# Patient Record
Sex: MALE | Race: WHITE | NOT HISPANIC OR LATINO | URBAN - METROPOLITAN AREA
[De-identification: names, ages, dates, MRNs, and addresses within clinical notes are randomized per-mention and may not be internally consistent; named-entity substitution may affect disease eponyms.]

---

## 2018-05-04 ENCOUNTER — INPATIENT (INPATIENT)
Facility: HOSPITAL | Age: 43
LOS: 20 days | Discharge: NURSING, REHAB OR LONG-TERM CARE | End: 2018-05-25
Attending: INTERNAL MEDICINE | Admitting: INTERNAL MEDICINE
Payer: MEDICAID

## 2018-05-04 VITALS
TEMPERATURE: 99 F | HEIGHT: 71 IN | RESPIRATION RATE: 19 BRPM | SYSTOLIC BLOOD PRESSURE: 106 MMHG | OXYGEN SATURATION: 95 % | DIASTOLIC BLOOD PRESSURE: 50 MMHG | HEART RATE: 145 BPM | WEIGHT: 164.91 LBS

## 2018-05-04 DIAGNOSIS — F10.230 ALCOHOL DEPENDENCE WITH WITHDRAWAL, UNCOMPLICATED: ICD-10-CM

## 2018-05-04 DIAGNOSIS — K85.20 ALCOHOL INDUCED ACUTE PANCREATITIS WITHOUT NECROSIS OR INFECTION: ICD-10-CM

## 2018-05-04 DIAGNOSIS — N28.9 DISORDER OF KIDNEY AND URETER, UNSPECIFIED: ICD-10-CM

## 2018-05-04 DIAGNOSIS — E87.6 HYPOKALEMIA: ICD-10-CM

## 2018-05-04 LAB
ALBUMIN SERPL ELPH-MCNC: 5.1 G/DL — HIGH (ref 3.3–5)
ALP SERPL-CCNC: 88 U/L — SIGNIFICANT CHANGE UP (ref 40–120)
ALT FLD-CCNC: 56 U/L — SIGNIFICANT CHANGE UP (ref 12–78)
ANION GAP SERPL CALC-SCNC: 16 MMOL/L — SIGNIFICANT CHANGE UP (ref 5–17)
ANION GAP SERPL CALC-SCNC: 24 MMOL/L — HIGH (ref 5–17)
APPEARANCE UR: CLEAR — SIGNIFICANT CHANGE UP
APTT BLD: 27.9 SEC — SIGNIFICANT CHANGE UP (ref 27.5–37.4)
AST SERPL-CCNC: 52 U/L — HIGH (ref 15–37)
BASE EXCESS BLDA CALC-SCNC: 18.6 MMOL/L — HIGH (ref -2–2)
BILIRUB SERPL-MCNC: 1.9 MG/DL — HIGH (ref 0.2–1.2)
BILIRUB UR-MCNC: ABNORMAL
BLOOD GAS COMMENTS: SIGNIFICANT CHANGE UP
BLOOD GAS COMMENTS: SIGNIFICANT CHANGE UP
BLOOD GAS SOURCE: SIGNIFICANT CHANGE UP
BUN SERPL-MCNC: 32 MG/DL — HIGH (ref 7–23)
BUN SERPL-MCNC: 38 MG/DL — HIGH (ref 7–23)
CALCIUM SERPL-MCNC: 10.8 MG/DL — HIGH (ref 8.5–10.1)
CALCIUM SERPL-MCNC: 8.8 MG/DL — SIGNIFICANT CHANGE UP (ref 8.5–10.1)
CHLORIDE SERPL-SCNC: 64 MMOL/L — LOW (ref 96–108)
CHLORIDE SERPL-SCNC: 77 MMOL/L — LOW (ref 96–108)
CK SERPL-CCNC: 223 U/L — SIGNIFICANT CHANGE UP (ref 26–308)
CO2 SERPL-SCNC: 39 MMOL/L — HIGH (ref 22–31)
CO2 SERPL-SCNC: 41 MMOL/L — HIGH (ref 22–31)
COLOR SPEC: YELLOW — SIGNIFICANT CHANGE UP
CREAT SERPL-MCNC: 2.41 MG/DL — HIGH (ref 0.5–1.3)
CREAT SERPL-MCNC: 4.59 MG/DL — HIGH (ref 0.5–1.3)
DIFF PNL FLD: ABNORMAL
ETHANOL SERPL-MCNC: <10 MG/DL — SIGNIFICANT CHANGE UP (ref 0–10)
GLUCOSE BLDC GLUCOMTR-MCNC: 169 MG/DL — HIGH (ref 70–99)
GLUCOSE SERPL-MCNC: 101 MG/DL — HIGH (ref 70–99)
GLUCOSE SERPL-MCNC: 138 MG/DL — HIGH (ref 70–99)
GLUCOSE UR QL: NEGATIVE MG/DL — SIGNIFICANT CHANGE UP
HCO3 BLDA-SCNC: 45 MMOL/L — HIGH (ref 21–29)
HCT VFR BLD CALC: 46.8 % — SIGNIFICANT CHANGE UP (ref 39–50)
HGB BLD-MCNC: 16.4 G/DL — SIGNIFICANT CHANGE UP (ref 13–17)
HOROWITZ INDEX BLDA+IHG-RTO: 21 — SIGNIFICANT CHANGE UP
INR BLD: 1.06 RATIO — SIGNIFICANT CHANGE UP (ref 0.88–1.16)
KETONES UR-MCNC: ABNORMAL
LACTATE SERPL-SCNC: 0.9 MMOL/L — SIGNIFICANT CHANGE UP (ref 0.7–2)
LEUKOCYTE ESTERASE UR-ACNC: ABNORMAL
LIDOCAIN IGE QN: 3547 U/L — HIGH (ref 73–393)
MCHC RBC-ENTMCNC: 32.8 PG — SIGNIFICANT CHANGE UP (ref 27–34)
MCHC RBC-ENTMCNC: 35 GM/DL — SIGNIFICANT CHANGE UP (ref 32–36)
MCV RBC AUTO: 93.6 FL — SIGNIFICANT CHANGE UP (ref 80–100)
NITRITE UR-MCNC: NEGATIVE — SIGNIFICANT CHANGE UP
NRBC # BLD: 0 /100 WBCS — SIGNIFICANT CHANGE UP (ref 0–0)
PCO2 BLDA: 51 MMHG — HIGH (ref 32–46)
PCP SPEC-MCNC: SIGNIFICANT CHANGE UP
PH BLD: 7.55 — HIGH (ref 7.35–7.45)
PH UR: 7 — SIGNIFICANT CHANGE UP (ref 5–8)
PLATELET # BLD AUTO: 188 K/UL — SIGNIFICANT CHANGE UP (ref 150–400)
PO2 BLDA: 81 MMHG — SIGNIFICANT CHANGE UP (ref 74–108)
POTASSIUM SERPL-MCNC: 2.5 MMOL/L — CRITICAL LOW (ref 3.5–5.3)
POTASSIUM SERPL-MCNC: 2.7 MMOL/L — CRITICAL LOW (ref 3.5–5.3)
POTASSIUM SERPL-SCNC: 2.5 MMOL/L — CRITICAL LOW (ref 3.5–5.3)
POTASSIUM SERPL-SCNC: 2.7 MMOL/L — CRITICAL LOW (ref 3.5–5.3)
PROT SERPL-MCNC: 10.9 GM/DL — HIGH (ref 6–8.3)
PROT UR-MCNC: 100 MG/DL
PROTHROM AB SERPL-ACNC: 11.6 SEC — SIGNIFICANT CHANGE UP (ref 9.8–12.7)
RBC # BLD: 5 M/UL — SIGNIFICANT CHANGE UP (ref 4.2–5.8)
RBC # FLD: 13.5 % — SIGNIFICANT CHANGE UP (ref 10.3–14.5)
SAO2 % BLDA: 96 % — SIGNIFICANT CHANGE UP (ref 92–96)
SODIUM SERPL-SCNC: 129 MMOL/L — LOW (ref 135–145)
SODIUM SERPL-SCNC: 132 MMOL/L — LOW (ref 135–145)
SP GR SPEC: 1.01 — SIGNIFICANT CHANGE UP (ref 1.01–1.02)
TROPONIN I SERPL-MCNC: <.015 NG/ML — SIGNIFICANT CHANGE UP (ref 0.01–0.04)
TSH SERPL-MCNC: 0.29 UIU/ML — LOW (ref 0.36–3.74)
UROBILINOGEN FLD QL: 4 MG/DL
WBC # BLD: 13.15 K/UL — HIGH (ref 3.8–10.5)
WBC # FLD AUTO: 13.15 K/UL — HIGH (ref 3.8–10.5)

## 2018-05-04 PROCEDURE — 71045 X-RAY EXAM CHEST 1 VIEW: CPT | Mod: 26

## 2018-05-04 PROCEDURE — 99284 EMERGENCY DEPT VISIT MOD MDM: CPT

## 2018-05-04 PROCEDURE — 70450 CT HEAD/BRAIN W/O DYE: CPT | Mod: 26

## 2018-05-04 PROCEDURE — 93010 ELECTROCARDIOGRAM REPORT: CPT

## 2018-05-04 PROCEDURE — 74176 CT ABD & PELVIS W/O CONTRAST: CPT | Mod: 26

## 2018-05-04 PROCEDURE — 99291 CRITICAL CARE FIRST HOUR: CPT

## 2018-05-04 RX ORDER — MIDAZOLAM HYDROCHLORIDE 1 MG/ML
4 INJECTION, SOLUTION INTRAMUSCULAR; INTRAVENOUS ONCE
Qty: 0 | Refills: 0 | Status: DISCONTINUED | OUTPATIENT
Start: 2018-05-04 | End: 2018-05-04

## 2018-05-04 RX ORDER — CHLORHEXIDINE GLUCONATE 213 G/1000ML
1 SOLUTION TOPICAL
Qty: 0 | Refills: 0 | Status: DISCONTINUED | OUTPATIENT
Start: 2018-05-04 | End: 2018-05-21

## 2018-05-04 RX ORDER — PHENOBARBITAL 60 MG
260 TABLET ORAL ONCE
Qty: 0 | Refills: 0 | Status: DISCONTINUED | OUTPATIENT
Start: 2018-05-04 | End: 2018-05-04

## 2018-05-04 RX ORDER — ONDANSETRON 8 MG/1
4 TABLET, FILM COATED ORAL EVERY 6 HOURS
Qty: 0 | Refills: 0 | Status: DISCONTINUED | OUTPATIENT
Start: 2018-05-04 | End: 2018-05-05

## 2018-05-04 RX ORDER — THIAMINE MONONITRATE (VIT B1) 100 MG
100 TABLET ORAL DAILY
Qty: 0 | Refills: 0 | Status: DISCONTINUED | OUTPATIENT
Start: 2018-05-04 | End: 2018-05-04

## 2018-05-04 RX ORDER — SODIUM CHLORIDE 9 MG/ML
1000 INJECTION, SOLUTION INTRAVENOUS
Qty: 0 | Refills: 0 | Status: COMPLETED | OUTPATIENT
Start: 2018-05-04 | End: 2018-05-04

## 2018-05-04 RX ORDER — FOLIC ACID 0.8 MG
1 TABLET ORAL DAILY
Qty: 0 | Refills: 0 | Status: DISCONTINUED | OUTPATIENT
Start: 2018-05-04 | End: 2018-05-04

## 2018-05-04 RX ORDER — POTASSIUM CHLORIDE 20 MEQ
10 PACKET (EA) ORAL
Qty: 0 | Refills: 0 | Status: COMPLETED | OUTPATIENT
Start: 2018-05-04 | End: 2018-05-04

## 2018-05-04 RX ORDER — SODIUM CHLORIDE 9 MG/ML
1000 INJECTION, SOLUTION INTRAVENOUS
Qty: 0 | Refills: 0 | Status: DISCONTINUED | OUTPATIENT
Start: 2018-05-04 | End: 2018-05-04

## 2018-05-04 RX ORDER — PANTOPRAZOLE SODIUM 20 MG/1
40 TABLET, DELAYED RELEASE ORAL
Qty: 0 | Refills: 0 | Status: DISCONTINUED | OUTPATIENT
Start: 2018-05-04 | End: 2018-05-08

## 2018-05-04 RX ORDER — SODIUM CHLORIDE 9 MG/ML
1000 INJECTION, SOLUTION INTRAVENOUS
Qty: 0 | Refills: 0 | Status: DISCONTINUED | OUTPATIENT
Start: 2018-05-04 | End: 2018-05-08

## 2018-05-04 RX ORDER — SODIUM CHLORIDE 9 MG/ML
2000 INJECTION INTRAMUSCULAR; INTRAVENOUS; SUBCUTANEOUS ONCE
Qty: 0 | Refills: 0 | Status: COMPLETED | OUTPATIENT
Start: 2018-05-04 | End: 2018-05-04

## 2018-05-04 RX ORDER — SODIUM CHLORIDE 9 MG/ML
1000 INJECTION INTRAMUSCULAR; INTRAVENOUS; SUBCUTANEOUS
Qty: 0 | Refills: 0 | Status: DISCONTINUED | OUTPATIENT
Start: 2018-05-04 | End: 2018-05-04

## 2018-05-04 RX ORDER — IOHEXOL 300 MG/ML
30 INJECTION, SOLUTION INTRAVENOUS ONCE
Qty: 0 | Refills: 0 | Status: COMPLETED | OUTPATIENT
Start: 2018-05-04 | End: 2018-05-04

## 2018-05-04 RX ORDER — DEXMEDETOMIDINE HYDROCHLORIDE IN 0.9% SODIUM CHLORIDE 4 UG/ML
0.2 INJECTION INTRAVENOUS
Qty: 200 | Refills: 0 | Status: DISCONTINUED | OUTPATIENT
Start: 2018-05-04 | End: 2018-05-13

## 2018-05-04 RX ORDER — PHENOBARBITAL 60 MG
130 TABLET ORAL
Qty: 0 | Refills: 0 | Status: DISCONTINUED | OUTPATIENT
Start: 2018-05-04 | End: 2018-05-10

## 2018-05-04 RX ORDER — PHENOBARBITAL 60 MG
260 TABLET ORAL
Qty: 0 | Refills: 0 | Status: DISCONTINUED | OUTPATIENT
Start: 2018-05-04 | End: 2018-05-10

## 2018-05-04 RX ORDER — HEPARIN SODIUM 5000 [USP'U]/ML
5000 INJECTION INTRAVENOUS; SUBCUTANEOUS EVERY 12 HOURS
Qty: 0 | Refills: 0 | Status: DISCONTINUED | OUTPATIENT
Start: 2018-05-04 | End: 2018-05-25

## 2018-05-04 RX ORDER — METOCLOPRAMIDE HCL 10 MG
10 TABLET ORAL ONCE
Qty: 0 | Refills: 0 | Status: COMPLETED | OUTPATIENT
Start: 2018-05-04 | End: 2018-05-04

## 2018-05-04 RX ORDER — POTASSIUM CHLORIDE 20 MEQ
40 PACKET (EA) ORAL EVERY 4 HOURS
Qty: 0 | Refills: 0 | Status: DISCONTINUED | OUTPATIENT
Start: 2018-05-04 | End: 2018-05-04

## 2018-05-04 RX ORDER — CALCIUM GLUCONATE 100 MG/ML
1 VIAL (ML) INTRAVENOUS ONCE
Qty: 0 | Refills: 0 | Status: DISCONTINUED | OUTPATIENT
Start: 2018-05-04 | End: 2018-05-04

## 2018-05-04 RX ORDER — MAGNESIUM SULFATE 500 MG/ML
2 VIAL (ML) INJECTION ONCE
Qty: 0 | Refills: 0 | Status: COMPLETED | OUTPATIENT
Start: 2018-05-04 | End: 2018-05-04

## 2018-05-04 RX ORDER — FAMOTIDINE 10 MG/ML
40 INJECTION INTRAVENOUS ONCE
Qty: 0 | Refills: 0 | Status: COMPLETED | OUTPATIENT
Start: 2018-05-04 | End: 2018-05-04

## 2018-05-04 RX ADMIN — Medication 1 MILLIGRAM(S): at 14:48

## 2018-05-04 RX ADMIN — Medication 2 MILLIGRAM(S): at 17:49

## 2018-05-04 RX ADMIN — Medication 100 MILLIEQUIVALENT(S): at 12:31

## 2018-05-04 RX ADMIN — Medication 130 MILLIGRAM(S): at 21:51

## 2018-05-04 RX ADMIN — Medication 100 MILLIEQUIVALENT(S): at 20:10

## 2018-05-04 RX ADMIN — Medication 2 MILLIGRAM(S): at 14:47

## 2018-05-04 RX ADMIN — Medication 50 MILLIGRAM(S): at 17:19

## 2018-05-04 RX ADMIN — Medication 10 MILLIGRAM(S): at 08:55

## 2018-05-04 RX ADMIN — Medication 2 MILLIGRAM(S): at 16:11

## 2018-05-04 RX ADMIN — Medication 260 MILLIGRAM(S): at 18:50

## 2018-05-04 RX ADMIN — Medication 2 MILLIGRAM(S): at 08:55

## 2018-05-04 RX ADMIN — Medication 100 MILLIEQUIVALENT(S): at 11:30

## 2018-05-04 RX ADMIN — MIDAZOLAM HYDROCHLORIDE 4 MILLIGRAM(S): 1 INJECTION, SOLUTION INTRAMUSCULAR; INTRAVENOUS at 18:50

## 2018-05-04 RX ADMIN — Medication 2 MILLIGRAM(S): at 17:57

## 2018-05-04 RX ADMIN — Medication 100 MILLIEQUIVALENT(S): at 21:05

## 2018-05-04 RX ADMIN — Medication 2 MILLIGRAM(S): at 17:17

## 2018-05-04 RX ADMIN — DEXMEDETOMIDINE HYDROCHLORIDE IN 0.9% SODIUM CHLORIDE 3.74 MICROGRAM(S)/KG/HR: 4 INJECTION INTRAVENOUS at 18:15

## 2018-05-04 RX ADMIN — IOHEXOL 30 MILLILITER(S): 300 INJECTION, SOLUTION INTRAVENOUS at 09:07

## 2018-05-04 RX ADMIN — Medication 100 MILLIEQUIVALENT(S): at 10:20

## 2018-05-04 RX ADMIN — Medication 130 MILLIGRAM(S): at 20:39

## 2018-05-04 RX ADMIN — Medication 40 MILLIEQUIVALENT(S): at 10:20

## 2018-05-04 RX ADMIN — Medication 260 MILLIGRAM(S): at 18:15

## 2018-05-04 RX ADMIN — Medication 1 TABLET(S): at 14:48

## 2018-05-04 RX ADMIN — SODIUM CHLORIDE 120 MILLILITER(S): 9 INJECTION, SOLUTION INTRAVENOUS at 10:20

## 2018-05-04 RX ADMIN — Medication 40 MILLIEQUIVALENT(S): at 14:48

## 2018-05-04 RX ADMIN — DEXMEDETOMIDINE HYDROCHLORIDE IN 0.9% SODIUM CHLORIDE 3.74 MICROGRAM(S)/KG/HR: 4 INJECTION INTRAVENOUS at 20:52

## 2018-05-04 RX ADMIN — SODIUM CHLORIDE 42 MILLILITER(S): 9 INJECTION, SOLUTION INTRAVENOUS at 21:15

## 2018-05-04 RX ADMIN — SODIUM CHLORIDE 2000 MILLILITER(S): 9 INJECTION INTRAMUSCULAR; INTRAVENOUS; SUBCUTANEOUS at 08:55

## 2018-05-04 RX ADMIN — Medication 50 GRAM(S): at 08:54

## 2018-05-04 RX ADMIN — Medication 100 MILLIEQUIVALENT(S): at 22:05

## 2018-05-04 RX ADMIN — FAMOTIDINE 40 MILLIGRAM(S): 10 INJECTION INTRAVENOUS at 08:55

## 2018-05-04 RX ADMIN — Medication 100 MILLIGRAM(S): at 14:48

## 2018-05-04 NOTE — ED PROVIDER NOTE - CARE PLAN
Principal Discharge DX:	Alcohol withdrawal syndrome without complication Principal Discharge DX:	Alcohol withdrawal syndrome without complication  Secondary Diagnosis:	Hypokalemia  Secondary Diagnosis:	Alcohol-induced acute pancreatitis, unspecified complication status

## 2018-05-04 NOTE — CHART NOTE - NSCHARTNOTEFT_GEN_A_CORE
Transfer accept note      CHIEF COMPLAINT: Abdominal pain    HPI: 41 y/o M w/alcohol abuse (4-5 glasses of wine per day for many years) no prior episodes of withdrawal presenting with abdominal pain for ~ 4 days. Patient reports epigastric pain which he thought was exacerbated by drinking so he stopped. Patient also reports diffuse muscle cramps. Patient reports recent fall, but no residual effects from fall. Patient currently reports some anxiety, disordered thoughts, and visual hallucinations. No history of seizures.     PAST MEDICAL & SURGICAL HISTORY:  No pertinent past medical history  No significant past surgical history      FAMILY HISTORY:  No pertinent family history in first degree relatives      SOCIAL HISTORY:  Smoke marijuana. Alcohol use as above. No other illicit drug use. 3-4 cigs/day    Allergies    No Known Allergies    Intolerances        HOME MEDICATIONS:  Home Medications:      REVIEW OF SYSTEMS:  See above. ROS otherwise negative    OBJECTIVE:  ICU Vital Signs Last 24 Hrs  T(C): 36.7 (04 May 2018 16:58), Max: 37.2 (04 May 2018 08:14)  T(F): 98.1 (04 May 2018 16:58), Max: 98.9 (04 May 2018 08:14)  HR: 90 (04 May 2018 16:58) (90 - 145)  BP: 121/82 (04 May 2018 16:58) (106/50 - 136/92)  BP(mean): --  ABP: --  ABP(mean): --  RR: 19 (04 May 2018 16:58) (18 - 19)  SpO2: 92% (04 May 2018 16:58) (92% - 95%)        CAPILLARY BLOOD GLUCOSE      POCT Blood Glucose.: 169 mg/dL (04 May 2018 08:16)      PHYSICAL EXAM:  General: Adult male pacing around, agitated  HEENT: NC/AT sclearae anicteric  Respiratory: No increased WOB. CTAB  Cardiovascular: S1, S2  Abdomen: Soft NT/ND + BS  Extremities: No edema  Skin: Intact  Neurological: Awake, follows commands  Psychiatry: Agitated, somewhat disordered thoughts    HOSPITAL MEDICATIONS:  Standing Meds:  dexmedetomidine Infusion 0.2 MICROgram(s)/kG/Hr IV Continuous <Continuous>  folic acid 1 milliGRAM(s) Oral daily  LORazepam   Injectable 4 milliGRAM(s) IV Push once  multivitamin 1 Tablet(s) Oral daily  pantoprazole    Tablet 40 milliGRAM(s) Oral before breakfast  PHENobarbital Injectable 260 milliGRAM(s) IV Push once  potassium chloride    Tablet ER 40 milliEquivalent(s) Oral every 4 hours  sodium chloride 0.9%. 1000 milliLiter(s) IV Continuous <Continuous>  thiamine 100 milliGRAM(s) Oral daily      PRN Meds:  ondansetron Injectable 4 milliGRAM(s) IV Push every 6 hours PRN  PHENobarbital Injectable 130 milliGRAM(s) IV Push every 15 minutes PRN  PHENobarbital Injectable 260 milliGRAM(s) IV Push every 15 minutes PRN      LABS:                        16.4   13.15 )-----------( 188      ( 04 May 2018 08:54 )             46.8     Hgb Trend: 16.4<--      132<L>  |  77<L>  |  32<H>  ----------------------------<  101<H>  2.5<LL>   |  39<H>  |  2.41<H>    Ca    8.8      04 May 2018 14:46    TPro  10.9<H>  /  Alb  5.1<H>  /  TBili  1.9<H>  /  DBili  x   /  AST  52<H>  /  ALT  56  /  AlkPhos  88      Creatinine Trend: 2.41<--, 4.59<--  PT/INR - ( 04 May 2018 08:54 )   PT: 11.6 sec;   INR: 1.06 ratio         PTT - ( 04 May 2018 08:54 )  PTT:27.9 sec  Urinalysis Basic - ( 04 May 2018 14:47 )    Color: Yellow / Appearance: Clear / S.010 / pH: x  Gluc: x / Ketone: Moderate  / Bili: Moderate / Urobili: 4 mg/dL   Blood: x / Protein: 100 mg/dL / Nitrite: Negative   Leuk Esterase: Trace / RBC: 11-25 /HPF / WBC 0-2   Sq Epi: x / Non Sq Epi: Occasional / Bacteria: Few      Arterial Blood Gas:   @ 10:23  --/51/81/45/96/18.6  ABG lactate: --        MICROBIOLOGY:       RADIOLOGY:  < from: CT Abdomen and Pelvis w/ Oral Cont (18 @ 11:58) >      IMPRESSION:     No bowel obstruction.  Pancreatic head inflammatory changes, question mild pancreatitis.  Hepatomegaly and hepatic steatosis.    < end of copied text >    A/P  41 y/o M w/alcohol abuse presenting with alcohol withdrawal, acute alcoholic pancreatitis, hyponatremia, hypokalemia, HAIDER and likely contraction alkalosis.    - Precedx, ativan, phenobarb  - IV hydration  - Thiamine, folate, MVI  - Replete K  - NPO  - Pain control as needed  - Trend Cr, avoid nephrotoxins   - Repeat TSH once medically improved, likely sick euthyroid    Attending critical care time 40 minutes

## 2018-05-04 NOTE — ED PROVIDER NOTE - PHYSICAL EXAMINATION
Vitals: tachy at 145  Gen: AAOx3, moderate distress, active vomiting, shaky extremities   Head: ncat, perrla, eomi b/l  Neck: supple, no lymphadenopathy, no midline deviation  Heart: rrr, no m/r/g  Lungs: CTA b/l, no rales/ronchi/wheezes  Abd: soft, nontender, non-distended, no rebound or guarding  Ext: no clubbing/cyanosis/edema  Neuro: sensation and muscle strength intact b/l, moving all 4 ext with equal strength, CN2-12 intact b/l, + Trousseau's sign on inflation of BP cuff on L arm

## 2018-05-04 NOTE — PATIENT PROFILE ADULT. - PRO MENTAL HEALTH SX RECENT
difficulty concentrating/sad/angry/feeling down/irritable/feeling depressed/self-blame/change in sleep pattern

## 2018-05-04 NOTE — ED ADULT TRIAGE NOTE - CHIEF COMPLAINT QUOTE
patient c/o of abdominal pain , with N/V started 2 days ago , c/o of chest pain , patient admitted drinking last time 3 days ago

## 2018-05-04 NOTE — ED PROVIDER NOTE - MEDICAL DECISION MAKING DETAILS
43 yo M with likely alcohol withdrawal, diffuse pain, n/v, r/o ACS, r/o pancreatitis, likely electrolyte derangement including hypocalcemia from fluid loss  -cbc, cmp, etoh level, blodo gas, ua, cx, drug screen, lipase, lactate, trop, tsh, coags  -tx pepcid, reglan, ns bolus 2L, ativan, cxr, ct abd/pel++ contrast, ekg, mvi, monitor

## 2018-05-04 NOTE — CONSULT NOTE ADULT - SUBJECTIVE AND OBJECTIVE BOX
Information from chart:  "Patient is a 42y old  Male who presents with a chief complaint of alcohol withdrawal/ pncreatitis/ electrolyte imbalnc/-Hypoklemia. Hypontremia/ vomiting (04 May 2018 14:01)    HPI:  41 yo M with total body pain, n/v/d for about 4 days.  Pt. admits to chronic drinking (5 glasses of wine per day).  He stopped days ago due to n/v and epigastric pain.  He thought he should stop because he figured alcohol was irritating his stomach.  He also notes diffuse cramps in his muscles.  He's been drinking Gatorade for the last few days to replenish fluids.  Pt. denies inciting event.  No sick contacts, no recent travel.  He was well before.  No other complaints.   	ROS: negative for fever, cough, headache, shortness of breath, and rash.  PMH: negative; Meds: Denies; SH: chronic alcohol and marijuana use (04 May 2018 14:01)   "  Denies NSAIDs use;      PAST MEDICAL & SURGICAL HISTORY:  No pertinent past medical history  No significant past surgical history    FAMILY HISTORY:  No pertinent family history in first degree relatives    Allergies    No Known Allergies    Intolerances      Home Medications:    MEDICATIONS  (STANDING):  chlorhexidine 4% Liquid 1 Application(s) Topical <User Schedule>  dexmedetomidine Infusion 0.2 MICROgram(s)/kG/Hr (3.74 mL/Hr) IV Continuous <Continuous>  heparin  Injectable 5000 Unit(s) SubCutaneous every 12 hours  LORazepam   Injectable 4 milliGRAM(s) IV Push once  pantoprazole    Tablet 40 milliGRAM(s) Oral before breakfast  sodium chloride 0.9% 1000 milliLiter(s) (42 mL/Hr) IV Continuous <Continuous>    MEDICATIONS  (PRN):  ondansetron Injectable 4 milliGRAM(s) IV Push every 6 hours PRN Nausea and/or Vomiting  PHENobarbital Injectable 130 milliGRAM(s) IV Push every 15 minutes PRN ciwa >8  PHENobarbital Injectable 260 milliGRAM(s) IV Push every 15 minutes PRN ciwa >20    Vital Signs Last 24 Hrs  T(C): 36 (04 May 2018 23:06), Max: 37.2 (04 May 2018 08:14)  T(F): 96.8 (04 May 2018 23:06), Max: 98.9 (04 May 2018 08:14)  HR: 82 (04 May 2018 22:00) (82 - 145)  BP: 100/66 (04 May 2018 22:00) (76/52 - 136/92)  BP(mean): 74 (04 May 2018 22:00) (59 - 89)  RR: 21 (04 May 2018 22:00) (18 - 27)  SpO2: 96% (04 May 2018 22:00) (85% - 98%)    Daily Height in cm: 180.34 (04 May 2018 08:14)    Daily Weight in k.4 (04 May 2018 18:48)    18 @ 07:01  -  18 @ 23:16  --------------------------------------------------------  IN: 421.6 mL / OUT: 0 mL / NET: 421.6 mL      CAPILLARY BLOOD GLUCOSE      POCT Blood Glucose.: 169 mg/dL (04 May 2018 08:16)    PHYSICAL EXAM:      T(C): 36 (18 @ 23:06), Max: 37.2 (18 @ 08:14)  HR: 82 (18 @ 22:00) (82 - 145)  BP: 100/66 (18 @ 22:00) (76/52 - 136/92)  RR: 21 (18 @ 22:00) (18 - 27)  SpO2: 96% (18 @ 22:00) (85% - 98%)  Wt(kg): --  Respiratory: clear anteriorly, decreased BS at bases  Cardiovascular: S1 S2  Gastrointestinal: soft NT ND +BS  Extremities:   tr edema                  132<L>  |  77<L>  |  32<H>  ----------------------------<  101<H>  2.5<LL>   |  39<H>  |  2.41<H>    Ca    8.8      04 May 2018 14:46    TPro  10.9<H>  /  Alb  5.1<H>  /  TBili  1.9<H>  /  DBili  x   /  AST  52<H>  /  ALT  56  /  AlkPhos  88                            16.4   13.15 )-----------( 188      ( 04 May 2018 08:54 )             46.8     Urinalysis Basic - ( 04 May 2018 14:47 )    Color: Yellow / Appearance: Clear / S.010 / pH: x  Gluc: x / Ketone: Moderate  / Bili: Moderate / Urobili: 4 mg/dL   Blood: x / Protein: 100 mg/dL / Nitrite: Negative   Leuk Esterase: Trace / RBC: 11-25 /HPF / WBC 0-2   Sq Epi: x / Non Sq Epi: Occasional / Bacteria: Few      ABG - ( 04 May 2018 10:23 )  pH, Arterial: x     pH, Blood: 7.55  /  pCO2: 51    /  pO2: 81    / HCO3: 45    / Base Excess: 18.6  /  SaO2: 96              Assessment and Plan    HAIDER pre renal azotemia, ETOH abuse; hypokalemia with metabolic alkalosis; alcoholic pancreatitis.   REplete K; IVF; MVI;   Follow cpk trend;   Will follow.

## 2018-05-04 NOTE — ED PROVIDER NOTE - OBJECTIVE STATEMENT
43 yo M with total body pain, n/v/d for about 4 days.  Pt. admits to chronic drinking (5 glasses of wine per day).  He stopped days ago due to n/v and epigastric pain.  He thought he should stop because he figured alcohol was irritating his stomach.  He also notes diffuse cramps in his muscles.  He's been drinking Gatorade for the last few days to replenish fluids.  Pt. denies inciting event.  No sick contacts, no recent travel.  He was well before.  No other complaints.   ROS: negative for fever, cough, headache, shortness of breath, and rash.  PMH: negative; Meds: Denies; SH: chronic alcohol and marijuana use

## 2018-05-04 NOTE — ED ADULT NURSE REASSESSMENT NOTE - NS ED NURSE REASSESS COMMENT FT1
RRT called by JAKE larios due to patient increasing hallucination and agitation, was upgraded to ICU due to ciwa of 29. was medicated by rn manager sabine with orders by ICU PA. jake larios gave report to ICU JAKE bazan and patient was transported by administrative team.

## 2018-05-04 NOTE — H&P ADULT - NSHPLABSRESULTS_GEN_ALL_CORE
16.4                 129  | 41   | 38           13.15 >-----------< 188     ------------------------< 138                   46.8                 2.7  | 64   | 4.59                                         Ca 10.8  Mg x     Ph x      ABG - ( 04 May 2018 10:23 )  pH, Arterial: x     pH, Blood: 7.55  /  pCO2: 51    /  pO2: 81    / HCO3: 45    / Base Excess: 18.6  /  SaO2: 96     < from: CT Abdomen and Pelvis w/ Oral Cont (05.04.18 @ 11:58) >      EXAM:  CT ABDOMEN AND PELVIS OC                            PROCEDURE DATE:  05/04/2018          INTERPRETATION:  CLINICAL INFORMATION: Abdominal pain, nausea, and   vomiting    COMPARISON: None.    PROCEDURE:   CT of the Abdomen and Pelvis was performed without intravenous contrast.   Intravenous contrast: None.  Oral contrast: positive contrast was administered.  Sagittal and coronal reformats were performed.    FINDINGS:    LOWER CHEST: Clear lung bases.    Please note that evaluation of the abdominal organs and vascular   structures is limited by lack of intravenous contrast.    LIVER: Enlarged. Low-attenuation, compatible with hepatic steatosis.  BILE DUCTS: Normal caliber.  GALLBLADDER: Within normal limits.  SPLEEN: Within normal limits.  PANCREAS: Mild inflammatory changes around pancreatic head.  ADRENALS: Within normal limits.  KIDNEYS/URETERS: Nonspecific perinephric stranding. Bilateral duplicated   renal collecting systems. No hydronephrosis.    BLADDER: Within normal limits.  REPRODUCTIVE ORGANS: The prostate is within normal limits.    BOWEL: Duodenal diverticulum. No bowel obstruction. Appendix within   normal limits. Colonic diverticulosis.  PERITONEUM: No ascites.  VESSELS:  Mild atherosclerotic changes.  RETROPERITONEUM: No lymphadenopathy.    ABDOMINAL WALL: Tiny fat-containing umbilical hernia.  BONES: Mild spinal degenerative changes. Chronic appearing left 9th and   10th posterior rib deformities. Bilateral L5 spondylolysis.    IMPRESSION:     No bowel obstruction.  Pancreatic head inflammatory changes, question mild pancreatitis.  Hepatomegaly and hepatic steatosis.                FAVIOLA ARVIZU M.D., ATTENDING RADIOLOGIST    < end of copied text >    EKG - Sinus tach. NAC

## 2018-05-04 NOTE — H&P ADULT - NSHPPHYSICALEXAM_GEN_ALL_CORE
General: A/ox 3, No acute Distress  skin : Normal  Head. Ashkan. No lacerations  Neck: Supple, NO JVD  Cardiac: S1 S2, No M/R/G  Pulmonary: CTAP, Breathing unlabored, No Rhonchi/Rales/Wheezing  Abdomen: Soft, Non -tender, +BS x 4 quads  Extremities: No Rashes, No edema  Neuro: A/o x 3, No focal deficits. Normal Motor and sensory exam  Vascular: Normal distal pulses.  Extremities: No edema  Decubiti ; None

## 2018-05-04 NOTE — ED ADULT NURSE NOTE - OBJECTIVE STATEMENT
patient received, came here for abdominal pain with n/v, x 3 days and leg cramps, patient appears to be in alcohol withdrawal, states last drink tuesday night 2 beers and 3 glasses of wine, got sick after drinking. patient is shaky and red.

## 2018-05-04 NOTE — ED PROVIDER NOTE - PROGRESS NOTE DETAILS
ct consistent with pancreatitis as well as labs, pt. improved after meds, lokesh noted on labs, likely 2/2 severe dehydration from vomiting and diarrhea pt. jeremias over 16, ordered another 2 ativan and 50 librium po  ICU consulted pt now hallucinating, evaluated by ICU team, accepted to ICU for etoh withdrawal and pancreatitis  changing bed request now

## 2018-05-05 DIAGNOSIS — K76.0 FATTY (CHANGE OF) LIVER, NOT ELSEWHERE CLASSIFIED: ICD-10-CM

## 2018-05-05 LAB
ALBUMIN SERPL ELPH-MCNC: 3.5 G/DL — SIGNIFICANT CHANGE UP (ref 3.3–5)
ALP SERPL-CCNC: 56 U/L — SIGNIFICANT CHANGE UP (ref 40–120)
ALT FLD-CCNC: 37 U/L — SIGNIFICANT CHANGE UP (ref 12–78)
AMYLASE P1 CFR SERPL: 157 U/L — HIGH (ref 25–115)
ANION GAP SERPL CALC-SCNC: 10 MMOL/L — SIGNIFICANT CHANGE UP (ref 5–17)
AST SERPL-CCNC: 43 U/L — HIGH (ref 15–37)
BASOPHILS # BLD AUTO: 0 K/UL — SIGNIFICANT CHANGE UP (ref 0–0.2)
BASOPHILS NFR BLD AUTO: 0 % — SIGNIFICANT CHANGE UP (ref 0–2)
BILIRUB SERPL-MCNC: 1.7 MG/DL — HIGH (ref 0.2–1.2)
BUN SERPL-MCNC: 28 MG/DL — HIGH (ref 7–23)
CA-I BLD-SCNC: 1.05 MMOL/L — LOW (ref 1.12–1.3)
CALCIUM SERPL-MCNC: 9 MG/DL — SIGNIFICANT CHANGE UP (ref 8.5–10.1)
CHLORIDE SERPL-SCNC: 85 MMOL/L — LOW (ref 96–108)
CK SERPL-CCNC: 178 U/L — SIGNIFICANT CHANGE UP (ref 26–308)
CO2 SERPL-SCNC: 39 MMOL/L — HIGH (ref 22–31)
CREAT ?TM UR-MCNC: 161 MG/DL — SIGNIFICANT CHANGE UP
CREAT SERPL-MCNC: 1.16 MG/DL — SIGNIFICANT CHANGE UP (ref 0.5–1.3)
CULTURE RESULTS: NO GROWTH — SIGNIFICANT CHANGE UP
EOSINOPHIL # BLD AUTO: 0.06 K/UL — SIGNIFICANT CHANGE UP (ref 0–0.5)
EOSINOPHIL NFR BLD AUTO: 1 % — SIGNIFICANT CHANGE UP (ref 0–6)
GLUCOSE SERPL-MCNC: 93 MG/DL — SIGNIFICANT CHANGE UP (ref 70–99)
HCT VFR BLD CALC: 35.2 % — LOW (ref 39–50)
HGB BLD-MCNC: 12 G/DL — LOW (ref 13–17)
IGA FLD-MCNC: 611 MG/DL — HIGH (ref 68–378)
IGG FLD-MCNC: 1290 MG/DL — SIGNIFICANT CHANGE UP (ref 694–1618)
IGM SERPL-MCNC: 66 MG/DL — SIGNIFICANT CHANGE UP (ref 40–230)
KAPPA LC SER QL IFE: 5.91 MG/DL — HIGH (ref 0.33–1.94)
KAPPA/LAMBDA FREE LIGHT CHAIN RATIO, SERUM: 1.47 RATIO — SIGNIFICANT CHANGE UP (ref 0.26–1.65)
LAMBDA LC SER QL IFE: 4.01 MG/DL — HIGH (ref 0.57–2.63)
LIDOCAIN IGE QN: 577 U/L — HIGH (ref 73–393)
LYMPHOCYTES # BLD AUTO: 0.66 K/UL — LOW (ref 1–3.3)
LYMPHOCYTES # BLD AUTO: 11 % — LOW (ref 13–44)
MAGNESIUM SERPL-MCNC: 1.9 MG/DL — SIGNIFICANT CHANGE UP (ref 1.6–2.6)
MAGNESIUM SERPL-MCNC: 2 MG/DL — SIGNIFICANT CHANGE UP (ref 1.6–2.6)
MANUAL SMEAR VERIFICATION: SIGNIFICANT CHANGE UP
MCHC RBC-ENTMCNC: 32.5 PG — SIGNIFICANT CHANGE UP (ref 27–34)
MCHC RBC-ENTMCNC: 34.1 GM/DL — SIGNIFICANT CHANGE UP (ref 32–36)
MCV RBC AUTO: 95.4 FL — SIGNIFICANT CHANGE UP (ref 80–100)
MONOCYTES # BLD AUTO: 0.6 K/UL — SIGNIFICANT CHANGE UP (ref 0–0.9)
MONOCYTES NFR BLD AUTO: 10 % — SIGNIFICANT CHANGE UP (ref 2–14)
NEUTROPHILS # BLD AUTO: 4.71 K/UL — SIGNIFICANT CHANGE UP (ref 1.8–7.4)
NEUTROPHILS NFR BLD AUTO: 78 % — HIGH (ref 43–77)
NRBC # BLD: 0 /100 — SIGNIFICANT CHANGE UP (ref 0–0)
NRBC # BLD: SIGNIFICANT CHANGE UP /100 WBCS (ref 0–0)
PHOSPHATE SERPL-MCNC: 3.3 MG/DL — SIGNIFICANT CHANGE UP (ref 2.5–4.5)
PHOSPHATE SERPL-MCNC: 4 MG/DL — SIGNIFICANT CHANGE UP (ref 2.5–4.5)
PLAT MORPH BLD: NORMAL — SIGNIFICANT CHANGE UP
PLATELET # BLD AUTO: 82 K/UL — LOW (ref 150–400)
PLATELET COUNT - ESTIMATE: ABNORMAL
POTASSIUM SERPL-MCNC: 2.8 MMOL/L — CRITICAL LOW (ref 3.5–5.3)
POTASSIUM SERPL-SCNC: 2.8 MMOL/L — CRITICAL LOW (ref 3.5–5.3)
PROT ?TM UR-MCNC: 145 MG/DL — HIGH (ref 0–12)
PROT ?TM UR-MCNC: 156 MG/DL — HIGH (ref 0–12)
PROT SERPL-MCNC: 7.4 GM/DL — SIGNIFICANT CHANGE UP (ref 6–8.3)
PROT SERPL-MCNC: 7.6 G/DL — SIGNIFICANT CHANGE UP (ref 6–8.3)
PROT SERPL-MCNC: 7.6 G/DL — SIGNIFICANT CHANGE UP (ref 6–8.3)
PROT/CREAT UR-RTO: 1 RATIO — HIGH (ref 0–0.2)
RBC # BLD: 3.69 M/UL — LOW (ref 4.2–5.8)
RBC # FLD: 13 % — SIGNIFICANT CHANGE UP (ref 10.3–14.5)
RBC BLD AUTO: NORMAL — SIGNIFICANT CHANGE UP
SODIUM SERPL-SCNC: 134 MMOL/L — LOW (ref 135–145)
SODIUM UR-SCNC: 43 MMOL/L — SIGNIFICANT CHANGE UP
SPECIMEN SOURCE: SIGNIFICANT CHANGE UP
WBC # BLD: 6.04 K/UL — SIGNIFICANT CHANGE UP (ref 3.8–10.5)
WBC # FLD AUTO: 6.04 K/UL — SIGNIFICANT CHANGE UP (ref 3.8–10.5)

## 2018-05-05 PROCEDURE — 99291 CRITICAL CARE FIRST HOUR: CPT

## 2018-05-05 RX ORDER — POTASSIUM CHLORIDE 20 MEQ
10 PACKET (EA) ORAL
Qty: 0 | Refills: 0 | Status: COMPLETED | OUTPATIENT
Start: 2018-05-05 | End: 2018-05-05

## 2018-05-05 RX ORDER — SODIUM CHLORIDE 9 MG/ML
1000 INJECTION, SOLUTION INTRAVENOUS ONCE
Qty: 0 | Refills: 0 | Status: COMPLETED | OUTPATIENT
Start: 2018-05-05 | End: 2018-05-05

## 2018-05-05 RX ORDER — HALOPERIDOL DECANOATE 100 MG/ML
5 INJECTION INTRAMUSCULAR ONCE
Qty: 0 | Refills: 0 | Status: COMPLETED | OUTPATIENT
Start: 2018-05-05 | End: 2018-05-05

## 2018-05-05 RX ORDER — THIAMINE MONONITRATE (VIT B1) 100 MG
100 TABLET ORAL DAILY
Qty: 0 | Refills: 0 | Status: DISCONTINUED | OUTPATIENT
Start: 2018-05-05 | End: 2018-05-08

## 2018-05-05 RX ADMIN — Medication 130 MILLIGRAM(S): at 12:12

## 2018-05-05 RX ADMIN — DEXMEDETOMIDINE HYDROCHLORIDE IN 0.9% SODIUM CHLORIDE 3.74 MICROGRAM(S)/KG/HR: 4 INJECTION INTRAVENOUS at 10:08

## 2018-05-05 RX ADMIN — Medication 130 MILLIGRAM(S): at 21:35

## 2018-05-05 RX ADMIN — HALOPERIDOL DECANOATE 5 MILLIGRAM(S): 100 INJECTION INTRAMUSCULAR at 16:49

## 2018-05-05 RX ADMIN — Medication 130 MILLIGRAM(S): at 14:31

## 2018-05-05 RX ADMIN — Medication 130 MILLIGRAM(S): at 19:05

## 2018-05-05 RX ADMIN — Medication 100 MILLIEQUIVALENT(S): at 09:49

## 2018-05-05 RX ADMIN — Medication 2 MILLIGRAM(S): at 14:00

## 2018-05-05 RX ADMIN — Medication 100 MILLIEQUIVALENT(S): at 12:14

## 2018-05-05 RX ADMIN — Medication 260 MILLIGRAM(S): at 19:50

## 2018-05-05 RX ADMIN — Medication 130 MILLIGRAM(S): at 16:23

## 2018-05-05 RX ADMIN — Medication 4 MILLIGRAM(S): at 10:38

## 2018-05-05 RX ADMIN — Medication 130 MILLIGRAM(S): at 16:53

## 2018-05-05 RX ADMIN — DEXMEDETOMIDINE HYDROCHLORIDE IN 0.9% SODIUM CHLORIDE 3.74 MICROGRAM(S)/KG/HR: 4 INJECTION INTRAVENOUS at 15:20

## 2018-05-05 RX ADMIN — Medication 130 MILLIGRAM(S): at 07:38

## 2018-05-05 RX ADMIN — Medication 2 MILLIGRAM(S): at 16:45

## 2018-05-05 RX ADMIN — Medication 130 MILLIGRAM(S): at 19:25

## 2018-05-05 RX ADMIN — HEPARIN SODIUM 5000 UNIT(S): 5000 INJECTION INTRAVENOUS; SUBCUTANEOUS at 05:34

## 2018-05-05 RX ADMIN — Medication 130 MILLIGRAM(S): at 10:08

## 2018-05-05 RX ADMIN — Medication 100 MILLIEQUIVALENT(S): at 13:51

## 2018-05-05 RX ADMIN — Medication 100 MILLIEQUIVALENT(S): at 06:33

## 2018-05-05 RX ADMIN — Medication 100 MILLIEQUIVALENT(S): at 07:38

## 2018-05-05 RX ADMIN — Medication 130 MILLIGRAM(S): at 09:53

## 2018-05-05 RX ADMIN — Medication 100 MILLIEQUIVALENT(S): at 14:47

## 2018-05-05 RX ADMIN — HEPARIN SODIUM 5000 UNIT(S): 5000 INJECTION INTRAVENOUS; SUBCUTANEOUS at 17:57

## 2018-05-05 RX ADMIN — Medication 2 MILLIGRAM(S): at 22:03

## 2018-05-05 RX ADMIN — Medication 2 MILLIGRAM(S): at 17:58

## 2018-05-05 RX ADMIN — CHLORHEXIDINE GLUCONATE 1 APPLICATION(S): 213 SOLUTION TOPICAL at 05:34

## 2018-05-05 RX ADMIN — SODIUM CHLORIDE 1000 MILLILITER(S): 9 INJECTION, SOLUTION INTRAVENOUS at 16:08

## 2018-05-05 NOTE — DIETITIAN INITIAL EVALUATION ADULT. - PERTINENT MEDS FT
lorazepam , heparin , pantoprazole , thiamine , potassium chloride IVPB 05-05, IVF sodium chloride 0.9%, 1000 ml @ 42 ml/hr c multivitamin, thiamine , folic acid x 3 days(05-04)lactated ringers bolus 05-05

## 2018-05-05 NOTE — PROGRESS NOTE ADULT - ASSESSMENT
41 yo M PMH alcohol abuse (4-5 glasses of wine per day) presents with total body pain, abdominal pain, n/v/d for about 4 days. Per report pt stopped drinking approximately 3 days prior to admission due to n/v and epigastric pain.  Admitted to ICU for pancreatitis and alcohol withdrawal/DTs CIWA 29, acute renal insufficiency    - cont precedex and ativan for DTs  - pt with episodes and bursts of severe agitation requiring sedation protocol  - if agitation uncontrolled, will place on standing phenobarbital with prn phenobarb for break through   - IVF hydration for underlying pancreatitis likely due to alcohol use  - NPO for now  - lipase trending down  - MVI/thiamine/folic acid  - cont 1:1 observation  - supplement hypokalemia  - HAIDER improved: likely due to dehydration  - spoke with pt's mother at bedside regarding plan of care and condition    Critical Care Time: 45 min

## 2018-05-05 NOTE — CONSULT NOTE ADULT - PROBLEM SELECTOR RECOMMENDATION 9
Follow amylase and lipase Serum as they are improving  Advance to regular diet   Continue IV hydration

## 2018-05-05 NOTE — PROGRESS NOTE ADULT - SUBJECTIVE AND OBJECTIVE BOX
Subjective: sedated on Precedex. No distress.       MEDICATIONS  (STANDING):  chlorhexidine 4% Liquid 1 Application(s) Topical <User Schedule>  dexmedetomidine Infusion 0.2 MICROgram(s)/kG/Hr (3.74 mL/Hr) IV Continuous <Continuous>  heparin  Injectable 5000 Unit(s) SubCutaneous every 12 hours  LORazepam   Injectable 4 milliGRAM(s) IV Push once  pantoprazole    Tablet 40 milliGRAM(s) Oral before breakfast  potassium chloride  10 mEq/100 mL IVPB 10 milliEquivalent(s) IV Intermittent every 1 hour  sodium chloride 0.9% 1000 milliLiter(s) (42 mL/Hr) IV Continuous <Continuous>    MEDICATIONS  (PRN):  ondansetron Injectable 4 milliGRAM(s) IV Push every 6 hours PRN Nausea and/or Vomiting  PHENobarbital Injectable 130 milliGRAM(s) IV Push every 15 minutes PRN ciwa >8  PHENobarbital Injectable 260 milliGRAM(s) IV Push every 15 minutes PRN ciwa >20          T(C): 34.7 (18 @ 03:12), Max: 37.2 (18 @ 08:14)  HR: 86 (18 @ 06:30) (82 - 145)  BP: 94/63 (18 @ 06:30) (76/52 - 136/92)  RR: 19 (18 @ 06:30) (18 - 27)  SpO2: 99% (18 @ 06:30) (85% - 100%)  Wt(kg): --    ABG - ( 04 May 2018 10:23 )  pH, Arterial: x     pH, Blood: 7.55  /  pCO2: 51    /  pO2: 81    / HCO3: 45    / Base Excess: 18.6  /  SaO2: 96                  I&O's Detail    04 May 2018 07:01  -  05 May 2018 07:00  --------------------------------------------------------  IN:    dexmedetomidine Infusion: 76.9 mL    sodium chloride 0.9%: 210 mL    Solution: 300 mL  Total IN: 586.9 mL    OUT:  Total OUT: 0 mL    Total NET: 586.9 mL               PHYSICAL EXAM:    GENERAL: comfortable  EYES: EOMI, PERRLA, conjunctiva and sclera clear  NECK: Supple, no inc in JVP  CHEST/LUNG: Clear  HEART: S1S2  ABDOMEN: mild epigastric tenderness  EXTREMITIES:  no edema  NEURO: no asterixis      LABS:  CBC Full  -  ( 04 May 2018 08:54 )  WBC Count : 13.15 K/uL  Hemoglobin : 16.4 g/dL  Hematocrit : 46.8 %  Platelet Count - Automated : 188 K/uL  Mean Cell Volume : 93.6 fl  Mean Cell Hemoglobin : 32.8 pg  Mean Cell Hemoglobin Concentration : 35.0 gm/dL  Auto Neutrophil # : x  Auto Lymphocyte # : x  Auto Monocyte # : x  Auto Eosinophil # : x  Auto Basophil # : x  Auto Neutrophil % : x  Auto Lymphocyte % : x  Auto Monocyte % : x  Auto Eosinophil % : x  Auto Basophil % : x    05-05    134<L>  |  85<L>  |  28<H>  ----------------------------<  93  2.8<LL>   |  39<H>  |  1.16    Ca    9.0      05 May 2018 04:32  Phos  3.3     05-05  Mg     2.0     05-05    TPro  7.4  /  Alb  3.5  /  TBili  1.7<H>  /  DBili  x   /  AST  43<H>  /  ALT  37  /  AlkPhos  56  05-05    PT/INR - ( 04 May 2018 08:54 )   PT: 11.6 sec;   INR: 1.06 ratio         PTT - ( 04 May 2018 08:54 )  PTT:27.9 sec  Urinalysis Basic - ( 04 May 2018 14:47 )    Color: Yellow / Appearance: Clear / S.010 / pH: x  Gluc: x / Ketone: Moderate  / Bili: Moderate / Urobili: 4 mg/dL   Blood: x / Protein: 100 mg/dL / Nitrite: Negative   Leuk Esterase: Trace / RBC: 11-25 /HPF / WBC 0-2   Sq Epi: x / Non Sq Epi: Occasional / Bacteria: Few          Impression:  * HAIDER -- pre-renal azotemia. Resolved.   * Alcohol withdrawal  * Pancreatitis, alcoholic  * Metabolic alkalosis due to vomiting, contraction  * HypoNa -- hypovolemic    Recommendations:  Cont liberal volume resuscitation with isotonic saline  Add KCL to IVFs for continuous repletion  Follow Phos, Mg. Supplement PRN

## 2018-05-05 NOTE — PROGRESS NOTE ADULT - SUBJECTIVE AND OBJECTIVE BOX
HPI:  43 yo M with total body pain, n/v/d for about 4 days.  Pt. admits to chronic drinking (5 glasses of wine per day).  He stopped days ago due to n/v and epigastric pain.  He thought he should stop because he figured alcohol was irritating his stomach.  He also notes diffuse cramps in his muscles.  He's been drinking Gatorade for the last few days to replenish fluids.  Pt. denies inciting event.  No sick contacts, no recent travel.  He was well before.  No other complaints.   	ROS: negative for fever, cough, headache, shortness of breath, and rash.  PMH: negative; Meds: Denies; SH: chronic alcohol and marijuana use (04 May 2018 14:01)      24 hr events:      ## ROS:  [ ] unable to obtain  CONSTITUTIONAL: No fever, weight loss, or fatigue  EYES: No eye pain, visual disturbances, or discharge  ENMT:  No difficulty hearing, tinnitus, vertigo; No sinus or throat pain  NECK: No pain or stiffness  RESPIRATORY: No cough, wheezing, chills or hemoptysis; No shortness of breath  CARDIOVASCULAR: No chest pain, palpitations, dizziness, or leg swelling  GASTROINTESTINAL: No abdominal or epigastric pain. No nausea, vomiting, or hematemesis; No diarrhea or constipation. No melena or hematochezia.  GENITOURINARY: No dysuria, frequency, hematuria, or incontinence  NEUROLOGICAL: No headaches, memory loss, loss of strength, numbness, or tremors  SKIN: No itching, burning, rashes, or lesions   LYMPH NODES: No enlarged glands  ENDOCRINE: No heat or cold intolerance; No hair loss  MUSCULOSKELETAL: No joint pain or swelling; No muscle, back, or extremity pain  PSYCHIATRIC: No depression, anxiety, mood swings, or difficulty sleeping  HEME/LYMPH: No easy bruising, or bleeding gums  ALLERGY AND IMMUNOLOGIC: No hives or eczema    ## Labs:  CBC:                        12.0   6.04  )-----------( 82       ( 05 May 2018 04:32 )             35.2     Chem:  05-05    134<L>  |  85<L>  |  28<H>  ----------------------------<  93  2.8<LL>   |  39<H>  |  1.16    Ca    9.0      05 May 2018 04:32  Phos  3.3     05-05  Mg     2.0     05-05    TPro  7.4  /  Alb  3.5  /  TBili  1.7<H>  /  DBili  x   /  AST  43<H>  /  ALT  37  /  AlkPhos  56  05-05    Coags:  PT/INR - ( 04 May 2018 08:54 )   PT: 11.6 sec;   INR: 1.06 ratio         PTT - ( 04 May 2018 08:54 )  PTT:27.9 sec        ## Imaging:    ## Medications:          heparin  Injectable 5000 Unit(s) SubCutaneous every 12 hours    pantoprazole    Tablet 40 milliGRAM(s) Oral before breakfast    dexmedetomidine Infusion 0.2 MICROgram(s)/kG/Hr IV Continuous <Continuous>  LORazepam   Injectable 2 milliGRAM(s) IV Push every 4 hours  PHENobarbital Injectable 130 milliGRAM(s) IV Push every 15 minutes PRN  PHENobarbital Injectable 260 milliGRAM(s) IV Push every 15 minutes PRN      ## Vitals:  T(C): 36.7 (05-05-18 @ 20:12), Max: 37 (05-05-18 @ 12:15)  HR: 95 (05-05-18 @ 22:00) (83 - 99)  BP: 89/64 (05-05-18 @ 22:00) (73/44 - 132/87)  BP(mean): 69 (05-05-18 @ 22:00) (49 - 112)  RR: 25 (05-05-18 @ 22:00) (13 - 29)  SpO2: 97% (05-05-18 @ 22:00) (91% - 100%)  Wt(kg): --  Vent:   ABG: ABG - ( 04 May 2018 10:23 )  pH, Arterial: x     pH, Blood: 7.55  /  pCO2: 51    /  pO2: 81    / HCO3: 45    / Base Excess: 18.6  /  SaO2: 96                    05-04 @ 07:01  -  05-05 @ 07:00  --------------------------------------------------------  IN: 1017.5 mL / OUT: 0 mL / NET: 1017.5 mL    05-05 @ 07:01 - 05-05 @ 22:57  --------------------------------------------------------  IN: 2371.7 mL / OUT: 0 mL / NET: 2371.7 mL          ## P/E:  Gen: lying comfortably in bed in no apparent distress  HEENT: PERRL, EOMI  Resp: CTA B/L no c/r/w  CVS: S1S2 no m/r/g  Abd: soft NT/ND +BS  Ext: no c/c/e  Neuro: A&Ox3    CENTRAL LINE: [ ] YES [ ] NO  LOCATION:   DATE INSERTED:  REMOVE: [ ] YES [ ] NO      QUINTEROS: [ ] YES [ ] NO    DATE INSERTED:  REMOVE:  [ ] YES [ ] NO      A-LINE:  [ ] YES [ ] NO  LOCATION:   DATE INSERTED:  REMOVE:  [ ] YES [ ] NO  EXPLAIN:    GLOBAL ISSUE/BEST PRACTICE:  Analgesia:  Sedation:  HOB elevation: yes  Stress ulcer prophylaxis:  VTE prophylaxis:  Oral Care:  Glycemic control:  Nutrition:    CODE STATUS: [ ] full code  [ ] DNR  [ ] DNI  [ ] [facility]  Goals of care discussion: [ ] yes HPI:  43 yo M PMH alcohol abuse (4-5 glasses of wine per day) presents with total body pain, abdominal pain, n/v/d for about 4 days. Per report pt stopped drinking approximately 3 days prior to admission due to n/v and epigastric pain.  Admitted to ICU for pancreatitis and alcohol withdrawal/DTs CIWA 29.     24 hr events:  remains agitated, combative, restless  rambling, not making any sense  disoriented  on precedex drip, prn phenobarbital     ## ROS:  [x] unable to obtain due to alcohol withdrawal/DTs    ## Labs:  CBC:                        12.0   6.04  )-----------( 82       ( 05 May 2018 04:32 )             35.2     Chem:  05-05    134<L>  |  85<L>  |  28<H>  ----------------------------<  93  2.8<LL>   |  39<H>  |  1.16    Ca    9.0      05 May 2018 04:32  Phos  3.3     05-05  Mg     2.0     05-05    TPro  7.4  /  Alb  3.5  /  TBili  1.7<H>  /  AST  43<H>  /  ALT  37  /  AlkPhos  56  05-05    Coags:  PT/INR - ( 04 May 2018 08:54 )   PT: 11.6 sec;   INR: 1.06 ratio    PTT - ( 04 May 2018 08:54 )  PTT:27.9 sec    Lipase, Serum (05.04.18 @ 08:54)    Lipase, Serum: 3547 U/L    Lipase, Serum (05.05.18 @ 04:32)    Lipase, Serum: 577 U/L    THC, Urine Qualitative (05.04.18 @ 14:47)    THC, Urine Qualitative: Positive    Alcohol, Blood (05.04.18 @ 14:46)    Alcohol, Blood: <10 mg/dL      ## Imaging:  CT abdomen < from: CT Abdomen and Pelvis w/ Oral Cont (05.04.18 @ 11:58) >  LIVER: Enlarged. Low-attenuation, compatible with hepatic steatosis.  BILE DUCTS: Normal caliber.  GALLBLADDER: Within normal limits.  SPLEEN: Within normal limits.  PANCREAS: Mild inflammatory changes around pancreatic head.  ADRENALS: Within normal limits.  KIDNEYS/URETERS: Nonspecific perinephric stranding. Bilateral duplicated   renal collecting systems. No hydronephrosis.    BLADDER: Within normal limits.  REPRODUCTIVE ORGANS: The prostate is within normal limits.    BOWEL: Duodenal diverticulum. No bowel obstruction. Appendix within   normal limits. Colonic diverticulosis.  PERITONEUM: No ascites.  VESSELS:  Mild atherosclerotic changes.  RETROPERITONEUM: No lymphadenopathy.    ABDOMINAL WALL: Tiny fat-containing umbilical hernia.  BONES: Mild spinal degenerative changes. Chronic appearing left 9th and   10th posterior rib deformities. Bilateral L5 spondylolysis.        ## Medications:  heparin  Injectable 5000 Unit(s) SubCutaneous every 12 hours    pantoprazole    Tablet 40 milliGRAM(s) Oral before breakfast    dexmedetomidine Infusion 0.2 MICROgram(s)/kG/Hr IV Continuous <Continuous>  LORazepam   Injectable 2 milliGRAM(s) IV Push every 4 hours  PHENobarbital Injectable 130 milliGRAM(s) IV Push every 15 minutes PRN  PHENobarbital Injectable 260 milliGRAM(s) IV Push every 15 minutes PRN      ## Vitals:  T(C): 36.7 (05-05-18 @ 20:12), Max: 37 (05-05-18 @ 12:15)  HR: 95 (05-05-18 @ 22:00) (83 - 99)  BP: 89/64 (05-05-18 @ 22:00) (73/44 - 132/87)  BP(mean): 69 (05-05-18 @ 22:00) (49 - 112)  RR: 25 (05-05-18 @ 22:00) (13 - 29)  SpO2: 97% (05-05-18 @ 22:00) (91% - 100%)  ABG: ABG - ( 04 May 2018 10:23 )  pH, Arterial:   pH, Blood: 7.55  /  pCO2: 51    /  pO2: 81    / HCO3: 45    / Base Excess: 18.6  /  SaO2: 96            05-04 @ 07:01  -  05-05 @ 07:00  --------------------------------------------------------  IN: 1017.5 mL / OUT: 0 mL / NET: 1017.5 mL    05-05 @ 07:01  -  05-05 @ 22:57  --------------------------------------------------------  IN: 2371.7 mL / OUT: 0 mL / NET: 2371.7 mL          ## P/E:  Gen: sedated, at times with very erratic behavior, restless, climbing out of bed  HEENT: unable to exam due to restlessness  Resp: CTA B/L, no wheeze, no rhonchi  CVS: S1S2 RRR  Abd: soft NT/ND +BS  Ext: no c/c/e  Neuro: agitated at times, moving all extremities, at times thrashing about    CENTRAL LINE: [ ] YES [x] NO  LOCATION:   DATE INSERTED:  REMOVE: [ ] YES [ ] NO      QUINTEROS: [ ] YES [x] NO    DATE INSERTED:  REMOVE:  [ ] YES [ ] NO      A-LINE:  [ ] YES [x] NO  LOCATION:   DATE INSERTED:  REMOVE:  [ ] YES [ ] NO  EXPLAIN:    GLOBAL ISSUE/BEST PRACTICE:  Analgesia: n/a  Sedation: precedex, ativan, prn phenobarbital   HOB elevation: yes  Stress ulcer prophylaxis: n/a  VTE prophylaxis: heparin sc  Oral Care: n/a  Glycemic control: n/a  Nutrition: npo    CODE STATUS: [x] full code  [ ] DNR  [ ] DNI  [ ] EMILIA  Goals of care discussion: [ ] yes

## 2018-05-05 NOTE — DIETITIAN INITIAL EVALUATION ADULT. - ENERGY NEEDS
Height (cm): 180.34 (05-04)  Weight (kg): 74.8 (05-04)  BMI (kg/m2): 23 (05-04)  IBW: 78kg      % IBW: 94% (as per pt information wt.)    UBW:  ?            %UBW: ?, wt. gain of 1.2 kg/1.7% noted since adm as per daily wt.

## 2018-05-05 NOTE — CONSULT NOTE ADULT - SUBJECTIVE AND OBJECTIVE BOX
Chief Complaint:  Patient is a 42y old  Male who presents with a chief complaint of alcohol withdrawal/ pncreatitis/ electrolyte imbalnc/-Hypoklemia. Hypontremia/ vomiting (04 May 2018 14:01)      HPI:  43 yo M with total body pain, n/v/d for about 4 days.  Pt. admits to chronic drinking (5 glasses of wine per day).  He stopped days ago due to n/v and epigastric pain.  He thought he should stop because he figured alcohol was irritating his stomach.  He also notes diffuse cramps in his muscles.  He's been drinking Gatorade for the last few days to replenish fluids.  Pt. denies inciting event.  No sick contacts, no recent travel.  He was well before.  No other complaints.   	  PMH/PSH:PAST MEDICAL & SURGICAL HISTORY:  No pertinent past medical history  No significant past surgical history    Allergies:  No Known Allergies      Medications:  chlorhexidine 4% Liquid 1 Application(s) Topical <User Schedule>  dexmedetomidine Infusion 0.2 MICROgram(s)/kG/Hr IV Continuous <Continuous>  heparin  Injectable 5000 Unit(s) SubCutaneous every 12 hours  LORazepam   Injectable 4 milliGRAM(s) IV Push once  ondansetron Injectable 4 milliGRAM(s) IV Push every 6 hours PRN  pantoprazole    Tablet 40 milliGRAM(s) Oral before breakfast  PHENobarbital Injectable 130 milliGRAM(s) IV Push every 15 minutes PRN  PHENobarbital Injectable 260 milliGRAM(s) IV Push every 15 minutes PRN  potassium chloride  10 mEq/100 mL IVPB 10 milliEquivalent(s) IV Intermittent every 1 hour  sodium chloride 0.9% 1000 milliLiter(s) IV Continuous <Continuous>  thiamine 100 milliGRAM(s) Oral daily      Review of Systems:  Unable to contribute secondary to lethargy    Relevant Family History:   FAMILY HISTORY:  No pertinent family history in first degree relatives      Relevant Social History:  + ETOH abuse no tobacco history    Physical Exam:    Vital Signs:  Vital Signs Last 24 Hrs  T(C): 35.9 (05 May 2018 07:00), Max: 36.8 (04 May 2018 12:27)  T(F): 96.6 (05 May 2018 07:00), Max: 98.2 (04 May 2018 12:27)  HR: 85 (05 May 2018 09:00) (82 - 97)  BP: 95/64 (05 May 2018 09:00) (76/52 - 136/92)  BP(mean): 71 (05 May 2018 09:00) (53 - 89)  RR: 29 (05 May 2018 09:00) (18 - 29)  SpO2: 96% (05 May 2018 09:00) (85% - 100%)  Daily     Daily Weight in k.6 (05 May 2018 01:44)    General:  Lethargic, Arousable   HEENT:  NC/AT,  conjunctivae clear and pink, no thyromegaly, nodules, adenopathy, no JVD  Chest:  Full & symmetric excursion, no increased effort, breath sounds clear  Cardiovascular:  Regular rhythm, S1, S2, no murmur/rub/S3/S4, no abdominal bruit, no edema  Abdomen:  Soft, non-tender, non-distended, normoactive bowel sounds,  no masses , no hepatosplenomegaly, no signs of chronic liver disease  Extremities:  no cyanosis, clubbing or edema  Skin:  No rash/erythema/ecchymoses/petechiae/wounds/abscess/warm/dry  Neuro/Psych:  Alert, responsive to verbal stimuli, no asterixis, mild tremor, no encephalopathy    Laboratory:                          12.0   6.04  )-----------( 82       ( 05 May 2018 04:32 )             35.2     05-05    134<L>  |  85<L>  |  28<H>  ----------------------------<  93  2.8<LL>   |  39<H>  |  1.16    Ca    9.0      05 May 2018 04:32  Phos  3.3     05-05  Mg     2.0     05-05    TPro  7.4  /  Alb  3.5  /  TBili  1.7<H>  /  DBili  x   /  AST  43<H>  /  ALT  37  /  AlkPhos  56  05-05    LIVER FUNCTIONS - ( 05 May 2018 04:32 )  Alb: 3.5 g/dL / Pro: 7.4 gm/dL / ALK PHOS: 56 U/L / ALT: 37 U/L / AST: 43 U/L / GGT: x           PT/INR - ( 04 May 2018 08:54 )   PT: 11.6 sec;   INR: 1.06 ratio         PTT - ( 04 May 2018 08:54 )  PTT:27.9 sec  Urinalysis Basic - ( 04 May 2018 14:47 )    Color: Yellow / Appearance: Clear / S.010 / pH: x  Gluc: x / Ketone: Moderate  / Bili: Moderate / Urobili: 4 mg/dL   Blood: x / Protein: 100 mg/dL / Nitrite: Negative   Leuk Esterase: Trace / RBC: 11-25 /HPF / WBC 0-2   Sq Epi: x / Non Sq Epi: Occasional / Bacteria: Few      Amylase [id number] U/L<H>      Lipase [id number] U/L<H> <---------  Lipase [id number] U/L<H>        Intake and Output    18 @ 07:01  -  18 @ 07:00  --------------------------------------------------------  IN: 1017.5 mL / OUT: 0 mL / NET: 1017.5 mL    18 @ 07:01  -  18 @ 09:33  --------------------------------------------------------  IN: 213.1 mL / OUT: 0 mL / NET: 213.1 mL        Imaging:    < from: CT Abdomen and Pelvis w/ Oral Cont (18 @ 11:58) >    EXAM:  CT ABDOMEN AND PELVIS OC                            PROCEDURE DATE:  2018          INTERPRETATION:  CLINICAL INFORMATION: Abdominal pain, nausea, and   vomiting    COMPARISON: None.    PROCEDURE:   CT of the Abdomen and Pelvis was performed without intravenous contrast.   Intravenous contrast: None.  Oral contrast: positive contrast was administered.  Sagittal and coronal reformats were performed.    FINDINGS:    LOWER CHEST: Clear lung bases.    Please note that evaluation of the abdominal organs and vascular   structures is limited by lack of intravenous contrast.    LIVER: Enlarged. Low-attenuation, compatible with hepatic steatosis.  BILE DUCTS: Normal caliber.  GALLBLADDER: Within normal limits.  SPLEEN: Within normal limits.  PANCREAS: Mild inflammatory changes around pancreatic head.  ADRENALS: Within normal limits.  KIDNEYS/URETERS: Nonspecific perinephric stranding. Bilateral duplicated   renal collecting systems. No hydronephrosis.    BLADDER: Within normal limits.  REPRODUCTIVE ORGANS: The prostate is within normal limits.    BOWEL: Duodenal diverticulum. No bowel obstruction. Appendix within   normal limits. Colonic diverticulosis.  PERITONEUM: No ascites.  VESSELS:  Mild atherosclerotic changes.  RETROPERITONEUM: No lymphadenopathy.    ABDOMINAL WALL: Tiny fat-containing umbilical hernia.  BONES: Mild spinal degenerative changes. Chronic appearing left 9th and   10th posterior rib deformities. Bilateral L5 spondylolysis.    IMPRESSION:     No bowel obstruction.  Pancreatic head inflammatory changes, question mild pancreatitis.  Hepatomegaly and hepatic steatosis.      FAVIOLA ARVIZU M.D., ATTENDING RADIOLOGIST  This document has been electronically signed. May  4 2018 12:20PM

## 2018-05-05 NOTE — DIETITIAN INITIAL EVALUATION ADULT. - FEEDING SKILL
BMI=23.0(05-04), Nutrition focused physical exam conducted; limited due to alcohol withdrawal syndrome c periods of agitation. Subcutaneous fat Exam;  [WNL  ]  Orbital fat pads region,  [WNL  ]Buccal fat region,  [ unable ]triceps region, [WNL  ]ribs region.  Muscle Exam; [mild  ]temples region, [mild  ]clavicle region, [WNL  ]shoulder region, [  unable]Scapula region, [ unable ]Interosseous region, [ unable ]thigh region, [ unable ]Calf region, 1+ generalized edema noted/independent

## 2018-05-05 NOTE — PROGRESS NOTE ADULT - SUBJECTIVE AND OBJECTIVE BOX
Patient is a 42y old  Male who presents with a chief complaint of alcohol withdrawal/ pncreatitis/ electrolyte imbalnc/-Hypoklemia. Hypontremia/ vomiting (04 May 2018 14:01)      INTERVAL HPI/OVERNIGHT EVENTS:  restless/agitated  sedation iv in icu    MEDICATIONS  (STANDING):  chlorhexidine 4% Liquid 1 Application(s) Topical <User Schedule>  dexmedetomidine Infusion 0.2 MICROgram(s)/kG/Hr (3.74 mL/Hr) IV Continuous <Continuous>  heparin  Injectable 5000 Unit(s) SubCutaneous every 12 hours  LORazepam   Injectable 2 milliGRAM(s) IV Push every 4 hours  pantoprazole    Tablet 40 milliGRAM(s) Oral before breakfast  sodium chloride 0.9% 1000 milliLiter(s) (42 mL/Hr) IV Continuous <Continuous>  thiamine 100 milliGRAM(s) Oral daily    MEDICATIONS  (PRN):  PHENobarbital Injectable 130 milliGRAM(s) IV Push every 15 minutes PRN ciwa >8  PHENobarbital Injectable 260 milliGRAM(s) IV Push every 15 minutes PRN ciwa >20        REVIEW OF SYSTEMS:  CONSTITUTIONAL: No fever, weight loss, or fatigue  EYES: No eye pain, visual disturbances, or discharge  ENMT:  No difficulty hearing, tinnitus, vertigo; No sinus or throat pain  NECK: No pain or stiffness  RESPIRATORY: No cough, wheezing, chills or hemoptysis; No shortness of breath  CARDIOVASCULAR: No chest pain, palpitations, dizziness, or leg swelling  GASTROINTESTINAL: No abdominal or epigastric pain. No nausea, vomiting, or hematemesis; No diarrhea or constipation. No melena or hematochezia.  GENITOURINARY: No dysuria, frequency, hematuria, or incontinence  NEUROLOGICAL: No headaches, memory loss, loss of strength, numbness, or tremors  SKIN: No itching, burning, rashes, or lesions      Vital Signs Last 24 Hrs  T(C): 36.7 (05 May 2018 20:12), Max: 37 (05 May 2018 12:15)  T(F): 98.1 (05 May 2018 20:12), Max: 98.6 (05 May 2018 12:15)  HR: 94 (05 May 2018 20:00) (82 - 96)  BP: 132/87 (05 May 2018 20:00) (73/44 - 132/87)  BP(mean): 97 (05 May 2018 20:00) (49 - 112)  RR: 23 (05 May 2018 20:00) (13 - 29)  SpO2: 99% (05 May 2018 20:00) (91% - 100%)    PHYSICAL EXAM:  GENERAL: NAD, well-groomed, well-developed  HEAD:  Atraumatic, Normocephalic  EYES: EOMI, PERRLA, conjunctiva and sclera clear  ENMT: No tonsillar erythema, exudates, or enlargement; Moist mucous membranes   NECK: Supple, No JVD   NERVOUS SYSTEM:  Alert & Oriented X3, Good concentration; Motor Strength 5/5 B/L upper and lower extremities; DTRs 2+ intact and symmetric  CHEST/LUNG: Clear to percussion bilaterally; No rales, rhonchi, wheezing, or rubs  HEART: Regular rate and rhythm; No murmurs, rubs, or gallops  ABDOMEN: Soft, Nontender, Nondistended; Bowel sounds present  EXTREMITIES:  2+ Peripheral Pulses, No clubbing, cyanosis, or edema  LYMPH: No lymphadenopathy noted  SKIN: No rashes or lesions    LABS:                        12.0   6.04  )-----------( 82       ( 05 May 2018 04:32 )             35.2     05-05    134<L>  |  85<L>  |  28<H>  ----------------------------<  93  2.8<LL>   |  39<H>  |  1.16    Ca    9.0      05 May 2018 04:32  Phos  3.3     05-05  Mg     2.0     05-05    TPro  7.4  /  Alb  3.5  /  TBili  1.7<H>  /  DBili  x   /  AST  43<H>  /  ALT  37  /  AlkPhos  56  05-05    PT/INR - ( 04 May 2018 08:54 )   PT: 11.6 sec;   INR: 1.06 ratio         PTT - ( 04 May 2018 08:54 )  PTT:27.9 sec  Urinalysis Basic - ( 04 May 2018 14:47 )    Color: Yellow / Appearance: Clear / S.010 / pH: x  Gluc: x / Ketone: Moderate  / Bili: Moderate / Urobili: 4 mg/dL   Blood: x / Protein: 100 mg/dL / Nitrite: Negative   Leuk Esterase: Trace / RBC: 11-25 /HPF / WBC 0-2   Sq Epi: x / Non Sq Epi: Occasional / Bacteria: Few      CAPILLARY BLOOD GLUCOSE          RADIOLOGY & ADDITIONAL TESTS:    Imaging Personally Reviewed:  [ ] YES  [ ] NO    Consultant(s) Notes Reviewed:  [ ] YES  [ ] NO    Care Discussed with Consultants/Other Providers [ ] YES  [ ] NO

## 2018-05-06 LAB
AFP-TM SERPL-MCNC: 4.3 NG/ML — SIGNIFICANT CHANGE UP
ALBUMIN SERPL ELPH-MCNC: 3.3 G/DL — SIGNIFICANT CHANGE UP (ref 3.3–5)
ALP SERPL-CCNC: 52 U/L — SIGNIFICANT CHANGE UP (ref 40–120)
ALT FLD-CCNC: 36 U/L — SIGNIFICANT CHANGE UP (ref 12–78)
AMYLASE P1 CFR SERPL: 105 U/L — SIGNIFICANT CHANGE UP (ref 25–115)
ANION GAP SERPL CALC-SCNC: 10 MMOL/L — SIGNIFICANT CHANGE UP (ref 5–17)
AST SERPL-CCNC: 46 U/L — HIGH (ref 15–37)
BILIRUB SERPL-MCNC: 1.5 MG/DL — HIGH (ref 0.2–1.2)
BUN SERPL-MCNC: 19 MG/DL — SIGNIFICANT CHANGE UP (ref 7–23)
CALCIUM SERPL-MCNC: 9 MG/DL — SIGNIFICANT CHANGE UP (ref 8.5–10.1)
CHLORIDE SERPL-SCNC: 96 MMOL/L — SIGNIFICANT CHANGE UP (ref 96–108)
CO2 SERPL-SCNC: 31 MMOL/L — SIGNIFICANT CHANGE UP (ref 22–31)
CREAT SERPL-MCNC: 0.7 MG/DL — SIGNIFICANT CHANGE UP (ref 0.5–1.3)
GLUCOSE SERPL-MCNC: 82 MG/DL — SIGNIFICANT CHANGE UP (ref 70–99)
HCT VFR BLD CALC: 36.8 % — LOW (ref 39–50)
HGB BLD-MCNC: 12.6 G/DL — LOW (ref 13–17)
LIDOCAIN IGE QN: 276 U/L — SIGNIFICANT CHANGE UP (ref 73–393)
MAGNESIUM SERPL-MCNC: 1.8 MG/DL — SIGNIFICANT CHANGE UP (ref 1.6–2.6)
MAGNESIUM SERPL-MCNC: 1.9 MG/DL — SIGNIFICANT CHANGE UP (ref 1.6–2.6)
MCHC RBC-ENTMCNC: 32.8 PG — SIGNIFICANT CHANGE UP (ref 27–34)
MCHC RBC-ENTMCNC: 34.2 GM/DL — SIGNIFICANT CHANGE UP (ref 32–36)
MCV RBC AUTO: 95.8 FL — SIGNIFICANT CHANGE UP (ref 80–100)
NRBC # BLD: 0 /100 WBCS — SIGNIFICANT CHANGE UP (ref 0–0)
PHOSPHATE SERPL-MCNC: 2.2 MG/DL — LOW (ref 2.5–4.5)
PHOSPHATE SERPL-MCNC: 2.3 MG/DL — LOW (ref 2.5–4.5)
PLATELET # BLD AUTO: 66 K/UL — LOW (ref 150–400)
POTASSIUM SERPL-MCNC: 3 MMOL/L — LOW (ref 3.5–5.3)
POTASSIUM SERPL-SCNC: 3 MMOL/L — LOW (ref 3.5–5.3)
PROT SERPL-MCNC: 7.2 GM/DL — SIGNIFICANT CHANGE UP (ref 6–8.3)
RBC # BLD: 3.84 M/UL — LOW (ref 4.2–5.8)
RBC # FLD: 12.6 % — SIGNIFICANT CHANGE UP (ref 10.3–14.5)
SODIUM SERPL-SCNC: 137 MMOL/L — SIGNIFICANT CHANGE UP (ref 135–145)
WBC # BLD: 4.16 K/UL — SIGNIFICANT CHANGE UP (ref 3.8–10.5)
WBC # FLD AUTO: 4.16 K/UL — SIGNIFICANT CHANGE UP (ref 3.8–10.5)

## 2018-05-06 PROCEDURE — 99291 CRITICAL CARE FIRST HOUR: CPT

## 2018-05-06 RX ORDER — PHENOBARBITAL 60 MG
130 TABLET ORAL EVERY 6 HOURS
Qty: 0 | Refills: 0 | Status: DISCONTINUED | OUTPATIENT
Start: 2018-05-06 | End: 2018-05-09

## 2018-05-06 RX ORDER — POTASSIUM PHOSPHATE, MONOBASIC POTASSIUM PHOSPHATE, DIBASIC 236; 224 MG/ML; MG/ML
15 INJECTION, SOLUTION INTRAVENOUS ONCE
Qty: 0 | Refills: 0 | Status: COMPLETED | OUTPATIENT
Start: 2018-05-06 | End: 2018-05-06

## 2018-05-06 RX ADMIN — HEPARIN SODIUM 5000 UNIT(S): 5000 INJECTION INTRAVENOUS; SUBCUTANEOUS at 05:51

## 2018-05-06 RX ADMIN — Medication 130 MILLIGRAM(S): at 17:16

## 2018-05-06 RX ADMIN — Medication 2 MILLIGRAM(S): at 05:51

## 2018-05-06 RX ADMIN — Medication 130 MILLIGRAM(S): at 16:27

## 2018-05-06 RX ADMIN — Medication 2 MILLIGRAM(S): at 02:41

## 2018-05-06 RX ADMIN — CHLORHEXIDINE GLUCONATE 1 APPLICATION(S): 213 SOLUTION TOPICAL at 05:51

## 2018-05-06 RX ADMIN — DEXMEDETOMIDINE HYDROCHLORIDE IN 0.9% SODIUM CHLORIDE 3.74 MICROGRAM(S)/KG/HR: 4 INJECTION INTRAVENOUS at 22:51

## 2018-05-06 RX ADMIN — POTASSIUM PHOSPHATE, MONOBASIC POTASSIUM PHOSPHATE, DIBASIC 63.75 MILLIMOLE(S): 236; 224 INJECTION, SOLUTION INTRAVENOUS at 09:32

## 2018-05-06 RX ADMIN — Medication 2 MILLIGRAM(S): at 13:32

## 2018-05-06 RX ADMIN — DEXMEDETOMIDINE HYDROCHLORIDE IN 0.9% SODIUM CHLORIDE 3.74 MICROGRAM(S)/KG/HR: 4 INJECTION INTRAVENOUS at 17:18

## 2018-05-06 RX ADMIN — Medication 2 MILLIGRAM(S): at 09:49

## 2018-05-06 RX ADMIN — SODIUM CHLORIDE 42 MILLILITER(S): 9 INJECTION, SOLUTION INTRAVENOUS at 18:53

## 2018-05-06 RX ADMIN — Medication 2 MILLIGRAM(S): at 17:16

## 2018-05-06 RX ADMIN — DEXMEDETOMIDINE HYDROCHLORIDE IN 0.9% SODIUM CHLORIDE 3.74 MICROGRAM(S)/KG/HR: 4 INJECTION INTRAVENOUS at 13:02

## 2018-05-06 RX ADMIN — Medication 2 MILLIGRAM(S): at 22:08

## 2018-05-06 RX ADMIN — HEPARIN SODIUM 5000 UNIT(S): 5000 INJECTION INTRAVENOUS; SUBCUTANEOUS at 17:15

## 2018-05-06 NOTE — PROGRESS NOTE ADULT - SUBJECTIVE AND OBJECTIVE BOX
Patient is a 42y old  Male who presents with a chief complaint of alcohol withdrawal/ pncreatitis/ electrolyte imbalnc/-Hypoklemia. Hypontremia/ vomiting (04 May 2018 14:01)      INTERVAL HPI/OVERNIGHT EVENTS:  clinically stable    MEDICATIONS  (STANDING):  chlorhexidine 4% Liquid 1 Application(s) Topical <User Schedule>  dexmedetomidine Infusion 0.2 MICROgram(s)/kG/Hr (3.74 mL/Hr) IV Continuous <Continuous>  heparin  Injectable 5000 Unit(s) SubCutaneous every 12 hours  LORazepam   Injectable 2 milliGRAM(s) IV Push every 4 hours  pantoprazole    Tablet 40 milliGRAM(s) Oral before breakfast  PHENobarbital Injectable 130 milliGRAM(s) IV Push every 6 hours  sodium chloride 0.9% 1000 milliLiter(s) (42 mL/Hr) IV Continuous <Continuous>  thiamine 100 milliGRAM(s) Oral daily    MEDICATIONS  (PRN):  PHENobarbital Injectable 130 milliGRAM(s) IV Push every 15 minutes PRN ciwa >8  PHENobarbital Injectable 260 milliGRAM(s) IV Push every 15 minutes PRN ciwa >20        REVIEW OF SYSTEMS:  CONSTITUTIONAL: No fever, weight loss, or fatigue  EYES: No eye pain, visual disturbances, or discharge  ENMT:  No difficulty hearing, tinnitus, vertigo; No sinus or throat pain  NECK: No pain or stiffness  RESPIRATORY: No cough, wheezing, chills or hemoptysis; No shortness of breath  CARDIOVASCULAR: No chest pain, palpitations, dizziness, or leg swelling  GASTROINTESTINAL: No abdominal or epigastric pain. No nausea, vomiting, or hematemesis; No diarrhea or constipation. No melena or hematochezia.  GENITOURINARY: No dysuria, frequency, hematuria, or incontinence  NEUROLOGICAL: No headaches, memory loss, loss of strength, numbness, or tremors  SKIN: No itching, burning, rashes, or lesions      Vital Signs Last 24 Hrs  T(C): 38.1 (06 May 2018 13:30), Max: 38.1 (06 May 2018 13:30)  T(F): 100.5 (06 May 2018 13:30), Max: 100.5 (06 May 2018 13:30)  HR: 97 (06 May 2018 07:19) (88 - 103)  BP: 94/58 (06 May 2018 07:19) (80/59 - 132/87)  BP(mean): 67 (06 May 2018 07:19) (58 - 97)  RR: 31 (06 May 2018 07:19) (13 - 31)  SpO2: 96% (06 May 2018 07:19) (91% - 100%)    PHYSICAL EXAM:  GENERAL: NAD, well-groomed, well-developed  HEAD:  Atraumatic, Normocephalic  EYES: EOMI, PERRLA, conjunctiva and sclera clear  ENMT: No tonsillar erythema, exudates, or enlargement; Moist mucous membranes   NECK: Supple, No JVD   NERVOUS SYSTEM:  Alert & Oriented X3, Good concentration; Motor Strength 5/5 B/L upper and lower extremities; DTRs 2+ intact and symmetric  CHEST/LUNG: Clear to percussion bilaterally; No rales, rhonchi, wheezing, or rubs  HEART: Regular rate and rhythm; No murmurs, rubs, or gallops  ABDOMEN: Soft, Nontender, Nondistended; Bowel sounds present  EXTREMITIES:  2+ Peripheral Pulses, No clubbing, cyanosis, or edema  LYMPH: No lymphadenopathy noted  SKIN: No rashes or lesions    LABS:                        12.6   4.16  )-----------( 66       ( 06 May 2018 04:08 )             36.8     05-06    137  |  96  |  19  ----------------------------<  82  3.0<L>   |  31  |  0.70    Ca    9.0      06 May 2018 04:08  Phos  2.2     05-06  Mg     1.8     05-06    TPro  7.2  /  Alb  3.3  /  TBili  1.5<H>  /  DBili  x   /  AST  46<H>  /  ALT  36  /  AlkPhos  52  05-06        CAPILLARY BLOOD GLUCOSE          RADIOLOGY & ADDITIONAL TESTS:    Imaging Personally Reviewed:  [ ] YES  [ ] NO    Consultant(s) Notes Reviewed:  [ ] YES  [ ] NO    Care Discussed with Consultants/Other Providers [ ] YES  [ ] NO

## 2018-05-06 NOTE — PROGRESS NOTE ADULT - SUBJECTIVE AND OBJECTIVE BOX
Gastroenterology  Patient seen and examined bedside resting comfortably with Mother  no acute events    T(F): 100.5 (05-06-18 @ 13:30), Max: 100.5 (05-06-18 @ 13:30)  HR: 97 (05-06-18 @ 07:19) (88 - 103)  BP: 94/58 (05-06-18 @ 07:19) (80/59 - 132/87)  RR: 31 (05-06-18 @ 07:19) (13 - 31)  SpO2: 96% (05-06-18 @ 07:19) (91% - 100%)  Wt(kg): --  CAPILLARY BLOOD GLUCOSE          PHYSICAL EXAM:  General: WDWN.   Neuro:  Restrained,  lethargic  HEENT: NCAT, EOMI, conjunctiva clear  CV: +S1+S2 regular rate and rhythm  Lung: clear to ausculation bilaterally, respirations nonlabored, good inspiratory effort  Abdomen: soft, Non tender, No Distension. Normal active BS  Extremities: no pedal edema or calf tenderness noted     LABS:                        12.6   4.16  )-----------( 66       ( 06 May 2018 04:08 )             36.8     05-06    137  |  96  |  19  ----------------------------<  82  3.0<L>   |  31  |  0.70    Ca    9.0      06 May 2018 04:08  Phos  2.2     05-06  Mg     1.8     05-06    TPro  7.2  /  Alb  3.3  /  TBili  1.5<H>  /  DBili  x   /  AST  46<H>  /  ALT  36  /  AlkPhos  52  05-06    LIVER FUNCTIONS - ( 06 May 2018 04:08 )  Alb: 3.3 g/dL / Pro: 7.2 gm/dL / ALK PHOS: 52 U/L / ALT: 36 U/L / AST: 46 U/L / GGT: x             I&O's Detail    05 May 2018 07:01  -  06 May 2018 07:00  --------------------------------------------------------  IN:    dexmedetomidine Infusion: 399.5 mL    Lactated Ringers IV Bolus: 1000 mL    sodium chloride 0.9%: 1008 mL    Solution: 500 mL  Total IN: 2907.5 mL    OUT:  Total OUT: 0 mL    Total NET: 2907.5 mL        05-06 @ 04:08    137 | 96 | 19  /9.0 | 1.8 | 2.2  _______________________/  3.0 | 31 | 0.70                           \par

## 2018-05-06 NOTE — PROGRESS NOTE ADULT - ASSESSMENT
41 yo M PMH alcohol abuse (4-5 glasses of wine per day) presents with total body pain, abdominal pain, n/v/d for about 4 days. Per report pt stopped drinking approximately 3 days prior to admission due to n/v and epigastric pain.  Admitted to ICU for pancreatitis and alcohol withdrawal/DTs CIWA 29, acute renal insufficiency    - cont precedex and ativan for DTs  - wean off precedex drip as tolerates, start standing phenobarbital  - pt with episodes and bursts of severe agitation requiring sedation protocol  - IVF hydration for underlying pancreatitis likely due to alcohol use  - NPO for now  - lipase trending down, initiate feeds if pt tolerates po intake in next 24 hours  - MVI/thiamine/folic acid  - cont 1:1 observation  - supplement hypokalemia, hypophosphatemia   - HAIDER improved: likely due to dehydration  - spoke with pt's mother at bedside regarding plan of care and condition    Critical Care Time: 40 min

## 2018-05-06 NOTE — PROGRESS NOTE ADULT - SUBJECTIVE AND OBJECTIVE BOX
HPI:  43 yo M with total body pain, n/v/d for about 4 days.  Pt. admits to chronic drinking (5 glasses of wine per day).  He stopped days ago due to n/v and epigastric pain.  He thought he should stop because he figured alcohol was irritating his stomach.  He also notes diffuse cramps in his muscles.  He's been drinking Gatorade for the last few days to replenish fluids.  Pt. denies inciting event.  No sick contacts, no recent travel.  He was well before.  No other complaints.   	ROS: negative for fever, cough, headache, shortness of breath, and rash.  PMH: negative; Meds: Denies; SH: chronic alcohol and marijuana use (04 May 2018 14:01)      24 hr events:      ## ROS:  [ ] unable to obtain  CONSTITUTIONAL: No fever, weight loss, or fatigue  EYES: No eye pain, visual disturbances, or discharge  ENMT:  No difficulty hearing, tinnitus, vertigo; No sinus or throat pain  NECK: No pain or stiffness  RESPIRATORY: No cough, wheezing, chills or hemoptysis; No shortness of breath  CARDIOVASCULAR: No chest pain, palpitations, dizziness, or leg swelling  GASTROINTESTINAL: No abdominal or epigastric pain. No nausea, vomiting, or hematemesis; No diarrhea or constipation. No melena or hematochezia.  GENITOURINARY: No dysuria, frequency, hematuria, or incontinence  NEUROLOGICAL: No headaches, memory loss, loss of strength, numbness, or tremors  SKIN: No itching, burning, rashes, or lesions   LYMPH NODES: No enlarged glands  ENDOCRINE: No heat or cold intolerance; No hair loss  MUSCULOSKELETAL: No joint pain or swelling; No muscle, back, or extremity pain  PSYCHIATRIC: No depression, anxiety, mood swings, or difficulty sleeping  HEME/LYMPH: No easy bruising, or bleeding gums  ALLERGY AND IMMUNOLOGIC: No hives or eczema    ## Labs:  CBC:                        12.6   4.16  )-----------( 66       ( 06 May 2018 04:08 )             36.8     Chem:  05-06    137  |  96  |  19  ----------------------------<  82  3.0<L>   |  31  |  0.70    Ca    9.0      06 May 2018 04:08  Phos  2.2     05-06  Mg     1.8         TPro  7.2  /  Alb  3.3  /  TBili  1.5<H>  /  DBili  x   /  AST  46<H>  /  ALT  36  /  AlkPhos  52      Coags:          ## Imaging:    ## Medications:          heparin  Injectable 5000 Unit(s) SubCutaneous every 12 hours    pantoprazole    Tablet 40 milliGRAM(s) Oral before breakfast    dexmedetomidine Infusion 0.2 MICROgram(s)/kG/Hr IV Continuous <Continuous>  LORazepam   Injectable 2 milliGRAM(s) IV Push every 4 hours  PHENobarbital Injectable 130 milliGRAM(s) IV Push every 15 minutes PRN  PHENobarbital Injectable 260 milliGRAM(s) IV Push every 15 minutes PRN  PHENobarbital Injectable 130 milliGRAM(s) IV Push every 6 hours      ## Vitals:  T(C): 37.2 (18 @ 21:00), Max: 38.1 (18 @ 13:30)  HR: 93 (18 @ 22:00) (91 - 109)  BP: 129/77 (18 @ 22:00) (80/59 - 129/77)  BP(mean): 87 (18 @ 22:00) (58 - 95)  RR: 26 (18 @ 22:00) (20 - 38)  SpO2: 92% (18 @ 22:00) (92% - 100%)  Wt(kg): --  Vent:   AB-05 @ 07:  -   @ 07:00  --------------------------------------------------------  IN: 2907.5 mL / OUT: 0 mL / NET: 2907.5 mL     @ 07:01  -   @ 22:48  --------------------------------------------------------  IN: 1088.4 mL / OUT: 600 mL / NET: 488.4 mL          ## P/E:  Gen: lying comfortably in bed in no apparent distress  HEENT: PERRL, EOMI  Resp: CTA B/L no c/r/w  CVS: S1S2 no m/r/g  Abd: soft NT/ND +BS  Ext: no c/c/e  Neuro: A&Ox3    CENTRAL LINE: [ ] YES [ ] NO  LOCATION:   DATE INSERTED:  REMOVE: [ ] YES [ ] NO      QUINTEROS: [ ] YES [ ] NO    DATE INSERTED:  REMOVE:  [ ] YES [ ] NO      A-LINE:  [ ] YES [ ] NO  LOCATION:   DATE INSERTED:  REMOVE:  [ ] YES [ ] NO  EXPLAIN:    GLOBAL ISSUE/BEST PRACTICE:  Analgesia:  Sedation:  HOB elevation: yes  Stress ulcer prophylaxis:  VTE prophylaxis:  Oral Care:  Glycemic control:  Nutrition:    CODE STATUS: [ ] full code  [ ] DNR  [ ] DNI  [ ] MOLST  Goals of care discussion: [ ] yes HPI:  43 yo M PMH alcohol abuse (4-5 glasses of wine per day) presents with total body pain, abdominal pain, n/v/d for about 4 days. Per report pt stopped drinking approximately 3 days prior to admission due to n/v and epigastric pain.  Admitted to ICU for pancreatitis and alcohol withdrawal/DTs CIWA 29.       24 hr events:  less agitated today compared to yesterday  on precedex drip  started on phenobarbital standing in attempt to wean off precedex    ## ROS:  [x] unable to obtain due to alcohol withdrawal DTs, encephalopathy     ## Labs:  CBC:                        12.6   4.16  )-----------( 66       ( 06 May 2018 04:08 )             36.8     Chem:  05-06    137  |  96  |  19  ----------------------------<  82  3.0<L>   |  31  |  0.70    Ca    9.0      06 May 2018 04:08  Phos  2.2     05-06  Mg     1.8     05-06    TPro  7.2  /  Alb  3.3  /  TBili  1.5<H>  /  AST  46<H>  /  ALT  36  /  AlkPhos  52  05-06      ## Medications:  heparin  Injectable 5000 Unit(s) SubCutaneous every 12 hours    pantoprazole    Tablet 40 milliGRAM(s) Oral before breakfast    dexmedetomidine Infusion 0.2 MICROgram(s)/kG/Hr IV Continuous <Continuous>  LORazepam   Injectable 2 milliGRAM(s) IV Push every 4 hours  PHENobarbital Injectable 130 milliGRAM(s) IV Push every 15 minutes PRN  PHENobarbital Injectable 260 milliGRAM(s) IV Push every 15 minutes PRN  PHENobarbital Injectable 130 milliGRAM(s) IV Push every 6 hours      ## Vitals:  T(C): 37.2 (05-06-18 @ 21:00), Max: 38.1 (05-06-18 @ 13:30)  HR: 93 (05-06-18 @ 22:00) (91 - 109)  BP: 129/77 (05-06-18 @ 22:00) (80/59 - 129/77)  BP(mean): 87 (05-06-18 @ 22:00) (58 - 95)  RR: 26 (05-06-18 @ 22:00) (20 - 38)  SpO2: 92% (05-06-18 @ 22:00) (92% - 100%)      05-05 @ 07:01  -  05-06 @ 07:00  --------------------------------------------------------  IN: 2907.5 mL / OUT: 0 mL / NET: 2907.5 mL    05-06 @ 07:01  -  05-06 @ 22:48  --------------------------------------------------------  IN: 1088.4 mL / OUT: 600 mL / NET: 488.4 mL          ## P/E:  Gen: sedated at present, at times with very erratic behavior, restless, attempts to climb out of bed  HEENT: unable to exam due to restlessness  Resp: scattered rhonchi  CVS: S1S2 RRR  Abd: soft NT/ND +BS  Ext: no c/c/e  Neuro: agitated at times, moving all extremities, intermittently thrashing about    CENTRAL LINE: [ ] YES [x] NO  LOCATION:   DATE INSERTED:  REMOVE: [ ] YES [ ] NO      QUINTEROS: [ ] YES [x] NO    DATE INSERTED:  REMOVE:  [ ] YES [ ] NO      A-LINE:  [ ] YES [x] NO  LOCATION:   DATE INSERTED:  REMOVE:  [ ] YES [ ] NO  EXPLAIN:    GLOBAL ISSUE/BEST PRACTICE:  Analgesia: n/a  Sedation: precedex, ativan, phenobarbital   HOB elevation: yes  Stress ulcer prophylaxis: n/a  VTE prophylaxis: heparin sc  Oral Care: n/a  Glycemic control: n/a  Nutrition: npo    CODE STATUS: [x] full code  [ ] DNR  [ ] DNI  [ ] MOLST  Goals of care discussion: [ ] yes

## 2018-05-07 LAB
ALBUMIN SERPL ELPH-MCNC: 3.1 G/DL — LOW (ref 3.3–5)
ALP SERPL-CCNC: 57 U/L — SIGNIFICANT CHANGE UP (ref 40–120)
ALT FLD-CCNC: 34 U/L — SIGNIFICANT CHANGE UP (ref 12–78)
AMYLASE P1 CFR SERPL: 92 U/L — SIGNIFICANT CHANGE UP (ref 25–115)
ANION GAP SERPL CALC-SCNC: 15 MMOL/L — SIGNIFICANT CHANGE UP (ref 5–17)
AST SERPL-CCNC: 33 U/L — SIGNIFICANT CHANGE UP (ref 15–37)
BILIRUB SERPL-MCNC: 1 MG/DL — SIGNIFICANT CHANGE UP (ref 0.2–1.2)
BUN SERPL-MCNC: 11 MG/DL — SIGNIFICANT CHANGE UP (ref 7–23)
CALCIUM SERPL-MCNC: 9 MG/DL — SIGNIFICANT CHANGE UP (ref 8.5–10.1)
CHLORIDE SERPL-SCNC: 102 MMOL/L — SIGNIFICANT CHANGE UP (ref 96–108)
CO2 SERPL-SCNC: 24 MMOL/L — SIGNIFICANT CHANGE UP (ref 22–31)
CREAT SERPL-MCNC: 0.66 MG/DL — SIGNIFICANT CHANGE UP (ref 0.5–1.3)
GLUCOSE SERPL-MCNC: 74 MG/DL — SIGNIFICANT CHANGE UP (ref 70–99)
HCT VFR BLD CALC: 35.7 % — LOW (ref 39–50)
HGB BLD-MCNC: 12.2 G/DL — LOW (ref 13–17)
LIDOCAIN IGE QN: 228 U/L — SIGNIFICANT CHANGE UP (ref 73–393)
M PROTEIN 24H UR ELPH-MRATE: SIGNIFICANT CHANGE UP
MAGNESIUM SERPL-MCNC: 1.6 MG/DL — SIGNIFICANT CHANGE UP (ref 1.6–2.6)
MCHC RBC-ENTMCNC: 32.6 PG — SIGNIFICANT CHANGE UP (ref 27–34)
MCHC RBC-ENTMCNC: 34.2 GM/DL — SIGNIFICANT CHANGE UP (ref 32–36)
MCV RBC AUTO: 95.5 FL — SIGNIFICANT CHANGE UP (ref 80–100)
NRBC # BLD: 0 /100 WBCS — SIGNIFICANT CHANGE UP (ref 0–0)
PHOSPHATE SERPL-MCNC: 2.1 MG/DL — LOW (ref 2.5–4.5)
PLATELET # BLD AUTO: 92 K/UL — LOW (ref 150–400)
POTASSIUM SERPL-MCNC: 3.1 MMOL/L — LOW (ref 3.5–5.3)
POTASSIUM SERPL-SCNC: 3.1 MMOL/L — LOW (ref 3.5–5.3)
PROT SERPL-MCNC: 7.4 GM/DL — SIGNIFICANT CHANGE UP (ref 6–8.3)
RBC # BLD: 3.74 M/UL — LOW (ref 4.2–5.8)
RBC # FLD: 12.3 % — SIGNIFICANT CHANGE UP (ref 10.3–14.5)
SODIUM SERPL-SCNC: 141 MMOL/L — SIGNIFICANT CHANGE UP (ref 135–145)
WBC # BLD: 5.09 K/UL — SIGNIFICANT CHANGE UP (ref 3.8–10.5)
WBC # FLD AUTO: 5.09 K/UL — SIGNIFICANT CHANGE UP (ref 3.8–10.5)

## 2018-05-07 RX ORDER — POTASSIUM CHLORIDE 20 MEQ
10 PACKET (EA) ORAL
Qty: 0 | Refills: 0 | Status: COMPLETED | OUTPATIENT
Start: 2018-05-07 | End: 2018-05-07

## 2018-05-07 RX ORDER — SODIUM CHLORIDE 9 MG/ML
1000 INJECTION, SOLUTION INTRAVENOUS
Qty: 0 | Refills: 0 | Status: DISCONTINUED | OUTPATIENT
Start: 2018-05-07 | End: 2018-05-17

## 2018-05-07 RX ORDER — SODIUM CHLORIDE 9 MG/ML
1000 INJECTION, SOLUTION INTRAVENOUS
Qty: 0 | Refills: 0 | Status: DISCONTINUED | OUTPATIENT
Start: 2018-05-07 | End: 2018-05-07

## 2018-05-07 RX ORDER — POTASSIUM PHOSPHATE, MONOBASIC POTASSIUM PHOSPHATE, DIBASIC 236; 224 MG/ML; MG/ML
15 INJECTION, SOLUTION INTRAVENOUS ONCE
Qty: 0 | Refills: 0 | Status: COMPLETED | OUTPATIENT
Start: 2018-05-07 | End: 2018-05-07

## 2018-05-07 RX ADMIN — Medication 2 MILLIGRAM(S): at 01:51

## 2018-05-07 RX ADMIN — Medication 2 MILLIGRAM(S): at 06:24

## 2018-05-07 RX ADMIN — Medication 130 MILLIGRAM(S): at 21:47

## 2018-05-07 RX ADMIN — HEPARIN SODIUM 5000 UNIT(S): 5000 INJECTION INTRAVENOUS; SUBCUTANEOUS at 18:34

## 2018-05-07 RX ADMIN — Medication 130 MILLIGRAM(S): at 00:23

## 2018-05-07 RX ADMIN — POTASSIUM PHOSPHATE, MONOBASIC POTASSIUM PHOSPHATE, DIBASIC 63.75 MILLIMOLE(S): 236; 224 INJECTION, SOLUTION INTRAVENOUS at 11:57

## 2018-05-07 RX ADMIN — SODIUM CHLORIDE 125 MILLILITER(S): 9 INJECTION, SOLUTION INTRAVENOUS at 13:06

## 2018-05-07 RX ADMIN — Medication 130 MILLIGRAM(S): at 18:28

## 2018-05-07 RX ADMIN — CHLORHEXIDINE GLUCONATE 1 APPLICATION(S): 213 SOLUTION TOPICAL at 06:25

## 2018-05-07 RX ADMIN — Medication 260 MILLIGRAM(S): at 20:30

## 2018-05-07 RX ADMIN — Medication 130 MILLIGRAM(S): at 22:51

## 2018-05-07 RX ADMIN — Medication 130 MILLIGRAM(S): at 13:18

## 2018-05-07 RX ADMIN — Medication 130 MILLIGRAM(S): at 11:18

## 2018-05-07 RX ADMIN — Medication 100 MILLIEQUIVALENT(S): at 08:00

## 2018-05-07 RX ADMIN — Medication 100 MILLIEQUIVALENT(S): at 09:00

## 2018-05-07 RX ADMIN — Medication 100 MILLIEQUIVALENT(S): at 10:00

## 2018-05-07 RX ADMIN — HEPARIN SODIUM 5000 UNIT(S): 5000 INJECTION INTRAVENOUS; SUBCUTANEOUS at 06:24

## 2018-05-07 RX ADMIN — Medication 130 MILLIGRAM(S): at 06:24

## 2018-05-07 NOTE — PROGRESS NOTE ADULT - SUBJECTIVE AND OBJECTIVE BOX
Patient is a 42y old  Male who presents with a chief complaint of alcohol withdrawal/ pncreatitis/ electrolyte imbalnc/-Hypoklemia. Hypontremia/ vomiting (04 May 2018 14:01)      INTERVAL HPI/OVERNIGHT EVENTS:  still sedated+  agitated+    MEDICATIONS  (STANDING):  chlorhexidine 4% Liquid 1 Application(s) Topical <User Schedule>  dexmedetomidine Infusion 0.2 MICROgram(s)/kG/Hr (3.74 mL/Hr) IV Continuous <Continuous>  heparin  Injectable 5000 Unit(s) SubCutaneous every 12 hours  LORazepam   Injectable 2 milliGRAM(s) IV Push every 4 hours  pantoprazole    Tablet 40 milliGRAM(s) Oral before breakfast  PHENobarbital Injectable 130 milliGRAM(s) IV Push every 6 hours  potassium chloride  10 mEq/100 mL IVPB 10 milliEquivalent(s) IV Intermittent every 1 hour  sodium chloride 0.9% 1000 milliLiter(s) (42 mL/Hr) IV Continuous <Continuous>  thiamine 100 milliGRAM(s) Oral daily    MEDICATIONS  (PRN):  PHENobarbital Injectable 130 milliGRAM(s) IV Push every 15 minutes PRN ciwa >8  PHENobarbital Injectable 260 milliGRAM(s) IV Push every 15 minutes PRN ciwa >20        REVIEW OF SYSTEMS:  CONSTITUTIONAL: No fever, weight loss, or fatigue  EYES: No eye pain, visual disturbances, or discharge  ENMT:  No difficulty hearing, tinnitus, vertigo; No sinus or throat pain  NECK: No pain or stiffness  RESPIRATORY: No cough, wheezing, chills or hemoptysis; No shortness of breath  CARDIOVASCULAR: No chest pain, palpitations, dizziness, or leg swelling  GASTROINTESTINAL: No abdominal or epigastric pain. No nausea, vomiting, or hematemesis; No diarrhea or constipation. No melena or hematochezia.  GENITOURINARY: No dysuria, frequency, hematuria, or incontinence  NEUROLOGICAL: No headaches, memory loss, loss of strength, numbness, or tremors  SKIN: No itching, burning, rashes, or lesions      Vital Signs Last 24 Hrs  T(C): 37.1 (07 May 2018 06:00), Max: 38.1 (06 May 2018 13:30)  T(F): 98.8 (07 May 2018 06:00), Max: 100.5 (06 May 2018 13:30)  HR: 102 (07 May 2018 08:00) (89 - 109)  BP: 100/53 (07 May 2018 08:00) (93/61 - 154/91)  BP(mean): 65 (07 May 2018 08:00) (65 - 108)  RR: 52 (07 May 2018 08:00) (20 - 52)  SpO2: 100% (07 May 2018 08:00) (92% - 100%)    PHYSICAL EXAM:  GENERAL: NAD, well-groomed, well-developed  HEAD:  Atraumatic, Normocephalic  EYES: EOMI, PERRLA, conjunctiva and sclera clear  ENMT: No tonsillar erythema, exudates, or enlargement; Moist mucous membranes   NECK: Supple, No JVD   NERVOUS SYSTEM:  Alert & Oriented X3, Good concentration; Motor Strength 5/5 B/L upper and lower extremities; DTRs 2+ intact and symmetric  CHEST/LUNG: Clear to percussion bilaterally; No rales, rhonchi, wheezing, or rubs  HEART: Regular rate and rhythm; No murmurs, rubs, or gallops  ABDOMEN: Soft, Nontender, Nondistended; Bowel sounds present  EXTREMITIES:  2+ Peripheral Pulses, No clubbing, cyanosis, or edema  LYMPH: No lymphadenopathy noted  SKIN: No rashes or lesions    LABS:                        12.2   5.09  )-----------( 92       ( 07 May 2018 03:50 )             35.7     05-07    141  |  102  |  11  ----------------------------<  74  3.1<L>   |  24  |  0.66    Ca    9.0      07 May 2018 03:50  Phos  2.1     05-07  Mg     1.6     05-07    TPro  7.4  /  Alb  3.1<L>  /  TBili  1.0  /  DBili  x   /  AST  33  /  ALT  34  /  AlkPhos  57  05-07        CAPILLARY BLOOD GLUCOSE          RADIOLOGY & ADDITIONAL TESTS:    Imaging Personally Reviewed:  [ ] YES  [ ] NO    Consultant(s) Notes Reviewed:  [ ] YES  [ ] NO    Care Discussed with Consultants/Other Providers [ ] YES  [ ] NO

## 2018-05-07 NOTE — PROGRESS NOTE ADULT - ASSESSMENT
alcohol withdrawal/ Pancreatitis , Hypokalemia ,hyponatremia Elevated serum protien  follow up labs  continue present therapy

## 2018-05-07 NOTE — PROGRESS NOTE ADULT - SUBJECTIVE AND OBJECTIVE BOX
INTERVAL HPI/OVERNIGHT EVENTS:    43 yo M PMH alcohol abuse (4-5 glasses of wine per day) stopped drinking 3 days prior to admission found to have acute pancreatitis now in acute alcohol withdrawal.      24 hour events: Continues to agitated.      REVIEW OF SYSTEMS: [x] Unable to obtain because acute alcohol withdrawal/delirium    ICU Vital Signs Last 24 Hrs  T(C): 38.2 (07 May 2018 15:00), Max: 38.8 (07 May 2018 11:42)  T(F): 100.7 (07 May 2018 15:00), Max: 101.9 (07 May 2018 11:42)  HR: 99 (07 May 2018 14:00) (89 - 112)  BP: 106/67 (07 May 2018 15:00) (93/61 - 154/91)  BP(mean): 83 (07 May 2018 14:00) (65 - 108)  ABP: --  ABP(mean): --  RR: 31 (07 May 2018 14:00) (21 - 52)  SpO2: 99% (07 May 2018 14:00) (92% - 100%)          I&O's Detail    06 May 2018 07:01  -  07 May 2018 07:00  --------------------------------------------------------  IN:    dexmedetomidine Infusion: 276.9 mL    sodium chloride 0.9%: 1008 mL    Solution: 250 mL  Total IN: 1534.9 mL    OUT:    Incontinent per Condom Catheter: 600 mL  Total OUT: 600 mL    Total NET: 934.9 mL      07 May 2018 07:01  -  07 May 2018 16:12  --------------------------------------------------------  IN:    dexmedetomidine Infusion: 84.4 mL    lactated ringers.: 125 mL    sodium chloride 0.9%: 384 mL    Solution: 300 mL    Solution: 250 mL  Total IN: 1143.4 mL    OUT:  Total OUT: 0 mL    Total NET: 1143.4 mL            CAPILLARY BLOOD GLUCOSE          MEDICATIONS  NEURO  Meds: dexmedetomidine Infusion 0.2 MICROgram(s)/kG/Hr (3.74 mL/Hr) IV Continuous <Continuous>  PHENobarbital Injectable 130 milliGRAM(s) IV Push every 15 minutes PRN ciwa >8  PHENobarbital Injectable 260 milliGRAM(s) IV Push every 15 minutes PRN ciwa >20  PHENobarbital Injectable 130 milliGRAM(s) IV Push every 6 hours    RESPIRATORY    Meds:   CARDIOVASCULAR  Meds:   GI/NUTRITION  Meds: pantoprazole    Tablet 40 milliGRAM(s) Oral before breakfast    GENITOURINARY  Meds: lactated ringers. 1000 milliLiter(s) IV Continuous <Continuous>  sodium chloride 0.9% 1000 milliLiter(s) IV Continuous <Continuous>  thiamine 100 milliGRAM(s) Oral daily    HEMATOLOGIC  Meds: heparin  Injectable 5000 Unit(s) SubCutaneous every 12 hours    [x] VTE Prophylaxis  INFECTIOUS DISEASES  Meds:   ENDOCRINE  CAPILLARY BLOOD GLUCOSE        Meds:   OTHER MEDICATIONS:  chlorhexidine 4% Liquid 1 Application(s) Topical <User Schedule>  :    PHYSICAL EXAM:    GENERAL: NAD, agitated and disoriented.    CHEST/LUNG: Clear to auscultation bilaterally; No rales, rhonchi, wheezing, or rubs  HEART: Regular rate and rhythm; No murmurs, rubs, or gallops  ABDOMEN: Soft, tender; Bowel sounds present  EXTREMITIES:  2+ Peripheral Pulses, No clubbing, cyanosis, or edema  NERVOUS SYSTEM:  Disoriented moving all extremities.      LABS:                        12.2   5.09  )-----------( 92       ( 07 May 2018 03:50 )             35.7      05-07    141  |  102  |  11  ----------------------------<  74  3.1<L>   |  24  |  0.66    Ca    9.0      07 May 2018 03:50  Phos  2.1     05-07  Mg     1.6     05-07    TPro  7.4  /  Alb  3.1<L>  /  TBili  1.0  /  DBili  x   /  AST  33  /  ALT  34  /  AlkPhos  57  05-07        Culture Results:   No growth (05-04 @ 19:07)      RADIOLOGY & ADDITIONAL STUDIES:    CULTURES

## 2018-05-07 NOTE — PROGRESS NOTE ADULT - ASSESSMENT
41 yo M PMH alcohol abuse (4-5 glasses of wine per day) stopped drinking 3 days prior to admission found to have acute pancreatitis now in acute alcohol withdrawal.      Alcoholic Pancreatitis - lipase now downtrending.  Not tolerating PO.  Continue with fluid hydration for now with LR @ 125cc/hr.  Replete mag and phos aggressively.    - cont thiamine and mvt   - continue with NPO for now.      Alcohol Withdrawal and Delirium Tremens   - Continue with phenobarb 260 Q6h.  with prn phenobarb pushes.    - Wean precedex as toelrated    JGong   PGY 6

## 2018-05-07 NOTE — PROGRESS NOTE ADULT - SUBJECTIVE AND OBJECTIVE BOX
Gastroenterology  Patient seen and examined bedside resting comfortably with Mother  no acute events    T(F): 101.9 (05-07-18 @ 11:42), Max: 101.9 (05-07-18 @ 11:42)  HR: 92 (05-07-18 @ 11:42) (89 - 109)  BP: 124/81 (05-07-18 @ 11:42) (93/61 - 154/91)  RR: 33 (05-07-18 @ 11:42) (21 - 52)  SpO2: 100% (05-07-18 @ 11:42) (92% - 100%)  Wt(kg): --  CAPILLARY BLOOD GLUCOSE    PHYSICAL EXAM:  General: WDWN.   Neuro:  Restrained,  lethargic  HEENT: NCAT, EOMI, conjunctiva clear  CV: +S1+S2 regular rate and rhythm  Lung: clear to ausculation bilaterally, respirations nonlabored, good inspiratory effort  Abdomen: soft, Non tender, No Distension. Normal active BS  Extremities: no pedal edema or calf tenderness noted      LABS:                        12.2   5.09  )-----------( 92       ( 07 May 2018 03:50 )             35.7     05-07    141  |  102  |  11  ----------------------------<  74  3.1<L>   |  24  |  0.66    Ca    9.0      07 May 2018 03:50  Phos  2.1     05-07  Mg     1.6     05-07    TPro  7.4  /  Alb  3.1<L>  /  TBili  1.0  /  DBili  x   /  AST  33  /  ALT  34  /  AlkPhos  57  05-07    LIVER FUNCTIONS - ( 07 May 2018 03:50 )  Alb: 3.1 g/dL / Pro: 7.4 gm/dL / ALK PHOS: 57 U/L / ALT: 34 U/L / AST: 33 U/L / GGT: x             I&O's Detail    06 May 2018 07:01  -  07 May 2018 07:00  --------------------------------------------------------  IN:    dexmedetomidine Infusion: 276.9 mL    sodium chloride 0.9%: 1008 mL    Solution: 250 mL  Total IN: 1534.9 mL    OUT:    Incontinent per Condom Catheter: 600 mL  Total OUT: 600 mL    Total NET: 934.9 mL      07 May 2018 07:01  -  07 May 2018 13:01  --------------------------------------------------------  IN:    dexmedetomidine Infusion: 28.2 mL    sodium chloride 0.9%: 284 mL    Solution: 200 mL  Total IN: 512.2 mL    OUT:  Total OUT: 0 mL    Total NET: 512.2 mL        05-07 @ 03:50    141 | 102 | 11  /9.0 | 1.6 | 2.1  _______________________/  3.1 | 24 | 0.66                           \par     Alpha Fetoprotein - Tumor Marker (05.06.18 @ 18:00)    Alpha Fetoprotein - Tumor Marker: 4.3: Method: Roche Electrochemilumenescence  Values obtained with different assay methods or kits cannot be  used interchangeably.  Results cannot be interpreted as absolute evidence of the presence  or absence of malignant disease.  AFP values are not interpretable in pregnant females. ng/mL

## 2018-05-07 NOTE — PROGRESS NOTE ADULT - SUBJECTIVE AND OBJECTIVE BOX
Confused and agitated.    MEDICATIONS  (STANDING):  chlorhexidine 4% Liquid 1 Application(s) Topical <User Schedule>  dexmedetomidine Infusion 0.2 MICROgram(s)/kG/Hr (3.74 mL/Hr) IV Continuous <Continuous>  heparin  Injectable 5000 Unit(s) SubCutaneous every 12 hours  lactated ringers. 1000 milliLiter(s) (125 mL/Hr) IV Continuous <Continuous>  pantoprazole    Tablet 40 milliGRAM(s) Oral before breakfast  PHENobarbital Injectable 130 milliGRAM(s) IV Push every 6 hours  sodium chloride 0.9% 1000 milliLiter(s) (42 mL/Hr) IV Continuous <Continuous>  thiamine 100 milliGRAM(s) Oral daily    MEDICATIONS  (PRN):  PHENobarbital Injectable 130 milliGRAM(s) IV Push every 15 minutes PRN ciwa >8  PHENobarbital Injectable 260 milliGRAM(s) IV Push every 15 minutes PRN ciwa >20      05-06-18 @ 07:01  -  05-07-18 @ 07:00  --------------------------------------------------------  IN: 1534.9 mL / OUT: 600 mL / NET: 934.9 mL    05-07-18 @ 07:01  -  05-07-18 @ 20:14  --------------------------------------------------------  IN: 1949.5 mL / OUT: 700 mL / NET: 1249.5 mL      PHYSICAL EXAM:      T(C): 38.2 (05-07-18 @ 15:00), Max: 38.8 (05-07-18 @ 11:42)  HR: 100 (05-07-18 @ 19:00) (87 - 112)  BP: 140/99 (05-07-18 @ 19:00) (93/61 - 154/91)  RR: 22 (05-07-18 @ 19:00) (21 - 52)  SpO2: 100% (05-07-18 @ 19:00) (92% - 100%)  Wt(kg): --  Respiratory: clear anteriorly, decreased BS at bases  Cardiovascular: S1 S2  Gastrointestinal: soft NT ND +BS  Extremities:   tr edema                                    12.2   5.09  )-----------( 92       ( 07 May 2018 03:50 )             35.7     05-07    141  |  102  |  11  ----------------------------<  74  3.1<L>   |  24  |  0.66    Ca    9.0      07 May 2018 03:50  Phos  2.1     05-07  Mg     1.6     05-07    TPro  7.4  /  Alb  3.1<L>  /  TBili  1.0  /  DBili  x   /  AST  33  /  ALT  34  /  AlkPhos  57  05-07      LIVER FUNCTIONS - ( 07 May 2018 03:50 )  Alb: 3.1 g/dL / Pro: 7.4 gm/dL / ALK PHOS: 57 U/L / ALT: 34 U/L / AST: 33 U/L / GGT: x             Assessment and Plan:  HAIDER  pre renal azotemia; improved;  Electrolyte imbalance, replete.

## 2018-05-08 LAB
ALBUMIN SERPL ELPH-MCNC: 2.9 G/DL — LOW (ref 3.3–5)
ALP SERPL-CCNC: 60 U/L — SIGNIFICANT CHANGE UP (ref 40–120)
ALT FLD-CCNC: 26 U/L — SIGNIFICANT CHANGE UP (ref 12–78)
AMYLASE P1 CFR SERPL: 80 U/L — SIGNIFICANT CHANGE UP (ref 25–115)
ANION GAP SERPL CALC-SCNC: 14 MMOL/L — SIGNIFICANT CHANGE UP (ref 5–17)
AST SERPL-CCNC: 20 U/L — SIGNIFICANT CHANGE UP (ref 15–37)
BASE EXCESS BLDA CALC-SCNC: -4.3 MMOL/L — LOW (ref -2–2)
BILIRUB SERPL-MCNC: 0.8 MG/DL — SIGNIFICANT CHANGE UP (ref 0.2–1.2)
BLOOD GAS COMMENTS: SIGNIFICANT CHANGE UP
BLOOD GAS COMMENTS: SIGNIFICANT CHANGE UP
BLOOD GAS SOURCE: SIGNIFICANT CHANGE UP
BUN SERPL-MCNC: 5 MG/DL — LOW (ref 7–23)
CALCIUM SERPL-MCNC: 9 MG/DL — SIGNIFICANT CHANGE UP (ref 8.5–10.1)
CHLORIDE SERPL-SCNC: 105 MMOL/L — SIGNIFICANT CHANGE UP (ref 96–108)
CO2 SERPL-SCNC: 22 MMOL/L — SIGNIFICANT CHANGE UP (ref 22–31)
CREAT SERPL-MCNC: 0.68 MG/DL — SIGNIFICANT CHANGE UP (ref 0.5–1.3)
GLUCOSE SERPL-MCNC: 82 MG/DL — SIGNIFICANT CHANGE UP (ref 70–99)
HCO3 BLDA-SCNC: 22 MMOL/L — SIGNIFICANT CHANGE UP (ref 21–29)
HCT VFR BLD CALC: 34.3 % — LOW (ref 39–50)
HGB BLD-MCNC: 12 G/DL — LOW (ref 13–17)
HOROWITZ INDEX BLDA+IHG-RTO: 21 — SIGNIFICANT CHANGE UP
LIDOCAIN IGE QN: 227 U/L — SIGNIFICANT CHANGE UP (ref 73–393)
MAGNESIUM SERPL-MCNC: 1.2 MG/DL — LOW (ref 1.6–2.6)
MCHC RBC-ENTMCNC: 33.3 PG — SIGNIFICANT CHANGE UP (ref 27–34)
MCHC RBC-ENTMCNC: 35 GM/DL — SIGNIFICANT CHANGE UP (ref 32–36)
MCV RBC AUTO: 95.3 FL — SIGNIFICANT CHANGE UP (ref 80–100)
NRBC # BLD: 0 /100 WBCS — SIGNIFICANT CHANGE UP (ref 0–0)
PCO2 BLDA: 45 MMHG — SIGNIFICANT CHANGE UP (ref 32–46)
PH BLD: 7.3 — LOW (ref 7.35–7.45)
PHOSPHATE SERPL-MCNC: 2.9 MG/DL — SIGNIFICANT CHANGE UP (ref 2.5–4.5)
PLATELET # BLD AUTO: 126 K/UL — LOW (ref 150–400)
PO2 BLDA: 47 MMHG — CRITICAL LOW (ref 74–108)
POTASSIUM SERPL-MCNC: 3.1 MMOL/L — LOW (ref 3.5–5.3)
POTASSIUM SERPL-SCNC: 3.1 MMOL/L — LOW (ref 3.5–5.3)
PROT SERPL-MCNC: 7.1 GM/DL — SIGNIFICANT CHANGE UP (ref 6–8.3)
RBC # BLD: 3.6 M/UL — LOW (ref 4.2–5.8)
RBC # FLD: 12.2 % — SIGNIFICANT CHANGE UP (ref 10.3–14.5)
SAO2 % BLDA: 79 % — LOW (ref 92–96)
SODIUM SERPL-SCNC: 141 MMOL/L — SIGNIFICANT CHANGE UP (ref 135–145)
WBC # BLD: 6.2 K/UL — SIGNIFICANT CHANGE UP (ref 3.8–10.5)
WBC # FLD AUTO: 6.2 K/UL — SIGNIFICANT CHANGE UP (ref 3.8–10.5)

## 2018-05-08 PROCEDURE — 71045 X-RAY EXAM CHEST 1 VIEW: CPT | Mod: 26

## 2018-05-08 RX ORDER — SODIUM CHLORIDE 9 MG/ML
1000 INJECTION INTRAMUSCULAR; INTRAVENOUS; SUBCUTANEOUS ONCE
Qty: 0 | Refills: 0 | Status: DISCONTINUED | OUTPATIENT
Start: 2018-05-08 | End: 2018-05-17

## 2018-05-08 RX ORDER — CHLORHEXIDINE GLUCONATE 213 G/1000ML
15 SOLUTION TOPICAL
Qty: 0 | Refills: 0 | Status: DISCONTINUED | OUTPATIENT
Start: 2018-05-08 | End: 2018-05-16

## 2018-05-08 RX ORDER — CHLORHEXIDINE GLUCONATE 213 G/1000ML
1 SOLUTION TOPICAL
Qty: 0 | Refills: 0 | Status: DISCONTINUED | OUTPATIENT
Start: 2018-05-08 | End: 2018-05-08

## 2018-05-08 RX ORDER — SODIUM CHLORIDE 9 MG/ML
1000 INJECTION, SOLUTION INTRAVENOUS
Qty: 0 | Refills: 0 | Status: DISCONTINUED | OUTPATIENT
Start: 2018-05-08 | End: 2018-05-09

## 2018-05-08 RX ORDER — MAGNESIUM SULFATE 500 MG/ML
2 VIAL (ML) INJECTION ONCE
Qty: 0 | Refills: 0 | Status: COMPLETED | OUTPATIENT
Start: 2018-05-08 | End: 2018-05-08

## 2018-05-08 RX ORDER — POTASSIUM CHLORIDE 20 MEQ
10 PACKET (EA) ORAL
Qty: 0 | Refills: 0 | Status: COMPLETED | OUTPATIENT
Start: 2018-05-08 | End: 2018-05-08

## 2018-05-08 RX ORDER — KETAMINE HYDROCHLORIDE 100 MG/ML
200 INJECTION INTRAMUSCULAR; INTRAVENOUS ONCE
Qty: 0 | Refills: 0 | Status: DISCONTINUED | OUTPATIENT
Start: 2018-05-08 | End: 2018-05-08

## 2018-05-08 RX ORDER — PANTOPRAZOLE SODIUM 20 MG/1
40 TABLET, DELAYED RELEASE ORAL DAILY
Qty: 0 | Refills: 0 | Status: DISCONTINUED | OUTPATIENT
Start: 2018-05-08 | End: 2018-05-11

## 2018-05-08 RX ORDER — FENTANYL CITRATE 50 UG/ML
0.5 INJECTION INTRAVENOUS
Qty: 2500 | Refills: 0 | Status: DISCONTINUED | OUTPATIENT
Start: 2018-05-08 | End: 2018-05-12

## 2018-05-08 RX ADMIN — KETAMINE HYDROCHLORIDE 200 MILLIGRAM(S): 100 INJECTION INTRAMUSCULAR; INTRAVENOUS at 13:30

## 2018-05-08 RX ADMIN — Medication 130 MILLIGRAM(S): at 10:30

## 2018-05-08 RX ADMIN — Medication 130 MILLIGRAM(S): at 01:09

## 2018-05-08 RX ADMIN — HEPARIN SODIUM 5000 UNIT(S): 5000 INJECTION INTRAVENOUS; SUBCUTANEOUS at 18:18

## 2018-05-08 RX ADMIN — Medication 130 MILLIGRAM(S): at 07:11

## 2018-05-08 RX ADMIN — Medication 100 MILLIEQUIVALENT(S): at 07:45

## 2018-05-08 RX ADMIN — Medication 100 MILLIEQUIVALENT(S): at 06:32

## 2018-05-08 RX ADMIN — SODIUM CHLORIDE 42 MILLILITER(S): 9 INJECTION, SOLUTION INTRAVENOUS at 11:13

## 2018-05-08 RX ADMIN — Medication 130 MILLIGRAM(S): at 23:21

## 2018-05-08 RX ADMIN — CHLORHEXIDINE GLUCONATE 15 MILLILITER(S): 213 SOLUTION TOPICAL at 18:18

## 2018-05-08 RX ADMIN — Medication 130 MILLIGRAM(S): at 18:19

## 2018-05-08 RX ADMIN — Medication 130 MILLIGRAM(S): at 00:04

## 2018-05-08 RX ADMIN — Medication 50 GRAM(S): at 09:08

## 2018-05-08 RX ADMIN — Medication 130 MILLIGRAM(S): at 06:30

## 2018-05-08 RX ADMIN — PANTOPRAZOLE SODIUM 40 MILLIGRAM(S): 20 TABLET, DELAYED RELEASE ORAL at 23:21

## 2018-05-08 RX ADMIN — Medication 130 MILLIGRAM(S): at 09:08

## 2018-05-08 RX ADMIN — FENTANYL CITRATE 3.74 MICROGRAM(S)/KG/HR: 50 INJECTION INTRAVENOUS at 14:11

## 2018-05-08 RX ADMIN — HEPARIN SODIUM 5000 UNIT(S): 5000 INJECTION INTRAVENOUS; SUBCUTANEOUS at 06:30

## 2018-05-08 RX ADMIN — CHLORHEXIDINE GLUCONATE 1 APPLICATION(S): 213 SOLUTION TOPICAL at 06:33

## 2018-05-08 RX ADMIN — Medication 100 MILLIEQUIVALENT(S): at 08:43

## 2018-05-08 NOTE — PROGRESS NOTE ADULT - SUBJECTIVE AND OBJECTIVE BOX
Gastroenterology  Patient seen and examined bedside resting comfortably with Mother  no acute events    T(F): 99.6 (05-08-18 @ 08:00), Max: 100.7 (05-07-18 @ 15:00)  HR: 98 (05-08-18 @ 14:00) (87 - 107)  BP: 105/63 (05-08-18 @ 14:00) (75/54 - 155/100)  RR: 26 (05-08-18 @ 14:00) (19 - 44)  SpO2: 100% (05-08-18 @ 14:00) (78% - 100%)  Wt(kg): --  CAPILLARY BLOOD GLUCOSE      PHYSICAL EXAM:  General: WDWN.   Neuro:  Restrained,  lethargic  HEENT: NCAT, EOMI, conjunctiva clear  CV: +S1+S2 regular rate and rhythm  Lung: clear to ausculation bilaterally, respirations nonlabored, good inspiratory effort  Abdomen: soft, Non tender, No Distension. Normal active BS  Extremities: no pedal edema or calf tenderness noted       LABS:                        12.0   6.20  )-----------( 126      ( 08 May 2018 04:35 )             34.3     05-08    141  |  105  |  5<L>  ----------------------------<  82  3.1<L>   |  22  |  0.68    Ca    9.0      08 May 2018 04:35  Phos  2.9     05-08  Mg     1.2     05-08    TPro  7.1  /  Alb  2.9<L>  /  TBili  0.8  /  DBili  x   /  AST  20  /  ALT  26  /  AlkPhos  60  05-08    LIVER FUNCTIONS - ( 08 May 2018 04:35 )  Alb: 2.9 g/dL / Pro: 7.1 gm/dL / ALK PHOS: 60 U/L / ALT: 26 U/L / AST: 20 U/L / GGT: x             I&O's Detail    07 May 2018 07:01  -  08 May 2018 07:00  --------------------------------------------------------  IN:    dexmedetomidine Infusion: 362.9 mL    lactated ringers.: 2125 mL    sodium chloride 0.9%: 384 mL    Solution: 500 mL    Solution: 300 mL  Total IN: 3671.9 mL    OUT:    Incontinent per Condom Catheter: 1900 mL  Total OUT: 1900 mL    Total NET: 1771.9 mL      08 May 2018 07:01  -  08 May 2018 14:12  --------------------------------------------------------  IN:    Solution: 100 mL  Total IN: 100 mL    OUT:  Total OUT: 0 mL    Total NET: 100 mL        05-08 @ 04:35    141 | 105 | 5  /9.0 | 1.2 | 2.9  _______________________/  3.1 | 22 | 0.68                           \par

## 2018-05-08 NOTE — PROGRESS NOTE ADULT - SUBJECTIVE AND OBJECTIVE BOX
Patient is a 42y old  Male who presents with a chief complaint of alcohol withdrawal/ pncreatitis/ electrolyte imbalnc/-Hypoklemia. Hypontremia/ vomiting (04 May 2018 14:01)      INTERVAL HPI/OVERNIGHT EVENTS:    intubated, sedated    MEDICATIONS  (STANDING):  chlorhexidine 0.12% Liquid 15 milliLiter(s) Swish and Spit two times a day  chlorhexidine 4% Liquid 1 Application(s) Topical <User Schedule>  dexmedetomidine Infusion 0.2 MICROgram(s)/kG/Hr (3.74 mL/Hr) IV Continuous <Continuous>  fentaNYL   Infusion. 0.5 MICROgram(s)/kG/Hr (3.74 mL/Hr) IV Continuous <Continuous>  heparin  Injectable 5000 Unit(s) SubCutaneous every 12 hours  lactated ringers. 1000 milliLiter(s) (125 mL/Hr) IV Continuous <Continuous>  pantoprazole   Suspension 40 milliGRAM(s) Oral daily  PHENobarbital Injectable 130 milliGRAM(s) IV Push every 6 hours  sodium chloride 0.9% 1000 milliLiter(s) (42 mL/Hr) IV Continuous <Continuous>    MEDICATIONS  (PRN):  PHENobarbital Injectable 130 milliGRAM(s) IV Push every 15 minutes PRN ciwa >8  PHENobarbital Injectable 260 milliGRAM(s) IV Push every 15 minutes PRN ciwa >20        REVIEW OF SYSTEMS:  CONSTITUTIONAL: No fever, weight loss, or fatigue  EYES: No eye pain, visual disturbances, or discharge  ENMT:  No difficulty hearing, tinnitus, vertigo; No sinus or throat pain  NECK: No pain or stiffness  RESPIRATORY: No cough, wheezing, chills or hemoptysis; No shortness of breath  CARDIOVASCULAR: No chest pain, palpitations, dizziness, or leg swelling  GASTROINTESTINAL: No abdominal or epigastric pain. No nausea, vomiting, or hematemesis; No diarrhea or constipation. No melena or hematochezia.  GENITOURINARY: No dysuria, frequency, hematuria, or incontinence  NEUROLOGICAL: No headaches, memory loss, loss of strength, numbness, or tremors  SKIN: No itching, burning, rashes, or lesions      Vital Signs Last 24 Hrs  T(C): 37.1 (08 May 2018 15:12), Max: 37.7 (07 May 2018 20:32)  T(F): 98.8 (08 May 2018 15:12), Max: 99.9 (07 May 2018 20:32)  HR: 84 (08 May 2018 16:30) (82 - 107)  BP: 107/67 (08 May 2018 16:00) (75/54 - 153/92)  BP(mean): 76 (08 May 2018 16:00) (59 - 105)  RR: 12 (08 May 2018 16:00) (12 - 44)  SpO2: 100% (08 May 2018 16:30) (78% - 100%)    PHYSICAL EXAM:  GENERAL: NAD, well-groomed, well-developed  HEAD:  Atraumatic, Normocephalic  EYES: EOMI, PERRLA, conjunctiva and sclera clear  ENMT: No tonsillar erythema, exudates, or enlargement; Moist mucous membranes ,intubated  NECK: Supple, No JVD   NERVOUS SYSTEM:  Alert & Oriented X3, Good concentration; Motor Strength 5/5 B/L upper and lower extremities; DTRs 2+ intact and symmetric  CHEST/LUNG: Clear to percussion bilaterally; No rales, rhonchi, wheezing, or rubs  HEART: Regular rate and rhythm; No murmurs, rubs, or gallops  ABDOMEN: Soft, Nontender, Nondistended; Bowel sounds present  EXTREMITIES:  2+ Peripheral Pulses, No clubbing, cyanosis, or edema  LYMPH: No lymphadenopathy noted  SKIN: No rashes or lesions    LABS:                        12.0   6.20  )-----------( 126      ( 08 May 2018 04:35 )             34.3     05-08    141  |  105  |  5<L>  ----------------------------<  82  3.1<L>   |  22  |  0.68    Ca    9.0      08 May 2018 04:35  Phos  2.9     05-08  Mg     1.2     05-08    TPro  7.1  /  Alb  2.9<L>  /  TBili  0.8  /  DBili  x   /  AST  20  /  ALT  26  /  AlkPhos  60  05-08        CAPILLARY BLOOD GLUCOSE          RADIOLOGY & ADDITIONAL TESTS:    Imaging Personally Reviewed:  [ ] YES  [ ] NO    Consultant(s) Notes Reviewed:  [ ] YES  [ ] NO    Care Discussed with Consultants/Other Providers [ ] YES  [ ] NO

## 2018-05-08 NOTE — PROCEDURE NOTE - NSTRACHPOSTINTU_RESP_A_CORE
Breath sounds equal/Breath sounds bilateral/Appropriate capnography/Chest X-Ray/Positive end tidal Co2 noted

## 2018-05-08 NOTE — PROGRESS NOTE ADULT - ASSESSMENT
41 yo M PMH alcohol abuse (4-5 glasses of wine per day) stopped drinking 3 days prior to admission found to have acute pancreatitis now in acute alcohol withdrawal.      Neuro: ETOH Withdrawal - requiring precedex and phenobarb standing, added fentanyl after intubation.  Continue with Phenobarb 260 PRN Q15 min for CIWA 8-20 and CIWA >20.  CV: No acute issues  Pulm: Not protecting airway, required intubation.    GI: Acute Pancreatitis - lipase is now downtrending.  Continue with MVT, Thiamine, and Folic Acid.  Add Lactated Ringers with KCl @125cc/hr for fluid resus.  Slowly improving, continues to have abd pain with nausea.   Dispo: requires ICU level of care, now intubated.  Weaning trials in AM.

## 2018-05-08 NOTE — PROGRESS NOTE ADULT - ASSESSMENT
alcohol withdrawal/ Pancreatitis , Hypokalemia ,hyponatremia Elevated serum protien  follow up labs  continue present therapy - sedation

## 2018-05-08 NOTE — PROGRESS NOTE ADULT - SUBJECTIVE AND OBJECTIVE BOX
Patient confused; agitated.     MEDICATIONS  (STANDING):  chlorhexidine 0.12% Liquid 15 milliLiter(s) Swish and Spit two times a day  chlorhexidine 4% Liquid 1 Application(s) Topical <User Schedule>  dexmedetomidine Infusion 0.2 MICROgram(s)/kG/Hr (3.74 mL/Hr) IV Continuous <Continuous>  fentaNYL   Infusion. 0.5 MICROgram(s)/kG/Hr (3.74 mL/Hr) IV Continuous <Continuous>  heparin  Injectable 5000 Unit(s) SubCutaneous every 12 hours  lactated ringers. 1000 milliLiter(s) (125 mL/Hr) IV Continuous <Continuous>  pantoprazole   Suspension 40 milliGRAM(s) Oral daily  PHENobarbital Injectable 130 milliGRAM(s) IV Push every 6 hours  sodium chloride 0.9% 1000 milliLiter(s) (42 mL/Hr) IV Continuous <Continuous>    MEDICATIONS  (PRN):  PHENobarbital Injectable 130 milliGRAM(s) IV Push every 15 minutes PRN ciwa >8  PHENobarbital Injectable 260 milliGRAM(s) IV Push every 15 minutes PRN ciwa >20      05-07-18 @ 07:01  -  05-08-18 @ 07:00  --------------------------------------------------------  IN: 3671.9 mL / OUT: 1900 mL / NET: 1771.9 mL    05-08-18 @ 07:01  -  05-08-18 @ 15:12  --------------------------------------------------------  IN: 482 mL / OUT: 0 mL / NET: 482 mL      PHYSICAL EXAM:      T(C): 37.6 (05-08-18 @ 08:00), Max: 37.7 (05-07-18 @ 20:32)  HR: 98 (05-08-18 @ 14:00) (87 - 107)  BP: 105/63 (05-08-18 @ 14:00) (75/54 - 155/100)  RR: 26 (05-08-18 @ 14:00) (19 - 44)  SpO2: 100% (05-08-18 @ 14:52) (78% - 100%)  Wt(kg): --  Respiratory: clear anteriorly, decreased BS at bases  Cardiovascular: S1 S2  Gastrointestinal: soft NT ND +BS  Extremities:  tr edema                                    12.0   6.20  )-----------( 126      ( 08 May 2018 04:35 )             34.3     05-08    141  |  105  |  5<L>  ----------------------------<  82  3.1<L>   |  22  |  0.68    Ca    9.0      08 May 2018 04:35  Phos  2.9     05-08  Mg     1.2     05-08    TPro  7.1  /  Alb  2.9<L>  /  TBili  0.8  /  DBili  x   /  AST  20  /  ALT  26  /  AlkPhos  60  05-08    ABG - ( 08 May 2018 12:55 )  pH, Arterial: x     pH, Blood: 7.30  /  pCO2: 45    /  pO2: 47    / HCO3: 22    / Base Excess: -4.3  /  SaO2: 79                LIVER FUNCTIONS - ( 08 May 2018 04:35 )  Alb: 2.9 g/dL / Pro: 7.1 gm/dL / ALK PHOS: 60 U/L / ALT: 26 U/L / AST: 20 U/L / GGT: x             Assessment and Plan:    HAIDER  pre renal azotemia; improved;  Electrolyte imbalance, replete.  Supportive care.

## 2018-05-08 NOTE — PROGRESS NOTE ADULT - SUBJECTIVE AND OBJECTIVE BOX
INTERVAL HPI/OVERNIGHT EVENTS:      41 yo M PMH alcohol abuse (4-5 glasses of wine per day) stopped drinking 3 days prior to admission found to have acute pancreatitis now in acute alcohol withdrawal.      24 hour events: Became increasing lethargic, intubated this afternoon.      REVIEW OF SYSTEMS: [x] Unable to obtain because intubated with delirium    ICU Vital Signs Last 24 Hrs  T(C): 37.1 (08 May 2018 15:12), Max: 37.7 (07 May 2018 20:32)  T(F): 98.8 (08 May 2018 15:12), Max: 99.9 (07 May 2018 20:32)  HR: 84 (08 May 2018 16:30) (82 - 107)  BP: 107/67 (08 May 2018 16:00) (75/54 - 155/100)  BP(mean): 76 (08 May 2018 16:00) (59 - 114)  ABP: --  ABP(mean): --  RR: 12 (08 May 2018 16:00) (12 - 44)  SpO2: 100% (08 May 2018 16:30) (78% - 100%)      ABG - ( 08 May 2018 12:55 )  pH, Arterial: x     pH, Blood: 7.30  /  pCO2: 45    /  pO2: 47    / HCO3: 22    / Base Excess: -4.3  /  SaO2: 79        I&O's Detail    07 May 2018 07:01  -  08 May 2018 07:00  --------------------------------------------------------  IN:    dexmedetomidine Infusion: 362.9 mL    lactated ringers.: 2125 mL    sodium chloride 0.9%: 384 mL    Solution: 500 mL    Solution: 300 mL  Total IN: 3671.9 mL    OUT:    Incontinent per Condom Catheter: 1900 mL  Total OUT: 1900 mL    Total NET: 1771.9 mL      08 May 2018 07:01  -  08 May 2018 18:09  --------------------------------------------------------  IN:    dexmedetomidine Infusion: 180 mL    fentaNYL Infusion.: 56 mL    sodium chloride 0.9%: 294 mL    Solution: 100 mL  Total IN: 630 mL    OUT:    Incontinent per Condom Catheter: 1300 mL  Total OUT: 1300 mL    Total NET: -670 mL          Mode: AC/ CMV (Assist Control/ Continuous Mandatory Ventilation)  RR (machine): 14  TV (machine): 400  FiO2: 50  PEEP: 5  ITime: 1  MAP: 7  PIP: 13    CAPILLARY BLOOD GLUCOSE          MEDICATIONS  NEURO  Meds: dexmedetomidine Infusion 0.2 MICROgram(s)/kG/Hr (3.74 mL/Hr) IV Continuous <Continuous>  fentaNYL   Infusion. 0.5 MICROgram(s)/kG/Hr (3.74 mL/Hr) IV Continuous <Continuous>  PHENobarbital Injectable 130 milliGRAM(s) IV Push every 6 hours  PHENobarbital Injectable 130 milliGRAM(s) IV Push every 15 minutes PRN ciwa >8  PHENobarbital Injectable 260 milliGRAM(s) IV Push every 15 minutes PRN ciwa >20    RESPIRATORY  ABG - ( 08 May 2018 12:55 )  pH: x     /  pCO2: 45    /  pO2: 47    / HCO3: 22    / Base Excess: -4.3  /  SaO2: 79      Lactate: x                Meds:   CARDIOVASCULAR  Meds:   GI/NUTRITION  Meds: pantoprazole   Suspension 40 milliGRAM(s) Oral daily    GENITOURINARY  Meds: lactated ringers. 1000 milliLiter(s) IV Continuous <Continuous>  sodium chloride 0.9% 1000 milliLiter(s) IV Continuous <Continuous>    HEMATOLOGIC  Meds: heparin  Injectable 5000 Unit(s) SubCutaneous every 12 hours    [x] VTE Prophylaxis  INFECTIOUS DISEASES  Meds:   ENDOCRINE  CAPILLARY BLOOD GLUCOSE        Meds:   OTHER MEDICATIONS:  chlorhexidine 0.12% Liquid 15 milliLiter(s) Swish and Spit two times a day  chlorhexidine 4% Liquid 1 Application(s) Topical <User Schedule>  :    PHYSICAL EXAM:    GENERAL: Now intubated for airway protection and increasing lethargy not protecting airway.  s  NECK: Supple, No JVD, Normal thyroid  CHEST/LUNG: coarse breath sounds bilaterally.    HEART: Regular rate and rhythm; No murmurs, rubs, or gallops  ABDOMEN: Soft, Nontender, Nondistended; Bowel sounds present  EXTREMITIES:  2+ Peripheral Pulses, No clubbing, cyanosis, or edema  NERVOUS SYSTEM:  disoriented not protecting airway.      LABS:                        12.0   6.20  )-----------( 126      ( 08 May 2018 04:35 )             34.3      05-08    141  |  105  |  5<L>  ----------------------------<  82  3.1<L>   |  22  |  0.68    Ca    9.0      08 May 2018 04:35  Phos  2.9     05-08  Mg     1.2     05-08    TPro  7.1  /  Alb  2.9<L>  /  TBili  0.8  /  DBili  x   /  AST  20  /  ALT  26  /  AlkPhos  60  05-08

## 2018-05-09 DIAGNOSIS — F10.239 ALCOHOL DEPENDENCE WITH WITHDRAWAL, UNSPECIFIED: ICD-10-CM

## 2018-05-09 LAB
% ALBUMIN: 55.5 % — SIGNIFICANT CHANGE UP
% ALPHA 1: 3.8 % — SIGNIFICANT CHANGE UP
% ALPHA 2: 10.3 % — SIGNIFICANT CHANGE UP
% BETA: 13.1 % — SIGNIFICANT CHANGE UP
% GAMMA: 17.3 % — SIGNIFICANT CHANGE UP
ALBUMIN SERPL ELPH-MCNC: 2.4 G/DL — LOW (ref 3.3–5)
ALBUMIN SERPL ELPH-MCNC: 4.2 G/DL — SIGNIFICANT CHANGE UP (ref 3.6–5.5)
ALBUMIN/GLOB SERPL ELPH: 1.2 RATIO — SIGNIFICANT CHANGE UP
ALP SERPL-CCNC: 57 U/L — SIGNIFICANT CHANGE UP (ref 40–120)
ALPHA1 GLOB SERPL ELPH-MCNC: 0.3 G/DL — SIGNIFICANT CHANGE UP (ref 0.1–0.4)
ALPHA2 GLOB SERPL ELPH-MCNC: 0.8 G/DL — SIGNIFICANT CHANGE UP (ref 0.5–1)
ALT FLD-CCNC: 20 U/L — SIGNIFICANT CHANGE UP (ref 12–78)
ANION GAP SERPL CALC-SCNC: 15 MMOL/L — SIGNIFICANT CHANGE UP (ref 5–17)
AST SERPL-CCNC: 20 U/L — SIGNIFICANT CHANGE UP (ref 15–37)
B-GLOBULIN SERPL ELPH-MCNC: 1 G/DL — SIGNIFICANT CHANGE UP (ref 0.5–1)
BASE EXCESS BLDA CALC-SCNC: -4 MMOL/L — LOW (ref -2–2)
BILIRUB SERPL-MCNC: 0.6 MG/DL — SIGNIFICANT CHANGE UP (ref 0.2–1.2)
BLOOD GAS COMMENTS: SIGNIFICANT CHANGE UP
BLOOD GAS COMMENTS: SIGNIFICANT CHANGE UP
BLOOD GAS SOURCE: SIGNIFICANT CHANGE UP
BUN SERPL-MCNC: 2 MG/DL — LOW (ref 7–23)
CALCIUM SERPL-MCNC: 8.4 MG/DL — LOW (ref 8.5–10.1)
CHLORIDE SERPL-SCNC: 108 MMOL/L — SIGNIFICANT CHANGE UP (ref 96–108)
CO2 SERPL-SCNC: 21 MMOL/L — LOW (ref 22–31)
CREAT SERPL-MCNC: 0.55 MG/DL — SIGNIFICANT CHANGE UP (ref 0.5–1.3)
GAMMA GLOBULIN: 1.3 G/DL — SIGNIFICANT CHANGE UP (ref 0.6–1.6)
GLUCOSE SERPL-MCNC: 74 MG/DL — SIGNIFICANT CHANGE UP (ref 70–99)
HCO3 BLDA-SCNC: 21 MMOL/L — SIGNIFICANT CHANGE UP (ref 21–29)
HCT VFR BLD CALC: 31 % — LOW (ref 39–50)
HGB BLD-MCNC: 10.5 G/DL — LOW (ref 13–17)
HOROWITZ INDEX BLDA+IHG-RTO: 30 — SIGNIFICANT CHANGE UP
INTERPRETATION SERPL IFE-IMP: SIGNIFICANT CHANGE UP
MAGNESIUM SERPL-MCNC: 1.3 MG/DL — LOW (ref 1.6–2.6)
MCHC RBC-ENTMCNC: 32.6 PG — SIGNIFICANT CHANGE UP (ref 27–34)
MCHC RBC-ENTMCNC: 33.9 GM/DL — SIGNIFICANT CHANGE UP (ref 32–36)
MCV RBC AUTO: 96.3 FL — SIGNIFICANT CHANGE UP (ref 80–100)
NRBC # BLD: 0 /100 WBCS — SIGNIFICANT CHANGE UP (ref 0–0)
PCO2 BLDA: 40 MMHG — SIGNIFICANT CHANGE UP (ref 32–46)
PH BLD: 7.34 — LOW (ref 7.35–7.45)
PHOSPHATE SERPL-MCNC: 2.4 MG/DL — LOW (ref 2.5–4.5)
PLATELET # BLD AUTO: 150 K/UL — SIGNIFICANT CHANGE UP (ref 150–400)
PO2 BLDA: 104 MMHG — SIGNIFICANT CHANGE UP (ref 74–108)
POTASSIUM SERPL-MCNC: 3.1 MMOL/L — LOW (ref 3.5–5.3)
POTASSIUM SERPL-SCNC: 3.1 MMOL/L — LOW (ref 3.5–5.3)
PROT PATTERN SERPL ELPH-IMP: SIGNIFICANT CHANGE UP
PROT SERPL-MCNC: 6.1 GM/DL — SIGNIFICANT CHANGE UP (ref 6–8.3)
RBC # BLD: 3.22 M/UL — LOW (ref 4.2–5.8)
RBC # FLD: 12.4 % — SIGNIFICANT CHANGE UP (ref 10.3–14.5)
SAO2 % BLDA: 97 % — HIGH (ref 92–96)
SODIUM SERPL-SCNC: 144 MMOL/L — SIGNIFICANT CHANGE UP (ref 135–145)
WBC # BLD: 5.41 K/UL — SIGNIFICANT CHANGE UP (ref 3.8–10.5)
WBC # FLD AUTO: 5.41 K/UL — SIGNIFICANT CHANGE UP (ref 3.8–10.5)

## 2018-05-09 PROCEDURE — 71045 X-RAY EXAM CHEST 1 VIEW: CPT | Mod: 26

## 2018-05-09 RX ORDER — THIAMINE MONONITRATE (VIT B1) 100 MG
100 TABLET ORAL DAILY
Qty: 0 | Refills: 0 | Status: DISCONTINUED | OUTPATIENT
Start: 2018-05-09 | End: 2018-05-11

## 2018-05-09 RX ORDER — PHENOBARBITAL 60 MG
260130 TABLET ORAL EVERY 8 HOURS
Qty: 0 | Refills: 0 | Status: DISCONTINUED | OUTPATIENT
Start: 2018-05-09 | End: 2018-05-09

## 2018-05-09 RX ORDER — METOCLOPRAMIDE HCL 10 MG
10 TABLET ORAL EVERY 8 HOURS
Qty: 0 | Refills: 0 | Status: DISCONTINUED | OUTPATIENT
Start: 2018-05-09 | End: 2018-05-11

## 2018-05-09 RX ORDER — POTASSIUM CHLORIDE 20 MEQ
40 PACKET (EA) ORAL ONCE
Qty: 0 | Refills: 0 | Status: COMPLETED | OUTPATIENT
Start: 2018-05-09 | End: 2018-05-09

## 2018-05-09 RX ORDER — MULTIVIT-MIN/FERROUS GLUCONATE 9 MG/15 ML
1 LIQUID (ML) ORAL DAILY
Qty: 0 | Refills: 0 | Status: DISCONTINUED | OUTPATIENT
Start: 2018-05-09 | End: 2018-05-11

## 2018-05-09 RX ORDER — MAGNESIUM SULFATE 500 MG/ML
1 VIAL (ML) INJECTION ONCE
Qty: 0 | Refills: 0 | Status: COMPLETED | OUTPATIENT
Start: 2018-05-09 | End: 2018-05-09

## 2018-05-09 RX ORDER — FOLIC ACID 0.8 MG
1 TABLET ORAL DAILY
Qty: 0 | Refills: 0 | Status: DISCONTINUED | OUTPATIENT
Start: 2018-05-09 | End: 2018-05-11

## 2018-05-09 RX ORDER — PHENOBARBITAL 60 MG
260 TABLET ORAL EVERY 8 HOURS
Qty: 0 | Refills: 0 | Status: DISCONTINUED | OUTPATIENT
Start: 2018-05-09 | End: 2018-05-10

## 2018-05-09 RX ORDER — POTASSIUM PHOSPHATE, MONOBASIC POTASSIUM PHOSPHATE, DIBASIC 236; 224 MG/ML; MG/ML
15 INJECTION, SOLUTION INTRAVENOUS ONCE
Qty: 0 | Refills: 0 | Status: COMPLETED | OUTPATIENT
Start: 2018-05-09 | End: 2018-05-09

## 2018-05-09 RX ORDER — MIDAZOLAM HYDROCHLORIDE 1 MG/ML
4 INJECTION, SOLUTION INTRAMUSCULAR; INTRAVENOUS ONCE
Qty: 0 | Refills: 0 | Status: DISCONTINUED | OUTPATIENT
Start: 2018-05-09 | End: 2018-05-09

## 2018-05-09 RX ADMIN — Medication 10 MILLIGRAM(S): at 21:49

## 2018-05-09 RX ADMIN — Medication 40 MILLIEQUIVALENT(S): at 05:45

## 2018-05-09 RX ADMIN — Medication 260 MILLIGRAM(S): at 21:49

## 2018-05-09 RX ADMIN — PANTOPRAZOLE SODIUM 40 MILLIGRAM(S): 20 TABLET, DELAYED RELEASE ORAL at 12:11

## 2018-05-09 RX ADMIN — CHLORHEXIDINE GLUCONATE 15 MILLILITER(S): 213 SOLUTION TOPICAL at 05:38

## 2018-05-09 RX ADMIN — MIDAZOLAM HYDROCHLORIDE 4 MILLIGRAM(S): 1 INJECTION, SOLUTION INTRAMUSCULAR; INTRAVENOUS at 11:29

## 2018-05-09 RX ADMIN — Medication 260 MILLIGRAM(S): at 19:53

## 2018-05-09 RX ADMIN — Medication 10 MILLIGRAM(S): at 14:00

## 2018-05-09 RX ADMIN — Medication 130 MILLIGRAM(S): at 05:45

## 2018-05-09 RX ADMIN — Medication 260 MILLIGRAM(S): at 16:15

## 2018-05-09 RX ADMIN — DEXMEDETOMIDINE HYDROCHLORIDE IN 0.9% SODIUM CHLORIDE 3.74 MICROGRAM(S)/KG/HR: 4 INJECTION INTRAVENOUS at 12:13

## 2018-05-09 RX ADMIN — FENTANYL CITRATE 3.74 MICROGRAM(S)/KG/HR: 50 INJECTION INTRAVENOUS at 12:12

## 2018-05-09 RX ADMIN — SODIUM CHLORIDE 125 MILLILITER(S): 9 INJECTION, SOLUTION INTRAVENOUS at 12:12

## 2018-05-09 RX ADMIN — Medication 260 MILLIGRAM(S): at 22:53

## 2018-05-09 RX ADMIN — HEPARIN SODIUM 5000 UNIT(S): 5000 INJECTION INTRAVENOUS; SUBCUTANEOUS at 05:45

## 2018-05-09 RX ADMIN — POTASSIUM PHOSPHATE, MONOBASIC POTASSIUM PHOSPHATE, DIBASIC 63.75 MILLIMOLE(S): 236; 224 INJECTION, SOLUTION INTRAVENOUS at 07:50

## 2018-05-09 RX ADMIN — Medication 100 GRAM(S): at 05:44

## 2018-05-09 RX ADMIN — HEPARIN SODIUM 5000 UNIT(S): 5000 INJECTION INTRAVENOUS; SUBCUTANEOUS at 17:52

## 2018-05-09 RX ADMIN — CHLORHEXIDINE GLUCONATE 1 APPLICATION(S): 213 SOLUTION TOPICAL at 05:38

## 2018-05-09 RX ADMIN — CHLORHEXIDINE GLUCONATE 15 MILLILITER(S): 213 SOLUTION TOPICAL at 17:52

## 2018-05-09 NOTE — PROGRESS NOTE ADULT - ASSESSMENT
41 yo M PMH alcohol abuse (4-5 glasses of wine per day) stopped drinking 3 days prior to admission found to have acute pancreatitis now in acute alcohol withdrawal.  Intubated yesterday 2/2 not protecting airway.      Neuro: ETOH Withdrawal - requiring precedex and phenobarb standing, added fentanyl after intubation.  Continue with Phenobarb 260 PRN Q15 min for CIWA 8-20 and CIWA >20.  CV: No acute issues  Pulm: Not protecting airway, required intubation.  Currently on 14/400/5/40.  GI: Acute Pancreatitis - lipase is now downtrending.  Continue with MVT, Thiamine, and Folic Acid.  Add Lactated Ringers with KCl @125cc/hr for fluid resus.  Slowly improving, continues to have abd pain with nausea.  High output from NGT, possible ileus.  Started on Reglan.    Dispo: requires ICU level of care, now intubated.  Weaning trials in [facility]   PGY 6

## 2018-05-09 NOTE — PROGRESS NOTE ADULT - SUBJECTIVE AND OBJECTIVE BOX
INTERVAL HPI/OVERNIGHT EVENTS:      43 yo M PMH alcohol abuse (4-5 glasses of wine per day) stopped drinking 3 days prior to admission found to have acute pancreatitis now in acute alcohol withdrawal.  Remains intubated overnight.      REVIEW OF SYSTEMS: [x] Unable to obtain because intubated and sedated.    ICU Vital Signs Last 24 Hrs  T(C): 37.1 (09 May 2018 14:27), Max: 38.4 (09 May 2018 10:48)  T(F): 98.7 (09 May 2018 14:27), Max: 101.2 (09 May 2018 10:48)  HR: 79 (09 May 2018 16:56) (79 - 95)  BP: 110/69 (09 May 2018 16:00) (88/49 - 115/68)  BP(mean): 77 (09 May 2018 16:00) (57 - 80)  ABP: --  ABP(mean): --  RR: 15 (09 May 2018 16:00) (13 - 30)  SpO2: 100% (09 May 2018 16:56) (96% - 100%)      ABG - ( 09 May 2018 03:11 )  pH, Arterial: x     pH, Blood: 7.34  /  pCO2: 40    /  pO2: 104   / HCO3: 21    / Base Excess: -4.0  /  SaO2: 97        I&O's Detail    08 May 2018 07:01  -  09 May 2018 07:00  --------------------------------------------------------  IN:    dexmedetomidine Infusion: 360.7 mL    fentaNYL Infusion.: 296 mL    lactated ringers.: 875 mL    sodium chloride 0.9%: 1044 mL    Sodium Chloride 0.9% IV Bolus: 1000 mL    Solution: 100 mL  Total IN: 3675.7 mL    OUT:    Incontinent per Condom Catheter: 1700 mL    Indwelling Catheter - Urethral: 1300 mL    Nasoenteral Tube: 500 mL  Total OUT: 3500 mL    Total NET: 175.7 mL      09 May 2018 07:01  -  09 May 2018 18:28  --------------------------------------------------------  IN:    dexmedetomidine Infusion: 198 mL    fentaNYL Infusion.: 165 mL    lactated ringers.: 1375 mL    Solution: 250 mL  Total IN: 1988 mL    OUT:    Indwelling Catheter - Urethral: 825 mL  Total OUT: 825 mL    Total NET: 1163 mL          Mode: AC/ CMV (Assist Control/ Continuous Mandatory Ventilation)  RR (machine): 14  TV (machine): 400  FiO2: 25  PEEP: 5  ITime: 0.8  MAP: 7  PIP: 15    CAPILLARY BLOOD GLUCOSE          MEDICATIONS  NEURO  Meds: dexmedetomidine Infusion 0.2 MICROgram(s)/kG/Hr (3.74 mL/Hr) IV Continuous <Continuous>  fentaNYL   Infusion. 0.5 MICROgram(s)/kG/Hr (3.74 mL/Hr) IV Continuous <Continuous>  metoclopramide Injectable 10 milliGRAM(s) IV Push every 8 hours  PHENobarbital Injectable 130 milliGRAM(s) IV Push every 15 minutes PRN ciwa >8  PHENobarbital Injectable 260 milliGRAM(s) IV Push every 15 minutes PRN ciwa >20  PHENobarbital Injectable 260 milliGRAM(s) IV Push every 8 hours    RESPIRATORY  ABG - ( 09 May 2018 03:11 )  pH: x     /  pCO2: 40    /  pO2: 104   / HCO3: 21    / Base Excess: -4.0  /  SaO2: 97      Lactate: x        Meds:   CARDIOVASCULAR  Meds:   GI/NUTRITION  Meds: pantoprazole   Suspension 40 milliGRAM(s) Oral daily    GENITOURINARY  Meds: folic acid 1 milliGRAM(s) Oral daily  lactated ringers. 1000 milliLiter(s) IV Continuous <Continuous>  multivitamin/minerals 1 Tablet(s) Oral daily  sodium chloride 0.9% Bolus 1000 milliLiter(s) IV Bolus once  thiamine 100 milliGRAM(s) Oral daily    HEMATOLOGIC  Meds: heparin  Injectable 5000 Unit(s) SubCutaneous every 12 hours    [x] VTE Prophylaxis  INFECTIOUS DISEASES  Meds:   ENDOCRINE  CAPILLARY BLOOD GLUCOSE        Meds:   OTHER MEDICATIONS:  chlorhexidine 0.12% Liquid 15 milliLiter(s) Swish and Spit two times a day  chlorhexidine 4% Liquid 1 Application(s) Topical <User Schedule>  :    PHYSICAL EXAM:    GENERAL: NAD, well-groomed, well-developed  NECK: Supple, No JVD, Normal thyroid  CHEST/LUNG: Clear to auscultation bilaterally; No rales, rhonchi, wheezing, or rubs  HEART: Regular rate and rhythm; No murmurs, rubs, or gallops  ABDOMEN: Soft, Nontender, Nondistended; Bowel sounds present  EXTREMITIES:  2+ Peripheral Pulses, No clubbing, cyanosis, or edema  NERVOUS SYSTEM:  Alert & Oriented X3, Good concentration; Motor Strength 5/5 B/L upper and lower extremities; DTRs 2+ intact and symmetric    LABS:                        10.5   5.41  )-----------( 150      ( 09 May 2018 03:58 )             31.0      05-09    144  |  108  |  2<L>  ----------------------------<  74  3.1<L>   |  21<L>  |  0.55    Ca    8.4<L>      09 May 2018 03:58  Phos  2.4     05-09  Mg     1.3     05-09    TPro  6.1  /  Alb  2.4<L>  /  TBili  0.6  /  DBili  x   /  AST  20  /  ALT  20  /  AlkPhos  57  05-09

## 2018-05-09 NOTE — PROGRESS NOTE ADULT - SUBJECTIVE AND OBJECTIVE BOX
Patient is a 42y old  Male who presents with a chief complaint of alcohol withdrawal/ pncreatitis/ electrolyte imbalnc/-Hypoklemia. Hypontremia/ vomiting (04 May 2018 14:01)      INTERVAL HPI/OVERNIGHT EVENTS:    intubated/sedated  still agitated    MEDICATIONS  (STANDING):  chlorhexidine 0.12% Liquid 15 milliLiter(s) Swish and Spit two times a day  chlorhexidine 4% Liquid 1 Application(s) Topical <User Schedule>  dexmedetomidine Infusion 0.2 MICROgram(s)/kG/Hr (3.74 mL/Hr) IV Continuous <Continuous>  fentaNYL   Infusion. 0.5 MICROgram(s)/kG/Hr (3.74 mL/Hr) IV Continuous <Continuous>  folic acid 1 milliGRAM(s) Oral daily  heparin  Injectable 5000 Unit(s) SubCutaneous every 12 hours  lactated ringers. 1000 milliLiter(s) (125 mL/Hr) IV Continuous <Continuous>  metoclopramide Injectable 10 milliGRAM(s) IV Push every 8 hours  multivitamin/minerals 1 Tablet(s) Oral daily  pantoprazole   Suspension 40 milliGRAM(s) Oral daily  PHENobarbital Injectable 260 milliGRAM(s) IV Push every 8 hours  sodium chloride 0.9% Bolus 1000 milliLiter(s) IV Bolus once  thiamine 100 milliGRAM(s) Oral daily    MEDICATIONS  (PRN):  PHENobarbital Injectable 130 milliGRAM(s) IV Push every 15 minutes PRN ciwa >8  PHENobarbital Injectable 260 milliGRAM(s) IV Push every 15 minutes PRN ciwa >20        REVIEW OF SYSTEMS:  CONSTITUTIONAL: No fever, weight loss, or fatigue  EYES: No eye pain, visual disturbances, or discharge  ENMT:  No difficulty hearing, tinnitus, vertigo; No sinus or throat pain  NECK: No pain or stiffness  RESPIRATORY: No cough, wheezing, chills or hemoptysis; No shortness of breath  CARDIOVASCULAR: No chest pain, palpitations, dizziness, or leg swelling  GASTROINTESTINAL: No abdominal or epigastric pain. No nausea, vomiting, or hematemesis; No diarrhea or constipation. No melena or hematochezia.  GENITOURINARY: No dysuria, frequency, hematuria, or incontinence  NEUROLOGICAL: No headaches, memory loss, loss of strength, numbness, or tremors  SKIN: No itching, burning, rashes, or lesions      Vital Signs Last 24 Hrs  T(C): 37.1 (09 May 2018 19:55), Max: 38.4 (09 May 2018 10:48)  T(F): 98.8 (09 May 2018 19:55), Max: 101.2 (09 May 2018 10:48)  HR: 100 (09 May 2018 22:30) (79 - 114)  BP: 107/65 (09 May 2018 22:30) (89/50 - 129/80)  BP(mean): 75 (09 May 2018 22:30) (59 - 97)  RR: 16 (09 May 2018 22:30) (13 - 30)  SpO2: 100% (09 May 2018 22:30) (96% - 100%)    PHYSICAL EXAM:  GENERAL: NAD, well-groomed, well-developed, intubated  HEAD:  Atraumatic, Normocephalic  EYES: EOMI, PERRLA, conjunctiva and sclera clear  ENMT: No tonsillar erythema, exudates, or enlargement; Moist mucous membranes   NECK: Supple, No JVD   NERVOUS SYSTEM:  Alert & Oriented X3, Good concentration; Motor Strength 5/5 B/L upper and lower extremities; DTRs 2+ intact and symmetric  CHEST/LUNG: Clear to percussion bilaterally; No rales, rhonchi, wheezing, or rubs  HEART: Regular rate and rhythm; No murmurs, rubs, or gallops  ABDOMEN: Soft, Nontender, Nondistended; Bowel sounds present  EXTREMITIES:  2+ Peripheral Pulses, No clubbing, cyanosis, or edema  LYMPH: No lymphadenopathy noted  SKIN: No rashes or lesions    LABS:                        10.5   5.41  )-----------( 150      ( 09 May 2018 03:58 )             31.0     05-09    144  |  108  |  2<L>  ----------------------------<  74  3.1<L>   |  21<L>  |  0.55    Ca    8.4<L>      09 May 2018 03:58  Phos  2.4     05-09  Mg     1.3     05-09    TPro  6.1  /  Alb  2.4<L>  /  TBili  0.6  /  DBili  x   /  AST  20  /  ALT  20  /  AlkPhos  57  05-09        CAPILLARY BLOOD GLUCOSE          RADIOLOGY & ADDITIONAL TESTS:    Imaging Personally Reviewed:  [ ] YES  [ ] NO    Consultant(s) Notes Reviewed:  [ ] YES  [ ] NO    Care Discussed with Consultants/Other Providers [ ] YES  [ ] NO

## 2018-05-09 NOTE — PROGRESS NOTE ADULT - SUBJECTIVE AND OBJECTIVE BOX
Gastroenterology  Patient seen and examined , intubated and sedated in ICU    T(F): 98.7 (05-09-18 @ 14:27), Max: 101.2 (05-09-18 @ 10:48)  HR: 84 (05-09-18 @ 14:00) (82 - 95)  BP: 105/62 (05-09-18 @ 14:00) (88/49 - 115/68)  RR: 17 (05-09-18 @ 14:00) (12 - 30)  SpO2: 99% (05-09-18 @ 14:00) (96% - 100%)  Wt(kg): --  CAPILLARY BLOOD GLUCOSE          PHYSICAL EXAM:  General: sedated  Neuro:  sedated  HEENT: intubated  CV: +S1+S2 regular rate and rhythm  Lung: clear to ausculation bilaterally, respirations nonlabored, good inspiratory effort  Abdomen: soft,  No Distension. Normal active BS  Extremities: no pedal edema or calf tenderness noted     LABS:                        10.5   5.41  )-----------( 150      ( 09 May 2018 03:58 )             31.0     05-09    144  |  108  |  2<L>  ----------------------------<  74  3.1<L>   |  21<L>  |  0.55    Ca    8.4<L>      09 May 2018 03:58  Phos  2.4     05-09  Mg     1.3     05-09    TPro  6.1  /  Alb  2.4<L>  /  TBili  0.6  /  DBili  x   /  AST  20  /  ALT  20  /  AlkPhos  57  05-09    LIVER FUNCTIONS - ( 09 May 2018 03:58 )  Alb: 2.4 g/dL / Pro: 6.1 gm/dL / ALK PHOS: 57 U/L / ALT: 20 U/L / AST: 20 U/L / GGT: x             I&O's Detail    08 May 2018 07:01  -  09 May 2018 07:00  --------------------------------------------------------  IN:    dexmedetomidine Infusion: 360.7 mL    fentaNYL Infusion.: 296 mL    lactated ringers.: 875 mL    sodium chloride 0.9%: 1044 mL    Sodium Chloride 0.9% IV Bolus: 1000 mL    Solution: 100 mL  Total IN: 3675.7 mL    OUT:    Incontinent per Condom Catheter: 1700 mL    Indwelling Catheter - Urethral: 1300 mL    Nasoenteral Tube: 500 mL  Total OUT: 3500 mL    Total NET: 175.7 mL      09 May 2018 07:01  -  09 May 2018 15:01  --------------------------------------------------------  IN:    dexmedetomidine Infusion: 36 mL    fentaNYL Infusion.: 30 mL    lactated ringers.: 250 mL    Solution: 250 mL  Total IN: 566 mL    OUT:    Indwelling Catheter - Urethral: 175 mL  Total OUT: 175 mL    Total NET: 391 mL        05-09 @ 03:58    144 | 108 | 2  /8.4 | 1.3 | 2.4  _______________________/  3.1 | 21 | 0.55                           \par

## 2018-05-09 NOTE — PROGRESS NOTE ADULT - SUBJECTIVE AND OBJECTIVE BOX
intubated now.    MEDICATIONS  (STANDING):  chlorhexidine 0.12% Liquid 15 milliLiter(s) Swish and Spit two times a day  chlorhexidine 4% Liquid 1 Application(s) Topical <User Schedule>  dexmedetomidine Infusion 0.2 MICROgram(s)/kG/Hr (3.74 mL/Hr) IV Continuous <Continuous>  fentaNYL   Infusion. 0.5 MICROgram(s)/kG/Hr (3.74 mL/Hr) IV Continuous <Continuous>  folic acid 1 milliGRAM(s) Oral daily  heparin  Injectable 5000 Unit(s) SubCutaneous every 12 hours  lactated ringers. 1000 milliLiter(s) (125 mL/Hr) IV Continuous <Continuous>  metoclopramide Injectable 10 milliGRAM(s) IV Push every 8 hours  multivitamin/minerals 1 Tablet(s) Oral daily  pantoprazole   Suspension 40 milliGRAM(s) Oral daily  PHENobarbital Injectable 260 milliGRAM(s) IV Push every 8 hours  sodium chloride 0.9% Bolus 1000 milliLiter(s) IV Bolus once  thiamine 100 milliGRAM(s) Oral daily    MEDICATIONS  (PRN):  PHENobarbital Injectable 130 milliGRAM(s) IV Push every 15 minutes PRN ciwa >8  PHENobarbital Injectable 260 milliGRAM(s) IV Push every 15 minutes PRN ciwa >20      05-08-18 @ 07:01  -  05-09-18 @ 07:00  --------------------------------------------------------  IN: 3675.7 mL / OUT: 3500 mL / NET: 175.7 mL    05-09-18 @ 07:01  -  05-09-18 @ 18:02  --------------------------------------------------------  IN: 1988 mL / OUT: 825 mL / NET: 1163 mL      PHYSICAL EXAM:      T(C): 37.1 (05-09-18 @ 14:27), Max: 38.4 (05-09-18 @ 10:48)  HR: 79 (05-09-18 @ 16:56) (79 - 95)  BP: 110/69 (05-09-18 @ 16:00) (88/49 - 115/68)  RR: 15 (05-09-18 @ 16:00) (13 - 30)  SpO2: 100% (05-09-18 @ 16:56) (96% - 100%)  Wt(kg): --  Respiratory: clear anteriorly, decreased BS at bases  Cardiovascular: S1 S2  Gastrointestinal: soft NT ND +BS  Extremities:   edema                                    10.5   5.41  )-----------( 150      ( 09 May 2018 03:58 )             31.0     05-09    144  |  108  |  2<L>  ----------------------------<  74  3.1<L>   |  21<L>  |  0.55    Ca    8.4<L>      09 May 2018 03:58  Phos  2.4     05-09  Mg     1.3     05-09    TPro  6.1  /  Alb  2.4<L>  /  TBili  0.6  /  DBili  x   /  AST  20  /  ALT  20  /  AlkPhos  57  05-09    ABG - ( 09 May 2018 03:11 )  pH, Arterial: x     pH, Blood: 7.34  /  pCO2: 40    /  pO2: 104   / HCO3: 21    / Base Excess: -4.0  /  SaO2: 97                LIVER FUNCTIONS - ( 09 May 2018 03:58 )  Alb: 2.4 g/dL / Pro: 6.1 gm/dL / ALK PHOS: 57 U/L / ALT: 20 U/L / AST: 20 U/L / GGT: x             Assessment and Plan:  HAIDER resolved; replete Mg, phos K

## 2018-05-09 NOTE — PROGRESS NOTE ADULT - ASSESSMENT
alcohol withdrawal/ Pancreatitis , Hypokalemia ,hyponatremia Elevated serum protien  follow up labs  continue present therapy - sedation  clinically unchanged

## 2018-05-10 LAB
ANION GAP SERPL CALC-SCNC: 15 MMOL/L — SIGNIFICANT CHANGE UP (ref 5–17)
BUN SERPL-MCNC: <1 MG/DL — LOW (ref 7–23)
CALCIUM SERPL-MCNC: 8.4 MG/DL — LOW (ref 8.5–10.1)
CHLORIDE SERPL-SCNC: 105 MMOL/L — SIGNIFICANT CHANGE UP (ref 96–108)
CO2 SERPL-SCNC: 20 MMOL/L — LOW (ref 22–31)
CREAT SERPL-MCNC: 0.55 MG/DL — SIGNIFICANT CHANGE UP (ref 0.5–1.3)
GLUCOSE SERPL-MCNC: 77 MG/DL — SIGNIFICANT CHANGE UP (ref 70–99)
HCT VFR BLD CALC: 31.9 % — LOW (ref 39–50)
HGB BLD-MCNC: 11 G/DL — LOW (ref 13–17)
MAGNESIUM SERPL-MCNC: 1.1 MG/DL — LOW (ref 1.6–2.6)
MCHC RBC-ENTMCNC: 33.1 PG — SIGNIFICANT CHANGE UP (ref 27–34)
MCHC RBC-ENTMCNC: 34.5 GM/DL — SIGNIFICANT CHANGE UP (ref 32–36)
MCV RBC AUTO: 96.1 FL — SIGNIFICANT CHANGE UP (ref 80–100)
NRBC # BLD: 0 /100 WBCS — SIGNIFICANT CHANGE UP (ref 0–0)
PHOSPHATE SERPL-MCNC: 2 MG/DL — LOW (ref 2.5–4.5)
PLATELET # BLD AUTO: 166 K/UL — SIGNIFICANT CHANGE UP (ref 150–400)
POTASSIUM SERPL-MCNC: 2.8 MMOL/L — CRITICAL LOW (ref 3.5–5.3)
POTASSIUM SERPL-SCNC: 2.8 MMOL/L — CRITICAL LOW (ref 3.5–5.3)
RBC # BLD: 3.32 M/UL — LOW (ref 4.2–5.8)
RBC # FLD: 12.5 % — SIGNIFICANT CHANGE UP (ref 10.3–14.5)
SODIUM SERPL-SCNC: 140 MMOL/L — SIGNIFICANT CHANGE UP (ref 135–145)
WBC # BLD: 6.28 K/UL — SIGNIFICANT CHANGE UP (ref 3.8–10.5)
WBC # FLD AUTO: 6.28 K/UL — SIGNIFICANT CHANGE UP (ref 3.8–10.5)

## 2018-05-10 RX ORDER — MIDAZOLAM HYDROCHLORIDE 1 MG/ML
2 INJECTION, SOLUTION INTRAMUSCULAR; INTRAVENOUS
Qty: 0 | Refills: 0 | Status: DISCONTINUED | OUTPATIENT
Start: 2018-05-10 | End: 2018-05-13

## 2018-05-10 RX ORDER — MIDAZOLAM HYDROCHLORIDE 1 MG/ML
4 INJECTION, SOLUTION INTRAMUSCULAR; INTRAVENOUS
Qty: 0 | Refills: 0 | Status: DISCONTINUED | OUTPATIENT
Start: 2018-05-10 | End: 2018-05-13

## 2018-05-10 RX ORDER — MAGNESIUM SULFATE 500 MG/ML
2 VIAL (ML) INJECTION ONCE
Qty: 0 | Refills: 0 | Status: COMPLETED | OUTPATIENT
Start: 2018-05-10 | End: 2018-05-10

## 2018-05-10 RX ORDER — ACETAMINOPHEN 500 MG
650 TABLET ORAL ONCE
Qty: 0 | Refills: 0 | Status: COMPLETED | OUTPATIENT
Start: 2018-05-10 | End: 2018-05-10

## 2018-05-10 RX ORDER — POTASSIUM CHLORIDE 20 MEQ
40 PACKET (EA) ORAL ONCE
Qty: 0 | Refills: 0 | Status: COMPLETED | OUTPATIENT
Start: 2018-05-10 | End: 2018-05-10

## 2018-05-10 RX ORDER — ACETAMINOPHEN 500 MG
650 TABLET ORAL EVERY 6 HOURS
Qty: 0 | Refills: 0 | Status: DISCONTINUED | OUTPATIENT
Start: 2018-05-10 | End: 2018-05-20

## 2018-05-10 RX ORDER — MIDAZOLAM HYDROCHLORIDE 1 MG/ML
4 INJECTION, SOLUTION INTRAMUSCULAR; INTRAVENOUS ONCE
Qty: 0 | Refills: 0 | Status: DISCONTINUED | OUTPATIENT
Start: 2018-05-10 | End: 2018-05-10

## 2018-05-10 RX ORDER — POTASSIUM CHLORIDE 20 MEQ
10 PACKET (EA) ORAL
Qty: 0 | Refills: 0 | Status: COMPLETED | OUTPATIENT
Start: 2018-05-10 | End: 2018-05-10

## 2018-05-10 RX ADMIN — Medication 4 MILLIGRAM(S): at 17:37

## 2018-05-10 RX ADMIN — Medication 260 MILLIGRAM(S): at 05:02

## 2018-05-10 RX ADMIN — SODIUM CHLORIDE 125 MILLILITER(S): 9 INJECTION, SOLUTION INTRAVENOUS at 22:35

## 2018-05-10 RX ADMIN — PANTOPRAZOLE SODIUM 40 MILLIGRAM(S): 20 TABLET, DELAYED RELEASE ORAL at 12:13

## 2018-05-10 RX ADMIN — Medication 1 TABLET(S): at 12:11

## 2018-05-10 RX ADMIN — Medication 40 MILLIEQUIVALENT(S): at 08:49

## 2018-05-10 RX ADMIN — Medication 4 MILLIGRAM(S): at 14:06

## 2018-05-10 RX ADMIN — DEXMEDETOMIDINE HYDROCHLORIDE IN 0.9% SODIUM CHLORIDE 3.74 MICROGRAM(S)/KG/HR: 4 INJECTION INTRAVENOUS at 17:38

## 2018-05-10 RX ADMIN — CHLORHEXIDINE GLUCONATE 15 MILLILITER(S): 213 SOLUTION TOPICAL at 17:39

## 2018-05-10 RX ADMIN — HEPARIN SODIUM 5000 UNIT(S): 5000 INJECTION INTRAVENOUS; SUBCUTANEOUS at 17:37

## 2018-05-10 RX ADMIN — Medication 4 MILLIGRAM(S): at 22:35

## 2018-05-10 RX ADMIN — HEPARIN SODIUM 5000 UNIT(S): 5000 INJECTION INTRAVENOUS; SUBCUTANEOUS at 05:30

## 2018-05-10 RX ADMIN — CHLORHEXIDINE GLUCONATE 15 MILLILITER(S): 213 SOLUTION TOPICAL at 05:31

## 2018-05-10 RX ADMIN — Medication 100 MILLIEQUIVALENT(S): at 07:37

## 2018-05-10 RX ADMIN — Medication 650 MILLIGRAM(S): at 09:00

## 2018-05-10 RX ADMIN — Medication 10 MILLIGRAM(S): at 05:31

## 2018-05-10 RX ADMIN — FENTANYL CITRATE 3.74 MICROGRAM(S)/KG/HR: 50 INJECTION INTRAVENOUS at 17:38

## 2018-05-10 RX ADMIN — Medication 100 MILLIEQUIVALENT(S): at 08:46

## 2018-05-10 RX ADMIN — Medication 260 MILLIGRAM(S): at 09:30

## 2018-05-10 RX ADMIN — Medication 10 MILLIGRAM(S): at 14:06

## 2018-05-10 RX ADMIN — Medication 1 MILLIGRAM(S): at 12:11

## 2018-05-10 RX ADMIN — DEXMEDETOMIDINE HYDROCHLORIDE IN 0.9% SODIUM CHLORIDE 3.74 MICROGRAM(S)/KG/HR: 4 INJECTION INTRAVENOUS at 23:56

## 2018-05-10 RX ADMIN — Medication 50 GRAM(S): at 08:38

## 2018-05-10 RX ADMIN — DEXMEDETOMIDINE HYDROCHLORIDE IN 0.9% SODIUM CHLORIDE 3.74 MICROGRAM(S)/KG/HR: 4 INJECTION INTRAVENOUS at 22:35

## 2018-05-10 RX ADMIN — Medication 10 MILLIGRAM(S): at 22:35

## 2018-05-10 RX ADMIN — FENTANYL CITRATE 3.74 MICROGRAM(S)/KG/HR: 50 INJECTION INTRAVENOUS at 23:56

## 2018-05-10 RX ADMIN — Medication 100 MILLIGRAM(S): at 12:13

## 2018-05-10 RX ADMIN — Medication 650 MILLIGRAM(S): at 06:41

## 2018-05-10 RX ADMIN — MIDAZOLAM HYDROCHLORIDE 4 MILLIGRAM(S): 1 INJECTION, SOLUTION INTRAMUSCULAR; INTRAVENOUS at 09:00

## 2018-05-10 RX ADMIN — CHLORHEXIDINE GLUCONATE 1 APPLICATION(S): 213 SOLUTION TOPICAL at 05:31

## 2018-05-10 NOTE — PROGRESS NOTE ADULT - SUBJECTIVE AND OBJECTIVE BOX
Gastroenterology  Patient seen and examined , intubated and sedated in ICU     T(F): 99.7 (05-10-18 @ 15:39), Max: 102.2 (05-10-18 @ 05:00)  HR: 99 (05-10-18 @ 17:00) (79 - 114)  BP: 108/61 (05-10-18 @ 17:00) (87/45 - 132/78)  RR: 15 (05-10-18 @ 17:00) (13 - 24)  SpO2: 100% (05-10-18 @ 17:00) (99% - 100%)  Wt(kg): --  CAPILLARY BLOOD GLUCOSE          PHYSICAL EXAM: REMAINS INTUBATED  General: sedated  Neuro:  sedated  HEENT: NCAT, EOMI, conjunctiva clear  CV: +S1+S2 regular rate and rhythm  Lung: clear to ausculation bilaterally, respirations nonlabored, good inspiratory effort  Abdomen: soft, NonTender, No distention Normal active BS  Extremities: no cyanosis, clubbing or edema    LABS:                        11.0   6.28  )-----------( 166      ( 10 May 2018 04:30 )             31.9     05-10    140  |  105  |  <1<L>  ----------------------------<  77  2.8<LL>   |  20<L>  |  0.55    Ca    8.4<L>      10 May 2018 04:30  Phos  2.0     05-10  Mg     1.1     05-10    TPro  6.1  /  Alb  2.4<L>  /  TBili  0.6  /  DBili  x   /  AST  20  /  ALT  20  /  AlkPhos  57  05-09    LIVER FUNCTIONS - ( 09 May 2018 03:58 )  Alb: 2.4 g/dL / Pro: 6.1 gm/dL / ALK PHOS: 57 U/L / ALT: 20 U/L / AST: 20 U/L / GGT: x             I&O's Detail    09 May 2018 07:01  -  10 May 2018 07:00  --------------------------------------------------------  IN:    dexmedetomidine Infusion: 516 mL    fentaNYL Infusion.: 476.1 mL    lactated ringers.: 3000 mL    Solution: 100 mL    Solution: 250 mL  Total IN: 4342.1 mL    OUT:    Indwelling Catheter - Urethral: 2785 mL    Nasoenteral Tube: 1100 mL  Total OUT: 3885 mL    Total NET: 457.1 mL      10 May 2018 07:01  -  10 May 2018 17:53  --------------------------------------------------------  IN:    dexmedetomidine Infusion: 162 mL    fentaNYL Infusion.: 132 mL    lactated ringers.: 750 mL    Solution: 250 mL  Total IN: 1294 mL    OUT:  Total OUT: 0 mL    Total NET: 1294 mL        05-10 @ 04:30    140 | 105 | <1  /8.4 | 1.1 | 2.0  _______________________/  2.8 | 20 | 0.55                           \par

## 2018-05-10 NOTE — PROGRESS NOTE ADULT - SUBJECTIVE AND OBJECTIVE BOX
Patient is a 42y old  Male who presents with a chief complaint of alcohol withdrawal/ pncreatitis/ electrolyte imbalnc/-Hypoklemia. Hypontremia/ vomiting (04 May 2018 14:01)      INTERVAL HPI/OVERNIGHT EVENTS:  remained intubated    MEDICATIONS  (STANDING):  chlorhexidine 0.12% Liquid 15 milliLiter(s) Swish and Spit two times a day  chlorhexidine 4% Liquid 1 Application(s) Topical <User Schedule>  dexmedetomidine Infusion 0.2 MICROgram(s)/kG/Hr (3.74 mL/Hr) IV Continuous <Continuous>  fentaNYL   Infusion. 0.5 MICROgram(s)/kG/Hr (3.74 mL/Hr) IV Continuous <Continuous>  folic acid 1 milliGRAM(s) Oral daily  heparin  Injectable 5000 Unit(s) SubCutaneous every 12 hours  lactated ringers. 1000 milliLiter(s) (125 mL/Hr) IV Continuous <Continuous>  LORazepam   Injectable 4 milliGRAM(s) IV Push every 4 hours  metoclopramide Injectable 10 milliGRAM(s) IV Push every 8 hours  multivitamin/minerals 1 Tablet(s) Oral daily  pantoprazole   Suspension 40 milliGRAM(s) Oral daily  sodium chloride 0.9% Bolus 1000 milliLiter(s) IV Bolus once  thiamine 100 milliGRAM(s) Oral daily    MEDICATIONS  (PRN):  acetaminophen  Suppository 650 milliGRAM(s) Rectal every 6 hours PRN For Temp greater than 38 C (100.4 F)  midazolam Injectable 4 milliGRAM(s) IV Push every 1 hour PRN CIWA >20  midazolam Injectable 2 milliGRAM(s) IV Push every 1 hour PRN CIWA 8-20        REVIEW OF SYSTEMS:  CONSTITUTIONAL: No fever, weight loss, or fatigue  EYES: No eye pain, visual disturbances, or discharge  ENMT:  No difficulty hearing, tinnitus, vertigo; No sinus or throat pain  NECK: No pain or stiffness  RESPIRATORY: No cough, wheezing, chills or hemoptysis; No shortness of breath  CARDIOVASCULAR: No chest pain, palpitations, dizziness, or leg swelling  GASTROINTESTINAL: No abdominal or epigastric pain. No nausea, vomiting, or hematemesis; No diarrhea or constipation. No melena or hematochezia.  GENITOURINARY: No dysuria, frequency, hematuria, or incontinence  NEUROLOGICAL: No headaches, memory loss, loss of strength, numbness, or tremors  SKIN: No itching, burning, rashes, or lesions      Vital Signs Last 24 Hrs  T(C): 36.9 (10 May 2018 20:00), Max: 39 (10 May 2018 05:00)  T(F): 98.5 (10 May 2018 20:00), Max: 102.2 (10 May 2018 05:00)  HR: 91 (10 May 2018 21:15) (89 - 114)  BP: 97/58 (10 May 2018 20:00) (87/45 - 132/78)  BP(mean): 66 (10 May 2018 20:00) (55 - 93)  RR: 23 (10 May 2018 20:00) (13 - 24)  SpO2: 94% (10 May 2018 21:15) (91% - 100%)    PHYSICAL EXAM:  GENERAL: NAD, well-groomed, well-developed, intubated  HEAD:  Atraumatic, Normocephalic  EYES: EOMI, PERRLA, conjunctiva and sclera clear  ENMT: No tonsillar erythema, exudates, or enlargement; Moist mucous membranes   NECK: Supple, No JVD   NERVOUS SYSTEM:  Alert & Oriented X3, Good concentration; Motor Strength 5/5 B/L upper and lower extremities; DTRs 2+ intact and symmetric  CHEST/LUNG: Clear to percussion bilaterally; No rales, rhonchi, wheezing, or rubs  HEART: Regular rate and rhythm; No murmurs, rubs, or gallops  ABDOMEN: Soft, Nontender, Nondistended; Bowel sounds present  EXTREMITIES:  2+ Peripheral Pulses, No clubbing, cyanosis, or edema  LYMPH: No lymphadenopathy noted  SKIN: No rashes or lesions    LABS:                        11.0   6.28  )-----------( 166      ( 10 May 2018 04:30 )             31.9     05-10    140  |  105  |  <1<L>  ----------------------------<  77  2.8<LL>   |  20<L>  |  0.55    Ca    8.4<L>      10 May 2018 04:30  Phos  2.0     05-10  Mg     1.1     05-10    TPro  6.1  /  Alb  2.4<L>  /  TBili  0.6  /  DBili  x   /  AST  20  /  ALT  20  /  AlkPhos  57  05-09        CAPILLARY BLOOD GLUCOSE          RADIOLOGY & ADDITIONAL TESTS:    Imaging Personally Reviewed:  [ ] YES  [ ] NO    Consultant(s) Notes Reviewed:  [ ] YES  [ ] NO    Care Discussed with Consultants/Other Providers [ ] YES  [ ] NO

## 2018-05-10 NOTE — PROGRESS NOTE ADULT - ASSESSMENT
alcohol withdrawal/ Pancreatitis , Hypokalemia ,hyponatremia Elevated serum protien  follow up labs  continue present therapy - sedation  clinically unchanged  Weaning trials per icu

## 2018-05-10 NOTE — PROGRESS NOTE ADULT - SUBJECTIVE AND OBJECTIVE BOX
INTERVAL HPI/OVERNIGHT EVENTS:      41 yo M PMH alcohol abuse (4-5 glasses of wine per day) stopped drinking 3 days prior to admission found to have acute pancreatitis now in acute alcohol withdrawal.  Remains intubated.    REVIEW OF SYSTEMS: [x] Unable to obtain because intubated and sedated.    ICU Vital Signs Last 24 Hrs  T(C): 37.6 (10 May 2018 15:39), Max: 39 (10 May 2018 05:00)  T(F): 99.7 (10 May 2018 15:39), Max: 102.2 (10 May 2018 05:00)  HR: 91 (10 May 2018 18:00) (79 - 114)  BP: 89/48 (10 May 2018 18:00) (87/45 - 132/78)  BP(mean): 57 (10 May 2018 18:00) (55 - 97)  ABP: --  ABP(mean): --  RR: 15 (10 May 2018 18:00) (13 - 24)  SpO2: 98% (10 May 2018 18:00) (98% - 100%)      ABG - ( 09 May 2018 03:11 )  pH, Arterial: x     pH, Blood: 7.34  /  pCO2: 40    /  pO2: 104   / HCO3: 21    / Base Excess: -4.0  /  SaO2: 97                  I&O's Detail    09 May 2018 07:01  -  10 May 2018 07:00  --------------------------------------------------------  IN:    dexmedetomidine Infusion: 516 mL    fentaNYL Infusion.: 476.1 mL    lactated ringers.: 3000 mL    Solution: 100 mL    Solution: 250 mL  Total IN: 4342.1 mL    OUT:    Indwelling Catheter - Urethral: 2785 mL    Nasoenteral Tube: 1100 mL  Total OUT: 3885 mL    Total NET: 457.1 mL      10 May 2018 07:01  -  10 May 2018 18:29  --------------------------------------------------------  IN:    dexmedetomidine Infusion: 324 mL    fentaNYL Infusion.: 264 mL    Jevity: 20 mL    lactated ringers.: 1500 mL    Solution: 250 mL  Total IN: 2358 mL    OUT:    Indwelling Catheter - Urethral: 1100 mL  Total OUT: 1100 mL    Total NET: 1258 mL          Mode: AC/ CMV (Assist Control/ Continuous Mandatory Ventilation)  RR (machine): 14  TV (machine): 400  FiO2: 25  PEEP: 5  ITime: 0.8  MAP: 7  PIP: 13    CAPILLARY BLOOD GLUCOSE          MEDICATIONS  NEURO  Meds: acetaminophen  Suppository 650 milliGRAM(s) Rectal every 6 hours PRN For Temp greater than 38 C (100.4 F)  dexmedetomidine Infusion 0.2 MICROgram(s)/kG/Hr (3.74 mL/Hr) IV Continuous <Continuous>  fentaNYL   Infusion. 0.5 MICROgram(s)/kG/Hr (3.74 mL/Hr) IV Continuous <Continuous>  LORazepam   Injectable 4 milliGRAM(s) IV Push every 4 hours  metoclopramide Injectable 10 milliGRAM(s) IV Push every 8 hours  midazolam Injectable 4 milliGRAM(s) IV Push every 1 hour PRN CIWA >20  midazolam Injectable 2 milliGRAM(s) IV Push every 1 hour PRN CIWA 8-20    RESPIRATORY  ABG - ( 09 May 2018 03:11 )  pH: x     /  pCO2: 40    /  pO2: 104   / HCO3: 21    / Base Excess: -4.0  /  SaO2: 97      Lactate: x                Meds:   CARDIOVASCULAR  Meds:   GI/NUTRITION  Meds: pantoprazole   Suspension 40 milliGRAM(s) Oral daily    GENITOURINARY  Meds: folic acid 1 milliGRAM(s) Oral daily  lactated ringers. 1000 milliLiter(s) IV Continuous <Continuous>  multivitamin/minerals 1 Tablet(s) Oral daily  sodium chloride 0.9% Bolus 1000 milliLiter(s) IV Bolus once  thiamine 100 milliGRAM(s) Oral daily    HEMATOLOGIC  Meds: heparin  Injectable 5000 Unit(s) SubCutaneous every 12 hours    [x] VTE Prophylaxis  INFECTIOUS DISEASES  Meds:   ENDOCRINE  CAPILLARY BLOOD GLUCOSE        Meds:   OTHER MEDICATIONS:  chlorhexidine 0.12% Liquid 15 milliLiter(s) Swish and Spit two times a day  chlorhexidine 4% Liquid 1 Application(s) Topical <User Schedule>  :    PHYSICAL EXAM:    GENERAL: NAD, well-groomed, well-developed  NECK: Supple, No JVD, Normal thyroid  CHEST/LUNG: Clear to auscultation bilaterally; No rales, rhonchi, wheezing, or rubs  HEART: Regular rate and rhythm; No murmurs, rubs, or gallops  ABDOMEN: Soft, Nontender, Nondistended; Bowel sounds present  EXTREMITIES:  2+ Peripheral Pulses, No clubbing, cyanosis, or edema  NERVOUS SYSTEM:  Alert & Oriented X3, Good concentration; Motor Strength 5/5 B/L upper and lower extremities; DTRs 2+ intact and symmetric    LABS:                        11.0   6.28  )-----------( 166      ( 10 May 2018 04:30 )             31.9      05-10    140  |  105  |  <1<L>  ----------------------------<  77  2.8<LL>   |  20<L>  |  0.55    Ca    8.4<L>      10 May 2018 04:30  Phos  2.0     05-10  Mg     1.1     05-10    TPro  6.1  /  Alb  2.4<L>  /  TBili  0.6  /  DBili  x   /  AST  20  /  ALT  20  /  AlkPhos  57  05-09            RADIOLOGY & ADDITIONAL STUDIES:    CULTURES

## 2018-05-10 NOTE — PROGRESS NOTE ADULT - ASSESSMENT
41 yo M PMH alcohol abuse (4-5 glasses of wine per day) stopped drinking 3 days prior to admission found to have acute pancreatitis now in acute alcohol withdrawal.  Intubated yesterday 2/2 not protecting airway.      Neuro: ETOH Withdrawal - requiring precedex and phenobarb standing, added fentanyl after intubation.  Remains uncontrolled on phenobarb, will start ativan taper for withdrawal.    CV: No acute issues  Pulm: intubated for airway protection, currently on 14/400/5/40.  Will attempt wean in morning.   GI: Acute Pancreatitis - lipase is now downtrending.  Continue with MVT, Thiamine, and Folic Acid.  Add Lactated Ringers with KCl @125cc/hr for fluid resus.  Slowly improving, continues to have abd pain with nausea.  High output from NGT, possible ileus.  Started on Reglan.    Dispo: requires ICU level of care, now intubated.  Weaning trials in [facility]

## 2018-05-11 LAB
-  COAGULASE NEGATIVE STAPHYLOCOCCUS: SIGNIFICANT CHANGE UP
ALBUMIN SERPL ELPH-MCNC: 1.9 G/DL — LOW (ref 3.3–5)
ALP SERPL-CCNC: 157 U/L — HIGH (ref 40–120)
ALT FLD-CCNC: 40 U/L — SIGNIFICANT CHANGE UP (ref 12–78)
ANION GAP SERPL CALC-SCNC: 13 MMOL/L — SIGNIFICANT CHANGE UP (ref 5–17)
AST SERPL-CCNC: 73 U/L — HIGH (ref 15–37)
BILIRUB SERPL-MCNC: 0.4 MG/DL — SIGNIFICANT CHANGE UP (ref 0.2–1.2)
BUN SERPL-MCNC: 4 MG/DL — LOW (ref 7–23)
CALCIUM SERPL-MCNC: 8 MG/DL — LOW (ref 8.5–10.1)
CHLORIDE SERPL-SCNC: 105 MMOL/L — SIGNIFICANT CHANGE UP (ref 96–108)
CO2 SERPL-SCNC: 23 MMOL/L — SIGNIFICANT CHANGE UP (ref 22–31)
CREAT SERPL-MCNC: 0.63 MG/DL — SIGNIFICANT CHANGE UP (ref 0.5–1.3)
GLUCOSE SERPL-MCNC: 76 MG/DL — SIGNIFICANT CHANGE UP (ref 70–99)
GRAM STN FLD: SIGNIFICANT CHANGE UP
GRAM STN FLD: SIGNIFICANT CHANGE UP
HCT VFR BLD CALC: 29.5 % — LOW (ref 39–50)
HGB BLD-MCNC: 10.1 G/DL — LOW (ref 13–17)
MAGNESIUM SERPL-MCNC: 1.2 MG/DL — LOW (ref 1.6–2.6)
MCHC RBC-ENTMCNC: 32.9 PG — SIGNIFICANT CHANGE UP (ref 27–34)
MCHC RBC-ENTMCNC: 34.2 GM/DL — SIGNIFICANT CHANGE UP (ref 32–36)
MCV RBC AUTO: 96.1 FL — SIGNIFICANT CHANGE UP (ref 80–100)
METHOD TYPE: SIGNIFICANT CHANGE UP
NRBC # BLD: 0 /100 WBCS — SIGNIFICANT CHANGE UP (ref 0–0)
PHOSPHATE SERPL-MCNC: 2.2 MG/DL — LOW (ref 2.5–4.5)
PLATELET # BLD AUTO: 219 K/UL — SIGNIFICANT CHANGE UP (ref 150–400)
POTASSIUM SERPL-MCNC: 3.2 MMOL/L — LOW (ref 3.5–5.3)
POTASSIUM SERPL-SCNC: 3.2 MMOL/L — LOW (ref 3.5–5.3)
PROT SERPL-MCNC: 5.8 GM/DL — LOW (ref 6–8.3)
RBC # BLD: 3.07 M/UL — LOW (ref 4.2–5.8)
RBC # FLD: 12.6 % — SIGNIFICANT CHANGE UP (ref 10.3–14.5)
SODIUM SERPL-SCNC: 141 MMOL/L — SIGNIFICANT CHANGE UP (ref 135–145)
SPECIMEN SOURCE: SIGNIFICANT CHANGE UP
SPECIMEN SOURCE: SIGNIFICANT CHANGE UP
WBC # BLD: 9.42 K/UL — SIGNIFICANT CHANGE UP (ref 3.8–10.5)
WBC # FLD AUTO: 9.42 K/UL — SIGNIFICANT CHANGE UP (ref 3.8–10.5)

## 2018-05-11 PROCEDURE — 70450 CT HEAD/BRAIN W/O DYE: CPT | Mod: 26

## 2018-05-11 PROCEDURE — 93010 ELECTROCARDIOGRAM REPORT: CPT

## 2018-05-11 PROCEDURE — 71260 CT THORAX DX C+: CPT | Mod: 26

## 2018-05-11 PROCEDURE — 74177 CT ABD & PELVIS W/CONTRAST: CPT | Mod: 26

## 2018-05-11 RX ORDER — POTASSIUM PHOSPHATE, MONOBASIC POTASSIUM PHOSPHATE, DIBASIC 236; 224 MG/ML; MG/ML
15 INJECTION, SOLUTION INTRAVENOUS ONCE
Qty: 0 | Refills: 0 | Status: COMPLETED | OUTPATIENT
Start: 2018-05-11 | End: 2018-05-11

## 2018-05-11 RX ORDER — SODIUM CHLORIDE 9 MG/ML
1000 INJECTION, SOLUTION INTRAVENOUS ONCE
Qty: 0 | Refills: 0 | Status: COMPLETED | OUTPATIENT
Start: 2018-05-11 | End: 2018-05-11

## 2018-05-11 RX ORDER — SODIUM CHLORIDE 9 MG/ML
1000 INJECTION, SOLUTION INTRAVENOUS
Qty: 0 | Refills: 0 | Status: COMPLETED | OUTPATIENT
Start: 2018-05-11 | End: 2018-05-11

## 2018-05-11 RX ORDER — ALBUMIN HUMAN 25 %
100 VIAL (ML) INTRAVENOUS ONCE
Qty: 0 | Refills: 0 | Status: DISCONTINUED | OUTPATIENT
Start: 2018-05-11 | End: 2018-05-11

## 2018-05-11 RX ORDER — KETOROLAC TROMETHAMINE 30 MG/ML
15 SYRINGE (ML) INJECTION ONCE
Qty: 0 | Refills: 0 | Status: DISCONTINUED | OUTPATIENT
Start: 2018-05-11 | End: 2018-05-11

## 2018-05-11 RX ORDER — PHENOBARBITAL 60 MG
130 TABLET ORAL ONCE
Qty: 0 | Refills: 0 | Status: DISCONTINUED | OUTPATIENT
Start: 2018-05-11 | End: 2018-05-11

## 2018-05-11 RX ORDER — MEROPENEM 1 G/30ML
2000 INJECTION INTRAVENOUS ONCE
Qty: 0 | Refills: 0 | Status: COMPLETED | OUTPATIENT
Start: 2018-05-11 | End: 2018-05-11

## 2018-05-11 RX ORDER — NOREPINEPHRINE BITARTRATE/D5W 8 MG/250ML
0.05 PLASTIC BAG, INJECTION (ML) INTRAVENOUS
Qty: 8 | Refills: 0 | Status: DISCONTINUED | OUTPATIENT
Start: 2018-05-11 | End: 2018-05-12

## 2018-05-11 RX ORDER — MEROPENEM 1 G/30ML
2000 INJECTION INTRAVENOUS EVERY 8 HOURS
Qty: 0 | Refills: 0 | Status: DISCONTINUED | OUTPATIENT
Start: 2018-05-12 | End: 2018-05-17

## 2018-05-11 RX ORDER — PROPOFOL 10 MG/ML
10 INJECTION, EMULSION INTRAVENOUS
Qty: 1000 | Refills: 0 | Status: DISCONTINUED | OUTPATIENT
Start: 2018-05-11 | End: 2018-05-15

## 2018-05-11 RX ORDER — MEROPENEM 1 G/30ML
INJECTION INTRAVENOUS
Qty: 0 | Refills: 0 | Status: DISCONTINUED | OUTPATIENT
Start: 2018-05-11 | End: 2018-05-17

## 2018-05-11 RX ORDER — VANCOMYCIN HCL 1 G
1000 VIAL (EA) INTRAVENOUS EVERY 8 HOURS
Qty: 0 | Refills: 0 | Status: DISCONTINUED | OUTPATIENT
Start: 2018-05-11 | End: 2018-05-14

## 2018-05-11 RX ORDER — MAGNESIUM SULFATE 500 MG/ML
2 VIAL (ML) INJECTION ONCE
Qty: 0 | Refills: 0 | Status: COMPLETED | OUTPATIENT
Start: 2018-05-11 | End: 2018-05-11

## 2018-05-11 RX ORDER — MIDAZOLAM HYDROCHLORIDE 1 MG/ML
4 INJECTION, SOLUTION INTRAMUSCULAR; INTRAVENOUS ONCE
Qty: 0 | Refills: 0 | Status: DISCONTINUED | OUTPATIENT
Start: 2018-05-11 | End: 2018-05-11

## 2018-05-11 RX ORDER — ALBUMIN HUMAN 25 %
100 VIAL (ML) INTRAVENOUS ONCE
Qty: 0 | Refills: 0 | Status: COMPLETED | OUTPATIENT
Start: 2018-05-11 | End: 2018-05-11

## 2018-05-11 RX ORDER — PANTOPRAZOLE SODIUM 20 MG/1
40 TABLET, DELAYED RELEASE ORAL
Qty: 0 | Refills: 0 | Status: DISCONTINUED | OUTPATIENT
Start: 2018-05-11 | End: 2018-05-16

## 2018-05-11 RX ADMIN — CHLORHEXIDINE GLUCONATE 15 MILLILITER(S): 213 SOLUTION TOPICAL at 17:39

## 2018-05-11 RX ADMIN — PANTOPRAZOLE SODIUM 40 MILLIGRAM(S): 20 TABLET, DELAYED RELEASE ORAL at 17:39

## 2018-05-11 RX ADMIN — Medication 15 MILLIGRAM(S): at 09:57

## 2018-05-11 RX ADMIN — Medication 4 MILLIGRAM(S): at 21:08

## 2018-05-11 RX ADMIN — Medication 130 MILLIGRAM(S): at 03:19

## 2018-05-11 RX ADMIN — CHLORHEXIDINE GLUCONATE 1 APPLICATION(S): 213 SOLUTION TOPICAL at 05:56

## 2018-05-11 RX ADMIN — SODIUM CHLORIDE 1000 MILLILITER(S): 9 INJECTION, SOLUTION INTRAVENOUS at 15:31

## 2018-05-11 RX ADMIN — PROPOFOL 4.49 MICROGRAM(S)/KG/MIN: 10 INJECTION, EMULSION INTRAVENOUS at 08:38

## 2018-05-11 RX ADMIN — MIDAZOLAM HYDROCHLORIDE 4 MILLIGRAM(S): 1 INJECTION, SOLUTION INTRAMUSCULAR; INTRAVENOUS at 09:31

## 2018-05-11 RX ADMIN — Medication 15 MILLIGRAM(S): at 10:31

## 2018-05-11 RX ADMIN — MIDAZOLAM HYDROCHLORIDE 2 MILLIGRAM(S): 1 INJECTION, SOLUTION INTRAMUSCULAR; INTRAVENOUS at 01:28

## 2018-05-11 RX ADMIN — MEROPENEM 200 MILLIGRAM(S): 1 INJECTION INTRAVENOUS at 21:09

## 2018-05-11 RX ADMIN — Medication 650 MILLIGRAM(S): at 03:21

## 2018-05-11 RX ADMIN — Medication 4 MILLIGRAM(S): at 06:36

## 2018-05-11 RX ADMIN — SODIUM CHLORIDE 125 MILLILITER(S): 9 INJECTION, SOLUTION INTRAVENOUS at 14:38

## 2018-05-11 RX ADMIN — Medication 250 MILLIGRAM(S): at 22:11

## 2018-05-11 RX ADMIN — HEPARIN SODIUM 5000 UNIT(S): 5000 INJECTION INTRAVENOUS; SUBCUTANEOUS at 05:27

## 2018-05-11 RX ADMIN — Medication 4 MILLIGRAM(S): at 17:39

## 2018-05-11 RX ADMIN — CHLORHEXIDINE GLUCONATE 15 MILLILITER(S): 213 SOLUTION TOPICAL at 05:44

## 2018-05-11 RX ADMIN — Medication 4 MILLIGRAM(S): at 10:12

## 2018-05-11 RX ADMIN — Medication 650 MILLIGRAM(S): at 09:31

## 2018-05-11 RX ADMIN — Medication 400 MILLILITER(S): at 15:30

## 2018-05-11 RX ADMIN — POTASSIUM PHOSPHATE, MONOBASIC POTASSIUM PHOSPHATE, DIBASIC 63.75 MILLIMOLE(S): 236; 224 INJECTION, SOLUTION INTRAVENOUS at 09:31

## 2018-05-11 RX ADMIN — HEPARIN SODIUM 5000 UNIT(S): 5000 INJECTION INTRAVENOUS; SUBCUTANEOUS at 17:39

## 2018-05-11 RX ADMIN — SODIUM CHLORIDE 100 MILLILITER(S): 9 INJECTION, SOLUTION INTRAVENOUS at 17:33

## 2018-05-11 RX ADMIN — Medication 10 MILLIGRAM(S): at 05:27

## 2018-05-11 RX ADMIN — Medication 50 GRAM(S): at 08:17

## 2018-05-11 NOTE — PROGRESS NOTE ADULT - ASSESSMENT
43 yo M PMH alcohol abuse (4-5 glasses of wine per day) stopped drinking 3 days prior to admission found to have acute pancreatitis now in acute alcohol withdrawal.  Intubated yesterday 2/2 not protecting airway.      Neuro: ETOH Withdrawal - requiring precedex and phenobarb standing, added fentanyl after intubation.  Remains uncontrolled on phenobarb, will start ativan taper for withdrawal.    CV: No acute issues  Pulm: intubated for airway protection, currently on 14/400/5/40.  Will attempt wean in morning.   GI: Acute Pancreatitis - lipase is now downtrending.  Continue with MVT, Thiamine, and Folic Acid.  Add Lactated Ringers with KCl @125cc/hr for fluid resus.  Slowly improving, continues to have abd pain with nausea.  High output from NGT, possible ileus.  Started on Reglan.    ID: Febrile overnight: cultured yesterday.  Awaiting results.  Start empiric Vanc and meropenem.  CT head/chest/abd/pelvis to eval for possible source of infection.     Dispo: requires ICU level of care, now intubated.  No weaning today 2/2 increasing agitation, fevers, and awaiting for ct.    MELANIE

## 2018-05-11 NOTE — PROGRESS NOTE ADULT - SUBJECTIVE AND OBJECTIVE BOX
INTERVAL HPI/OVERNIGHT EVENTS:      ETOH withdrawal and pancreatitis, now intubated for airway protection.    24 hour events: febrile overnight.  cultures pending.      REVIEW OF SYSTEMS: [intubated and sedated] Unable to obtain because:      ICU Vital Signs Last 24 Hrs  T(C): 38 (11 May 2018 17:57), Max: 40.1 (11 May 2018 07:50)  T(F): 100.4 (11 May 2018 17:57), Max: 104.2 (11 May 2018 08:00)  HR: 116 (11 May 2018 17:30) (89 - 159)  BP: 124/73 (11 May 2018 17:30) (88/43 - 137/65)  BP(mean): 85 (11 May 2018 17:30) (52 - 148)  ABP: --  ABP(mean): --  RR: 26 (11 May 2018 17:30) (12 - 39)  SpO2: 100% (11 May 2018 17:30) (89% - 100%)          I&O's Detail    10 May 2018 07:01  -  11 May 2018 07:00  --------------------------------------------------------  IN:    dexmedetomidine Infusion: 529.7 mL    fentaNYL Infusion.: 510.4 mL    Jevity: 20 mL    lactated ringers.: 2875 mL    Solution: 250 mL  Total IN: 4185.1 mL    OUT:    Indwelling Catheter - Urethral: 1950 mL  Total OUT: 1950 mL    Total NET: 2235.1 mL      11 May 2018 07:01  -  11 May 2018 17:58  --------------------------------------------------------  IN:    dexmedetomidine Infusion: 26.2 mL    fentaNYL Infusion.: 35.4 mL    lactated ringers.: 875 mL    propofol Infusion: 110 mL  Total IN: 1046.6 mL    OUT:    Indwelling Catheter - Urethral: 300 mL  Total OUT: 300 mL    Total NET: 746.6 mL          Mode: AC/ CMV (Assist Control/ Continuous Mandatory Ventilation)  RR (machine): 14  TV (machine): 400  FiO2: 35  PEEP: 5  ITime: 0.8  MAP: 6  PIP: 14    CAPILLARY BLOOD GLUCOSE    MEDICATIONS  NEURO  Meds: acetaminophen  Suppository 650 milliGRAM(s) Rectal every 6 hours PRN For Temp greater than 38 C (100.4 F)  dexmedetomidine Infusion 0.2 MICROgram(s)/kG/Hr (3.74 mL/Hr) IV Continuous <Continuous>  fentaNYL   Infusion. 0.5 MICROgram(s)/kG/Hr (3.74 mL/Hr) IV Continuous <Continuous>  LORazepam   Injectable 4 milliGRAM(s) IV Push every 4 hours  midazolam Injectable 4 milliGRAM(s) IV Push every 1 hour PRN CIWA >20  midazolam Injectable 2 milliGRAM(s) IV Push every 1 hour PRN CIWA 8-20  propofol Infusion 10 MICROgram(s)/kG/Min (4.488 mL/Hr) IV Continuous <Continuous>    RESPIRATORY    Meds:   CARDIOVASCULAR  Meds: norepinephrine Infusion 0.05 MICROgram(s)/kG/Min (7.013 mL/Hr) IV Continuous <Continuous>    GI/NUTRITION  Meds: pantoprazole  Injectable 40 milliGRAM(s) IV Push two times a day    GENITOURINARY  Meds: lactated ringers. 1000 milliLiter(s) IV Continuous <Continuous>  sodium chloride 0.9% Bolus 1000 milliLiter(s) IV Bolus once    HEMATOLOGIC  Meds: heparin  Injectable 5000 Unit(s) SubCutaneous every 12 hours    [x] VTE Prophylaxis  INFECTIOUS DISEASES  Meds:   ENDOCRINE  CAPILLARY BLOOD GLUCOSE        Meds:   OTHER MEDICATIONS:  chlorhexidine 0.12% Liquid 15 milliLiter(s) Swish and Spit two times a day  chlorhexidine 4% Liquid 1 Application(s) Topical <User Schedule>  :    PHYSICAL EXAM:    GENERAL: NAD, well-groomed, well-developed  NECK: Supple, No JVD, Normal thyroid  CHEST/LUNG: Clear to auscultation bilaterally; No rales, rhonchi, wheezing, or rubs  HEART: Regular rate and rhythm; No murmurs, rubs, or gallops  ABDOMEN: Soft, Nontender, Nondistended; Bowel sounds present  EXTREMITIES:  2+ Peripheral Pulses, No clubbing, cyanosis, or edema  NERVOUS SYSTEM:  Alert & Oriented X3, Good concentration; Motor Strength 5/5 B/L upper and lower extremities; DTRs 2+ intact and symmetric      LABS:                        10.1   9.42  )-----------( 219      ( 11 May 2018 05:25 )             29.5      05-11    141  |  105  |  4<L>  ----------------------------<  76  3.2<L>   |  23  |  0.63    Ca    8.0<L>      11 May 2018 05:25  Phos  2.2     05-11  Mg     1.2     05-11    TPro  5.8<L>  /  Alb  1.9<L>  /  TBili  0.4  /  DBili  x   /  AST  73<H>  /  ALT  40  /  AlkPhos  157<H>  05-11            RADIOLOGY & ADDITIONAL STUDIES:    CULTURES    Culture - Sputum (collected 05-11-18 @ 00:27)  Source: .Sputum Sputum  Gram Stain (05-11-18 @ 06:53):    Few Squamous epithelial cells per low power field    Few polymorphonuclear leukocytes per low power field    Numerous Gram positive cocci in pairs, chains and clusters per oil power    field

## 2018-05-11 NOTE — PROVIDER CONTACT NOTE (CRITICAL VALUE NOTIFICATION) - TEST AND RESULT REPORTED:
Blood culture collected 5/10/18- preliminary results- growth in aerobic bottle- gram positive cocci in clusters

## 2018-05-11 NOTE — PROGRESS NOTE ADULT - ASSESSMENT
alcohol withdrawal/ Pancreatitis , Hypokalemia ,hyponatremia Elevated serum protien  follow up labs  continue present therapy - sedation  clinically unchanged  Weaning trials per icu  fever w/up

## 2018-05-11 NOTE — CHART NOTE - NSCHARTNOTEFT_GEN_A_CORE
Assessment: pt intubated 5/08 due to resp distress. TF initiated 5/09    Factors impacting intake: [ ] none [ ] nausea  [ ] vomiting [ ] diarrhea [ ] constipation  [ ]chewing problems [ ] swallowing issues  [ ] other:     Diet Presciption: Diet, NPO with Tube Feed:   Tube Feeding Modality: Orogastric  Jevity 1.2 Reji  Total Volume for 24 Hours (mL): 240  Continuous  Starting Tube Feed Rate {mL per Hour}: 10  Until Goal Tube Feed Rate (mL per Hour): 10  Tube Feed Duration (in Hours): 24  Tube Feed Start Time: 11:00 (05-09-18 @ 11:06)    Intake: TF was only providing 288 kcals, 13g pro. TF held 5/11 @ 12 am due to residuals >120mL. NGT connected to LWS.     Current Weight: 77.7 kg (5/11)  % Weight Change: gained 7.3 kg x 1 wk   noted generalized edema 3+   edema 1+ R&L wrists, hands, arms    Pertinent Medications: MEDICATIONS  (STANDING):  chlorhexidine 0.12% Liquid 15 milliLiter(s) Swish and Spit two times a day  chlorhexidine 4% Liquid 1 Application(s) Topical <User Schedule>  dexmedetomidine Infusion 0.2 MICROgram(s)/kG/Hr (3.74 mL/Hr) IV Continuous <Continuous>  fentaNYL   Infusion. 0.5 MICROgram(s)/kG/Hr (3.74 mL/Hr) IV Continuous <Continuous>  folic acid 1 milliGRAM(s) Oral daily  heparin  Injectable 5000 Unit(s) SubCutaneous every 12 hours  lactated ringers. 1000 milliLiter(s) (125 mL/Hr) IV Continuous <Continuous>  LORazepam   Injectable 4 milliGRAM(s) IV Push every 4 hours  multivitamin/minerals 1 Tablet(s) Oral daily  pantoprazole   Suspension 40 milliGRAM(s) Oral daily  propofol Infusion 10 MICROgram(s)/kG/Min (4.488 mL/Hr) IV Continuous <Continuous>  sodium chloride 0.9% Bolus 1000 milliLiter(s) IV Bolus once  thiamine 100 milliGRAM(s) Oral daily    MEDICATIONS  (PRN):  acetaminophen  Suppository 650 milliGRAM(s) Rectal every 6 hours PRN For Temp greater than 38 C (100.4 F)  midazolam Injectable 4 milliGRAM(s) IV Push every 1 hour PRN CIWA >20  midazolam Injectable 2 milliGRAM(s) IV Push every 1 hour PRN CIWA 8-20    Pertinent Labs: 05-11 Na141 mmol/L Glu 76 mg/dL K+ 3.2 mmol/L<L> Cr  0.63 mg/dL BUN 4 mg/dL<L> 05-11 Phos 2.2 mg/dL<L> 05-11 Alb 1.9 g/dL<L>     CAPILLARY BLOOD GLUCOSE        Skin: WNL    Estimated Needs:   [x ] no change since previous assessment  [ ] recalculated:     Previous Nutrition Diagnosis:   [ x] Inadequate Energy Intake [ ]Inadequate Oral Intake [ ] Excessive Energy Intake   [ ] Underweight [ ] Increased Nutrient Needs [ ] Overweight/Obesity   [ ] Altered GI Function [ ] Unintended Weight Loss [ ] Food & Nutrition Related Knowledge Deficit [ ] Malnutrition     Nutrition Diagnosis is [x ] ongoing  [ ] resolved [ ] not applicable   pt has been NPO x 8 days    New Nutrition Diagnosis: [x ] not applicable       Interventions:   Recommend  [ ] Change Diet To:  [ ] Nutrition Supplement  [ ] Nutrition Support  [ x] Other: resume TF when medically feasible    Monitoring and Evaluation:   [ ] PO intake [ x ] Tolerance to diet prescription [ x ] weights [ x ] labs[ x ] follow up per protocol  [ ] other: Assessment: pt intubated 5/08 due to resp distress. TF initiated 5/09    Factors impacting intake: [ ] none [ ] nausea  [ ] vomiting [ ] diarrhea [ ] constipation  [ ]chewing problems [ ] swallowing issues  [ ] other:    Diet Presciption: Diet, NPO with Tube Feed:   Tube Feeding Modality: Orogastric  Jevity 1.2 Reji  Total Volume for 24 Hours (mL): 240  Continuous  Starting Tube Feed Rate {mL per Hour}: 10  Until Goal Tube Feed Rate (mL per Hour): 10  Tube Feed Duration (in Hours): 24  Tube Feed Start Time: 11:00 (05-09-18 @ 11:06)    Intake: TF was only providing 288 kcals, 13g pro. TF held 5/11 @ 12 am due to residuals >120mL. NGT connected to LWS.     Current Weight: 77.7 kg (5/11)  % Weight Change: gained 7.3 kg x 1 wk   noted generalized edema 3+   edema 1+ R&L wrists, hands, arms    Nutrition focused physical exam conducted; Subcutaneous fat loss: [WNL] Orbital fat pads region, [WNL ]Buccal fat region, [unable ]Triceps region,  [WNL ]Ribs region.  Muscle wasting: [WNL ]Temples region, [WNL ]Clavicle region, [WNL ]Shoulder region, [unable ]Scapula region, [WNL ]Interosseous region,  [unable ]thigh region, [WNL ]Calf region    Pertinent Medications: MEDICATIONS  (STANDING):  chlorhexidine 0.12% Liquid 15 milliLiter(s) Swish and Spit two times a day  chlorhexidine 4% Liquid 1 Application(s) Topical <User Schedule>  dexmedetomidine Infusion 0.2 MICROgram(s)/kG/Hr (3.74 mL/Hr) IV Continuous <Continuous>  fentaNYL   Infusion. 0.5 MICROgram(s)/kG/Hr (3.74 mL/Hr) IV Continuous <Continuous>  folic acid 1 milliGRAM(s) Oral daily  heparin  Injectable 5000 Unit(s) SubCutaneous every 12 hours  lactated ringers. 1000 milliLiter(s) (125 mL/Hr) IV Continuous <Continuous>  LORazepam   Injectable 4 milliGRAM(s) IV Push every 4 hours  multivitamin/minerals 1 Tablet(s) Oral daily  pantoprazole   Suspension 40 milliGRAM(s) Oral daily  propofol Infusion 10 MICROgram(s)/kG/Min (4.488 mL/Hr) IV Continuous <Continuous>  sodium chloride 0.9% Bolus 1000 milliLiter(s) IV Bolus once  thiamine 100 milliGRAM(s) Oral daily    MEDICATIONS  (PRN):  acetaminophen  Suppository 650 milliGRAM(s) Rectal every 6 hours PRN For Temp greater than 38 C (100.4 F)  midazolam Injectable 4 milliGRAM(s) IV Push every 1 hour PRN CIWA >20  midazolam Injectable 2 milliGRAM(s) IV Push every 1 hour PRN CIWA 8-20    Pertinent Labs: 05-11 Na141 mmol/L Glu 76 mg/dL K+ 3.2 mmol/L<L> Cr  0.63 mg/dL BUN 4 mg/dL<L> 05-11 Phos 2.2 mg/dL<L> 05-11 Alb 1.9 g/dL<L>     CAPILLARY BLOOD GLUCOSE        Skin: WNL    Estimated Needs:   [x ] no change since previous assessment  [ ] recalculated:     Previous Nutrition Diagnosis:   [ x] Inadequate Energy Intake [ ]Inadequate Oral Intake [ ] Excessive Energy Intake   [ ] Underweight [ ] Increased Nutrient Needs [ ] Overweight/Obesity   [ ] Altered GI Function [ ] Unintended Weight Loss [ ] Food & Nutrition Related Knowledge Deficit [ ] Malnutrition     Nutrition Diagnosis is [x ] ongoing  [ ] resolved [ ] not applicable   pt has been NPO x 8 days    New Nutrition Diagnosis: [x ] not applicable       Interventions:   Recommend  [ ] Change Diet To:  [ ] Nutrition Supplement  [ ] Nutrition Support  [ x] Other: resume TF when medically feasible    Monitoring and Evaluation:   [ ] PO intake [ x ] Tolerance to diet prescription [ x ] weights [ x ] labs[ x ] follow up per protocol  [ ] other: Assessment: pt intubated 5/08 due to resp distress. TF initiated 5/09    Factors impacting intake: [ ] none [ ] nausea  [ ] vomiting [ ] diarrhea [ ] constipation  [ ]chewing problems [ ] swallowing issues  [ x] other: gastric secretions    Diet Presciption: Diet, NPO with Tube Feed:   Tube Feeding Modality: Orogastric  Jevity 1.2 Reji  Total Volume for 24 Hours (mL): 240  Continuous  Starting Tube Feed Rate {mL per Hour}: 10  Until Goal Tube Feed Rate (mL per Hour): 10  Tube Feed Duration (in Hours): 24  Tube Feed Start Time: 11:00 (05-09-18 @ 11:06)    Intake: TF was only providing 288 kcals, 13g pro (low rate due to acute pancreatitis c cont abd pain + nausea).   TF held 5/11 (12 am) due to residuals >120mL. NGT connected to LWS-high output, possible ileus as per chart.     Current Weight: 77.7 kg (5/11)  % Weight Change: gained 7.3 kg x 1 wk   noted generalized edema 3+   edema 1+ R&L wrists, hands, arms    Nutrition focused physical exam conducted; Subcutaneous fat loss: [WNL] Orbital fat pads region, [WNL ]Buccal fat region, [unable ]Triceps region,  [WNL ]Ribs region.  Muscle wasting: [WNL ]Temples region, [WNL ]Clavicle region, [WNL ]Shoulder region, [unable ]Scapula region, [WNL ]Interosseous region,  [unable ]thigh region, [WNL ]Calf region    Pertinent Medications: MEDICATIONS  (STANDING):  chlorhexidine 0.12% Liquid 15 milliLiter(s) Swish and Spit two times a day  chlorhexidine 4% Liquid 1 Application(s) Topical <User Schedule>  dexmedetomidine Infusion 0.2 MICROgram(s)/kG/Hr (3.74 mL/Hr) IV Continuous <Continuous>  fentaNYL   Infusion. 0.5 MICROgram(s)/kG/Hr (3.74 mL/Hr) IV Continuous <Continuous>  folic acid 1 milliGRAM(s) Oral daily  heparin  Injectable 5000 Unit(s) SubCutaneous every 12 hours  lactated ringers. 1000 milliLiter(s) (125 mL/Hr) IV Continuous <Continuous>  LORazepam   Injectable 4 milliGRAM(s) IV Push every 4 hours  multivitamin/minerals 1 Tablet(s) Oral daily  pantoprazole   Suspension 40 milliGRAM(s) Oral daily  propofol Infusion 10 MICROgram(s)/kG/Min (4.488 mL/Hr) IV Continuous <Continuous>  sodium chloride 0.9% Bolus 1000 milliLiter(s) IV Bolus once  thiamine 100 milliGRAM(s) Oral daily    MEDICATIONS  (PRN):  acetaminophen  Suppository 650 milliGRAM(s) Rectal every 6 hours PRN For Temp greater than 38 C (100.4 F)  midazolam Injectable 4 milliGRAM(s) IV Push every 1 hour PRN CIWA >20  midazolam Injectable 2 milliGRAM(s) IV Push every 1 hour PRN CIWA 8-20    Pertinent Labs: 05-11 Na141 mmol/L Glu 76 mg/dL K+ 3.2 mmol/L<L> Cr  0.63 mg/dL BUN 4 mg/dL<L> 05-11 Phos 2.2 mg/dL<L> 05-11 Alb 1.9 g/dL<L>     CAPILLARY BLOOD GLUCOSE        Skin: WNL    Estimated Needs:   [x ] no change since previous assessment  [ ] recalculated:     Previous Nutrition Diagnosis:   [ x] Inadequate Energy Intake [ ]Inadequate Oral Intake [ ] Excessive Energy Intake   [ ] Underweight [ ] Increased Nutrient Needs [ ] Overweight/Obesity   [ ] Altered GI Function [ ] Unintended Weight Loss [ ] Food & Nutrition Related Knowledge Deficit [ ] Malnutrition     Nutrition Diagnosis is [x ] ongoing  [ ] resolved [ ] not applicable   pt has been NPO x 8 days    New Nutrition Diagnosis: [x ] not applicable       Interventions:   Recommend  [ ] Change Diet To:  [ ] Nutrition Supplement  [ ] Nutrition Support  [ x] Other: resume TF when medically feasible    Monitoring and Evaluation:   [ ] PO intake [ x ] Tolerance to diet prescription [ x ] weights [ x ] labs[ x ] follow up per protocol  [ ] other:

## 2018-05-11 NOTE — PROGRESS NOTE ADULT - SUBJECTIVE AND OBJECTIVE BOX
Patient is a 42y old  Male who presents with a chief complaint of alcohol withdrawal/ pncreatitis/ electrolyte imbalnc/-Hypoklemia. Hypontremia/ vomiting (04 May 2018 14:01)      INTERVAL HPI/OVERNIGHT EVENTS:    intubated  fever this am    MEDICATIONS  (STANDING):  chlorhexidine 0.12% Liquid 15 milliLiter(s) Swish and Spit two times a day  chlorhexidine 4% Liquid 1 Application(s) Topical <User Schedule>  dexmedetomidine Infusion 0.2 MICROgram(s)/kG/Hr (3.74 mL/Hr) IV Continuous <Continuous>  fentaNYL   Infusion. 0.5 MICROgram(s)/kG/Hr (3.74 mL/Hr) IV Continuous <Continuous>  folic acid 1 milliGRAM(s) Oral daily  heparin  Injectable 5000 Unit(s) SubCutaneous every 12 hours  lactated ringers. 1000 milliLiter(s) (125 mL/Hr) IV Continuous <Continuous>  LORazepam   Injectable 4 milliGRAM(s) IV Push every 4 hours  multivitamin/minerals 1 Tablet(s) Oral daily  pantoprazole   Suspension 40 milliGRAM(s) Oral daily  propofol Infusion 10 MICROgram(s)/kG/Min (4.488 mL/Hr) IV Continuous <Continuous>  sodium chloride 0.9% Bolus 1000 milliLiter(s) IV Bolus once  thiamine 100 milliGRAM(s) Oral daily    MEDICATIONS  (PRN):  acetaminophen  Suppository 650 milliGRAM(s) Rectal every 6 hours PRN For Temp greater than 38 C (100.4 F)  midazolam Injectable 4 milliGRAM(s) IV Push every 1 hour PRN CIWA >20  midazolam Injectable 2 milliGRAM(s) IV Push every 1 hour PRN CIWA 8-20        REVIEW OF SYSTEMS:  CONSTITUTIONAL: No fever, weight loss, or fatigue  EYES: No eye pain, visual disturbances, or discharge  ENMT:  No difficulty hearing, tinnitus, vertigo; No sinus or throat pain  NECK: No pain or stiffness  RESPIRATORY: No cough, wheezing, chills or hemoptysis; No shortness of breath  CARDIOVASCULAR: No chest pain, palpitations, dizziness, or leg swelling  GASTROINTESTINAL: No abdominal or epigastric pain. No nausea, vomiting, or hematemesis; No diarrhea or constipation. No melena or hematochezia.  GENITOURINARY: No dysuria, frequency, hematuria, or incontinence  NEUROLOGICAL: No headaches, memory loss, loss of strength, numbness, or tremors  SKIN: No itching, burning, rashes, or lesions      Vital Signs Last 24 Hrs  T(C): 38.5 (11 May 2018 10:20), Max: 40.1 (11 May 2018 07:50)  T(F): 101.3 (11 May 2018 10:20), Max: 104.2 (11 May 2018 08:00)  HR: 97 (11 May 2018 12:17) (89 - 159)  BP: 112/60 (11 May 2018 10:00) (88/43 - 137/65)  BP(mean): 72 (11 May 2018 10:00) (52 - 148)  RR: 23 (11 May 2018 10:00) (12 - 39)  SpO2: 100% (11 May 2018 12:17) (89% - 100%)    PHYSICAL EXAM:  GENERAL: NAD, well-groomed, well-developed, intubated  HEAD:  Atraumatic, Normocephalic  EYES: EOMI, PERRLA, conjunctiva and sclera clear  ENMT: No tonsillar erythema, exudates, or enlargement; Moist mucous membranes   NECK: Supple, No JVD   NERVOUS SYSTEM:  Alert & Oriented X3, Good concentration; Motor Strength 5/5 B/L upper and lower extremities; DTRs 2+ intact and symmetric  CHEST/LUNG: Clear to percussion bilaterally; No rales, rhonchi, wheezing, or rubs  HEART: Regular rate and rhythm; No murmurs, rubs, or gallops  ABDOMEN: Soft, Nontender, Nondistended; Bowel sounds present  EXTREMITIES:  2+ Peripheral Pulses, No clubbing, cyanosis, or edema  LYMPH: No lymphadenopathy noted  SKIN: No rashes or lesions    LABS:                        10.1   9.42  )-----------( 219      ( 11 May 2018 05:25 )             29.5     05-11    141  |  105  |  4<L>  ----------------------------<  76  3.2<L>   |  23  |  0.63    Ca    8.0<L>      11 May 2018 05:25  Phos  2.2     05-11  Mg     1.2     05-11    TPro  5.8<L>  /  Alb  1.9<L>  /  TBili  0.4  /  DBili  x   /  AST  73<H>  /  ALT  40  /  AlkPhos  157<H>  05-11        CAPILLARY BLOOD GLUCOSE          RADIOLOGY & ADDITIONAL TESTS:    Imaging Personally Reviewed:  [ ] YES  [ ] NO    Consultant(s) Notes Reviewed:  [ ] YES  [ ] NO    Care Discussed with Consultants/Other Providers [ ] YES  [ ] NO

## 2018-05-12 LAB
-  AMPICILLIN/SULBACTAM: SIGNIFICANT CHANGE UP
-  CEFAZOLIN: SIGNIFICANT CHANGE UP
-  CIPROFLOXACIN: SIGNIFICANT CHANGE UP
-  CLINDAMYCIN: SIGNIFICANT CHANGE UP
-  ERYTHROMYCIN: SIGNIFICANT CHANGE UP
-  GENTAMICIN: SIGNIFICANT CHANGE UP
-  LEVOFLOXACIN: SIGNIFICANT CHANGE UP
-  MOXIFLOXACIN(AEROBIC): SIGNIFICANT CHANGE UP
-  OXACILLIN: SIGNIFICANT CHANGE UP
-  PENICILLIN: SIGNIFICANT CHANGE UP
-  RIFAMPIN: SIGNIFICANT CHANGE UP
-  TETRACYCLINE: SIGNIFICANT CHANGE UP
-  TRIMETHOPRIM/SULFAMETHOXAZOLE: SIGNIFICANT CHANGE UP
-  VANCOMYCIN: SIGNIFICANT CHANGE UP
ALBUMIN SERPL ELPH-MCNC: 2.1 G/DL — LOW (ref 3.3–5)
ALP SERPL-CCNC: 139 U/L — HIGH (ref 40–120)
ALT FLD-CCNC: 34 U/L — SIGNIFICANT CHANGE UP (ref 12–78)
ANION GAP SERPL CALC-SCNC: 14 MMOL/L — SIGNIFICANT CHANGE UP (ref 5–17)
AST SERPL-CCNC: 58 U/L — HIGH (ref 15–37)
BILIRUB SERPL-MCNC: 0.4 MG/DL — SIGNIFICANT CHANGE UP (ref 0.2–1.2)
BUN SERPL-MCNC: 1 MG/DL — LOW (ref 7–23)
CALCIUM SERPL-MCNC: 7.9 MG/DL — LOW (ref 8.5–10.1)
CHLORIDE SERPL-SCNC: 104 MMOL/L — SIGNIFICANT CHANGE UP (ref 96–108)
CO2 SERPL-SCNC: 23 MMOL/L — SIGNIFICANT CHANGE UP (ref 22–31)
CREAT SERPL-MCNC: 0.59 MG/DL — SIGNIFICANT CHANGE UP (ref 0.5–1.3)
CULTURE RESULTS: SIGNIFICANT CHANGE UP
GLUCOSE SERPL-MCNC: 76 MG/DL — SIGNIFICANT CHANGE UP (ref 70–99)
GRAM STN FLD: SIGNIFICANT CHANGE UP
HCT VFR BLD CALC: 28.9 % — LOW (ref 39–50)
HGB BLD-MCNC: 10 G/DL — LOW (ref 13–17)
MAGNESIUM SERPL-MCNC: 1.3 MG/DL — LOW (ref 1.6–2.6)
MCHC RBC-ENTMCNC: 33 PG — SIGNIFICANT CHANGE UP (ref 27–34)
MCHC RBC-ENTMCNC: 34.6 GM/DL — SIGNIFICANT CHANGE UP (ref 32–36)
MCV RBC AUTO: 95.4 FL — SIGNIFICANT CHANGE UP (ref 80–100)
METHOD TYPE: SIGNIFICANT CHANGE UP
NRBC # BLD: 0 /100 WBCS — SIGNIFICANT CHANGE UP (ref 0–0)
ORGANISM # SPEC MICROSCOPIC CNT: SIGNIFICANT CHANGE UP
ORGANISM # SPEC MICROSCOPIC CNT: SIGNIFICANT CHANGE UP
PHOSPHATE SERPL-MCNC: 2.7 MG/DL — SIGNIFICANT CHANGE UP (ref 2.5–4.5)
PLATELET # BLD AUTO: 239 K/UL — SIGNIFICANT CHANGE UP (ref 150–400)
POTASSIUM SERPL-MCNC: 2.8 MMOL/L — CRITICAL LOW (ref 3.5–5.3)
POTASSIUM SERPL-SCNC: 2.8 MMOL/L — CRITICAL LOW (ref 3.5–5.3)
PROCALCITONIN SERPL-MCNC: 1.71 NG/ML — HIGH (ref 0–0.04)
PROT SERPL-MCNC: 5.8 GM/DL — LOW (ref 6–8.3)
RBC # BLD: 3.03 M/UL — LOW (ref 4.2–5.8)
RBC # FLD: 12.8 % — SIGNIFICANT CHANGE UP (ref 10.3–14.5)
SODIUM SERPL-SCNC: 141 MMOL/L — SIGNIFICANT CHANGE UP (ref 135–145)
SPECIMEN SOURCE: SIGNIFICANT CHANGE UP
VANCOMYCIN TROUGH SERPL-MCNC: 14.4 UG/ML — SIGNIFICANT CHANGE UP (ref 10–20)
WBC # BLD: 13.65 K/UL — HIGH (ref 3.8–10.5)
WBC # FLD AUTO: 13.65 K/UL — HIGH (ref 3.8–10.5)

## 2018-05-12 PROCEDURE — 71045 X-RAY EXAM CHEST 1 VIEW: CPT | Mod: 26

## 2018-05-12 PROCEDURE — 99291 CRITICAL CARE FIRST HOUR: CPT

## 2018-05-12 RX ORDER — MAGNESIUM SULFATE 500 MG/ML
2 VIAL (ML) INJECTION ONCE
Qty: 0 | Refills: 0 | Status: COMPLETED | OUTPATIENT
Start: 2018-05-12 | End: 2018-05-12

## 2018-05-12 RX ORDER — POTASSIUM CHLORIDE 20 MEQ
10 PACKET (EA) ORAL
Qty: 0 | Refills: 0 | Status: COMPLETED | OUTPATIENT
Start: 2018-05-12 | End: 2018-05-12

## 2018-05-12 RX ORDER — POTASSIUM CHLORIDE 20 MEQ
40 PACKET (EA) ORAL EVERY 4 HOURS
Qty: 0 | Refills: 0 | Status: COMPLETED | OUTPATIENT
Start: 2018-05-12 | End: 2018-05-12

## 2018-05-12 RX ORDER — METOCLOPRAMIDE HCL 10 MG
10 TABLET ORAL EVERY 8 HOURS
Qty: 0 | Refills: 0 | Status: DISCONTINUED | OUTPATIENT
Start: 2018-05-12 | End: 2018-05-16

## 2018-05-12 RX ORDER — POTASSIUM CHLORIDE 20 MEQ
10 PACKET (EA) ORAL ONCE
Qty: 0 | Refills: 0 | Status: COMPLETED | OUTPATIENT
Start: 2018-05-12 | End: 2018-05-12

## 2018-05-12 RX ORDER — POTASSIUM CHLORIDE 20 MEQ
40 PACKET (EA) ORAL EVERY 4 HOURS
Qty: 0 | Refills: 0 | Status: DISCONTINUED | OUTPATIENT
Start: 2018-05-12 | End: 2018-05-12

## 2018-05-12 RX ADMIN — Medication 10 MILLIGRAM(S): at 22:22

## 2018-05-12 RX ADMIN — Medication 4 MILLIGRAM(S): at 06:30

## 2018-05-12 RX ADMIN — Medication 40 MILLIEQUIVALENT(S): at 09:43

## 2018-05-12 RX ADMIN — HEPARIN SODIUM 5000 UNIT(S): 5000 INJECTION INTRAVENOUS; SUBCUTANEOUS at 17:10

## 2018-05-12 RX ADMIN — SODIUM CHLORIDE 125 MILLILITER(S): 9 INJECTION, SOLUTION INTRAVENOUS at 09:46

## 2018-05-12 RX ADMIN — Medication 100 MILLIEQUIVALENT(S): at 13:59

## 2018-05-12 RX ADMIN — Medication 250 MILLIGRAM(S): at 14:02

## 2018-05-12 RX ADMIN — Medication 4 MILLIGRAM(S): at 02:03

## 2018-05-12 RX ADMIN — Medication 4 MILLIGRAM(S): at 14:59

## 2018-05-12 RX ADMIN — PANTOPRAZOLE SODIUM 40 MILLIGRAM(S): 20 TABLET, DELAYED RELEASE ORAL at 06:30

## 2018-05-12 RX ADMIN — MEROPENEM 200 MILLIGRAM(S): 1 INJECTION INTRAVENOUS at 18:21

## 2018-05-12 RX ADMIN — PANTOPRAZOLE SODIUM 40 MILLIGRAM(S): 20 TABLET, DELAYED RELEASE ORAL at 17:12

## 2018-05-12 RX ADMIN — CHLORHEXIDINE GLUCONATE 1 APPLICATION(S): 213 SOLUTION TOPICAL at 06:30

## 2018-05-12 RX ADMIN — Medication 250 MILLIGRAM(S): at 07:30

## 2018-05-12 RX ADMIN — Medication 4 MILLIGRAM(S): at 09:50

## 2018-05-12 RX ADMIN — Medication 40 MILLIEQUIVALENT(S): at 14:59

## 2018-05-12 RX ADMIN — Medication 50 GRAM(S): at 17:07

## 2018-05-12 RX ADMIN — Medication 100 MILLIEQUIVALENT(S): at 15:00

## 2018-05-12 RX ADMIN — Medication 100 MILLIEQUIVALENT(S): at 09:33

## 2018-05-12 RX ADMIN — Medication 50 GRAM(S): at 09:33

## 2018-05-12 RX ADMIN — CHLORHEXIDINE GLUCONATE 15 MILLILITER(S): 213 SOLUTION TOPICAL at 07:17

## 2018-05-12 RX ADMIN — Medication 4 MILLIGRAM(S): at 22:22

## 2018-05-12 RX ADMIN — MEROPENEM 200 MILLIGRAM(S): 1 INJECTION INTRAVENOUS at 06:30

## 2018-05-12 RX ADMIN — HEPARIN SODIUM 5000 UNIT(S): 5000 INJECTION INTRAVENOUS; SUBCUTANEOUS at 06:30

## 2018-05-12 RX ADMIN — Medication 250 MILLIGRAM(S): at 22:22

## 2018-05-12 RX ADMIN — Medication 4 MILLIGRAM(S): at 17:14

## 2018-05-12 RX ADMIN — PROPOFOL 4.49 MICROGRAM(S)/KG/MIN: 10 INJECTION, EMULSION INTRAVENOUS at 09:45

## 2018-05-12 RX ADMIN — CHLORHEXIDINE GLUCONATE 15 MILLILITER(S): 213 SOLUTION TOPICAL at 17:15

## 2018-05-12 NOTE — PROGRESS NOTE ADULT - SUBJECTIVE AND OBJECTIVE BOX
HPI:  Pt is a 41 yo M with h/o ETOH abuse  (4-5 glasses of wine per day) and stopped drinking 3 days prior to admission 2 to N/V/D. Pt  found to have acute pancreatitis and then with acute alcohol withdrawal.  Intubated 5/10 2 to not protecting airway.     ## Labs:  CBC:                        10.0   13.65 )-----------( 239      ( 12 May 2018 04:30 )             28.9     Chem:      141  |  104  |  1<L>  ----------------------------<  76  2.8<LL>   |  23  |  0.59    Ca    7.9<L>      12 May 2018 04:30  Phos  2.7       Mg     1.3         TPro  5.8<L>  /  Alb  2.1<L>  /  TBili  0.4  /  DBili  x   /  AST  58<H>  /  ALT  34  /  AlkPhos  139<H>      Coags:    culture blood:  --   @ 00:27            culture sputum:     Moderate Staphylococcus aureus ***********Note************  This isolate demonstrates inducible  clindamycin resistance.  Clindamycin may still be effective in some patients.  Normal Respiratory Poonam present           culture urine:  --  @ 00:27  culture blood:  --   @ 00:26            culture sputum:     Growth in anaerobic bottle: Gram Positive Cocci in Clusters  Growth in aerobic bottle: Gram Positive Cocci in Clusters           culture urine:  --  @ 00:26        ## Imaging:    ## Medications:  meropenem  IVPB      meropenem  IVPB 2000 milliGRAM(s) IV Intermittent every 8 hours  vancomycin  IVPB 1000 milliGRAM(s) IV Intermittent every 8 hours          heparin  Injectable 5000 Unit(s) SubCutaneous every 12 hours    pantoprazole  Injectable 40 milliGRAM(s) IV Push two times a day    acetaminophen  Suppository 650 milliGRAM(s) Rectal every 6 hours PRN  dexmedetomidine Infusion 0.2 MICROgram(s)/kG/Hr IV Continuous <Continuous>  LORazepam   Injectable 4 milliGRAM(s) IV Push every 4 hours  metoclopramide Injectable 10 milliGRAM(s) IV Push every 8 hours  midazolam Injectable 4 milliGRAM(s) IV Push every 1 hour PRN  midazolam Injectable 2 milliGRAM(s) IV Push every 1 hour PRN  propofol Infusion 10 MICROgram(s)/kG/Min IV Continuous <Continuous>      ## Vitals:  T(C): 36.3 (18 @ 15:55), Max: 38.1 (18 @ 02:00)  HR: 88 (18 @ 21:10) (80 - 106)  BP: 107/67 (18 @ 21:00) (88/51 - 144/85)  BP(mean): 76 (18 @ 21:00) (58 - 115)  RR: 0 (18 @ 21:00) (0 - 25)  SpO2: 100% (18 @ 21:10) (98% - 100%)  Wt(kg): --  Vent: Mode: AC/ CMV (Assist Control/ Continuous Mandatory Ventilation), RR (machine): 14, RR (patient): 17, TV (machine): 400, FiO2: 35, PEEP: 5, PIP: 16  AB-11 @ 07:01  -   @ 07:00  --------------------------------------------------------  IN: 5387.8 mL / OUT: 3350 mL / NET: 2037.8 mL     @ 07:01  -   @ 22:43  --------------------------------------------------------  IN: 2863.6 mL / OUT: 2450 mL / NET: 413.6 mL          ## P/E:  Gen: lying comfortably in bed in no apparent distress  Mouth: (+) ETT  Lungs: CTA  Heart: RRR  Abd: Soft/+BS  Ext: L forearm erythematous and swollen  Neuro: Sedated    CENTRAL LINE: [ ] YES [ ] NO  LOCATION:   DATE INSERTED:  REMOVE: [ ] YES [ ] NO      ALDAIR: [x ] YES [ ] NO    DATE INSERTED:  REMOVE:  [ ] YES [ ] NO      A-LINE:  [ ] YES [ ] NO  LOCATION:   DATE INSERTED:  REMOVE:  [ ] YES [ ] NO  EXPLAIN:    CODE STATUS: [ x] full code  [ ] DNR  [ ] DNI  [ ] MOLST  Goals of care discussion: [ ] yes

## 2018-05-12 NOTE — PROGRESS NOTE ADULT - ASSESSMENT
·	Hypokalemia  ·	HypoMg    Potassium supps and Mg supps as ordered. Monitor BP trend.   Will follow electrolytes and renal function trend.

## 2018-05-12 NOTE — PROGRESS NOTE ADULT - SUBJECTIVE AND OBJECTIVE BOX
Chief Complaints:  Patient is a 42y old  Male who presents with a chief complaint of alcohol withdrawal/ pncreatitis/ electrolyte imbalnc/-Hypoklemia. Hypontremia/ vomiting (04 May 2018 14:01)      Interval History:    ROS: Unchanged    Physical Exam:    In NAD:    Vital Signs Last 24 Hrs  T(C): 36.3 (05-12-18 @ 15:55), Max: 38.1 (05-12-18 @ 02:00)  T(F): 97.3 (05-12-18 @ 15:55), Max: 100.6 (05-12-18 @ 02:00)  HR: 96 (05-12-18 @ 19:00) (80 - 108)  BP: 121/81 (05-12-18 @ 19:00) (88/51 - 144/85)  BP(mean): 89 (05-12-18 @ 19:00) (58 - 115)  RR: 19 (05-12-18 @ 19:00) (16 - 25)  SpO2: 100% (05-12-18 @ 19:00) (98% - 100%)    Eyes: Anicteric.  Neck: Supple No jugular venous distention  Chest: Good air entry bilaterally  CVS: S1 and S2 regular  Abdomen: Soft BS + in all quadrant, no tenderness, no rebound tenderness and guarding.  Extremity: No edema cyanosis or clubbing.  CNS: awake and alert.    MEDICATIONS  (STANDING):  chlorhexidine 0.12% Liquid 15 milliLiter(s) Swish and Spit two times a day  chlorhexidine 4% Liquid 1 Application(s) Topical <User Schedule>  dexmedetomidine Infusion 0.2 MICROgram(s)/kG/Hr IV Continuous <Continuous>  heparin  Injectable 5000 Unit(s) SubCutaneous every 12 hours  lactated ringers. 1000 milliLiter(s) IV Continuous <Continuous>  LORazepam   Injectable 4 milliGRAM(s) IV Push every 4 hours  meropenem  IVPB      meropenem  IVPB 2000 milliGRAM(s) IV Intermittent every 8 hours  metoclopramide Injectable 10 milliGRAM(s) IV Push every 8 hours  pantoprazole  Injectable 40 milliGRAM(s) IV Push two times a day  propofol Infusion 10 MICROgram(s)/kG/Min IV Continuous <Continuous>  sodium chloride 0.9% Bolus 1000 milliLiter(s) IV Bolus once  vancomycin  IVPB 1000 milliGRAM(s) IV Intermittent every 8 hours    12 May 2018 04:30    141    |  104    |  1      ----------------------------<  76     2.8     |  23     |  0.59     Ca    7.9        12 May 2018 04:30  Phos  2.7       12 May 2018 04:30  Mg     1.3       12 May 2018 04:30    TPro  5.8    /  Alb  2.1    /  TBili  0.4    /  DBili  x      /  AST  58     /  ALT  34     /  AlkPhos  139    12 May 2018 04:30                          10.0   13.65 )-----------( 239      ( 12 May 2018 04:30 )             28.9     CAPILLARY BLOOD GLUCOSE          Provider to RN (05-12-18 @ 18:33)  Indwelling Urethral Catheter (05-12-18 @ 18:33) Chief Complaints:  Patient is a 42y old  Male who presents with a chief complaint of alcohol withdrawal/ pncreatitis/ electrolyte imbalnc/-Hypoklemia. Hypontremia/ vomiting (04 May 2018 14:01)      Interval History: Remains in CIU, intubated and sedated    ROS: Unchanged    Physical Exam:    In NAD: ETT in place    Vital Signs Last 24 Hrs  T(C): 36.3 (05-12-18 @ 15:55), Max: 38.1 (05-12-18 @ 02:00)  T(F): 97.3 (05-12-18 @ 15:55), Max: 100.6 (05-12-18 @ 02:00)  HR: 96 (05-12-18 @ 19:00) (80 - 108)  BP: 121/81 (05-12-18 @ 19:00) (88/51 - 144/85)  BP(mean): 89 (05-12-18 @ 19:00) (58 - 115)  RR: 19 (05-12-18 @ 19:00) (16 - 25)  SpO2: 100% (05-12-18 @ 19:00) (98% - 100%)    Eyes: Anicteric.  Neck: Supple No jugular venous distention  Chest: Good air entry bilaterally  CVS: S1 and S2 regular  Abdomen: Soft BS + in all quadrant, no tenderness, no rebound tenderness and guarding.  Extremity: No edema cyanosis or clubbing.  CNS: sedated    MEDICATIONS  (STANDING):  chlorhexidine 0.12% Liquid 15 milliLiter(s) Swish and Spit two times a day  chlorhexidine 4% Liquid 1 Application(s) Topical <User Schedule>  dexmedetomidine Infusion 0.2 MICROgram(s)/kG/Hr IV Continuous <Continuous>  heparin  Injectable 5000 Unit(s) SubCutaneous every 12 hours  lactated ringers. 1000 milliLiter(s) IV Continuous <Continuous>  LORazepam   Injectable 4 milliGRAM(s) IV Push every 4 hours  meropenem  IVPB      meropenem  IVPB 2000 milliGRAM(s) IV Intermittent every 8 hours  metoclopramide Injectable 10 milliGRAM(s) IV Push every 8 hours  pantoprazole  Injectable 40 milliGRAM(s) IV Push two times a day  propofol Infusion 10 MICROgram(s)/kG/Min IV Continuous <Continuous>  sodium chloride 0.9% Bolus 1000 milliLiter(s) IV Bolus once  vancomycin  IVPB 1000 milliGRAM(s) IV Intermittent every 8 hours    12 May 2018 04:30    141    |  104    |  1      ----------------------------<  76     2.8     |  23     |  0.59     Ca    7.9        12 May 2018 04:30  Phos  2.7       12 May 2018 04:30  Mg     1.3       12 May 2018 04:30    TPro  5.8    /  Alb  2.1    /  TBili  0.4    /  DBili  x      /  AST  58     /  ALT  34     /  AlkPhos  139    12 May 2018 04:30                          10.0   13.65 )-----------( 239      ( 12 May 2018 04:30 )             28.9     CAPILLARY BLOOD GLUCOSE          Provider to RN (05-12-18 @ 18:33)  Indwelling Urethral Catheter (05-12-18 @ 18:33)

## 2018-05-12 NOTE — PROGRESS NOTE ADULT - SUBJECTIVE AND OBJECTIVE BOX
Patient is a 42y old  Male who presents with a chief complaint of alcohol withdrawal/ pncreatitis/ electrolyte imbalnc/-Hypoklemia. Hypontremia/ vomiting (04 May 2018 14:01)      Patient seen in follow up for low K, Mg. On vent support. Stable renal function    PAST MEDICAL HISTORY:  No pertinent past medical history    MEDICATIONS  (STANDING):  chlorhexidine 0.12% Liquid 15 milliLiter(s) Swish and Spit two times a day  chlorhexidine 4% Liquid 1 Application(s) Topical <User Schedule>  dexmedetomidine Infusion 0.2 MICROgram(s)/kG/Hr (3.74 mL/Hr) IV Continuous <Continuous>  fentaNYL   Infusion. 0.5 MICROgram(s)/kG/Hr (3.74 mL/Hr) IV Continuous <Continuous>  heparin  Injectable 5000 Unit(s) SubCutaneous every 12 hours  lactated ringers. 1000 milliLiter(s) (125 mL/Hr) IV Continuous <Continuous>  LORazepam   Injectable 4 milliGRAM(s) IV Push every 4 hours  meropenem  IVPB      meropenem  IVPB 2000 milliGRAM(s) IV Intermittent every 8 hours  norepinephrine Infusion 0.05 MICROgram(s)/kG/Min (7.013 mL/Hr) IV Continuous <Continuous>  pantoprazole  Injectable 40 milliGRAM(s) IV Push two times a day  potassium chloride   Powder 40 milliEquivalent(s) Oral every 4 hours  propofol Infusion 10 MICROgram(s)/kG/Min (4.488 mL/Hr) IV Continuous <Continuous>  sodium chloride 0.9% Bolus 1000 milliLiter(s) IV Bolus once  vancomycin  IVPB 1000 milliGRAM(s) IV Intermittent every 8 hours    MEDICATIONS  (PRN):  acetaminophen  Suppository 650 milliGRAM(s) Rectal every 6 hours PRN For Temp greater than 38 C (100.4 F)  midazolam Injectable 4 milliGRAM(s) IV Push every 1 hour PRN CIWA >20  midazolam Injectable 2 milliGRAM(s) IV Push every 1 hour PRN CIWA 8-20    T(C): 36.7 (05-12-18 @ 08:00), Max: 40.1 (05-11-18 @ 07:50)  HR: 86 (05-12-18 @ 11:31) (84 - 159)  BP: 111/65 (05-12-18 @ 11:00) (88/43 - 148/80)  RR: 18 (05-12-18 @ 11:30) (12 - 39)  SpO2: 100% (05-12-18 @ 11:31) (98% - 100%)  Wt(kg): --  I&O's Detail    11 May 2018 07:01  -  12 May 2018 07:00  --------------------------------------------------------  IN:    dexmedetomidine Infusion: 26.2 mL    fentaNYL Infusion.: 35.4 mL    lactated ringers.: 2875 mL    multivitamin/thiamine/folic acid in sodium chloride 0.9%: 1700 mL    propofol Infusion: 401.2 mL    Solution: 100 mL    Solution: 250 mL  Total IN: 5387.8 mL    OUT:    Indwelling Catheter - Urethral: 2650 mL    Nasoenteral Tube: 700 mL  Total OUT: 3350 mL    Total NET: 2037.8 mL      12 May 2018 07:01  -  12 May 2018 13:20  --------------------------------------------------------  IN:    lactated ringers.: 125 mL    propofol Infusion: 22.4 mL  Total IN: 147.4 mL    OUT:    Indwelling Catheter - Urethral: 100 mL  Total OUT: 100 mL    Total NET: 47.4 mL          PHYSICAL EXAM:  General: on vent  Respiratory: b/l air entry  Cardiovascular: S1 S2  Gastrointestinal: soft  Extremities:  + edema                          10.0   13.65 )-----------( 239      ( 12 May 2018 04:30 )             28.9     05-12    141  |  104  |  1<L>  ----------------------------<  76  2.8<LL>   |  23  |  0.59    Ca    7.9<L>      12 May 2018 04:30  Phos  2.7     05-12  Mg     1.3     05-12    TPro  5.8<L>  /  Alb  2.1<L>  /  TBili  0.4  /  DBili  x   /  AST  58<H>  /  ALT  34  /  AlkPhos  139<H>  05-12        LIVER FUNCTIONS - ( 12 May 2018 04:30 )  Alb: 2.1 g/dL / Pro: 5.8 gm/dL / ALK PHOS: 139 U/L / ALT: 34 U/L / AST: 58 U/L / GGT: x               Sodium, Serum: 141 (05-12 @ 04:30)  Sodium, Serum: 141 (05-11 @ 05:25)  Sodium, Serum: 140 (05-10 @ 04:30)  Sodium, Serum: 144 (05-09 @ 03:58)    Creatinine, Serum: 0.59 (05-12 @ 04:30)  Creatinine, Serum: 0.63 (05-11 @ 05:25)  Creatinine, Serum: 0.55 (05-10 @ 04:30)  Creatinine, Serum: 0.55 (05-09 @ 03:58)    Potassium, Serum: 2.8 (05-12 @ 04:30)  Potassium, Serum: 3.2 (05-11 @ 05:25)  Potassium, Serum: 2.8 (05-10 @ 04:30)  Potassium, Serum: 3.1 (05-09 @ 03:58)    Hemoglobin: 10.0 (05-12 @ 04:30)  Hemoglobin: 10.1 (05-11 @ 05:25)  Hemoglobin: 11.0 (05-10 @ 04:30)  Hemoglobin: 10.5 (05-09 @ 03:58)

## 2018-05-12 NOTE — PROGRESS NOTE ADULT - ASSESSMENT
Pt is a 41 yo M with h/o ETOH abuse  (4-5 glasses of wine per day) and stopped drinking 3 days prior to admission 2 to N/V/D. Pt  found to have acute pancreatitis and then with acute alcohol withdrawal.  Intubated 5/10 2 to not protecting airway.    Resp: Cont current vent settings  ID: Sputum with Staph no sign infiltrate on CXR/ Bld Cx Staph coag neg; cont Abx for now for L UE cellulitis   FEN: Replace K and Mg 2 to hypokalemia/hypomagnesemia  GI: Follow Lizzie/Lip prn  Neuro: Cont Propofol    CCT: 35 min

## 2018-05-13 LAB
-  AMPICILLIN/SULBACTAM: SIGNIFICANT CHANGE UP
-  CEFAZOLIN: SIGNIFICANT CHANGE UP
-  CIPROFLOXACIN: SIGNIFICANT CHANGE UP
-  CLINDAMYCIN: SIGNIFICANT CHANGE UP
-  ERYTHROMYCIN: SIGNIFICANT CHANGE UP
-  GENTAMICIN: SIGNIFICANT CHANGE UP
-  LEVOFLOXACIN: SIGNIFICANT CHANGE UP
-  MOXIFLOXACIN(AEROBIC): SIGNIFICANT CHANGE UP
-  OXACILLIN: SIGNIFICANT CHANGE UP
-  RIFAMPIN: SIGNIFICANT CHANGE UP
-  TETRACYCLINE: SIGNIFICANT CHANGE UP
-  TRIMETHOPRIM/SULFAMETHOXAZOLE: SIGNIFICANT CHANGE UP
-  VANCOMYCIN: SIGNIFICANT CHANGE UP
ANION GAP SERPL CALC-SCNC: 9 MMOL/L — SIGNIFICANT CHANGE UP (ref 5–17)
BUN SERPL-MCNC: 2 MG/DL — LOW (ref 7–23)
CALCIUM SERPL-MCNC: 8.3 MG/DL — LOW (ref 8.5–10.1)
CHLORIDE SERPL-SCNC: 107 MMOL/L — SIGNIFICANT CHANGE UP (ref 96–108)
CO2 SERPL-SCNC: 27 MMOL/L — SIGNIFICANT CHANGE UP (ref 22–31)
CREAT SERPL-MCNC: 0.6 MG/DL — SIGNIFICANT CHANGE UP (ref 0.5–1.3)
CULTURE RESULTS: SIGNIFICANT CHANGE UP
CULTURE RESULTS: SIGNIFICANT CHANGE UP
GLUCOSE SERPL-MCNC: 92 MG/DL — SIGNIFICANT CHANGE UP (ref 70–99)
GRAM STN FLD: SIGNIFICANT CHANGE UP
GRAM STN FLD: SIGNIFICANT CHANGE UP
HCT VFR BLD CALC: 35.8 % — LOW (ref 39–50)
HGB BLD-MCNC: 12.3 G/DL — LOW (ref 13–17)
MAGNESIUM SERPL-MCNC: 1.7 MG/DL — SIGNIFICANT CHANGE UP (ref 1.6–2.6)
MCHC RBC-ENTMCNC: 33 PG — SIGNIFICANT CHANGE UP (ref 27–34)
MCHC RBC-ENTMCNC: 34.4 GM/DL — SIGNIFICANT CHANGE UP (ref 32–36)
MCV RBC AUTO: 96 FL — SIGNIFICANT CHANGE UP (ref 80–100)
METHOD TYPE: SIGNIFICANT CHANGE UP
NRBC # BLD: 0 /100 WBCS — SIGNIFICANT CHANGE UP (ref 0–0)
ORGANISM # SPEC MICROSCOPIC CNT: SIGNIFICANT CHANGE UP
PHOSPHATE SERPL-MCNC: 1.9 MG/DL — LOW (ref 2.5–4.5)
PLATELET # BLD AUTO: 254 K/UL — SIGNIFICANT CHANGE UP (ref 150–400)
POTASSIUM SERPL-MCNC: 3.4 MMOL/L — LOW (ref 3.5–5.3)
POTASSIUM SERPL-SCNC: 3.4 MMOL/L — LOW (ref 3.5–5.3)
RBC # BLD: 3.73 M/UL — LOW (ref 4.2–5.8)
RBC # FLD: 13 % — SIGNIFICANT CHANGE UP (ref 10.3–14.5)
SODIUM SERPL-SCNC: 143 MMOL/L — SIGNIFICANT CHANGE UP (ref 135–145)
SPECIMEN SOURCE: SIGNIFICANT CHANGE UP
SPECIMEN SOURCE: SIGNIFICANT CHANGE UP
WBC # BLD: 11.11 K/UL — HIGH (ref 3.8–10.5)
WBC # FLD AUTO: 11.11 K/UL — HIGH (ref 3.8–10.5)

## 2018-05-13 PROCEDURE — 99291 CRITICAL CARE FIRST HOUR: CPT

## 2018-05-13 RX ORDER — POTASSIUM CHLORIDE 20 MEQ
10 PACKET (EA) ORAL ONCE
Qty: 0 | Refills: 0 | Status: COMPLETED | OUTPATIENT
Start: 2018-05-13 | End: 2018-05-13

## 2018-05-13 RX ORDER — SCOPALAMINE 1 MG/3D
1 PATCH, EXTENDED RELEASE TRANSDERMAL
Qty: 0 | Refills: 0 | Status: DISCONTINUED | OUTPATIENT
Start: 2018-05-13 | End: 2018-05-18

## 2018-05-13 RX ORDER — POTASSIUM CHLORIDE 20 MEQ
20 PACKET (EA) ORAL ONCE
Qty: 0 | Refills: 0 | Status: COMPLETED | OUTPATIENT
Start: 2018-05-13 | End: 2018-05-13

## 2018-05-13 RX ORDER — POTASSIUM PHOSPHATE, MONOBASIC POTASSIUM PHOSPHATE, DIBASIC 236; 224 MG/ML; MG/ML
15 INJECTION, SOLUTION INTRAVENOUS ONCE
Qty: 0 | Refills: 0 | Status: COMPLETED | OUTPATIENT
Start: 2018-05-13 | End: 2018-05-13

## 2018-05-13 RX ADMIN — MEROPENEM 200 MILLIGRAM(S): 1 INJECTION INTRAVENOUS at 14:09

## 2018-05-13 RX ADMIN — CHLORHEXIDINE GLUCONATE 15 MILLILITER(S): 213 SOLUTION TOPICAL at 18:39

## 2018-05-13 RX ADMIN — SODIUM CHLORIDE 125 MILLILITER(S): 9 INJECTION, SOLUTION INTRAVENOUS at 12:17

## 2018-05-13 RX ADMIN — Medication 20 MILLIEQUIVALENT(S): at 12:16

## 2018-05-13 RX ADMIN — Medication 4 MILLIGRAM(S): at 22:20

## 2018-05-13 RX ADMIN — PANTOPRAZOLE SODIUM 40 MILLIGRAM(S): 20 TABLET, DELAYED RELEASE ORAL at 06:30

## 2018-05-13 RX ADMIN — PANTOPRAZOLE SODIUM 40 MILLIGRAM(S): 20 TABLET, DELAYED RELEASE ORAL at 17:15

## 2018-05-13 RX ADMIN — HEPARIN SODIUM 5000 UNIT(S): 5000 INJECTION INTRAVENOUS; SUBCUTANEOUS at 06:45

## 2018-05-13 RX ADMIN — Medication 250 MILLIGRAM(S): at 22:37

## 2018-05-13 RX ADMIN — Medication 10 MILLIGRAM(S): at 22:30

## 2018-05-13 RX ADMIN — MEROPENEM 200 MILLIGRAM(S): 1 INJECTION INTRAVENOUS at 22:30

## 2018-05-13 RX ADMIN — CHLORHEXIDINE GLUCONATE 15 MILLILITER(S): 213 SOLUTION TOPICAL at 08:04

## 2018-05-13 RX ADMIN — Medication 650 MILLIGRAM(S): at 17:15

## 2018-05-13 RX ADMIN — PROPOFOL 4.49 MICROGRAM(S)/KG/MIN: 10 INJECTION, EMULSION INTRAVENOUS at 00:05

## 2018-05-13 RX ADMIN — SCOPALAMINE 1 PATCH: 1 PATCH, EXTENDED RELEASE TRANSDERMAL at 18:46

## 2018-05-13 RX ADMIN — Medication 10 MILLIGRAM(S): at 14:09

## 2018-05-13 RX ADMIN — SODIUM CHLORIDE 125 MILLILITER(S): 9 INJECTION, SOLUTION INTRAVENOUS at 18:46

## 2018-05-13 RX ADMIN — Medication 250 MILLIGRAM(S): at 14:09

## 2018-05-13 RX ADMIN — MEROPENEM 200 MILLIGRAM(S): 1 INJECTION INTRAVENOUS at 06:00

## 2018-05-13 RX ADMIN — HEPARIN SODIUM 5000 UNIT(S): 5000 INJECTION INTRAVENOUS; SUBCUTANEOUS at 17:15

## 2018-05-13 RX ADMIN — CHLORHEXIDINE GLUCONATE 1 APPLICATION(S): 213 SOLUTION TOPICAL at 12:17

## 2018-05-13 RX ADMIN — MEROPENEM 200 MILLIGRAM(S): 1 INJECTION INTRAVENOUS at 00:05

## 2018-05-13 RX ADMIN — Medication 4 MILLIGRAM(S): at 13:54

## 2018-05-13 RX ADMIN — Medication 4 MILLIGRAM(S): at 02:03

## 2018-05-13 RX ADMIN — Medication 10 MILLIGRAM(S): at 06:30

## 2018-05-13 RX ADMIN — Medication 250 MILLIGRAM(S): at 08:00

## 2018-05-13 RX ADMIN — Medication 100 MILLIEQUIVALENT(S): at 06:40

## 2018-05-13 RX ADMIN — Medication 4 MILLIGRAM(S): at 17:15

## 2018-05-13 RX ADMIN — POTASSIUM PHOSPHATE, MONOBASIC POTASSIUM PHOSPHATE, DIBASIC 63.75 MILLIMOLE(S): 236; 224 INJECTION, SOLUTION INTRAVENOUS at 12:16

## 2018-05-13 RX ADMIN — Medication 4 MILLIGRAM(S): at 07:00

## 2018-05-13 NOTE — PROGRESS NOTE ADULT - SUBJECTIVE AND OBJECTIVE BOX
Chief Complaints:  Patient is a 42y old  Male who presents with a chief complaint of alcohol withdrawal/ pncreatitis/ electrolyte imbalnc/-Hypoklemia. Hypontremia/ vomiting (04 May 2018 14:01)      Interval History: Clinically unchanged    ROS: Unchanged    Physical Exam: ETT in place    In NAD:    Vital Signs Last 24 Hrs  T(C): 37.8 (05-13-18 @ 20:35), Max: 38.2 (05-13-18 @ 16:52)  T(F): 100 (05-13-18 @ 20:35), Max: 100.8 (05-13-18 @ 16:52)  HR: 89 (05-13-18 @ 21:39) (85 - 121)  BP: 120/69 (05-13-18 @ 19:00) (116/101 - 133/87)  BP(mean): 81 (05-13-18 @ 19:00) (81 - 105)  RR: 19 (05-13-18 @ 19:00) (19 - 29)  SpO2: 100% (05-13-18 @ 21:39) (99% - 100%)    Eyes: Anicteric.  Neck: Supple No jugular venous distention  Chest: Good air entry bilaterally  CVS: S1 and S2 regular  Abdomen: Soft BS + in all quadrant, no tenderness, no rebound tenderness and guarding.  Extremity: No edema cyanosis or clubbing.  CNS: Sedated    MEDICATIONS  (STANDING):  chlorhexidine 0.12% Liquid 15 milliLiter(s) Swish and Spit two times a day  chlorhexidine 4% Liquid 1 Application(s) Topical <User Schedule>  heparin  Injectable 5000 Unit(s) SubCutaneous every 12 hours  lactated ringers. 1000 milliLiter(s) IV Continuous <Continuous>  LORazepam   Injectable 4 milliGRAM(s) IV Push every 4 hours  meropenem  IVPB      meropenem  IVPB 2000 milliGRAM(s) IV Intermittent every 8 hours  metoclopramide Injectable 10 milliGRAM(s) IV Push every 8 hours  pantoprazole  Injectable 40 milliGRAM(s) IV Push two times a day  propofol Infusion 10 MICROgram(s)/kG/Min IV Continuous <Continuous>  scopolamine   Patch 1 Patch Transdermal every 3 days  sodium chloride 0.9% Bolus 1000 milliLiter(s) IV Bolus once  vancomycin  IVPB 1000 milliGRAM(s) IV Intermittent every 8 hours    13 May 2018 04:10    143    |  107    |  2      ----------------------------<  92     3.4     |  27     |  0.60     Ca    8.3        13 May 2018 04:10  Phos  1.9       13 May 2018 04:10  Mg     1.7       13 May 2018 04:10    TPro  5.8    /  Alb  2.1    /  TBili  0.4    /  DBili  x      /  AST  58     /  ALT  34     /  AlkPhos  139    12 May 2018 04:30                          12.3   11.11 )-----------( 254      ( 13 May 2018 04:10 )             35.8     CAPILLARY BLOOD GLUCOSE          Indwelling Urethral Catheter (05-13-18 @ 18:56)

## 2018-05-13 NOTE — PROGRESS NOTE ADULT - SUBJECTIVE AND OBJECTIVE BOX
HPI:  Pt is a 41 yo M with h/o ETOH abuse  (4-5 glasses of wine per day) and stopped drinking 3 days prior to admission 2 to N/V/D. Pt  found to have acute pancreatitis and then with acute alcohol withdrawal. Intubated 5/10 2 to not protecting airway.      ## Labs:  CBC:                        12.3   11.11 )-----------( 254      ( 13 May 2018 04:10 )             35.8     Chem:      143  |  107  |  2<L>  ----------------------------<  92  3.4<L>   |  27  |  0.60    Ca    8.3<L>      13 May 2018 04:10  Phos  1.9       Mg     1.7         TPro  5.8<L>  /  Alb  2.1<L>  /  TBili  0.4  /  DBili  x   /  AST  58<H>  /  ALT  34  /  AlkPhos  139<H>      Coags:      ## Imaging:    ## Medications:  meropenem  IVPB      meropenem  IVPB 2000 milliGRAM(s) IV Intermittent every 8 hours  vancomycin  IVPB 1000 milliGRAM(s) IV Intermittent every 8 hours        heparin  Injectable 5000 Unit(s) SubCutaneous every 12 hours    pantoprazole  Injectable 40 milliGRAM(s) IV Push two times a day    acetaminophen  Suppository 650 milliGRAM(s) Rectal every 6 hours PRN  dexmedetomidine Infusion 0.2 MICROgram(s)/kG/Hr IV Continuous <Continuous>  LORazepam   Injectable 4 milliGRAM(s) IV Push every 4 hours  metoclopramide Injectable 10 milliGRAM(s) IV Push every 8 hours  midazolam Injectable 4 milliGRAM(s) IV Push every 1 hour PRN  midazolam Injectable 2 milliGRAM(s) IV Push every 1 hour PRN  propofol Infusion 10 MICROgram(s)/kG/Min IV Continuous <Continuous>      ## Vitals:  T(C): 37.8 (18 @ 13:50), Max: 38.1 (18 @ 05:00)  HR: 105 (18 @ 14:15) (85 - 121)  BP: 125/92 (18 @ 14:00) (95/60 - 136/90)  BP(mean): 101 (18 @ 14:00) (66 - 115)  RR: 26 (18 @ 14:00) (0 - 29)  SpO2: 100% (18 @ 14:15) (98% - 100%)  Wt(kg): --  Vent: Mode: AC/ CMV (Assist Control/ Continuous Mandatory Ventilation), RR (machine): 14, RR (patient): 31, TV (machine): 400, FiO2: 35, PEEP: 5, PIP: 19  AB-12 @ 07:01  -   @ 07:00  --------------------------------------------------------  IN: 4737.6 mL / OUT: 5150 mL / NET: -412.4 mL     @ 07:01  -   @ 14:48  --------------------------------------------------------  IN: 1044.8 mL / OUT: 400 mL / NET: 644.8 mL      ## P/E:  Gen: lying comfortably in bed in no apparent distress  Mouth: (+) ETT  Lungs: CTA  Heart: RRR  Abd: Soft/+BS  Ext: L forearm erythematous and swollen  Neuro: Sedated    CENTRAL LINE: [ ] YES [ ] NO  LOCATION:   DATE INSERTED:  REMOVE: [ ] YES [ ] NO      QUINTEROS: [x ] YES [ ] NO    DATE INSERTED:  REMOVE:  [ ] YES [ ] NO      A-LINE:  [ ] YES [ ] NO  LOCATION:   DATE INSERTED:  REMOVE:  [ ] YES [ ] NO  EXPLAIN:    CODE STATUS: [x ] full code  [ ] DNR  [ ] DNI  [ ] MOLST  Goals of care discussion: [ ] yes

## 2018-05-13 NOTE — PROGRESS NOTE ADULT - ASSESSMENT
Pt is a 41 yo M with h/o ETOH abuse  (4-5 glasses of wine per day) and stopped drinking 3 days prior to admission 2 to N/V/D. Pt  found to have acute pancreatitis and then with acute alcohol withdrawal. Intubated 5/10 2 to not protecting airway.    Resp: Sedation held and pt is weaning on CPAP 5 + PS 5  ID: Sputum with Staph no sign infiltrate on CXR/ 3/4 bottles Bld Cx Staph coag neg/ L UE cellulitis: cont Abx   FEN: Replace K and Phos 2 to hypokalemia/hypophophatemia  GI: Follow Lizzie/Lip prn  Neuro: Hold sedation  Social: Mother at bedside and updated    CCT: 40 min

## 2018-05-14 LAB
AMYLASE P1 CFR SERPL: 91 U/L — SIGNIFICANT CHANGE UP (ref 25–115)
ANION GAP SERPL CALC-SCNC: 8 MMOL/L — SIGNIFICANT CHANGE UP (ref 5–17)
BUN SERPL-MCNC: 2 MG/DL — LOW (ref 7–23)
CALCIUM SERPL-MCNC: 8.1 MG/DL — LOW (ref 8.5–10.1)
CHLORIDE SERPL-SCNC: 106 MMOL/L — SIGNIFICANT CHANGE UP (ref 96–108)
CO2 SERPL-SCNC: 29 MMOL/L — SIGNIFICANT CHANGE UP (ref 22–31)
CREAT SERPL-MCNC: 0.41 MG/DL — LOW (ref 0.5–1.3)
GLUCOSE SERPL-MCNC: 96 MG/DL — SIGNIFICANT CHANGE UP (ref 70–99)
HCT VFR BLD CALC: 28.6 % — LOW (ref 39–50)
HGB BLD-MCNC: 10 G/DL — LOW (ref 13–17)
LIDOCAIN IGE QN: 594 U/L — HIGH (ref 73–393)
MAGNESIUM SERPL-MCNC: 1.4 MG/DL — LOW (ref 1.6–2.6)
MCHC RBC-ENTMCNC: 33.4 PG — SIGNIFICANT CHANGE UP (ref 27–34)
MCHC RBC-ENTMCNC: 35 GM/DL — SIGNIFICANT CHANGE UP (ref 32–36)
MCV RBC AUTO: 95.7 FL — SIGNIFICANT CHANGE UP (ref 80–100)
NRBC # BLD: 0 /100 WBCS — SIGNIFICANT CHANGE UP (ref 0–0)
PHOSPHATE SERPL-MCNC: 2.6 MG/DL — SIGNIFICANT CHANGE UP (ref 2.5–4.5)
PLATELET # BLD AUTO: 350 K/UL — SIGNIFICANT CHANGE UP (ref 150–400)
POTASSIUM SERPL-MCNC: 3.1 MMOL/L — LOW (ref 3.5–5.3)
POTASSIUM SERPL-SCNC: 3.1 MMOL/L — LOW (ref 3.5–5.3)
RBC # BLD: 2.99 M/UL — LOW (ref 4.2–5.8)
RBC # FLD: 13.2 % — SIGNIFICANT CHANGE UP (ref 10.3–14.5)
SODIUM SERPL-SCNC: 143 MMOL/L — SIGNIFICANT CHANGE UP (ref 135–145)
WBC # BLD: 9.8 K/UL — SIGNIFICANT CHANGE UP (ref 3.8–10.5)
WBC # FLD AUTO: 9.8 K/UL — SIGNIFICANT CHANGE UP (ref 3.8–10.5)

## 2018-05-14 PROCEDURE — 99291 CRITICAL CARE FIRST HOUR: CPT

## 2018-05-14 RX ORDER — POTASSIUM CHLORIDE 20 MEQ
10 PACKET (EA) ORAL
Qty: 0 | Refills: 0 | Status: COMPLETED | OUTPATIENT
Start: 2018-05-14 | End: 2018-05-14

## 2018-05-14 RX ADMIN — MEROPENEM 200 MILLIGRAM(S): 1 INJECTION INTRAVENOUS at 14:50

## 2018-05-14 RX ADMIN — Medication 250 MILLIGRAM(S): at 05:46

## 2018-05-14 RX ADMIN — Medication 100 MILLIEQUIVALENT(S): at 08:05

## 2018-05-14 RX ADMIN — Medication 4 MILLIGRAM(S): at 02:30

## 2018-05-14 RX ADMIN — Medication 650 MILLIGRAM(S): at 17:21

## 2018-05-14 RX ADMIN — Medication 4 MILLIGRAM(S): at 05:46

## 2018-05-14 RX ADMIN — Medication 10 MILLIGRAM(S): at 22:16

## 2018-05-14 RX ADMIN — HEPARIN SODIUM 5000 UNIT(S): 5000 INJECTION INTRAVENOUS; SUBCUTANEOUS at 05:46

## 2018-05-14 RX ADMIN — MEROPENEM 200 MILLIGRAM(S): 1 INJECTION INTRAVENOUS at 06:16

## 2018-05-14 RX ADMIN — CHLORHEXIDINE GLUCONATE 1 APPLICATION(S): 213 SOLUTION TOPICAL at 05:47

## 2018-05-14 RX ADMIN — CHLORHEXIDINE GLUCONATE 15 MILLILITER(S): 213 SOLUTION TOPICAL at 05:47

## 2018-05-14 RX ADMIN — Medication 10 MILLIGRAM(S): at 14:49

## 2018-05-14 RX ADMIN — PROPOFOL 4.49 MICROGRAM(S)/KG/MIN: 10 INJECTION, EMULSION INTRAVENOUS at 09:08

## 2018-05-14 RX ADMIN — Medication 4 MILLIGRAM(S): at 22:24

## 2018-05-14 RX ADMIN — PANTOPRAZOLE SODIUM 40 MILLIGRAM(S): 20 TABLET, DELAYED RELEASE ORAL at 17:22

## 2018-05-14 RX ADMIN — SODIUM CHLORIDE 125 MILLILITER(S): 9 INJECTION, SOLUTION INTRAVENOUS at 18:46

## 2018-05-14 RX ADMIN — Medication 10 MILLIGRAM(S): at 05:45

## 2018-05-14 RX ADMIN — PROPOFOL 4.49 MICROGRAM(S)/KG/MIN: 10 INJECTION, EMULSION INTRAVENOUS at 22:17

## 2018-05-14 RX ADMIN — MEROPENEM 200 MILLIGRAM(S): 1 INJECTION INTRAVENOUS at 22:59

## 2018-05-14 RX ADMIN — Medication 4 MILLIGRAM(S): at 14:49

## 2018-05-14 RX ADMIN — Medication 4 MILLIGRAM(S): at 10:22

## 2018-05-14 RX ADMIN — SODIUM CHLORIDE 125 MILLILITER(S): 9 INJECTION, SOLUTION INTRAVENOUS at 10:31

## 2018-05-14 RX ADMIN — SODIUM CHLORIDE 125 MILLILITER(S): 9 INJECTION, SOLUTION INTRAVENOUS at 22:17

## 2018-05-14 RX ADMIN — Medication 4 MILLIGRAM(S): at 17:22

## 2018-05-14 RX ADMIN — Medication 100 MILLIEQUIVALENT(S): at 09:09

## 2018-05-14 RX ADMIN — PANTOPRAZOLE SODIUM 40 MILLIGRAM(S): 20 TABLET, DELAYED RELEASE ORAL at 05:45

## 2018-05-14 RX ADMIN — HEPARIN SODIUM 5000 UNIT(S): 5000 INJECTION INTRAVENOUS; SUBCUTANEOUS at 17:21

## 2018-05-14 RX ADMIN — Medication 100 MILLIEQUIVALENT(S): at 10:13

## 2018-05-14 RX ADMIN — CHLORHEXIDINE GLUCONATE 15 MILLILITER(S): 213 SOLUTION TOPICAL at 17:21

## 2018-05-14 NOTE — PROGRESS NOTE ADULT - SUBJECTIVE AND OBJECTIVE BOX
INTERVAL HPI/OVERNIGHT EVENTS:      ETOH withdrawal and pancreatitis, now intubated for airway protection.      24 hour events: Following commands, weaning propofol.  On SBT today, tolerating 5/5/30.  Continues to have fever, noted to have staph in sputum and coag neg staph bacteremia.     QUINTEROS: [x ] YES [ ] NO    DATE INSERTED:   REMOVE:  [ ] YES [x ] NO  EXPLAIN:    REVIEW OF SYSTEMS: x Unable to obtain because 2/2 intubated on propofol and precedex.  +    ICU Vital Signs Last 24 Hrs  T(C): 36.4 (14 May 2018 12:36), Max: 38.2 (13 May 2018 16:52)  T(F): 97.6 (14 May 2018 12:36), Max: 100.8 (13 May 2018 16:52)  HR: 81 (14 May 2018 13:00) (74 - 121)  BP: 138/92 (14 May 2018 13:00) (101/65 - 151/92)  BP(mean): 103 (14 May 2018 13:00) (68 - 115)  ABP: --  ABP(mean): --  RR: 22 (14 May 2018 13:00) (13 - 26)  SpO2: 100% (14 May 2018 13:00) (99% - 100%)          I&O's Detail    13 May 2018 07:01  -  14 May 2018 07:00  --------------------------------------------------------  IN:    lactated ringers.: 3000 mL    propofol Infusion: 403.2 mL    Solution: 750 mL    Solution: 250 mL    Solution: 200 mL  Total IN: 4603.2 mL    OUT:    Indwelling Catheter - Urethral: 4000 mL    Nasoenteral Tube: 50 mL  Total OUT: 4050 mL    Total NET: 553.2 mL      14 May 2018 07:01  -  14 May 2018 13:22  --------------------------------------------------------  IN:    lactated ringers.: 750 mL    propofol Infusion: 112 mL    Solution: 300 mL  Total IN: 1162 mL    OUT:    Indwelling Catheter - Urethral: 1300 mL  Total OUT: 1300 mL    Total NET: -138 mL          Mode: AC/ CMV (Assist Control/ Continuous Mandatory Ventilation)  RR (machine): 14  TV (machine): 400  FiO2: 30  PEEP: 5  ITime: 0.8  MAP: 8  PIP: 15    CAPILLARY BLOOD GLUCOSE          MEDICATIONS  NEURO  Meds: acetaminophen  Suppository 650 milliGRAM(s) Rectal every 6 hours PRN For Temp greater than 38 C (100.4 F)  LORazepam   Injectable 4 milliGRAM(s) IV Push every 4 hours  metoclopramide Injectable 10 milliGRAM(s) IV Push every 8 hours  propofol Infusion 10 MICROgram(s)/kG/Min (4.488 mL/Hr) IV Continuous <Continuous>  scopolamine   Patch 1 Patch Transdermal every 3 days    RESPIRATORY    Meds:   CARDIOVASCULAR  Meds:   GI/NUTRITION  Meds: pantoprazole  Injectable 40 milliGRAM(s) IV Push two times a day    GENITOURINARY  Meds: lactated ringers. 1000 milliLiter(s) IV Continuous <Continuous>  sodium chloride 0.9% Bolus 1000 milliLiter(s) IV Bolus once    HEMATOLOGIC  Meds: heparin  Injectable 5000 Unit(s) SubCutaneous every 12 hours    [x] VTE Prophylaxis  INFECTIOUS DISEASES  Meds: meropenem  IVPB      meropenem  IVPB 2000 milliGRAM(s) IV Intermittent every 8 hours  vancomycin  IVPB 1000 milliGRAM(s) IV Intermittent every 8 hours    ENDOCRINE  CAPILLARY BLOOD GLUCOSE        Meds:   OTHER MEDICATIONS:  chlorhexidine 0.12% Liquid 15 milliLiter(s) Swish and Spit two times a day  chlorhexidine 4% Liquid 1 Application(s) Topical <User Schedule>  :    PHYSICAL EXAM:    GENERAL: NAD intubated and sedated  NECK: Supple, No JVD, Normal thyroid  CHEST/LUNG: coarse breath sounds bilaterally   HEART: Regular rate and rhythm; No murmurs, rubs, or gallops  ABDOMEN: Soft, Nontender, Nondistended; Bowel sounds present  EXTREMITIES:  2+ Peripheral Pulses, No clubbing, cyanosis, or edema  NERVOUS SYSTEM:  Alert & Oriented, following simple commands.      LABS:                        10.0   9.80  )-----------( 350      ( 14 May 2018 03:08 )             28.6      05-14    143  |  106  |  2<L>  ----------------------------<  96  3.1<L>   |  29  |  0.41<L>    Ca    8.1<L>      14 May 2018 03:08  Phos  2.6     05-14  Mg     1.4     05-14

## 2018-05-14 NOTE — PROGRESS NOTE ADULT - ASSESSMENT
43 yo M with h/o ETOH abuse  (4-5 glasses of wine per day) and stopped drinking 3 days prior to admission arrived with acute pancreatitis and ETOH withdrawal.  Intubated 5/10 2 to not protecting airway.    Neuro: Propofol 50mcg, for ETOH Withdrawal -Now off phenobarb and on ativan 4q4 with versed 4 q1hr prn; CIWA >20; 2 q1hr CIWA 8-20; Failed SBT and SAT yesterday.    CV: No acute issues  Pulm: 14/400/30/5; failed sbt yesterday; notable to have copious oral secretions.    Occasionally agitated on vent.    GI: Acute Pancreatitis - lipase is now downtrending.  High residuals requiring reglan.  Start jevity 10cc/hr for trickle feeds.    Renal/Metabolic: hypokalemic hypomagnesemicm, hypophosphatemic s/p repletion  ID: Staph in sputum and coag neg staph in blood, d/c vanco, c/w zosyn.  Repeat blood cultures.    Dispo: Remains intubated with MSSA in sputum and ?coag neg staph bacteremia, awaiting repeat blood cultures.    [facility]

## 2018-05-14 NOTE — PROGRESS NOTE ADULT - SUBJECTIVE AND OBJECTIVE BOX
Patient is a 42y old  Male who presents with a chief complaint of alcohol withdrawal/ pncreatitis/ electrolyte imbalnc/-Hypoklemia. Hypontremia/ vomiting (04 May 2018 14:01)      INTERVAL HPI/OVERNIGHT EVENTS:  clinically unchanged  still intubated, sedated  MEDICATIONS  (STANDING):  chlorhexidine 0.12% Liquid 15 milliLiter(s) Swish and Spit two times a day  chlorhexidine 4% Liquid 1 Application(s) Topical <User Schedule>  heparin  Injectable 5000 Unit(s) SubCutaneous every 12 hours  lactated ringers. 1000 milliLiter(s) (125 mL/Hr) IV Continuous <Continuous>  LORazepam   Injectable 4 milliGRAM(s) IV Push every 4 hours  meropenem  IVPB      meropenem  IVPB 2000 milliGRAM(s) IV Intermittent every 8 hours  metoclopramide Injectable 10 milliGRAM(s) IV Push every 8 hours  pantoprazole  Injectable 40 milliGRAM(s) IV Push two times a day  propofol Infusion 10 MICROgram(s)/kG/Min (4.488 mL/Hr) IV Continuous <Continuous>  scopolamine   Patch 1 Patch Transdermal every 3 days  sodium chloride 0.9% Bolus 1000 milliLiter(s) IV Bolus once  vancomycin  IVPB 1000 milliGRAM(s) IV Intermittent every 8 hours    MEDICATIONS  (PRN):  acetaminophen  Suppository 650 milliGRAM(s) Rectal every 6 hours PRN For Temp greater than 38 C (100.4 F)        REVIEW OF SYSTEMS:  CONSTITUTIONAL: No fever, weight loss, or fatigue  EYES: No eye pain, visual disturbances, or discharge  ENMT:  No difficulty hearing, tinnitus, vertigo; No sinus or throat pain  NECK: No pain or stiffness  RESPIRATORY: No cough, wheezing, chills or hemoptysis; No shortness of breath  CARDIOVASCULAR: No chest pain, palpitations, dizziness, or leg swelling  GASTROINTESTINAL: No abdominal or epigastric pain. No nausea, vomiting, or hematemesis; No diarrhea or constipation. No melena or hematochezia.  GENITOURINARY: No dysuria, frequency, hematuria, or incontinence  NEUROLOGICAL: No headaches, memory loss, loss of strength, numbness, or tremors  SKIN: No itching, burning, rashes, or lesions      Vital Signs Last 24 Hrs  T(C): 37.2 (14 May 2018 08:00), Max: 38.2 (13 May 2018 16:52)  T(F): 99 (14 May 2018 08:00), Max: 100.8 (13 May 2018 16:52)  HR: 79 (14 May 2018 11:00) (74 - 121)  BP: 103/69 (14 May 2018 11:00) (101/65 - 151/92)  BP(mean): 77 (14 May 2018 11:00) (68 - 115)  RR: 18 (14 May 2018 11:00) (16 - 29)  SpO2: 100% (14 May 2018 11:00) (99% - 100%)    PHYSICAL EXAM:  GENERAL: NAD, well-groomed, well-developed, intubated  HEAD:  Atraumatic, Normocephalic  EYES: EOMI, PERRLA, conjunctiva and sclera clear  ENMT: No tonsillar erythema, exudates, or enlargement; Moist mucous membranes   NECK: Supple, No JVD   NERVOUS SYSTEM:  Alert & Oriented X3, Good concentration; Motor Strength 5/5 B/L upper and lower extremities; DTRs 2+ intact and symmetric  CHEST/LUNG: Clear to percussion bilaterally; No rales, rhonchi, wheezing, or rubs  HEART: Regular rate and rhythm; No murmurs, rubs, or gallops  ABDOMEN: Soft, Nontender, Nondistended; Bowel sounds present  EXTREMITIES:  2+ Peripheral Pulses, No clubbing, cyanosis, or edema  LYMPH: No lymphadenopathy noted  SKIN: No rashes or lesions    LABS:                        10.0   9.80  )-----------( 350      ( 14 May 2018 03:08 )             28.6     05-14    143  |  106  |  2<L>  ----------------------------<  96  3.1<L>   |  29  |  0.41<L>    Ca    8.1<L>      14 May 2018 03:08  Phos  2.6     05-14  Mg     1.4     05-14          CAPILLARY BLOOD GLUCOSE          RADIOLOGY & ADDITIONAL TESTS:    Imaging Personally Reviewed:  [ ] YES  [ ] NO    Consultant(s) Notes Reviewed:  [ ] YES  [ ] NO    Care Discussed with Consultants/Other Providers [ ] YES  [ ] NO

## 2018-05-14 NOTE — PROGRESS NOTE ADULT - ASSESSMENT
alcohol withdrawal/ Pancreatitis , Hypokalemia ,hyponatremia Elevated serum protien  follow up labs  continue present therapy - sedation  clinically unchanged  Weaning trials per icu  fever w/up  continue icu care

## 2018-05-15 LAB
ALBUMIN SERPL ELPH-MCNC: 2 G/DL — LOW (ref 3.3–5)
ALP SERPL-CCNC: 121 U/L — HIGH (ref 40–120)
ALT FLD-CCNC: 41 U/L — SIGNIFICANT CHANGE UP (ref 12–78)
ANION GAP SERPL CALC-SCNC: 8 MMOL/L — SIGNIFICANT CHANGE UP (ref 5–17)
AST SERPL-CCNC: 46 U/L — HIGH (ref 15–37)
BASOPHILS # BLD AUTO: 0.08 K/UL — SIGNIFICANT CHANGE UP (ref 0–0.2)
BASOPHILS NFR BLD AUTO: 0.8 % — SIGNIFICANT CHANGE UP (ref 0–2)
BILIRUB SERPL-MCNC: 0.4 MG/DL — SIGNIFICANT CHANGE UP (ref 0.2–1.2)
BUN SERPL-MCNC: 4 MG/DL — LOW (ref 7–23)
CALCIUM SERPL-MCNC: 8.2 MG/DL — LOW (ref 8.5–10.1)
CHLORIDE SERPL-SCNC: 103 MMOL/L — SIGNIFICANT CHANGE UP (ref 96–108)
CO2 SERPL-SCNC: 30 MMOL/L — SIGNIFICANT CHANGE UP (ref 22–31)
CREAT SERPL-MCNC: 0.25 MG/DL — LOW (ref 0.5–1.3)
EOSINOPHIL # BLD AUTO: 0.12 K/UL — SIGNIFICANT CHANGE UP (ref 0–0.5)
EOSINOPHIL NFR BLD AUTO: 1.2 % — SIGNIFICANT CHANGE UP (ref 0–6)
GLUCOSE SERPL-MCNC: 97 MG/DL — SIGNIFICANT CHANGE UP (ref 70–99)
HCT VFR BLD CALC: 27.7 % — LOW (ref 39–50)
HGB BLD-MCNC: 9.5 G/DL — LOW (ref 13–17)
IMM GRANULOCYTES NFR BLD AUTO: 0.9 % — SIGNIFICANT CHANGE UP (ref 0–1.5)
LYMPHOCYTES # BLD AUTO: 1.28 K/UL — SIGNIFICANT CHANGE UP (ref 1–3.3)
LYMPHOCYTES # BLD AUTO: 12.6 % — LOW (ref 13–44)
MAGNESIUM SERPL-MCNC: 1.3 MG/DL — LOW (ref 1.6–2.6)
MCHC RBC-ENTMCNC: 32.9 PG — SIGNIFICANT CHANGE UP (ref 27–34)
MCHC RBC-ENTMCNC: 34.3 GM/DL — SIGNIFICANT CHANGE UP (ref 32–36)
MCV RBC AUTO: 95.8 FL — SIGNIFICANT CHANGE UP (ref 80–100)
MONOCYTES # BLD AUTO: 0.89 K/UL — SIGNIFICANT CHANGE UP (ref 0–0.9)
MONOCYTES NFR BLD AUTO: 8.8 % — SIGNIFICANT CHANGE UP (ref 2–14)
NEUTROPHILS # BLD AUTO: 7.71 K/UL — HIGH (ref 1.8–7.4)
NEUTROPHILS NFR BLD AUTO: 75.7 % — SIGNIFICANT CHANGE UP (ref 43–77)
NRBC # BLD: 0 /100 WBCS — SIGNIFICANT CHANGE UP (ref 0–0)
PHOSPHATE SERPL-MCNC: 2.6 MG/DL — SIGNIFICANT CHANGE UP (ref 2.5–4.5)
PLATELET # BLD AUTO: 368 K/UL — SIGNIFICANT CHANGE UP (ref 150–400)
POTASSIUM SERPL-MCNC: 3.2 MMOL/L — LOW (ref 3.5–5.3)
POTASSIUM SERPL-SCNC: 3.2 MMOL/L — LOW (ref 3.5–5.3)
PROT SERPL-MCNC: 6 GM/DL — SIGNIFICANT CHANGE UP (ref 6–8.3)
RBC # BLD: 2.89 M/UL — LOW (ref 4.2–5.8)
RBC # FLD: 12.8 % — SIGNIFICANT CHANGE UP (ref 10.3–14.5)
SODIUM SERPL-SCNC: 141 MMOL/L — SIGNIFICANT CHANGE UP (ref 135–145)
TRIGL SERPL-MCNC: 311 MG/DL — HIGH (ref 10–149)
WBC # BLD: 10.17 K/UL — SIGNIFICANT CHANGE UP (ref 3.8–10.5)
WBC # FLD AUTO: 10.17 K/UL — SIGNIFICANT CHANGE UP (ref 3.8–10.5)

## 2018-05-15 PROCEDURE — 71045 X-RAY EXAM CHEST 1 VIEW: CPT | Mod: 26,77

## 2018-05-15 PROCEDURE — 99291 CRITICAL CARE FIRST HOUR: CPT

## 2018-05-15 PROCEDURE — 71045 X-RAY EXAM CHEST 1 VIEW: CPT | Mod: 26

## 2018-05-15 RX ORDER — POTASSIUM CHLORIDE 20 MEQ
10 PACKET (EA) ORAL
Qty: 0 | Refills: 0 | Status: COMPLETED | OUTPATIENT
Start: 2018-05-15 | End: 2018-05-15

## 2018-05-15 RX ORDER — MAGNESIUM SULFATE 500 MG/ML
1 VIAL (ML) INJECTION ONCE
Qty: 0 | Refills: 0 | Status: COMPLETED | OUTPATIENT
Start: 2018-05-15 | End: 2018-05-15

## 2018-05-15 RX ORDER — MIDAZOLAM HYDROCHLORIDE 1 MG/ML
4 INJECTION, SOLUTION INTRAMUSCULAR; INTRAVENOUS ONCE
Qty: 0 | Refills: 0 | Status: DISCONTINUED | OUTPATIENT
Start: 2018-05-15 | End: 2018-05-15

## 2018-05-15 RX ORDER — DEXMEDETOMIDINE HYDROCHLORIDE IN 0.9% SODIUM CHLORIDE 4 UG/ML
0.7 INJECTION INTRAVENOUS
Qty: 200 | Refills: 0 | Status: DISCONTINUED | OUTPATIENT
Start: 2018-05-15 | End: 2018-05-19

## 2018-05-15 RX ADMIN — PROPOFOL 4.49 MICROGRAM(S)/KG/MIN: 10 INJECTION, EMULSION INTRAVENOUS at 08:39

## 2018-05-15 RX ADMIN — Medication 4 MILLIGRAM(S): at 06:00

## 2018-05-15 RX ADMIN — CHLORHEXIDINE GLUCONATE 15 MILLILITER(S): 213 SOLUTION TOPICAL at 05:23

## 2018-05-15 RX ADMIN — Medication 4 MILLIGRAM(S): at 09:45

## 2018-05-15 RX ADMIN — MEROPENEM 200 MILLIGRAM(S): 1 INJECTION INTRAVENOUS at 13:17

## 2018-05-15 RX ADMIN — CHLORHEXIDINE GLUCONATE 15 MILLILITER(S): 213 SOLUTION TOPICAL at 17:41

## 2018-05-15 RX ADMIN — Medication 4 MILLIGRAM(S): at 22:51

## 2018-05-15 RX ADMIN — MEROPENEM 200 MILLIGRAM(S): 1 INJECTION INTRAVENOUS at 22:51

## 2018-05-15 RX ADMIN — HEPARIN SODIUM 5000 UNIT(S): 5000 INJECTION INTRAVENOUS; SUBCUTANEOUS at 17:23

## 2018-05-15 RX ADMIN — PANTOPRAZOLE SODIUM 40 MILLIGRAM(S): 20 TABLET, DELAYED RELEASE ORAL at 05:23

## 2018-05-15 RX ADMIN — Medication 100 GRAM(S): at 21:20

## 2018-05-15 RX ADMIN — Medication 4 MILLIGRAM(S): at 13:16

## 2018-05-15 RX ADMIN — SODIUM CHLORIDE 125 MILLILITER(S): 9 INJECTION, SOLUTION INTRAVENOUS at 21:27

## 2018-05-15 RX ADMIN — Medication 650 MILLIGRAM(S): at 05:25

## 2018-05-15 RX ADMIN — PANTOPRAZOLE SODIUM 40 MILLIGRAM(S): 20 TABLET, DELAYED RELEASE ORAL at 17:23

## 2018-05-15 RX ADMIN — Medication 10 MILLIGRAM(S): at 21:23

## 2018-05-15 RX ADMIN — PROPOFOL 4.49 MICROGRAM(S)/KG/MIN: 10 INJECTION, EMULSION INTRAVENOUS at 05:24

## 2018-05-15 RX ADMIN — DEXMEDETOMIDINE HYDROCHLORIDE IN 0.9% SODIUM CHLORIDE 13.09 MICROGRAM(S)/KG/HR: 4 INJECTION INTRAVENOUS at 22:51

## 2018-05-15 RX ADMIN — CHLORHEXIDINE GLUCONATE 1 APPLICATION(S): 213 SOLUTION TOPICAL at 05:23

## 2018-05-15 RX ADMIN — DEXMEDETOMIDINE HYDROCHLORIDE IN 0.9% SODIUM CHLORIDE 13.09 MICROGRAM(S)/KG/HR: 4 INJECTION INTRAVENOUS at 17:40

## 2018-05-15 RX ADMIN — Medication 100 MILLIEQUIVALENT(S): at 09:42

## 2018-05-15 RX ADMIN — SODIUM CHLORIDE 125 MILLILITER(S): 9 INJECTION, SOLUTION INTRAVENOUS at 05:24

## 2018-05-15 RX ADMIN — Medication 100 MILLIEQUIVALENT(S): at 08:39

## 2018-05-15 RX ADMIN — HEPARIN SODIUM 5000 UNIT(S): 5000 INJECTION INTRAVENOUS; SUBCUTANEOUS at 05:23

## 2018-05-15 RX ADMIN — Medication 10 MILLIGRAM(S): at 05:23

## 2018-05-15 RX ADMIN — MEROPENEM 200 MILLIGRAM(S): 1 INJECTION INTRAVENOUS at 05:23

## 2018-05-15 RX ADMIN — SODIUM CHLORIDE 125 MILLILITER(S): 9 INJECTION, SOLUTION INTRAVENOUS at 08:39

## 2018-05-15 RX ADMIN — Medication 4 MILLIGRAM(S): at 02:00

## 2018-05-15 RX ADMIN — MIDAZOLAM HYDROCHLORIDE 4 MILLIGRAM(S): 1 INJECTION, SOLUTION INTRAMUSCULAR; INTRAVENOUS at 17:12

## 2018-05-15 RX ADMIN — Medication 100 MILLIEQUIVALENT(S): at 06:49

## 2018-05-15 RX ADMIN — Medication 4 MILLIGRAM(S): at 17:40

## 2018-05-15 RX ADMIN — Medication 10 MILLIGRAM(S): at 13:15

## 2018-05-15 NOTE — PROGRESS NOTE ADULT - SUBJECTIVE AND OBJECTIVE BOX
Patient is a 42y old  Male who presents with a chief complaint of alcohol withdrawal/ pncreatitis/ electrolyte imbalnc/-Hypoklemia. Hypontremia/ vomiting (04 May 2018 14:01)      INTERVAL HPI/OVERNIGHT EVENTS:  unchanged,intubated  MEDICATIONS  (STANDING):  chlorhexidine 0.12% Liquid 15 milliLiter(s) Swish and Spit two times a day  chlorhexidine 4% Liquid 1 Application(s) Topical <User Schedule>  dexmedetomidine Infusion 0.7 MICROgram(s)/kG/Hr (13.09 mL/Hr) IV Continuous <Continuous>  heparin  Injectable 5000 Unit(s) SubCutaneous every 12 hours  lactated ringers. 1000 milliLiter(s) (125 mL/Hr) IV Continuous <Continuous>  LORazepam   Injectable 4 milliGRAM(s) IV Push every 4 hours  magnesium sulfate  IVPB 1 Gram(s) IV Intermittent once  meropenem  IVPB      meropenem  IVPB 2000 milliGRAM(s) IV Intermittent every 8 hours  metoclopramide Injectable 10 milliGRAM(s) IV Push every 8 hours  pantoprazole  Injectable 40 milliGRAM(s) IV Push two times a day  potassium chloride   Powder 40 milliEquivalent(s) Oral once  scopolamine   Patch 1 Patch Transdermal every 3 days  sodium chloride 0.9% Bolus 1000 milliLiter(s) IV Bolus once    MEDICATIONS  (PRN):  acetaminophen  Suppository 650 milliGRAM(s) Rectal every 6 hours PRN For Temp greater than 38 C (100.4 F)        REVIEW OF SYSTEMS:  CONSTITUTIONAL: No fever, weight loss, or fatigue  EYES: No eye pain, visual disturbances, or discharge  ENMT:  No difficulty hearing, tinnitus, vertigo; No sinus or throat pain  NECK: No pain or stiffness  RESPIRATORY: No cough, wheezing, chills or hemoptysis; No shortness of breath  CARDIOVASCULAR: No chest pain, palpitations, dizziness, or leg swelling  GASTROINTESTINAL: No abdominal or epigastric pain. No nausea, vomiting, or hematemesis; No diarrhea or constipation. No melena or hematochezia.  GENITOURINARY: No dysuria, frequency, hematuria, or incontinence  NEUROLOGICAL: No headaches, memory loss, loss of strength, numbness, or tremors  SKIN: No itching, burning, rashes, or lesions            PHYSICAL EXAM:  GENERAL: NAD, well-groomed, well-developed,intubated  HEAD:  Atraumatic, Normocephalic  EYES: EOMI, PERRLA, conjunctiva and sclera clear  ENMT: No tonsillar erythema, exudates, or enlargement; Moist mucous membranes   NECK: Supple, No JVD   NERVOUS SYSTEM:  Alert & Oriented X3, Good concentration; Motor Strength 5/5 B/L upper and lower extremities; DTRs 2+ intact and symmetric  CHEST/LUNG: Clear to percussion bilaterally; No rales, rhonchi, wheezing, or rubs  HEART: Regular rate and rhythm; No murmurs, rubs, or gallops  ABDOMEN: Soft, Nontender, Nondistended; Bowel sounds present  EXTREMITIES:  2+ Peripheral Pulses, No clubbing, cyanosis, or edema  LYMPH: No lymphadenopathy noted  SKIN: No rashes or lesions    LABS:                        10.2   10.07 )-----------( 399      ( 16 May 2018 03:32 )             29.7     05-16    141  |  103  |  4<L>  ----------------------------<  102<H>  3.3<L>   |  29  |  0.36<L>    Ca    8.7      16 May 2018 03:32  Phos  2.8     05-16  Mg     1.6     05-16    TPro  6.0  /  Alb  2.0<L>  /  TBili  0.4  /  DBili  x   /  AST  46<H>  /  ALT  41  /  AlkPhos  121<H>  05-15        CAPILLARY BLOOD GLUCOSE          RADIOLOGY & ADDITIONAL TESTS:    Imaging Personally Reviewed:  [ ] YES  [ ] NO    Consultant(s) Notes Reviewed:  [ ] YES  [ ] NO    Care Discussed with Consultants/Other Providers [ ] YES  [ ] NO

## 2018-05-15 NOTE — PROGRESS NOTE ADULT - SUBJECTIVE AND OBJECTIVE BOX
INTERVAL HPI/OVERNIGHT EVENTS:      ETOH withdrawal and pancreatitis, now intubated for airway protection.      24 hour events: Off propofol, remains on precedex, coag neg staph on meropenem, no fevers overnight, tolerating feeds.      CENTRAL LINE: [ ] YES [x ] NO  LOCATION:   DATE INSERTED:  REMOVE: [ ] YES [ ] NO  EXPLAIN:    QUINTEROS: [ ] YES [x ] NO    DATE INSERTED:  REMOVE:  [ ] YES [ ] NO  EXPLAIN:    A-LINE:  [ ] YES [ x] NO  LOCATION:   DATE INSERTED:  REMOVE:  [ ] YES [ ] NO  EXPLAIN:    REVIEW OF SYSTEMS: [x] Unable to obtain because intubated and sedated, following simple commands.      ICU Vital Signs Last 24 Hrs  T(C): 37.7 (15 May 2018 15:19), Max: 38.5 (15 May 2018 04:00)  T(F): 99.9 (15 May 2018 15:19), Max: 101.3 (15 May 2018 04:00)  HR: 96 (15 May 2018 17:00) (76 - 112)  BP: 142/90 (15 May 2018 17:00) (106/66 - 147/89)  BP(mean): 103 (15 May 2018 17:00) (72 - 111)  ABP: --  ABP(mean): --  RR: 14 (15 May 2018 17:00) (13 - 28)  SpO2: 98% (15 May 2018 17:00) (85% - 100%)          I&O's Detail    14 May 2018 07:01  -  15 May 2018 07:00  --------------------------------------------------------  IN:    Jevity: 110 mL    lactated ringers.: 2750 mL    propofol Infusion: 430.2 mL    Solution: 300 mL    Solution: 400 mL  Total IN: 3990.2 mL    OUT:    Indwelling Catheter - Urethral: 4050 mL  Total OUT: 4050 mL    Total NET: -59.8 mL      15 May 2018 07:01  -  15 May 2018 18:30  --------------------------------------------------------  IN:    dexmedetomidine Infusion: 35.5 mL    Jevity: 100 mL    lactated ringers.: 1250 mL    propofol Infusion: 67.2 mL    Solution: 100 mL    Solution: 200 mL  Total IN: 1752.7 mL    OUT:    Indwelling Catheter - Urethral: 2175 mL  Total OUT: 2175 mL    Total NET: -422.3 mL    Mode: AC/ CMV (Assist Control/ Continuous Mandatory Ventilation)  RR (machine): 14  TV (machine): 400  FiO2: 30  PEEP: 5  ITime: 1  MAP: 8  PIP: 13    CAPILLARY BLOOD GLUCOSE    MEDICATIONS  NEURO  Meds: acetaminophen  Suppository 650 milliGRAM(s) Rectal every 6 hours PRN For Temp greater than 38 C (100.4 F)  dexmedetomidine Infusion 0.7 MICROgram(s)/kG/Hr (13.09 mL/Hr) IV Continuous <Continuous>  LORazepam   Injectable 4 milliGRAM(s) IV Push every 4 hours  metoclopramide Injectable 10 milliGRAM(s) IV Push every 8 hours  scopolamine   Patch 1 Patch Transdermal every 3 days    RESPIRATORY    Meds:   CARDIOVASCULAR  Meds:   GI/NUTRITION  Meds: pantoprazole  Injectable 40 milliGRAM(s) IV Push two times a day    GENITOURINARY  Meds: lactated ringers. 1000 milliLiter(s) IV Continuous <Continuous>  sodium chloride 0.9% Bolus 1000 milliLiter(s) IV Bolus once    HEMATOLOGIC  Meds: heparin  Injectable 5000 Unit(s) SubCutaneous every 12 hours    [x] VTE Prophylaxis  INFECTIOUS DISEASES  Meds: meropenem  IVPB      meropenem  IVPB 2000 milliGRAM(s) IV Intermittent every 8 hours    ENDOCRINE  CAPILLARY BLOOD GLUCOSE        Meds:   OTHER MEDICATIONS:  chlorhexidine 0.12% Liquid 15 milliLiter(s) Swish and Spit two times a day  chlorhexidine 4% Liquid 1 Application(s) Topical <User Schedule>  :    PHYSICAL EXAM:    GENERAL: NAD, Intubated and sedated requiring frequent suctioning.   CHEST/LUNG: coarse breath sounds  HEART: Regular rate and rhythm; No murmurs, rubs, or gallops  ABDOMEN: Soft, Nontender, Nondistended; Bowel sounds present  EXTREMITIES:  2+ Peripheral Pulses, No clubbing, cyanosis, or edema  NERVOUS SYSTEM:  moving all extremities, currently responds to simple commands.      LABS:                        9.5    10.17 )-----------( 368      ( 15 May 2018 06:47 )             27.7      05-15    141  |  103  |  4<L>  ----------------------------<  97  3.2<L>   |  30  |  0.25<L>    Ca    8.2<L>      15 May 2018 06:46  Phos  2.6     05-15  Mg     1.3     05-15    TPro  6.0  /  Alb  2.0<L>  /  TBili  0.4  /  DBili  x   /  AST  46<H>  /  ALT  41  /  AlkPhos  121<H>  05-15 INTERVAL HPI/OVERNIGHT EVENTS:      ETOH withdrawal and pancreatitis, now intubated for airway protection.      24 hour events: Off propofol, remains on precedex, coag neg staph on meropenem, no fevers overnight, tolerating feeds.      CENTRAL LINE: [ ] YES [x ] NO  LOCATION:   DATE INSERTED:  REMOVE: [ ] YES [ ] NO  EXPLAIN:    QUINTEROS: [ ] YES [x ] NO    DATE INSERTED:  REMOVE:  [ ] YES [ ] NO  EXPLAIN:    A-LINE:  [ ] YES [ x] NO  LOCATION:   DATE INSERTED:  REMOVE:  [ ] YES [ ] NO  EXPLAIN:    REVIEW OF SYSTEMS: [x] Unable to obtain because intubated and sedated, following simple commands.      ICU Vital Signs Last 24 Hrs  T(C): 37.7 (15 May 2018 15:19), Max: 38.5 (15 May 2018 04:00)  T(F): 99.9 (15 May 2018 15:19), Max: 101.3 (15 May 2018 04:00)  HR: 96 (15 May 2018 17:00) (76 - 112)  BP: 142/90 (15 May 2018 17:00) (106/66 - 147/89)  BP(mean): 103 (15 May 2018 17:00) (72 - 111)  RR: 14 (15 May 2018 17:00) (13 - 28)  SpO2: 98% (15 May 2018 17:00) (85% - 100%)          I&O's Detail    14 May 2018 07:01  -  15 May 2018 07:00  --------------------------------------------------------  IN:    Jevity: 110 mL    lactated ringers.: 2750 mL    propofol Infusion: 430.2 mL    Solution: 300 mL    Solution: 400 mL  Total IN: 3990.2 mL    OUT:    Indwelling Catheter - Urethral: 4050 mL  Total OUT: 4050 mL    Total NET: -59.8 mL      15 May 2018 07:01  -  15 May 2018 18:30  --------------------------------------------------------  IN:    dexmedetomidine Infusion: 35.5 mL    Jevity: 100 mL    lactated ringers.: 1250 mL    propofol Infusion: 67.2 mL    Solution: 100 mL    Solution: 200 mL  Total IN: 1752.7 mL    OUT:    Indwelling Catheter - Urethral: 2175 mL  Total OUT: 2175 mL    Total NET: -422.3 mL    Mode: AC/ CMV (Assist Control/ Continuous Mandatory Ventilation)  RR (machine): 14  TV (machine): 400  FiO2: 30  PEEP: 5  ITime: 1  MAP: 8  PIP: 13    CAPILLARY BLOOD GLUCOSE    MEDICATIONS  NEURO  Meds: acetaminophen  Suppository 650 milliGRAM(s) Rectal every 6 hours PRN For Temp greater than 38 C (100.4 F)  dexmedetomidine Infusion 0.7 MICROgram(s)/kG/Hr (13.09 mL/Hr) IV Continuous <Continuous>  LORazepam   Injectable 4 milliGRAM(s) IV Push every 4 hours  metoclopramide Injectable 10 milliGRAM(s) IV Push every 8 hours  scopolamine   Patch 1 Patch Transdermal every 3 days    RESPIRATORY    Meds:   CARDIOVASCULAR  Meds:   GI/NUTRITION  Meds: pantoprazole  Injectable 40 milliGRAM(s) IV Push two times a day    GENITOURINARY  Meds: lactated ringers. 1000 milliLiter(s) IV Continuous <Continuous>  sodium chloride 0.9% Bolus 1000 milliLiter(s) IV Bolus once    HEMATOLOGIC  Meds: heparin  Injectable 5000 Unit(s) SubCutaneous every 12 hours    [x] VTE Prophylaxis  INFECTIOUS DISEASES  Meds: meropenem  IVPB      meropenem  IVPB 2000 milliGRAM(s) IV Intermittent every 8 hours    ENDOCRINE  CAPILLARY BLOOD GLUCOSE        Meds:   OTHER MEDICATIONS:  chlorhexidine 0.12% Liquid 15 milliLiter(s) Swish and Spit two times a day  chlorhexidine 4% Liquid 1 Application(s) Topical <User Schedule>      PHYSICAL EXAM:    GENERAL: NAD, Intubated and sedated requiring frequent suctioning.   CHEST/LUNG: coarse breath sounds  HEART: Regular rate and rhythm; No murmurs, rubs, or gallops  ABDOMEN: Soft, Nontender, Nondistended; Bowel sounds present  EXTREMITIES:  2+ Peripheral Pulses, No clubbing, cyanosis, or edema  NERVOUS SYSTEM:  moving all extremities, currently responds to simple commands.      LABS:                        9.5    10.17 )-----------( 368      ( 15 May 2018 06:47 )             27.7      05-15    141        |  103  |  4<L>  --------------------------------<  97  3.2<L>   |  30  |  0.25<L>    Ca    8.2<L>      15 May 2018 06:46  Phos  2.6     05-15  Mg     1.3     05-15    TPro  6.0  /  Alb  2.0<L>  /  TBili  0.4  /  AST  46<H>  /  ALT  41  /  AlkPhos  121<H>  05-15    Culture - Blood (05.11.18 @ 00:26)    Specimen Source: .Blood Blood-Venous    Organism: Coag Negative Staphylococcus        Culture - Blood (05.11.18 @ 00:26)    Specimen Source: .Blood Blood-Venous    Culture Results: Growth in aerobic and anaerobic bottles: Coag Negative Staphylococcus      Culture - Sputum . (05.11.18 @ 00:27)    Specimen Source: .Sputum Sputum    Culture Results: Moderate Staphylococcus aureus

## 2018-05-15 NOTE — PROGRESS NOTE ADULT - ASSESSMENT
43 yo M with h/o ETOH abuse  (4-5 glasses of wine per day) and stopped drinking 3 days prior to admission arrived with acute pancreatitis and ETOH withdrawal.  Intubated 5/10, not protecting airway.    Neuro: Propofol 50mcg, for ETOH Withdrawal -Now off phenobarb and on ativan 4q4 with versed 4 q1hr prn; CIWA >20; 2 q1hr CIWA 8-20; Failed SBT and SAT yesterday.    CV: No acute issues  Pulm: 14/400/30/5; failed sbt yesterday; notable to have copious oral secretions and strong cough.    Occasionally agitated on vent.    GI: Acute Pancreatitis - lipase is now downtrending.  High residuals requiring reglan.  Advance to Jevity 30cc/hr for trickle feeds.    Renal/Metabolic: hypokalemic hypomagnesemic s/p repletion  ID: Staph in sputum and coag neg staph in blood, d/c vanco, c/w zosyn.  Repeat blood cultures.    Dispo: Remains intubated with MSSA in sputum and ?coag neg staph bacteremia, awaiting repeat blood cultures.    [facility] [place] 6

## 2018-05-15 NOTE — PROGRESS NOTE ADULT - ASSESSMENT
alcohol withdrawal/ Pancreatitis , Hypokalemia ,hyponatremia Elevated serum protien  follow up labs  continue present therapy - sedation  clinically unchanged  Weaning trials per icu  fever w/up  continue icu care,taper sedation

## 2018-05-16 LAB
ANION GAP SERPL CALC-SCNC: 9 MMOL/L — SIGNIFICANT CHANGE UP (ref 5–17)
BUN SERPL-MCNC: 4 MG/DL — LOW (ref 7–23)
CALCIUM SERPL-MCNC: 8.7 MG/DL — SIGNIFICANT CHANGE UP (ref 8.5–10.1)
CHLORIDE SERPL-SCNC: 103 MMOL/L — SIGNIFICANT CHANGE UP (ref 96–108)
CO2 SERPL-SCNC: 29 MMOL/L — SIGNIFICANT CHANGE UP (ref 22–31)
CREAT SERPL-MCNC: 0.36 MG/DL — LOW (ref 0.5–1.3)
GLUCOSE SERPL-MCNC: 102 MG/DL — HIGH (ref 70–99)
HCT VFR BLD CALC: 29.7 % — LOW (ref 39–50)
HGB BLD-MCNC: 10.2 G/DL — LOW (ref 13–17)
MAGNESIUM SERPL-MCNC: 1.6 MG/DL — SIGNIFICANT CHANGE UP (ref 1.6–2.6)
MCHC RBC-ENTMCNC: 32.7 PG — SIGNIFICANT CHANGE UP (ref 27–34)
MCHC RBC-ENTMCNC: 34.3 GM/DL — SIGNIFICANT CHANGE UP (ref 32–36)
MCV RBC AUTO: 95.2 FL — SIGNIFICANT CHANGE UP (ref 80–100)
NRBC # BLD: 0 /100 WBCS — SIGNIFICANT CHANGE UP (ref 0–0)
PHOSPHATE SERPL-MCNC: 2.8 MG/DL — SIGNIFICANT CHANGE UP (ref 2.5–4.5)
PLATELET # BLD AUTO: 399 K/UL — SIGNIFICANT CHANGE UP (ref 150–400)
POTASSIUM SERPL-MCNC: 3.3 MMOL/L — LOW (ref 3.5–5.3)
POTASSIUM SERPL-SCNC: 3.3 MMOL/L — LOW (ref 3.5–5.3)
RBC # BLD: 3.12 M/UL — LOW (ref 4.2–5.8)
RBC # FLD: 12.6 % — SIGNIFICANT CHANGE UP (ref 10.3–14.5)
SODIUM SERPL-SCNC: 141 MMOL/L — SIGNIFICANT CHANGE UP (ref 135–145)
WBC # BLD: 10.07 K/UL — SIGNIFICANT CHANGE UP (ref 3.8–10.5)
WBC # FLD AUTO: 10.07 K/UL — SIGNIFICANT CHANGE UP (ref 3.8–10.5)

## 2018-05-16 PROCEDURE — 71045 X-RAY EXAM CHEST 1 VIEW: CPT | Mod: 26

## 2018-05-16 PROCEDURE — 99291 CRITICAL CARE FIRST HOUR: CPT

## 2018-05-16 RX ORDER — MIDAZOLAM HYDROCHLORIDE 1 MG/ML
2 INJECTION, SOLUTION INTRAMUSCULAR; INTRAVENOUS ONCE
Qty: 0 | Refills: 0 | Status: DISCONTINUED | OUTPATIENT
Start: 2018-05-16 | End: 2018-05-16

## 2018-05-16 RX ORDER — OLANZAPINE 15 MG/1
5 TABLET, FILM COATED ORAL ONCE
Qty: 0 | Refills: 0 | Status: COMPLETED | OUTPATIENT
Start: 2018-05-16 | End: 2018-05-16

## 2018-05-16 RX ORDER — MAGNESIUM SULFATE 500 MG/ML
1 VIAL (ML) INJECTION ONCE
Qty: 0 | Refills: 0 | Status: COMPLETED | OUTPATIENT
Start: 2018-05-16 | End: 2018-05-16

## 2018-05-16 RX ORDER — POTASSIUM CHLORIDE 20 MEQ
40 PACKET (EA) ORAL ONCE
Qty: 0 | Refills: 0 | Status: COMPLETED | OUTPATIENT
Start: 2018-05-16 | End: 2018-05-16

## 2018-05-16 RX ORDER — MIDAZOLAM HYDROCHLORIDE 1 MG/ML
4 INJECTION, SOLUTION INTRAMUSCULAR; INTRAVENOUS ONCE
Qty: 0 | Refills: 0 | Status: DISCONTINUED | OUTPATIENT
Start: 2018-05-16 | End: 2018-05-16

## 2018-05-16 RX ORDER — SODIUM CHLORIDE 9 MG/ML
1000 INJECTION INTRAMUSCULAR; INTRAVENOUS; SUBCUTANEOUS ONCE
Qty: 0 | Refills: 0 | Status: DISCONTINUED | OUTPATIENT
Start: 2018-05-16 | End: 2018-05-16

## 2018-05-16 RX ORDER — HALOPERIDOL DECANOATE 100 MG/ML
5 INJECTION INTRAMUSCULAR ONCE
Qty: 0 | Refills: 0 | Status: COMPLETED | OUTPATIENT
Start: 2018-05-16 | End: 2018-05-16

## 2018-05-16 RX ORDER — FENTANYL CITRATE 50 UG/ML
50 INJECTION INTRAVENOUS ONCE
Qty: 0 | Refills: 0 | Status: DISCONTINUED | OUTPATIENT
Start: 2018-05-16 | End: 2018-05-16

## 2018-05-16 RX ADMIN — SCOPALAMINE 1 PATCH: 1 PATCH, EXTENDED RELEASE TRANSDERMAL at 12:18

## 2018-05-16 RX ADMIN — CHLORHEXIDINE GLUCONATE 15 MILLILITER(S): 213 SOLUTION TOPICAL at 06:18

## 2018-05-16 RX ADMIN — MIDAZOLAM HYDROCHLORIDE 2 MILLIGRAM(S): 1 INJECTION, SOLUTION INTRAMUSCULAR; INTRAVENOUS at 20:12

## 2018-05-16 RX ADMIN — SODIUM CHLORIDE 125 MILLILITER(S): 9 INJECTION, SOLUTION INTRAVENOUS at 13:37

## 2018-05-16 RX ADMIN — HEPARIN SODIUM 5000 UNIT(S): 5000 INJECTION INTRAVENOUS; SUBCUTANEOUS at 06:11

## 2018-05-16 RX ADMIN — MEROPENEM 200 MILLIGRAM(S): 1 INJECTION INTRAVENOUS at 21:35

## 2018-05-16 RX ADMIN — Medication 100 GRAM(S): at 08:58

## 2018-05-16 RX ADMIN — Medication 4 MILLIGRAM(S): at 10:21

## 2018-05-16 RX ADMIN — Medication 4 MILLIGRAM(S): at 02:19

## 2018-05-16 RX ADMIN — MIDAZOLAM HYDROCHLORIDE 2 MILLIGRAM(S): 1 INJECTION, SOLUTION INTRAMUSCULAR; INTRAVENOUS at 20:28

## 2018-05-16 RX ADMIN — PANTOPRAZOLE SODIUM 40 MILLIGRAM(S): 20 TABLET, DELAYED RELEASE ORAL at 06:11

## 2018-05-16 RX ADMIN — CHLORHEXIDINE GLUCONATE 1 APPLICATION(S): 213 SOLUTION TOPICAL at 09:35

## 2018-05-16 RX ADMIN — HEPARIN SODIUM 5000 UNIT(S): 5000 INJECTION INTRAVENOUS; SUBCUTANEOUS at 19:00

## 2018-05-16 RX ADMIN — Medication 4 MILLIGRAM(S): at 13:36

## 2018-05-16 RX ADMIN — MEROPENEM 200 MILLIGRAM(S): 1 INJECTION INTRAVENOUS at 13:36

## 2018-05-16 RX ADMIN — OLANZAPINE 5 MILLIGRAM(S): 15 TABLET, FILM COATED ORAL at 13:34

## 2018-05-16 RX ADMIN — Medication 10 MILLIGRAM(S): at 06:11

## 2018-05-16 RX ADMIN — HALOPERIDOL DECANOATE 5 MILLIGRAM(S): 100 INJECTION INTRAMUSCULAR at 22:15

## 2018-05-16 RX ADMIN — DEXMEDETOMIDINE HYDROCHLORIDE IN 0.9% SODIUM CHLORIDE 13.09 MICROGRAM(S)/KG/HR: 4 INJECTION INTRAVENOUS at 06:11

## 2018-05-16 RX ADMIN — DEXMEDETOMIDINE HYDROCHLORIDE IN 0.9% SODIUM CHLORIDE 13.09 MICROGRAM(S)/KG/HR: 4 INJECTION INTRAVENOUS at 13:37

## 2018-05-16 RX ADMIN — MIDAZOLAM HYDROCHLORIDE 4 MILLIGRAM(S): 1 INJECTION, SOLUTION INTRAMUSCULAR; INTRAVENOUS at 04:53

## 2018-05-16 RX ADMIN — MIDAZOLAM HYDROCHLORIDE 4 MILLIGRAM(S): 1 INJECTION, SOLUTION INTRAMUSCULAR; INTRAVENOUS at 19:00

## 2018-05-16 RX ADMIN — Medication 4 MILLIGRAM(S): at 06:11

## 2018-05-16 RX ADMIN — Medication 4 MILLIGRAM(S): at 21:10

## 2018-05-16 RX ADMIN — FENTANYL CITRATE 50 MICROGRAM(S): 50 INJECTION INTRAVENOUS at 03:10

## 2018-05-16 RX ADMIN — Medication 4 MILLIGRAM(S): at 17:03

## 2018-05-16 RX ADMIN — DEXMEDETOMIDINE HYDROCHLORIDE IN 0.9% SODIUM CHLORIDE 13.09 MICROGRAM(S)/KG/HR: 4 INJECTION INTRAVENOUS at 19:00

## 2018-05-16 RX ADMIN — Medication 40 MILLIEQUIVALENT(S): at 09:35

## 2018-05-16 RX ADMIN — MEROPENEM 200 MILLIGRAM(S): 1 INJECTION INTRAVENOUS at 07:00

## 2018-05-16 RX ADMIN — FENTANYL CITRATE 50 MICROGRAM(S): 50 INJECTION INTRAVENOUS at 03:25

## 2018-05-16 RX ADMIN — SCOPALAMINE 1 PATCH: 1 PATCH, EXTENDED RELEASE TRANSDERMAL at 12:20

## 2018-05-16 NOTE — PROGRESS NOTE ADULT - SUBJECTIVE AND OBJECTIVE BOX
Gastroenterology  Patient seen and examined bedside resting comfortably.  restrained in ICU    T(F): 98.6 (05-16-18 @ 11:45), Max: 99.9 (05-15-18 @ 15:19)  HR: 95 (05-16-18 @ 11:00) (67 - 112)  BP: 135/92 (05-16-18 @ 11:00) (116/79 - 154/96)  RR: 15 (05-16-18 @ 11:00) (0 - 30)  SpO2: 100% (05-16-18 @ 11:00) (85% - 100%)  Wt(kg): --  CAPILLARY BLOOD GLUCOSE    PHYSICAL EXAM:  General: NAD, WDWN.   Neuro:  lethargic, responsive to verbal stimuli   HEENT: NCAT, EOMI, conjunctiva clear  CV: +S1+S2 regular rate and rhythm  Lung: clear to ausculation bilaterally, respirations nonlabored, good inspiratory effort  Abdomen: soft, Non tender, No Distension. Normal active BS  Extremities: no pedal edema or calf tenderness noted     LABS:                        10.2   10.07 )-----------( 399      ( 16 May 2018 03:32 )             29.7     05-16    141  |  103  |  4<L>  ----------------------------<  102<H>  3.3<L>   |  29  |  0.36<L>    Ca    8.7      16 May 2018 03:32  Phos  2.8     05-16  Mg     1.6     05-16    TPro  6.0  /  Alb  2.0<L>  /  TBili  0.4  /  DBili  x   /  AST  46<H>  /  ALT  41  /  AlkPhos  121<H>  05-15    LIVER FUNCTIONS - ( 15 May 2018 06:46 )  Alb: 2.0 g/dL / Pro: 6.0 gm/dL / ALK PHOS: 121 U/L / ALT: 41 U/L / AST: 46 U/L / GGT: x             I&O's Detail    15 May 2018 07:01  -  16 May 2018 07:00  --------------------------------------------------------  IN:    dexmedetomidine Infusion: 377.1 mL    Jevity: 320 mL    lactated ringers.: 3000 mL    propofol Infusion: 67.2 mL    Solution: 300 mL    Solution: 200 mL    Solution: 100 mL  Total IN: 4364.3 mL    OUT:    Indwelling Catheter - Urethral: 5075 mL  Total OUT: 5075 mL    Total NET: -710.7 mL      16 May 2018 07:01  -  16 May 2018 12:54  --------------------------------------------------------  IN:    dexmedetomidine Infusion: 107.2 mL    lactated ringers.: 500 mL    Solution: 100 mL  Total IN: 707.2 mL    OUT:    Indwelling Catheter - Urethral: 400 mL  Total OUT: 400 mL    Total NET: 307.2 mL        05-16 @ 03:32    141 | 103 | 4  /8.7 | 1.6 | 2.8  _______________________/  3.3 | 29 | 0.36                           \par

## 2018-05-16 NOTE — PROGRESS NOTE ADULT - ASSESSMENT
42M PMH alcohol abuse presents with total body pain, abdominal pain, n/v/d for 4 days. Pt stopped drinking approximately 3 days prior to admission due to n/v and epigastric pain.  Admitted to ICU for alcohol pancreatitis and alcohol withdrawal/DTs, acute renal insufficiency. Intubated 5/8 for acute hypoxic respiratory failure due to staph aureus pneumonia. Extubated 5/16.    1. PULM  - weaned and extubated today to nasal cannula  - secretions improved  - remains on meropenem for pneumonia    2. NEURO  - cont with precedex for control of agitation  - on 1:1 observation for safety check, tries to climb out of bed, hands legs over bed rails.     3. RENAL  - HAIDER resolved  - supplement hypokalemia    4. GEN  - physical therapy  - spoke to mother at bedside, condition updated    Critical Care Time: 40 min

## 2018-05-16 NOTE — PROGRESS NOTE ADULT - SUBJECTIVE AND OBJECTIVE BOX
Patient is a 42y old  Male who presents with a chief complaint of alcohol withdrawal/ pncreatitis/ electrolyte imbalnc/-Hypoklemia. Hypontremia/ vomiting (04 May 2018 14:01)      INTERVAL HPI/OVERNIGHT EVENTS:  calmer  intubated  responsive to call    MEDICATIONS  (STANDING):  chlorhexidine 0.12% Liquid 15 milliLiter(s) Swish and Spit two times a day  chlorhexidine 4% Liquid 1 Application(s) Topical <User Schedule>  dexmedetomidine Infusion 0.7 MICROgram(s)/kG/Hr (13.09 mL/Hr) IV Continuous <Continuous>  heparin  Injectable 5000 Unit(s) SubCutaneous every 12 hours  lactated ringers. 1000 milliLiter(s) (125 mL/Hr) IV Continuous <Continuous>  LORazepam   Injectable 4 milliGRAM(s) IV Push every 4 hours  meropenem  IVPB      meropenem  IVPB 2000 milliGRAM(s) IV Intermittent every 8 hours  metoclopramide Injectable 10 milliGRAM(s) IV Push every 8 hours  pantoprazole  Injectable 40 milliGRAM(s) IV Push two times a day  scopolamine   Patch 1 Patch Transdermal every 3 days  sodium chloride 0.9% Bolus 1000 milliLiter(s) IV Bolus once    MEDICATIONS  (PRN):  acetaminophen  Suppository 650 milliGRAM(s) Rectal every 6 hours PRN For Temp greater than 38 C (100.4 F)        REVIEW OF SYSTEMS:  CONSTITUTIONAL: No fever, weight loss, or fatigue  EYES: No eye pain, visual disturbances, or discharge  ENMT:  No difficulty hearing, tinnitus, vertigo; No sinus or throat pain  NECK: No pain or stiffness  RESPIRATORY: No cough, wheezing, chills or hemoptysis; No shortness of breath  CARDIOVASCULAR: No chest pain, palpitations, dizziness, or leg swelling  GASTROINTESTINAL: No abdominal or epigastric pain. No nausea, vomiting, or hematemesis; No diarrhea or constipation. No melena or hematochezia.  GENITOURINARY: No dysuria, frequency, hematuria, or incontinence  NEUROLOGICAL: No headaches, memory loss, loss of strength, numbness, or tremors  SKIN: No itching, burning, rashes, or lesions      Vital Signs Last 24 Hrs  T(C): 36.8 (16 May 2018 09:19), Max: 37.7 (15 May 2018 15:19)  T(F): 98.3 (16 May 2018 09:19), Max: 99.9 (15 May 2018 15:19)  HR: 68 (16 May 2018 08:24) (67 - 112)  BP: 119/81 (16 May 2018 08:00) (106/66 - 154/96)  BP(mean): 90 (16 May 2018 08:00) (72 - 110)  RR: 14 (16 May 2018 08:00) (0 - 28)  SpO2: 99% (16 May 2018 08:24) (85% - 99%)    PHYSICAL EXAM:  GENERAL: NAD, well-groomed, well-developed, intubated  HEAD:  Atraumatic, Normocephalic  EYES: EOMI, PERRLA, conjunctiva and sclera clear  ENMT: No tonsillar erythema, exudates, or enlargement; Moist mucous membranes   NECK: Supple, No JVD   NERVOUS SYSTEM:  Alert & Oriented X3, Good concentration; Motor Strength 5/5 B/L upper and lower extremities; DTRs 2+ intact and symmetric  CHEST/LUNG: Clear to percussion bilaterally; No rales, rhonchi, wheezing, or rubs  HEART: Regular rate and rhythm; No murmurs, rubs, or gallops  ABDOMEN: Soft, Nontender, Nondistended; Bowel sounds present  EXTREMITIES:  2+ Peripheral Pulses, No clubbing, cyanosis, or edema  LYMPH: No lymphadenopathy noted  SKIN: No rashes or lesions    LABS:                        10.2   10.07 )-----------( 399      ( 16 May 2018 03:32 )             29.7     05-16    141  |  103  |  4<L>  ----------------------------<  102<H>  3.3<L>   |  29  |  0.36<L>    Ca    8.7      16 May 2018 03:32  Phos  2.8     05-16  Mg     1.6     05-16    TPro  6.0  /  Alb  2.0<L>  /  TBili  0.4  /  DBili  x   /  AST  46<H>  /  ALT  41  /  AlkPhos  121<H>  05-15        CAPILLARY BLOOD GLUCOSE          RADIOLOGY & ADDITIONAL TESTS:    Imaging Personally Reviewed:  [ ] YES  [ ] NO    Consultant(s) Notes Reviewed:  [ ] YES  [ ] NO    Care Discussed with Consultants/Other Providers [ ] YES  [ ] NO

## 2018-05-16 NOTE — PROGRESS NOTE ADULT - ASSESSMENT
alcohol withdrawal/ Pancreatitis , Hypokalemia ,hyponatremia Elevated serum protien  follow up labs  continue present therapy - sedation  clinically unchanged  Weaning trials per icu  fever w/up neg so far  continue icu care, taper sedation

## 2018-05-16 NOTE — PROGRESS NOTE ADULT - SUBJECTIVE AND OBJECTIVE BOX
HPI:  43 yo M PMH alcohol abuse (4-5 glasses of wine per day) presents with total body pain, abdominal pain, n/v/d for about 4 days. Per report pt stopped drinking approximately 3 days prior to admission due to n/v and epigastric pain.  Admitted to ICU for pancreatitis and alcohol withdrawal/DTs CIWA 29. Intubated  for acute hypoxic respiratory failure due to pneumonia.       24 hr events:  secretions improved  weaned and extubated this morning  remains on precedex for agitation and restlessness, also requiring 1:1 observation       ## ROS:  [x] limited due to sedation  denies SOB  denies CP  denies abdominal pain      ## Labs:  CBC:                        10.2   10.07 )-----------( 399      ( 16 May 2018 03:32 )             29.7     Chem:      141  |  103  |  4<L>  ----------------------------<  102<H>  3.3<L>   |  29  |  0.36<L>    Ca    8.7      16 May 2018 03:32  Phos  2.8       Mg     1.6         TPro  6.0  /  Alb  2.0<L>  /  TBili  0.4  / AST  46<H>  /  ALT  41  /  AlkPhos  121<H>  05-15    Coags:          ## Imaging:    ## Medications:  meropenem  IVPB      meropenem  IVPB 2000 milliGRAM(s) IV Intermittent every 8 hours          heparin  Injectable 5000 Unit(s) SubCutaneous every 12 hours      acetaminophen  Suppository 650 milliGRAM(s) Rectal every 6 hours PRN  dexmedetomidine Infusion 0.7 MICROgram(s)/kG/Hr IV Continuous <Continuous>  LORazepam   Injectable 4 milliGRAM(s) IV Push every 4 hours  scopolamine   Patch 1 Patch Transdermal every 3 days      ## Vitals:  T(C): 37 (18 @ 15:00), Max: 37.2 (05-15-18 @ 19:32)  HR: 93 (18 @ 17:30) (67 - 131)  BP: 130/81 (18 @ 17:00) (116/79 - 154/96)  BP(mean): 92 (18 @ 17:00) (87 - 110)  RR: 23 (18 @ 17:30) (0 - 40)  SpO2: 100% (18 @ 17:30) (96% - 100%)  Wt(kg): --  Vent: Mode: CPAP with PS, RR (patient): 15, FiO2: 30, PEEP: 5, PS: 5, PIP: 10  AB-15 @ 07:01  -   @ 07:00  --------------------------------------------------------  IN: 4364.3 mL / OUT: 5075 mL / NET: -710.7 mL     @ 07:01  -   @ 17:51  --------------------------------------------------------  IN: 1865.6 mL / OUT: 1700 mL / NET: 165.6 mL          ## P/E:  Gen: lying comfortably in bed in no apparent distress  HEENT: PERRL, EOMI  Resp: CTA B/L no c/r/w  CVS: S1S2 no m/r/g  Abd: soft NT/ND +BS  Ext: no c/c/e  Neuro: A&Ox3    CENTRAL LINE: [ ] YES [ ] NO  LOCATION:   DATE INSERTED:  REMOVE: [ ] YES [ ] NO      QUINTEROS: [ ] YES [ ] NO    DATE INSERTED:  REMOVE:  [ ] YES [ ] NO      A-LINE:  [ ] YES [ ] NO  LOCATION:   DATE INSERTED:  REMOVE:  [ ] YES [ ] NO  EXPLAIN:    GLOBAL ISSUE/BEST PRACTICE:  Analgesia:  Sedation:  HOB elevation: yes  Stress ulcer prophylaxis:  VTE prophylaxis:  Oral Care:  Glycemic control:  Nutrition:    CODE STATUS: [ ] full code  [ ] DNR  [ ] DNI  [ ] [facility]  Goals of care discussion: [ ] yes HPI:  43 yo M PMH alcohol abuse (4-5 glasses of wine per day) presents with total body pain, abdominal pain, n/v/d for about 4 days. Per report pt stopped drinking approximately 3 days prior to admission due to n/v and epigastric pain.  Admitted to ICU for pancreatitis and alcohol withdrawal/DTs CIWA 29. Intubated 5/8 for acute hypoxic respiratory failure due to pneumonia.       24 hr events:  secretions improved  weaned and extubated this morning  remains on precedex for agitation and restlessness, also requiring 1:1 observation       ## ROS:  [x] limited due to sedation  denies SOB  denies CP  denies abdominal pain      ## Labs:  CBC:                        10.2   10.07 )-----------( 399      ( 16 May 2018 03:32 )             29.7     Chem:  05-16    141  |  103  |  4<L>  ----------------------------<  102<H>  3.3<L>   |  29  |  0.36<L>    Ca    8.7      16 May 2018 03:32  Phos  2.8     05-16  Mg     1.6     05-16    TPro  6.0  /  Alb  2.0<L>  /  TBili  0.4  / AST  46<H>  /  ALT  41  /  AlkPhos  121<H>  05-15    Culture - Blood (05.14.18 @ 18:18)    Specimen Source: .Blood Blood    Culture Results: No growth to date.    Culture - Blood (05.14.18 @ 18:18)    Specimen Source: .Blood Blood    Culture Results: No growth to date.    Culture - Sputum . (05.11.18 @ 00:27)    Specimen Source: .Sputum Sputum    Culture Results: Moderate Staphylococcus aureus      ## Imaging:  CXR < from: Xray Chest 1 View- PORTABLE-Routine (05.16.18 @ 07:36) >  Interval mild improvement in left basal infiltrate.  Life supporting devices in appropriate position.      ## Medications:  meropenem  IVPB 2000 milliGRAM(s) IV Intermittent every 8 hours    heparin  Injectable 5000 Unit(s) SubCutaneous every 12 hours    acetaminophen  Suppository 650 milliGRAM(s) Rectal every 6 hours PRN  dexmedetomidine Infusion 0.7 MICROgram(s)/kG/Hr IV Continuous <Continuous>  LORazepam   Injectable 4 milliGRAM(s) IV Push every 4 hours  scopolamine   Patch 1 Patch Transdermal every 3 days      ## Vitals:  T(C): 37 (05-16-18 @ 15:00), Max: 37.2 (05-15-18 @ 19:32)  HR: 93 (05-16-18 @ 17:30) (67 - 131)  BP: 130/81 (05-16-18 @ 17:00) (116/79 - 154/96)  BP(mean): 92 (05-16-18 @ 17:00) (87 - 110)  RR: 23 (05-16-18 @ 17:30) (0 - 40)  SpO2: 100% (05-16-18 @ 17:30) (96% - 100%)  Vent: nasal cannula      05-15 @ 07:01  -  05-16 @ 07:00  --------------------------------------------------------  IN: 4364.3 mL / OUT: 5075 mL / NET: -710.7 mL    05-16 @ 07:01  -  05-16 @ 17:51  --------------------------------------------------------  IN: 1865.6 mL / OUT: 1700 mL / NET: 165.6 mL          ## P/E:  Gen: lying comfortably in bed in no apparent distress, restless at times  HEENT: PERRL, EOMI  Resp: CTA B/L no wheeze, no rales  CVS: S1S2 RRR  Abd: soft NT/ND +BS  Ext: no cyanosis, mild hand edema bilaterally  Neuro: awake, responsive, follows commands but restless at times.     CENTRAL LINE: [ ] YES [x] NO  LOCATION:   DATE INSERTED:  REMOVE: [ ] YES [ ] NO      QUINTEROS: [x] YES [ ] NO    DATE INSERTED:  REMOVE:  [x] YES [ ] NO      A-LINE:  [ ] YES [x] NO  LOCATION:   DATE INSERTED:  REMOVE:  [ ] YES [ ] NO  EXPLAIN:    GLOBAL ISSUE/BEST PRACTICE:  Analgesia: n/a  Sedation: precedex, ativan  HOB elevation: yes  Stress ulcer prophylaxis: n/a  VTE prophylaxis: heparin sc  Oral Care: n/a  Glycemic control: n/a  Nutrition: po diet if pt tolerates    CODE STATUS: [x] full code  [ ] DNR  [ ] DNI  [ ] MOLST

## 2018-05-17 DIAGNOSIS — N17.9 ACUTE KIDNEY FAILURE, UNSPECIFIED: ICD-10-CM

## 2018-05-17 LAB
ANION GAP SERPL CALC-SCNC: 9 MMOL/L — SIGNIFICANT CHANGE UP (ref 5–17)
BUN SERPL-MCNC: 2 MG/DL — LOW (ref 7–23)
CALCIUM SERPL-MCNC: 9.1 MG/DL — SIGNIFICANT CHANGE UP (ref 8.5–10.1)
CHLORIDE SERPL-SCNC: 103 MMOL/L — SIGNIFICANT CHANGE UP (ref 96–108)
CO2 SERPL-SCNC: 29 MMOL/L — SIGNIFICANT CHANGE UP (ref 22–31)
CREAT SERPL-MCNC: 0.39 MG/DL — LOW (ref 0.5–1.3)
GLUCOSE SERPL-MCNC: 90 MG/DL — SIGNIFICANT CHANGE UP (ref 70–99)
HCT VFR BLD CALC: 30.4 % — LOW (ref 39–50)
HGB BLD-MCNC: 10.6 G/DL — LOW (ref 13–17)
MAGNESIUM SERPL-MCNC: 1.5 MG/DL — LOW (ref 1.6–2.6)
MCHC RBC-ENTMCNC: 32.9 PG — SIGNIFICANT CHANGE UP (ref 27–34)
MCHC RBC-ENTMCNC: 34.9 GM/DL — SIGNIFICANT CHANGE UP (ref 32–36)
MCV RBC AUTO: 94.4 FL — SIGNIFICANT CHANGE UP (ref 80–100)
NRBC # BLD: 0 /100 WBCS — SIGNIFICANT CHANGE UP (ref 0–0)
PHOSPHATE SERPL-MCNC: 2.4 MG/DL — LOW (ref 2.5–4.5)
PLATELET # BLD AUTO: 428 K/UL — HIGH (ref 150–400)
POTASSIUM SERPL-MCNC: 3.4 MMOL/L — LOW (ref 3.5–5.3)
POTASSIUM SERPL-SCNC: 3.4 MMOL/L — LOW (ref 3.5–5.3)
RBC # BLD: 3.22 M/UL — LOW (ref 4.2–5.8)
RBC # FLD: 12.3 % — SIGNIFICANT CHANGE UP (ref 10.3–14.5)
SODIUM SERPL-SCNC: 141 MMOL/L — SIGNIFICANT CHANGE UP (ref 135–145)
WBC # BLD: 8.83 K/UL — SIGNIFICANT CHANGE UP (ref 3.8–10.5)
WBC # FLD AUTO: 8.83 K/UL — SIGNIFICANT CHANGE UP (ref 3.8–10.5)

## 2018-05-17 PROCEDURE — 99291 CRITICAL CARE FIRST HOUR: CPT

## 2018-05-17 RX ORDER — MAGNESIUM SULFATE 500 MG/ML
2 VIAL (ML) INJECTION ONCE
Qty: 0 | Refills: 0 | Status: COMPLETED | OUTPATIENT
Start: 2018-05-17 | End: 2018-05-17

## 2018-05-17 RX ORDER — MIDAZOLAM HYDROCHLORIDE 1 MG/ML
4 INJECTION, SOLUTION INTRAMUSCULAR; INTRAVENOUS ONCE
Qty: 0 | Refills: 0 | Status: DISCONTINUED | OUTPATIENT
Start: 2018-05-17 | End: 2018-05-17

## 2018-05-17 RX ORDER — POTASSIUM PHOSPHATE, MONOBASIC POTASSIUM PHOSPHATE, DIBASIC 236; 224 MG/ML; MG/ML
15 INJECTION, SOLUTION INTRAVENOUS ONCE
Qty: 0 | Refills: 0 | Status: COMPLETED | OUTPATIENT
Start: 2018-05-17 | End: 2018-05-17

## 2018-05-17 RX ORDER — FOLIC ACID 0.8 MG
1 TABLET ORAL DAILY
Qty: 0 | Refills: 0 | Status: DISCONTINUED | OUTPATIENT
Start: 2018-05-17 | End: 2018-05-17

## 2018-05-17 RX ORDER — HALOPERIDOL DECANOATE 100 MG/ML
5 INJECTION INTRAMUSCULAR EVERY 8 HOURS
Qty: 0 | Refills: 0 | Status: DISCONTINUED | OUTPATIENT
Start: 2018-05-17 | End: 2018-05-20

## 2018-05-17 RX ORDER — FOLIC ACID 0.8 MG
1 TABLET ORAL DAILY
Qty: 0 | Refills: 0 | Status: DISCONTINUED | OUTPATIENT
Start: 2018-05-17 | End: 2018-05-25

## 2018-05-17 RX ORDER — HALOPERIDOL DECANOATE 100 MG/ML
5 INJECTION INTRAMUSCULAR ONCE
Qty: 0 | Refills: 0 | Status: COMPLETED | OUTPATIENT
Start: 2018-05-17 | End: 2018-05-17

## 2018-05-17 RX ORDER — THIAMINE MONONITRATE (VIT B1) 100 MG
100 TABLET ORAL DAILY
Qty: 0 | Refills: 0 | Status: COMPLETED | OUTPATIENT
Start: 2018-05-17 | End: 2018-05-19

## 2018-05-17 RX ORDER — POTASSIUM CHLORIDE 20 MEQ
10 PACKET (EA) ORAL
Qty: 0 | Refills: 0 | Status: COMPLETED | OUTPATIENT
Start: 2018-05-17 | End: 2018-05-17

## 2018-05-17 RX ADMIN — Medication 1 MILLIGRAM(S): at 12:06

## 2018-05-17 RX ADMIN — HALOPERIDOL DECANOATE 5 MILLIGRAM(S): 100 INJECTION INTRAMUSCULAR at 21:45

## 2018-05-17 RX ADMIN — DEXMEDETOMIDINE HYDROCHLORIDE IN 0.9% SODIUM CHLORIDE 13.09 MICROGRAM(S)/KG/HR: 4 INJECTION INTRAVENOUS at 08:19

## 2018-05-17 RX ADMIN — Medication 50 GRAM(S): at 06:50

## 2018-05-17 RX ADMIN — MEROPENEM 200 MILLIGRAM(S): 1 INJECTION INTRAVENOUS at 06:15

## 2018-05-17 RX ADMIN — Medication 100 MILLIGRAM(S): at 12:05

## 2018-05-17 RX ADMIN — HEPARIN SODIUM 5000 UNIT(S): 5000 INJECTION INTRAVENOUS; SUBCUTANEOUS at 06:15

## 2018-05-17 RX ADMIN — MIDAZOLAM HYDROCHLORIDE 4 MILLIGRAM(S): 1 INJECTION, SOLUTION INTRAMUSCULAR; INTRAVENOUS at 03:30

## 2018-05-17 RX ADMIN — HALOPERIDOL DECANOATE 5 MILLIGRAM(S): 100 INJECTION INTRAMUSCULAR at 11:23

## 2018-05-17 RX ADMIN — Medication 2 MILLIGRAM(S): at 22:37

## 2018-05-17 RX ADMIN — CHLORHEXIDINE GLUCONATE 1 APPLICATION(S): 213 SOLUTION TOPICAL at 06:15

## 2018-05-17 RX ADMIN — DEXMEDETOMIDINE HYDROCHLORIDE IN 0.9% SODIUM CHLORIDE 13.09 MICROGRAM(S)/KG/HR: 4 INJECTION INTRAVENOUS at 14:33

## 2018-05-17 RX ADMIN — HALOPERIDOL DECANOATE 5 MILLIGRAM(S): 100 INJECTION INTRAMUSCULAR at 03:13

## 2018-05-17 RX ADMIN — Medication 1 TABLET(S): at 12:06

## 2018-05-17 RX ADMIN — Medication 4 MILLIGRAM(S): at 10:22

## 2018-05-17 RX ADMIN — DEXMEDETOMIDINE HYDROCHLORIDE IN 0.9% SODIUM CHLORIDE 13.09 MICROGRAM(S)/KG/HR: 4 INJECTION INTRAVENOUS at 11:00

## 2018-05-17 RX ADMIN — Medication 100 MILLIGRAM(S): at 12:06

## 2018-05-17 RX ADMIN — Medication 4 MILLIGRAM(S): at 02:00

## 2018-05-17 RX ADMIN — DEXMEDETOMIDINE HYDROCHLORIDE IN 0.9% SODIUM CHLORIDE 13.09 MICROGRAM(S)/KG/HR: 4 INJECTION INTRAVENOUS at 18:00

## 2018-05-17 RX ADMIN — DEXMEDETOMIDINE HYDROCHLORIDE IN 0.9% SODIUM CHLORIDE 13.09 MICROGRAM(S)/KG/HR: 4 INJECTION INTRAVENOUS at 21:30

## 2018-05-17 RX ADMIN — Medication 100 MILLIEQUIVALENT(S): at 07:30

## 2018-05-17 RX ADMIN — Medication 100 MILLIGRAM(S): at 17:37

## 2018-05-17 RX ADMIN — Medication 100 MILLIEQUIVALENT(S): at 08:57

## 2018-05-17 RX ADMIN — Medication 4 MILLIGRAM(S): at 06:15

## 2018-05-17 RX ADMIN — Medication 100 MILLIEQUIVALENT(S): at 06:50

## 2018-05-17 RX ADMIN — POTASSIUM PHOSPHATE, MONOBASIC POTASSIUM PHOSPHATE, DIBASIC 63.75 MILLIMOLE(S): 236; 224 INJECTION, SOLUTION INTRAVENOUS at 07:22

## 2018-05-17 RX ADMIN — DEXMEDETOMIDINE HYDROCHLORIDE IN 0.9% SODIUM CHLORIDE 13.09 MICROGRAM(S)/KG/HR: 4 INJECTION INTRAVENOUS at 12:49

## 2018-05-17 RX ADMIN — DEXMEDETOMIDINE HYDROCHLORIDE IN 0.9% SODIUM CHLORIDE 13.09 MICROGRAM(S)/KG/HR: 4 INJECTION INTRAVENOUS at 15:30

## 2018-05-17 RX ADMIN — HEPARIN SODIUM 5000 UNIT(S): 5000 INJECTION INTRAVENOUS; SUBCUTANEOUS at 17:38

## 2018-05-17 NOTE — CHART NOTE - NSCHARTNOTEFT_GEN_A_CORE
Pt c acute ETOH pancreatitis. Extubated 5/16, TF initiated 5/9. Diet advanced today full liquid--> regular diet. Pt c confusion, requiring 1:1 observation.    Factors impacting intake: [X ] none [ ] nausea  [ ] vomiting [ ] diarrhea [ ] constipation  [ ]chewing problems [ ] swallowing issues  [ ] other:     Diet Prescription: Diet, Regular (05-17-18 @ 11:13)    Intake: Pt had liquids today for breakfast ( Gingerale and juice). Regular diet will be provided for lunch.    Current Weight: 72.1 (05-17), 70.4 (05-04) noted c 1+ generalized edema.  % Weight Change 2.3 % wt gain (3.7#)    Pertinent Medications: MEDICATIONS  (STANDING):  chlordiazePOXIDE 100 milliGRAM(s) Oral every 6 hours  chlorhexidine 4% Liquid 1 Application(s) Topical <User Schedule>  dexmedetomidine Infusion 0.7 MICROgram(s)/kG/Hr (13.09 mL/Hr) IV Continuous <Continuous>  folic acid 1 milliGRAM(s) Oral daily  haloperidol     Tablet 5 milliGRAM(s) Oral every 8 hours  heparin  Injectable 5000 Unit(s) SubCutaneous every 12 hours  levoFLOXacin IVPB 750 milliGRAM(s) IV Intermittent every 24 hours  multivitamin 1 Tablet(s) Oral daily  scopolamine   Patch 1 Patch Transdermal every 3 days  thiamine 100 milliGRAM(s) Oral daily    MEDICATIONS  (PRN):  acetaminophen  Suppository 650 milliGRAM(s) Rectal every 6 hours PRN For Temp greater than 38 C (100.4 F)  LORazepam   Injectable 2 milliGRAM(s) IV Push every 2 hours PRN CIWA 8-20  LORazepam   Injectable 4 milliGRAM(s) IV Push every 2 hours PRN CIWA >20    Pertinent Labs: 05-17 Na141 mmol/L Glu 90 mg/dL K+ 3.4 mmol/L<L> Cr  0.39 mg/dL<L> BUN 2 mg/dL<L> 05-17 Phos 2.4 mg/dL<L> 05-15 Alb 2.0 g/dL<L> 05-15 Chol --    LDL --    HDL --    Trig 311 mg/dL<H>     CAPILLARY BLOOD GLUCOSE    Skin: WDL    Estimated Needs:   [X ] no change since previous assessment  [ ] recalculated:     Previous Nutrition Diagnosis:   [X ] Inadequate Energy Intake [ ]Inadequate Oral Intake [ ] Excessive Energy Intake   [ ] Underweight [ ] Increased Nutrient Needs [ ] Overweight/Obesity   [ ] Altered GI Function [ ] Unintended Weight Loss [ ] Food & Nutrition Related Knowledge Deficit [ ] Malnutrition     Nutrition Diagnosis is [ ] ongoing  [X ] resolved [ ] not applicable     New Nutrition Diagnosis: [X ] not applicable  [ ] Inadequate Energy Intake [ ]Inadequate Oral Intake [ ] Excessive Energy Intake   [ ] Underweight [] decreased Nutrient Needs [ ] Overweight/Obesity   [ ] Altered GI Function [ ] Unintended Weight Loss [ X] Food & Nutrition Related Knowledge Deficit [ ] Malnutrition     Related to (etiology): Impaired cognitive ability    As evidenced by (signs & symptoms): No prior knowledge of need for food-and nutrition-related recommendation    Goal/Expected Outcome: Pt will be able to verbalize 3 high fat foods to avoid.      Interventions:   Recommend  [X ] Change Diet To: Recommend low fat diet  [ ] Nutrition Supplement  [ ] Nutrition Support  [ ] Other:     Monitoring and Evaluation:   [X ] PO intake [ x ] Tolerance to diet prescription [ x ] weights [ x ] labs[ x ] follow up per protocol  [ ] other: Pt c acute ETOH pancreatitis. Extubated 5/16, TF initiated 5/9. Diet advanced today full liquid--> regular diet. Pt c confusion, requiring 1:1 observation. As per MD 5/16 HAIDER resolved.    Factors impacting intake: [X ] none [ ] nausea  [ ] vomiting [ ] diarrhea [ ] constipation  [ ]chewing problems [ ] swallowing issues  [ ] other:     Diet Prescription: Diet, Regular (05-17-18 @ 11:13)    Intake: Pt had liquids today for breakfast ( Gingerale and juice). Started regular diet  for lunch.    Current Weight: 72.1 (05-17), 70.4 (05-04) noted c 1+ generalized edema.  % Weight Change 2.3 % wt gain (3.7#)    Pertinent Medications: MEDICATIONS  (STANDING):  chlordiazePOXIDE 100 milliGRAM(s) Oral every 6 hours  chlorhexidine 4% Liquid 1 Application(s) Topical <User Schedule>  dexmedetomidine Infusion 0.7 MICROgram(s)/kG/Hr (13.09 mL/Hr) IV Continuous <Continuous>  folic acid 1 milliGRAM(s) Oral daily  haloperidol     Tablet 5 milliGRAM(s) Oral every 8 hours  heparin  Injectable 5000 Unit(s) SubCutaneous every 12 hours  levoFLOXacin IVPB 750 milliGRAM(s) IV Intermittent every 24 hours  multivitamin 1 Tablet(s) Oral daily  scopolamine   Patch 1 Patch Transdermal every 3 days  thiamine 100 milliGRAM(s) Oral daily    MEDICATIONS  (PRN):  acetaminophen  Suppository 650 milliGRAM(s) Rectal every 6 hours PRN For Temp greater than 38 C (100.4 F)  LORazepam   Injectable 2 milliGRAM(s) IV Push every 2 hours PRN CIWA 8-20  LORazepam   Injectable 4 milliGRAM(s) IV Push every 2 hours PRN CIWA >20    Pertinent Labs: 05-17 Na141 mmol/L Glu 90 mg/dL K+ 3.4 mmol/L<L> Cr  0.39 mg/dL<L> BUN 2 mg/dL<L> 05-17 Phos 2.4 mg/dL<L> 05-15 Alb 2.0 g/dL<L> 05-15 Chol --    LDL --    HDL --    Trig 311 mg/dL<H>     CAPILLARY BLOOD GLUCOSE    Skin: WDL    Estimated Needs:   [X ] no change since previous assessment  [ ] recalculated:     Previous Nutrition Diagnosis:   [X ] Inadequate Energy Intake [ ]Inadequate Oral Intake [ ] Excessive Energy Intake   [ ] Underweight [ ] Increased Nutrient Needs [ ] Overweight/Obesity   [ ] Altered GI Function [ ] Unintended Weight Loss [ ] Food & Nutrition Related Knowledge Deficit [ ] Malnutrition     Nutrition Diagnosis is [ ] ongoing  [X ] resolved [ ] not applicable     New Nutrition Diagnosis: [X ] not applicable  [ ] Inadequate Energy Intake [ ]Inadequate Oral Intake [ ] Excessive Energy Intake   [ ] Underweight [] decreased Nutrient Needs [ ] Overweight/Obesity   [ ] Altered GI Function [ ] Unintended Weight Loss [ X] Food & Nutrition Related Knowledge Deficit [ ] Malnutrition     Related to (etiology): Impaired cognitive ability    As evidenced by (signs & symptoms): No prior knowledge of need for food-and nutrition-related recommendation    Goal/Expected Outcome: Pt will be able to verbalize 3 high fat foods to avoid.       Interventions:   Recommend  [X ] Change Diet To: Recommend low-fat diet   [ ] Nutrition Supplement  [ ] Nutrition Support  [ X] Other: Pancreatitis nutrition therapy written literature provided at pt bedside    Monitoring and Evaluation:   [X ] PO intake [ x ] Tolerance to diet prescription [ x ] weights [ x ] labs[ x ] follow up per protocol  [ ] other: Pt c acute ETOH pancreatitis. Extubated 5/16, TF initiated 5/9. Diet advanced today full liquid--> regular diet. Pt c confusion, requiring 1:1 observation. As per MD 5/16 HAIDER resolved.    Factors impacting intake: [X ] none [ ] nausea  [ ] vomiting [ ] diarrhea [ ] constipation  [ ]chewing problems [ ] swallowing issues  [ ] other:     Diet Prescription: Diet, Regular (05-17-18 @ 11:13)    Intake: Pt had liquids today for breakfast ( Gingerale and juice). Started regular diet  for lunch.    Current Weight: 72.1 (05-17), 70.4 (05-04) noted c 1+ generalized edema.  % Weight Change 2.3 % wt gain (3.7#)    Pertinent Medications: MEDICATIONS  (STANDING):  chlordiazePOXIDE 100 milliGRAM(s) Oral every 6 hours  chlorhexidine 4% Liquid 1 Application(s) Topical <User Schedule>  dexmedetomidine Infusion 0.7 MICROgram(s)/kG/Hr (13.09 mL/Hr) IV Continuous <Continuous>  folic acid 1 milliGRAM(s) Oral daily  haloperidol     Tablet 5 milliGRAM(s) Oral every 8 hours  heparin  Injectable 5000 Unit(s) SubCutaneous every 12 hours  levoFLOXacin IVPB 750 milliGRAM(s) IV Intermittent every 24 hours  multivitamin 1 Tablet(s) Oral daily  scopolamine   Patch 1 Patch Transdermal every 3 days  thiamine 100 milliGRAM(s) Oral daily    MEDICATIONS  (PRN):  acetaminophen  Suppository 650 milliGRAM(s) Rectal every 6 hours PRN For Temp greater than 38 C (100.4 F)  LORazepam   Injectable 2 milliGRAM(s) IV Push every 2 hours PRN CIWA 8-20  LORazepam   Injectable 4 milliGRAM(s) IV Push every 2 hours PRN CIWA >20    Pertinent Labs: 05-17 Na141 mmol/L Glu 90 mg/dL K+ 3.4 mmol/L<L> Cr  0.39 mg/dL<L> BUN 2 mg/dL<L> 05-17 Phos 2.4 mg/dL<L> 05-15 Alb 2.0 g/dL<L> 05-15 Chol --    LDL --    HDL --    Trig 311 mg/dL<H>     CAPILLARY BLOOD GLUCOSE    Skin: WDL    Estimated Needs:   [X ] no change since previous assessment  [ ] recalculated:     Previous Nutrition Diagnosis:   [X ] Inadequate Energy Intake [ ]Inadequate Oral Intake [ ] Excessive Energy Intake   [ ] Underweight [ ] Increased Nutrient Needs [ ] Overweight/Obesity   [ ] Altered GI Function [ ] Unintended Weight Loss [ ] Food & Nutrition Related Knowledge Deficit [ ] Malnutrition     Nutrition Diagnosis is [X ] ongoing/improving  [ ] resolved [ ] not applicable     New Nutrition Diagnosis: [ ] not applicable    [ ] Inadequate Energy Intake [ ]Inadequate Oral Intake [ ] Excessive Energy Intake   [ ] Underweight [] decreased Nutrient Needs [ ] Overweight/Obesity   [ ] Altered GI Function [ ] Unintended Weight Loss [ X] Food & Nutrition Related Knowledge Deficit [ ] Malnutrition     Related to (etiology): Impaired cognitive ability    As evidenced by (signs & symptoms): No prior knowledge of need for food-and nutrition-related recommendation    Goal/Expected Outcome: Pt will be able to verbalize 3 high fat foods to avoid.       Interventions:   Recommend  [X ] Change Diet To: Recommend low-fat diet   [ ] Nutrition Supplement  [ ] Nutrition Support  [ X] Other: Pancreatitis nutrition therapy written literature provided at pt bedside    Monitoring and Evaluation:   [X ] PO intake [ x ] Tolerance to diet prescription [ x ] weights [ x ] labs[ x ] follow up per protocol  [ ] other:

## 2018-05-17 NOTE — PROGRESS NOTE ADULT - ASSESSMENT
42M PMH alcohol abuse presents with total body pain, abdominal pain, n/v/d for 4 days. Pt stopped drinking approximately 3 days prior to admission due to n/v and epigastric pain.  Admitted to ICU for alcohol pancreatitis and alcohol withdrawal/DTs, acute renal insufficiency. Intubated 5/8 for acute hypoxic respiratory failure due to staph aureus pneumonia. Extubated 5/16. 42M PMH alcohol abuse presents with total body pain, abdominal pain, n/v/d for 4 days. Pt stopped drinking approximately 3 days prior to admission due to n/v and epigastric pain.  Admitted to ICU for alcohol pancreatitis and alcohol withdrawal/DTs, acute renal insufficiency. Intubated 5/8 for acute hypoxic respiratory failure due to staph aureus pneumonia. Extubated 5/16.    Neuro: ETOH withdrawal, now s/p intubation, extubated yesterday, remains delirious, started on librium 100 q6H, with Haldol 5 mg PO Q8H for delirium and agitation.  Continue to monitor for now.  Currently on precedex, wean as tolerated.   Pulm: Staph Pneumonia, continue on levaquin, suction prn.  On nasal cannula currently.  Monitor resp status closely  GI: Full diet, cont with mvt, thiamine and folic acid.    Renal/Metabolic: FRANKO  ID: S. Pneumonia and coag neg staph bacteremia, follow up blood cultures.  continue with levaquin at this time. d/c vanco  Dispo: Requiring precedex at this time.

## 2018-05-17 NOTE — PROGRESS NOTE ADULT - SUBJECTIVE AND OBJECTIVE BOX
Patient is a 42y old  Male who presents with a chief complaint of alcohol withdrawal/ pncreatitis/ electrolyte imbalnc/-Hypoklemia. Hypontremia/ vomiting (04 May 2018 14:01)      INTERVAL HPI/OVERNIGHT EVENTS:  pt remain stable  MEDICATIONS  (STANDING):  chlordiazePOXIDE 100 milliGRAM(s) Oral every 6 hours  chlorhexidine 4% Liquid 1 Application(s) Topical <User Schedule>  dexmedetomidine Infusion 0.7 MICROgram(s)/kG/Hr (13.09 mL/Hr) IV Continuous <Continuous>  folic acid 1 milliGRAM(s) Oral daily  haloperidol     Tablet 5 milliGRAM(s) Oral every 8 hours  heparin  Injectable 5000 Unit(s) SubCutaneous every 12 hours  levoFLOXacin IVPB 750 milliGRAM(s) IV Intermittent every 24 hours  multivitamin 1 Tablet(s) Oral daily  scopolamine   Patch 1 Patch Transdermal every 3 days  thiamine 100 milliGRAM(s) Oral daily    MEDICATIONS  (PRN):  acetaminophen  Suppository 650 milliGRAM(s) Rectal every 6 hours PRN For Temp greater than 38 C (100.4 F)  LORazepam   Injectable 2 milliGRAM(s) IV Push every 2 hours PRN CIWA 8-20  LORazepam   Injectable 4 milliGRAM(s) IV Push every 2 hours PRN CIWA >20      Allergies    No Known Allergies    Intolerances        REVIEW OF SYSTEMS:  CONSTITUTIONAL: No fever, weight loss, or fatigue  EYES: No eye pain, visual disturbances, or discharge  ENMT:  No difficulty hearing, tinnitus, vertigo; No sinus or throat pain  NECK: No pain or stiffness  BREASTS: No pain, masses, or nipple discharge  RESPIRATORY: No cough, wheezing, chills or hemoptysis; No shortness of breath  CARDIOVASCULAR: No chest pain, palpitations, dizziness, or leg swelling  GASTROINTESTINAL: No abdominal or epigastric pain. No nausea, vomiting, or hematemesis; No diarrhea or constipation. No melena or hematochezia.  GENITOURINARY: No dysuria, frequency, hematuria, or incontinence  NEUROLOGICAL: No headaches, memory loss, loss of strength, numbness, or tremors  SKIN: No itching, burning, rashes, or lesions   LYMPH NODES: No enlarged glands  ENDOCRINE: No heat or cold intolerance; No hair loss  MUSCULOSKELETAL: No joint pain or swelling; No muscle, back, or extremity pain  PSYCHIATRIC: No depression, anxiety, mood swings, or difficulty sleeping  HEME/LYMPH: No easy bruising, or bleeding gums  ALLERGY AND IMMUNOLOGIC: No hives or eczema    Vital Signs Last 24 Hrs  T(C): 37.2 (17 May 2018 19:33), Max: 37.5 (17 May 2018 15:24)  T(F): 99 (17 May 2018 19:33), Max: 99.5 (17 May 2018 15:24)  HR: 96 (17 May 2018 19:20) (69 - 107)  BP: 134/80 (17 May 2018 19:00) (101/75 - 152/91)  BP(mean): 93 (17 May 2018 19:00) (66 - 106)  RR: 19 (17 May 2018 19:20) (7 - 30)  SpO2: 96% (17 May 2018 19:20) (94% - 100%)    PHYSICAL EXAM:  GENERAL: NAD, well-groomed, well-developed  HEAD:  Atraumatic, Normocephalic  EYES: EOMI, PERRLA, conjunctiva and sclera clear  ENMT: No tonsillar erythema, exudates, or enlargement; Moist mucous membranes, Good dentition, No lesions  NECK: Supple, No JVD, Normal thyroid  NERVOUS SYSTEM:  Alert & Oriented X3, Good concentration; Motor Strength 5/5 B/L upper and lower extremities; DTRs 2+ intact and symmetric  CHEST/LUNG: Clear to percussion bilaterally; No rales, rhonchi, wheezing, or rubs  HEART: Regular rate and rhythm; No murmurs, rubs, or gallops  ABDOMEN: Soft, Nontender, Nondistended; Bowel sounds present  EXTREMITIES:  2+ Peripheral Pulses, No clubbing, cyanosis, or edema  LYMPH: No lymphadenopathy noted  SKIN: No rashes or lesions    LABS:                        10.6   8.83  )-----------( 428      ( 17 May 2018 04:49 )             30.4     05-17    141  |  103  |  2<L>  ----------------------------<  90  3.4<L>   |  29  |  0.39<L>    Ca    9.1      17 May 2018 04:49  Phos  2.4     05-17  Mg     1.5     05-17          CAPILLARY BLOOD GLUCOSE        CULTURES:    HEMOGLOBIN A1C:    CHOLESTEROL:  Triglycerides, Serum: 311 mg/dL (05-15-18 @ 08:06)        RADIOLOGY & ADDITIONAL TESTS:

## 2018-05-17 NOTE — STUDENT SIGN OFF DOCUMENT - COPY OF STUDENT DOCUMENT REVIEW
DR PALACIO OFFICE CALLED STATING PT PRE OP LABS CAME BACK ABNORMAL. GOING TO FAX PAPERWORK FOR YOU TO REVIEW   Nutrition Services

## 2018-05-17 NOTE — PROGRESS NOTE ADULT - SUBJECTIVE AND OBJECTIVE BOX
INTERVAL HPI/OVERNIGHT EVENTS:      41 yo M PMH alcohol abuse (4-5 glasses of wine per day) presents with total body pain, abdominal pain, n/v/d for about 4 days. Per report pt stopped drinking approximately 3 days prior to admission due to n/v and epigastric pain.  Admitted to ICU for pancreatitis and alcohol withdrawal/DTs CIWA 29. Intubated 5/8 for acute hypoxic respiratory failure due to pneumonia. Extubated on 5/16/18.    24 hour events: Agitated at times requiring increasing sedation, on precedex.  Restless this AM.      CENTRAL LINE: [ ] YES [x ] NO  LOCATION:   DATE INSERTED:  REMOVE: [ ] YES [ ] NO  EXPLAIN:    QUINTEROS: [ ] YES [ x] NO    DATE INSERTED:  REMOVE:  [ ] YES [ ] NO  EXPLAIN:    REVIEW OF SYSTEMS: Occasionally agitated, delirious.    ICU Vital Signs Last 24 Hrs  T(C): 37.2 (17 May 2018 07:20), Max: 37.3 (16 May 2018 19:00)  T(F): 99 (17 May 2018 07:20), Max: 99.2 (16 May 2018 19:00)  HR: 90 (17 May 2018 08:00) (69 - 131)  BP: 112/67 (17 May 2018 08:00) (101/75 - 154/101)  BP(mean): 75 (17 May 2018 08:00) (75 - 114)  ABP: --  ABP(mean): --  RR: 24 (17 May 2018 08:00) (12 - 40)  SpO2: 96% (17 May 2018 08:00) (94% - 100%)          I&O's Detail    16 May 2018 07:01  -  17 May 2018 07:00  --------------------------------------------------------  IN:    dexmedetomidine Infusion: 625.9 mL    lactated ringers.: 2875 mL    Solution: 100 mL    Solution: 150 mL    Solution: 300 mL  Total IN: 4050.9 mL    OUT:    Indwelling Catheter - Urethral: 3500 mL    Voided: 325 mL  Total OUT: 3825 mL    Total NET: 225.9 mL      17 May 2018 07:01  -  17 May 2018 11:24  --------------------------------------------------------  IN:    dexmedetomidine Infusion: 56.2 mL    lactated ringers.: 250 mL    Solution: 250 mL    Solution: 200 mL  Total IN: 756.2 mL    OUT:  Total OUT: 0 mL    Total NET: 756.2 mL            CAPILLARY BLOOD GLUCOSE          MEDICATIONS  NEURO  Meds: acetaminophen  Suppository 650 milliGRAM(s) Rectal every 6 hours PRN For Temp greater than 38 C (100.4 F)  chlordiazePOXIDE 100 milliGRAM(s) Oral every 6 hours  dexmedetomidine Infusion 0.7 MICROgram(s)/kG/Hr (13.09 mL/Hr) IV Continuous <Continuous>  haloperidol     Tablet 5 milliGRAM(s) Oral every 8 hours  LORazepam   Injectable 2 milliGRAM(s) IV Push every 2 hours PRN CIWA 8-20  LORazepam   Injectable 4 milliGRAM(s) IV Push every 2 hours PRN CIWA >20  scopolamine   Patch 1 Patch Transdermal every 3 days    RESPIRATORY    Meds:   CARDIOVASCULAR  Meds:   GI/NUTRITION  Meds:   GENITOURINARY  Meds: folic acid 1 milliGRAM(s) Oral daily  multivitamin 1 Tablet(s) Oral daily  thiamine 100 milliGRAM(s) Oral daily    HEMATOLOGIC  Meds: heparin  Injectable 5000 Unit(s) SubCutaneous every 12 hours    [x] VTE Prophylaxis  INFECTIOUS DISEASES  Meds: levoFLOXacin IVPB 750 milliGRAM(s) IV Intermittent every 24 hours    ENDOCRINE  CAPILLARY BLOOD GLUCOSE        Meds:   OTHER MEDICATIONS:  chlorhexidine 4% Liquid 1 Application(s) Topical <User Schedule>  :    PHYSICAL EXAM:    Gen: lying comfortably in bed in no apparent distress, restless at times  HEENT: PERRL, EOMI  Resp: CTA B/L no wheeze, no rales  CVS: S1S2 RRR  Abd: soft NT/ND +BS  Ext: no cyanosis, mild hand edema bilaterally  Neuro: awake, responsive, follows commands but restless at times.     LABS:                        10.6   8.83  )-----------( 428      ( 17 May 2018 04:49 )             30.4      05-17    141  |  103  |  2<L>  ----------------------------<  90  3.4<L>   |  29  |  0.39<L>    Ca    9.1      17 May 2018 04:49  Phos  2.4     05-17  Mg     1.5     05-17          Culture Results:   No growth to date. (05-14 @ 18:18)  Culture Results:   No growth to date. (05-14 @ 18:18)      CULTURES  Culture - Blood (05.14.18 @ 18:18)    Specimen Source: .Blood Blood    Culture Results:   No growth to date.    Culture - Sputum . (05.11.18 @ 00:27)    -  Tetra/Doxy: S <=1    -  Trimethoprim/Sulfamethoxazole: S <=0.5/9.5    -  Levofloxacin: S <=0.5    -  Moxifloxacin(Aerobic): S <=0.5    -  Oxacillin: S <=0.25    -  Penicillin: R 8    -  RIF- Rifampin: S <=1    -  Vancomycin: S 2    Gram Stain:   Few Squamous epithelial cells per low power field  Few polymorphonuclear leukocytes per low power field  Numerous Gram positive cocci in pairs, chains and clusters per oil power  field    -  Ampicillin/Sulbactam: S <=8/4    -  Cefazolin: S <=4    -  Ciprofloxacin: S <=1    -  Clindamycin: R <=0.25    -  Erythromycin: R >4    -  Gentamicin: S <=1    Specimen Source: .Sputum Sputum    Culture Results:   Moderate Staphylococcus aureus ***********Note************  This isolate demonstrates inducible  clindamycin resistance.  Clindamycin may still be effective in some patients.  Normal Respiratory Poonam present    Organism Identification: Staphylococcus aureus    Organism: Staphylococcus aureus    Method Type: KEITH    Organism: Coag Negative Staphylococcus (05.11.18 @ 00:26) INTERVAL HPI/OVERNIGHT EVENTS:      41 yo M PMH alcohol abuse (4-5 glasses of wine per day) presents with total body pain, abdominal pain, n/v/d for about 4 days. Per report pt stopped drinking approximately 3 days prior to admission due to n/v and epigastric pain.  Admitted to ICU for pancreatitis and alcohol withdrawal/DTs CIWA 29. Intubated 5/8 for acute hypoxic respiratory failure due to pneumonia. Extubated on 5/16/18.    24 hour events: Agitated at times requiring increasing sedation, on precedex.  Restless this AM.      CENTRAL LINE: [ ] YES [x ] NO  LOCATION:   DATE INSERTED:  REMOVE: [ ] YES [ ] NO  EXPLAIN:    QUINTEROS: [ ] YES [ x] NO    DATE INSERTED:  REMOVE:  [ ] YES [ ] NO  EXPLAIN:    REVIEW OF SYSTEMS: Occasionally agitated, delirious.    ICU Vital Signs Last 24 Hrs  T(C): 37.2 (17 May 2018 07:20), Max: 37.3 (16 May 2018 19:00)  T(F): 99 (17 May 2018 07:20), Max: 99.2 (16 May 2018 19:00)  HR: 90 (17 May 2018 08:00) (69 - 131)  BP: 112/67 (17 May 2018 08:00) (101/75 - 154/101)  BP(mean): 75 (17 May 2018 08:00) (75 - 114)  ABP: --  ABP(mean): --  RR: 24 (17 May 2018 08:00) (12 - 40)  SpO2: 96% (17 May 2018 08:00) (94% - 100%)          I&O's Detail    16 May 2018 07:01  -  17 May 2018 07:00  --------------------------------------------------------  IN:    dexmedetomidine Infusion: 625.9 mL    lactated ringers.: 2875 mL    Solution: 100 mL    Solution: 150 mL    Solution: 300 mL  Total IN: 4050.9 mL    OUT:    Indwelling Catheter - Urethral: 3500 mL    Voided: 325 mL  Total OUT: 3825 mL    Total NET: 225.9 mL      17 May 2018 07:01  -  17 May 2018 11:24  --------------------------------------------------------  IN:    dexmedetomidine Infusion: 56.2 mL    lactated ringers.: 250 mL    Solution: 250 mL    Solution: 200 mL  Total IN: 756.2 mL    OUT:  Total OUT: 0 mL    Total NET: 756.2 mL      MEDICATIONS  NEURO  Meds: acetaminophen  Suppository 650 milliGRAM(s) Rectal every 6 hours PRN For Temp greater than 38 C (100.4 F)  chlordiazePOXIDE 100 milliGRAM(s) Oral every 6 hours  dexmedetomidine Infusion 0.7 MICROgram(s)/kG/Hr (13.09 mL/Hr) IV Continuous <Continuous>  haloperidol     Tablet 5 milliGRAM(s) Oral every 8 hours  LORazepam   Injectable 2 milliGRAM(s) IV Push every 2 hours PRN CIWA 8-20  LORazepam   Injectable 4 milliGRAM(s) IV Push every 2 hours PRN CIWA >20  scopolamine   Patch 1 Patch Transdermal every 3 days    RESPIRATORY    Meds:   CARDIOVASCULAR  Meds:   GI/NUTRITION  Meds:   GENITOURINARY  Meds: folic acid 1 milliGRAM(s) Oral daily  multivitamin 1 Tablet(s) Oral daily  thiamine 100 milliGRAM(s) Oral daily    HEMATOLOGIC  Meds: heparin  Injectable 5000 Unit(s) SubCutaneous every 12 hours    [x] VTE Prophylaxis  INFECTIOUS DISEASES  Meds: levoFLOXacin IVPB 750 milliGRAM(s) IV Intermittent every 24 hours    ENDOCRINE  CAPILLARY BLOOD GLUCOSE        Meds:   OTHER MEDICATIONS:  chlorhexidine 4% Liquid 1 Application(s) Topical <User Schedule>  :    PHYSICAL EXAM:    Gen: lying comfortably in bed in no apparent distress, restless at times  HEENT: PERRL, EOMI  Resp: CTA B/L no wheeze, no rales  CVS: S1S2 RRR  Abd: soft NT/ND +BS  Ext: no cyanosis, mild hand edema bilaterally  Neuro: awake, responsive, follows commands but restless at times.     LABS:                        10.6   8.83  )-----------( 428      ( 17 May 2018 04:49 )             30.4      05-17    141  |  103  |  2<L>  ----------------------------<  90  3.4<L>   |  29  |  0.39<L>    Ca    9.1      17 May 2018 04:49  Phos  2.4     05-17  Mg     1.5     05-17          Culture Results:   No growth to date. (05-14 @ 18:18)  Culture Results:   No growth to date. (05-14 @ 18:18)      CULTURES  Culture - Blood (05.14.18 @ 18:18)    Specimen Source: .Blood Blood    Culture Results:   No growth to date.    Culture - Sputum . (05.11.18 @ 00:27)    -  Tetra/Doxy: S <=1    -  Trimethoprim/Sulfamethoxazole: S <=0.5/9.5    -  Levofloxacin: S <=0.5    -  Moxifloxacin(Aerobic): S <=0.5    -  Oxacillin: S <=0.25    -  Penicillin: R 8    -  RIF- Rifampin: S <=1    -  Vancomycin: S 2    Gram Stain:   Few Squamous epithelial cells per low power field  Few polymorphonuclear leukocytes per low power field  Numerous Gram positive cocci in pairs, chains and clusters per oil power  field    -  Ampicillin/Sulbactam: S <=8/4    -  Cefazolin: S <=4    -  Ciprofloxacin: S <=1    -  Clindamycin: R <=0.25    -  Erythromycin: R >4    -  Gentamicin: S <=1    Specimen Source: .Sputum Sputum    Culture Results:   Moderate Staphylococcus aureus ***********Note************  This isolate demonstrates inducible  clindamycin resistance.  Clindamycin may still be effective in some patients.  Normal Respiratory Poonam present    Organism Identification: Staphylococcus aureus    Organism: Staphylococcus aureus    Method Type: KEITH    Organism: Coag Negative Staphylococcus (05.11.18 @ 00:26)

## 2018-05-18 LAB
ALBUMIN SERPL ELPH-MCNC: 2.7 G/DL — LOW (ref 3.3–5)
ALP SERPL-CCNC: 105 U/L — SIGNIFICANT CHANGE UP (ref 40–120)
ALT FLD-CCNC: 43 U/L — SIGNIFICANT CHANGE UP (ref 12–78)
ANION GAP SERPL CALC-SCNC: 13 MMOL/L — SIGNIFICANT CHANGE UP (ref 5–17)
AST SERPL-CCNC: 42 U/L — HIGH (ref 15–37)
BILIRUB SERPL-MCNC: 0.6 MG/DL — SIGNIFICANT CHANGE UP (ref 0.2–1.2)
BUN SERPL-MCNC: 4 MG/DL — LOW (ref 7–23)
CALCIUM SERPL-MCNC: 9.2 MG/DL — SIGNIFICANT CHANGE UP (ref 8.5–10.1)
CHLORIDE SERPL-SCNC: 100 MMOL/L — SIGNIFICANT CHANGE UP (ref 96–108)
CO2 SERPL-SCNC: 26 MMOL/L — SIGNIFICANT CHANGE UP (ref 22–31)
CREAT SERPL-MCNC: 0.5 MG/DL — SIGNIFICANT CHANGE UP (ref 0.5–1.3)
GLUCOSE SERPL-MCNC: 84 MG/DL — SIGNIFICANT CHANGE UP (ref 70–99)
HCT VFR BLD CALC: 30.6 % — LOW (ref 39–50)
HGB BLD-MCNC: 10.5 G/DL — LOW (ref 13–17)
MAGNESIUM SERPL-MCNC: 1.6 MG/DL — SIGNIFICANT CHANGE UP (ref 1.6–2.6)
MCHC RBC-ENTMCNC: 32.3 PG — SIGNIFICANT CHANGE UP (ref 27–34)
MCHC RBC-ENTMCNC: 34.3 GM/DL — SIGNIFICANT CHANGE UP (ref 32–36)
MCV RBC AUTO: 94.2 FL — SIGNIFICANT CHANGE UP (ref 80–100)
NRBC # BLD: 0 /100 WBCS — SIGNIFICANT CHANGE UP (ref 0–0)
PHOSPHATE SERPL-MCNC: 2 MG/DL — LOW (ref 2.5–4.5)
PLATELET # BLD AUTO: 485 K/UL — HIGH (ref 150–400)
POTASSIUM SERPL-MCNC: 3.3 MMOL/L — LOW (ref 3.5–5.3)
POTASSIUM SERPL-SCNC: 3.3 MMOL/L — LOW (ref 3.5–5.3)
PROT SERPL-MCNC: 7.7 GM/DL — SIGNIFICANT CHANGE UP (ref 6–8.3)
RBC # BLD: 3.25 M/UL — LOW (ref 4.2–5.8)
RBC # FLD: 12.4 % — SIGNIFICANT CHANGE UP (ref 10.3–14.5)
SODIUM SERPL-SCNC: 139 MMOL/L — SIGNIFICANT CHANGE UP (ref 135–145)
WBC # BLD: 9.15 K/UL — SIGNIFICANT CHANGE UP (ref 3.8–10.5)
WBC # FLD AUTO: 9.15 K/UL — SIGNIFICANT CHANGE UP (ref 3.8–10.5)

## 2018-05-18 PROCEDURE — 99291 CRITICAL CARE FIRST HOUR: CPT

## 2018-05-18 RX ORDER — POTASSIUM PHOSPHATE, MONOBASIC POTASSIUM PHOSPHATE, DIBASIC 236; 224 MG/ML; MG/ML
15 INJECTION, SOLUTION INTRAVENOUS ONCE
Qty: 0 | Refills: 0 | Status: COMPLETED | OUTPATIENT
Start: 2018-05-18 | End: 2018-05-18

## 2018-05-18 RX ORDER — POTASSIUM CHLORIDE 20 MEQ
10 PACKET (EA) ORAL
Qty: 0 | Refills: 0 | Status: COMPLETED | OUTPATIENT
Start: 2018-05-18 | End: 2018-05-18

## 2018-05-18 RX ORDER — MAGNESIUM SULFATE 500 MG/ML
1 VIAL (ML) INJECTION ONCE
Qty: 0 | Refills: 0 | Status: COMPLETED | OUTPATIENT
Start: 2018-05-18 | End: 2018-05-18

## 2018-05-18 RX ADMIN — Medication 2 MILLIGRAM(S): at 17:18

## 2018-05-18 RX ADMIN — HALOPERIDOL DECANOATE 5 MILLIGRAM(S): 100 INJECTION INTRAMUSCULAR at 14:01

## 2018-05-18 RX ADMIN — CHLORHEXIDINE GLUCONATE 1 APPLICATION(S): 213 SOLUTION TOPICAL at 06:50

## 2018-05-18 RX ADMIN — Medication 100 MILLIGRAM(S): at 06:48

## 2018-05-18 RX ADMIN — HALOPERIDOL DECANOATE 5 MILLIGRAM(S): 100 INJECTION INTRAMUSCULAR at 21:59

## 2018-05-18 RX ADMIN — DEXMEDETOMIDINE HYDROCHLORIDE IN 0.9% SODIUM CHLORIDE 13.09 MICROGRAM(S)/KG/HR: 4 INJECTION INTRAVENOUS at 18:10

## 2018-05-18 RX ADMIN — POTASSIUM PHOSPHATE, MONOBASIC POTASSIUM PHOSPHATE, DIBASIC 63.75 MILLIMOLE(S): 236; 224 INJECTION, SOLUTION INTRAVENOUS at 10:09

## 2018-05-18 RX ADMIN — Medication 2 MILLIGRAM(S): at 02:40

## 2018-05-18 RX ADMIN — HEPARIN SODIUM 5000 UNIT(S): 5000 INJECTION INTRAVENOUS; SUBCUTANEOUS at 06:49

## 2018-05-18 RX ADMIN — HEPARIN SODIUM 5000 UNIT(S): 5000 INJECTION INTRAVENOUS; SUBCUTANEOUS at 17:23

## 2018-05-18 RX ADMIN — DEXMEDETOMIDINE HYDROCHLORIDE IN 0.9% SODIUM CHLORIDE 13.09 MICROGRAM(S)/KG/HR: 4 INJECTION INTRAVENOUS at 12:16

## 2018-05-18 RX ADMIN — Medication 100 GRAM(S): at 06:49

## 2018-05-18 RX ADMIN — Medication 100 MILLIEQUIVALENT(S): at 06:49

## 2018-05-18 RX ADMIN — Medication 100 MILLIEQUIVALENT(S): at 08:19

## 2018-05-18 RX ADMIN — Medication 100 MILLIGRAM(S): at 12:05

## 2018-05-18 RX ADMIN — HALOPERIDOL DECANOATE 5 MILLIGRAM(S): 100 INJECTION INTRAMUSCULAR at 06:48

## 2018-05-18 RX ADMIN — Medication 2 MILLIGRAM(S): at 12:28

## 2018-05-18 RX ADMIN — Medication 1 TABLET(S): at 12:05

## 2018-05-18 RX ADMIN — Medication 100 MILLIGRAM(S): at 00:06

## 2018-05-18 RX ADMIN — Medication 100 MILLIGRAM(S): at 17:18

## 2018-05-18 RX ADMIN — Medication 1 MILLIGRAM(S): at 12:05

## 2018-05-18 RX ADMIN — Medication 100 MILLIGRAM(S): at 12:06

## 2018-05-18 RX ADMIN — DEXMEDETOMIDINE HYDROCHLORIDE IN 0.9% SODIUM CHLORIDE 13.09 MICROGRAM(S)/KG/HR: 4 INJECTION INTRAVENOUS at 04:00

## 2018-05-18 RX ADMIN — Medication 4 MILLIGRAM(S): at 20:59

## 2018-05-18 RX ADMIN — DEXMEDETOMIDINE HYDROCHLORIDE IN 0.9% SODIUM CHLORIDE 13.09 MICROGRAM(S)/KG/HR: 4 INJECTION INTRAVENOUS at 10:06

## 2018-05-18 RX ADMIN — DEXMEDETOMIDINE HYDROCHLORIDE IN 0.9% SODIUM CHLORIDE 13.09 MICROGRAM(S)/KG/HR: 4 INJECTION INTRAVENOUS at 00:02

## 2018-05-18 RX ADMIN — DEXMEDETOMIDINE HYDROCHLORIDE IN 0.9% SODIUM CHLORIDE 13.09 MICROGRAM(S)/KG/HR: 4 INJECTION INTRAVENOUS at 15:28

## 2018-05-18 NOTE — PROGRESS NOTE ADULT - SUBJECTIVE AND OBJECTIVE BOX
Patient is a 42y old  Male who presents with a chief complaint of alcohol withdrawal/ pncreatitis/ electrolyte imbalnc/-Hypoklemia. Hypontremia/ vomiting (04 May 2018 14:01)      INTERVAL HPI/OVERNIGHT EVENTS:  improving  Extubated  follows simple commands  still confused on 1:1    MEDICATIONS  (STANDING):  chlordiazePOXIDE 100 milliGRAM(s) Oral every 6 hours  chlorhexidine 4% Liquid 1 Application(s) Topical <User Schedule>  dexmedetomidine Infusion 0.7 MICROgram(s)/kG/Hr (13.09 mL/Hr) IV Continuous <Continuous>  folic acid 1 milliGRAM(s) Oral daily  haloperidol     Tablet 5 milliGRAM(s) Oral every 8 hours  heparin  Injectable 5000 Unit(s) SubCutaneous every 12 hours  levoFLOXacin IVPB 750 milliGRAM(s) IV Intermittent every 24 hours  multivitamin 1 Tablet(s) Oral daily  thiamine 100 milliGRAM(s) Oral daily    MEDICATIONS  (PRN):  acetaminophen  Suppository 650 milliGRAM(s) Rectal every 6 hours PRN For Temp greater than 38 C (100.4 F)  LORazepam   Injectable 2 milliGRAM(s) IV Push every 2 hours PRN CIWA 8-20  LORazepam   Injectable 4 milliGRAM(s) IV Push every 2 hours PRN CIWA >20        REVIEW OF SYSTEMS:  CONSTITUTIONAL: No fever, weight loss, or fatigue  EYES: No eye pain, visual disturbances, or discharge  ENMT:  No difficulty hearing, tinnitus, vertigo; No sinus or throat pain  NECK: No pain or stiffness  RESPIRATORY: No cough, wheezing, chills or hemoptysis; No shortness of breath  CARDIOVASCULAR: No chest pain, palpitations, dizziness, or leg swelling  GASTROINTESTINAL: No abdominal or epigastric pain. No nausea, vomiting, or hematemesis; No diarrhea or constipation. No melena or hematochezia.  GENITOURINARY: No dysuria, frequency, hematuria, or incontinence  NEUROLOGICAL: No headaches, memory loss, loss of strength, numbness, or tremors  SKIN: No itching, burning, rashes, or lesions      Vital Signs Last 24 Hrs  T(C): 36.6 (18 May 2018 07:34), Max: 37.5 (17 May 2018 15:24)  T(F): 97.9 (18 May 2018 07:34), Max: 99.5 (17 May 2018 15:24)  HR: 97 (18 May 2018 10:00) (80 - 103)  BP: 93/51 (18 May 2018 10:00) (93/51 - 150/77)  BP(mean): 61 (18 May 2018 10:00) (61 - 130)  RR: 23 (18 May 2018 10:00) (7 - 30)  SpO2: 100% (18 May 2018 10:00) (93% - 100%)    PHYSICAL EXAM:  GENERAL: NAD, well-groomed, well-developed  HEAD:  Atraumatic, Normocephalic  EYES: EOMI, PERRLA, conjunctiva and sclera clear  ENMT: No tonsillar erythema, exudates, or enlargement; Moist mucous membranes   NECK: Supple, No JVD   NERVOUS SYSTEM:  Alert & Oriented X3, Good concentration; Motor Strength 5/5 B/L upper and lower extremities; DTRs 2+ intact and symmetric  CHEST/LUNG: Clear to percussion bilaterally; No rales, rhonchi, wheezing, or rubs  HEART: Regular rate and rhythm; No murmurs, rubs, or gallops  ABDOMEN: Soft, Nontender, Nondistended; Bowel sounds present  EXTREMITIES:  2+ Peripheral Pulses, No clubbing, cyanosis, or edema  LYMPH: No lymphadenopathy noted  SKIN: No rashes or lesions    LABS:                        10.5   9.15  )-----------( 485      ( 18 May 2018 04:08 )             30.6     05-18    139  |  100  |  4<L>  ----------------------------<  84  3.3<L>   |  26  |  0.50    Ca    9.2      18 May 2018 04:08  Phos  2.0     05-18  Mg     1.6     05-18    TPro  7.7  /  Alb  2.7<L>  /  TBili  0.6  /  DBili  x   /  AST  42<H>  /  ALT  43  /  AlkPhos  105  05-18        CAPILLARY BLOOD GLUCOSE          RADIOLOGY & ADDITIONAL TESTS:    Imaging Personally Reviewed:  [ ] YES  [ ] NO    Consultant(s) Notes Reviewed:  [ ] YES  [ ] NO    Care Discussed with Consultants/Other Providers [ ] YES  [ ] NO

## 2018-05-18 NOTE — PROGRESS NOTE ADULT - ASSESSMENT
alcohol withdrawal/ Pancreatitis , Hypokalemia ,hyponatremia Elevated serum protien  follow up labs  clinically improved  fever w/up neg so far  continue icu care, taper sedation

## 2018-05-18 NOTE — PROGRESS NOTE ADULT - SUBJECTIVE AND OBJECTIVE BOX
HPI:  43 yo M with total body pain, n/v/d for about 4 days.  Pt. admits to chronic drinking (5 glasses of wine per day).  He stopped days ago due to n/v and epigastric pain.  He thought he should stop because he figured alcohol was irritating his stomach.  He also notes diffuse cramps in his muscles.  He's been drinking Gatorade for the last few days to replenish fluids.  Pt. denies inciting event.  No sick contacts, no recent travel.  He was well before.  No other complaints.   	ROS: negative for fever, cough, headache, shortness of breath, and rash.  PMH: negative; Meds: Denies; SH: chronic alcohol and marijuana use (04 May 2018 14:01)      24 hr events:      ## ROS:  [ ] unable to obtain  CONSTITUTIONAL: No fever, weight loss, or fatigue  EYES: No eye pain, visual disturbances, or discharge  ENMT:  No difficulty hearing, tinnitus, vertigo; No sinus or throat pain  NECK: No pain or stiffness  RESPIRATORY: No cough, wheezing, chills or hemoptysis; No shortness of breath  CARDIOVASCULAR: No chest pain, palpitations, dizziness, or leg swelling  GASTROINTESTINAL: No abdominal or epigastric pain. No nausea, vomiting, or hematemesis; No diarrhea or constipation. No melena or hematochezia.  GENITOURINARY: No dysuria, frequency, hematuria, or incontinence  NEUROLOGICAL: No headaches, memory loss, loss of strength, numbness, or tremors  SKIN: No itching, burning, rashes, or lesions   LYMPH NODES: No enlarged glands  ENDOCRINE: No heat or cold intolerance; No hair loss  MUSCULOSKELETAL: No joint pain or swelling; No muscle, back, or extremity pain  PSYCHIATRIC: No depression, anxiety, mood swings, or difficulty sleeping  HEME/LYMPH: No easy bruising, or bleeding gums  ALLERGY AND IMMUNOLOGIC: No hives or eczema    ## Labs:  CBC:                        10.5   9.15  )-----------( 485      ( 18 May 2018 04:08 )             30.6     Chem:  -    139  |  100  |  4<L>  ----------------------------<  84  3.3<L>   |  26  |  0.50    Ca    9.2      18 May 2018 04:08  Phos  2.0       Mg     1.6         TPro  7.7  /  Alb  2.7<L>  /  TBili  0.6  /  DBili  x   /  AST  42<H>  /  ALT  43  /  AlkPhos  105      Coags:          ## Imaging:    ## Medications:  levoFLOXacin IVPB 750 milliGRAM(s) IV Intermittent every 24 hours          heparin  Injectable 5000 Unit(s) SubCutaneous every 12 hours      acetaminophen  Suppository 650 milliGRAM(s) Rectal every 6 hours PRN  chlordiazePOXIDE 100 milliGRAM(s) Oral every 6 hours  dexmedetomidine Infusion 0.7 MICROgram(s)/kG/Hr IV Continuous <Continuous>  haloperidol     Tablet 5 milliGRAM(s) Oral every 8 hours  LORazepam   Injectable 2 milliGRAM(s) IV Push every 2 hours PRN  LORazepam   Injectable 4 milliGRAM(s) IV Push every 2 hours PRN      ## Vitals:  T(C): 36.1 (18 @ 18:53), Max: 36.7 (18 @ 11:06)  HR: 90 (18 @ 19:20) (79 - 103)  BP: 94/55 (18 @ 19:00) (89/52 - 150/77)  BP(mean): 65 (18 @ 19:00) (60 - 130)  RR: 29 (18 @ 19:20) (10 - 30)  SpO2: 98% (18 @ 19:20) (81% - 100%)  Wt(kg): --  Vent:   AB-17 @ 07:  -   @ 07:00  --------------------------------------------------------  IN: 1642.7 mL / OUT: 1600 mL / NET: 42.7 mL     @ 07:01  -   @ 22:15  --------------------------------------------------------  IN: 679.6 mL / OUT: 0 mL / NET: 679.6 mL          ## P/E:  Gen: lying comfortably in bed in no apparent distress  HEENT: PERRL, EOMI  Resp: CTA B/L no c/r/w  CVS: S1S2 no m/r/g  Abd: soft NT/ND +BS  Ext: no c/c/e  Neuro: A&Ox3    CENTRAL LINE: [ ] YES [ ] NO  LOCATION:   DATE INSERTED:  REMOVE: [ ] YES [ ] NO      QUINTEROS: [ ] YES [ ] NO    DATE INSERTED:  REMOVE:  [ ] YES [ ] NO      A-LINE:  [ ] YES [ ] NO  LOCATION:   DATE INSERTED:  REMOVE:  [ ] YES [ ] NO  EXPLAIN:    GLOBAL ISSUE/BEST PRACTICE:  Analgesia:  Sedation:  HOB elevation: yes  Stress ulcer prophylaxis:  VTE prophylaxis:  Oral Care:  Glycemic control:  Nutrition:    CODE STATUS: [ ] full code  [ ] DNR  [ ] DNI  [ ] MOLST  Goals of care discussion: [ ] yes HPI:  41 yo M PMH alcohol abuse (4-5 glasses of wine per day) presents with total body pain, abdominal pain, n/v/d for about 4 days. Per report pt stopped drinking approximately 3 days prior to admission due to n/v and epigastric pain.  Admitted to ICU for pancreatitis and alcohol withdrawal/DTs CIWA 29. Intubated 5/8 for acute hypoxic respiratory failure due to pneumonia.    24 hr events:  weaning down precedex drip  less agitated today, more appropriate      ## ROS:  [x] limited due to delirium  no abdominal pain  no CP  no SOB    ## Labs:  CBC:                        10.5   9.15  )-----------( 485      ( 18 May 2018 04:08 )             30.6     Chem:  05-18    139  |  100  |  4<L>  ----------------------------<  84  3.3<L>   |  26  |  0.50    Ca    9.2      18 May 2018 04:08  Phos  2.0     05-18  Mg     1.6     05-18    TPro  7.7  /  Alb  2.7<L>  /  TBili  0.6  /  DBili  x   /  AST  42<H>  /  ALT  43  /  AlkPhos  105  05-18      ## Medications:  levoFLOXacin IVPB 750 milliGRAM(s) IV Intermittent every 24 hours    heparin  Injectable 5000 Unit(s) SubCutaneous every 12 hours      acetaminophen  Suppository 650 milliGRAM(s) Rectal every 6 hours PRN  chlordiazePOXIDE 100 milliGRAM(s) Oral every 6 hours  dexmedetomidine Infusion 0.7 MICROgram(s)/kG/Hr IV Continuous <Continuous>  haloperidol     Tablet 5 milliGRAM(s) Oral every 8 hours  LORazepam   Injectable 2 milliGRAM(s) IV Push every 2 hours PRN  LORazepam   Injectable 4 milliGRAM(s) IV Push every 2 hours PRN      ## Vitals:  T(C): 36.1 (05-18-18 @ 18:53), Max: 36.7 (05-18-18 @ 11:06)  HR: 90 (05-18-18 @ 19:20) (79 - 103)  BP: 94/55 (05-18-18 @ 19:00) (89/52 - 150/77)  BP(mean): 65 (05-18-18 @ 19:00) (60 - 130)  RR: 29 (05-18-18 @ 19:20) (10 - 30)  SpO2: 98% (05-18-18 @ 19:20) (81% - 100%)      05-17 @ 07:01  -  05-18 @ 07:00  --------------------------------------------------------  IN: 1642.7 mL / OUT: 1600 mL / NET: 42.7 mL    05-18 @ 07:01  -  05-18 @ 22:15  --------------------------------------------------------  IN: 679.6 mL / OUT: 0 mL / NET: 679.6 mL          ## P/E:  Gen: lying comfortably in bed, restless but improved  HEENT: PERRL, EOMI  Resp: CTA B/L no rales, no wheeze  CVS: S1S2 RRR  Abd: soft NT/ND +BS  Ext: no c/c/e  Neuro: A&Ox2, follows simple commands    CENTRAL LINE: [ ] YES [x] NO  LOCATION:   DATE INSERTED:  REMOVE: [ ] YES [ ] NO      QUINTEROS: [ ] YES [x] NO    DATE INSERTED:  REMOVE:  [ ] YES [ ] NO      A-LINE:  [ ] YES [x] NO  LOCATION:   DATE INSERTED:  REMOVE:  [ ] YES [ ] NO  EXPLAIN:    CODE STATUS: [x] full code  [ ] DNR  [ ] DNI  [ ] [facility]  Goals of care discussion: [ ] yes

## 2018-05-19 LAB
ALBUMIN SERPL ELPH-MCNC: 2.5 G/DL — LOW (ref 3.3–5)
ALP SERPL-CCNC: 101 U/L — SIGNIFICANT CHANGE UP (ref 40–120)
ALT FLD-CCNC: 42 U/L — SIGNIFICANT CHANGE UP (ref 12–78)
ANION GAP SERPL CALC-SCNC: 9 MMOL/L — SIGNIFICANT CHANGE UP (ref 5–17)
AST SERPL-CCNC: 44 U/L — HIGH (ref 15–37)
BILIRUB SERPL-MCNC: 0.5 MG/DL — SIGNIFICANT CHANGE UP (ref 0.2–1.2)
BUN SERPL-MCNC: 4 MG/DL — LOW (ref 7–23)
CALCIUM SERPL-MCNC: 9.4 MG/DL — SIGNIFICANT CHANGE UP (ref 8.5–10.1)
CHLORIDE SERPL-SCNC: 107 MMOL/L — SIGNIFICANT CHANGE UP (ref 96–108)
CO2 SERPL-SCNC: 24 MMOL/L — SIGNIFICANT CHANGE UP (ref 22–31)
CREAT SERPL-MCNC: 0.51 MG/DL — SIGNIFICANT CHANGE UP (ref 0.5–1.3)
CULTURE RESULTS: SIGNIFICANT CHANGE UP
CULTURE RESULTS: SIGNIFICANT CHANGE UP
GLUCOSE SERPL-MCNC: 105 MG/DL — HIGH (ref 70–99)
HCT VFR BLD CALC: 32.9 % — LOW (ref 39–50)
HGB BLD-MCNC: 11.2 G/DL — LOW (ref 13–17)
MAGNESIUM SERPL-MCNC: 1.9 MG/DL — SIGNIFICANT CHANGE UP (ref 1.6–2.6)
MCHC RBC-ENTMCNC: 32.7 PG — SIGNIFICANT CHANGE UP (ref 27–34)
MCHC RBC-ENTMCNC: 34 GM/DL — SIGNIFICANT CHANGE UP (ref 32–36)
MCV RBC AUTO: 96.2 FL — SIGNIFICANT CHANGE UP (ref 80–100)
NRBC # BLD: 0 /100 WBCS — SIGNIFICANT CHANGE UP (ref 0–0)
PHOSPHATE SERPL-MCNC: 4.3 MG/DL — SIGNIFICANT CHANGE UP (ref 2.5–4.5)
PLATELET # BLD AUTO: 488 K/UL — HIGH (ref 150–400)
POTASSIUM SERPL-MCNC: 4.4 MMOL/L — SIGNIFICANT CHANGE UP (ref 3.5–5.3)
POTASSIUM SERPL-SCNC: 4.4 MMOL/L — SIGNIFICANT CHANGE UP (ref 3.5–5.3)
PROT SERPL-MCNC: 7.5 GM/DL — SIGNIFICANT CHANGE UP (ref 6–8.3)
RBC # BLD: 3.42 M/UL — LOW (ref 4.2–5.8)
RBC # FLD: 12.5 % — SIGNIFICANT CHANGE UP (ref 10.3–14.5)
SODIUM SERPL-SCNC: 140 MMOL/L — SIGNIFICANT CHANGE UP (ref 135–145)
SPECIMEN SOURCE: SIGNIFICANT CHANGE UP
SPECIMEN SOURCE: SIGNIFICANT CHANGE UP
WBC # BLD: 6.55 K/UL — SIGNIFICANT CHANGE UP (ref 3.8–10.5)
WBC # FLD AUTO: 6.55 K/UL — SIGNIFICANT CHANGE UP (ref 3.8–10.5)

## 2018-05-19 RX ORDER — SODIUM CHLORIDE 9 MG/ML
1000 INJECTION INTRAMUSCULAR; INTRAVENOUS; SUBCUTANEOUS ONCE
Qty: 0 | Refills: 0 | Status: COMPLETED | OUTPATIENT
Start: 2018-05-19 | End: 2018-05-19

## 2018-05-19 RX ORDER — QUETIAPINE FUMARATE 200 MG/1
50 TABLET, FILM COATED ORAL AT BEDTIME
Qty: 0 | Refills: 0 | Status: DISCONTINUED | OUTPATIENT
Start: 2018-05-19 | End: 2018-05-20

## 2018-05-19 RX ADMIN — SCOPALAMINE 1 PATCH: 1 PATCH, EXTENDED RELEASE TRANSDERMAL at 13:28

## 2018-05-19 RX ADMIN — CHLORHEXIDINE GLUCONATE 1 APPLICATION(S): 213 SOLUTION TOPICAL at 06:35

## 2018-05-19 RX ADMIN — Medication 100 MILLIGRAM(S): at 12:15

## 2018-05-19 RX ADMIN — Medication 100 MILLIGRAM(S): at 12:12

## 2018-05-19 RX ADMIN — SODIUM CHLORIDE 2000 MILLILITER(S): 9 INJECTION INTRAMUSCULAR; INTRAVENOUS; SUBCUTANEOUS at 11:18

## 2018-05-19 RX ADMIN — HALOPERIDOL DECANOATE 5 MILLIGRAM(S): 100 INJECTION INTRAMUSCULAR at 06:35

## 2018-05-19 RX ADMIN — Medication 1 TABLET(S): at 12:12

## 2018-05-19 RX ADMIN — HEPARIN SODIUM 5000 UNIT(S): 5000 INJECTION INTRAVENOUS; SUBCUTANEOUS at 17:35

## 2018-05-19 RX ADMIN — HALOPERIDOL DECANOATE 5 MILLIGRAM(S): 100 INJECTION INTRAMUSCULAR at 22:03

## 2018-05-19 RX ADMIN — Medication 1 MILLIGRAM(S): at 12:12

## 2018-05-19 RX ADMIN — QUETIAPINE FUMARATE 50 MILLIGRAM(S): 200 TABLET, FILM COATED ORAL at 22:04

## 2018-05-19 RX ADMIN — Medication 100 MILLIGRAM(S): at 22:00

## 2018-05-19 RX ADMIN — HEPARIN SODIUM 5000 UNIT(S): 5000 INJECTION INTRAVENOUS; SUBCUTANEOUS at 06:35

## 2018-05-19 RX ADMIN — Medication 100 MILLIGRAM(S): at 06:35

## 2018-05-19 RX ADMIN — HALOPERIDOL DECANOATE 5 MILLIGRAM(S): 100 INJECTION INTRAMUSCULAR at 13:40

## 2018-05-19 RX ADMIN — DEXMEDETOMIDINE HYDROCHLORIDE IN 0.9% SODIUM CHLORIDE 13.09 MICROGRAM(S)/KG/HR: 4 INJECTION INTRAVENOUS at 06:36

## 2018-05-19 NOTE — PROGRESS NOTE ADULT - SUBJECTIVE AND OBJECTIVE BOX
# CC: Patient is a 42y old  Male who presents with a chief complaint of alcohol withdrawal/ pncreatitis/ electrolyte imbalnc/-Hypoklemia. Hypontremia/ vomiting (04 May 2018 14:01)    ## HPI:  41 yo M PMH alcohol abuse (4-5 glasses of wine per day) presents with total body pain, abdominal pain, n/v/d for about 4 days. Per report pt stopped drinking approximately 3 days prior to admission due to n/v and epigastric pain.  Admitted to ICU for pancreatitis and alcohol withdrawal/DTs CIWA 29. Intubated 5/8 for acute hypoxic respiratory failure due to pneumonia. Extubated on 5/16/18.    **O/N:**  Remains extubated.    ## ROS:  Poor , not well oriented.    ## Labs:             11.2   6.55  )-----------( 488      ( 19 May 2018 02:12 )             32.9     Chem:  05-19    140  |  107  |  4<L>  ----------------------------<  105<H>  4.4   |  24  |  0.51    Ca    9.4      19 May 2018 02:12  Phos  4.3     05-19  Mg     1.9     05-19    TPro  7.5  /  Alb  2.5<L>  /  TBili  0.5  /  DBili  x   /  AST  44<H>  /  ALT  42  /  AlkPhos  101  05-19    Culture - Blood (collected 14 May 2018 18:18)  No growth at 5 days.  Culture - Blood (collected 14 May 2018 18:18)  No growth at 5 days.  Culture - Sputum (collected 11 May 2018 00:27)    Moderate Staphylococcus aureus ***********Note************  Organism: Staphylococcus aureus (12 May 2018 19:09)      -  Ampicillin/Sulbactam: S <=8/4      -  Cefazolin: S <=4      -  Ciprofloxacin: S <=1      -  Clindamycin: R <=0.25      -  Erythromycin: R >4      -  Gentamicin: S <=1      -  Levofloxacin: S <=0.5      -  Moxifloxacin(Aerobic): S <=0.5      -  Oxacillin: S <=0.25      -  Penicillin: R 8      -  RIF- Rifampin: S <=1      -  Tetra/Doxy: S <=1      -  Trimethoprim/Sulfamethoxazole: S <=0.5/9.5      -  Vancomycin: S 2      Method Type: KEITH  Culture - Blood (collected 11 May 2018 00:26)    Growth in aerobic bottle: Coag Negative Staphylococcus      -  Ampicillin/Sulbactam: S <=8/4      -  Cefazolin: S <=4      -  Ciprofloxacin: S <=1      -  Clindamycin: S <=0.25      -  Erythromycin: S <=0.25      -  Gentamicin: S <=1      -  Levofloxacin: S <=0.5      -  Moxifloxacin(Aerobic): S <=0.5      -  Oxacillin: S <=0.25      -  RIF- Rifampin: S <=1      -  Tetra/Doxy: S <=1      -  Trimethoprim/Sulfamethoxazole: S <=0.5/9.5      -  Vancomycin: S 1      Method Type: KEITH  Culture - Blood (collected 11 May 2018 00:26)    Growth in aerobic and anaerobic bottles: Coag Negative Staphylococcus    ## Imaging: No new imaging    ## Medications:  levoFLOXacin IVPB 750 milliGRAM(s) IV Intermittent every 24 hours    acetaminophen  Suppository 650 milliGRAM(s) Rectal every 6 hours PRN  chlordiazePOXIDE 100 milliGRAM(s) Oral every 8 hours  haloperidol     Tablet 5 milliGRAM(s) Oral every 8 hours  LORazepam   Injectable 2 milliGRAM(s) IV Push every 2 hours PRN  LORazepam   Injectable 4 milliGRAM(s) IV Push every 2 hours PRN  QUEtiapine 50 milliGRAM(s) Oral at bedtime    heparin  Injectable 5000 Unit(s) SubCutaneous every 12 hours    ## O/E:  T(C): 37.2 (19 May 2018 23:06), Max: 37.6 (19 May 2018 22:00)  HR: 127 (19 May 2018 23:15) (81 - 127)  BP: 95/53 (19 May 2018 23:15) (78/58 - 128/87)  BP(mean): 57 (19 May 2018 23:15) (57 - 97)  RR: 13 (19 May 2018 23:15) (13 - 28)  SpO2: 100% (19 May 2018 23:15) (96% - 100%)  IN: 862.9 mL / OUT: 300 mL / NET: 562.9 mL    Gen: lying comfortably in bed, disoriented but alert  HEENT: sluggish but reactive pupils  Resp: CTA B/L no c/r/w  CVS: S1S2 no m/r/g  Abd: soft NT/ND +BS  Ext: no c/c/e  Neuro: poorly oriented, confused.    ## Daily Issues  - HOB elevation: Y  - GI ppx (ulcers): N/A  - DVT ppx: Hep SC  - Nutrition: PO diet    ## Assessment / Plan:  42M with etOH withdrawal  - Now 16 days in hospital, withdrawal is not likely cause  - Librium with taper  - Seroquel / Haldol for possible psych issues  - Sputum positive MSSA, on Levaquin, Blood positive CoNS but cultures negative since 5/14  - No leukocytosis  - Remains on 1:1    ## Code status:  Goals of care discussion: [x] yes [ ] no  [x] full code  [ ] DNR  [ ] DNI  [ ] MOLST    Continue to monitor in ICU

## 2018-05-19 NOTE — PROGRESS NOTE ADULT - SUBJECTIVE AND OBJECTIVE BOX
Patient is a 42y old  Male who presents with a chief complaint of alcohol withdrawal/ pncreatitis/ electrolyte imbalnc/-Hypoklemia. Hypontremia/ vomiting (04 May 2018 14:01)      INTERVAL HPI/OVERNIGHT EVENTS:  no new episode  MEDICATIONS  (STANDING):  chlordiazePOXIDE 100 milliGRAM(s) Oral every 8 hours  chlordiazePOXIDE   Oral   chlorhexidine 4% Liquid 1 Application(s) Topical <User Schedule>  dexmedetomidine Infusion 0.7 MICROgram(s)/kG/Hr (13.09 mL/Hr) IV Continuous <Continuous>  folic acid 1 milliGRAM(s) Oral daily  haloperidol     Tablet 5 milliGRAM(s) Oral every 8 hours  heparin  Injectable 5000 Unit(s) SubCutaneous every 12 hours  levoFLOXacin IVPB 750 milliGRAM(s) IV Intermittent every 24 hours  multivitamin 1 Tablet(s) Oral daily  QUEtiapine 50 milliGRAM(s) Oral at bedtime    MEDICATIONS  (PRN):  acetaminophen  Suppository 650 milliGRAM(s) Rectal every 6 hours PRN For Temp greater than 38 C (100.4 F)  LORazepam   Injectable 2 milliGRAM(s) IV Push every 2 hours PRN CIWA 8-20  LORazepam   Injectable 4 milliGRAM(s) IV Push every 2 hours PRN CIWA >20      Allergies    No Known Allergies    Intolerances        REVIEW OF SYSTEMS:  CONSTITUTIONAL: No fever, weight loss, or fatigue  EYES: No eye pain, visual disturbances, or discharge  ENMT:  No difficulty hearing, tinnitus, vertigo; No sinus or throat pain  NECK: No pain or stiffness  BREASTS: No pain, masses, or nipple discharge  RESPIRATORY: No cough, wheezing, chills or hemoptysis; No shortness of breath  CARDIOVASCULAR: No chest pain, palpitations, dizziness, or leg swelling  GASTROINTESTINAL: No abdominal or epigastric pain. No nausea, vomiting, or hematemesis; No diarrhea or constipation. No melena or hematochezia.  GENITOURINARY: No dysuria, frequency, hematuria, or incontinence  NEUROLOGICAL: No headaches, memory loss, loss of strength, numbness, or tremors  SKIN: No itching, burning, rashes, or lesions   LYMPH NODES: No enlarged glands  ENDOCRINE: No heat or cold intolerance; No hair loss  MUSCULOSKELETAL: No joint pain or swelling; No muscle, back, or extremity pain  PSYCHIATRIC: No depression, anxiety, mood swings, or difficulty sleeping  HEME/LYMPH: No easy bruising, or bleeding gums  ALLERGY AND IMMUNOLOGIC: No hives or eczema    Vital Signs Last 24 Hrs  T(C): 36.1 (19 May 2018 12:00), Max: 36.4 (18 May 2018 15:26)  T(F): 97 (19 May 2018 12:00), Max: 97.6 (18 May 2018 15:26)  HR: 112 (19 May 2018 13:00) (51 - 112)  BP: 97/78 (19 May 2018 13:00) (79/60 - 174/97)  BP(mean): 82 (19 May 2018 13:00) (60 - 115)  RR: 15 (19 May 2018 13:00) (13 - 29)  SpO2: 100% (19 May 2018 13:00) (81% - 100%)    PHYSICAL EXAM:  GENERAL: NAD, well-groomed, well-developed  HEAD:  Atraumatic, Normocephalic  EYES: EOMI, PERRLA, conjunctiva and sclera clear  ENMT: No tonsillar erythema, exudates, or enlargement; Moist mucous membranes, Good dentition, No lesions  NECK: Supple, No JVD, Normal thyroid  NERVOUS SYSTEM:  Alert & Oriented X3, Good concentration; Motor Strength 5/5 B/L upper and lower extremities; DTRs 2+ intact and symmetric  CHEST/LUNG: Clear to percussion bilaterally; No rales, rhonchi, wheezing, or rubs  HEART: Regular rate and rhythm; No murmurs, rubs, or gallops  ABDOMEN: Soft, Nontender, Nondistended; Bowel sounds present  EXTREMITIES:  2+ Peripheral Pulses, No clubbing, cyanosis, or edema  LYMPH: No lymphadenopathy noted  SKIN: No rashes or lesions    LABS:                        11.2   6.55  )-----------( 488      ( 19 May 2018 02:12 )             32.9     05-19    140  |  107  |  4<L>  ----------------------------<  105<H>  4.4   |  24  |  0.51    Ca    9.4      19 May 2018 02:12  Phos  4.3     05-19  Mg     1.9     05-19    TPro  7.5  /  Alb  2.5<L>  /  TBili  0.5  /  DBili  x   /  AST  44<H>  /  ALT  42  /  AlkPhos  101  05-19        CAPILLARY BLOOD GLUCOSE        CULTURES:    HEMOGLOBIN A1C:    CHOLESTEROL:        RADIOLOGY & ADDITIONAL TESTS:

## 2018-05-20 LAB
ALBUMIN SERPL ELPH-MCNC: 2.7 G/DL — LOW (ref 3.3–5)
ALP SERPL-CCNC: 96 U/L — SIGNIFICANT CHANGE UP (ref 40–120)
ALT FLD-CCNC: 56 U/L — SIGNIFICANT CHANGE UP (ref 12–78)
ANION GAP SERPL CALC-SCNC: 9 MMOL/L — SIGNIFICANT CHANGE UP (ref 5–17)
AST SERPL-CCNC: 59 U/L — HIGH (ref 15–37)
BILIRUB SERPL-MCNC: 0.5 MG/DL — SIGNIFICANT CHANGE UP (ref 0.2–1.2)
BUN SERPL-MCNC: 6 MG/DL — LOW (ref 7–23)
CALCIUM SERPL-MCNC: 9 MG/DL — SIGNIFICANT CHANGE UP (ref 8.5–10.1)
CHLORIDE SERPL-SCNC: 105 MMOL/L — SIGNIFICANT CHANGE UP (ref 96–108)
CO2 SERPL-SCNC: 25 MMOL/L — SIGNIFICANT CHANGE UP (ref 22–31)
CREAT SERPL-MCNC: 0.68 MG/DL — SIGNIFICANT CHANGE UP (ref 0.5–1.3)
GLUCOSE SERPL-MCNC: 92 MG/DL — SIGNIFICANT CHANGE UP (ref 70–99)
HCT VFR BLD CALC: 29.7 % — LOW (ref 39–50)
HGB BLD-MCNC: 10.1 G/DL — LOW (ref 13–17)
MAGNESIUM SERPL-MCNC: 1.4 MG/DL — LOW (ref 1.6–2.6)
MCHC RBC-ENTMCNC: 32.2 PG — SIGNIFICANT CHANGE UP (ref 27–34)
MCHC RBC-ENTMCNC: 34 GM/DL — SIGNIFICANT CHANGE UP (ref 32–36)
MCV RBC AUTO: 94.6 FL — SIGNIFICANT CHANGE UP (ref 80–100)
NRBC # BLD: 0 /100 WBCS — SIGNIFICANT CHANGE UP (ref 0–0)
PHOSPHATE SERPL-MCNC: 2.6 MG/DL — SIGNIFICANT CHANGE UP (ref 2.5–4.5)
PLATELET # BLD AUTO: 503 K/UL — HIGH (ref 150–400)
POTASSIUM SERPL-MCNC: 3.5 MMOL/L — SIGNIFICANT CHANGE UP (ref 3.5–5.3)
POTASSIUM SERPL-SCNC: 3.5 MMOL/L — SIGNIFICANT CHANGE UP (ref 3.5–5.3)
PROT SERPL-MCNC: 7.4 GM/DL — SIGNIFICANT CHANGE UP (ref 6–8.3)
RBC # BLD: 3.14 M/UL — LOW (ref 4.2–5.8)
RBC # FLD: 12.5 % — SIGNIFICANT CHANGE UP (ref 10.3–14.5)
SODIUM SERPL-SCNC: 139 MMOL/L — SIGNIFICANT CHANGE UP (ref 135–145)
WBC # BLD: 8.63 K/UL — SIGNIFICANT CHANGE UP (ref 3.8–10.5)
WBC # FLD AUTO: 8.63 K/UL — SIGNIFICANT CHANGE UP (ref 3.8–10.5)

## 2018-05-20 RX ORDER — MAGNESIUM SULFATE 500 MG/ML
2 VIAL (ML) INJECTION ONCE
Qty: 0 | Refills: 0 | Status: COMPLETED | OUTPATIENT
Start: 2018-05-20 | End: 2018-05-20

## 2018-05-20 RX ORDER — QUETIAPINE FUMARATE 200 MG/1
25 TABLET, FILM COATED ORAL ONCE
Qty: 0 | Refills: 0 | Status: COMPLETED | OUTPATIENT
Start: 2018-05-20 | End: 2018-05-20

## 2018-05-20 RX ORDER — QUETIAPINE FUMARATE 200 MG/1
50 TABLET, FILM COATED ORAL
Qty: 0 | Refills: 0 | Status: DISCONTINUED | OUTPATIENT
Start: 2018-05-20 | End: 2018-05-22

## 2018-05-20 RX ORDER — ACETAMINOPHEN 500 MG
650 TABLET ORAL EVERY 6 HOURS
Qty: 0 | Refills: 0 | Status: DISCONTINUED | OUTPATIENT
Start: 2018-05-20 | End: 2018-05-25

## 2018-05-20 RX ADMIN — Medication 100 MILLIGRAM(S): at 12:41

## 2018-05-20 RX ADMIN — QUETIAPINE FUMARATE 25 MILLIGRAM(S): 200 TABLET, FILM COATED ORAL at 12:41

## 2018-05-20 RX ADMIN — Medication 50 GRAM(S): at 05:21

## 2018-05-20 RX ADMIN — Medication 650 MILLIGRAM(S): at 11:52

## 2018-05-20 RX ADMIN — Medication 100 MILLIGRAM(S): at 21:39

## 2018-05-20 RX ADMIN — Medication 100 MILLIGRAM(S): at 05:21

## 2018-05-20 RX ADMIN — HEPARIN SODIUM 5000 UNIT(S): 5000 INJECTION INTRAVENOUS; SUBCUTANEOUS at 05:23

## 2018-05-20 RX ADMIN — Medication 1 TABLET(S): at 11:45

## 2018-05-20 RX ADMIN — QUETIAPINE FUMARATE 50 MILLIGRAM(S): 200 TABLET, FILM COATED ORAL at 21:39

## 2018-05-20 RX ADMIN — Medication 650 MILLIGRAM(S): at 12:42

## 2018-05-20 RX ADMIN — Medication 1 MILLIGRAM(S): at 11:45

## 2018-05-20 RX ADMIN — HEPARIN SODIUM 5000 UNIT(S): 5000 INJECTION INTRAVENOUS; SUBCUTANEOUS at 17:17

## 2018-05-20 RX ADMIN — CHLORHEXIDINE GLUCONATE 1 APPLICATION(S): 213 SOLUTION TOPICAL at 05:23

## 2018-05-20 RX ADMIN — HALOPERIDOL DECANOATE 5 MILLIGRAM(S): 100 INJECTION INTRAMUSCULAR at 05:21

## 2018-05-20 NOTE — PROGRESS NOTE ADULT - ASSESSMENT
alcohol withdrawal/ Pancreatitis , Hypokalemia ,hyponatremia Elevated serum protien  follow up labs  clinically improved  fever w/up neg so far  for transfer to regular floor  taper sedation

## 2018-05-20 NOTE — CHART NOTE - NSCHARTNOTEFT_GEN_A_CORE
HPI    43 yo M PMH alcohol abuse (4-5 glasses of wine per day Admitted to ICU for pancreatitis and alcohol withdrawal/DTs CIWA 29. Intubated 5/8 for acute hypoxic respiratory failure due to pneumonia. Extubated on 5/16/18. Agitated at times requiring increasing sedation, on Precedex. remained delirious, started on librium 100 q6H, now on titrating doses, with Haldol 5 mg PO Q8H for delirium and agitation.  Decreased agitation, started on Seroquel for delirium,  and haldol dc'd, now  improving, Awake alert cooperative.   On 1:1 for safety. Physical therapy ordered for decondition. HPI    43 yo M PMH alcohol abuse (4-5 glasses of wine per day Admitted to ICU for pancreatitis and alcohol withdrawal/DTs CIWA 29. Intubated 5/8 for acute hypoxic respiratory failure due to pneumonia  Sputum positive MSSA, on Levaquin, Blood positive CoNS but cultures negative since 5/14. Extubated on 5/16/18. Agitated at times requiring increasing sedation, on Precedex. remained delirious, started on librium 100 q6H, now on titrating doses, with Haldol 5 mg PO Q8H for delirium and agitation.  Decreased agitation, started on Seroquel for delirium,  and haldol dc'd, now  improving, Awake alert cooperative.  On 1:1 for safety. Physical therapy ordered for decondition. Patient stable for transfer to non monitored bed.     Report given to Dr. Echeverria - HPI    41 yo M PMH alcohol abuse (4-5 glasses of wine per day Admitted to ICU for pancreatitis and alcohol withdrawal/DTs CIWA 29. Intubated 5/8 for acute hypoxic respiratory failure due to pneumonia  Sputum positive MSSA, on Levaquin, Blood positive CoNS but cultures negative since 5/14. Extubated on 5/16/18. Agitated at times requiring increasing sedation, on Precedex. remained delirious, started on librium 100 q6H, now on titrating doses, with Haldol 5 mg PO Q8H for delirium and agitation.  Decreased agitation, started on Seroquel for delirium,  and haldol dc'd, now  improving, Awake alert cooperative.  On 1:1 for safety. Physical therapy ordered for decondition. Patient stable for transfer to non monitored bed.     Report given to Dr. Weeks - PMTONYA Baldwin NP-C  critical care

## 2018-05-20 NOTE — DOWNTIME INTERRUPTION NOTE - WHICH MANUAL FORMS INITIATED?
[place] system interruption from 01:45 to just after 0300. No acute changes in patient status. Blood samples drawn and sent to lab. Patient remains on constant observation 1:1 for safety.

## 2018-05-20 NOTE — PROGRESS NOTE ADULT - SUBJECTIVE AND OBJECTIVE BOX
Patient is a 42y old  Male who presents with a chief complaint of alcohol withdrawal/ pancreatitis/ electrolyte imbalance /-Hypokalemia. Hyponatremia/ vomiting (04 May 2018 14:01)      INTERVAL HPI/OVERNIGHT EVENTS:    Calm, awake  mental status is improved  Engaging in conversation    MEDICATIONS  (STANDING):  chlordiazePOXIDE 100 milliGRAM(s) Oral every 8 hours  chlordiazePOXIDE   Oral   chlorhexidine 4% Liquid 1 Application(s) Topical <User Schedule>  folic acid 1 milliGRAM(s) Oral daily  heparin  Injectable 5000 Unit(s) SubCutaneous every 12 hours  levoFLOXacin  Tablet 750 milliGRAM(s) Oral every 24 hours  multivitamin 1 Tablet(s) Oral daily  QUEtiapine 50 milliGRAM(s) Oral <User Schedule>    MEDICATIONS  (PRN):  acetaminophen   Tablet. 650 milliGRAM(s) Oral every 6 hours PRN Headache  LORazepam   Injectable 2 milliGRAM(s) IV Push every 2 hours PRN CIWA 8-20  LORazepam   Injectable 4 milliGRAM(s) IV Push every 2 hours PRN CIWA >20        REVIEW OF SYSTEMS:  CONSTITUTIONAL: No fever, weight loss, or fatigue  EYES: No eye pain, visual disturbances, or discharge  ENMT:  No difficulty hearing, tinnitus, vertigo; No sinus or throat pain  NECK: No pain or stiffness  RESPIRATORY: No cough, wheezing, chills or hemoptysis; No shortness of breath  CARDIOVASCULAR: No chest pain, palpitations, dizziness, or leg swelling  GASTROINTESTINAL: No abdominal or epigastric pain. No nausea, vomiting, or hematemesis; No diarrhea or constipation. No melena or hematochezia.  GENITOURINARY: No dysuria, frequency, hematuria, or incontinence  NEUROLOGICAL: No headaches, memory loss, loss of strength, numbness, or tremors  SKIN: No itching, burning, rashes, or lesions      Vital Signs Last 24 Hrs  T(C): 36.9 (20 May 2018 15:00), Max: 37.4 (20 May 2018 02:52)  T(F): 98.5 (20 May 2018 15:00), Max: 99.4 (20 May 2018 02:52)  HR: 112 (20 May 2018 20:00) (86 - 128)  BP: 119/102 (20 May 2018 20:00) (92/79 - 133/86)  BP(mean): 105 (20 May 2018 20:00) (57 - 105)  RR: 21 (20 May 2018 20:00) (13 - 24)  SpO2: 100% (20 May 2018 20:00) (99% - 100%)    PHYSICAL EXAM:  GENERAL: NAD, well-groomed, well-developed  HEAD:  Atraumatic, Normocephalic  EYES: EOMI, PERRLA, conjunctiva and sclera clear  ENMT: No tonsillar erythema, exudates, or enlargement; Moist mucous membranes   NECK: Supple, No JVD   NERVOUS SYSTEM:  Alert & Oriented X3, Good concentration; Motor Strength 5/5 B/L upper and lower extremities; DTRs 2+ intact and symmetric  CHEST/LUNG: Clear to percussion bilaterally; No rales, rhonchi, wheezing, or rubs  HEART: Regular rate and rhythm; No murmurs, rubs, or gallops  ABDOMEN: Soft, Nontender, Nondistended; Bowel sounds present  EXTREMITIES:  2+ Peripheral Pulses, No clubbing, cyanosis, or edema  LYMPH: No lymphadenopathy noted  SKIN: No rashes or lesions    LABS:                        10.1   8.63  )-----------( 503      ( 20 May 2018 03:54 )             29.7     05-20    139  |  105  |  6<L>  ----------------------------<  92  3.5   |  25  |  0.68    Ca    9.0      20 May 2018 03:54  Phos  2.6     05-20  Mg     1.4     05-20    TPro  7.4  /  Alb  2.7<L>  /  TBili  0.5  /  DBili  x   /  AST  59<H>  /  ALT  56  /  AlkPhos  96  05-20        CAPILLARY BLOOD GLUCOSE          RADIOLOGY & ADDITIONAL TESTS:    Imaging Personally Reviewed:  [ ] YES  [ ] NO    Consultant(s) Notes Reviewed:  [ ] YES  [ ] NO    Care Discussed with Consultants/Other Providers [ ] YES  [ ] NO

## 2018-05-21 LAB
ALBUMIN SERPL ELPH-MCNC: 2.9 G/DL — LOW (ref 3.3–5)
ALP SERPL-CCNC: 96 U/L — SIGNIFICANT CHANGE UP (ref 40–120)
ALT FLD-CCNC: 66 U/L — SIGNIFICANT CHANGE UP (ref 12–78)
ANION GAP SERPL CALC-SCNC: 8 MMOL/L — SIGNIFICANT CHANGE UP (ref 5–17)
AST SERPL-CCNC: 63 U/L — HIGH (ref 15–37)
BILIRUB SERPL-MCNC: 0.5 MG/DL — SIGNIFICANT CHANGE UP (ref 0.2–1.2)
BUN SERPL-MCNC: 6 MG/DL — LOW (ref 7–23)
CALCIUM SERPL-MCNC: 9.5 MG/DL — SIGNIFICANT CHANGE UP (ref 8.5–10.1)
CHLORIDE SERPL-SCNC: 105 MMOL/L — SIGNIFICANT CHANGE UP (ref 96–108)
CO2 SERPL-SCNC: 26 MMOL/L — SIGNIFICANT CHANGE UP (ref 22–31)
CREAT SERPL-MCNC: 0.63 MG/DL — SIGNIFICANT CHANGE UP (ref 0.5–1.3)
GLUCOSE SERPL-MCNC: 93 MG/DL — SIGNIFICANT CHANGE UP (ref 70–99)
HCT VFR BLD CALC: 30.7 % — LOW (ref 39–50)
HGB BLD-MCNC: 10.7 G/DL — LOW (ref 13–17)
MAGNESIUM SERPL-MCNC: 1.7 MG/DL — SIGNIFICANT CHANGE UP (ref 1.6–2.6)
MCHC RBC-ENTMCNC: 33.3 PG — SIGNIFICANT CHANGE UP (ref 27–34)
MCHC RBC-ENTMCNC: 34.9 GM/DL — SIGNIFICANT CHANGE UP (ref 32–36)
MCV RBC AUTO: 95.6 FL — SIGNIFICANT CHANGE UP (ref 80–100)
NRBC # BLD: 0 /100 WBCS — SIGNIFICANT CHANGE UP (ref 0–0)
PHOSPHATE SERPL-MCNC: 3.7 MG/DL — SIGNIFICANT CHANGE UP (ref 2.5–4.5)
PLATELET # BLD AUTO: 455 K/UL — HIGH (ref 150–400)
POTASSIUM SERPL-MCNC: 3.6 MMOL/L — SIGNIFICANT CHANGE UP (ref 3.5–5.3)
POTASSIUM SERPL-SCNC: 3.6 MMOL/L — SIGNIFICANT CHANGE UP (ref 3.5–5.3)
PROT SERPL-MCNC: 7.9 GM/DL — SIGNIFICANT CHANGE UP (ref 6–8.3)
RBC # BLD: 3.21 M/UL — LOW (ref 4.2–5.8)
RBC # FLD: 12.5 % — SIGNIFICANT CHANGE UP (ref 10.3–14.5)
SODIUM SERPL-SCNC: 139 MMOL/L — SIGNIFICANT CHANGE UP (ref 135–145)
WBC # BLD: 7.34 K/UL — SIGNIFICANT CHANGE UP (ref 3.8–10.5)
WBC # FLD AUTO: 7.34 K/UL — SIGNIFICANT CHANGE UP (ref 3.8–10.5)

## 2018-05-21 RX ORDER — SENNA PLUS 8.6 MG/1
2 TABLET ORAL AT BEDTIME
Qty: 0 | Refills: 0 | Status: DISCONTINUED | OUTPATIENT
Start: 2018-05-21 | End: 2018-05-25

## 2018-05-21 RX ORDER — DOCUSATE SODIUM 100 MG
100 CAPSULE ORAL
Qty: 0 | Refills: 0 | Status: DISCONTINUED | OUTPATIENT
Start: 2018-05-21 | End: 2018-05-25

## 2018-05-21 RX ORDER — POLYETHYLENE GLYCOL 3350 17 G/17G
17 POWDER, FOR SOLUTION ORAL DAILY
Qty: 0 | Refills: 0 | Status: DISCONTINUED | OUTPATIENT
Start: 2018-05-21 | End: 2018-05-25

## 2018-05-21 RX ADMIN — HEPARIN SODIUM 5000 UNIT(S): 5000 INJECTION INTRAVENOUS; SUBCUTANEOUS at 17:14

## 2018-05-21 RX ADMIN — Medication 100 MILLIGRAM(S): at 12:39

## 2018-05-21 RX ADMIN — QUETIAPINE FUMARATE 50 MILLIGRAM(S): 200 TABLET, FILM COATED ORAL at 10:43

## 2018-05-21 RX ADMIN — SENNA PLUS 2 TABLET(S): 8.6 TABLET ORAL at 21:33

## 2018-05-21 RX ADMIN — QUETIAPINE FUMARATE 50 MILLIGRAM(S): 200 TABLET, FILM COATED ORAL at 21:33

## 2018-05-21 RX ADMIN — HEPARIN SODIUM 5000 UNIT(S): 5000 INJECTION INTRAVENOUS; SUBCUTANEOUS at 05:47

## 2018-05-21 RX ADMIN — Medication 1 TABLET(S): at 12:40

## 2018-05-21 RX ADMIN — Medication 100 MILLIGRAM(S): at 05:47

## 2018-05-21 RX ADMIN — CHLORHEXIDINE GLUCONATE 1 APPLICATION(S): 213 SOLUTION TOPICAL at 05:47

## 2018-05-21 RX ADMIN — Medication 1 MILLIGRAM(S): at 12:40

## 2018-05-21 NOTE — OCCUPATIONAL THERAPY INITIAL EVALUATION ADULT - TRANSFER SAFETY CONCERNS NOTED: BED/CHAIR, REHAB EVAL
decreased balance during turns/decreased proprioception/stepping too close to front of assistive device/decreased safety awareness/losing balance/decreased sequencing ability/squat pivot/decreased weight-shifting ability/stand pivot/decreased step length

## 2018-05-21 NOTE — OCCUPATIONAL THERAPY INITIAL EVALUATION ADULT - IMPAIRED TRANSFERS: BED/CHAIR, REHAB EVAL
impaired postural control/scissoring/impaired motor control/narrow base of support/impaired balance/ataxic/decreased ROM/decreased sensation/impaired coordination/cognition

## 2018-05-21 NOTE — OCCUPATIONAL THERAPY INITIAL EVALUATION ADULT - GENERAL OBSERVATIONS, REHAB EVAL
Pt was seen for initial OT consult, encountered OOB to chair on cardiac monitoring & on 1:1 observation; pt was AA&Ox2, confused, tangential, cooperative & followed command. Pt required verbal cue to initiate, sequence, attend to tasks & terminate tasks due to cognitive limitations. Pt exhibits decreased activity tolerance ,balance, ADL management, coordination &functional mobility. Pt has gait instability due to weakness in BLE. Pt was seen for initial OT consult, encountered OOB to chair on cardiac monitoring & on 1:1 observation; pt was AA&Ox2, confused, tangential, cooperative & followed command. Pt required verbal cues to initiate, sequence, attend to tasks & terminate tasks due to cognitive limitations. Pt exhibits decreased activity tolerance ,balance, ADL management, coordination &functional mobility. Pt has gait instability due to weakness in BLE.

## 2018-05-21 NOTE — PROGRESS NOTE ADULT - ASSESSMENT
alcohol withdrawal/ Pancreatitis , Hypokalemia ,hyponatremia Elevated serum protien  follow up labs  clinically improved  fever w/up neg so far  for transfer to regular floor  bladder sono  for retention.  may need mosqueda after failure of voiding

## 2018-05-21 NOTE — OCCUPATIONAL THERAPY INITIAL EVALUATION ADULT - SOCIAL CONCERNS
Pt voiced concerns about loss of ability to ambulate and care for himself./Complex psychosocial needs/coping issues

## 2018-05-21 NOTE — OCCUPATIONAL THERAPY INITIAL EVALUATION ADULT - LIVES WITH, PROFILE
father in a private house with  multiple steps to negotiate . The bathroom has a tub/shower combination and standard toilet.

## 2018-05-21 NOTE — OCCUPATIONAL THERAPY INITIAL EVALUATION ADULT - PERTINENT HX OF CURRENT PROBLEM, REHAB EVAL
Pt presented to ER diagnosed with alcohol  withdrawal syndrome without complications and hypokalemia; pt  has elevated CIWA score and is on ativan protocol .  Head CTI on _5/4/18 results confirm  No mass or space occupying lesion. No acute ischemic changes or hemorrhagic foci are suggested

## 2018-05-21 NOTE — OCCUPATIONAL THERAPY INITIAL EVALUATION ADULT - IMPAIRMENTS CONTRIBUTING IMPAIRED BED MOBILITY, REHAB EVAL
decreased sensation/impaired sensory feedback/decreased ROM/impaired coordination/impaired postural control/decreased flexibility/impaired motor control/abnormal muscle tone/scissoring/decreased strength/cognition/narrow base of support/ataxic/impaired balance

## 2018-05-21 NOTE — OCCUPATIONAL THERAPY INITIAL EVALUATION ADULT - ADDITIONAL COMMENTS
Prior to admission, pt was functioning in her roles, self sufficient & ambulating independently without any assistive devices. Presently, pt needs assistance with self care tasks and functional mobility. Pt is right hand dominant. Dexterity and fine motor skills are diminished. Pt is able to reach out of base of support in sitting without loss of balance but unable to do it in standing. The scale below depicts a picture of the pt's current level of functioning. Barthel Index: Feeding Score__5____, Bathing Score_____0_, Grooming Score___0__, Dressing Score__0___, Bowel Score___0__, Bladder Score___0___, Toilet Score___0__, Transfer Score____10__, Mobility Score_____0, Stairs Score___0__, Total Score___15/100__. Prior to admission, pt was functioning in her roles, self sufficient & ambulating independently without any assistive devices. Presently, pt needs assistance with self care tasks and functional mobility. Pt is right hand dominant. Dexterity and fine motor skills are diminished in both hands. Pt is able to reach out of base of support in sitting without loss of balance but unable to do it in standing. The scale below depicts a picture of the pt's current level of functioning. Barthel Index: Feeding Score__5____, Bathing Score_____0_, Grooming Score___0__, Dressing Score__0___, Bowel Score___0__, Bladder Score___0___, Toilet Score___0__, Transfer Score____10__, Mobility Score_____0, Stairs Score___0__, Total Score___15/100__.

## 2018-05-21 NOTE — PROGRESS NOTE ADULT - SUBJECTIVE AND OBJECTIVE BOX
Patient is a 42y old  Male who presents with a chief complaint of alcohol withdrawal/ pncreatitis/ electrolyte imbalnc/-Hypoklemia. Hypontremia/ vomiting (04 May 2018 14:01)      INTERVAL HPI/OVERNIGHT EVENTS:  pt feels like making urine but no flow  probable urinary retention    MEDICATIONS  (STANDING):  chlordiazePOXIDE   Oral   docusate sodium 100 milliGRAM(s) Oral two times a day  folic acid 1 milliGRAM(s) Oral daily  heparin  Injectable 5000 Unit(s) SubCutaneous every 12 hours  levoFLOXacin  Tablet 750 milliGRAM(s) Oral every 24 hours  multivitamin 1 Tablet(s) Oral daily  QUEtiapine 50 milliGRAM(s) Oral <User Schedule>  senna 2 Tablet(s) Oral at bedtime    MEDICATIONS  (PRN):  acetaminophen   Tablet. 650 milliGRAM(s) Oral every 6 hours PRN Headache  LORazepam   Injectable 2 milliGRAM(s) IV Push every 2 hours PRN CIWA 8-20  LORazepam   Injectable 4 milliGRAM(s) IV Push every 2 hours PRN CIWA >20  polyethylene glycol 3350 17 Gram(s) Oral daily PRN Constipation        REVIEW OF SYSTEMS:  CONSTITUTIONAL: No fever, weight loss, or fatigue  EYES: No eye pain, visual disturbances, or discharge  ENMT:  No difficulty hearing, tinnitus, vertigo; No sinus or throat pain  NECK: No pain or stiffness  RESPIRATORY: No cough, wheezing, chills or hemoptysis; No shortness of breath  CARDIOVASCULAR: No chest pain, palpitations, dizziness, or leg swelling  GASTROINTESTINAL: No abdominal or epigastric pain. No nausea, vomiting, or hematemesis; No diarrhea or constipation. No melena or hematochezia.  GENITOURINARY: No dysuria, frequency, hematuria, or incontinence  NEUROLOGICAL: No headaches, memory loss, loss of strength, numbness, or tremors  SKIN: No itching, burning, rashes, or lesions      Vital Signs Last 24 Hrs  T(C): 36.7 (21 May 2018 19:37), Max: 36.9 (21 May 2018 04:44)  T(F): 98.1 (21 May 2018 19:37), Max: 98.4 (21 May 2018 04:44)  HR: 99 (21 May 2018 16:40) (11 - 130)  BP: 127/83 (21 May 2018 16:40) (92/50 - 127/83)  BP(mean): 75 (21 May 2018 16:20) (61 - 96)  RR: 16 (21 May 2018 16:40) (15 - 24)  SpO2: 98% (21 May 2018 16:40) (96% - 100%)    PHYSICAL EXAM:  GENERAL: NAD, well-groomed, well-developed  HEAD:  Atraumatic, Normocephalic  EYES: EOMI, PERRLA, conjunctiva and sclera clear  ENMT: No tonsillar erythema, exudates, or enlargement; Moist mucous membranes   NECK: Supple, No JVD   NERVOUS SYSTEM:  Alert & Oriented X3, Good concentration; Motor Strength 5/5 B/L upper and lower extremities; DTRs 2+ intact and symmetric  CHEST/LUNG: Clear to percussion bilaterally; No rales, rhonchi, wheezing, or rubs  HEART: Regular rate and rhythm; No murmurs, rubs, or gallops  ABDOMEN: Soft, Nontender, Nondistended; Bowel sounds present  EXTREMITIES:  2+ Peripheral Pulses, No clubbing, cyanosis, or edema  LYMPH: No lymphadenopathy noted  SKIN: No rashes or lesions  texas cath in place. no urine in bag  LABS:                        10.7   7.34  )-----------( 455      ( 21 May 2018 04:00 )             30.7     05-21    139  |  105  |  6<L>  ----------------------------<  93  3.6   |  26  |  0.63    Ca    9.5      21 May 2018 04:00  Phos  3.7     05-21  Mg     1.7     05-21    TPro  7.9  /  Alb  2.9<L>  /  TBili  0.5  /  DBili  x   /  AST  63<H>  /  ALT  66  /  AlkPhos  96  05-21        CAPILLARY BLOOD GLUCOSE          RADIOLOGY & ADDITIONAL TESTS:    Imaging Personally Reviewed:  [ ] YES  [ ] NO    Consultant(s) Notes Reviewed:  [ ] YES  [ ] NO    Care Discussed with Consultants/Other Providers [ ] YES  [ ] NO

## 2018-05-21 NOTE — OCCUPATIONAL THERAPY INITIAL EVALUATION ADULT - PLANNED THERAPY INTERVENTIONS, OT EVAL
energy conservation techniques/bed mobility training/motor coordination training/ADL retraining/balance training/transfer training/joint mobilization/ROM/neuromuscular re-education/IADL retraining/fine motor coordination training/parent/caregiver training.../strengthening/stretching/cognitive, visual perceptual

## 2018-05-21 NOTE — OCCUPATIONAL THERAPY INITIAL EVALUATION ADULT - BALANCE DISTURBANCE, IDENTIFIED IMPAIRMENT CONTRIBUTE, REHAB EVAL
decreased sensation/impaired sensory feedback/decreased strength/impaired coordination/impaired motor control/impaired postural control/abnormal muscle tone/decreased ROM

## 2018-05-22 DIAGNOSIS — F41.9 ANXIETY DISORDER, UNSPECIFIED: ICD-10-CM

## 2018-05-22 DIAGNOSIS — F19.94 OTHER PSYCHOACTIVE SUBSTANCE USE, UNSPECIFIED WITH PSYCHOACTIVE SUBSTANCE-INDUCED MOOD DISORDER: ICD-10-CM

## 2018-05-22 DIAGNOSIS — F10.230 ALCOHOL DEPENDENCE WITH WITHDRAWAL, UNCOMPLICATED: ICD-10-CM

## 2018-05-22 LAB
ANION GAP SERPL CALC-SCNC: 9 MMOL/L — SIGNIFICANT CHANGE UP (ref 5–17)
BUN SERPL-MCNC: 8 MG/DL — SIGNIFICANT CHANGE UP (ref 7–23)
CALCIUM SERPL-MCNC: 9.9 MG/DL — SIGNIFICANT CHANGE UP (ref 8.5–10.1)
CHLORIDE SERPL-SCNC: 105 MMOL/L — SIGNIFICANT CHANGE UP (ref 96–108)
CO2 SERPL-SCNC: 25 MMOL/L — SIGNIFICANT CHANGE UP (ref 22–31)
CREAT SERPL-MCNC: 0.72 MG/DL — SIGNIFICANT CHANGE UP (ref 0.5–1.3)
GLUCOSE SERPL-MCNC: 94 MG/DL — SIGNIFICANT CHANGE UP (ref 70–99)
HCT VFR BLD CALC: 32.7 % — LOW (ref 39–50)
HGB BLD-MCNC: 11.2 G/DL — LOW (ref 13–17)
MAGNESIUM SERPL-MCNC: 1.6 MG/DL — SIGNIFICANT CHANGE UP (ref 1.6–2.6)
MCHC RBC-ENTMCNC: 33 PG — SIGNIFICANT CHANGE UP (ref 27–34)
MCHC RBC-ENTMCNC: 34.3 GM/DL — SIGNIFICANT CHANGE UP (ref 32–36)
MCV RBC AUTO: 96.5 FL — SIGNIFICANT CHANGE UP (ref 80–100)
NRBC # BLD: 0 /100 WBCS — SIGNIFICANT CHANGE UP (ref 0–0)
PHOSPHATE SERPL-MCNC: 4.3 MG/DL — SIGNIFICANT CHANGE UP (ref 2.5–4.5)
PLATELET # BLD AUTO: 449 K/UL — HIGH (ref 150–400)
POTASSIUM SERPL-MCNC: 3.6 MMOL/L — SIGNIFICANT CHANGE UP (ref 3.5–5.3)
POTASSIUM SERPL-SCNC: 3.6 MMOL/L — SIGNIFICANT CHANGE UP (ref 3.5–5.3)
RBC # BLD: 3.39 M/UL — LOW (ref 4.2–5.8)
RBC # FLD: 12.5 % — SIGNIFICANT CHANGE UP (ref 10.3–14.5)
SODIUM SERPL-SCNC: 139 MMOL/L — SIGNIFICANT CHANGE UP (ref 135–145)
WBC # BLD: 10.44 K/UL — SIGNIFICANT CHANGE UP (ref 3.8–10.5)
WBC # FLD AUTO: 10.44 K/UL — SIGNIFICANT CHANGE UP (ref 3.8–10.5)

## 2018-05-22 PROCEDURE — 90792 PSYCH DIAG EVAL W/MED SRVCS: CPT

## 2018-05-22 RX ORDER — CALCIUM CARBONATE 500(1250)
1 TABLET ORAL EVERY 8 HOURS
Qty: 0 | Refills: 0 | Status: DISCONTINUED | OUTPATIENT
Start: 2018-05-22 | End: 2018-05-25

## 2018-05-22 RX ORDER — PANTOPRAZOLE SODIUM 20 MG/1
40 TABLET, DELAYED RELEASE ORAL ONCE
Qty: 0 | Refills: 0 | Status: COMPLETED | OUTPATIENT
Start: 2018-05-22 | End: 2018-05-22

## 2018-05-22 RX ORDER — PANTOPRAZOLE SODIUM 20 MG/1
40 TABLET, DELAYED RELEASE ORAL
Qty: 0 | Refills: 0 | Status: DISCONTINUED | OUTPATIENT
Start: 2018-05-22 | End: 2018-05-25

## 2018-05-22 RX ORDER — TRAZODONE HCL 50 MG
50 TABLET ORAL AT BEDTIME
Qty: 0 | Refills: 0 | Status: DISCONTINUED | OUTPATIENT
Start: 2018-05-22 | End: 2018-05-25

## 2018-05-22 RX ORDER — QUETIAPINE FUMARATE 200 MG/1
75 TABLET, FILM COATED ORAL
Qty: 0 | Refills: 0 | Status: DISCONTINUED | OUTPATIENT
Start: 2018-05-22 | End: 2018-05-25

## 2018-05-22 RX ADMIN — Medication 100 MILLIGRAM(S): at 01:29

## 2018-05-22 RX ADMIN — PANTOPRAZOLE SODIUM 40 MILLIGRAM(S): 20 TABLET, DELAYED RELEASE ORAL at 20:05

## 2018-05-22 RX ADMIN — HEPARIN SODIUM 5000 UNIT(S): 5000 INJECTION INTRAVENOUS; SUBCUTANEOUS at 17:02

## 2018-05-22 RX ADMIN — Medication 1 MILLIGRAM(S): at 11:14

## 2018-05-22 RX ADMIN — HEPARIN SODIUM 5000 UNIT(S): 5000 INJECTION INTRAVENOUS; SUBCUTANEOUS at 05:36

## 2018-05-22 RX ADMIN — Medication 100 MILLIGRAM(S): at 05:36

## 2018-05-22 RX ADMIN — QUETIAPINE FUMARATE 75 MILLIGRAM(S): 200 TABLET, FILM COATED ORAL at 20:05

## 2018-05-22 RX ADMIN — QUETIAPINE FUMARATE 50 MILLIGRAM(S): 200 TABLET, FILM COATED ORAL at 07:36

## 2018-05-22 RX ADMIN — Medication 1 TABLET(S): at 11:14

## 2018-05-22 RX ADMIN — Medication 100 MILLIGRAM(S): at 17:01

## 2018-05-22 RX ADMIN — Medication 1 TABLET(S): at 10:49

## 2018-05-22 RX ADMIN — Medication 100 MILLIGRAM(S): at 13:19

## 2018-05-22 NOTE — BEHAVIORAL HEALTH ASSESSMENT NOTE - NSBHCONSULTMEDS_PSY_A_CORE FT
INCREASE dose of Seroquel to 75mg bid po, with plans to increase dose to target symptoms  collaborate care with family to coordinate d/c planning.  Continue librium taper

## 2018-05-22 NOTE — BEHAVIORAL HEALTH ASSESSMENT NOTE - OTHER PAST PSYCHIATRIC HISTORY (INCLUDE DETAILS REGARDING ONSET, COURSE OF ILLNESS, INPATIENT/OUTPATIENT TREATMENT)
has a psychiatric history significant for Alcohol dependence, Cannabis abuse,  history of multiple rehabs/detox admissions in the past, history of DWI- no pending legal cases as per patient, denies any history of inpatient psychiatric admissions, denies any history of si or hi

## 2018-05-22 NOTE — BEHAVIORAL HEALTH ASSESSMENT NOTE - OTHER
unable to reach mother, father or brother for collateral substance abuse fair did not assess variable reasonable

## 2018-05-22 NOTE — PROGRESS NOTE ADULT - SUBJECTIVE AND OBJECTIVE BOX
Patient is a 42y old  Male who presents with a chief complaint of alcohol withdrawal/ pncreatitis/ electrolyte imbalnc/-Hypoklemia. Hypontremia/ vomiting (04 May 2018 14:01)      INTERVAL HPI/OVERNIGHT EVENTS:  wants to shower  unsteady gait  more lucid  no retention- passed water yesterday    MEDICATIONS  (STANDING):  chlordiazePOXIDE   Oral   chlordiazePOXIDE 100 milliGRAM(s) Oral every 12 hours  docusate sodium 100 milliGRAM(s) Oral two times a day  folic acid 1 milliGRAM(s) Oral daily  heparin  Injectable 5000 Unit(s) SubCutaneous every 12 hours  levoFLOXacin  Tablet 750 milliGRAM(s) Oral every 24 hours  multivitamin 1 Tablet(s) Oral daily  pantoprazole    Tablet 40 milliGRAM(s) Oral before breakfast  QUEtiapine 50 milliGRAM(s) Oral <User Schedule>  senna 2 Tablet(s) Oral at bedtime    MEDICATIONS  (PRN):  acetaminophen   Tablet. 650 milliGRAM(s) Oral every 6 hours PRN Headache  calcium carbonate 500 mG (Tums) Chewable 1 Tablet(s) Chew every 8 hours PRN Dyspepsia  LORazepam   Injectable 2 milliGRAM(s) IV Push every 2 hours PRN CIWA 8-20  LORazepam   Injectable 4 milliGRAM(s) IV Push every 2 hours PRN CIWA >20  polyethylene glycol 3350 17 Gram(s) Oral daily PRN Constipation        REVIEW OF SYSTEMS:  CONSTITUTIONAL: No fever, weight loss, or fatigue  EYES: No eye pain, visual disturbances, or discharge  ENMT:  No difficulty hearing, tinnitus, vertigo; No sinus or throat pain  NECK: No pain or stiffness  RESPIRATORY: No cough, wheezing, chills or hemoptysis; No shortness of breath  CARDIOVASCULAR: No chest pain, palpitations, dizziness, or leg swelling  GASTROINTESTINAL: No abdominal or epigastric pain. No nausea, vomiting, or hematemesis; No diarrhea or constipation. No melena or hematochezia.  GENITOURINARY: No dysuria, frequency, hematuria, or incontinence  NEUROLOGICAL: No headaches, memory loss, loss of strength, numbness, or tremors  SKIN: No itching, burning, rashes, or lesions      Vital Signs Last 24 Hrs  T(C): 37.1 (22 May 2018 05:19), Max: 37.1 (22 May 2018 05:19)  T(F): 98.7 (22 May 2018 05:19), Max: 98.7 (22 May 2018 05:19)  HR: 114 (22 May 2018 09:00) (99 - 123)  BP: 121/88 (22 May 2018 09:00) (92/50 - 129/84)  BP(mean): 75 (21 May 2018 16:20) (61 - 75)  RR: 17 (22 May 2018 05:19) (16 - 22)  SpO2: 94% (22 May 2018 09:00) (94% - 99%)    PHYSICAL EXAM:  GENERAL: NAD, well-groomed, well-developed  HEAD:  Atraumatic, Normocephalic  EYES: EOMI, PERRLA, conjunctiva and sclera clear  ENMT: No tonsillar erythema, exudates, or enlargement; Moist mucous membranes   NECK: Supple, No JVD   NERVOUS SYSTEM:  Alert & Oriented X3, Good concentration; Motor Strength 5/5 B/L upper and lower extremities; DTRs 2+ intact and symmetric  CHEST/LUNG: Clear to percussion bilaterally; No rales, rhonchi, wheezing, or rubs  HEART: Regular rate and rhythm; No murmurs, rubs, or gallops  ABDOMEN: Soft, Nontender, Nondistended; Bowel sounds present  EXTREMITIES:  2+ Peripheral Pulses, No clubbing, cyanosis, or edema  LYMPH: No lymphadenopathy noted  SKIN: No rashes or lesions    LABS:                        11.2   10.44 )-----------( 449      ( 22 May 2018 07:15 )             32.7     05-22    139  |  105  |  8   ----------------------------<  94  3.6   |  25  |  0.72    Ca    9.9      22 May 2018 07:15  Phos  4.3     05-22  Mg     1.6     05-22    TPro  7.9  /  Alb  2.9<L>  /  TBili  0.5  /  DBili  x   /  AST  63<H>  /  ALT  66  /  AlkPhos  96  05-21        CAPILLARY BLOOD GLUCOSE          RADIOLOGY & ADDITIONAL TESTS:    Imaging Personally Reviewed:  [ ] YES  [ ] NO    Consultant(s) Notes Reviewed:  [ ] YES  [ ] NO    Care Discussed with Consultants/Other Providers [ ] YES  [ ] NO

## 2018-05-22 NOTE — BEHAVIORAL HEALTH ASSESSMENT NOTE - HPI (INCLUDE ILLNESS QUALITY, SEVERITY, DURATION, TIMING, CONTEXT, MODIFYING FACTORS, ASSOCIATED SIGNS AND SYMPTOMS)
Briefly, the patient is a 42 year old male, single, unemployed, moved from [place], now living with father, used to work as a /, has a medical history notable for pancreatitis, has a psychiatric history significant for Alcohol dependence, history of multiple rehabs/detox admissions in the past, history of DWI- no pending legal cases as per patient, denies any history of inpatient psychiatric admissions, denies any history of si or hi, presented to the ed intoxicated. Admitted to the ICU with pancreatitis and significant DT's. Intubated for respiratory failiur due to MSSA PNA. Extubated on 5/16. Was on Haldol, and now started on Seroquel. Intermittently tries to leave AMA. Consult placed to assess capacity to leave ama, and to assess mood.     Met with the patient. He is calm, and engages well with MD. He is fully oriented and alert. He states that he has been drinking heavily and has been doing so for years. Does not think it is a problem and does not wish to go to a rehab or detox. He admits to daily Cannabis abuse but denies other drugs. States that he is aware that he 'has issues with pancrease' and states that ' I have decided to stop drinking'. He states that his mood is stable, and denies any depressive or hypomanic symptoms. Denies any psychosis. Denies any si or hi. States that he tends to anxious, restless and poor frustration tolerance. States that he tends to have anger outbursts. Discussed with patient that he is on Seroquel and discussed plan to increase dose to target the above. He agrees. He gives phone numbers for his father, mother and brother- but despite multiple attempts no answer to phone calls  He states that he does not want to leave AMA, understands the risks of alcohol relapse if he does not seek treatment, understands the benefits of treatment, negotiates options and able to make an informed choice.   Father: [phone]; brother: [phone]

## 2018-05-22 NOTE — PROGRESS NOTE ADULT - ASSESSMENT
alcohol withdrawal/ Pancreatitis , Hypokalemia ,hyponatremia Elevated serum protien  follow up labs  clinically improved  bladder sono  for retention.- noted    PT/OT follow up

## 2018-05-23 LAB
ANION GAP SERPL CALC-SCNC: 9 MMOL/L — SIGNIFICANT CHANGE UP (ref 5–17)
BUN SERPL-MCNC: 9 MG/DL — SIGNIFICANT CHANGE UP (ref 7–23)
CALCIUM SERPL-MCNC: 9.8 MG/DL — SIGNIFICANT CHANGE UP (ref 8.5–10.1)
CHLORIDE SERPL-SCNC: 105 MMOL/L — SIGNIFICANT CHANGE UP (ref 96–108)
CO2 SERPL-SCNC: 26 MMOL/L — SIGNIFICANT CHANGE UP (ref 22–31)
CREAT SERPL-MCNC: 0.86 MG/DL — SIGNIFICANT CHANGE UP (ref 0.5–1.3)
GLUCOSE SERPL-MCNC: 90 MG/DL — SIGNIFICANT CHANGE UP (ref 70–99)
HCT VFR BLD CALC: 32.5 % — LOW (ref 39–50)
HGB BLD-MCNC: 11 G/DL — LOW (ref 13–17)
MAGNESIUM SERPL-MCNC: 1.5 MG/DL — LOW (ref 1.6–2.6)
MCHC RBC-ENTMCNC: 33 PG — SIGNIFICANT CHANGE UP (ref 27–34)
MCHC RBC-ENTMCNC: 33.8 GM/DL — SIGNIFICANT CHANGE UP (ref 32–36)
MCV RBC AUTO: 97.6 FL — SIGNIFICANT CHANGE UP (ref 80–100)
NRBC # BLD: 0 /100 WBCS — SIGNIFICANT CHANGE UP (ref 0–0)
PHOSPHATE SERPL-MCNC: 3.8 MG/DL — SIGNIFICANT CHANGE UP (ref 2.5–4.5)
PLATELET # BLD AUTO: 430 K/UL — HIGH (ref 150–400)
POTASSIUM SERPL-MCNC: 3.7 MMOL/L — SIGNIFICANT CHANGE UP (ref 3.5–5.3)
POTASSIUM SERPL-SCNC: 3.7 MMOL/L — SIGNIFICANT CHANGE UP (ref 3.5–5.3)
RBC # BLD: 3.33 M/UL — LOW (ref 4.2–5.8)
RBC # FLD: 12.4 % — SIGNIFICANT CHANGE UP (ref 10.3–14.5)
SODIUM SERPL-SCNC: 140 MMOL/L — SIGNIFICANT CHANGE UP (ref 135–145)
WBC # BLD: 8.19 K/UL — SIGNIFICANT CHANGE UP (ref 3.8–10.5)
WBC # FLD AUTO: 8.19 K/UL — SIGNIFICANT CHANGE UP (ref 3.8–10.5)

## 2018-05-23 PROCEDURE — 99232 SBSQ HOSP IP/OBS MODERATE 35: CPT

## 2018-05-23 RX ADMIN — SENNA PLUS 2 TABLET(S): 8.6 TABLET ORAL at 21:49

## 2018-05-23 RX ADMIN — Medication 100 MILLIGRAM(S): at 01:32

## 2018-05-23 RX ADMIN — QUETIAPINE FUMARATE 75 MILLIGRAM(S): 200 TABLET, FILM COATED ORAL at 09:24

## 2018-05-23 RX ADMIN — HEPARIN SODIUM 5000 UNIT(S): 5000 INJECTION INTRAVENOUS; SUBCUTANEOUS at 05:41

## 2018-05-23 RX ADMIN — POLYETHYLENE GLYCOL 3350 17 GRAM(S): 17 POWDER, FOR SOLUTION ORAL at 17:12

## 2018-05-23 RX ADMIN — QUETIAPINE FUMARATE 75 MILLIGRAM(S): 200 TABLET, FILM COATED ORAL at 21:49

## 2018-05-23 RX ADMIN — PANTOPRAZOLE SODIUM 40 MILLIGRAM(S): 20 TABLET, DELAYED RELEASE ORAL at 09:23

## 2018-05-23 RX ADMIN — Medication 100 MILLIGRAM(S): at 17:12

## 2018-05-23 RX ADMIN — Medication 100 MILLIGRAM(S): at 12:06

## 2018-05-23 RX ADMIN — HEPARIN SODIUM 5000 UNIT(S): 5000 INJECTION INTRAVENOUS; SUBCUTANEOUS at 17:12

## 2018-05-23 RX ADMIN — Medication 1 MILLIGRAM(S): at 12:07

## 2018-05-23 RX ADMIN — Medication 1 TABLET(S): at 12:06

## 2018-05-23 RX ADMIN — Medication 100 MILLIGRAM(S): at 05:41

## 2018-05-23 NOTE — PROGRESS NOTE BEHAVIORAL HEALTH - SUMMARY
Briefly, the patient is a 42 year old male, single, unemployed, moved from [place], now living with father, used to work as a /, has a medical history notable for pancreatitis, has a psychiatric history significant for Alcohol dependence, history of multiple rehabs/detox admissions in the past, history of DWI- no pending legal cases as per patient, denies any history of inpatient psychiatric admissions, denies any history of si or hi, presented to the ed intoxicated. Admitted to the ICU with pancreatitis and significant DT's. Intubated for respiratory failiur due to MSSA PNA. Extubated on 5/16. Was on Haldol, and now started on Seroquel. Intermittently tries to leave AMA. Consult placed to assess capacity to leave ama, and to assess mood.   patient states that his mood is stable, and denies any depressive or hypomanic symptoms. Denies any psychosis. Denies any si or hi. Anxiety restless appears to have improved with increased dose of Seroquel. Discussed with patient that he is on Seroquel and Librium. Discussed with mother in detail. Patient in agreement with remaining inpatient till Librium taper is completed.     Patient is alert, fully oriented. He states that he does not want to leave AMA, understands the risks of alcohol relapse if he does not seek treatment, understands the benefits of treatment, negotiates options and able to make an informed choice. Patient appears to have reasonable capacity today to make treatment choices.

## 2018-05-23 NOTE — CHART NOTE - NSCHARTNOTEFT_GEN_A_CORE
Pt c acute ETOH pancreatitis; intubated in ICU for respiratory failure; extubated 5/16. Pt was on tube feeding; diet advanced to solids 5/17.   Pt was on 1:1 observation due to confusion, agitation; behavior now improved; downgraded to enhanced supervision at this time.    Factors impacting intake: [X ] none [ ] nausea  [ ] vomiting [ ] diarrhea [ ] constipation  [ ]chewing problems [ ] swallowing issues  [ ] other:     Diet Prescription: Diet, Regular:   Low Fat (LOWFAT) (05-17-18 @ 15:14)    Intake: 75% most meals    Current Weight: documented as 66.6 kg (5/21) of questionable accuracy since wt one day earlier was 70.6 kg (10# difference x 1 day ?); 72.1 kg (5/17)  % Weight Change: 2% loss x 5 days     Pertinent Medications: MEDICATIONS  (STANDING):  chlordiazePOXIDE   Oral   docusate sodium 100 milliGRAM(s) Oral two times a day  folic acid 1 milliGRAM(s) Oral daily  heparin  Injectable 5000 Unit(s) SubCutaneous every 12 hours  levoFLOXacin  Tablet 750 milliGRAM(s) Oral every 24 hours  multivitamin 1 Tablet(s) Oral daily  pantoprazole    Tablet 40 milliGRAM(s) Oral before breakfast  QUEtiapine 75 milliGRAM(s) Oral <User Schedule>  senna 2 Tablet(s) Oral at bedtime    MEDICATIONS  (PRN):  acetaminophen   Tablet. 650 milliGRAM(s) Oral every 6 hours PRN Headache  calcium carbonate 500 mG (Tums) Chewable 1 Tablet(s) Chew every 8 hours PRN Dyspepsia  LORazepam   Injectable 2 milliGRAM(s) IV Push every 2 hours PRN CIWA 8-20  LORazepam   Injectable 4 milliGRAM(s) IV Push every 2 hours PRN CIWA >20  polyethylene glycol 3350 17 Gram(s) Oral daily PRN Constipation  traZODone 50 milliGRAM(s) Oral at bedtime PRN insomnia    Pertinent Labs: 05-23 Na140 mmol/L Glu 90 mg/dL K+ 3.7 mmol/L Cr  0.86 mg/dL BUN 9 mg/dL 05-23 Phos 3.8 mg/dL 05-21 Alb 2.9 g/dL<L> 05-15 Chol --    LDL --    HDL --    Trig 311 mg/dL<H>     CAPILLARY BLOOD GLUCOSE    Skin: WDL; no edema    Estimated Needs:   [X ] no change since previous assessment  [ ] recalculated:     Previous Nutrition Diagnosis:  (2)  [X ] Inadequate Energy Intake [ ]Inadequate Oral Intake [ ] Excessive Energy Intake   [ ] Underweight [ ] Increased Nutrient Needs [ ] Overweight/Obesity   [ ] Altered GI Function [ ] Unintended Weight Loss [X ] Food & Nutrition Related Knowledge Deficit [ ] Malnutrition     Nutrition Diagnosis is [ ] ongoing  [X ] resolved [ ] not applicable   Pt appetite improved; consuming 75% + pt was educated on low-fat diet recommendations    New Nutrition Diagnosis: [X ] not applicable      Interventions: continue current diet rx  Recommend  [ ] Change Diet To:  [ ] Nutrition Supplement  [ ] Nutrition Support  [ ] Other: nutritional counseling provided re low fat diet recommendations    Monitoring and Evaluation:   [ X] PO intake [ x ] Tolerance to diet prescription [ x ] weights [ x ] labs[ x ] follow up per protocol  [ ] other:

## 2018-05-23 NOTE — PROGRESS NOTE BEHAVIORAL HEALTH - RISK ASSESSMENT
has chronic risks, but no imminent risk to self or others: older male, substance abuse, legal issues, not keen on seeking substance abuse treatment, motivated to stop drinking on own, anger outbursts but keen on treatment, mood stable, alert, denies any si or hi, no psychosis, denies any history of inpatient psych admissions, no known history of si or hi, future oriented, feels responsible for father and family.

## 2018-05-23 NOTE — PROGRESS NOTE ADULT - SUBJECTIVE AND OBJECTIVE BOX
Patient is a 42y old  Male who presents with a chief complaint of alcohol withdrawal/ pncreatitis/ electrolyte imbalnc/-Hypoklemia. Hypontremia/ vomiting (04 May 2018 14:01)      INTERVAL HPI/OVERNIGHT EVENTS:  no new symptoms    MEDICATIONS  (STANDING):  chlordiazePOXIDE   Oral   chlordiazePOXIDE 50 milliGRAM(s) Oral every 12 hours  docusate sodium 100 milliGRAM(s) Oral two times a day  folic acid 1 milliGRAM(s) Oral daily  heparin  Injectable 5000 Unit(s) SubCutaneous every 12 hours  levoFLOXacin  Tablet 750 milliGRAM(s) Oral every 24 hours  magnesium oxide 400 milliGRAM(s) Oral three times a day with meals  multivitamin 1 Tablet(s) Oral daily  pantoprazole    Tablet 40 milliGRAM(s) Oral before breakfast  QUEtiapine 75 milliGRAM(s) Oral <User Schedule>  senna 2 Tablet(s) Oral at bedtime    MEDICATIONS  (PRN):  acetaminophen   Tablet. 650 milliGRAM(s) Oral every 6 hours PRN Headache  calcium carbonate 500 mG (Tums) Chewable 1 Tablet(s) Chew every 8 hours PRN Dyspepsia  LORazepam   Injectable 2 milliGRAM(s) IV Push every 2 hours PRN CIWA 8-20  LORazepam   Injectable 4 milliGRAM(s) IV Push every 2 hours PRN CIWA >20  polyethylene glycol 3350 17 Gram(s) Oral daily PRN Constipation  traZODone 50 milliGRAM(s) Oral at bedtime PRN insomnia        REVIEW OF SYSTEMS:  CONSTITUTIONAL: No fever, weight loss, or fatigue  EYES: No eye pain, visual disturbances, or discharge  ENMT:  No difficulty hearing, tinnitus, vertigo; No sinus or throat pain  NECK: No pain or stiffness  RESPIRATORY: No cough, wheezing, chills or hemoptysis; No shortness of breath  CARDIOVASCULAR: No chest pain, palpitations, dizziness, or leg swelling  GASTROINTESTINAL: No abdominal or epigastric pain. No nausea, vomiting, or hematemesis; No diarrhea or constipation. No melena or hematochezia.  GENITOURINARY: No dysuria, frequency, hematuria, or incontinence  NEUROLOGICAL: No headaches, memory loss, loss of strength, numbness, or tremors  SKIN: No itching, burning, rashes, or lesions          PHYSICAL EXAM:  GENERAL: NAD, well-groomed, well-developed  HEAD:  Atraumatic, Normocephalic  EYES: EOMI, PERRLA, conjunctiva and sclera clear  ENMT: No tonsillar erythema, exudates, or enlargement; Moist mucous membranes   NECK: Supple, No JVD   NERVOUS SYSTEM:  Alert & Oriented X3, Good concentration; Motor Strength 5/5 B/L upper and lower extremities; DTRs 2+ intact and symmetric  CHEST/LUNG: Clear to percussion bilaterally; No rales, rhonchi, wheezing, or rubs  HEART: Regular rate and rhythm; No murmurs, rubs, or gallops  ABDOMEN: Soft, Nontender, Nondistended; Bowel sounds present  EXTREMITIES:  2+ Peripheral Pulses, No clubbing, cyanosis, or edema  LYMPH: No lymphadenopathy noted  SKIN: No rashes or lesions            CAPILLARY BLOOD GLUCOSE          RADIOLOGY & ADDITIONAL TESTS:    Imaging Personally Reviewed:  [ ] YES  [ ] NO    Consultant(s) Notes Reviewed:  [ ] YES  [ ] NO    Care Discussed with Consultants/Other Providers [ ] YES  [ ] NO

## 2018-05-23 NOTE — PROGRESS NOTE BEHAVIORAL HEALTH - NSBHFUPINTERVALHXFT_PSY_A_CORE
Met with the patient and also discussed with mother who is by his side. Discussed yesterday's evaluation with mother. Patient is alert and oriented, some speech slurring- suspected to be due to Librium. He denies any mood symptoms, TP is logical, denies any si or hi, sleep is fair, and no psychosis.   Mother states that there is no safety issues at home. Mother discussed with patient that he must stay till the Librium taper is completed. Patient and mother in agreement with outpatient substance abuse treatment. Asked for resources of providers who take Medicaid. Discussed with BERTRAM Corea who will call mother to give info.

## 2018-05-23 NOTE — PROGRESS NOTE BEHAVIORAL HEALTH - NSBHCONSULTMEDS_PSY_A_CORE FT
Continue dose of Seroquel to 75mg bid po for now  collaborate care with family to coordinate d/c planning.  Continue librium taper

## 2018-05-24 ENCOUNTER — TRANSCRIPTION ENCOUNTER (OUTPATIENT)
Age: 43
End: 2018-05-24

## 2018-05-24 LAB
ANION GAP SERPL CALC-SCNC: 11 MMOL/L — SIGNIFICANT CHANGE UP (ref 5–17)
BUN SERPL-MCNC: 12 MG/DL — SIGNIFICANT CHANGE UP (ref 7–23)
CALCIUM SERPL-MCNC: 9.6 MG/DL — SIGNIFICANT CHANGE UP (ref 8.5–10.1)
CHLORIDE SERPL-SCNC: 106 MMOL/L — SIGNIFICANT CHANGE UP (ref 96–108)
CO2 SERPL-SCNC: 23 MMOL/L — SIGNIFICANT CHANGE UP (ref 22–31)
CREAT SERPL-MCNC: 0.75 MG/DL — SIGNIFICANT CHANGE UP (ref 0.5–1.3)
GLUCOSE SERPL-MCNC: 83 MG/DL — SIGNIFICANT CHANGE UP (ref 70–99)
HCT VFR BLD CALC: 32.1 % — LOW (ref 39–50)
HGB BLD-MCNC: 10.9 G/DL — LOW (ref 13–17)
MAGNESIUM SERPL-MCNC: 1.5 MG/DL — LOW (ref 1.6–2.6)
MCHC RBC-ENTMCNC: 33 PG — SIGNIFICANT CHANGE UP (ref 27–34)
MCHC RBC-ENTMCNC: 34 GM/DL — SIGNIFICANT CHANGE UP (ref 32–36)
MCV RBC AUTO: 97.3 FL — SIGNIFICANT CHANGE UP (ref 80–100)
NRBC # BLD: 0 /100 WBCS — SIGNIFICANT CHANGE UP (ref 0–0)
PHOSPHATE SERPL-MCNC: 3.4 MG/DL — SIGNIFICANT CHANGE UP (ref 2.5–4.5)
PLATELET # BLD AUTO: 367 K/UL — SIGNIFICANT CHANGE UP (ref 150–400)
POTASSIUM SERPL-MCNC: 3.6 MMOL/L — SIGNIFICANT CHANGE UP (ref 3.5–5.3)
POTASSIUM SERPL-SCNC: 3.6 MMOL/L — SIGNIFICANT CHANGE UP (ref 3.5–5.3)
RBC # BLD: 3.3 M/UL — LOW (ref 4.2–5.8)
RBC # FLD: 12.2 % — SIGNIFICANT CHANGE UP (ref 10.3–14.5)
SODIUM SERPL-SCNC: 140 MMOL/L — SIGNIFICANT CHANGE UP (ref 135–145)
WBC # BLD: 8.11 K/UL — SIGNIFICANT CHANGE UP (ref 3.8–10.5)
WBC # FLD AUTO: 8.11 K/UL — SIGNIFICANT CHANGE UP (ref 3.8–10.5)

## 2018-05-24 RX ORDER — PANTOPRAZOLE SODIUM 20 MG/1
1 TABLET, DELAYED RELEASE ORAL
Qty: 30 | Refills: 0 | OUTPATIENT
Start: 2018-05-24 | End: 2018-06-22

## 2018-05-24 RX ORDER — CIPROFLOXACIN LACTATE 400MG/40ML
1 VIAL (ML) INTRAVENOUS
Qty: 2 | Refills: 0 | OUTPATIENT
Start: 2018-05-24 | End: 2018-05-25

## 2018-05-24 RX ORDER — QUETIAPINE FUMARATE 200 MG/1
3 TABLET, FILM COATED ORAL
Qty: 180 | Refills: 0 | OUTPATIENT
Start: 2018-05-24 | End: 2018-06-22

## 2018-05-24 RX ORDER — FOLIC ACID 0.8 MG
1 TABLET ORAL
Qty: 30 | Refills: 0 | OUTPATIENT
Start: 2018-05-24 | End: 2018-06-22

## 2018-05-24 RX ORDER — TRAZODONE HCL 50 MG
1 TABLET ORAL
Qty: 14 | Refills: 0 | OUTPATIENT
Start: 2018-05-24 | End: 2018-06-06

## 2018-05-24 RX ORDER — MAGNESIUM OXIDE 400 MG ORAL TABLET 241.3 MG
400 TABLET ORAL
Qty: 0 | Refills: 0 | Status: COMPLETED | OUTPATIENT
Start: 2018-05-24 | End: 2018-05-24

## 2018-05-24 RX ADMIN — Medication 100 MILLIGRAM(S): at 17:10

## 2018-05-24 RX ADMIN — MAGNESIUM OXIDE 400 MG ORAL TABLET 400 MILLIGRAM(S): 241.3 TABLET ORAL at 09:16

## 2018-05-24 RX ADMIN — QUETIAPINE FUMARATE 75 MILLIGRAM(S): 200 TABLET, FILM COATED ORAL at 22:00

## 2018-05-24 RX ADMIN — HEPARIN SODIUM 5000 UNIT(S): 5000 INJECTION INTRAVENOUS; SUBCUTANEOUS at 05:55

## 2018-05-24 RX ADMIN — Medication 1 MILLIGRAM(S): at 11:21

## 2018-05-24 RX ADMIN — Medication 50 MILLIGRAM(S): at 13:43

## 2018-05-24 RX ADMIN — QUETIAPINE FUMARATE 75 MILLIGRAM(S): 200 TABLET, FILM COATED ORAL at 07:26

## 2018-05-24 RX ADMIN — SENNA PLUS 2 TABLET(S): 8.6 TABLET ORAL at 21:59

## 2018-05-24 RX ADMIN — Medication 1 TABLET(S): at 11:21

## 2018-05-24 RX ADMIN — PANTOPRAZOLE SODIUM 40 MILLIGRAM(S): 20 TABLET, DELAYED RELEASE ORAL at 05:56

## 2018-05-24 RX ADMIN — MAGNESIUM OXIDE 400 MG ORAL TABLET 400 MILLIGRAM(S): 241.3 TABLET ORAL at 11:33

## 2018-05-24 RX ADMIN — Medication 2 MILLIGRAM(S): at 20:46

## 2018-05-24 RX ADMIN — HEPARIN SODIUM 5000 UNIT(S): 5000 INJECTION INTRAVENOUS; SUBCUTANEOUS at 17:10

## 2018-05-24 RX ADMIN — Medication 50 MILLIGRAM(S): at 00:59

## 2018-05-24 RX ADMIN — Medication 100 MILLIGRAM(S): at 05:55

## 2018-05-24 NOTE — DISCHARGE NOTE ADULT - PLAN OF CARE
Cessation Follow up with PMD Rseolved Continue meds as prescribed  Follow up with PMD Stable Resolved

## 2018-05-24 NOTE — PROGRESS NOTE ADULT - PROBLEM SELECTOR PLAN 2
Continue CIWA monitoring with Ativan PRN  ICU care
delirium-improved
Advance to regular diet   Continue IV hydration. Lipase normal
Continue IV hydration. Lipase normal.   start NGT dfeedings
cont IVF
cont tx
Advance to regular diet   Continue IV hydration. Lipase normal.
Continue CIWA monitoring with Ativan PRN  Thiamine and MVI.
Continue IV hydration. Lipase normal.
delirium
delirium-improved

## 2018-05-24 NOTE — PROGRESS NOTE ADULT - PROBLEM SELECTOR PLAN 3
improved
Dash/TLC diet. check AFP
monitor cbc
Dash/TLC diet. when tolerating PO  AFP normal
Dash/TLC diet. when tolerating PO and fully awake  AFP normal.
improved
renal consult

## 2018-05-24 NOTE — DISCHARGE NOTE ADULT - SECONDARY DIAGNOSIS.
Acute renal failure, unspecified acute renal failure type Anxiety disorder Substance induced mood disorder Hepatic steatosis Alcohol-induced acute pancreatitis, unspecified complication status

## 2018-05-24 NOTE — DISCHARGE NOTE ADULT - CARE PLAN
Principal Discharge DX:	Alcohol withdrawal syndrome without complication  Goal:	Cessation  Assessment and plan of treatment:	Follow up with PMD  Secondary Diagnosis:	Acute renal failure, unspecified acute renal failure type  Assessment and plan of treatment:	Rseolved  Secondary Diagnosis:	Anxiety disorder  Assessment and plan of treatment:	Continue meds as prescribed  Follow up with PMD  Secondary Diagnosis:	Substance induced mood disorder  Assessment and plan of treatment:	Continue meds as prescribed  Follow up with PMD  Secondary Diagnosis:	Hepatic steatosis  Assessment and plan of treatment:	Stable  Secondary Diagnosis:	Alcohol-induced acute pancreatitis, unspecified complication status  Assessment and plan of treatment:	Resolved

## 2018-05-24 NOTE — DISCHARGE NOTE ADULT - PATIENT PORTAL LINK FT
You can access the [facility] Patient Portal, offered by [facility], by registering with the following website: [email or web address]

## 2018-05-24 NOTE — PROGRESS NOTE ADULT - PROBLEM SELECTOR PROBLEM 3
Renal insufficiency
Hepatic steatosis
Acute renal failure, unspecified acute renal failure type
Hepatic steatosis
Hepatic steatosis
Renal insufficiency

## 2018-05-24 NOTE — PROGRESS NOTE ADULT - SUBJECTIVE AND OBJECTIVE BOX
Patient is a 42y old  Male who presents with a chief complaint of alcohol withdrawal/ pncreatitis/ electrolyte imbalnc/-Hypoklemia. Hypontremia/ vomiting (04 May 2018 14:01)      INTERVAL HPI/OVERNIGHT EVENTS:    calm no distress  still unsteady  for ALE    EDICATIONS  (STANDING):  chlordiazePOXIDE   Oral   chlordiazePOXIDE 50 milliGRAM(s) Oral every 12 hours  docusate sodium 100 milliGRAM(s) Oral two times a day  folic acid 1 milliGRAM(s) Oral daily  heparin  Injectable 5000 Unit(s) SubCutaneous every 12 hours  levoFLOXacin  Tablet 750 milliGRAM(s) Oral every 24 hours  magnesium oxide 400 milliGRAM(s) Oral three times a day with meals  multivitamin 1 Tablet(s) Oral daily  pantoprazole    Tablet 40 milliGRAM(s) Oral before breakfast  QUEtiapine 75 milliGRAM(s) Oral <User Schedule>  senna 2 Tablet(s) Oral at bedtime    MEDICATIONS  (PRN):  acetaminophen   Tablet. 650 milliGRAM(s) Oral every 6 hours PRN Headache  calcium carbonate 500 mG (Tums) Chewable 1 Tablet(s) Chew every 8 hours PRN Dyspepsia  LORazepam   Injectable 2 milliGRAM(s) IV Push every 2 hours PRN CIWA 8-20  LORazepam   Injectable 4 milliGRAM(s) IV Push every 2 hours PRN CIWA >20  polyethylene glycol 3350 17 Gram(s) Oral daily PRN Constipation  traZODone 50 milliGRAM(s) Oral at bedtime PRN insomnia        REVIEW OF SYSTEMS:  CONSTITUTIONAL: No fever, weight loss, or fatigue  EYES: No eye pain, visual disturbances, or discharge  ENMT:  No difficulty hearing, tinnitus, vertigo; No sinus or throat pain  NECK: No pain or stiffness  RESPIRATORY: No cough, wheezing, chills or hemoptysis; No shortness of breath  CARDIOVASCULAR: No chest pain, palpitations, dizziness, or leg swelling  GASTROINTESTINAL: No abdominal or epigastric pain. No nausea, vomiting, or hematemesis; No diarrhea or constipation. No melena or hematochezia.  GENITOURINARY: No dysuria, frequency, hematuria, or incontinence  NEUROLOGICAL: No headaches, memory loss, loss of strength, numbness, or tremors  SKIN: No itching, burning, rashes, or lesions      Vital Signs Last 24 Hrs  T(C): 36.6 (24 May 2018 05:00), Max: 36.7 (23 May 2018 12:00)  T(F): 97.8 (24 May 2018 05:00), Max: 98.1 (23 May 2018 12:00)  HR: 97 (24 May 2018 05:00) (96 - 107)  BP: 111/67 (24 May 2018 05:00) (100/63 - 127/75)  BP(mean): --  RR: 18 (24 May 2018 05:00) (18 - 18)  SpO2: 99% (24 May 2018 05:00) (97% - 100%)    PHYSICAL EXAM:  GENERAL: NAD, well-groomed, well-developed  HEAD:  Atraumatic, Normocephalic  EYES: EOMI, PERRLA, conjunctiva and sclera clear  ENMT: No tonsillar erythema, exudates, or enlargement; Moist mucous membranes   NECK: Supple, No JVD   NERVOUS SYSTEM:  Alert & Oriented X3, Good concentration; Motor Strength 5/5 B/L upper and lower extremities; DTRs 2+ intact and symmetric  CHEST/LUNG: Clear to percussion bilaterally; No rales, rhonchi, wheezing, or rubs  HEART: Regular rate and rhythm; No murmurs, rubs, or gallops  ABDOMEN: Soft, Nontender, Nondistended; Bowel sounds present  EXTREMITIES:  2+ Peripheral Pulses, No clubbing, cyanosis, or edema  LYMPH: No lymphadenopathy noted  SKIN: No rashes or lesions    LABS:                        10.9   8.11  )-----------( 367      ( 24 May 2018 06:25 )             32.1     05-24    140  |  106  |  12  ----------------------------<  83  3.6   |  23  |  0.75    Ca    9.6      24 May 2018 06:24  Phos  3.4     05-24  Mg     1.5     05-24          CAPILLARY BLOOD GLUCOSE          RADIOLOGY & ADDITIONAL TESTS:    Imaging Personally Reviewed:  [ ] YES  [ ] NO    Consultant(s) Notes Reviewed:  [ ] YES  [ ] NO    Care Discussed with Consultants/Other Providers [ ] YES  [ ] NO

## 2018-05-24 NOTE — DISCHARGE NOTE ADULT - HOSPITAL COURSE
41 yo M with total body pain, n/v/d for about 4 days.  Pt. admits to chronic drinking (5 glasses of wine per day).  He stopped days ago due to n/v and epigastric pain.  He thought he should stop because he figured alcohol was irritating his stomach.  He also notes diffuse cramps in his muscles.  He's been drinking Gatorade for the last few days to replenish fluids.  Pt. denies inciting event.  No sick contacts, no recent travel.  He was well before.  No other complaints.     Patient symptoms controlled, medically optimized, and stable for discharge. Patient to follow up with PMD and psychiatrist. EtOH cessation stressed.

## 2018-05-24 NOTE — PROGRESS NOTE ADULT - PROBLEM SELECTOR PROBLEM 4
Hypokalemia

## 2018-05-24 NOTE — DISCHARGE NOTE ADULT - CARE PROVIDER_API CALL
Vinay Escalante), Internal Medicine  [address]  [place] [address]  Phone: [phone]  Fax: [phone]    Yuriy Velazquez (ELMA), Psychiatry  [address] [place] [address]  Phone: [phone]  Fax: [phone]

## 2018-05-24 NOTE — PROGRESS NOTE ADULT - PROVIDER SPECIALTY LIST ADULT
Critical Care
Gastroenterology
Internal Medicine
Nephrology
Critical Care
Critical Care
Nephrology
Nephrology
Critical Care
Gastroenterology
Internal Medicine

## 2018-05-24 NOTE — PROGRESS NOTE ADULT - PROBLEM SELECTOR PROBLEM 1
Alcohol withdrawal syndrome without complication
Alcohol withdrawal syndrome with complication
Alcohol withdrawal syndrome with complication
Alcohol withdrawal syndrome without complication
Alcohol-induced acute pancreatitis, unspecified complication status
no tub baths/no sports/gym/no heavy lifting

## 2018-05-24 NOTE — PROGRESS NOTE ADULT - PROBLEM SELECTOR PROBLEM 2
Alcohol withdrawal syndrome with complication
Alcohol withdrawal syndrome without complication
Alcohol-induced acute pancreatitis, unspecified complication status
Acute renal failure, unspecified acute renal failure type
Alcohol-induced acute pancreatitis, unspecified complication status
Hepatic steatosis
Alcohol withdrawal syndrome without complication
Alcohol-induced acute pancreatitis, unspecified complication status
Hepatic steatosis

## 2018-05-24 NOTE — PROGRESS NOTE ADULT - PROBLEM SELECTOR PLAN 1
Continue IV hydration. Lipase normal
s/p icu monitoring  alcohol withdrawal protocol. COnt same teatment
Continue CIWA monitoring with Ativan PRN  Thiamine and MVI.
Continue CIWA monitoring with Ativan PRN  ICU care.
cont med
cont tx
Advance to regular diet   Continue IV hydration. Lipase normal.
Continue CIWA monitoring with Ativan PRN  ICU care.  Reconsult as needed
Continue CIWA monitoring with Ativan PRN  Thiamine and MVI.
icu monitoring alcohol withdrawal protocol. COnt same teatment
icu monitoring alcohol withdrawal protocol. GI consult
s/p icu monitoring  alcohol withdrawal protocol. COnt same teatment

## 2018-05-24 NOTE — DISCHARGE NOTE ADULT - MEDICATION SUMMARY - MEDICATIONS TO TAKE
I will START or STAY ON the medications listed below when I get home from the hospital:    traZODone 50 mg oral tablet  -- 1 tab(s) by mouth once a day (at bedtime), As needed, insomnia MDD:1  -- Indication: For Insomnia    QUEtiapine 25 mg oral tablet  -- 3 tab(s) by mouth 2 times a day   -- Indication: For Antipsychotic    pantoprazole 40 mg oral delayed release tablet  -- 1 tab(s) by mouth once a day (before a meal)  -- Indication: For GERD    levoFLOXacin 750 mg oral tablet  -- 1 tab(s) by mouth every 24 hours  -- Indication: For Infection    Multiple Vitamins oral tablet  -- 1 tab(s) by mouth once a day  -- Indication: For Supplement    folic acid 1 mg oral tablet  -- 1 tab(s) by mouth once a day  -- Indication: For Supplement

## 2018-05-24 NOTE — PROGRESS NOTE ADULT - PROBLEM SELECTOR PLAN 4
Improved with supplement
potassium monitoring and repletion

## 2018-05-24 NOTE — PROGRESS NOTE ADULT - ASSESSMENT
alcohol withdrawal/ Pancreatitis , Hypokalemia ,hyponatremia Elevated serum protien  follow up labs  clinically improved      PT/OT follow up-dc planning ALE

## 2018-05-25 ENCOUNTER — INPATIENT (INPATIENT)
Facility: HOSPITAL | Age: 43
LOS: 2 days | Discharge: HOME | DRG: 949 | End: 2018-05-28
Attending: STUDENT IN AN ORGANIZED HEALTH CARE EDUCATION/TRAINING PROGRAM | Admitting: STUDENT IN AN ORGANIZED HEALTH CARE EDUCATION/TRAINING PROGRAM
Payer: MEDICAID

## 2018-05-25 VITALS
SYSTOLIC BLOOD PRESSURE: 102 MMHG | RESPIRATION RATE: 18 BRPM | HEART RATE: 89 BPM | DIASTOLIC BLOOD PRESSURE: 67 MMHG | OXYGEN SATURATION: 98 %

## 2018-05-25 DIAGNOSIS — R53.81 OTHER MALAISE: ICD-10-CM

## 2018-05-25 RX ORDER — QUETIAPINE FUMARATE 200 MG/1
50 TABLET, FILM COATED ORAL ONCE
Qty: 0 | Refills: 0 | Status: COMPLETED | OUTPATIENT
Start: 2018-05-25 | End: 2018-05-25

## 2018-05-25 RX ADMIN — Medication 1 MILLIGRAM(S): at 12:40

## 2018-05-25 RX ADMIN — Medication 1 TABLET(S): at 12:40

## 2018-05-25 RX ADMIN — PANTOPRAZOLE SODIUM 40 MILLIGRAM(S): 20 TABLET, DELAYED RELEASE ORAL at 08:15

## 2018-05-25 RX ADMIN — QUETIAPINE FUMARATE 50 MILLIGRAM(S): 200 TABLET, FILM COATED ORAL at 04:45

## 2018-05-25 RX ADMIN — HEPARIN SODIUM 5000 UNIT(S): 5000 INJECTION INTRAVENOUS; SUBCUTANEOUS at 05:57

## 2018-05-25 RX ADMIN — Medication 50 MILLIGRAM(S): at 12:40

## 2018-05-25 RX ADMIN — QUETIAPINE FUMARATE 75 MILLIGRAM(S): 200 TABLET, FILM COATED ORAL at 08:15

## 2018-05-25 RX ADMIN — Medication 100 MILLIGRAM(S): at 05:56

## 2018-05-26 PROCEDURE — 99223 1ST HOSP IP/OBS HIGH 75: CPT

## 2018-05-26 PROCEDURE — 93010 ELECTROCARDIOGRAM REPORT: CPT

## 2018-05-28 PROCEDURE — 97163 PT EVAL HIGH COMPLEX 45 MIN: CPT

## 2018-05-28 PROCEDURE — 92610 EVALUATE SWALLOWING FUNCTION: CPT

## 2018-05-28 PROCEDURE — 97167 OT EVAL HIGH COMPLEX 60 MIN: CPT

## 2018-05-28 PROCEDURE — 97535 SELF CARE MNGMENT TRAINING: CPT

## 2018-05-28 PROCEDURE — 84100 ASSAY OF PHOSPHORUS: CPT

## 2018-05-28 PROCEDURE — 85610 PROTHROMBIN TIME: CPT

## 2018-05-28 PROCEDURE — 83735 ASSAY OF MAGNESIUM: CPT

## 2018-05-28 PROCEDURE — 97110 THERAPEUTIC EXERCISES: CPT

## 2018-05-28 PROCEDURE — 80053 COMPREHEN METABOLIC PANEL: CPT

## 2018-05-28 PROCEDURE — 93005 ELECTROCARDIOGRAM TRACING: CPT

## 2018-05-28 PROCEDURE — 85025 COMPLETE CBC W/AUTO DIFF WBC: CPT

## 2018-05-29 DIAGNOSIS — R45.1 RESTLESSNESS AND AGITATION: ICD-10-CM

## 2018-05-29 DIAGNOSIS — E83.51 HYPOCALCEMIA: ICD-10-CM

## 2018-05-29 DIAGNOSIS — E87.1 HYPO-OSMOLALITY AND HYPONATREMIA: ICD-10-CM

## 2018-05-29 DIAGNOSIS — A41.9 SEPSIS, UNSPECIFIED ORGANISM: ICD-10-CM

## 2018-05-29 DIAGNOSIS — F12.10 CANNABIS ABUSE, UNCOMPLICATED: ICD-10-CM

## 2018-05-29 DIAGNOSIS — K85.20 ALCOHOL INDUCED ACUTE PANCREATITIS WITHOUT NECROSIS OR INFECTION: ICD-10-CM

## 2018-05-29 DIAGNOSIS — F19.94 OTHER PSYCHOACTIVE SUBSTANCE USE, UNSPECIFIED WITH PSYCHOACTIVE SUBSTANCE-INDUCED MOOD DISORDER: ICD-10-CM

## 2018-05-29 DIAGNOSIS — J15.211 PNEUMONIA DUE TO METHICILLIN SUSCEPTIBLE STAPHYLOCOCCUS AUREUS: ICD-10-CM

## 2018-05-29 DIAGNOSIS — Z56.0 UNEMPLOYMENT, UNSPECIFIED: ICD-10-CM

## 2018-05-29 DIAGNOSIS — F41.9 ANXIETY DISORDER, UNSPECIFIED: ICD-10-CM

## 2018-05-29 DIAGNOSIS — K76.0 FATTY (CHANGE OF) LIVER, NOT ELSEWHERE CLASSIFIED: ICD-10-CM

## 2018-05-29 DIAGNOSIS — Z78.1 PHYSICAL RESTRAINT STATUS: ICD-10-CM

## 2018-05-29 DIAGNOSIS — K56.7 ILEUS, UNSPECIFIED: ICD-10-CM

## 2018-05-29 DIAGNOSIS — K21.9 GASTRO-ESOPHAGEAL REFLUX DISEASE WITHOUT ESOPHAGITIS: ICD-10-CM

## 2018-05-29 DIAGNOSIS — E87.3 ALKALOSIS: ICD-10-CM

## 2018-05-29 DIAGNOSIS — F10.231 ALCOHOL DEPENDENCE WITH WITHDRAWAL DELIRIUM: ICD-10-CM

## 2018-05-29 DIAGNOSIS — N17.9 ACUTE KIDNEY FAILURE, UNSPECIFIED: ICD-10-CM

## 2018-05-29 DIAGNOSIS — E83.39 OTHER DISORDERS OF PHOSPHORUS METABOLISM: ICD-10-CM

## 2018-05-29 DIAGNOSIS — E86.0 DEHYDRATION: ICD-10-CM

## 2018-05-29 DIAGNOSIS — J96.01 ACUTE RESPIRATORY FAILURE WITH HYPOXIA: ICD-10-CM

## 2018-05-29 DIAGNOSIS — E87.6 HYPOKALEMIA: ICD-10-CM

## 2018-05-29 DIAGNOSIS — D72.829 ELEVATED WHITE BLOOD CELL COUNT, UNSPECIFIED: ICD-10-CM

## 2018-05-29 SDOH — ECONOMIC STABILITY - INCOME SECURITY: UNEMPLOYMENT, UNSPECIFIED: Z56.0

## 2018-05-30 PROBLEM — Z00.00 ENCOUNTER FOR PREVENTIVE HEALTH EXAMINATION: Status: ACTIVE | Noted: 2018-05-30

## 2018-06-05 ENCOUNTER — APPOINTMENT (OUTPATIENT)
Dept: INTERNAL MEDICINE | Facility: CLINIC | Age: 43
End: 2018-06-05
Payer: MEDICAID

## 2018-06-05 VITALS — BODY MASS INDEX: 23.19 KG/M2 | WEIGHT: 162 LBS | HEIGHT: 70 IN

## 2018-06-05 VITALS
RESPIRATION RATE: 16 BRPM | DIASTOLIC BLOOD PRESSURE: 80 MMHG | TEMPERATURE: 98.7 F | SYSTOLIC BLOOD PRESSURE: 110 MMHG | HEART RATE: 92 BPM

## 2018-06-05 DIAGNOSIS — N28.9 DISORDER OF KIDNEY AND URETER, UNSPECIFIED: ICD-10-CM

## 2018-06-05 DIAGNOSIS — Z56.0 UNEMPLOYMENT, UNSPECIFIED: ICD-10-CM

## 2018-06-05 DIAGNOSIS — M79.1 MYALGIA: ICD-10-CM

## 2018-06-05 DIAGNOSIS — F17.290 NICOTINE DEPENDENCE, OTHER TOBACCO PRODUCT, UNCOMPLICATED: ICD-10-CM

## 2018-06-05 DIAGNOSIS — Z83.3 FAMILY HISTORY OF DIABETES MELLITUS: ICD-10-CM

## 2018-06-05 DIAGNOSIS — Z78.9 OTHER SPECIFIED HEALTH STATUS: ICD-10-CM

## 2018-06-05 DIAGNOSIS — M62.81 MUSCLE WEAKNESS (GENERALIZED): ICD-10-CM

## 2018-06-05 DIAGNOSIS — R94.6 ABNORMAL RESULTS OF THYROID FUNCTION STUDIES: ICD-10-CM

## 2018-06-05 PROCEDURE — 36415 COLL VENOUS BLD VENIPUNCTURE: CPT

## 2018-06-05 PROCEDURE — 99204 OFFICE O/P NEW MOD 45 MIN: CPT | Mod: 25

## 2018-06-05 RX ORDER — FOLIC ACID 1 MG/1
1 TABLET ORAL
Refills: 0 | Status: ACTIVE | COMMUNITY

## 2018-06-05 RX ORDER — MULTIVITAMIN
CAPSULE ORAL
Refills: 0 | Status: ACTIVE | COMMUNITY

## 2018-06-05 SDOH — ECONOMIC STABILITY - INCOME SECURITY: UNEMPLOYMENT, UNSPECIFIED: Z56.0

## 2018-06-05 NOTE — COUNSELING
[Weight management counseling provided] : Weight management [Healthy eating counseling provided] : healthy eating [Activity counseling provided] : activity [Behavioral health counseling provided] : behavioral health  [Engage in a relaxing activity] : Engage in a relaxing activity [None] : None [Good understanding] : Patient has a good understanding of lifestyle changes and the steps needed to achieve self management goals

## 2018-06-05 NOTE — PHYSICAL EXAM
[No Acute Distress] : no acute distress [Well Nourished] : well nourished [Well Developed] : well developed [Well-Appearing] : well-appearing [Normal Sclera/Conjunctiva] : normal sclera/conjunctiva [PERRL] : pupils equal round and reactive to light [EOMI] : extraocular movements intact [Normal Outer Ear/Nose] : the outer ears and nose were normal in appearance [Normal Oropharynx] : the oropharynx was normal [No JVD] : no jugular venous distention [Supple] : supple [No Lymphadenopathy] : no lymphadenopathy [Thyroid Normal, No Nodules] : the thyroid was normal and there were no nodules present [No Respiratory Distress] : no respiratory distress  [Clear to Auscultation] : lungs were clear to auscultation bilaterally [No Accessory Muscle Use] : no accessory muscle use [Normal Rate] : normal rate  [Regular Rhythm] : with a regular rhythm [Normal S1, S2] : normal S1 and S2 [No Murmur] : no murmur heard [No Carotid Bruits] : no carotid bruits [No Abdominal Bruit] : a ~M bruit was not heard ~T in the abdomen [Pedal Pulses Present] : the pedal pulses are present [No Edema] : there was no peripheral edema [No Palpable Aorta] : no palpable aorta [Soft] : abdomen soft [Non-distended] : non-distended [No Masses] : no abdominal mass palpated [No HSM] : no HSM [Normal Bowel Sounds] : normal bowel sounds [Normal Posterior Cervical Nodes] : no posterior cervical lymphadenopathy [Normal Anterior Cervical Nodes] : no anterior cervical lymphadenopathy [No CVA Tenderness] : no CVA  tenderness [No Spinal Tenderness] : no spinal tenderness [No Joint Swelling] : no joint swelling [Grossly Normal Strength/Tone] : grossly normal strength/tone [No Rash] : no rash [Normal Gait] : normal gait [Coordination Grossly Intact] : coordination grossly intact [No Focal Deficits] : no focal deficits [Speech Grossly Normal] : speech grossly normal [Memory Grossly Normal] : memory grossly normal [Normal Affect] : the affect was normal [Alert and Oriented x3] : oriented to person, place, and time [Normal Mood] : the mood was normal [Normal Insight/Judgement] : insight and judgment were intact [de-identified] : gen tenderness [de-identified] : mild/fine tremor.  no asterexis

## 2018-06-05 NOTE — HEALTH RISK ASSESSMENT
[] : Yes [3] : 2) Feeling down, depressed, or hopeless for nearly every day (3) [de-identified] : reports abstention since hospitalization.   [[id number]] : 6

## 2018-06-05 NOTE — HISTORY OF PRESENT ILLNESS
[FreeTextEntry8] : Pt is a 41 y/o male with a hx of etoh, pancreatitis s/p admission to [facility] 5/4/18-5/25/18 with ETOH withdrawal, pancreatitis, ca, electrolyte imbalance.  Admission complicated by DTs, PNA/hypoxia requiring intubation.  \par Pt reports that he had blacked out at the time of admission.  Had been vomiting - history from the pt not clear.  Reprots that he was admitted to the ICU.  was dx'd with withdrawal, etoh associated pancreatitis, renal insuf.  Was seen by psych and was dx'd with anxiety - was started on meds.  Was d/c'd to [facility] - s/p d/c 5/28/18.\par Reports that he has been eating well since d/c.\par Has been avoiding the ETOH.  Reports no drinking since he has been home.\par still with some anxiety since he has been at home.  With stress at home.  Does not have the info for psych f/u.\par No noted GI sx.\par No longer with tremors.\par Has been having some muscle aches.  + gen weakness.  No numbness.\par No noted fevers.\par No noted pulm sx.\par No noted urinary sx.  \par With continues anx and depression.  No noted si/hi.

## 2018-06-05 NOTE — REVIEW OF SYSTEMS
[Chills] : chills [Fatigue] : fatigue [Night Sweats] : night sweats [Recent Change In Weight] : ~T recent weight change [Suicidal] : suicidal [Negative] : Heme/Lymph [Fever] : no fever [Skin Rash] : no skin rash [Anxiety] : no anxiety [Depression] : no depression [FreeTextEntry2] : wt gain [FreeTextEntry9] : gen aches

## 2018-06-06 LAB
ALBUMIN SERPL ELPH-MCNC: 4.1 G/DL
ALP BLD-CCNC: 64 U/L
ALT SERPL-CCNC: 35 U/L
AMYLASE/CREAT SERPL: 179 U/L
ANION GAP SERPL CALC-SCNC: 17 MMOL/L
AST SERPL-CCNC: 38 U/L
BASOPHILS # BLD AUTO: 0.03 K/UL
BASOPHILS NFR BLD AUTO: 0.3 %
BILIRUB SERPL-MCNC: 0.4 MG/DL
BUN SERPL-MCNC: 11 MG/DL
CALCIUM SERPL-MCNC: 9.8 MG/DL
CHLORIDE SERPL-SCNC: 104 MMOL/L
CO2 SERPL-SCNC: 22 MMOL/L
CREAT SERPL-MCNC: 0.64 MG/DL
EOSINOPHIL # BLD AUTO: 0.51 K/UL
EOSINOPHIL NFR BLD AUTO: 5.8 %
GGT SERPL-CCNC: 185 U/L
GLUCOSE SERPL-MCNC: 77 MG/DL
HCT VFR BLD CALC: 37.1 %
HGB BLD-MCNC: 12.1 G/DL
IMM GRANULOCYTES NFR BLD AUTO: 0.2 %
LPL SERPL-CCNC: 101 U/L
LYMPHOCYTES # BLD AUTO: 1.63 K/UL
LYMPHOCYTES NFR BLD AUTO: 18.6 %
MAN DIFF?: NORMAL
MCHC RBC-ENTMCNC: 32.6 GM/DL
MCHC RBC-ENTMCNC: 32.6 PG
MCV RBC AUTO: 100 FL
MONOCYTES # BLD AUTO: 0.61 K/UL
MONOCYTES NFR BLD AUTO: 7 %
NEUTROPHILS # BLD AUTO: 5.95 K/UL
NEUTROPHILS NFR BLD AUTO: 68.1 %
PLATELET # BLD AUTO: 239 K/UL
POTASSIUM SERPL-SCNC: 3.9 MMOL/L
PROT SERPL-MCNC: 7.9 G/DL
RBC # BLD: 3.71 M/UL
RBC # FLD: 13.7 %
SODIUM SERPL-SCNC: 143 MMOL/L
T3 SERPL-MCNC: 145 NG/DL
T4 FREE SERPL-MCNC: 0.8 NG/DL
TSH SERPL-ACNC: 2.91 UIU/ML
WBC # FLD AUTO: 8.75 K/UL

## 2018-06-20 ENCOUNTER — APPOINTMENT (OUTPATIENT)
Dept: INTERNAL MEDICINE | Facility: CLINIC | Age: 43
End: 2018-06-20
Payer: MEDICAID

## 2018-06-20 VITALS
RESPIRATION RATE: 14 BRPM | SYSTOLIC BLOOD PRESSURE: 100 MMHG | DIASTOLIC BLOOD PRESSURE: 60 MMHG | TEMPERATURE: 98.4 F | HEART RATE: 108 BPM

## 2018-06-20 VITALS — BODY MASS INDEX: 23.19 KG/M2 | WEIGHT: 162 LBS | HEIGHT: 70 IN

## 2018-06-20 DIAGNOSIS — K76.0 FATTY (CHANGE OF) LIVER, NOT ELSEWHERE CLASSIFIED: ICD-10-CM

## 2018-06-20 DIAGNOSIS — M26.609 UNSPECIFIED TEMPOROMANDIBULAR JOINT DISORDER: ICD-10-CM

## 2018-06-20 DIAGNOSIS — K11.20 SIALOADENITIS, UNSPECIFIED: ICD-10-CM

## 2018-06-20 DIAGNOSIS — K85.20 ALCOHOL INDUCED ACUTE PANCREATITIS WITHOUT NECROSIS OR INFECTION: ICD-10-CM

## 2018-06-20 DIAGNOSIS — F41.9 ANXIETY DISORDER, UNSPECIFIED: ICD-10-CM

## 2018-06-20 DIAGNOSIS — D64.9 ANEMIA, UNSPECIFIED: ICD-10-CM

## 2018-06-20 DIAGNOSIS — H02.9 UNSPECIFIED DISORDER OF EYELID: ICD-10-CM

## 2018-06-20 PROCEDURE — 99214 OFFICE O/P EST MOD 30 MIN: CPT

## 2018-06-20 NOTE — HISTORY OF PRESENT ILLNESS
[FreeTextEntry1] : anxiety, ETOH abuse, pancreatitis, hepatic steatosis, anemia\par jaw pain, eyelid lesion [de-identified] : Pt is a 43 y/o male with a hx of etoh, pancreatitis s/p admission to [facility] 5/4/18-5/25/18 with ETOH withdrawal, pancreatitis, ca, electrolyte imbalance. Admission complicated by DTs, PNA/hypoxia requiring intubation. Was seen by psych and was dx'd with anxiety - was started on meds. Was d/c'd to [facility].\par Here for f/u and acute issues.  \par Reports that he has been eating well since d/c.\par Has been avoiding the ETOH. Reports no drinking since he has been home from [facility].\par Still with anxiety since he has been at home. With stress at home.  Has been taking the meds.  Has been sleeping well with the meds.  Reports that he has been having some depressed mood from the anx and not working.  Anx has not been changed.  Has not gone to see psych.  no SI/HI\par No noted GI sx.\par Remains w/o tremors\par Reports that the weakness and aches have been better.  has been going to gym\par No noted fevers.\par No noted pulm sx.\par No noted urinary sx. \par Reports pain at the right throat with swallowing.  Located inf to the ear.  Has been bothering him for ~ 2 weeks.  no nasal congestion.  no ear sx.  no noted chest sx.  no pain at the jaw, but with some pain when moving the mouth around/biting.  no fevers, chills, sweats.  no noted swelling or lad.  Reports went to dental last week and had cavities illed.  Was told that the pain did not have relation to the teeth - had x rays.  no other noted relieving or exacerbating factors.  \par With lesion at the lt lateral inf eyelid for ~ 1 mo.  no pain.  ? occ pruritis.  no sx at the eye.  \par

## 2018-06-20 NOTE — PHYSICAL EXAM
[No Acute Distress] : no acute distress [Well Nourished] : well nourished [Well Developed] : well developed [Well-Appearing] : well-appearing [Normal Sclera/Conjunctiva] : normal sclera/conjunctiva [PERRL] : pupils equal round and reactive to light [EOMI] : extraocular movements intact [Normal Outer Ear/Nose] : the outer ears and nose were normal in appearance [Normal Oropharynx] : the oropharynx was normal [No JVD] : no jugular venous distention [Supple] : supple [No Lymphadenopathy] : no lymphadenopathy [Thyroid Normal, No Nodules] : the thyroid was normal and there were no nodules present [No Respiratory Distress] : no respiratory distress  [Clear to Auscultation] : lungs were clear to auscultation bilaterally [No Accessory Muscle Use] : no accessory muscle use [Normal Rate] : normal rate  [Regular Rhythm] : with a regular rhythm [Normal S1, S2] : normal S1 and S2 [No Murmur] : no murmur heard [No Carotid Bruits] : no carotid bruits [No Abdominal Bruit] : a ~M bruit was not heard ~T in the abdomen [Pedal Pulses Present] : the pedal pulses are present [No Edema] : there was no peripheral edema [No Palpable Aorta] : no palpable aorta [Soft] : abdomen soft [Non-distended] : non-distended [No Masses] : no abdominal mass palpated [No HSM] : no HSM [Normal Bowel Sounds] : normal bowel sounds [Normal Posterior Cervical Nodes] : no posterior cervical lymphadenopathy [Normal Anterior Cervical Nodes] : no anterior cervical lymphadenopathy [No CVA Tenderness] : no CVA  tenderness [No Spinal Tenderness] : no spinal tenderness [No Joint Swelling] : no joint swelling [Grossly Normal Strength/Tone] : grossly normal strength/tone [No Rash] : no rash [Normal Gait] : normal gait [Coordination Grossly Intact] : coordination grossly intact [No Focal Deficits] : no focal deficits [Speech Grossly Normal] : speech grossly normal [Memory Grossly Normal] : memory grossly normal [Normal Affect] : the affect was normal [Alert and Oriented x3] : oriented to person, place, and time [Normal Mood] : the mood was normal [Normal Insight/Judgement] : insight and judgment were intact [de-identified] : lt lateral eyelid margin with vessicle/pustule [de-identified] : + tenderness at the TMJ joint and iferuiorly past the angle of the mandible.   [de-identified] : gen tenderness [de-identified] : no asterexis

## 2018-06-20 NOTE — REVIEW OF SYSTEMS
[Anxiety] : anxiety [Depression] : depression [Negative] : Heme/Lymph [Fever] : no fever [Chills] : no chills [Fatigue] : no fatigue [Night Sweats] : no night sweats [Recent Change In Weight] : ~T no recent weight change [Earache] : no earache [Hearing Loss] : no hearing loss [Nosebleeds] : no nosebleeds [Postnasal Drip] : no postnasal drip [Nasal Discharge] : no nasal discharge [Sore Throat] : no sore throat [Hoarseness] : no hoarseness [Skin Rash] : no skin rash [Suicidal] : not suicidal [Insomnia] : no insomnia [FreeTextEntry2] : wt gain [FreeTextEntry3] : eyelid lesion as noted [FreeTextEntry4] : jaw pain

## 2018-06-20 NOTE — COUNSELING
[Weight management counseling provided] : Weight management [Healthy eating counseling provided] : healthy eating [Activity counseling provided] : activity [None] : None [Good understanding] : Patient has a good understanding of lifestyle changes and the steps needed to achieve self management goals [Smoking cessation counseling provided] : smoking cessation [Quit Smoking] : Quit smoking

## 2018-07-12 NOTE — BEHAVIORAL HEALTH ASSESSMENT NOTE - SUMMARY
Principal Discharge DX:	Nephrolithiasis  Goal:	remove the stone  Assessment and plan of treatment:	to follow up next week for stent removal
Briefly, the patient is a 42 year old male, single, unemployed, moved from [place], now living with father, used to work as a /, has a medical history notable for pancreatitis, has a psychiatric history significant for Alcohol dependence, history of multiple rehabs/detox admissions in the past, history of DWI- no pending legal cases as per patient, denies any history of inpatient psychiatric admissions, denies any history of si or hi, presented to the ed intoxicated. Admitted to the ICU with pancreatitis and significant DT's. Intubated for respiratory failiur due to MSSA PNA. Extubated on 5/16. Was on Haldol, and now started on Seroquel. Intermittently tries to leave AMA. Consult placed to assess capacity to leave ama, and to assess mood.   Based on evaluation done today, patient states that his mood is stable, and denies any depressive or hypomanic symptoms. Denies any psychosis. Denies any si or hi. States that he tends to anxious, restless and poor frustration tolerance. States that he tends to have anger outbursts. Discussed with patient that he is on Seroquel and discussed plan to increase dose to target the above. He agrees. He gives phone numbers for his father, mother and brother- but despite multiple attempts no answer to phone calls. Father: [phone]; brother: [phone]    Patient is alert, fully oriented. He states that he does not want to leave AMA, understands the risks of alcohol relapse if he does not seek treatment, understands the benefits of treatment, negotiates options and able to make an informed choice. Patient appears to have reasonable capacity today to make treatment choices.

## 2018-09-21 ENCOUNTER — APPOINTMENT (OUTPATIENT)
Dept: OPHTHALMOLOGY | Facility: CLINIC | Age: 43
End: 2018-09-21
Payer: MEDICAID

## 2018-09-21 DIAGNOSIS — H02.826 CYSTS OF LEFT EYE, UNSPECIFIED EYELID: ICD-10-CM

## 2018-09-21 PROCEDURE — 92002 INTRM OPH EXAM NEW PATIENT: CPT

## 2018-11-01 ENCOUNTER — OUTPATIENT (OUTPATIENT)
Dept: OUTPATIENT SERVICES | Facility: HOSPITAL | Age: 43
LOS: 1 days | End: 2018-11-01
Payer: MEDICAID

## 2018-11-01 ENCOUNTER — INPATIENT (INPATIENT)
Facility: HOSPITAL | Age: 43
LOS: 1 days | Discharge: LEFT BEFORE DISCHARGE | End: 2018-11-03
Attending: INTERNAL MEDICINE | Admitting: INTERNAL MEDICINE
Payer: MEDICAID

## 2018-11-01 VITALS
WEIGHT: 164.91 LBS | HEIGHT: 70 IN | RESPIRATION RATE: 16 BRPM | DIASTOLIC BLOOD PRESSURE: 86 MMHG | SYSTOLIC BLOOD PRESSURE: 124 MMHG | TEMPERATURE: 99 F | OXYGEN SATURATION: 98 % | HEART RATE: 75 BPM

## 2018-11-01 DIAGNOSIS — N17.9 ACUTE KIDNEY FAILURE, UNSPECIFIED: ICD-10-CM

## 2018-11-01 DIAGNOSIS — Z29.9 ENCOUNTER FOR PROPHYLACTIC MEASURES, UNSPECIFIED: ICD-10-CM

## 2018-11-01 DIAGNOSIS — D72.829 ELEVATED WHITE BLOOD CELL COUNT, UNSPECIFIED: ICD-10-CM

## 2018-11-01 DIAGNOSIS — E87.2 ACIDOSIS: ICD-10-CM

## 2018-11-01 DIAGNOSIS — F10.230 ALCOHOL DEPENDENCE WITH WITHDRAWAL, UNCOMPLICATED: ICD-10-CM

## 2018-11-01 LAB
ALBUMIN SERPL ELPH-MCNC: 5.3 G/DL — HIGH (ref 3.3–5)
ALP SERPL-CCNC: 85 U/L — SIGNIFICANT CHANGE UP (ref 40–120)
ALT FLD-CCNC: 37 U/L — SIGNIFICANT CHANGE UP (ref 12–78)
ANION GAP SERPL CALC-SCNC: 28 MMOL/L — HIGH (ref 5–17)
ANISOCYTOSIS BLD QL: SLIGHT — SIGNIFICANT CHANGE UP
APPEARANCE UR: ABNORMAL
AST SERPL-CCNC: 45 U/L — HIGH (ref 15–37)
BACTERIA # UR AUTO: ABNORMAL
BASOPHILS # BLD AUTO: 0 K/UL — SIGNIFICANT CHANGE UP (ref 0–0.2)
BASOPHILS NFR BLD AUTO: 0 % — SIGNIFICANT CHANGE UP (ref 0–2)
BILIRUB SERPL-MCNC: 1.8 MG/DL — HIGH (ref 0.2–1.2)
BILIRUB UR-MCNC: ABNORMAL
BUN SERPL-MCNC: 51 MG/DL — HIGH (ref 7–23)
CALCIUM SERPL-MCNC: 9.6 MG/DL — SIGNIFICANT CHANGE UP (ref 8.5–10.1)
CHLORIDE SERPL-SCNC: 69 MMOL/L — LOW (ref 96–108)
CO2 SERPL-SCNC: 33 MMOL/L — HIGH (ref 22–31)
COLOR SPEC: YELLOW — SIGNIFICANT CHANGE UP
CREAT SERPL-MCNC: 3.52 MG/DL — HIGH (ref 0.5–1.3)
DIFF PNL FLD: ABNORMAL
EOSINOPHIL # BLD AUTO: 0 K/UL — SIGNIFICANT CHANGE UP (ref 0–0.5)
EOSINOPHIL NFR BLD AUTO: 0 % — SIGNIFICANT CHANGE UP (ref 0–6)
EPI CELLS # UR: SIGNIFICANT CHANGE UP
ETHANOL SERPL-MCNC: <10 MG/DL — SIGNIFICANT CHANGE UP (ref 0–10)
GLUCOSE SERPL-MCNC: 159 MG/DL — HIGH (ref 70–99)
GLUCOSE UR QL: NEGATIVE MG/DL — SIGNIFICANT CHANGE UP
HCT VFR BLD CALC: 46.2 % — SIGNIFICANT CHANGE UP (ref 39–50)
HGB BLD-MCNC: 16.9 G/DL — SIGNIFICANT CHANGE UP (ref 13–17)
HYALINE CASTS # UR AUTO: ABNORMAL /LPF
KETONES UR-MCNC: ABNORMAL
LACTATE SERPL-SCNC: 1.3 MMOL/L — SIGNIFICANT CHANGE UP (ref 0.7–2)
LACTATE SERPL-SCNC: 2.9 MMOL/L — HIGH (ref 0.7–2)
LEUKOCYTE ESTERASE UR-ACNC: ABNORMAL
LYMPHOCYTES # BLD AUTO: 1.62 K/UL — SIGNIFICANT CHANGE UP (ref 1–3.3)
LYMPHOCYTES # BLD AUTO: 8 % — LOW (ref 13–44)
MAGNESIUM SERPL-MCNC: 1.5 MG/DL — LOW (ref 1.6–2.6)
MANUAL SMEAR VERIFICATION: SIGNIFICANT CHANGE UP
MCHC RBC-ENTMCNC: 31.8 PG — SIGNIFICANT CHANGE UP (ref 27–34)
MCHC RBC-ENTMCNC: 36.6 GM/DL — HIGH (ref 32–36)
MCV RBC AUTO: 86.8 FL — SIGNIFICANT CHANGE UP (ref 80–100)
MONOCYTES # BLD AUTO: 1.62 K/UL — HIGH (ref 0–0.9)
MONOCYTES NFR BLD AUTO: 8 % — SIGNIFICANT CHANGE UP (ref 2–14)
NEUTROPHILS # BLD AUTO: 16.97 K/UL — HIGH (ref 1.8–7.4)
NEUTROPHILS NFR BLD AUTO: 82 % — HIGH (ref 43–77)
NEUTS BAND # BLD: 2 % — SIGNIFICANT CHANGE UP (ref 0–8)
NITRITE UR-MCNC: NEGATIVE — SIGNIFICANT CHANGE UP
NRBC # BLD: 0 /100 — SIGNIFICANT CHANGE UP (ref 0–0)
NRBC # BLD: SIGNIFICANT CHANGE UP /100 WBCS (ref 0–0)
PH UR: 6 — SIGNIFICANT CHANGE UP (ref 5–8)
PHOSPHATE SERPL-MCNC: 2.5 MG/DL — SIGNIFICANT CHANGE UP (ref 2.5–4.5)
PLAT MORPH BLD: NORMAL — SIGNIFICANT CHANGE UP
PLATELET # BLD AUTO: 253 K/UL — SIGNIFICANT CHANGE UP (ref 150–400)
POTASSIUM SERPL-MCNC: 3.1 MMOL/L — LOW (ref 3.5–5.3)
POTASSIUM SERPL-SCNC: 3.1 MMOL/L — LOW (ref 3.5–5.3)
PROT SERPL-MCNC: 10.4 GM/DL — HIGH (ref 6–8.3)
PROT UR-MCNC: 500 MG/DL
RBC # BLD: 5.32 M/UL — SIGNIFICANT CHANGE UP (ref 4.2–5.8)
RBC # FLD: 13.1 % — SIGNIFICANT CHANGE UP (ref 10.3–14.5)
RBC BLD AUTO: SIGNIFICANT CHANGE UP
RBC CASTS # UR COMP ASSIST: ABNORMAL /HPF (ref 0–4)
SODIUM SERPL-SCNC: 130 MMOL/L — LOW (ref 135–145)
SP GR SPEC: 1.01 — SIGNIFICANT CHANGE UP (ref 1.01–1.02)
UROBILINOGEN FLD QL: 1 MG/DL
WBC # BLD: 20.2 K/UL — HIGH (ref 3.8–10.5)
WBC # FLD AUTO: 20.2 K/UL — HIGH (ref 3.8–10.5)
WBC UR QL: SIGNIFICANT CHANGE UP

## 2018-11-01 PROCEDURE — 99291 CRITICAL CARE FIRST HOUR: CPT

## 2018-11-01 PROCEDURE — 99223 1ST HOSP IP/OBS HIGH 75: CPT

## 2018-11-01 PROCEDURE — 99222 1ST HOSP IP/OBS MODERATE 55: CPT

## 2018-11-01 PROCEDURE — 71045 X-RAY EXAM CHEST 1 VIEW: CPT | Mod: 26

## 2018-11-01 PROCEDURE — 93010 ELECTROCARDIOGRAM REPORT: CPT

## 2018-11-01 PROCEDURE — G9001: CPT

## 2018-11-01 RX ORDER — SODIUM CHLORIDE 9 MG/ML
1000 INJECTION INTRAMUSCULAR; INTRAVENOUS; SUBCUTANEOUS
Qty: 0 | Refills: 0 | Status: DISCONTINUED | OUTPATIENT
Start: 2018-11-01 | End: 2018-11-03

## 2018-11-01 RX ORDER — FOLIC ACID 0.8 MG
1 TABLET ORAL DAILY
Qty: 0 | Refills: 0 | Status: DISCONTINUED | OUTPATIENT
Start: 2018-11-01 | End: 2018-11-03

## 2018-11-01 RX ORDER — POTASSIUM CHLORIDE 20 MEQ
10 PACKET (EA) ORAL
Qty: 0 | Refills: 0 | Status: COMPLETED | OUTPATIENT
Start: 2018-11-01 | End: 2018-11-01

## 2018-11-01 RX ORDER — SODIUM CHLORIDE 9 MG/ML
1000 INJECTION, SOLUTION INTRAVENOUS ONCE
Qty: 0 | Refills: 0 | Status: COMPLETED | OUTPATIENT
Start: 2018-11-01 | End: 2018-11-01

## 2018-11-01 RX ORDER — SODIUM CHLORIDE 9 MG/ML
1000 INJECTION, SOLUTION INTRAVENOUS
Qty: 0 | Refills: 0 | Status: COMPLETED | OUTPATIENT
Start: 2018-11-01 | End: 2018-11-01

## 2018-11-01 RX ORDER — MAGNESIUM SULFATE 500 MG/ML
2 VIAL (ML) INJECTION ONCE
Qty: 0 | Refills: 0 | Status: COMPLETED | OUTPATIENT
Start: 2018-11-01 | End: 2018-11-01

## 2018-11-01 RX ORDER — KETOROLAC TROMETHAMINE 30 MG/ML
30 SYRINGE (ML) INJECTION ONCE
Qty: 0 | Refills: 0 | Status: DISCONTINUED | OUTPATIENT
Start: 2018-11-01 | End: 2018-11-01

## 2018-11-01 RX ORDER — CALCIUM CARBONATE 500(1250)
2 TABLET ORAL
Qty: 0 | Refills: 0 | Status: DISCONTINUED | OUTPATIENT
Start: 2018-11-01 | End: 2018-11-03

## 2018-11-01 RX ORDER — THIAMINE MONONITRATE (VIT B1) 100 MG
100 TABLET ORAL DAILY
Qty: 0 | Refills: 0 | Status: DISCONTINUED | OUTPATIENT
Start: 2018-11-01 | End: 2018-11-03

## 2018-11-01 RX ADMIN — SODIUM CHLORIDE 1000 MILLILITER(S): 9 INJECTION, SOLUTION INTRAVENOUS at 15:50

## 2018-11-01 RX ADMIN — Medication 2 MILLIGRAM(S): at 22:19

## 2018-11-01 RX ADMIN — SODIUM CHLORIDE 500 MILLILITER(S): 9 INJECTION, SOLUTION INTRAVENOUS at 16:11

## 2018-11-01 RX ADMIN — Medication 30 MILLIGRAM(S): at 16:58

## 2018-11-01 RX ADMIN — SODIUM CHLORIDE 1000 MILLILITER(S): 9 INJECTION, SOLUTION INTRAVENOUS at 17:49

## 2018-11-01 RX ADMIN — Medication 100 MILLIEQUIVALENT(S): at 20:27

## 2018-11-01 RX ADMIN — SODIUM CHLORIDE 1000 MILLILITER(S): 9 INJECTION, SOLUTION INTRAVENOUS at 18:03

## 2018-11-01 RX ADMIN — Medication 2 GRAM(S): at 18:03

## 2018-11-01 RX ADMIN — Medication 100 MILLIEQUIVALENT(S): at 18:49

## 2018-11-01 RX ADMIN — Medication 100 MILLIEQUIVALENT(S): at 17:32

## 2018-11-01 RX ADMIN — SODIUM CHLORIDE 1000 MILLILITER(S): 9 INJECTION, SOLUTION INTRAVENOUS at 18:35

## 2018-11-01 RX ADMIN — SODIUM CHLORIDE 115 MILLILITER(S): 9 INJECTION INTRAMUSCULAR; INTRAVENOUS; SUBCUTANEOUS at 20:28

## 2018-11-01 RX ADMIN — Medication 50 GRAM(S): at 16:58

## 2018-11-01 RX ADMIN — Medication 30 MILLIGRAM(S): at 18:35

## 2018-11-01 RX ADMIN — Medication 2 MILLIGRAM(S): at 18:52

## 2018-11-01 NOTE — ED ADULT NURSE NOTE - OBJECTIVE STATEMENT
patient received, came here for bilateral hands cramping with body aches, stated last drink was 2 days ago, a bottle of vodka

## 2018-11-01 NOTE — CONSULT NOTE ADULT - ASSESSMENT
41 y/o male with PMH of alcohol abuse p/w alcohol withdrawal, + anion gap- alcoholic ketoacidosis, metabolic acidosis, dehydration, HAIDER, 43 y/o male with PMH of alcohol abuse p/w alcohol withdrawal, + anion gap- alcoholic ketoacidosis, metabolic acidosis, dehydration, HAIDER, electrolyte abnormalities     patient stable at this time.    - continue CIWA  - continue ativan protocol with standing and prn  - IVF  - thiamine, MVI and folate  - replete Mag and K  - repeat labs  - monitor for DT/s

## 2018-11-01 NOTE — ED PROVIDER NOTE - OBJECTIVE STATEMENT
Pertinent PMH/PSH/FHx/SHx and Review of Systems contained within:  42m hx of Pertinent PMH/PSH/FHx/SHx and Review of Systems contained within:  42m hx of etoh abuse pw agitation, trembling and chills. patient notes last etoh consumption 2 days ago. he normally drinks etoh daily. he has no cp, sob, cough, vomiting, fever, rash, ha, vision change, rhinorrhea, loose stools, si, bleeding.   he has not taken anything for symptoms.   Fh and Sh not otherwise contributory  ROS otherwise negative

## 2018-11-01 NOTE — ED PROVIDER NOTE - MEDICAL DECISION MAKING DETAILS
patient pw acute alcohol withdrawal. will need to start benzos and fluids. I read ekg as sinus tach rate 108, bi atrial and left ventricular enlargement, no st elevation or depression, qtc is 557, pr is 122. patient pw acute alcohol withdrawal. will need to start benzos and fluids. I read ekg as sinus tach rate 108, bi atrial and left ventricular enlargement, no st elevation or depression, qtc is 557, pr is 122. Initial CIWA is 11 patient pw acute alcohol withdrawal. will need to start benzos and fluids. I read ekg as sinus tach rate 108, bi atrial and left ventricular enlargement, no st elevation or depression, qtc is 557, pr is 122. Initial CIWA is 11. admit for benzos and fluid resuscitation.

## 2018-11-01 NOTE — CONSULT NOTE ADULT - ATTENDING COMMENTS
I have seen and examined the patient. I agree with the above history, physical exam, and plan of care except for as detailed below.    41 y/o M w/hx of alcohol abuse, prior admissions for withdrawal now presenting with alcohol withdrawal. Last drink ~ 2 days ago. Patient also found to have HAIDER likely prerenal, anion gap acidosis (likely starvation ketosis), metabolic alkalosis (likely volume depletion), hypokalemia, and hypomagensemia. Leukocytosis of unclear etiology.    - IV fluids  - Ativan standing and PRN  - MVI, Thiamine, folate  - Trend Cr, avoid nephrotoxins  - replete K, Mg  - Check procalcitonin, consider empiric abx  - No need for ICU level of care at this time

## 2018-11-01 NOTE — ED PROVIDER NOTE - CARE PLAN
Principal Discharge DX:	Alcohol withdrawal syndrome without complication Principal Discharge DX:	Alcohol withdrawal syndrome without complication  Secondary Diagnosis:	Acute renal failure, unspecified acute renal failure type

## 2018-11-01 NOTE — H&P ADULT - ASSESSMENT
43y/o Male admitted to [facility] for leukocytosis, electrolyte imbalance, and lactic acidosis secondary to acute alcohol withdrawal. Last alcohol consumption 2 days prior (10/30). Patient given IV fluids, electrolyte replacements and Diazepam in ED with improvement of symptoms.     IMPROVE VTE Individual Risk Assessment          RISK                                                          Points  [  ] Previous VTE                                                3  [  ] Thrombophilia                                             2  [  ] Lower limb paralysis                                   2        (unable to hold up >15 seconds)    [  ] Current Cancer                                             2         (within 6 months)  [  ] Immobilization > 24 hrs                              1  [  ] ICU/CCU stay > 24 hours                             1  [  ] Age > 60                                                         1    IMPROVE VTE Score: 0

## 2018-11-01 NOTE — PHARMACY COMMUNICATION NOTE - COMMENTS
s/w dr holder - aware additional doses of valium iv is given 3-4 hr after first dose, but  still wants to give additional dose now

## 2018-11-01 NOTE — ED PROVIDER NOTE - PHYSICAL EXAMINATION
Gen: Alert, trembling  Head: NC, AT   Eyes: PERRL, EOMI, normal lids/conjunctiva  ENT: normal hearing, patent oropharynx without erythema/exudate, uvula midline  Neck: supple, no tenderness, Trachea midline  Pulm: Bilateral BS, normal resp effort, no wheeze/stridor/retractions  CV: tachycardic. no M/R/G, 2+ radial and dp pulses bl, no edema  Abd: soft, NT/ND, +BS, no hepatosplenomegaly  Mskel: extremities x4 with normal ROM and no joint effusions. no ctl spine ttp.   Skin: no rash, no bruising   Neuro: AAOx3, no sensory/motor deficits but resting tremors.  CN 2-12 intact

## 2018-11-01 NOTE — H&P ADULT - NSHPSOCIALHISTORY_GEN_ALL_CORE
Lives at home with his father and his daughter. Pt drinks 15 shots of vodka per day. Admitting to smoking cigars occasionally. Denies illicit drug use. Patient states that he could not afford food for last 3 days. Patient unemployed, former .

## 2018-11-01 NOTE — H&P ADULT - NSHPREVIEWOFSYSTEMS_GEN_ALL_CORE
REVIEW OF SYSTEMS:  Constitutional: Denies fever, weight loss, fatigue  Eye: Denies eye pain, visual changes, discharge, blurred vision  ENT: Denies hearing changes, tinnitus, vertigo, sinus congestion, sore throat  Neck: Denies pain or stiffness  Respiratory: Denies cough, wheezing, chills, hemoptysis, shortness of breath, difficulty breathing  Cardiovascular: Positive palpitations. Denies chest pain, dizziness, leg swelling  Gastrointestinal: Positive heartburn. Denies nausea, vomiting, hematemesis, diarrhea, constipation, melena, hematochezia  Genitourinary: Denies dysuria, frequency, hematuria, retention, incontinence  Neurological: Admits to tremors. Denies headaches, memory loss, loss of strength, numbness  Skin: Denies itching, burning, rashes, lesions   Endocrine: Denies heat or cold intolerance, hair loss  Musculoskeletal: Admits to cramping in b/l feet. Denies joint pain or swelling, back, extremity pain  Psychiatric: Denies depression, anxiety, mood swings, difficulty sleeping, suicidal ideation  Hematology: Denies easy bruising, bleeding gums  Immunologic: Denies hives or eczema

## 2018-11-01 NOTE — CONSULT NOTE ADULT - SUBJECTIVE AND OBJECTIVE BOX
42m hx of etoh abuse pw agitation, trembling and chills. patient notes last etoh consumption 2 days ago. he normally drinks etoh daily. 42m hx of ETOH abuse presented with  agitation, trembling and chills. Patient noted last ETOH consumption 2 days ago. He normally drinks about a bottle of vodka daily.Reports has not eaten in three days. Noted yesterday had hallucinations, sweating and chills. Began to feel his hands where cramping and came to the hospital. ER MD requested ICU evaluation. Patient seen and examined with ICU attending at bedside.           MEDICATION;    LORazepam   Injectable 2 milliGRAM(s) IV Push every 4 hours  LORazepam   Injectable   IV Push   LORazepam   Injectable 2 milliGRAM(s) IV Push every 1 hour PRN  potassium chloride  10 mEq/100 mL IVPB 10 milliEquivalent(s) IV Intermittent every 1 hour    LORazepam   Injectable 2 milliGRAM(s) IV Push every 4 hours  LORazepam   Injectable   IV Push   LORazepam   Injectable 2 milliGRAM(s) IV Push every 1 hour PRN  potassium chloride  10 mEq/100 mL IVPB 10 milliEquivalent(s) IV Intermittent every 1 hour    Drug Dosing Weight  Height (cm): 177.8 (2018 15:14)  Weight (kg): 74.8 (2018 15:14)  BMI (kg/m2): 23.7 (2018 15:14)  BSA (m2): 1.92 (2018 15:14)      PAST MEDICAL & SURGICAL HISTORY:  Alcohol abuse  No significant past surgical history    SOCIAL HISTORY  + chronic  etoh use   + tobacco   works as       REVIEW OF SYSTEMS:    CONSTITUTIONAL: No fever, weight loss, or fatigue  EYES: No eye pain, visual disturbances, or discharge  ENMT:  No difficulty hearing, tinnitus, vertigo; No sinus or throat pain  NECK: No pain or stiffness  RESPIRATORY: No cough, wheezing, chills or hemoptysis; No shortness of breath  CARDIOVASCULAR: No chest pain, palpitations, dizziness, or leg swelling  GASTROINTESTINAL: + abdominal / epigastric pain, none now. No nausea, vomiting, or hematemesis;   GENITOURINARY: No dysuria, frequency, hematuria, or incontinence  NEUROLOGICAL: No headaches, memory loss,    ENDOCRINE: No heat or cold intolerance; No hair loss  MUSCULOSKELETAL: No joint pain or swelling; No muscle, back, or extremity pain  PSYCHIATRIC: No depression,+  anxiety, difficulty sleeping          ER Vital Signs Last 24 Hrs  T(C): 36.8 (2018 16:45), Max: 37.1 (2018 15:14)  T(F): 98.3 (2018 16:45), Max: 98.7 (2018 15:14)  HR: 96 (2018 17:31) (75 - 98)  BP: 143/97 (2018 17:31) (124/86 - 151/105)  RR: 13 (2018 17:31) (13 - 19)  SpO2: 96% (2018 17:31) (96% - 100%)      PHYSICAL EXAM:    GENERAL: NAD,  well-developed alert and oriented x3  HEAD:  Atraumatic, Normocephalic  EYES: EOMI, PERRLA, conjunctiva and sclera clear  ENMT:  Dry mucous membranes,   NECK: Supple, No JVD, Normal thyroid  NERVOUS SYSTEM:  Alert & Oriented X3, Good concentration; RAYO's   CHEST/LUNG:  No rales, rhonchi, wheezing, or rubs  HEART: Regular rate and rhythm; No murmurs, rubs, or gallops  ABDOMEN: Soft, Nontender, Nondistended; Bowel sounds present  EXTREMITIES:  2+ Peripheral Pulses, No clubbing, cyanosis, or edema  LYMPH: No lymphadenopathy noted  SKIN: No rashes or lesions      LABS:    CBC Full  -  ( 2018 16:06 )  WBC Count : 20.20 K/uL  Hemoglobin : 16.9 g/dL  Hematocrit : 46.2 %  Platelet Count - Automated : 253 K/uL  Mean Cell Volume : 86.8 fl  Mean Cell Hemoglobin : 31.8 pg  Mean Cell Hemoglobin Concentration : 36.6 gm/dL  Auto Neutrophil # : 16.97 K/uL  Auto Lymphocyte # : 1.62 K/uL  Auto Monocyte # : 1.62 K/uL  Auto Eosinophil # : 0.00 K/uL  Auto Basophil # : 0.00 K/uL  Auto Neutrophil % : 82.0 %  Auto Lymphocyte % : 8.0 %  Auto Monocyte % : 8.0 %  Auto Eosinophil % : 0.0 %  Auto Basophil % : 0.0 %      11    130<L>  |  69<L>  |  51<H>  ----------------------------<  159<H>  3.1<L>   |  33<H>  |  3.52<H>    Ca    9.6      2018 16:06  Phos  2.5     11-  Mg     1.5         TPro  10.4<H>  /  Alb  5.3<H>  /  TBili  1.8<H>  /  DBili  x   /  AST  45<H>  /  ALT  37  /  AlkPhos  85         Urinalysis Basic - ( 2018 17:48 )    Color: Yellow / Appearance: Slightly Turbid / S.015 / pH: x  Gluc: x / Ketone: Large  / Bili: Moderate / Urobili: 1 mg/dL   Blood: x / Protein: 500 mg/dL / Nitrite: Negative   Leuk Esterase: Trace / RBC: 3-5 /HPF / WBC 3-5   Sq Epi: x / Non Sq Epi: Few / Bacteria: Few    Leuk Esterase: Trace / RBC: 3-5 /HPF / WBC 3-5   Sq Epi: x / Non Sq Epi: Few / Bacteria: Few    Alcohol, Blood (18 @ 16:06)    Alcohol, Blood: <10: TOXIC CONCENTRATIONS (mg/dL):  Flushing, Slowing of  Reflexes, Impaired Visual Acuity:     Depression of CNS:    > 100  Fatalities Reported:       > 400  Results reported as a "< number" are below reliably detectable limits and  considered negative  These ranges are intended as general guidelines.  Alcohol metabolism can vary widely among individuals.  This test  is approved for clinical and not for forensic purposes. mg/dL      RADIOLOGY & ADDITIONAL STUDIES:  < from: Xray Chest 1 View-PORTABLE IMMEDIATE (18 @ 16:43) >  FINDINGS:   The cardiomediastinal silhouette is unremarkable.  The lungs are clear. No pleural effusion or pneumothorax  The bony structures are intact.    IMPRESSION:   Clear lungs.      < end of copied text >      CRITICAL CARE TIME SPENT:

## 2018-11-01 NOTE — H&P ADULT - HISTORY OF PRESENT ILLNESS
43 y/o M with PMH significant for EtOH Abuse presents to ED with agitation, trembling, and chills. Patient voluntarily brought himself to the ED and states he no longer wants to drink. Patient notes last alcohol consumption was 2 days ago (10/30). He admits to drinking about 15 shots of vodka daily. He has not taken anything for his symptoms. Pt denies chest pain, SOB, cough, vomiting, fever, rash, HA, vision change, rhinorrhea, loose stools, bleeding, Patient has a support system at home (father and daughter).

## 2018-11-01 NOTE — H&P ADULT - NSHPPHYSICALEXAM_GEN_ALL_CORE
Vital Signs Last 24 Hrs  T(C): 36.8 (01 Nov 2018 16:45), Max: 37.1 (01 Nov 2018 15:14)  T(F): 98.3 (01 Nov 2018 16:45), Max: 98.7 (01 Nov 2018 15:14)  HR: 96 (01 Nov 2018 17:31) (75 - 98)  BP: 143/97 (01 Nov 2018 17:31) (124/86 - 151/105)  BP(mean): --  RR: 13 (01 Nov 2018 17:31) (13 - 19)  SpO2: 96% (01 Nov 2018 17:31) (96% - 100%)    PHYSICAL EXAM:  GENERAL: NAD, well-groomed, well-developed  HEAD:  Atraumatic, Normocephalic  EYES: EOMI, PERRL, conjunctiva and sclera clear  ENMT: No tonsillar erythema, exudates, or enlargement; Moist mucous membranes, Good dentition, No lesions  NECK: Supple, No JVD, Normal thyroid  NERVOUS SYSTEM:  Alert & Oriented X3, Good concentration; Motor Strength 5/5 B/L upper and lower extremities  CHEST/LUNG: Clear to auscultation bilaterally; No rales, rhonchi, wheezing, or rubs  HEART: Regular rate and rhythm; No murmurs appreciated  ABDOMEN: Soft, Nontender, Nondistended; Bowel sounds present  MSK: ROM intact in all extremities, 5/5 strength in upper and lower extremities, B/L.  EXTREMITIES:  2+ Peripheral Pulses, No clubbing, cyanosis, or edema  LYMPH: No lymphadenopathy noted  SKIN: No rashes or lesions

## 2018-11-02 LAB
ALBUMIN SERPL ELPH-MCNC: 3.2 G/DL — LOW (ref 3.3–5)
ALBUMIN SERPL ELPH-MCNC: 3.4 G/DL — SIGNIFICANT CHANGE UP (ref 3.3–5)
ALP SERPL-CCNC: 59 U/L — SIGNIFICANT CHANGE UP (ref 40–120)
ALP SERPL-CCNC: 61 U/L — SIGNIFICANT CHANGE UP (ref 40–120)
ALT FLD-CCNC: 30 U/L — SIGNIFICANT CHANGE UP (ref 12–78)
ALT FLD-CCNC: 36 U/L — SIGNIFICANT CHANGE UP (ref 12–78)
ANION GAP SERPL CALC-SCNC: 10 MMOL/L — SIGNIFICANT CHANGE UP (ref 5–17)
ANION GAP SERPL CALC-SCNC: 11 MMOL/L — SIGNIFICANT CHANGE UP (ref 5–17)
ANION GAP SERPL CALC-SCNC: 8 MMOL/L — SIGNIFICANT CHANGE UP (ref 5–17)
AST SERPL-CCNC: 35 U/L — SIGNIFICANT CHANGE UP (ref 15–37)
AST SERPL-CCNC: 50 U/L — HIGH (ref 15–37)
BILIRUB DIRECT SERPL-MCNC: 0.36 MG/DL — HIGH (ref 0.05–0.2)
BILIRUB INDIRECT FLD-MCNC: 1 MG/DL — SIGNIFICANT CHANGE UP (ref 0.2–1)
BILIRUB SERPL-MCNC: 1.4 MG/DL — HIGH (ref 0.2–1.2)
BILIRUB SERPL-MCNC: 1.6 MG/DL — HIGH (ref 0.2–1.2)
BUN SERPL-MCNC: 21 MG/DL — SIGNIFICANT CHANGE UP (ref 7–23)
BUN SERPL-MCNC: 22 MG/DL — SIGNIFICANT CHANGE UP (ref 7–23)
BUN SERPL-MCNC: 22 MG/DL — SIGNIFICANT CHANGE UP (ref 7–23)
BUN SERPL-MCNC: 26 MG/DL — HIGH (ref 7–23)
BUN SERPL-MCNC: 28 MG/DL — HIGH (ref 7–23)
CALCIUM SERPL-MCNC: 8 MG/DL — LOW (ref 8.5–10.1)
CALCIUM SERPL-MCNC: 8.1 MG/DL — LOW (ref 8.5–10.1)
CALCIUM SERPL-MCNC: 8.4 MG/DL — LOW (ref 8.5–10.1)
CALCIUM SERPL-MCNC: 8.4 MG/DL — LOW (ref 8.5–10.1)
CALCIUM SERPL-MCNC: 8.5 MG/DL — SIGNIFICANT CHANGE UP (ref 8.5–10.1)
CHLORIDE SERPL-SCNC: 87 MMOL/L — LOW (ref 96–108)
CHLORIDE SERPL-SCNC: 89 MMOL/L — LOW (ref 96–108)
CHLORIDE SERPL-SCNC: 93 MMOL/L — LOW (ref 96–108)
CHLORIDE SERPL-SCNC: 95 MMOL/L — LOW (ref 96–108)
CHLORIDE SERPL-SCNC: 98 MMOL/L — SIGNIFICANT CHANGE UP (ref 96–108)
CO2 SERPL-SCNC: 29 MMOL/L — SIGNIFICANT CHANGE UP (ref 22–31)
CO2 SERPL-SCNC: 31 MMOL/L — SIGNIFICANT CHANGE UP (ref 22–31)
CO2 SERPL-SCNC: 33 MMOL/L — HIGH (ref 22–31)
CO2 SERPL-SCNC: 37 MMOL/L — HIGH (ref 22–31)
CO2 SERPL-SCNC: 38 MMOL/L — HIGH (ref 22–31)
CREAT SERPL-MCNC: 0.76 MG/DL — SIGNIFICANT CHANGE UP (ref 0.5–1.3)
CREAT SERPL-MCNC: 0.8 MG/DL — SIGNIFICANT CHANGE UP (ref 0.5–1.3)
CREAT SERPL-MCNC: 0.86 MG/DL — SIGNIFICANT CHANGE UP (ref 0.5–1.3)
CREAT SERPL-MCNC: 0.9 MG/DL — SIGNIFICANT CHANGE UP (ref 0.5–1.3)
CREAT SERPL-MCNC: 0.96 MG/DL — SIGNIFICANT CHANGE UP (ref 0.5–1.3)
GLUCOSE SERPL-MCNC: 102 MG/DL — HIGH (ref 70–99)
GLUCOSE SERPL-MCNC: 112 MG/DL — HIGH (ref 70–99)
GLUCOSE SERPL-MCNC: 114 MG/DL — HIGH (ref 70–99)
GLUCOSE SERPL-MCNC: 119 MG/DL — HIGH (ref 70–99)
GLUCOSE SERPL-MCNC: 95 MG/DL — SIGNIFICANT CHANGE UP (ref 70–99)
MAGNESIUM SERPL-MCNC: 2 MG/DL — SIGNIFICANT CHANGE UP (ref 1.6–2.6)
PHOSPHATE SERPL-MCNC: 2.2 MG/DL — LOW (ref 2.5–4.5)
POTASSIUM SERPL-MCNC: 2.4 MMOL/L — CRITICAL LOW (ref 3.5–5.3)
POTASSIUM SERPL-MCNC: 2.5 MMOL/L — CRITICAL LOW (ref 3.5–5.3)
POTASSIUM SERPL-MCNC: 3 MMOL/L — LOW (ref 3.5–5.3)
POTASSIUM SERPL-MCNC: 3.3 MMOL/L — LOW (ref 3.5–5.3)
POTASSIUM SERPL-MCNC: 3.5 MMOL/L — SIGNIFICANT CHANGE UP (ref 3.5–5.3)
POTASSIUM SERPL-SCNC: 2.4 MMOL/L — CRITICAL LOW (ref 3.5–5.3)
POTASSIUM SERPL-SCNC: 2.5 MMOL/L — CRITICAL LOW (ref 3.5–5.3)
POTASSIUM SERPL-SCNC: 3 MMOL/L — LOW (ref 3.5–5.3)
POTASSIUM SERPL-SCNC: 3.3 MMOL/L — LOW (ref 3.5–5.3)
POTASSIUM SERPL-SCNC: 3.5 MMOL/L — SIGNIFICANT CHANGE UP (ref 3.5–5.3)
PROT SERPL-MCNC: 6.8 GM/DL — SIGNIFICANT CHANGE UP (ref 6–8.3)
PROT SERPL-MCNC: 6.9 GM/DL — SIGNIFICANT CHANGE UP (ref 6–8.3)
SODIUM SERPL-SCNC: 134 MMOL/L — LOW (ref 135–145)
SODIUM SERPL-SCNC: 135 MMOL/L — SIGNIFICANT CHANGE UP (ref 135–145)
SODIUM SERPL-SCNC: 136 MMOL/L — SIGNIFICANT CHANGE UP (ref 135–145)
SODIUM SERPL-SCNC: 137 MMOL/L — SIGNIFICANT CHANGE UP (ref 135–145)
SODIUM SERPL-SCNC: 137 MMOL/L — SIGNIFICANT CHANGE UP (ref 135–145)

## 2018-11-02 PROCEDURE — 99233 SBSQ HOSP IP/OBS HIGH 50: CPT

## 2018-11-02 RX ORDER — POTASSIUM PHOSPHATE, MONOBASIC POTASSIUM PHOSPHATE, DIBASIC 236; 224 MG/ML; MG/ML
15 INJECTION, SOLUTION INTRAVENOUS ONCE
Qty: 0 | Refills: 0 | Status: COMPLETED | OUTPATIENT
Start: 2018-11-02 | End: 2018-11-02

## 2018-11-02 RX ORDER — POTASSIUM CHLORIDE 20 MEQ
40 PACKET (EA) ORAL ONCE
Qty: 0 | Refills: 0 | Status: COMPLETED | OUTPATIENT
Start: 2018-11-02 | End: 2018-11-02

## 2018-11-02 RX ORDER — HEPARIN SODIUM 5000 [USP'U]/ML
5000 INJECTION INTRAVENOUS; SUBCUTANEOUS EVERY 12 HOURS
Qty: 0 | Refills: 0 | Status: DISCONTINUED | OUTPATIENT
Start: 2018-11-02 | End: 2018-11-03

## 2018-11-02 RX ORDER — POTASSIUM CHLORIDE 20 MEQ
10 PACKET (EA) ORAL
Qty: 0 | Refills: 0 | Status: COMPLETED | OUTPATIENT
Start: 2018-11-02 | End: 2018-11-02

## 2018-11-02 RX ADMIN — POTASSIUM PHOSPHATE, MONOBASIC POTASSIUM PHOSPHATE, DIBASIC 63.75 MILLIMOLE(S): 236; 224 INJECTION, SOLUTION INTRAVENOUS at 15:15

## 2018-11-02 RX ADMIN — HEPARIN SODIUM 5000 UNIT(S): 5000 INJECTION INTRAVENOUS; SUBCUTANEOUS at 17:13

## 2018-11-02 RX ADMIN — Medication 2 MILLIGRAM(S): at 06:04

## 2018-11-02 RX ADMIN — Medication 100 MILLIEQUIVALENT(S): at 10:25

## 2018-11-02 RX ADMIN — Medication 100 MILLIGRAM(S): at 11:11

## 2018-11-02 RX ADMIN — Medication 2 MILLIGRAM(S): at 02:33

## 2018-11-02 RX ADMIN — Medication 40 MILLIEQUIVALENT(S): at 19:00

## 2018-11-02 RX ADMIN — SODIUM CHLORIDE 115 MILLILITER(S): 9 INJECTION INTRAMUSCULAR; INTRAVENOUS; SUBCUTANEOUS at 06:04

## 2018-11-02 RX ADMIN — Medication 2 MILLIGRAM(S): at 09:30

## 2018-11-02 RX ADMIN — Medication 1.5 MILLIGRAM(S): at 21:41

## 2018-11-02 RX ADMIN — Medication 1 MILLIGRAM(S): at 11:11

## 2018-11-02 RX ADMIN — Medication 1.5 MILLIGRAM(S): at 17:13

## 2018-11-02 RX ADMIN — Medication 2 MILLIGRAM(S): at 14:07

## 2018-11-02 RX ADMIN — Medication 1 TABLET(S): at 11:11

## 2018-11-02 RX ADMIN — Medication 100 MILLIEQUIVALENT(S): at 14:08

## 2018-11-02 RX ADMIN — Medication 40 MILLIEQUIVALENT(S): at 14:07

## 2018-11-02 RX ADMIN — Medication 100 MILLIEQUIVALENT(S): at 11:25

## 2018-11-02 RX ADMIN — SODIUM CHLORIDE 115 MILLILITER(S): 9 INJECTION INTRAMUSCULAR; INTRAVENOUS; SUBCUTANEOUS at 15:15

## 2018-11-02 NOTE — CONSULT NOTE ADULT - SUBJECTIVE AND OBJECTIVE BOX
Information from chart:  "Patient is a 42y old  Male who presents with a chief complaint of Alcohol Withdrawal (2018 14:58)    HPI:  43 y/o M with PMH significant for EtOH Abuse presents to ED with agitation, trembling, and chills. Patient voluntarily brought himself to the ED and states he no longer wants to drink. Patient notes last alcohol consumption was 2 days ago (10/30). He admits to drinking about 15 shots of vodka daily. He has not taken anything for his symptoms. Pt denies chest pain, SOB, cough, vomiting, fever, rash, HA, vision change, rhinorrhea, loose stools, bleeding, Patient has a support system at home (father and daughter). (2018 19:31)   "    N/V for last 2-3 days, no po intake;    PAST MEDICAL & SURGICAL HISTORY:  HAIDER  Pancreatitis;  Etoh withdraw    FAMILY HISTORY:  No pertinent family history in first degree relatives    Allergies    No Known Allergies    Intolerances      Home Medications:    MEDICATIONS  (STANDING):  folic acid 1 milliGRAM(s) Oral daily  heparin  Injectable 5000 Unit(s) SubCutaneous every 12 hours  LORazepam   Injectable   IV Push   LORazepam   Injectable 1.5 milliGRAM(s) IV Push every 4 hours  multivitamin 1 Tablet(s) Oral daily  sodium chloride 0.9%. 1000 milliLiter(s) (115 mL/Hr) IV Continuous <Continuous>  thiamine 100 milliGRAM(s) Oral daily    MEDICATIONS  (PRN):  calcium carbonate    500 mG (Tums) Chewable 2 Tablet(s) Chew two times a day PRN Heartburn  LORazepam   Injectable 2 milliGRAM(s) IV Push every 1 hour PRN Symptom-triggered: each CIWA -Ar score 8 or GREATER    Vital Signs Last 24 Hrs  T(C): 36.2 (2018 11:08), Max: 37.3 (2018 19:49)  T(F): 97.2 (2018 11:08), Max: 99.1 (2018 19:49)  HR: 105 (2018 11:08) (81 - 105)  BP: 125/87 (2018 11:08) (104/92 - 143/97)  BP(mean): --  RR: 18 (2018 11:08) (13 - 18)  SpO2: 97% (2018 11:08) (96% - 97%)    Daily Height in cm: 177.8 (2018 21:30)    Daily Weight in k (2018 05:22)    18 @ 07:01  -  18 @ 07:00  --------------------------------------------------------  IN: 1380 mL / OUT: 800 mL / NET: 580 mL    18 @ 07:01  -  18 @ 16:16  --------------------------------------------------------  IN: 780 mL / OUT: 601 mL / NET: 179 mL      CAPILLARY BLOOD GLUCOSE        PHYSICAL EXAM:      T(C): 36.2 (18 @ 11:08), Max: 37.3 (18 @ 19:49)  HR: 105 (18 @ 11:08) (81 - 105)  BP: 125/87 (18 @ 11:08) (104/92 - 143/97)  RR: 18 (18 @ 11:08) (13 - 18)  SpO2: 97% (18 @ 11:08) (96% - 97%)  Wt(kg): --  Respiratory: clear anteriorly, decreased BS at bases  Cardiovascular: S1 S2  Gastrointestinal: soft NT ND +BS  Extremities: 1  edema                  135  |  89<L>  |  26<H>  ----------------------------<  119<H>  2.5<LL>   |  38<H>  |  0.90    Ca    8.0<L>      2018 08:18  Phos  2.2       Mg     2.0         TPro  6.9  /  Alb  3.4  /  TBili  1.6<H>  /  DBili  x   /  AST  50<H>  /  ALT  36  /  AlkPhos  59                            16.9   20.20 )-----------( 253      ( 2018 16:06 )             46.2     Urinalysis Basic - ( 2018 17:48 )    Color: Yellow / Appearance: Slightly Turbid / S.015 / pH: x  Gluc: x / Ketone: Large  / Bili: Moderate / Urobili: 1 mg/dL   Blood: x / Protein: 500 mg/dL / Nitrite: Negative   Leuk Esterase: Trace / RBC: 3-5 /HPF / WBC 3-5   Sq Epi: x / Non Sq Epi: Few / Bacteria: Few    Assessment and Plan    HAIDER pre renal azotemia; uremic metabolic acidosis ( resolved) alcoholic withdraw; contraction alkalosis; hypokalemia; phos  Replete lytes; supportive care IVF;   Assess protein/ cr   Will follow.

## 2018-11-02 NOTE — PROGRESS NOTE ADULT - SUBJECTIVE AND OBJECTIVE BOX
Patient is a 42y old  Male who presents with a chief complaint of Alcohol Withdrawal (2018 19:31)      INTERVAL HPI/OVERNIGHT EVENTS:  Pt was seen and examined, no acute events.    MEDICATIONS  (STANDING):  folic acid 1 milliGRAM(s) Oral daily  LORazepam   Injectable   IV Push   LORazepam   Injectable 1.5 milliGRAM(s) IV Push every 4 hours  multivitamin 1 Tablet(s) Oral daily  potassium phosphate IVPB 15 milliMole(s) IV Intermittent once  sodium chloride 0.9%. 1000 milliLiter(s) (115 mL/Hr) IV Continuous <Continuous>  thiamine 100 milliGRAM(s) Oral daily    MEDICATIONS  (PRN):  calcium carbonate    500 mG (Tums) Chewable 2 Tablet(s) Chew two times a day PRN Heartburn  LORazepam   Injectable 2 milliGRAM(s) IV Push every 1 hour PRN Symptom-triggered: each CIWA -Ar score 8 or GREATER      Allergies  No Known Allergies      Vital Signs Last 24 Hrs  T(C): 36.2 (2018 11:08), Max: 37.3 (2018 19:49)  T(F): 97.2 (2018 11:08), Max: 99.1 (2018 19:49)  HR: 105 (2018 11:08) (75 - 105)  BP: 125/87 (2018 11:08) (104/92 - 151/105)  BP(mean): --  RR: 18 (2018 11:08) (13 - 19)  SpO2: 97% (2018 11:08) (96% - 100%)    PHYSICAL EXAM:  GENERAL: NAD  HEAD:  Atraumatic  EYES: PERRLA  NERVOUS SYSTEM:  A, O X 3, non focal  CHEST/LUNG: Clear  HEART: RRR  ABDOMEN: Soft, non tender  EXTREMITIES:  no edema      LABS:                        16.9   20.20 )-----------( 253      ( 2018 16:06 )             46.2     11-02    135  |  89<L>  |  26<H>  ----------------------------<  119<H>  2.5<LL>   |  38<H>  |  0.90    Ca    8.0<L>      2018 08:18  Phos  2.2     11-02  Mg     2.0         TPro  6.9  /  Alb  3.4  /  TBili  1.6<H>  /  DBili  x   /  AST  50<H>  /  ALT  36  /  AlkPhos  59        Urinalysis Basic - ( 2018 17:48 )    Color: Yellow / Appearance: Slightly Turbid / S.015 / pH: x  Gluc: x / Ketone: Large  / Bili: Moderate / Urobili: 1 mg/dL   Blood: x / Protein: 500 mg/dL / Nitrite: Negative   Leuk Esterase: Trace / RBC: 3-5 /HPF / WBC 3-5   Sq Epi: x / Non Sq Epi: Few / Bacteria: Few      CAPILLARY BLOOD GLUCOSE          RADIOLOGY & ADDITIONAL TESTS:    Imaging Personally Reviewed:  [ ] YES  [ ] NO    Consultant(s) Notes Reviewed:  [ ] YES  [ ] NO    Care Discussed with Consultants/Other Providers [ ] YES  [ ] NO

## 2018-11-02 NOTE — PROGRESS NOTE ADULT - ASSESSMENT
42 yrs old male presented with ETOH withdrawal, fund to have leukocytosis, electrolyte imbalance, acid base imbalance. Last alcohol consumption 2 days prior (10/30).       Alcohol withdrawal syndrome without complication:  - Ativan per [facility] Protocol  - Continue multivitamin folate thiamine.    Hypokalemia:  - In the setting of ETOH abuse, vomiting  - Continue replacement    Acid base imbalance:  - Initially elevated LA, though serum bicarb elevated  - Likely combination of metabolic acidosis and alkalosis  - no ABG done  - In the setting of ETOH abuse, vomiting  - Continue IVF, K replacement    Acute renal failure:  - Likely pre renal  - Resolved after IVF      Leukocytosis:  - Afebrile  - Monitor off antibiotic now  - Will check pro calcitonin.      DVT ppx;  - S/Q Heparin.

## 2018-11-03 VITALS
RESPIRATION RATE: 18 BRPM | SYSTOLIC BLOOD PRESSURE: 133 MMHG | TEMPERATURE: 96 F | DIASTOLIC BLOOD PRESSURE: 82 MMHG | OXYGEN SATURATION: 99 % | HEART RATE: 95 BPM

## 2018-11-03 LAB
ALBUMIN SERPL ELPH-MCNC: 3.4 G/DL — SIGNIFICANT CHANGE UP (ref 3.3–5)
ALP SERPL-CCNC: 62 U/L — SIGNIFICANT CHANGE UP (ref 40–120)
ALT FLD-CCNC: 43 U/L — SIGNIFICANT CHANGE UP (ref 12–78)
ANION GAP SERPL CALC-SCNC: 9 MMOL/L — SIGNIFICANT CHANGE UP (ref 5–17)
AST SERPL-CCNC: 45 U/L — HIGH (ref 15–37)
BILIRUB SERPL-MCNC: 1.4 MG/DL — HIGH (ref 0.2–1.2)
BUN SERPL-MCNC: 15 MG/DL — SIGNIFICANT CHANGE UP (ref 7–23)
CALCIUM SERPL-MCNC: 8.4 MG/DL — LOW (ref 8.5–10.1)
CHLORIDE SERPL-SCNC: 102 MMOL/L — SIGNIFICANT CHANGE UP (ref 96–108)
CO2 SERPL-SCNC: 27 MMOL/L — SIGNIFICANT CHANGE UP (ref 22–31)
CREAT SERPL-MCNC: 0.66 MG/DL — SIGNIFICANT CHANGE UP (ref 0.5–1.3)
GLUCOSE SERPL-MCNC: 97 MG/DL — SIGNIFICANT CHANGE UP (ref 70–99)
HCT VFR BLD CALC: 36.7 % — LOW (ref 39–50)
HGB BLD-MCNC: 13.1 G/DL — SIGNIFICANT CHANGE UP (ref 13–17)
MAGNESIUM SERPL-MCNC: 1.6 MG/DL — SIGNIFICANT CHANGE UP (ref 1.6–2.6)
MCHC RBC-ENTMCNC: 31.6 PG — SIGNIFICANT CHANGE UP (ref 27–34)
MCHC RBC-ENTMCNC: 35.7 GM/DL — SIGNIFICANT CHANGE UP (ref 32–36)
MCV RBC AUTO: 88.6 FL — SIGNIFICANT CHANGE UP (ref 80–100)
NRBC # BLD: 0 /100 WBCS — SIGNIFICANT CHANGE UP (ref 0–0)
PHOSPHATE SERPL-MCNC: 2.1 MG/DL — LOW (ref 2.5–4.5)
PLATELET # BLD AUTO: 112 K/UL — LOW (ref 150–400)
POTASSIUM SERPL-MCNC: 3.1 MMOL/L — LOW (ref 3.5–5.3)
POTASSIUM SERPL-SCNC: 3.1 MMOL/L — LOW (ref 3.5–5.3)
PROCALCITONIN SERPL-MCNC: 0.06 NG/ML — SIGNIFICANT CHANGE UP (ref 0.02–0.1)
PROT SERPL-MCNC: 7 GM/DL — SIGNIFICANT CHANGE UP (ref 6–8.3)
RBC # BLD: 4.14 M/UL — LOW (ref 4.2–5.8)
RBC # FLD: 12.5 % — SIGNIFICANT CHANGE UP (ref 10.3–14.5)
SODIUM SERPL-SCNC: 138 MMOL/L — SIGNIFICANT CHANGE UP (ref 135–145)
WBC # BLD: 7.54 K/UL — SIGNIFICANT CHANGE UP (ref 3.8–10.5)
WBC # FLD AUTO: 7.54 K/UL — SIGNIFICANT CHANGE UP (ref 3.8–10.5)

## 2018-11-03 PROCEDURE — 99239 HOSP IP/OBS DSCHRG MGMT >30: CPT

## 2018-11-03 RX ORDER — POTASSIUM PHOSPHATE, MONOBASIC POTASSIUM PHOSPHATE, DIBASIC 236; 224 MG/ML; MG/ML
30 INJECTION, SOLUTION INTRAVENOUS ONCE
Qty: 0 | Refills: 0 | Status: DISCONTINUED | OUTPATIENT
Start: 2018-11-03 | End: 2018-11-03

## 2018-11-03 RX ORDER — SODIUM,POTASSIUM PHOSPHATES 278-250MG
1 POWDER IN PACKET (EA) ORAL
Qty: 0 | Refills: 0 | Status: DISCONTINUED | OUTPATIENT
Start: 2018-11-03 | End: 2018-11-03

## 2018-11-03 RX ADMIN — Medication 1 MILLIGRAM(S): at 12:42

## 2018-11-03 RX ADMIN — Medication 1 TABLET(S): at 12:42

## 2018-11-03 RX ADMIN — Medication 100 MILLIGRAM(S): at 12:42

## 2018-11-03 RX ADMIN — Medication 1.5 MILLIGRAM(S): at 05:58

## 2018-11-03 RX ADMIN — Medication 1 TABLET(S): at 14:03

## 2018-11-03 RX ADMIN — Medication 2 MILLIGRAM(S): at 04:01

## 2018-11-03 RX ADMIN — Medication 1.5 MILLIGRAM(S): at 14:03

## 2018-11-03 RX ADMIN — Medication 1.5 MILLIGRAM(S): at 01:41

## 2018-11-03 RX ADMIN — Medication 1.5 MILLIGRAM(S): at 10:42

## 2018-11-03 RX ADMIN — HEPARIN SODIUM 5000 UNIT(S): 5000 INJECTION INTRAVENOUS; SUBCUTANEOUS at 05:58

## 2018-11-03 RX ADMIN — Medication 2 MILLIGRAM(S): at 12:43

## 2018-11-03 NOTE — PROGRESS NOTE ADULT - ASSESSMENT
HAIDER pre renal azotemia; uremic metabolic acidosis ( resolved) alcoholic withdraw; contraction alkalosis; hypokalemia; phos  Replete lytes; Improved renal function. Potassium supps. Will follow electrolytes and renal function trend.

## 2018-11-03 NOTE — PROGRESS NOTE ADULT - SUBJECTIVE AND OBJECTIVE BOX
Patient is a 42y old  Male who presents with a chief complaint of Alcohol Withdrawal (2018 16:16)      Patient seen in follow up for     PAST MEDICAL HISTORY:  No pertinent past medical history    MEDICATIONS  (STANDING):  folic acid 1 milliGRAM(s) Oral daily  heparin  Injectable 5000 Unit(s) SubCutaneous every 12 hours  LORazepam   Injectable   IV Push   LORazepam   Injectable 1.5 milliGRAM(s) IV Push every 4 hours  LORazepam   Injectable 1 milliGRAM(s) IV Push every 4 hours  multivitamin 1 Tablet(s) Oral daily  sodium chloride 0.9%. 1000 milliLiter(s) (115 mL/Hr) IV Continuous <Continuous>  thiamine 100 milliGRAM(s) Oral daily    MEDICATIONS  (PRN):  calcium carbonate    500 mG (Tums) Chewable 2 Tablet(s) Chew two times a day PRN Heartburn  LORazepam   Injectable 2 milliGRAM(s) IV Push every 1 hour PRN Symptom-triggered: each CIWA -Ar score 8 or GREATER    T(C): 35.7 (18 @ 11:38), Max: 37.2 (18 @ 18:00)  HR: 95 (18 @ 11:38) (88 - 105)  BP: 133/82 (18 @ 11:38) (125/87 - 143/96)  RR: 18 (18 @ 11:38) (18 - 18)  SpO2: 99% (18 @ 11:38) (97% - 100%)  Wt(kg): --  I&O's Detail    2018 07:01  -  2018 07:00  --------------------------------------------------------  IN:    Oral Fluid: 960 mL    Solution: 300 mL  Total IN: 1260 mL    OUT:    Voided: 2501 mL  Total OUT: 2501 mL    Total NET: -1241 mL      2018 08:01  -  2018 11:49  --------------------------------------------------------  IN:    Oral Fluid: 310 mL    sodium chloride 0.9%.: 1380 mL  Total IN: 1690 mL    OUT:    Voided: 340 mL  Total OUT: 340 mL    Total NET: 1350 mL          PHYSICAL EXAM:  General: NAD  Respiratory: b/l air entry  Cardiovascular: S1 S2  Gastrointestinal: soft  Extremities:                            13.1   7.54  )-----------( 112      ( 2018 10:06 )             36.7         138  |  102  |  15  ----------------------------<  97  3.1<L>   |  27  |  0.66    Ca    8.4<L>      2018 10:06  Phos  2.1       Mg     1.6         TPro  7.0  /  Alb  3.4  /  TBili  1.4<H>  /  DBili  x   /  AST  45<H>  /  ALT  43  /  AlkPhos  62          LIVER FUNCTIONS - ( 2018 10:06 )  Alb: 3.4 g/dL / Pro: 7.0 gm/dL / ALK PHOS: 62 U/L / ALT: 43 U/L / AST: 45 U/L / GGT: x           Urinalysis Basic - ( 2018 17:48 )    Color: Yellow / Appearance: Slightly Turbid / S.015 / pH: x  Gluc: x / Ketone: Large  / Bili: Moderate / Urobili: 1 mg/dL   Blood: x / Protein: 500 mg/dL / Nitrite: Negative   Leuk Esterase: Trace / RBC: 3-5 /HPF / WBC 3-5   Sq Epi: x / Non Sq Epi: Few / Bacteria: Few        Sodium, Serum: 138 ( @ 10:06)  Sodium, Serum: 137 ( @ 21:39)  Sodium, Serum: 136 ( @ 17:52)  Sodium, Serum: 137 ( @ 15:47)    Creatinine, Serum: 0.66 ( @ 10:06)  Creatinine, Serum: 0.76 ( @ 21:39)  Creatinine, Serum: 0.86 ( @ 17:52)  Creatinine, Serum: 0.80 ( @ 15:47)  Creatinine, Serum: 0.90 ( @ 08:18)  Creatinine, Serum: 0.96 ( @ 06:33)  Creatinine, Serum: 3.52 ( @ 16:06)    Potassium, Serum: 3.1 ( @ 10:06)  Potassium, Serum: 3.5 ( @ 21:39)  Potassium, Serum: 3.3 ( @ 17:52)  Potassium, Serum: 3.0 ( @ 15:47)    Hemoglobin: 13.1 ( @ 10:06)  Hemoglobin: 16.9 ( @ 16:06) Patient is a 42y old  Male who presents with a chief complaint of Alcohol Withdrawal (2018 16:16)      Patient seen in follow up for HAIDER    PAST MEDICAL HISTORY:  No pertinent past medical history    MEDICATIONS  (STANDING):  folic acid 1 milliGRAM(s) Oral daily  heparin  Injectable 5000 Unit(s) SubCutaneous every 12 hours  LORazepam   Injectable   IV Push   LORazepam   Injectable 1.5 milliGRAM(s) IV Push every 4 hours  LORazepam   Injectable 1 milliGRAM(s) IV Push every 4 hours  multivitamin 1 Tablet(s) Oral daily  sodium chloride 0.9%. 1000 milliLiter(s) (115 mL/Hr) IV Continuous <Continuous>  thiamine 100 milliGRAM(s) Oral daily    MEDICATIONS  (PRN):  calcium carbonate    500 mG (Tums) Chewable 2 Tablet(s) Chew two times a day PRN Heartburn  LORazepam   Injectable 2 milliGRAM(s) IV Push every 1 hour PRN Symptom-triggered: each CIWA -Ar score 8 or GREATER    T(C): 35.7 (18 @ 11:38), Max: 37.2 (18 @ 18:00)  HR: 95 (18 @ 11:38) (88 - 105)  BP: 133/82 (18 @ 11:38) (125/87 - 143/96)  RR: 18 (18 @ 11:38) (18 - 18)  SpO2: 99% (18 @ 11:38) (97% - 100%)  Wt(kg): --  I&O's Detail    2018 07:01  -  2018 07:00  --------------------------------------------------------  IN:    Oral Fluid: 960 mL    Solution: 300 mL  Total IN: 1260 mL    OUT:    Voided: 2501 mL  Total OUT: 2501 mL    Total NET: -1241 mL      2018 08:01  -  2018 11:49  --------------------------------------------------------  IN:    Oral Fluid: 310 mL    sodium chloride 0.9%.: 1380 mL  Total IN: 1690 mL    OUT:    Voided: 340 mL  Total OUT: 340 mL    Total NET: 1350 mL          PHYSICAL EXAM:  General: NAD  Respiratory: b/l air entry  Cardiovascular: S1 S2  Gastrointestinal: soft  Extremities:  no edema                          13.1   7.54  )-----------( 112      ( 2018 10:06 )             36.7         138  |  102  |  15  ----------------------------<  97  3.1<L>   |  27  |  0.66    Ca    8.4<L>      2018 10:06  Phos  2.1       Mg     1.6         TPro  7.0  /  Alb  3.4  /  TBili  1.4<H>  /  DBili  x   /  AST  45<H>  /  ALT  43  /  AlkPhos  62          LIVER FUNCTIONS - ( 2018 10:06 )  Alb: 3.4 g/dL / Pro: 7.0 gm/dL / ALK PHOS: 62 U/L / ALT: 43 U/L / AST: 45 U/L / GGT: x           Urinalysis Basic - ( 2018 17:48 )    Color: Yellow / Appearance: Slightly Turbid / S.015 / pH: x  Gluc: x / Ketone: Large  / Bili: Moderate / Urobili: 1 mg/dL   Blood: x / Protein: 500 mg/dL / Nitrite: Negative   Leuk Esterase: Trace / RBC: 3-5 /HPF / WBC 3-5   Sq Epi: x / Non Sq Epi: Few / Bacteria: Few        Sodium, Serum: 138 ( @ 10:06)  Sodium, Serum: 137 ( @ 21:39)  Sodium, Serum: 136 ( @ 17:52)  Sodium, Serum: 137 ( @ 15:47)    Creatinine, Serum: 0.66 ( @ 10:06)  Creatinine, Serum: 0.76 ( @ 21:39)  Creatinine, Serum: 0.86 ( @ 17:52)  Creatinine, Serum: 0.80 ( @ 15:47)  Creatinine, Serum: 0.90 ( @ 08:18)  Creatinine, Serum: 0.96 ( @ 06:33)  Creatinine, Serum: 3.52 ( @ 16:06)    Potassium, Serum: 3.1 ( @ 10:06)  Potassium, Serum: 3.5 ( @ 21:39)  Potassium, Serum: 3.3 ( @ 17:52)  Potassium, Serum: 3.0 ( @ 15:47)    Hemoglobin: 13.1 ( @ 10:06)  Hemoglobin: 16.9 ( @ 16:06)

## 2018-11-03 NOTE — PROGRESS NOTE ADULT - SUBJECTIVE AND OBJECTIVE BOX
Patient is a 42y old  Male who presents with a chief complaint of Alcohol Withdrawal (2018 11:49)    INTERVAL HPI/OVERNIGHT EVENTS:  Pt was seen and examined, no acute events.    MEDICATIONS  (STANDING):  folic acid 1 milliGRAM(s) Oral daily  heparin  Injectable 5000 Unit(s) SubCutaneous every 12 hours  LORazepam   Injectable   IV Push   LORazepam   Injectable 1 milliGRAM(s) IV Push every 4 hours  multivitamin 1 Tablet(s) Oral daily  potassium acid phosphate/sodium acid phosphate tablet (K-PHOS No. 2) 1 Tablet(s) Oral four times a day with meals  potassium phosphate IVPB 30 milliMole(s) IV Intermittent once  sodium chloride 0.9%. 1000 milliLiter(s) (115 mL/Hr) IV Continuous <Continuous>  thiamine 100 milliGRAM(s) Oral daily    MEDICATIONS  (PRN):  calcium carbonate    500 mG (Tums) Chewable 2 Tablet(s) Chew two times a day PRN Heartburn  LORazepam   Injectable 2 milliGRAM(s) IV Push every 1 hour PRN Symptom-triggered: each CIWA -Ar score 8 or GREATER      Allergies  No Known Allergies      Vital Signs Last 24 Hrs  T(C): 35.7 (2018 11:38), Max: 37.2 (2018 18:00)  T(F): 96.3 (2018 11:38), Max: 99 (2018 18:00)  HR: 95 (2018 11:38) (88 - 102)  BP: 133/82 (2018 11:38) (126/81 - 133/82)  BP(mean): --  RR: 18 (2018 11:38) (18 - 18)  SpO2: 99% (2018 11:38) (98% - 100%)    PHYSICAL EXAM:  GENERAL: NAD  HEAD:  Atraumatic   EYES: PERRLA  NERVOUS SYSTEM:  A, o X 3, non focal  CHEST/LUNG: Clear  HEART: RRR  ABDOMEN: Soft, non focal  EXTREMITIES: No edema      LABS:                        13.1   7.54  )-----------( 112      ( 2018 10:06 )             36.7     11-03    138  |  102  |  15  ----------------------------<  97  3.1<L>   |  27  |  0.66    Ca    8.4<L>      2018 10:06  Phos  2.1     11-  Mg     1.6     -    TPro  7.0  /  Alb  3.4  /  TBili  1.4<H>  /  DBili  x   /  AST  45<H>  /  ALT  43  /  AlkPhos  62  11-      Urinalysis Basic - ( 2018 17:48 )    Color: Yellow / Appearance: Slightly Turbid / S.015 / pH: x  Gluc: x / Ketone: Large  / Bili: Moderate / Urobili: 1 mg/dL   Blood: x / Protein: 500 mg/dL / Nitrite: Negative   Leuk Esterase: Trace / RBC: 3-5 /HPF / WBC 3-5   Sq Epi: x / Non Sq Epi: Few / Bacteria: Few      CAPILLARY BLOOD GLUCOSE          RADIOLOGY & ADDITIONAL TESTS:    Imaging Personally Reviewed:  [ ] YES  [ ] NO    Consultant(s) Notes Reviewed:  [ ] YES  [ ] NO    Care Discussed with Consultants/Other Providers [ ] YES  [ ] NO

## 2018-11-03 NOTE — PROGRESS NOTE ADULT - ASSESSMENT
42 yrs old male presented with ETOH withdrawal, fund to have leukocytosis, electrolyte imbalance, acid base imbalance. Last alcohol consumption 2 days prior (10/30).       Alcohol withdrawal syndrome without complication:  - Ativan per [facility] Protocol  - Continue multivitamin, folate, thiamine.    Hypokalemia:  - In the setting of ETOH abuse, vomiting  - Continue replacement, improved    Acid base imbalance:  - Now resolved   - Initially elevated LA, though serum bicarb elevated  - Likely combination of metabolic acidosis and alkalosis  - no ABG done  - In the setting of ETOH abuse, vomiting  - Continue IVF, K replacement      Acute renal failure:  - Likely pre renal  - Resolved after IVF      Leukocytosis:  - resolved      DVT ppx;  - S/Q Heparin.

## 2018-11-05 DIAGNOSIS — Z71.89 OTHER SPECIFIED COUNSELING: ICD-10-CM

## 2018-11-07 DIAGNOSIS — E87.2 ACIDOSIS: ICD-10-CM

## 2018-11-07 DIAGNOSIS — F10.239 ALCOHOL DEPENDENCE WITH WITHDRAWAL, UNSPECIFIED: ICD-10-CM

## 2018-11-07 DIAGNOSIS — N17.9 ACUTE KIDNEY FAILURE, UNSPECIFIED: ICD-10-CM

## 2018-11-07 DIAGNOSIS — E83.42 HYPOMAGNESEMIA: ICD-10-CM

## 2018-11-07 DIAGNOSIS — E86.0 DEHYDRATION: ICD-10-CM

## 2018-11-07 DIAGNOSIS — E87.3 ALKALOSIS: ICD-10-CM

## 2018-11-07 DIAGNOSIS — E83.39 OTHER DISORDERS OF PHOSPHORUS METABOLISM: ICD-10-CM

## 2018-11-07 DIAGNOSIS — E87.6 HYPOKALEMIA: ICD-10-CM

## 2018-11-13 NOTE — DISCHARGE NOTE ADULT - PATIENT PORTAL LINK FT
You can access the [facility] Patient Portal, offered by [facility], by registering with the following website: [email or web address]

## 2018-11-13 NOTE — DISCHARGE NOTE ADULT - HOSPITAL COURSE
42 yrs old male presented with ETOH withdrawal, fund to have leukocytosis, electrolyte imbalance, acid base imbalance. Last alcohol consumption 2 days prior (10/30).   Started Ativan per [facility] Protocol, pt didn't complete and left AMA.  He also had low K, initially elevated LA, though serum bicarb elevated, likely combination of metabolic acidosis and alkalosis, in the setting of ETOH abuse, vomiting  Improved after IVF.

## 2018-11-13 NOTE — DISCHARGE NOTE ADULT - CARE PLAN
Principal Discharge DX:	Alcohol withdrawal syndrome without complication  Goal:	detox  Assessment and plan of treatment:	Didn't complete Ativan protocol  Pt left against medical advise.  Secondary Diagnosis:	Acute renal failure, unspecified acute renal failure type  Assessment and plan of treatment:	resolved  Secondary Diagnosis:	Lactic acidosis  Assessment and plan of treatment:	resolved

## 2018-12-05 NOTE — OCCUPATIONAL THERAPY INITIAL EVALUATION ADULT - EATING, PREVIOUS LEVEL OF FUNCTION, OT EVAL
[facility] Call Center    Phone Message    May a detailed message be left on voicemail: yes    Reason for Call: Other: [employer]up[employer] rep is calling to get more infor on a DME order for pt, Please give Rupali forte call back to discuss [phone]    Action Taken: Message routed to:  Primary Care p [phone]   independent

## 2019-01-06 ENCOUNTER — EMERGENCY (EMERGENCY)
Facility: HOSPITAL | Age: 44
LOS: 0 days | Discharge: HOME | End: 2019-01-06
Attending: EMERGENCY MEDICINE
Payer: MEDICAID

## 2019-01-06 VITALS
HEIGHT: 70 IN | TEMPERATURE: 98 F | RESPIRATION RATE: 18 BRPM | WEIGHT: 164.91 LBS | DIASTOLIC BLOOD PRESSURE: 94 MMHG | OXYGEN SATURATION: 98 % | HEART RATE: 115 BPM | SYSTOLIC BLOOD PRESSURE: 137 MMHG

## 2019-01-06 VITALS — HEART RATE: 83 BPM
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
## 2019-01-06 DIAGNOSIS — S02.609A FRACTURE OF MANDIBLE, UNSPECIFIED, INITIAL ENCOUNTER FOR CLOSED FRACTURE: ICD-10-CM

## 2019-01-06 DIAGNOSIS — R51 HEADACHE: ICD-10-CM
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
## 2019-01-06 PROCEDURE — 76377 3D RENDER W/INTRP POSTPROCES: CPT | Mod: 26

## 2019-01-06 PROCEDURE — 99284 EMERGENCY DEPT VISIT MOD MDM: CPT

## 2019-01-06 PROCEDURE — 70486 CT MAXILLOFACIAL W/O DYE: CPT | Mod: 26

## 2019-01-06 RX ORDER — IBUPROFEN 200 MG
1 TABLET ORAL
Qty: 20 | Refills: 0
Start: 2019-01-06

## 2019-01-06 RX ORDER — OXYCODONE AND ACETAMINOPHEN 5; 325 MG/1; MG/1
1 TABLET ORAL ONCE
Qty: 0 | Refills: 0 | Status: DISCONTINUED | OUTPATIENT
Start: 2019-01-06 | End: 2019-01-06

## 2019-01-06 RX ORDER — ACETAMINOPHEN 500 MG
650 TABLET ORAL ONCE
Qty: 0 | Refills: 0 | Status: COMPLETED | OUTPATIENT
Start: 2019-01-06 | End: 2019-01-06

## 2019-01-06 RX ADMIN — OXYCODONE AND ACETAMINOPHEN 1 TABLET(S): 5; 325 TABLET ORAL at 15:39

## 2019-01-06 RX ADMIN — Medication 1 TABLET(S): at 15:39

## 2019-01-06 RX ADMIN — Medication 650 MILLIGRAM(S): at 14:30

## 2019-01-06 RX ADMIN — OXYCODONE AND ACETAMINOPHEN 1 TABLET(S): 5; 325 TABLET ORAL at 15:50

## 2019-01-06 RX ADMIN — Medication 650 MILLIGRAM(S): at 15:30

## 2019-01-06 NOTE — ED PROVIDER NOTE - OBJECTIVE STATEMENT
42 y/o male with PMH etoh abuse here c/o L side jaw pain and swelling x 2 days. pt states while playing softball he got hit in the face with the ball. no loc. pt states he noticed it got more swollen today so came to ed for eval. pt hasn't taken anything for pain. reports pain is worse when chewing. no fevers. no HA. no change in vision.    ROS: No fever/chills. No eye pain/changes in vision, No ear pain/sore throat, No chest pain/palpitations. No SOB/cough/. No abdominal pain, N/V/D, no black/bloody bm. No dysuria/frequency/discharge, No headache. No Dizziness.    No rashes or breaks in skin. No numbness/tingling/weakness.

## 2019-01-06 NOTE — ED PROVIDER NOTE - PHYSICAL EXAMINATION
Gen: Alert, NAD  Head: NC, PERRL, EOMI, normal lids/conjunctiva, tenderness and swelling L mandible,   ENT: B TM WNL, normal hearing, patent oropharynx without erythema/exudate, uvula midline  Neck: +supple, no tenderness/meningismus/JVD, +Trachea midline  Pulm: Bilateral BS, normal resp effort, no wheeze/stridor/retractions  CV: RRR, no M/R/G, +dist pulses  Abd: soft, NT/ND, +BS, no hepatosplenomegaly  Mskel: no edema/erythema/cyanosis  Skin: no rash  Neuro: AAOx3, no sensory/motor deficits, CN 2-12 intact

## 2019-01-06 NOTE — ED ADULT TRIAGE NOTE - CHIEF COMPLAINT QUOTE
Hit in jaw with a softball 2 days ago on left side face, No LOC, unable to eat or chew unable to open jaw fully

## 2019-01-06 NOTE — ED ADULT NURSE NOTE - OBJECTIVE STATEMENT
Patient was hit 2 days ago in the face with a softball, denies syncope. patient states Patient was hit 2 days ago in the jaw with a softball, denies syncope. patient left side of jaw has swelling and complains pain, headache in the parietal regions and difficulty eating. No blood or loose teeth seen in mouth currently. Pt tachycardiac. Currently not taking any medication, pt states he's an occasional drinker

## 2019-01-06 NOTE — ED PROVIDER NOTE - CARE PLAN
Principal Discharge DX:	Closed fracture of left side of mandible, unspecified mandibular site, initial encounter

## 2019-01-06 NOTE — ED PROVIDER NOTE - MEDICAL DECISION MAKING DETAILS
pt here with L jaw pain and swelling s/p hit with soft ball. ct with fracture at angle L madible and root of L third molar, pt is able to open mouth, no trismus, tolerating po, pain control, d/w Dr quick, will dc pt with abx and OMFS/dental f/u, pt understands,

## 2019-01-09 ENCOUNTER — TRANSCRIPTION ENCOUNTER (OUTPATIENT)
Age: 44
End: 2019-01-09

## 2019-01-09 ENCOUNTER — OUTPATIENT (OUTPATIENT)
Dept: OUTPATIENT SERVICES | Facility: HOSPITAL | Age: 44
LOS: 1 days | End: 2019-01-09

## 2019-01-09 VITALS
RESPIRATION RATE: 14 BRPM | SYSTOLIC BLOOD PRESSURE: 132 MMHG | WEIGHT: 162.04 LBS | TEMPERATURE: 97 F | HEART RATE: 88 BPM | DIASTOLIC BLOOD PRESSURE: 70 MMHG | OXYGEN SATURATION: 98 % | HEIGHT: 69 IN

## 2019-01-09 DIAGNOSIS — S02.650B FRACTURE OF ANGLE OF MANDIBLE, UNSPECIFIED SIDE, INITIAL ENCOUNTER FOR OPEN FRACTURE: ICD-10-CM

## 2019-01-09 DIAGNOSIS — S02.650A FRACTURE OF ANGLE OF MANDIBLE, UNSPECIFIED SIDE, INITIAL ENCOUNTER FOR CLOSED FRACTURE: ICD-10-CM

## 2019-01-09 DIAGNOSIS — F10.10 ALCOHOL ABUSE, UNCOMPLICATED: ICD-10-CM

## 2019-01-09 LAB
ANION GAP SERPL CALC-SCNC: 15 MEQ/L — HIGH (ref 7–14)
BLD GP AB SCN SERPL QL: NEGATIVE — SIGNIFICANT CHANGE UP
BUN SERPL-MCNC: 12 MG/DL — SIGNIFICANT CHANGE UP (ref 7–23)
CALCIUM SERPL-MCNC: 9.7 MG/DL — SIGNIFICANT CHANGE UP (ref 8.4–10.5)
CHLORIDE SERPL-SCNC: 103 MMOL/L — SIGNIFICANT CHANGE UP (ref 98–107)
CO2 SERPL-SCNC: 19 MMOL/L — LOW (ref 22–31)
CREAT SERPL-MCNC: 0.78 MG/DL — SIGNIFICANT CHANGE UP (ref 0.5–1.3)
GLUCOSE SERPL-MCNC: 80 MG/DL — SIGNIFICANT CHANGE UP (ref 70–99)
HCT VFR BLD CALC: 40.3 % — SIGNIFICANT CHANGE UP (ref 39–50)
HGB BLD-MCNC: 13.8 G/DL — SIGNIFICANT CHANGE UP (ref 13–17)
MCHC RBC-ENTMCNC: 31.7 PG — SIGNIFICANT CHANGE UP (ref 27–34)
MCHC RBC-ENTMCNC: 34.2 % — SIGNIFICANT CHANGE UP (ref 32–36)
MCV RBC AUTO: 92.6 FL — SIGNIFICANT CHANGE UP (ref 80–100)
NRBC # FLD: 0 K/UL — LOW (ref 25–125)
PLATELET # BLD AUTO: 183 K/UL — SIGNIFICANT CHANGE UP (ref 150–400)
PMV BLD: 10.8 FL — SIGNIFICANT CHANGE UP (ref 7–13)
POTASSIUM SERPL-MCNC: 3.5 MMOL/L — SIGNIFICANT CHANGE UP (ref 3.5–5.3)
POTASSIUM SERPL-SCNC: 3.5 MMOL/L — SIGNIFICANT CHANGE UP (ref 3.5–5.3)
RBC # BLD: 4.35 M/UL — SIGNIFICANT CHANGE UP (ref 4.2–5.8)
RBC # FLD: 12.6 % — SIGNIFICANT CHANGE UP (ref 10.3–14.5)
RH IG SCN BLD-IMP: POSITIVE — SIGNIFICANT CHANGE UP
SODIUM SERPL-SCNC: 137 MMOL/L — SIGNIFICANT CHANGE UP (ref 135–145)
WBC # BLD: 5.29 K/UL — SIGNIFICANT CHANGE UP (ref 3.8–10.5)
WBC # FLD AUTO: 5.29 K/UL — SIGNIFICANT CHANGE UP (ref 3.8–10.5)

## 2019-01-09 NOTE — H&P PST ADULT - NEGATIVE ENMT SYMPTOMS
no hearing difficulty/no nose bleeds/no sinus symptoms/no ear pain/no tinnitus/no throat pain/no dysphagia

## 2019-01-09 NOTE — H&P PST ADULT - HISTORY OF PRESENT ILLNESS
43 year old male presents to presurgical testing with diagnosis of fracture of tooth (traumatic), initial encounter for open fracture and fracture of ankle of mandible scheduled for open reduction internal fixation left mandible fracture, angle, left and surgical removal of fractured tooth #17. Pt reports left sided law pain and swelling as as result of being hit by softball. Pt evaluated at [facility] on 1/6/19. 43 year old male presents to presurgical testing with diagnosis of fracture of tooth (traumatic), initial encounter for open fracture and fracture of angle of mandible scheduled for open reduction internal fixation left mandible fracture, angle, left and surgical removal of fractured tooth #17 for 1/10/19. Pt reports left sided jaw pain and swelling as a result of being hit by softball. Pt evaluated at [facility] on 1/6/19. Pt was started on pain medication and antibiotics.

## 2019-01-09 NOTE — H&P PST ADULT - RS GEN PE MLT RESP DETAILS PC
airway patent/no rales/no rhonchi/breath sounds equal/respirations non-labored/no wheezes/good air movement/clear to auscultation bilaterally

## 2019-01-09 NOTE — H&P PST ADULT - ACTIVITY
able to climb a flight of stairs, runs on [employer] able to climb a flight of stairs, runs on treadmill

## 2019-01-09 NOTE — H&P PST ADULT - NEGATIVE OPHTHALMOLOGIC SYMPTOMS
no blurred vision L/no pain L/no loss of vision L/no pain R/no loss of vision R/no photophobia/no blurred vision R/no diplopia

## 2019-01-09 NOTE — H&P PST ADULT - PMH
Anemia    Anxiety    ETOH abuse    Fracture of angle of mandible    Fracture of tooth (traumatic), initial encounter for open fracture

## 2019-01-09 NOTE — H&P PST ADULT - ENMT COMMENTS
left pain and tenderness due to fracture left jaw pain and tenderness due to fracture left sided jaw tenderness and swelling

## 2019-01-09 NOTE — H&P PST ADULT - MUSCULOSKELETAL
details… detailed exam normal strength/no calf tenderness/no joint swelling/no joint erythema/ROM intact/no joint warmth

## 2019-01-09 NOTE — H&P PST ADULT - NEGATIVE GASTROINTESTINAL SYMPTOMS
no change in bowel habits/no abdominal pain/no melena/no diarrhea/no constipation/no vomiting/no nausea

## 2019-01-09 NOTE — H&P PST ADULT - PROBLEM SELECTOR PLAN 1
Pt is scheduled for open reduction internal fixation left mandible fracture, angle, left and surgical removal of fractured tooth #17 for 1/10/19. Pre-op instructions provided. Pepcid provided for GI prophylaxis. Pt stated understanding.

## 2019-01-09 NOTE — H&P PST ADULT - ASSESSMENT
fracture of tooth (traumatic), initial encounter for open fracture  fracture of ankle of mandible fracture of tooth (traumatic), initial encounter for open fracture  fracture of angle of mandible

## 2019-01-09 NOTE — H&P PST ADULT - NEGATIVE NEUROLOGICAL SYMPTOMS
no transient paralysis/no headache/no tremors/no vertigo/no paresthesias/no syncope/no difficulty walking/no weakness/no generalized seizures

## 2019-01-10 ENCOUNTER — TRANSCRIPTION ENCOUNTER (OUTPATIENT)
Age: 44
End: 2019-01-10

## 2019-01-10 ENCOUNTER — INPATIENT (INPATIENT)
Facility: HOSPITAL | Age: 44
LOS: 0 days | Discharge: HOME | End: 2019-01-11
Attending: DENTIST | Admitting: DENTIST

## 2019-01-10 VITALS
HEART RATE: 98 BPM | TEMPERATURE: 98 F | HEIGHT: 69 IN | RESPIRATION RATE: 14 BRPM | OXYGEN SATURATION: 99 % | WEIGHT: 162.04 LBS | DIASTOLIC BLOOD PRESSURE: 96 MMHG | SYSTOLIC BLOOD PRESSURE: 144 MMHG

## 2019-01-10 DIAGNOSIS — S02.650B FRACTURE OF ANGLE OF MANDIBLE, UNSPECIFIED SIDE, INITIAL ENCOUNTER FOR OPEN FRACTURE: ICD-10-CM

## 2019-01-10 LAB
GRAM STN WND: SIGNIFICANT CHANGE UP
RH IG SCN BLD-IMP: POSITIVE — SIGNIFICANT CHANGE UP
SPECIMEN SOURCE: SIGNIFICANT CHANGE UP

## 2019-01-10 RX ORDER — ACETAMINOPHEN 500 MG
1000 TABLET ORAL ONCE
Qty: 0 | Refills: 0 | Status: DISCONTINUED | OUTPATIENT
Start: 2019-01-10 | End: 2019-01-11

## 2019-01-10 RX ORDER — HYDROMORPHONE HYDROCHLORIDE 2 MG/ML
0.5 INJECTION INTRAMUSCULAR; INTRAVENOUS; SUBCUTANEOUS
Qty: 0 | Refills: 0 | Status: DISCONTINUED | OUTPATIENT
Start: 2019-01-10 | End: 2019-01-11

## 2019-01-10 RX ORDER — OXYCODONE HYDROCHLORIDE 5 MG/1
5 TABLET ORAL EVERY 6 HOURS
Qty: 0 | Refills: 0 | Status: DISCONTINUED | OUTPATIENT
Start: 2019-01-10 | End: 2019-01-11

## 2019-01-10 RX ORDER — SODIUM CHLORIDE 9 MG/ML
1000 INJECTION, SOLUTION INTRAVENOUS
Qty: 0 | Refills: 0 | Status: DISCONTINUED | OUTPATIENT
Start: 2019-01-10 | End: 2019-01-11

## 2019-01-10 RX ORDER — METOCLOPRAMIDE HCL 10 MG
10 TABLET ORAL EVERY 8 HOURS
Qty: 0 | Refills: 0 | Status: DISCONTINUED | OUTPATIENT
Start: 2019-01-10 | End: 2019-01-10

## 2019-01-10 RX ORDER — IBUPROFEN 200 MG
600 TABLET ORAL EVERY 6 HOURS
Qty: 0 | Refills: 0 | Status: DISCONTINUED | OUTPATIENT
Start: 2019-01-10 | End: 2019-01-11

## 2019-01-10 RX ORDER — ONDANSETRON 8 MG/1
4 TABLET, FILM COATED ORAL EVERY 6 HOURS
Qty: 0 | Refills: 0 | Status: DISCONTINUED | OUTPATIENT
Start: 2019-01-10 | End: 2019-01-11

## 2019-01-10 RX ORDER — OXYCODONE HYDROCHLORIDE 5 MG/1
10 TABLET ORAL EVERY 6 HOURS
Qty: 0 | Refills: 0 | Status: DISCONTINUED | OUTPATIENT
Start: 2019-01-10 | End: 2019-01-11

## 2019-01-10 RX ORDER — CHLORHEXIDINE GLUCONATE 213 G/1000ML
15 SOLUTION TOPICAL
Qty: 0 | Refills: 0 | Status: DISCONTINUED | OUTPATIENT
Start: 2019-01-10 | End: 2019-01-11

## 2019-01-10 RX ORDER — HYDROMORPHONE HYDROCHLORIDE 2 MG/ML
0.3 INJECTION INTRAMUSCULAR; INTRAVENOUS; SUBCUTANEOUS
Qty: 0 | Refills: 0 | Status: DISCONTINUED | OUTPATIENT
Start: 2019-01-10 | End: 2019-01-11

## 2019-01-10 RX ORDER — MORPHINE SULFATE 50 MG/1
2 CAPSULE, EXTENDED RELEASE ORAL EVERY 6 HOURS
Qty: 0 | Refills: 0 | Status: DISCONTINUED | OUTPATIENT
Start: 2019-01-10 | End: 2019-01-10

## 2019-01-10 RX ORDER — SODIUM CHLORIDE 9 MG/ML
1000 INJECTION, SOLUTION INTRAVENOUS
Qty: 0 | Refills: 0 | Status: DISCONTINUED | OUTPATIENT
Start: 2019-01-10 | End: 2019-01-10

## 2019-01-10 RX ORDER — CEFAZOLIN SODIUM 1 G
2000 VIAL (EA) INJECTION EVERY 8 HOURS
Qty: 0 | Refills: 0 | Status: DISCONTINUED | OUTPATIENT
Start: 2019-01-10 | End: 2019-01-11

## 2019-01-10 RX ORDER — ONDANSETRON 8 MG/1
4 TABLET, FILM COATED ORAL ONCE
Qty: 0 | Refills: 0 | Status: DISCONTINUED | OUTPATIENT
Start: 2019-01-10 | End: 2019-01-11

## 2019-01-10 RX ADMIN — OXYCODONE HYDROCHLORIDE 10 MILLIGRAM(S): 5 TABLET ORAL at 23:14

## 2019-01-10 RX ADMIN — HYDROMORPHONE HYDROCHLORIDE 0.5 MILLIGRAM(S): 2 INJECTION INTRAMUSCULAR; INTRAVENOUS; SUBCUTANEOUS at 23:20

## 2019-01-10 RX ADMIN — OXYCODONE HYDROCHLORIDE 10 MILLIGRAM(S): 5 TABLET ORAL at 22:44

## 2019-01-10 RX ADMIN — SODIUM CHLORIDE 30 MILLILITER(S): 9 INJECTION, SOLUTION INTRAVENOUS at 16:01

## 2019-01-10 RX ADMIN — HYDROMORPHONE HYDROCHLORIDE 0.5 MILLIGRAM(S): 2 INJECTION INTRAMUSCULAR; INTRAVENOUS; SUBCUTANEOUS at 23:05

## 2019-01-10 RX ADMIN — SODIUM CHLORIDE 110 MILLILITER(S): 9 INJECTION, SOLUTION INTRAVENOUS at 22:46

## 2019-01-10 NOTE — DISCHARGE NOTE ADULT - ADDITIONAL INSTRUCTIONS
1) Full liquid diet, no chewing  2) Avoid trauma  3) F/[place] [facility] [place], call [phone] to confirm appointment 1) Soft NON CHEW diet. DO NOT CHEW FOR 6 WEEKS.   2) Avoid trauma  3) F/u in [facility] in 1 week, call [phone] to confirm appointment

## 2019-01-10 NOTE — DISCHARGE NOTE ADULT - CONDITIONS AT DISCHARGE
1) Full liquid diet, no chewing  2) Avoid trauma  3) [place] [facility] [place], call [phone] to confirm appointment Alert & oriented. Jaw rubberbanded, scissors at bedside,  scissors at bedside. Ice on/off Q 20mins. Out of bed ambulating. Tolerating full liquid diet without nausea or vomiting. Voiding without difficulty. Vitals stable, afebrile. Understands all discharge instruction & will follow up with the MD.

## 2019-01-10 NOTE — DISCHARGE NOTE ADULT - CARE PLAN
Principal Discharge DX:	Open fracture of left mandibular angle, initial encounter  Goal:	Recover from surgery  Assessment and plan of treatment:	Ambulating, voiding, tolerating PO

## 2019-01-10 NOTE — H&P ADULT - NSHPLABSRESULTS_GEN_ALL_CORE
Pano: Left mandibular angle fracture w/ fractured mesial root #17. #16 erupted in occlusion w/ #17, absent #15. Condyles seated b/l in fossa w/o evidence of fracture. Max sinuses WNL. All else grossly WNL.    CT maxface (1/6/19):   FACIAL BONES: The roof, floor, medial, and lateral walls of the orbits are intact. Zygomatic arches are intact. Old left nasal bone fracture.   MANDIBLE: Fracture of the angle of the left mandible with a fracture of the root of the left third molar.   SINONASAL CAVITIES: No air-fluid levels.   VISUALIZED INTRACRANIAL STRUCTURES: Normal.   ORBITAL CONTENTS: No proptosis. Optic globes are within normal limits. The extraocular muscles and optic nerves are unremarkable. The retrobulbar fat is clear, bilaterally.   REMAINING VISUALIZED BONES: Normal.   MISCELLANEOUS: Visualized portions of the mastoid air cells are   well-aerated.     IMPRESSION: Fracture of the angle of the left mandible with a fracture of the root of   the left third molar.

## 2019-01-10 NOTE — DISCHARGE NOTE ADULT - HOSPITAL COURSE
1/10/19: Pt was taken to the operating room for ORIF of left mandibular angle fracture and extraction of tooth #17. Recovery in PACU was uneventful, admitted to OMFS service for observation overnight.  1/11/19: NINO overnight, afebrile, AVSS. During morning rounds pt was ambulating, voiding, tolerating PO, pain was well controlled. Determined ready for discharge home.

## 2019-01-10 NOTE — DISCHARGE NOTE ADULT - MEDICATION SUMMARY - MEDICATIONS TO TAKE
I will START or STAY ON the medications listed below when I get home from the hospital:    ibuprofen 600 mg oral tablet  -- 1 tab(s) by mouth every 6 hours, As Needed  -- Indication: For pain    chlorhexidine 0.12% mucous membrane liquid  -- 15  mucous membrane 2 times a day  -- Indication: For infection prophylaxis    Keflex 500 mg oral capsule  -- 1 cap(s) by mouth every 6 hours  -- Indication: For infection prophylaxis

## 2019-01-10 NOTE — H&P ADULT - ASSESSMENT
44 yo M w/ left angle fracture traversing erupted #17 w/ mesial root fracture and mobility. #17 recommended for extraction, due to removal of posterior stop recommend ORIF of angle fracture in the OR. D/w pt RBA of closed vs open reduction and indications for extracting #17. D/w pt that even though not numb on left side of lip/chin now, high possibility that after the procedure could have temporary or permanent numbness. Questions answered, pt agrees to ORIF in the OR, understands may be wired shut after procedure, understands bruxism could complicate tx and possibly fx plates.

## 2019-01-10 NOTE — DISCHARGE NOTE ADULT - CARE PROVIDER_API CALL
Jakob Gudino (DDS; MD), OralMaxillofacial Surgery  [address] [place] [address]  Phone: [phone]  Fax: [phone]

## 2019-01-10 NOTE — H&P ADULT - HISTORY OF PRESENT ILLNESS
44 yo M presents w/ L mandible fracture. Patient presented to [facility] clinic for consult on 1/7/18 regarding jaw pain. Pt reported he was playing softball 1 week ago when he got hit in the face with a softball. He denied any LOC, n/v/f/c, vision changes, or numbness/tingling in his left jaw. Today, he presents for ORIF of L angle fracture and extraction of tooth #17 in the OR w/ Dr. Gudino.

## 2019-01-10 NOTE — DISCHARGE NOTE ADULT - PATIENT PORTAL LINK FT
You can access the [facility] Patient Portal, offered by [facility], by registering with the following website: [email or web address]

## 2019-01-10 NOTE — DISCHARGE NOTE ADULT - INSTRUCTIONS
Full liquid diet for 4-6 weeks, no chewing. Watch for signs of infection; redness, swelling, fever, chills or heat, report such symptoms to the MD. Drink 6-8 glasses of fluids daily to promote hydration. No heavy lifting, pulling or pushing heavy objects. Follow up with the MD Soft NON-CHEW diet for 6 weeks. DO NOT CHEW.

## 2019-01-11 VITALS
TEMPERATURE: 98 F | OXYGEN SATURATION: 99 % | RESPIRATION RATE: 18 BRPM | SYSTOLIC BLOOD PRESSURE: 124 MMHG | DIASTOLIC BLOOD PRESSURE: 80 MMHG | HEART RATE: 75 BPM

## 2019-01-11 RX ORDER — KETOROLAC TROMETHAMINE 30 MG/ML
30 SYRINGE (ML) INJECTION ONCE
Qty: 0 | Refills: 0 | Status: DISCONTINUED | OUTPATIENT
Start: 2019-01-11 | End: 2019-01-11

## 2019-01-11 RX ORDER — TETRAHYDROZOLINE/POLYETHYL GLY 0.05 %-1 %
1 DROPS OPHTHALMIC (EYE)
Qty: 0 | Refills: 0 | COMMUNITY

## 2019-01-11 RX ORDER — INFLUENZA VIRUS VACCINE 15; 15; 15; 15 UG/.5ML; UG/.5ML; UG/.5ML; UG/.5ML
0.5 SUSPENSION INTRAMUSCULAR ONCE
Qty: 0 | Refills: 0 | Status: COMPLETED | OUTPATIENT
Start: 2019-01-11 | End: 2019-01-11

## 2019-01-11 RX ORDER — CHLORHEXIDINE GLUCONATE 213 G/1000ML
15 SOLUTION TOPICAL
Qty: 0 | Refills: 0 | DISCHARGE
Start: 2019-01-11

## 2019-01-11 RX ADMIN — OXYCODONE HYDROCHLORIDE 10 MILLIGRAM(S): 5 TABLET ORAL at 08:30

## 2019-01-11 RX ADMIN — Medication 30 MILLIGRAM(S): at 10:30

## 2019-01-11 RX ADMIN — SODIUM CHLORIDE 110 MILLILITER(S): 9 INJECTION, SOLUTION INTRAVENOUS at 10:36

## 2019-01-11 RX ADMIN — Medication 100 MILLIGRAM(S): at 10:36

## 2019-01-11 RX ADMIN — Medication 30 MILLIGRAM(S): at 01:10

## 2019-01-11 RX ADMIN — Medication 30 MILLIGRAM(S): at 00:52

## 2019-01-11 RX ADMIN — CHLORHEXIDINE GLUCONATE 15 MILLILITER(S): 213 SOLUTION TOPICAL at 07:33

## 2019-01-11 RX ADMIN — Medication 100 MILLIGRAM(S): at 04:11

## 2019-01-11 RX ADMIN — Medication 30 MILLIGRAM(S): at 09:17

## 2019-01-11 RX ADMIN — OXYCODONE HYDROCHLORIDE 10 MILLIGRAM(S): 5 TABLET ORAL at 07:33

## 2019-01-11 NOTE — PROGRESS NOTE ADULT - SUBJECTIVE AND OBJECTIVE BOX
43y Male s/p ORIF L angle mandible, exo #17.  Patient examined at bedside.  NINO overnight. Patient states pain is well controlled.  Denies any fever, chills, nausea, vomiting.  Patient ambulating, voiding, tolerating PO.    Vital Signs Last 24 Hrs  T(C): 36.4 (11 Jan 2019 02:35), Max: 36.8 (10 Kar 2019 15:21)  T(F): 97.6 (11 Jan 2019 02:35), Max: 98.3 (10 Kar 2019 15:21)  HR: 77 (11 Jan 2019 02:35) (76 - 101)  BP: 134/82 (11 Jan 2019 02:35) (131/84 - 144/96)  BP(mean): 98 (10 Kar 2019 22:30) (98 - 101)  RR: 18 (11 Jan 2019 02:35) (11 - 18)  SpO2: 98% (11 Jan 2019 02:35) (96% - 100%)    PE:   Gen: AAOx3, NAD  EOE: L mandibular edema, TTP   IOE: Occlusion stable and reproducible, Sutures C/D/I.  Wounds hemostatic.                            13.8   5.29  )-----------( 183      ( 09 Jan 2019 09:50 )             40.3     01-09    137  |  103  |  12  ----------------------------<  80  3.5   |  19<L>  |  0.78    Ca    9.7      09 Jan 2019 09:50      I&O's Summary    10 Kar 2019 07:01  -  11 Jan 2019 06:19  --------------------------------------------------------  IN: 460 mL / OUT: 0 mL / NET: 460 mL      A/P: 43y Male s/p ORIF L angle mandible, exo #17. Patient recovering well.  - Continue abx  - Continue pain control  - Encourage PO fluids, voiding, ambulation.  - DVT PPX

## 2019-01-14 LAB — CULTURE - SURGICAL SITE: SIGNIFICANT CHANGE UP

## 2019-01-26 ENCOUNTER — INPATIENT (INPATIENT)
Facility: HOSPITAL | Age: 44
LOS: 1 days | Discharge: HOME | End: 2019-01-28
Attending: INTERNAL MEDICINE | Admitting: INTERNAL MEDICINE
Payer: MEDICAID

## 2019-01-26 VITALS
WEIGHT: 160.06 LBS | OXYGEN SATURATION: 95 % | HEIGHT: 70 IN | DIASTOLIC BLOOD PRESSURE: 77 MMHG | TEMPERATURE: 98 F | RESPIRATION RATE: 20 BRPM | SYSTOLIC BLOOD PRESSURE: 128 MMHG | HEART RATE: 116 BPM

## 2019-01-26 LAB
ALBUMIN SERPL ELPH-MCNC: 4.9 G/DL — SIGNIFICANT CHANGE UP (ref 3.3–5)
ALP SERPL-CCNC: 99 U/L — SIGNIFICANT CHANGE UP (ref 40–120)
ALT FLD-CCNC: 60 U/L — SIGNIFICANT CHANGE UP (ref 12–78)
AMPHET UR-MCNC: NEGATIVE — SIGNIFICANT CHANGE UP
ANION GAP SERPL CALC-SCNC: 27 MMOL/L — HIGH (ref 5–17)
APAP SERPL-MCNC: < 2 UG/ML (ref 10–30)
APPEARANCE UR: ABNORMAL
AST SERPL-CCNC: 64 U/L — HIGH (ref 15–37)
BACTERIA # UR AUTO: ABNORMAL
BARBITURATES UR SCN-MCNC: NEGATIVE — SIGNIFICANT CHANGE UP
BASOPHILS # BLD AUTO: 0.02 K/UL — SIGNIFICANT CHANGE UP (ref 0–0.2)
BASOPHILS NFR BLD AUTO: 0.1 % — SIGNIFICANT CHANGE UP (ref 0–2)
BENZODIAZ UR-MCNC: POSITIVE — SIGNIFICANT CHANGE UP
BILIRUB SERPL-MCNC: 1.5 MG/DL — HIGH (ref 0.2–1.2)
BILIRUB UR-MCNC: ABNORMAL
BUN SERPL-MCNC: 45 MG/DL — HIGH (ref 7–23)
CALCIUM SERPL-MCNC: 9.9 MG/DL — SIGNIFICANT CHANGE UP (ref 8.5–10.1)
CHLORIDE SERPL-SCNC: 68 MMOL/L — LOW (ref 96–108)
CK SERPL-CCNC: 238 U/L — SIGNIFICANT CHANGE UP (ref 26–308)
CO2 SERPL-SCNC: 32 MMOL/L — HIGH (ref 22–31)
COCAINE METAB.OTHER UR-MCNC: NEGATIVE — SIGNIFICANT CHANGE UP
COLOR SPEC: YELLOW — SIGNIFICANT CHANGE UP
CREAT SERPL-MCNC: 3.85 MG/DL — HIGH (ref 0.5–1.3)
DIFF PNL FLD: ABNORMAL
EOSINOPHIL # BLD AUTO: 0 K/UL — SIGNIFICANT CHANGE UP (ref 0–0.5)
EOSINOPHIL NFR BLD AUTO: 0 % — SIGNIFICANT CHANGE UP (ref 0–6)
EPI CELLS # UR: SIGNIFICANT CHANGE UP
GLUCOSE SERPL-MCNC: 135 MG/DL — HIGH (ref 70–99)
GLUCOSE UR QL: NEGATIVE MG/DL — SIGNIFICANT CHANGE UP
HCT VFR BLD CALC: 46.6 % — SIGNIFICANT CHANGE UP (ref 39–50)
HGB BLD-MCNC: 16.5 G/DL — SIGNIFICANT CHANGE UP (ref 13–17)
HYALINE CASTS # UR AUTO: ABNORMAL /LPF
IMM GRANULOCYTES NFR BLD AUTO: 0.3 % — SIGNIFICANT CHANGE UP (ref 0–1.5)
KETONES UR-MCNC: ABNORMAL
LEUKOCYTE ESTERASE UR-ACNC: ABNORMAL
LYMPHOCYTES # BLD AUTO: 1.43 K/UL — SIGNIFICANT CHANGE UP (ref 1–3.3)
LYMPHOCYTES # BLD AUTO: 9.4 % — LOW (ref 13–44)
MAGNESIUM SERPL-MCNC: 1.6 MG/DL — SIGNIFICANT CHANGE UP (ref 1.6–2.6)
MCHC RBC-ENTMCNC: 31.3 PG — SIGNIFICANT CHANGE UP (ref 27–34)
MCHC RBC-ENTMCNC: 35.4 GM/DL — SIGNIFICANT CHANGE UP (ref 32–36)
MCV RBC AUTO: 88.3 FL — SIGNIFICANT CHANGE UP (ref 80–100)
METHADONE UR-MCNC: NEGATIVE — SIGNIFICANT CHANGE UP
MONOCYTES # BLD AUTO: 1.25 K/UL — HIGH (ref 0–0.9)
MONOCYTES NFR BLD AUTO: 8.2 % — SIGNIFICANT CHANGE UP (ref 2–14)
NEUTROPHILS # BLD AUTO: 12.53 K/UL — HIGH (ref 1.8–7.4)
NEUTROPHILS NFR BLD AUTO: 82 % — HIGH (ref 43–77)
NITRITE UR-MCNC: NEGATIVE — SIGNIFICANT CHANGE UP
NRBC # BLD: 0 /100 WBCS — SIGNIFICANT CHANGE UP (ref 0–0)
OPIATES UR-MCNC: NEGATIVE — SIGNIFICANT CHANGE UP
PCP SPEC-MCNC: SIGNIFICANT CHANGE UP
PCP UR-MCNC: NEGATIVE — SIGNIFICANT CHANGE UP
PH UR: 5 — SIGNIFICANT CHANGE UP (ref 5–8)
PHOSPHATE SERPL-MCNC: 3.2 MG/DL — SIGNIFICANT CHANGE UP (ref 2.5–4.5)
PLATELET # BLD AUTO: 305 K/UL — SIGNIFICANT CHANGE UP (ref 150–400)
POTASSIUM SERPL-MCNC: 2.8 MMOL/L — CRITICAL LOW (ref 3.5–5.3)
POTASSIUM SERPL-SCNC: 2.8 MMOL/L — CRITICAL LOW (ref 3.5–5.3)
PROT SERPL-MCNC: 10.2 GM/DL — HIGH (ref 6–8.3)
PROT UR-MCNC: 100 MG/DL
RBC # BLD: 5.28 M/UL — SIGNIFICANT CHANGE UP (ref 4.2–5.8)
RBC # FLD: 12.6 % — SIGNIFICANT CHANGE UP (ref 10.3–14.5)
RBC CASTS # UR COMP ASSIST: SIGNIFICANT CHANGE UP /HPF (ref 0–4)
SALICYLATES SERPL-MCNC: <1.7 MG/DL — LOW (ref 2.8–20)
SODIUM SERPL-SCNC: 127 MMOL/L — LOW (ref 135–145)
SP GR SPEC: 1.02 — SIGNIFICANT CHANGE UP (ref 1.01–1.02)
THC UR QL: POSITIVE — SIGNIFICANT CHANGE UP
UROBILINOGEN FLD QL: 4 MG/DL
WBC # BLD: 15.28 K/UL — HIGH (ref 3.8–10.5)
WBC # FLD AUTO: 15.28 K/UL — HIGH (ref 3.8–10.5)
WBC UR QL: SIGNIFICANT CHANGE UP

## 2019-01-26 PROCEDURE — 99285 EMERGENCY DEPT VISIT HI MDM: CPT

## 2019-01-26 PROCEDURE — 93010 ELECTROCARDIOGRAM REPORT: CPT

## 2019-01-26 PROCEDURE — 99222 1ST HOSP IP/OBS MODERATE 55: CPT | Mod: AI

## 2019-01-26 RX ORDER — FOLIC ACID 0.8 MG
1 TABLET ORAL DAILY
Qty: 0 | Refills: 0 | Status: DISCONTINUED | OUTPATIENT
Start: 2019-01-26 | End: 2019-01-28

## 2019-01-26 RX ORDER — POTASSIUM CHLORIDE 20 MEQ
40 PACKET (EA) ORAL ONCE
Qty: 0 | Refills: 0 | Status: COMPLETED | OUTPATIENT
Start: 2019-01-26 | End: 2019-01-26

## 2019-01-26 RX ORDER — PANTOPRAZOLE SODIUM 20 MG/1
40 TABLET, DELAYED RELEASE ORAL
Qty: 0 | Refills: 0 | Status: DISCONTINUED | OUTPATIENT
Start: 2019-01-26 | End: 2019-01-28

## 2019-01-26 RX ORDER — SODIUM CHLORIDE 9 MG/ML
2000 INJECTION INTRAMUSCULAR; INTRAVENOUS; SUBCUTANEOUS ONCE
Qty: 0 | Refills: 0 | Status: COMPLETED | OUTPATIENT
Start: 2019-01-26 | End: 2019-01-26

## 2019-01-26 RX ORDER — THIAMINE MONONITRATE (VIT B1) 100 MG
100 TABLET ORAL DAILY
Qty: 0 | Refills: 0 | Status: DISCONTINUED | OUTPATIENT
Start: 2019-01-26 | End: 2019-01-28

## 2019-01-26 RX ORDER — SODIUM CHLORIDE 9 MG/ML
1000 INJECTION, SOLUTION INTRAVENOUS
Qty: 0 | Refills: 0 | Status: COMPLETED | OUTPATIENT
Start: 2019-01-26 | End: 2019-01-26

## 2019-01-26 RX ORDER — ONDANSETRON 8 MG/1
4 TABLET, FILM COATED ORAL EVERY 6 HOURS
Qty: 0 | Refills: 0 | Status: DISCONTINUED | OUTPATIENT
Start: 2019-01-26 | End: 2019-01-28

## 2019-01-26 RX ORDER — KETOROLAC TROMETHAMINE 30 MG/ML
15 SYRINGE (ML) INJECTION EVERY 8 HOURS
Qty: 0 | Refills: 0 | Status: DISCONTINUED | OUTPATIENT
Start: 2019-01-26 | End: 2019-01-27

## 2019-01-26 RX ORDER — POTASSIUM CHLORIDE 20 MEQ
10 PACKET (EA) ORAL ONCE
Qty: 0 | Refills: 0 | Status: COMPLETED | OUTPATIENT
Start: 2019-01-26 | End: 2019-01-26

## 2019-01-26 RX ORDER — MIDAZOLAM HYDROCHLORIDE 1 MG/ML
2 INJECTION, SOLUTION INTRAMUSCULAR; INTRAVENOUS ONCE
Qty: 0 | Refills: 0 | Status: DISCONTINUED | OUTPATIENT
Start: 2019-01-26 | End: 2019-01-26

## 2019-01-26 RX ADMIN — MIDAZOLAM HYDROCHLORIDE 2 MILLIGRAM(S): 1 INJECTION, SOLUTION INTRAMUSCULAR; INTRAVENOUS at 21:28

## 2019-01-26 RX ADMIN — SODIUM CHLORIDE 150 MILLILITER(S): 9 INJECTION, SOLUTION INTRAVENOUS at 23:08

## 2019-01-26 RX ADMIN — SODIUM CHLORIDE 2000 MILLILITER(S): 9 INJECTION INTRAMUSCULAR; INTRAVENOUS; SUBCUTANEOUS at 21:28

## 2019-01-26 RX ADMIN — Medication 40 MILLIEQUIVALENT(S): at 22:32

## 2019-01-26 RX ADMIN — Medication 100 MILLIEQUIVALENT(S): at 23:07

## 2019-01-26 NOTE — H&P ADULT - HISTORY OF PRESENT ILLNESS
42 y/o male  w/PMH of EtOH Abuse and Left mandibular ORIF 2 weeks ago after softball trauma, has been on liquid diet since, and taking ibuprofen for pain 4times a day.  states mostly not able to tolerate foods been throwing up so been 44 y/o male  w/PMH of EtOH Abuse and Left mandibular ORIF 2 weeks ago after softball trauma, should have been on liquid diet since but didnt tolerate, and taking ibuprofen for pain 4times a day.  states mostly not able to tolerate foods been throwing up so been just drinking various juices, and etoh, reports about 5-6 drinks of vodka qd but last etoh 3 days ago. comes in as over the last 2-3 days been getting cramping of le and at times over face as well, sx's werent improving which made him come in. pt denies any fever, chills, sob, cp, palpitations, +n/+v/-d/-c no travels

## 2019-01-26 NOTE — H&P ADULT - PMH
No pertinent past medical history Anemia    Anxiety    ETOH abuse    Fracture of angle of mandible    Fracture of tooth (traumatic), initial encounter for open fracture

## 2019-01-26 NOTE — H&P ADULT - NSHPSOCIALHISTORY_GEN_ALL_CORE
Lives at home with his father and his daughter. Pt drinks 15 shots of vodka per day. Admitting to smoking cigars occasionally. Denies illicit drug use. . Patient unemployed, former .

## 2019-01-26 NOTE — ED PROVIDER NOTE - OBJECTIVE STATEMENT
Pertinent PMH/PSH/FHx/SHx and Review of Systems contained within:  42 yo m with pmh of anxiety, alcohol abuse and withdrawal, s/p mandicular ORIF 2 weeks ago presents in ED c/o nbnb vomitus, agitation, whole body cramping for the last 3 days worse today. Patient was taking ibu, percocet and abx upto 3 days ago. Patient also reports last drink was 3 days ago. No aggravating or relieving factors, No fever/chills, No photophobia/eye pain/changes in vision, No ear pain/sore throat/dysphagia, No chest pain/palpitations, no SOB/cough/wheeze/stridor, No abdominal pain, No N/V/D, no dysuria/frequency/discharge, No neck/back pain, no rash, no changes in neurological status/function. Pertinent PMH/PSH/FHx/SHx and Review of Systems contained within:  44 yo m with pmh of anxiety, alcohol abuse and withdrawal, s/p mandicular ORIF 2 weeks ago presents in ED c/o nbnb vomitus, anxiety, whole body cramping for the last 3 days worse today. Patient was taking ibu, percocet and abx upto 3 days ago. Patient also reports last drink was 3 days ago. No aggravating or relieving factors, No fever/chills, No photophobia/eye pain/changes in vision, No ear pain/sore throat/dysphagia, No chest pain/palpitations, no SOB/cough/wheeze/stridor, No abdominal pain, No N/V/D, no dysuria/frequency/discharge, No neck/back pain, no rash, no changes in neurological status/function.

## 2019-01-26 NOTE — ED ADULT TRIAGE NOTE - CHIEF COMPLAINT QUOTE
patient reports pins and needles in both hands, hallucinating last night denies ETOH, drug use, reports anxiety

## 2019-01-26 NOTE — ED PROVIDER NOTE - PROGRESS NOTE DETAILS
Prieto: patient given mv bag, versed and ivfs and is now laying comfortably on stretcher asking for juice. [facility] and given current stable condition. He is now being admitted to medicine for further management.

## 2019-01-26 NOTE — H&P ADULT - NSHPPHYSICALEXAM_GEN_ALL_CORE
Vital Signs Last 24 Hrs  T(C): 36.4 (27 Jan 2019 00:20), Max: 36.5 (26 Jan 2019 19:27)  T(F): 97.5 (27 Jan 2019 00:20), Max: 97.7 (26 Jan 2019 19:27)  HR: 89 (27 Jan 2019 00:20) (89 - 116)  BP: 122/80 (27 Jan 2019 00:20) (122/80 - 128/77)  BP(mean): --  RR: 16 (27 Jan 2019 00:20) (16 - 20)  SpO2: 94% (27 Jan 2019 00:20) (94% - 95%)    PHYSICAL EXAM:    GENERAL: NAD, well-groomed, well-developed  HEAD:  Atraumatic, Normocephalic  EYES: EOMI, PERRLA, conjunctiva and sclera clear  ENMT: No tonsillar erythema, exudates, or enlargement; Moist mucous membranes, No lesions, +tenderness over left face  NECK: Supple, No JVD, Normal thyroid  NERVOUS SYSTEM:  Alert & Oriented X3, Good concentration; Motor Strength 5/5 B/L upper and lower extremities; DTRs 2+ intact and symmetric  CHEST/LUNG: Clear to percussion bilaterally; No rales, rhonchi, wheezing, or rubs  HEART: Regular rate and rhythm; No rubs, or gallops, +S1,S2  ABDOMEN: Soft, Nontender, Nondistended; Bowel sounds present  EXTREMITIES:  2+ Peripheral Pulses, No clubbing, cyanosis, or edema  LYMPH: No cervical adenopathy  RECTAL: deferred  BREAST: No palpatble masses, skin no lesions   : deferred  SKIN: No rashes or lesions    IMPROVE VTE Individual Risk Assessment        RISK                                                          Points  [  ] Previous VTE                                                3  [  ] Thrombophilia                                             2  [  ] Lower limb paralysis                                    2        (unable to hold up >15 seconds)    [  ] Current Cancer                                             2         (within 6 months)  [  ] Immobilization > 24 hrs                              1  [  ] ICU/CCU stay > 24 hours                            1  [  ] Age > 60                                                    1  IMPROVE VTE Score _____0____

## 2019-01-26 NOTE — H&P ADULT - NSHPLABSRESULTS_GEN_ALL_CORE
LABS:                        16.5   15.28 )-----------( 305      ( 2019 21:50 )             46.6         127<L>  |  68<L>  |  45<H>  ----------------------------<  135<H>  2.8<LL>   |  32<H>  |  3.85<H>    Ca    9.9      2019 21:50  Phos  3.2       Mg     1.6         TPro  10.2<H>  /  Alb  4.9  /  TBili  1.5<H>  /  DBili  x   /  AST  64<H>  /  ALT  60  /  AlkPhos  99        Urinalysis Basic - ( 2019 22:31 )    Color: Yellow / Appearance: Slightly Turbid / S.020 / pH: x  Gluc: x / Ketone: Moderate  / Bili: Moderate / Urobili: 4 mg/dL   Blood: x / Protein: 100 mg/dL / Nitrite: Negative   Leuk Esterase: Trace / RBC: 0-2 /HPF / WBC 0-2   Sq Epi: x / Non Sq Epi: Occasional / Bacteria: Occasional      CAPILLARY BLOOD GLUCOSE          RADIOLOGY & ADDITIONAL TESTS:    Imaging Personally Reviewed:  [ X] YES  [ ] NO

## 2019-01-26 NOTE — H&P ADULT - ASSESSMENT
pt w/etoh abuse and left mandibular fx s/p orif, w/malnourishment, significant electrolyte imbalance, etoh abuse, dehydration and lokesh

## 2019-01-27 DIAGNOSIS — S02.652S: Chronic | ICD-10-CM

## 2019-01-27 DIAGNOSIS — E87.8 OTHER DISORDERS OF ELECTROLYTE AND FLUID BALANCE, NOT ELSEWHERE CLASSIFIED: ICD-10-CM

## 2019-01-27 PROBLEM — F10.10 ALCOHOL ABUSE, UNCOMPLICATED: Chronic | Status: ACTIVE | Noted: 2019-01-09

## 2019-01-27 LAB
ALBUMIN SERPL ELPH-MCNC: 3.6 G/DL — SIGNIFICANT CHANGE UP (ref 3.3–5)
ALP SERPL-CCNC: 71 U/L — SIGNIFICANT CHANGE UP (ref 40–120)
ALT FLD-CCNC: 49 U/L — SIGNIFICANT CHANGE UP (ref 12–78)
ANION GAP SERPL CALC-SCNC: 10 MMOL/L — SIGNIFICANT CHANGE UP (ref 5–17)
ANION GAP SERPL CALC-SCNC: 13 MMOL/L — SIGNIFICANT CHANGE UP (ref 5–17)
ANION GAP SERPL CALC-SCNC: 9 MMOL/L — SIGNIFICANT CHANGE UP (ref 5–17)
AST SERPL-CCNC: 75 U/L — HIGH (ref 15–37)
BILIRUB SERPL-MCNC: 1.8 MG/DL — HIGH (ref 0.2–1.2)
BUN SERPL-MCNC: 31 MG/DL — HIGH (ref 7–23)
BUN SERPL-MCNC: 32 MG/DL — HIGH (ref 7–23)
BUN SERPL-MCNC: 33 MG/DL — HIGH (ref 7–23)
CALCIUM SERPL-MCNC: 7.4 MG/DL — LOW (ref 8.5–10.1)
CALCIUM SERPL-MCNC: 7.9 MG/DL — LOW (ref 8.5–10.1)
CALCIUM SERPL-MCNC: 8.2 MG/DL — LOW (ref 8.5–10.1)
CHLORIDE SERPL-SCNC: 87 MMOL/L — LOW (ref 96–108)
CHLORIDE SERPL-SCNC: 88 MMOL/L — LOW (ref 96–108)
CHLORIDE SERPL-SCNC: 89 MMOL/L — LOW (ref 96–108)
CO2 SERPL-SCNC: 32 MMOL/L — HIGH (ref 22–31)
CO2 SERPL-SCNC: 34 MMOL/L — HIGH (ref 22–31)
CO2 SERPL-SCNC: 36 MMOL/L — HIGH (ref 22–31)
CREAT SERPL-MCNC: 1.16 MG/DL — SIGNIFICANT CHANGE UP (ref 0.5–1.3)
CREAT SERPL-MCNC: 1.19 MG/DL — SIGNIFICANT CHANGE UP (ref 0.5–1.3)
CREAT SERPL-MCNC: 1.5 MG/DL — HIGH (ref 0.5–1.3)
GLUCOSE SERPL-MCNC: 102 MG/DL — HIGH (ref 70–99)
GLUCOSE SERPL-MCNC: 105 MG/DL — HIGH (ref 70–99)
GLUCOSE SERPL-MCNC: 119 MG/DL — HIGH (ref 70–99)
MAGNESIUM SERPL-MCNC: 1.6 MG/DL — SIGNIFICANT CHANGE UP (ref 1.6–2.6)
POTASSIUM SERPL-MCNC: 2.9 MMOL/L — CRITICAL LOW (ref 3.5–5.3)
POTASSIUM SERPL-MCNC: 3 MMOL/L — LOW (ref 3.5–5.3)
POTASSIUM SERPL-MCNC: 3.2 MMOL/L — LOW (ref 3.5–5.3)
POTASSIUM SERPL-SCNC: 2.9 MMOL/L — CRITICAL LOW (ref 3.5–5.3)
POTASSIUM SERPL-SCNC: 3 MMOL/L — LOW (ref 3.5–5.3)
POTASSIUM SERPL-SCNC: 3.2 MMOL/L — LOW (ref 3.5–5.3)
PROT SERPL-MCNC: 7.5 GM/DL — SIGNIFICANT CHANGE UP (ref 6–8.3)
SODIUM SERPL-SCNC: 132 MMOL/L — LOW (ref 135–145)
SODIUM SERPL-SCNC: 132 MMOL/L — LOW (ref 135–145)
SODIUM SERPL-SCNC: 134 MMOL/L — LOW (ref 135–145)

## 2019-01-27 PROCEDURE — 99233 SBSQ HOSP IP/OBS HIGH 50: CPT

## 2019-01-27 RX ORDER — POTASSIUM CHLORIDE 20 MEQ
40 PACKET (EA) ORAL EVERY 4 HOURS
Qty: 0 | Refills: 0 | Status: COMPLETED | OUTPATIENT
Start: 2019-01-27 | End: 2019-01-27

## 2019-01-27 RX ORDER — DEXTROSE MONOHYDRATE, SODIUM CHLORIDE, AND POTASSIUM CHLORIDE 50; .745; 4.5 G/1000ML; G/1000ML; G/1000ML
1000 INJECTION, SOLUTION INTRAVENOUS
Qty: 0 | Refills: 0 | Status: DISCONTINUED | OUTPATIENT
Start: 2019-01-27 | End: 2019-01-28

## 2019-01-27 RX ORDER — ACETAMINOPHEN 500 MG
650 TABLET ORAL EVERY 6 HOURS
Qty: 0 | Refills: 0 | Status: DISCONTINUED | OUTPATIENT
Start: 2019-01-27 | End: 2019-01-28

## 2019-01-27 RX ADMIN — Medication 650 MILLIGRAM(S): at 10:23

## 2019-01-27 RX ADMIN — Medication 650 MILLIGRAM(S): at 18:51

## 2019-01-27 RX ADMIN — Medication 650 MILLIGRAM(S): at 17:51

## 2019-01-27 RX ADMIN — PANTOPRAZOLE SODIUM 40 MILLIGRAM(S): 20 TABLET, DELAYED RELEASE ORAL at 05:57

## 2019-01-27 RX ADMIN — Medication 650 MILLIGRAM(S): at 11:23

## 2019-01-27 RX ADMIN — Medication 1 TABLET(S): at 13:15

## 2019-01-27 RX ADMIN — Medication 40 MILLIEQUIVALENT(S): at 13:15

## 2019-01-27 RX ADMIN — Medication 100 MILLIGRAM(S): at 13:16

## 2019-01-27 RX ADMIN — Medication 650 MILLIGRAM(S): at 23:43

## 2019-01-27 RX ADMIN — Medication 15 MILLIGRAM(S): at 03:45

## 2019-01-27 RX ADMIN — DEXTROSE MONOHYDRATE, SODIUM CHLORIDE, AND POTASSIUM CHLORIDE 60 MILLILITER(S): 50; .745; 4.5 INJECTION, SOLUTION INTRAVENOUS at 10:50

## 2019-01-27 RX ADMIN — Medication 15 MILLIGRAM(S): at 05:57

## 2019-01-27 RX ADMIN — PANTOPRAZOLE SODIUM 40 MILLIGRAM(S): 20 TABLET, DELAYED RELEASE ORAL at 17:46

## 2019-01-27 RX ADMIN — Medication 1 MILLIGRAM(S): at 13:15

## 2019-01-27 RX ADMIN — Medication 40 MILLIEQUIVALENT(S): at 10:23

## 2019-01-27 NOTE — PROGRESS NOTE ADULT - ASSESSMENT
pt w/etoh abuse and left mandibular fx s/p orif, w/malnourishment, significant electrolyte imbalance, etoh abuse, dehydration and lokesh  BUN/cr Improving

## 2019-01-27 NOTE — ED ADULT NURSE REASSESSMENT NOTE - NS ED NURSE REASSESS COMMENT FT1
pt remains A & O x 4, no complaints voiced. pt shows no sign of withdrawal , remains stable. awaiting bed in tele

## 2019-01-27 NOTE — PROGRESS NOTE ADULT - SUBJECTIVE AND OBJECTIVE BOX
CHIEF COMPLAINT/INTERVAL HISTORY:    Patient is a 43y old  Male who presents with a chief complaint of leg cramping (2019 23:38)      HPI:  44 y/o male  w/PMH of EtOH Abuse and Left mandibular ORIF 2 weeks ago after softball trauma, should have been on liquid diet since but didnt tolerate, and taking ibuprofen for pain 4times a day.  states mostly not able to tolerate foods been throwing up so been just drinking various juices, and etoh, reports about 5-6 drinks of vodka qd but last etoh 3 days ago. comes in as over the last 2-3 days been getting cramping of le and at times over face as well, sx's werent improving which made him come in. pt denies any fever, chills, sob, cp, palpitations, +n/+v/-d/-c no travels (2019 23:38)    Overnight issues  Feeling better  No Chest pain, SOB, LE edema   No fever Chills          SUBJECTIVE & OBJECTIVE: Pt seen and examined at bedside.   ROS:  CONSTITUTIONAL: No fever, weight loss, or fatigue  EYES: No eye pain, visual disturbances, or discharge  ENMT:  No difficulty hearing, tinnitus, vertigo; No sinus or throat pain  NECK: No pain or stiffness  RESPIRATORY: No cough, wheezing, chills or hemoptysis; No shortness of breath  CARDIOVASCULAR: No chest pain, palpitations, dizziness, or leg swelling  GASTROINTESTINAL: No abdominal or epigastric pain. No nausea, vomiting, or hematemesis; No diarrhea or constipation. No melena or hematochezia.  GENITOURINARY: No dysuria, frequency, hematuria, or incontinence  NEUROLOGICAL: No headaches, memory loss, loss of strength, numbness, or tremors  SKIN: No itching, burning, rashes, or lesions   LYMPH NODES: No enlarged glands  ENDOCRINE: No heat or cold intolerance; No hair loss  MUSCULOSKELETAL: No joint pain or swelling; No muscle, back, or extremity pain  PSYCHIATRIC: No depression, anxiety, mood swings, or difficulty sleeping  HEME/LYMPH: No easy bruising, or bleeding gums  ALLERGY AND IMMUNOLOGIC: No hives or eczema  ICU Vital Signs Last 24 Hrs  T(C): 37.1 (2019 04:49), Max: 37.1 (2019 04:49)  T(F): 98.7 (2019 04:49), Max: 98.7 (2019 04:49)  HR: 92 (2019 04:49) (89 - 116)  BP: 128/75 (2019 04:49) (122/80 - 128/77)  BP(mean): --  ABP: --  ABP(mean): --  RR: 16 (2019 04:49) (16 - 20)  SpO2: 98% (2019 04:49) (94% - 98%)        MEDICATIONS  (STANDING):  folic acid 1 milliGRAM(s) Oral daily  multivitamin 1 Tablet(s) Oral daily  pantoprazole  Injectable 40 milliGRAM(s) IV Push two times a day  potassium chloride    Tablet ER 40 milliEquivalent(s) Oral every 4 hours  thiamine 100 milliGRAM(s) Oral daily    MEDICATIONS  (PRN):  acetaminophen   Tablet .. 650 milliGRAM(s) Oral every 6 hours PRN Mild Pain (1 - 3)  LORazepam   Injectable 2 milliGRAM(s) IV Push every 2 hours PRN CIWA-Ar score increase by 2 points and a total score of 7 or less  ondansetron Injectable 4 milliGRAM(s) IV Push every 6 hours PRN Nausea and/or Vomiting        PHYSICAL EXAM:    GENERAL: NAD, well-groomed, well-developed  HEAD:  Atraumatic, Normocephalic  EYES: EOMI, PERRLA, conjunctiva and sclera clear  ENMT: Moist mucous membranes  NECK: Supple, No JVD  NERVOUS SYSTEM:  Alert & Oriented X3, Motor Strength 5/5 B/L upper and lower extremities; DTRs 2+ intact and symmetric  CHEST/LUNG: Clear to auscultation bilaterally; No rales, rhonchi, wheezing, or rubs  HEART: Regular rate and rhythm; No murmurs, rubs, or gallops  ABDOMEN: Soft, Nontender, Nondistended; Bowel sounds present  EXTREMITIES:  2+ Peripheral Pulses, No clubbing, cyanosis, or edema    LABS:                        16.5   15.28 )-----------( 305      ( 2019 21:50 )             46.6         132<L>  |  87<L>  |  33<H>  ----------------------------<  119<H>  3.2<L>   |  36<H>  |  1.50<H>    Ca    7.4<L>      2019 04:30  Phos  3.2       Mg     1.6         TPro  10.2<H>  /  Alb  4.9  /  TBili  1.5<H>  /  DBili  x   /  AST  64<H>  /  ALT  60  /  AlkPhos  99        Urinalysis Basic - ( 2019 22:31 )    Color: Yellow / Appearance: Slightly Turbid / S.020 / pH: x  Gluc: x / Ketone: Moderate  / Bili: Moderate / Urobili: 4 mg/dL   Blood: x / Protein: 100 mg/dL / Nitrite: Negative   Leuk Esterase: Trace / RBC: 0-2 /HPF / WBC 0-2   Sq Epi: x / Non Sq Epi: Occasional / Bacteria: Occasional        CAPILLARY BLOOD GLUCOSE          RECENT CULTURES:      RADIOLOGY & ADDITIONAL TESTS:  Imaging Personally Reviewed:  [ ] YES      Consultant(s) Notes Reviewed:  [ ] YES     Care Discussed with [ ] Consultants [X ] Patient [ ] Family  []    [x ]  Other; RN  HEALTH ISSUES - PROBLEM Dx:  Electrolyte disturbance: Electrolyte disturbance        DVT/GI ppx  Discussed with pt @ bedside

## 2019-01-28 ENCOUNTER — TRANSCRIPTION ENCOUNTER (OUTPATIENT)
Age: 44
End: 2019-01-28

## 2019-01-28 VITALS
SYSTOLIC BLOOD PRESSURE: 129 MMHG | OXYGEN SATURATION: 99 % | DIASTOLIC BLOOD PRESSURE: 81 MMHG | RESPIRATION RATE: 18 BRPM | HEART RATE: 74 BPM | TEMPERATURE: 98 F

## 2019-01-28 LAB
ALBUMIN SERPL ELPH-MCNC: 3.5 G/DL — SIGNIFICANT CHANGE UP (ref 3.3–5)
ALP SERPL-CCNC: 71 U/L — SIGNIFICANT CHANGE UP (ref 40–120)
ALT FLD-CCNC: 58 U/L — SIGNIFICANT CHANGE UP (ref 12–78)
ANION GAP SERPL CALC-SCNC: 9 MMOL/L — SIGNIFICANT CHANGE UP (ref 5–17)
AST SERPL-CCNC: 69 U/L — HIGH (ref 15–37)
BILIRUB SERPL-MCNC: 1.5 MG/DL — HIGH (ref 0.2–1.2)
BUN SERPL-MCNC: 18 MG/DL — SIGNIFICANT CHANGE UP (ref 7–23)
CALCIUM SERPL-MCNC: 8.6 MG/DL — SIGNIFICANT CHANGE UP (ref 8.5–10.1)
CHLORIDE SERPL-SCNC: 100 MMOL/L — SIGNIFICANT CHANGE UP (ref 96–108)
CO2 SERPL-SCNC: 26 MMOL/L — SIGNIFICANT CHANGE UP (ref 22–31)
CREAT SERPL-MCNC: 0.78 MG/DL — SIGNIFICANT CHANGE UP (ref 0.5–1.3)
GLUCOSE SERPL-MCNC: 93 MG/DL — SIGNIFICANT CHANGE UP (ref 70–99)
HCT VFR BLD CALC: 38.6 % — LOW (ref 39–50)
HGB BLD-MCNC: 13.4 G/DL — SIGNIFICANT CHANGE UP (ref 13–17)
MAGNESIUM SERPL-MCNC: 1.7 MG/DL — SIGNIFICANT CHANGE UP (ref 1.6–2.6)
MCHC RBC-ENTMCNC: 31.4 PG — SIGNIFICANT CHANGE UP (ref 27–34)
MCHC RBC-ENTMCNC: 34.7 GM/DL — SIGNIFICANT CHANGE UP (ref 32–36)
MCV RBC AUTO: 90.4 FL — SIGNIFICANT CHANGE UP (ref 80–100)
NRBC # BLD: 0 /100 WBCS — SIGNIFICANT CHANGE UP (ref 0–0)
PLATELET # BLD AUTO: 115 K/UL — LOW (ref 150–400)
POTASSIUM SERPL-MCNC: 3.3 MMOL/L — LOW (ref 3.5–5.3)
POTASSIUM SERPL-SCNC: 3.3 MMOL/L — LOW (ref 3.5–5.3)
PROT SERPL-MCNC: 7.3 GM/DL — SIGNIFICANT CHANGE UP (ref 6–8.3)
RBC # BLD: 4.27 M/UL — SIGNIFICANT CHANGE UP (ref 4.2–5.8)
RBC # FLD: 12.1 % — SIGNIFICANT CHANGE UP (ref 10.3–14.5)
SODIUM SERPL-SCNC: 135 MMOL/L — SIGNIFICANT CHANGE UP (ref 135–145)
WBC # BLD: 9.01 K/UL — SIGNIFICANT CHANGE UP (ref 3.8–10.5)
WBC # FLD AUTO: 9.01 K/UL — SIGNIFICANT CHANGE UP (ref 3.8–10.5)

## 2019-01-28 PROCEDURE — 99238 HOSP IP/OBS DSCHRG MGMT 30/<: CPT

## 2019-01-28 RX ORDER — IBUPROFEN 200 MG
1 TABLET ORAL
Qty: 0 | Refills: 0 | COMMUNITY

## 2019-01-28 RX ORDER — FOLIC ACID 0.8 MG
1 TABLET ORAL
Qty: 0 | Refills: 0 | DISCHARGE
Start: 2019-01-28

## 2019-01-28 RX ORDER — POTASSIUM CHLORIDE 20 MEQ
20 PACKET (EA) ORAL ONCE
Qty: 0 | Refills: 0 | Status: COMPLETED | OUTPATIENT
Start: 2019-01-28 | End: 2019-01-28

## 2019-01-28 RX ORDER — THIAMINE MONONITRATE (VIT B1) 100 MG
1 TABLET ORAL
Qty: 0 | Refills: 0 | DISCHARGE
Start: 2019-01-28

## 2019-01-28 RX ORDER — POTASSIUM CHLORIDE 20 MEQ
10 PACKET (EA) ORAL
Qty: 0 | Refills: 0 | Status: COMPLETED | OUTPATIENT
Start: 2019-01-28 | End: 2019-01-28

## 2019-01-28 RX ORDER — CEPHALEXIN 500 MG
1 CAPSULE ORAL
Qty: 0 | Refills: 0 | COMMUNITY

## 2019-01-28 RX ADMIN — Medication 650 MILLIGRAM(S): at 08:48

## 2019-01-28 RX ADMIN — PANTOPRAZOLE SODIUM 40 MILLIGRAM(S): 20 TABLET, DELAYED RELEASE ORAL at 05:32

## 2019-01-28 RX ADMIN — Medication 650 MILLIGRAM(S): at 07:48

## 2019-01-28 RX ADMIN — Medication 650 MILLIGRAM(S): at 14:43

## 2019-01-28 RX ADMIN — Medication 20 MILLIEQUIVALENT(S): at 11:11

## 2019-01-28 RX ADMIN — Medication 1 TABLET(S): at 11:29

## 2019-01-28 RX ADMIN — Medication 650 MILLIGRAM(S): at 14:01

## 2019-01-28 RX ADMIN — Medication 1 MILLIGRAM(S): at 11:29

## 2019-01-28 RX ADMIN — Medication 100 MILLIEQUIVALENT(S): at 11:13

## 2019-01-28 RX ADMIN — Medication 100 MILLIEQUIVALENT(S): at 12:43

## 2019-01-28 RX ADMIN — Medication 20 MILLIEQUIVALENT(S): at 14:41

## 2019-01-28 RX ADMIN — Medication 100 MILLIGRAM(S): at 11:29

## 2019-01-28 RX ADMIN — Medication 650 MILLIGRAM(S): at 00:35

## 2019-01-28 NOTE — DISCHARGE NOTE ADULT - CARE PLAN
Principal Discharge DX:	Dehydration  Goal:	Avoid dehydration.  Assessment and plan of treatment:	Drink plenty of fluids.  Secondary Diagnosis:	Electrolyte disturbance  Assessment and plan of treatment:	Have electrolytes checked with your primary care provider within one week.  Secondary Diagnosis:	Acute renal failure, unspecified acute renal failure type  Assessment and plan of treatment:	Have labs checked with your primary care provider within one week.  Secondary Diagnosis:	ETOH abuse  Assessment and plan of treatment:	Stop alcohol use.  Take folic acid and thiamine supplements.

## 2019-01-28 NOTE — DISCHARGE NOTE ADULT - MEDICATION SUMMARY - MEDICATIONS TO TAKE
I will START or STAY ON the medications listed below when I get home from the hospital:    chlorhexidine 0.12% mucous membrane liquid  -- 15  mucous membrane 2 times a day  -- Indication: For mouthwash    thiamine 100 mg oral tablet  -- 1 tab(s) by mouth once a day  -- Indication: For supplement    folic acid 1 mg oral tablet  -- 1 tab(s) by mouth once a day  -- Indication: For supplement

## 2019-01-28 NOTE — DISCHARGE NOTE ADULT - MEDICATION SUMMARY - MEDICATIONS TO CHANGE
I will SWITCH the dose or number of times a day I take the medications listed below when I get home from the hospital:  None English

## 2019-01-28 NOTE — DISCHARGE NOTE ADULT - PLAN OF CARE
Avoid dehydration. Drink plenty of fluids. Have electrolytes checked with your primary care provider within one week. Have labs checked with your primary care provider within one week. Stop alcohol use.  Take folic acid and thiamine supplements.

## 2019-01-28 NOTE — DISCHARGE NOTE ADULT - MEDICATION SUMMARY - MEDICATIONS TO STOP TAKING
I will STOP taking the medications listed below when I get home from the hospital:    ibuprofen 600 mg oral tablet  -- 1 tab(s) by mouth every 6 hours, As Needed    Keflex 500 mg oral capsule  -- 1 cap(s) by mouth every 6 hours

## 2019-01-28 NOTE — DISCHARGE NOTE ADULT - PATIENT PORTAL LINK FT
You can access the [facility] Patient Portal, offered by [facility], by registering with the following website: [email or web address]

## 2019-01-28 NOTE — DISCHARGE NOTE ADULT - CARE PROVIDER_API CALL
Vinay Escalante), Internal Medicine  [address] [place] [address]  Phone: [phone]  Fax: [phone]

## 2019-01-28 NOTE — DISCHARGE NOTE ADULT - NSTOBACCOHOTLINE_GEN_A_CS
[facility] Smokers Quitline ([id number]) [facility] Smokers Quitline ([id number])

## 2019-01-28 NOTE — DISCHARGE NOTE ADULT - HOSPITAL COURSE
Discharge diagnoses:      Vital Signs Last 24 Hrs  T(C): 36.6 (28 Jan 2019 11:11), Max: 36.9 (28 Jan 2019 04:59)  T(F): 97.9 (28 Jan 2019 11:11), Max: 98.4 (28 Jan 2019 04:59)  HR: 74 (28 Jan 2019 11:11) (71 - 93)  BP: 129/81 (28 Jan 2019 11:11) (122/81 - 129/88)  BP(mean): --  RR: 18 (28 Jan 2019 11:11) (17 - 18)  SpO2: 99% (28 Jan 2019 11:11) (96% - 99%) Discharge diagnoses:    1.  Leg cramps - most likely due to electrolyte disorders.  2.  Acute renal failure - most likely pre-renal from dehydration.  3.  Severe dehydration.  4.  Hyponatremia - due to dehydration.  5.  Hypokalemia.  6.  Alcohol abuse.    Patient received IVF and electrolyte replacement.  Serum creatinine 3.5 on admission and improved to normal limits.  Potassium was 2.8 and replaced with oral and IV supplements.  Serum sodium 127 on admission and return to normal at time of discharge.  WBC was 15 on admission and normal following day with IVF.  No fever or other signs of infection.  Patient states he completed antibiotics as prescribed prior to admission.    Patient had no sign of alcohol withdrawal during his hospital stay.  Time spent was 30 minutes in discharge management.    Vital Signs Last 24 Hrs  T(C): 36.6 (28 Jan 2019 11:11), Max: 36.9 (28 Jan 2019 04:59)  T(F): 97.9 (28 Jan 2019 11:11), Max: 98.4 (28 Jan 2019 04:59)  HR: 74 (28 Jan 2019 11:11) (71 - 93)  BP: 129/81 (28 Jan 2019 11:11) (122/81 - 129/88)  BP(mean): --  RR: 18 (28 Jan 2019 11:11) (17 - 18)  SpO2: 99% (28 Jan 2019 11:11) (96% - 99%)    GENERAL:  NAD, awake and alert.  LUNGS:  CTA, resp. effort does not appear labored.  CVS:  RRR, no murmurs.  ABDOMEN:  Soft, not tender or distended, no signs of HSM or masses.  Bowel sounds present.  EXT:  No clubbing or cyanosis.  No edema.  NEURO:  No focal deficit.  Sensory and motor grossly intact.  Gait normal appearing. Discharge diagnoses:    1.  Leg cramps - most likely due to electrolyte disorders.  2.  Acute renal failure - most likely pre-renal from dehydration.  3.  Severe dehydration.  4.  Hyponatremia - due to dehydration.  5.  Hypokalemia.  6.  Alcohol abuse.    Patient received IVF and electrolyte replacement.  Serum creatinine 3.85 on admission and improved to normal limits.  Potassium was 2.8 and replaced with oral and IV supplements.  Serum sodium 127 on admission and return to normal at time of discharge.  WBC was 15 on admission and return to normal limits following administration of IVF.  No fever or other signs of infection.  Patient states he completed antibiotics as prescribed prior to admission.    Patient had no sign of alcohol withdrawal during his hospital stay.  Time spent was 30 minutes in discharge management.    Vital Signs Last 24 Hrs  T(C): 36.6 (28 Jan 2019 11:11), Max: 36.9 (28 Jan 2019 04:59)  T(F): 97.9 (28 Jan 2019 11:11), Max: 98.4 (28 Jan 2019 04:59)  HR: 74 (28 Jan 2019 11:11) (71 - 93)  BP: 129/81 (28 Jan 2019 11:11) (122/81 - 129/88)  BP(mean): --  RR: 18 (28 Jan 2019 11:11) (17 - 18)  SpO2: 99% (28 Jan 2019 11:11) (96% - 99%)    GENERAL:  NAD, awake and alert.  LUNGS:  CTA, resp. effort does not appear labored.  CVS:  RRR, no murmurs.  ABDOMEN:  Soft, not tender or distended, no signs of HSM or masses.  Bowel sounds present.  EXT:  No clubbing or cyanosis.  No edema.  NEURO:  No focal deficit.  Sensory and motor grossly intact.  Gait normal appearing.

## 2019-01-31 DIAGNOSIS — E87.6 HYPOKALEMIA: ICD-10-CM

## 2019-01-31 DIAGNOSIS — F17.210 NICOTINE DEPENDENCE, CIGARETTES, UNCOMPLICATED: ICD-10-CM

## 2019-01-31 DIAGNOSIS — Z79.2 LONG TERM (CURRENT) USE OF ANTIBIOTICS: ICD-10-CM

## 2019-01-31 DIAGNOSIS — G47.62 SLEEP RELATED LEG CRAMPS: ICD-10-CM

## 2019-01-31 DIAGNOSIS — N17.9 ACUTE KIDNEY FAILURE, UNSPECIFIED: ICD-10-CM

## 2019-01-31 DIAGNOSIS — Z28.21 IMMUNIZATION NOT CARRIED OUT BECAUSE OF PATIENT REFUSAL: ICD-10-CM

## 2019-01-31 DIAGNOSIS — F41.9 ANXIETY DISORDER, UNSPECIFIED: ICD-10-CM

## 2019-01-31 DIAGNOSIS — E86.0 DEHYDRATION: ICD-10-CM

## 2019-01-31 DIAGNOSIS — F10.10 ALCOHOL ABUSE, UNCOMPLICATED: ICD-10-CM

## 2019-01-31 DIAGNOSIS — E87.1 HYPO-OSMOLALITY AND HYPONATREMIA: ICD-10-CM

## 2019-02-07 ENCOUNTER — APPOINTMENT (OUTPATIENT)
Dept: INTERNAL MEDICINE | Facility: CLINIC | Age: 44
End: 2019-02-07

## 2019-02-21 ENCOUNTER — APPOINTMENT (OUTPATIENT)
Dept: INTERNAL MEDICINE | Facility: CLINIC | Age: 44
End: 2019-02-21

## 2019-06-08 ENCOUNTER — INPATIENT (INPATIENT)
Facility: HOSPITAL | Age: 44
LOS: 1 days | Discharge: HOME | End: 2019-06-10
Attending: INTERNAL MEDICINE | Admitting: INTERNAL MEDICINE
Payer: MEDICAID

## 2019-06-08 VITALS
RESPIRATION RATE: 20 BRPM | DIASTOLIC BLOOD PRESSURE: 103 MMHG | HEIGHT: 70 IN | WEIGHT: 160.06 LBS | HEART RATE: 100 BPM | SYSTOLIC BLOOD PRESSURE: 125 MMHG

## 2019-06-08 DIAGNOSIS — E87.8 OTHER DISORDERS OF ELECTROLYTE AND FLUID BALANCE, NOT ELSEWHERE CLASSIFIED: ICD-10-CM

## 2019-06-08 DIAGNOSIS — K85.90 ACUTE PANCREATITIS WITHOUT NECROSIS OR INFECTION, UNSPECIFIED: ICD-10-CM

## 2019-06-08 DIAGNOSIS — S02.652S: Chronic | ICD-10-CM

## 2019-06-08 DIAGNOSIS — N17.9 ACUTE KIDNEY FAILURE, UNSPECIFIED: ICD-10-CM

## 2019-06-08 DIAGNOSIS — K72.00 ACUTE AND SUBACUTE HEPATIC FAILURE WITHOUT COMA: ICD-10-CM

## 2019-06-08 DIAGNOSIS — Z29.9 ENCOUNTER FOR PROPHYLACTIC MEASURES, UNSPECIFIED: ICD-10-CM

## 2019-06-08 DIAGNOSIS — F10.10 ALCOHOL ABUSE, UNCOMPLICATED: ICD-10-CM

## 2019-06-08 LAB
ALBUMIN SERPL ELPH-MCNC: 5.1 G/DL — HIGH (ref 3.3–5)
ALP SERPL-CCNC: 88 U/L — SIGNIFICANT CHANGE UP (ref 40–120)
ALT FLD-CCNC: 105 U/L — HIGH (ref 12–78)
ANION GAP SERPL CALC-SCNC: 28 MMOL/L — HIGH (ref 5–17)
APPEARANCE UR: CLEAR — SIGNIFICANT CHANGE UP
AST SERPL-CCNC: 88 U/L — HIGH (ref 15–37)
BACTERIA # UR AUTO: ABNORMAL
BASOPHILS # BLD AUTO: 0.03 K/UL — SIGNIFICANT CHANGE UP (ref 0–0.2)
BASOPHILS NFR BLD AUTO: 0.2 % — SIGNIFICANT CHANGE UP (ref 0–2)
BILIRUB SERPL-MCNC: 2.2 MG/DL — HIGH (ref 0.2–1.2)
BILIRUB UR-MCNC: ABNORMAL
BUN SERPL-MCNC: 29 MG/DL — HIGH (ref 7–23)
CALCIUM SERPL-MCNC: 10.4 MG/DL — HIGH (ref 8.5–10.1)
CHLORIDE SERPL-SCNC: 82 MMOL/L — LOW (ref 96–108)
CO2 SERPL-SCNC: 23 MMOL/L — SIGNIFICANT CHANGE UP (ref 22–31)
COLOR SPEC: ABNORMAL
CREAT SERPL-MCNC: 1.63 MG/DL — HIGH (ref 0.5–1.3)
DIFF PNL FLD: ABNORMAL
EOSINOPHIL # BLD AUTO: 0 K/UL — SIGNIFICANT CHANGE UP (ref 0–0.5)
EOSINOPHIL NFR BLD AUTO: 0 % — SIGNIFICANT CHANGE UP (ref 0–6)
EPI CELLS # UR: SIGNIFICANT CHANGE UP
GLUCOSE SERPL-MCNC: 154 MG/DL — HIGH (ref 70–99)
GLUCOSE UR QL: NEGATIVE MG/DL — SIGNIFICANT CHANGE UP
GRAN CASTS # UR COMP ASSIST: ABNORMAL /LPF
HCT VFR BLD CALC: 44.8 % — SIGNIFICANT CHANGE UP (ref 39–50)
HGB BLD-MCNC: 16.1 G/DL — SIGNIFICANT CHANGE UP (ref 13–17)
HYALINE CASTS # UR AUTO: ABNORMAL /LPF
IMM GRANULOCYTES NFR BLD AUTO: 0.4 % — SIGNIFICANT CHANGE UP (ref 0–1.5)
KETONES UR-MCNC: ABNORMAL
LACTATE SERPL-SCNC: 1.5 MMOL/L — SIGNIFICANT CHANGE UP (ref 0.7–2)
LACTATE SERPL-SCNC: 2.4 MMOL/L — HIGH (ref 0.7–2)
LEUKOCYTE ESTERASE UR-ACNC: ABNORMAL
LIDOCAIN IGE QN: 787 U/L — HIGH (ref 73–393)
LYMPHOCYTES # BLD AUTO: 0.52 K/UL — LOW (ref 1–3.3)
LYMPHOCYTES # BLD AUTO: 3.6 % — LOW (ref 13–44)
MCHC RBC-ENTMCNC: 32.5 PG — SIGNIFICANT CHANGE UP (ref 27–34)
MCHC RBC-ENTMCNC: 35.9 GM/DL — SIGNIFICANT CHANGE UP (ref 32–36)
MCV RBC AUTO: 90.5 FL — SIGNIFICANT CHANGE UP (ref 80–100)
MONOCYTES # BLD AUTO: 1.07 K/UL — HIGH (ref 0–0.9)
MONOCYTES NFR BLD AUTO: 7.3 % — SIGNIFICANT CHANGE UP (ref 2–14)
NEUTROPHILS # BLD AUTO: 12.92 K/UL — HIGH (ref 1.8–7.4)
NEUTROPHILS NFR BLD AUTO: 88.5 % — HIGH (ref 43–77)
NITRITE UR-MCNC: NEGATIVE — SIGNIFICANT CHANGE UP
NRBC # BLD: 0 /100 WBCS — SIGNIFICANT CHANGE UP (ref 0–0)
PH UR: 6 — SIGNIFICANT CHANGE UP (ref 5–8)
PLATELET # BLD AUTO: 156 K/UL — SIGNIFICANT CHANGE UP (ref 150–400)
POTASSIUM SERPL-MCNC: 3.4 MMOL/L — LOW (ref 3.5–5.3)
POTASSIUM SERPL-SCNC: 3.4 MMOL/L — LOW (ref 3.5–5.3)
PROT SERPL-MCNC: 10.4 GM/DL — HIGH (ref 6–8.3)
PROT UR-MCNC: 500 MG/DL
RBC # BLD: 4.95 M/UL — SIGNIFICANT CHANGE UP (ref 4.2–5.8)
RBC # FLD: 12.5 % — SIGNIFICANT CHANGE UP (ref 10.3–14.5)
RBC CASTS # UR COMP ASSIST: SIGNIFICANT CHANGE UP /HPF (ref 0–4)
SODIUM SERPL-SCNC: 133 MMOL/L — LOW (ref 135–145)
SP GR SPEC: 1.01 — SIGNIFICANT CHANGE UP (ref 1.01–1.02)
UROBILINOGEN FLD QL: 1 MG/DL
WBC # BLD: 14.6 K/UL — HIGH (ref 3.8–10.5)
WBC # FLD AUTO: 14.6 K/UL — HIGH (ref 3.8–10.5)
WBC UR QL: SIGNIFICANT CHANGE UP

## 2019-06-08 PROCEDURE — 99223 1ST HOSP IP/OBS HIGH 75: CPT

## 2019-06-08 PROCEDURE — 99285 EMERGENCY DEPT VISIT HI MDM: CPT

## 2019-06-08 PROCEDURE — 74177 CT ABD & PELVIS W/CONTRAST: CPT | Mod: 26

## 2019-06-08 RX ORDER — SODIUM CHLORIDE 9 MG/ML
1000 INJECTION INTRAMUSCULAR; INTRAVENOUS; SUBCUTANEOUS
Refills: 0 | Status: COMPLETED | OUTPATIENT
Start: 2019-06-08 | End: 2019-06-08

## 2019-06-08 RX ORDER — ONDANSETRON 8 MG/1
8 TABLET, FILM COATED ORAL ONCE
Refills: 0 | Status: COMPLETED | OUTPATIENT
Start: 2019-06-08 | End: 2019-06-08

## 2019-06-08 RX ORDER — PANTOPRAZOLE SODIUM 20 MG/1
40 TABLET, DELAYED RELEASE ORAL ONCE
Refills: 0 | Status: COMPLETED | OUTPATIENT
Start: 2019-06-08 | End: 2019-06-08

## 2019-06-08 RX ORDER — SODIUM CHLORIDE 9 MG/ML
1000 INJECTION INTRAMUSCULAR; INTRAVENOUS; SUBCUTANEOUS
Refills: 0 | Status: DISCONTINUED | OUTPATIENT
Start: 2019-06-08 | End: 2019-06-10

## 2019-06-08 RX ORDER — MORPHINE SULFATE 50 MG/1
4 CAPSULE, EXTENDED RELEASE ORAL ONCE
Refills: 0 | Status: DISCONTINUED | OUTPATIENT
Start: 2019-06-08 | End: 2019-06-08

## 2019-06-08 RX ORDER — MORPHINE SULFATE 50 MG/1
1 CAPSULE, EXTENDED RELEASE ORAL EVERY 6 HOURS
Refills: 0 | Status: DISCONTINUED | OUTPATIENT
Start: 2019-06-08 | End: 2019-06-10

## 2019-06-08 RX ORDER — ONDANSETRON 8 MG/1
4 TABLET, FILM COATED ORAL EVERY 6 HOURS
Refills: 0 | Status: DISCONTINUED | OUTPATIENT
Start: 2019-06-08 | End: 2019-06-10

## 2019-06-08 RX ORDER — SODIUM CHLORIDE 9 MG/ML
1000 INJECTION, SOLUTION INTRAVENOUS
Refills: 0 | Status: COMPLETED | OUTPATIENT
Start: 2019-06-08 | End: 2019-06-08

## 2019-06-08 RX ADMIN — SODIUM CHLORIDE 2000 MILLILITER(S): 9 INJECTION INTRAMUSCULAR; INTRAVENOUS; SUBCUTANEOUS at 15:30

## 2019-06-08 RX ADMIN — Medication 50 MILLIGRAM(S): at 18:02

## 2019-06-08 RX ADMIN — MORPHINE SULFATE 4 MILLIGRAM(S): 50 CAPSULE, EXTENDED RELEASE ORAL at 14:53

## 2019-06-08 RX ADMIN — SODIUM CHLORIDE 1000 MILLILITER(S): 9 INJECTION INTRAMUSCULAR; INTRAVENOUS; SUBCUTANEOUS at 15:00

## 2019-06-08 RX ADMIN — SODIUM CHLORIDE 100 MILLILITER(S): 9 INJECTION INTRAMUSCULAR; INTRAVENOUS; SUBCUTANEOUS at 22:04

## 2019-06-08 RX ADMIN — SODIUM CHLORIDE 1000 MILLILITER(S): 9 INJECTION INTRAMUSCULAR; INTRAVENOUS; SUBCUTANEOUS at 16:20

## 2019-06-08 RX ADMIN — ONDANSETRON 8 MILLIGRAM(S): 8 TABLET, FILM COATED ORAL at 14:18

## 2019-06-08 RX ADMIN — SODIUM CHLORIDE 250 MILLILITER(S): 9 INJECTION, SOLUTION INTRAVENOUS at 18:03

## 2019-06-08 RX ADMIN — MORPHINE SULFATE 4 MILLIGRAM(S): 50 CAPSULE, EXTENDED RELEASE ORAL at 14:23

## 2019-06-08 RX ADMIN — SODIUM CHLORIDE 2000 MILLILITER(S): 9 INJECTION INTRAMUSCULAR; INTRAVENOUS; SUBCUTANEOUS at 14:22

## 2019-06-08 RX ADMIN — MORPHINE SULFATE 1 MILLIGRAM(S): 50 CAPSULE, EXTENDED RELEASE ORAL at 23:35

## 2019-06-08 RX ADMIN — PANTOPRAZOLE SODIUM 40 MILLIGRAM(S): 20 TABLET, DELAYED RELEASE ORAL at 14:22

## 2019-06-08 NOTE — H&P ADULT - PROBLEM SELECTOR PLAN 4
Most likely secondary to ETOH abuse and dehydration.   cont w/ IVF and monitor.   If no improvement consider workup

## 2019-06-08 NOTE — ED PROVIDER NOTE - CARE PLAN
Principal Discharge DX:	Alcohol-induced acute pancreatitis, unspecified complication status  Secondary Diagnosis:	HAIDER (acute kidney injury)  Secondary Diagnosis:	Dehydration

## 2019-06-08 NOTE — H&P ADULT - NSHPPHYSICALEXAM_GEN_ALL_CORE
GENERAL: NAD, well-groomed, well-developed  HEAD:  Atraumatic, Normocephalic  EYES: EOMI, PERRLA, conjunctiva and sclera clear  ENMT: No tonsillar erythema, exudates, or enlargement; Moist mucous membranes, Good dentition, No lesions  NECK: Supple, No JVD, Normal thyroid  NERVOUS SYSTEM:  Alert & Oriented X3, Good concentration; Motor Strength 5/5 B/L upper and lower extremities; DTRs 2+ intact and symmetric  CHEST/LUNG: Clear to ascultation bilaterally; No rales, rhonchi, wheezing, or rubs  HEART: Regular rate and rhythm; No murmurs, rubs, or gallops  ABDOMEN: Soft, Nontender, Nondistended; Bowel sounds present  EXTREMITIES:  2+ Peripheral Pulses, No clubbing, cyanosis, or edema  LYMPH: No lymphadenopathy   SKIN: No rashes or lesions

## 2019-06-08 NOTE — ED ADULT TRIAGE NOTE - CHIEF COMPLAINT QUOTE
pt c/o pain and cramps to the left side of the cramps,  nausea and vomiting stated that he has hx of pancreatitis, feeling dehydrated,

## 2019-06-08 NOTE — ED PROVIDER NOTE - OBJECTIVE STATEMENT
43yoM; with pmh signif for pancreatitis; now p/w abd pain--epigastric, cramping, x2 day. c/o n/v x2 day--nbnb.  denies f/c/s. denies diarrhea. denies dark stool.   reports drinking alcohol 3x/week (vodka, 2 glasses per day, 3x/week).  denies cp/sob/palp. c/o cramping diffusely throughout body.  denies dysuria, hematuria, frequency, urgency.  PMH: pancreatitis  SOCIAL: No tobacco/illicit substance use/socialEtOH

## 2019-06-08 NOTE — H&P ADULT - HISTORY OF PRESENT ILLNESS
44 y/o M with previous history of ETOH abuse and Pancreatitis X2 presents with Abdominal pain for the past 3 days. Pt describes 10/10 diffuse "cramping" burning abdominal pain associated with Nausea/vomiting (inability to keep food down) and muscle cramping. Pt claims that he drinks much less than he does in the past, drinking wine or vodka; 2-3 glasses 3x week. Last drink 3 days ago. He admits to dry cough but denies fever, diarrhea or dysuria. No Chest pain, SOB, palpitation or dizziness.     ER course: CT abd/pelvis: no acute changes. Lipase 787, Acute renal failure, acute liver failure, electrolyte imbalance.

## 2019-06-08 NOTE — H&P ADULT - NSHPLABSRESULTS_GEN_ALL_CORE
16.1   14.60 )-----------( 156      ( 2019 14:39 )             44.8     06-08    133<L>  |  82<L>  |  29<H>  ----------------------------<  154<H>  3.4<L>   |  23  |  1.63<H>    Ca    10.4<H>      2019 14:39    TPro  10.4<H>  /  Alb  5.1<H>  /  TBili  2.2<H>  /  DBili  x   /  AST  88<H>  /  ALT  105<H>  /  AlkPhos  88  -08      Urinalysis Basic - ( 2019 14:39 )    Color: Erlinda / Appearance: Clear / S.015 / pH: x  Gluc: x / Ketone: Large  / Bili: Moderate / Urobili: 1 mg/dL   Blood: x / Protein: 500 mg/dL / Nitrite: Negative   Leuk Esterase: Trace / RBC: 0-2 /HPF / WBC 3-5   Sq Epi: x / Non Sq Epi: Occasional / Bacteria: Occasional

## 2019-06-08 NOTE — H&P ADULT - PROBLEM SELECTOR PLAN 1
+lipase, CT abdomen benign.   NPO status  IVF and analgesics prn.  f/u lipid panel and monitor lipase levels.

## 2019-06-08 NOTE — H&P ADULT - NSHPREVIEWOFSYSTEMS_GEN_ALL_CORE
REVIEW OF SYSTEMS:    CONSTITUTIONAL: No fever, NO generalized weakness/Fatigue, No weight loss  EYES: No eye pain, visual disturbances, or discharge  ENMT:  No difficulty hearing, tinnitus, vertigo; No sinus or throat pain  NECK: No pain or stiffness  RESPIRATORY: No shortness of breath,  cough, wheezing, sputum or hemoptysis   CARDIOVASCULAR: No chest pain, palpitations, or leg swelling  GASTROINTESTINAL: positive abdominal pain,  nausea, and vomiting, No diarrhea or constipation. No melena or hematochezia.  GENITOURINARY: No dysuria, frequency, hematuria, or incontinence  SKIN: No itching, burning, rashes, or lesions   MUSCULOSKELETAL: muscle cramping, No joint pain or swelling; No back, or extremity pain  HEME/LYMPH: No easy bruising, or bleeding gums  NEUROLOGICAL: No headaches, Dizziness, memory loss, loss of strength, numbness, or tremors  PSYCHIATRIC: No depression, anxiety, mood swings, or difficulty sleeping

## 2019-06-08 NOTE — ED PROVIDER NOTE - CLINICAL SUMMARY MEDICAL DECISION MAKING FREE TEXT BOX
patient arrived with abd pain and vomiting. found to have acute pancreatitis with elevated lipase, elevated lactate, elevated wbc.  elevated creatnine. will admit for iv hydration and pain control

## 2019-06-09 ENCOUNTER — TRANSCRIPTION ENCOUNTER (OUTPATIENT)
Age: 44
End: 2019-06-09

## 2019-06-09 LAB
ALBUMIN SERPL ELPH-MCNC: 3.6 G/DL — SIGNIFICANT CHANGE UP (ref 3.3–5)
ALP SERPL-CCNC: 58 U/L — SIGNIFICANT CHANGE UP (ref 40–120)
ALT FLD-CCNC: 72 U/L — SIGNIFICANT CHANGE UP (ref 12–78)
ANION GAP SERPL CALC-SCNC: 10 MMOL/L — SIGNIFICANT CHANGE UP (ref 5–17)
AST SERPL-CCNC: 72 U/L — HIGH (ref 15–37)
BILIRUB SERPL-MCNC: 1.6 MG/DL — HIGH (ref 0.2–1.2)
BUN SERPL-MCNC: 22 MG/DL — SIGNIFICANT CHANGE UP (ref 7–23)
CALCIUM SERPL-MCNC: 7.7 MG/DL — LOW (ref 8.5–10.1)
CHLORIDE SERPL-SCNC: 99 MMOL/L — SIGNIFICANT CHANGE UP (ref 96–108)
CHOLEST SERPL-MCNC: 200 MG/DL — HIGH (ref 10–199)
CO2 SERPL-SCNC: 29 MMOL/L — SIGNIFICANT CHANGE UP (ref 22–31)
CREAT SERPL-MCNC: 0.92 MG/DL — SIGNIFICANT CHANGE UP (ref 0.5–1.3)
CULTURE RESULTS: SIGNIFICANT CHANGE UP
GLUCOSE SERPL-MCNC: 84 MG/DL — SIGNIFICANT CHANGE UP (ref 70–99)
HBA1C BLD-MCNC: 4.9 % — SIGNIFICANT CHANGE UP (ref 4–5.6)
HCT VFR BLD CALC: 37.9 % — LOW (ref 39–50)
HDLC SERPL-MCNC: 43 MG/DL — SIGNIFICANT CHANGE UP
HGB BLD-MCNC: 13 G/DL — SIGNIFICANT CHANGE UP (ref 13–17)
LIDOCAIN IGE QN: 894 U/L — HIGH (ref 73–393)
LIPID PNL WITH DIRECT LDL SERPL: 128 MG/DL — HIGH
MAGNESIUM SERPL-MCNC: 1.6 MG/DL — SIGNIFICANT CHANGE UP (ref 1.6–2.6)
MCHC RBC-ENTMCNC: 32.1 PG — SIGNIFICANT CHANGE UP (ref 27–34)
MCHC RBC-ENTMCNC: 34.3 GM/DL — SIGNIFICANT CHANGE UP (ref 32–36)
MCV RBC AUTO: 93.6 FL — SIGNIFICANT CHANGE UP (ref 80–100)
NRBC # BLD: 0 /100 WBCS — SIGNIFICANT CHANGE UP (ref 0–0)
PHOSPHATE SERPL-MCNC: 1.3 MG/DL — LOW (ref 2.5–4.5)
PLATELET # BLD AUTO: 92 K/UL — LOW (ref 150–400)
POTASSIUM SERPL-MCNC: 3.3 MMOL/L — LOW (ref 3.5–5.3)
POTASSIUM SERPL-SCNC: 3.3 MMOL/L — LOW (ref 3.5–5.3)
PROT SERPL-MCNC: 7.5 GM/DL — SIGNIFICANT CHANGE UP (ref 6–8.3)
RBC # BLD: 4.05 M/UL — LOW (ref 4.2–5.8)
RBC # FLD: 12.4 % — SIGNIFICANT CHANGE UP (ref 10.3–14.5)
SODIUM SERPL-SCNC: 138 MMOL/L — SIGNIFICANT CHANGE UP (ref 135–145)
SPECIMEN SOURCE: SIGNIFICANT CHANGE UP
TOTAL CHOLESTEROL/HDL RATIO MEASUREMENT: 4.7 RATIO — SIGNIFICANT CHANGE UP (ref 3.4–9.6)
TRIGL SERPL-MCNC: 143 MG/DL — SIGNIFICANT CHANGE UP (ref 10–149)
WBC # BLD: 8.25 K/UL — SIGNIFICANT CHANGE UP (ref 3.8–10.5)
WBC # FLD AUTO: 8.25 K/UL — SIGNIFICANT CHANGE UP (ref 3.8–10.5)

## 2019-06-09 PROCEDURE — 99232 SBSQ HOSP IP/OBS MODERATE 35: CPT

## 2019-06-09 RX ORDER — POTASSIUM CHLORIDE 20 MEQ
40 PACKET (EA) ORAL ONCE
Refills: 0 | Status: COMPLETED | OUTPATIENT
Start: 2019-06-09 | End: 2019-06-09

## 2019-06-09 RX ORDER — POTASSIUM PHOSPHATE, MONOBASIC POTASSIUM PHOSPHATE, DIBASIC 236; 224 MG/ML; MG/ML
15 INJECTION, SOLUTION INTRAVENOUS ONCE
Refills: 0 | Status: COMPLETED | OUTPATIENT
Start: 2019-06-09 | End: 2019-06-09

## 2019-06-09 RX ORDER — CALCIUM GLUCONATE 100 MG/ML
1 VIAL (ML) INTRAVENOUS ONCE
Refills: 0 | Status: COMPLETED | OUTPATIENT
Start: 2019-06-09 | End: 2019-06-09

## 2019-06-09 RX ADMIN — Medication 1 TABLET(S): at 11:53

## 2019-06-09 RX ADMIN — MORPHINE SULFATE 1 MILLIGRAM(S): 50 CAPSULE, EXTENDED RELEASE ORAL at 21:25

## 2019-06-09 RX ADMIN — Medication 40 MILLIEQUIVALENT(S): at 09:22

## 2019-06-09 RX ADMIN — MORPHINE SULFATE 1 MILLIGRAM(S): 50 CAPSULE, EXTENDED RELEASE ORAL at 21:50

## 2019-06-09 RX ADMIN — MORPHINE SULFATE 1 MILLIGRAM(S): 50 CAPSULE, EXTENDED RELEASE ORAL at 00:32

## 2019-06-09 RX ADMIN — POTASSIUM PHOSPHATE, MONOBASIC POTASSIUM PHOSPHATE, DIBASIC 62.5 MILLIMOLE(S): 236; 224 INJECTION, SOLUTION INTRAVENOUS at 09:22

## 2019-06-09 RX ADMIN — SODIUM CHLORIDE 100 MILLILITER(S): 9 INJECTION INTRAMUSCULAR; INTRAVENOUS; SUBCUTANEOUS at 11:53

## 2019-06-09 RX ADMIN — Medication 200 GRAM(S): at 18:23

## 2019-06-09 RX ADMIN — SODIUM CHLORIDE 100 MILLILITER(S): 9 INJECTION INTRAMUSCULAR; INTRAVENOUS; SUBCUTANEOUS at 07:28

## 2019-06-09 NOTE — PROGRESS NOTE ADULT - SUBJECTIVE AND OBJECTIVE BOX
Patient is a 43y old  Male who presents with a chief complaint of Abdominal pain (2019 18:19)      INTERVAL HPI/ OVERNIGHT EVENTS: Pt was seen and examined at bedside today, No significant overnight events, pt abdominal pain improving, will start full liquid diet and advance as tolerated.      MEDICATIONS  (STANDING):  multivitamin 1 Tablet(s) Oral daily  sodium chloride 0.9%. 1000 milliLiter(s) (100 mL/Hr) IV Continuous <Continuous>    MEDICATIONS  (PRN):  morphine  - Injectable 1 milliGRAM(s) IV Push every 6 hours PRN Severe Pain (7 - 10)  ondansetron Injectable 4 milliGRAM(s) IV Push every 6 hours PRN Nausea      Allergies    No Known Allergies    Intolerances        REVIEW OF SYSTEMS:    Unable to examine due to [ ] Encephalopathy [ ] Advanced Dementia [ ] Expressive Aphasia [ ] Non-verbal patient    CONSTITUTIONAL: No fever, NO generalized weakness/Fatigue, No weight loss  EYES: No eye pain, visual disturbances, or discharge  ENMT:  No difficulty hearing, tinnitus, vertigo; No sinus or throat pain  NECK: No pain or stiffness  RESPIRATORY: No shortness of breath,  cough, wheezing, sputum or hemoptysis   CARDIOVASCULAR: No chest pain, palpitations, or leg swelling  GASTROINTESTINAL: No abdominal pain. No nausea, vomiting, diarrhea or constipation. No melena or hematochezia.  GENITOURINARY: No dysuria, frequency, hematuria, or incontinence  NEUROLOGICAL: No headaches, Dizziness, memory loss, loss of strength, numbness, or tremors  SKIN: No itching, burning, rashes, or lesions   MUSCULOSKELETAL: No joint pain or swelling; No muscle, back, or extremity pain  PSYCHIATRIC: No depression, anxiety, mood swings, or difficulty sleeping  HEME/LYMPH: No easy bruising, or bleeding gums      Vital Signs Last 24 Hrs  T(C): 37.1 (2019 16:53), Max: 37.3 (2019 20:09)  T(F): 98.7 (2019 16:53), Max: 99.2 (2019 20:30)  HR: 82 (2019 16:53) (76 - 96)  BP: 156/89 (2019 16:53) (118/89 - 163/99)  BP(mean): --  RR: 18 (2019 16:53) (17 - 19)  SpO2: 99% (2019 16:53) (97% - 99%)    PHYSICAL EXAM:  GENERAL: NAD, well-developed, well-groomed  HEAD:  Atraumatic, Normocephalic  EYES: conjunctiva and sclera clear  ENMT: Moist mucous membranes  NECK: Supple, No JVD, Normal thyroid  CHEST/LUNG: Clear to Auscultation bilaterally; No rales, rhonchi, wheezing, or rubs  HEART: Regular rate and rhythm; No murmurs, rubs, or gallops  ABDOMEN: Soft, Nontender, Nondistended; Bowel sounds present  EXTREMITIES:  2+ Peripheral Pulses, No clubbing, cyanosis, or edema  SKIN: No rashes or lesions  NERVOUS SYSTEM:  Alert & Oriented X3, Good concentration; Motor Strength 5/5 B/L upper and lower extremities    LABS:                        13.0   8.25  )-----------( 92       ( 2019 06:55 )             37.9     -    138  |  99  |  22  ----------------------------<  84  3.3<L>   |  29  |  0.92    Ca    7.7<L>      2019 06:55  Phos  1.3     -  Mg     1.6         TPro  7.5  /  Alb  3.6  /  TBili  1.6<H>  /  DBili  x   /  AST  72<H>  /  ALT  72  /  AlkPhos  58        Urinalysis Basic - ( 2019 14:39 )    Color: Erlinda / Appearance: Clear / S.015 / pH: x  Gluc: x / Ketone: Large  / Bili: Moderate / Urobili: 1 mg/dL   Blood: x / Protein: 500 mg/dL / Nitrite: Negative   Leuk Esterase: Trace / RBC: 0-2 /HPF / WBC 3-5   Sq Epi: x / Non Sq Epi: Occasional / Bacteria: Occasional      CAPILLARY BLOOD GLUCOSE            Culture - Urine (collected 19)  Source: .Urine  Final Report (19):    <10,000 CFU/mL Normal Urogenital Poonam        RADIOLOGY & ADDITIONAL TESTS:          Imaging Personally Reviewed:  [ ] YES  [ ] NO    Consultant(s) Notes Reviewed:  [ ] YES  [ ] NO    Care Discussed with Consultants/Other Providers [x ] YES  [ ] NO

## 2019-06-09 NOTE — DISCHARGE NOTE PROVIDER - NSDCCPCAREPLAN_GEN_ALL_CORE_FT
PRINCIPAL DISCHARGE DIAGNOSIS  Diagnosis: Alcohol-induced acute pancreatitis, unspecified complication status  Assessment and Plan of Treatment: Resolved   Avoid Alcohol as it may precipitate pancreatitis      SECONDARY DISCHARGE DIAGNOSES  Diagnosis: HAIDER (acute kidney injury)  Assessment and Plan of Treatment: Resolved with IV fluids.    Diagnosis: Electrolyte imbalance  Assessment and Plan of Treatment: Hyponatremia: corrected.   Hypokalemia: replace and monitor  Hypocalcemia: replace and monitor.    Diagnosis: Acute liver failure without hepatic coma  Assessment and Plan of Treatment: Resolved with Hydration.    Diagnosis: ETOH abuse  Assessment and Plan of Treatment: Recommend avoiding all alcohol products.

## 2019-06-09 NOTE — DISCHARGE NOTE PROVIDER - CARE PROVIDER_API CALL
Vinay Escalante)  Internal Medicine  [address], [address]  [place] [address]  Phone: [phone]  Fax: [phone]  Follow Up Time:

## 2019-06-09 NOTE — PROGRESS NOTE ADULT - PROBLEM SELECTOR PLAN 5
Pt was counseled about quitting alcohol  No withdrawals  he was notified that ETOH can lead to pancreatitis

## 2019-06-09 NOTE — DISCHARGE NOTE PROVIDER - HOSPITAL COURSE
44 y/o M with previous history of ETOH abuse and Pancreatitis X2 presents with Abdominal pain for the past 3 days. Pt describes 10/10 diffuse "cramping" burning abdominal pain associated with Nausea/vomiting (inability to keep food down) and muscle cramping. Pt claims that he drinks much less than he does in the past, drinking wine or vodka; 2-3 glasses 3x week. Last drink 3 days ago. He admits to dry cough but denies fever, diarrhea or dysuria. No Chest pain, SOB, palpitation or dizziness.         ER course: CT abd/pelvis: no acute changes. Lipase 787, Acute renal failure, acute liver failure, electrolyte imbalance.         The patient was admitted to medicine bed. The patient was treated with IV fluid and keep NPO 44 y/o M with previous history of ETOH abuse and Pancreatitis X2 presents with Abdominal pain for the past 3 days. Pt describes 10/10 diffuse "cramping" burning abdominal pain associated with Nausea/vomiting (inability to keep food down) and muscle cramping. Pt claims that he drinks much less than he does in the past, drinking wine or vodka; 2-3 glasses 3x week. Last drink 3 days ago. He admits to dry cough but denies fever, diarrhea or dysuria. No Chest pain, SOB, palpitation or dizziness.         ER course: CT abd/pelvis: no acute changes. Lipase 787, Acute renal failure, acute liver failure, electrolyte imbalance.         The patient was admitted to medicine bed. The patient was treated with IV fluid and keep NPO. The patient's acute pancreatitis resolved and electrolytes were replaced. The patient is tolerating oral diet and is discharged home; he  will follow up with his PCP.

## 2019-06-10 ENCOUNTER — TRANSCRIPTION ENCOUNTER (OUTPATIENT)
Age: 44
End: 2019-06-10

## 2019-06-10 VITALS
SYSTOLIC BLOOD PRESSURE: 136 MMHG | TEMPERATURE: 98 F | DIASTOLIC BLOOD PRESSURE: 73 MMHG | HEART RATE: 63 BPM | OXYGEN SATURATION: 94 % | RESPIRATION RATE: 18 BRPM

## 2019-06-10 LAB
ALBUMIN SERPL ELPH-MCNC: 3.6 G/DL — SIGNIFICANT CHANGE UP (ref 3.3–5)
ALP SERPL-CCNC: 69 U/L — SIGNIFICANT CHANGE UP (ref 40–120)
ALT FLD-CCNC: 100 U/L — HIGH (ref 12–78)
ANION GAP SERPL CALC-SCNC: 9 MMOL/L — SIGNIFICANT CHANGE UP (ref 5–17)
AST SERPL-CCNC: 114 U/L — HIGH (ref 15–37)
BILIRUB SERPL-MCNC: 1.7 MG/DL — HIGH (ref 0.2–1.2)
BUN SERPL-MCNC: 17 MG/DL — SIGNIFICANT CHANGE UP (ref 7–23)
CALCIUM SERPL-MCNC: 8.9 MG/DL — SIGNIFICANT CHANGE UP (ref 8.5–10.1)
CHLORIDE SERPL-SCNC: 101 MMOL/L — SIGNIFICANT CHANGE UP (ref 96–108)
CO2 SERPL-SCNC: 27 MMOL/L — SIGNIFICANT CHANGE UP (ref 22–31)
CREAT SERPL-MCNC: 0.77 MG/DL — SIGNIFICANT CHANGE UP (ref 0.5–1.3)
GLUCOSE SERPL-MCNC: 91 MG/DL — SIGNIFICANT CHANGE UP (ref 70–99)
MAGNESIUM SERPL-MCNC: 1.8 MG/DL — SIGNIFICANT CHANGE UP (ref 1.6–2.6)
PHOSPHATE SERPL-MCNC: 2.3 MG/DL — LOW (ref 2.5–4.5)
POTASSIUM SERPL-MCNC: 3.4 MMOL/L — LOW (ref 3.5–5.3)
POTASSIUM SERPL-SCNC: 3.4 MMOL/L — LOW (ref 3.5–5.3)
PROT SERPL-MCNC: 7.2 GM/DL — SIGNIFICANT CHANGE UP (ref 6–8.3)
SODIUM SERPL-SCNC: 137 MMOL/L — SIGNIFICANT CHANGE UP (ref 135–145)

## 2019-06-10 PROCEDURE — 99239 HOSP IP/OBS DSCHRG MGMT >30: CPT

## 2019-06-10 RX ORDER — POTASSIUM PHOSPHATE, MONOBASIC POTASSIUM PHOSPHATE, DIBASIC 236; 224 MG/ML; MG/ML
15 INJECTION, SOLUTION INTRAVENOUS ONCE
Refills: 0 | Status: DISCONTINUED | OUTPATIENT
Start: 2019-06-10 | End: 2019-06-10

## 2019-06-10 RX ORDER — POTASSIUM CHLORIDE 20 MEQ
40 PACKET (EA) ORAL ONCE
Refills: 0 | Status: DISCONTINUED | OUTPATIENT
Start: 2019-06-10 | End: 2019-06-10

## 2019-06-10 RX ORDER — SODIUM,POTASSIUM PHOSPHATES 278-250MG
1 POWDER IN PACKET (EA) ORAL
Refills: 0 | Status: DISCONTINUED | OUTPATIENT
Start: 2019-06-10 | End: 2019-06-10

## 2019-06-10 RX ORDER — POTASSIUM CHLORIDE 20 MEQ
40 PACKET (EA) ORAL ONCE
Refills: 0 | Status: COMPLETED | OUTPATIENT
Start: 2019-06-10 | End: 2019-06-10

## 2019-06-10 RX ORDER — SODIUM,POTASSIUM PHOSPHATES 278-250MG
1 POWDER IN PACKET (EA) ORAL
Qty: 4 | Refills: 0
Start: 2019-06-10 | End: 2019-06-10

## 2019-06-10 RX ADMIN — Medication 40 MILLIEQUIVALENT(S): at 10:11

## 2019-06-10 RX ADMIN — Medication 1 TABLET(S): at 11:02

## 2019-06-10 NOTE — DISCHARGE NOTE NURSING/CASE MANAGEMENT/SOCIAL WORK - NSDCDPATPORTLINK_GEN_ALL_CORE
You can access the [facility] Patient Portal, offered by [facility], by registering with the following website: [email or web address]

## 2019-06-14 DIAGNOSIS — E87.1 HYPO-OSMOLALITY AND HYPONATREMIA: ICD-10-CM

## 2019-06-14 DIAGNOSIS — E87.6 HYPOKALEMIA: ICD-10-CM

## 2019-06-14 DIAGNOSIS — K85.20 ALCOHOL INDUCED ACUTE PANCREATITIS WITHOUT NECROSIS OR INFECTION: ICD-10-CM

## 2019-06-14 DIAGNOSIS — N17.9 ACUTE KIDNEY FAILURE, UNSPECIFIED: ICD-10-CM

## 2019-06-14 DIAGNOSIS — E86.0 DEHYDRATION: ICD-10-CM

## 2019-06-14 DIAGNOSIS — E83.52 HYPERCALCEMIA: ICD-10-CM

## 2019-06-14 DIAGNOSIS — F10.10 ALCOHOL ABUSE, UNCOMPLICATED: ICD-10-CM

## 2019-06-14 DIAGNOSIS — R10.9 UNSPECIFIED ABDOMINAL PAIN: ICD-10-CM

## 2019-06-14 DIAGNOSIS — K72.00 ACUTE AND SUBACUTE HEPATIC FAILURE WITHOUT COMA: ICD-10-CM

## 2019-06-21 ENCOUNTER — INBOUND DOCUMENT (OUTPATIENT)
Age: 44
End: 2019-06-21

## 2019-07-29 ENCOUNTER — INPATIENT (INPATIENT)
Facility: HOSPITAL | Age: 44
LOS: 1 days | Discharge: HOME | End: 2019-07-31
Attending: INTERNAL MEDICINE | Admitting: INTERNAL MEDICINE
Payer: MEDICAID

## 2019-07-29 VITALS
WEIGHT: 139.99 LBS | HEART RATE: 142 BPM | OXYGEN SATURATION: 100 % | DIASTOLIC BLOOD PRESSURE: 113 MMHG | RESPIRATION RATE: 22 BRPM | SYSTOLIC BLOOD PRESSURE: 154 MMHG | TEMPERATURE: 98 F | HEIGHT: 70 IN

## 2019-07-29 DIAGNOSIS — F10.10 ALCOHOL ABUSE, UNCOMPLICATED: ICD-10-CM

## 2019-07-29 DIAGNOSIS — D69.6 THROMBOCYTOPENIA, UNSPECIFIED: ICD-10-CM

## 2019-07-29 DIAGNOSIS — K29.20 ALCOHOLIC GASTRITIS WITHOUT BLEEDING: ICD-10-CM

## 2019-07-29 DIAGNOSIS — S02.652S: Chronic | ICD-10-CM

## 2019-07-29 DIAGNOSIS — E87.1 HYPO-OSMOLALITY AND HYPONATREMIA: ICD-10-CM

## 2019-07-29 DIAGNOSIS — R74.8 ABNORMAL LEVELS OF OTHER SERUM ENZYMES: ICD-10-CM

## 2019-07-29 DIAGNOSIS — E83.42 HYPOMAGNESEMIA: ICD-10-CM

## 2019-07-29 LAB
ALBUMIN SERPL ELPH-MCNC: 5.1 G/DL — HIGH (ref 3.3–5)
ALP SERPL-CCNC: 80 U/L — SIGNIFICANT CHANGE UP (ref 40–120)
ALT FLD-CCNC: 105 U/L — HIGH (ref 12–78)
AMPHET UR-MCNC: NEGATIVE — SIGNIFICANT CHANGE UP
ANION GAP SERPL CALC-SCNC: 28 MMOL/L — HIGH (ref 5–17)
APPEARANCE UR: CLEAR — SIGNIFICANT CHANGE UP
AST SERPL-CCNC: 125 U/L — HIGH (ref 15–37)
BACTERIA # UR AUTO: ABNORMAL
BARBITURATES UR SCN-MCNC: NEGATIVE — SIGNIFICANT CHANGE UP
BASOPHILS # BLD AUTO: 0.02 K/UL — SIGNIFICANT CHANGE UP (ref 0–0.2)
BASOPHILS NFR BLD AUTO: 0.2 % — SIGNIFICANT CHANGE UP (ref 0–2)
BENZODIAZ UR-MCNC: NEGATIVE — SIGNIFICANT CHANGE UP
BILIRUB SERPL-MCNC: 2.5 MG/DL — HIGH (ref 0.2–1.2)
BILIRUB UR-MCNC: NEGATIVE — SIGNIFICANT CHANGE UP
BUN SERPL-MCNC: 20 MG/DL — SIGNIFICANT CHANGE UP (ref 7–23)
CALCIUM SERPL-MCNC: 10.1 MG/DL — SIGNIFICANT CHANGE UP (ref 8.5–10.1)
CHLORIDE SERPL-SCNC: 84 MMOL/L — LOW (ref 96–108)
CO2 SERPL-SCNC: 20 MMOL/L — LOW (ref 22–31)
COCAINE METAB.OTHER UR-MCNC: NEGATIVE — SIGNIFICANT CHANGE UP
COLOR SPEC: YELLOW — SIGNIFICANT CHANGE UP
CREAT SERPL-MCNC: 1.2 MG/DL — SIGNIFICANT CHANGE UP (ref 0.5–1.3)
DIFF PNL FLD: ABNORMAL
EOSINOPHIL # BLD AUTO: 0 K/UL — SIGNIFICANT CHANGE UP (ref 0–0.5)
EOSINOPHIL NFR BLD AUTO: 0 % — SIGNIFICANT CHANGE UP (ref 0–6)
EPI CELLS # UR: SIGNIFICANT CHANGE UP
ETHANOL SERPL-MCNC: <10 MG/DL — SIGNIFICANT CHANGE UP (ref 0–10)
GLUCOSE SERPL-MCNC: 107 MG/DL — HIGH (ref 70–99)
GLUCOSE UR QL: NEGATIVE MG/DL — SIGNIFICANT CHANGE UP
HCT VFR BLD CALC: 40.8 % — SIGNIFICANT CHANGE UP (ref 39–50)
HGB BLD-MCNC: 14.5 G/DL — SIGNIFICANT CHANGE UP (ref 13–17)
HYALINE CASTS # UR AUTO: ABNORMAL /LPF
IMM GRANULOCYTES NFR BLD AUTO: 0.3 % — SIGNIFICANT CHANGE UP (ref 0–1.5)
KETONES UR-MCNC: ABNORMAL
LACTATE SERPL-SCNC: 1.7 MMOL/L — SIGNIFICANT CHANGE UP (ref 0.7–2)
LEUKOCYTE ESTERASE UR-ACNC: NEGATIVE — SIGNIFICANT CHANGE UP
LIDOCAIN IGE QN: 786 U/L — HIGH (ref 73–393)
LYMPHOCYTES # BLD AUTO: 0.55 K/UL — LOW (ref 1–3.3)
LYMPHOCYTES # BLD AUTO: 5.6 % — LOW (ref 13–44)
MAGNESIUM SERPL-MCNC: 1.5 MG/DL — LOW (ref 1.6–2.6)
MCHC RBC-ENTMCNC: 32.7 PG — SIGNIFICANT CHANGE UP (ref 27–34)
MCHC RBC-ENTMCNC: 35.5 GM/DL — SIGNIFICANT CHANGE UP (ref 32–36)
MCV RBC AUTO: 91.9 FL — SIGNIFICANT CHANGE UP (ref 80–100)
METHADONE UR-MCNC: NEGATIVE — SIGNIFICANT CHANGE UP
MONOCYTES # BLD AUTO: 0.71 K/UL — SIGNIFICANT CHANGE UP (ref 0–0.9)
MONOCYTES NFR BLD AUTO: 7.3 % — SIGNIFICANT CHANGE UP (ref 2–14)
NEUTROPHILS # BLD AUTO: 8.48 K/UL — HIGH (ref 1.8–7.4)
NEUTROPHILS NFR BLD AUTO: 86.6 % — HIGH (ref 43–77)
NITRITE UR-MCNC: NEGATIVE — SIGNIFICANT CHANGE UP
NRBC # BLD: 0 /100 WBCS — SIGNIFICANT CHANGE UP (ref 0–0)
OPIATES UR-MCNC: NEGATIVE — SIGNIFICANT CHANGE UP
PCP SPEC-MCNC: SIGNIFICANT CHANGE UP
PCP UR-MCNC: NEGATIVE — SIGNIFICANT CHANGE UP
PH UR: 6 — SIGNIFICANT CHANGE UP (ref 5–8)
PHOSPHATE SERPL-MCNC: 3.8 MG/DL — SIGNIFICANT CHANGE UP (ref 2.5–4.5)
PLATELET # BLD AUTO: 99 K/UL — LOW (ref 150–400)
POTASSIUM SERPL-MCNC: 4 MMOL/L — SIGNIFICANT CHANGE UP (ref 3.5–5.3)
POTASSIUM SERPL-SCNC: 4 MMOL/L — SIGNIFICANT CHANGE UP (ref 3.5–5.3)
PROT SERPL-MCNC: 10.2 GM/DL — HIGH (ref 6–8.3)
PROT UR-MCNC: 100 MG/DL
RBC # BLD: 4.44 M/UL — SIGNIFICANT CHANGE UP (ref 4.2–5.8)
RBC # FLD: 12 % — SIGNIFICANT CHANGE UP (ref 10.3–14.5)
RBC CASTS # UR COMP ASSIST: ABNORMAL /HPF (ref 0–4)
SODIUM SERPL-SCNC: 132 MMOL/L — LOW (ref 135–145)
SP GR SPEC: 1.02 — SIGNIFICANT CHANGE UP (ref 1.01–1.02)
THC UR QL: POSITIVE — SIGNIFICANT CHANGE UP
UROBILINOGEN FLD QL: NEGATIVE MG/DL — SIGNIFICANT CHANGE UP
WBC # BLD: 9.79 K/UL — SIGNIFICANT CHANGE UP (ref 3.8–10.5)
WBC # FLD AUTO: 9.79 K/UL — SIGNIFICANT CHANGE UP (ref 3.8–10.5)
WBC UR QL: SIGNIFICANT CHANGE UP

## 2019-07-29 PROCEDURE — 93010 ELECTROCARDIOGRAM REPORT: CPT

## 2019-07-29 PROCEDURE — 71045 X-RAY EXAM CHEST 1 VIEW: CPT | Mod: 26

## 2019-07-29 PROCEDURE — 99285 EMERGENCY DEPT VISIT HI MDM: CPT

## 2019-07-29 PROCEDURE — 99223 1ST HOSP IP/OBS HIGH 75: CPT

## 2019-07-29 RX ORDER — PANTOPRAZOLE SODIUM 20 MG/1
40 TABLET, DELAYED RELEASE ORAL ONCE
Refills: 0 | Status: COMPLETED | OUTPATIENT
Start: 2019-07-29 | End: 2019-07-29

## 2019-07-29 RX ORDER — ONDANSETRON 8 MG/1
8 TABLET, FILM COATED ORAL ONCE
Refills: 0 | Status: COMPLETED | OUTPATIENT
Start: 2019-07-29 | End: 2019-07-29

## 2019-07-29 RX ORDER — SUCRALFATE 1 G
1 TABLET ORAL ONCE
Refills: 0 | Status: COMPLETED | OUTPATIENT
Start: 2019-07-29 | End: 2019-07-29

## 2019-07-29 RX ORDER — SODIUM CHLORIDE 9 MG/ML
2000 INJECTION INTRAMUSCULAR; INTRAVENOUS; SUBCUTANEOUS ONCE
Refills: 0 | Status: COMPLETED | OUTPATIENT
Start: 2019-07-29 | End: 2019-07-29

## 2019-07-29 RX ORDER — ACETAMINOPHEN 500 MG
650 TABLET ORAL EVERY 6 HOURS
Refills: 0 | Status: DISCONTINUED | OUTPATIENT
Start: 2019-07-29 | End: 2019-07-31

## 2019-07-29 RX ORDER — MAGNESIUM SULFATE 500 MG/ML
2 VIAL (ML) INJECTION ONCE
Refills: 0 | Status: COMPLETED | OUTPATIENT
Start: 2019-07-29 | End: 2019-07-29

## 2019-07-29 RX ORDER — ONDANSETRON 8 MG/1
4 TABLET, FILM COATED ORAL EVERY 6 HOURS
Refills: 0 | Status: DISCONTINUED | OUTPATIENT
Start: 2019-07-29 | End: 2019-07-31

## 2019-07-29 RX ADMIN — ONDANSETRON 8 MILLIGRAM(S): 8 TABLET, FILM COATED ORAL at 16:31

## 2019-07-29 RX ADMIN — Medication 50 GRAM(S): at 17:36

## 2019-07-29 RX ADMIN — Medication 1 GRAM(S): at 16:31

## 2019-07-29 RX ADMIN — SODIUM CHLORIDE 2000 MILLILITER(S): 9 INJECTION INTRAMUSCULAR; INTRAVENOUS; SUBCUTANEOUS at 16:31

## 2019-07-29 RX ADMIN — PANTOPRAZOLE SODIUM 40 MILLIGRAM(S): 20 TABLET, DELAYED RELEASE ORAL at 16:31

## 2019-07-29 RX ADMIN — Medication 650 MILLIGRAM(S): at 17:36

## 2019-07-29 NOTE — ED PROVIDER NOTE - OBJECTIVE STATEMENT
44yo male with pmh etoh abuse, pancreatitis presents with 2 days of multiple vomiting and today with diffuse cramping to mid back and abd and tingling and cramping to hands and feet. no vomiting today. no fever. + diaphoretic and last drink 2 days ago. pt admitted for pancreatiits and lokesh last month.     ROS: No fever/chills. No photophobia/eye pain/changes in vision, No ear pain/sore throat/dysphagia, No chest pain/palpitations. No SOB/cough. + abdominal pain, + N/V, no D, no black/bloody bm. No dysuria/frequency/discharge, No headache. No Dizziness.  No rash.  No numbness/tingling/weakness.

## 2019-07-29 NOTE — H&P ADULT - HISTORY OF PRESENT ILLNESS
42yo male with pmh etoh abuse, pancreatitis presents with 2 days of multiple vomiting and today with diffuse cramping to mid back and abd and tingling and cramping to hands and feet. no vomiting today. no fever. + diaphoretic and last drink 2 days ago. pt admitted for pancreatiits and lokesh last month.   currently patient is drinking water and is hhungry and will advance diet to full liquid diet - currently denies any pain

## 2019-07-29 NOTE — H&P ADULT - NSHPPHYSICALEXAM_GEN_ALL_CORE
ICU Vital Signs Last 24 Hrs  T(C): 37.3 (29 Jul 2019 16:48), Max: 37.3 (29 Jul 2019 16:48)  T(F): 99.2 (29 Jul 2019 16:48), Max: 99.2 (29 Jul 2019 16:48)  HR: 110 (29 Jul 2019 16:37) (94 - 142)  BP: 137/96 (29 Jul 2019 15:46) (137/96 - 154/113)  BP(mean): --  ABP: --  ABP(mean): --  RR: 13 (29 Jul 2019 15:46) (13 - 22)  SpO2: 100% (29 Jul 2019 15:46) (100% - 100%)  GENERAL: NAD well-developed  HEAD:  Atraumatic, Normocephalic  EYES: EOMI, PERRLA, conjunctiva and sclera clear  ENMT: No tonsillar erythema, exudates, or enlargement; Moist mucous membranes, Good dentition, No lesions  NECK: Supple, No JVD, Normal thyroid  NERVOUS SYSTEM:  Alert & Oriented X3, Good concentration; Motor Strength 5/5 B/L upper and lower extremities; DTRs 2+ intact and symmetric  CHEST/LUNG: Clear to percussion bilaterally; No rales, rhonchi, wheezing, or rubs  HEART: Regular rate and rhythm; No murmurs, rubs, or gallops  ABDOMEN: Soft, Nontender, Nondistended; Bowel sounds present  EXTREMITIES:  2+ Peripheral Pulses, No clubbing, cyanosis, or edema  LYMPH: No lymphadenopathy   SKIN: No rashes or lesions

## 2019-07-29 NOTE — H&P ADULT - ASSESSMENT
43m with history of pancreatitis comes in after drinking with two days of n/v/ and abdominal pain with no heme-  currently improved       IMPROVE VTE Individual Risk Assessment          RISK                                                          Points  [  ] Previous VTE                                                3  [  ] Thrombophilia                                             2  [  ] Lower limb paralysis                                   2        (unable to hold up >15 seconds)    [  ] Current Cancer                                             2         (within 6 months)  [  ] Immobilization > 24 hrs                              1  [  ] ICU/CCU stay > 24 hours                             1  [  ] Age > 60                                                         1    IMPROVE VTE Score: 2

## 2019-07-29 NOTE — ED ADULT NURSE NOTE - CHIEF COMPLAINT QUOTE
pt states, " I have pancreatitis, I have been vomiting , severe abdominal pain, have not eating  2 days " pt last drink was 2 days ago , alcohol abuse

## 2019-07-29 NOTE — ED PROVIDER NOTE - CLINICAL SUMMARY MEDICAL DECISION MAKING FREE TEXT BOX
pt feeling better after anti nausea and IVF, however with electrolyte disturbance and still not feeling well will admit. lipas eis baseline and without epigsatri tenderness, likely chronic pancreatitis. dr cordon aware.

## 2019-07-29 NOTE — ED ADULT TRIAGE NOTE - CHIEF COMPLAINT QUOTE
pt states, " I have pancreatitis, I have been vomiting , severe abdominal pain, have not eating  2 days " pt states, " I have pancreatitis, I have been vomiting , severe abdominal pain, have not eating  2 days " pt last drink was 2 days ago , alcohol abuse

## 2019-07-29 NOTE — ED ADULT NURSE NOTE - OBJECTIVE STATEMENT
43 y.o male with a hx of pancreatitis complains of abdominal pain that started 2 days ago with nausea and vomiting. patient has slurpee today and is currently vomiting the slurpee.

## 2019-07-29 NOTE — ED PROVIDER NOTE - PHYSICAL EXAMINATION
Gen: Alert, diaphoretic  Head: NC, AT, PERRL, normal lids/conjunctiva   ENT: patent oropharynx without erythema/exudate, uvula midline  Neck: supple, no tenderness/meningismus  Pulm: Bilateral clear BS, normal resp effort  CV: RRR, no M/R/G, +dist pulses   Abd: soft, NT/ND, +BS, no guarding/rebound tenderness  Mskel: + cramped hands  Skin: no rash, no bruising  Neuro: AAOx3, no sensory/motor deficits, CN 2-12 intact

## 2019-07-30 LAB
ALBUMIN SERPL ELPH-MCNC: 4.1 G/DL — SIGNIFICANT CHANGE UP (ref 3.3–5)
ALP SERPL-CCNC: 77 U/L — SIGNIFICANT CHANGE UP (ref 40–120)
ALT FLD-CCNC: 75 U/L — SIGNIFICANT CHANGE UP (ref 12–78)
ANION GAP SERPL CALC-SCNC: 10 MMOL/L — SIGNIFICANT CHANGE UP (ref 5–17)
AST SERPL-CCNC: 73 U/L — HIGH (ref 15–37)
BILIRUB DIRECT SERPL-MCNC: 0.46 MG/DL — HIGH (ref 0.05–0.2)
BILIRUB INDIRECT FLD-MCNC: 1.2 MG/DL — HIGH (ref 0.2–1)
BILIRUB SERPL-MCNC: 1.7 MG/DL — HIGH (ref 0.2–1.2)
BUN SERPL-MCNC: 23 MG/DL — SIGNIFICANT CHANGE UP (ref 7–23)
CALCIUM SERPL-MCNC: 9.3 MG/DL — SIGNIFICANT CHANGE UP (ref 8.5–10.1)
CHLORIDE SERPL-SCNC: 92 MMOL/L — LOW (ref 96–108)
CO2 SERPL-SCNC: 30 MMOL/L — SIGNIFICANT CHANGE UP (ref 22–31)
CREAT SERPL-MCNC: 0.9 MG/DL — SIGNIFICANT CHANGE UP (ref 0.5–1.3)
GLUCOSE SERPL-MCNC: 97 MG/DL — SIGNIFICANT CHANGE UP (ref 70–99)
HCT VFR BLD CALC: 38.7 % — LOW (ref 39–50)
HGB BLD-MCNC: 13.7 G/DL — SIGNIFICANT CHANGE UP (ref 13–17)
MAGNESIUM SERPL-MCNC: 2.1 MG/DL — SIGNIFICANT CHANGE UP (ref 1.6–2.6)
MCHC RBC-ENTMCNC: 32.4 PG — SIGNIFICANT CHANGE UP (ref 27–34)
MCHC RBC-ENTMCNC: 35.4 GM/DL — SIGNIFICANT CHANGE UP (ref 32–36)
MCV RBC AUTO: 91.5 FL — SIGNIFICANT CHANGE UP (ref 80–100)
NRBC # BLD: 0 /100 WBCS — SIGNIFICANT CHANGE UP (ref 0–0)
PHOSPHATE SERPL-MCNC: 2.2 MG/DL — LOW (ref 2.5–4.5)
PLATELET # BLD AUTO: 76 K/UL — LOW (ref 150–400)
POTASSIUM SERPL-MCNC: 2.8 MMOL/L — CRITICAL LOW (ref 3.5–5.3)
POTASSIUM SERPL-SCNC: 2.8 MMOL/L — CRITICAL LOW (ref 3.5–5.3)
PROT SERPL-MCNC: 8.3 GM/DL — SIGNIFICANT CHANGE UP (ref 6–8.3)
RBC # BLD: 4.23 M/UL — SIGNIFICANT CHANGE UP (ref 4.2–5.8)
RBC # FLD: 12.1 % — SIGNIFICANT CHANGE UP (ref 10.3–14.5)
SODIUM SERPL-SCNC: 132 MMOL/L — LOW (ref 135–145)
WBC # BLD: 8.17 K/UL — SIGNIFICANT CHANGE UP (ref 3.8–10.5)
WBC # FLD AUTO: 8.17 K/UL — SIGNIFICANT CHANGE UP (ref 3.8–10.5)

## 2019-07-30 PROCEDURE — 99233 SBSQ HOSP IP/OBS HIGH 50: CPT

## 2019-07-30 RX ORDER — SODIUM,POTASSIUM PHOSPHATES 278-250MG
1 POWDER IN PACKET (EA) ORAL
Refills: 0 | Status: DISCONTINUED | OUTPATIENT
Start: 2019-07-30 | End: 2019-07-30

## 2019-07-30 RX ORDER — POTASSIUM CHLORIDE 20 MEQ
10 PACKET (EA) ORAL
Refills: 0 | Status: COMPLETED | OUTPATIENT
Start: 2019-07-30 | End: 2019-07-30

## 2019-07-30 RX ORDER — SODIUM CHLORIDE 9 MG/ML
1000 INJECTION, SOLUTION INTRAVENOUS
Refills: 0 | Status: DISCONTINUED | OUTPATIENT
Start: 2019-07-30 | End: 2019-07-31

## 2019-07-30 RX ORDER — SODIUM,POTASSIUM PHOSPHATES 278-250MG
1 POWDER IN PACKET (EA) ORAL
Refills: 0 | Status: COMPLETED | OUTPATIENT
Start: 2019-07-30 | End: 2019-07-31

## 2019-07-30 RX ADMIN — Medication 100 MILLIEQUIVALENT(S): at 16:48

## 2019-07-30 RX ADMIN — Medication 650 MILLIGRAM(S): at 06:44

## 2019-07-30 RX ADMIN — Medication 650 MILLIGRAM(S): at 14:30

## 2019-07-30 RX ADMIN — Medication 650 MILLIGRAM(S): at 13:36

## 2019-07-30 RX ADMIN — Medication 650 MILLIGRAM(S): at 19:41

## 2019-07-30 RX ADMIN — Medication 100 MILLIEQUIVALENT(S): at 15:11

## 2019-07-30 RX ADMIN — SODIUM CHLORIDE 100 MILLILITER(S): 9 INJECTION, SOLUTION INTRAVENOUS at 23:58

## 2019-07-30 RX ADMIN — SODIUM CHLORIDE 100 MILLILITER(S): 9 INJECTION, SOLUTION INTRAVENOUS at 13:33

## 2019-07-30 RX ADMIN — Medication 650 MILLIGRAM(S): at 23:58

## 2019-07-30 RX ADMIN — Medication 100 MILLIEQUIVALENT(S): at 13:34

## 2019-07-30 RX ADMIN — Medication 650 MILLIGRAM(S): at 05:44

## 2019-07-30 RX ADMIN — Medication 650 MILLIGRAM(S): at 01:50

## 2019-07-30 RX ADMIN — Medication 30 MILLILITER(S): at 16:48

## 2019-07-30 RX ADMIN — Medication 650 MILLIGRAM(S): at 20:41

## 2019-07-30 RX ADMIN — Medication 1 TABLET(S): at 16:48

## 2019-07-30 RX ADMIN — Medication 650 MILLIGRAM(S): at 00:53

## 2019-07-30 NOTE — PROGRESS NOTE ADULT - ASSESSMENT
43m with history of pancreatitis comes in after drinking with two days of n/v/ and abdominal pain, also complains of muscle spasm.

## 2019-07-30 NOTE — PROGRESS NOTE ADULT - SUBJECTIVE AND OBJECTIVE BOX
Patient is a 43y old  Male who presents with a chief complaint of vomiting (2019 18:42)      INTERVAL HPI/OVERNIGHT EVENTS:  Pt was seen and examined, no acute events.    MEDICATIONS  (STANDING):  acetaminophen   Tablet .. 650 milliGRAM(s) Oral every 6 hours  lactated ringers. 1000 milliLiter(s) (100 mL/Hr) IV Continuous <Continuous>  potassium acid phosphate/sodium acid phosphate tablet (K-PHOS No. 2) 1 Tablet(s) Oral three times a day with meals    MEDICATIONS  (PRN):  aluminum hydroxide/magnesium hydroxide/simethicone Suspension 30 milliLiter(s) Oral every 6 hours PRN Dyspepsia  ondansetron Injectable 4 milliGRAM(s) IV Push every 6 hours PRN Nausea and/or Vomiting      Allergies  No Known Allergies        Vital Signs Last 24 Hrs  T(C): 37.2 (2019 17:19), Max: 37.2 (2019 23:37)  T(F): 98.9 (2019 17:19), Max: 98.9 (2019 23:37)  HR: 76 (2019 17:19) (76 - 110)  BP: 119/98 (2019 17:19) (119/98 - 160/100)  BP(mean): --  RR: 18 (2019 17:19) (18 - 18)  SpO2: 99% (2019 17:19) (95% - 100%)    PHYSICAL EXAM:  GENERAL: NAD  HEAD:  Atraumatic  EYES: PERRLA  NERVOUS SYSTEM:  A, O x 3, non focal  CHEST/LUNG: Clear  HEART: RRR  ABDOMEN: Soft, non focal  EXTREMITIES:  no edema      LABS:                        13.7   8.17  )-----------( 76       ( 2019 08:26 )             38.7     07-30    132<L>  |  92<L>  |  23  ----------------------------<  97  2.8<LL>   |  30  |  0.90    Ca    9.3      2019 08:29  Phos  2.2     07-30  Mg     2.1     07-30    TPro  8.3  /  Alb  4.1  /  TBili  1.7<H>  /  DBili  .46<H>  /  AST  73<H>  /  ALT  75  /  AlkPhos  77  07-30      Urinalysis Basic - ( 2019 21:20 )    Color: Yellow / Appearance: Clear / S.020 / pH: x  Gluc: x / Ketone: Large  / Bili: Negative / Urobili: Negative mg/dL   Blood: x / Protein: 100 mg/dL / Nitrite: Negative   Leuk Esterase: Negative / RBC: 6-10 /HPF / WBC 3-5   Sq Epi: x / Non Sq Epi: Few / Bacteria: Few      CAPILLARY BLOOD GLUCOSE          RADIOLOGY & ADDITIONAL TESTS:    Imaging Personally Reviewed:  [ ] YES  [ ] NO    Consultant(s) Notes Reviewed:  [ ] YES  [ ] NO    Care Discussed with Consultants/Other Providers [ ] YES  [ ] NO

## 2019-07-30 NOTE — SBIRT NOTE ADULT - NSSBIRTBRIEFINTDET_GEN_A_CORE
Pt encouraged to stop drinking. Pt states he "like alcohol" and does not want to stop. Pt declines need for referrals

## 2019-07-31 ENCOUNTER — TRANSCRIPTION ENCOUNTER (OUTPATIENT)
Age: 44
End: 2019-07-31

## 2019-07-31 VITALS
TEMPERATURE: 98 F | OXYGEN SATURATION: 99 % | RESPIRATION RATE: 18 BRPM | SYSTOLIC BLOOD PRESSURE: 145 MMHG | DIASTOLIC BLOOD PRESSURE: 99 MMHG

## 2019-07-31 LAB
ALBUMIN SERPL ELPH-MCNC: 3.8 G/DL — SIGNIFICANT CHANGE UP (ref 3.3–5)
ALP SERPL-CCNC: 95 U/L — SIGNIFICANT CHANGE UP (ref 40–120)
ALT FLD-CCNC: 75 U/L — SIGNIFICANT CHANGE UP (ref 12–78)
ANION GAP SERPL CALC-SCNC: 8 MMOL/L — SIGNIFICANT CHANGE UP (ref 5–17)
AST SERPL-CCNC: 95 U/L — HIGH (ref 15–37)
BILIRUB SERPL-MCNC: 1.1 MG/DL — SIGNIFICANT CHANGE UP (ref 0.2–1.2)
BUN SERPL-MCNC: 21 MG/DL — SIGNIFICANT CHANGE UP (ref 7–23)
CALCIUM SERPL-MCNC: 8.8 MG/DL — SIGNIFICANT CHANGE UP (ref 8.5–10.1)
CHLORIDE SERPL-SCNC: 100 MMOL/L — SIGNIFICANT CHANGE UP (ref 96–108)
CO2 SERPL-SCNC: 29 MMOL/L — SIGNIFICANT CHANGE UP (ref 22–31)
CREAT SERPL-MCNC: 0.88 MG/DL — SIGNIFICANT CHANGE UP (ref 0.5–1.3)
GLUCOSE SERPL-MCNC: 93 MG/DL — SIGNIFICANT CHANGE UP (ref 70–99)
HCT VFR BLD CALC: 37.1 % — LOW (ref 39–50)
HGB BLD-MCNC: 13.1 G/DL — SIGNIFICANT CHANGE UP (ref 13–17)
MAGNESIUM SERPL-MCNC: 1.6 MG/DL — SIGNIFICANT CHANGE UP (ref 1.6–2.6)
MCHC RBC-ENTMCNC: 32.5 PG — SIGNIFICANT CHANGE UP (ref 27–34)
MCHC RBC-ENTMCNC: 35.3 GM/DL — SIGNIFICANT CHANGE UP (ref 32–36)
MCV RBC AUTO: 92.1 FL — SIGNIFICANT CHANGE UP (ref 80–100)
NRBC # BLD: 0 /100 WBCS — SIGNIFICANT CHANGE UP (ref 0–0)
PHOSPHATE SERPL-MCNC: 2.3 MG/DL — LOW (ref 2.5–4.5)
PLATELET # BLD AUTO: 67 K/UL — LOW (ref 150–400)
POTASSIUM SERPL-MCNC: 3.7 MMOL/L — SIGNIFICANT CHANGE UP (ref 3.5–5.3)
POTASSIUM SERPL-SCNC: 3.7 MMOL/L — SIGNIFICANT CHANGE UP (ref 3.5–5.3)
PROT SERPL-MCNC: 7.5 GM/DL — SIGNIFICANT CHANGE UP (ref 6–8.3)
RBC # BLD: 4.03 M/UL — LOW (ref 4.2–5.8)
RBC # FLD: 11.8 % — SIGNIFICANT CHANGE UP (ref 10.3–14.5)
SODIUM SERPL-SCNC: 137 MMOL/L — SIGNIFICANT CHANGE UP (ref 135–145)
WBC # BLD: 6.93 K/UL — SIGNIFICANT CHANGE UP (ref 3.8–10.5)
WBC # FLD AUTO: 6.93 K/UL — SIGNIFICANT CHANGE UP (ref 3.8–10.5)

## 2019-07-31 PROCEDURE — 99239 HOSP IP/OBS DSCHRG MGMT >30: CPT

## 2019-07-31 RX ORDER — SODIUM,POTASSIUM PHOSPHATES 278-250MG
1 POWDER IN PACKET (EA) ORAL
Refills: 0 | Status: DISCONTINUED | OUTPATIENT
Start: 2019-07-31 | End: 2019-07-31

## 2019-07-31 RX ORDER — SODIUM,POTASSIUM PHOSPHATES 278-250MG
1 POWDER IN PACKET (EA) ORAL
Qty: 12 | Refills: 0
Start: 2019-07-31 | End: 2019-08-02

## 2019-07-31 RX ADMIN — Medication 650 MILLIGRAM(S): at 12:47

## 2019-07-31 RX ADMIN — Medication 650 MILLIGRAM(S): at 11:49

## 2019-07-31 RX ADMIN — Medication 650 MILLIGRAM(S): at 07:20

## 2019-07-31 RX ADMIN — Medication 1 TABLET(S): at 11:49

## 2019-07-31 RX ADMIN — Medication 650 MILLIGRAM(S): at 06:18

## 2019-07-31 RX ADMIN — Medication 650 MILLIGRAM(S): at 00:58

## 2019-07-31 NOTE — DISCHARGE NOTE PROVIDER - NSDCCPCAREPLAN_GEN_ALL_CORE_FT
PRINCIPAL DISCHARGE DIAGNOSIS  Diagnosis: Alcoholic gastritis  Assessment and Plan of Treatment: - resolved      SECONDARY DISCHARGE DIAGNOSES  Diagnosis: Hypophosphatemia  Assessment and Plan of Treatment: - suppl given    Diagnosis: Hypomagnesemia  Assessment and Plan of Treatment: - suppl given    Diagnosis: Electrolyte disturbance  Assessment and Plan of Treatment: -

## 2019-07-31 NOTE — DISCHARGE NOTE PROVIDER - HOSPITAL COURSE
42 yo male with pmh etoh abuse, pancreatitis presents with 2 days of multiple vomiting and today with diffuse cramping to mid back and abd and tingling and cramping to hands and feet. no vomiting today. no fever. + diaphoretic and last drink 2 days ago. pt admitted for pancreatitis and lokesh last month.  He was admitted for vomiting, abdominal pain.  He has no complaint today.              Problem/Plan - 1:    ·  Problem: Acute alcoholic gastritis without hemorrhage.  Plan: Continue protonix, zofran     Advance diet as tolerated.          Problem/Plan - 2:    ·  Problem: ETOH abuse.  Plan: Monitor for DT.          Problem/Plan - 3:    ·  Problem: Elevated lipase.  Plan: no clinical evidence of pancreatitis.          Problem/Plan - 4:    ·  Problem: Hyponatremia.  Plan: Mild hyponatremia    In the setting of ETOH intake    Follow up am lab.          Problem/Plan - 5:    ·  Problem: Thrombocytopenia.  Plan: Secondary to ETOH    Continue to monitor    No AC.          Problem/Plan - 6:    Problem: Hypomagnesemia. Plan: replaced.            It took 35 minutes to discharge the patient.

## 2019-07-31 NOTE — DISCHARGE NOTE NURSING/CASE MANAGEMENT/SOCIAL WORK - NSDCDPATPORTLINK_GEN_ALL_CORE
You can access the [facility] Patient Portal, offered by [facility], by registering with the following website: [email or web address]

## 2019-08-06 DIAGNOSIS — E83.42 HYPOMAGNESEMIA: ICD-10-CM

## 2019-08-06 DIAGNOSIS — K29.00 ACUTE GASTRITIS WITHOUT BLEEDING: ICD-10-CM

## 2019-08-06 DIAGNOSIS — E83.39 OTHER DISORDERS OF PHOSPHORUS METABOLISM: ICD-10-CM

## 2019-08-06 DIAGNOSIS — F10.10 ALCOHOL ABUSE, UNCOMPLICATED: ICD-10-CM

## 2019-08-06 DIAGNOSIS — D69.59 OTHER SECONDARY THROMBOCYTOPENIA: ICD-10-CM

## 2019-08-06 DIAGNOSIS — E87.1 HYPO-OSMOLALITY AND HYPONATREMIA: ICD-10-CM

## 2019-10-14 NOTE — ED ADULT NURSE NOTE - PRO INTERPRETER NEED 2
Internal Medicine Progress Note NAME: Lisa Mejia :  1932 MRM:  [id number] Date/Time: 10/14/2019 ASSESSMENT/PLAN: 
 
Principal Problem: 
  Severe sepsis ([facility] (10/8/2019) Active Problems: 
  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 H/o cat bite 4 weeks to RLE per daughter, when he came in with cellulitis and was treated. D/w Dr. Terri Ferguson concern for persistent infection/abscess, he ordered nuclear gallium scan, which will take 72 hrs complete, in process now, results likely Monday. Blood culture repeat on 10/10 negative to date. · MACKENZIE - IV fluid hydration, monitor closely. Improving · AMS - Waxing and waning. Suspect 2/2 metabolic encephalopathy from sepsis. Cont to monitor. Dw his daughter and she confirmed this is AMS new to him and he was making sense in ICU but now not. He has poor sleep at night. Check CT head. · AFIB - Rate control, resume Elliquis for [facility] when there are no further procedures to be done · Elevated LFT - GI consulted, suspects 2/2 sepsis, appreciate recs. · Hypothyroidism - Cont home levothyroxine · Hypocalcemia. Replete · Hypernatremia. D/c NS IVF. D5. Serial labs to monitor level. · ETOH abuse - CIWA ordered by [facility]. Per family last drink was 1 month ago. DVT ppx - SCD; resume Eliquis. 
 
-Code status : FULL Lab Review:  
 
Recent Labs 10/14/19 
0420 10/13/19 
0330 10/12/19 
040 WBC 11.7 9.6 11.9 HGB 8.7* 7.7* 8.0*  
HCT 27.2* 23.3* 24.6*  
 135 120* Recent Labs 10/14/19 
0420 10/13/19 
1814 10/13/19 
0330  10/12/19 
0400 *  --  147*  --  146*  
K 3.9 4.7 3.2*   < > 3.6 *  --  112*  --  111 CO2 29  --  29  --  26  
GLU 97  --  106*  --  106* BUN 21*  --  24*  --  31*  
 CREA 0.72  --  0.75  --  0.88 CA 8.0*  --  7.7*  --  7.6*  
MG 2.1 1.8 1.9  --  2.1 PHOS 2.3*  --  2.6  --  2.5 ALB 1.9*  --   --   --  1.9* TBILI 1.3*  --   --   --  1.2* SGOT 40*  --   --   --  55* ALT 30  --   --   --  42  
 < > = values in this interval not displayed. Lab Results Component Value Date/Time Glucose (POC) 117 (H) 10/12/2019 11:35 AM  
 Glucose (POC) 111 (H) 10/12/2019 03:00 AM  
 Glucose (POC) 130 (H) 07/11/2019 04:30 PM  
 Glucose, POC 83 01/03/2017 10:19 AM  
 
No results for input(s): PH, PCO2, PO2, HCO3, FIO2 in the last 72 hours. No results for input(s): INR, INREXT in the last 72 hours. No results found for: SDES Lab Results Component Value Date/Time Culture result: NO GROWTH 3 DAYS 10/10/2019 01:41 PM  
 Culture result: NO GROWTH 3 DAYS 10/10/2019 12:37 PM  
 Culture result: NO GROWTH 4 DAYS 10/09/2019 12:00 PM  
 
 
All Cardiac Markers in the last 24 hours: No results found for: CPK, CK, CKMMB, CKMB, RCK3, CKMBT, CKNDX, CKND1, CHAYA, TROPT, TROIQ, BRENDAN, TROPT, TNIPOC, BNP, BNPP Subjective: Chief Complaint:     
Non verbal , aroused ROS: 
Non historian due to his medical condition Objective:  
 
Vitals: 
Last 24hrs VS reviewed since prior progress note. Most recent are: 
 
Visit Vitals /68 (BP 1 Location: Right arm, BP Patient Position: At rest) Pulse 97 Temp 98.3 °F (36.8 °C) Resp 18 Ht 5' 8\" (1.727 m) Wt 74.8 kg (165 lb) SpO2 98% BMI 25.09 kg/m² SpO2 Readings from Last 6 Encounters:  
10/14/19 98% 10/08/19 95% 10/08/19 91% 10/07/19 98% 10/03/19 100% 10/03/19 90% O2 Flow Rate (L/min): 3 l/min Intake/Output Summary (Last 24 hours) at 10/14/2019 4534 Last data filed at 10/14/2019 9882 Gross per 24 hour Intake 659.58 ml Output 2275 ml Net -1615.42 ml Physical Exam:  
Ears: hearing is intact Eyes: Icterus is not present Lungs: clear to auscultation bilaterally Heart: regular rate and rhythm, S1, S2 normal, no murmur, click, rub or gallop Gastrointestinal: soft, non-tender. Bowel sounds normal. No masses,  no organomegaly Neurological:  New Focal Deficits are not present. NON VERBAL. AROUSAL Skin: diffuse ecchymosis. Face with small petechiae (per family is due to known actinic keratosis). Psychiatric:  Mood is stable Medications Reviewed: (see below) Lab Data Reviewed: (see below) 
 
______________________________________________________________________ Medications:  
 
Current Facility-Administered Medications Medication Dose Route Frequency  calcium gluconate 2 g in 0.9% sodium chloride 50 mL IVPB  2 g IntraVENous ONCE  
 albuterol-ipratropium (DUO-NEB) 2.5 MG-0.5 MG/3 ML  3 mL Nebulization Q4H PRN  
 ELECTROLYTE REPLACEMENT PROTOCOL - Potassium Standard  Dosing Details for Fluid Restricted Patients  1 Each Other PRN  
 ELECTROLYTE REPLACEMENT PROTOCOL - Magnesium   1 Each Other PRN  
 metoprolol (LOPRESSOR) injection 1.25 mg  1.25 mg IntraVENous Q6H  
 pantoprazole (PROTONIX) injection 40 mg  40 mg IntraVENous DAILY  cefTRIAXone (ROCEPHIN) 2 g in 0.9% sodium chloride (MBP/ADV) 50 mL MBP  2 g IntraVENous O13X  
 folic acid (FOLVITE) tablet 1 mg  1 mg Oral DAILY  thiamine mononitrate (B-1) tablet 100 mg  100 mg Oral DAILY  furosemide (LASIX) injection 40 mg  40 mg IntraVENous DAILY  apixaban (ELIQUIS) tablet 5 mg  5 mg Oral BID  sodium chloride (NS) flush 5-40 mL  5-40 mL IntraVENous Q8H  
 sodium chloride (NS) flush 5-40 mL  5-40 mL IntraVENous PRN  
 LORazepam (ATIVAN) tablet 1 mg  1 mg Oral Q1H PRN Or  
 LORazepam (ATIVAN) injection 1 mg  1 mg IntraVENous Q1H PRN  
 LORazepam (ATIVAN) tablet 2 mg  2 mg Oral Q1H PRN  Or  
 LORazepam (ATIVAN) injection 2 mg  2 mg IntraVENous Q1H PRN  
 LORazepam (ATIVAN) injection 3 mg  3 mg IntraVENous Q15MIN PRN  
  desonide (TRIDESILON) 0.05 % cream - patient supplied (Patient Supplied)   Topical BID  albuterol-ipratropium (DUO-NEB) 2.5 MG-0.5 MG/3 ML  3 mL Nebulization Q6H PRN  
 HYDROmorphone (DILAUDID) syringe 0.25 mg  0.25 mg IntraVENous Q4H PRN  
 naloxone (NARCAN) injection 0.4 mg  0.4 mg IntraVENous PRN  
 ELECTROLYTE REPLACEMENT PROTOCOL - Potassium Renal Dosing  1 Each Other PRN  
 melatonin tablet 3 mg  3 mg Oral QHS PRN  
 sodium chloride (NS) flush 5-10 mL  5-10 mL IntraVENous PRN  
 acetaminophen (TYLENOL) tablet 650 mg  650 mg Oral Q4H PRN  
 levothyroxine (SYNTHROID) tablet 175 mcg  175 mcg Oral ACB  
 0.9% sodium chloride infusion  25 mL/hr IntraVENous CONTINUOUS Total time spent with patient: 30 minutes Care Plan discussed with: Care Manager, Nursing Staff, Consultant/Specialist and >50% of time spent in counseling and coordination of care Discussed:  Care Plan Attending Physician: Gracia Olivares MD  
 
 
 English

## 2019-10-17 ENCOUNTER — INPATIENT (INPATIENT)
Facility: HOSPITAL | Age: 44
LOS: 0 days | Discharge: LEFT BEFORE DISCHARGE | End: 2019-10-18
Attending: INTERNAL MEDICINE | Admitting: INTERNAL MEDICINE
Payer: MEDICAID

## 2019-10-17 VITALS
OXYGEN SATURATION: 100 % | SYSTOLIC BLOOD PRESSURE: 126 MMHG | TEMPERATURE: 99 F | RESPIRATION RATE: 18 BRPM | DIASTOLIC BLOOD PRESSURE: 59 MMHG | HEART RATE: 133 BPM | HEIGHT: 70 IN | WEIGHT: 164.91 LBS

## 2019-10-17 DIAGNOSIS — Z29.9 ENCOUNTER FOR PROPHYLACTIC MEASURES, UNSPECIFIED: ICD-10-CM

## 2019-10-17 DIAGNOSIS — R10.84 GENERALIZED ABDOMINAL PAIN: ICD-10-CM

## 2019-10-17 DIAGNOSIS — E87.6 HYPOKALEMIA: ICD-10-CM

## 2019-10-17 DIAGNOSIS — F10.239 ALCOHOL DEPENDENCE WITH WITHDRAWAL, UNSPECIFIED: ICD-10-CM

## 2019-10-17 DIAGNOSIS — E83.42 HYPOMAGNESEMIA: ICD-10-CM

## 2019-10-17 DIAGNOSIS — S02.652S: Chronic | ICD-10-CM

## 2019-10-17 LAB
ALBUMIN SERPL ELPH-MCNC: 5 G/DL — SIGNIFICANT CHANGE UP (ref 3.3–5)
ALP SERPL-CCNC: 118 U/L — SIGNIFICANT CHANGE UP (ref 40–120)
ALT FLD-CCNC: 139 U/L — HIGH (ref 12–78)
AMPHET UR-MCNC: NEGATIVE — SIGNIFICANT CHANGE UP
AMYLASE P1 CFR SERPL: 130 U/L — HIGH (ref 25–115)
ANION GAP SERPL CALC-SCNC: 17 MMOL/L — SIGNIFICANT CHANGE UP (ref 5–17)
AST SERPL-CCNC: 189 U/L — HIGH (ref 15–37)
BARBITURATES UR SCN-MCNC: NEGATIVE — SIGNIFICANT CHANGE UP
BASOPHILS # BLD AUTO: 0.02 K/UL — SIGNIFICANT CHANGE UP (ref 0–0.2)
BASOPHILS NFR BLD AUTO: 0.3 % — SIGNIFICANT CHANGE UP (ref 0–2)
BENZODIAZ UR-MCNC: NEGATIVE — SIGNIFICANT CHANGE UP
BILIRUB SERPL-MCNC: 2.7 MG/DL — HIGH (ref 0.2–1.2)
BUN SERPL-MCNC: 21 MG/DL — SIGNIFICANT CHANGE UP (ref 7–23)
CALCIUM SERPL-MCNC: 10.4 MG/DL — HIGH (ref 8.5–10.1)
CHLORIDE SERPL-SCNC: 91 MMOL/L — LOW (ref 96–108)
CO2 SERPL-SCNC: 29 MMOL/L — SIGNIFICANT CHANGE UP (ref 22–31)
COCAINE METAB.OTHER UR-MCNC: NEGATIVE — SIGNIFICANT CHANGE UP
CREAT SERPL-MCNC: 1.1 MG/DL — SIGNIFICANT CHANGE UP (ref 0.5–1.3)
EOSINOPHIL # BLD AUTO: 0 K/UL — SIGNIFICANT CHANGE UP (ref 0–0.5)
EOSINOPHIL NFR BLD AUTO: 0 % — SIGNIFICANT CHANGE UP (ref 0–6)
ETHANOL SERPL-MCNC: <10 MG/DL — SIGNIFICANT CHANGE UP (ref 0–10)
GLUCOSE BLDC GLUCOMTR-MCNC: 170 MG/DL — HIGH (ref 70–99)
GLUCOSE SERPL-MCNC: 167 MG/DL — HIGH (ref 70–99)
HCT VFR BLD CALC: 43.9 % — SIGNIFICANT CHANGE UP (ref 39–50)
HGB BLD-MCNC: 15.7 G/DL — SIGNIFICANT CHANGE UP (ref 13–17)
IMM GRANULOCYTES NFR BLD AUTO: 0.3 % — SIGNIFICANT CHANGE UP (ref 0–1.5)
LIDOCAIN IGE QN: 214 U/L — SIGNIFICANT CHANGE UP (ref 73–393)
LYMPHOCYTES # BLD AUTO: 0.84 K/UL — LOW (ref 1–3.3)
LYMPHOCYTES # BLD AUTO: 11.4 % — LOW (ref 13–44)
MAGNESIUM SERPL-MCNC: 0.9 MG/DL — CRITICAL LOW (ref 1.6–2.6)
MCHC RBC-ENTMCNC: 32.6 PG — SIGNIFICANT CHANGE UP (ref 27–34)
MCHC RBC-ENTMCNC: 35.8 GM/DL — SIGNIFICANT CHANGE UP (ref 32–36)
MCV RBC AUTO: 91.3 FL — SIGNIFICANT CHANGE UP (ref 80–100)
METHADONE UR-MCNC: NEGATIVE — SIGNIFICANT CHANGE UP
MONOCYTES # BLD AUTO: 0.5 K/UL — SIGNIFICANT CHANGE UP (ref 0–0.9)
MONOCYTES NFR BLD AUTO: 6.8 % — SIGNIFICANT CHANGE UP (ref 2–14)
NEUTROPHILS # BLD AUTO: 6.02 K/UL — SIGNIFICANT CHANGE UP (ref 1.8–7.4)
NEUTROPHILS NFR BLD AUTO: 81.2 % — HIGH (ref 43–77)
NRBC # BLD: 0 /100 WBCS — SIGNIFICANT CHANGE UP (ref 0–0)
OPIATES UR-MCNC: POSITIVE — SIGNIFICANT CHANGE UP
PCP SPEC-MCNC: SIGNIFICANT CHANGE UP
PCP UR-MCNC: NEGATIVE — SIGNIFICANT CHANGE UP
PLATELET # BLD AUTO: 143 K/UL — LOW (ref 150–400)
POTASSIUM SERPL-MCNC: 3.4 MMOL/L — LOW (ref 3.5–5.3)
POTASSIUM SERPL-SCNC: 3.4 MMOL/L — LOW (ref 3.5–5.3)
PROT SERPL-MCNC: 9.7 GM/DL — HIGH (ref 6–8.3)
RBC # BLD: 4.81 M/UL — SIGNIFICANT CHANGE UP (ref 4.2–5.8)
RBC # FLD: 12.3 % — SIGNIFICANT CHANGE UP (ref 10.3–14.5)
SODIUM SERPL-SCNC: 137 MMOL/L — SIGNIFICANT CHANGE UP (ref 135–145)
THC UR QL: POSITIVE — SIGNIFICANT CHANGE UP
WBC # BLD: 7.4 K/UL — SIGNIFICANT CHANGE UP (ref 3.8–10.5)
WBC # FLD AUTO: 7.4 K/UL — SIGNIFICANT CHANGE UP (ref 3.8–10.5)

## 2019-10-17 PROCEDURE — 99285 EMERGENCY DEPT VISIT HI MDM: CPT

## 2019-10-17 PROCEDURE — 71045 X-RAY EXAM CHEST 1 VIEW: CPT | Mod: 26

## 2019-10-17 PROCEDURE — 70450 CT HEAD/BRAIN W/O DYE: CPT | Mod: 26

## 2019-10-17 PROCEDURE — 93010 ELECTROCARDIOGRAM REPORT: CPT

## 2019-10-17 PROCEDURE — 74177 CT ABD & PELVIS W/CONTRAST: CPT | Mod: 26

## 2019-10-17 PROCEDURE — 99223 1ST HOSP IP/OBS HIGH 75: CPT

## 2019-10-17 RX ORDER — SODIUM CHLORIDE 9 MG/ML
1000 INJECTION INTRAMUSCULAR; INTRAVENOUS; SUBCUTANEOUS ONCE
Refills: 0 | Status: COMPLETED | OUTPATIENT
Start: 2019-10-17 | End: 2019-10-17

## 2019-10-17 RX ORDER — SUCRALFATE 1 G
1 TABLET ORAL EVERY 6 HOURS
Refills: 0 | Status: DISCONTINUED | OUTPATIENT
Start: 2019-10-17 | End: 2019-10-18

## 2019-10-17 RX ORDER — POTASSIUM CHLORIDE 20 MEQ
40 PACKET (EA) ORAL ONCE
Refills: 0 | Status: COMPLETED | OUTPATIENT
Start: 2019-10-17 | End: 2019-10-17

## 2019-10-17 RX ORDER — PANTOPRAZOLE SODIUM 20 MG/1
40 TABLET, DELAYED RELEASE ORAL
Refills: 0 | Status: DISCONTINUED | OUTPATIENT
Start: 2019-10-17 | End: 2019-10-18

## 2019-10-17 RX ORDER — HYDROMORPHONE HYDROCHLORIDE 2 MG/ML
0.5 INJECTION INTRAMUSCULAR; INTRAVENOUS; SUBCUTANEOUS ONCE
Refills: 0 | Status: DISCONTINUED | OUTPATIENT
Start: 2019-10-17 | End: 2019-10-17

## 2019-10-17 RX ORDER — POTASSIUM CHLORIDE 20 MEQ
40 PACKET (EA) ORAL ONCE
Refills: 0 | Status: DISCONTINUED | OUTPATIENT
Start: 2019-10-17 | End: 2019-10-17

## 2019-10-17 RX ORDER — PANTOPRAZOLE SODIUM 20 MG/1
40 TABLET, DELAYED RELEASE ORAL ONCE
Refills: 0 | Status: COMPLETED | OUTPATIENT
Start: 2019-10-17 | End: 2019-10-17

## 2019-10-17 RX ORDER — MAGNESIUM SULFATE 500 MG/ML
2 VIAL (ML) INJECTION ONCE
Refills: 0 | Status: COMPLETED | OUTPATIENT
Start: 2019-10-17 | End: 2019-10-17

## 2019-10-17 RX ORDER — SODIUM CHLORIDE 9 MG/ML
1000 INJECTION, SOLUTION INTRAVENOUS
Refills: 0 | Status: COMPLETED | OUTPATIENT
Start: 2019-10-17 | End: 2019-10-17

## 2019-10-17 RX ORDER — MAGNESIUM SULFATE 500 MG/ML
2 VIAL (ML) INJECTION ONCE
Refills: 0 | Status: DISCONTINUED | OUTPATIENT
Start: 2019-10-17 | End: 2019-10-17

## 2019-10-17 RX ORDER — THIAMINE MONONITRATE (VIT B1) 100 MG
100 TABLET ORAL DAILY
Refills: 0 | Status: DISCONTINUED | OUTPATIENT
Start: 2019-10-17 | End: 2019-10-18

## 2019-10-17 RX ORDER — FOLIC ACID 0.8 MG
1 TABLET ORAL DAILY
Refills: 0 | Status: DISCONTINUED | OUTPATIENT
Start: 2019-10-17 | End: 2019-10-18

## 2019-10-17 RX ADMIN — HYDROMORPHONE HYDROCHLORIDE 0.5 MILLIGRAM(S): 2 INJECTION INTRAMUSCULAR; INTRAVENOUS; SUBCUTANEOUS at 11:52

## 2019-10-17 RX ADMIN — SODIUM CHLORIDE 1000 MILLILITER(S): 9 INJECTION INTRAMUSCULAR; INTRAVENOUS; SUBCUTANEOUS at 11:17

## 2019-10-17 RX ADMIN — HYDROMORPHONE HYDROCHLORIDE 0.5 MILLIGRAM(S): 2 INJECTION INTRAMUSCULAR; INTRAVENOUS; SUBCUTANEOUS at 11:42

## 2019-10-17 RX ADMIN — Medication 50 GRAM(S): at 12:37

## 2019-10-17 RX ADMIN — Medication 2 MILLIGRAM(S): at 15:58

## 2019-10-17 RX ADMIN — SODIUM CHLORIDE 1000 MILLILITER(S): 9 INJECTION INTRAMUSCULAR; INTRAVENOUS; SUBCUTANEOUS at 12:17

## 2019-10-17 RX ADMIN — Medication 2 MILLIGRAM(S): at 11:18

## 2019-10-17 RX ADMIN — Medication 40 MILLIEQUIVALENT(S): at 12:37

## 2019-10-17 RX ADMIN — Medication 1 GRAM(S): at 17:02

## 2019-10-17 RX ADMIN — Medication 50 MILLIGRAM(S): at 17:29

## 2019-10-17 RX ADMIN — SODIUM CHLORIDE 125 MILLILITER(S): 9 INJECTION, SOLUTION INTRAVENOUS at 11:22

## 2019-10-17 RX ADMIN — Medication 1 GRAM(S): at 23:05

## 2019-10-17 RX ADMIN — PANTOPRAZOLE SODIUM 40 MILLIGRAM(S): 20 TABLET, DELAYED RELEASE ORAL at 11:22

## 2019-10-17 RX ADMIN — Medication 50 MILLIGRAM(S): at 23:06

## 2019-10-17 NOTE — ED PROVIDER NOTE - CONSTITUTIONAL, MLM
normal... Well appearing, well nourished, awake, alert, oriented to person, place, time/situation and in no apparent distress. Speaking in clear full sentences no nasal flaring no shoulders retractions no diphoresis

## 2019-10-17 NOTE — H&P ADULT - NSHPLABSRESULTS_GEN_ALL_CORE
15.7   7.40  )-----------( 143      ( 17 Oct 2019 11:08 )             43.9     10-17    137  |  91<L>  |  21  ----------------------------<  167<H>  3.4<L>   |  29  |  1.10    Ca    10.4<H>      17 Oct 2019 11:08  Mg     0.9     10-17    TPro  9.7<H>  /  Alb  5.0  /  TBili  2.7<H>  /  DBili  x   /  AST  189<H>  /  ALT  139<H>  /  AlkPhos  118  10-17        CAPILLARY BLOOD GLUCOSE      POCT Blood Glucose.: 170 mg/dL (17 Oct 2019 10:50)    < from: CT Head No Cont (10.17.19 @ 15:06) >    FINDINGS:    No evidence of acute infarction, intracranial hemorrhage or mass lesion.    The ventricles are normal without evidence of hydrocephalus. There are no   extra-axial fluid collections.    The visualized intraorbital contents are normal. There are hypoplastic   frontal sinuses bilaterally. There is minimal mucosal thickening in the   right maxillary sinus. The mastoid air cells are grossly clear. Partially   visualized is surgical hardware in the left hemimandible. There bismark   chronic comminuted nasal bone fracture. The visualized soft tissues   appear otherwise unremarkable.    IMPRESSION:     No acute intracranial findings.    < end of copied text >    < from: CT Abdomen and Pelvis w/ IV Cont (10.17.19 @ 15:05) >    FINDINGS:    LOWER CHEST: Clear lung bases.    LIVER: Enlarged. Low-attenuation, compatiblewith hepatic steatosis.  BILE DUCTS: Normal caliber.  GALLBLADDER: Within normal limits.  SPLEEN: Within normal limits.  PANCREAS: Within normal limits.  ADRENALS: Within normal limits.  KIDNEYS/URETERS: Bilateral duplicated renal collecting systems.No   hydronephrosis.    BLADDER: Minimally distended.  REPRODUCTIVE ORGANS: Prostate within normal limits.    BOWEL: Distal esophageal wall thickening. No bowel obstruction. Appendix   within normal limits. Colonic diverticulosis.  PERITONEUM: No ascites.  VESSELS: Mild atherosclerotic changes.  RETROPERITONEUM/LYMPH NODES: Prominent epigastric and retroperitoneal   lymph nodes.    ABDOMINAL WALL: Tiny fat-containing umbilical hernia.  BONES: Mild spinal degenerative changes. Chronic bilateral lower rib   fractures. Bilateral L5 spondylolysis.    IMPRESSION:     Hepatomegaly and hepatic steatosis.  Distal soft tissue wall thickening, question esophagitis.  No bowel obstruction.    < end of copied text >

## 2019-10-17 NOTE — ED PROVIDER NOTE - CARE PLAN
Principal Discharge DX:	Alcohol withdrawal  Secondary Diagnosis:	Abdominal pain Principal Discharge DX:	Alcohol withdrawal  Secondary Diagnosis:	Abdominal pain  Secondary Diagnosis:	Hypokalemia  Secondary Diagnosis:	Hypomagnesemia

## 2019-10-17 NOTE — H&P ADULT - NSHPREVIEWOFSYSTEMS_GEN_ALL_CORE
CONSTITUTIONAL: No fever, weight loss or fatigue  EYES: No eye pain, visual disturbances, or discharge  ENMT: Denies  difficulty hearing, tinnitis, vertigo, sinus or   throat pain   NECK: Denies pain or stiffness  BREAST: Denies pain, masses, or nipple discharge    RESP: Denies SOB, dyspnea, cough, wheezing, chills or hemoptysis   CV: Denies  chest pain, SOB, MOSS, palpitations, dizziness, lightheadedness or leg swelling  GI: Dull abdominal 4/10, diffuse in all 4 quadrants. Denies nausea, vomiting, hematemesis; diarrhea or changes in bowel pattern, no melena or hematochezia.  : Denies dysuria, urgency, frequency, retention, hematuria or incontinence  SKIN: Denies itching, burning, rashes or lesions  MSK: Denies edema, pain, difficulty with ambulation, joint pain or swelling, muscle or back pain  NEURO: Denies weakness, numbness, tingling, loss of sensation, vision, headaches, memory loss  LYMPH: Denies pain or enlarged glands  ENDO: Denies heat or cold intolerances, hair loss  PSYCH: Denies depression, anxiety of SI  HEME: Denies easy brusing or bleeding gums  ALLERGY/IMMUNOLOGY: Denies hives, eczema

## 2019-10-17 NOTE — H&P ADULT - HISTORY OF PRESENT ILLNESS
· 43 years old male, PMH alcohol abuse, presents to ED with c/o diffused abd pain,  nausea,  vomiting and body shaking for 2 days. Pt states he has a hx of alcohol abuse; drinks 4-5 glasses wine daily and Vodka  sometimes.  His  last drink was 2 days ago. Pt denies headache, dizziness, blurred visions, light sensitivities, focal/distal weakness or numbness, cough, SOB, chest pain, fever, chills, dysuria or irregular bowel movements.

## 2019-10-17 NOTE — ED PROVIDER NOTE - OBJECTIVE STATEMENT
43 years old male here c/o diffused abd pain nausea vomiting and body shaking for 2 days. Pt sts he has a hx of alcohol abuse ( Vodka ) and last drink was 2 days ago. Pt denies headache, dizziness, blurred visions, light sensitivities, focal/distal weakness or numbness, cough, sob, chest pain 43 years old male here c/o diffused abd pain nausea vomiting and body shaking for 2 days. Pt sts he has a hx of alcohol abuse ( Vodka ) and last drink was 2 days ago. Pt denies headache, dizziness, blurred visions, light sensitivities, focal/distal weakness or numbness, cough, sob, chest pain, fever, chills, dysuria or irregular bowel movements.

## 2019-10-17 NOTE — ED ADULT NURSE NOTE - OBJECTIVE STATEMENT
Pt alert awake interactive, in distress, pt severe tremors, states last drink 2 weeks ago. Usually drink wine and vodka aprox 4 -5 glasses of wine everyday and half pint vodka. Pt states he was hallucinating in his room. 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 pt denies shortness of breath or pain on inspirations.

## 2019-10-17 NOTE — ED ADULT TRIAGE NOTE - CHIEF COMPLAINT QUOTE
pt walk in with complaint of alcohol withdrawal, last drink 2 days ago, pt complaining of abdominal pain, diaphoretic, pt complaining of nausea, total body cramping

## 2019-10-17 NOTE — H&P ADULT - PROBLEM SELECTOR PLAN 1
Admit to medicine  [facility] Librium PO protocol  Thiamine, Folic Acid, MVI daily  Trend elevated Liver enzymes  Social Work Consult

## 2019-10-17 NOTE — H&P ADULT - NSHPPHYSICALEXAM_GEN_ALL_CORE
Vital Signs Last 24 Hrs  T(C): 37.1 (17 Oct 2019 16:07), Max: 37.1 (17 Oct 2019 10:40)  T(F): 98.8 (17 Oct 2019 16:07), Max: 98.8 (17 Oct 2019 16:07)  HR: 88 (17 Oct 2019 16:07) (87 - 133)  BP: 133/100 (17 Oct 2019 16:07) (126/59 - 149/99)  BP(mean): --  RR: 16 (17 Oct 2019 16:07) (13 - 18)  SpO2: 95% (17 Oct 2019 16:07) (95% - 100%)      GENERAL: NAD  HEAD:  Atraumatic, Normocephalic  EYES: EOMI, PERRLA, conjunctiva and sclera clear  ENMT: No tonsillar erythema, exudates, or enlargement; Moist mucous membranes, No lesions  NECK: Supple, No JVD, Normal thyroid  NERVOUS SYSTEM:  Alert & Oriented X3, Good concentration; Motor Strength 5/5 B/L upper and lower extremities; DTRs 2+ intact and symmetric  CHEST/LUNG: Clear to percussion bilaterally; No rales, rhonchi, wheezing, or rubs  HEART: Regular rate and rhythm; No murmurs, rubs, or gallops  ABDOMEN: Soft, tender to palpation laterally, Nondistended; Bowel sounds present  EXTREMITIES:  2+ Peripheral Pulses, No clubbing, cyanosis, or edema  LYMPH: No lymphadenopathy noted  SKIN: No rashes or lesions

## 2019-10-18 ENCOUNTER — TRANSCRIPTION ENCOUNTER (OUTPATIENT)
Age: 44
End: 2019-10-18

## 2019-10-18 VITALS
TEMPERATURE: 98 F | RESPIRATION RATE: 19 BRPM | SYSTOLIC BLOOD PRESSURE: 132 MMHG | HEART RATE: 108 BPM | DIASTOLIC BLOOD PRESSURE: 89 MMHG | OXYGEN SATURATION: 97 %

## 2019-10-18 LAB
ALBUMIN SERPL ELPH-MCNC: 3.6 G/DL — SIGNIFICANT CHANGE UP (ref 3.3–5)
ALP SERPL-CCNC: 100 U/L — SIGNIFICANT CHANGE UP (ref 40–120)
ALT FLD-CCNC: 95 U/L — HIGH (ref 12–78)
ANION GAP SERPL CALC-SCNC: 9 MMOL/L — SIGNIFICANT CHANGE UP (ref 5–17)
AST SERPL-CCNC: 139 U/L — HIGH (ref 15–37)
BILIRUB SERPL-MCNC: 1.9 MG/DL — HIGH (ref 0.2–1.2)
BUN SERPL-MCNC: 23 MG/DL — SIGNIFICANT CHANGE UP (ref 7–23)
CALCIUM SERPL-MCNC: 8.8 MG/DL — SIGNIFICANT CHANGE UP (ref 8.5–10.1)
CHLORIDE SERPL-SCNC: 99 MMOL/L — SIGNIFICANT CHANGE UP (ref 96–108)
CO2 SERPL-SCNC: 29 MMOL/L — SIGNIFICANT CHANGE UP (ref 22–31)
CREAT SERPL-MCNC: 0.58 MG/DL — SIGNIFICANT CHANGE UP (ref 0.5–1.3)
GLUCOSE SERPL-MCNC: 85 MG/DL — SIGNIFICANT CHANGE UP (ref 70–99)
HCT VFR BLD CALC: 37.4 % — LOW (ref 39–50)
HGB BLD-MCNC: 12.9 G/DL — LOW (ref 13–17)
MAGNESIUM SERPL-MCNC: 1.5 MG/DL — LOW (ref 1.6–2.6)
MCHC RBC-ENTMCNC: 32.8 PG — SIGNIFICANT CHANGE UP (ref 27–34)
MCHC RBC-ENTMCNC: 34.5 GM/DL — SIGNIFICANT CHANGE UP (ref 32–36)
MCV RBC AUTO: 95.2 FL — SIGNIFICANT CHANGE UP (ref 80–100)
NRBC # BLD: 0 /100 WBCS — SIGNIFICANT CHANGE UP (ref 0–0)
PHOSPHATE SERPL-MCNC: 2.7 MG/DL — SIGNIFICANT CHANGE UP (ref 2.5–4.5)
PLATELET # BLD AUTO: 75 K/UL — LOW (ref 150–400)
POTASSIUM SERPL-MCNC: 3.2 MMOL/L — LOW (ref 3.5–5.3)
POTASSIUM SERPL-SCNC: 3.2 MMOL/L — LOW (ref 3.5–5.3)
PROT SERPL-MCNC: 7.4 GM/DL — SIGNIFICANT CHANGE UP (ref 6–8.3)
RBC # BLD: 3.93 M/UL — LOW (ref 4.2–5.8)
RBC # FLD: 12.3 % — SIGNIFICANT CHANGE UP (ref 10.3–14.5)
SODIUM SERPL-SCNC: 137 MMOL/L — SIGNIFICANT CHANGE UP (ref 135–145)
WBC # BLD: 5.32 K/UL — SIGNIFICANT CHANGE UP (ref 3.8–10.5)
WBC # FLD AUTO: 5.32 K/UL — SIGNIFICANT CHANGE UP (ref 3.8–10.5)

## 2019-10-18 PROCEDURE — 99233 SBSQ HOSP IP/OBS HIGH 50: CPT

## 2019-10-18 RX ORDER — POTASSIUM CHLORIDE 20 MEQ
40 PACKET (EA) ORAL ONCE
Refills: 0 | Status: COMPLETED | OUTPATIENT
Start: 2019-10-18 | End: 2019-10-18

## 2019-10-18 RX ORDER — MAGNESIUM SULFATE 500 MG/ML
2 VIAL (ML) INJECTION ONCE
Refills: 0 | Status: COMPLETED | OUTPATIENT
Start: 2019-10-18 | End: 2019-10-18

## 2019-10-18 RX ORDER — THIAMINE MONONITRATE (VIT B1) 100 MG
1 TABLET ORAL
Qty: 30 | Refills: 0
Start: 2019-10-18 | End: 2019-11-16

## 2019-10-18 RX ORDER — IBUPROFEN 200 MG
400 TABLET ORAL EVERY 8 HOURS
Refills: 0 | Status: DISCONTINUED | OUTPATIENT
Start: 2019-10-18 | End: 2019-10-18

## 2019-10-18 RX ORDER — FOLIC ACID 0.8 MG
1 TABLET ORAL
Qty: 30 | Refills: 0
Start: 2019-10-18 | End: 2019-11-16

## 2019-10-18 RX ORDER — PANTOPRAZOLE SODIUM 20 MG/1
1 TABLET, DELAYED RELEASE ORAL
Qty: 30 | Refills: 0
Start: 2019-10-18 | End: 2019-11-16

## 2019-10-18 RX ADMIN — Medication 1 GRAM(S): at 05:15

## 2019-10-18 RX ADMIN — Medication 50 MILLIGRAM(S): at 11:40

## 2019-10-18 RX ADMIN — Medication 400 MILLIGRAM(S): at 17:19

## 2019-10-18 RX ADMIN — PANTOPRAZOLE SODIUM 40 MILLIGRAM(S): 20 TABLET, DELAYED RELEASE ORAL at 07:55

## 2019-10-18 RX ADMIN — Medication 1 TABLET(S): at 11:40

## 2019-10-18 RX ADMIN — Medication 40 MILLIEQUIVALENT(S): at 08:23

## 2019-10-18 RX ADMIN — Medication 400 MILLIGRAM(S): at 09:00

## 2019-10-18 RX ADMIN — Medication 400 MILLIGRAM(S): at 08:23

## 2019-10-18 RX ADMIN — Medication 1 MILLIGRAM(S): at 11:40

## 2019-10-18 RX ADMIN — Medication 1 GRAM(S): at 17:17

## 2019-10-18 RX ADMIN — Medication 50 GRAM(S): at 13:32

## 2019-10-18 RX ADMIN — Medication 100 MILLIGRAM(S): at 11:40

## 2019-10-18 RX ADMIN — Medication 1 GRAM(S): at 11:40

## 2019-10-18 RX ADMIN — Medication 50 MILLIGRAM(S): at 05:15

## 2019-10-18 NOTE — DISCHARGE NOTE NURSING/CASE MANAGEMENT/SOCIAL WORK - PATIENT PORTAL LINK FT
You can access the FollowMyHealth Patient Portal offered by [facility] by registering at the following website: [email or web address]. By joining [facility]’s FollowMyHealth portal, you will also be able to view your health information using other applications (apps) compatible with our system.

## 2019-10-18 NOTE — PROGRESS NOTE ADULT - ASSESSMENT
· 43 years old male, PMH alcohol abuse, presents to ED with c/o diffused abd pain,  nausea,  vomiting and body shaking for 2 days. Pt states he has a hx of alcohol abuse; drinks 4-5 glasses wine daily and Vodka  sometimes.  His  last drink was 2 days ago. Pt denies headache, dizziness, blurred visions, light sensitivities, focal/distal weakness or numbness, cough, SOB, chest pain, fever, chills, dysuria or irregular bowel movements.	    IMPROVE VTE Individual Risk Assessment          RISK                                                          Points    [  ] Previous VTE                                                3    [  ] Thrombophilia                                             2    [  ] Lower limb paralysis                                    2        (unable to hold up >15 seconds)      [  ] Current Cancer                                             2         (within 6 months)    [  ] Immobilization > 24 hrs                              1    [  ] ICU/CCU stay > 24 hours                            1    [  ] Age > 60                                                    1    IMPROVE VTE Score _______0__    Low risk 0-1: [  ] Early ambulation                          [x  ] Intermittent Compression Device    High Risk 2-12: [  ] Heparin 5,000 units SC Q8 H                              [  ] Heparin 5,000 units SC Q 12 H                              [  ] LMWH 40 mg SC daily (CrCl > 30 ml)

## 2019-10-18 NOTE — PROGRESS NOTE ADULT - PROBLEM SELECTOR PLAN 1
Admit to medicine  [facility] Librium PO protocol  Thiamine, Folic Acid, MVI daily  Trend elevated Liver enzymes  Social Work Consult

## 2019-10-18 NOTE — DISCHARGE NOTE PROVIDER - HOSPITAL COURSE
· 43 years old male, PMH alcohol abuse, presents to ED with c/o diffused abd pain,  nausea,  vomiting and body shaking for 2 days. Pt states he has a hx of alcohol abuse; drinks 4-5 glasses wine daily and Vodka  sometimes.  His  last drink was 2 days ago. Pt denies headache, dizziness, blurred visions, light sensitivities, focal/distal weakness or numbness, cough, SOB, chest pain, fever, chills, dysuria or irregular bowel movements. Patient admitted for alcohol withdrawal on librium taper. However father now admitted to hospital and patient stated he needs to go home to walk the dog. Upon examination, patient without tremors, steady on feet mildly anxious A+Ox4. CIWA 2 currently. Risks of signing out AMA for alcohol withdrawal explained to patient, including continued/worsening withdrawal, seizures, DT's, as well as death. Patient verbalized understanding however still persistent on signing out AMA. Hospitalist notified.

## 2019-10-18 NOTE — DISCHARGE NOTE PROVIDER - NSDCCPCAREPLAN_GEN_ALL_CORE_FT
PRINCIPAL DISCHARGE DIAGNOSIS  Diagnosis: Alcohol withdrawal  Assessment and Plan of Treatment: signed out AMA, risks explained to patient and patient verbalized understanding. librium taper sent to pharmacy. thiamine, folic acid, multivitamin. Follow up with PCP ASAP.      SECONDARY DISCHARGE DIAGNOSES  Diagnosis: Hypomagnesemia  Assessment and Plan of Treatment: supplemented    Diagnosis: Hypokalemia  Assessment and Plan of Treatment: supplemented    Diagnosis: Abdominal pain  Assessment and Plan of Treatment: resolved.

## 2019-10-18 NOTE — PROGRESS NOTE ADULT - SUBJECTIVE AND OBJECTIVE BOX
CHIEF COMPLAINT/INTERVAL HISTORY:    Patient is a 43y old  Male who presents with a chief complaint of Abdominal pain (17 Oct 2019 17:06)      HPI:  · 43 years old male, PMH alcohol abuse, presents to ED with c/o diffused abd pain,  nausea,  vomiting and body shaking for 2 days. Pt states he has a hx of alcohol abuse; drinks 4-5 glasses wine daily and Vodka  sometimes.  His  last drink was 2 days ago. Pt denies headache, dizziness, blurred visions, light sensitivities, focal/distal weakness or numbness, cough, SOB, chest pain, fever, chills, dysuria or irregular bowel movements. (17 Oct 2019 17:06)    Overnight issues  feels better    shaking less  electrolytes still abnormal   SUBJECTIVE & OBJECTIVE: Pt seen and examined at bedside.   ROS:  CONSTITUTIONAL: No fever, weight loss, or fatigue  NECK: No pain or stiffness  RESPIRATORY: No cough, wheezing, chills or hemoptysis; No shortness of breath  CARDIOVASCULAR: No chest pain, palpitations, dizziness, or leg swelling  GASTROINTESTINAL: No abdominal or epigastric pain. No nausea, vomiting, or hematemesis; No diarrhea or constipation. No melena or hematochezia.  GENITOURINARY: No dysuria, frequency, hematuria, or incontinence  NEUROLOGICAL: No headaches, memory loss, loss of strength, numbness, or tremors  SKIN: No itching, burning, rashes, or lesions   ICU Vital Signs Last 24 Hrs  T(C): 36.7 (18 Oct 2019 06:58), Max: 37.1 (17 Oct 2019 16:07)  T(F): 98.1 (18 Oct 2019 06:58), Max: 98.8 (17 Oct 2019 16:07)  HR: 82 (18 Oct 2019 06:58) (82 - 108)  BP: 140/98 (18 Oct 2019 06:58) (119/95 - 149/99)  BP(mean): --  ABP: --  ABP(mean): --  RR: 18 (18 Oct 2019 06:58) (13 - 18)  SpO2: 99% (18 Oct 2019 06:58) (95% - 99%)        MEDICATIONS  (STANDING):  chlordiazePOXIDE 50 milliGRAM(s) Oral every 6 hours  chlordiazePOXIDE 50 milliGRAM(s) Oral every 8 hours  chlordiazePOXIDE   Oral   folic acid 1 milliGRAM(s) Oral daily  multivitamin 1 Tablet(s) Oral daily  pantoprazole    Tablet 40 milliGRAM(s) Oral before breakfast  sucralfate suspension 1 Gram(s) Oral every 6 hours  thiamine 100 milliGRAM(s) Oral daily    MEDICATIONS  (PRN):  ibuprofen  Tablet. 400 milliGRAM(s) Oral every 8 hours PRN Moderate Pain (4 - 6)        PHYSICAL EXAM:    GENERAL: NAD, well-groomed, well-developed  HEAD:  Atraumatic, Normocephalic  EYES: EOMI, PERRLA, conjunctiva and sclera clear  ENMT: Moist mucous membranes  NECK: Supple, No JVD  NERVOUS SYSTEM:  Alert & Oriented X3, Motor Strength 5/5 B/L upper and lower extremities; DTRs 2+ intact and symmetric  CHEST/LUNG: Clear to auscultation bilaterally; No rales, rhonchi, wheezing, or rubs  HEART: Regular rate and rhythm; No murmurs, rubs, or gallops  ABDOMEN: Soft, Nontender, Nondistended; Bowel sounds present  EXTREMITIES:  2+ Peripheral Pulses, No clubbing, cyanosis, or edema    LABS:                        12.9   5.32  )-----------( 75       ( 18 Oct 2019 06:25 )             37.4     10-18    137  |  99  |  23  ----------------------------<  85  3.2<L>   |  29  |  0.58    Ca    8.8      18 Oct 2019 06:25  Phos  2.7     10-18  Mg     1.5     10-18    TPro  7.4  /  Alb  3.6  /  TBili  1.9<H>  /  DBili  x   /  AST  139<H>  /  ALT  95<H>  /  AlkPhos  100  10-18          CAPILLARY BLOOD GLUCOSE          RECENT CULTURES:      RADIOLOGY & ADDITIONAL TESTS:  Imaging Personally Reviewed:  [ ] YES      Consultant(s) Notes Reviewed:  [ ] YES     Care Discussed with [ ] Consultants [X ] Patient [ ] Family  [x ]    [x ]  Other; RN  HEALTH ISSUES - PROBLEM Dx:  Preventive measure: Preventive measure  Generalized abdominal pain: Generalized abdominal pain  Hypokalemia: Hypokalemia  Hypomagnesemia: Hypomagnesemia  Alcohol withdrawal syndrome with complication: Alcohol withdrawal syndrome with complication        DVT/GI ppx  Discussed with pt @ bedside

## 2019-10-22 DIAGNOSIS — R10.9 UNSPECIFIED ABDOMINAL PAIN: ICD-10-CM

## 2019-10-22 DIAGNOSIS — E83.42 HYPOMAGNESEMIA: ICD-10-CM

## 2019-10-22 DIAGNOSIS — Z79.899 OTHER LONG TERM (CURRENT) DRUG THERAPY: ICD-10-CM

## 2019-10-22 DIAGNOSIS — E87.6 HYPOKALEMIA: ICD-10-CM

## 2019-10-22 DIAGNOSIS — F10.239 ALCOHOL DEPENDENCE WITH WITHDRAWAL, UNSPECIFIED: ICD-10-CM

## 2019-12-11 ENCOUNTER — INPATIENT (INPATIENT)
Facility: HOSPITAL | Age: 44
LOS: 2 days | Discharge: HOME | End: 2019-12-14
Attending: HOSPITALIST | Admitting: HOSPITALIST
Payer: MEDICAID

## 2019-12-11 VITALS
SYSTOLIC BLOOD PRESSURE: 130 MMHG | HEART RATE: 140 BPM | TEMPERATURE: 98 F | OXYGEN SATURATION: 97 % | DIASTOLIC BLOOD PRESSURE: 98 MMHG | HEIGHT: 70 IN | WEIGHT: 164.91 LBS | RESPIRATION RATE: 22 BRPM

## 2019-12-11 DIAGNOSIS — N17.9 ACUTE KIDNEY FAILURE, UNSPECIFIED: ICD-10-CM

## 2019-12-11 DIAGNOSIS — E87.6 HYPOKALEMIA: ICD-10-CM

## 2019-12-11 DIAGNOSIS — F10.230 ALCOHOL DEPENDENCE WITH WITHDRAWAL, UNCOMPLICATED: ICD-10-CM

## 2019-12-11 DIAGNOSIS — E86.0 DEHYDRATION: ICD-10-CM

## 2019-12-11 DIAGNOSIS — E83.42 HYPOMAGNESEMIA: ICD-10-CM

## 2019-12-11 DIAGNOSIS — F10.10 ALCOHOL ABUSE, UNCOMPLICATED: ICD-10-CM

## 2019-12-11 DIAGNOSIS — S02.652S: Chronic | ICD-10-CM

## 2019-12-11 LAB
ALBUMIN SERPL ELPH-MCNC: 3.9 G/DL — SIGNIFICANT CHANGE UP (ref 3.3–5)
ALBUMIN SERPL ELPH-MCNC: 4.3 G/DL — SIGNIFICANT CHANGE UP (ref 3.3–5)
ALP SERPL-CCNC: 86 U/L — SIGNIFICANT CHANGE UP (ref 40–120)
ALP SERPL-CCNC: 92 U/L — SIGNIFICANT CHANGE UP (ref 40–120)
ALT FLD-CCNC: 78 U/L — SIGNIFICANT CHANGE UP (ref 12–78)
ALT FLD-CCNC: 85 U/L — HIGH (ref 12–78)
ANION GAP SERPL CALC-SCNC: 13 MMOL/L — SIGNIFICANT CHANGE UP (ref 5–17)
ANION GAP SERPL CALC-SCNC: 20 MMOL/L — HIGH (ref 5–17)
APTT BLD: 32.4 SEC — SIGNIFICANT CHANGE UP (ref 27.5–36.3)
AST SERPL-CCNC: 111 U/L — HIGH (ref 15–37)
AST SERPL-CCNC: 118 U/L — HIGH (ref 15–37)
BASOPHILS # BLD AUTO: 0.02 K/UL — SIGNIFICANT CHANGE UP (ref 0–0.2)
BASOPHILS NFR BLD AUTO: 0.2 % — SIGNIFICANT CHANGE UP (ref 0–2)
BILIRUB DIRECT SERPL-MCNC: 0.77 MG/DL — HIGH (ref 0.05–0.2)
BILIRUB INDIRECT FLD-MCNC: 1.2 MG/DL — HIGH (ref 0.2–1)
BILIRUB SERPL-MCNC: 2 MG/DL — HIGH (ref 0.2–1.2)
BILIRUB SERPL-MCNC: 2.3 MG/DL — HIGH (ref 0.2–1.2)
BUN SERPL-MCNC: 29 MG/DL — HIGH (ref 7–23)
BUN SERPL-MCNC: 32 MG/DL — HIGH (ref 7–23)
CALCIUM SERPL-MCNC: 8.7 MG/DL — SIGNIFICANT CHANGE UP (ref 8.5–10.1)
CALCIUM SERPL-MCNC: 8.8 MG/DL — SIGNIFICANT CHANGE UP (ref 8.5–10.1)
CHLORIDE SERPL-SCNC: 85 MMOL/L — LOW (ref 96–108)
CHLORIDE SERPL-SCNC: 88 MMOL/L — LOW (ref 96–108)
CO2 SERPL-SCNC: 30 MMOL/L — SIGNIFICANT CHANGE UP (ref 22–31)
CO2 SERPL-SCNC: 33 MMOL/L — HIGH (ref 22–31)
CREAT SERPL-MCNC: 1.58 MG/DL — HIGH (ref 0.5–1.3)
CREAT SERPL-MCNC: 1.63 MG/DL — HIGH (ref 0.5–1.3)
EOSINOPHIL # BLD AUTO: 0 K/UL — SIGNIFICANT CHANGE UP (ref 0–0.5)
EOSINOPHIL NFR BLD AUTO: 0 % — SIGNIFICANT CHANGE UP (ref 0–6)
GLUCOSE BLDC GLUCOMTR-MCNC: 171 MG/DL — HIGH (ref 70–99)
GLUCOSE SERPL-MCNC: 162 MG/DL — HIGH (ref 70–99)
GLUCOSE SERPL-MCNC: 93 MG/DL — SIGNIFICANT CHANGE UP (ref 70–99)
HCT VFR BLD CALC: 44.9 % — SIGNIFICANT CHANGE UP (ref 39–50)
HGB BLD-MCNC: 15.8 G/DL — SIGNIFICANT CHANGE UP (ref 13–17)
IMM GRANULOCYTES NFR BLD AUTO: 0.5 % — SIGNIFICANT CHANGE UP (ref 0–1.5)
INR BLD: 1.06 RATIO — SIGNIFICANT CHANGE UP (ref 0.88–1.16)
LYMPHOCYTES # BLD AUTO: 0.5 K/UL — LOW (ref 1–3.3)
LYMPHOCYTES # BLD AUTO: 4.1 % — LOW (ref 13–44)
MAGNESIUM SERPL-MCNC: 1.1 MG/DL — LOW (ref 1.6–2.6)
MAGNESIUM SERPL-MCNC: 1.2 MG/DL — LOW (ref 1.6–2.6)
MCHC RBC-ENTMCNC: 33.3 PG — SIGNIFICANT CHANGE UP (ref 27–34)
MCHC RBC-ENTMCNC: 35.2 GM/DL — SIGNIFICANT CHANGE UP (ref 32–36)
MCV RBC AUTO: 94.5 FL — SIGNIFICANT CHANGE UP (ref 80–100)
MONOCYTES # BLD AUTO: 1.03 K/UL — HIGH (ref 0–0.9)
MONOCYTES NFR BLD AUTO: 8.5 % — SIGNIFICANT CHANGE UP (ref 2–14)
NEUTROPHILS # BLD AUTO: 10.53 K/UL — HIGH (ref 1.8–7.4)
NEUTROPHILS NFR BLD AUTO: 86.7 % — HIGH (ref 43–77)
NRBC # BLD: 0 /100 WBCS — SIGNIFICANT CHANGE UP (ref 0–0)
PHOSPHATE SERPL-MCNC: 2.7 MG/DL — SIGNIFICANT CHANGE UP (ref 2.5–4.5)
PLATELET # BLD AUTO: 112 K/UL — LOW (ref 150–400)
POTASSIUM SERPL-MCNC: 2.8 MMOL/L — CRITICAL LOW (ref 3.5–5.3)
POTASSIUM SERPL-MCNC: 2.9 MMOL/L — CRITICAL LOW (ref 3.5–5.3)
POTASSIUM SERPL-SCNC: 2.8 MMOL/L — CRITICAL LOW (ref 3.5–5.3)
POTASSIUM SERPL-SCNC: 2.9 MMOL/L — CRITICAL LOW (ref 3.5–5.3)
PROT SERPL-MCNC: 8.3 GM/DL — SIGNIFICANT CHANGE UP (ref 6–8.3)
PROT SERPL-MCNC: 8.8 GM/DL — HIGH (ref 6–8.3)
PROTHROM AB SERPL-ACNC: 11.9 SEC — SIGNIFICANT CHANGE UP (ref 10–12.9)
RBC # BLD: 4.75 M/UL — SIGNIFICANT CHANGE UP (ref 4.2–5.8)
RBC # FLD: 12.6 % — SIGNIFICANT CHANGE UP (ref 10.3–14.5)
SODIUM SERPL-SCNC: 134 MMOL/L — LOW (ref 135–145)
SODIUM SERPL-SCNC: 135 MMOL/L — SIGNIFICANT CHANGE UP (ref 135–145)
TROPONIN I SERPL-MCNC: <.015 NG/ML — SIGNIFICANT CHANGE UP (ref 0.01–0.04)
WBC # BLD: 12.14 K/UL — HIGH (ref 3.8–10.5)
WBC # FLD AUTO: 12.14 K/UL — HIGH (ref 3.8–10.5)

## 2019-12-11 PROCEDURE — 99223 1ST HOSP IP/OBS HIGH 75: CPT

## 2019-12-11 PROCEDURE — 99285 EMERGENCY DEPT VISIT HI MDM: CPT

## 2019-12-11 PROCEDURE — 93010 ELECTROCARDIOGRAM REPORT: CPT

## 2019-12-11 PROCEDURE — 71045 X-RAY EXAM CHEST 1 VIEW: CPT | Mod: 26

## 2019-12-11 RX ORDER — MAGNESIUM SULFATE 500 MG/ML
2 VIAL (ML) INJECTION DAILY
Refills: 0 | Status: DISCONTINUED | OUTPATIENT
Start: 2019-12-11 | End: 2019-12-12

## 2019-12-11 RX ORDER — POTASSIUM CHLORIDE 20 MEQ
10 PACKET (EA) ORAL
Refills: 0 | Status: COMPLETED | OUTPATIENT
Start: 2019-12-11 | End: 2019-12-11

## 2019-12-11 RX ORDER — FOLIC ACID 0.8 MG
1 TABLET ORAL ONCE
Refills: 0 | Status: DISCONTINUED | OUTPATIENT
Start: 2019-12-11 | End: 2019-12-11

## 2019-12-11 RX ORDER — SODIUM CHLORIDE 9 MG/ML
1000 INJECTION, SOLUTION INTRAVENOUS
Refills: 0 | Status: DISCONTINUED | OUTPATIENT
Start: 2019-12-11 | End: 2019-12-11

## 2019-12-11 RX ORDER — SODIUM CHLORIDE 9 MG/ML
1000 INJECTION, SOLUTION INTRAVENOUS
Refills: 0 | Status: COMPLETED | OUTPATIENT
Start: 2019-12-11 | End: 2019-12-11

## 2019-12-11 RX ORDER — THIAMINE MONONITRATE (VIT B1) 100 MG
100 TABLET ORAL ONCE
Refills: 0 | Status: DISCONTINUED | OUTPATIENT
Start: 2019-12-11 | End: 2019-12-11

## 2019-12-11 RX ORDER — SODIUM CHLORIDE 9 MG/ML
1000 INJECTION INTRAMUSCULAR; INTRAVENOUS; SUBCUTANEOUS ONCE
Refills: 0 | Status: COMPLETED | OUTPATIENT
Start: 2019-12-11 | End: 2019-12-11

## 2019-12-11 RX ORDER — INFLUENZA VIRUS VACCINE 15; 15; 15; 15 UG/.5ML; UG/.5ML; UG/.5ML; UG/.5ML
0.5 SUSPENSION INTRAMUSCULAR ONCE
Refills: 0 | Status: COMPLETED | OUTPATIENT
Start: 2019-12-11 | End: 2019-12-11

## 2019-12-11 RX ORDER — ONDANSETRON 8 MG/1
4 TABLET, FILM COATED ORAL ONCE
Refills: 0 | Status: COMPLETED | OUTPATIENT
Start: 2019-12-11 | End: 2019-12-11

## 2019-12-11 RX ORDER — POTASSIUM CHLORIDE 20 MEQ
40 PACKET (EA) ORAL ONCE
Refills: 0 | Status: COMPLETED | OUTPATIENT
Start: 2019-12-11 | End: 2019-12-11

## 2019-12-11 RX ORDER — THIAMINE MONONITRATE (VIT B1) 100 MG
100 TABLET ORAL ONCE
Refills: 0 | Status: COMPLETED | OUTPATIENT
Start: 2019-12-11 | End: 2019-12-11

## 2019-12-11 RX ADMIN — Medication 100 MILLIEQUIVALENT(S): at 20:20

## 2019-12-11 RX ADMIN — Medication 40 MILLIEQUIVALENT(S): at 16:00

## 2019-12-11 RX ADMIN — SODIUM CHLORIDE 200 MILLILITER(S): 9 INJECTION, SOLUTION INTRAVENOUS at 12:31

## 2019-12-11 RX ADMIN — Medication 50 MILLIGRAM(S): at 19:06

## 2019-12-11 RX ADMIN — ONDANSETRON 4 MILLIGRAM(S): 8 TABLET, FILM COATED ORAL at 11:00

## 2019-12-11 RX ADMIN — Medication 100 MILLIEQUIVALENT(S): at 18:03

## 2019-12-11 RX ADMIN — Medication 50 GRAM(S): at 17:24

## 2019-12-11 RX ADMIN — Medication 100 MILLIGRAM(S): at 11:57

## 2019-12-11 RX ADMIN — SODIUM CHLORIDE 1000 MILLILITER(S): 9 INJECTION INTRAMUSCULAR; INTRAVENOUS; SUBCUTANEOUS at 11:06

## 2019-12-11 RX ADMIN — Medication 2 MILLIGRAM(S): at 11:26

## 2019-12-11 RX ADMIN — Medication 50 MILLIGRAM(S): at 17:21

## 2019-12-11 NOTE — H&P ADULT - HISTORY OF PRESENT ILLNESS
The patient is a 44y Male complaining of detox of alchohol The patient is a 44y Male complaining of detox of alchohol - patient celebrated with alchohol and now complain of nausea and vomiting     with tremors - patient has been in the hospital for the same thing

## 2019-12-11 NOTE — ED ADULT NURSE NOTE - IN THE PAST 12 MONTHS HAVE YOU USED DRUGS OTHER THAN THOSE REQUIRED FOR MEDICAL REASON?
Interval History:   NAEON. Patient's lasix ggt discontinued; bumex 3mg BID & added metolazone 10mg BID,  Responded well to ggt according to the granddaughter who reports multiple urinations;Cr. 3.6; baseline 3.3.  SOB improving; difficult to determine baseline as she has COPD, HFref, CKD IV + anemia.         Review of Systems   Constitutional: Negative for activity change, chills, diaphoresis, fatigue and fever.   HENT: Negative for congestion, sore throat and trouble swallowing.    Eyes: Negative for photophobia, redness and visual disturbance.   Respiratory: Positive for cough and shortness of breath. Negative for choking, chest tightness and wheezing.    Cardiovascular: Negative for chest pain, palpitations and leg swelling.   Gastrointestinal: Negative for abdominal distention, abdominal pain, constipation, diarrhea, nausea and vomiting.   Genitourinary: Negative for difficulty urinating, dysuria and hematuria.   Musculoskeletal: Negative for arthralgias and myalgias.   Skin: Negative for rash and wound.   Neurological: Negative for dizziness, syncope, weakness, light-headedness, numbness and headaches.   Psychiatric/Behavioral: Negative for confusion.      allopurinol  50 mg Oral Daily    aspirin  81 mg Oral Daily    bumetanide  3 mg Oral BID    carvedilol  12.5 mg Oral BID WM    donepezil  5 mg Oral QHS    fluticasone  1 spray Each Nare Daily    fluticasone-vilanterol  1 puff Inhalation Daily    heparin (porcine)  5,000 Units Subcutaneous Q8H    INV hydrALAZINE  75 mg Oral Q8H    hydrocortisone   Rectal BID    isosorbide dinitrate  40 mg Oral TID    levothyroxine  125 mcg Oral Before breakfast    metOLazone  10 mg Oral BID    pantoprazole  40 mg Oral Daily    sevelamer carbonate  800 mg Oral BID WM    sodium bicarbonate  1 tablet Oral TID    tiotropium  18 mcg Inhalation Daily    traZODone  50 mg Oral QHS    vitamin renal formula (B-complex-vitamin c-folic acid)  1 capsule Oral Daily      .        Objective:     Vital Signs (Most Recent):  Temp: 97.7 °F (36.5 °C) (04/26/18 1154)  Pulse: (!) 57 (04/26/18 1154)  Resp: 18 (04/26/18 1154)  BP: 128/63 (04/26/18 1154)  SpO2: 95 % (04/26/18 1154) Vital Signs (24h Range):  Temp:  [97.7 °F (36.5 °C)-100.5 °F (38.1 °C)] 97.7 °F (36.5 °C)  Pulse:  [56-63] 57  Resp:  [18-20] 18  SpO2:  [90 %-98 %] 95 %  BP: (127-144)/(62-69) 128/63     Weight: 76.7 kg (169 lb 1.5 oz)  Body mass index is 27.29 kg/m².    Intake/Output Summary (Last 24 hours) at 04/26/18 1547  Last data filed at 04/26/18 1400   Gross per 24 hour   Intake           1147.5 ml   Output              750 ml   Net            397.5 ml      Physical Exam   Constitutional: She is oriented to person, place, and time. She appears well-developed and well-nourished. No distress.   HENT:   Head: Normocephalic and atraumatic.   Right Ear: External ear normal.   Left Ear: External ear normal.   Mouth/Throat: Oropharynx is clear and moist. No oropharyngeal exudate.   Eyes: EOM are normal. Pupils are equal, round, and reactive to light. Right eye exhibits no discharge. Left eye exhibits no discharge. No scleral icterus.   Neck: Normal range of motion. Neck supple.   Cardiovascular: Normal rate and regular rhythm.    Murmur heard.  Pulmonary/Chest: Effort normal. No respiratory distress. She has no wheezes. She has no rales.   Decreased breath sounds bilat   Abdominal: Soft. Bowel sounds are normal. She exhibits no distension and no mass. There is no tenderness. There is no guarding.   Musculoskeletal: She exhibits edema (1+ pitting edema of bilateral lower extremities ). She exhibits no tenderness.   Neurological: She is alert and oriented to person, place, and time. No cranial nerve deficit.   Skin: Skin is warm and dry. She is not diaphoretic.   Psychiatric: She has a normal mood and affect.   Vitals reviewed.      Significant Labs:   CBC:     Recent Labs  Lab 04/25/18  0346 04/26/18  0728   WBC 4.59 4.29    HGB 7.9* 8.1*   HCT 24.2* 25.5*    175     CMP:     Recent Labs  Lab 18  0346 18  0728    136   K 3.8 3.4*   CL 94* 90*   CO2 35* 36*   GLU 97 88   BUN 61* 58*   CREATININE 3.6* 3.6*   CALCIUM 9.5 9.7   ALBUMIN 3.2* 3.1*   ANIONGAP 9 10   EGFRNONAA 11.9* 11.9*     Cardiac Markers:     Recent Labs  Lab 18  0855   BNP 4,103*       Significant ImaginD echo  CONCLUSIONS     1 - Moderate left ventricular enlargement.     2 - Severely depressed left ventricular systolic function (EF 25-30%).     3 - Right ventricular enlargement with moderately depressed systolic function.     4 - Biatrial enlargement.     5 - Mild to moderate tricuspid regurgitation.     6 - Severe functional mitral regurgitation.     7 - Pulmonary hypertension. The estimated PA systolic pressure is 50 mmHg.     8 - Increased central venous pressure.    No

## 2019-12-11 NOTE — ED ADULT NURSE NOTE - CHIEF COMPLAINT QUOTE
patient c/o of abdominal pain , chest pain , N/V headache , c/o of back pain started 2 days , patient  last drink 2 days ago , c/o of hallucinations

## 2019-12-11 NOTE — ED PROVIDER NOTE - NS ED ROS FT
Constitutional: no fevers or chills  Cardiac: no palpitations, +chest pain  Lungs: no shortness of breath, wheezes  Abd: +abd pain, nausea, vomiting, denies diarrhea  Genitourinary: no dysuria, increased urinary frequency, hematuria  Neurology: no sensorimotor deficits, + dizziness, + headache, no visual changes  Skin: no rashes  All other ROS negative except as per HPI

## 2019-12-11 NOTE — ED PROVIDER NOTE - CLINICAL SUMMARY MEDICAL DECISION MAKING FREE TEXT BOX
Male presents to ED for evaluation of alcohol withdrawal - Will check labs, EKG, imaging, medicate, adm

## 2019-12-11 NOTE — ED PROVIDER NOTE - OBJECTIVE STATEMENT
43 yo male with PMH EtOH abuse presents to ED in alcohol withdrawal. Patient reports last drink was 2 days ago and since has been having abd pain, chest pain, nausea, vomiting, headache, generalized weakness. States he doesn't feel well, cannot hold any food down, decreased appetite. Denies diarrhea. +visual hallucinations

## 2019-12-11 NOTE — H&P ADULT - ASSESSMENT
44m with history of alchohol abuse with 44m with history of alchohol abuse with repeated visists for the same

## 2019-12-11 NOTE — ED PROVIDER NOTE - PHYSICAL EXAMINATION
Gen: tired appearing, diaphoretic  Cardiac: tachycardic  Pulm: Clear to auscultation bilaterally  Abd: soft, nontender, nondistended, no guarding  Back: neg CVA ttp, nontender spine  Extremity: no edema, no deformity, warm and well perfused, FROM all extremities    Neuro: awake, alert, oriented x 3, sensorimotor intact

## 2019-12-11 NOTE — ED ADULT TRIAGE NOTE - CHIEF COMPLAINT QUOTE
patient c/o of abdominal pain , chest pain , N/V headache , c/o of back pain started 2 days , patient  last drink 2 days ago , patient c/o of abdominal pain , chest pain , N/V headache , c/o of back pain started 2 days , patient  last drink 2 days ago , c/o of hallucinations

## 2019-12-11 NOTE — ED ADULT TRIAGE NOTE - MODE OF ARRIVAL
Telephone Encounter by Gladys Roberts RN at 09/07/17 02:48 PM     Author:  Gladys Roberts RN Service:  (none) Author Type:  Registered Nurse     Filed:  09/07/17 02:48 PM Encounter Date:  9/7/2017 Status:  Signed     :  Gladys Roberts RN (Registered Nurse)            HM updated.[JV1.1M]  Electronically Signed by:    Gladys Roberts RN , 9/7/2017[JV1.1T]        Revision History        User Key Date/Time User Provider Type Action    > JV1.1 09/07/17 02:48 PM Gladys Roberts RN Registered Nurse Sign    M - Manual, T - Template             EMS

## 2019-12-11 NOTE — H&P ADULT - NSHPPHYSICALEXAM_GEN_ALL_CORE
ICU Vital Signs Last 24 Hrs  T(C): 37.1 (11 Dec 2019 14:41), Max: 37.1 (11 Dec 2019 14:41)  T(F): 98.7 (11 Dec 2019 14:41), Max: 98.7 (11 Dec 2019 14:41)  HR: 115 (11 Dec 2019 14:41) (115 - 140)  BP: 135/87 (11 Dec 2019 14:41) (130/98 - 135/87)  BP(mean): --  ABP: --  ABP(mean): --  RR: 19 (11 Dec 2019 14:41) (19 - 22)  SpO2: 95% (11 Dec 2019 14:41) (95% - 97%)  GENERAL: NAD well-developed  HEAD:  Atraumatic, Normocephalic  EYES: EOMI, PERRLA, conjunctiva and sclera clear  ENMT: No tonsillar erythema, exudates, or enlargement; Moist mucous membranes, Good dentition, No lesions  NECK: Supple, No JVD, Normal thyroid  NERVOUS SYSTEM:  Alert & Oriented X3, Good concentration; Motor Strength 5/5 B/L upper and lower extremities; DTRs 2+ intact and symmetric  CHEST/LUNG: Clear to percussion bilaterally; No rales, rhonchi, wheezing, or rubs  HEART: Regular rate and rhythm; No murmurs, rubs, or gallops  ABDOMEN: Soft, Nontender, Nondistended; Bowel sounds present  EXTREMITIES:  2+ Peripheral Pulses, No clubbing, cyanosis, or edema  LYMPH: No lymphadenopathy   SKIN: No rashes or lesions ICU Vital Signs Last 24 Hrs  T(C): 37.1 (11 Dec 2019 14:41), Max: 37.1 (11 Dec 2019 14:41)  T(F): 98.7 (11 Dec 2019 14:41), Max: 98.7 (11 Dec 2019 14:41)  HR: 115 (11 Dec 2019 14:41) (115 - 140)  BP: 135/87 (11 Dec 2019 14:41) (130/98 - 135/87)  BP(mean): --  ABP: --  ABP(mean): --  RR: 19 (11 Dec 2019 14:41) (19 - 22)  SpO2: 95% (11 Dec 2019 14:41) (95% - 97%)  GENERAL: NAD well-developed  HEAD:  Atraumatic, Normocephalic  EYES: EOMI, PERRLA, conjunctiva and sclera clear  ENMT: No tonsillar erythema, exudates, or enlargement; Moist mucous membranes, Good dentition, No lesions  NECK: Supple, No JVD, Normal thyroid  NERVOUS SYSTEM:  Alert & Oriented X3, Good concentration; Motor Strength 5/5 B/L upper and lower extremities; DTRs 2+ intact and symmetric  CHEST/LUNG: Clear to percussion bilaterally; No rales, rhonchi, wheezing, or rubs  HEART: Regular rate and rhythm; No murmurs, rubs, or gallops  ABDOMEN: Soft, Nontender, Nondistended; Bowel sounds present  EXTREMITIES:  2+ Peripheral Pulses, No clubbing, cyanosis, or edema POSITIVE mild tremors   LYMPH: No lymphadenopathy   SKIN: No rashes or lesions

## 2019-12-11 NOTE — ED ADULT TRIAGE NOTE - IDEAL BODY WEIGHT(KG)
[FreeTextEntry6] : 5 month old tilting head to the right. Mom states that she has had an ongoing neck tilt for the last few months but today is worse . Mom also states that her BMs have increased over the last few days as well , she is afebrile and active .Denies any recent illness 74

## 2019-12-12 DIAGNOSIS — E83.39 OTHER DISORDERS OF PHOSPHORUS METABOLISM: ICD-10-CM

## 2019-12-12 LAB
ANION GAP SERPL CALC-SCNC: 8 MMOL/L — SIGNIFICANT CHANGE UP (ref 5–17)
ANION GAP SERPL CALC-SCNC: 8 MMOL/L — SIGNIFICANT CHANGE UP (ref 5–17)
BUN SERPL-MCNC: 22 MG/DL — SIGNIFICANT CHANGE UP (ref 7–23)
BUN SERPL-MCNC: 28 MG/DL — HIGH (ref 7–23)
CALCIUM SERPL-MCNC: 8.4 MG/DL — LOW (ref 8.5–10.1)
CALCIUM SERPL-MCNC: 9.3 MG/DL — SIGNIFICANT CHANGE UP (ref 8.5–10.1)
CHLORIDE SERPL-SCNC: 92 MMOL/L — LOW (ref 96–108)
CHLORIDE SERPL-SCNC: 92 MMOL/L — LOW (ref 96–108)
CO2 SERPL-SCNC: 32 MMOL/L — HIGH (ref 22–31)
CO2 SERPL-SCNC: 35 MMOL/L — HIGH (ref 22–31)
CREAT SERPL-MCNC: 0.91 MG/DL — SIGNIFICANT CHANGE UP (ref 0.5–1.3)
CREAT SERPL-MCNC: 1.02 MG/DL — SIGNIFICANT CHANGE UP (ref 0.5–1.3)
GLUCOSE SERPL-MCNC: 114 MG/DL — HIGH (ref 70–99)
GLUCOSE SERPL-MCNC: 116 MG/DL — HIGH (ref 70–99)
MAGNESIUM SERPL-MCNC: 1.5 MG/DL — LOW (ref 1.6–2.6)
PHOSPHATE SERPL-MCNC: 2 MG/DL — LOW (ref 2.5–4.5)
POTASSIUM SERPL-MCNC: 2.8 MMOL/L — CRITICAL LOW (ref 3.5–5.3)
POTASSIUM SERPL-MCNC: 3.3 MMOL/L — LOW (ref 3.5–5.3)
POTASSIUM SERPL-SCNC: 2.8 MMOL/L — CRITICAL LOW (ref 3.5–5.3)
POTASSIUM SERPL-SCNC: 3.3 MMOL/L — LOW (ref 3.5–5.3)
SODIUM SERPL-SCNC: 132 MMOL/L — LOW (ref 135–145)
SODIUM SERPL-SCNC: 135 MMOL/L — SIGNIFICANT CHANGE UP (ref 135–145)

## 2019-12-12 PROCEDURE — 99233 SBSQ HOSP IP/OBS HIGH 50: CPT

## 2019-12-12 RX ORDER — FOLIC ACID 0.8 MG
1 TABLET ORAL DAILY
Refills: 0 | Status: DISCONTINUED | OUTPATIENT
Start: 2019-12-12 | End: 2019-12-14

## 2019-12-12 RX ORDER — POTASSIUM CHLORIDE 20 MEQ
10 PACKET (EA) ORAL
Refills: 0 | Status: COMPLETED | OUTPATIENT
Start: 2019-12-12 | End: 2019-12-12

## 2019-12-12 RX ORDER — POTASSIUM CHLORIDE 20 MEQ
40 PACKET (EA) ORAL EVERY 4 HOURS
Refills: 0 | Status: COMPLETED | OUTPATIENT
Start: 2019-12-12 | End: 2019-12-12

## 2019-12-12 RX ORDER — SODIUM,POTASSIUM PHOSPHATES 278-250MG
1 POWDER IN PACKET (EA) ORAL
Refills: 0 | Status: DISCONTINUED | OUTPATIENT
Start: 2019-12-12 | End: 2019-12-12

## 2019-12-12 RX ORDER — MAGNESIUM SULFATE 500 MG/ML
2 VIAL (ML) INJECTION
Refills: 0 | Status: COMPLETED | OUTPATIENT
Start: 2019-12-12 | End: 2019-12-12

## 2019-12-12 RX ORDER — IBUPROFEN 200 MG
400 TABLET ORAL ONCE
Refills: 0 | Status: COMPLETED | OUTPATIENT
Start: 2019-12-12 | End: 2019-12-12

## 2019-12-12 RX ORDER — SODIUM,POTASSIUM PHOSPHATES 278-250MG
1 POWDER IN PACKET (EA) ORAL
Refills: 0 | Status: COMPLETED | OUTPATIENT
Start: 2019-12-12 | End: 2019-12-13

## 2019-12-12 RX ORDER — THIAMINE MONONITRATE (VIT B1) 100 MG
100 TABLET ORAL DAILY
Refills: 0 | Status: DISCONTINUED | OUTPATIENT
Start: 2019-12-12 | End: 2019-12-14

## 2019-12-12 RX ADMIN — Medication 400 MILLIGRAM(S): at 15:02

## 2019-12-12 RX ADMIN — Medication 100 MILLIEQUIVALENT(S): at 00:06

## 2019-12-12 RX ADMIN — Medication 50 GRAM(S): at 15:02

## 2019-12-12 RX ADMIN — Medication 50 MILLIGRAM(S): at 14:33

## 2019-12-12 RX ADMIN — Medication 40 MILLIEQUIVALENT(S): at 09:15

## 2019-12-12 RX ADMIN — Medication 50 MILLIGRAM(S): at 00:06

## 2019-12-12 RX ADMIN — Medication 100 MILLIEQUIVALENT(S): at 09:22

## 2019-12-12 RX ADMIN — Medication 30 MILLILITER(S): at 18:45

## 2019-12-12 RX ADMIN — Medication 1 TABLET(S): at 21:48

## 2019-12-12 RX ADMIN — Medication 50 MILLIGRAM(S): at 05:23

## 2019-12-12 RX ADMIN — Medication 100 MILLIEQUIVALENT(S): at 13:22

## 2019-12-12 RX ADMIN — Medication 50 MILLIGRAM(S): at 21:49

## 2019-12-12 RX ADMIN — Medication 30 MILLILITER(S): at 21:48

## 2019-12-12 RX ADMIN — Medication 400 MILLIGRAM(S): at 16:05

## 2019-12-12 RX ADMIN — Medication 30 MILLILITER(S): at 12:47

## 2019-12-12 RX ADMIN — Medication 40 MILLIEQUIVALENT(S): at 14:39

## 2019-12-12 RX ADMIN — Medication 50 GRAM(S): at 11:14

## 2019-12-12 NOTE — PROGRESS NOTE ADULT - PROBLEM SELECTOR PLAN 1
c/w librium  CIWA protocol  c/w folic acid, thiamine c/w librium  CIWA protocol  c/w folic acid, thiamine  f/u with outpt detox

## 2019-12-12 NOTE — PROGRESS NOTE ADULT - SUBJECTIVE AND OBJECTIVE BOX
DEEPIKA [id number]  Patient is a 44y old  Male who presents with a chief complaint of alcohol withdrawal.        Subjective: Pt seen and examined.      Daily     Daily   Vital Signs Last 24 Hrs  T(C): 36.9 (12 Dec 2019 13:05), Max: 37.7 (11 Dec 2019 20:55)  T(F): 98.5 (12 Dec 2019 13:05), Max: 99.8 (11 Dec 2019 20:55)  HR: 93 (12 Dec 2019 13:05) (81 - 112)  BP: 130/92 (12 Dec 2019 13:05) (124/80 - 141/85)  BP(mean): --  RR: 17 (12 Dec 2019 13:05) (15 - 17)  SpO2: 97% (12 Dec 2019 13:05) (96% - 99%)                        15.8   12.14 )-----------( 112      ( 11 Dec 2019 11:02 )             44.9   12-12    135  |  92<L>  |  28<H>  ----------------------------<  114<H>  2.8<LL>   |  35<H>  |  1.02    Ca    8.4<L>      12 Dec 2019 07:48  Phos  2.0     12-12  Mg     1.5     12-12    TPro  8.3  /  Alb  3.9  /  TBili  2.0<H>  /  DBili  .77<H>  /  AST  111<H>  /  ALT  78  /  AlkPhos  86  12-11  PT/INR - ( 11 Dec 2019 11:02 )   PT: 11.9 sec;   INR: 1.06 ratio         PTT - ( 11 Dec 2019 11:02 )  PTT:32.4 secCAPILLARY BLOOD GLUCOSE      CARDIAC MARKERS ( 11 Dec 2019 13:55 )  <.015 ng/mL / x     / x     / x     / x            MEDICATIONS  (STANDING):  chlordiazePOXIDE   Oral   chlordiazePOXIDE 50 milliGRAM(s) Oral every 8 hours  folic acid 1 milliGRAM(s) Oral daily  ibuprofen  Tablet. 400 milliGRAM(s) Oral once  influenza   Vaccine 0.5 milliLiter(s) IntraMuscular once  magnesium sulfate  IVPB 2 Gram(s) IV Intermittent every 1 hour  potassium chloride  10 mEq/100 mL IVPB 10 milliEquivalent(s) IV Intermittent every 1 hour  thiamine 100 milliGRAM(s) Oral daily    MEDICATIONS  (PRN):  aluminum hydroxide/magnesium hydroxide/simethicone Suspension 30 milliLiter(s) Oral every 4 hours PRN Dyspepsia

## 2019-12-13 LAB
ALBUMIN SERPL ELPH-MCNC: 3.6 G/DL — SIGNIFICANT CHANGE UP (ref 3.3–5)
ALP SERPL-CCNC: 108 U/L — SIGNIFICANT CHANGE UP (ref 40–120)
ALT FLD-CCNC: 126 U/L — HIGH (ref 12–78)
ANION GAP SERPL CALC-SCNC: 8 MMOL/L — SIGNIFICANT CHANGE UP (ref 5–17)
AST SERPL-CCNC: 211 U/L — HIGH (ref 15–37)
BILIRUB SERPL-MCNC: 1.7 MG/DL — HIGH (ref 0.2–1.2)
BUN SERPL-MCNC: 23 MG/DL — SIGNIFICANT CHANGE UP (ref 7–23)
CALCIUM SERPL-MCNC: 9.1 MG/DL — SIGNIFICANT CHANGE UP (ref 8.5–10.1)
CHLORIDE SERPL-SCNC: 97 MMOL/L — SIGNIFICANT CHANGE UP (ref 96–108)
CO2 SERPL-SCNC: 29 MMOL/L — SIGNIFICANT CHANGE UP (ref 22–31)
CREAT SERPL-MCNC: 0.74 MG/DL — SIGNIFICANT CHANGE UP (ref 0.5–1.3)
GLUCOSE SERPL-MCNC: 110 MG/DL — HIGH (ref 70–99)
HCT VFR BLD CALC: 37.1 % — LOW (ref 39–50)
HGB BLD-MCNC: 12.8 G/DL — LOW (ref 13–17)
MAGNESIUM SERPL-MCNC: 2.5 MG/DL — SIGNIFICANT CHANGE UP (ref 1.6–2.6)
MCHC RBC-ENTMCNC: 33.1 PG — SIGNIFICANT CHANGE UP (ref 27–34)
MCHC RBC-ENTMCNC: 34.5 GM/DL — SIGNIFICANT CHANGE UP (ref 32–36)
MCV RBC AUTO: 95.9 FL — SIGNIFICANT CHANGE UP (ref 80–100)
NRBC # BLD: 0 /100 WBCS — SIGNIFICANT CHANGE UP (ref 0–0)
PHOSPHATE SERPL-MCNC: 1.6 MG/DL — LOW (ref 2.5–4.5)
PLATELET # BLD AUTO: 62 K/UL — LOW (ref 150–400)
POTASSIUM SERPL-MCNC: 3.1 MMOL/L — LOW (ref 3.5–5.3)
POTASSIUM SERPL-SCNC: 3.1 MMOL/L — LOW (ref 3.5–5.3)
PROT SERPL-MCNC: 7.6 GM/DL — SIGNIFICANT CHANGE UP (ref 6–8.3)
RBC # BLD: 3.87 M/UL — LOW (ref 4.2–5.8)
RBC # FLD: 12.3 % — SIGNIFICANT CHANGE UP (ref 10.3–14.5)
SODIUM SERPL-SCNC: 134 MMOL/L — LOW (ref 135–145)
WBC # BLD: 5.79 K/UL — SIGNIFICANT CHANGE UP (ref 3.8–10.5)
WBC # FLD AUTO: 5.79 K/UL — SIGNIFICANT CHANGE UP (ref 3.8–10.5)

## 2019-12-13 PROCEDURE — 99232 SBSQ HOSP IP/OBS MODERATE 35: CPT

## 2019-12-13 RX ORDER — SODIUM,POTASSIUM PHOSPHATES 278-250MG
1 POWDER IN PACKET (EA) ORAL
Refills: 0 | Status: COMPLETED | OUTPATIENT
Start: 2019-12-13 | End: 2019-12-14

## 2019-12-13 RX ORDER — ACETAMINOPHEN 500 MG
650 TABLET ORAL EVERY 6 HOURS
Refills: 0 | Status: DISCONTINUED | OUTPATIENT
Start: 2019-12-13 | End: 2019-12-14

## 2019-12-13 RX ADMIN — Medication 30 MILLILITER(S): at 02:53

## 2019-12-13 RX ADMIN — Medication 50 MILLIGRAM(S): at 06:22

## 2019-12-13 RX ADMIN — Medication 50 MILLIGRAM(S): at 17:27

## 2019-12-13 RX ADMIN — Medication 650 MILLIGRAM(S): at 19:46

## 2019-12-13 RX ADMIN — Medication 1 TABLET(S): at 08:05

## 2019-12-13 RX ADMIN — Medication 1 MILLIGRAM(S): at 11:50

## 2019-12-13 RX ADMIN — Medication 650 MILLIGRAM(S): at 18:47

## 2019-12-13 RX ADMIN — Medication 30 MILLILITER(S): at 21:27

## 2019-12-13 RX ADMIN — Medication 100 MILLIGRAM(S): at 11:50

## 2019-12-13 RX ADMIN — Medication 1 PACKET(S): at 17:26

## 2019-12-13 NOTE — PROGRESS NOTE ADULT - SUBJECTIVE AND OBJECTIVE BOX
DEEPIKA [id number]  Patient is a 44y old  Male who presents with a chief complaint of alchohol (12 Dec 2019 14:53)        Subjective: Pt seen and examined      Daily     Daily   Vital Signs Last 24 Hrs  T(C): 37.2 (13 Dec 2019 12:14), Max: 37.2 (13 Dec 2019 12:14)  T(F): 98.9 (13 Dec 2019 12:14), Max: 98.9 (13 Dec 2019 12:14)  HR: 94 (13 Dec 2019 12:14) (85 - 94)  BP: 121/84 (13 Dec 2019 12:14) (118/85 - 121/84)  BP(mean): --  RR: 17 (13 Dec 2019 12:14) (17 - 18)  SpO2: 97% (13 Dec 2019 12:14) (97% - 99%)                        12.8   5.79  )-----------( 62       ( 13 Dec 2019 07:00 )             37.1   12-13    134<L>  |  97  |  23  ----------------------------<  110<H>  3.1<L>   |  29  |  0.74    Ca    9.1      13 Dec 2019 07:00  Phos  1.6     12-13  Mg     2.5     12-13    TPro  7.6  /  Alb  3.6  /  TBili  1.7<H>  /  DBili  x   /  AST  211<H>  /  ALT  126<H>  /  AlkPhos  108  12-13  CAPILLARY BLOOD GLUCOSE            MEDICATIONS  (STANDING):  chlordiazePOXIDE   Oral   chlordiazePOXIDE 50 milliGRAM(s) Oral every 12 hours  folic acid 1 milliGRAM(s) Oral daily  influenza   Vaccine 0.5 milliLiter(s) IntraMuscular once  potassium phosphate / sodium phosphate powder 1 Packet(s) Oral two times a day  thiamine 100 milliGRAM(s) Oral daily    MEDICATIONS  (PRN):  acetaminophen   Tablet .. 650 milliGRAM(s) Oral every 6 hours PRN Mild Pain (1 - 3)  aluminum hydroxide/magnesium hydroxide/simethicone Suspension 30 milliLiter(s) Oral every 4 hours PRN Dyspepsia DEEPIKA [id number]  Patient is a 44y old  Male who presents with a chief complaint of alchohol (12 Dec 2019 14:53)        Subjective: Pt seen and examined. Pt feels better.      Daily     Daily   Vital Signs Last 24 Hrs  T(C): 37.2 (13 Dec 2019 12:14), Max: 37.2 (13 Dec 2019 12:14)  T(F): 98.9 (13 Dec 2019 12:14), Max: 98.9 (13 Dec 2019 12:14)  HR: 94 (13 Dec 2019 12:14) (85 - 94)  BP: 121/84 (13 Dec 2019 12:14) (118/85 - 121/84)  BP(mean): --  RR: 17 (13 Dec 2019 12:14) (17 - 18)  SpO2: 97% (13 Dec 2019 12:14) (97% - 99%)                        12.8   5.79  )-----------( 62       ( 13 Dec 2019 07:00 )             37.1   12-13    134<L>  |  97  |  23  ----------------------------<  110<H>  3.1<L>   |  29  |  0.74    Ca    9.1      13 Dec 2019 07:00  Phos  1.6     12-13  Mg     2.5     12-13    TPro  7.6  /  Alb  3.6  /  TBili  1.7<H>  /  DBili  x   /  AST  211<H>  /  ALT  126<H>  /  AlkPhos  108  12-13  CAPILLARY BLOOD GLUCOSE            MEDICATIONS  (STANDING):  chlordiazePOXIDE   Oral   chlordiazePOXIDE 50 milliGRAM(s) Oral every 12 hours  folic acid 1 milliGRAM(s) Oral daily  influenza   Vaccine 0.5 milliLiter(s) IntraMuscular once  potassium phosphate / sodium phosphate powder 1 Packet(s) Oral two times a day  thiamine 100 milliGRAM(s) Oral daily    MEDICATIONS  (PRN):  acetaminophen   Tablet .. 650 milliGRAM(s) Oral every 6 hours PRN Mild Pain (1 - 3)  aluminum hydroxide/magnesium hydroxide/simethicone Suspension 30 milliLiter(s) Oral every 4 hours PRN Dyspepsia

## 2019-12-14 ENCOUNTER — TRANSCRIPTION ENCOUNTER (OUTPATIENT)
Age: 44
End: 2019-12-14

## 2019-12-14 VITALS
TEMPERATURE: 98 F | OXYGEN SATURATION: 98 % | SYSTOLIC BLOOD PRESSURE: 128 MMHG | RESPIRATION RATE: 18 BRPM | HEART RATE: 100 BPM | DIASTOLIC BLOOD PRESSURE: 84 MMHG

## 2019-12-14 LAB
ANION GAP SERPL CALC-SCNC: 5 MMOL/L — SIGNIFICANT CHANGE UP (ref 5–17)
BUN SERPL-MCNC: 25 MG/DL — HIGH (ref 7–23)
CALCIUM SERPL-MCNC: 10.2 MG/DL — HIGH (ref 8.5–10.1)
CHLORIDE SERPL-SCNC: 100 MMOL/L — SIGNIFICANT CHANGE UP (ref 96–108)
CO2 SERPL-SCNC: 30 MMOL/L — SIGNIFICANT CHANGE UP (ref 22–31)
CREAT SERPL-MCNC: 0.86 MG/DL — SIGNIFICANT CHANGE UP (ref 0.5–1.3)
GLUCOSE SERPL-MCNC: 103 MG/DL — HIGH (ref 70–99)
PHOSPHATE SERPL-MCNC: 3.5 MG/DL — SIGNIFICANT CHANGE UP (ref 2.5–4.5)
POTASSIUM SERPL-MCNC: 3.4 MMOL/L — LOW (ref 3.5–5.3)
POTASSIUM SERPL-SCNC: 3.4 MMOL/L — LOW (ref 3.5–5.3)
SODIUM SERPL-SCNC: 135 MMOL/L — SIGNIFICANT CHANGE UP (ref 135–145)

## 2019-12-14 PROCEDURE — 99239 HOSP IP/OBS DSCHRG MGMT >30: CPT

## 2019-12-14 RX ORDER — POTASSIUM CHLORIDE 20 MEQ
40 PACKET (EA) ORAL ONCE
Refills: 0 | Status: COMPLETED | OUTPATIENT
Start: 2019-12-14 | End: 2019-12-14

## 2019-12-14 RX ORDER — DIPHENHYDRAMINE HCL 50 MG
50 CAPSULE ORAL ONCE
Refills: 0 | Status: COMPLETED | OUTPATIENT
Start: 2019-12-14 | End: 2019-12-14

## 2019-12-14 RX ADMIN — Medication 100 MILLIGRAM(S): at 11:09

## 2019-12-14 RX ADMIN — Medication 50 MILLIGRAM(S): at 00:29

## 2019-12-14 RX ADMIN — Medication 650 MILLIGRAM(S): at 00:34

## 2019-12-14 RX ADMIN — Medication 40 MILLIEQUIVALENT(S): at 12:46

## 2019-12-14 RX ADMIN — Medication 50 MILLIGRAM(S): at 05:20

## 2019-12-14 RX ADMIN — Medication 650 MILLIGRAM(S): at 01:34

## 2019-12-14 RX ADMIN — Medication 1 MILLIGRAM(S): at 11:09

## 2019-12-14 RX ADMIN — Medication 1 PACKET(S): at 05:20

## 2019-12-14 RX ADMIN — Medication 650 MILLIGRAM(S): at 10:49

## 2019-12-14 RX ADMIN — Medication 650 MILLIGRAM(S): at 11:10

## 2019-12-14 NOTE — DISCHARGE NOTE PROVIDER - CARE PROVIDER_API CALL
Vinay Escalante)  Internal Medicine  [address], [address]  [place] [address]  Phone: [phone]  Fax: [phone]  Follow Up Time:

## 2019-12-14 NOTE — DISCHARGE NOTE NURSING/CASE MANAGEMENT/SOCIAL WORK - PATIENT PORTAL LINK FT
You can access the FollowMyHealth Patient Portal offered by [facility] by registering at the following website: [email or web address]. By joining [email or web address]’s FollowMyHealth portal, you will also be able to view your health information using other applications (apps) compatible with our system.

## 2019-12-14 NOTE — DISCHARGE NOTE PROVIDER - HOSPITAL COURSE
HPI:    The patient is a 44y Male complaining of detox of alchohol - patient celebrated with alchohol and now complain of nausea and vomiting         with tremors - patient has been in the hospital for the same thing (11 Dec 2019 15:29)        · Assessment        44m with history of alchohol abuse with repeated visists for the same          Problem/Plan - 1:    ·  Problem: Alcohol withdrawal syndrome without complication.  Plan: c/w librium    CIWA protocol    c/w folic acid, thiamine    f/u with outpt detox.          Problem/Plan - 2:    ·  Problem: Hypokalemia.  Plan: replete, f/u K.          Problem/Plan - 3:    ·  Problem: Hypomagnesemia.  Plan: repleted.          Problem/Plan - 4:    ·  Problem: Hypophosphatemia.  Plan: replete, f/u. HPI:    The patient is a 44y Male complaining of detox of alchohol - patient celebrated with alchohol and now complain of nausea and vomiting         with tremors - patient has been in the hospital for the same thing (11 Dec 2019 15:29)        · Assessment        44m with history of alchohol abuse with repeated visists for the same          Problem/Plan - 1:    ·  Problem: Alcohol withdrawal syndrome without complication.  Plan: c/w librium    CIWA protocol    c/w folic acid, thiamine    f/u with outpt detox.          Problem/Plan - 2:    ·  Problem: Hypokalemia.  Plan: replete, f/u K.          Problem/Plan - 3:    ·  Problem: Hypomagnesemia.  Plan: repleted.          Problem/Plan - 4:    ·  Problem: Hypophosphatemia.  Plan: replete, f/u.         Problem 5    Acute HAIDER present on admission secondary ro prerenal dehydration patient creatnine is normally  0.5 in creased to 1.6 then returned to normal with fluid hydration

## 2019-12-14 NOTE — DISCHARGE NOTE PROVIDER - NSFOLLOWUPCLINICS_GEN_ALL_ED_FT
Alcoholics Anonymous - 24 hour hotline  Alcoholics Anonymous  .  [place]   Phone: [phone]  Fax:   Follow Up Time:

## 2019-12-14 NOTE — DISCHARGE NOTE PROVIDER - NSDCCPCAREPLAN_GEN_ALL_CORE_FT
PRINCIPAL DISCHARGE DIAGNOSIS  Diagnosis: Alcohol withdrawal  Assessment and Plan of Treatment: Please refrain from drinking seek AAA or rehab

## 2019-12-18 DIAGNOSIS — F10.230 ALCOHOL DEPENDENCE WITH WITHDRAWAL, UNCOMPLICATED: ICD-10-CM

## 2019-12-18 DIAGNOSIS — E83.39 OTHER DISORDERS OF PHOSPHORUS METABOLISM: ICD-10-CM

## 2019-12-18 DIAGNOSIS — R11.2 NAUSEA WITH VOMITING, UNSPECIFIED: ICD-10-CM

## 2019-12-18 DIAGNOSIS — E83.42 HYPOMAGNESEMIA: ICD-10-CM

## 2019-12-18 DIAGNOSIS — F10.10 ALCOHOL ABUSE, UNCOMPLICATED: ICD-10-CM

## 2019-12-18 DIAGNOSIS — E87.6 HYPOKALEMIA: ICD-10-CM

## 2020-02-11 ENCOUNTER — TRANSCRIPTION ENCOUNTER (OUTPATIENT)
Age: 45
End: 2020-02-11

## 2020-02-11 ENCOUNTER — INPATIENT (INPATIENT)
Facility: HOSPITAL | Age: 45
LOS: 0 days | Discharge: HOME | End: 2020-02-11
Attending: INTERNAL MEDICINE | Admitting: INTERNAL MEDICINE
Payer: MEDICAID

## 2020-02-11 VITALS
HEART RATE: 88 BPM | SYSTOLIC BLOOD PRESSURE: 128 MMHG | OXYGEN SATURATION: 99 % | DIASTOLIC BLOOD PRESSURE: 82 MMHG | RESPIRATION RATE: 18 BRPM

## 2020-02-11 VITALS
RESPIRATION RATE: 19 BRPM | WEIGHT: 164.91 LBS | OXYGEN SATURATION: 100 % | HEART RATE: 111 BPM | TEMPERATURE: 97 F | SYSTOLIC BLOOD PRESSURE: 180 MMHG | DIASTOLIC BLOOD PRESSURE: 120 MMHG | HEIGHT: 70 IN

## 2020-02-11 DIAGNOSIS — Z87.19 PERSONAL HISTORY OF OTHER DISEASES OF THE DIGESTIVE SYSTEM: ICD-10-CM

## 2020-02-11 DIAGNOSIS — S02.652S: Chronic | ICD-10-CM

## 2020-02-11 DIAGNOSIS — F10.10 ALCOHOL ABUSE, UNCOMPLICATED: ICD-10-CM

## 2020-02-11 DIAGNOSIS — N17.9 ACUTE KIDNEY FAILURE, UNSPECIFIED: ICD-10-CM

## 2020-02-11 DIAGNOSIS — F10.230 ALCOHOL DEPENDENCE WITH WITHDRAWAL, UNCOMPLICATED: ICD-10-CM

## 2020-02-11 LAB
ALBUMIN SERPL ELPH-MCNC: 3.6 G/DL — SIGNIFICANT CHANGE UP (ref 3.3–5)
ALBUMIN SERPL ELPH-MCNC: 4.7 G/DL — SIGNIFICANT CHANGE UP (ref 3.3–5)
ALP SERPL-CCNC: 102 U/L — SIGNIFICANT CHANGE UP (ref 40–120)
ALP SERPL-CCNC: 80 U/L — SIGNIFICANT CHANGE UP (ref 40–120)
ALT FLD-CCNC: 118 U/L — HIGH (ref 12–78)
ALT FLD-CCNC: 84 U/L — HIGH (ref 12–78)
AMYLASE P1 CFR SERPL: 137 U/L — HIGH (ref 25–115)
ANION GAP SERPL CALC-SCNC: 22 MMOL/L — HIGH (ref 5–17)
ANION GAP SERPL CALC-SCNC: 7 MMOL/L — SIGNIFICANT CHANGE UP (ref 5–17)
APTT BLD: 32.8 SEC — SIGNIFICANT CHANGE UP (ref 28.5–37)
AST SERPL-CCNC: 147 U/L — HIGH (ref 15–37)
AST SERPL-CCNC: 92 U/L — HIGH (ref 15–37)
BASOPHILS # BLD AUTO: 0.05 K/UL — SIGNIFICANT CHANGE UP (ref 0–0.2)
BASOPHILS NFR BLD AUTO: 0.5 % — SIGNIFICANT CHANGE UP (ref 0–2)
BILIRUB SERPL-MCNC: 2 MG/DL — HIGH (ref 0.2–1.2)
BILIRUB SERPL-MCNC: 2.7 MG/DL — HIGH (ref 0.2–1.2)
BUN SERPL-MCNC: 20 MG/DL — SIGNIFICANT CHANGE UP (ref 7–23)
BUN SERPL-MCNC: 21 MG/DL — SIGNIFICANT CHANGE UP (ref 7–23)
CALCIUM SERPL-MCNC: 10.5 MG/DL — HIGH (ref 8.5–10.1)
CALCIUM SERPL-MCNC: 8.1 MG/DL — LOW (ref 8.5–10.1)
CHLORIDE SERPL-SCNC: 94 MMOL/L — LOW (ref 96–108)
CHLORIDE SERPL-SCNC: 99 MMOL/L — SIGNIFICANT CHANGE UP (ref 96–108)
CO2 SERPL-SCNC: 25 MMOL/L — SIGNIFICANT CHANGE UP (ref 22–31)
CO2 SERPL-SCNC: 34 MMOL/L — HIGH (ref 22–31)
CREAT SERPL-MCNC: 0.95 MG/DL — SIGNIFICANT CHANGE UP (ref 0.5–1.3)
CREAT SERPL-MCNC: 1.51 MG/DL — HIGH (ref 0.5–1.3)
EOSINOPHIL # BLD AUTO: 0 K/UL — SIGNIFICANT CHANGE UP (ref 0–0.5)
EOSINOPHIL NFR BLD AUTO: 0 % — SIGNIFICANT CHANGE UP (ref 0–6)
ETHANOL SERPL-MCNC: <10 MG/DL — SIGNIFICANT CHANGE UP (ref 0–10)
GLUCOSE SERPL-MCNC: 135 MG/DL — HIGH (ref 70–99)
GLUCOSE SERPL-MCNC: 94 MG/DL — SIGNIFICANT CHANGE UP (ref 70–99)
HCT VFR BLD CALC: 37.8 % — LOW (ref 39–50)
HCT VFR BLD CALC: 45 % — SIGNIFICANT CHANGE UP (ref 39–50)
HGB BLD-MCNC: 12.8 G/DL — LOW (ref 13–17)
HGB BLD-MCNC: 15.7 G/DL — SIGNIFICANT CHANGE UP (ref 13–17)
IMM GRANULOCYTES NFR BLD AUTO: 0.2 % — SIGNIFICANT CHANGE UP (ref 0–1.5)
INR BLD: 1.24 RATIO — HIGH (ref 0.88–1.16)
LIDOCAIN IGE QN: 217 U/L — SIGNIFICANT CHANGE UP (ref 73–393)
LYMPHOCYTES # BLD AUTO: 1.21 K/UL — SIGNIFICANT CHANGE UP (ref 1–3.3)
LYMPHOCYTES # BLD AUTO: 11.6 % — LOW (ref 13–44)
MAGNESIUM SERPL-MCNC: 1.1 MG/DL — LOW (ref 1.6–2.6)
MCHC RBC-ENTMCNC: 32.3 PG — SIGNIFICANT CHANGE UP (ref 27–34)
MCHC RBC-ENTMCNC: 32.7 PG — SIGNIFICANT CHANGE UP (ref 27–34)
MCHC RBC-ENTMCNC: 33.9 GM/DL — SIGNIFICANT CHANGE UP (ref 32–36)
MCHC RBC-ENTMCNC: 34.9 GM/DL — SIGNIFICANT CHANGE UP (ref 32–36)
MCV RBC AUTO: 93.8 FL — SIGNIFICANT CHANGE UP (ref 80–100)
MCV RBC AUTO: 95.5 FL — SIGNIFICANT CHANGE UP (ref 80–100)
MONOCYTES # BLD AUTO: 0.64 K/UL — SIGNIFICANT CHANGE UP (ref 0–0.9)
MONOCYTES NFR BLD AUTO: 6.2 % — SIGNIFICANT CHANGE UP (ref 2–14)
NEUTROPHILS # BLD AUTO: 8.47 K/UL — HIGH (ref 1.8–7.4)
NEUTROPHILS NFR BLD AUTO: 81.5 % — HIGH (ref 43–77)
NRBC # BLD: 0 /100 WBCS — SIGNIFICANT CHANGE UP (ref 0–0)
NRBC # BLD: 0 /100 WBCS — SIGNIFICANT CHANGE UP (ref 0–0)
PHOSPHATE SERPL-MCNC: 2.7 MG/DL — SIGNIFICANT CHANGE UP (ref 2.5–4.5)
PLATELET # BLD AUTO: 141 K/UL — LOW (ref 150–400)
PLATELET # BLD AUTO: 240 K/UL — SIGNIFICANT CHANGE UP (ref 150–400)
POTASSIUM SERPL-MCNC: 3.3 MMOL/L — LOW (ref 3.5–5.3)
POTASSIUM SERPL-MCNC: 3.6 MMOL/L — SIGNIFICANT CHANGE UP (ref 3.5–5.3)
POTASSIUM SERPL-SCNC: 3.3 MMOL/L — LOW (ref 3.5–5.3)
POTASSIUM SERPL-SCNC: 3.6 MMOL/L — SIGNIFICANT CHANGE UP (ref 3.5–5.3)
PROT SERPL-MCNC: 7.8 GM/DL — SIGNIFICANT CHANGE UP (ref 6–8.3)
PROT SERPL-MCNC: 9.8 GM/DL — HIGH (ref 6–8.3)
PROTHROM AB SERPL-ACNC: 14 SEC — HIGH (ref 10–12.9)
RBC # BLD: 3.96 M/UL — LOW (ref 4.2–5.8)
RBC # BLD: 4.8 M/UL — SIGNIFICANT CHANGE UP (ref 4.2–5.8)
RBC # FLD: 12.6 % — SIGNIFICANT CHANGE UP (ref 10.3–14.5)
RBC # FLD: 12.9 % — SIGNIFICANT CHANGE UP (ref 10.3–14.5)
SODIUM SERPL-SCNC: 140 MMOL/L — SIGNIFICANT CHANGE UP (ref 135–145)
SODIUM SERPL-SCNC: 141 MMOL/L — SIGNIFICANT CHANGE UP (ref 135–145)
WBC # BLD: 10.39 K/UL — SIGNIFICANT CHANGE UP (ref 3.8–10.5)
WBC # BLD: 8.49 K/UL — SIGNIFICANT CHANGE UP (ref 3.8–10.5)
WBC # FLD AUTO: 10.39 K/UL — SIGNIFICANT CHANGE UP (ref 3.8–10.5)
WBC # FLD AUTO: 8.49 K/UL — SIGNIFICANT CHANGE UP (ref 3.8–10.5)

## 2020-02-11 PROCEDURE — 71045 X-RAY EXAM CHEST 1 VIEW: CPT | Mod: 26

## 2020-02-11 PROCEDURE — 99291 CRITICAL CARE FIRST HOUR: CPT

## 2020-02-11 PROCEDURE — 99222 1ST HOSP IP/OBS MODERATE 55: CPT

## 2020-02-11 PROCEDURE — 93010 ELECTROCARDIOGRAM REPORT: CPT

## 2020-02-11 RX ORDER — SODIUM CHLORIDE 9 MG/ML
1000 INJECTION INTRAMUSCULAR; INTRAVENOUS; SUBCUTANEOUS
Refills: 0 | Status: DISCONTINUED | OUTPATIENT
Start: 2020-02-11 | End: 2020-02-11

## 2020-02-11 RX ORDER — ONDANSETRON 8 MG/1
4 TABLET, FILM COATED ORAL ONCE
Refills: 0 | Status: COMPLETED | OUTPATIENT
Start: 2020-02-11 | End: 2020-02-11

## 2020-02-11 RX ORDER — THIAMINE MONONITRATE (VIT B1) 100 MG
1 TABLET ORAL
Qty: 30 | Refills: 0
Start: 2020-02-11 | End: 2020-03-11

## 2020-02-11 RX ORDER — FOLIC ACID 0.8 MG
1 TABLET ORAL
Qty: 30 | Refills: 0
Start: 2020-02-11 | End: 2020-11-26

## 2020-02-11 RX ORDER — MAGNESIUM OXIDE 400 MG ORAL TABLET 241.3 MG
400 TABLET ORAL DAILY
Refills: 0 | Status: DISCONTINUED | OUTPATIENT
Start: 2020-02-11 | End: 2020-02-11

## 2020-02-11 RX ORDER — THIAMINE MONONITRATE (VIT B1) 100 MG
100 TABLET ORAL DAILY
Refills: 0 | Status: DISCONTINUED | OUTPATIENT
Start: 2020-02-11 | End: 2020-02-11

## 2020-02-11 RX ORDER — HEPARIN SODIUM 5000 [USP'U]/ML
5000 INJECTION INTRAVENOUS; SUBCUTANEOUS EVERY 12 HOURS
Refills: 0 | Status: DISCONTINUED | OUTPATIENT
Start: 2020-02-11 | End: 2020-02-11

## 2020-02-11 RX ORDER — POTASSIUM CHLORIDE 20 MEQ
2 PACKET (EA) ORAL
Qty: 10 | Refills: 0
Start: 2020-02-11 | End: 2020-02-15

## 2020-02-11 RX ORDER — FOLIC ACID 0.8 MG
1 TABLET ORAL DAILY
Refills: 0 | Status: DISCONTINUED | OUTPATIENT
Start: 2020-02-11 | End: 2020-02-11

## 2020-02-11 RX ORDER — MAGNESIUM OXIDE 400 MG ORAL TABLET 241.3 MG
1 TABLET ORAL
Qty: 7 | Refills: 0
Start: 2020-02-11 | End: 2020-02-17

## 2020-02-11 RX ORDER — SODIUM CHLORIDE 9 MG/ML
1000 INJECTION INTRAMUSCULAR; INTRAVENOUS; SUBCUTANEOUS ONCE
Refills: 0 | Status: COMPLETED | OUTPATIENT
Start: 2020-02-11 | End: 2020-02-11

## 2020-02-11 RX ORDER — FOLIC ACID 0.8 MG
1 TABLET ORAL
Qty: 30 | Refills: 0
Start: 2020-02-11 | End: 2020-03-11

## 2020-02-11 RX ORDER — ONDANSETRON 8 MG/1
4 TABLET, FILM COATED ORAL EVERY 6 HOURS
Refills: 0 | Status: DISCONTINUED | OUTPATIENT
Start: 2020-02-11 | End: 2020-02-11

## 2020-02-11 RX ORDER — POTASSIUM CHLORIDE 20 MEQ
40 PACKET (EA) ORAL DAILY
Refills: 0 | Status: DISCONTINUED | OUTPATIENT
Start: 2020-02-11 | End: 2020-02-11

## 2020-02-11 RX ADMIN — ONDANSETRON 4 MILLIGRAM(S): 8 TABLET, FILM COATED ORAL at 02:51

## 2020-02-11 RX ADMIN — Medication 2 MILLIGRAM(S): at 06:49

## 2020-02-11 RX ADMIN — SODIUM CHLORIDE 75 MILLILITER(S): 9 INJECTION INTRAMUSCULAR; INTRAVENOUS; SUBCUTANEOUS at 05:15

## 2020-02-11 RX ADMIN — Medication 2 MILLIGRAM(S): at 13:33

## 2020-02-11 RX ADMIN — SODIUM CHLORIDE 1000 MILLILITER(S): 9 INJECTION INTRAMUSCULAR; INTRAVENOUS; SUBCUTANEOUS at 03:50

## 2020-02-11 RX ADMIN — Medication 2 MILLIGRAM(S): at 02:51

## 2020-02-11 RX ADMIN — SODIUM CHLORIDE 1000 MILLILITER(S): 9 INJECTION INTRAMUSCULAR; INTRAVENOUS; SUBCUTANEOUS at 02:50

## 2020-02-11 RX ADMIN — Medication 1 TABLET(S): at 11:37

## 2020-02-11 RX ADMIN — Medication 100 MILLIGRAM(S): at 13:32

## 2020-02-11 RX ADMIN — Medication 2 MILLIGRAM(S): at 11:38

## 2020-02-11 RX ADMIN — Medication 1 MILLIGRAM(S): at 11:37

## 2020-02-11 NOTE — ED ADULT NURSE NOTE - OBJECTIVE STATEMENT
assuming coverage for JAKE Miranda for her break. pt received to bed 5 c/o n/v, tingling and numbness in arms and legs, headache and dizzy starting this morning. pt states he usually drinks 10 cups of vodka daily, and stopped drinking this morning. pt states he smokes cigars occasionally. denies: drug use, chest pain, shortness of breath, abdominal pain. pt appears anxious and is moving around a lot in bed. on cardiac monitor at bedside.

## 2020-02-11 NOTE — ED PROVIDER NOTE - CRITICAL CARE PROVIDED
direct patient care (not related to procedure)/additional history taking/consultation with other physicians/interpretation of diagnostic studies/consult w/ pt's family directly relating to pts condition/documentation

## 2020-02-11 NOTE — SBIRT NOTE ADULT - NSSBIRTALCPASSREFTXDET_GEN_A_CORE
Sw offered referral to treatments. Pt. noted that he is not interested in inpatient treatment at this time, however he is considering outpatient treatment. Sw educated pt. on the healthy drinking guidelines. Pt. was receptive.

## 2020-02-11 NOTE — DISCHARGE NOTE PROVIDER - NSDCMRMEDTOKEN_GEN_ALL_CORE_FT
chlordiazePOXIDE 10 mg oral capsule: 4 cap(s) oral - orally 3 times a day x 1 days  4 cap(s) oral - orally 2 times a day x 1 days  4 cap(s) oral - orally 1 time a day x 1 days  4 cap(s) oral - orally 1 times a day x 1 days MDD:12  folic acid 1 mg oral tablet: 1 tab(s) orally once a day   magnesium oxide 400 mg (240 mg elemental magnesium) oral tablet: 1 tab(s) orally once a day   potassium chloride 20 mEq oral tablet, extended release: 2 tab(s) orally once a day   thiamine 100 mg oral tablet: 1 tab(s) orally once a day

## 2020-02-11 NOTE — H&P ADULT - NSHPREVIEWOFSYSTEMS_GEN_ALL_CORE
REVIEW OF SYSTEMS:  CONSTITUTIONAL: No fever, weight loss, or fatigue	  EYES: No eye pain, visual disturbances, or discharge  ENMT:  No difficulty hearing, tinnitus, vertigo; No sinus or throat pain  NECK: No pain or stiffness  BREASTS: No pain, masses, or nipple discharge  RESPIRATORY: No cough, wheezing, chills or hemoptysis; No shortness of breath  CARDIOVASCULAR: No chest pain, palpitations, dizziness, or leg swelling  GASTROINTESTINAL:+ abdominal or epigastric pain. +nausea, vomiting, or hematemesis; No diarrhea or constipation. No melena or hematochezia.  GENITOURINARY: No dysuria, No frequency, No hematuria, No incontinence  NEUROLOGICAL: No headaches, memory loss, loss of strength, numbness, or tremors  SKIN: No itching, burning, rashes, or lesions   LYMPH NODES: No enlarged glands  ENDOCRINE: No heat or cold intolerance; No hair loss  MUSCULOSKELETAL: No joint pain or swelling; No muscle, back, or extremity pain  PSYCHIATRIC: No depression, anxiety, mood swings, or difficulty sleeping  HEME/LYMPH: No easy bruising, or bleeding gums  ALLERGY AND IMMUNOLOGIC: No hives or eczema

## 2020-02-11 NOTE — DISCHARGE NOTE PROVIDER - HOSPITAL COURSE
44 YOM with PMHx of etoh abuse, pancreatitis now admitted with etoh withdrawal. Pt states he drinks about 10 shots of vodhka daily. Stopped drinking last night because he felt nauseous and continued to throw up clear water which now brought him to ER. +tremors +nausea +anxiety +headaches. Also c/o cough x few days. Admits to abd pain LLQ and vague back pain. Regularly smokes cigars and THC. Otherwise denies cp, sob, palpitations.        Patient reevaluated by hospitalist later in the day, pt wants to go home, no signs of withdrawing now. No abdominal pain, tolerated meals earlier today.         Social work evaled and referred to outpatient ETOH detox which pt agree to go. See med list and summary. Librium taper for 3 days after discharge.             Problem/Plan - 1:    ·  Problem: Alcohol withdrawal syndrome without complication.  Plan: was admitted to telemetry    Placed on ciwa taper    ekg no changes    flu swab negative    zofran for n/v    multivitamins    thiamine    folate.         Problem/Plan - 2:    ·  Problem: ETOH abuse.  Plan: ciwa taper.         Problem/Plan - 3:    ·  Problem: HAIDER (acute kidney injury).  Plan: Cr: jump 0.86 to 1.51    IVF hydration.         Problem/Plan - 4:    ·  Problem: History of acute pancreatitis.  Plan: await amylase/lipase.

## 2020-02-11 NOTE — CHART NOTE - NSCHARTNOTEFT_GEN_A_CORE
H&P dated today reviewed in full. H&P dated today reviewed in full. Admitted for ETOH withdrawal. No signs of withdrawal now. Wants to go home. No abdominal pain, tolerated meals earlier today.     Social work evaled and referred to outpatient ETOH detox which pt agree to go. See med list and summary. Librium taper for 3 days after discharge.

## 2020-02-11 NOTE — H&P ADULT - PROBLEM SELECTOR PLAN 1
admit to telemetry  ciwa taper  ekg pending  flu swab r/o flu  zofran for n/v  multivitamins  thiamine  folate

## 2020-02-11 NOTE — H&P ADULT - ATTENDING COMMENTS
Pt seen with PA. 43 Y/O Male with PMHx of etoh abuse, pancreatitis now admitted with etoh withdrawal. Pt states he drinks about 10 shots of vodhka daily. Stopped drinking last night because he felt nauseous and continued to throw up clear water which now brought him to ER. +tremors +nausea +anxiety +headaches. Also c/o cough x few days. Admits to abd pain LLQ and vague back pain. Regularly smokes cigars and THC. Otherwise denies cp, sob, palpitations. Presents with alcohol withdrawal.  A&P as above

## 2020-02-11 NOTE — H&P ADULT - HISTORY OF PRESENT ILLNESS
44 YOM with PMHx of etoh abuse, pancreatitis now admitted with etoh withdrawal. Pt states he drinks about 10 shots of vodhka daily. Stopped drinking last night because he felt nauseous and continued to throw up clear water which now brought him to ER. +tremors +nausea +anxiety +headaches. Also c/o cough x few days. Admits to abd pain LLQ and vague back pain. Regularly smokes cigars and THC. Otherwise denies cp, sob, palpitations.    In ED pt with Cr 1.51. Elevated /118.

## 2020-02-11 NOTE — DISCHARGE NOTE PROVIDER - NSDCCPCAREPLAN_GEN_ALL_CORE_FT
PRINCIPAL DISCHARGE DIAGNOSIS  Diagnosis: Alcohol withdrawal syndrome without complication  Assessment and Plan of Treatment: - Librium Taper  -Magnesium Oxide daily x 1 week  - Potassium Chloride daily for 5 days  - Folic Acid  -Thiamine

## 2020-02-11 NOTE — ED ADULT NURSE NOTE - NSIMPLEMENTINTERV_GEN_ALL_ED
Implemented All Fall Risk Interventions:  [place] to call system. Call bell, personal items and telephone within reach. Instruct patient to call for assistance. Room bathroom lighting operational. Non-slip footwear when patient is off stretcher. Physically safe environment: no spills, clutter or unnecessary equipment. Stretcher in lowest position, wheels locked, appropriate side rails in place. Provide visual cue, wrist band, yellow gown, etc. Monitor gait and stability. Monitor for mental status changes and reorient to person, place, and time. Review medications for side effects contributing to fall risk. Reinforce activity limits and safety measures with patient and family.

## 2020-02-11 NOTE — H&P ADULT - NSHPLABSRESULTS_GEN_ALL_CORE
LABS:                        15.7   10.39 )-----------( 240      ( 11 Feb 2020 02:57 )             45.0     02-11    141  |  94<L>  |  20  ----------------------------<  135<H>  3.6   |  25  |  1.51<H>    Ca    10.5<H>      11 Feb 2020 02:57    TPro  9.8<H>  /  Alb  4.7  /  TBili  2.7<H>  /  DBili  x   /  AST  147<H>  /  ALT  118<H>  /  AlkPhos  102  02-11      CAPILLARY BLOOD GLUCOSE    LIVER FUNCTIONS - ( 11 Feb 2020 02:57 )  Alb: 4.7 g/dL / Pro: 9.8 gm/dL / ALK PHOS: 102 U/L / ALT: 118 U/L / AST: 147 U/L / GGT: x             await flu swab  await ekg

## 2020-02-15 DIAGNOSIS — F17.290 NICOTINE DEPENDENCE, OTHER TOBACCO PRODUCT, UNCOMPLICATED: ICD-10-CM

## 2020-02-15 DIAGNOSIS — Z87.19 PERSONAL HISTORY OF OTHER DISEASES OF THE DIGESTIVE SYSTEM: ICD-10-CM

## 2020-02-15 DIAGNOSIS — F10.188 ALCOHOL ABUSE WITH OTHER ALCOHOL-INDUCED DISORDER: ICD-10-CM

## 2020-02-15 DIAGNOSIS — R51 HEADACHE: ICD-10-CM
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
## 2020-02-15 DIAGNOSIS — F10.180 ALCOHOL ABUSE WITH ALCOHOL-INDUCED ANXIETY DISORDER: ICD-10-CM

## 2020-02-15 DIAGNOSIS — N17.9 ACUTE KIDNEY FAILURE, UNSPECIFIED: ICD-10-CM

## 2020-02-15 DIAGNOSIS — G25.2 OTHER SPECIFIED FORMS OF TREMOR: ICD-10-CM

## 2020-04-01 ENCOUNTER — OUTPATIENT (OUTPATIENT)
Dept: OUTPATIENT SERVICES | Facility: HOSPITAL | Age: 45
LOS: 1 days | End: 2020-04-01
Payer: MEDICAID

## 2020-04-01 DIAGNOSIS — S02.652S: Chronic | ICD-10-CM

## 2020-04-01 PROCEDURE — G9001: CPT

## 2020-04-17 NOTE — ED PROVIDER NOTE - CADM POA PRESS ULCER
Non-COVID Internal Medicine Progress Note  65F PMH HTN, HLD, PAD, chronic HFpEF, s/p aortic dissection repair (c/b pAfib , acute renal failure requiring HD session, now resolved), Aortic BPV, s/p PPM , COPD, and gout who presented with 3 days of increasing pain in her LLE, was noted to have acute on chronic renal disease as well as septic shock in setting of LLE cellulitis. Admitted to MICU and started on levophed. Received 1 uPRBC on day of admission due to Hgb < 7.0, rectal exam negative for melena, hemoglobin stable since. ID consulted and approved linezolid. On 4/12 she was titrated off levophed without incident. LUE doppler performed and revealed left cephalic and branch of basilic vein thrombophlebitis in forearm, no need for intervention. S/p debridement of LLE wound by vascular with cultures sent. Course complicated by episodes of hypoglycemia in setting of infection and poor PO intake. Fingerstick unreliable given peripheral vascular disease, monitoring with BMP. Started on D10 @ 30 cc/hr. Infectious disease consulted, switched to PO linezolid and started Cefpodoxime for 10 day course.  Patient was re-swabbed for COVID and was negative x2 so transitioned from COVID RMF to RMF    OVERNIGHT EVENTS:  NAEON    SUBJECTIVE: Ms. Parham denies any pain, SOB, fevers/chills this AM. Requesting to go home.    ICU Vital Signs Last 24 Hrs  T(C): 36.6 (17 Apr 2020 08:57), Max: 36.6 (16 Apr 2020 23:05)  T(F): 97.9 (17 Apr 2020 08:57), Max: 97.9 (17 Apr 2020 08:57)  HR: 88 (17 Apr 2020 08:57) (88 - 93)  BP: 131/84 (17 Apr 2020 08:57) (129/84 - 131/84)  RR: 20 (17 Apr 2020 08:57) (19 - 20)  SpO2: 99% (17 Apr 2020 08:57) (99% - 99%)      PHYSICAL EXAM:  Constitutional: NAD, comfortable lying in bed.  HEENT: NC/AT, PERRLA, EOMI, no conjunctival pallor or scleral icterus, MMM, bandage at right IJ site  Neck: Supple, no JVD  Respiratory: CTA B/L. No w/r/r.   Cardiovascular: RRR, normal S1 and S2  Gastrointestinal: +BS, soft NTND, no guarding or rebound tenderness, no palpable masses   Extremities: wwp; no cyanosis, clubbing or edema. LLE wrapped with dressing clean, dry and intact.  Tender to palpation.  Vascular: Weak DP pulse palpated b/l LEs, warm to touch.  Radial pulses equal and strong throughout.   Neurological: AAOx3, no CN deficits, strength and sensation intact throughout.   Skin: LUE with palpable cord        LABS:                         9.0    9.24  )-----------( 203      ( 17 Apr 2020 07:06 )             27.2     04-17    142  |  105  |  61<H>  ----------------------------<  74  4.4   |  23  |  3.45<H>    Ca    8.8      17 Apr 2020 07:06  Phos  3.3     04-16  Mg     2.1     04-17                    RADIOLOGY, EKG & ADDITIONAL TESTS: Reviewed.                   RADIOLOGY, EKG & ADDITIONAL TESTS: Reviewed.           RADIOLOGY & ADDITIONAL TESTS:  No new imaging    MEDICATIONS  (STANDING):  aspirin enteric coated 81 milliGRAM(s) Oral daily  cefpodoxime 200 milliGRAM(s) Oral every 24 hours  colchicine 0.6 milliGRAM(s) Oral daily  gabapentin 100 milliGRAM(s) Oral three times a day  heparin  Injectable 5000 Unit(s) SubCutaneous every 12 hours  linezolid    Tablet 600 milliGRAM(s) Oral every 12 hours  pentoxifylline 400 milliGRAM(s) Oral every 24 hours  senna 1 Tablet(s) Oral at bedtime  simvastatin 10 milliGRAM(s) Oral at bedtime  sodium bicarbonate 650 milliGRAM(s) Oral three times a day    MEDICATIONS  (PRN):  albuterol/ipratropium for Nebulization 3 milliLiter(s) Nebulizer every 6 hours PRN Shortness of Breath and/or Wheezing  oxycodone    5 mG/acetaminophen 325 mG 1 Tablet(s) Oral every 6 hours PRN Severe Pain (7 - 10) No

## 2020-04-28 DIAGNOSIS — Z71.89 OTHER SPECIFIED COUNSELING: ICD-10-CM

## 2020-04-28 PROBLEM — Z87.19 PERSONAL HISTORY OF OTHER DISEASES OF THE DIGESTIVE SYSTEM: Chronic | Status: ACTIVE | Noted: 2020-02-11
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
## 2020-08-17 ENCOUNTER — INPATIENT (INPATIENT)
Facility: HOSPITAL | Age: 45
LOS: 69 days | Discharge: HOME HEALTH | End: 2020-10-26
Attending: INTERNAL MEDICINE | Admitting: INTERNAL MEDICINE
Payer: MEDICAID

## 2020-08-17 VITALS
SYSTOLIC BLOOD PRESSURE: 150 MMHG | HEIGHT: 70 IN | OXYGEN SATURATION: 100 % | RESPIRATION RATE: 18 BRPM | TEMPERATURE: 98 F | WEIGHT: 160.06 LBS | HEART RATE: 97 BPM | DIASTOLIC BLOOD PRESSURE: 109 MMHG

## 2020-08-17 DIAGNOSIS — S02.652S: Chronic | ICD-10-CM

## 2020-08-17 DIAGNOSIS — R74.0 NONSPECIFIC ELEVATION OF LEVELS OF TRANSAMINASE AND LACTIC ACID DEHYDROGENASE [LDH]: ICD-10-CM

## 2020-08-17 DIAGNOSIS — E87.6 HYPOKALEMIA: ICD-10-CM

## 2020-08-17 DIAGNOSIS — K85.20 ALCOHOL INDUCED ACUTE PANCREATITIS WITHOUT NECROSIS OR INFECTION: ICD-10-CM

## 2020-08-17 DIAGNOSIS — E87.2 ACIDOSIS: ICD-10-CM

## 2020-08-17 DIAGNOSIS — F10.239 ALCOHOL DEPENDENCE WITH WITHDRAWAL, UNSPECIFIED: ICD-10-CM

## 2020-08-17 DIAGNOSIS — E83.52 HYPERCALCEMIA: ICD-10-CM

## 2020-08-17 DIAGNOSIS — Z29.9 ENCOUNTER FOR PROPHYLACTIC MEASURES, UNSPECIFIED: ICD-10-CM

## 2020-08-17 LAB
ALBUMIN SERPL ELPH-MCNC: 4.2 G/DL — SIGNIFICANT CHANGE UP (ref 3.3–5)
ALP SERPL-CCNC: 152 U/L — HIGH (ref 40–120)
ALT FLD-CCNC: 131 U/L — HIGH (ref 12–78)
ANION GAP SERPL CALC-SCNC: 16 MMOL/L — SIGNIFICANT CHANGE UP (ref 5–17)
AST SERPL-CCNC: 200 U/L — HIGH (ref 15–37)
BASOPHILS # BLD AUTO: 0.04 K/UL — SIGNIFICANT CHANGE UP (ref 0–0.2)
BASOPHILS NFR BLD AUTO: 0.3 % — SIGNIFICANT CHANGE UP (ref 0–2)
BILIRUB SERPL-MCNC: 2.6 MG/DL — HIGH (ref 0.2–1.2)
BUN SERPL-MCNC: 11 MG/DL — SIGNIFICANT CHANGE UP (ref 7–23)
CALCIUM SERPL-MCNC: 11.5 MG/DL — HIGH (ref 8.5–10.1)
CHLORIDE SERPL-SCNC: 96 MMOL/L — SIGNIFICANT CHANGE UP (ref 96–108)
CO2 SERPL-SCNC: 25 MMOL/L — SIGNIFICANT CHANGE UP (ref 22–31)
CREAT SERPL-MCNC: 0.92 MG/DL — SIGNIFICANT CHANGE UP (ref 0.5–1.3)
EOSINOPHIL # BLD AUTO: 0 K/UL — SIGNIFICANT CHANGE UP (ref 0–0.5)
EOSINOPHIL NFR BLD AUTO: 0 % — SIGNIFICANT CHANGE UP (ref 0–6)
ETHANOL SERPL-MCNC: <10 MG/DL — SIGNIFICANT CHANGE UP (ref 0–10)
GLUCOSE SERPL-MCNC: 167 MG/DL — HIGH (ref 70–99)
HCT VFR BLD CALC: 42.6 % — SIGNIFICANT CHANGE UP (ref 39–50)
HGB BLD-MCNC: 15.4 G/DL — SIGNIFICANT CHANGE UP (ref 13–17)
IMM GRANULOCYTES NFR BLD AUTO: 0.2 % — SIGNIFICANT CHANGE UP (ref 0–1.5)
LACTATE SERPL-SCNC: 2.5 MMOL/L — HIGH (ref 0.7–2)
LACTATE SERPL-SCNC: 3.8 MMOL/L — HIGH (ref 0.7–2)
LIDOCAIN IGE QN: HIGH U/L (ref 73–393)
LYMPHOCYTES # BLD AUTO: 0.82 K/UL — LOW (ref 1–3.3)
LYMPHOCYTES # BLD AUTO: 6.4 % — LOW (ref 13–44)
MCHC RBC-ENTMCNC: 33 PG — SIGNIFICANT CHANGE UP (ref 27–34)
MCHC RBC-ENTMCNC: 36.2 GM/DL — HIGH (ref 32–36)
MCV RBC AUTO: 91.4 FL — SIGNIFICANT CHANGE UP (ref 80–100)
MONOCYTES # BLD AUTO: 0.97 K/UL — HIGH (ref 0–0.9)
MONOCYTES NFR BLD AUTO: 7.5 % — SIGNIFICANT CHANGE UP (ref 2–14)
NEUTROPHILS # BLD AUTO: 11.04 K/UL — HIGH (ref 1.8–7.4)
NEUTROPHILS NFR BLD AUTO: 85.6 % — HIGH (ref 43–77)
NRBC # BLD: 0 /100 WBCS — SIGNIFICANT CHANGE UP (ref 0–0)
PLATELET # BLD AUTO: 156 K/UL — SIGNIFICANT CHANGE UP (ref 150–400)
POTASSIUM SERPL-MCNC: 2.9 MMOL/L — CRITICAL LOW (ref 3.5–5.3)
POTASSIUM SERPL-SCNC: 2.9 MMOL/L — CRITICAL LOW (ref 3.5–5.3)
PROT SERPL-MCNC: 9.2 GM/DL — HIGH (ref 6–8.3)
RBC # BLD: 4.66 M/UL — SIGNIFICANT CHANGE UP (ref 4.2–5.8)
RBC # FLD: 12.2 % — SIGNIFICANT CHANGE UP (ref 10.3–14.5)
SODIUM SERPL-SCNC: 137 MMOL/L — SIGNIFICANT CHANGE UP (ref 135–145)
TROPONIN I SERPL-MCNC: <.015 NG/ML — SIGNIFICANT CHANGE UP (ref 0.01–0.04)
WBC # BLD: 12.89 K/UL — HIGH (ref 3.8–10.5)
WBC # FLD AUTO: 12.89 K/UL — HIGH (ref 3.8–10.5)

## 2020-08-17 PROCEDURE — 99222 1ST HOSP IP/OBS MODERATE 55: CPT

## 2020-08-17 PROCEDURE — 74177 CT ABD & PELVIS W/CONTRAST: CPT | Mod: 26

## 2020-08-17 PROCEDURE — 71045 X-RAY EXAM CHEST 1 VIEW: CPT | Mod: 26

## 2020-08-17 PROCEDURE — 93010 ELECTROCARDIOGRAM REPORT: CPT

## 2020-08-17 PROCEDURE — 99285 EMERGENCY DEPT VISIT HI MDM: CPT

## 2020-08-17 RX ORDER — SODIUM CHLORIDE 9 MG/ML
1000 INJECTION INTRAMUSCULAR; INTRAVENOUS; SUBCUTANEOUS ONCE
Refills: 0 | Status: COMPLETED | OUTPATIENT
Start: 2020-08-17 | End: 2020-08-17

## 2020-08-17 RX ORDER — THIAMINE MONONITRATE (VIT B1) 100 MG
100 TABLET ORAL ONCE
Refills: 0 | Status: COMPLETED | OUTPATIENT
Start: 2020-08-17 | End: 2020-08-17

## 2020-08-17 RX ORDER — LABETALOL HCL 100 MG
10 TABLET ORAL ONCE
Refills: 0 | Status: COMPLETED | OUTPATIENT
Start: 2020-08-17 | End: 2020-08-17

## 2020-08-17 RX ORDER — HEPARIN SODIUM 5000 [USP'U]/ML
5000 INJECTION INTRAVENOUS; SUBCUTANEOUS EVERY 12 HOURS
Refills: 0 | Status: DISCONTINUED | OUTPATIENT
Start: 2020-08-17 | End: 2020-08-18

## 2020-08-17 RX ORDER — FOLIC ACID 0.8 MG
1 TABLET ORAL DAILY
Refills: 0 | Status: DISCONTINUED | OUTPATIENT
Start: 2020-08-17 | End: 2020-08-18

## 2020-08-17 RX ORDER — ONDANSETRON 8 MG/1
4 TABLET, FILM COATED ORAL ONCE
Refills: 0 | Status: COMPLETED | OUTPATIENT
Start: 2020-08-17 | End: 2020-08-17

## 2020-08-17 RX ORDER — MAGNESIUM SULFATE 500 MG/ML
1 VIAL (ML) INJECTION ONCE
Refills: 0 | Status: COMPLETED | OUTPATIENT
Start: 2020-08-17 | End: 2020-08-17

## 2020-08-17 RX ORDER — POTASSIUM CHLORIDE 20 MEQ
10 PACKET (EA) ORAL ONCE
Refills: 0 | Status: COMPLETED | OUTPATIENT
Start: 2020-08-17 | End: 2020-08-17

## 2020-08-17 RX ORDER — MORPHINE SULFATE 50 MG/1
4 CAPSULE, EXTENDED RELEASE ORAL EVERY 6 HOURS
Refills: 0 | Status: DISCONTINUED | OUTPATIENT
Start: 2020-08-17 | End: 2020-08-18

## 2020-08-17 RX ORDER — MORPHINE SULFATE 50 MG/1
4 CAPSULE, EXTENDED RELEASE ORAL ONCE
Refills: 0 | Status: DISCONTINUED | OUTPATIENT
Start: 2020-08-17 | End: 2020-08-17

## 2020-08-17 RX ORDER — SODIUM CHLORIDE 9 MG/ML
1000 INJECTION, SOLUTION INTRAVENOUS
Refills: 0 | Status: COMPLETED | OUTPATIENT
Start: 2020-08-17 | End: 2020-08-18

## 2020-08-17 RX ORDER — POTASSIUM CHLORIDE 20 MEQ
10 PACKET (EA) ORAL
Refills: 0 | Status: COMPLETED | OUTPATIENT
Start: 2020-08-17 | End: 2020-08-18

## 2020-08-17 RX ORDER — THIAMINE MONONITRATE (VIT B1) 100 MG
100 TABLET ORAL DAILY
Refills: 0 | Status: DISCONTINUED | OUTPATIENT
Start: 2020-08-17 | End: 2020-08-18

## 2020-08-17 RX ADMIN — MORPHINE SULFATE 4 MILLIGRAM(S): 50 CAPSULE, EXTENDED RELEASE ORAL at 23:51

## 2020-08-17 RX ADMIN — SODIUM CHLORIDE 1000 MILLILITER(S): 9 INJECTION INTRAMUSCULAR; INTRAVENOUS; SUBCUTANEOUS at 18:19

## 2020-08-17 RX ADMIN — Medication 100 GRAM(S): at 21:13

## 2020-08-17 RX ADMIN — MORPHINE SULFATE 4 MILLIGRAM(S): 50 CAPSULE, EXTENDED RELEASE ORAL at 19:19

## 2020-08-17 RX ADMIN — Medication 2 MILLIGRAM(S): at 19:56

## 2020-08-17 RX ADMIN — Medication 100 MILLIEQUIVALENT(S): at 18:20

## 2020-08-17 RX ADMIN — Medication 2 MILLIGRAM(S): at 17:20

## 2020-08-17 RX ADMIN — Medication 1 TABLET(S): at 19:57

## 2020-08-17 RX ADMIN — SODIUM CHLORIDE 150 MILLILITER(S): 9 INJECTION, SOLUTION INTRAVENOUS at 23:51

## 2020-08-17 RX ADMIN — Medication 25 MILLIGRAM(S): at 19:56

## 2020-08-17 RX ADMIN — SODIUM CHLORIDE 1000 MILLILITER(S): 9 INJECTION INTRAMUSCULAR; INTRAVENOUS; SUBCUTANEOUS at 21:13

## 2020-08-17 RX ADMIN — MORPHINE SULFATE 4 MILLIGRAM(S): 50 CAPSULE, EXTENDED RELEASE ORAL at 20:44

## 2020-08-17 RX ADMIN — Medication 100 MILLIGRAM(S): at 19:57

## 2020-08-17 RX ADMIN — Medication 2 MILLIGRAM(S): at 23:51

## 2020-08-17 RX ADMIN — ONDANSETRON 4 MILLIGRAM(S): 8 TABLET, FILM COATED ORAL at 17:20

## 2020-08-17 RX ADMIN — SODIUM CHLORIDE 1000 MILLILITER(S): 9 INJECTION INTRAMUSCULAR; INTRAVENOUS; SUBCUTANEOUS at 17:20

## 2020-08-17 NOTE — ED PROVIDER NOTE - NS ED ROS FT
CONST: no fevers, no chills, no trauma  EYES: no pain, no visual disturbances  ENT: no sore throat, no epistaxis, no rhinorrhea, no hearing changes  CV: no chest pain, no palpitations, no orthopnea, no extremity pain or swelling  RESP: no shortness of breath, no cough, no sputum, no pleurisy, no wheezing  ABD: + abdominal pain,+ nausea/vomiting, no diarrhea, no black or bloody stool  : no dysuria, no hematuria, no frequency, no urgency  MSK: no back pain, no neck pain, no extremity pain  NEURO: no headache, no sensory disturbances, no focal weakness, no dizziness  HEME: no easy bleeding or bruising  SKIN: no diaphoresis, no rash

## 2020-08-17 NOTE — H&P ADULT - PROBLEM SELECTOR PLAN 2
GAURAV currently 2, recently received ativan  Will continue withdrawal protocol with Ativan 2 q6 standing  Ativan 2 mg IVP PRN for acute withdrawal symptoms  Thiamine, Folate, MVI daily

## 2020-08-17 NOTE — H&P ADULT - NSHPLABSRESULTS_GEN_ALL_CORE
Recent Vitals  T(C): 36.4 (08-17-20 @ 20:02), Max: 36.6 (08-17-20 @ 16:48)  HR: 97 (08-17-20 @ 20:02) (97 - 97)  BP: 158/120 (08-17-20 @ 20:02) (150/109 - 158/120)  RR: 18 (08-17-20 @ 20:02) (18 - 18)  SpO2: 98% (08-17-20 @ 20:02) (98% - 100%)                        15.4   12.89 )-----------( 156      ( 17 Aug 2020 17:27 )             42.6     08-17    137  |  96  |  11  ----------------------------<  167<H>  2.9<LL>   |  25  |  0.92    Ca    11.5<H>      17 Aug 2020 17:27    TPro  9.2<H>  /  Alb  4.2  /  TBili  2.6<H>  /  DBili  x   /  AST  200<H>  /  ALT  131<H>  /  AlkPhos  152<H>  08-17      LIVER FUNCTIONS - ( 17 Aug 2020 17:27 )  Alb: 4.2 g/dL / Pro: 9.2 gm/dL / ALK PHOS: 152 U/L / ALT: 131 U/L / AST: 200 U/L / GGT: x               Home Medications:

## 2020-08-17 NOTE — H&P ADULT - HISTORY OF PRESENT ILLNESS
Patient is a 44M with a PMH of EtOH abuse and withdrawal, alcohol pancreatitis who presents to the ED with severe abdominal pain.  Patient states that for the past three days he has been on a drinking binge, drinking 5 beers and 5 shots of liquor per day.  Last drink reportedly yesterday.  Patient reports that he woke up today and had severe generalized abdominal pain with multiple episodes of nonbloody, nonbilious vomiting.  Patient also complaining of nausea and generalized tremors.  Vitals stable, labs show leukocytosis, lactic acidosis, and severely elevated lipase levels.  CT shows pancreatitis with early necrosis in the pancreatic body.  Will admit to floor.

## 2020-08-17 NOTE — H&P ADULT - ASSESSMENT
Patient is a 44M with a PMH of EtOH abuse and withdrawal, alcohol pancreatitis who presents to the ED with severe abdominal pain.  Patient states that for the past three days he has been on a drinking binge, drinking 5 beers and 5 shots of liquor per day.  Last drink reportedly yesterday.  Patient reports that he woke up today and had severe generalized abdominal pain with multiple episodes of nonbloody, nonbilious vomiting.  Patient also complaining of nausea and generalized tremors.  Vitals stable, labs show leukocytosis, lactic acidosis, and severely elevated lipase levels.  CT shows pancreatitis with early necrosis in the pancreatic body.  Will admit to floor.    IMPROVE VTE Individual Risk Assessment          RISK                                                          Points  [  ] Previous VTE                                                3  [  ] Thrombophilia                                             2  [  ] Lower limb paralysis                                    2        (unable to hold up >15 seconds)    [  ] Current Cancer                                             2         (within 6 months)  [  ] Immobilization > 24 hrs                              1  [  ] ICU/CCU stay > 24 hours                            1  [  ] Age > 60                                                    1    IMPROVE VTE Score - 0

## 2020-08-17 NOTE — H&P ADULT - NSHPPHYSICALEXAM_GEN_ALL_CORE
Physical exam:  General: patient in no acute distress, resting comfortably  Head:  Atraumatic, Normocephalic  Eyes: EOMI, PERRLA, clear sclera  Neck: Supple, thyroid nontender, non enlarged  Cardio: S1/S2 +ve, regular rate and rhythm, no M/G/R  Resp: clear to ausculation bilaterally, no rales or wheezes  GI: abdomen soft with diffuse tenderness, nonvoluntary guarding, BS +ve x 4  Ext: no significant pedal edema  Neuro: CN 2-12 intact, no significant motor or sensory deficits.  Skin: No rashes or lesions

## 2020-08-17 NOTE — H&P ADULT - PROBLEM SELECTOR PLAN 1
Will start on clear liquid diet, advance diet as tolerated  IVF LR @ 100 ml/hr, Pain control with morphine q6  CT abdomen shows moderate pancreatitis

## 2020-08-17 NOTE — ED PROVIDER NOTE - CARE PLAN
Principal Discharge DX:	Abdominal pain  Secondary Diagnosis:	Nausea & vomiting Principal Discharge DX:	Pancreatitis  Secondary Diagnosis:	Nausea & vomiting

## 2020-08-17 NOTE — ED PROVIDER NOTE - CLINICAL SUMMARY MEDICAL DECISION MAKING FREE TEXT BOX
45 yo M presenting with abdominal pain, n/v, r/o pancreatitis, diffuse ttp and distended abdomen, r/o perf, chest tightness in setting of n/v ro/ Boerhaave's, analgesia, anti-emetics, benzos for early etoh withdrawal,

## 2020-08-17 NOTE — SBIRT NOTE ADULT - NSSBIRTDRGBRIEFINTDET_GEN_A_CORE
Provided SBIRT services: Full screen positive. Brief Intervention Performed. Screening results were reviewed with the patient and patient was provided information about healthy guidelines and potential negative consequences associated with level of risk. Motivation and readiness to reduce or stop use was discussed and goals and activities to make changes were suggested/offered. Pt not interested at this time.   Tobacco cessation discussed- pt not interested in reducing/stopping at this time.

## 2020-08-17 NOTE — ED PROVIDER NOTE - PROGRESS NOTE DETAILS
lactate downtrending; patient not in active clnical etoh withdrawal will admit fo rmanagememtn of panceatitis and hypokalemia and n/v

## 2020-08-17 NOTE — H&P ADULT - ATTENDING COMMENTS
The patient has signs and symptoms of respiratory failure and dysphagia, and I suspect that the process is advanced.    I have discussed the options with the family, and reviewed both non-operative and operative treatment methods.  I have reviewed the risks and benefits of both approaches, and have discussed the possible complications associated with the surgical procedure.  They are aware that there is a risk of infection or abscess formation after surgery and that there may be the need for additional surgery in the future.  I have recommended that we proceed with tracheostomy and PEG  The patient's significant other has given consent to proceed with the procedure.

## 2020-08-17 NOTE — H&P ADULT - NSHPREVIEWOFSYSTEMS_GEN_ALL_CORE
Constitutional: no fever, chills, night sweats  Ears: no hearing changes or ear pain,   Nose: no nasal congestion, sinus pain, or rhinorrhea  Cardio: no chest pain, orthopnea, edema, or palpitations  Resp: no dyspnea, cough, wheezing  GI: abdominal pain, nausea, vomiting positive.  No hematochezia, or melena  : no dysuria, urinary frequency, hematuria  MSK: no back pain, neck pain  Skin: no rash, pruritis   Neuro: no weakness, dizziness, lightheadedness, syncope   Heme/Lymph: no bruising or bleeding

## 2020-08-17 NOTE — ED PROVIDER NOTE - OBJECTIVE STATEMENT
45 yo M hx ETOH use disorder, pancreatitis, presenting with cramping abdominal pain and chest tightness with associated nb/nb emesis since 11 am today. Last drink was yesterday. No coffee emesis. Reports feeling anxious and shaking. Denies black tarry stools/blood stools. Denies recreational drug use.

## 2020-08-17 NOTE — SBIRT NOTE ADULT - NSSBIRTBRIEFINTDET_GEN_A_CORE
Provided SBIRT services: Full screen positive. Brief Intervention Performed. Screening results were reviewed with the patient and patient was provided information about healthy guidelines and potential negative consequences associated with level of risk. Motivation and readiness to reduce or stop use was discussed and goals and activities to make changes were suggested/offered. Referral for complete assessment and level of care determination at a certified treatment facility was not completed because: Patient not interested at this time.

## 2020-08-18 LAB
ALBUMIN SERPL ELPH-MCNC: 2.9 G/DL — LOW (ref 3.3–5)
ALBUMIN SERPL ELPH-MCNC: 2.9 G/DL — LOW (ref 3.3–5)
ALP SERPL-CCNC: 79 U/L — SIGNIFICANT CHANGE UP (ref 40–120)
ALP SERPL-CCNC: 85 U/L — SIGNIFICANT CHANGE UP (ref 40–120)
ALT FLD-CCNC: 79 U/L — HIGH (ref 12–78)
ALT FLD-CCNC: 82 U/L — HIGH (ref 12–78)
ANION GAP SERPL CALC-SCNC: 10 MMOL/L — SIGNIFICANT CHANGE UP (ref 5–17)
ANION GAP SERPL CALC-SCNC: 12 MMOL/L — SIGNIFICANT CHANGE UP (ref 5–17)
ANION GAP SERPL CALC-SCNC: 14 MMOL/L — SIGNIFICANT CHANGE UP (ref 5–17)
APPEARANCE UR: CLEAR — SIGNIFICANT CHANGE UP
APPEARANCE UR: CLEAR — SIGNIFICANT CHANGE UP
APTT BLD: 31.6 SEC — SIGNIFICANT CHANGE UP (ref 27.5–35.5)
AST SERPL-CCNC: 112 U/L — HIGH (ref 15–37)
AST SERPL-CCNC: 116 U/L — HIGH (ref 15–37)
BACTERIA # UR AUTO: ABNORMAL
BACTERIA # UR AUTO: ABNORMAL
BASE EXCESS BLDA CALC-SCNC: 0.2 MMOL/L — SIGNIFICANT CHANGE UP (ref -2–2)
BASOPHILS # BLD AUTO: 0 K/UL — SIGNIFICANT CHANGE UP (ref 0–0.2)
BASOPHILS NFR BLD AUTO: 0 % — SIGNIFICANT CHANGE UP (ref 0–2)
BILIRUB DIRECT SERPL-MCNC: 1.1 MG/DL — HIGH (ref 0.05–0.2)
BILIRUB INDIRECT FLD-MCNC: 1.2 MG/DL — HIGH (ref 0.2–1)
BILIRUB SERPL-MCNC: 2.3 MG/DL — HIGH (ref 0.2–1.2)
BILIRUB SERPL-MCNC: 2.4 MG/DL — HIGH (ref 0.2–1.2)
BILIRUB UR-MCNC: ABNORMAL
BILIRUB UR-MCNC: ABNORMAL
BLOOD GAS COMMENTS: SIGNIFICANT CHANGE UP
BLOOD GAS SOURCE: SIGNIFICANT CHANGE UP
BUN SERPL-MCNC: 15 MG/DL — SIGNIFICANT CHANGE UP (ref 7–23)
BUN SERPL-MCNC: 17 MG/DL — SIGNIFICANT CHANGE UP (ref 7–23)
BUN SERPL-MCNC: 17 MG/DL — SIGNIFICANT CHANGE UP (ref 7–23)
CALCIUM SERPL-MCNC: 8.3 MG/DL — LOW (ref 8.5–10.1)
CALCIUM SERPL-MCNC: 9.2 MG/DL — SIGNIFICANT CHANGE UP (ref 8.5–10.1)
CALCIUM SERPL-MCNC: 9.2 MG/DL — SIGNIFICANT CHANGE UP (ref 8.5–10.1)
CHLORIDE SERPL-SCNC: 102 MMOL/L — SIGNIFICANT CHANGE UP (ref 96–108)
CHLORIDE SERPL-SCNC: 102 MMOL/L — SIGNIFICANT CHANGE UP (ref 96–108)
CHLORIDE SERPL-SCNC: 105 MMOL/L — SIGNIFICANT CHANGE UP (ref 96–108)
CO2 SERPL-SCNC: 20 MMOL/L — LOW (ref 22–31)
CO2 SERPL-SCNC: 21 MMOL/L — LOW (ref 22–31)
CO2 SERPL-SCNC: 24 MMOL/L — SIGNIFICANT CHANGE UP (ref 22–31)
COLOR SPEC: ABNORMAL
COLOR SPEC: ABNORMAL
CREAT SERPL-MCNC: 0.98 MG/DL — SIGNIFICANT CHANGE UP (ref 0.5–1.3)
CREAT SERPL-MCNC: 1.07 MG/DL — SIGNIFICANT CHANGE UP (ref 0.5–1.3)
CREAT SERPL-MCNC: 1.09 MG/DL — SIGNIFICANT CHANGE UP (ref 0.5–1.3)
DIFF PNL FLD: ABNORMAL
DIFF PNL FLD: ABNORMAL
EOSINOPHIL # BLD AUTO: 0 K/UL — SIGNIFICANT CHANGE UP (ref 0–0.5)
EOSINOPHIL NFR BLD AUTO: 0 % — SIGNIFICANT CHANGE UP (ref 0–6)
EPI CELLS # UR: SIGNIFICANT CHANGE UP
EPI CELLS # UR: SIGNIFICANT CHANGE UP
GLUCOSE BLDC GLUCOMTR-MCNC: 100 MG/DL — HIGH (ref 70–99)
GLUCOSE BLDC GLUCOMTR-MCNC: 106 MG/DL — HIGH (ref 70–99)
GLUCOSE BLDC GLUCOMTR-MCNC: 116 MG/DL — HIGH (ref 70–99)
GLUCOSE BLDC GLUCOMTR-MCNC: 91 MG/DL — SIGNIFICANT CHANGE UP (ref 70–99)
GLUCOSE SERPL-MCNC: 102 MG/DL — HIGH (ref 70–99)
GLUCOSE SERPL-MCNC: 134 MG/DL — HIGH (ref 70–99)
GLUCOSE SERPL-MCNC: 141 MG/DL — HIGH (ref 70–99)
GLUCOSE UR QL: NEGATIVE MG/DL — SIGNIFICANT CHANGE UP
GLUCOSE UR QL: NEGATIVE MG/DL — SIGNIFICANT CHANGE UP
HCO3 BLDA-SCNC: 22 MMOL/L — SIGNIFICANT CHANGE UP (ref 21–29)
HCT VFR BLD CALC: 45.8 % — SIGNIFICANT CHANGE UP (ref 39–50)
HGB BLD-MCNC: 16.4 G/DL — SIGNIFICANT CHANGE UP (ref 13–17)
HOROWITZ INDEX BLDA+IHG-RTO: 0.21 — SIGNIFICANT CHANGE UP
INR BLD: 1.36 RATIO — HIGH (ref 0.88–1.16)
KETONES UR-MCNC: ABNORMAL
KETONES UR-MCNC: ABNORMAL
LACTATE SERPL-SCNC: 4.5 MMOL/L — CRITICAL HIGH (ref 0.7–2)
LACTATE SERPL-SCNC: 4.6 MMOL/L — CRITICAL HIGH (ref 0.7–2)
LACTATE SERPL-SCNC: 4.6 MMOL/L — CRITICAL HIGH (ref 0.7–2)
LEUKOCYTE ESTERASE UR-ACNC: ABNORMAL
LEUKOCYTE ESTERASE UR-ACNC: ABNORMAL
LYMPHOCYTES # BLD AUTO: 0.73 K/UL — LOW (ref 1–3.3)
LYMPHOCYTES # BLD AUTO: 16 % — SIGNIFICANT CHANGE UP (ref 13–44)
MAGNESIUM SERPL-MCNC: 0.9 MG/DL — CRITICAL LOW (ref 1.6–2.6)
MAGNESIUM SERPL-MCNC: 1.3 MG/DL — LOW (ref 1.6–2.6)
MANUAL SMEAR VERIFICATION: SIGNIFICANT CHANGE UP
MCHC RBC-ENTMCNC: 33.7 PG — SIGNIFICANT CHANGE UP (ref 27–34)
MCHC RBC-ENTMCNC: 35.8 GM/DL — SIGNIFICANT CHANGE UP (ref 32–36)
MCV RBC AUTO: 94 FL — SIGNIFICANT CHANGE UP (ref 80–100)
METAMYELOCYTES # FLD: 4 % — HIGH (ref 0–0)
MONOCYTES # BLD AUTO: 0.09 K/UL — SIGNIFICANT CHANGE UP (ref 0–0.9)
MONOCYTES NFR BLD AUTO: 2 % — SIGNIFICANT CHANGE UP (ref 2–14)
NEUTROPHILS # BLD AUTO: 3.54 K/UL — SIGNIFICANT CHANGE UP (ref 1.8–7.4)
NEUTROPHILS NFR BLD AUTO: 50 % — SIGNIFICANT CHANGE UP (ref 43–77)
NEUTS BAND # BLD: 28 % — HIGH (ref 0–8)
NITRITE UR-MCNC: POSITIVE
NITRITE UR-MCNC: POSITIVE
NRBC # BLD: 0 /100 — SIGNIFICANT CHANGE UP (ref 0–0)
NRBC # BLD: SIGNIFICANT CHANGE UP /100 WBCS (ref 0–0)
PCO2 BLDA: 30 MMHG — LOW (ref 32–46)
PH BLD: 7.48 — HIGH (ref 7.35–7.45)
PH UR: 5 — SIGNIFICANT CHANGE UP (ref 5–8)
PH UR: 6.5 — SIGNIFICANT CHANGE UP (ref 5–8)
PHOSPHATE SERPL-MCNC: 0.8 MG/DL — CRITICAL LOW (ref 2.5–4.5)
PHOSPHATE SERPL-MCNC: 4 MG/DL — SIGNIFICANT CHANGE UP (ref 2.5–4.5)
PLAT MORPH BLD: NORMAL — SIGNIFICANT CHANGE UP
PLATELET # BLD AUTO: 96 K/UL — LOW (ref 150–400)
PO2 BLDA: 180 MMHG — HIGH (ref 74–108)
POTASSIUM SERPL-MCNC: 3.8 MMOL/L — SIGNIFICANT CHANGE UP (ref 3.5–5.3)
POTASSIUM SERPL-MCNC: 4 MMOL/L — SIGNIFICANT CHANGE UP (ref 3.5–5.3)
POTASSIUM SERPL-MCNC: 4.5 MMOL/L — SIGNIFICANT CHANGE UP (ref 3.5–5.3)
POTASSIUM SERPL-SCNC: 3.8 MMOL/L — SIGNIFICANT CHANGE UP (ref 3.5–5.3)
POTASSIUM SERPL-SCNC: 4 MMOL/L — SIGNIFICANT CHANGE UP (ref 3.5–5.3)
POTASSIUM SERPL-SCNC: 4.5 MMOL/L — SIGNIFICANT CHANGE UP (ref 3.5–5.3)
PROCALCITONIN SERPL-MCNC: 0.4 NG/ML — HIGH (ref 0.02–0.1)
PROT SERPL-MCNC: 6.9 GM/DL — SIGNIFICANT CHANGE UP (ref 6–8.3)
PROT SERPL-MCNC: 6.9 GM/DL — SIGNIFICANT CHANGE UP (ref 6–8.3)
PROT UR-MCNC: 100 MG/DL
PROT UR-MCNC: 30 MG/DL
PROTHROM AB SERPL-ACNC: 15.6 SEC — HIGH (ref 10.6–13.6)
RBC # BLD: 4.87 M/UL — SIGNIFICANT CHANGE UP (ref 4.2–5.8)
RBC # FLD: 12.3 % — SIGNIFICANT CHANGE UP (ref 10.3–14.5)
RBC BLD AUTO: NORMAL — SIGNIFICANT CHANGE UP
RBC CASTS # UR COMP ASSIST: ABNORMAL /HPF (ref 0–4)
RBC CASTS # UR COMP ASSIST: SIGNIFICANT CHANGE UP /HPF (ref 0–4)
SAO2 % BLDA: 99 % — HIGH (ref 92–96)
SARS-COV-2 IGG SERPL QL IA: NEGATIVE — SIGNIFICANT CHANGE UP
SARS-COV-2 IGM SERPL IA-ACNC: <3.8 AU/ML — SIGNIFICANT CHANGE UP
SARS-COV-2 RNA SPEC QL NAA+PROBE: SIGNIFICANT CHANGE UP
SODIUM SERPL-SCNC: 135 MMOL/L — SIGNIFICANT CHANGE UP (ref 135–145)
SODIUM SERPL-SCNC: 136 MMOL/L — SIGNIFICANT CHANGE UP (ref 135–145)
SODIUM SERPL-SCNC: 139 MMOL/L — SIGNIFICANT CHANGE UP (ref 135–145)
SP GR SPEC: 1 — LOW (ref 1.01–1.02)
SP GR SPEC: 1.01 — SIGNIFICANT CHANGE UP (ref 1.01–1.02)
UROBILINOGEN FLD QL: 4 MG/DL
UROBILINOGEN FLD QL: 8 MG/DL
WBC # BLD: 4.54 K/UL — SIGNIFICANT CHANGE UP (ref 3.8–10.5)
WBC # FLD AUTO: 4.54 K/UL — SIGNIFICANT CHANGE UP (ref 3.8–10.5)
WBC UR QL: SIGNIFICANT CHANGE UP
WBC UR QL: SIGNIFICANT CHANGE UP

## 2020-08-18 PROCEDURE — 76700 US EXAM ABDOM COMPLETE: CPT | Mod: 26

## 2020-08-18 PROCEDURE — 99222 1ST HOSP IP/OBS MODERATE 55: CPT

## 2020-08-18 PROCEDURE — 76937 US GUIDE VASCULAR ACCESS: CPT | Mod: 26

## 2020-08-18 PROCEDURE — 99291 CRITICAL CARE FIRST HOUR: CPT

## 2020-08-18 PROCEDURE — 36410 VNPNXR 3YR/> PHY/QHP DX/THER: CPT

## 2020-08-18 RX ORDER — MAGNESIUM SULFATE 500 MG/ML
2 VIAL (ML) INJECTION ONCE
Refills: 0 | Status: COMPLETED | OUTPATIENT
Start: 2020-08-18 | End: 2020-08-18

## 2020-08-18 RX ORDER — SODIUM CHLORIDE 9 MG/ML
1000 INJECTION, SOLUTION INTRAVENOUS ONCE
Refills: 0 | Status: COMPLETED | OUTPATIENT
Start: 2020-08-18 | End: 2020-08-18

## 2020-08-18 RX ORDER — ENOXAPARIN SODIUM 100 MG/ML
40 INJECTION SUBCUTANEOUS DAILY
Refills: 0 | Status: DISCONTINUED | OUTPATIENT
Start: 2020-08-18 | End: 2020-09-10

## 2020-08-18 RX ORDER — FOLIC ACID 0.8 MG
1 TABLET ORAL DAILY
Refills: 0 | Status: DISCONTINUED | OUTPATIENT
Start: 2020-08-18 | End: 2020-08-21

## 2020-08-18 RX ORDER — PIPERACILLIN AND TAZOBACTAM 4; .5 G/20ML; G/20ML
3.38 INJECTION, POWDER, LYOPHILIZED, FOR SOLUTION INTRAVENOUS EVERY 8 HOURS
Refills: 0 | Status: DISCONTINUED | OUTPATIENT
Start: 2020-08-18 | End: 2020-08-23

## 2020-08-18 RX ORDER — THIAMINE MONONITRATE (VIT B1) 100 MG
100 TABLET ORAL DAILY
Refills: 0 | Status: DISCONTINUED | OUTPATIENT
Start: 2020-08-18 | End: 2020-08-21

## 2020-08-18 RX ORDER — MORPHINE SULFATE 50 MG/1
4 CAPSULE, EXTENDED RELEASE ORAL EVERY 6 HOURS
Refills: 0 | Status: DISCONTINUED | OUTPATIENT
Start: 2020-08-18 | End: 2020-08-19

## 2020-08-18 RX ORDER — PHENOBARBITAL 60 MG
130 TABLET ORAL ONCE
Refills: 0 | Status: DISCONTINUED | OUTPATIENT
Start: 2020-08-18 | End: 2020-08-18

## 2020-08-18 RX ORDER — SODIUM CHLORIDE 9 MG/ML
1000 INJECTION, SOLUTION INTRAVENOUS
Refills: 0 | Status: DISCONTINUED | OUTPATIENT
Start: 2020-08-18 | End: 2020-08-19

## 2020-08-18 RX ORDER — PHENOBARBITAL 60 MG
130 TABLET ORAL EVERY 6 HOURS
Refills: 0 | Status: DISCONTINUED | OUTPATIENT
Start: 2020-08-18 | End: 2020-08-18

## 2020-08-18 RX ORDER — POTASSIUM PHOSPHATE, MONOBASIC POTASSIUM PHOSPHATE, DIBASIC 236; 224 MG/ML; MG/ML
30 INJECTION, SOLUTION INTRAVENOUS ONCE
Refills: 0 | Status: COMPLETED | OUTPATIENT
Start: 2020-08-18 | End: 2020-08-18

## 2020-08-18 RX ORDER — DEXMEDETOMIDINE HYDROCHLORIDE IN 0.9% SODIUM CHLORIDE 4 UG/ML
0.4 INJECTION INTRAVENOUS
Qty: 200 | Refills: 0 | Status: DISCONTINUED | OUTPATIENT
Start: 2020-08-18 | End: 2020-08-21

## 2020-08-18 RX ORDER — CHLORHEXIDINE GLUCONATE 213 G/1000ML
1 SOLUTION TOPICAL
Refills: 0 | Status: DISCONTINUED | OUTPATIENT
Start: 2020-08-18 | End: 2020-09-10

## 2020-08-18 RX ORDER — ACETAMINOPHEN 500 MG
1000 TABLET ORAL ONCE
Refills: 0 | Status: COMPLETED | OUTPATIENT
Start: 2020-08-18 | End: 2020-08-18

## 2020-08-18 RX ORDER — PIPERACILLIN AND TAZOBACTAM 4; .5 G/20ML; G/20ML
3.38 INJECTION, POWDER, LYOPHILIZED, FOR SOLUTION INTRAVENOUS ONCE
Refills: 0 | Status: COMPLETED | OUTPATIENT
Start: 2020-08-18 | End: 2020-08-18

## 2020-08-18 RX ADMIN — Medication 50 GRAM(S): at 13:42

## 2020-08-18 RX ADMIN — SODIUM CHLORIDE 1000 MILLILITER(S): 9 INJECTION, SOLUTION INTRAVENOUS at 13:43

## 2020-08-18 RX ADMIN — MORPHINE SULFATE 4 MILLIGRAM(S): 50 CAPSULE, EXTENDED RELEASE ORAL at 10:45

## 2020-08-18 RX ADMIN — DEXMEDETOMIDINE HYDROCHLORIDE IN 0.9% SODIUM CHLORIDE 7.26 MICROGRAM(S)/KG/HR: 4 INJECTION INTRAVENOUS at 07:55

## 2020-08-18 RX ADMIN — PIPERACILLIN AND TAZOBACTAM 25 GRAM(S): 4; .5 INJECTION, POWDER, LYOPHILIZED, FOR SOLUTION INTRAVENOUS at 13:42

## 2020-08-18 RX ADMIN — ENOXAPARIN SODIUM 40 MILLIGRAM(S): 100 INJECTION SUBCUTANEOUS at 11:28

## 2020-08-18 RX ADMIN — SODIUM CHLORIDE 150 MILLILITER(S): 9 INJECTION, SOLUTION INTRAVENOUS at 08:06

## 2020-08-18 RX ADMIN — PIPERACILLIN AND TAZOBACTAM 200 GRAM(S): 4; .5 INJECTION, POWDER, LYOPHILIZED, FOR SOLUTION INTRAVENOUS at 06:42

## 2020-08-18 RX ADMIN — Medication 1 TABLET(S): at 11:28

## 2020-08-18 RX ADMIN — Medication 50 GRAM(S): at 07:54

## 2020-08-18 RX ADMIN — MORPHINE SULFATE 4 MILLIGRAM(S): 50 CAPSULE, EXTENDED RELEASE ORAL at 10:26

## 2020-08-18 RX ADMIN — POTASSIUM PHOSPHATE, MONOBASIC POTASSIUM PHOSPHATE, DIBASIC 83.33 MILLIMOLE(S): 236; 224 INJECTION, SOLUTION INTRAVENOUS at 06:42

## 2020-08-18 RX ADMIN — DEXMEDETOMIDINE HYDROCHLORIDE IN 0.9% SODIUM CHLORIDE 7.26 MICROGRAM(S)/KG/HR: 4 INJECTION INTRAVENOUS at 16:00

## 2020-08-18 RX ADMIN — Medication 100 MILLIEQUIVALENT(S): at 02:07

## 2020-08-18 RX ADMIN — Medication 50 GRAM(S): at 06:42

## 2020-08-18 RX ADMIN — Medication 100 MILLIGRAM(S): at 11:28

## 2020-08-18 RX ADMIN — MORPHINE SULFATE 4 MILLIGRAM(S): 50 CAPSULE, EXTENDED RELEASE ORAL at 01:58

## 2020-08-18 RX ADMIN — MORPHINE SULFATE 4 MILLIGRAM(S): 50 CAPSULE, EXTENDED RELEASE ORAL at 17:30

## 2020-08-18 RX ADMIN — Medication 130 MILLIGRAM(S): at 01:25

## 2020-08-18 RX ADMIN — PIPERACILLIN AND TAZOBACTAM 25 GRAM(S): 4; .5 INJECTION, POWDER, LYOPHILIZED, FOR SOLUTION INTRAVENOUS at 21:35

## 2020-08-18 RX ADMIN — SODIUM CHLORIDE 150 MILLILITER(S): 9 INJECTION, SOLUTION INTRAVENOUS at 10:16

## 2020-08-18 RX ADMIN — DEXMEDETOMIDINE HYDROCHLORIDE IN 0.9% SODIUM CHLORIDE 7.26 MICROGRAM(S)/KG/HR: 4 INJECTION INTRAVENOUS at 03:52

## 2020-08-18 RX ADMIN — CHLORHEXIDINE GLUCONATE 1 APPLICATION(S): 213 SOLUTION TOPICAL at 05:16

## 2020-08-18 RX ADMIN — SODIUM CHLORIDE 1000 MILLILITER(S): 9 INJECTION, SOLUTION INTRAVENOUS at 07:24

## 2020-08-18 RX ADMIN — Medication 1 MILLIGRAM(S): at 11:28

## 2020-08-18 RX ADMIN — SODIUM CHLORIDE 1000 MILLILITER(S): 9 INJECTION, SOLUTION INTRAVENOUS at 04:47

## 2020-08-18 RX ADMIN — SODIUM CHLORIDE 1000 MILLILITER(S): 9 INJECTION, SOLUTION INTRAVENOUS at 06:00

## 2020-08-18 RX ADMIN — MORPHINE SULFATE 4 MILLIGRAM(S): 50 CAPSULE, EXTENDED RELEASE ORAL at 23:46

## 2020-08-18 RX ADMIN — Medication 2 MILLIGRAM(S): at 02:11

## 2020-08-18 RX ADMIN — Medication 100 MILLIEQUIVALENT(S): at 00:53

## 2020-08-18 RX ADMIN — MORPHINE SULFATE 4 MILLIGRAM(S): 50 CAPSULE, EXTENDED RELEASE ORAL at 17:11

## 2020-08-18 RX ADMIN — MORPHINE SULFATE 4 MILLIGRAM(S): 50 CAPSULE, EXTENDED RELEASE ORAL at 23:31

## 2020-08-18 RX ADMIN — SODIUM CHLORIDE 1000 MILLILITER(S): 9 INJECTION, SOLUTION INTRAVENOUS at 03:30

## 2020-08-18 RX ADMIN — Medication 10 MILLIGRAM(S): at 00:53

## 2020-08-18 RX ADMIN — Medication 400 MILLIGRAM(S): at 03:41

## 2020-08-18 RX ADMIN — HEPARIN SODIUM 5000 UNIT(S): 5000 INJECTION INTRAVENOUS; SUBCUTANEOUS at 05:16

## 2020-08-18 NOTE — CHART NOTE - NSCHARTNOTEFT_GEN_A_CORE
Patient exhibiting worsening withdrawal, on ativan and phenobarbital.    Tachycardic to the 140s, BP elevated.   CIWA score - 21  Consulted ICU for eval - who accepted the patient for transfer

## 2020-08-18 NOTE — DIETITIAN INITIAL EVALUATION ADULT. - PERTINENT MEDS FT
MEDICATIONS  (STANDING):  chlorhexidine 4% Liquid 1 Application(s) Topical <User Schedule>  dexMEDEtomidine Infusion 0.4 MICROgram(s)/kG/Hr (7.26 mL/Hr) IV Continuous <Continuous>  enoxaparin Injectable 40 milliGRAM(s) SubCutaneous daily  folic acid Injectable 1 milliGRAM(s) IV Push daily  lactated ringers. 1000 milliLiter(s) (150 mL/Hr) IV Continuous <Continuous>  multivitamin 1 Tablet(s) Oral daily  piperacillin/tazobactam IVPB.. 3.375 Gram(s) IV Intermittent every 8 hours  thiamine Injectable 100 milliGRAM(s) IV Push daily    MEDICATIONS  (PRN):  morphine  - Injectable 4 milliGRAM(s) IV Push every 6 hours PRN Severe Pain (7 - 10)

## 2020-08-18 NOTE — DIETITIAN INITIAL EVALUATION ADULT. - PHYSICAL APPEARANCE
BMI= 22.6. 1+ generalized edema. Unable to conduct Nutrition-focused physical exam as pt is maintained on Airborne/Contact Isolation precautions.

## 2020-08-18 NOTE — CONSULT NOTE ADULT - ATTENDING COMMENTS
44M w/ EtOH abuse and withdrawal, pancreatitis. Presents w/ abdominal pain, nausea and vomiting. Labs and CT are consistent w/ pancreatitis and possible early necrosis of pancreas. Pt transferred to ICU for severe alcohol withdrawal symptoms. Pt seen and examined on rounds. 44M w/ EtOH abuse and withdrawal, pancreatitis. Presents w/ abdominal pain, nausea and vomiting. Labs and CT are consistent w/ pancreatitis and possible early necrosis of pancreas. Pt transferred to ICU for severe alcohol withdrawal symptoms. Pt seen and examined on rounds. Pt is sleepy but wakes up and answers questions. +BS, abdomen is distended and firm, diffusely tender.     - precedex for agitation, will hold on phenobarb since per admission note last drink was almost 4 days ago   - pain control, aggressive IVF today LR @ 150 cc/hr  - will get surgery consult in setting of pancreatic necrosis  - will get RUQ US in setting of mildly elevated bilirubin  - aggressive electrolytes repletion   - monitor UOP  - noted to have significant bandemia on labs, started on zosyn; f/u blood cx, urine cx, procal  - thiamine, MVI, folate  - lovenox for ppx  - will need midline for IV access 44M w/ EtOH abuse and withdrawal, pancreatitis. Presents w/ abdominal pain, nausea and vomiting. Labs and CT are consistent w/ pancreatitis and possible early necrosis of pancreas. Pt transferred to ICU for severe alcohol withdrawal symptoms. Pt seen and examined on rounds. Pt is sleepy but wakes up and answers questions. +BS, abdomen is distended and firm, diffusely tender.     - precedex for agitation, will hold on phenobarb since per admission note last drink was almost 4 days ago   - pain control, aggressive IVF today LR @ 150 cc/hr  - will get surgery consult in setting of pancreatic necrosis  - will get RUQ US in setting of mildly elevated bilirubin  - aggressive electrolytes repletion - Mg, Phos  - monitor UOP  - noted to have significant bandemia on labs, started on zosyn; f/u blood cx, urine cx, procal  - lactic acidosis likely from pt being underresuscitated, will trend  - thiamine, MVI, folate  - lovenox for ppx  - will need midline for IV access

## 2020-08-18 NOTE — ED ADULT NURSE REASSESSMENT NOTE - NS ED NURSE REASSESS COMMENT FT1
covering for primary RN marcela. pt put on cardiac monitor. CIWA done, Dr. pat made aware of pt's conditions and v/s. Dr. pat at bedside. pt upgraded to tele. will continue to monitor. As per Dr. pat repeat lactate @0200.

## 2020-08-18 NOTE — DIETITIAN INITIAL EVALUATION ADULT. - ENERGY NEEDS
Height (cm): 177.8 (08-17)  Weight (kg): 71.5 (08-18)  BMI (kg/m2): 22.6 (08-18)  IBW: 75 kg   % IBW:  95%      UBW: unknown       %UBW: unknown

## 2020-08-18 NOTE — CONSULT NOTE ADULT - ATTENDING COMMENTS
I saw and examined the patient at bedside and agree with the findings. I have reviewed the laboratory and imaging findings, which are consistent with acute pancreatitis, possible early necrosis of the pancreas. At this time no acute surgical intervention, continue resuscitative care as per ICU. Surgery following.

## 2020-08-18 NOTE — DIETITIAN INITIAL EVALUATION ADULT. - PERTINENT LABORATORY DATA
08-18 Na139 mmol/L Glu 102 mg/dL<H> K+ 4.5 mmol/L Cr  0.98 mg/dL BUN 17 mg/dL 08-18 Phos 4.0 mg/dL 08-18 Alb 2.9 g/dL<L>

## 2020-08-18 NOTE — PATIENT PROFILE ADULT - NSPROGENPREVTRANSF_GEN_A_NUR
patient disoriented, unable to verbalize patient disoriented, unable to verbalize/no history of blood product transfusion

## 2020-08-18 NOTE — CONSULT NOTE ADULT - ASSESSMENT
A/P: 44M w/ EtOH abuse and withdrawal, pancreatitis. Presents w/ abdominal pain, nausea and vomiting. Labs and CT are consistent w/ pancreatitis and possible early necrosis of pancreas. Pt transferred to ICU for severe alcohol withdrawal symptoms    -Continue NPO  -Continue ICU management and supportive measures, IV ABX (zosyn)  -Aggressive IVF resuscitation, titrate fluids to UOP. Replete lytes PRN  -F/u labs, trend Lactate and LFTs/Lipase  -Sanchez catheter, Strict I/Os  -Pain control PRN, antiemetic PRN  -Monitor for temps, Antipyretics PRN  -Serial abd exams  -[facility] protocol and monitoring per primary team  -No acute surgical intervention planned  -Discussed with Dr. Shelley

## 2020-08-18 NOTE — CONSULT NOTE ADULT - SUBJECTIVE AND OBJECTIVE BOX
In progress History obtained from chart, pt is currently is in withdrawal and confused at this time.     44 year old man with a PMH of EtOH abuse and withdrawal, alcohol pancreatitis who presents to the ED with severe abdominal pain. Patient states that for the past three days he has been on a drinking binge, drinking 5 beers and 5 shots of liquor per day.  Last drink reportedly yesterday.  Patient reports that he woke up today and had severe generalized abdominal pain with multiple episodes of nonbloody, nonbilious vomiting.  Patient also complaining of nausea and generalized tremors.  Vitals stable, labs show leukocytosis, lactic acidosis, and severely elevated lipase levels. CT of the abdomen shows pancreatitis with early necrosis in the pancreatic body.  ICU was consulted for alcohol withdrawal and elevated lactate. In the ED patient received only 2 L of IV fluids. He also received multiple doses of ativan, a dose of librium, a dose of phenobarbital and 2 doses of morphine. K was 2.9 and it was repleted.      Patient was seen and examined in the ED. Patient will be admitted to the ICU for resuscitation. Also for increased Lactate along with worsening sign of delirium and alcohol withdrawal.    ROS: unable to do at this time.   Allergies: NKDA per chart     Physical Exam:   General: diaphoretic, mumbling words to himself.   Neuro: able to follow simple commands. Not answering questions clearly.   Confused. A and O x1.   Pupils: pinpoint pupils b/l   Lungs: clear to ascultation, bilateral air entry   Heart: S1, S2, no murmur heard   Abdomen: soft, some tenderness on palpation, bowel sounds present   Ext: moves all extremities, no edema, pulses present       Lab Results:  CBC  CBC Full  -  ( 17 Aug 2020 17:27 )  WBC Count : 12.89 K/uL  RBC Count : 4.66 M/uL  Hemoglobin : 15.4 g/dL  Hematocrit : 42.6 %  Platelet Count - Automated : 156 K/uL  Mean Cell Volume : 91.4 fl  Mean Cell Hemoglobin : 33.0 pg  Mean Cell Hemoglobin Concentration : 36.2 gm/dL  Auto Neutrophil # : 11.04 K/uL  Auto Lymphocyte # : 0.82 K/uL  Auto Monocyte # : 0.97 K/uL  Auto Eosinophil # : 0.00 K/uL  Auto Basophil # : 0.04 K/uL  Auto Neutrophil % : 85.6 %  Auto Lymphocyte % : 6.4 %  Auto Monocyte % : 7.5 %  Auto Eosinophil % : 0.0 %  Auto Basophil % : 0.3 %    .		Differential:	[] Automated		[] Manual  Chemistry                        15.4   12.89 )-----------( 156      ( 17 Aug 2020 17:27 )             42.6     08-17    137  |  96  |  11  ----------------------------<  167<H>  2.9<LL>   |  25  |  0.92    Ca    11.5<H>      17 Aug 2020 17:27    TPro  9.2<H>  /  Alb  4.2  /  TBili  2.6<H>  /  DBili  x   /  AST  200<H>  /  ALT  131<H>  /  AlkPhos  152<H>  08-17    LIVER FUNCTIONS - ( 17 Aug 2020 17:27 )  Alb: 4.2 g/dL / Pro: 9.2 gm/dL / ALK PHOS: 152 U/L / ALT: 131 U/L / AST: 200 U/L / GGT: x             Lactate, Blood: 4.6 mmol/L <HH> (08-18-20 @ 01:55)  Lactate, Blood: 2.5 mmol/L <H> (08-17-20 @ 22:00)  Lactate, Blood: 3.8 mmol/L <H> (08-17-20 @ 17:27)  Troponin I, Serum: <.015 ng/mL (08-17-20 @ 17:27)

## 2020-08-18 NOTE — DIETITIAN INITIAL EVALUATION ADULT. - ADD RECOMMEND
If/when medically feasible initiate Clear Liquids. Gradually advance to Low fat diet, as tolerated. If PO diet is not feasible consider alternate means of nutrition support via EN/TPN as medically feasible.

## 2020-08-18 NOTE — CONSULT NOTE ADULT - SUBJECTIVE AND OBJECTIVE BOX
REASON FOR CONSULT: Alcohol withdrawal, pancreatitis.    Chief Complaint    HPI:44M with a PMH of EtOH abuse and withdrawal, alcohol pancreatitis who presents to the ED with severe abdominal pain.  Patient states that for the past three days he has been on a drinking binge, drinking 5 beers and 5 shots of liquor per day.  Last drink reportedly yesterday.  Patient reports that he woke up today and had severe generalized abdominal pain with multiple episodes of nonbloody, nonbilious vomiting.  Patient also complaining of nausea and generalized tremors.  Vitals stable, labs show leukocytosis, lactic acidosis, and severely elevated lipase levels.  CT shows pancreatitis with early necrosis in the pancreatic body.   Further monitoring in the ER revealed inadequate resuscitation and increased Lactate along with worsening sign of delirium and withdrawal.  Case was discussed in detail with the ICU PA.      PAST MEDICAL & SURGICAL HISTORY:  History of acute pancreatitis  ETOH abuse  Closed fracture of left mandibular angle, sequela    Allergies    No Known Allergies      FAMILY HISTORY:  Family history of diabetes mellitus (Father)      Medications:  acetaminophen  IVPB .. 1000 milliGRAM(s) IV Intermittent once  dexMEDEtomidine Infusion 0.4 MICROgram(s)/kG/Hr IV Continuous <Continuous>  heparin   Injectable 5000 Unit(s) SubCutaneous every 12 hours  folic acid 1 milliGRAM(s) Oral daily  lactated ringers Bolus 1000 milliLiter(s) IV Bolus once  lactated ringers. 1000 milliLiter(s) IV Continuous <Continuous>  multivitamin 1 Tablet(s) Oral daily  thiamine 100 milliGRAM(s) Oral daily  chlorhexidine 4% Liquid 1 Application(s) Topical <User Schedule>        Vital Signs Last 24 Hrs  T(C): 38.1 (18 Aug 2020 02:49), Max: 38.1 (18 Aug 2020 02:49)  T(F): 100.5 (18 Aug 2020 02:49), Max: 100.5 (18 Aug 2020 02:49)  HR: 139 (18 Aug 2020 02:49) (97 - 163)  BP: 149/110 (18 Aug 2020 02:49) (149/110 - 181/112)  RR: 25 (18 Aug 2020 02:49) (18 - 25)  SpO2: 97% (18 Aug 2020 02:49) (96% - 100%)      LABS:                        15.4   12.89 )-----------( 156      ( 17 Aug 2020 17:27 )             42.6     08-17    137  |  96  |  11  ----------------------------<  167<H>  2.9<LL>   |  25  |  0.92    Ca    11.5<H>      17 Aug 2020 17:27    TPro  9.2<H>  /  Alb  4.2  /  TBili  2.6<H>  /  DBili  x   /  AST  200<H>  /  ALT  131<H>  /  AlkPhos  152<H>  08-17      CARDIAC MARKERS ( 17 Aug 2020 17:27 )  <.015 ng/mL / x     / x     / x     / x          CULTURES: pending      Physical Examination:  Physical exam as per bedside MD (direct physical examination could not be performed because the visit was provided via Telemedicine):       RADIOLOGY: < from: CT Abdomen and Pelvis w/ IV Cont (08.17.20 @ 20:09) >  IMPRESSION:  Moderate acute pancreatitis. Suggestion of early pancreatic body necrosis. Regional adenopathy. Follow-up advised.    < end of copied text >    IMP-  Acute pancreatitis  elevated lactate  Alcohol withdrawal  hypokalemia  Alcoholic hepatitis    Plan-  Admit ICU  resuscitation  Precedex drip  replace lytes  culture  serial exams  GI consult  ABG      prognosis- guarded.    CRITICAL CARE TIME SPENT: 60    This visit was provided via telemedicine. Patient was located at     [facility].    Provider was located at [facility] [place] for the visit.

## 2020-08-18 NOTE — CONSULT NOTE ADULT - ASSESSMENT
Assessment:   Acute pancreatitis  Elevated lactate  Alcohol withdrawal  Hypokalemia  Alcoholic hepatitis        Plan:     Neuro:  CIWA protocol  Precedex drip  Titrate sedation to RASS of 0 to -1  Pain control     CV:   Continue hemodynamic monitoring   IV Fluid bolus and maintenance fluid   Bedside POCUS  Will repeat lactate lelvel       Pulmonary:   Monitor resp status  Incentive spirometer   ABG    GI:   GI consult   NPO    Endo:   Monitor fingersticks   Glycemic control with insulin sliding scale       Renal:   Monitor urine output closely.   May need to place mosqueda catheter for accurate urine monitoring.   Monitor electrolytes and replete as needed.       ID:   Will send blood cultures.   Monitor temp.   No abx at this time.     Heme:   Repeat labs   DVT prophylaxis with SCD and Heparin SQ     Other/Disposition:  Will admit to the ICU for further management.   Code Status: Full code     Case discussed with EICU Attending.

## 2020-08-18 NOTE — CONSULT NOTE ADULT - SUBJECTIVE AND OBJECTIVE BOX
GENERAL SURGERY CONSULT NOTE    Patient is a 44y old  Male who presents with a chief complaint of pancreatitis (18 Aug 2020 03:48)    HPI:  Patient is a 44M with a PMH of EtOH abuse and withdrawal, alcohol pancreatitis who presents to the ED with severe abdominal pain.  Patient states that for the past three days he has been on a drinking binge, drinking 5 beers and 5 shots of liquor per day.  Last drink reportedly yesterday.  Patient reports that he woke up today and had severe generalized abdominal pain with multiple episodes of nonbloody, nonbilious vomiting.  Patient also complaining of nausea and generalized tremors.  Vitals stable, labs show leukocytosis, lactic acidosis, and severely elevated lipase levels.  CT shows pancreatitis with early necrosis in the pancreatic body.  Will admit to floor. (17 Aug 2020 23:25)    Pt seen and examined at bedside with Dr. Shelley in CCU. Patient c/o severe epigastric abdominal pain. Denies N/V. Wants to drink liquids.  Low grade temps.   Denies chest pain, sob, dizziness, cough.     PAST MEDICAL & SURGICAL HISTORY:  History of acute pancreatitis  ETOH abuse  Closed fracture of left mandibular angle, sequela      Review of Systems:  Contained within HPI    MEDICATIONS  (STANDING):  chlorhexidine 4% Liquid 1 Application(s) Topical <User Schedule>  dexMEDEtomidine Infusion 0.4 MICROgram(s)/kG/Hr (7.26 mL/Hr) IV Continuous <Continuous>  enoxaparin Injectable 40 milliGRAM(s) SubCutaneous daily  folic acid Injectable 1 milliGRAM(s) IV Push daily  lactated ringers. 1000 milliLiter(s) (150 mL/Hr) IV Continuous <Continuous>  multivitamin 1 Tablet(s) Oral daily  piperacillin/tazobactam IVPB.. 3.375 Gram(s) IV Intermittent every 8 hours  thiamine Injectable 100 milliGRAM(s) IV Push daily    MEDICATIONS  (PRN):  morphine  - Injectable 4 milliGRAM(s) IV Push every 6 hours PRN Severe Pain (7 - 10)      Allergies    No Known Allergies    SOCIAL HISTORY:    ETOH abuse    FAMILY HISTORY:  Family history of diabetes mellitus (Father)      Vital Signs Last 24 Hrs  T(C): 37.8 (18 Aug 2020 11:43), Max: 38.1 (18 Aug 2020 02:49)  T(F): 100 (18 Aug 2020 11:43), Max: 100.5 (18 Aug 2020 02:49)  HR: 135 (18 Aug 2020 12:00) (97 - 163)  BP: 103/64 (18 Aug 2020 12:00) (100/81 - 181/112)  BP(mean): 72 (18 Aug 2020 12:00) (72 - 117)  RR: 18 (18 Aug 2020 12:00) (18 - 34)  SpO2: 100% (18 Aug 2020 12:00) (79% - 100%)    Physical Exam:    General:  Mild distress due to pain, diaphoretic   Eyes : TAMAR  HENT:  WNL, no JVD  Chest:  clear breath sounds  Cardiovascular: Tachycardic, S1S2  Abdomen: Softly distended, +diffusely tender to palpation with guarding and rebound tenderness  : mosqueda draining dark yellow urine   Extremities: No calf tenderness b/l  Skin:  No rash  Musculoskeletal:  normal strength  Neuro/Psych:  Alert, oriented to time, place and person       LABS:                        16.4   4.54  )-----------( 96       ( 18 Aug 2020 05:08 )             45.8     08-18    135  |  102  |  17  ----------------------------<  134<H>  4.0   |  21<L>  |  1.09    Ca    9.2      18 Aug 2020 05:08  Phos  0.8     08-18  Mg     0.9     08-18    TPro  6.9  /  Alb  2.9<L>  /  TBili  2.3<H>  /  DBili  1.10<H>  /  AST  116<H>  /  ALT  79<H>  /  AlkPhos  79  08-18    PT/INR - ( 18 Aug 2020 05:08 )   PT: 15.6 sec;   INR: 1.36 ratio         PTT - ( 18 Aug 2020 05:08 )  PTT:31.6 sec  Urinalysis Basic - ( 18 Aug 2020 08:28 )    Color: Erlinda / Appearance: Clear / S.010 / pH: x  Gluc: x / Ketone: Trace  / Bili: Small / Urobili: 4 mg/dL   Blood: x / Protein: 30 mg/dL / Nitrite: Positive   Leuk Esterase: Trace / RBC: 11-25 /HPF / WBC 0-2   Sq Epi: x / Non Sq Epi: Occasional / Bacteria: Few    RADIOLOGY & ADDITIONAL STUDIES:  < from: CT Abdomen and Pelvis w/ IV Cont (20 @ 20:09) >  EXAM:  CT ABDOMEN AND PELVIS IC                            PROCEDURE DATE:  2020          INTERPRETATION:  CLINICAL INDICATION: 44 years  Male with abd pain.    COMPARISON: 10/17/2019    PROCEDURE:  CT of the Abdomen and Pelvis was performed with intravenous contrast.  Intravenous contrast: 90 ml Omnipaque 350. 10 ml discarded.  Oral contrast: None.  Sagittal and coronal reformats were performed.    LIMITATION: Absence of enteric contrast limits evaluation of the GI tract.    FINDINGS:  LOWER CHEST: Within normal limits.    LIVER: Hepatic steatosis.  BILE DUCTS: Normal caliber.  GALLBLADDER: Within normal limits.  SPLEEN: Within normal limits.  PANCREAS: Moderate peripancreatic fluid. Heterogeneous enhancement of the pancreatic body may indicate early pancreatic necrosis. No pancreatic duct distention. Calcifications in the uncinate process suggestive of prior episodes of pancreatitis. 1.3 cm lymph node adjacent to the pancreatic head (2:48).  ADRENALS: Within normal limits.  KIDNEYS/URETERS: Within normal limits.    BLADDER: Within normal limits.  REPRODUCTIVE ORGANS: Normal prostate    BOWEL: No bowel obstruction. Appendix is normal.  PERITONEUM: Retroperitoneal fluid around the pancreas and tracking down the bilateral anterior pararenal space. Fluid extends into the anterior mesentery and omentum.  VESSELS: Mild atherosclerosis.  RETROPERITONEUM/LYMPH NODES: Retroperitoneal fluid. No retroperitoneal adenopathy.  ABDOMINAL WALL: Within normal limits.  BONES: Within normal limits.    IMPRESSION:  Moderate acute pancreatitis. Suggestion of early pancreatic body necrosis. Regional adenopathy. Follow-up advised.    Findings were discussed with Dr. Coombs [phone] 2020 8:25 PM by Dr. Duffy with read back confirmation.        VILMA DUFFY M.D., ATTENDING RADIOLOGIST  This document has been electronically signed. Aug 17 2020  8:28PM    < end of copied text >

## 2020-08-19 LAB
ALBUMIN SERPL ELPH-MCNC: 2.1 G/DL — LOW (ref 3.3–5)
ALP SERPL-CCNC: 29 U/L — LOW (ref 40–120)
ALT FLD-CCNC: 50 U/L — SIGNIFICANT CHANGE UP (ref 12–78)
ANION GAP SERPL CALC-SCNC: 10 MMOL/L — SIGNIFICANT CHANGE UP (ref 5–17)
ANION GAP SERPL CALC-SCNC: 11 MMOL/L — SIGNIFICANT CHANGE UP (ref 5–17)
APTT BLD: 40.8 SEC — HIGH (ref 27.5–35.5)
AST SERPL-CCNC: 78 U/L — HIGH (ref 15–37)
BASOPHILS # BLD AUTO: 0.04 K/UL — SIGNIFICANT CHANGE UP (ref 0–0.2)
BASOPHILS NFR BLD AUTO: 1.2 % — SIGNIFICANT CHANGE UP (ref 0–2)
BILIRUB SERPL-MCNC: 1.8 MG/DL — HIGH (ref 0.2–1.2)
BUN SERPL-MCNC: 24 MG/DL — HIGH (ref 7–23)
BUN SERPL-MCNC: 26 MG/DL — HIGH (ref 7–23)
CALCIUM SERPL-MCNC: 7.2 MG/DL — LOW (ref 8.5–10.1)
CALCIUM SERPL-MCNC: 7.5 MG/DL — LOW (ref 8.5–10.1)
CHLORIDE SERPL-SCNC: 102 MMOL/L — SIGNIFICANT CHANGE UP (ref 96–108)
CHLORIDE SERPL-SCNC: 102 MMOL/L — SIGNIFICANT CHANGE UP (ref 96–108)
CO2 SERPL-SCNC: 22 MMOL/L — SIGNIFICANT CHANGE UP (ref 22–31)
CO2 SERPL-SCNC: 23 MMOL/L — SIGNIFICANT CHANGE UP (ref 22–31)
CREAT SERPL-MCNC: 1.28 MG/DL — SIGNIFICANT CHANGE UP (ref 0.5–1.3)
CREAT SERPL-MCNC: 1.39 MG/DL — HIGH (ref 0.5–1.3)
CULTURE RESULTS: NO GROWTH — SIGNIFICANT CHANGE UP
EOSINOPHIL # BLD AUTO: 0.04 K/UL — SIGNIFICANT CHANGE UP (ref 0–0.5)
EOSINOPHIL NFR BLD AUTO: 1.2 % — SIGNIFICANT CHANGE UP (ref 0–6)
GLUCOSE BLDC GLUCOMTR-MCNC: 100 MG/DL — HIGH (ref 70–99)
GLUCOSE BLDC GLUCOMTR-MCNC: 105 MG/DL — HIGH (ref 70–99)
GLUCOSE BLDC GLUCOMTR-MCNC: 114 MG/DL — HIGH (ref 70–99)
GLUCOSE BLDC GLUCOMTR-MCNC: 120 MG/DL — HIGH (ref 70–99)
GLUCOSE BLDC GLUCOMTR-MCNC: 69 MG/DL — LOW (ref 70–99)
GLUCOSE SERPL-MCNC: 113 MG/DL — HIGH (ref 70–99)
GLUCOSE SERPL-MCNC: 84 MG/DL — SIGNIFICANT CHANGE UP (ref 70–99)
HCT VFR BLD CALC: 36.6 % — LOW (ref 39–50)
HGB BLD-MCNC: 12.7 G/DL — LOW (ref 13–17)
IMM GRANULOCYTES NFR BLD AUTO: 4.4 % — HIGH (ref 0–1.5)
INR BLD: 1.72 RATIO — HIGH (ref 0.88–1.16)
LACTATE SERPL-SCNC: 2.7 MMOL/L — HIGH (ref 0.7–2)
LACTATE SERPL-SCNC: 3.5 MMOL/L — HIGH (ref 0.7–2)
LACTATE SERPL-SCNC: 5 MMOL/L — CRITICAL HIGH (ref 0.7–2)
LIDOCAIN IGE QN: 8699 U/L — HIGH (ref 73–393)
LYMPHOCYTES # BLD AUTO: 0.2 K/UL — LOW (ref 1–3.3)
LYMPHOCYTES # BLD AUTO: 5.9 % — LOW (ref 13–44)
MAGNESIUM SERPL-MCNC: 1.7 MG/DL — SIGNIFICANT CHANGE UP (ref 1.6–2.6)
MAGNESIUM SERPL-MCNC: 2 MG/DL — SIGNIFICANT CHANGE UP (ref 1.6–2.6)
MCHC RBC-ENTMCNC: 33.6 PG — SIGNIFICANT CHANGE UP (ref 27–34)
MCHC RBC-ENTMCNC: 34.7 GM/DL — SIGNIFICANT CHANGE UP (ref 32–36)
MCV RBC AUTO: 96.8 FL — SIGNIFICANT CHANGE UP (ref 80–100)
MONOCYTES # BLD AUTO: 0.19 K/UL — SIGNIFICANT CHANGE UP (ref 0–0.9)
MONOCYTES NFR BLD AUTO: 5.6 % — SIGNIFICANT CHANGE UP (ref 2–14)
NEUTROPHILS # BLD AUTO: 2.78 K/UL — SIGNIFICANT CHANGE UP (ref 1.8–7.4)
NEUTROPHILS NFR BLD AUTO: 81.7 % — HIGH (ref 43–77)
NRBC # BLD: 0 /100 WBCS — SIGNIFICANT CHANGE UP (ref 0–0)
PHOSPHATE SERPL-MCNC: 2.6 MG/DL — SIGNIFICANT CHANGE UP (ref 2.5–4.5)
PHOSPHATE SERPL-MCNC: 3.1 MG/DL — SIGNIFICANT CHANGE UP (ref 2.5–4.5)
PLATELET # BLD AUTO: 70 K/UL — LOW (ref 150–400)
POTASSIUM SERPL-MCNC: 4.2 MMOL/L — SIGNIFICANT CHANGE UP (ref 3.5–5.3)
POTASSIUM SERPL-MCNC: 4.5 MMOL/L — SIGNIFICANT CHANGE UP (ref 3.5–5.3)
POTASSIUM SERPL-SCNC: 4.2 MMOL/L — SIGNIFICANT CHANGE UP (ref 3.5–5.3)
POTASSIUM SERPL-SCNC: 4.5 MMOL/L — SIGNIFICANT CHANGE UP (ref 3.5–5.3)
PROT SERPL-MCNC: 5.3 GM/DL — LOW (ref 6–8.3)
PROTHROM AB SERPL-ACNC: 19.4 SEC — HIGH (ref 10.6–13.6)
RBC # BLD: 3.78 M/UL — LOW (ref 4.2–5.8)
RBC # FLD: 12.7 % — SIGNIFICANT CHANGE UP (ref 10.3–14.5)
SODIUM SERPL-SCNC: 135 MMOL/L — SIGNIFICANT CHANGE UP (ref 135–145)
SODIUM SERPL-SCNC: 135 MMOL/L — SIGNIFICANT CHANGE UP (ref 135–145)
SPECIMEN SOURCE: SIGNIFICANT CHANGE UP
WBC # BLD: 3.4 K/UL — LOW (ref 3.8–10.5)
WBC # FLD AUTO: 3.4 K/UL — LOW (ref 3.8–10.5)

## 2020-08-19 PROCEDURE — 74018 RADEX ABDOMEN 1 VIEW: CPT | Mod: 26

## 2020-08-19 PROCEDURE — 74177 CT ABD & PELVIS W/CONTRAST: CPT | Mod: 26

## 2020-08-19 PROCEDURE — 99291 CRITICAL CARE FIRST HOUR: CPT

## 2020-08-19 PROCEDURE — 51702 INSERT TEMP BLADDER CATH: CPT

## 2020-08-19 RX ORDER — NOREPINEPHRINE BITARTRATE/D5W 8 MG/250ML
0.05 PLASTIC BAG, INJECTION (ML) INTRAVENOUS
Qty: 8 | Refills: 0 | Status: DISCONTINUED | OUTPATIENT
Start: 2020-08-19 | End: 2020-08-21

## 2020-08-19 RX ORDER — SODIUM CHLORIDE 9 MG/ML
1000 INJECTION INTRAMUSCULAR; INTRAVENOUS; SUBCUTANEOUS ONCE
Refills: 0 | Status: COMPLETED | OUTPATIENT
Start: 2020-08-19 | End: 2020-08-19

## 2020-08-19 RX ORDER — PANTOPRAZOLE SODIUM 20 MG/1
40 TABLET, DELAYED RELEASE ORAL DAILY
Refills: 0 | Status: DISCONTINUED | OUTPATIENT
Start: 2020-08-19 | End: 2020-08-21

## 2020-08-19 RX ORDER — SODIUM CHLORIDE 9 MG/ML
1000 INJECTION, SOLUTION INTRAVENOUS ONCE
Refills: 0 | Status: COMPLETED | OUTPATIENT
Start: 2020-08-19 | End: 2020-08-19

## 2020-08-19 RX ORDER — SENNA PLUS 8.6 MG/1
2 TABLET ORAL AT BEDTIME
Refills: 0 | Status: DISCONTINUED | OUTPATIENT
Start: 2020-08-19 | End: 2020-09-06

## 2020-08-19 RX ORDER — LIDOCAINE HCL 20 MG/ML
10 VIAL (ML) INJECTION ONCE
Refills: 0 | Status: DISCONTINUED | OUTPATIENT
Start: 2020-08-19 | End: 2020-08-19

## 2020-08-19 RX ORDER — ACETAMINOPHEN 500 MG
1000 TABLET ORAL ONCE
Refills: 0 | Status: COMPLETED | OUTPATIENT
Start: 2020-08-19 | End: 2020-08-19

## 2020-08-19 RX ORDER — LIDOCAINE HCL 20 MG/ML
10 VIAL (ML) INJECTION ONCE
Refills: 0 | Status: COMPLETED | OUTPATIENT
Start: 2020-08-19 | End: 2020-08-19

## 2020-08-19 RX ORDER — MAGNESIUM SULFATE 500 MG/ML
2 VIAL (ML) INJECTION ONCE
Refills: 0 | Status: COMPLETED | OUTPATIENT
Start: 2020-08-19 | End: 2020-08-19

## 2020-08-19 RX ORDER — SODIUM CHLORIDE 9 MG/ML
1000 INJECTION, SOLUTION INTRAVENOUS
Refills: 0 | Status: DISCONTINUED | OUTPATIENT
Start: 2020-08-19 | End: 2020-08-20

## 2020-08-19 RX ORDER — LIDOCAINE HCL 20 MG/ML
1 VIAL (ML) INJECTION DAILY
Refills: 0 | Status: DISCONTINUED | OUTPATIENT
Start: 2020-08-19 | End: 2020-08-19

## 2020-08-19 RX ORDER — IOHEXOL 300 MG/ML
30 INJECTION, SOLUTION INTRAVENOUS ONCE
Refills: 0 | Status: COMPLETED | OUTPATIENT
Start: 2020-08-19 | End: 2020-08-19

## 2020-08-19 RX ORDER — FENTANYL CITRATE 50 UG/ML
50 INJECTION INTRAVENOUS ONCE
Refills: 0 | Status: DISCONTINUED | OUTPATIENT
Start: 2020-08-19 | End: 2020-08-19

## 2020-08-19 RX ORDER — ACETAMINOPHEN 500 MG
650 TABLET ORAL ONCE
Refills: 0 | Status: DISCONTINUED | OUTPATIENT
Start: 2020-08-19 | End: 2020-08-19

## 2020-08-19 RX ADMIN — ENOXAPARIN SODIUM 40 MILLIGRAM(S): 100 INJECTION SUBCUTANEOUS at 11:58

## 2020-08-19 RX ADMIN — SODIUM CHLORIDE 125 MILLILITER(S): 9 INJECTION, SOLUTION INTRAVENOUS at 06:26

## 2020-08-19 RX ADMIN — Medication 6.7 MICROGRAM(S)/KG/MIN: at 05:21

## 2020-08-19 RX ADMIN — Medication 1000 MILLIGRAM(S): at 05:36

## 2020-08-19 RX ADMIN — FENTANYL CITRATE 50 MICROGRAM(S): 50 INJECTION INTRAVENOUS at 21:40

## 2020-08-19 RX ADMIN — FENTANYL CITRATE 50 MICROGRAM(S): 50 INJECTION INTRAVENOUS at 21:27

## 2020-08-19 RX ADMIN — SODIUM CHLORIDE 1000 MILLILITER(S): 9 INJECTION, SOLUTION INTRAVENOUS at 03:42

## 2020-08-19 RX ADMIN — PIPERACILLIN AND TAZOBACTAM 25 GRAM(S): 4; .5 INJECTION, POWDER, LYOPHILIZED, FOR SOLUTION INTRAVENOUS at 21:15

## 2020-08-19 RX ADMIN — PIPERACILLIN AND TAZOBACTAM 25 GRAM(S): 4; .5 INJECTION, POWDER, LYOPHILIZED, FOR SOLUTION INTRAVENOUS at 13:28

## 2020-08-19 RX ADMIN — CHLORHEXIDINE GLUCONATE 1 APPLICATION(S): 213 SOLUTION TOPICAL at 12:04

## 2020-08-19 RX ADMIN — PANTOPRAZOLE SODIUM 40 MILLIGRAM(S): 20 TABLET, DELAYED RELEASE ORAL at 16:26

## 2020-08-19 RX ADMIN — SODIUM CHLORIDE 125 MILLILITER(S): 9 INJECTION, SOLUTION INTRAVENOUS at 14:44

## 2020-08-19 RX ADMIN — MORPHINE SULFATE 4 MILLIGRAM(S): 50 CAPSULE, EXTENDED RELEASE ORAL at 15:45

## 2020-08-19 RX ADMIN — Medication 50 GRAM(S): at 04:15

## 2020-08-19 RX ADMIN — SODIUM CHLORIDE 1000 MILLILITER(S): 9 INJECTION INTRAMUSCULAR; INTRAVENOUS; SUBCUTANEOUS at 04:46

## 2020-08-19 RX ADMIN — DEXMEDETOMIDINE HYDROCHLORIDE IN 0.9% SODIUM CHLORIDE 7.26 MICROGRAM(S)/KG/HR: 4 INJECTION INTRAVENOUS at 14:56

## 2020-08-19 RX ADMIN — SODIUM CHLORIDE 150 MILLILITER(S): 9 INJECTION, SOLUTION INTRAVENOUS at 04:46

## 2020-08-19 RX ADMIN — Medication 1 MILLIGRAM(S): at 13:26

## 2020-08-19 RX ADMIN — Medication 1 TABLET(S): at 11:58

## 2020-08-19 RX ADMIN — Medication 1000 MILLIGRAM(S): at 21:37

## 2020-08-19 RX ADMIN — Medication 10 MILLIGRAM(S): at 16:09

## 2020-08-19 RX ADMIN — Medication 10 MILLILITER(S): at 09:19

## 2020-08-19 RX ADMIN — MORPHINE SULFATE 4 MILLIGRAM(S): 50 CAPSULE, EXTENDED RELEASE ORAL at 15:29

## 2020-08-19 RX ADMIN — IOHEXOL 30 MILLILITER(S): 300 INJECTION, SOLUTION INTRAVENOUS at 09:54

## 2020-08-19 RX ADMIN — PIPERACILLIN AND TAZOBACTAM 25 GRAM(S): 4; .5 INJECTION, POWDER, LYOPHILIZED, FOR SOLUTION INTRAVENOUS at 05:21

## 2020-08-19 RX ADMIN — Medication 100 MILLIGRAM(S): at 13:25

## 2020-08-19 RX ADMIN — Medication 400 MILLIGRAM(S): at 21:27

## 2020-08-19 RX ADMIN — Medication 400 MILLIGRAM(S): at 05:21

## 2020-08-19 RX ADMIN — SODIUM CHLORIDE 1000 MILLILITER(S): 9 INJECTION, SOLUTION INTRAVENOUS at 02:11

## 2020-08-19 NOTE — PROGRESS NOTE ADULT - ASSESSMENT
44M w/ EtOH abuse and withdrawal, pancreatitis. Presents w/ abdominal pain, nausea and vomiting. Found to have pancreatitis, possible early necrosis, as well as alcohol withdrawal. Transferred to ICU for severe alcohol withdrawal symptoms.     #Neuro - calm today; continue w/ precedex for now  #CV - started on pressors overnight for hypotension, could be due to sepsis; would not give more boluses at this time; titrate NE to MAP </65  #Pulm - satting well on 2LNC  #ID- on zosyn for possible abdominal sepsis; pt had bandemia; initial blood cx NGTD  #Renal/metabolic - HAIDER today - concerned for intra-abdominal HTN as posisble cause vs prerenal ATN; place mosqueda, monitor UOP and electrolytes closely; continue w/ IVF, trend lactate  #GI- admitted w/ pancreatitis; abdomen appears worse today on exam; with lactate that has not cleared, worsening abdominal exam and new pressor requirements and HAIDER will repeat CT A/P w/ IV contrast to look for fluid collection or other findings; surgery following; RUQ US did not show any biliary disease; keep NPO, morphine for pain control; thiamine, MVI, folate  #Heme - hgb stable, platelets dropping; will send DIC panel  #Endo - FS 69 this morning; started on D5 LR, continue w/ FS  #PPx - lovenox  #Dispo- remains on precedex in ICU for now

## 2020-08-19 NOTE — PROCEDURE NOTE - NSURITECHNIQUE_GU_A_CORE
The catheter was secured with a securement device (e.g. StatLock)/The collection bag is below the level of the patient and urinary bladder/Proper hand hygiene was performed/Sterile gloves were worn for the duration of the procedure/A sterile drape was used to cover all adjacent areas/The catheter was appropriately lubricated

## 2020-08-19 NOTE — PROGRESS NOTE ADULT - SUBJECTIVE AND OBJECTIVE BOX
44y old  Male who presented with a chief complaint of pancreatitis and was admitted with PANCREATITIS;NAUSEA VOMITING    Patient seen and examined at bedside - sedated on morphine, but communicative. No flatus/BM. Denies pain. No nausea/ vomiting today. Tolerating diet. No hallucinations, tremors, or sweating. No chest pain or palpitations.      Vital Signs Last 24 Hrs  T(F): 99.8 (08-19-20 @ 15:27), Max: 99.8 (08-19-20 @ 15:27)  HR: 114 (08-19-20 @ 17:00)  BP: 96/66 (08-19-20 @ 17:00)  RR: 23 (08-19-20 @ 17:00)  SpO2: 94% (08-19-20 @ 17:00)  Wt(kg): --   CAPILLARY BLOOD GLUCOSE      POCT Blood Glucose.: 114 mg/dL (19 Aug 2020 15:46)      GENERAL: Alert, NAD  CHEST/LUNG: Clear to auscultation bilaterally, respirations nonlabored  HEART: S1S2, Regular rate and rhythm;   ABDOMEN: Nondistended, + Bowel sounds, soft, diffuse tenderness to palpation, especially in mid-epigastric region. No guarding or rebound tenderness. No masses or hernias.    EXTREMITIES:  no calf tenderness, No edema    I&O's Detail    18 Aug 2020 07:01  -  19 Aug 2020 07:00  --------------------------------------------------------  IN:    dexmedetomidine Infusion: 153.2 mL    dextrose 5% + lactated ringers.: 125 mL    IV PiggyBack: 350 mL    Lactated Ringers IV Bolus: 2250 mL    lactated ringers.: 2400 mL    norepinephrine Infusion: 29.4 mL  Total IN: 5307.6 mL    OUT:    Incontinent per Condom Catheter: 50 mL    Indwelling Catheter - Urethral: 185 mL    Voided: 60 mL  Total OUT: 295 mL    Total NET: 5012.6 mL      19 Aug 2020 07:01  -  19 Aug 2020 17:39  --------------------------------------------------------  IN:    dexmedetomidine Infusion: 82.4 mL    dextrose 5% + lactated ringers.: 1250 mL    IV PiggyBack: 100 mL    norepinephrine Infusion: 143.8 mL    Oral Fluid: 700 mL  Total IN: 2276.2 mL    OUT:    Indwelling Catheter - Urethral: 640 mL  Total OUT: 640 mL    Total NET: 1636.2 mL          LABS:                        12.7   3.40  )-----------( 70       ( 19 Aug 2020 03:07 )             36.6     08-19    135  |  102  |  26<H>  ----------------------------<  113<H>  4.2   |  22  |  1.28    Glucose, Serum: 113 mg/dL (08-19-20 @ 10:48)  Glucose, Serum: 84 mg/dL (08-19-20 @ 03:07)  Glucose, Serum: 102 mg/dL (08-18-20 @ 12:55)  Glucose, Serum: 134 mg/dL (08-18-20 @ 05:08)  Glucose, Serum: 141 mg/dL (08-18-20 @ 04:27)  Glucose, Serum: 167 mg/dL (08-17-20 @ 17:27)    BUN/Creatinine  08-19 @ 10:48  BUN  26     Creatinine  1.28  08-19 @ 03:07  BUN  24     Creatinine  1.39  08-18 @ 12:55  BUN  17     Creatinine  0.98  08-18 @ 05:08  BUN  17     Creatinine  1.09  08-18 @ 04:27  BUN  15     Creatinine  1.07  08-17 @ 17:27  BUN  11     Creatinine  0.92      Ca    7.2<L>      19 Aug 2020 10:48  Phos  2.6     08-19  Mg     2.0     08-19    TPro  5.3<L>  /  Alb  2.1<L>  /  TBili  1.8<H>  /  DBili  x   /  AST  78<H>  /  ALT  50  /  AlkPhos  29<L>  08-19    PT/INR - ( 19 Aug 2020 16:42 )   PT: 19.4 sec;   INR: 1.72 ratio       PTT - ( 19 Aug 2020 16:42 )  PTT:40.8 sec    Lactate, Blood: 2.7 mmol/L (08-19 @ 16:42)  Lactate, Blood: 3.5 mmol/L (08-19 @ 10:48)  Lactate, Blood: 5.0 mmol/L (08-19 @ 03:07)  Lactate, Blood: 4.6 mmol/L (08-18 @ 12:55)  Lactate, Blood: 4.5 mmol/L (08-18 @ 05:09)    RADIOLOGY & ADDITIONAL STUDIES:  IMAGING:  CT Abdomen and Pelvis w/ Oral Cont and w/ IV Cont:   EXAM:  CT ABDOMEN AND PELVIS OC IC                            PROCEDURE DATE:  08/19/2020          INTERPRETATION:  CLINICAL INDICATION: 44 years  Male with Abdominal distension. Pancreatitis.    COMPARISON: 8/17/2020    PROCEDURE:  CT of the Abdomen and Pelvis was performed with intravenous contrast.  Intravenous contrast: 86 ml Omnipaque 350. 14 ml discarded.  Oral contrast: positive contrast was administered.  Sagittal and coronal reformats were performed.    FINDINGS:  LOWER CHEST: New moderate bilateral pleural effusions with underlying compressive atelectasis of the lower lobes. Left effusion greater than right.    LIVER: Hepatic steatosis. The portal veins and hepatic veins are patent.  BILE DUCTS: Normal caliber.  GALLBLADDER: Mildly distended measuring 8.4 cm sagittal by 4.6 cm in diameter.  SPLEEN: Within normal limits.  PANCREAS: Edematous enlarged pancreas reidentified. Heterogeneous enhancement in the pancreatic body suggestive of pancreatic necrosis.  ADRENALS: Within normal limits.  KIDNEYS/URETERS: Bilateral perinephric inflammatory stranding.    BLADDER: Collapsed around a Sanchez catheter.  REPRODUCTIVE ORGANS: Unremarkable prostate.    BOWEL: Increasing small bowel distention. Jejunal loops in the pelvis now measureup to 3.5 cm. Transition zone in the right lower quadrant at a loop of thickened jejunum (2:99). Retained fecal matter in the ascending and proximal transverse colon. Appendix is not visualized and cannot be assessed.  PERITONEUM: Increasing ascites,now perihepatic and perisplenic tracking down the paracolic gutters into the pelvis..  VESSELS: Within normal limits. Visualized portions of the SMA, SMV and celiac axis are patent.  RETROPERITONEUM/LYMPH NODES: No lymphadenopathy.  ABDOMINAL WALL: Within normal limits.  BONES: Within normal limits.    IMPRESSION:  Increasing small bowel distention with transition point in the right lower quadrant which may represent a mechanical obstruction or ileus related to the pancreatitis.    Increasing ascites.    Mildly distended gallbladder.    Increasing bilateral pleural effusions with underlying compressive atelectasis of the lower lobes.    Findings were discussed with Dr. RODY ESTRADA 8/19/2020 1:14 PM by Dr. Duffy with read back confirmation.              VILMA DUFFY M.D., ATTENDING RADIOLOGIST  This document has been electronically signed. Aug 19 2020  1:17PM              US Abdomen Complete:   EXAM:  US ABDOMINAL COMPLETE                            PROCEDURE DATE:  08/18/2020          INTERPRETATION:  CLINICAL INDICATION: 44 years  Male with eval RUQ.    COMPARISON: CT abdomen and pelvis 8/17/2020    TECHNIQUE: Sonography of the abdomen.    FINDINGS:    Liver: Echogenic parenchyma secondary to hepatic steatosis. 14.7 cm sagittal.  Bile ducts: Normal caliber. Common bile duct measures 4.4 mm.  Gallbladder: Within normal limits.  Pancreas: Obscured by bowel gas.  Spleen: 9.5 cm. Withinnormal limits.  Right kidney: 13.6 cm. No hydronephrosis.  Left kidney: 13.6 cm.  No hydronephrosis.  Ascites: Small  Aorta and IVC: Visualized portions are within normal limits.    IMPRESSION:  Pancreas obscured by bowel gas.    Hepatic steatosis.    Mild ascites.    No cholelithiasis.      VILMA DUFFY M.D., ATTENDING RADIOLOGIST  This document has been electronically signed. Aug 18 2020  3:28PM              CT Abdomen and Pelvis w/ IV Cont:   EXAM:  CT ABDOMEN AND PELVIS IC                            *** ADDENDUM 08/19/2020  ***    Addendum:    There is a segment of fecalized mid jejunal loops with gradual transition in the right lower quadrant (2:108), which may represent ileus in the pancreatitis or developing mechanical bowel obstruction. Follow-up advised.    Findings discussed with CHASE Estrada at 8/19/2020 1:19 PM with readback.    *** END OF ADDENDUM 08/19/2020  ***    PROCEDURE DATE:  08/17/2020        INTERPRETATION:  CLINICAL INDICATION: 44 years  Male with abd pain.    COMPARISON: 10/17/2019    PROCEDURE:  CT of the Abdomen and Pelvis was performed with intravenous contrast.  Intravenous contrast: 90 ml Omnipaque 350. 10 ml discarded.  Oral contrast: None.  Sagittal and coronal reformats were performed.    LIMITATION: Absence of enteric contrast limits evaluation of the GI tract.    FINDINGS:  LOWER CHEST: Within normal limits.    LIVER: Hepatic steatosis.  BILE DUCTS: Normal caliber.  GALLBLADDER: Within normal limits.  SPLEEN: Within normal limits.  PANCREAS: Moderate peripancreatic fluid. Heterogeneous enhancement of the pancreatic body may indicate early pancreatic necrosis. No pancreatic duct distention. Calcifications in the uncinate process suggestive of prior episodes of pancreatitis. 1.3 cm lymph node adjacent to the pancreatic head (2:48).  ADRENALS: Within normal limits.  KIDNEYS/URETERS: Within normal limits.    BLADDER: Within normal limits.  REPRODUCTIVE ORGANS: Normal prostate    BOWEL: No bowel obstruction. Appendix is normal.  PERITONEUM: Retroperitoneal fluid around the pancreas and tracking down the bilateral anterior pararenal space. Fluid extends into the anterior mesentery and omentum.  VESSELS: Mild atherosclerosis.  RETROPERITONEUM/LYMPH NODES: Retroperitoneal fluid. No retroperitoneal adenopathy.  ABDOMINAL WALL: Within normal limits.  BONES: Within normal limits.    IMPRESSION:  Moderate acute pancreatitis. Suggestion of early pancreatic body necrosis. Regional adenopathy. Follow-up advised.    Findings were discussed with Dr. Coombs [phone] 8/17/2020 8:25 PM by Dr. Duffy with read back confirmation.    ***Please see the addendum at the top of this report. It may contain additional important information or changes.****      VILMA DUFFY M.D., ATTENDING RADIOLOGIST  This document has been electronically signed. Aug 17 2020  8:28PM  Addend:  VILMA DUFFY M.D., ATTENDING RADIOLOGIST  This addendum was electronically signed on: Aug 19 2020  1:20PM.      A/P: 44M w/ EtOH abuse and withdrawal, pancreatitis. Presents w/ abdominal pain, nausea and vomiting. Labs and CT are consistent w/ pancreatitis and possible early necrosis of pancreas. Pt transferred to ICU for severe alcohol withdrawal symptoms. Levophed started 8/19.    -Continue NPO  -Continue ICU management and supportive measures, IV ABX (zosyn)  -Aggressive IVF resuscitation, titrate fluids to UOP. Replete lytes PRN  -F/u labs, trend Lactate and LFTs/Lipase  -Sanchez catheter, Strict I/Os  -Pain control PRN, antiemetic PRN  -Monitor for temps, Antipyretics PRN  -Serial abd exams  -[facility] protocol and monitoring per primary team  -No acute surgical intervention planned  -Will d/w attending

## 2020-08-19 NOTE — PROGRESS NOTE ADULT - SUBJECTIVE AND OBJECTIVE BOX
SURGERY PROGRESS HPI:  Pt seen and examined at bedside. Reports abdominal pain is still present but it is improving. Pt denies nausea and vomiting. No BM/flatus. Voiding well. Pt denies chest pain, SOB, dizziness, fever, chills.     Vital Signs Last 24 Hrs  T(C): 37.4 (19 Aug 2020 04:01), Max: 37.8 (18 Aug 2020 11:43)  T(F): 99.4 (19 Aug 2020 04:01), Max: 100 (18 Aug 2020 11:43)  HR: 113 (19 Aug 2020 05:30) (112 - 137)  BP: 64/48 (19 Aug 2020 05:30) (64/48 - 138/89)  BP(mean): 51 (19 Aug 2020 05:30) (51 - 103)  RR: 36 (19 Aug 2020 05:30) (18 - 39)  SpO2: 94% (19 Aug 2020 05:30) (79% - 100%)      PHYSICAL EXAM:    GENERAL: NAD  CHEST/LUNG: Clear to ausculation, bilaterally   HEART: RRR S1S2  ABDOMEN: softly distended, hypoactive BS, soft, diffusely tender (upper more than lower)  EXTREMITIES:  calf soft, non tender b/l    I&O's Detail    17 Aug 2020 07:01  -  18 Aug 2020 07:00  --------------------------------------------------------  IN:    dexmedetomidine Infusion: 21.9 mL    IV PiggyBack: 600 mL    Lactated Ringers IV Bolus: 3000 mL    lactated ringers.: 150 mL  Total IN: 3771.9 mL    OUT:    Voided: 25 mL  Total OUT: 25 mL    Total NET: 3746.9 mL      18 Aug 2020 07:01  -  19 Aug 2020 05:34  --------------------------------------------------------  IN:    dexmedetomidine Infusion: 135.4 mL    IV PiggyBack: 350 mL    Lactated Ringers IV Bolus: 2250 mL    lactated ringers.: 2250 mL  Total IN: 4985.4 mL    OUT:    Incontinent per Condom Catheter: 50 mL    Indwelling Catheter - Urethral: 185 mL    Voided: 50 mL  Total OUT: 285 mL    Total NET: 4700.4 mL      LABS:                        12.7   3.40  )-----------( 70       ( 19 Aug 2020 03:07 )             36.6     08-19    135  |  102  |  24<H>  ----------------------------<  84  4.5   |  23  |  1.39<H>    Ca    7.5<L>      19 Aug 2020 03:07  Phos  3.1     08-19  Mg     1.7     -    TPro  5.3<L>  /  Alb  2.1<L>  /  TBili  1.8<H>  /  DBili  x   /  AST  78<H>  /  ALT  50  /  AlkPhos  29<L>  08-    PT/INR - ( 18 Aug 2020 05:08 )   PT: 15.6 sec;   INR: 1.36 ratio         PTT - ( 18 Aug 2020 05:08 )  PTT:31.6 sec  Urinalysis Basic - ( 18 Aug 2020 08:28 )    Color: Erlinda / Appearance: Clear / S.010 / pH: x  Gluc: x / Ketone: Trace  / Bili: Small / Urobili: 4 mg/dL   Blood: x / Protein: 30 mg/dL / Nitrite: Positive   Leuk Esterase: Trace / RBC: 11-25 /HPF / WBC 0-2   Sq Epi: x / Non Sq Epi: Occasional / Bacteria: Few        A/P: 44M w/ EtOH abuse and withdrawal, pancreatitis. Presents w/ abdominal pain, nausea and vomiting. Labs and CT are consistent w/ pancreatitis and possible early necrosis of pancreas. Pt transferred to ICU for severe alcohol withdrawal symptoms    -Continue NPO  -Continue ICU management and supportive measures, IV ABX (zosyn)  -Aggressive IVF resuscitation, titrate fluids to UOP. Replete lytes PRN  -F/u labs, trend Lactate and LFTs/Lipase  -Sanchez catheter, Strict I/Os  -Pain control PRN, antiemetic PRN  -Monitor for temps, Antipyretics PRN  -Serial abd exams  -[facility] protocol and monitoring per primary team  -No acute surgical intervention planned  -Will d/w attending SURGERY PROGRESS HPI:  Pt seen and examined at bedside. Reports abdominal pain is still present but it is improving. Pt denies nausea and vomiting. No BM/flatus. Voiding well. Pt denies chest pain, SOB, dizziness, fever, chills.     Vital Signs Last 24 Hrs  T(C): 37.4 (19 Aug 2020 04:01), Max: 37.8 (18 Aug 2020 11:43)  T(F): 99.4 (19 Aug 2020 04:01), Max: 100 (18 Aug 2020 11:43)  HR: 113 (19 Aug 2020 05:30) (112 - 137)  BP: 64/48 (19 Aug 2020 05:30) (64/48 - 138/89)  BP(mean): 51 (19 Aug 2020 05:30) (51 - 103)  RR: 36 (19 Aug 2020 05:30) (18 - 39)  SpO2: 94% (19 Aug 2020 05:30) (79% - 100%)      PHYSICAL EXAM:    GENERAL: NAD  CHEST/LUNG: Clear to ausculation, bilaterally   HEART: RRR S1S2  ABDOMEN: softly distended, hypoactive BS, soft, diffusely tender (upper more than lower)  EXTREMITIES:  calf soft, non tender b/l    I&O's Detail    17 Aug 2020 07:01  -  18 Aug 2020 07:00  --------------------------------------------------------  IN:    dexmedetomidine Infusion: 21.9 mL    IV PiggyBack: 600 mL    Lactated Ringers IV Bolus: 3000 mL    lactated ringers.: 150 mL  Total IN: 3771.9 mL    OUT:    Voided: 25 mL  Total OUT: 25 mL    Total NET: 3746.9 mL      18 Aug 2020 07:01  -  19 Aug 2020 05:34  --------------------------------------------------------  IN:    dexmedetomidine Infusion: 135.4 mL    IV PiggyBack: 350 mL    Lactated Ringers IV Bolus: 2250 mL    lactated ringers.: 2250 mL  Total IN: 4985.4 mL    OUT:    Incontinent per Condom Catheter: 50 mL    Indwelling Catheter - Urethral: 185 mL    Voided: 50 mL  Total OUT: 285 mL    Total NET: 4700.4 mL      LABS:                        12.7   3.40  )-----------( 70       ( 19 Aug 2020 03:07 )             36.6     08-19    135  |  102  |  24<H>  ----------------------------<  84  4.5   |  23  |  1.39<H>    Ca    7.5<L>      19 Aug 2020 03:07  Phos  3.1       Mg     1.7         TPro  5.3<L>  /  Alb  2.1<L>  /  TBili  1.8<H>  /  DBili  x   /  AST  78<H>  /  ALT  50  /  AlkPhos  29<L>      PT/INR - ( 18 Aug 2020 05:08 )   PT: 15.6 sec;   INR: 1.36 ratio         PTT - ( 18 Aug 2020 05:08 )  PTT:31.6 sec  Urinalysis Basic - ( 18 Aug 2020 08:28 )    Color: Erlinda / Appearance: Clear / S.010 / pH: x  Gluc: x / Ketone: Trace  / Bili: Small / Urobili: 4 mg/dL   Blood: x / Protein: 30 mg/dL / Nitrite: Positive   Leuk Esterase: Trace / RBC: 11-25 /HPF / WBC 0-2   Sq Epi: x / Non Sq Epi: Occasional / Bacteria: Few        A/P: 44M w/ EtOH abuse and withdrawal, pancreatitis. Presents w/ abdominal pain, nausea and vomiting. Labs and CT are consistent w/ pancreatitis and possible early necrosis of pancreas. Pt transferred to ICU for severe alcohol withdrawal symptoms. Levophed started .    -Continue NPO  -Continue ICU management and supportive measures, IV ABX (zosyn)  -Aggressive IVF resuscitation, titrate fluids to UOP. Replete lytes PRN  -F/u labs, trend Lactate and LFTs/Lipase  -Sanchez catheter, Strict I/Os  -Pain control PRN, antiemetic PRN  -Monitor for temps, Antipyretics PRN  -Serial abd exams  -[facility] protocol and monitoring per primary team  -No acute surgical intervention planned  -Will d/w attending

## 2020-08-19 NOTE — PROGRESS NOTE ADULT - SUBJECTIVE AND OBJECTIVE BOX
CHIEF COMPLAINT:    Interval Events:    REVIEW OF SYSTEMS:  Constitutional: [ ] fevers [ ] chills [ ] weight loss [ ] weight gain  HEENT: [ ] dry eyes [ ] eye irritation [ ] postnasal drip [ ] nasal congestion  CV: [ ] chest pain [ ] orthopnea [ ] palpitations [ ] murmur  Resp: [ ] cough [ ] shortness of breath [ ] dyspnea [ ] wheezing [ ] sputum [ ] hemoptysis  GI: [ ] nausea [ ] vomiting [ ] diarrhea [ ] constipation [ ] abd pain [ ] dysphagia   : [ ] dysuria [ ] nocturia [ ] hematuria [ ] increased urinary frequency  Musculoskeletal: [ ] back pain [ ] myalgias [ ] arthralgias [ ] fracture  Skin: [ ] rash [ ] itch  Neurological: [ ] headache [ ] dizziness [ ] syncope [ ] weakness [ ] numbness  Hematologic/Lymphatic: [ ] anemia [ ] bleeding problem  Allergic/Immunologic: [ ] itchy eyes [ ] nasal discharge [ ] hives [ ] angioedema  [ ] All other systems negative  [ ] Unable to assess ROS because ________    OBJECTIVE:  ICU Vital Signs Last 24 Hrs  T(C): 37 (19 Aug 2020 07:30), Max: 37.8 (18 Aug 2020 11:43)  T(F): 98.6 (19 Aug 2020 07:30), Max: 100 (18 Aug 2020 11:43)  HR: 110 (19 Aug 2020 07:30) (107 - 137)  BP: 105/68 (19 Aug 2020 07:15) (64/48 - 138/89)  BP(mean): 72 (19 Aug 2020 07:15) (51 - 101)  ABP: --  ABP(mean): --  RR: 24 (19 Aug 2020 07:30) (18 - 39)  SpO2: 91% (19 Aug 2020 07:30) (79% - 100%)        08-18 @ 07:01  -  08-19 @ 07:00  --------------------------------------------------------  IN: 5307.6 mL / OUT: 295 mL / NET: 5012.6 mL      CAPILLARY BLOOD GLUCOSE      POCT Blood Glucose.: 69 mg/dL (19 Aug 2020 06:03)      PHYSICAL EXAM:  General:   HEENT:   Neck:   Respiratory:   Cardiovascular:   Abdomen:   Extremities:   Skin:   Neurological:  Psychiatry:    LINES:    HOSPITAL MEDICATIONS:  MEDICATIONS  (STANDING):  chlorhexidine 4% Liquid 1 Application(s) Topical <User Schedule>  dexMEDEtomidine Infusion 0.4 MICROgram(s)/kG/Hr (7.26 mL/Hr) IV Continuous <Continuous>  dextrose 5% + lactated ringers. 1000 milliLiter(s) (125 mL/Hr) IV Continuous <Continuous>  enoxaparin Injectable 40 milliGRAM(s) SubCutaneous daily  folic acid Injectable 1 milliGRAM(s) IV Push daily  multivitamin 1 Tablet(s) Oral daily  norepinephrine Infusion 0.05 MICROgram(s)/kG/Min (6.7 mL/Hr) IV Continuous <Continuous>  piperacillin/tazobactam IVPB.. 3.375 Gram(s) IV Intermittent every 8 hours  thiamine Injectable 100 milliGRAM(s) IV Push daily    MEDICATIONS  (PRN):  morphine  - Injectable 4 milliGRAM(s) IV Push every 6 hours PRN Severe Pain (7 - 10)      LABS:                        12.7   3.40  )-----------( 70       ( 19 Aug 2020 03:07 )             36.6     Hgb Trend: 12.7<--, 16.4<--, 15.4<--  08-19    135  |  102  |  24<H>  ----------------------------<  84  4.5   |  23  |  1.39<H>    Ca    7.5<L>      19 Aug 2020 03:07  Phos  3.1     -  Mg     1.7         TPro  5.3<L>  /  Alb  2.1<L>  /  TBili  1.8<H>  /  DBili  x   /  AST  78<H>  /  ALT  50  /  AlkPhos  29<L>  -19    PT/INR - ( 18 Aug 2020 05:08 )   PT: 15.6 sec;   INR: 1.36 ratio         PTT - ( 18 Aug 2020 05:08 )  PTT:31.6 sec  Urinalysis Basic - ( 18 Aug 2020 08:28 )    Color: Erlinda / Appearance: Clear / S.010 / pH: x  Gluc: x / Ketone: Trace  / Bili: Small / Urobili: 4 mg/dL   Blood: x / Protein: 30 mg/dL / Nitrite: Positive   Leuk Esterase: Trace / RBC: 11-25 /HPF / WBC 0-2   Sq Epi: x / Non Sq Epi: Occasional / Bacteria: Few      Arterial Blood Gas:   @ 04:46  --/30/180/22/99/0.2  ABG lactate: --        MICROBIOLOGY:     RADIOLOGY:  [ ] Reviewed and interpreted by me CHIEF COMPLAINT:    Interval Events:  started on NE  developed HAIDER  feeling somewhat better    REVIEW OF SYSTEMS:  Constitutional: [ -] fevers [ -] chills [ ] weight loss [ ] weight gain  HEENT: [- ] dry eyes [ -] eye irritation [ ] postnasal drip [ ] nasal congestion  CV: [- ] chest pain [- ] orthopnea [ ] palpitations [ ] murmur  Resp: [- ] cough [- ] shortness of breath [ -] dyspnea [ ] wheezing [ ] sputum [ ] hemoptysis  GI: [- ] nausea [ -] vomiting [- ] diarrhea [- ] constipation [ +] abd pain [ ] dysphagia   : [- ] dysuria [ -] nocturia [ ] hematuria [ ] increased urinary frequency  Musculoskeletal: [ -] back pain [- ] myalgias [ ] arthralgias [ ] fracture  Skin: [- ] rash [ ] itch  Neurological: [- ] headache [ -] dizziness [ ] syncope [ ] weakness [ ] numbness  Hematologic/Lymphatic: [ ] anemia [ ] bleeding problem  Allergic/Immunologic: [ ] itchy eyes [ ] nasal discharge [ ] hives [ ] angioedema  [x ] All other systems negative    OBJECTIVE:  ICU Vital Signs Last 24 Hrs  T(C): 37 (19 Aug 2020 07:30), Max: 37.8 (18 Aug 2020 11:43)  T(F): 98.6 (19 Aug 2020 07:30), Max: 100 (18 Aug 2020 11:43)  HR: 110 (19 Aug 2020 07:30) (107 - 137)  BP: 105/68 (19 Aug 2020 07:15) (64/48 - 138/89)  BP(mean): 72 (19 Aug 2020 07:15) (51 - 101)  ABP: --  ABP(mean): --  RR: 24 (19 Aug 2020 07:30) (18 - 39)  SpO2: 91% (19 Aug 2020 07:30) (79% - 100%)        08-18 @ 07:01  -  08-19 @ 07:00  --------------------------------------------------------  IN: 5307.6 mL / OUT: 295 mL / NET: 5012.6 mL      CAPILLARY BLOOD GLUCOSE      POCT Blood Glucose.: 69 mg/dL (19 Aug 2020 06:03)      PHYSICAL EXAM:  General: mild distress, non toxic appearing  HEENT: dry MM, EOMI  Neck: supple  Respiratory: CTA b/l, no wheezing or rhonchi  Cardiovascular: s1s2 RRR  Abdomen: distended, TTP diffusely, diminished BS  Extremities: warm, no edema or clubbing  Skin: intact  Neurological: no focal deficits  Psychiatry: sleepy but wakes up and is appropriate    LINES:  none    HOSPITAL MEDICATIONS:  MEDICATIONS  (STANDING):  chlorhexidine 4% Liquid 1 Application(s) Topical <User Schedule>  dexMEDEtomidine Infusion 0.4 MICROgram(s)/kG/Hr (7.26 mL/Hr) IV Continuous <Continuous>  dextrose 5% + lactated ringers. 1000 milliLiter(s) (125 mL/Hr) IV Continuous <Continuous>  enoxaparin Injectable 40 milliGRAM(s) SubCutaneous daily  folic acid Injectable 1 milliGRAM(s) IV Push daily  multivitamin 1 Tablet(s) Oral daily  norepinephrine Infusion 0.05 MICROgram(s)/kG/Min (6.7 mL/Hr) IV Continuous <Continuous>  piperacillin/tazobactam IVPB.. 3.375 Gram(s) IV Intermittent every 8 hours  thiamine Injectable 100 milliGRAM(s) IV Push daily    MEDICATIONS  (PRN):  morphine  - Injectable 4 milliGRAM(s) IV Push every 6 hours PRN Severe Pain (7 - 10)      LABS:                        12.7   3.40  )-----------( 70       ( 19 Aug 2020 03:07 )             36.6     Hgb Trend: 12.7<--, 16.4<--, 15.4<--      135  |  102  |  24<H>  ----------------------------<  84  4.5   |  23  |  1.39<H>    Ca    7.5<L>      19 Aug 2020 03:07  Phos  3.1       Mg     1.7         TPro  5.3<L>  /  Alb  2.1<L>  /  TBili  1.8<H>  /  DBili  x   /  AST  78<H>  /  ALT  50  /  AlkPhos  29<L>      PT/INR - ( 18 Aug 2020 05:08 )   PT: 15.6 sec;   INR: 1.36 ratio         PTT - ( 18 Aug 2020 05:08 )  PTT:31.6 sec  Urinalysis Basic - ( 18 Aug 2020 08:28 )    Color: Erlinda / Appearance: Clear / S.010 / pH: x  Gluc: x / Ketone: Trace  / Bili: Small / Urobili: 4 mg/dL   Blood: x / Protein: 30 mg/dL / Nitrite: Positive   Leuk Esterase: Trace / RBC: 11-25 /HPF / WBC 0-2   Sq Epi: x / Non Sq Epi: Occasional / Bacteria: Few      Arterial Blood Gas:   @ 04:46  --/30/180/22/99/0.2  ABG lactate: --        MICROBIOLOGY:     RADIOLOGY:  [ ] Reviewed and interpreted by me

## 2020-08-20 LAB
ALBUMIN SERPL ELPH-MCNC: 1.6 G/DL — LOW (ref 3.3–5)
ALP SERPL-CCNC: 36 U/L — LOW (ref 40–120)
ALT FLD-CCNC: 36 U/L — SIGNIFICANT CHANGE UP (ref 12–78)
ANION GAP SERPL CALC-SCNC: 10 MMOL/L — SIGNIFICANT CHANGE UP (ref 5–17)
AST SERPL-CCNC: 62 U/L — HIGH (ref 15–37)
BILIRUB SERPL-MCNC: 1.4 MG/DL — HIGH (ref 0.2–1.2)
BUN SERPL-MCNC: 22 MG/DL — SIGNIFICANT CHANGE UP (ref 7–23)
CALCIUM SERPL-MCNC: 6.6 MG/DL — LOW (ref 8.5–10.1)
CHLORIDE SERPL-SCNC: 103 MMOL/L — SIGNIFICANT CHANGE UP (ref 96–108)
CO2 SERPL-SCNC: 21 MMOL/L — LOW (ref 22–31)
CREAT SERPL-MCNC: 0.79 MG/DL — SIGNIFICANT CHANGE UP (ref 0.5–1.3)
GLUCOSE BLDC GLUCOMTR-MCNC: 122 MG/DL — HIGH (ref 70–99)
GLUCOSE BLDC GLUCOMTR-MCNC: 124 MG/DL — HIGH (ref 70–99)
GLUCOSE BLDC GLUCOMTR-MCNC: 127 MG/DL — HIGH (ref 70–99)
GLUCOSE SERPL-MCNC: 117 MG/DL — HIGH (ref 70–99)
HCT VFR BLD CALC: 32.4 % — LOW (ref 39–50)
HGB BLD-MCNC: 11.5 G/DL — LOW (ref 13–17)
LIDOCAIN IGE QN: 2196 U/L — HIGH (ref 73–393)
MAGNESIUM SERPL-MCNC: 1.9 MG/DL — SIGNIFICANT CHANGE UP (ref 1.6–2.6)
MCHC RBC-ENTMCNC: 33.6 PG — SIGNIFICANT CHANGE UP (ref 27–34)
MCHC RBC-ENTMCNC: 35.5 GM/DL — SIGNIFICANT CHANGE UP (ref 32–36)
MCV RBC AUTO: 94.7 FL — SIGNIFICANT CHANGE UP (ref 80–100)
NRBC # BLD: 0 /100 WBCS — SIGNIFICANT CHANGE UP (ref 0–0)
PHOSPHATE SERPL-MCNC: 1.7 MG/DL — LOW (ref 2.5–4.5)
PLATELET # BLD AUTO: 72 K/UL — LOW (ref 150–400)
POTASSIUM SERPL-MCNC: 3.5 MMOL/L — SIGNIFICANT CHANGE UP (ref 3.5–5.3)
POTASSIUM SERPL-SCNC: 3.5 MMOL/L — SIGNIFICANT CHANGE UP (ref 3.5–5.3)
PROT SERPL-MCNC: 5.1 GM/DL — LOW (ref 6–8.3)
RBC # BLD: 3.42 M/UL — LOW (ref 4.2–5.8)
RBC # FLD: 12.7 % — SIGNIFICANT CHANGE UP (ref 10.3–14.5)
SODIUM SERPL-SCNC: 134 MMOL/L — LOW (ref 135–145)
WBC # BLD: 5.2 K/UL — SIGNIFICANT CHANGE UP (ref 3.8–10.5)
WBC # FLD AUTO: 5.2 K/UL — SIGNIFICANT CHANGE UP (ref 3.8–10.5)

## 2020-08-20 PROCEDURE — 99291 CRITICAL CARE FIRST HOUR: CPT

## 2020-08-20 PROCEDURE — 99232 SBSQ HOSP IP/OBS MODERATE 35: CPT

## 2020-08-20 PROCEDURE — 93306 TTE W/DOPPLER COMPLETE: CPT | Mod: 26

## 2020-08-20 RX ORDER — SODIUM CHLORIDE 9 MG/ML
1000 INJECTION, SOLUTION INTRAVENOUS ONCE
Refills: 0 | Status: COMPLETED | OUTPATIENT
Start: 2020-08-20 | End: 2020-08-20

## 2020-08-20 RX ORDER — SODIUM CHLORIDE 9 MG/ML
1000 INJECTION, SOLUTION INTRAVENOUS
Refills: 0 | Status: DISCONTINUED | OUTPATIENT
Start: 2020-08-20 | End: 2020-09-01

## 2020-08-20 RX ORDER — MAGNESIUM SULFATE 500 MG/ML
2 VIAL (ML) INJECTION ONCE
Refills: 0 | Status: COMPLETED | OUTPATIENT
Start: 2020-08-20 | End: 2020-08-20

## 2020-08-20 RX ORDER — FENTANYL CITRATE 50 UG/ML
50 INJECTION INTRAVENOUS EVERY 4 HOURS
Refills: 0 | Status: DISCONTINUED | OUTPATIENT
Start: 2020-08-20 | End: 2020-08-20

## 2020-08-20 RX ORDER — ACETAMINOPHEN 500 MG
1000 TABLET ORAL ONCE
Refills: 0 | Status: COMPLETED | OUTPATIENT
Start: 2020-08-20 | End: 2020-08-20

## 2020-08-20 RX ORDER — POTASSIUM PHOSPHATE, MONOBASIC POTASSIUM PHOSPHATE, DIBASIC 236; 224 MG/ML; MG/ML
30 INJECTION, SOLUTION INTRAVENOUS ONCE
Refills: 0 | Status: COMPLETED | OUTPATIENT
Start: 2020-08-20 | End: 2020-08-20

## 2020-08-20 RX ORDER — FENTANYL CITRATE 50 UG/ML
50 INJECTION INTRAVENOUS ONCE
Refills: 0 | Status: DISCONTINUED | OUTPATIENT
Start: 2020-08-20 | End: 2020-08-20

## 2020-08-20 RX ORDER — HYDROMORPHONE HYDROCHLORIDE 2 MG/ML
0.5 INJECTION INTRAMUSCULAR; INTRAVENOUS; SUBCUTANEOUS EVERY 4 HOURS
Refills: 0 | Status: DISCONTINUED | OUTPATIENT
Start: 2020-08-20 | End: 2020-08-27

## 2020-08-20 RX ADMIN — Medication 1000 MILLIGRAM(S): at 07:18

## 2020-08-20 RX ADMIN — Medication 1 TABLET(S): at 12:37

## 2020-08-20 RX ADMIN — SODIUM CHLORIDE 1000 MILLILITER(S): 9 INJECTION, SOLUTION INTRAVENOUS at 22:09

## 2020-08-20 RX ADMIN — SENNA PLUS 2 TABLET(S): 8.6 TABLET ORAL at 21:37

## 2020-08-20 RX ADMIN — FENTANYL CITRATE 50 MICROGRAM(S): 50 INJECTION INTRAVENOUS at 12:08

## 2020-08-20 RX ADMIN — Medication 1 MILLIGRAM(S): at 12:37

## 2020-08-20 RX ADMIN — PANTOPRAZOLE SODIUM 40 MILLIGRAM(S): 20 TABLET, DELAYED RELEASE ORAL at 12:37

## 2020-08-20 RX ADMIN — HYDROMORPHONE HYDROCHLORIDE 0.5 MILLIGRAM(S): 2 INJECTION INTRAMUSCULAR; INTRAVENOUS; SUBCUTANEOUS at 13:17

## 2020-08-20 RX ADMIN — PIPERACILLIN AND TAZOBACTAM 25 GRAM(S): 4; .5 INJECTION, POWDER, LYOPHILIZED, FOR SOLUTION INTRAVENOUS at 13:46

## 2020-08-20 RX ADMIN — FENTANYL CITRATE 50 MICROGRAM(S): 50 INJECTION INTRAVENOUS at 09:34

## 2020-08-20 RX ADMIN — Medication 50 GRAM(S): at 06:54

## 2020-08-20 RX ADMIN — HYDROMORPHONE HYDROCHLORIDE 0.5 MILLIGRAM(S): 2 INJECTION INTRAMUSCULAR; INTRAVENOUS; SUBCUTANEOUS at 19:10

## 2020-08-20 RX ADMIN — ENOXAPARIN SODIUM 40 MILLIGRAM(S): 100 INJECTION SUBCUTANEOUS at 12:38

## 2020-08-20 RX ADMIN — HYDROMORPHONE HYDROCHLORIDE 0.5 MILLIGRAM(S): 2 INJECTION INTRAMUSCULAR; INTRAVENOUS; SUBCUTANEOUS at 23:50

## 2020-08-20 RX ADMIN — PIPERACILLIN AND TAZOBACTAM 25 GRAM(S): 4; .5 INJECTION, POWDER, LYOPHILIZED, FOR SOLUTION INTRAVENOUS at 21:37

## 2020-08-20 RX ADMIN — CHLORHEXIDINE GLUCONATE 1 APPLICATION(S): 213 SOLUTION TOPICAL at 13:46

## 2020-08-20 RX ADMIN — FENTANYL CITRATE 50 MICROGRAM(S): 50 INJECTION INTRAVENOUS at 04:42

## 2020-08-20 RX ADMIN — Medication 1000 MILLIGRAM(S): at 22:06

## 2020-08-20 RX ADMIN — POTASSIUM PHOSPHATE, MONOBASIC POTASSIUM PHOSPHATE, DIBASIC 83.33 MILLIMOLE(S): 236; 224 INJECTION, SOLUTION INTRAVENOUS at 06:54

## 2020-08-20 RX ADMIN — HYDROMORPHONE HYDROCHLORIDE 0.5 MILLIGRAM(S): 2 INJECTION INTRAMUSCULAR; INTRAVENOUS; SUBCUTANEOUS at 23:34

## 2020-08-20 RX ADMIN — HYDROMORPHONE HYDROCHLORIDE 0.5 MILLIGRAM(S): 2 INJECTION INTRAMUSCULAR; INTRAVENOUS; SUBCUTANEOUS at 19:25

## 2020-08-20 RX ADMIN — FENTANYL CITRATE 50 MICROGRAM(S): 50 INJECTION INTRAVENOUS at 11:53

## 2020-08-20 RX ADMIN — SODIUM CHLORIDE 125 MILLILITER(S): 9 INJECTION, SOLUTION INTRAVENOUS at 12:36

## 2020-08-20 RX ADMIN — HYDROMORPHONE HYDROCHLORIDE 0.5 MILLIGRAM(S): 2 INJECTION INTRAMUSCULAR; INTRAVENOUS; SUBCUTANEOUS at 13:44

## 2020-08-20 RX ADMIN — Medication 400 MILLIGRAM(S): at 21:37

## 2020-08-20 RX ADMIN — FENTANYL CITRATE 50 MICROGRAM(S): 50 INJECTION INTRAVENOUS at 09:50

## 2020-08-20 RX ADMIN — FENTANYL CITRATE 50 MICROGRAM(S): 50 INJECTION INTRAVENOUS at 04:56

## 2020-08-20 RX ADMIN — Medication 100 MILLIGRAM(S): at 12:37

## 2020-08-20 RX ADMIN — PIPERACILLIN AND TAZOBACTAM 25 GRAM(S): 4; .5 INJECTION, POWDER, LYOPHILIZED, FOR SOLUTION INTRAVENOUS at 05:18

## 2020-08-20 NOTE — PROGRESS NOTE ADULT - ATTENDING COMMENTS
Reports that abdominal pain had gotten better, but in early afternoon returned now also with pain in left shoulder and back, likely referred. Had BM's today and feels that his abdomen is less full since placement of NGT.   Low grade fever this am. BP's improved, now off pressors. Still tachycardic to 100-110's. UOP much improved.  Alert and oriented x 3  Respirations non labored  Abdomen is soft, moderately distended, moderately tender in epigastrium without rebound/guarding  Clear urine in Sanchez bag    Patient overall improving today.  Appreciated Critical Care Team.  C/w NGT decompression, will wait on enteral feeding until lactate completely cleared.   Pain control.

## 2020-08-20 NOTE — PROGRESS NOTE ADULT - SUBJECTIVE AND OBJECTIVE BOX
HPI:  Patient is a 44M with a PMH of EtOH abuse and withdrawal, alcohol pancreatitis who presents to the ED with severe abdominal pain.  Patient states that for the past three days he has been on a drinking binge, drinking 5 beers and 5 shots of liquor per day.  Last drink reportedly yesterday.  Patient reports that he woke up today and had severe generalized abdominal pain with multiple episodes of nonbloody, nonbilious vomiting.  Patient also complaining of nausea and generalized tremors.  Vitals stable, labs show leukocytosis, lactic acidosis, and severely elevated lipase levels.  CT shows pancreatitis with early necrosis in the pancreatic body.  Will admit to floor. (17 Aug 2020 23:25)      24 hr events:      ## ROS:  [ ] unable to obtain  CONSTITUTIONAL: No fever, weight loss, or fatigue  EYES: No eye pain, visual disturbances, or discharge  ENMT:  No difficulty hearing, tinnitus, vertigo; No sinus or throat pain  NECK: No pain or stiffness  RESPIRATORY: No cough, wheezing, chills or hemoptysis; No shortness of breath  CARDIOVASCULAR: No chest pain, palpitations, dizziness, or leg swelling  GASTROINTESTINAL: No abdominal or epigastric pain. No nausea, vomiting, or hematemesis; No diarrhea or constipation. No melena or hematochezia.  GENITOURINARY: No dysuria, frequency, hematuria, or incontinence  NEUROLOGICAL: No headaches, memory loss, loss of strength, numbness, or tremors  SKIN: No itching, burning, rashes, or lesions   LYMPH NODES: No enlarged glands  ENDOCRINE: No heat or cold intolerance; No hair loss  MUSCULOSKELETAL: No joint pain or swelling; No muscle, back, or extremity pain  PSYCHIATRIC: No depression, anxiety, mood swings, or difficulty sleeping  HEME/LYMPH: No easy bruising, or bleeding gums  ALLERGY AND IMMUNOLOGIC: No hives or eczema    ## Labs:  CBC:                        11.5   5.20  )-----------( 72       ( 20 Aug 2020 03:34 )             32.4     Chem:  08-20    134<L>  |  103  |  22  ----------------------------<  117<H>  3.5   |  21<L>  |  0.79    Ca    6.6<L>      20 Aug 2020 03:34  Phos  1.7     08-20  Mg     1.9     08-20    TPro  5.1<L>  /  Alb  1.6<L>  /  TBili  1.4<H>  /  DBili  x   /  AST  62<H>  /  ALT  36  /  AlkPhos  36<L>      Coags:  PT/INR - ( 19 Aug 2020 16:42 )   PT: 19.4 sec;   INR: 1.72 ratio         PTT - ( 19 Aug 2020 16:42 )  PTT:40.8 sec        ## Imaging:    ## Medications:  piperacillin/tazobactam IVPB.. 3.375 Gram(s) IV Intermittent every 8 hours    norepinephrine Infusion 0.05 MICROgram(s)/kG/Min IV Continuous <Continuous>        enoxaparin Injectable 40 milliGRAM(s) SubCutaneous daily    pantoprazole  Injectable 40 milliGRAM(s) IV Push daily  senna 2 Tablet(s) Oral at bedtime    dexMEDEtomidine Infusion 0.4 MICROgram(s)/kG/Hr IV Continuous <Continuous>  HYDROmorphone  Injectable 0.5 milliGRAM(s) IV Push every 4 hours PRN      ## Vitals:  T(C): 38.7 (20 @ 19:05), Max: 38.7 (20 @ 19:05)  HR: 109 (20 @ 20:00) (105 - 120)  BP: 108/70 (20 @ 20:00) (91/61 - 118/70)  BP(mean): 78 (20 @ 20:00) (60 - 87)  RR: 20 (20 @ 20:00) (17 - 33)  SpO2: 98% (20 @ 20:00) (80% - 100%)  Wt(kg): --  Vent:   AB-19 @ 07:01  -   @ 07:00  --------------------------------------------------------  IN: 5072 mL / OUT: 1855 mL / NET: 3217 mL     @ 07:01  -   @ 20:35  --------------------------------------------------------  IN: 1349.4 mL / OUT: 1600 mL / NET: -250.6 mL          ## P/E:  Gen: lying comfortably in bed in no apparent distress  HEENT: PERRL, EOMI  Resp: CTA B/L no c/r/w  CVS: S1S2 no m/r/g  Abd: soft NT/ND +BS  Ext: no c/c/e  Neuro: A&Ox3    CENTRAL LINE: [ ] YES [ ] NO  LOCATION:   DATE INSERTED:  REMOVE: [ ] YES [ ] NO      QUINTEROS: [ ] YES [ ] NO    DATE INSERTED:  REMOVE:  [ ] YES [ ] NO      A-LINE:  [ ] YES [ ] NO  LOCATION:   DATE INSERTED:  REMOVE:  [ ] YES [ ] NO  EXPLAIN:    GLOBAL ISSUE/BEST PRACTICE:  Analgesia:  Sedation:  HOB elevation: yes  Stress ulcer prophylaxis:  VTE prophylaxis:  Oral Care:  Glycemic control:  Nutrition:    CODE STATUS: [ ] full code  [ ] DNR  [ ] DNI  [ ] MOLST  Goals of care discussion: [ ] yes 24 hr events:  still with abdominal pain radiating to left shoulder and back  weaned off levophed this afternoon  on precedex for underlying anxiety and alcohol withdrawal  had BM x2  hypoglycemia resolved with D5LR fluids      ## ROS:  no chills  mild SOB, no cough  no CP  + bilateral L>R shoulder pain   + left back pain  + abdominal pain generalized  + BM + flatus  no nausea  no emesis      ## Labs:  CBC:                        11.5   5.20  )-----------( 72       ( 20 Aug 2020 03:34 )             32.4     Chem:  08-20    134<L>  |  103  |  22  ----------------------------<  117<H>  3.5   |  21<L>  |  0.79    Ca    6.6<L>      20 Aug 2020 03:34  Phos  1.7     08-20  Mg     1.9     08-20    TPro  5.1<L>  /  Alb  1.6<L>  /  TBili  1.4<H>  /  AST  62<H>  /  ALT  36  /  AlkPhos  36<L>  08-20    Coags:  PT/INR - ( 19 Aug 2020 16:42 )   PT: 19.4 sec;   INR: 1.72 ratio    PTT - ( 19 Aug 2020 16:42 )  PTT:40.8 sec    Lipase, Serum in AM (08.20.20 @ 03:34)    Lipase, Serum: 2196 U/L    Lipase, Serum (08.19.20 @ 05:18)    Lipase, Serum: 8699 U/L    Lactate, Blood (08.19.20 @ 16:42)    Lactate, Blood: 2.7: Elevated lactate. Consider ordering follow-up lactate to trend. mmol/L    Culture - Urine (08.18.20 @ 18:16)    Specimen Source: .Urine Catheterized    Culture Results: No growth    Culture - Blood (08.18.20 @ 08:38)    Specimen Source: .Blood Blood    Culture Results: No growth to date.        ## Imaging:  Xray Kidney Ureter Bladder (08.19.20 @ 14:14) >  Appropriate NG tube placement. Pulmonary vascular congestion and bilateral pleural effusions        ## Medications:  piperacillin/tazobactam IVPB.. 3.375 Gram(s) IV Intermittent every 8 hours    norepinephrine Infusion 0.05 MICROgram(s)/kG/Min IV Continuous <Continuous>      enoxaparin Injectable 40 milliGRAM(s) SubCutaneous daily    pantoprazole  Injectable 40 milliGRAM(s) IV Push daily  senna 2 Tablet(s) Oral at bedtime    dexMEDEtomidine Infusion 0.4 MICROgram(s)/kG/Hr IV Continuous <Continuous>  HYDROmorphone  Injectable 0.5 milliGRAM(s) IV Push every 4 hours PRN      ## Vitals:  T(C): 38.7 (08-20-20 @ 19:05), Max: 38.7 (08-20-20 @ 19:05)  HR: 109 (08-20-20 @ 20:00) (105 - 120)  BP: 108/70 (08-20-20 @ 20:00) (91/61 - 118/70)  BP(mean): 78 (08-20-20 @ 20:00) (60 - 87)  RR: 20 (08-20-20 @ 20:00) (17 - 33)  SpO2: 98% (08-20-20 @ 20:00) (80% - 100%)      08-19 @ 07:01  -  08-20 @ 07:00  --------------------------------------------------------  IN: 5072 mL / OUT: 1855 mL / NET: 3217 mL    08-20 @ 07:01  -  08-20 @ 20:35  --------------------------------------------------------  IN: 1349.4 mL / OUT: 1600 mL / NET: -250.6 mL          ## P/E:  Gen: lying in bed,  no apparent distress at time of exam  HEENT: PERRL, EOMI  Resp: CTA B/L   CVS: RRR  Abd: soft ND +BS, tender to palpation epigastric and bilateral upper quadrants  Ext: no c/c/e  Neuro: A&Ox3    CENTRAL LINE: [ ] YES [x] NO  LOCATION:   DATE INSERTED:  REMOVE: [ ] YES [ ] NO      QUINTEROS: [ ] YES [x] NO    DATE INSERTED:  REMOVE:  [ ] YES [ ] NO      A-LINE:  [ ] YES [x] NO  LOCATION:   DATE INSERTED:  REMOVE:  [ ] YES [ ] NO  EXPLAIN:    GLOBAL ISSUE/BEST PRACTICE:  Analgesia: dilaudid  Sedation: precedex  HOB elevation: yes  Stress ulcer prophylaxis: protonix  VTE prophylaxis:  lovenox  Oral Care: n/a  Glycemic control: n/a  Nutrition: npo    CODE STATUS: [x] full code  [ ] DNR  [ ] DNI  [ ] MOLST  Goals of care discussion: [ ] yes

## 2020-08-20 NOTE — PROGRESS NOTE ADULT - SUBJECTIVE AND OBJECTIVE BOX
SURGERY PROGRESS HPI:  Pt seen and examined at bedside resting comfortably in the ICU. Reports abdominal pain is still present but it is improving. Pt tolerating NGT. Pt denies nausea and vomiting. +BM/flatus. Pt denies chest pain, SOB, dizziness, fever, chills.       Vital Signs Last 24 Hrs  T(C): 37.9 (20 Aug 2020 03:18), Max: 38.5 (19 Aug 2020 19:27)  T(F): 100.3 (20 Aug 2020 03:18), Max: 101.3 (19 Aug 2020 19:27)  HR: 109 (20 Aug 2020 05:30) (72 - 120)  BP: 118/70 (20 Aug 2020 05:30) (87/60 - 125/84)  BP(mean): 77 (20 Aug 2020 05:30) (60 - 95)  RR: 22 (20 Aug 2020 05:30) (18 - 36)  SpO2: 94% (20 Aug 2020 05:30) (80% - 100%)    PHYSICAL EXAM:  GENERAL: NAD  CHEST/LUNG: Clear to ausculation, bilaterally   HEART: RRR S1S2  ABDOMEN: softly distended, +BS, soft. diffusely tender, upper more than lower (improving from yesterday).  EXTREMITIES:  calf soft, non tender b/l    I&O's Detail    18 Aug 2020 07:01  -  19 Aug 2020 07:00  --------------------------------------------------------  IN:    dexmedetomidine Infusion: 153.2 mL    dextrose 5% + lactated ringers.: 125 mL    IV PiggyBack: 350 mL    Lactated Ringers IV Bolus: 2250 mL    lactated ringers.: 2400 mL    norepinephrine Infusion: 29.4 mL  Total IN: 5307.6 mL    OUT:    Incontinent per Condom Catheter: 50 mL    Indwelling Catheter - Urethral: 185 mL    Voided: 60 mL  Total OUT: 295 mL    Total NET: 5012.6 mL      19 Aug 2020 07:01  -  20 Aug 2020 06:11  --------------------------------------------------------  IN:    dexmedetomidine Infusion: 158 mL    dextrose 5% + lactated ringers.: 3125 mL    IV PiggyBack: 300 mL    norepinephrine Infusion: 239 mL    Oral Fluid: 700 mL  Total IN: 4522 mL    OUT:    Indwelling Catheter - Urethral: 1570 mL    Stool: 200 mL  Total OUT: 1770 mL    Total NET: 2752 mL          LABS:                        11.5   5.20  )-----------( 72       ( 20 Aug 2020 03:34 )             32.4     08-20    134<L>  |  103  |  22  ----------------------------<  117<H>  3.5   |  21<L>  |  0.79    Ca    6.6<L>      20 Aug 2020 03:34  Phos  1.7     08-20  Mg     1.9     20    TPro  5.1<L>  /  Alb  1.6<L>  /  TBili  1.4<H>  /  DBili  x   /  AST  62<H>  /  ALT  36  /  AlkPhos  36<L>  0820    PT/INR - ( 19 Aug 2020 16:42 )   PT: 19.4 sec;   INR: 1.72 ratio         PTT - ( 19 Aug 2020 16:42 )  PTT:40.8 sec  Urinalysis Basic - ( 18 Aug 2020 08:28 )    Color: Erlinda / Appearance: Clear / S.010 / pH: x  Gluc: x / Ketone: Trace  / Bili: Small / Urobili: 4 mg/dL   Blood: x / Protein: 30 mg/dL / Nitrite: Positive   Leuk Esterase: Trace / RBC: 11-25 /HPF / WBC 0-2   Sq Epi: x / Non Sq Epi: Occasional / Bacteria: Few      Culture Results:   No growth ( @ 18:16)  Culture Results:   No growth to date. ( @ 08:38)  Culture Results:   No growth to date. ( @ 08:35)      < from: CT Abdomen and Pelvis w/ Oral Cont and w/ IV Cont (20 @ 12:15) >  IMPRESSION:  Increasing small bowel distention with transition point in the right lower quadrant which may represent a mechanical obstruction or ileus related to the pancreatitis.    Increasing ascites.    Mildly distended gallbladder.    Increasing bilateral pleural effusions with underlying compressive atelectasis of the lower lobes.    < end of copied text >        A/P: 44M w/ EtOH abuse and withdrawal, pancreatitis. Presents w/ abdominal pain, nausea and vomiting. Labs and CT are consistent w/ pancreatitis and possible early necrosis of pancreas. Pt transferred to ICU for severe alcohol withdrawal symptoms. Levophed started .    -Continue NPO  -Continue ICU management and supportive measures, IV ABX (zosyn)  -Aggressive IVF resuscitation, titrate fluids to UOP. Replete lytes PRN  -F/u labs, trend Lactate and LFTs/Lipase  -Sanchez catheter, Strict I/Os  -Pain control PRN, antiemetic PRN  -Monitor for temps, Antipyretics PRN  -Serial abd exams  -[facility] protocol and monitoring per primary team  -No acute surgical intervention planned  -Will d/w attending

## 2020-08-20 NOTE — PROGRESS NOTE ADULT - SUBJECTIVE AND OBJECTIVE BOX
44y old  Male who presented with a chief complaint of pancreatitis and was admitted with PANCREATITIS;NAUSEA VOMITING  ABDOMINAL PAIN    Pt seen and examined at bedside. Reports abdominal pain is still present but it is improving. Pt denies nausea and vomiting. No BM/flatus. Voiding well. Pt denies chest pain, SOB, dizziness, fever, chills.                 Vital Signs Last 24 Hrs  T(F): 100.3 (08-20-20 @ 03:18), Max: 101.3 (08-19-20 @ 19:27)  HR: 106 (08-20-20 @ 06:00)  BP: 101/69 (08-20-20 @ 06:00)  RR: 23 (08-20-20 @ 06:00)  SpO2: 96% (08-20-20 @ 06:00)  Wt(kg): --   CAPILLARY BLOOD GLUCOSE      POCT Blood Glucose.: 127 mg/dL (20 Aug 2020 05:24)      GENERAL: Alert, NAD  CHEST/LUNG: Clear to auscultation bilaterally, respirations nonlabored  HEART: S1S2, Regular rate and rhythm;   ABDOMEN: + Bowel sounds, soft, Nontender, Nondistended  EXTREMITIES:  no calf tenderness, No edema    I&O's Detail    18 Aug 2020 07:01  -  19 Aug 2020 07:00  --------------------------------------------------------  IN:    dexmedetomidine Infusion: 153.2 mL    dextrose 5% + lactated ringers.: 125 mL    IV PiggyBack: 350 mL    Lactated Ringers IV Bolus: 2250 mL    lactated ringers.: 2400 mL    norepinephrine Infusion: 29.4 mL  Total IN: 5307.6 mL    OUT:    Incontinent per Condom Catheter: 50 mL    Indwelling Catheter - Urethral: 185 mL    Voided: 60 mL  Total OUT: 295 mL    Total NET: 5012.6 mL      19 Aug 2020 07:01  -  20 Aug 2020 06:23  --------------------------------------------------------  IN:    dexmedetomidine Infusion: 158 mL    dextrose 5% + lactated ringers.: 3125 mL    IV PiggyBack: 300 mL    norepinephrine Infusion: 239 mL    Oral Fluid: 700 mL  Total IN: 4522 mL    OUT:    Indwelling Catheter - Urethral: 1570 mL    Stool: 200 mL  Total OUT: 1770 mL    Total NET: 2752 mL          LABS:                        11.5   5.20  )-----------( 72       ( 20 Aug 2020 03:34 )             32.4     08-20    134<L>  |  103  |  22  ----------------------------<  117<H>  3.5   |  21<L>  |  0.79    BUN/Creatinine  08-20 @ 03:34  BUN  22     Creatinine  0.79  08-19 @ 10:48  BUN  26     Creatinine  1.28  08-19 @ 03:07  BUN  24     Creatinine  1.39  08-18 @ 12:55  BUN  17     Creatinine  0.98  08-18 @ 05:08  BUN  17     Creatinine  1.09  08-18 @ 04:27  BUN  15     Creatinine  1.07  08-17 @ 17:27  BUN  11     Creatinine  0.92    Glucose  Glucose, Serum: 117 mg/dL (08-20-20 @ 03:34)  Glucose, Serum: 113 mg/dL (08-19-20 @ 10:48)  Glucose, Serum: 84 mg/dL (08-19-20 @ 03:07)  Glucose, Serum: 102 mg/dL (08-18-20 @ 12:55)  Glucose, Serum: 134 mg/dL (08-18-20 @ 05:08)  Glucose, Serum: 141 mg/dL (08-18-20 @ 04:27)  Glucose, Serum: 167 mg/dL (08-17-20 @ 17:27)      Ca    6.6<L>      20 Aug 2020 03:34  Phos  1.7     08-20  Mg     1.9     08-20    Calcium, Total Serum: 6.6 mg/dL [8.5 - 10.1] (08-20-20)  Calcium, Total Serum: 7.2 mg/dL [8.5 - 10.1] (08-19-20)  Calcium, Total Serum: 7.5 mg/dL [8.5 - 10.1] (08-19-20)  Calcium, Total Serum: 8.3 mg/dL [8.5 - 10.1] (08-18-20)  Calcium, Total Serum: 9.2 mg/dL [8.5 - 10.1] (08-18-20)  Calcium, Total Serum: 9.2 mg/dL [8.5 - 10.1] (08-18-20)  Calcium, Total Serum: 11.5 mg/dL [8.5 - 10.1] (08-17-20)      TPro  5.1<L>  /  Alb  1.6<L>  /  TBili  1.4<H>  /  DBili  x   /  AST  62<H>  /  ALT  36  /  AlkPhos  36<L>  08-20    PT/INR - ( 19 Aug 2020 16:42 )   PT: 19.4 sec;   INR: 1.72 ratio         PTT - ( 19 Aug 2020 16:42 )  PTT:40.8 sec    Lactate, Blood: 2.7 mmol/L (08-19 @ 16:42)  Lactate, Blood: 3.5 mmol/L (08-19 @ 10:48)  Lactate, Blood: 5.0 mmol/L (08-19 @ 03:07)  Lactate, Blood: 4.6 mmol/L (08-18 @ 12:55)  Lactate, Blood: 4.5 mmol/L (08-18 @ 05:09)        RADIOLOGY & ADDITIONAL STUDIES:  IMAGING:  CT Abdomen and Pelvis w/ Oral Cont and w/ IV Cont:   EXAM:  CT ABDOMEN AND PELVIS OC IC                            PROCEDURE DATE:  08/19/2020          INTERPRETATION:  CLINICAL INDICATION: 44 years  Male with Abdominal distension. Pancreatitis.    COMPARISON: 8/17/2020    PROCEDURE:  CT of the Abdomen and Pelvis was performed with intravenous contrast.  Intravenous contrast: 86 ml Omnipaque 350. 14 ml discarded.  Oral contrast: positive contrast was administered.  Sagittal and coronal reformats were performed.    FINDINGS:  LOWER CHEST: New moderate bilateral pleural effusions with underlying compressive atelectasis of the lower lobes. Left effusion greater than right.    LIVER: Hepatic steatosis. The portal veins and hepatic veins are patent.  BILE DUCTS: Normal caliber.  GALLBLADDER: Mildly distended measuring 8.4 cm sagittal by 4.6 cm in diameter.  SPLEEN: Within normal limits.  PANCREAS: Edematous enlarged pancreas reidentified. Heterogeneous enhancement in the pancreatic body suggestive of pancreatic necrosis.  ADRENALS: Within normal limits.  KIDNEYS/URETERS: Bilateral perinephric inflammatory stranding.    BLADDER: Collapsed around a Sanchez catheter.  REPRODUCTIVE ORGANS: Unremarkable prostate.    BOWEL: Increasing small bowel distention. Jejunal loops in the pelvis now measureup to 3.5 cm. Transition zone in the right lower quadrant at a loop of thickened jejunum (2:99). Retained fecal matter in the ascending and proximal transverse colon. Appendix is not visualized and cannot be assessed.  PERITONEUM: Increasing ascites now perihepatic and perisplenic tracking down the paracolic gutters into the pelvis..  VESSELS: Within normal limits. Visualized portions of the SMA, SMV and celiac axis are patent.  RETROPERITONEUM/LYMPH NODES: No lymphadenopathy.  ABDOMINAL WALL: Within normal limits.  BONES: Within normal limits.    IMPRESSION:  Increasing small bowel distention with transition point in the right lower quadrant which may represent a mechanical obstruction or ileus related to the pancreatitis.    Increasing ascites.    Mildly distended gallbladder.    Increasing bilateral pleural effusions with underlying compressive atelectasis of the lower lobes.    Findings were discussed with Dr. RODY GANNON 8/19/2020 1:14 PM by Dr. Curry with read back confirmation.      US Abdomen Complete:   EXAM:  US ABDOMINAL COMPLETE                            PROCEDURE DATE:  08/18/2020          INTERPRETATION:  CLINICAL INDICATION: 44 years  Male with eval RUQ.    COMPARISON: CT abdomen and pelvis 8/17/2020    TECHNIQUE: Sonography of the abdomen.    FINDINGS:    Liver: Echogenic parenchyma secondary to hepatic steatosis. 14.7 cm sagittal.  Bile ducts: Normal caliber. Common bile duct measures 4.4 mm.  Gallbladder: Within normal limits.  Pancreas: Obscured by bowel gas.  Spleen: 9.5 cm. Withinnormal limits.  Right kidney: 13.6 cm. No hydronephrosis.  Left kidney: 13.6 cm.  No hydronephrosis.  Ascites: Small  Aorta and IVC: Visualized portions are within normal limits.    IMPRESSION:  Pancreas obscured by bowel gas.    Hepatic steatosis.    Mild ascites.    No cholelithiasis.      VILMA CURRY M.D., ATTENDING RADIOLOGIST  This document has been electronically signed. Aug 18 2020  3:28PM      A/P: 44M w/ EtOH abuse and withdrawal, pancreatitis. Presents w/ abdominal pain, nausea and vomiting. Labs and CT are consistent w/ pancreatitis and possible early necrosis of pancreas. Pt transferred to ICU for severe alcohol withdrawal symptoms. Levophed started 8/19.    -Continue NPO  -Continue ICU management and supportive measures, IV ABX (zosyn)  -Aggressive IVF resuscitation, titrate fluids to UOP. Replete lytes PRN  -F/u labs, trend Lactate and LFTs/Lipase  -Sanchez catheter, Strict I/Os  -Pain control PRN, antiemetic PRN  -Monitor for temps, Antipyretics PRN  -Serial abd exams  -[facility] protocol and monitoring per primary team  -No acute surgical intervention planned  -Will d/w attending 44y old  Male who presented with a chief complaint of pancreatitis and was admitted with PANCREATITIS;NAUSEA VOMITING  ABDOMINAL PAIN    Pt seen and examined at bedside. Reports diffuse abdominal pain is still present but it is improving. Pt denies nausea and vomiting. No BM/flatus. Voiding well. +flatus, diarrhea ~3am today. No vomiting/nausea. No pain at rest, but reports generalized aching pain associated with movement. Otherwise no complaints. Tolerating some PO fluids. Voiding OK. Pt denies chest pain, SOB, dizziness.     MEDICATIONS  (STANDING):  chlorhexidine 4% Liquid 1 Application(s) Topical <User Schedule>  dexMEDEtomidine Infusion 0.4 MICROgram(s)/kG/Hr (7.26 mL/Hr) IV Continuous <Continuous>  dextrose 5% + lactated ringers. 1000 milliLiter(s) (125 mL/Hr) IV Continuous <Continuous>  enoxaparin Injectable 40 milliGRAM(s) SubCutaneous daily  folic acid Injectable 1 milliGRAM(s) IV Push daily  magnesium sulfate  IVPB 2 Gram(s) IV Intermittent once  multivitamin 1 Tablet(s) Oral daily  norepinephrine Infusion 0.05 MICROgram(s)/kG/Min (6.7 mL/Hr) IV Continuous <Continuous>  pantoprazole  Injectable 40 milliGRAM(s) IV Push daily  piperacillin/tazobactam IVPB.. 3.375 Gram(s) IV Intermittent every 8 hours  potassium phosphate IVPB 30 milliMole(s) IV Intermittent once  senna 2 Tablet(s) Oral at bedtime  thiamine Injectable 100 milliGRAM(s) IV Push daily    MEDICATIONS  (PRN):      Vital Signs Last 24 Hrs  T(F): 100.3 (08-20-20 @ 03:18), Max: 101.3 (08-19-20 @ 19:27)  HR: 106 (08-20-20 @ 06:00)  BP: 101/69 (08-20-20 @ 06:00)  RR: 23 (08-20-20 @ 06:00)  SpO2: 96% (08-20-20 @ 06:00)  Wt(kg): --     CAPILLARY BLOOD GLUCOSE    T(C): 37.9 (08-20-20 @ 03:18), Max: 38.5 (08-19-20 @ 19:27)  T(F): 100.3 (08-20-20 @ 03:18), Max: 101.3 (08-19-20 @ 19:27)    POCT Blood Glucose.: 127 mg/dL (20 Aug 2020 05:24)      GENERAL: Alert, NAD  CHEST/LUNG: Clear to auscultation bilaterally, respirations nonlabored  HEART: S1S2, Regular rate and rhythm;   ABDOMEN: softly distended, +BS, soft. Diffusely tender, upper more than lower.  EXTREMITIES:  No edema. SCDs on lower extremites.     I&O's Detail    18 Aug 2020 07:01  -  19 Aug 2020 07:00  --------------------------------------------------------  IN:    dexmedetomidine Infusion: 153.2 mL    dextrose 5% + lactated ringers.: 125 mL    IV PiggyBack: 350 mL    Lactated Ringers IV Bolus: 2250 mL    lactated ringers.: 2400 mL    norepinephrine Infusion: 29.4 mL  Total IN: 5307.6 mL    OUT:    Incontinent per Condom Catheter: 50 mL    Indwelling Catheter - Urethral: 185 mL    Voided: 60 mL  Total OUT: 295 mL    Total NET: 5012.6 mL      19 Aug 2020 07:01  -  20 Aug 2020 06:23  --------------------------------------------------------  IN:    dexmedetomidine Infusion: 158 mL    dextrose 5% + lactated ringers.: 3125 mL    IV PiggyBack: 300 mL    norepinephrine Infusion: 239 mL    Oral Fluid: 700 mL  Total IN: 4522 mL    OUT:    Indwelling Catheter - Urethral: 1570 mL    Stool: 200 mL  Total OUT: 1770 mL    Total NET: 2752 mL          LABS:                        11.5   5.20  )-----------( 72       ( 20 Aug 2020 03:34 )             32.4     08-20    134<L>  |  103  |  22  ----------------------------<  117<H>  3.5   |  21<L>  |  0.79    BUN/Creatinine  08-20 @ 03:34  BUN  22     Creatinine  0.79  08-19 @ 10:48  BUN  26     Creatinine  1.28  08-19 @ 03:07  BUN  24     Creatinine  1.39  08-18 @ 12:55  BUN  17     Creatinine  0.98  08-18 @ 05:08  BUN  17     Creatinine  1.09  08-18 @ 04:27  BUN  15     Creatinine  1.07  08-17 @ 17:27  BUN  11     Creatinine  0.92    Glucose  Glucose, Serum: 117 mg/dL (08-20-20 @ 03:34)  Glucose, Serum: 113 mg/dL (08-19-20 @ 10:48)  Glucose, Serum: 84 mg/dL (08-19-20 @ 03:07)  Glucose, Serum: 102 mg/dL (08-18-20 @ 12:55)  Glucose, Serum: 134 mg/dL (08-18-20 @ 05:08)  Glucose, Serum: 141 mg/dL (08-18-20 @ 04:27)  Glucose, Serum: 167 mg/dL (08-17-20 @ 17:27)      Ca    6.6<L>      20 Aug 2020 03:34  Phos  1.7     08-20  Mg     1.9     08-20    Calcium, Total Serum: 6.6 mg/dL [8.5 - 10.1] (08-20-20)  Calcium, Total Serum: 7.2 mg/dL [8.5 - 10.1] (08-19-20)  Calcium, Total Serum: 7.5 mg/dL [8.5 - 10.1] (08-19-20)  Calcium, Total Serum: 8.3 mg/dL [8.5 - 10.1] (08-18-20)  Calcium, Total Serum: 9.2 mg/dL [8.5 - 10.1] (08-18-20)  Calcium, Total Serum: 9.2 mg/dL [8.5 - 10.1] (08-18-20)  Calcium, Total Serum: 11.5 mg/dL [8.5 - 10.1] (08-17-20)      TPro  5.1<L>  /  Alb  1.6<L>  /  TBili  1.4<H>  /  DBili  x   /  AST  62<H>  /  ALT  36  /  AlkPhos  36<L>  08-20    Aspartate Aminotransferase (AST/SGOT): 62 (08-20-20 @ 03:34)  Alanine Aminotransferase (ALT/SGPT): 36 (08-20-20 @ 03:34)    Aspartate Aminotransferase (AST/SGOT): 78 (08-19-20 @ 03:07)  Alanine Aminotransferase (ALT/SGPT): 50 (08-19-20 @ 03:07)      PT/INR - ( 19 Aug 2020 16:42 )   PT: 19.4 sec;   INR: 1.72 ratio       PTT - ( 19 Aug 2020 16:42 )  PTT:40.8 sec    Lactate, Blood: 2.7 mmol/L (08-19 @ 16:42)  Lactate, Blood: 3.5 mmol/L (08-19 @ 10:48)  Lactate, Blood: 5.0 mmol/L (08-19 @ 03:07)  Lactate, Blood: 4.6 mmol/L (08-18 @ 12:55)  Lactate, Blood: 4.5 mmol/L (08-18 @ 05:09)      RADIOLOGY & ADDITIONAL STUDIES:  IMAGING:  CT Abdomen and Pelvis w/ Oral Cont and w/ IV Cont:   EXAM:  CT ABDOMEN AND PELVIS OC IC                            PROCEDURE DATE:  08/19/2020          INTERPRETATION:  CLINICAL INDICATION: 44 years  Male with Abdominal distension. Pancreatitis.    COMPARISON: 8/17/2020    PROCEDURE:  CT of the Abdomen and Pelvis was performed with intravenous contrast.  Intravenous contrast: 86 ml Omnipaque 350. 14 ml discarded.  Oral contrast: positive contrast was administered.  Sagittal and coronal reformats were performed.    FINDINGS:  LOWER CHEST: New moderate bilateral pleural effusions with underlying compressive atelectasis of the lower lobes. Left effusion greater than right.    LIVER: Hepatic steatosis. The portal veins and hepatic veins are patent.  BILE DUCTS: Normal caliber.  GALLBLADDER: Mildly distended measuring 8.4 cm sagittal by 4.6 cm in diameter.  SPLEEN: Within normal limits.  PANCREAS: Edematous enlarged pancreas reidentified. Heterogeneous enhancement in the pancreatic body suggestive of pancreatic necrosis.  ADRENALS: Within normal limits.  KIDNEYS/URETERS: Bilateral perinephric inflammatory stranding.    BLADDER: Collapsed around a Sanchez catheter.  REPRODUCTIVE ORGANS: Unremarkable prostate.    BOWEL: Increasing small bowel distention. Jejunal loops in the pelvis now measureup to 3.5 cm. Transition zone in the right lower quadrant at a loop of thickened jejunum (2:99). Retained fecal matter in the ascending and proximal transverse colon. Appendix is not visualized and cannot be assessed.  PERITONEUM: Increasing ascites now perihepatic and perisplenic tracking down the paracolic gutters into the pelvis..  VESSELS: Within normal limits. Visualized portions of the SMA, SMV and celiac axis are patent.  RETROPERITONEUM/LYMPH NODES: No lymphadenopathy.  ABDOMINAL WALL: Within normal limits.  BONES: Within normal limits.    IMPRESSION:  Increasing small bowel distention with transition point in the right lower quadrant which may represent a mechanical obstruction or ileus related to the pancreatitis.    Increasing ascites.    Mildly distended gallbladder.    Increasing bilateral pleural effusions with underlying compressive atelectasis of the lower lobes.    Findings were discussed with Dr. RODY GANNON 8/19/2020 1:14 PM by Dr. Curry with read back confirmation.      US Abdomen Complete:   EXAM:  US ABDOMINAL COMPLETE                            PROCEDURE DATE:  08/18/2020          INTERPRETATION:  CLINICAL INDICATION: 44 years  Male with eval RUQ.    COMPARISON: CT abdomen and pelvis 8/17/2020    TECHNIQUE: Sonography of the abdomen.    FINDINGS:    Liver: Echogenic parenchyma secondary to hepatic steatosis. 14.7 cm sagittal.  Bile ducts: Normal caliber. Common bile duct measures 4.4 mm.  Gallbladder: Within normal limits.  Pancreas: Obscured by bowel gas.  Spleen: 9.5 cm. Withinnormal limits.  Right kidney: 13.6 cm. No hydronephrosis.  Left kidney: 13.6 cm.  No hydronephrosis.  Ascites: Small  Aorta and IVC: Visualized portions are within normal limits.    IMPRESSION:  Pancreas obscured by bowel gas.    Hepatic steatosis.    Mild ascites.    No cholelithiasis.      VILMA CURRY M.D., ATTENDING RADIOLOGIST  This document has been electronically signed. Aug 18 2020  3:28PM      A/P: 44M w/ EtOH abuse and withdrawal, pancreatitis. Presents w/ abdominal pain, nausea and vomiting. Labs and CT are consistent w/ pancreatitis and possible early necrosis of pancreas. Pt transferred to ICU for severe alcohol withdrawal symptoms. Levophed started 8/19.    -Continue NPO  -Continue ICU management and supportive measures, IV ABX (zosyn)  -Aggressive IVF resuscitation, titrate fluids to UOP. Replete lytes PRN  -F/u labs, trend Lactate and LFTs/Lipase  -Sanchez catheter, Strict I/Os  -Pain control PRN, antiemetic PRN  -Monitor for temps, Antipyretics PRN  -Serial abd exams  -[facility] protocol and monitoring per primary team  -No acute surgical intervention planned  -Will d/w attending 44y old  Male who presented with a chief complaint of pancreatitis and was admitted with PANCREATITIS;NAUSEA VOMITING  ABDOMINAL PAIN    Pt seen and examined at bedside. Reports diffuse abdominal pain is still present but it is improving. Pt denies nausea and vomiting. No BM/flatus. Voiding well. +flatus, diarrhea ~3am today. No vomiting/nausea. No pain at rest, but reports generalized aching pain associated with movement. Otherwise no complaints. Tolerating some PO fluids. Voiding OK. Pt denies chest pain, SOB, dizziness.     MEDICATIONS  (STANDING):  chlorhexidine 4% Liquid 1 Application(s) Topical <User Schedule>  dexMEDEtomidine Infusion 0.4 MICROgram(s)/kG/Hr (7.26 mL/Hr) IV Continuous <Continuous>  dextrose 5% + lactated ringers. 1000 milliLiter(s) (125 mL/Hr) IV Continuous <Continuous>  enoxaparin Injectable 40 milliGRAM(s) SubCutaneous daily  folic acid Injectable 1 milliGRAM(s) IV Push daily  magnesium sulfate  IVPB 2 Gram(s) IV Intermittent once  multivitamin 1 Tablet(s) Oral daily  norepinephrine Infusion 0.05 MICROgram(s)/kG/Min (6.7 mL/Hr) IV Continuous <Continuous>  pantoprazole  Injectable 40 milliGRAM(s) IV Push daily  piperacillin/tazobactam IVPB.. 3.375 Gram(s) IV Intermittent every 8 hours  potassium phosphate IVPB 30 milliMole(s) IV Intermittent once  senna 2 Tablet(s) Oral at bedtime  thiamine Injectable 100 milliGRAM(s) IV Push daily    MEDICATIONS  (PRN):      Vital Signs Last 24 Hrs  T(F): 100.3 (08-20-20 @ 03:18), Max: 101.3 (08-19-20 @ 19:27)  HR: 106 (08-20-20 @ 06:00)  BP: 101/69 (08-20-20 @ 06:00)  RR: 23 (08-20-20 @ 06:00)  SpO2: 96% (08-20-20 @ 06:00)  Wt(kg): --     CAPILLARY BLOOD GLUCOSE    T(C): 37.9 (08-20-20 @ 03:18), Max: 38.5 (08-19-20 @ 19:27)  T(F): 100.3 (08-20-20 @ 03:18), Max: 101.3 (08-19-20 @ 19:27)    POCT Blood Glucose.: 127 mg/dL (20 Aug 2020 05:24)      GENERAL: Alert, NAD  CHEST/LUNG: Clear to auscultation bilaterally, respirations nonlabored  HEART: S1S2, Regular rate and rhythm;   ABDOMEN: softly distended, +BS, soft. Diffusely tender, upper more than lower.  EXTREMITIES:  No edema. SCDs on lower extremites.     I&O's Detail    18 Aug 2020 07:01  -  19 Aug 2020 07:00  --------------------------------------------------------  IN:    dexmedetomidine Infusion: 153.2 mL    dextrose 5% + lactated ringers.: 125 mL    IV PiggyBack: 350 mL    Lactated Ringers IV Bolus: 2250 mL    lactated ringers.: 2400 mL    norepinephrine Infusion: 29.4 mL  Total IN: 5307.6 mL    OUT:    Incontinent per Condom Catheter: 50 mL    Indwelling Catheter - Urethral: 185 mL    Voided: 60 mL  Total OUT: 295 mL    Total NET: 5012.6 mL      19 Aug 2020 07:01  -  20 Aug 2020 06:23  --------------------------------------------------------  IN:    dexmedetomidine Infusion: 158 mL    dextrose 5% + lactated ringers.: 3125 mL    IV PiggyBack: 300 mL    norepinephrine Infusion: 239 mL    Oral Fluid: 700 mL  Total IN: 4522 mL    OUT:    Indwelling Catheter - Urethral: 1570 mL    Stool: 200 mL  Total OUT: 1770 mL    Total NET: 2752 mL          LABS:                        11.5   5.20  )-----------( 72       ( 20 Aug 2020 03:34 )             32.4     08-20    134<L>  |  103  |  22  ----------------------------<  117<H>  3.5   |  21<L>  |  0.79    BUN/Creatinine  08-20 @ 03:34  BUN  22     Creatinine  0.79  08-19 @ 10:48  BUN  26     Creatinine  1.28  08-19 @ 03:07  BUN  24     Creatinine  1.39  08-18 @ 12:55  BUN  17     Creatinine  0.98  08-18 @ 05:08  BUN  17     Creatinine  1.09  08-18 @ 04:27  BUN  15     Creatinine  1.07  08-17 @ 17:27  BUN  11     Creatinine  0.92    Glucose  Glucose, Serum: 117 mg/dL (08-20-20 @ 03:34)  Glucose, Serum: 113 mg/dL (08-19-20 @ 10:48)  Glucose, Serum: 84 mg/dL (08-19-20 @ 03:07)  Glucose, Serum: 102 mg/dL (08-18-20 @ 12:55)  Glucose, Serum: 134 mg/dL (08-18-20 @ 05:08)  Glucose, Serum: 141 mg/dL (08-18-20 @ 04:27)  Glucose, Serum: 167 mg/dL (08-17-20 @ 17:27)      Ca    6.6<L>      20 Aug 2020 03:34  Phos  1.7     08-20  Mg     1.9     08-20    Calcium, Total Serum: 6.6 mg/dL [8.5 - 10.1] (08-20-20)  Calcium, Total Serum: 7.2 mg/dL [8.5 - 10.1] (08-19-20)  Calcium, Total Serum: 7.5 mg/dL [8.5 - 10.1] (08-19-20)  Calcium, Total Serum: 8.3 mg/dL [8.5 - 10.1] (08-18-20)  Calcium, Total Serum: 9.2 mg/dL [8.5 - 10.1] (08-18-20)  Calcium, Total Serum: 9.2 mg/dL [8.5 - 10.1] (08-18-20)  Calcium, Total Serum: 11.5 mg/dL [8.5 - 10.1] (08-17-20)      TPro  5.1<L>  /  Alb  1.6<L>  /  TBili  1.4<H>  /  DBili  x   /  AST  62<H>  /  ALT  36  /  AlkPhos  36<L>  08-20    Aspartate Aminotransferase (AST/SGOT): 62 (08-20-20 @ 03:34)  Alanine Aminotransferase (ALT/SGPT): 36 (08-20-20 @ 03:34)    Aspartate Aminotransferase (AST/SGOT): 78 (08-19-20 @ 03:07)  Alanine Aminotransferase (ALT/SGPT): 50 (08-19-20 @ 03:07)      PT/INR - ( 19 Aug 2020 16:42 )   PT: 19.4 sec;   INR: 1.72 ratio       PTT - ( 19 Aug 2020 16:42 )  PTT:40.8 sec    Lactate, Blood: 2.7 mmol/L (08-19 @ 16:42)  Lactate, Blood: 3.5 mmol/L (08-19 @ 10:48)  Lactate, Blood: 5.0 mmol/L (08-19 @ 03:07)  Lactate, Blood: 4.6 mmol/L (08-18 @ 12:55)  Lactate, Blood: 4.5 mmol/L (08-18 @ 05:09)      RADIOLOGY & ADDITIONAL STUDIES:  IMAGING:  CT Abdomen and Pelvis w/ Oral Cont and w/ IV Cont:   EXAM:  CT ABDOMEN AND PELVIS OC IC                            PROCEDURE DATE:  08/19/2020          INTERPRETATION:  CLINICAL INDICATION: 44 years  Male with Abdominal distension. Pancreatitis.    COMPARISON: 8/17/2020    PROCEDURE:  CT of the Abdomen and Pelvis was performed with intravenous contrast.  Intravenous contrast: 86 ml Omnipaque 350. 14 ml discarded.  Oral contrast: positive contrast was administered.  Sagittal and coronal reformats were performed.    FINDINGS:  LOWER CHEST: New moderate bilateral pleural effusions with underlying compressive atelectasis of the lower lobes. Left effusion greater than right.    LIVER: Hepatic steatosis. The portal veins and hepatic veins are patent.  BILE DUCTS: Normal caliber.  GALLBLADDER: Mildly distended measuring 8.4 cm sagittal by 4.6 cm in diameter.  SPLEEN: Within normal limits.  PANCREAS: Edematous enlarged pancreas reidentified. Heterogeneous enhancement in the pancreatic body suggestive of pancreatic necrosis.  ADRENALS: Within normal limits.  KIDNEYS/URETERS: Bilateral perinephric inflammatory stranding.    BLADDER: Collapsed around a Sanchez catheter.  REPRODUCTIVE ORGANS: Unremarkable prostate.    BOWEL: Increasing small bowel distention. Jejunal loops in the pelvis now measureup to 3.5 cm. Transition zone in the right lower quadrant at a loop of thickened jejunum (2:99). Retained fecal matter in the ascending and proximal transverse colon. Appendix is not visualized and cannot be assessed.  PERITONEUM: Increasing ascites now perihepatic and perisplenic tracking down the paracolic gutters into the pelvis..  VESSELS: Within normal limits. Visualized portions of the SMA, SMV and celiac axis are patent.  RETROPERITONEUM/LYMPH NODES: No lymphadenopathy.  ABDOMINAL WALL: Within normal limits.  BONES: Within normal limits.    IMPRESSION:  Increasing small bowel distention with transition point in the right lower quadrant which may represent a mechanical obstruction or ileus related to the pancreatitis.    Increasing ascites.    Mildly distended gallbladder.    Increasing bilateral pleural effusions with underlying compressive atelectasis of the lower lobes.    Findings were discussed with Dr. RODY GANNON 8/19/2020 1:14 PM by Dr. Curry with read back confirmation.      US Abdomen Complete:   EXAM:  US ABDOMINAL COMPLETE                            PROCEDURE DATE:  08/18/2020          INTERPRETATION:  CLINICAL INDICATION: 44 years  Male with eval RUQ.    COMPARISON: CT abdomen and pelvis 8/17/2020    TECHNIQUE: Sonography of the abdomen.    FINDINGS:    Liver: Echogenic parenchyma secondary to hepatic steatosis. 14.7 cm sagittal.  Bile ducts: Normal caliber. Common bile duct measures 4.4 mm.  Gallbladder: Within normal limits.  Pancreas: Obscured by bowel gas.  Spleen: 9.5 cm. Withinnormal limits.  Right kidney: 13.6 cm. No hydronephrosis.  Left kidney: 13.6 cm.  No hydronephrosis.  Ascites: Small  Aorta and IVC: Visualized portions are within normal limits.    IMPRESSION:  Pancreas obscured by bowel gas.    Hepatic steatosis.    Mild ascites.    No cholelithiasis.      VILMA CURRY M.D., ATTENDING RADIOLOGIST  This document has been electronically signed. Aug 18 2020  3:28PM      A/P: 44M w/ EtOH abuse and withdrawal, pancreatitis. Presents w/ abdominal pain, nausea and vomiting. Labs and CT are consistent w/ pancreatitis and possible early necrosis of pancreas. Pt transferred to ICU for severe alcohol withdrawal symptoms. Levophed started 8/19.    -Continue NPO  -Continue ICU management and supportive measures, IV ABX (zosyn)  -Aggressive IVF resuscitation, titrate fluids to UOP. Replete lytes PRN  -F/u labs, trend Lactate and LFTs/Lipase  -Sanchez catheter, Strict I/Os  -Pain control PRN, antiemetic PRN  -Monitor for temps, Antipyretics PRN  -Serial abd exams  -[facility] protocol and monitoring per primary team  -No acute surgical intervention planned  -Will d/w attending 44y old  Male who presented with a chief complaint of pancreatitis and was admitted with PANCREATITIS;NAUSEA VOMITING  ABDOMINAL PAIN    Pt seen and examined at bedside. Reports diffuse abdominal pain is still present but it is improving. Pt denies nausea and vomiting. No BM/flatus. Voiding well. +flatus, diarrhea ~3am today. No vomiting/nausea. No pain at rest, but reports generalized aching pain associated with movement. Otherwise no complaints. Tolerating PO fluids. Voiding OK. Pt denies chest pain, SOB, dizziness.     MEDICATIONS  (STANDING):  chlorhexidine 4% Liquid 1 Application(s) Topical <User Schedule>  dexMEDEtomidine Infusion 0.4 MICROgram(s)/kG/Hr (7.26 mL/Hr) IV Continuous <Continuous>  dextrose 5% + lactated ringers. 1000 milliLiter(s) (125 mL/Hr) IV Continuous <Continuous>  enoxaparin Injectable 40 milliGRAM(s) SubCutaneous daily  folic acid Injectable 1 milliGRAM(s) IV Push daily  magnesium sulfate  IVPB 2 Gram(s) IV Intermittent once  multivitamin 1 Tablet(s) Oral daily  norepinephrine Infusion 0.05 MICROgram(s)/kG/Min (6.7 mL/Hr) IV Continuous <Continuous>  pantoprazole  Injectable 40 milliGRAM(s) IV Push daily  piperacillin/tazobactam IVPB.. 3.375 Gram(s) IV Intermittent every 8 hours  potassium phosphate IVPB 30 milliMole(s) IV Intermittent once  senna 2 Tablet(s) Oral at bedtime  thiamine Injectable 100 milliGRAM(s) IV Push daily    MEDICATIONS  (PRN):      Vital Signs Last 24 Hrs  T(F): 100.3 (08-20-20 @ 03:18), Max: 101.3 (08-19-20 @ 19:27)  HR: 106 (08-20-20 @ 06:00)  BP: 101/69 (08-20-20 @ 06:00)  RR: 23 (08-20-20 @ 06:00)  SpO2: 96% (08-20-20 @ 06:00)  Wt(kg): --     CAPILLARY BLOOD GLUCOSE    T(C): 37.9 (08-20-20 @ 03:18), Max: 38.5 (08-19-20 @ 19:27)  T(F): 100.3 (08-20-20 @ 03:18), Max: 101.3 (08-19-20 @ 19:27)    POCT Blood Glucose.: 127 mg/dL (20 Aug 2020 05:24)      GENERAL: Alert, NAD  CHEST/LUNG: Clear to auscultation bilaterally, respirations nonlabored  HEART: S1S2, Regular rate and rhythm;   ABDOMEN: softly distended, +BS, soft. Diffusely tender, upper more than lower.  EXTREMITIES:  No edema. SCDs on lower extremites.     I&O's Detail    18 Aug 2020 07:01  -  19 Aug 2020 07:00  --------------------------------------------------------  IN:    dexmedetomidine Infusion: 153.2 mL    dextrose 5% + lactated ringers.: 125 mL    IV PiggyBack: 350 mL    Lactated Ringers IV Bolus: 2250 mL    lactated ringers.: 2400 mL    norepinephrine Infusion: 29.4 mL  Total IN: 5307.6 mL    OUT:    Incontinent per Condom Catheter: 50 mL    Indwelling Catheter - Urethral: 185 mL    Voided: 60 mL  Total OUT: 295 mL    Total NET: 5012.6 mL      19 Aug 2020 07:01  -  20 Aug 2020 06:23  --------------------------------------------------------  IN:    dexmedetomidine Infusion: 158 mL    dextrose 5% + lactated ringers.: 3125 mL    IV PiggyBack: 300 mL    norepinephrine Infusion: 239 mL    Oral Fluid: 700 mL  Total IN: 4522 mL    OUT:    Indwelling Catheter - Urethral: 1570 mL    Stool: 200 mL  Total OUT: 1770 mL    Total NET: 2752 mL          LABS:                        11.5   5.20  )-----------( 72       ( 20 Aug 2020 03:34 )             32.4     08-20    134<L>  |  103  |  22  ----------------------------<  117<H>  3.5   |  21<L>  |  0.79    BUN/Creatinine  08-20 @ 03:34  BUN  22     Creatinine  0.79  08-19 @ 10:48  BUN  26     Creatinine  1.28  08-19 @ 03:07  BUN  24     Creatinine  1.39  08-18 @ 12:55  BUN  17     Creatinine  0.98  08-18 @ 05:08  BUN  17     Creatinine  1.09  08-18 @ 04:27  BUN  15     Creatinine  1.07  08-17 @ 17:27  BUN  11     Creatinine  0.92    Glucose  Glucose, Serum: 117 mg/dL (08-20-20 @ 03:34)  Glucose, Serum: 113 mg/dL (08-19-20 @ 10:48)  Glucose, Serum: 84 mg/dL (08-19-20 @ 03:07)  Glucose, Serum: 102 mg/dL (08-18-20 @ 12:55)  Glucose, Serum: 134 mg/dL (08-18-20 @ 05:08)  Glucose, Serum: 141 mg/dL (08-18-20 @ 04:27)  Glucose, Serum: 167 mg/dL (08-17-20 @ 17:27)      Ca    6.6<L>      20 Aug 2020 03:34  Phos  1.7     08-20  Mg     1.9     08-20    Calcium, Total Serum: 6.6 mg/dL [8.5 - 10.1] (08-20-20)  Calcium, Total Serum: 7.2 mg/dL [8.5 - 10.1] (08-19-20)  Calcium, Total Serum: 7.5 mg/dL [8.5 - 10.1] (08-19-20)  Calcium, Total Serum: 8.3 mg/dL [8.5 - 10.1] (08-18-20)  Calcium, Total Serum: 9.2 mg/dL [8.5 - 10.1] (08-18-20)  Calcium, Total Serum: 9.2 mg/dL [8.5 - 10.1] (08-18-20)  Calcium, Total Serum: 11.5 mg/dL [8.5 - 10.1] (08-17-20)      TPro  5.1<L>  /  Alb  1.6<L>  /  TBili  1.4<H>  /  DBili  x   /  AST  62<H>  /  ALT  36  /  AlkPhos  36<L>  08-20    Aspartate Aminotransferase (AST/SGOT): 62 (08-20-20 @ 03:34)  Alanine Aminotransferase (ALT/SGPT): 36 (08-20-20 @ 03:34)    Aspartate Aminotransferase (AST/SGOT): 78 (08-19-20 @ 03:07)  Alanine Aminotransferase (ALT/SGPT): 50 (08-19-20 @ 03:07)      PT/INR - ( 19 Aug 2020 16:42 )   PT: 19.4 sec;   INR: 1.72 ratio       PTT - ( 19 Aug 2020 16:42 )  PTT:40.8 sec    Lactate, Blood: 2.7 mmol/L (08-19 @ 16:42)  Lactate, Blood: 3.5 mmol/L (08-19 @ 10:48)  Lactate, Blood: 5.0 mmol/L (08-19 @ 03:07)  Lactate, Blood: 4.6 mmol/L (08-18 @ 12:55)  Lactate, Blood: 4.5 mmol/L (08-18 @ 05:09)      RADIOLOGY & ADDITIONAL STUDIES:  IMAGING:  CT Abdomen and Pelvis w/ Oral Cont and w/ IV Cont:   EXAM:  CT ABDOMEN AND PELVIS OC IC                            PROCEDURE DATE:  08/19/2020          INTERPRETATION:  CLINICAL INDICATION: 44 years  Male with Abdominal distension. Pancreatitis.    COMPARISON: 8/17/2020    PROCEDURE:  CT of the Abdomen and Pelvis was performed with intravenous contrast.  Intravenous contrast: 86 ml Omnipaque 350. 14 ml discarded.  Oral contrast: positive contrast was administered.  Sagittal and coronal reformats were performed.    FINDINGS:  LOWER CHEST: New moderate bilateral pleural effusions with underlying compressive atelectasis of the lower lobes. Left effusion greater than right.    LIVER: Hepatic steatosis. The portal veins and hepatic veins are patent.  BILE DUCTS: Normal caliber.  GALLBLADDER: Mildly distended measuring 8.4 cm sagittal by 4.6 cm in diameter.  SPLEEN: Within normal limits.  PANCREAS: Edematous enlarged pancreas reidentified. Heterogeneous enhancement in the pancreatic body suggestive of pancreatic necrosis.  ADRENALS: Within normal limits.  KIDNEYS/URETERS: Bilateral perinephric inflammatory stranding.    BLADDER: Collapsed around a Sanchez catheter.  REPRODUCTIVE ORGANS: Unremarkable prostate.    BOWEL: Increasing small bowel distention. Jejunal loops in the pelvis now measureup to 3.5 cm. Transition zone in the right lower quadrant at a loop of thickened jejunum (2:99). Retained fecal matter in the ascending and proximal transverse colon. Appendix is not visualized and cannot be assessed.  PERITONEUM: Increasing ascites now perihepatic and perisplenic tracking down the paracolic gutters into the pelvis..  VESSELS: Within normal limits. Visualized portions of the SMA, SMV and celiac axis are patent.  RETROPERITONEUM/LYMPH NODES: No lymphadenopathy.  ABDOMINAL WALL: Within normal limits.  BONES: Within normal limits.    IMPRESSION:  Increasing small bowel distention with transition point in the right lower quadrant which may represent a mechanical obstruction or ileus related to the pancreatitis.    Increasing ascites.    Mildly distended gallbladder.    Increasing bilateral pleural effusions with underlying compressive atelectasis of the lower lobes.    Findings were discussed with Dr. RODY GANNON 8/19/2020 1:14 PM by Dr. Curry with read back confirmation.      US Abdomen Complete:   EXAM:  US ABDOMINAL COMPLETE                            PROCEDURE DATE:  08/18/2020          INTERPRETATION:  CLINICAL INDICATION: 44 years  Male with eval RUQ.    COMPARISON: CT abdomen and pelvis 8/17/2020    TECHNIQUE: Sonography of the abdomen.    FINDINGS:    Liver: Echogenic parenchyma secondary to hepatic steatosis. 14.7 cm sagittal.  Bile ducts: Normal caliber. Common bile duct measures 4.4 mm.  Gallbladder: Within normal limits.  Pancreas: Obscured by bowel gas.  Spleen: 9.5 cm. Withinnormal limits.  Right kidney: 13.6 cm. No hydronephrosis.  Left kidney: 13.6 cm.  No hydronephrosis.  Ascites: Small  Aorta and IVC: Visualized portions are within normal limits.    IMPRESSION:  Pancreas obscured by bowel gas.    Hepatic steatosis.    Mild ascites.    No cholelithiasis.      VILMA CURRY M.D., ATTENDING RADIOLOGIST  This document has been electronically signed. Aug 18 2020  3:28PM      A/P: 44M w/ EtOH abuse and withdrawal, pancreatitis. Presents w/ abdominal pain, nausea and vomiting. Labs and CT are consistent w/ pancreatitis and possible early necrosis of pancreas. Pt transferred to ICU for severe alcohol withdrawal symptoms. Levophed started 8/19.    -Continue NPO  -Continue ICU management and supportive measures, IV ABX (zosyn)  -Aggressive IVF resuscitation, titrate fluids to UOP. Replete lytes PRN  -F/u labs, trend Lactate and LFTs/Lipase  -Sanchez catheter, Strict I/Os  -Pain control PRN, antiemetic PRN  -Monitor for temps, Antipyretics PRN  -Serial abd exams  -[facility] protocol and monitoring per primary team  -No acute surgical intervention planned  -Will d/w attending

## 2020-08-20 NOTE — PROGRESS NOTE ADULT - ASSESSMENT
44M PMH EtOH abuse, alcohol withdrawal, alcohol pancreatitis presents for 3 days of abdominal pain, nausea and vomiting after recent binge drinking. Found to have necrotizing pancreatitis. Admitted to medical service for pancreatitis. Transferred to ICU for alcohol withdrawal/DTs. Course complicated by septic shock, acute renal insufficiency, lactic acidosis, small bowel obstruction.     1. NEURO  - cont with precedex for alcohol withdrawal  - wean off precedex as tolerates    2. CV  - septic shock improving  - weaned off levophed this afternoon  - monitor BP off  IV pressors     3. PULM  - incentive spirometer  - pt splinting secondary to abdominal pain  - will decrease IVF D5LR to 75cc/hr as CXR with pulmonary vascular congestion    4. GI  - acute alcohol pancreatitis slowly improving  - still with abdominal pain and likely referred pain to left shoulder and back  - dilaudid 0.5mg IV q4 hrs prn severe pain  - conservative management for SBO with NGT decompression  - + BM and flatus  - appreciate surgical follow up  - will cont to keep NPO for now : if lipase continues to trend down and abdominal pain improves can likely initiate po diet in next 24 hours    5. ID  - on zosyn for sepsis likely secondary to intra-abdominal source  - cultures negative to date  - will check repeat procalcitonin  - clinically improving on current regimen  - lactate down trending    6. RENAL  - lactic acidosis improving  - supplement hypophosphatemia  - HAIDER resolved  - urine output improving    7. GEN  - OOB to chair  - physical therapy     Critical Care Time: 35 min

## 2020-08-21 LAB
ANION GAP SERPL CALC-SCNC: 7 MMOL/L — SIGNIFICANT CHANGE UP (ref 5–17)
BUN SERPL-MCNC: 14 MG/DL — SIGNIFICANT CHANGE UP (ref 7–23)
CALCIUM SERPL-MCNC: 6.7 MG/DL — LOW (ref 8.5–10.1)
CHLORIDE SERPL-SCNC: 103 MMOL/L — SIGNIFICANT CHANGE UP (ref 96–108)
CO2 SERPL-SCNC: 25 MMOL/L — SIGNIFICANT CHANGE UP (ref 22–31)
CREAT SERPL-MCNC: 0.71 MG/DL — SIGNIFICANT CHANGE UP (ref 0.5–1.3)
GLUCOSE BLDC GLUCOMTR-MCNC: 101 MG/DL — HIGH (ref 70–99)
GLUCOSE BLDC GLUCOMTR-MCNC: 116 MG/DL — HIGH (ref 70–99)
GLUCOSE SERPL-MCNC: 99 MG/DL — SIGNIFICANT CHANGE UP (ref 70–99)
HCT VFR BLD CALC: 33.6 % — LOW (ref 39–50)
HGB BLD-MCNC: 11.4 G/DL — LOW (ref 13–17)
LACTATE SERPL-SCNC: 0.9 MMOL/L — SIGNIFICANT CHANGE UP (ref 0.7–2)
LIDOCAIN IGE QN: 982 U/L — HIGH (ref 73–393)
MAGNESIUM SERPL-MCNC: 2 MG/DL — SIGNIFICANT CHANGE UP (ref 1.6–2.6)
MCHC RBC-ENTMCNC: 33 PG — SIGNIFICANT CHANGE UP (ref 27–34)
MCHC RBC-ENTMCNC: 33.9 GM/DL — SIGNIFICANT CHANGE UP (ref 32–36)
MCV RBC AUTO: 97.4 FL — SIGNIFICANT CHANGE UP (ref 80–100)
NRBC # BLD: 0 /100 WBCS — SIGNIFICANT CHANGE UP (ref 0–0)
PHOSPHATE SERPL-MCNC: 1.8 MG/DL — LOW (ref 2.5–4.5)
PLATELET # BLD AUTO: 65 K/UL — LOW (ref 150–400)
POTASSIUM SERPL-MCNC: 3.6 MMOL/L — SIGNIFICANT CHANGE UP (ref 3.5–5.3)
POTASSIUM SERPL-SCNC: 3.6 MMOL/L — SIGNIFICANT CHANGE UP (ref 3.5–5.3)
PROCALCITONIN SERPL-MCNC: 0.4 NG/ML — HIGH (ref 0.02–0.1)
RBC # BLD: 3.45 M/UL — LOW (ref 4.2–5.8)
RBC # FLD: 13.2 % — SIGNIFICANT CHANGE UP (ref 10.3–14.5)
SODIUM SERPL-SCNC: 135 MMOL/L — SIGNIFICANT CHANGE UP (ref 135–145)
WBC # BLD: 5.44 K/UL — SIGNIFICANT CHANGE UP (ref 3.8–10.5)
WBC # FLD AUTO: 5.44 K/UL — SIGNIFICANT CHANGE UP (ref 3.8–10.5)

## 2020-08-21 PROCEDURE — 71045 X-RAY EXAM CHEST 1 VIEW: CPT | Mod: 26

## 2020-08-21 PROCEDURE — 99291 CRITICAL CARE FIRST HOUR: CPT

## 2020-08-21 PROCEDURE — 93010 ELECTROCARDIOGRAM REPORT: CPT

## 2020-08-21 PROCEDURE — 99232 SBSQ HOSP IP/OBS MODERATE 35: CPT

## 2020-08-21 RX ORDER — ONDANSETRON 8 MG/1
4 TABLET, FILM COATED ORAL ONCE
Refills: 0 | Status: COMPLETED | OUTPATIENT
Start: 2020-08-21 | End: 2020-08-21

## 2020-08-21 RX ORDER — HYDROMORPHONE HYDROCHLORIDE 2 MG/ML
1 INJECTION INTRAMUSCULAR; INTRAVENOUS; SUBCUTANEOUS ONCE
Refills: 0 | Status: DISCONTINUED | OUTPATIENT
Start: 2020-08-21 | End: 2020-08-21

## 2020-08-21 RX ORDER — SIMETHICONE 80 MG/1
80 TABLET, CHEWABLE ORAL THREE TIMES A DAY
Refills: 0 | Status: DISCONTINUED | OUTPATIENT
Start: 2020-08-21 | End: 2020-08-24

## 2020-08-21 RX ORDER — HYDROMORPHONE HYDROCHLORIDE 2 MG/ML
0.5 INJECTION INTRAMUSCULAR; INTRAVENOUS; SUBCUTANEOUS ONCE
Refills: 0 | Status: DISCONTINUED | OUTPATIENT
Start: 2020-08-21 | End: 2020-08-21

## 2020-08-21 RX ORDER — MORPHINE SULFATE 50 MG/1
2 CAPSULE, EXTENDED RELEASE ORAL ONCE
Refills: 0 | Status: DISCONTINUED | OUTPATIENT
Start: 2020-08-21 | End: 2020-08-21

## 2020-08-21 RX ORDER — ACETAMINOPHEN 500 MG
650 TABLET ORAL EVERY 6 HOURS
Refills: 0 | Status: DISCONTINUED | OUTPATIENT
Start: 2020-08-21 | End: 2020-08-21

## 2020-08-21 RX ORDER — FOLIC ACID 0.8 MG
1 TABLET ORAL DAILY
Refills: 0 | Status: DISCONTINUED | OUTPATIENT
Start: 2020-08-21 | End: 2020-09-10

## 2020-08-21 RX ORDER — POTASSIUM PHOSPHATE, MONOBASIC POTASSIUM PHOSPHATE, DIBASIC 236; 224 MG/ML; MG/ML
30 INJECTION, SOLUTION INTRAVENOUS ONCE
Refills: 0 | Status: COMPLETED | OUTPATIENT
Start: 2020-08-21 | End: 2020-08-21

## 2020-08-21 RX ORDER — LANOLIN ALCOHOL/MO/W.PET/CERES
3 CREAM (GRAM) TOPICAL AT BEDTIME
Refills: 0 | Status: DISCONTINUED | OUTPATIENT
Start: 2020-08-21 | End: 2020-08-24

## 2020-08-21 RX ORDER — ACETAMINOPHEN 500 MG
650 TABLET ORAL ONCE
Refills: 0 | Status: COMPLETED | OUTPATIENT
Start: 2020-08-21 | End: 2020-08-21

## 2020-08-21 RX ORDER — THIAMINE MONONITRATE (VIT B1) 100 MG
100 TABLET ORAL DAILY
Refills: 0 | Status: DISCONTINUED | OUTPATIENT
Start: 2020-08-21 | End: 2020-09-10

## 2020-08-21 RX ADMIN — Medication 3 MILLIGRAM(S): at 23:35

## 2020-08-21 RX ADMIN — POTASSIUM PHOSPHATE, MONOBASIC POTASSIUM PHOSPHATE, DIBASIC 83.33 MILLIMOLE(S): 236; 224 INJECTION, SOLUTION INTRAVENOUS at 08:42

## 2020-08-21 RX ADMIN — HYDROMORPHONE HYDROCHLORIDE 0.5 MILLIGRAM(S): 2 INJECTION INTRAMUSCULAR; INTRAVENOUS; SUBCUTANEOUS at 14:41

## 2020-08-21 RX ADMIN — HYDROMORPHONE HYDROCHLORIDE 0.5 MILLIGRAM(S): 2 INJECTION INTRAMUSCULAR; INTRAVENOUS; SUBCUTANEOUS at 14:55

## 2020-08-21 RX ADMIN — HYDROMORPHONE HYDROCHLORIDE 0.5 MILLIGRAM(S): 2 INJECTION INTRAMUSCULAR; INTRAVENOUS; SUBCUTANEOUS at 21:11

## 2020-08-21 RX ADMIN — PIPERACILLIN AND TAZOBACTAM 25 GRAM(S): 4; .5 INJECTION, POWDER, LYOPHILIZED, FOR SOLUTION INTRAVENOUS at 05:07

## 2020-08-21 RX ADMIN — HYDROMORPHONE HYDROCHLORIDE 0.5 MILLIGRAM(S): 2 INJECTION INTRAMUSCULAR; INTRAVENOUS; SUBCUTANEOUS at 17:40

## 2020-08-21 RX ADMIN — HYDROMORPHONE HYDROCHLORIDE 0.5 MILLIGRAM(S): 2 INJECTION INTRAMUSCULAR; INTRAVENOUS; SUBCUTANEOUS at 10:27

## 2020-08-21 RX ADMIN — HYDROMORPHONE HYDROCHLORIDE 0.5 MILLIGRAM(S): 2 INJECTION INTRAMUSCULAR; INTRAVENOUS; SUBCUTANEOUS at 10:40

## 2020-08-21 RX ADMIN — Medication 650 MILLIGRAM(S): at 10:27

## 2020-08-21 RX ADMIN — HYDROMORPHONE HYDROCHLORIDE 0.5 MILLIGRAM(S): 2 INJECTION INTRAMUSCULAR; INTRAVENOUS; SUBCUTANEOUS at 17:22

## 2020-08-21 RX ADMIN — SIMETHICONE 80 MILLIGRAM(S): 80 TABLET, CHEWABLE ORAL at 21:11

## 2020-08-21 RX ADMIN — HYDROMORPHONE HYDROCHLORIDE 0.5 MILLIGRAM(S): 2 INJECTION INTRAMUSCULAR; INTRAVENOUS; SUBCUTANEOUS at 05:15

## 2020-08-21 RX ADMIN — ONDANSETRON 4 MILLIGRAM(S): 8 TABLET, FILM COATED ORAL at 21:22

## 2020-08-21 RX ADMIN — MORPHINE SULFATE 2 MILLIGRAM(S): 50 CAPSULE, EXTENDED RELEASE ORAL at 23:35

## 2020-08-21 RX ADMIN — PIPERACILLIN AND TAZOBACTAM 25 GRAM(S): 4; .5 INJECTION, POWDER, LYOPHILIZED, FOR SOLUTION INTRAVENOUS at 21:11

## 2020-08-21 RX ADMIN — Medication 1 TABLET(S): at 12:09

## 2020-08-21 RX ADMIN — HYDROMORPHONE HYDROCHLORIDE 1 MILLIGRAM(S): 2 INJECTION INTRAMUSCULAR; INTRAVENOUS; SUBCUTANEOUS at 02:30

## 2020-08-21 RX ADMIN — Medication 650 MILLIGRAM(S): at 11:35

## 2020-08-21 RX ADMIN — MORPHINE SULFATE 2 MILLIGRAM(S): 50 CAPSULE, EXTENDED RELEASE ORAL at 23:50

## 2020-08-21 RX ADMIN — Medication 1 MILLIGRAM(S): at 12:09

## 2020-08-21 RX ADMIN — SODIUM CHLORIDE 75 MILLILITER(S): 9 INJECTION, SOLUTION INTRAVENOUS at 14:41

## 2020-08-21 RX ADMIN — Medication 100 MILLIGRAM(S): at 12:09

## 2020-08-21 RX ADMIN — HYDROMORPHONE HYDROCHLORIDE 0.5 MILLIGRAM(S): 2 INJECTION INTRAMUSCULAR; INTRAVENOUS; SUBCUTANEOUS at 05:00

## 2020-08-21 RX ADMIN — PIPERACILLIN AND TAZOBACTAM 25 GRAM(S): 4; .5 INJECTION, POWDER, LYOPHILIZED, FOR SOLUTION INTRAVENOUS at 13:18

## 2020-08-21 RX ADMIN — SIMETHICONE 80 MILLIGRAM(S): 80 TABLET, CHEWABLE ORAL at 12:09

## 2020-08-21 RX ADMIN — HYDROMORPHONE HYDROCHLORIDE 1 MILLIGRAM(S): 2 INJECTION INTRAMUSCULAR; INTRAVENOUS; SUBCUTANEOUS at 02:18

## 2020-08-21 RX ADMIN — SODIUM CHLORIDE 75 MILLILITER(S): 9 INJECTION, SOLUTION INTRAVENOUS at 01:08

## 2020-08-21 RX ADMIN — ENOXAPARIN SODIUM 40 MILLIGRAM(S): 100 INJECTION SUBCUTANEOUS at 12:08

## 2020-08-21 RX ADMIN — SENNA PLUS 2 TABLET(S): 8.6 TABLET ORAL at 21:11

## 2020-08-21 RX ADMIN — HYDROMORPHONE HYDROCHLORIDE 0.5 MILLIGRAM(S): 2 INJECTION INTRAMUSCULAR; INTRAVENOUS; SUBCUTANEOUS at 21:26

## 2020-08-21 NOTE — PROGRESS NOTE ADULT - ASSESSMENT
44M PMH alcohol abuse, hx of alcohol withdrawal, alcohol pancreatitis presents for abdominal pain. Admitted with necrotizing pancreatitis, sepsis, septic shock with course complicated by DTs requiring sedation and small bowel obstruction conservatively managed with NGT and NPO. Noted to desat off oxygen supplementation. CXR ordered with developing PNA>       1. NEURO  - weaned off precedex  - calm cooperative  - low CIWA  - MVI/thiamine/folate    2. PULM  - development of PNA  - already on zosyn, will cont and monitor  - O2 supplementation to keep O2 sat > 90, RA sat in the 80s    3. CV  - out of shock state, weaned off levophed since yesterday  - BP stable    4. GI  - diet advanced to clears and pt tolerated  - cont on clears  - lipase trending down  - having BMs  - SBO resolved  - appreciate surgical follow up    5. GEN  - DVT prophylaxis  - likely can downgrade to medical floor if remains stable off precedex and levophed    Critical care Time: 35 min

## 2020-08-21 NOTE — PROGRESS NOTE ADULT - SUBJECTIVE AND OBJECTIVE BOX
HPI:  Patient is a 44M with a PMH of EtOH abuse and withdrawal, alcohol pancreatitis who presents to the ED with severe abdominal pain.  Patient states that for the past three days he has been on a drinking binge, drinking 5 beers and 5 shots of liquor per day.  Last drink reportedly yesterday.  Patient reports that he woke up today and had severe generalized abdominal pain with multiple episodes of nonbloody, nonbilious vomiting.  Patient also complaining of nausea and generalized tremors.  Vitals stable, labs show leukocytosis, lactic acidosis, and severely elevated lipase levels.  CT shows pancreatitis with early necrosis in the pancreatic body.  Will admit to floor. (17 Aug 2020 23:25)      24 hr events:      ## ROS:  [ ] unable to obtain  CONSTITUTIONAL: No fever, weight loss, or fatigue  EYES: No eye pain, visual disturbances, or discharge  ENMT:  No difficulty hearing, tinnitus, vertigo; No sinus or throat pain  NECK: No pain or stiffness  RESPIRATORY: No cough, wheezing, chills or hemoptysis; No shortness of breath  CARDIOVASCULAR: No chest pain, palpitations, dizziness, or leg swelling  GASTROINTESTINAL: No abdominal or epigastric pain. No nausea, vomiting, or hematemesis; No diarrhea or constipation. No melena or hematochezia.  GENITOURINARY: No dysuria, frequency, hematuria, or incontinence  NEUROLOGICAL: No headaches, memory loss, loss of strength, numbness, or tremors  SKIN: No itching, burning, rashes, or lesions   LYMPH NODES: No enlarged glands  ENDOCRINE: No heat or cold intolerance; No hair loss  MUSCULOSKELETAL: No joint pain or swelling; No muscle, back, or extremity pain  PSYCHIATRIC: No depression, anxiety, mood swings, or difficulty sleeping  HEME/LYMPH: No easy bruising, or bleeding gums  ALLERGY AND IMMUNOLOGIC: No hives or eczema    ## Labs:  CBC:                        11.4   5.44  )-----------( 65       ( 21 Aug 2020 02:55 )             33.6     Chem:  08-    135  |  103  |  14  ----------------------------<  99  3.6   |  25  |  0.71    Ca    6.7<L>      21 Aug 2020 02:55  Phos  1.8     08-  Mg     2.0     08-    TPro  5.1<L>  /  Alb  1.6<L>  /  TBili  1.4<H>  /  DBili  x   /  AST  62<H>  /  ALT  36  /  AlkPhos  36<L>      Coags:          ## Imaging:    ## Medications:  piperacillin/tazobactam IVPB.. 3.375 Gram(s) IV Intermittent every 8 hours          enoxaparin Injectable 40 milliGRAM(s) SubCutaneous daily    senna 2 Tablet(s) Oral at bedtime  simethicone 80 milliGRAM(s) Chew three times a day PRN    HYDROmorphone  Injectable 0.5 milliGRAM(s) IV Push every 4 hours PRN      ## Vitals:  T(C): 37.1 (20 @ 15:44), Max: 38.4 (20 @ 11:30)  HR: 105 (20 @ 19:00) (94 - 135)  BP: 134/90 (20 @ 19:00) (85/61 - 149/98)  BP(mean): 98 (20 @ 19:00) (61 - 108)  RR: 23 (20 @ 19:00) (15 - 34)  SpO2: 96% (20 @ 19:00) (93% - 101%)  Wt(kg): --  Vent:   AB-20 @ 07:  -   @ 07:00  --------------------------------------------------------  IN: 3421 mL / OUT: 2700 mL / NET: 721 mL     @ 07:01  -   @ 20:27  --------------------------------------------------------  IN: 1750 mL / OUT: 2525 mL / NET: -775 mL          ## P/E:  Gen: lying comfortably in bed in no apparent distress  HEENT: PERRL, EOMI  Resp: CTA B/L no c/r/w  CVS: S1S2 no m/r/g  Abd: soft NT/ND +BS  Ext: no c/c/e  Neuro: A&Ox3    CENTRAL LINE: [ ] YES [ ] NO  LOCATION:   DATE INSERTED:  REMOVE: [ ] YES [ ] NO      QUINTEROS: [ ] YES [ ] NO    DATE INSERTED:  REMOVE:  [ ] YES [ ] NO      A-LINE:  [ ] YES [ ] NO  LOCATION:   DATE INSERTED:  REMOVE:  [ ] YES [ ] NO  EXPLAIN:    GLOBAL ISSUE/BEST PRACTICE:  Analgesia:  Sedation:  HOB elevation: yes  Stress ulcer prophylaxis:  VTE prophylaxis:  Oral Care:  Glycemic control:  Nutrition:    CODE STATUS: [ ] full code  [ ] DNR  [ ] DNI  [ ] MOLST  Goals of care discussion: [ ] yes 24 hr events:  weaned off precedex today  CIWA 2 during the day  weaned off levophed yesterday  lipase downtrending  abdominal pain present but improved  NGT d/angela      ## ROS:  no fevers, no chills  no HA  no SOB, no cough  no CP  mild abdominal pain  no  nausea  no emesis  + BM      ## Labs:  CBC:                        11.4   5.44  )-----------( 65       ( 21 Aug 2020 02:55 )             33.6     Chem:  08-21    135  |  103  |  14  ----------------------------<  99  3.6   |  25  |  0.71    Ca    6.7<L>      21 Aug 2020 02:55  Phos  1.8     08-21  Mg     2.0     08-21    TPro  5.1<L>  /  Alb  1.6<L>  /  TBili  1.4<H>  /  AST  62<H>  /  ALT  36  /  AlkPhos  36<L>  08-20    Lipase, Serum (08.21.20 @ 02:55)    Lipase, Serum: 982 U/L      ## Imaging:  CXR < from: Xray Chest 1 View- PORTABLE-Urgent (08.21.20 @ 15:51) >  Bilateral multifocal  perihilar and LEFT basilar airspace consolidations and/or LEFT effusion..  NG tube in stomach      ## Medications:  piperacillin/tazobactam IVPB.. 3.375 Gram(s) IV Intermittent every 8 hours      enoxaparin Injectable 40 milliGRAM(s) SubCutaneous daily    senna 2 Tablet(s) Oral at bedtime  simethicone 80 milliGRAM(s) Chew three times a day PRN    HYDROmorphone  Injectable 0.5 milliGRAM(s) IV Push every 4 hours PRN      ## Vitals:  T(C): 37.1 (08-21-20 @ 15:44), Max: 38.4 (08-21-20 @ 11:30)  HR: 105 (08-21-20 @ 19:00) (94 - 135)  BP: 134/90 (08-21-20 @ 19:00) (85/61 - 149/98)  BP(mean): 98 (08-21-20 @ 19:00) (61 - 108)  RR: 23 (08-21-20 @ 19:00) (15 - 34)  SpO2: 96% (08-21-20 @ 19:00) (93% - 101%)        08-20 @ 07:01  -  08-21 @ 07:00  --------------------------------------------------------  IN: 3421 mL / OUT: 2700 mL / NET: 721 mL    08-21 @ 07:01  -  08-21 @ 20:27  --------------------------------------------------------  IN: 1750 mL / OUT: 2525 mL / NET: -775 mL          ## P/E:  Gen: lying comfortably in bed in no apparent distress  HEENT: PERRL, EOMI  Resp: CTA B/L   CVS: RRR  Abd: soft ND +BS stll somewhat tender to palpation epigastric and LUQ  Ext: no c/c/e  Neuro: A&Ox3, calm cooperative    CENTRAL LINE: [ ] YES [x] NO  LOCATION:   DATE INSERTED:  REMOVE: [ ] YES [ ] NO      QUINTEROS: [ ] YES [x] NO    DATE INSERTED:  REMOVE:  [ ] YES [ ] NO      A-LINE:  [ ] YES [x] NO  LOCATION:   DATE INSERTED:  REMOVE:  [ ] YES [ ] NO  EXPLAIN:    GLOBAL ISSUE/BEST PRACTICE:  Analgesia: dilaudid prn  Sedation: n/a  HOB elevation: yes  Stress ulcer prophylaxis: n/a  VTE prophylaxis: lovenox  Oral Care: n/a  Glycemic control: n/a  Nutrition: advanced to clears    CODE STATUS: [x] full code  [ ] DNR  [ ] DNI  [ ] MOLST  Goals of care discussion: [ ] yes

## 2020-08-21 NOTE — PROGRESS NOTE ADULT - SUBJECTIVE AND OBJECTIVE BOX
Surgery AM Progress Note    S: Pt seen and examined this AM in the ICU.  Pt endorses passing flatus and having a BM yesterday. States abd pain is getting better. States been tolerating Clears (with NGT clamped).  Pt denies nausea and vomiting. Pt denies chest pain, SOB, dizziness, fever, chills.     Vital Signs Last 24 Hrs  T(C): 38.4 (21 Aug 2020 11:30), Max: 38.7 (20 Aug 2020 19:05)  T(F): 101.2 (21 Aug 2020 11:30), Max: 101.6 (20 Aug 2020 19:05)  HR: 118 (21 Aug 2020 11:00) (94 - 120)  BP: 113/69 (21 Aug 2020 11:00) (85/61 - 149/98)  BP(mean): 80 (21 Aug 2020 11:00) (61 - 105)  RR: 24 (21 Aug 2020 11:00) (15 - 29)  SpO2: 96% (21 Aug 2020 11:00) (93% - 101%)    I&O's Summary    20 Aug 2020 07:01  -  21 Aug 2020 07:00  --------------------------------------------------------  IN: 3421 mL / OUT: 2700 mL / NET: 721 mL    21 Aug 2020 07:01  -  21 Aug 2020 11:51  --------------------------------------------------------  IN: 800 mL / OUT: 675 mL / NET: 125 mL         Physical Exam  General Appearance:  Appears well, NAD, AAO x3  Chest: Equal expansion bilaterally  CV: Pulse regular presently  Abdomen: soft, moderately distended, minimally tender, +Bowel Sounds  Extremities: Grossly symmetric.      LABS:                        11.4   5.44  )-----------( 65       ( 21 Aug 2020 02:55 )             33.6       135  |  103  |  14  ----------------------------<  99  3.6   |  25  |  0.71    Ca    6.7<L>      21 Aug 2020 02:55  Phos  1.8     08-21  Mg     2.0     08-21    TPro  5.1<L>  /  Alb  1.6<L>  /  TBili  1.4<H>  /  DBili  x   /  AST  62<H>  /  ALT  36  /  AlkPhos  36<L>  08-20  PT/INR - ( 19 Aug 2020 16:42 )   PT: 19.4 sec;   INR: 1.72 ratio    PTT - ( 19 Aug 2020 16:42 )  PTT:40.8 sec      Echo (8/20):   1. Left ventricular ejection fraction, by visual estimation, is 55 to 60%.   2. Mild mitral annular calcification.   3. Trace mitral valve regurgitation.   4. Mild tricuspid regurgitation.   5. Estimated pulmonary artery systolic pressure is 37.9 mmHg assuming a right atrial pressure of 5 mmHg, which is consistent with mild pulmonary hypertension.

## 2020-08-21 NOTE — PROGRESS NOTE ADULT - SUBJECTIVE AND OBJECTIVE BOX
44y old  Male who presented with a chief complaint of pancreatitis and was admitted with PANCREATITIS.     Pt seen and examined at bedside resting comfortably in the ICU. Reports abdominal pain is still present but it is improving. Pt tolerating NGT. Pt denies nausea and vomiting. +BM/flatus. Pt denies chest pain, SOB, dizziness, fever, chills.       Vital Signs Last 24 Hrs  T(F): 98.3 (08-21-20 @ 03:00), Max: 101.6 (08-20-20 @ 19:05)  HR: 105 (08-21-20 @ 06:00)  BP: 110/71 (08-21-20 @ 06:00)  RR: 16 (08-21-20 @ 06:00)  SpO2: 98% (08-21-20 @ 06:00)  Wt(kg): --   CAPILLARY BLOOD GLUCOSE      POCT Blood Glucose.: 101 mg/dL (21 Aug 2020 05:01)  POCT Glucose Trend  101 mg/dL08-21 @ 05:01  116 mg/dL08-21 @ 00:40  124 mg/dL08-20 @ 18:06  122 mg/dL08-20 @ 13:09      GENERAL: Alert, NAD  CHEST/LUNG: Clear to auscultation bilaterally, respirations nonlabored  HEART: S1S2, Regular rate and rhythm;   ABDOMEN: softly distended, +BS, soft. diffusely tender, upper more than lower  EXTREMITIES:  no calf tenderness, No edema    I&O's Detail    19 Aug 2020 07:01  -  20 Aug 2020 07:00  --------------------------------------------------------  IN:    dexmedetomidine Infusion: 158 mL    dextrose 5% + lactated ringers.: 3125 mL    IV PiggyBack: 850 mL    norepinephrine Infusion: 239 mL    Oral Fluid: 700 mL  Total IN: 5072 mL    OUT:    Indwelling Catheter - Urethral: 1645 mL    Nasoenteral Tube: 10 mL    Stool: 200 mL  Total OUT: 1855 mL    Total NET: 3217 mL      20 Aug 2020 07:01  -  21 Aug 2020 06:21  --------------------------------------------------------  IN:    dexmedetomidine Infusion: 53.1 mL    dextrose 5% + lactated ringers.: 750 mL    dextrose 5% + lactated ringers.: 1275 mL    IV PiggyBack: 1250 mL    norepinephrine Infusion: 16.1 mL  Total IN: 3344.2 mL    OUT:    Indwelling Catheter - Urethral: 2290 mL    Voided: 400 mL  Total OUT: 2690 mL    Total NET: 654.2 mL          LABS:                        11.4   5.44  )-----------( 65       ( 21 Aug 2020 02:55 )             33.6     08-21    135  |  103  |  14  ----------------------------<  99  3.6   |  25  |  0.71    Ca    6.7<L>      21 Aug 2020 02:55  Phos  1.8     08-21  Mg     2.0     08-21    Calcium, Total Serum: 6.7 mg/dL [8.5 - 10.1] (08-21-20)  Calcium, Total Serum: 6.6 mg/dL [8.5 - 10.1] (08-20-20)  Calcium, Total Serum: 7.2 mg/dL [8.5 - 10.1] (08-19-20)  Calcium, Total Serum: 7.5 mg/dL [8.5 - 10.1] (08-19-20)  Calcium, Total Serum: 8.3 mg/dL [8.5 - 10.1] (08-18-20)  Calcium, Total Serum: 9.2 mg/dL [8.5 - 10.1] (08-18-20)  Calcium, Total Serum: 9.2 mg/dL [8.5 - 10.1] (08-18-20)  Calcium, Total Serum: 11.5 mg/dL [8.5 - 10.1] (08-17-20)      TPro  5.1<L>  /  Alb  1.6<L>  /  TBili  1.4<H>  /  DBili  x   /  AST  62<H>  /  ALT  36  /  AlkPhos  36<L>  08-20    Aspartate Aminotransferase (AST/SGOT): 62 (08-20-20 @ 03:34)  Alanine Aminotransferase (ALT/SGPT): 36 (08-20-20 @ 03:34)    PT/INR - ( 19 Aug 2020 16:42 )   PT: 19.4 sec;   INR: 1.72 ratio       PTT - ( 19 Aug 2020 16:42 )  PTT:40.8 sec    Lactate, Blood: 0.9 mmol/L (08-21 @ 04:27)  Lactate, Blood: 2.7 mmol/L (08-19 @ 16:42)  Lactate, Blood: 3.5 mmol/L (08-19 @ 10:48)  Lactate, Blood: 5.0 mmol/L (08-19 @ 03:07)  Lactate, Blood: 4.6 mmol/L (08-18 @ 12:55)        RADIOLOGY & ADDITIONAL STUDIES:  IMAGING:  CT Abdomen and Pelvis w/ Oral Cont and w/ IV Cont:   EXAM:  CT ABDOMEN AND PELVIS OC IC                            PROCEDURE DATE:  08/19/2020          INTERPRETATION:  CLINICAL INDICATION: 44 years  Male with Abdominal distension. Pancreatitis.    COMPARISON: 8/17/2020    PROCEDURE:  CT of the Abdomen and Pelvis was performed with intravenous contrast.  Intravenous contrast: 86 ml Omnipaque 350. 14 ml discarded.  Oral contrast: positive contrast was administered.  Sagittal and coronal reformats were performed.    FINDINGS:  LOWER CHEST: New moderate bilateral pleural effusions with underlying compressive atelectasis of the lower lobes. Left effusion greater than right.    LIVER: Hepatic steatosis. The portal veins and hepatic veins are patent.  BILE DUCTS: Normal caliber.  GALLBLADDER: Mildly distended measuring 8.4 cm sagittal by 4.6 cm in diameter.  SPLEEN: Within normal limits.  PANCREAS: Edematous enlarged pancreas reidentified. Heterogeneous enhancement in the pancreatic body suggestive of pancreatic necrosis.  ADRENALS: Within normal limits.  KIDNEYS/URETERS: Bilateral perinephric inflammatory stranding.    BLADDER: Collapsed around a Sanchez catheter.  REPRODUCTIVE ORGANS: Unremarkable prostate.    BOWEL: Increasing small bowel distention. Jejunal loops in the pelvis now measureup to 3.5 cm. Transition zone in the right lower quadrant at a loop of thickened jejunum (2:99). Retained fecal matter in the ascending and proximal transverse colon. Appendix is not visualized and cannot be assessed.  PERITONEUM: Increasing ascites,now perihepatic and perisplenic tracking down the paracolic gutters into the pelvis..  VESSELS: Within normal limits. Visualized portions of the SMA, SMV and celiac axis are patent.  RETROPERITONEUM/LYMPH NODES: No lymphadenopathy.  ABDOMINAL WALL: Within normal limits.  BONES: Within normal limits.    IMPRESSION:  Increasing small bowel distention with transition point in the right lower quadrant which may represent a mechanical obstruction or ileus related to the pancreatitis.    Increasing ascites.    Mildly distended gallbladder.    Increasing bilateral pleural effusions with underlying compressive atelectasis of the lower lobes.    Findings were discussed with Dr. RODY GANNON 8/19/2020 1:14 PM by Dr. Curry with read back confirmation.      VILMA CURRY M.D., ATTENDING RADIOLOGIST  This document has been electronically signed. Aug 19 2020  1:17PM         A/P: 44M w/ EtOH abuse and withdrawal, pancreatitis. Presents w/ abdominal pain, nausea and vomiting. Labs and CT are consistent w/ pancreatitis and possible early necrosis of pancreas. Pt transferred to ICU for severe alcohol withdrawal symptoms. Levophed started 8/19. Lactate currently 0.9 and downtrending.     - Supplemental calcium gluconate to correct hypocalcemia.   -Continue NPO  -Continue ICU management and supportive measures, IV ABX (zosyn)  -Aggressive IVF resuscitation, titrate fluids to UOP. Replete lytes PRN  -F/u labs, trend Lactate and LFTs/Lipase  -Sanchez catheter, Strict I/Os  -Pain control PRN, antiemetic PRN  -Monitor for temps, Antipyretics PRN  -Serial abd exams  -[facility] protocol and monitoring per primary team  -No acute surgical intervention planned 44y old  Male who presented with a chief complaint of pancreatitis.    Pt seen and examined at bedside resting comfortably in the ICU. No acute events overnight. Reports abdominal pain is still present but it is improving. Reports that RT shoulder pain is improved from yesterday. Patient reduced pain associated with movement, can now comfortably rest on his left side while in bed. Pt tolerating NGT. Has been tolerating PO fluids and asks when he can begin eating food again. Pt denies nausea and vomiting. Reports +flatus, says unsure of timing of last BM. Pt denies chest pain, SOB, dizziness, fever, chills.       MEDICATIONS  (STANDING):  chlorhexidine 4% Liquid 1 Application(s) Topical <User Schedule>  dexMEDEtomidine Infusion 0.4 MICROgram(s)/kG/Hr (7.26 mL/Hr) IV Continuous <Continuous>  dextrose 5% + lactated ringers. 1000 milliLiter(s) (75 mL/Hr) IV Continuous <Continuous>  enoxaparin Injectable 40 milliGRAM(s) SubCutaneous daily  folic acid Injectable 1 milliGRAM(s) IV Push daily  multivitamin 1 Tablet(s) Oral daily  norepinephrine Infusion 0.05 MICROgram(s)/kG/Min (6.7 mL/Hr) IV Continuous <Continuous>  pantoprazole  Injectable 40 milliGRAM(s) IV Push daily  piperacillin/tazobactam IVPB.. 3.375 Gram(s) IV Intermittent every 8 hours  senna 2 Tablet(s) Oral at bedtime  thiamine Injectable 100 milliGRAM(s) IV Push daily    MEDICATIONS  (PRN):  HYDROmorphone  Injectable 0.5 milliGRAM(s) IV Push every 4 hours PRN Severe Pain (7 - 10)      Vital Signs Last 24 Hrs  T(F): 98.3 (08-21-20 @ 03:00), Max: 101.6 (08-20-20 @ 19:05)  HR: 105 (08-21-20 @ 06:00)  BP: 110/71 (08-21-20 @ 06:00)  RR: 16 (08-21-20 @ 06:00)  SpO2: 98% (08-21-20 @ 06:00)  Wt(kg): --   CAPILLARY BLOOD GLUCOSE      POCT Blood Glucose.: 101 mg/dL (21 Aug 2020 05:01)  POCT Glucose Trend  101 mg/dL08-21 @ 05:01  116 mg/dL08-21 @ 00:40  124 mg/dL08-20 @ 18:06  122 mg/dL08-20 @ 13:09      GENERAL: Alert, NAD  CHEST/LUNG: Clear to auscultation bilaterally, respirations nonlabored  HEART: S1S2, Regular rate and rhythm;   ABDOMEN: softly distended (improved from yesterday), +BS, soft. diffusely tender, upper more than lower (improved from yesterday)  EXTREMITIES:  no calf tenderness, No edema    I&O's Detail    19 Aug 2020 07:01  -  20 Aug 2020 07:00  --------------------------------------------------------  IN:    dexmedetomidine Infusion: 158 mL    dextrose 5% + lactated ringers.: 3125 mL    IV PiggyBack: 850 mL    norepinephrine Infusion: 239 mL    Oral Fluid: 700 mL  Total IN: 5072 mL    OUT:    Indwelling Catheter - Urethral: 1645 mL    Nasoenteral Tube: 10 mL    Stool: 200 mL  Total OUT: 1855 mL    Total NET: 3217 mL     NG output 10cc.   20 Aug 2020 07:01  -  21 Aug 2020 06:21  --------------------------------------------------------  IN:    dexmedetomidine Infusion: 53.1 mL    dextrose 5% + lactated ringers.: 750 mL    dextrose 5% + lactated ringers.: 1275 mL    IV PiggyBack: 1250 mL    norepinephrine Infusion: 16.1 mL  Total IN: 3344.2 mL    OUT:    Indwelling Catheter - Urethral: 2290 mL    Voided: 400 mL  Total OUT: 2690 mL    Total NET: 654.2 mL          LABS:                        11.4   5.44  )-----------( 65       ( 21 Aug 2020 02:55 )             33.6     08-21    135  |  103  |  14  ----------------------------<  99  3.6   |  25  |  0.71    Ca    6.7<L>      21 Aug 2020 02:55  Phos  1.8     08-21  Mg     2.0     08-21    Calcium, Total Serum: 6.7 mg/dL [8.5 - 10.1] (08-21-20)  Calcium, Total Serum: 6.6 mg/dL [8.5 - 10.1] (08-20-20)  Calcium, Total Serum: 7.2 mg/dL [8.5 - 10.1] (08-19-20)  Calcium, Total Serum: 7.5 mg/dL [8.5 - 10.1] (08-19-20)  Calcium, Total Serum: 8.3 mg/dL [8.5 - 10.1] (08-18-20)  Calcium, Total Serum: 9.2 mg/dL [8.5 - 10.1] (08-18-20)  Calcium, Total Serum: 9.2 mg/dL [8.5 - 10.1] (08-18-20)  Calcium, Total Serum: 11.5 mg/dL [8.5 - 10.1] (08-17-20)      TPro  5.1<L>  /  Alb  1.6<L>  /  TBili  1.4<H>  /  DBili  x   /  AST  62<H>  /  ALT  36  /  AlkPhos  36<L>  08-20    Aspartate Aminotransferase (AST/SGOT): 62 (08-20-20 @ 03:34)  Alanine Aminotransferase (ALT/SGPT): 36 (08-20-20 @ 03:34)    PT/INR - ( 19 Aug 2020 16:42 )   PT: 19.4 sec;   INR: 1.72 ratio       PTT - ( 19 Aug 2020 16:42 )  PTT:40.8 sec    Lactate, Blood: 0.9 mmol/L (08-21 @ 04:27)  Lactate, Blood: 2.7 mmol/L (08-19 @ 16:42)  Lactate, Blood: 3.5 mmol/L (08-19 @ 10:48)  Lactate, Blood: 5.0 mmol/L (08-19 @ 03:07)  Lactate, Blood: 4.6 mmol/L (08-18 @ 12:55)        RADIOLOGY & ADDITIONAL STUDIES:  IMAGING:  CT Abdomen and Pelvis w/ Oral Cont and w/ IV Cont:   EXAM:  CT ABDOMEN AND PELVIS OC IC                            PROCEDURE DATE:  08/19/2020          INTERPRETATION:  CLINICAL INDICATION: 44 years  Male with Abdominal distension. Pancreatitis.    COMPARISON: 8/17/2020    PROCEDURE:  CT of the Abdomen and Pelvis was performed with intravenous contrast.  Intravenous contrast: 86 ml Omnipaque 350. 14 ml discarded.  Oral contrast: positive contrast was administered.  Sagittal and coronal reformats were performed.    FINDINGS:  LOWER CHEST: New moderate bilateral pleural effusions with underlying compressive atelectasis of the lower lobes. Left effusion greater than right.    LIVER: Hepatic steatosis. The portal veins and hepatic veins are patent.  BILE DUCTS: Normal caliber.  GALLBLADDER: Mildly distended measuring 8.4 cm sagittal by 4.6 cm in diameter.  SPLEEN: Within normal limits.  PANCREAS: Edematous enlarged pancreas reidentified. Heterogeneous enhancement in the pancreatic body suggestive of pancreatic necrosis.  ADRENALS: Within normal limits.  KIDNEYS/URETERS: Bilateral perinephric inflammatory stranding.    BLADDER: Collapsed around a Sanchez catheter.  REPRODUCTIVE ORGANS: Unremarkable prostate.    BOWEL: Increasing small bowel distention. Jejunal loops in the pelvis now measureup to 3.5 cm. Transition zone in the right lower quadrant at a loop of thickened jejunum (2:99). Retained fecal matter in the ascending and proximal transverse colon. Appendix is not visualized and cannot be assessed.  PERITONEUM: Increasing ascites,now perihepatic and perisplenic tracking down the paracolic gutters into the pelvis..  VESSELS: Within normal limits. Visualized portions of the SMA, SMV and celiac axis are patent.  RETROPERITONEUM/LYMPH NODES: No lymphadenopathy.  ABDOMINAL WALL: Within normal limits.  BONES: Within normal limits.    IMPRESSION:  Increasing small bowel distention with transition point in the right lower quadrant which may represent a mechanical obstruction or ileus related to the pancreatitis.    Increasing ascites.    Mildly distended gallbladder.    Increasing bilateral pleural effusions with underlying compressive atelectasis of the lower lobes.    Findings were discussed with Dr. RODY GANNON 8/19/2020 1:14 PM by Dr. Curry with read back confirmation.      VILMA CURRY M.D., ATTENDING RADIOLOGIST  This document has been electronically signed. Aug 19 2020  1:17PM         A/P: 44M w/ EtOH abuse and withdrawal, pancreatitis. Presents w/ abdominal pain, nausea and vomiting. Labs and CT are consistent w/ pancreatitis and possible early necrosis of pancreas. Pt transferred to ICU for severe alcohol withdrawal symptoms. Levophed started 8/19. Lactate currently 0.9 and downtrending. K+ improved. Patient hypocalcemic.     - Supplemental IV calcium gluconate for hypocalcemia.   -Continue NPO. Advance to clears as tolerated.   -Continue ICU management and supportive measures, IV ABX (zosyn)  -Aggressive IVF resuscitation, titrate fluids to UOP. Replete lytes PRN  -F/u labs, trend Lactate and LFTs/Lipase  -Sanchez catheter, Strict I/Os  -Pain control PRN, antiemetic PRN  -Monitor for temps, Antipyretics PRN  -Serial abd exams  -[facility] protocol and monitoring per primary team  -No acute surgical intervention planned 44y old  Male who presented with a chief complaint of pancreatitis.    Pt seen and examined at bedside resting comfortably in the ICU. No acute events overnight. Reports abdominal pain is still present but it is improving. Reports that RT shoulder pain is improved from yesterday. Patient reports reduced pain associated with movement, can now comfortably rest on his side while in bed. Pt tolerating NGT. Has been tolerating PO fluids and asks when he can begin eating food again. Pt denies nausea and vomiting. Reports +flatus. Pt denies chest pain, SOB, dizziness, fever, chills.       MEDICATIONS  (STANDING):  chlorhexidine 4% Liquid 1 Application(s) Topical <User Schedule>  dexMEDEtomidine Infusion 0.4 MICROgram(s)/kG/Hr (7.26 mL/Hr) IV Continuous <Continuous>  dextrose 5% + lactated ringers. 1000 milliLiter(s) (75 mL/Hr) IV Continuous <Continuous>  enoxaparin Injectable 40 milliGRAM(s) SubCutaneous daily  folic acid Injectable 1 milliGRAM(s) IV Push daily  multivitamin 1 Tablet(s) Oral daily  norepinephrine Infusion 0.05 MICROgram(s)/kG/Min (6.7 mL/Hr) IV Continuous <Continuous>  pantoprazole  Injectable 40 milliGRAM(s) IV Push daily  piperacillin/tazobactam IVPB.. 3.375 Gram(s) IV Intermittent every 8 hours  senna 2 Tablet(s) Oral at bedtime  thiamine Injectable 100 milliGRAM(s) IV Push daily    MEDICATIONS  (PRN):  HYDROmorphone  Injectable 0.5 milliGRAM(s) IV Push every 4 hours PRN Severe Pain (7 - 10)      Vital Signs Last 24 Hrs  T(F): 98.3 (08-21-20 @ 03:00), Max: 101.6 (08-20-20 @ 19:05)  HR: 105 (08-21-20 @ 06:00)  BP: 110/71 (08-21-20 @ 06:00)  RR: 16 (08-21-20 @ 06:00)  SpO2: 98% (08-21-20 @ 06:00)  Wt(kg): --   CAPILLARY BLOOD GLUCOSE      POCT Blood Glucose.: 101 mg/dL (21 Aug 2020 05:01)  POCT Glucose Trend  101 mg/dL08-21 @ 05:01  116 mg/dL08-21 @ 00:40  124 mg/dL08-20 @ 18:06  122 mg/dL08-20 @ 13:09      GENERAL: Alert, NAD  CHEST/LUNG: Clear to auscultation bilaterally, respirations nonlabored  HEART: S1S2, Regular rate and rhythm;   ABDOMEN: softly distended (improved from yesterday), +BS, soft. diffusely tender, upper more than lower (improved from yesterday)  EXTREMITIES:  no calf tenderness, No edema    I&O's Detail    19 Aug 2020 07:01  -  20 Aug 2020 07:00  --------------------------------------------------------  IN:    dexmedetomidine Infusion: 158 mL    dextrose 5% + lactated ringers.: 3125 mL    IV PiggyBack: 850 mL    norepinephrine Infusion: 239 mL    Oral Fluid: 700 mL  Total IN: 5072 mL    OUT:    Indwelling Catheter - Urethral: 1645 mL    Nasoenteral Tube: 10 mL    Stool: 200 mL  Total OUT: 1855 mL    Total NET: 3217 mL    NGT output 10cc.     20 Aug 2020 07:01  -  21 Aug 2020 06:21  --------------------------------------------------------  IN:    dexmedetomidine Infusion: 53.1 mL    dextrose 5% + lactated ringers.: 750 mL    dextrose 5% + lactated ringers.: 1275 mL    IV PiggyBack: 1250 mL    norepinephrine Infusion: 16.1 mL  Total IN: 3344.2 mL    OUT:    Indwelling Catheter - Urethral: 2290 mL    Voided: 400 mL  Total OUT: 2690 mL    Total NET: 654.2 mL          LABS:                        11.4   5.44  )-----------( 65       ( 21 Aug 2020 02:55 )             33.6     08-21    135  |  103  |  14  ----------------------------<  99  3.6   |  25  |  0.71    Ca    6.7<L>      21 Aug 2020 02:55  Phos  1.8     08-21  Mg     2.0     08-21    Calcium, Total Serum: 6.7 mg/dL [8.5 - 10.1] (08-21-20)  Calcium, Total Serum: 6.6 mg/dL [8.5 - 10.1] (08-20-20)  Calcium, Total Serum: 7.2 mg/dL [8.5 - 10.1] (08-19-20)  Calcium, Total Serum: 7.5 mg/dL [8.5 - 10.1] (08-19-20)  Calcium, Total Serum: 8.3 mg/dL [8.5 - 10.1] (08-18-20)  Calcium, Total Serum: 9.2 mg/dL [8.5 - 10.1] (08-18-20)  Calcium, Total Serum: 9.2 mg/dL [8.5 - 10.1] (08-18-20)  Calcium, Total Serum: 11.5 mg/dL [8.5 - 10.1] (08-17-20)      TPro  5.1<L>  /  Alb  1.6<L>  /  TBili  1.4<H>  /  DBili  x   /  AST  62<H>  /  ALT  36  /  AlkPhos  36<L>  08-20    Aspartate Aminotransferase (AST/SGOT): 62 (08-20-20 @ 03:34)  Alanine Aminotransferase (ALT/SGPT): 36 (08-20-20 @ 03:34)    PT/INR - ( 19 Aug 2020 16:42 )   PT: 19.4 sec;   INR: 1.72 ratio       PTT - ( 19 Aug 2020 16:42 )  PTT:40.8 sec    Lactate, Blood: 0.9 mmol/L (08-21 @ 04:27)  Lactate, Blood: 2.7 mmol/L (08-19 @ 16:42)  Lactate, Blood: 3.5 mmol/L (08-19 @ 10:48)  Lactate, Blood: 5.0 mmol/L (08-19 @ 03:07)  Lactate, Blood: 4.6 mmol/L (08-18 @ 12:55)        RADIOLOGY & ADDITIONAL STUDIES:  IMAGING:  CT Abdomen and Pelvis w/ Oral Cont and w/ IV Cont:   EXAM:  CT ABDOMEN AND PELVIS OC IC                            PROCEDURE DATE:  08/19/2020          INTERPRETATION:  CLINICAL INDICATION: 44 years  Male with Abdominal distension. Pancreatitis.    COMPARISON: 8/17/2020    PROCEDURE:  CT of the Abdomen and Pelvis was performed with intravenous contrast.  Intravenous contrast: 86 ml Omnipaque 350. 14 ml discarded.  Oral contrast: positive contrast was administered.  Sagittal and coronal reformats were performed.    FINDINGS:  LOWER CHEST: New moderate bilateral pleural effusions with underlying compressive atelectasis of the lower lobes. Left effusion greater than right.    LIVER: Hepatic steatosis. The portal veins and hepatic veins are patent.  BILE DUCTS: Normal caliber.  GALLBLADDER: Mildly distended measuring 8.4 cm sagittal by 4.6 cm in diameter.  SPLEEN: Within normal limits.  PANCREAS: Edematous enlarged pancreas reidentified. Heterogeneous enhancement in the pancreatic body suggestive of pancreatic necrosis.  ADRENALS: Within normal limits.  KIDNEYS/URETERS: Bilateral perinephric inflammatory stranding.    BLADDER: Collapsed around a Sanchez catheter.  REPRODUCTIVE ORGANS: Unremarkable prostate.    BOWEL: Increasing small bowel distention. Jejunal loops in the pelvis now measureup to 3.5 cm. Transition zone in the right lower quadrant at a loop of thickened jejunum (2:99). Retained fecal matter in the ascending and proximal transverse colon. Appendix is not visualized and cannot be assessed.  PERITONEUM: Increasing ascites,now perihepatic and perisplenic tracking down the paracolic gutters into the pelvis..  VESSELS: Within normal limits. Visualized portions of the SMA, SMV and celiac axis are patent.  RETROPERITONEUM/LYMPH NODES: No lymphadenopathy.  ABDOMINAL WALL: Within normal limits.  BONES: Within normal limits.    IMPRESSION:  Increasing small bowel distention with transition point in the right lower quadrant which may represent a mechanical obstruction or ileus related to the pancreatitis.    Increasing ascites.    Mildly distended gallbladder.    Increasing bilateral pleural effusions with underlying compressive atelectasis of the lower lobes.    Findings were discussed with Dr. RODY GANNON 8/19/2020 1:14 PM by Dr. Curry with read back confirmation.      VILMA CURRY M.D., ATTENDING RADIOLOGIST  This document has been electronically signed. Aug 19 2020  1:17PM         A/P: 44M w/ EtOH abuse and withdrawal, pancreatitis. Presents w/ abdominal pain, nausea and vomiting. Labs and CT are consistent w/ pancreatitis and possible early necrosis of pancreas. Pt transferred to ICU for severe alcohol withdrawal symptoms. Levophed started 8/19. Lactate currently 0.9 and downtrending. K+ improved. Patient hypocalcemic.     - Supplemental IV calcium gluconate for hypocalcemia.   -Continue NPO. Advance to clears as tolerated.   -Continue ICU management and supportive measures, IV ABX (zosyn)  -Aggressive IVF resuscitation, titrate fluids to UOP. Replete lytes PRN  -F/u labs, trend Lactate and LFTs/Lipase  -Sanchez catheter, Strict I/Os  -Pain control PRN, antiemetic PRN  -Monitor for temps, Antipyretics PRN  -Serial abd exams  -[facility] protocol and monitoring per primary team  -No acute surgical intervention planned 44y old  Male who presented with a chief complaint of pancreatitis.    Pt seen and examined at bedside resting comfortably in the ICU. No acute events overnight. Reports abdominal pain is still present but it is improving. Reports that RT shoulder pain is improved from yesterday. Patient reports reduced pain associated with movement, can now comfortably rest on his side while in bed. Pt tolerating NGT. Has been tolerating PO fluids and asks when he can begin eating food again. Pt denies nausea and vomiting. Reports +flatus. Pt denies chest pain, SOB, dizziness, fever, chills.       MEDICATIONS  (STANDING):  chlorhexidine 4% Liquid 1 Application(s) Topical <User Schedule>  dexMEDEtomidine Infusion 0.4 MICROgram(s)/kG/Hr (7.26 mL/Hr) IV Continuous <Continuous>  dextrose 5% + lactated ringers. 1000 milliLiter(s) (75 mL/Hr) IV Continuous <Continuous>  enoxaparin Injectable 40 milliGRAM(s) SubCutaneous daily  folic acid Injectable 1 milliGRAM(s) IV Push daily  multivitamin 1 Tablet(s) Oral daily  norepinephrine Infusion 0.05 MICROgram(s)/kG/Min (6.7 mL/Hr) IV Continuous <Continuous>  pantoprazole  Injectable 40 milliGRAM(s) IV Push daily  piperacillin/tazobactam IVPB.. 3.375 Gram(s) IV Intermittent every 8 hours  senna 2 Tablet(s) Oral at bedtime  thiamine Injectable 100 milliGRAM(s) IV Push daily    MEDICATIONS  (PRN):  HYDROmorphone  Injectable 0.5 milliGRAM(s) IV Push every 4 hours PRN Severe Pain (7 - 10)      Vital Signs Last 24 Hrs  T(F): 98.3 (08-21-20 @ 03:00), Max: 101.6 (08-20-20 @ 19:05)  HR: 105 (08-21-20 @ 06:00)  BP: 110/71 (08-21-20 @ 06:00)  RR: 16 (08-21-20 @ 06:00)  SpO2: 98% (08-21-20 @ 06:00)  Wt(kg): --   CAPILLARY BLOOD GLUCOSE      POCT Blood Glucose.: 101 mg/dL (21 Aug 2020 05:01)  POCT Glucose Trend  101 mg/dL08-21 @ 05:01  116 mg/dL08-21 @ 00:40  124 mg/dL08-20 @ 18:06  122 mg/dL08-20 @ 13:09      GENERAL: Alert, NAD  CHEST/LUNG: Clear to auscultation bilaterally, respirations nonlabored  HEART: S1S2, Regular rate and rhythm;   ABDOMEN: softly distended (improved from yesterday), +BS, soft. diffusely tender, upper more than lower (improved from yesterday)  EXTREMITIES:  no calf tenderness, No edema    I&O's Detail    19 Aug 2020 07:01  -  20 Aug 2020 07:00  --------------------------------------------------------  IN:    dexmedetomidine Infusion: 158 mL    dextrose 5% + lactated ringers.: 3125 mL    IV PiggyBack: 850 mL    norepinephrine Infusion: 239 mL    Oral Fluid: 700 mL  Total IN: 5072 mL    OUT:    Indwelling Catheter - Urethral: 1645 mL    Nasoenteral Tube: 10 mL    Stool: 200 mL  Total OUT: 1855 mL    Total NET: 3217 mL    NGT output 10cc.     20 Aug 2020 07:01  -  21 Aug 2020 06:21  --------------------------------------------------------  IN:    dexmedetomidine Infusion: 53.1 mL    dextrose 5% + lactated ringers.: 750 mL    dextrose 5% + lactated ringers.: 1275 mL    IV PiggyBack: 1250 mL    norepinephrine Infusion: 16.1 mL  Total IN: 3344.2 mL    OUT:    Indwelling Catheter - Urethral: 2290 mL    Voided: 400 mL  Total OUT: 2690 mL    Total NET: 654.2 mL          LABS:                        11.4   5.44  )-----------( 65       ( 21 Aug 2020 02:55 )             33.6     08-21    135  |  103  |  14  ----------------------------<  99  3.6   |  25  |  0.71    Ca    6.7<L>      21 Aug 2020 02:55  Phos  1.8     08-21  Mg     2.0     08-21    Calcium, Total Serum: 6.7 mg/dL [8.5 - 10.1] (08-21-20)  Calcium, Total Serum: 6.6 mg/dL [8.5 - 10.1] (08-20-20)  Calcium, Total Serum: 7.2 mg/dL [8.5 - 10.1] (08-19-20)  Calcium, Total Serum: 7.5 mg/dL [8.5 - 10.1] (08-19-20)  Calcium, Total Serum: 8.3 mg/dL [8.5 - 10.1] (08-18-20)  Calcium, Total Serum: 9.2 mg/dL [8.5 - 10.1] (08-18-20)  Calcium, Total Serum: 9.2 mg/dL [8.5 - 10.1] (08-18-20)  Calcium, Total Serum: 11.5 mg/dL [8.5 - 10.1] (08-17-20)      TPro  5.1<L>  /  Alb  1.6<L>  /  TBili  1.4<H>  /  DBili  x   /  AST  62<H>  /  ALT  36  /  AlkPhos  36<L>  08-20    Aspartate Aminotransferase (AST/SGOT): 62 (08-20-20 @ 03:34)  Alanine Aminotransferase (ALT/SGPT): 36 (08-20-20 @ 03:34)    PT/INR - ( 19 Aug 2020 16:42 )   PT: 19.4 sec;   INR: 1.72 ratio       PTT - ( 19 Aug 2020 16:42 )  PTT:40.8 sec    Lactate, Blood: 0.9 mmol/L (08-21 @ 04:27)  Lactate, Blood: 2.7 mmol/L (08-19 @ 16:42)  Lactate, Blood: 3.5 mmol/L (08-19 @ 10:48)  Lactate, Blood: 5.0 mmol/L (08-19 @ 03:07)  Lactate, Blood: 4.6 mmol/L (08-18 @ 12:55)        RADIOLOGY & ADDITIONAL STUDIES:  IMAGING:  CT Abdomen and Pelvis w/ Oral Cont and w/ IV Cont:   EXAM:  CT ABDOMEN AND PELVIS OC IC                            PROCEDURE DATE:  08/19/2020          INTERPRETATION:  CLINICAL INDICATION: 44 years  Male with Abdominal distension. Pancreatitis.    COMPARISON: 8/17/2020    PROCEDURE:  CT of the Abdomen and Pelvis was performed with intravenous contrast.  Intravenous contrast: 86 ml Omnipaque 350. 14 ml discarded.  Oral contrast: positive contrast was administered.  Sagittal and coronal reformats were performed.    FINDINGS:  LOWER CHEST: New moderate bilateral pleural effusions with underlying compressive atelectasis of the lower lobes. Left effusion greater than right.    LIVER: Hepatic steatosis. The portal veins and hepatic veins are patent.  BILE DUCTS: Normal caliber.  GALLBLADDER: Mildly distended measuring 8.4 cm sagittal by 4.6 cm in diameter.  SPLEEN: Within normal limits.  PANCREAS: Edematous enlarged pancreas reidentified. Heterogeneous enhancement in the pancreatic body suggestive of pancreatic necrosis.  ADRENALS: Within normal limits.  KIDNEYS/URETERS: Bilateral perinephric inflammatory stranding.    BLADDER: Collapsed around a Sanchez catheter.  REPRODUCTIVE ORGANS: Unremarkable prostate.    BOWEL: Increasing small bowel distention. Jejunal loops in the pelvis now measureup to 3.5 cm. Transition zone in the right lower quadrant at a loop of thickened jejunum (2:99). Retained fecal matter in the ascending and proximal transverse colon. Appendix is not visualized and cannot be assessed.  PERITONEUM: Increasing ascites,now perihepatic and perisplenic tracking down the paracolic gutters into the pelvis..  VESSELS: Within normal limits. Visualized portions of the SMA, SMV and celiac axis are patent.  RETROPERITONEUM/LYMPH NODES: No lymphadenopathy.  ABDOMINAL WALL: Within normal limits.  BONES: Within normal limits.    IMPRESSION:  Increasing small bowel distention with transition point in the right lower quadrant which may represent a mechanical obstruction or ileus related to the pancreatitis.    Increasing ascites.    Mildly distended gallbladder.    Increasing bilateral pleural effusions with underlying compressive atelectasis of the lower lobes.    Findings were discussed with Dr. RODY GANNON 8/19/2020 1:14 PM by Dr. Curry with read back confirmation.      VILMA CURRY M.D., ATTENDING RADIOLOGIST  This document has been electronically signed. Aug 19 2020  1:17PM         A/P: 44M w/ EtOH abuse and withdrawal, pancreatitis. Presents w/ abdominal pain, nausea and vomiting. Labs and CT are consistent w/ pancreatitis and possible early necrosis of pancreas. Pt transferred to ICU for severe alcohol withdrawal symptoms. Levophed started 8/19. Lactate currently 0.9 and downtrending. K+ improved. Patient hypocalcemic.     - Supplemental IV calcium gluconate for hypocalcemia.   -Continue NPO.   -Continue ICU management and supportive measures, IV ABX (zosyn)  -Aggressive IVF resuscitation, titrate fluids to UOP. Replete lytes PRN  -F/u labs, trend Lactate and LFTs/Lipase  -Sanchez catheter, Strict I/Os  -Pain control PRN, antiemetic PRN  -Monitor for temps, Antipyretics PRN  -Serial abd exams  -[facility] protocol and monitoring per primary team  -No acute surgical intervention planned

## 2020-08-21 NOTE — PROGRESS NOTE ADULT - ASSESSMENT
This is a 44M w/ EtOH abuse/ withdrawal and pancreatitis. Presents w/ abdominal pain, nausea and vomiting. Labs and CT are consistent w/ pancreatitis and possible early necrosis of pancreas. Pt transferred to ICU for severe alcohol withdrawal symptoms. Currently off Pressors. Lactate cleared.  Lipase downtrending. Having bowel function.    -Continue ICU management and supportive measures, IV ABX (zosyn)  -D/C NGT and advance to CLD.  -Aggressive IVF resuscitation, titrate fluids to UOP.   -Replete lytes PRN  (calcium, potassium, phosphorus)  -Sanchez catheter, Strict I/Os  -Pain control PRN, antiemetic PRN  -Monitor for temps, Antipyretics PRN  -Serial abd exams  -[facility] protocol and monitoring per primary team  -No acute surgical intervention planned  -Will d/w attending       REA Lowry PA-C  Surgery

## 2020-08-22 LAB
ALBUMIN SERPL ELPH-MCNC: 1.7 G/DL — LOW (ref 3.3–5)
ALP SERPL-CCNC: 140 U/L — HIGH (ref 40–120)
ALT FLD-CCNC: 64 U/L — SIGNIFICANT CHANGE UP (ref 12–78)
ANION GAP SERPL CALC-SCNC: 5 MMOL/L — SIGNIFICANT CHANGE UP (ref 5–17)
AST SERPL-CCNC: 122 U/L — HIGH (ref 15–37)
BILIRUB SERPL-MCNC: 3.1 MG/DL — HIGH (ref 0.2–1.2)
BUN SERPL-MCNC: 8 MG/DL — SIGNIFICANT CHANGE UP (ref 7–23)
CALCIUM SERPL-MCNC: 6.8 MG/DL — LOW (ref 8.5–10.1)
CHLORIDE SERPL-SCNC: 101 MMOL/L — SIGNIFICANT CHANGE UP (ref 96–108)
CO2 SERPL-SCNC: 27 MMOL/L — SIGNIFICANT CHANGE UP (ref 22–31)
CREAT SERPL-MCNC: 0.55 MG/DL — SIGNIFICANT CHANGE UP (ref 0.5–1.3)
GLUCOSE SERPL-MCNC: 102 MG/DL — HIGH (ref 70–99)
HCT VFR BLD CALC: 31.9 % — LOW (ref 39–50)
HGB BLD-MCNC: 11 G/DL — LOW (ref 13–17)
LIDOCAIN IGE QN: 440 U/L — HIGH (ref 73–393)
MAGNESIUM SERPL-MCNC: 1.5 MG/DL — LOW (ref 1.6–2.6)
MCHC RBC-ENTMCNC: 32.5 PG — SIGNIFICANT CHANGE UP (ref 27–34)
MCHC RBC-ENTMCNC: 34.5 GM/DL — SIGNIFICANT CHANGE UP (ref 32–36)
MCV RBC AUTO: 94.4 FL — SIGNIFICANT CHANGE UP (ref 80–100)
NRBC # BLD: 0 /100 WBCS — SIGNIFICANT CHANGE UP (ref 0–0)
PHOSPHATE SERPL-MCNC: 1.2 MG/DL — LOW (ref 2.5–4.5)
PLATELET # BLD AUTO: 74 K/UL — LOW (ref 150–400)
POTASSIUM SERPL-MCNC: 3 MMOL/L — LOW (ref 3.5–5.3)
POTASSIUM SERPL-SCNC: 3 MMOL/L — LOW (ref 3.5–5.3)
PROT SERPL-MCNC: 5.5 GM/DL — LOW (ref 6–8.3)
RBC # BLD: 3.38 M/UL — LOW (ref 4.2–5.8)
RBC # FLD: 12.8 % — SIGNIFICANT CHANGE UP (ref 10.3–14.5)
SODIUM SERPL-SCNC: 133 MMOL/L — LOW (ref 135–145)
WBC # BLD: 9.6 K/UL — SIGNIFICANT CHANGE UP (ref 3.8–10.5)
WBC # FLD AUTO: 9.6 K/UL — SIGNIFICANT CHANGE UP (ref 3.8–10.5)

## 2020-08-22 PROCEDURE — 99291 CRITICAL CARE FIRST HOUR: CPT

## 2020-08-22 PROCEDURE — 99232 SBSQ HOSP IP/OBS MODERATE 35: CPT

## 2020-08-22 RX ORDER — ONDANSETRON 8 MG/1
4 TABLET, FILM COATED ORAL ONCE
Refills: 0 | Status: COMPLETED | OUTPATIENT
Start: 2020-08-22 | End: 2020-08-22

## 2020-08-22 RX ORDER — POTASSIUM PHOSPHATE, MONOBASIC POTASSIUM PHOSPHATE, DIBASIC 236; 224 MG/ML; MG/ML
15 INJECTION, SOLUTION INTRAVENOUS ONCE
Refills: 0 | Status: COMPLETED | OUTPATIENT
Start: 2020-08-22 | End: 2020-08-22

## 2020-08-22 RX ORDER — MAGNESIUM SULFATE 500 MG/ML
2 VIAL (ML) INJECTION ONCE
Refills: 0 | Status: COMPLETED | OUTPATIENT
Start: 2020-08-22 | End: 2020-08-22

## 2020-08-22 RX ORDER — FUROSEMIDE 40 MG
40 TABLET ORAL ONCE
Refills: 0 | Status: COMPLETED | OUTPATIENT
Start: 2020-08-22 | End: 2020-08-22

## 2020-08-22 RX ORDER — TRAZODONE HCL 50 MG
50 TABLET ORAL AT BEDTIME
Refills: 0 | Status: COMPLETED | OUTPATIENT
Start: 2020-08-22 | End: 2020-08-22

## 2020-08-22 RX ORDER — QUETIAPINE FUMARATE 200 MG/1
25 TABLET, FILM COATED ORAL DAILY
Refills: 0 | Status: DISCONTINUED | OUTPATIENT
Start: 2020-08-22 | End: 2020-09-01

## 2020-08-22 RX ORDER — PHENOBARBITAL 60 MG
65 TABLET ORAL ONCE
Refills: 0 | Status: DISCONTINUED | OUTPATIENT
Start: 2020-08-22 | End: 2020-08-22

## 2020-08-22 RX ORDER — PHENOBARBITAL 60 MG
130 TABLET ORAL EVERY 6 HOURS
Refills: 0 | Status: DISCONTINUED | OUTPATIENT
Start: 2020-08-22 | End: 2020-08-24

## 2020-08-22 RX ORDER — PHENOBARBITAL 60 MG
130 TABLET ORAL ONCE
Refills: 0 | Status: DISCONTINUED | OUTPATIENT
Start: 2020-08-22 | End: 2020-08-22

## 2020-08-22 RX ADMIN — HYDROMORPHONE HYDROCHLORIDE 0.5 MILLIGRAM(S): 2 INJECTION INTRAMUSCULAR; INTRAVENOUS; SUBCUTANEOUS at 03:30

## 2020-08-22 RX ADMIN — Medication 50 MILLIGRAM(S): at 21:25

## 2020-08-22 RX ADMIN — PIPERACILLIN AND TAZOBACTAM 25 GRAM(S): 4; .5 INJECTION, POWDER, LYOPHILIZED, FOR SOLUTION INTRAVENOUS at 18:00

## 2020-08-22 RX ADMIN — ENOXAPARIN SODIUM 40 MILLIGRAM(S): 100 INJECTION SUBCUTANEOUS at 16:10

## 2020-08-22 RX ADMIN — CHLORHEXIDINE GLUCONATE 1 APPLICATION(S): 213 SOLUTION TOPICAL at 08:28

## 2020-08-22 RX ADMIN — Medication 130 MILLIGRAM(S): at 17:55

## 2020-08-22 RX ADMIN — Medication 130 MILLIGRAM(S): at 11:00

## 2020-08-22 RX ADMIN — POTASSIUM PHOSPHATE, MONOBASIC POTASSIUM PHOSPHATE, DIBASIC 62.5 MILLIMOLE(S): 236; 224 INJECTION, SOLUTION INTRAVENOUS at 06:39

## 2020-08-22 RX ADMIN — SODIUM CHLORIDE 75 MILLILITER(S): 9 INJECTION, SOLUTION INTRAVENOUS at 05:35

## 2020-08-22 RX ADMIN — HYDROMORPHONE HYDROCHLORIDE 0.5 MILLIGRAM(S): 2 INJECTION INTRAMUSCULAR; INTRAVENOUS; SUBCUTANEOUS at 08:15

## 2020-08-22 RX ADMIN — SODIUM CHLORIDE 75 MILLILITER(S): 9 INJECTION, SOLUTION INTRAVENOUS at 08:27

## 2020-08-22 RX ADMIN — Medication 40 MILLIGRAM(S): at 11:41

## 2020-08-22 RX ADMIN — HYDROMORPHONE HYDROCHLORIDE 0.5 MILLIGRAM(S): 2 INJECTION INTRAMUSCULAR; INTRAVENOUS; SUBCUTANEOUS at 23:42

## 2020-08-22 RX ADMIN — HYDROMORPHONE HYDROCHLORIDE 0.5 MILLIGRAM(S): 2 INJECTION INTRAMUSCULAR; INTRAVENOUS; SUBCUTANEOUS at 23:27

## 2020-08-22 RX ADMIN — Medication 50 GRAM(S): at 05:41

## 2020-08-22 RX ADMIN — PIPERACILLIN AND TAZOBACTAM 25 GRAM(S): 4; .5 INJECTION, POWDER, LYOPHILIZED, FOR SOLUTION INTRAVENOUS at 14:29

## 2020-08-22 RX ADMIN — Medication 65 MILLIGRAM(S): at 19:55

## 2020-08-22 RX ADMIN — PIPERACILLIN AND TAZOBACTAM 25 GRAM(S): 4; .5 INJECTION, POWDER, LYOPHILIZED, FOR SOLUTION INTRAVENOUS at 05:35

## 2020-08-22 RX ADMIN — HYDROMORPHONE HYDROCHLORIDE 0.5 MILLIGRAM(S): 2 INJECTION INTRAMUSCULAR; INTRAVENOUS; SUBCUTANEOUS at 16:10

## 2020-08-22 RX ADMIN — SIMETHICONE 80 MILLIGRAM(S): 80 TABLET, CHEWABLE ORAL at 21:15

## 2020-08-22 RX ADMIN — HYDROMORPHONE HYDROCHLORIDE 0.5 MILLIGRAM(S): 2 INJECTION INTRAMUSCULAR; INTRAVENOUS; SUBCUTANEOUS at 17:10

## 2020-08-22 RX ADMIN — SODIUM CHLORIDE 75 MILLILITER(S): 9 INJECTION, SOLUTION INTRAVENOUS at 20:03

## 2020-08-22 RX ADMIN — Medication 2 MILLIGRAM(S): at 10:44

## 2020-08-22 RX ADMIN — ONDANSETRON 4 MILLIGRAM(S): 8 TABLET, FILM COATED ORAL at 14:25

## 2020-08-22 RX ADMIN — QUETIAPINE FUMARATE 25 MILLIGRAM(S): 200 TABLET, FILM COATED ORAL at 21:15

## 2020-08-22 RX ADMIN — PIPERACILLIN AND TAZOBACTAM 25 GRAM(S): 4; .5 INJECTION, POWDER, LYOPHILIZED, FOR SOLUTION INTRAVENOUS at 21:25

## 2020-08-22 RX ADMIN — HYDROMORPHONE HYDROCHLORIDE 0.5 MILLIGRAM(S): 2 INJECTION INTRAMUSCULAR; INTRAVENOUS; SUBCUTANEOUS at 03:13

## 2020-08-22 RX ADMIN — HYDROMORPHONE HYDROCHLORIDE 0.5 MILLIGRAM(S): 2 INJECTION INTRAMUSCULAR; INTRAVENOUS; SUBCUTANEOUS at 08:30

## 2020-08-22 RX ADMIN — Medication 130 MILLIGRAM(S): at 23:27

## 2020-08-22 RX ADMIN — Medication 3 MILLIGRAM(S): at 21:25

## 2020-08-22 NOTE — PROGRESS NOTE ADULT - ASSESSMENT
44M PMH alcohol abuse, hx of alcohol withdrawal, alcohol pancreatitis presents for abdominal pain. Admitted with necrotizing pancreatitis, sepsis, septic shock with course complicated by DTs requiring sedation and small bowel obstruction conservatively managed with NGT and NPO.  Now with worsening delirium (DTs) off precedex drip, development of PNA, fevers, hypoxia requiring O2 supplementation.       1. NEURO  - cont phenobarbital, re-initiate precedex drip if agitation uncontrolled  - s/p breakthrough ativan and phenobarbital for agitation  - MVI/thiamine/folate    2. PULM  - development of PNA  - remains on zosyn, if fevers persist will broaden antibx  - O2 supplementation, RA sat in the 80s    3. CV  - has been out of shock state, weaned off pressors   - cont to monitor BP    4. GI  - tolerated clears, pt yesterday insisted on advancement of diet  - cont on clears, will place back on NPO if abdominal pain persists  - lipase trending down and normalized  - having BMs  - SBO resolved  - appreciate surgical follow up    5. GEN  - DVT prophylaxis    Critical care Time: 35 min

## 2020-08-22 NOTE — PROGRESS NOTE ADULT - SUBJECTIVE AND OBJECTIVE BOX
Patient seen and examined at bedside in ICU.  Tmax in 24 hrs: 101.2.  Pt reports one episode of vomiting overnight.  Nausea somewhat relieved with IV meds.   Pt still reports moderate sharp abdominal pain, although he says it seems to be slightly improved from yesterday.   Reports passing flatus and diarrhea overnight.     Vital Signs Last 24 Hrs  T(F): 99.7 (08-22-20 @ 07:00), Max: 101.2 (08-21-20 @ 11:30)  HR: 109 (08-22-20 @ 09:00)  BP: 141/89 (08-22-20 @ 09:00)  RR: 25 (08-22-20 @ 09:00)  SpO2: 96% (08-22-20 @ 09:00)    POCT Blood Glucose.: 101 mg/dL (21 Aug 2020 05:01)    GENERAL: Alert, NAD  CHEST/LUNG: Respirations nonlabored  HEART: Regular rate and rhythm   ABDOMEN: +Bowel sounds, soft, moderately distended, +ttp of epigastric region with voluntary guarding  EXTREMITIES:  no calf tenderness, No edema    I&O's Detail    21 Aug 2020 07:01  -  22 Aug 2020 07:00  --------------------------------------------------------  IN:    dextrose 5% + lactated ringers.: 1650 mL    IV PiggyBack: 850 mL    Oral Fluid: 250 mL  Total IN: 2750 mL    OUT:    Indwelling Catheter - Urethral: 575 mL    Voided: 4150 mL  Total OUT: 4725 mL    Total NET: -1975 mL      22 Aug 2020 07:01  -  22 Aug 2020 11:12  --------------------------------------------------------  IN:    dextrose 5% + lactated ringers.: 150 mL    IV PiggyBack: 250 mL    Oral Fluid: 320 mL  Total IN: 720 mL    OUT:    Voided: 300 mL  Total OUT: 300 mL    Total NET: 420 mL        LABS:                        11.0   9.60  )-----------( 74       ( 22 Aug 2020 04:48 )             31.9     08-22    133<L>  |  101  |  8   ----------------------------<  102<H>  3.0<L>   |  27  |  0.55    Ca    6.8<L>      22 Aug 2020 04:48  Phos  1.2     08-22  Mg     1.5     08-22    TPro  5.5<L>  /  Alb  1.7<L>  /  TBili  3.1<H>  /  DBili  x   /  AST  122<H>  /  ALT  64  /  AlkPhos  140<H>  08-22    < from: CT Abdomen and Pelvis w/ Oral Cont and w/ IV Cont (08.19.20 @ 12:15) >  EXAM:  CT ABDOMEN AND PELVIS OC IC                            PROCEDURE DATE:  08/19/2020          INTERPRETATION:  CLINICAL INDICATION: 44 years  Male with Abdominal distension. Pancreatitis.    COMPARISON: 8/17/2020    PROCEDURE:  CT of the Abdomen and Pelvis was performed with intravenous contrast.  Intravenous contrast: 86 ml Omnipaque 350. 14 ml discarded.  Oral contrast: positive contrast was administered.  Sagittal and coronal reformats were performed.    FINDINGS:  LOWER CHEST: New moderate bilateral pleural effusions with underlying compressive atelectasis of the lower lobes. Left effusion greater than right.    LIVER: Hepatic steatosis. The portal veins and hepatic veins are patent.  BILE DUCTS: Normal caliber.  GALLBLADDER: Mildly distended measuring 8.4 cm sagittal by 4.6 cm in diameter.  SPLEEN: Within normal limits.  PANCREAS: Edematous enlarged pancreas reidentified. Heterogeneous enhancement in the pancreatic body suggestive of pancreatic necrosis.  ADRENALS: Within normal limits.  KIDNEYS/URETERS: Bilateral perinephric inflammatory stranding.    BLADDER: Collapsed around a Sanchez catheter.  REPRODUCTIVE ORGANS: Unremarkable prostate.    BOWEL: Increasing small bowel distention. Jejunal loops in the pelvis now measureup to 3.5 cm. Transition zone in the right lower quadrant at a loop of thickened jejunum (2:99). Retained fecal matter in the ascending and proximal transverse colon. Appendix is not visualized and cannot be assessed.  PERITONEUM: Increasing ascites,now perihepatic and perisplenic tracking down the paracolic gutters into the pelvis..  VESSELS: Within normal limits. Visualized portions of the SMA, SMV and celiac axis are patent.  RETROPERITONEUM/LYMPH NODES: No lymphadenopathy.  ABDOMINAL WALL: Within normal limits.  BONES: Within normal limits.    IMPRESSION:  Increasing small bowel distention with transition point in the right lower quadrant which may represent a mechanical obstruction or ileus related to the pancreatitis.    Increasing ascites.    Mildly distended gallbladder.    Increasing bilateral pleural effusions with underlying compressive atelectasis of the lower lobes.    Findings were discussed with Dr. RODY GANNON 8/19/2020 1:14 PM by Dr. Curry with read back confirmation.      < end of copied text >    Impression: 44M w/ EtOH abuse and withdrawal, history of pancreatitis a/w pancreatitis with possible early necrosis of pancreas. Repeat imaging 8/19 shows distended bowel/gallbladder and suggests pancreatitic necrosis.     Plan  -No acute surgical intervention.   -Advance diet as tolerated.   -Continue ICU management and supportive measures.  -Aggressive IVF resuscitation, monitor intake/output.  -F/u labs.   -Sanchez catheter, Strict I/Os  -Pain control PRN, antiemetic PRN  -Serial abd exams  -Withdrawal monitoring per primary team.   -Will discuss with attending. Patient seen and examined at bedside in ICU.  Tmax in 24 hrs: 101.2.  Pt reports one episode of vomiting overnight.  Nausea somewhat relieved with IV meds.   Pt still reports moderate sharp abdominal pain, although he says it seems to be slightly improved from yesterday.   Reports passing flatus and diarrhea overnight.     Vital Signs Last 24 Hrs  T(F): 99.7 (08-22-20 @ 07:00), Max: 101.2 (08-21-20 @ 11:30)  HR: 109 (08-22-20 @ 09:00)  BP: 141/89 (08-22-20 @ 09:00)  RR: 25 (08-22-20 @ 09:00)  SpO2: 96% (08-22-20 @ 09:00)    POCT Blood Glucose.: 101 mg/dL (21 Aug 2020 05:01)    GENERAL: Alert, NAD  CHEST/LUNG: Respirations nonlabored  HEART: Regular rate and rhythm   ABDOMEN: +Bowel sounds, soft, moderately distended, +ttp of epigastric region with voluntary guarding  EXTREMITIES:  no calf tenderness, No edema    I&O's Detail    21 Aug 2020 07:01  -  22 Aug 2020 07:00  --------------------------------------------------------  IN:    dextrose 5% + lactated ringers.: 1650 mL    IV PiggyBack: 850 mL    Oral Fluid: 250 mL  Total IN: 2750 mL    OUT:    Indwelling Catheter - Urethral: 575 mL    Voided: 4150 mL  Total OUT: 4725 mL    Total NET: -1975 mL      22 Aug 2020 07:01  -  22 Aug 2020 11:12  --------------------------------------------------------  IN:    dextrose 5% + lactated ringers.: 150 mL    IV PiggyBack: 250 mL    Oral Fluid: 320 mL  Total IN: 720 mL    OUT:    Voided: 300 mL  Total OUT: 300 mL    Total NET: 420 mL        LABS:                        11.0   9.60  )-----------( 74       ( 22 Aug 2020 04:48 )             31.9     08-22    133<L>  |  101  |  8   ----------------------------<  102<H>  3.0<L>   |  27  |  0.55    Ca    6.8<L>      22 Aug 2020 04:48  Phos  1.2     08-22  Mg     1.5     08-22    TPro  5.5<L>  /  Alb  1.7<L>  /  TBili  3.1<H>  /  DBili  x   /  AST  122<H>  /  ALT  64  /  AlkPhos  140<H>  08-22    < from: CT Abdomen and Pelvis w/ Oral Cont and w/ IV Cont (08.19.20 @ 12:15) >  EXAM:  CT ABDOMEN AND PELVIS OC IC                            PROCEDURE DATE:  08/19/2020          INTERPRETATION:  CLINICAL INDICATION: 44 years  Male with Abdominal distension. Pancreatitis.    COMPARISON: 8/17/2020    PROCEDURE:  CT of the Abdomen and Pelvis was performed with intravenous contrast.  Intravenous contrast: 86 ml Omnipaque 350. 14 ml discarded.  Oral contrast: positive contrast was administered.  Sagittal and coronal reformats were performed.    FINDINGS:  LOWER CHEST: New moderate bilateral pleural effusions with underlying compressive atelectasis of the lower lobes. Left effusion greater than right.    LIVER: Hepatic steatosis. The portal veins and hepatic veins are patent.  BILE DUCTS: Normal caliber.  GALLBLADDER: Mildly distended measuring 8.4 cm sagittal by 4.6 cm in diameter.  SPLEEN: Within normal limits.  PANCREAS: Edematous enlarged pancreas reidentified. Heterogeneous enhancement in the pancreatic body suggestive of pancreatic necrosis.  ADRENALS: Within normal limits.  KIDNEYS/URETERS: Bilateral perinephric inflammatory stranding.    BLADDER: Collapsed around a Sanchez catheter.  REPRODUCTIVE ORGANS: Unremarkable prostate.    BOWEL: Increasing small bowel distention. Jejunal loops in the pelvis now measureup to 3.5 cm. Transition zone in the right lower quadrant at a loop of thickened jejunum (2:99). Retained fecal matter in the ascending and proximal transverse colon. Appendix is not visualized and cannot be assessed.  PERITONEUM: Increasing ascites,now perihepatic and perisplenic tracking down the paracolic gutters into the pelvis..  VESSELS: Within normal limits. Visualized portions of the SMA, SMV and celiac axis are patent.  RETROPERITONEUM/LYMPH NODES: No lymphadenopathy.  ABDOMINAL WALL: Within normal limits.  BONES: Within normal limits.    IMPRESSION:  Increasing small bowel distention with transition point in the right lower quadrant which may represent a mechanical obstruction or ileus related to the pancreatitis.    Increasing ascites.    Mildly distended gallbladder.    Increasing bilateral pleural effusions with underlying compressive atelectasis of the lower lobes.    Findings were discussed with Dr. RODY GANNON 8/19/2020 1:14 PM by Dr. Curry with read back confirmation.      < end of copied text >    Impression: 44M w/ EtOH abuse and withdrawal, history of pancreatitis a/w pancreatitis with possible early necrosis of pancreas. Repeat imaging 8/19 shows distended bowel/gallbladder and suggests pancreatitic necrosis. +Gas, BM.     Plan  -No acute surgical intervention.   -Advance diet as tolerated.   -Continue ICU management and supportive measures.  -Aggressive IVF resuscitation, monitor intake/output.  -F/u labs.   -Sanchez catheter, Strict I/Os  -Pain control PRN, antiemetic PRN  -Serial abd exams  -Withdrawal monitoring per primary team.   -Will discuss with attending.

## 2020-08-22 NOTE — PROGRESS NOTE ADULT - SUBJECTIVE AND OBJECTIVE BOX
24 hr events:  agitated, restless, today  talking to self and people who are not in room  febrile 101.2    ## ROS:  [ ] unable to obtain  CONSTITUTIONAL: No fever, weight loss, or fatigue  EYES: No eye pain, visual disturbances, or discharge  ENMT:  No difficulty hearing, tinnitus, vertigo; No sinus or throat pain  NECK: No pain or stiffness  RESPIRATORY: No cough, wheezing, chills or hemoptysis; No shortness of breath  CARDIOVASCULAR: No chest pain, palpitations, dizziness, or leg swelling  GASTROINTESTINAL: No abdominal or epigastric pain. No nausea, vomiting, or hematemesis; No diarrhea or constipation. No melena or hematochezia.  GENITOURINARY: No dysuria, frequency, hematuria, or incontinence  NEUROLOGICAL: No headaches, memory loss, loss of strength, numbness, or tremors  SKIN: No itching, burning, rashes, or lesions   LYMPH NODES: No enlarged glands  ENDOCRINE: No heat or cold intolerance; No hair loss  MUSCULOSKELETAL: No joint pain or swelling; No muscle, back, or extremity pain  PSYCHIATRIC: No depression, anxiety, mood swings, or difficulty sleeping  HEME/LYMPH: No easy bruising, or bleeding gums  ALLERGY AND IMMUNOLOGIC: No hives or eczema    ## Labs:  CBC:                        11.0   9.60  )-----------( 74       ( 22 Aug 2020 04:48 )             31.9     Chem:      133<L>  |  101  |  8   ----------------------------<  102<H>  3.0<L>   |  27  |  0.55    Ca    6.8<L>      22 Aug 2020 04:48  Phos  1.2       Mg     1.5         TPro  5.5<L>  /  Alb  1.7<L>  /  TBili  3.1<H>  /  DBili  x   /  AST  122<H>  /  ALT  64  /  AlkPhos  140<H>      Coags:          ## Imaging:    ## Medications:  piperacillin/tazobactam IVPB.. 3.375 Gram(s) IV Intermittent every 8 hours          enoxaparin Injectable 40 milliGRAM(s) SubCutaneous daily    senna 2 Tablet(s) Oral at bedtime  simethicone 80 milliGRAM(s) Chew three times a day PRN    HYDROmorphone  Injectable 0.5 milliGRAM(s) IV Push every 4 hours PRN  melatonin 3 milliGRAM(s) Oral at bedtime  PHENobarbital Injectable 130 milliGRAM(s) IV Push every 6 hours  QUEtiapine 25 milliGRAM(s) Oral daily      ## Vitals:  T(C): 37.3 (20 @ 19:27), Max: 38.4 (20 @ 15:22)  HR: 100 (20 @ 21:00) (95 - 141)  BP: 148/88 (20 @ 21:00) (125/81 - 170/105)  BP(mean): 102 (20 @ 21:00) (87 - 123)  RR: 30 (20 @ 21:00) (15 - 46)  SpO2: 97% (20 @ 21:00) (92% - 97%)  Wt(kg): --  Vent:   AB-21 @ 07:01  -   @ 07:00  --------------------------------------------------------  IN: 2750 mL / OUT: 4725 mL / NET: -1975 mL     @ 07:01  -   @ 21:43  --------------------------------------------------------  IN: 1545 mL / OUT: 3000 mL / NET: -1455 mL          ## P/E:  Gen: lying comfortably in bed in no apparent distress  HEENT: PERRL, EOMI  Resp: CTA B/L no c/r/w  CVS: S1S2 no m/r/g  Abd: soft NT/ND +BS  Ext: no c/c/e  Neuro: A&Ox3    CENTRAL LINE: [ ] YES [ ] NO  LOCATION:   DATE INSERTED:  REMOVE: [ ] YES [ ] NO      QUINTEROS: [ ] YES [ ] NO    DATE INSERTED:  REMOVE:  [ ] YES [ ] NO      A-LINE:  [ ] YES [ ] NO  LOCATION:   DATE INSERTED:  REMOVE:  [ ] YES [ ] NO  EXPLAIN:    GLOBAL ISSUE/BEST PRACTICE:  Analgesia:  Sedation:  HOB elevation: yes  Stress ulcer prophylaxis:  VTE prophylaxis:  Oral Care:  Glycemic control:  Nutrition:    CODE STATUS: [ ] full code  [ ] DNR  [ ] DNI  [ ] MOLST  Goals of care discussion: [ ] yes 24 hr events:  agitated, restless, today  talking to self and people who are not in room  febrile 101.2  reported by staff to have been nauseous and vomiting yesterday night  + loose BM this morning    ## ROS:  [ ] unable to obtain  CONSTITUTIONAL: No fever, weight loss, or fatigue  EYES: No eye pain, visual disturbances, or discharge  ENMT:  No difficulty hearing, tinnitus, vertigo; No sinus or throat pain  NECK: No pain or stiffness  RESPIRATORY: No cough, wheezing, chills or hemoptysis; No shortness of breath  CARDIOVASCULAR: No chest pain, palpitations, dizziness, or leg swelling  GASTROINTESTINAL: No abdominal or epigastric pain. No nausea, vomiting, or hematemesis; No diarrhea or constipation. No melena or hematochezia.  GENITOURINARY: No dysuria, frequency, hematuria, or incontinence  NEUROLOGICAL: No headaches, memory loss, loss of strength, numbness, or tremors  SKIN: No itching, burning, rashes, or lesions   LYMPH NODES: No enlarged glands  ENDOCRINE: No heat or cold intolerance; No hair loss  MUSCULOSKELETAL: No joint pain or swelling; No muscle, back, or extremity pain  PSYCHIATRIC: No depression, anxiety, mood swings, or difficulty sleeping  HEME/LYMPH: No easy bruising, or bleeding gums  ALLERGY AND IMMUNOLOGIC: No hives or eczema    ## Labs:  CBC:                        11.0   9.60  )-----------( 74       ( 22 Aug 2020 04:48 )             31.9     Chem:      133<L>  |  101  |  8   ----------------------------<  102<H>  3.0<L>   |  27  |  0.55    Ca    6.8<L>      22 Aug 2020 04:48  Phos  1.2       Mg     1.5         TPro  5.5<L>  /  Alb  1.7<L>  /  TBili  3.1<H>  /  DBili  x   /  AST  122<H>  /  ALT  64  /  AlkPhos  140<H>      Coags:          ## Imaging:    ## Medications:  piperacillin/tazobactam IVPB.. 3.375 Gram(s) IV Intermittent every 8 hours          enoxaparin Injectable 40 milliGRAM(s) SubCutaneous daily    senna 2 Tablet(s) Oral at bedtime  simethicone 80 milliGRAM(s) Chew three times a day PRN    HYDROmorphone  Injectable 0.5 milliGRAM(s) IV Push every 4 hours PRN  melatonin 3 milliGRAM(s) Oral at bedtime  PHENobarbital Injectable 130 milliGRAM(s) IV Push every 6 hours  QUEtiapine 25 milliGRAM(s) Oral daily      ## Vitals:  T(C): 37.3 (20 @ 19:27), Max: 38.4 (20 @ 15:22)  HR: 100 (20 @ 21:00) (95 - 141)  BP: 148/88 (20 @ 21:00) (125/81 - 170/105)  BP(mean): 102 (20 @ 21:00) (87 - 123)  RR: 30 (20 @ 21:00) (15 - 46)  SpO2: 97% (20 @ 21:00) (92% - 97%)  Wt(kg): --  Vent:   AB-21 @ 07:01  -   @ 07:00  --------------------------------------------------------  IN: 2750 mL / OUT: 4725 mL / NET: -1975 mL     @ 07:01  -   @ 21:43  --------------------------------------------------------  IN: 1545 mL / OUT: 3000 mL / NET: -1455 mL          ## P/E:  Gen: lying comfortably in bed in no apparent distress  HEENT: PERRL, EOMI  Resp: CTA B/L no c/r/w  CVS: S1S2 no m/r/g  Abd: soft NT/ND +BS  Ext: no c/c/e  Neuro: A&Ox3    CENTRAL LINE: [ ] YES [ ] NO  LOCATION:   DATE INSERTED:  REMOVE: [ ] YES [ ] NO      QUINTEROS: [ ] YES [ ] NO    DATE INSERTED:  REMOVE:  [ ] YES [ ] NO      A-LINE:  [ ] YES [ ] NO  LOCATION:   DATE INSERTED:  REMOVE:  [ ] YES [ ] NO  EXPLAIN:    GLOBAL ISSUE/BEST PRACTICE:  Analgesia:  Sedation:  HOB elevation: yes  Stress ulcer prophylaxis:  VTE prophylaxis:  Oral Care:  Glycemic control:  Nutrition:    CODE STATUS: [ ] full code  [ ] DNR  [ ] DNI  [ ] MOLST  Goals of care discussion: [ ] yes 24 hr events:  weaned off precedex yesterday morning, remains on standing phenobarbital  now agitated, restless, today  talking to self and people who are not in room  febrile 101.2  reported by staff to have been nauseous and vomiting yesterday night  + loose BM this morning    ## ROS:  [x] limited due to underlying agitation  + fever  denies SOB  no CP  + abdominal pain  no nausea at present time      ## Labs:  CBC:                        11.0   9.60  )-----------( 74       ( 22 Aug 2020 04:48 )             31.9     Chem:  08-22    133<L>  |  101  |  8   ----------------------------<  102<H>  3.0<L>   |  27  |  0.55    Ca    6.8<L>      22 Aug 2020 04:48  Phos  1.2     08-22  Mg     1.5     08-22    TPro  5.5<L>  /  Alb  1.7<L>  /  TBili  3.1<H>  /  DBili  x   /  AST  122<H>  /  ALT  64  /  AlkPhos  140<H>  08-22    Lipase, Serum in AM (08.22.20 @ 04:48)    Lipase, Serum: 440 U/L        ## Imaging:  CXR < from: Xray Chest 1 View- PORTABLE-Urgent (08.21.20 @ 15:51) >  The lungs show new bilateral multifocal airspace disease in the perihilar lung base. LEFT diaphragm contour indistinct consistent with LEFT lower lobe airspace consolidation and/or effusion.  NG tube tip beyond GE junction.  The heart and mediastinum are within normal limits.    Visualized osseous structures are intact.        ## Medications:  piperacillin/tazobactam IVPB.. 3.375 Gram(s) IV Intermittent every 8 hours          enoxaparin Injectable 40 milliGRAM(s) SubCutaneous daily    senna 2 Tablet(s) Oral at bedtime  simethicone 80 milliGRAM(s) Chew three times a day PRN    HYDROmorphone  Injectable 0.5 milliGRAM(s) IV Push every 4 hours PRN  melatonin 3 milliGRAM(s) Oral at bedtime  PHENobarbital Injectable 130 milliGRAM(s) IV Push every 6 hours  QUEtiapine 25 milliGRAM(s) Oral daily      ## Vitals:  T(C): 37.3 (08-22-20 @ 19:27), Max: 38.4 (08-22-20 @ 15:22)  HR: 100 (08-22-20 @ 21:00) (95 - 141)  BP: 148/88 (08-22-20 @ 21:00) (125/81 - 170/105)  BP(mean): 102 (08-22-20 @ 21:00) (87 - 123)  RR: 30 (08-22-20 @ 21:00) (15 - 46)  SpO2: 97% (08-22-20 @ 21:00) (92% - 97%)        08-21 @ 07:01  -  08-22 @ 07:00  --------------------------------------------------------  IN: 2750 mL / OUT: 4725 mL / NET: -1975 mL    08-22 @ 07:01  -  08-22 @ 21:43  --------------------------------------------------------  IN: 1545 mL / OUT: 3000 mL / NET: -1455 mL          ## P/E:  Gen: restless  HEENT: PERRL, EOMI  Resp: scattered rhonchi bilaterally , no wheeze  CVS: mild tachycardia  Abd: soft ND +BS, some tenderness to palpation epigastric and bilateral upper quadrants, no guarding  Ext: no c/c/e  Neuro: awake, restless, follows commands but irritable, moving/flailing all extremities    CENTRAL LINE: [ ] YES [x] NO  LOCATION:   DATE INSERTED:  REMOVE: [ ] YES [ ] NO      QUINTEROS: [ ] YES [x] NO    DATE INSERTED:  REMOVE:  [ ] YES [ ] NO      A-LINE:  [ ] YES [x] NO  LOCATION:   DATE INSERTED:  REMOVE:  [ ] YES [ ] NO  EXPLAIN:    GLOBAL ISSUE/BEST PRACTICE:  Analgesia: dilaudid prn  Sedation: n/a  HOB elevation: yes  Stress ulcer prophylaxis: n/a  VTE prophylaxis: lovenox  Oral Care: n/a  Glycemic control: n/a  Nutrition: po diet    CODE STATUS: [x] full code  [ ] DNR  [ ] DNI  [ ] MOLST  Goals of care discussion: [ ] yes

## 2020-08-23 LAB
ALBUMIN SERPL ELPH-MCNC: 1.7 G/DL — LOW (ref 3.3–5)
ALP SERPL-CCNC: 164 U/L — HIGH (ref 40–120)
ALT FLD-CCNC: 83 U/L — HIGH (ref 12–78)
ANION GAP SERPL CALC-SCNC: 7 MMOL/L — SIGNIFICANT CHANGE UP (ref 5–17)
ANION GAP SERPL CALC-SCNC: 7 MMOL/L — SIGNIFICANT CHANGE UP (ref 5–17)
AST SERPL-CCNC: 144 U/L — HIGH (ref 15–37)
BASE EXCESS BLDA CALC-SCNC: 2.8 MMOL/L — HIGH (ref -2–2)
BASOPHILS # BLD AUTO: 0.03 K/UL — SIGNIFICANT CHANGE UP (ref 0–0.2)
BASOPHILS NFR BLD AUTO: 0.2 % — SIGNIFICANT CHANGE UP (ref 0–2)
BILIRUB SERPL-MCNC: 2.8 MG/DL — HIGH (ref 0.2–1.2)
BLOOD GAS COMMENTS: SIGNIFICANT CHANGE UP
BLOOD GAS COMMENTS: SIGNIFICANT CHANGE UP
BLOOD GAS SOURCE: SIGNIFICANT CHANGE UP
BUN SERPL-MCNC: 10 MG/DL — SIGNIFICANT CHANGE UP (ref 7–23)
BUN SERPL-MCNC: 12 MG/DL — SIGNIFICANT CHANGE UP (ref 7–23)
CALCIUM SERPL-MCNC: 7.4 MG/DL — LOW (ref 8.5–10.1)
CALCIUM SERPL-MCNC: 7.6 MG/DL — LOW (ref 8.5–10.1)
CHLORIDE SERPL-SCNC: 103 MMOL/L — SIGNIFICANT CHANGE UP (ref 96–108)
CHLORIDE SERPL-SCNC: 99 MMOL/L — SIGNIFICANT CHANGE UP (ref 96–108)
CO2 SERPL-SCNC: 28 MMOL/L — SIGNIFICANT CHANGE UP (ref 22–31)
CO2 SERPL-SCNC: 31 MMOL/L — SIGNIFICANT CHANGE UP (ref 22–31)
CREAT SERPL-MCNC: 0.52 MG/DL — SIGNIFICANT CHANGE UP (ref 0.5–1.3)
CREAT SERPL-MCNC: 0.54 MG/DL — SIGNIFICANT CHANGE UP (ref 0.5–1.3)
CULTURE RESULTS: SIGNIFICANT CHANGE UP
CULTURE RESULTS: SIGNIFICANT CHANGE UP
EOSINOPHIL # BLD AUTO: 0.09 K/UL — SIGNIFICANT CHANGE UP (ref 0–0.5)
EOSINOPHIL NFR BLD AUTO: 0.7 % — SIGNIFICANT CHANGE UP (ref 0–6)
FIBRINOGEN AG PPP IA-MCNC: 391 MG/DL — HIGH
GLUCOSE SERPL-MCNC: 101 MG/DL — HIGH (ref 70–99)
GLUCOSE SERPL-MCNC: 113 MG/DL — HIGH (ref 70–99)
HCO3 BLDA-SCNC: 26 MMOL/L — SIGNIFICANT CHANGE UP (ref 21–29)
HCT VFR BLD CALC: 29.1 % — LOW (ref 39–50)
HGB BLD-MCNC: 10.4 G/DL — LOW (ref 13–17)
HOROWITZ INDEX BLDA+IHG-RTO: 60 — SIGNIFICANT CHANGE UP
IMM GRANULOCYTES NFR BLD AUTO: 0.9 % — SIGNIFICANT CHANGE UP (ref 0–1.5)
LIDOCAIN IGE QN: 276 U/L — SIGNIFICANT CHANGE UP (ref 73–393)
LYMPHOCYTES # BLD AUTO: 0.53 K/UL — LOW (ref 1–3.3)
LYMPHOCYTES # BLD AUTO: 3.9 % — LOW (ref 13–44)
MAGNESIUM SERPL-MCNC: 1.3 MG/DL — LOW (ref 1.6–2.6)
MAGNESIUM SERPL-MCNC: 1.8 MG/DL — SIGNIFICANT CHANGE UP (ref 1.6–2.6)
MCHC RBC-ENTMCNC: 33.2 PG — SIGNIFICANT CHANGE UP (ref 27–34)
MCHC RBC-ENTMCNC: 35.7 GM/DL — SIGNIFICANT CHANGE UP (ref 32–36)
MCV RBC AUTO: 93 FL — SIGNIFICANT CHANGE UP (ref 80–100)
MONOCYTES # BLD AUTO: 1.08 K/UL — HIGH (ref 0–0.9)
MONOCYTES NFR BLD AUTO: 7.9 % — SIGNIFICANT CHANGE UP (ref 2–14)
NEUTROPHILS # BLD AUTO: 11.78 K/UL — HIGH (ref 1.8–7.4)
NEUTROPHILS NFR BLD AUTO: 86.4 % — HIGH (ref 43–77)
NRBC # BLD: 0 /100 WBCS — SIGNIFICANT CHANGE UP (ref 0–0)
PCO2 BLDA: 34 MMHG — SIGNIFICANT CHANGE UP (ref 32–46)
PH BLD: 7.49 — HIGH (ref 7.35–7.45)
PHOSPHATE SERPL-MCNC: 1.9 MG/DL — LOW (ref 2.5–4.5)
PHOSPHATE SERPL-MCNC: 3.6 MG/DL — SIGNIFICANT CHANGE UP (ref 2.5–4.5)
PLATELET # BLD AUTO: 103 K/UL — LOW (ref 150–400)
PO2 BLDA: 176 MMHG — HIGH (ref 74–108)
POTASSIUM SERPL-MCNC: 2.9 MMOL/L — CRITICAL LOW (ref 3.5–5.3)
POTASSIUM SERPL-MCNC: 3.4 MMOL/L — LOW (ref 3.5–5.3)
POTASSIUM SERPL-SCNC: 2.9 MMOL/L — CRITICAL LOW (ref 3.5–5.3)
POTASSIUM SERPL-SCNC: 3.4 MMOL/L — LOW (ref 3.5–5.3)
PROT SERPL-MCNC: 5.4 GM/DL — LOW (ref 6–8.3)
RBC # BLD: 3.13 M/UL — LOW (ref 4.2–5.8)
RBC # FLD: 12.8 % — SIGNIFICANT CHANGE UP (ref 10.3–14.5)
SAO2 % BLDA: 100 % — HIGH (ref 92–96)
SODIUM SERPL-SCNC: 137 MMOL/L — SIGNIFICANT CHANGE UP (ref 135–145)
SODIUM SERPL-SCNC: 138 MMOL/L — SIGNIFICANT CHANGE UP (ref 135–145)
SPECIMEN SOURCE: SIGNIFICANT CHANGE UP
SPECIMEN SOURCE: SIGNIFICANT CHANGE UP
WBC # BLD: 13.63 K/UL — HIGH (ref 3.8–10.5)
WBC # FLD AUTO: 13.63 K/UL — HIGH (ref 3.8–10.5)

## 2020-08-23 PROCEDURE — 99232 SBSQ HOSP IP/OBS MODERATE 35: CPT

## 2020-08-23 PROCEDURE — 99291 CRITICAL CARE FIRST HOUR: CPT | Mod: 25

## 2020-08-23 PROCEDURE — 71045 X-RAY EXAM CHEST 1 VIEW: CPT | Mod: 26,76

## 2020-08-23 PROCEDURE — 31500 INSERT EMERGENCY AIRWAY: CPT

## 2020-08-23 RX ORDER — PROPOFOL 10 MG/ML
20 INJECTION, EMULSION INTRAVENOUS ONCE
Refills: 0 | Status: COMPLETED | OUTPATIENT
Start: 2020-08-23 | End: 2020-08-23

## 2020-08-23 RX ORDER — PANTOPRAZOLE SODIUM 20 MG/1
40 TABLET, DELAYED RELEASE ORAL DAILY
Refills: 0 | Status: DISCONTINUED | OUTPATIENT
Start: 2020-08-23 | End: 2020-09-10

## 2020-08-23 RX ORDER — MIDAZOLAM HYDROCHLORIDE 1 MG/ML
2 INJECTION, SOLUTION INTRAMUSCULAR; INTRAVENOUS ONCE
Refills: 0 | Status: DISCONTINUED | OUTPATIENT
Start: 2020-08-23 | End: 2020-08-23

## 2020-08-23 RX ORDER — PHENOBARBITAL 60 MG
260 TABLET ORAL ONCE
Refills: 0 | Status: DISCONTINUED | OUTPATIENT
Start: 2020-08-23 | End: 2020-08-23

## 2020-08-23 RX ORDER — POTASSIUM CHLORIDE 20 MEQ
10 PACKET (EA) ORAL
Refills: 0 | Status: COMPLETED | OUTPATIENT
Start: 2020-08-23 | End: 2020-08-23

## 2020-08-23 RX ORDER — HALOPERIDOL DECANOATE 100 MG/ML
5 INJECTION INTRAMUSCULAR ONCE
Refills: 0 | Status: COMPLETED | OUTPATIENT
Start: 2020-08-23 | End: 2020-08-23

## 2020-08-23 RX ORDER — MAGNESIUM SULFATE 500 MG/ML
1 VIAL (ML) INJECTION ONCE
Refills: 0 | Status: COMPLETED | OUTPATIENT
Start: 2020-08-23 | End: 2020-08-23

## 2020-08-23 RX ORDER — MIDAZOLAM HYDROCHLORIDE 1 MG/ML
4 INJECTION, SOLUTION INTRAMUSCULAR; INTRAVENOUS ONCE
Refills: 0 | Status: DISCONTINUED | OUTPATIENT
Start: 2020-08-23 | End: 2020-08-23

## 2020-08-23 RX ORDER — MAGNESIUM SULFATE 500 MG/ML
2 VIAL (ML) INJECTION ONCE
Refills: 0 | Status: COMPLETED | OUTPATIENT
Start: 2020-08-23 | End: 2020-08-23

## 2020-08-23 RX ORDER — PROPOFOL 10 MG/ML
10 INJECTION, EMULSION INTRAVENOUS
Qty: 1000 | Refills: 0 | Status: DISCONTINUED | OUTPATIENT
Start: 2020-08-23 | End: 2020-08-29

## 2020-08-23 RX ORDER — POTASSIUM PHOSPHATE, MONOBASIC POTASSIUM PHOSPHATE, DIBASIC 236; 224 MG/ML; MG/ML
30 INJECTION, SOLUTION INTRAVENOUS ONCE
Refills: 0 | Status: COMPLETED | OUTPATIENT
Start: 2020-08-23 | End: 2020-08-23

## 2020-08-23 RX ORDER — PHENOBARBITAL 60 MG
65 TABLET ORAL ONCE
Refills: 0 | Status: DISCONTINUED | OUTPATIENT
Start: 2020-08-23 | End: 2020-08-23

## 2020-08-23 RX ORDER — CHLORHEXIDINE GLUCONATE 213 G/1000ML
15 SOLUTION TOPICAL EVERY 12 HOURS
Refills: 0 | Status: DISCONTINUED | OUTPATIENT
Start: 2020-08-23 | End: 2020-09-10

## 2020-08-23 RX ORDER — DEXMEDETOMIDINE HYDROCHLORIDE IN 0.9% SODIUM CHLORIDE 4 UG/ML
0.3 INJECTION INTRAVENOUS
Qty: 200 | Refills: 0 | Status: DISCONTINUED | OUTPATIENT
Start: 2020-08-23 | End: 2020-09-02

## 2020-08-23 RX ORDER — MEROPENEM 1 G/30ML
1000 INJECTION INTRAVENOUS EVERY 8 HOURS
Refills: 0 | Status: DISCONTINUED | OUTPATIENT
Start: 2020-08-23 | End: 2020-09-02

## 2020-08-23 RX ORDER — MIDAZOLAM HYDROCHLORIDE 1 MG/ML
2 INJECTION, SOLUTION INTRAMUSCULAR; INTRAVENOUS
Refills: 0 | Status: DISCONTINUED | OUTPATIENT
Start: 2020-08-23 | End: 2020-08-23

## 2020-08-23 RX ADMIN — HYDROMORPHONE HYDROCHLORIDE 0.5 MILLIGRAM(S): 2 INJECTION INTRAMUSCULAR; INTRAVENOUS; SUBCUTANEOUS at 09:44

## 2020-08-23 RX ADMIN — DEXMEDETOMIDINE HYDROCHLORIDE IN 0.9% SODIUM CHLORIDE 5.36 MICROGRAM(S)/KG/HR: 4 INJECTION INTRAVENOUS at 13:18

## 2020-08-23 RX ADMIN — DEXMEDETOMIDINE HYDROCHLORIDE IN 0.9% SODIUM CHLORIDE 5.36 MICROGRAM(S)/KG/HR: 4 INJECTION INTRAVENOUS at 14:55

## 2020-08-23 RX ADMIN — DEXMEDETOMIDINE HYDROCHLORIDE IN 0.9% SODIUM CHLORIDE 5.36 MICROGRAM(S)/KG/HR: 4 INJECTION INTRAVENOUS at 11:30

## 2020-08-23 RX ADMIN — PIPERACILLIN AND TAZOBACTAM 25 GRAM(S): 4; .5 INJECTION, POWDER, LYOPHILIZED, FOR SOLUTION INTRAVENOUS at 13:17

## 2020-08-23 RX ADMIN — Medication 100 MILLIEQUIVALENT(S): at 09:02

## 2020-08-23 RX ADMIN — DEXMEDETOMIDINE HYDROCHLORIDE IN 0.9% SODIUM CHLORIDE 5.36 MICROGRAM(S)/KG/HR: 4 INJECTION INTRAVENOUS at 17:16

## 2020-08-23 RX ADMIN — HALOPERIDOL DECANOATE 5 MILLIGRAM(S): 100 INJECTION INTRAMUSCULAR at 01:12

## 2020-08-23 RX ADMIN — Medication 3 MILLIGRAM(S): at 22:59

## 2020-08-23 RX ADMIN — Medication 100 MILLIEQUIVALENT(S): at 07:55

## 2020-08-23 RX ADMIN — Medication 100 MILLIEQUIVALENT(S): at 19:40

## 2020-08-23 RX ADMIN — CHLORHEXIDINE GLUCONATE 1 APPLICATION(S): 213 SOLUTION TOPICAL at 08:11

## 2020-08-23 RX ADMIN — Medication 260 MILLIGRAM(S): at 07:53

## 2020-08-23 RX ADMIN — Medication 100 GRAM(S): at 17:16

## 2020-08-23 RX ADMIN — PROPOFOL 20 MILLIGRAM(S): 10 INJECTION, EMULSION INTRAVENOUS at 06:34

## 2020-08-23 RX ADMIN — PROPOFOL 4.29 MICROGRAM(S)/KG/MIN: 10 INJECTION, EMULSION INTRAVENOUS at 20:46

## 2020-08-23 RX ADMIN — DEXMEDETOMIDINE HYDROCHLORIDE IN 0.9% SODIUM CHLORIDE 5.36 MICROGRAM(S)/KG/HR: 4 INJECTION INTRAVENOUS at 04:32

## 2020-08-23 RX ADMIN — Medication 65 MILLIGRAM(S): at 00:45

## 2020-08-23 RX ADMIN — PROPOFOL 4.29 MICROGRAM(S)/KG/MIN: 10 INJECTION, EMULSION INTRAVENOUS at 23:01

## 2020-08-23 RX ADMIN — Medication 100 MILLIEQUIVALENT(S): at 18:19

## 2020-08-23 RX ADMIN — Medication 100 MILLIEQUIVALENT(S): at 17:23

## 2020-08-23 RX ADMIN — Medication 50 GRAM(S): at 06:33

## 2020-08-23 RX ADMIN — DEXMEDETOMIDINE HYDROCHLORIDE IN 0.9% SODIUM CHLORIDE 5.36 MICROGRAM(S)/KG/HR: 4 INJECTION INTRAVENOUS at 09:39

## 2020-08-23 RX ADMIN — POTASSIUM PHOSPHATE, MONOBASIC POTASSIUM PHOSPHATE, DIBASIC 83.33 MILLIMOLE(S): 236; 224 INJECTION, SOLUTION INTRAVENOUS at 08:08

## 2020-08-23 RX ADMIN — Medication 130 MILLIGRAM(S): at 17:09

## 2020-08-23 RX ADMIN — CHLORHEXIDINE GLUCONATE 15 MILLILITER(S): 213 SOLUTION TOPICAL at 22:58

## 2020-08-23 RX ADMIN — DEXMEDETOMIDINE HYDROCHLORIDE IN 0.9% SODIUM CHLORIDE 5.36 MICROGRAM(S)/KG/HR: 4 INJECTION INTRAVENOUS at 18:20

## 2020-08-23 RX ADMIN — Medication 100 MILLIEQUIVALENT(S): at 06:34

## 2020-08-23 RX ADMIN — SENNA PLUS 2 TABLET(S): 8.6 TABLET ORAL at 22:59

## 2020-08-23 RX ADMIN — SODIUM CHLORIDE 75 MILLILITER(S): 9 INJECTION, SOLUTION INTRAVENOUS at 11:31

## 2020-08-23 RX ADMIN — MIDAZOLAM HYDROCHLORIDE 2 MILLIGRAM(S): 1 INJECTION, SOLUTION INTRAMUSCULAR; INTRAVENOUS at 20:04

## 2020-08-23 RX ADMIN — PANTOPRAZOLE SODIUM 40 MILLIGRAM(S): 20 TABLET, DELAYED RELEASE ORAL at 22:58

## 2020-08-23 RX ADMIN — MIDAZOLAM HYDROCHLORIDE 2 MILLIGRAM(S): 1 INJECTION, SOLUTION INTRAMUSCULAR; INTRAVENOUS at 13:55

## 2020-08-23 RX ADMIN — HYDROMORPHONE HYDROCHLORIDE 0.5 MILLIGRAM(S): 2 INJECTION INTRAMUSCULAR; INTRAVENOUS; SUBCUTANEOUS at 09:27

## 2020-08-23 RX ADMIN — DEXMEDETOMIDINE HYDROCHLORIDE IN 0.9% SODIUM CHLORIDE 5.36 MICROGRAM(S)/KG/HR: 4 INJECTION INTRAVENOUS at 19:59

## 2020-08-23 RX ADMIN — ENOXAPARIN SODIUM 40 MILLIGRAM(S): 100 INJECTION SUBCUTANEOUS at 11:42

## 2020-08-23 RX ADMIN — DEXMEDETOMIDINE HYDROCHLORIDE IN 0.9% SODIUM CHLORIDE 5.36 MICROGRAM(S)/KG/HR: 4 INJECTION INTRAVENOUS at 22:58

## 2020-08-23 RX ADMIN — Medication 130 MILLIGRAM(S): at 11:30

## 2020-08-23 RX ADMIN — DEXMEDETOMIDINE HYDROCHLORIDE IN 0.9% SODIUM CHLORIDE 5.36 MICROGRAM(S)/KG/HR: 4 INJECTION INTRAVENOUS at 00:50

## 2020-08-23 RX ADMIN — HALOPERIDOL DECANOATE 5 MILLIGRAM(S): 100 INJECTION INTRAMUSCULAR at 06:32

## 2020-08-23 RX ADMIN — MEROPENEM 100 MILLIGRAM(S): 1 INJECTION INTRAVENOUS at 22:59

## 2020-08-23 RX ADMIN — DEXMEDETOMIDINE HYDROCHLORIDE IN 0.9% SODIUM CHLORIDE 5.36 MICROGRAM(S)/KG/HR: 4 INJECTION INTRAVENOUS at 01:42

## 2020-08-23 RX ADMIN — Medication 130 MILLIGRAM(S): at 05:57

## 2020-08-23 RX ADMIN — MIDAZOLAM HYDROCHLORIDE 2 MILLIGRAM(S): 1 INJECTION, SOLUTION INTRAMUSCULAR; INTRAVENOUS at 18:58

## 2020-08-23 RX ADMIN — MIDAZOLAM HYDROCHLORIDE 4 MILLIGRAM(S): 1 INJECTION, SOLUTION INTRAMUSCULAR; INTRAVENOUS at 20:15

## 2020-08-23 RX ADMIN — Medication 1 MILLIGRAM(S): at 07:52

## 2020-08-23 RX ADMIN — DEXMEDETOMIDINE HYDROCHLORIDE IN 0.9% SODIUM CHLORIDE 5.36 MICROGRAM(S)/KG/HR: 4 INJECTION INTRAVENOUS at 07:53

## 2020-08-23 NOTE — PROCEDURE NOTE - NSTRACHPOSTINTU_RESP_A_CORE
Appropriate capnography/Chest excursion noted/Chest X-Ray/Breath sounds equal/Breath sounds bilateral

## 2020-08-23 NOTE — PROGRESS NOTE ADULT - ASSESSMENT
44M PMH alcohol abuse, hx of alcohol withdrawal, alcohol pancreatitis presents for abdominal pain. Admitted with necrotizing pancreatitis, sepsis, septic shock with course complicated by DTs requiring sedation and small bowel obstruction conservatively managed with NGT and NPO. Now with worsening DTs, acute hypoxic respiratory failure requiring intubation secondary to PNA.    1. NEURO  - sedated on propofol, precedex, phenobarbital  - MVI/thiamine/folate    2. PULM  - intubated today for acute hypoxic respiratory failure due to PNA, dense MENDY consolidation noted with bilateral opacities  - has been on zosyn will change to meropenem with clinical decline  - check sputum cx  - check ABG post intubation    3. CV  - out of shock state, weaned off pressors   - cont to monitor BP    4. GI  - tolerated po intake prior to intubation  - NPO for now and likely can start tube feeds in AM  - lipase trending down  - having BMs  - appreciate surgical follow up    5. GEN  - GI/DVT prophylaxis    Critical care Time: 40 min not including procedures

## 2020-08-23 NOTE — PROGRESS NOTE ADULT - SUBJECTIVE AND OBJECTIVE BOX
Patient seen and examined bedside in ICU.  Per nursing, patient agitated overnight, experiencing DTs and s/p code grey, given ativan and phenobarbital   Resting comfortably at present.    T(F): 99.5 (08-23-20 @ 08:50), Max: 101.2 (08-22-20 @ 15:22)  HR: 87 (08-23-20 @ 12:00) (85 - 150)  BP: 102/73 (08-23-20 @ 12:00) (92/70 - 170/105)  RR: 29 (08-23-20 @ 12:00) (15 - 47)  SpO2: 95% (08-23-20 @ 12:00) (87% - 100%)  Wt(kg): --    PHYSICAL EXAM:  General: lethargic  HEENT: NCAT  Chest: nonlabored  Abdomen: soft, NTND.    LABS:                        10.4   13.63 )-----------( 103      ( 23 Aug 2020 04:01 )             29.1     08-23    137  |  99  |  10  ----------------------------<  101<H>  2.9<LL>   |  31  |  0.52    Ca    7.4<L>      23 Aug 2020 04:01  Phos  1.9     08-23  Mg     1.3     08-23    TPro  5.4<L>  /  Alb  1.7<L>  /  TBili  2.8<H>  /  DBili  x   /  AST  144<H>  /  ALT  83<H>  /  AlkPhos  164<H>  08-23    Culture Results:   No growth (08-18 @ 18:16)  Culture Results:   No Growth Final (08-18 @ 08:38)  Culture Results:   No Growth Final (08-18 @ 08:35)      Impression: 44M w/ ETOH abuse and withdrawal, history of pancreatitis a/w pancreatitis with possible early necrosis of pancreas  Plan  -No acute surgical intervention planned, will continue to follow  -continue ICU management of withdrawal  -continue IVF resuscitation, monitor intake/output.

## 2020-08-23 NOTE — PROCEDURE NOTE - NSINDICATIONS_GEN_A_CORE
airway protection/respiratory distress hypoxia, worsening PNA on CXR/airway protection/respiratory distress/respiratory failure

## 2020-08-23 NOTE — CHART NOTE - NSCHARTNOTEFT_GEN_A_CORE
Patient still remains very agitated and trying to get out of bed and taking off oxygen.   Desaturating to 80's. CXR done showed worsening pneumonia. Antibiotic changed from zosyn to meropenum.   Receiving precedex drip and phenobarbital for ETOH withdrawal with intermittent pushes of versed with not much improvement.  Attending notified, decision was made to intubate.

## 2020-08-23 NOTE — PROGRESS NOTE ADULT - ATTENDING COMMENTS
Mr. Hussein examined. Remains sedated, comfortable. He is hemodynamically stable, afebrile. On exam is abdomen is soft, nontender, nondistended. Will continue to monitor. No surgical indication at this time.

## 2020-08-23 NOTE — PROGRESS NOTE ADULT - SUBJECTIVE AND OBJECTIVE BOX
HPI:  Patient is a 44M with a PMH of EtOH abuse and withdrawal, alcohol pancreatitis who presents to the ED with severe abdominal pain.  Patient states that for the past three days he has been on a drinking binge, drinking 5 beers and 5 shots of liquor per day.  Last drink reportedly yesterday.  Patient reports that he woke up today and had severe generalized abdominal pain with multiple episodes of nonbloody, nonbilious vomiting.  Patient also complaining of nausea and generalized tremors.  Vitals stable, labs show leukocytosis, lactic acidosis, and severely elevated lipase levels.  CT shows pancreatitis with early necrosis in the pancreatic body.  Will admit to floor. (17 Aug 2020 23:25)      24 hr events:      ## ROS:  [ ] unable to obtain  CONSTITUTIONAL: No fever, weight loss, or fatigue  EYES: No eye pain, visual disturbances, or discharge  ENMT:  No difficulty hearing, tinnitus, vertigo; No sinus or throat pain  NECK: No pain or stiffness  RESPIRATORY: No cough, wheezing, chills or hemoptysis; No shortness of breath  CARDIOVASCULAR: No chest pain, palpitations, dizziness, or leg swelling  GASTROINTESTINAL: No abdominal or epigastric pain. No nausea, vomiting, or hematemesis; No diarrhea or constipation. No melena or hematochezia.  GENITOURINARY: No dysuria, frequency, hematuria, or incontinence  NEUROLOGICAL: No headaches, memory loss, loss of strength, numbness, or tremors  SKIN: No itching, burning, rashes, or lesions   LYMPH NODES: No enlarged glands  ENDOCRINE: No heat or cold intolerance; No hair loss  MUSCULOSKELETAL: No joint pain or swelling; No muscle, back, or extremity pain  PSYCHIATRIC: No depression, anxiety, mood swings, or difficulty sleeping  HEME/LYMPH: No easy bruising, or bleeding gums  ALLERGY AND IMMUNOLOGIC: No hives or eczema    ## Labs:  CBC:                        10.4   13.63 )-----------( 103      ( 23 Aug 2020 04:01 )             29.1     Chem:  08-23    138  |  103  |  12  ----------------------------<  113<H>  3.4<L>   |  28  |  0.54    Ca    7.6<L>      23 Aug 2020 14:45  Phos  3.6     08-23  Mg     1.8     08-23    TPro  5.4<L>  /  Alb  1.7<L>  /  TBili  2.8<H>  /  DBili  x   /  AST  144<H>  /  ALT  83<H>  /  AlkPhos  164<H>  08-23    Coags:          ## Imaging:    ## Medications:  meropenem  IVPB 1000 milliGRAM(s) IV Intermittent every 8 hours          enoxaparin Injectable 40 milliGRAM(s) SubCutaneous daily    pantoprazole  Injectable 40 milliGRAM(s) IV Push daily  senna 2 Tablet(s) Oral at bedtime  simethicone 80 milliGRAM(s) Chew three times a day PRN    dexMEDEtomidine Infusion 0.3 MICROgram(s)/kG/Hr IV Continuous <Continuous>  HYDROmorphone  Injectable 0.5 milliGRAM(s) IV Push every 4 hours PRN  melatonin 3 milliGRAM(s) Oral at bedtime  PHENobarbital Injectable 130 milliGRAM(s) IV Push every 6 hours  propofol Infusion 10 MICROgram(s)/kG/Min IV Continuous <Continuous>  QUEtiapine 25 milliGRAM(s) Oral daily      ## Vitals:  T(C): 37.3 (08-23-20 @ 19:01), Max: 37.7 (08-23-20 @ 04:00)  HR: 76 (08-23-20 @ 23:00) (76 - 150)  BP: 111/75 (08-23-20 @ 23:00) (83/45 - 146/85)  BP(mean): 83 (08-23-20 @ 23:00) (54 - 100)  RR: 14 (08-23-20 @ 23:00) (14 - 47)  SpO2: 99% (08-23-20 @ 23:00) (88% - 100%)  Wt(kg): --  Vent: Mode: AC/ CMV (Assist Control/ Continuous Mandatory Ventilation), RR (machine): 12, RR (patient): 15, TV (machine): 500, FiO2: 45, PEEP: 5, PIP: 31  ABG: ABG - ( 23 Aug 2020 21:58 )  pH, Arterial: x     pH, Blood: 7.49  /  pCO2: 34    /  pO2: 176   / HCO3: 26    / Base Excess: 2.8   /  SaO2: 100                   08-22 @ 07:01  -  08-23 @ 07:00  --------------------------------------------------------  IN: 2863.7 mL / OUT: 3200 mL / NET: -336.3 mL    08-23 @ 07:01  -  08-23 @ 23:20  --------------------------------------------------------  IN: 2975 mL / OUT: 300 mL / NET: 2675 mL          ## P/E:  Gen: lying comfortably in bed in no apparent distress  HEENT: PERRL, EOMI  Resp: CTA B/L no c/r/w  CVS: S1S2 no m/r/g  Abd: soft NT/ND +BS  Ext: no c/c/e  Neuro: A&Ox3    CENTRAL LINE: [ ] YES [ ] NO  LOCATION:   DATE INSERTED:  REMOVE: [ ] YES [ ] NO      QUINTEROS: [ ] YES [ ] NO    DATE INSERTED:  REMOVE:  [ ] YES [ ] NO      A-LINE:  [ ] YES [ ] NO  LOCATION:   DATE INSERTED:  REMOVE:  [ ] YES [ ] NO  EXPLAIN:    GLOBAL ISSUE/BEST PRACTICE:  Analgesia:  Sedation:  HOB elevation: yes  Stress ulcer prophylaxis:  VTE prophylaxis:  Oral Care:  Glycemic control:  Nutrition:    CODE STATUS: [ ] full code  [ ] DNR  [ ] DNI  [ ] MOLST  Goals of care discussion: [ ] yes 24 hr events:  agitated, irritable, restless today  CIWA 27  combative kicking and punching staff  febrile  on precedex drip and standing phenobarbital 130mg IV q6  s/p pushes of versed, ativan, haldol  hypoxic without O2 supplementation  tachypneic, abdominally breathing towards end of night  intubated tonight for hypoxic resp failure, worsening PNA, DTs with severe agitation      ## ROS:  [x] unable to obtain due to sedation/intubation      ## Labs:  CBC:                        10.4   13.63 )-----------( 103      ( 23 Aug 2020 04:01 )             29.1     Chem:  08-23    138        |  103  |  12  ----------------------------<  113<H>  3.4<L>   |  28  |  0.54    Ca    7.6<L>      23 Aug 2020 14:45  Phos  3.6     08-23  Mg     1.8     08-23    TPro  5.4<L>  /  Alb  1.7<L>  /  TBili  2.8<H>  /  DBili  x   /  AST  144<H>  /  ALT  83<H>  /  AlkPhos  164<H>  08-23        ## Imaging:  CXR < from: Xray Chest 1 View- PORTABLE-Urgent (08.23.20 @ 21:44) >  Endotracheal tube tip is above the level of david. Nasogastric tube tip is below the level of the left hemidiaphragm.  The evaluation of the cardiomediastinal silhouette is limited on portable technique.  There is patchy bilateral interstitial and airspace consolidation with small pleural effusions.      ## Medications:  meropenem  IVPB 1000 milliGRAM(s) IV Intermittent every 8 hours    enoxaparin Injectable 40 milliGRAM(s) SubCutaneous daily    pantoprazole  Injectable 40 milliGRAM(s) IV Push daily  senna 2 Tablet(s) Oral at bedtime  simethicone 80 milliGRAM(s) Chew three times a day PRN    dexMEDEtomidine Infusion 0.3 MICROgram(s)/kG/Hr IV Continuous <Continuous>  HYDROmorphone  Injectable 0.5 milliGRAM(s) IV Push every 4 hours PRN  melatonin 3 milliGRAM(s) Oral at bedtime  PHENobarbital Injectable 130 milliGRAM(s) IV Push every 6 hours  propofol Infusion 10 MICROgram(s)/kG/Min IV Continuous <Continuous>  QUEtiapine 25 milliGRAM(s) Oral daily      ## Vitals:  T(C): 37.3 (08-23-20 @ 19:01), Max: 37.7 (08-23-20 @ 04:00)  HR: 76 (08-23-20 @ 23:00) (76 - 150)  BP: 111/75 (08-23-20 @ 23:00) (83/45 - 146/85)  BP(mean): 83 (08-23-20 @ 23:00) (54 - 100)  RR: 14 (08-23-20 @ 23:00) (14 - 47)  SpO2: 99% (08-23-20 @ 23:00) (88% - 100%)    Vent: Mode: AC/ CMV (Assist Control/ Continuous Mandatory Ventilation), RR (machine): 12, RR (patient): 15, TV (machine): 500, FiO2: 45, PEEP: 5, PIP: 31  ABG: ABG - ( 23 Aug 2020 21:58 )  pH, Arterial: x     pH, Blood: 7.49  /  pCO2: 34    /  pO2: 176   / HCO3: 26    / Base Excess: 2.8   /  SaO2: 100                   08-22 @ 07:01  -  08-23 @ 07:00  --------------------------------------------------------  IN: 2863.7 mL / OUT: 3200 mL / NET: -336.3 mL    08-23 @ 07:01  -  08-23 @ 23:20  --------------------------------------------------------  IN: 2975 mL / OUT: 300 mL / NET: 2675 mL          ## P/E:  Gen: lying comfortably in bed in no apparent distress, sedated  HEENT: PERRL, EOMI, ETT in place  Resp: bilateral rhonchi  CVS: tachycardic  Abd: soft NT/ND +BS  Ext: no c/c/e  Neuro: sedated at present time on propofol and precedex    CENTRAL LINE: [ ] YES [x] NO  LOCATION:   DATE INSERTED:  REMOVE: [ ] YES [ ] NO      QUINTEROS: [ ] YES [ ] NO    DATE INSERTED:  REMOVE:  [ ] YES [ ] NO      A-LINE:  [ ] YES [x] NO  LOCATION:   DATE INSERTED:  REMOVE:  [ ] YES [ ] NO  EXPLAIN:    GLOBAL ISSUE/BEST PRACTICE:  Analgesia: n/a  Sedation: propofol, precedex, phenobarbital   HOB elevation: yes  Stress ulcer prophylaxis: protonix  VTE prophylaxis: lovenox  Oral Care: chlorhexidine   Glycemic control: sliding scale coverage  Nutrition: npo    CODE STATUS: [x] full code  [ ] DNR  [ ] DNI  [ ] MOLST  Goals of care discussion: [ ] yes

## 2020-08-24 LAB
ANION GAP SERPL CALC-SCNC: 7 MMOL/L — SIGNIFICANT CHANGE UP (ref 5–17)
ANION GAP SERPL CALC-SCNC: 9 MMOL/L — SIGNIFICANT CHANGE UP (ref 5–17)
BUN SERPL-MCNC: 11 MG/DL — SIGNIFICANT CHANGE UP (ref 7–23)
BUN SERPL-MCNC: 11 MG/DL — SIGNIFICANT CHANGE UP (ref 7–23)
CALCIUM SERPL-MCNC: 7.3 MG/DL — LOW (ref 8.5–10.1)
CALCIUM SERPL-MCNC: 7.7 MG/DL — LOW (ref 8.5–10.1)
CHLORIDE SERPL-SCNC: 105 MMOL/L — SIGNIFICANT CHANGE UP (ref 96–108)
CHLORIDE SERPL-SCNC: 106 MMOL/L — SIGNIFICANT CHANGE UP (ref 96–108)
CO2 SERPL-SCNC: 26 MMOL/L — SIGNIFICANT CHANGE UP (ref 22–31)
CO2 SERPL-SCNC: 26 MMOL/L — SIGNIFICANT CHANGE UP (ref 22–31)
CREAT SERPL-MCNC: 0.44 MG/DL — LOW (ref 0.5–1.3)
CREAT SERPL-MCNC: 0.46 MG/DL — LOW (ref 0.5–1.3)
GLUCOSE SERPL-MCNC: 113 MG/DL — HIGH (ref 70–99)
GLUCOSE SERPL-MCNC: 119 MG/DL — HIGH (ref 70–99)
GRAM STN FLD: SIGNIFICANT CHANGE UP
HCT VFR BLD CALC: 31.8 % — LOW (ref 39–50)
HGB BLD-MCNC: 11.1 G/DL — LOW (ref 13–17)
LIDOCAIN IGE QN: 222 U/L — SIGNIFICANT CHANGE UP (ref 73–393)
MAGNESIUM SERPL-MCNC: 1.6 MG/DL — SIGNIFICANT CHANGE UP (ref 1.6–2.6)
MAGNESIUM SERPL-MCNC: 2 MG/DL — SIGNIFICANT CHANGE UP (ref 1.6–2.6)
MCHC RBC-ENTMCNC: 32.8 PG — SIGNIFICANT CHANGE UP (ref 27–34)
MCHC RBC-ENTMCNC: 34.9 GM/DL — SIGNIFICANT CHANGE UP (ref 32–36)
MCV RBC AUTO: 94.1 FL — SIGNIFICANT CHANGE UP (ref 80–100)
NRBC # BLD: 0 /100 WBCS — SIGNIFICANT CHANGE UP (ref 0–0)
PHOSPHATE SERPL-MCNC: 2.4 MG/DL — LOW (ref 2.5–4.5)
PHOSPHATE SERPL-MCNC: 3.3 MG/DL — SIGNIFICANT CHANGE UP (ref 2.5–4.5)
PLATELET # BLD AUTO: 173 K/UL — SIGNIFICANT CHANGE UP (ref 150–400)
POTASSIUM SERPL-MCNC: 3.2 MMOL/L — LOW (ref 3.5–5.3)
POTASSIUM SERPL-MCNC: 3.7 MMOL/L — SIGNIFICANT CHANGE UP (ref 3.5–5.3)
POTASSIUM SERPL-SCNC: 3.2 MMOL/L — LOW (ref 3.5–5.3)
POTASSIUM SERPL-SCNC: 3.7 MMOL/L — SIGNIFICANT CHANGE UP (ref 3.5–5.3)
RBC # BLD: 3.38 M/UL — LOW (ref 4.2–5.8)
RBC # FLD: 13.2 % — SIGNIFICANT CHANGE UP (ref 10.3–14.5)
SODIUM SERPL-SCNC: 138 MMOL/L — SIGNIFICANT CHANGE UP (ref 135–145)
SODIUM SERPL-SCNC: 141 MMOL/L — SIGNIFICANT CHANGE UP (ref 135–145)
SPECIMEN SOURCE: SIGNIFICANT CHANGE UP
WBC # BLD: 19.47 K/UL — HIGH (ref 3.8–10.5)
WBC # FLD AUTO: 19.47 K/UL — HIGH (ref 3.8–10.5)

## 2020-08-24 PROCEDURE — 99232 SBSQ HOSP IP/OBS MODERATE 35: CPT

## 2020-08-24 PROCEDURE — 99291 CRITICAL CARE FIRST HOUR: CPT

## 2020-08-24 RX ORDER — POTASSIUM PHOSPHATE, MONOBASIC POTASSIUM PHOSPHATE, DIBASIC 236; 224 MG/ML; MG/ML
15 INJECTION, SOLUTION INTRAVENOUS ONCE
Refills: 0 | Status: COMPLETED | OUTPATIENT
Start: 2020-08-24 | End: 2020-08-24

## 2020-08-24 RX ORDER — ACETAMINOPHEN 500 MG
1000 TABLET ORAL ONCE
Refills: 0 | Status: COMPLETED | OUTPATIENT
Start: 2020-08-24 | End: 2020-08-24

## 2020-08-24 RX ORDER — MAGNESIUM SULFATE 500 MG/ML
2 VIAL (ML) INJECTION ONCE
Refills: 0 | Status: COMPLETED | OUTPATIENT
Start: 2020-08-24 | End: 2020-08-24

## 2020-08-24 RX ORDER — ACETAMINOPHEN 500 MG
650 TABLET ORAL EVERY 6 HOURS
Refills: 0 | Status: DISCONTINUED | OUTPATIENT
Start: 2020-08-24 | End: 2020-08-27

## 2020-08-24 RX ORDER — POTASSIUM CHLORIDE 20 MEQ
10 PACKET (EA) ORAL
Refills: 0 | Status: COMPLETED | OUTPATIENT
Start: 2020-08-24 | End: 2020-08-24

## 2020-08-24 RX ADMIN — DEXMEDETOMIDINE HYDROCHLORIDE IN 0.9% SODIUM CHLORIDE 5.36 MICROGRAM(S)/KG/HR: 4 INJECTION INTRAVENOUS at 21:25

## 2020-08-24 RX ADMIN — MEROPENEM 100 MILLIGRAM(S): 1 INJECTION INTRAVENOUS at 05:10

## 2020-08-24 RX ADMIN — DEXMEDETOMIDINE HYDROCHLORIDE IN 0.9% SODIUM CHLORIDE 5.36 MICROGRAM(S)/KG/HR: 4 INJECTION INTRAVENOUS at 07:28

## 2020-08-24 RX ADMIN — DEXMEDETOMIDINE HYDROCHLORIDE IN 0.9% SODIUM CHLORIDE 5.36 MICROGRAM(S)/KG/HR: 4 INJECTION INTRAVENOUS at 09:47

## 2020-08-24 RX ADMIN — MEROPENEM 100 MILLIGRAM(S): 1 INJECTION INTRAVENOUS at 21:25

## 2020-08-24 RX ADMIN — DEXMEDETOMIDINE HYDROCHLORIDE IN 0.9% SODIUM CHLORIDE 5.36 MICROGRAM(S)/KG/HR: 4 INJECTION INTRAVENOUS at 04:36

## 2020-08-24 RX ADMIN — Medication 50 GRAM(S): at 05:21

## 2020-08-24 RX ADMIN — Medication 100 MILLIEQUIVALENT(S): at 04:34

## 2020-08-24 RX ADMIN — SODIUM CHLORIDE 75 MILLILITER(S): 9 INJECTION, SOLUTION INTRAVENOUS at 13:40

## 2020-08-24 RX ADMIN — Medication 650 MILLIGRAM(S): at 21:49

## 2020-08-24 RX ADMIN — DEXMEDETOMIDINE HYDROCHLORIDE IN 0.9% SODIUM CHLORIDE 5.36 MICROGRAM(S)/KG/HR: 4 INJECTION INTRAVENOUS at 10:43

## 2020-08-24 RX ADMIN — DEXMEDETOMIDINE HYDROCHLORIDE IN 0.9% SODIUM CHLORIDE 5.36 MICROGRAM(S)/KG/HR: 4 INJECTION INTRAVENOUS at 12:19

## 2020-08-24 RX ADMIN — Medication 650 MILLIGRAM(S): at 21:25

## 2020-08-24 RX ADMIN — PROPOFOL 4.29 MICROGRAM(S)/KG/MIN: 10 INJECTION, EMULSION INTRAVENOUS at 07:28

## 2020-08-24 RX ADMIN — ENOXAPARIN SODIUM 40 MILLIGRAM(S): 100 INJECTION SUBCUTANEOUS at 11:19

## 2020-08-24 RX ADMIN — PANTOPRAZOLE SODIUM 40 MILLIGRAM(S): 20 TABLET, DELAYED RELEASE ORAL at 11:19

## 2020-08-24 RX ADMIN — SODIUM CHLORIDE 75 MILLILITER(S): 9 INJECTION, SOLUTION INTRAVENOUS at 19:16

## 2020-08-24 RX ADMIN — CHLORHEXIDINE GLUCONATE 1 APPLICATION(S): 213 SOLUTION TOPICAL at 09:49

## 2020-08-24 RX ADMIN — DEXMEDETOMIDINE HYDROCHLORIDE IN 0.9% SODIUM CHLORIDE 5.36 MICROGRAM(S)/KG/HR: 4 INJECTION INTRAVENOUS at 05:51

## 2020-08-24 RX ADMIN — Medication 1 MILLIGRAM(S): at 11:19

## 2020-08-24 RX ADMIN — Medication 2 MILLIGRAM(S): at 21:25

## 2020-08-24 RX ADMIN — Medication 2 MILLIGRAM(S): at 17:16

## 2020-08-24 RX ADMIN — Medication 400 MILLIGRAM(S): at 23:49

## 2020-08-24 RX ADMIN — MEROPENEM 100 MILLIGRAM(S): 1 INJECTION INTRAVENOUS at 13:16

## 2020-08-24 RX ADMIN — Medication 1 TABLET(S): at 11:19

## 2020-08-24 RX ADMIN — POTASSIUM PHOSPHATE, MONOBASIC POTASSIUM PHOSPHATE, DIBASIC 62.5 MILLIMOLE(S): 236; 224 INJECTION, SOLUTION INTRAVENOUS at 05:59

## 2020-08-24 RX ADMIN — Medication 100 MILLIEQUIVALENT(S): at 07:29

## 2020-08-24 RX ADMIN — DEXMEDETOMIDINE HYDROCHLORIDE IN 0.9% SODIUM CHLORIDE 5.36 MICROGRAM(S)/KG/HR: 4 INJECTION INTRAVENOUS at 00:51

## 2020-08-24 RX ADMIN — Medication 100 MILLIGRAM(S): at 11:19

## 2020-08-24 RX ADMIN — DEXMEDETOMIDINE HYDROCHLORIDE IN 0.9% SODIUM CHLORIDE 5.36 MICROGRAM(S)/KG/HR: 4 INJECTION INTRAVENOUS at 02:29

## 2020-08-24 RX ADMIN — PROPOFOL 4.29 MICROGRAM(S)/KG/MIN: 10 INJECTION, EMULSION INTRAVENOUS at 10:43

## 2020-08-24 RX ADMIN — SENNA PLUS 2 TABLET(S): 8.6 TABLET ORAL at 21:26

## 2020-08-24 RX ADMIN — CHLORHEXIDINE GLUCONATE 15 MILLILITER(S): 213 SOLUTION TOPICAL at 05:11

## 2020-08-24 RX ADMIN — DEXMEDETOMIDINE HYDROCHLORIDE IN 0.9% SODIUM CHLORIDE 5.36 MICROGRAM(S)/KG/HR: 4 INJECTION INTRAVENOUS at 17:16

## 2020-08-24 RX ADMIN — CHLORHEXIDINE GLUCONATE 15 MILLILITER(S): 213 SOLUTION TOPICAL at 17:16

## 2020-08-24 RX ADMIN — Medication 50 GRAM(S): at 04:35

## 2020-08-24 RX ADMIN — SODIUM CHLORIDE 75 MILLILITER(S): 9 INJECTION, SOLUTION INTRAVENOUS at 01:05

## 2020-08-24 RX ADMIN — Medication 100 MILLIEQUIVALENT(S): at 05:58

## 2020-08-24 RX ADMIN — DEXMEDETOMIDINE HYDROCHLORIDE IN 0.9% SODIUM CHLORIDE 5.36 MICROGRAM(S)/KG/HR: 4 INJECTION INTRAVENOUS at 23:49

## 2020-08-24 RX ADMIN — Medication 2 MILLIGRAM(S): at 13:16

## 2020-08-24 RX ADMIN — Medication 650 MILLIGRAM(S): at 15:56

## 2020-08-24 RX ADMIN — QUETIAPINE FUMARATE 25 MILLIGRAM(S): 200 TABLET, FILM COATED ORAL at 11:19

## 2020-08-24 RX ADMIN — Medication 130 MILLIGRAM(S): at 05:08

## 2020-08-24 RX ADMIN — Medication 130 MILLIGRAM(S): at 01:05

## 2020-08-24 RX ADMIN — PROPOFOL 4.29 MICROGRAM(S)/KG/MIN: 10 INJECTION, EMULSION INTRAVENOUS at 21:25

## 2020-08-24 NOTE — PROGRESS NOTE ADULT - SUBJECTIVE AND OBJECTIVE BOX
INTERVAL HPI/OVERNIGHT EVENTS:      Intubated yesterday for increased secretion, hypoxic respiratory failure.  Requiring propofol and precedex for sedation.    CENTRAL LINE: [ ] YES [x ] NO  LOCATION:       QUINTEROS: [x ] YES [ ] NO        A-LINE:  [ ] YES [ ] NO  LOCATION:       GLOBAL ISSUE/BEST PRACTICE:  Analgesia: Dilaudid  Sedation: Propofol and Precedex  HOB elevation: yes  Stress ulcer prophylaxis: Protonix  VTE prophylaxis: HSQ  Oral Care: Chlorhexidine  Glycemic control: ISS/Lantus  Nutrition:    REVIEW OF SYSTEMS: [ x ] Unable to obtain because: intubated and sedated    PHYSICAL EXAM:    Gen: intubated and sedated  HEENT: Intubated with ETT  CHEST/LUNG: Crackles on left with coarse breath sound bilaterally  HEART: Regular rate and rhythm; No murmurs, rubs, or gallops  ABDOMEN: Soft, Nontender, Nondistended; Bowel sounds present  EXTREMITIES:  2+ Peripheral Pulses, No clubbing, cyanosis, or edema  NERVOUS SYSTEM:  intubated and sedated.    ICU Vital Signs Last 24 Hrs  T(C): 38.1 (24 Aug 2020 15:27), Max: 38.1 (24 Aug 2020 15:27)  T(F): 100.5 (24 Aug 2020 15:27), Max: 100.5 (24 Aug 2020 15:27)  HR: 88 (24 Aug 2020 17:00) (76 - 92)  BP: 99/57 (24 Aug 2020 17:00) (79/56 - 119/85)  BP(mean): 67 (24 Aug 2020 17:00) (61 - 93)  ABP: --  ABP(mean): --  RR: 25 (24 Aug 2020 17:00) (14 - 38)  SpO2: 91% (24 Aug 2020 17:00) (87% - 100%)      I&O's Detail    23 Aug 2020 07:01  -  24 Aug 2020 07:00  --------------------------------------------------------  IN:    dexmedetomidine Infusion: 698.4 mL    dextrose 5% + lactated ringers.: 1800 mL    IV PiggyBack: 1700 mL    propofol Infusion: 77.4 mL  Total IN: 4275.8 mL    OUT:    Incontinent per Condom Catheter: 400 mL    Nasoenteral Tube: 400 mL    Voided: 200 mL  Total OUT: 1000 mL    Total NET: 3275.8 mL      24 Aug 2020 07:01  -  24 Aug 2020 17:08  --------------------------------------------------------  IN:    dexmedetomidine Infusion: 199.8 mL    dextrose 5% + lactated ringers.: 600 mL    IV PiggyBack: 150 mL    propofol Infusion: 115.7 mL  Total IN: 1065.5 mL    OUT:  Total OUT: 0 mL    Total NET: 1065.5 mL    MEDICATIONS  NEURO  Meds: acetaminophen   Tablet .. 650 milliGRAM(s) Oral every 6 hours PRN Temp greater or equal to 38C (100.4F), Mild Pain (1 - 3)  dexMEDEtomidine Infusion 0.3 MICROgram(s)/kG/Hr (5.36 mL/Hr) IV Continuous <Continuous>  HYDROmorphone  Injectable 0.5 milliGRAM(s) IV Push every 4 hours PRN Severe Pain (7 - 10)  LORazepam   Injectable 2 milliGRAM(s) IV Push every 4 hours  propofol Infusion 10 MICROgram(s)/kG/Min (4.29 mL/Hr) IV Continuous <Continuous>  QUEtiapine 25 milliGRAM(s) Oral daily    RESPIRATORY  ABG - ( 23 Aug 2020 21:58 )  pH: x     /  pCO2: 34    /  pO2: 176   / HCO3: 26    / Base Excess: 2.8   /  SaO2: 100     Lactate: x      Meds:     CARDIOVASCULAR  Meds:   GI/NUTRITION  Meds: pantoprazole  Injectable 40 milliGRAM(s) IV Push daily  senna 2 Tablet(s) Oral at bedtime    GENITOURINARY  Meds: dextrose 5% + lactated ringers. 1000 milliLiter(s) IV Continuous <Continuous>  folic acid 1 milliGRAM(s) Oral daily  multivitamin 1 Tablet(s) Oral daily  thiamine 100 milliGRAM(s) Oral daily    HEMATOLOGIC  Meds: enoxaparin Injectable 40 milliGRAM(s) SubCutaneous daily    [x] VTE Prophylaxis  INFECTIOUS DISEASES  Meds: meropenem  IVPB 1000 milliGRAM(s) IV Intermittent every 8 hours    ENDOCRINE  CAPILLARY BLOOD GLUCOSE  Meds:   OTHER MEDICATIONS:  chlorhexidine 0.12% Liquid 15 milliLiter(s) Oral Mucosa every 12 hours  chlorhexidine 4% Liquid 1 Application(s) Topical <User Schedule>  :    LABS:                        11.1   19.47 )-----------( 173      ( 24 Aug 2020 03:01 )             31.8      08-24    141  |  106  |  11  ----------------------------<  113<H>  3.7   |  26  |  0.46<L>    Ca    7.7<L>      24 Aug 2020 11:44  Phos  3.3     08-24  Mg     2.0     08-24    TPro  5.4<L>  /  Alb  1.7<L>  /  TBili  2.8<H>  /  DBili  x   /  AST  144<H>  /  ALT  83<H>  /  AlkPhos  164<H>  08-23      ## COVID Panel  Procalcitonin, Serum: 0.40 ng/mL (08-21-20 @ 09:05)      Culture Results:   No growth to date. (08-23 @ 01:14)  Culture Results:   No growth to date. (08-23 @ 01:10)      Culture - Sputum (collected 08-24-20 @ 09:16)  Source: .Sputum Sputum trap  Gram Stain (08-24-20 @ 12:15):    Few polymorphonuclear leukocytes per low power field    Few Squamous epithelial cells per low power field    Rare Gram Negative Diplococci per oil power field      Mode: AC/ CMV (Assist Control/ Continuous Mandatory Ventilation), RR (machine): 12, TV (machine): 500, FiO2: 45, PEEP: 5, ITime: 1, MAP: 11, PIP: 29      RADIOLOGY & ADDITIONAL STUDIES:

## 2020-08-24 NOTE — PROGRESS NOTE ADULT - ASSESSMENT
44M PMH EtOH abuse, alcohol withdrawal, alcohol pancreatitis presents for 3 days of abdominal pain, nausea and vomiting after recent binge drinking. Found to have necrotizing pancreatitis. Admitted to medical service for pancreatitis. Transferred to ICU for alcohol withdrawal/DTs. Course complicated by septic shock, acute renal insufficiency, lactic acidosis, small bowel obstruction.    Neuro: Intubated and sedated with known alcohol withdrawal, now requiring propofol and precedex for sedation; will continue background ativan given better response with benzos yesterday over phenobarbital prior to intubation.  Daily sbt and sat.   CV: Sepsis with septic shock - multifactorial given pancreatitis and now pneumonia - now off levophed; maintain map>65  Pulm: Acute hypoxic respiratory failure 2/2 pneumonia with L sided infiltrated; noted to have gram neg diploccoci in sputum culture; continue with antibiotics; wean FiO2 as tolerated;   C/w 12/500/5/45  GI: NPO; acute pancreatitis noted on admission; advanced to clears prior to intubation; will start trickle feeds in AM  Renal/Metabolic: FRANKO; replete electrolytes prn; continue with maintenance fluids D5 LR; will start trickle feeds in am and wean fluids.  ID: Sepsis with Septic shock - likely pneumonia now continue with meropenem.    Heme: WBC 19.47; uptrending in the setting of sepsis and acute pneumonia.    Endo: FRANKO  Dispo: Remains intubated and sedated; critically ill 2/2 pneumonia, alcohol withdrawal and acute hypoxic respiratory failure.

## 2020-08-24 NOTE — PROGRESS NOTE ADULT - ATTENDING COMMENTS
Mr. Hussein was examined. Overnight intubated for increased restlessness and agitation. Concern for PNA switched to meropenem. On exam he is intubated and sedated, his abdomen is soft, nondistended. Given prolonged hospital course and nothing by mouth status, recommend nasogastric feeds.

## 2020-08-24 NOTE — PROGRESS NOTE ADULT - SUBJECTIVE AND OBJECTIVE BOX
Patient seen and examined at bedside in no distress.  Intubated overnight for respiratory distress from ETOH withdrawal and worsening pneumonia.    Vital Signs Last 24 Hrs  T(F): 99.7 (08-24-20 @ 03:28), Max: 99.8 (08-23-20 @ 14:30)  HR: 81 (08-24-20 @ 05:20)  BP: 110/74 (08-24-20 @ 04:00)  RR: 15 (08-24-20 @ 04:00)  SpO2: 98% (08-24-20 @ 05:20)    GENERAL: Sedated, NAD  HEENT: Intubated. NGT to LWS with green output in canister: 400cc/24hrs  CHEST/LUNG: Vented, respirations nonlabored  HEART: S1S2, RRR  ABDOMEN: + Bowel sounds, softly distended, nontender  EXTREMITIES: No pedal edema b/l     LABS:                        11.1   19.47 )-----------( 173      ( 24 Aug 2020 03:01 )             31.8     08-24    138  |  105  |  11  ----------------------------<  119<H>  3.2<L>   |  26  |  0.44<L>    Ca    7.3<L>      24 Aug 2020 03:01  Phos  2.4     08-24  Mg     1.6     08-24      A/P: 44M PMH ETOH abuse, pancreatitis, a/w acute necrotizing pancreatitis. Now intubated 2/2 respiratory distress due to ETOH withdrawal and pneumonia. Worsening leukocytosis, hypokalemia, hypophosphatemia.  - antibiotics for PNA  - IV hydration  - monitor NGT output  - GI ppx, DVT ppx  - replete electrolytes  - no acute surgical intervention warranted at this time, will continue to monitor  - monitor labs  - ICU management  - will d/w surgical attending

## 2020-08-24 NOTE — CHART NOTE - NSCHARTNOTEFT_GEN_A_CORE
Assessment: Pt seen for NPO/clears x 7 days. Pt with PMHx ETOH abuse & acute pancreatitis hx. Pt admitted with pancreatitis; s/p intubation on 8/23 secondary to respiratory distress due to ETOH withdrawal & pna. Pt also with worsening leukocytosis, hypokalemia, hypophosphatemia.    Factors impacting intake: [ ] none [ ] nausea  [ ] vomiting [ ] diarrhea [ ] constipation  [ ]chewing problems [ ] swallowing issues  [x] other: s/p intubation    Diet Prescription: Diet, NPO:   Except Medications  With Ice Chips/Sips of Water (08-22-20 @ 11:07)    Intake: 0%    Current Weight: 77.6 kg (8/23), 71.5 kg (8/18)  % Weight Change: 8.5% wt gain x 5 days; Likely fluid-related, not true wt gain    Physical appearance: No edema; Pt with visible signs of muscle wasting/fat loss in orbital region (mild) & temporal region (moderate), however other regions not visible; unable to assess buccal region as pt intubated    Pertinent Medications: MEDICATIONS  (STANDING):  chlorhexidine 0.12% Liquid 15 milliLiter(s) Oral Mucosa every 12 hours  chlorhexidine 4% Liquid 1 Application(s) Topical <User Schedule>  dexMEDEtomidine Infusion 0.3 MICROgram(s)/kG/Hr (5.36 mL/Hr) IV Continuous <Continuous>  dextrose 5% + lactated ringers. 1000 milliLiter(s) (75 mL/Hr) IV Continuous <Continuous>  enoxaparin Injectable 40 milliGRAM(s) SubCutaneous daily  folic acid 1 milliGRAM(s) Oral daily  LORazepam   Injectable 2 milliGRAM(s) IV Push every 4 hours  meropenem  IVPB 1000 milliGRAM(s) IV Intermittent every 8 hours  multivitamin 1 Tablet(s) Oral daily  pantoprazole  Injectable 40 milliGRAM(s) IV Push daily  propofol Infusion 10 MICROgram(s)/kG/Min (4.29 mL/Hr) IV Continuous <Continuous>  QUEtiapine 25 milliGRAM(s) Oral daily  senna 2 Tablet(s) Oral at bedtime  thiamine 100 milliGRAM(s) Oral daily    MEDICATIONS  (PRN):  HYDROmorphone  Injectable 0.5 milliGRAM(s) IV Push every 4 hours PRN Severe Pain (7 - 10)    Pertinent Labs: 08-24 Na141 mmol/L Glu 113 mg/dL<H> K+ 3.7 mmol/L Cr  0.46 mg/dL<L> BUN 11 mg/dL 08-24 Phos 3.3 mg/dL 08-23 Alb 1.7 g/dL<L>    CAPILLARY BLOOD GLUCOSE    Skin: WDL    Estimated Needs:   [x] no change since previous assessment on 8/18  [ ] recalculated:     Previous Nutrition Diagnosis:   Nutrition Diagnostic Terminology #1 Inadequate Energy Intake.     Etiology Decreased ability to consume sufficient energy.     Signs/Symptoms Pt currently NPO.     Goal/Expected Outcome Pt will consume >75% nutrition needs once diet is initiated & advanced; not applicable as pt NPO    Nutrition Diagnosis is [x] ongoing  [ ] resolved [ ] not applicable    New Nutrition Diagnosis: [x] not applicable       Interventions:   Recommend  [ ] Change Diet To:  [ ] Nutrition Supplement  [x] Nutrition Support: Consider nutrition support as medically appropriate; recommend Vital AF 1.2 initiated @ 20 ml/hr to goal rate 60 ml/hr providing 1440 ml, 1728 kcal, 108 gm protein, + 150 ml free water flush q 6 hours  [ ] Other:     Monitoring and Evaluation:   [ ] PO intake [ x ] Tolerance to diet prescription [ x ] weights [ x ] labs[ x ] follow up per protocol  [ ] other:

## 2020-08-24 NOTE — PROGRESS NOTE ADULT - SUBJECTIVE AND OBJECTIVE BOX
44y old  Male who presented with a chief complaint of abdominal pain and was admitted with PANCREATITIS.    Patient seen and examined at bedside in no distress. Intubated overnight for respiratory distress from ETOH withdrawal and worsening pneumonia.      MEDICATIONS  (STANDING):  chlorhexidine 0.12% Liquid 15 milliLiter(s) Oral Mucosa every 12 hours  chlorhexidine 4% Liquid 1 Application(s) Topical <User Schedule>  dexMEDEtomidine Infusion 0.3 MICROgram(s)/kG/Hr (5.36 mL/Hr) IV Continuous <Continuous>  dextrose 5% + lactated ringers. 1000 milliLiter(s) (75 mL/Hr) IV Continuous <Continuous>  enoxaparin Injectable 40 milliGRAM(s) SubCutaneous daily  folic acid 1 milliGRAM(s) Oral daily  meropenem  IVPB 1000 milliGRAM(s) IV Intermittent every 8 hours  multivitamin 1 Tablet(s) Oral daily  pantoprazole  Injectable 40 milliGRAM(s) IV Push daily  PHENobarbital Injectable 130 milliGRAM(s) IV Push every 6 hours  potassium chloride  10 mEq/100 mL IVPB 10 milliEquivalent(s) IV Intermittent every 1 hour  propofol Infusion 10 MICROgram(s)/kG/Min (4.29 mL/Hr) IV Continuous <Continuous>  QUEtiapine 25 milliGRAM(s) Oral daily  senna 2 Tablet(s) Oral at bedtime  thiamine 100 milliGRAM(s) Oral daily    MEDICATIONS  (PRN):  HYDROmorphone  Injectable 0.5 milliGRAM(s) IV Push every 4 hours PRN Severe Pain (7 - 10)      Vital Signs Last 24 Hrs  T(F): 99.7 (08-24-20 @ 03:28), Max: 99.8 (08-23-20 @ 14:30)  HR: 80 (08-24-20 @ 06:00)  BP: 105/67 (08-24-20 @ 06:00)  RR: 17 (08-24-20 @ 06:00)  SpO2: 96% (08-24-20 @ 06:00)  Wt(kg): --   CAPILLARY BLOOD GLUCOSE      GENERAL: Sedated, NAD  HEENT: Intubated. NGT to LWS with green output in canister: 400cc/24hrs  CHEST/LUNG: Vented, respirations nonlabored  HEART: S1S2, RRR  ABDOMEN: + Bowel sounds, softly distended, nontender  EXTREMITIES: No pedal edema b/l       I&O's Detail    22 Aug 2020 07:01  -  23 Aug 2020 07:00  --------------------------------------------------------  IN:    dexmedetomidine Infusion: 193.7 mL    dextrose 5% + lactated ringers.: 1800 mL    IV PiggyBack: 550 mL    Oral Fluid: 320 mL  Total IN: 2863.7 mL    OUT:    Incontinent per Condom Catheter: 1800 mL    Voided: 1400 mL  Total OUT: 3200 mL    Total NET: -336.3 mL      23 Aug 2020 07:01  -  24 Aug 2020 06:24  --------------------------------------------------------  IN:    dexmedetomidine Infusion: 669.3 mL    dextrose 5% + lactated ringers.: 1725 mL    IV PiggyBack: 1700 mL    propofol Infusion: 68.9 mL  Total IN: 4163.2 mL    OUT:    Nasoenteral Tube: 400 mL    Voided: 200 mL  Total OUT: 600 mL    Total NET: 3563.2 mL          LABS:                        11.1   19.47 )-----------( 173      ( 24 Aug 2020 03:01 )             31.8     08-24    138  |  105  |  11  ----------------------------<  119<H>  3.2<L>   |  26  |  0.44<L>    Ca    7.3<L>      24 Aug 2020 03:01  Phos  2.4     08-24  Mg     1.6     08-24    Calcium, Total Serum: 7.3 mg/dL [8.5 - 10.1] (08-24-20)  Calcium, Total Serum: 7.6 mg/dL [8.5 - 10.1] (08-23-20)  Calcium, Total Serum: 7.4 mg/dL [8.5 - 10.1] (08-23-20)  Calcium, Total Serum: 6.8 mg/dL [8.5 - 10.1] (08-22-20)  Calcium, Total Serum: 6.7 mg/dL [8.5 - 10.1] (08-21-20)  Calcium, Total Serum: 6.6 mg/dL [8.5 - 10.1] (08-20-20)  Calcium, Total Serum: 7.2 mg/dL [8.5 - 10.1] (08-19-20)  Calcium, Total Serum: 7.5 mg/dL [8.5 - 10.1] (08-19-20)  Calcium, Total Serum: 8.3 mg/dL [8.5 - 10.1] (08-18-20)  Calcium, Total Serum: 9.2 mg/dL [8.5 - 10.1] (08-18-20)  Calcium, Total Serum: 9.2 mg/dL [8.5 - 10.1] (08-18-20)  Calcium, Total Serum: 11.5 mg/dL [8.5 - 10.1] (08-17-20)          TPro  5.4<L>  /  Alb  1.7<L>  /  TBili  2.8<H>  /  DBili  x   /  AST  144<H>  /  ALT  83<H>  /  AlkPhos  164<H>  08-23    LIVER FUNCTIONS - ( 23 Aug 2020 04:01 )  Alb: 1.7 [3.3 - 5.0] / Pro: 5.4 [6.0 - 8.3] / ALK PHOS: 164 [40 - 120] / ALT: 83 [12 - 78] / AST: 144 [15 - 37] / GGT: x     LIVER FUNCTIONS - ( 22 Aug 2020 04:48 )  Alb: 1.7 [3.3 - 5.0] / Pro: 5.5 [6.0 - 8.3] / ALK PHOS: 140 [40 - 120] / ALT: 64 [12 - 78] / AST: 122 [15 - 37] / GGT: x     LIVER FUNCTIONS - ( 20 Aug 2020 03:34 )  Alb: 1.6 [3.3 - 5.0] / Pro: 5.1 [6.0 - 8.3] / ALK PHOS: 36 [40 - 120] / ALT: 36 [12 - 78] / AST: 62 [15 - 37] / GGT: x     LIVER FUNCTIONS - ( 19 Aug 2020 03:07 )  Alb: 2.1 [3.3 - 5.0] / Pro: 5.3 [6.0 - 8.3] / ALK PHOS: 29 [40 - 120] / ALT: 50 [12 - 78] / AST: 78 [15 - 37] / GGT: x     LIVER FUNCTIONS - ( 18 Aug 2020 05:08 )  Alb: 2.9 [3.3 - 5.0] / Pro: 6.9 [6.0 - 8.3] / ALK PHOS: 79 [40 - 120] / ALT: 79 [12 - 78] / AST: 116 [15 - 37] / GGT: x     LIVER FUNCTIONS - ( 18 Aug 2020 04:27 )  Alb: 2.9 [3.3 - 5.0] / Pro: 6.9 [6.0 - 8.3] / ALK PHOS: 85 [40 - 120] / ALT: 82 [12 - 78] / AST: 112 [15 - 37] / GGT: x     LIVER FUNCTIONS - ( 17 Aug 2020 17:27 )  Alb: 4.2 [3.3 - 5.0] / Pro: 9.2 [6.0 - 8.3] / ALK PHOS: 152 [40 - 120] / ALT: 131 [12 - 78] / AST: 200 [15 - 37] / GGT: x       Lactate, Blood: 0.9 mmol/L (08-21 @ 04:27)      RADIOLOGY & ADDITIONAL STUDIES:      A/P: 44M PMH ETOH abuse, pancreatitis, a/w acute necrotizing pancreatitis. Now intubated 2/2 respiratory distress due to ETOH withdrawal and pneumonia. Worsening leukocytosis, hypokalemia, hypophosphatemia.  - antibiotics for PNA  - IV hydration  - monitor NGT output  - GI ppx, DVT ppx  - replete electrolytes  - no acute surgical intervention warranted at this time, will continue to monitor  - monitor labs  - ICU management  - will d/w surgical attending 44y old  Male who presented with a chief complaint of abdominal pain and was admitted with PANCREATITIS.    Patient seen at bedside in no distress. Per nursing, patient has been agitated and violent when awake. Patient is currently sedated on propofol. Patient was Intubated overnight for respiratory distress from ETOH withdrawal and worsening pneumonia.       MEDICATIONS  (STANDING):  chlorhexidine 0.12% Liquid 15 milliLiter(s) Oral Mucosa every 12 hours  chlorhexidine 4% Liquid 1 Application(s) Topical <User Schedule>  dexMEDEtomidine Infusion 0.3 MICROgram(s)/kG/Hr (5.36 mL/Hr) IV Continuous <Continuous>  dextrose 5% + lactated ringers. 1000 milliLiter(s) (75 mL/Hr) IV Continuous <Continuous>  enoxaparin Injectable 40 milliGRAM(s) SubCutaneous daily  folic acid 1 milliGRAM(s) Oral daily  meropenem  IVPB 1000 milliGRAM(s) IV Intermittent every 8 hours  multivitamin 1 Tablet(s) Oral daily  pantoprazole  Injectable 40 milliGRAM(s) IV Push daily  PHENobarbital Injectable 130 milliGRAM(s) IV Push every 6 hours  potassium chloride  10 mEq/100 mL IVPB 10 milliEquivalent(s) IV Intermittent every 1 hour  propofol Infusion 10 MICROgram(s)/kG/Min (4.29 mL/Hr) IV Continuous <Continuous>  QUEtiapine 25 milliGRAM(s) Oral daily  senna 2 Tablet(s) Oral at bedtime  thiamine 100 milliGRAM(s) Oral daily    MEDICATIONS  (PRN):  HYDROmorphone  Injectable 0.5 milliGRAM(s) IV Push every 4 hours PRN Severe Pain (7 - 10)      Vital Signs Last 24 Hrs  T(F): 99.7 (08-24-20 @ 03:28), Max: 99.8 (08-23-20 @ 14:30)  HR: 80 (08-24-20 @ 06:00)  BP: 105/67 (08-24-20 @ 06:00)  RR: 17 (08-24-20 @ 06:00)  SpO2: 96% (08-24-20 @ 06:00)  Wt(kg): --   CAPILLARY BLOOD GLUCOSE      GENERAL: Sedated, NAD  HEENT: Intubated. NGT to LWS with green output in canister: 400cc/24hrs  CHEST/LUNG: Vented, respirations nonlabored  HEART: S1S2, RRR  ABDOMEN: + Bowel sounds, softly distended, nontender  EXTREMITIES: No pedal edema b/l       I&O's Detail    22 Aug 2020 07:01  -  23 Aug 2020 07:00  --------------------------------------------------------  IN:    dexmedetomidine Infusion: 193.7 mL    dextrose 5% + lactated ringers.: 1800 mL    IV PiggyBack: 550 mL    Oral Fluid: 320 mL  Total IN: 2863.7 mL    OUT:    Incontinent per Condom Catheter: 1800 mL    Voided: 1400 mL  Total OUT: 3200 mL    Total NET: -336.3 mL      23 Aug 2020 07:01  -  24 Aug 2020 06:24  --------------------------------------------------------  IN:    dexmedetomidine Infusion: 669.3 mL    dextrose 5% + lactated ringers.: 1725 mL    IV PiggyBack: 1700 mL    propofol Infusion: 68.9 mL  Total IN: 4163.2 mL    OUT:    Nasoenteral Tube: 400 mL    Voided: 200 mL  Total OUT: 600 mL    Total NET: 3563.2 mL          LABS:                        11.1   19.47 )-----------( 173      ( 24 Aug 2020 03:01 )             31.8     08-24    138  |  105  |  11  ----------------------------<  119<H>  3.2<L>   |  26  |  0.44<L>    Ca    7.3<L>      24 Aug 2020 03:01  Phos  2.4     08-24  Mg     1.6     08-24    Calcium, Total Serum: 7.3 mg/dL [8.5 - 10.1] (08-24-20)  Calcium, Total Serum: 7.6 mg/dL [8.5 - 10.1] (08-23-20)  Calcium, Total Serum: 7.4 mg/dL [8.5 - 10.1] (08-23-20)  Calcium, Total Serum: 6.8 mg/dL [8.5 - 10.1] (08-22-20)  Calcium, Total Serum: 6.7 mg/dL [8.5 - 10.1] (08-21-20)  Calcium, Total Serum: 6.6 mg/dL [8.5 - 10.1] (08-20-20)  Calcium, Total Serum: 7.2 mg/dL [8.5 - 10.1] (08-19-20)  Calcium, Total Serum: 7.5 mg/dL [8.5 - 10.1] (08-19-20)  Calcium, Total Serum: 8.3 mg/dL [8.5 - 10.1] (08-18-20)  Calcium, Total Serum: 9.2 mg/dL [8.5 - 10.1] (08-18-20)  Calcium, Total Serum: 9.2 mg/dL [8.5 - 10.1] (08-18-20)  Calcium, Total Serum: 11.5 mg/dL [8.5 - 10.1] (08-17-20)      TPro  5.4<L>  /  Alb  1.7<L>  /  TBili  2.8<H>  /  DBili  x   /  AST  144<H>  /  ALT  83<H>  /  AlkPhos  164<H>  08-23    LIVER FUNCTIONS - ( 23 Aug 2020 04:01 )  Alb: 1.7 [3.3 - 5.0] / Pro: 5.4 [6.0 - 8.3] / ALK PHOS: 164 [40 - 120] / ALT: 83 [12 - 78] / AST: 144 [15 - 37] / GGT: x     LIVER FUNCTIONS - ( 22 Aug 2020 04:48 )  Alb: 1.7 [3.3 - 5.0] / Pro: 5.5 [6.0 - 8.3] / ALK PHOS: 140 [40 - 120] / ALT: 64 [12 - 78] / AST: 122 [15 - 37] / GGT: x     LIVER FUNCTIONS - ( 20 Aug 2020 03:34 )  Alb: 1.6 [3.3 - 5.0] / Pro: 5.1 [6.0 - 8.3] / ALK PHOS: 36 [40 - 120] / ALT: 36 [12 - 78] / AST: 62 [15 - 37] / GGT: x     LIVER FUNCTIONS - ( 19 Aug 2020 03:07 )  Alb: 2.1 [3.3 - 5.0] / Pro: 5.3 [6.0 - 8.3] / ALK PHOS: 29 [40 - 120] / ALT: 50 [12 - 78] / AST: 78 [15 - 37] / GGT: x     LIVER FUNCTIONS - ( 18 Aug 2020 05:08 )  Alb: 2.9 [3.3 - 5.0] / Pro: 6.9 [6.0 - 8.3] / ALK PHOS: 79 [40 - 120] / ALT: 79 [12 - 78] / AST: 116 [15 - 37] / GGT: x     LIVER FUNCTIONS - ( 18 Aug 2020 04:27 )  Alb: 2.9 [3.3 - 5.0] / Pro: 6.9 [6.0 - 8.3] / ALK PHOS: 85 [40 - 120] / ALT: 82 [12 - 78] / AST: 112 [15 - 37] / GGT: x     LIVER FUNCTIONS - ( 17 Aug 2020 17:27 )  Alb: 4.2 [3.3 - 5.0] / Pro: 9.2 [6.0 - 8.3] / ALK PHOS: 152 [40 - 120] / ALT: 131 [12 - 78] / AST: 200 [15 - 37] / GGT: x       Lactate, Blood: 0.9 mmol/L (08-21 @ 04:27)      RADIOLOGY & ADDITIONAL STUDIES:      A/P: 44M PMH ETOH abuse, pancreatitis, a/w acute necrotizing pancreatitis. Now intubated 2/2 respiratory distress due to ETOH withdrawal and pneumonia. Worsening leukocytosis, hypokalemia, hypophosphatemia.  - antibiotics for PNA  - IV hydration  - monitor NGT output  - GI ppx, DVT ppx  - replete electrolytes - consider increasing KCl to 20meq/L to correct hypokalemia.   - no acute surgical intervention warranted at this time, will continue to monitor  - monitor labs  - ICU management  - will d/w surgical attending

## 2020-08-25 LAB
ALBUMIN SERPL ELPH-MCNC: 1.3 G/DL — LOW (ref 3.3–5)
ALP SERPL-CCNC: 197 U/L — HIGH (ref 40–120)
ALT FLD-CCNC: 55 U/L — SIGNIFICANT CHANGE UP (ref 12–78)
ANION GAP SERPL CALC-SCNC: 6 MMOL/L — SIGNIFICANT CHANGE UP (ref 5–17)
ANION GAP SERPL CALC-SCNC: 8 MMOL/L — SIGNIFICANT CHANGE UP (ref 5–17)
ANION GAP SERPL CALC-SCNC: 9 MMOL/L — SIGNIFICANT CHANGE UP (ref 5–17)
AST SERPL-CCNC: 91 U/L — HIGH (ref 15–37)
BILIRUB SERPL-MCNC: 2.8 MG/DL — HIGH (ref 0.2–1.2)
BUN SERPL-MCNC: 14 MG/DL — SIGNIFICANT CHANGE UP (ref 7–23)
BUN SERPL-MCNC: 14 MG/DL — SIGNIFICANT CHANGE UP (ref 7–23)
BUN SERPL-MCNC: 15 MG/DL — SIGNIFICANT CHANGE UP (ref 7–23)
CALCIUM SERPL-MCNC: 7.5 MG/DL — LOW (ref 8.5–10.1)
CALCIUM SERPL-MCNC: 7.6 MG/DL — LOW (ref 8.5–10.1)
CALCIUM SERPL-MCNC: 8 MG/DL — LOW (ref 8.5–10.1)
CHLORIDE SERPL-SCNC: 107 MMOL/L — SIGNIFICANT CHANGE UP (ref 96–108)
CO2 SERPL-SCNC: 24 MMOL/L — SIGNIFICANT CHANGE UP (ref 22–31)
CO2 SERPL-SCNC: 25 MMOL/L — SIGNIFICANT CHANGE UP (ref 22–31)
CO2 SERPL-SCNC: 26 MMOL/L — SIGNIFICANT CHANGE UP (ref 22–31)
CREAT SERPL-MCNC: 0.7 MG/DL — SIGNIFICANT CHANGE UP (ref 0.5–1.3)
CREAT SERPL-MCNC: 0.79 MG/DL — SIGNIFICANT CHANGE UP (ref 0.5–1.3)
CREAT SERPL-MCNC: 0.83 MG/DL — SIGNIFICANT CHANGE UP (ref 0.5–1.3)
GLUCOSE BLDC GLUCOMTR-MCNC: 103 MG/DL — HIGH (ref 70–99)
GLUCOSE SERPL-MCNC: 111 MG/DL — HIGH (ref 70–99)
GLUCOSE SERPL-MCNC: 168 MG/DL — HIGH (ref 70–99)
GLUCOSE SERPL-MCNC: 576 MG/DL — CRITICAL HIGH (ref 70–99)
HCT VFR BLD CALC: 32.4 % — LOW (ref 39–50)
HGB BLD-MCNC: 11 G/DL — LOW (ref 13–17)
MAGNESIUM SERPL-MCNC: 1.8 MG/DL — SIGNIFICANT CHANGE UP (ref 1.6–2.6)
MAGNESIUM SERPL-MCNC: 1.9 MG/DL — SIGNIFICANT CHANGE UP (ref 1.6–2.6)
MAGNESIUM SERPL-MCNC: 2 MG/DL — SIGNIFICANT CHANGE UP (ref 1.6–2.6)
MCHC RBC-ENTMCNC: 32.8 PG — SIGNIFICANT CHANGE UP (ref 27–34)
MCHC RBC-ENTMCNC: 34 GM/DL — SIGNIFICANT CHANGE UP (ref 32–36)
MCV RBC AUTO: 96.7 FL — SIGNIFICANT CHANGE UP (ref 80–100)
NRBC # BLD: 0 /100 WBCS — SIGNIFICANT CHANGE UP (ref 0–0)
PHOSPHATE SERPL-MCNC: 2.6 MG/DL — SIGNIFICANT CHANGE UP (ref 2.5–4.5)
PHOSPHATE SERPL-MCNC: 2.8 MG/DL — SIGNIFICANT CHANGE UP (ref 2.5–4.5)
PLATELET # BLD AUTO: 222 K/UL — SIGNIFICANT CHANGE UP (ref 150–400)
POTASSIUM SERPL-MCNC: 3.1 MMOL/L — LOW (ref 3.5–5.3)
POTASSIUM SERPL-MCNC: 3.5 MMOL/L — SIGNIFICANT CHANGE UP (ref 3.5–5.3)
POTASSIUM SERPL-MCNC: 3.6 MMOL/L — SIGNIFICANT CHANGE UP (ref 3.5–5.3)
POTASSIUM SERPL-SCNC: 3.1 MMOL/L — LOW (ref 3.5–5.3)
POTASSIUM SERPL-SCNC: 3.5 MMOL/L — SIGNIFICANT CHANGE UP (ref 3.5–5.3)
POTASSIUM SERPL-SCNC: 3.6 MMOL/L — SIGNIFICANT CHANGE UP (ref 3.5–5.3)
PROT SERPL-MCNC: 5.2 GM/DL — LOW (ref 6–8.3)
RBC # BLD: 3.35 M/UL — LOW (ref 4.2–5.8)
RBC # FLD: 13.8 % — SIGNIFICANT CHANGE UP (ref 10.3–14.5)
SODIUM SERPL-SCNC: 138 MMOL/L — SIGNIFICANT CHANGE UP (ref 135–145)
SODIUM SERPL-SCNC: 140 MMOL/L — SIGNIFICANT CHANGE UP (ref 135–145)
SODIUM SERPL-SCNC: 141 MMOL/L — SIGNIFICANT CHANGE UP (ref 135–145)
WBC # BLD: 16.87 K/UL — HIGH (ref 3.8–10.5)
WBC # FLD AUTO: 16.87 K/UL — HIGH (ref 3.8–10.5)

## 2020-08-25 PROCEDURE — 99291 CRITICAL CARE FIRST HOUR: CPT

## 2020-08-25 PROCEDURE — 99232 SBSQ HOSP IP/OBS MODERATE 35: CPT

## 2020-08-25 RX ORDER — MAGNESIUM SULFATE 500 MG/ML
2 VIAL (ML) INJECTION ONCE
Refills: 0 | Status: COMPLETED | OUTPATIENT
Start: 2020-08-25 | End: 2020-08-25

## 2020-08-25 RX ORDER — IBUPROFEN 200 MG
400 TABLET ORAL ONCE
Refills: 0 | Status: COMPLETED | OUTPATIENT
Start: 2020-08-25 | End: 2020-08-25

## 2020-08-25 RX ORDER — POTASSIUM CHLORIDE 20 MEQ
10 PACKET (EA) ORAL
Refills: 0 | Status: COMPLETED | OUTPATIENT
Start: 2020-08-25 | End: 2020-08-25

## 2020-08-25 RX ADMIN — PROPOFOL 4.29 MICROGRAM(S)/KG/MIN: 10 INJECTION, EMULSION INTRAVENOUS at 02:12

## 2020-08-25 RX ADMIN — Medication 100 MILLIEQUIVALENT(S): at 05:06

## 2020-08-25 RX ADMIN — Medication 2 MILLIGRAM(S): at 02:13

## 2020-08-25 RX ADMIN — DEXMEDETOMIDINE HYDROCHLORIDE IN 0.9% SODIUM CHLORIDE 5.36 MICROGRAM(S)/KG/HR: 4 INJECTION INTRAVENOUS at 04:47

## 2020-08-25 RX ADMIN — Medication 100 MILLIGRAM(S): at 12:25

## 2020-08-25 RX ADMIN — SODIUM CHLORIDE 75 MILLILITER(S): 9 INJECTION, SOLUTION INTRAVENOUS at 21:31

## 2020-08-25 RX ADMIN — CHLORHEXIDINE GLUCONATE 1 APPLICATION(S): 213 SOLUTION TOPICAL at 06:23

## 2020-08-25 RX ADMIN — DEXMEDETOMIDINE HYDROCHLORIDE IN 0.9% SODIUM CHLORIDE 5.36 MICROGRAM(S)/KG/HR: 4 INJECTION INTRAVENOUS at 02:12

## 2020-08-25 RX ADMIN — Medication 2 MILLIGRAM(S): at 14:07

## 2020-08-25 RX ADMIN — Medication 1000 MILLIGRAM(S): at 00:15

## 2020-08-25 RX ADMIN — DEXMEDETOMIDINE HYDROCHLORIDE IN 0.9% SODIUM CHLORIDE 5.36 MICROGRAM(S)/KG/HR: 4 INJECTION INTRAVENOUS at 23:31

## 2020-08-25 RX ADMIN — MEROPENEM 100 MILLIGRAM(S): 1 INJECTION INTRAVENOUS at 21:30

## 2020-08-25 RX ADMIN — SENNA PLUS 2 TABLET(S): 8.6 TABLET ORAL at 21:30

## 2020-08-25 RX ADMIN — Medication 400 MILLIGRAM(S): at 15:00

## 2020-08-25 RX ADMIN — Medication 50 GRAM(S): at 03:49

## 2020-08-25 RX ADMIN — CHLORHEXIDINE GLUCONATE 15 MILLILITER(S): 213 SOLUTION TOPICAL at 18:33

## 2020-08-25 RX ADMIN — Medication 2 MILLIGRAM(S): at 21:30

## 2020-08-25 RX ADMIN — MEROPENEM 100 MILLIGRAM(S): 1 INJECTION INTRAVENOUS at 14:09

## 2020-08-25 RX ADMIN — Medication 1 MILLIGRAM(S): at 12:24

## 2020-08-25 RX ADMIN — PANTOPRAZOLE SODIUM 40 MILLIGRAM(S): 20 TABLET, DELAYED RELEASE ORAL at 12:25

## 2020-08-25 RX ADMIN — DEXMEDETOMIDINE HYDROCHLORIDE IN 0.9% SODIUM CHLORIDE 5.36 MICROGRAM(S)/KG/HR: 4 INJECTION INTRAVENOUS at 21:30

## 2020-08-25 RX ADMIN — Medication 2 MILLIGRAM(S): at 10:40

## 2020-08-25 RX ADMIN — DEXMEDETOMIDINE HYDROCHLORIDE IN 0.9% SODIUM CHLORIDE 5.36 MICROGRAM(S)/KG/HR: 4 INJECTION INTRAVENOUS at 10:40

## 2020-08-25 RX ADMIN — Medication 2 MILLIGRAM(S): at 05:06

## 2020-08-25 RX ADMIN — Medication 1 TABLET(S): at 12:25

## 2020-08-25 RX ADMIN — PROPOFOL 4.29 MICROGRAM(S)/KG/MIN: 10 INJECTION, EMULSION INTRAVENOUS at 10:41

## 2020-08-25 RX ADMIN — MEROPENEM 100 MILLIGRAM(S): 1 INJECTION INTRAVENOUS at 05:06

## 2020-08-25 RX ADMIN — Medication 100 MILLIEQUIVALENT(S): at 03:49

## 2020-08-25 RX ADMIN — ENOXAPARIN SODIUM 40 MILLIGRAM(S): 100 INJECTION SUBCUTANEOUS at 12:24

## 2020-08-25 RX ADMIN — Medication 400 MILLIGRAM(S): at 14:09

## 2020-08-25 RX ADMIN — CHLORHEXIDINE GLUCONATE 15 MILLILITER(S): 213 SOLUTION TOPICAL at 05:06

## 2020-08-25 RX ADMIN — Medication 100 MILLIEQUIVALENT(S): at 06:21

## 2020-08-25 RX ADMIN — Medication 2 MILLIGRAM(S): at 18:33

## 2020-08-25 RX ADMIN — QUETIAPINE FUMARATE 25 MILLIGRAM(S): 200 TABLET, FILM COATED ORAL at 12:25

## 2020-08-25 NOTE — PROGRESS NOTE ADULT - ATTENDING COMMENTS
Patient intubated and sedated, not on pressors, making good urine.  NGT in place to LCWS  Abdomen moderately distended, soft  Appreciate excellent ICU care  No surgical intervention planned or indicated at this time

## 2020-08-25 NOTE — PROGRESS NOTE ADULT - ASSESSMENT
44M PMH EtOH abuse, alcohol withdrawal, alcohol pancreatitis presents for 3 days of abdominal pain, nausea and vomiting after recent binge drinking. Found to have necrotizing pancreatitis. Admitted to medical service for pancreatitis. Transferred to ICU for alcohol withdrawal/DTs. Course complicated by septic shock, acute renal insufficiency, lactic acidosis, small bowel obstruction.    Neuro: Intubated and sedated with known alcohol withdrawal, now requiring propofol and precedex for sedation; will continue background ativan.  Daily sbt and sat.   CV: Sepsis with septic shock - multifactorial given pancreatitis and now pneumonia - now off levophed; maintain map>65.  Pulm: Acute hypoxic respiratory failure 2/2 pneumonia with L sided infiltrated; noted to have gram neg diploccoci in sputum culture; continue with antibiotics; wean FiO2 as tolerated;   C/w 12/500/5/45; tachypneic during sbt/sat this am.    GI: NPO; acute pancreatitis noted on admission; advanced to clears prior to intubation; will start trickle feeds in AM  Renal/Metabolic: FRANKO; replete electrolytes prn; continue with maintenance fluids D5 LR; will start trickle feeds in am and wean fluids.  ID: Sepsis with Septic shock - likely pneumonia now continue with meropenem.    Heme: WBC 16; stable the setting of sepsis and acute pneumonia.    Endo: FRANKO  Dispo: Remains intubated and sedated; critically ill 2/2 pneumonia, alcohol withdrawal and acute hypoxic respiratory failure.

## 2020-08-25 NOTE — PROGRESS NOTE ADULT - SUBJECTIVE AND OBJECTIVE BOX
INTERVAL HPI/OVERNIGHT EVENTS:    CPAP failed this AM 2/2 tachypnea.      CENTRAL LINE: [ ] YES [x ] NO  LOCATION:       QUINTEROS: [x ] YES [ ] NO        A-LINE:  [ ] YES [ ] NO  LOCATION:       GLOBAL ISSUE/BEST PRACTICE:  Analgesia: Dilaudid  Sedation: Propofol and Precedex  HOB elevation: yes  Stress ulcer prophylaxis: Protonix  VTE prophylaxis: HSQ  Oral Care: Chlorhexidine  Glycemic control: ISS/Lantus  Nutrition:    REVIEW OF SYSTEMS: [ x ] Unable to obtain because: intubated and sedated    PHYSICAL EXAM:    Gen: intubated and sedated  HEENT: Intubated with ETT  CHEST/LUNG: Crackles on left with coarse breath sound bilaterally  HEART: Regular rate and rhythm; No murmurs, rubs, or gallops  ABDOMEN: Soft, Nontender, Nondistended; Bowel sounds present  EXTREMITIES:  2+ Peripheral Pulses, No clubbing, cyanosis, or edema  NERVOUS SYSTEM:  intubated and sedated.    ICU Vital Signs Last 24 Hrs  T(C): 38.7 (25 Aug 2020 15:00), Max: 39.3 (25 Aug 2020 13:30)  T(F): 101.7 (25 Aug 2020 15:00), Max: 102.7 (25 Aug 2020 13:30)  HR: 81 (25 Aug 2020 16:30) (81 - 97)  BP: 92/58 (25 Aug 2020 16:00) (88/53 - 104/59)  BP(mean): 65 (25 Aug 2020 16:00) (58 - 71)  ABP: --  ABP(mean): --  RR: 15 (25 Aug 2020 16:30) (15 - 35)  SpO2: 94% (25 Aug 2020 16:30) (90% - 97%)      I&O's Detail    24 Aug 2020 07:01  -  25 Aug 2020 07:00  --------------------------------------------------------  IN:    dexmedetomidine Infusion: 510.2 mL    dextrose 5% + lactated ringers.: 1800 mL    IV PiggyBack: 700 mL    propofol Infusion: 411 mL  Total IN: 3421.2 mL    OUT:    Incontinent per Condom Catheter: 1200 mL    Nasoenteral Tube: 300 mL  Total OUT: 1500 mL    Total NET: 1921.2 mL      25 Aug 2020 07:01  -  25 Aug 2020 17:31  --------------------------------------------------------  IN:    dexmedetomidine Infusion: 174.6 mL    dextrose 5% + lactated ringers.: 675 mL    propofol Infusion: 121.2 mL  Total IN: 970.8 mL    OUT:    Incontinent per Condom Catheter: 200 mL  Total OUT: 200 mL    Total NET: 770.8 mL          MEDICATIONS  NEURO  Meds: acetaminophen   Tablet .. 650 milliGRAM(s) Oral every 6 hours PRN Temp greater or equal to 38C (100.4F), Mild Pain (1 - 3)  dexMEDEtomidine Infusion 0.3 MICROgram(s)/kG/Hr (5.36 mL/Hr) IV Continuous <Continuous>  HYDROmorphone  Injectable 0.5 milliGRAM(s) IV Push every 4 hours PRN Severe Pain (7 - 10)  LORazepam   Injectable 2 milliGRAM(s) IV Push every 4 hours  propofol Infusion 10 MICROgram(s)/kG/Min (4.29 mL/Hr) IV Continuous <Continuous>  QUEtiapine 25 milliGRAM(s) Oral daily    RESPIRATORY  ABG - ( 23 Aug 2020 21:58 )  pH: x     /  pCO2: 34    /  pO2: 176   / HCO3: 26    / Base Excess: 2.8   /  SaO2: 100     Lactate: x                Meds:   CARDIOVASCULAR  Meds:   GI/NUTRITION  Meds: pantoprazole  Injectable 40 milliGRAM(s) IV Push daily  senna 2 Tablet(s) Oral at bedtime    GENITOURINARY  Meds: dextrose 5% + lactated ringers. 1000 milliLiter(s) IV Continuous <Continuous>  folic acid 1 milliGRAM(s) Oral daily  multivitamin 1 Tablet(s) Oral daily  thiamine 100 milliGRAM(s) Oral daily    HEMATOLOGIC  Meds: enoxaparin Injectable 40 milliGRAM(s) SubCutaneous daily    [x] VTE Prophylaxis  INFECTIOUS DISEASES  Meds: meropenem  IVPB 1000 milliGRAM(s) IV Intermittent every 8 hours    ENDOCRINE  CAPILLARY BLOOD GLUCOSE      POCT Blood Glucose.: 103 mg/dL (25 Aug 2020 09:39)    Meds:   OTHER MEDICATIONS:  chlorhexidine 0.12% Liquid 15 milliLiter(s) Oral Mucosa every 12 hours  chlorhexidine 4% Liquid 1 Application(s) Topical <User Schedule>  :    LABS:                        11.0   16.87 )-----------( 222      ( 25 Aug 2020 02:43 )             32.4      08-25    141  |  107  |  15  ----------------------------<  168<H>  3.5   |  26  |  0.83    Ca    8.0<L>      25 Aug 2020 10:15  Phos  2.6     08-25  Mg     2.0     08-25    TPro  5.2<L>  /  Alb  1.3<L>  /  TBili  2.8<H>  /  DBili  x   /  AST  91<H>  /  ALT  55  /  AlkPhos  197<H>  08-25      ## COVID Panel  Procalcitonin, Serum: 0.40 ng/mL (08-21-20 @ 09:05)      Culture Results:   Normal Respiratory Poonam present (08-24 @ 09:16)  Culture Results:   No growth to date. (08-23 @ 01:14)  Culture Results:   No growth to date. (08-23 @ 01:10)      Mode: AC/ CMV (Assist Control/ Continuous Mandatory Ventilation), RR (machine): 12, TV (machine): 500, FiO2: 50, PEEP: 5, ITime: 1, MAP: 9, PIP: 26      RADIOLOGY & ADDITIONAL STUDIES:

## 2020-08-25 NOTE — PROGRESS NOTE ADULT - SUBJECTIVE AND OBJECTIVE BOX
44y old  Male who presented with a chief complaint of abdominal pain and was admitted with PANCREATITIS.     Pt seen and examined at bedside resting comfortably in no acute distress in the ICU, intubated - due to hypoxic respiratory failure.             MEDICATIONS  (STANDING):  chlorhexidine 0.12% Liquid 15 milliLiter(s) Oral Mucosa every 12 hours  chlorhexidine 4% Liquid 1 Application(s) Topical <User Schedule>  dexMEDEtomidine Infusion 0.3 MICROgram(s)/kG/Hr (5.36 mL/Hr) IV Continuous <Continuous>  dextrose 5% + lactated ringers. 1000 milliLiter(s) (75 mL/Hr) IV Continuous <Continuous>  enoxaparin Injectable 40 milliGRAM(s) SubCutaneous daily  folic acid 1 milliGRAM(s) Oral daily  LORazepam   Injectable 2 milliGRAM(s) IV Push every 4 hours  meropenem  IVPB 1000 milliGRAM(s) IV Intermittent every 8 hours  multivitamin 1 Tablet(s) Oral daily  pantoprazole  Injectable 40 milliGRAM(s) IV Push daily  propofol Infusion 10 MICROgram(s)/kG/Min (4.29 mL/Hr) IV Continuous <Continuous>  QUEtiapine 25 milliGRAM(s) Oral daily  senna 2 Tablet(s) Oral at bedtime  thiamine 100 milliGRAM(s) Oral daily      MEDICATIONS  (PRN):  acetaminophen   Tablet .. 650 milliGRAM(s) Oral every 6 hours PRN Temp greater or equal to 38C (100.4F), Mild Pain (1 - 3)  HYDROmorphone  Injectable 0.5 milliGRAM(s) IV Push every 4 hours PRN Severe Pain (7 - 10)      Vital Signs Last 24 Hrs  T(F): 99 (08-25-20 @ 04:00), Max: 102.3 (08-24-20 @ 23:25)  HR: 85 (08-25-20 @ 06:00)  BP: 94/56 (08-25-20 @ 06:00)  RR: 18 (08-25-20 @ 06:00)  SpO2: 95% (08-25-20 @ 06:00)  Wt(kg): --   CAPILLARY BLOOD GLUCOSE      GENERAL: Sedated, NAD  HEENT: Intubated. NGT to LWS with green output in canister:  CHEST/LUNG: Vented, respirations nonlabored  HEART: S1S2, RRR  ABDOMEN: + Bowel sounds, softly distended, nontender  EXTREMITIES: No pedal edema b/l     I&O's Detail    23 Aug 2020 07:01  -  24 Aug 2020 07:00  --------------------------------------------------------  IN:    dexmedetomidine Infusion: 698.4 mL    dextrose 5% + lactated ringers.: 1800 mL    IV PiggyBack: 1700 mL    propofol Infusion: 77.4 mL  Total IN: 4275.8 mL    OUT:    Incontinent per Condom Catheter: 400 mL    Nasoenteral Tube: 400 mL    Voided: 200 mL  Total OUT: 1000 mL    Total NET: 3275.8 mL      24 Aug 2020 07:01  -  25 Aug 2020 06:27  --------------------------------------------------------  IN:    dexmedetomidine Infusion: 471.4 mL    dextrose 5% + lactated ringers.: 1650 mL    IV PiggyBack: 600 mL    propofol Infusion: 373.8 mL  Total IN: 3095.2 mL    OUT:    Incontinent per Condom Catheter: 500 mL    Nasoenteral Tube: 300 mL  Total OUT: 800 mL    Total NET: 2295.2 mL        LABS:                        11.0   16.87 )-----------( 222      ( 25 Aug 2020 02:43 )             32.4     WBC Count: 16.87 K/uL (08-25 @ 02:43)  WBC Count: 19.47 K/uL (08-24 @ 03:01)  WBC Count: 13.63 K/uL (08-23 @ 04:01)  WBC Count: 9.60 K/uL (08-22 @ 04:48)  WBC Count: 5.44 K/uL (08-21 @ 02:55)    08-25    138  |  107  |  14  ----------------------------<  111<H>  3.1<L>   |  25  |  0.70    Potassium, Serum: 3.1 mmol/L (08-25-20 @ 02:43)  Potassium, Serum: 3.7 mmol/L (08-24-20 @ 11:44)  Potassium, Serum: 3.2 mmol/L (08-24-20 @ 03:01)  Potassium, Serum: 3.4 mmol/L (08-23-20 @ 14:45)  Potassium, Serum: 2.9 mmol/L (08-23-20 @ 04:01)  Potassium, Serum: 3.0 mmol/L (08-22-20 @ 04:48)  Potassium, Serum: 3.6 mmol/L (08-21-20 @ 02:55)  Potassium, Serum: 3.5 mmol/L (08-20-20 @ 03:34)  Potassium, Serum: 4.2 mmol/L (08-19-20 @ 10:48)  Potassium, Serum: 4.5 mmol/L (08-19-20 @ 03:07)  Potassium, Serum: 4.5 mmol/L (08-18-20 @ 12:55)  Potassium, Serum: 4.0 mmol/L (08-18-20 @ 05:08)  Potassium, Serum: 3.8 mmol/L (08-18-20 @ 04:27)  Potassium, Serum: 2.9 mmol/L (08-17-20 @ 17:27)  Potassium, Serum: 3.3 mmol/L (02-11-20 @ 12:14)  Potassium, Serum: 3.6 mmol/L (02-11-20 @ 02:57)  Potassium, Serum: 3.4 mmol/L (12-14-19 @ 07:40)  Potassium, Serum: 3.1 mmol/L (12-13-19 @ 07:00)  Potassium, Serum: 3.3 mmol/L (12-12-19 @ 16:43)  Potassium, Serum: 2.8 mmol/L (12-12-19 @ 07:48)    Ca    7.5<L>      25 Aug 2020 02:43  Phos  2.8     08-25  Mg     1.9     08-25    Calcium, Total Serum: 7.5 mg/dL [8.5 - 10.1] (08-25-20)  Magnesium, Serum: 1.9 mg/dL [1.6 - 2.6] (08-25-20)  Phosphorus Level, Serum: 2.8 mg/dL [2.5 - 4.5] (08-25-20)  Calcium, Total Serum: 7.7 mg/dL [8.5 - 10.1] (08-24-20)  Magnesium, Serum: 2.0 mg/dL [1.6 - 2.6] (08-24-20)  Phosphorus Level, Serum: 3.3 mg/dL [2.5 - 4.5] (08-24-20)  Calcium, Total Serum: 7.3 mg/dL [8.5 - 10.1] (08-24-20)  Magnesium, Serum: 1.6 mg/dL [1.6 - 2.6] (08-24-20)  Phosphorus Level, Serum: 2.4 mg/dL [2.5 - 4.5] (08-24-20)  Calcium, Total Serum: 7.6 mg/dL [8.5 - 10.1] (08-23-20)  Magnesium, Serum: 1.8 mg/dL [1.6 - 2.6] (08-23-20)  Phosphorus Level, Serum: 3.6 mg/dL [2.5 - 4.5] (08-23-20)  Calcium, Total Serum: 7.4 mg/dL [8.5 - 10.1] (08-23-20)  Magnesium, Serum: 1.3 mg/dL [1.6 - 2.6] (08-23-20)  Phosphorus Level, Serum: 1.9 mg/dL [2.5 - 4.5] (08-23-20)  Magnesium, Serum: 1.5 mg/dL [1.6 - 2.6] (08-22-20)  Phosphorus Level, Serum: 1.2 mg/dL [2.5 - 4.5] (08-22-20)  Calcium, Total Serum: 6.8 mg/dL [8.5 - 10.1] (08-22-20)  Calcium, Total Serum: 6.7 mg/dL [8.5 - 10.1] (08-21-20)  Magnesium, Serum: 2.0 mg/dL [1.6 - 2.6] (08-21-20)  Phosphorus Level, Serum: 1.8 mg/dL [2.5 - 4.5] (08-21-20)  Calcium, Total Serum: 6.6 mg/dL [8.5 - 10.1] (08-20-20)  Magnesium, Serum: 1.9 mg/dL [1.6 - 2.6] (08-20-20)  Phosphorus Level, Serum: 1.7 mg/dL [2.5 - 4.5] (08-20-20)  Calcium, Total Serum: 7.2 mg/dL [8.5 - 10.1] (08-19-20)  Magnesium, Serum: 2.0 mg/dL [1.6 - 2.6] (08-19-20)  Phosphorus Level, Serum: 2.6 mg/dL [2.5 - 4.5] (08-19-20)  Calcium, Total Serum: 7.5 mg/dL [8.5 - 10.1] (08-19-20)  Magnesium, Serum: 1.7 mg/dL [1.6 - 2.6] (08-19-20)  Phosphorus Level, Serum: 3.1 mg/dL [2.5 - 4.5] (08-19-20)  Calcium, Total Serum: 8.3 mg/dL [8.5 - 10.1] (08-18-20)  Magnesium, Serum: 1.3 mg/dL [1.6 - 2.6] (08-18-20)  Phosphorus Level, Serum: 4.0 mg/dL [2.5 - 4.5] (08-18-20)  Calcium, Total Serum: 9.2 mg/dL [8.5 - 10.1] (08-18-20)  Magnesium, Serum: 0.9 mg/dL [1.6 - 2.6] (08-18-20)  Phosphorus Level, Serum: 0.8 mg/dL [2.5 - 4.5] (08-18-20)  Calcium, Total Serum: 9.2 mg/dL [8.5 - 10.1] (08-18-20)  Calcium, Total Serum: 11.5 mg/dL [8.5 - 10.1] (08-17-20)    RADIOLOGY & ADDITIONAL STUDIES:  IMAGING:      A/P: 44M PMH ETOH abuse, pancreatitis, a/w acute necrotizing pancreatitis. Now intubated 2/2 respiratory distress due to ETOH withdrawal and pneumonia. Leukocytosis.  - antibiotics for PNA  - IV hydration  - monitor NGT output  - GI ppx, DVT ppx  - replete electrolytes  - no acute surgical intervention warranted at this time, will continue to monitor  - monitor labs  - ICU management  - will d/w surgical attending 44y old  Male who presented with a chief complaint of abdominal pain and was admitted with PANCREATITIS.     Pt seen and examined at bedside resting comfortably in no acute distress in the ICU, intubated - due to hypoxic respiratory failure.             MEDICATIONS  (STANDING):  chlorhexidine 0.12% Liquid 15 milliLiter(s) Oral Mucosa every 12 hours  chlorhexidine 4% Liquid 1 Application(s) Topical <User Schedule>  dexMEDEtomidine Infusion 0.3 MICROgram(s)/kG/Hr (5.36 mL/Hr) IV Continuous <Continuous>  dextrose 5% + lactated ringers. 1000 milliLiter(s) (75 mL/Hr) IV Continuous <Continuous>  enoxaparin Injectable 40 milliGRAM(s) SubCutaneous daily  folic acid 1 milliGRAM(s) Oral daily  LORazepam   Injectable 2 milliGRAM(s) IV Push every 4 hours  meropenem  IVPB 1000 milliGRAM(s) IV Intermittent every 8 hours  multivitamin 1 Tablet(s) Oral daily  pantoprazole  Injectable 40 milliGRAM(s) IV Push daily  propofol Infusion 10 MICROgram(s)/kG/Min (4.29 mL/Hr) IV Continuous <Continuous>  QUEtiapine 25 milliGRAM(s) Oral daily  senna 2 Tablet(s) Oral at bedtime  thiamine 100 milliGRAM(s) Oral daily      MEDICATIONS  (PRN):  acetaminophen   Tablet .. 650 milliGRAM(s) Oral every 6 hours PRN Temp greater or equal to 38C (100.4F), Mild Pain (1 - 3)  HYDROmorphone  Injectable 0.5 milliGRAM(s) IV Push every 4 hours PRN Severe Pain (7 - 10)      Vital Signs Last 24 Hrs  T(F): 99 (08-25-20 @ 04:00), Max: 102.3 (08-24-20 @ 23:25)  HR: 85 (08-25-20 @ 06:00)  BP: 94/56 (08-25-20 @ 06:00)  RR: 18 (08-25-20 @ 06:00)  SpO2: 95% (08-25-20 @ 06:00)  Wt(kg): --   CAPILLARY BLOOD GLUCOSE      GENERAL: Sedated, NAD  HEENT: Intubated. NGT to LWS with green output in canister:  CHEST/LUNG: Vented, respirations nonlabored  HEART: S1S2, RRR  ABDOMEN: + Bowel sounds, softly distended, nontender  EXTREMITIES: No pedal edema b/l     I&O's Detail    23 Aug 2020 07:01  -  24 Aug 2020 07:00  --------------------------------------------------------  IN:    dexmedetomidine Infusion: 698.4 mL    dextrose 5% + lactated ringers.: 1800 mL    IV PiggyBack: 1700 mL    propofol Infusion: 77.4 mL  Total IN: 4275.8 mL    OUT:    Incontinent per Condom Catheter: 400 mL    Nasoenteral Tube: 400 mL    Voided: 200 mL  Total OUT: 1000 mL    Total NET: 3275.8 mL      24 Aug 2020 07:01  -  25 Aug 2020 06:27  --------------------------------------------------------  IN:    dexmedetomidine Infusion: 471.4 mL    dextrose 5% + lactated ringers.: 1650 mL    IV PiggyBack: 600 mL    propofol Infusion: 373.8 mL  Total IN: 3095.2 mL    OUT:    Incontinent per Condom Catheter: 500 mL    Nasoenteral Tube: 300 mL  Total OUT: 800 mL    Total NET: 2295.2 mL        LABS:                        11.0   16.87 )-----------( 222      ( 25 Aug 2020 02:43 )             32.4     WBC Count: 16.87 K/uL (08-25 @ 02:43)  WBC Count: 19.47 K/uL (08-24 @ 03:01)  WBC Count: 13.63 K/uL (08-23 @ 04:01)  WBC Count: 9.60 K/uL (08-22 @ 04:48)  WBC Count: 5.44 K/uL (08-21 @ 02:55)    08-25    138  |  107  |  14  ----------------------------<  111<H>  3.1<L>   |  25  |  0.70    Potassium, Serum: 3.1 mmol/L (08-25-20 @ 02:43)  Potassium, Serum: 3.7 mmol/L (08-24-20 @ 11:44)  Potassium, Serum: 3.2 mmol/L (08-24-20 @ 03:01)  Potassium, Serum: 3.4 mmol/L (08-23-20 @ 14:45)  Potassium, Serum: 2.9 mmol/L (08-23-20 @ 04:01)  Potassium, Serum: 3.0 mmol/L (08-22-20 @ 04:48)  Potassium, Serum: 3.6 mmol/L (08-21-20 @ 02:55)  Potassium, Serum: 3.5 mmol/L (08-20-20 @ 03:34)  Potassium, Serum: 4.2 mmol/L (08-19-20 @ 10:48)  Potassium, Serum: 4.5 mmol/L (08-19-20 @ 03:07)  Potassium, Serum: 4.5 mmol/L (08-18-20 @ 12:55)  Potassium, Serum: 4.0 mmol/L (08-18-20 @ 05:08)  Potassium, Serum: 3.8 mmol/L (08-18-20 @ 04:27)  Potassium, Serum: 2.9 mmol/L (08-17-20 @ 17:27)  Potassium, Serum: 3.3 mmol/L (02-11-20 @ 12:14)  Potassium, Serum: 3.6 mmol/L (02-11-20 @ 02:57)  Potassium, Serum: 3.4 mmol/L (12-14-19 @ 07:40)  Potassium, Serum: 3.1 mmol/L (12-13-19 @ 07:00)  Potassium, Serum: 3.3 mmol/L (12-12-19 @ 16:43)  Potassium, Serum: 2.8 mmol/L (12-12-19 @ 07:48)    Ca    7.5<L>      25 Aug 2020 02:43  Phos  2.8     08-25  Mg     1.9     08-25    Calcium, Total Serum: 7.5 mg/dL [8.5 - 10.1] (08-25-20)  Magnesium, Serum: 1.9 mg/dL [1.6 - 2.6] (08-25-20)  Phosphorus Level, Serum: 2.8 mg/dL [2.5 - 4.5] (08-25-20)  Calcium, Total Serum: 7.7 mg/dL [8.5 - 10.1] (08-24-20)  Magnesium, Serum: 2.0 mg/dL [1.6 - 2.6] (08-24-20)  Phosphorus Level, Serum: 3.3 mg/dL [2.5 - 4.5] (08-24-20)  Calcium, Total Serum: 7.3 mg/dL [8.5 - 10.1] (08-24-20)  Magnesium, Serum: 1.6 mg/dL [1.6 - 2.6] (08-24-20)  Phosphorus Level, Serum: 2.4 mg/dL [2.5 - 4.5] (08-24-20)  Calcium, Total Serum: 7.6 mg/dL [8.5 - 10.1] (08-23-20)  Magnesium, Serum: 1.8 mg/dL [1.6 - 2.6] (08-23-20)  Phosphorus Level, Serum: 3.6 mg/dL [2.5 - 4.5] (08-23-20)  Calcium, Total Serum: 7.4 mg/dL [8.5 - 10.1] (08-23-20)  Magnesium, Serum: 1.3 mg/dL [1.6 - 2.6] (08-23-20)  Phosphorus Level, Serum: 1.9 mg/dL [2.5 - 4.5] (08-23-20)  Magnesium, Serum: 1.5 mg/dL [1.6 - 2.6] (08-22-20)  Phosphorus Level, Serum: 1.2 mg/dL [2.5 - 4.5] (08-22-20)  Calcium, Total Serum: 6.8 mg/dL [8.5 - 10.1] (08-22-20)  Calcium, Total Serum: 6.7 mg/dL [8.5 - 10.1] (08-21-20)  Magnesium, Serum: 2.0 mg/dL [1.6 - 2.6] (08-21-20)  Phosphorus Level, Serum: 1.8 mg/dL [2.5 - 4.5] (08-21-20)  Calcium, Total Serum: 6.6 mg/dL [8.5 - 10.1] (08-20-20)  Magnesium, Serum: 1.9 mg/dL [1.6 - 2.6] (08-20-20)  Phosphorus Level, Serum: 1.7 mg/dL [2.5 - 4.5] (08-20-20)  Calcium, Total Serum: 7.2 mg/dL [8.5 - 10.1] (08-19-20)  Magnesium, Serum: 2.0 mg/dL [1.6 - 2.6] (08-19-20)  Phosphorus Level, Serum: 2.6 mg/dL [2.5 - 4.5] (08-19-20)  Calcium, Total Serum: 7.5 mg/dL [8.5 - 10.1] (08-19-20)  Magnesium, Serum: 1.7 mg/dL [1.6 - 2.6] (08-19-20)  Phosphorus Level, Serum: 3.1 mg/dL [2.5 - 4.5] (08-19-20)  Calcium, Total Serum: 8.3 mg/dL [8.5 - 10.1] (08-18-20)  Magnesium, Serum: 1.3 mg/dL [1.6 - 2.6] (08-18-20)  Phosphorus Level, Serum: 4.0 mg/dL [2.5 - 4.5] (08-18-20)  Calcium, Total Serum: 9.2 mg/dL [8.5 - 10.1] (08-18-20)  Magnesium, Serum: 0.9 mg/dL [1.6 - 2.6] (08-18-20)  Phosphorus Level, Serum: 0.8 mg/dL [2.5 - 4.5] (08-18-20)  Calcium, Total Serum: 9.2 mg/dL [8.5 - 10.1] (08-18-20)  Calcium, Total Serum: 11.5 mg/dL [8.5 - 10.1] (08-17-20)    RADIOLOGY & ADDITIONAL STUDIES:  IMAGING:      A/P: 44M PMH ETOH abuse, pancreatitis, a/w acute necrotizing pancreatitis. Now intubated 2/2 respiratory distress due to ETOH withdrawal and pneumonia. Leukocytosis.  - antibiotics for PNA  - IV hydration  - monitor NGT output  - GI ppx, DVT ppx  - replete electrolytes - switch to 20meq KCl IV  - no acute surgical intervention warranted at this time, will continue to monitor  - monitor labs  - ICU management  - will d/w surgical attending

## 2020-08-25 NOTE — PROGRESS NOTE ADULT - SUBJECTIVE AND OBJECTIVE BOX
SURGERY PROGRESS HPI:  Pt seen and examined at bedside resting comfortably in the ICU, intubated.     Vital Signs Last 24 Hrs  T(C): 37.2 (25 Aug 2020 04:00), Max: 39.1 (24 Aug 2020 23:25)  T(F): 99 (25 Aug 2020 04:00), Max: 102.3 (24 Aug 2020 23:25)  HR: 86 (25 Aug 2020 04:00) (80 - 97)  BP: 95/55 (25 Aug 2020 04:00) (79/56 - 107/71)  BP(mean): 64 (25 Aug 2020 04:00) (58 - 79)  RR: 17 (25 Aug 2020 04:00) (15 - 25)  SpO2: 96% (25 Aug 2020 04:00) (87% - 98%)      PHYSICAL EXAM:    GENERAL: NAD, sedated  HEAD:  Atraumatic, Normocephalic  CHEST/LUNG: Intubated, on vent. Clear to ausculation, bilaterally   HEART: RRR S1S2  ABDOMEN: softly distended, hypoactive BS, soft, non tender, no guarding  EXTREMITIES:  calf soft, non tender b/l    I&O's Detail    23 Aug 2020 07:01  -  24 Aug 2020 07:00  --------------------------------------------------------  IN:    dexmedetomidine Infusion: 698.4 mL    dextrose 5% + lactated ringers.: 1800 mL    IV PiggyBack: 1700 mL    propofol Infusion: 77.4 mL  Total IN: 4275.8 mL    OUT:    Incontinent per Condom Catheter: 400 mL    Nasoenteral Tube: 400 mL    Voided: 200 mL  Total OUT: 1000 mL    Total NET: 3275.8 mL      24 Aug 2020 07:01  -  25 Aug 2020 05:22  --------------------------------------------------------  IN:    dexmedetomidine Infusion: 452 mL    dextrose 5% + lactated ringers.: 1575 mL    IV PiggyBack: 400 mL    propofol Infusion: 355.2 mL  Total IN: 2782.2 mL    OUT:    Incontinent per Condom Catheter: 500 mL    Nasoenteral Tube: 300 mL  Total OUT: 800 mL    Total NET: 1982.2 mL          LABS:                        11.0   16.87 )-----------( 222      ( 25 Aug 2020 02:43 )             32.4     08-25    138  |  107  |  14  ----------------------------<  111<H>  3.1<L>   |  25  |  0.70    Ca    7.5<L>      25 Aug 2020 02:43  Phos  2.8     08-25  Mg     1.9     08-25      Culture Results:   No growth to date. (08-23 @ 01:14)  Culture Results:   No growth to date. (08-23 @ 01:10)        A/P: 44M PMH ETOH abuse, pancreatitis, a/w acute necrotizing pancreatitis. Now intubated 2/2 respiratory distress due to ETOH withdrawal and pneumonia. Leukocytosis.  - antibiotics for PNA  - IV hydration  - monitor NGT output  - GI ppx, DVT ppx  - replete electrolytes  - no acute surgical intervention warranted at this time, will continue to monitor  - monitor labs  - ICU management  - will d/w surgical attending

## 2020-08-26 LAB
ANION GAP SERPL CALC-SCNC: 8 MMOL/L — SIGNIFICANT CHANGE UP (ref 5–17)
BUN SERPL-MCNC: 14 MG/DL — SIGNIFICANT CHANGE UP (ref 7–23)
CALCIUM SERPL-MCNC: 7.7 MG/DL — LOW (ref 8.5–10.1)
CHLORIDE SERPL-SCNC: 107 MMOL/L — SIGNIFICANT CHANGE UP (ref 96–108)
CO2 SERPL-SCNC: 23 MMOL/L — SIGNIFICANT CHANGE UP (ref 22–31)
CREAT SERPL-MCNC: 0.46 MG/DL — LOW (ref 0.5–1.3)
CULTURE RESULTS: SIGNIFICANT CHANGE UP
GLUCOSE SERPL-MCNC: 104 MG/DL — HIGH (ref 70–99)
GRAM STN FLD: SIGNIFICANT CHANGE UP
GRAM STN FLD: SIGNIFICANT CHANGE UP
HCT VFR BLD CALC: 33.2 % — LOW (ref 39–50)
HGB BLD-MCNC: 10.9 G/DL — LOW (ref 13–17)
MAGNESIUM SERPL-MCNC: 1.9 MG/DL — SIGNIFICANT CHANGE UP (ref 1.6–2.6)
MCHC RBC-ENTMCNC: 32.6 PG — SIGNIFICANT CHANGE UP (ref 27–34)
MCHC RBC-ENTMCNC: 32.8 GM/DL — SIGNIFICANT CHANGE UP (ref 32–36)
MCV RBC AUTO: 99.4 FL — SIGNIFICANT CHANGE UP (ref 80–100)
NRBC # BLD: 0 /100 WBCS — SIGNIFICANT CHANGE UP (ref 0–0)
PHOSPHATE SERPL-MCNC: 3.4 MG/DL — SIGNIFICANT CHANGE UP (ref 2.5–4.5)
PLATELET # BLD AUTO: 263 K/UL — SIGNIFICANT CHANGE UP (ref 150–400)
POTASSIUM SERPL-MCNC: 3.4 MMOL/L — LOW (ref 3.5–5.3)
POTASSIUM SERPL-SCNC: 3.4 MMOL/L — LOW (ref 3.5–5.3)
RBC # BLD: 3.34 M/UL — LOW (ref 4.2–5.8)
RBC # FLD: 13.9 % — SIGNIFICANT CHANGE UP (ref 10.3–14.5)
SODIUM SERPL-SCNC: 138 MMOL/L — SIGNIFICANT CHANGE UP (ref 135–145)
SPECIMEN SOURCE: SIGNIFICANT CHANGE UP
SPECIMEN SOURCE: SIGNIFICANT CHANGE UP
WBC # BLD: 16.87 K/UL — HIGH (ref 3.8–10.5)
WBC # FLD AUTO: 16.87 K/UL — HIGH (ref 3.8–10.5)

## 2020-08-26 PROCEDURE — 99291 CRITICAL CARE FIRST HOUR: CPT

## 2020-08-26 RX ORDER — VANCOMYCIN HCL 1 G
1000 VIAL (EA) INTRAVENOUS EVERY 8 HOURS
Refills: 0 | Status: DISCONTINUED | OUTPATIENT
Start: 2020-08-26 | End: 2020-08-27

## 2020-08-26 RX ORDER — POTASSIUM CHLORIDE 20 MEQ
10 PACKET (EA) ORAL
Refills: 0 | Status: COMPLETED | OUTPATIENT
Start: 2020-08-26 | End: 2020-08-26

## 2020-08-26 RX ORDER — FENTANYL CITRATE 50 UG/ML
0.5 INJECTION INTRAVENOUS
Qty: 2500 | Refills: 0 | Status: DISCONTINUED | OUTPATIENT
Start: 2020-08-26 | End: 2020-08-27

## 2020-08-26 RX ORDER — HALOPERIDOL DECANOATE 100 MG/ML
5 INJECTION INTRAMUSCULAR ONCE
Refills: 0 | Status: COMPLETED | OUTPATIENT
Start: 2020-08-26 | End: 2020-08-26

## 2020-08-26 RX ORDER — SODIUM CHLORIDE 9 MG/ML
1000 INJECTION, SOLUTION INTRAVENOUS ONCE
Refills: 0 | Status: COMPLETED | OUTPATIENT
Start: 2020-08-26 | End: 2020-08-26

## 2020-08-26 RX ORDER — MAGNESIUM SULFATE 500 MG/ML
2 VIAL (ML) INJECTION ONCE
Refills: 0 | Status: COMPLETED | OUTPATIENT
Start: 2020-08-26 | End: 2020-08-26

## 2020-08-26 RX ADMIN — QUETIAPINE FUMARATE 25 MILLIGRAM(S): 200 TABLET, FILM COATED ORAL at 12:33

## 2020-08-26 RX ADMIN — Medication 1 MILLIGRAM(S): at 12:33

## 2020-08-26 RX ADMIN — MEROPENEM 100 MILLIGRAM(S): 1 INJECTION INTRAVENOUS at 05:37

## 2020-08-26 RX ADMIN — PROPOFOL 4.29 MICROGRAM(S)/KG/MIN: 10 INJECTION, EMULSION INTRAVENOUS at 17:53

## 2020-08-26 RX ADMIN — SODIUM CHLORIDE 75 MILLILITER(S): 9 INJECTION, SOLUTION INTRAVENOUS at 10:58

## 2020-08-26 RX ADMIN — Medication 650 MILLIGRAM(S): at 08:56

## 2020-08-26 RX ADMIN — Medication 650 MILLIGRAM(S): at 20:41

## 2020-08-26 RX ADMIN — DEXMEDETOMIDINE HYDROCHLORIDE IN 0.9% SODIUM CHLORIDE 5.36 MICROGRAM(S)/KG/HR: 4 INJECTION INTRAVENOUS at 06:54

## 2020-08-26 RX ADMIN — Medication 2 MILLIGRAM(S): at 01:02

## 2020-08-26 RX ADMIN — SODIUM CHLORIDE 1000 MILLILITER(S): 9 INJECTION, SOLUTION INTRAVENOUS at 22:41

## 2020-08-26 RX ADMIN — MEROPENEM 100 MILLIGRAM(S): 1 INJECTION INTRAVENOUS at 22:41

## 2020-08-26 RX ADMIN — CHLORHEXIDINE GLUCONATE 1 APPLICATION(S): 213 SOLUTION TOPICAL at 06:28

## 2020-08-26 RX ADMIN — Medication 100 MILLIEQUIVALENT(S): at 07:55

## 2020-08-26 RX ADMIN — Medication 100 MILLIGRAM(S): at 12:54

## 2020-08-26 RX ADMIN — Medication 250 MILLIGRAM(S): at 20:41

## 2020-08-26 RX ADMIN — ENOXAPARIN SODIUM 40 MILLIGRAM(S): 100 INJECTION SUBCUTANEOUS at 12:33

## 2020-08-26 RX ADMIN — CHLORHEXIDINE GLUCONATE 15 MILLILITER(S): 213 SOLUTION TOPICAL at 05:35

## 2020-08-26 RX ADMIN — DEXMEDETOMIDINE HYDROCHLORIDE IN 0.9% SODIUM CHLORIDE 5.36 MICROGRAM(S)/KG/HR: 4 INJECTION INTRAVENOUS at 01:54

## 2020-08-26 RX ADMIN — DEXMEDETOMIDINE HYDROCHLORIDE IN 0.9% SODIUM CHLORIDE 5.36 MICROGRAM(S)/KG/HR: 4 INJECTION INTRAVENOUS at 12:14

## 2020-08-26 RX ADMIN — Medication 1 TABLET(S): at 12:34

## 2020-08-26 RX ADMIN — MEROPENEM 100 MILLIGRAM(S): 1 INJECTION INTRAVENOUS at 15:48

## 2020-08-26 RX ADMIN — Medication 50 GRAM(S): at 05:35

## 2020-08-26 RX ADMIN — CHLORHEXIDINE GLUCONATE 15 MILLILITER(S): 213 SOLUTION TOPICAL at 17:54

## 2020-08-26 RX ADMIN — Medication 100 MILLIEQUIVALENT(S): at 08:57

## 2020-08-26 RX ADMIN — Medication 250 MILLIGRAM(S): at 12:14

## 2020-08-26 RX ADMIN — SENNA PLUS 2 TABLET(S): 8.6 TABLET ORAL at 22:41

## 2020-08-26 RX ADMIN — PROPOFOL 4.29 MICROGRAM(S)/KG/MIN: 10 INJECTION, EMULSION INTRAVENOUS at 05:36

## 2020-08-26 RX ADMIN — DEXMEDETOMIDINE HYDROCHLORIDE IN 0.9% SODIUM CHLORIDE 5.36 MICROGRAM(S)/KG/HR: 4 INJECTION INTRAVENOUS at 05:36

## 2020-08-26 RX ADMIN — HALOPERIDOL DECANOATE 5 MILLIGRAM(S): 100 INJECTION INTRAMUSCULAR at 21:24

## 2020-08-26 RX ADMIN — Medication 650 MILLIGRAM(S): at 09:45

## 2020-08-26 RX ADMIN — DEXMEDETOMIDINE HYDROCHLORIDE IN 0.9% SODIUM CHLORIDE 5.36 MICROGRAM(S)/KG/HR: 4 INJECTION INTRAVENOUS at 09:44

## 2020-08-26 RX ADMIN — Medication 650 MILLIGRAM(S): at 21:41

## 2020-08-26 RX ADMIN — Medication 100 MILLIEQUIVALENT(S): at 05:35

## 2020-08-26 RX ADMIN — DEXMEDETOMIDINE HYDROCHLORIDE IN 0.9% SODIUM CHLORIDE 5.36 MICROGRAM(S)/KG/HR: 4 INJECTION INTRAVENOUS at 22:54

## 2020-08-26 RX ADMIN — PROPOFOL 4.29 MICROGRAM(S)/KG/MIN: 10 INJECTION, EMULSION INTRAVENOUS at 01:02

## 2020-08-26 RX ADMIN — Medication 2 MILLIGRAM(S): at 05:36

## 2020-08-26 RX ADMIN — DEXMEDETOMIDINE HYDROCHLORIDE IN 0.9% SODIUM CHLORIDE 5.36 MICROGRAM(S)/KG/HR: 4 INJECTION INTRAVENOUS at 17:54

## 2020-08-26 RX ADMIN — PANTOPRAZOLE SODIUM 40 MILLIGRAM(S): 20 TABLET, DELAYED RELEASE ORAL at 12:33

## 2020-08-26 RX ADMIN — PROPOFOL 4.29 MICROGRAM(S)/KG/MIN: 10 INJECTION, EMULSION INTRAVENOUS at 12:48

## 2020-08-26 RX ADMIN — DEXMEDETOMIDINE HYDROCHLORIDE IN 0.9% SODIUM CHLORIDE 5.36 MICROGRAM(S)/KG/HR: 4 INJECTION INTRAVENOUS at 20:42

## 2020-08-26 RX ADMIN — PROPOFOL 4.29 MICROGRAM(S)/KG/MIN: 10 INJECTION, EMULSION INTRAVENOUS at 20:41

## 2020-08-26 NOTE — PROGRESS NOTE ADULT - SUBJECTIVE AND OBJECTIVE BOX
INTERVAL HPI/OVERNIGHT EVENTS:    FAILED SBT THIS AM.  NOTED TO HAVE THICK SECRETIONS FROM MOUTH AND ETT.      CENTRAL LINE: [ ] YES [x ] NO  LOCATION:       QUINTEROS: [x ] YES [ ] NO        A-LINE:  [ ] YES [ ] NO  LOCATION:       GLOBAL ISSUE/BEST PRACTICE:  Analgesia: Dilaudid  Sedation: Propofol and Precedex  HOB elevation: yes  Stress ulcer prophylaxis: Protonix  VTE prophylaxis: HSQ  Oral Care: Chlorhexidine  Glycemic control: ISS/Lantus  Nutrition:    REVIEW OF SYSTEMS: [ x ] Unable to obtain because: intubated and sedated    PHYSICAL EXAM:    Gen: intubated and sedated  HEENT: Intubated with ETT  CHEST/LUNG: Crackles on left with coarse breath sound bilaterally  HEART: Regular rate and rhythm; No murmurs, rubs, or gallops  ABDOMEN: Soft, Nontender, Nondistended; Bowel sounds present  EXTREMITIES:  2+ Peripheral Pulses, No clubbing, cyanosis, or edema  NERVOUS SYSTEM:  intubated and sedated.    ICU Vital Signs Last 24 Hrs  T(C): 38.3 (26 Aug 2020 13:00), Max: 39 (26 Aug 2020 12:00)  T(F): 100.9 (26 Aug 2020 13:00), Max: 102.2 (26 Aug 2020 12:00)  HR: 82 (26 Aug 2020 14:30) (72 - 97)  BP: 98/57 (26 Aug 2020 14:30) (89/56 - 113/65)  BP(mean): 66 (26 Aug 2020 14:30) (61 - 77)  ABP: --  ABP(mean): --  RR: 16 (26 Aug 2020 14:30) (15 - 24)  SpO2: 97% (26 Aug 2020 14:30) (89% - 100%)      I&O's Detail    25 Aug 2020 07:01  -  26 Aug 2020 07:00  --------------------------------------------------------  IN:    dexmedetomidine Infusion: 465.6 mL    dextrose 5% + lactated ringers.: 1800 mL    IV PiggyBack: 400 mL    propofol Infusion: 331.2 mL  Total IN: 2996.8 mL    OUT:    Incontinent per Condom Catheter: 550 mL    Nasoenteral Tube: 300 mL  Total OUT: 850 mL    Total NET: 2146.8 mL      26 Aug 2020 07:01  -  26 Aug 2020 15:13  --------------------------------------------------------  IN:    dexmedetomidine Infusion: 116.4 mL    dextrose 5% + lactated ringers.: 450 mL    IV PiggyBack: 350 mL    propofol Infusion: 97.8 mL  Total IN: 1014.2 mL    OUT:    Incontinent per Condom Catheter: 400 mL  Total OUT: 400 mL    Total NET: 614.2 mL          MEDICATIONS  NEURO  Meds: acetaminophen   Tablet .. 650 milliGRAM(s) Oral every 6 hours PRN Temp greater or equal to 38C (100.4F), Mild Pain (1 - 3)  dexMEDEtomidine Infusion 0.3 MICROgram(s)/kG/Hr (5.36 mL/Hr) IV Continuous <Continuous>  HYDROmorphone  Injectable 0.5 milliGRAM(s) IV Push every 4 hours PRN Severe Pain (7 - 10)  LORazepam   Injectable 2 milliGRAM(s) IV Push every 4 hours  propofol Infusion 10 MICROgram(s)/kG/Min (4.29 mL/Hr) IV Continuous <Continuous>  QUEtiapine 25 milliGRAM(s) Oral daily    RESPIRATORY    Meds:   CARDIOVASCULAR  Meds:   GI/NUTRITION  Meds: pantoprazole  Injectable 40 milliGRAM(s) IV Push daily  senna 2 Tablet(s) Oral at bedtime    GENITOURINARY  Meds: dextrose 5% + lactated ringers. 1000 milliLiter(s) IV Continuous <Continuous>  folic acid 1 milliGRAM(s) Oral daily  multivitamin 1 Tablet(s) Oral daily  thiamine 100 milliGRAM(s) Oral daily    HEMATOLOGIC  Meds: enoxaparin Injectable 40 milliGRAM(s) SubCutaneous daily    [x] VTE Prophylaxis  INFECTIOUS DISEASES  Meds: meropenem  IVPB 1000 milliGRAM(s) IV Intermittent every 8 hours  vancomycin  IVPB 1000 milliGRAM(s) IV Intermittent every 8 hours    ENDOCRINE  CAPILLARY BLOOD GLUCOSE        Meds:   OTHER MEDICATIONS:  chlorhexidine 0.12% Liquid 15 milliLiter(s) Oral Mucosa every 12 hours  chlorhexidine 4% Liquid 1 Application(s) Topical <User Schedule>  :    LABS:                        10.9   16.87 )-----------( 263      ( 26 Aug 2020 04:09 )             33.2      08-26    138  |  107  |  14  ----------------------------<  104<H>  3.4<L>   |  23  |  0.46<L>    Ca    7.7<L>      26 Aug 2020 04:09  Phos  3.4     08-26  Mg     1.9     08-26    TPro  5.2<L>  /  Alb  1.3<L>  /  TBili  2.8<H>  /  DBili  x   /  AST  91<H>  /  ALT  55  /  AlkPhos  197<H>  08-25      ## COVID Panel      Culture Results:   Normal Respiratory Poonam present (08-24 @ 09:16)      Mode: AC/ CMV (Assist Control/ Continuous Mandatory Ventilation), RR (machine): 12, TV (machine): 500, FiO2: 40, PEEP: 5, ITime: 0.9, MAP: 14, PIP: 31      RADIOLOGY & ADDITIONAL STUDIES: INTERVAL HPI/OVERNIGHT EVENTS:    FAILED SBT THIS AM.  NOTED TO HAVE THICK SECRETIONS FROM MOUTH AND ETT.      CENTRAL LINE: [ ] YES [x ] NO  LOCATION:       QUINTEROS: [x ] YES [ ] NO        A-LINE:  [ ] YES [ ] NO  LOCATION:       GLOBAL ISSUE/BEST PRACTICE:  Analgesia: Dilaudid  Sedation: Propofol and Precedex  HOB elevation: yes  Stress ulcer prophylaxis: Protonix  VTE prophylaxis: HSQ  Oral Care: Chlorhexidine  Glycemic control: ISS/Lantus  Nutrition:    REVIEW OF SYSTEMS: [ x ] Unable to obtain because: intubated and sedated    PHYSICAL EXAM:    Gen: intubated and sedated  HEENT: Intubated with ETT  CHEST/LUNG: Crackles on left with coarse breath sound bilaterally  HEART: Regular rate and rhythm; No murmurs, rubs, or gallops  ABDOMEN: FIRM, Nontender, distended; Bowel sounds present  EXTREMITIES:  2+ Peripheral Pulses, No clubbing, cyanosis, or edema  NERVOUS SYSTEM:  intubated and sedated.    ICU Vital Signs Last 24 Hrs  T(C): 38.3 (26 Aug 2020 13:00), Max: 39 (26 Aug 2020 12:00)  T(F): 100.9 (26 Aug 2020 13:00), Max: 102.2 (26 Aug 2020 12:00)  HR: 82 (26 Aug 2020 14:30) (72 - 97)  BP: 98/57 (26 Aug 2020 14:30) (89/56 - 113/65)  BP(mean): 66 (26 Aug 2020 14:30) (61 - 77)  ABP: --  ABP(mean): --  RR: 16 (26 Aug 2020 14:30) (15 - 24)  SpO2: 97% (26 Aug 2020 14:30) (89% - 100%)      I&O's Detail    25 Aug 2020 07:01  -  26 Aug 2020 07:00  --------------------------------------------------------  IN:    dexmedetomidine Infusion: 465.6 mL    dextrose 5% + lactated ringers.: 1800 mL    IV PiggyBack: 400 mL    propofol Infusion: 331.2 mL  Total IN: 2996.8 mL    OUT:    Incontinent per Condom Catheter: 550 mL    Nasoenteral Tube: 300 mL  Total OUT: 850 mL    Total NET: 2146.8 mL      26 Aug 2020 07:01  -  26 Aug 2020 15:13  --------------------------------------------------------  IN:    dexmedetomidine Infusion: 116.4 mL    dextrose 5% + lactated ringers.: 450 mL    IV PiggyBack: 350 mL    propofol Infusion: 97.8 mL  Total IN: 1014.2 mL    OUT:    Incontinent per Condom Catheter: 400 mL  Total OUT: 400 mL    Total NET: 614.2 mL          MEDICATIONS  NEURO  Meds: acetaminophen   Tablet .. 650 milliGRAM(s) Oral every 6 hours PRN Temp greater or equal to 38C (100.4F), Mild Pain (1 - 3)  dexMEDEtomidine Infusion 0.3 MICROgram(s)/kG/Hr (5.36 mL/Hr) IV Continuous <Continuous>  HYDROmorphone  Injectable 0.5 milliGRAM(s) IV Push every 4 hours PRN Severe Pain (7 - 10)  LORazepam   Injectable 2 milliGRAM(s) IV Push every 4 hours  propofol Infusion 10 MICROgram(s)/kG/Min (4.29 mL/Hr) IV Continuous <Continuous>  QUEtiapine 25 milliGRAM(s) Oral daily    RESPIRATORY    Meds:   CARDIOVASCULAR  Meds:   GI/NUTRITION  Meds: pantoprazole  Injectable 40 milliGRAM(s) IV Push daily  senna 2 Tablet(s) Oral at bedtime    GENITOURINARY  Meds: dextrose 5% + lactated ringers. 1000 milliLiter(s) IV Continuous <Continuous>  folic acid 1 milliGRAM(s) Oral daily  multivitamin 1 Tablet(s) Oral daily  thiamine 100 milliGRAM(s) Oral daily    HEMATOLOGIC  Meds: enoxaparin Injectable 40 milliGRAM(s) SubCutaneous daily    [x] VTE Prophylaxis  INFECTIOUS DISEASES  Meds: meropenem  IVPB 1000 milliGRAM(s) IV Intermittent every 8 hours  vancomycin  IVPB 1000 milliGRAM(s) IV Intermittent every 8 hours    ENDOCRINE  CAPILLARY BLOOD GLUCOSE        Meds:   OTHER MEDICATIONS:  chlorhexidine 0.12% Liquid 15 milliLiter(s) Oral Mucosa every 12 hours  chlorhexidine 4% Liquid 1 Application(s) Topical <User Schedule>  :    LABS:                        10.9   16.87 )-----------( 263      ( 26 Aug 2020 04:09 )             33.2      08-26    138  |  107  |  14  ----------------------------<  104<H>  3.4<L>   |  23  |  0.46<L>    Ca    7.7<L>      26 Aug 2020 04:09  Phos  3.4     08-26  Mg     1.9     08-26    TPro  5.2<L>  /  Alb  1.3<L>  /  TBili  2.8<H>  /  DBili  x   /  AST  91<H>  /  ALT  55  /  AlkPhos  197<H>  08-25      ## COVID Panel      Culture Results:   Normal Respiratory Poonam present (08-24 @ 09:16)      Mode: AC/ CMV (Assist Control/ Continuous Mandatory Ventilation), RR (machine): 12, TV (machine): 500, FiO2: 40, PEEP: 5, ITime: 0.9, MAP: 14, PIP: 31      RADIOLOGY & ADDITIONAL STUDIES:

## 2020-08-26 NOTE — PROGRESS NOTE ADULT - ASSESSMENT
44M PMH EtOH abuse, alcohol withdrawal, alcohol pancreatitis presents for 3 days of abdominal pain, nausea and vomiting after recent binge drinking. Found to have necrotizing pancreatitis. Admitted to medical service for pancreatitis. Transferred to ICU for alcohol withdrawal/DTs. Course complicated by septic shock, acute renal insufficiency, lactic acidosis, small bowel obstruction.    Neuro: Intubated and sedated with known alcohol withdrawal, now requiring propofol and precedex for sedation; will continue background ativan .  Daily sbt and sat.   CV: Sepsis with septic shock - multifactorial given pancreatitis and now pneumonia - now off levophed; maintain map>65  Pulm: Acute hypoxic respiratory failure 2/2 pneumonia with L sided infiltrated; noted to have gram neg diploccoci in sputum culture; continue with antibiotics; wean FiO2 as tolerated;   C/w 12/500/5/45; continues to have thick secretions and tachypnea on SBT / SAT  GI: NPO; acute pancreatitis noted on admission; advanced to clears prior to intubation; will start trickle feeds today  Renal/Metabolic: FRANKO; replete electrolytes prn; continue with maintenance fluids D5 LR; will start trickle feeds in am and wean fluids.  ID: Sepsis with Septic shock - likely pneumonia now continue with meropenem.    Heme: WBC 16.87; now stable in the setting of sepsis and acute pneumonia.    Endo: FRANKO  Dispo: Remains intubated and sedated; critically ill 2/2 pneumonia, alcohol withdrawal and acute hypoxic respiratory failure.

## 2020-08-26 NOTE — PROGRESS NOTE ADULT - SUBJECTIVE AND OBJECTIVE BOX
Patient seen and examined at bedside.   Intubated and sedated.    Vital Signs Last 24 Hrs  T(F): 97.8 (08-26-20 @ 03:38), Max: 102.7 (08-25-20 @ 13:30)  HR: 94 (08-26-20 @ 04:00)  BP: 102/63 (08-26-20 @ 04:00)  RR: 16 (08-26-20 @ 04:00)  SpO2: 96% (08-26-20 @ 04:00)    GENERAL: Sedated, NAD  HEENT: Intubated  CHEST/LUNG: Vented, respirations nonlabored  HEART: S1S2, RRR  ABDOMEN: + Bowel sounds, softly distended, nontender  EXTREMITIES: 1+pitting edema x 4 extremities, +palpable distal pulses. SCDs b/l LE  : Condom catheter with saturated, dark, yellow urine, output: 1200cc/24hrs    LABS:                        10.9   16.87 )-----------( 263      ( 26 Aug 2020 04:09 )             33.2     08-26    138  |  107  |  14  ----------------------------<  104<H>  3.4<L>   |  23  |  0.46<L>    Ca    7.7<L>      26 Aug 2020 04:09  Phos  3.4     08-26  Mg     1.9     08-26    A/P: 44M PMH ETOH abuse, pancreatitis, a/w acute necrotizing pancreatitis. Intubated 2/2 respiratory distress due to ETOH withdrawal and pneumonia.   Persistent leukocytosis. Hypokalemia.  - merrem per primary team  - IV hydration  - NGT, monitor output  - GI ppx, DVT ppx  - replete electrolytes  - no acute surgical intervention warranted at this time, will continue to monitor  - ICU management  - will d/w surgical attending

## 2020-08-26 NOTE — PROGRESS NOTE ADULT - SUBJECTIVE AND OBJECTIVE BOX
44y old  Male who presented with a chief complaint of abdominal and was admitted with PANCREATITIS. Patient seen and examined at bedside. Still intubated since Sunday evening. Sedated on propofol.     MEDICATIONS  (STANDING):  chlorhexidine 0.12% Liquid 15 milliLiter(s) Oral Mucosa every 12 hours  chlorhexidine 4% Liquid 1 Application(s) Topical <User Schedule>  dexMEDEtomidine Infusion 0.3 MICROgram(s)/kG/Hr (5.36 mL/Hr) IV Continuous <Continuous>  dextrose 5% + lactated ringers. 1000 milliLiter(s) (75 mL/Hr) IV Continuous <Continuous>  enoxaparin Injectable 40 milliGRAM(s) SubCutaneous daily  folic acid 1 milliGRAM(s) Oral daily  LORazepam   Injectable 2 milliGRAM(s) IV Push every 4 hours  meropenem  IVPB 1000 milliGRAM(s) IV Intermittent every 8 hours  multivitamin 1 Tablet(s) Oral daily  pantoprazole  Injectable 40 milliGRAM(s) IV Push daily  potassium chloride  10 mEq/100 mL IVPB 10 milliEquivalent(s) IV Intermittent every 1 hour  propofol Infusion 10 MICROgram(s)/kG/Min (4.29 mL/Hr) IV Continuous <Continuous>  QUEtiapine 25 milliGRAM(s) Oral daily  senna 2 Tablet(s) Oral at bedtime  thiamine 100 milliGRAM(s) Oral daily    MEDICATIONS  (PRN):  acetaminophen   Tablet .. 650 milliGRAM(s) Oral every 6 hours PRN Temp greater or equal to 38C (100.4F), Mild Pain (1 - 3)  HYDROmorphone  Injectable 0.5 milliGRAM(s) IV Push every 4 hours PRN Severe Pain (7 - 10)      Vital Signs Last 24 Hrs  T(F): 97.8 (08-26-20 @ 03:38), Max: 102.7 (08-25-20 @ 13:30)  HR: 96 (08-26-20 @ 06:00)  BP: 99/57 (08-26-20 @ 06:00)  RR: 18 (08-26-20 @ 06:00)  SpO2: 97% (08-26-20 @ 06:00)  Wt(kg): --   CAPILLARY BLOOD GLUCOSE    T(C): 36.6 (08-26-20 @ 03:38), Max: 39.3 (08-25-20 @ 13:30)  T(F): 97.8 (08-26-20 @ 03:38), Max: 102.7 (08-25-20 @ 13:30)    POCT Blood Glucose.: 103 mg/dL (25 Aug 2020 09:39)      GENERAL: Sedated, NAD  HEENT: Intubated  CHEST/LUNG: Vented, respirations nonlabored  HEART: S1S2, RRR  ABDOMEN: + Bowel sounds, softly distended, nontender  EXTREMITIES: no LLE edema, +palpable distal pulses. SCDs b/l LE  : Condom catheter with saturated, dark, yellow urine, output: 1200cc/24hrs      I&O's Detail    24 Aug 2020 07:01  -  25 Aug 2020 07:00  --------------------------------------------------------  IN:    dexmedetomidine Infusion: 510.2 mL    dextrose 5% + lactated ringers.: 1800 mL    IV PiggyBack: 700 mL    propofol Infusion: 411 mL  Total IN: 3421.2 mL    OUT:    Incontinent per Condom Catheter: 1200 mL    Nasoenteral Tube: 300 mL  Total OUT: 1500 mL    Total NET: 1921.2 mL      25 Aug 2020 07:01  -  26 Aug 2020 06:27  --------------------------------------------------------  IN:    dexmedetomidine Infusion: 446.2 mL    dextrose 5% + lactated ringers.: 1725 mL    IV PiggyBack: 100 mL    propofol Infusion: 317.2 mL  Total IN: 2588.4 mL    OUT:    Incontinent per Condom Catheter: 550 mL    Nasoenteral Tube: 300 mL  Total OUT: 850 mL    Total NET: 1738.4 mL          LABS:                        10.9   16.87 )-----------( 263      ( 26 Aug 2020 04:09 )             33.2     WBC Count: 16.87 K/uL (08-26 @ 04:09)  WBC Count: 16.87 K/uL (08-25 @ 02:43)  WBC Count: 19.47 K/uL (08-24 @ 03:01)  WBC Count: 13.63 K/uL (08-23 @ 04:01)  WBC Count: 9.60 K/uL (08-22 @ 04:48)      08-26    138  |  107  |  14  ----------------------------<  104<H>  3.4<L>   |  23  |  0.46<L>    Potassium, Serum: 3.4 mmol/L (08-26-20 @ 04:09)  Potassium, Serum: 3.5 mmol/L (08-25-20 @ 10:15)  Potassium, Serum: 3.6 mmol/L (08-25-20 @ 09:08)  Potassium, Serum: 3.1 mmol/L (08-25-20 @ 02:43)  Potassium, Serum: 3.7 mmol/L (08-24-20 @ 11:44)  Potassium, Serum: 3.2 mmol/L (08-24-20 @ 03:01)  Potassium, Serum: 3.4 mmol/L (08-23-20 @ 14:45)  Potassium, Serum: 2.9 mmol/L (08-23-20 @ 04:01)  Potassium, Serum: 3.0 mmol/L (08-22-20 @ 04:48)  Potassium, Serum: 3.6 mmol/L (08-21-20 @ 02:55)  Potassium, Serum: 3.5 mmol/L (08-20-20 @ 03:34)  Potassium, Serum: 4.2 mmol/L (08-19-20 @ 10:48)  Potassium, Serum: 4.5 mmol/L (08-19-20 @ 03:07)  Potassium, Serum: 4.5 mmol/L (08-18-20 @ 12:55)  Potassium, Serum: 4.0 mmol/L (08-18-20 @ 05:08)  Potassium, Serum: 3.8 mmol/L (08-18-20 @ 04:27)  Potassium, Serum: 2.9 mmol/L (08-17-20 @ 17:27)  Potassium, Serum: 3.3 mmol/L (02-11-20 @ 12:14)  Potassium, Serum: 3.6 mmol/L (02-11-20 @ 02:57)  Potassium, Serum: 3.4 mmol/L (12-14-19 @ 07:40)      Ca    7.7<L>      26 Aug 2020 04:09  Phos  3.4     08-26  Mg     1.9     08-26    Calcium, Total Serum: 7.7 mg/dL [8.5 - 10.1] (08-26-20)  Magnesium, Serum: 1.9 mg/dL [1.6 - 2.6] (08-26-20)  Phosphorus Level, Serum: 3.4 mg/dL [2.5 - 4.5] (08-26-20)  Calcium, Total Serum: 8.0 mg/dL [8.5 - 10.1] (08-25-20)  Magnesium, Serum: 2.0 mg/dL [1.6 - 2.6] (08-25-20)  Calcium, Total Serum: 7.6 mg/dL [8.5 - 10.1] (08-25-20)  Magnesium, Serum: 1.8 mg/dL [1.6 - 2.6] (08-25-20)  Phosphorus Level, Serum: 2.6 mg/dL [2.5 - 4.5] (08-25-20)  Calcium, Total Serum: 7.5 mg/dL [8.5 - 10.1] (08-25-20)  Magnesium, Serum: 1.9 mg/dL [1.6 - 2.6] (08-25-20)  Phosphorus Level, Serum: 2.8 mg/dL [2.5 - 4.5] (08-25-20)  Calcium, Total Serum: 7.7 mg/dL [8.5 - 10.1] (08-24-20)  Magnesium, Serum: 2.0 mg/dL [1.6 - 2.6] (08-24-20)  Phosphorus Level, Serum: 3.3 mg/dL [2.5 - 4.5] (08-24-20)  Calcium, Total Serum: 7.3 mg/dL [8.5 - 10.1] (08-24-20)  Magnesium, Serum: 1.6 mg/dL [1.6 - 2.6] (08-24-20)  Phosphorus Level, Serum: 2.4 mg/dL [2.5 - 4.5] (08-24-20)  Calcium, Total Serum: 7.6 mg/dL [8.5 - 10.1] (08-23-20)  Magnesium, Serum: 1.8 mg/dL [1.6 - 2.6] (08-23-20)  Phosphorus Level, Serum: 3.6 mg/dL [2.5 - 4.5] (08-23-20)  Calcium, Total Serum: 7.4 mg/dL [8.5 - 10.1] (08-23-20)  Magnesium, Serum: 1.3 mg/dL [1.6 - 2.6] (08-23-20)  Phosphorus Level, Serum: 1.9 mg/dL [2.5 - 4.5] (08-23-20)  Magnesium, Serum: 1.5 mg/dL [1.6 - 2.6] (08-22-20)  Phosphorus Level, Serum: 1.2 mg/dL [2.5 - 4.5] (08-22-20)  Calcium, Total Serum: 6.8 mg/dL [8.5 - 10.1] (08-22-20)  Calcium, Total Serum: 6.7 mg/dL [8.5 - 10.1] (08-21-20)  Magnesium, Serum: 2.0 mg/dL [1.6 - 2.6] (08-21-20)  Phosphorus Level, Serum: 1.8 mg/dL [2.5 - 4.5] (08-21-20)  Calcium, Total Serum: 6.6 mg/dL [8.5 - 10.1] (08-20-20)  Magnesium, Serum: 1.9 mg/dL [1.6 - 2.6] (08-20-20)  Phosphorus Level, Serum: 1.7 mg/dL [2.5 - 4.5] (08-20-20)  Calcium, Total Serum: 7.2 mg/dL [8.5 - 10.1] (08-19-20)  Magnesium, Serum: 2.0 mg/dL [1.6 - 2.6] (08-19-20)  Phosphorus Level, Serum: 2.6 mg/dL [2.5 - 4.5] (08-19-20)  Calcium, Total Serum: 7.5 mg/dL [8.5 - 10.1] (08-19-20)  Magnesium, Serum: 1.7 mg/dL [1.6 - 2.6] (08-19-20)  Phosphorus Level, Serum: 3.1 mg/dL [2.5 - 4.5] (08-19-20)  Calcium, Total Serum: 8.3 mg/dL [8.5 - 10.1] (08-18-20)  Magnesium, Serum: 1.3 mg/dL [1.6 - 2.6] (08-18-20)  Phosphorus Level, Serum: 4.0 mg/dL [2.5 - 4.5] (08-18-20)  Calcium, Total Serum: 9.2 mg/dL [8.5 - 10.1] (08-18-20)  Magnesium, Serum: 0.9 mg/dL [1.6 - 2.6] (08-18-20)  Phosphorus Level, Serum: 0.8 mg/dL [2.5 - 4.5] (08-18-20)  Calcium, Total Serum: 9.2 mg/dL [8.5 - 10.1] (08-18-20)  Calcium, Total Serum: 11.5 mg/dL [8.5 - 10.1] (08-17-20)      TPro  5.2<L>  /  Alb  1.3<L>  /  TBili  2.8<H>  /  DBili  x   /  AST  91<H>  /  ALT  55  /  AlkPhos  197<H>  08-25      RADIOLOGY & ADDITIONAL STUDIES:  IMAGING:      A/P: 44M PMH ETOH abuse, pancreatitis, a/w acute necrotizing pancreatitis. Intubated 2/2 respiratory distress due to ETOH withdrawal and pneumonia.   Persistent leukocytosis. Hypokalemia. Hypocalcemia.     - merrem per primary team - PNA   - IV hydration  - NGT, monitor output  - GI ppx, DVT ppx  - replete electrolytes  - no acute surgical intervention warranted at this time, will continue to monitor  - ICU management  - will d/w surgical attending 44y old  Male who presented with a chief complaint of abdominal pain and was admitted with PANCREATITIS. Patient seen and examined at bedside. Still intubated since Sunday evening. Sedated on propofol.     MEDICATIONS  (STANDING):  chlorhexidine 0.12% Liquid 15 milliLiter(s) Oral Mucosa every 12 hours  chlorhexidine 4% Liquid 1 Application(s) Topical <User Schedule>  dexMEDEtomidine Infusion 0.3 MICROgram(s)/kG/Hr (5.36 mL/Hr) IV Continuous <Continuous>  dextrose 5% + lactated ringers. 1000 milliLiter(s) (75 mL/Hr) IV Continuous <Continuous>  enoxaparin Injectable 40 milliGRAM(s) SubCutaneous daily  folic acid 1 milliGRAM(s) Oral daily  LORazepam   Injectable 2 milliGRAM(s) IV Push every 4 hours  meropenem  IVPB 1000 milliGRAM(s) IV Intermittent every 8 hours  multivitamin 1 Tablet(s) Oral daily  pantoprazole  Injectable 40 milliGRAM(s) IV Push daily  potassium chloride  10 mEq/100 mL IVPB 10 milliEquivalent(s) IV Intermittent every 1 hour  propofol Infusion 10 MICROgram(s)/kG/Min (4.29 mL/Hr) IV Continuous <Continuous>  QUEtiapine 25 milliGRAM(s) Oral daily  senna 2 Tablet(s) Oral at bedtime  thiamine 100 milliGRAM(s) Oral daily    MEDICATIONS  (PRN):  acetaminophen   Tablet .. 650 milliGRAM(s) Oral every 6 hours PRN Temp greater or equal to 38C (100.4F), Mild Pain (1 - 3)  HYDROmorphone  Injectable 0.5 milliGRAM(s) IV Push every 4 hours PRN Severe Pain (7 - 10)      Vital Signs Last 24 Hrs  T(F): 97.8 (08-26-20 @ 03:38), Max: 102.7 (08-25-20 @ 13:30)  HR: 96 (08-26-20 @ 06:00)  BP: 99/57 (08-26-20 @ 06:00)  RR: 18 (08-26-20 @ 06:00)  SpO2: 97% (08-26-20 @ 06:00)  Wt(kg): --   CAPILLARY BLOOD GLUCOSE    T(C): 36.6 (08-26-20 @ 03:38), Max: 39.3 (08-25-20 @ 13:30)  T(F): 97.8 (08-26-20 @ 03:38), Max: 102.7 (08-25-20 @ 13:30)    POCT Blood Glucose.: 103 mg/dL (25 Aug 2020 09:39)      GENERAL: Sedated, NAD  HEENT: Intubated  CHEST/LUNG: Vented, respirations nonlabored  HEART: S1S2, RRR  ABDOMEN: + Bowel sounds, softly distended, nontender  EXTREMITIES: no LLE edema, +palpable distal pulses. SCDs b/l LE  : Condom catheter with saturated, dark, yellow urine, output: 1200cc/24hrs      I&O's Detail    24 Aug 2020 07:01  -  25 Aug 2020 07:00  --------------------------------------------------------  IN:    dexmedetomidine Infusion: 510.2 mL    dextrose 5% + lactated ringers.: 1800 mL    IV PiggyBack: 700 mL    propofol Infusion: 411 mL  Total IN: 3421.2 mL    OUT:    Incontinent per Condom Catheter: 1200 mL    Nasoenteral Tube: 300 mL  Total OUT: 1500 mL    Total NET: 1921.2 mL      25 Aug 2020 07:01  -  26 Aug 2020 06:27  --------------------------------------------------------  IN:    dexmedetomidine Infusion: 446.2 mL    dextrose 5% + lactated ringers.: 1725 mL    IV PiggyBack: 100 mL    propofol Infusion: 317.2 mL  Total IN: 2588.4 mL    OUT:    Incontinent per Condom Catheter: 550 mL    Nasoenteral Tube: 300 mL  Total OUT: 850 mL    Total NET: 1738.4 mL          LABS:                        10.9   16.87 )-----------( 263      ( 26 Aug 2020 04:09 )             33.2     WBC Count: 16.87 K/uL (08-26 @ 04:09)  WBC Count: 16.87 K/uL (08-25 @ 02:43)  WBC Count: 19.47 K/uL (08-24 @ 03:01)  WBC Count: 13.63 K/uL (08-23 @ 04:01)  WBC Count: 9.60 K/uL (08-22 @ 04:48)      08-26    138  |  107  |  14  ----------------------------<  104<H>  3.4<L>   |  23  |  0.46<L>    Potassium, Serum: 3.4 mmol/L (08-26-20 @ 04:09)  Potassium, Serum: 3.5 mmol/L (08-25-20 @ 10:15)  Potassium, Serum: 3.6 mmol/L (08-25-20 @ 09:08)  Potassium, Serum: 3.1 mmol/L (08-25-20 @ 02:43)  Potassium, Serum: 3.7 mmol/L (08-24-20 @ 11:44)  Potassium, Serum: 3.2 mmol/L (08-24-20 @ 03:01)  Potassium, Serum: 3.4 mmol/L (08-23-20 @ 14:45)  Potassium, Serum: 2.9 mmol/L (08-23-20 @ 04:01)  Potassium, Serum: 3.0 mmol/L (08-22-20 @ 04:48)  Potassium, Serum: 3.6 mmol/L (08-21-20 @ 02:55)  Potassium, Serum: 3.5 mmol/L (08-20-20 @ 03:34)  Potassium, Serum: 4.2 mmol/L (08-19-20 @ 10:48)  Potassium, Serum: 4.5 mmol/L (08-19-20 @ 03:07)  Potassium, Serum: 4.5 mmol/L (08-18-20 @ 12:55)  Potassium, Serum: 4.0 mmol/L (08-18-20 @ 05:08)  Potassium, Serum: 3.8 mmol/L (08-18-20 @ 04:27)  Potassium, Serum: 2.9 mmol/L (08-17-20 @ 17:27)  Potassium, Serum: 3.3 mmol/L (02-11-20 @ 12:14)  Potassium, Serum: 3.6 mmol/L (02-11-20 @ 02:57)  Potassium, Serum: 3.4 mmol/L (12-14-19 @ 07:40)      Ca    7.7<L>      26 Aug 2020 04:09  Phos  3.4     08-26  Mg     1.9     08-26    Calcium, Total Serum: 7.7 mg/dL [8.5 - 10.1] (08-26-20)  Magnesium, Serum: 1.9 mg/dL [1.6 - 2.6] (08-26-20)  Phosphorus Level, Serum: 3.4 mg/dL [2.5 - 4.5] (08-26-20)  Calcium, Total Serum: 8.0 mg/dL [8.5 - 10.1] (08-25-20)  Magnesium, Serum: 2.0 mg/dL [1.6 - 2.6] (08-25-20)  Calcium, Total Serum: 7.6 mg/dL [8.5 - 10.1] (08-25-20)  Magnesium, Serum: 1.8 mg/dL [1.6 - 2.6] (08-25-20)  Phosphorus Level, Serum: 2.6 mg/dL [2.5 - 4.5] (08-25-20)  Calcium, Total Serum: 7.5 mg/dL [8.5 - 10.1] (08-25-20)  Magnesium, Serum: 1.9 mg/dL [1.6 - 2.6] (08-25-20)  Phosphorus Level, Serum: 2.8 mg/dL [2.5 - 4.5] (08-25-20)  Calcium, Total Serum: 7.7 mg/dL [8.5 - 10.1] (08-24-20)  Magnesium, Serum: 2.0 mg/dL [1.6 - 2.6] (08-24-20)  Phosphorus Level, Serum: 3.3 mg/dL [2.5 - 4.5] (08-24-20)  Calcium, Total Serum: 7.3 mg/dL [8.5 - 10.1] (08-24-20)  Magnesium, Serum: 1.6 mg/dL [1.6 - 2.6] (08-24-20)  Phosphorus Level, Serum: 2.4 mg/dL [2.5 - 4.5] (08-24-20)  Calcium, Total Serum: 7.6 mg/dL [8.5 - 10.1] (08-23-20)  Magnesium, Serum: 1.8 mg/dL [1.6 - 2.6] (08-23-20)  Phosphorus Level, Serum: 3.6 mg/dL [2.5 - 4.5] (08-23-20)  Calcium, Total Serum: 7.4 mg/dL [8.5 - 10.1] (08-23-20)  Magnesium, Serum: 1.3 mg/dL [1.6 - 2.6] (08-23-20)  Phosphorus Level, Serum: 1.9 mg/dL [2.5 - 4.5] (08-23-20)  Magnesium, Serum: 1.5 mg/dL [1.6 - 2.6] (08-22-20)  Phosphorus Level, Serum: 1.2 mg/dL [2.5 - 4.5] (08-22-20)  Calcium, Total Serum: 6.8 mg/dL [8.5 - 10.1] (08-22-20)  Calcium, Total Serum: 6.7 mg/dL [8.5 - 10.1] (08-21-20)  Magnesium, Serum: 2.0 mg/dL [1.6 - 2.6] (08-21-20)  Phosphorus Level, Serum: 1.8 mg/dL [2.5 - 4.5] (08-21-20)  Calcium, Total Serum: 6.6 mg/dL [8.5 - 10.1] (08-20-20)  Magnesium, Serum: 1.9 mg/dL [1.6 - 2.6] (08-20-20)  Phosphorus Level, Serum: 1.7 mg/dL [2.5 - 4.5] (08-20-20)  Calcium, Total Serum: 7.2 mg/dL [8.5 - 10.1] (08-19-20)  Magnesium, Serum: 2.0 mg/dL [1.6 - 2.6] (08-19-20)  Phosphorus Level, Serum: 2.6 mg/dL [2.5 - 4.5] (08-19-20)  Calcium, Total Serum: 7.5 mg/dL [8.5 - 10.1] (08-19-20)  Magnesium, Serum: 1.7 mg/dL [1.6 - 2.6] (08-19-20)  Phosphorus Level, Serum: 3.1 mg/dL [2.5 - 4.5] (08-19-20)  Calcium, Total Serum: 8.3 mg/dL [8.5 - 10.1] (08-18-20)  Magnesium, Serum: 1.3 mg/dL [1.6 - 2.6] (08-18-20)  Phosphorus Level, Serum: 4.0 mg/dL [2.5 - 4.5] (08-18-20)  Calcium, Total Serum: 9.2 mg/dL [8.5 - 10.1] (08-18-20)  Magnesium, Serum: 0.9 mg/dL [1.6 - 2.6] (08-18-20)  Phosphorus Level, Serum: 0.8 mg/dL [2.5 - 4.5] (08-18-20)  Calcium, Total Serum: 9.2 mg/dL [8.5 - 10.1] (08-18-20)  Calcium, Total Serum: 11.5 mg/dL [8.5 - 10.1] (08-17-20)      TPro  5.2<L>  /  Alb  1.3<L>  /  TBili  2.8<H>  /  DBili  x   /  AST  91<H>  /  ALT  55  /  AlkPhos  197<H>  08-25      RADIOLOGY & ADDITIONAL STUDIES:  IMAGING:      A/P: 44M PMH ETOH abuse, pancreatitis, a/w acute necrotizing pancreatitis. Intubated 2/2 respiratory distress due to ETOH withdrawal and pneumonia.   Persistent leukocytosis. Hypokalemia. Hypocalcemia.     - merrem per primary team - PNA   - IV hydration  - NGT, monitor output  - GI ppx, DVT ppx  - replete electrolytes  - no acute surgical intervention warranted at this time, will continue to monitor  - ICU management  - will d/w surgical attending 44y old  Male who presented with a chief complaint of abdominal pain and was admitted with PANCREATITIS. Patient seen and examined at bedside. Still intubated since Sunday evening. Sedated on propofol.     MEDICATIONS  (STANDING):  chlorhexidine 0.12% Liquid 15 milliLiter(s) Oral Mucosa every 12 hours  chlorhexidine 4% Liquid 1 Application(s) Topical <User Schedule>  dexMEDEtomidine Infusion 0.3 MICROgram(s)/kG/Hr (5.36 mL/Hr) IV Continuous <Continuous>  dextrose 5% + lactated ringers. 1000 milliLiter(s) (75 mL/Hr) IV Continuous <Continuous>  enoxaparin Injectable 40 milliGRAM(s) SubCutaneous daily  folic acid 1 milliGRAM(s) Oral daily  LORazepam   Injectable 2 milliGRAM(s) IV Push every 4 hours  meropenem  IVPB 1000 milliGRAM(s) IV Intermittent every 8 hours  multivitamin 1 Tablet(s) Oral daily  pantoprazole  Injectable 40 milliGRAM(s) IV Push daily  potassium chloride  10 mEq/100 mL IVPB 10 milliEquivalent(s) IV Intermittent every 1 hour  propofol Infusion 10 MICROgram(s)/kG/Min (4.29 mL/Hr) IV Continuous <Continuous>  QUEtiapine 25 milliGRAM(s) Oral daily  senna 2 Tablet(s) Oral at bedtime  thiamine 100 milliGRAM(s) Oral daily    MEDICATIONS  (PRN):  acetaminophen   Tablet .. 650 milliGRAM(s) Oral every 6 hours PRN Temp greater or equal to 38C (100.4F), Mild Pain (1 - 3)  HYDROmorphone  Injectable 0.5 milliGRAM(s) IV Push every 4 hours PRN Severe Pain (7 - 10)      Vital Signs Last 24 Hrs  T(F): 97.8 (08-26-20 @ 03:38), Max: 102.7 (08-25-20 @ 13:30)  HR: 96 (08-26-20 @ 06:00)  BP: 99/57 (08-26-20 @ 06:00)  RR: 18 (08-26-20 @ 06:00)  SpO2: 97% (08-26-20 @ 06:00)  Wt(kg): --   CAPILLARY BLOOD GLUCOSE    T(C): 36.6 (08-26-20 @ 03:38), Max: 39.3 (08-25-20 @ 13:30)  T(F): 97.8 (08-26-20 @ 03:38), Max: 102.7 (08-25-20 @ 13:30)    POCT Blood Glucose.: 103 mg/dL (25 Aug 2020 09:39)      GENERAL: Sedated, NAD  HEENT: Intubated  CHEST/LUNG: Vented, respirations nonlabored  HEART: S1S2, RRR  ABDOMEN: + Bowel sounds, softly distended, nontender  EXTREMITIES: LLE edema, +palpable distal pulses. SCDs b/l LE  : Condom catheter with saturated, dark, yellow urine, output: 1200cc/24hrs      I&O's Detail    24 Aug 2020 07:01  -  25 Aug 2020 07:00  --------------------------------------------------------  IN:    dexmedetomidine Infusion: 510.2 mL    dextrose 5% + lactated ringers.: 1800 mL    IV PiggyBack: 700 mL    propofol Infusion: 411 mL  Total IN: 3421.2 mL    OUT:    Incontinent per Condom Catheter: 1200 mL    Nasoenteral Tube: 300 mL  Total OUT: 1500 mL    Total NET: 1921.2 mL      25 Aug 2020 07:01  -  26 Aug 2020 06:27  --------------------------------------------------------  IN:    dexmedetomidine Infusion: 446.2 mL    dextrose 5% + lactated ringers.: 1725 mL    IV PiggyBack: 100 mL    propofol Infusion: 317.2 mL  Total IN: 2588.4 mL    OUT:    Incontinent per Condom Catheter: 550 mL    Nasoenteral Tube: 300 mL  Total OUT: 850 mL    Total NET: 1738.4 mL          LABS:                        10.9   16.87 )-----------( 263      ( 26 Aug 2020 04:09 )             33.2     WBC Count: 16.87 K/uL (08-26 @ 04:09)  WBC Count: 16.87 K/uL (08-25 @ 02:43)  WBC Count: 19.47 K/uL (08-24 @ 03:01)  WBC Count: 13.63 K/uL (08-23 @ 04:01)  WBC Count: 9.60 K/uL (08-22 @ 04:48)      08-26    138  |  107  |  14  ----------------------------<  104<H>  3.4<L>   |  23  |  0.46<L>    Potassium, Serum: 3.4 mmol/L (08-26-20 @ 04:09)  Potassium, Serum: 3.5 mmol/L (08-25-20 @ 10:15)  Potassium, Serum: 3.6 mmol/L (08-25-20 @ 09:08)  Potassium, Serum: 3.1 mmol/L (08-25-20 @ 02:43)  Potassium, Serum: 3.7 mmol/L (08-24-20 @ 11:44)  Potassium, Serum: 3.2 mmol/L (08-24-20 @ 03:01)  Potassium, Serum: 3.4 mmol/L (08-23-20 @ 14:45)  Potassium, Serum: 2.9 mmol/L (08-23-20 @ 04:01)  Potassium, Serum: 3.0 mmol/L (08-22-20 @ 04:48)  Potassium, Serum: 3.6 mmol/L (08-21-20 @ 02:55)  Potassium, Serum: 3.5 mmol/L (08-20-20 @ 03:34)  Potassium, Serum: 4.2 mmol/L (08-19-20 @ 10:48)  Potassium, Serum: 4.5 mmol/L (08-19-20 @ 03:07)  Potassium, Serum: 4.5 mmol/L (08-18-20 @ 12:55)  Potassium, Serum: 4.0 mmol/L (08-18-20 @ 05:08)  Potassium, Serum: 3.8 mmol/L (08-18-20 @ 04:27)  Potassium, Serum: 2.9 mmol/L (08-17-20 @ 17:27)  Potassium, Serum: 3.3 mmol/L (02-11-20 @ 12:14)  Potassium, Serum: 3.6 mmol/L (02-11-20 @ 02:57)  Potassium, Serum: 3.4 mmol/L (12-14-19 @ 07:40)      Ca    7.7<L>      26 Aug 2020 04:09  Phos  3.4     08-26  Mg     1.9     08-26    Calcium, Total Serum: 7.7 mg/dL [8.5 - 10.1] (08-26-20)  Magnesium, Serum: 1.9 mg/dL [1.6 - 2.6] (08-26-20)  Phosphorus Level, Serum: 3.4 mg/dL [2.5 - 4.5] (08-26-20)  Calcium, Total Serum: 8.0 mg/dL [8.5 - 10.1] (08-25-20)  Magnesium, Serum: 2.0 mg/dL [1.6 - 2.6] (08-25-20)  Calcium, Total Serum: 7.6 mg/dL [8.5 - 10.1] (08-25-20)  Magnesium, Serum: 1.8 mg/dL [1.6 - 2.6] (08-25-20)  Phosphorus Level, Serum: 2.6 mg/dL [2.5 - 4.5] (08-25-20)  Calcium, Total Serum: 7.5 mg/dL [8.5 - 10.1] (08-25-20)  Magnesium, Serum: 1.9 mg/dL [1.6 - 2.6] (08-25-20)  Phosphorus Level, Serum: 2.8 mg/dL [2.5 - 4.5] (08-25-20)  Calcium, Total Serum: 7.7 mg/dL [8.5 - 10.1] (08-24-20)  Magnesium, Serum: 2.0 mg/dL [1.6 - 2.6] (08-24-20)  Phosphorus Level, Serum: 3.3 mg/dL [2.5 - 4.5] (08-24-20)  Calcium, Total Serum: 7.3 mg/dL [8.5 - 10.1] (08-24-20)  Magnesium, Serum: 1.6 mg/dL [1.6 - 2.6] (08-24-20)  Phosphorus Level, Serum: 2.4 mg/dL [2.5 - 4.5] (08-24-20)  Calcium, Total Serum: 7.6 mg/dL [8.5 - 10.1] (08-23-20)  Magnesium, Serum: 1.8 mg/dL [1.6 - 2.6] (08-23-20)  Phosphorus Level, Serum: 3.6 mg/dL [2.5 - 4.5] (08-23-20)  Calcium, Total Serum: 7.4 mg/dL [8.5 - 10.1] (08-23-20)  Magnesium, Serum: 1.3 mg/dL [1.6 - 2.6] (08-23-20)  Phosphorus Level, Serum: 1.9 mg/dL [2.5 - 4.5] (08-23-20)  Magnesium, Serum: 1.5 mg/dL [1.6 - 2.6] (08-22-20)  Phosphorus Level, Serum: 1.2 mg/dL [2.5 - 4.5] (08-22-20)  Calcium, Total Serum: 6.8 mg/dL [8.5 - 10.1] (08-22-20)  Calcium, Total Serum: 6.7 mg/dL [8.5 - 10.1] (08-21-20)  Magnesium, Serum: 2.0 mg/dL [1.6 - 2.6] (08-21-20)  Phosphorus Level, Serum: 1.8 mg/dL [2.5 - 4.5] (08-21-20)  Calcium, Total Serum: 6.6 mg/dL [8.5 - 10.1] (08-20-20)  Magnesium, Serum: 1.9 mg/dL [1.6 - 2.6] (08-20-20)  Phosphorus Level, Serum: 1.7 mg/dL [2.5 - 4.5] (08-20-20)  Calcium, Total Serum: 7.2 mg/dL [8.5 - 10.1] (08-19-20)  Magnesium, Serum: 2.0 mg/dL [1.6 - 2.6] (08-19-20)  Phosphorus Level, Serum: 2.6 mg/dL [2.5 - 4.5] (08-19-20)  Calcium, Total Serum: 7.5 mg/dL [8.5 - 10.1] (08-19-20)  Magnesium, Serum: 1.7 mg/dL [1.6 - 2.6] (08-19-20)  Phosphorus Level, Serum: 3.1 mg/dL [2.5 - 4.5] (08-19-20)  Calcium, Total Serum: 8.3 mg/dL [8.5 - 10.1] (08-18-20)  Magnesium, Serum: 1.3 mg/dL [1.6 - 2.6] (08-18-20)  Phosphorus Level, Serum: 4.0 mg/dL [2.5 - 4.5] (08-18-20)  Calcium, Total Serum: 9.2 mg/dL [8.5 - 10.1] (08-18-20)  Magnesium, Serum: 0.9 mg/dL [1.6 - 2.6] (08-18-20)  Phosphorus Level, Serum: 0.8 mg/dL [2.5 - 4.5] (08-18-20)  Calcium, Total Serum: 9.2 mg/dL [8.5 - 10.1] (08-18-20)  Calcium, Total Serum: 11.5 mg/dL [8.5 - 10.1] (08-17-20)      TPro  5.2<L>  /  Alb  1.3<L>  /  TBili  2.8<H>  /  DBili  x   /  AST  91<H>  /  ALT  55  /  AlkPhos  197<H>  08-25      RADIOLOGY & ADDITIONAL STUDIES:  IMAGING:      A/P: 44M PMH ETOH abuse, pancreatitis, a/w acute necrotizing pancreatitis. Intubated 2/2 respiratory distress due to ETOH withdrawal and pneumonia.   Persistent leukocytosis. Hypokalemia. Hypocalcemia.     - merrem per primary team for pneumonia   - IV hydration  - monitor output of NGT   - GI ppx, DVT ppx  - replete electrolytes as needed, including supplemental KCl   - no acute surgical intervention warranted at this time, will continue to monitor  - ICU management  - will d/w surgical attending 44y old  Male who presented with a chief complaint of abdominal pain and was admitted with PANCREATITIS. Patient seen and examined at bedside. Still intubated since Sunday evening. Sedated on propofol.     MEDICATIONS  (STANDING):  chlorhexidine 0.12% Liquid 15 milliLiter(s) Oral Mucosa every 12 hours  chlorhexidine 4% Liquid 1 Application(s) Topical <User Schedule>  dexMEDEtomidine Infusion 0.3 MICROgram(s)/kG/Hr (5.36 mL/Hr) IV Continuous <Continuous>  dextrose 5% + lactated ringers. 1000 milliLiter(s) (75 mL/Hr) IV Continuous <Continuous>  enoxaparin Injectable 40 milliGRAM(s) SubCutaneous daily  folic acid 1 milliGRAM(s) Oral daily  LORazepam   Injectable 2 milliGRAM(s) IV Push every 4 hours  meropenem  IVPB 1000 milliGRAM(s) IV Intermittent every 8 hours  multivitamin 1 Tablet(s) Oral daily  pantoprazole  Injectable 40 milliGRAM(s) IV Push daily  potassium chloride  10 mEq/100 mL IVPB 10 milliEquivalent(s) IV Intermittent every 1 hour  propofol Infusion 10 MICROgram(s)/kG/Min (4.29 mL/Hr) IV Continuous <Continuous>  QUEtiapine 25 milliGRAM(s) Oral daily  senna 2 Tablet(s) Oral at bedtime  thiamine 100 milliGRAM(s) Oral daily    MEDICATIONS  (PRN):  acetaminophen   Tablet .. 650 milliGRAM(s) Oral every 6 hours PRN Temp greater or equal to 38C (100.4F), Mild Pain (1 - 3)  HYDROmorphone  Injectable 0.5 milliGRAM(s) IV Push every 4 hours PRN Severe Pain (7 - 10)      Vital Signs Last 24 Hrs  T(F): 97.8 (08-26-20 @ 03:38), Max: 102.7 (08-25-20 @ 13:30)  HR: 96 (08-26-20 @ 06:00)  BP: 99/57 (08-26-20 @ 06:00)  RR: 18 (08-26-20 @ 06:00)  SpO2: 97% (08-26-20 @ 06:00)  Wt(kg): --   CAPILLARY BLOOD GLUCOSE    T(C): 36.6 (08-26-20 @ 03:38), Max: 39.3 (08-25-20 @ 13:30)  T(F): 97.8 (08-26-20 @ 03:38), Max: 102.7 (08-25-20 @ 13:30)    POCT Blood Glucose.: 103 mg/dL (25 Aug 2020 09:39)      GENERAL: Sedated, NAD  HEENT: Intubated  CHEST/LUNG: Vented, respirations nonlabored  HEART: S1S2, RRR  ABDOMEN: + Bowel sounds, softly distended, nontender  EXTREMITIES: bilateral lower extremity pitting edema, +palpable distal pulses. SCDs b/l LE  : Condom catheter with saturated, dark, yellow urine, output: 1200cc/24hrs      I&O's Detail    24 Aug 2020 07:01  -  25 Aug 2020 07:00  --------------------------------------------------------  IN:    dexmedetomidine Infusion: 510.2 mL    dextrose 5% + lactated ringers.: 1800 mL    IV PiggyBack: 700 mL    propofol Infusion: 411 mL  Total IN: 3421.2 mL    OUT:    Incontinent per Condom Catheter: 1200 mL    Nasoenteral Tube: 300 mL  Total OUT: 1500 mL    Total NET: 1921.2 mL      25 Aug 2020 07:01  -  26 Aug 2020 06:27  --------------------------------------------------------  IN:    dexmedetomidine Infusion: 446.2 mL    dextrose 5% + lactated ringers.: 1725 mL    IV PiggyBack: 100 mL    propofol Infusion: 317.2 mL  Total IN: 2588.4 mL    OUT:    Incontinent per Condom Catheter: 550 mL    Nasoenteral Tube: 300 mL  Total OUT: 850 mL    Total NET: 1738.4 mL          LABS:                        10.9   16.87 )-----------( 263      ( 26 Aug 2020 04:09 )             33.2     WBC Count: 16.87 K/uL (08-26 @ 04:09)  WBC Count: 16.87 K/uL (08-25 @ 02:43)  WBC Count: 19.47 K/uL (08-24 @ 03:01)  WBC Count: 13.63 K/uL (08-23 @ 04:01)  WBC Count: 9.60 K/uL (08-22 @ 04:48)      08-26    138  |  107  |  14  ----------------------------<  104<H>  3.4<L>   |  23  |  0.46<L>    Potassium, Serum: 3.4 mmol/L (08-26-20 @ 04:09)  Potassium, Serum: 3.5 mmol/L (08-25-20 @ 10:15)  Potassium, Serum: 3.6 mmol/L (08-25-20 @ 09:08)  Potassium, Serum: 3.1 mmol/L (08-25-20 @ 02:43)  Potassium, Serum: 3.7 mmol/L (08-24-20 @ 11:44)  Potassium, Serum: 3.2 mmol/L (08-24-20 @ 03:01)  Potassium, Serum: 3.4 mmol/L (08-23-20 @ 14:45)  Potassium, Serum: 2.9 mmol/L (08-23-20 @ 04:01)  Potassium, Serum: 3.0 mmol/L (08-22-20 @ 04:48)  Potassium, Serum: 3.6 mmol/L (08-21-20 @ 02:55)  Potassium, Serum: 3.5 mmol/L (08-20-20 @ 03:34)  Potassium, Serum: 4.2 mmol/L (08-19-20 @ 10:48)  Potassium, Serum: 4.5 mmol/L (08-19-20 @ 03:07)  Potassium, Serum: 4.5 mmol/L (08-18-20 @ 12:55)  Potassium, Serum: 4.0 mmol/L (08-18-20 @ 05:08)  Potassium, Serum: 3.8 mmol/L (08-18-20 @ 04:27)  Potassium, Serum: 2.9 mmol/L (08-17-20 @ 17:27)  Potassium, Serum: 3.3 mmol/L (02-11-20 @ 12:14)  Potassium, Serum: 3.6 mmol/L (02-11-20 @ 02:57)  Potassium, Serum: 3.4 mmol/L (12-14-19 @ 07:40)      Ca    7.7<L>      26 Aug 2020 04:09  Phos  3.4     08-26  Mg     1.9     08-26    Calcium, Total Serum: 7.7 mg/dL [8.5 - 10.1] (08-26-20)  Magnesium, Serum: 1.9 mg/dL [1.6 - 2.6] (08-26-20)  Phosphorus Level, Serum: 3.4 mg/dL [2.5 - 4.5] (08-26-20)  Calcium, Total Serum: 8.0 mg/dL [8.5 - 10.1] (08-25-20)  Magnesium, Serum: 2.0 mg/dL [1.6 - 2.6] (08-25-20)  Calcium, Total Serum: 7.6 mg/dL [8.5 - 10.1] (08-25-20)  Magnesium, Serum: 1.8 mg/dL [1.6 - 2.6] (08-25-20)  Phosphorus Level, Serum: 2.6 mg/dL [2.5 - 4.5] (08-25-20)  Calcium, Total Serum: 7.5 mg/dL [8.5 - 10.1] (08-25-20)  Magnesium, Serum: 1.9 mg/dL [1.6 - 2.6] (08-25-20)  Phosphorus Level, Serum: 2.8 mg/dL [2.5 - 4.5] (08-25-20)  Calcium, Total Serum: 7.7 mg/dL [8.5 - 10.1] (08-24-20)  Magnesium, Serum: 2.0 mg/dL [1.6 - 2.6] (08-24-20)  Phosphorus Level, Serum: 3.3 mg/dL [2.5 - 4.5] (08-24-20)  Calcium, Total Serum: 7.3 mg/dL [8.5 - 10.1] (08-24-20)  Magnesium, Serum: 1.6 mg/dL [1.6 - 2.6] (08-24-20)  Phosphorus Level, Serum: 2.4 mg/dL [2.5 - 4.5] (08-24-20)  Calcium, Total Serum: 7.6 mg/dL [8.5 - 10.1] (08-23-20)  Magnesium, Serum: 1.8 mg/dL [1.6 - 2.6] (08-23-20)  Phosphorus Level, Serum: 3.6 mg/dL [2.5 - 4.5] (08-23-20)  Calcium, Total Serum: 7.4 mg/dL [8.5 - 10.1] (08-23-20)  Magnesium, Serum: 1.3 mg/dL [1.6 - 2.6] (08-23-20)  Phosphorus Level, Serum: 1.9 mg/dL [2.5 - 4.5] (08-23-20)  Magnesium, Serum: 1.5 mg/dL [1.6 - 2.6] (08-22-20)  Phosphorus Level, Serum: 1.2 mg/dL [2.5 - 4.5] (08-22-20)  Calcium, Total Serum: 6.8 mg/dL [8.5 - 10.1] (08-22-20)  Calcium, Total Serum: 6.7 mg/dL [8.5 - 10.1] (08-21-20)  Magnesium, Serum: 2.0 mg/dL [1.6 - 2.6] (08-21-20)  Phosphorus Level, Serum: 1.8 mg/dL [2.5 - 4.5] (08-21-20)  Calcium, Total Serum: 6.6 mg/dL [8.5 - 10.1] (08-20-20)  Magnesium, Serum: 1.9 mg/dL [1.6 - 2.6] (08-20-20)  Phosphorus Level, Serum: 1.7 mg/dL [2.5 - 4.5] (08-20-20)  Calcium, Total Serum: 7.2 mg/dL [8.5 - 10.1] (08-19-20)  Magnesium, Serum: 2.0 mg/dL [1.6 - 2.6] (08-19-20)  Phosphorus Level, Serum: 2.6 mg/dL [2.5 - 4.5] (08-19-20)  Calcium, Total Serum: 7.5 mg/dL [8.5 - 10.1] (08-19-20)  Magnesium, Serum: 1.7 mg/dL [1.6 - 2.6] (08-19-20)  Phosphorus Level, Serum: 3.1 mg/dL [2.5 - 4.5] (08-19-20)  Calcium, Total Serum: 8.3 mg/dL [8.5 - 10.1] (08-18-20)  Magnesium, Serum: 1.3 mg/dL [1.6 - 2.6] (08-18-20)  Phosphorus Level, Serum: 4.0 mg/dL [2.5 - 4.5] (08-18-20)  Calcium, Total Serum: 9.2 mg/dL [8.5 - 10.1] (08-18-20)  Magnesium, Serum: 0.9 mg/dL [1.6 - 2.6] (08-18-20)  Phosphorus Level, Serum: 0.8 mg/dL [2.5 - 4.5] (08-18-20)  Calcium, Total Serum: 9.2 mg/dL [8.5 - 10.1] (08-18-20)  Calcium, Total Serum: 11.5 mg/dL [8.5 - 10.1] (08-17-20)      TPro  5.2<L>  /  Alb  1.3<L>  /  TBili  2.8<H>  /  DBili  x   /  AST  91<H>  /  ALT  55  /  AlkPhos  197<H>  08-25      RADIOLOGY & ADDITIONAL STUDIES:  IMAGING:      A/P: 44M PMH ETOH abuse, pancreatitis, a/w acute necrotizing pancreatitis. Intubated 2/2 respiratory distress due to ETOH withdrawal and pneumonia.   Persistent leukocytosis. Hypokalemia. Hypocalcemia.     - merrem per primary team for pneumonia   - IV hydration  - monitor output of NGT   - GI ppx, DVT ppx enoxaparin subQ qd  - replete electrolytes as needed, including supplemental KCl   - no acute surgical intervention warranted at this time, will continue to monitor  - ICU management  - will d/w surgical attending

## 2020-08-27 LAB
ALBUMIN SERPL ELPH-MCNC: 1.1 G/DL — LOW (ref 3.3–5)
ALP SERPL-CCNC: 305 U/L — HIGH (ref 40–120)
ALT FLD-CCNC: 93 U/L — HIGH (ref 12–78)
ANION GAP SERPL CALC-SCNC: 6 MMOL/L — SIGNIFICANT CHANGE UP (ref 5–17)
AST SERPL-CCNC: 161 U/L — HIGH (ref 15–37)
BASOPHILS # BLD AUTO: 0.03 K/UL — SIGNIFICANT CHANGE UP (ref 0–0.2)
BASOPHILS NFR BLD AUTO: 0.2 % — SIGNIFICANT CHANGE UP (ref 0–2)
BILIRUB SERPL-MCNC: 3.8 MG/DL — HIGH (ref 0.2–1.2)
BUN SERPL-MCNC: 12 MG/DL — SIGNIFICANT CHANGE UP (ref 7–23)
CALCIUM SERPL-MCNC: 7.4 MG/DL — LOW (ref 8.5–10.1)
CHLORIDE SERPL-SCNC: 105 MMOL/L — SIGNIFICANT CHANGE UP (ref 96–108)
CO2 SERPL-SCNC: 25 MMOL/L — SIGNIFICANT CHANGE UP (ref 22–31)
CREAT SERPL-MCNC: 0.47 MG/DL — LOW (ref 0.5–1.3)
EOSINOPHIL # BLD AUTO: 0.13 K/UL — SIGNIFICANT CHANGE UP (ref 0–0.5)
EOSINOPHIL NFR BLD AUTO: 0.9 % — SIGNIFICANT CHANGE UP (ref 0–6)
GLUCOSE SERPL-MCNC: 113 MG/DL — HIGH (ref 70–99)
HCT VFR BLD CALC: 29 % — LOW (ref 39–50)
HGB BLD-MCNC: 9.9 G/DL — LOW (ref 13–17)
IMM GRANULOCYTES NFR BLD AUTO: 0.8 % — SIGNIFICANT CHANGE UP (ref 0–1.5)
LYMPHOCYTES # BLD AUTO: 0.73 K/UL — LOW (ref 1–3.3)
LYMPHOCYTES # BLD AUTO: 5.2 % — LOW (ref 13–44)
MAGNESIUM SERPL-MCNC: 1.5 MG/DL — LOW (ref 1.6–2.6)
MCHC RBC-ENTMCNC: 32.6 PG — SIGNIFICANT CHANGE UP (ref 27–34)
MCHC RBC-ENTMCNC: 34.1 GM/DL — SIGNIFICANT CHANGE UP (ref 32–36)
MCV RBC AUTO: 95.4 FL — SIGNIFICANT CHANGE UP (ref 80–100)
MONOCYTES # BLD AUTO: 0.93 K/UL — HIGH (ref 0–0.9)
MONOCYTES NFR BLD AUTO: 6.6 % — SIGNIFICANT CHANGE UP (ref 2–14)
MRSA PCR RESULT.: SIGNIFICANT CHANGE UP
NEUTROPHILS # BLD AUTO: 12.13 K/UL — HIGH (ref 1.8–7.4)
NEUTROPHILS NFR BLD AUTO: 86.3 % — HIGH (ref 43–77)
NRBC # BLD: 0 /100 WBCS — SIGNIFICANT CHANGE UP (ref 0–0)
PHOSPHATE SERPL-MCNC: 3.5 MG/DL — SIGNIFICANT CHANGE UP (ref 2.5–4.5)
PLATELET # BLD AUTO: 226 K/UL — SIGNIFICANT CHANGE UP (ref 150–400)
POTASSIUM SERPL-MCNC: 3.6 MMOL/L — SIGNIFICANT CHANGE UP (ref 3.5–5.3)
POTASSIUM SERPL-SCNC: 3.6 MMOL/L — SIGNIFICANT CHANGE UP (ref 3.5–5.3)
PROT SERPL-MCNC: 5.4 GM/DL — LOW (ref 6–8.3)
RBC # BLD: 3.04 M/UL — LOW (ref 4.2–5.8)
RBC # FLD: 13.9 % — SIGNIFICANT CHANGE UP (ref 10.3–14.5)
S AUREUS DNA NOSE QL NAA+PROBE: SIGNIFICANT CHANGE UP
SODIUM SERPL-SCNC: 136 MMOL/L — SIGNIFICANT CHANGE UP (ref 135–145)
WBC # BLD: 14.06 K/UL — HIGH (ref 3.8–10.5)
WBC # FLD AUTO: 14.06 K/UL — HIGH (ref 3.8–10.5)

## 2020-08-27 PROCEDURE — 99232 SBSQ HOSP IP/OBS MODERATE 35: CPT

## 2020-08-27 PROCEDURE — 99291 CRITICAL CARE FIRST HOUR: CPT

## 2020-08-27 PROCEDURE — 71260 CT THORAX DX C+: CPT | Mod: 26

## 2020-08-27 PROCEDURE — 74177 CT ABD & PELVIS W/CONTRAST: CPT | Mod: 26

## 2020-08-27 RX ORDER — FENTANYL CITRATE 50 UG/ML
50 INJECTION INTRAVENOUS ONCE
Refills: 0 | Status: DISCONTINUED | OUTPATIENT
Start: 2020-08-27 | End: 2020-08-27

## 2020-08-27 RX ORDER — POTASSIUM CHLORIDE 20 MEQ
40 PACKET (EA) ORAL ONCE
Refills: 0 | Status: COMPLETED | OUTPATIENT
Start: 2020-08-27 | End: 2020-08-27

## 2020-08-27 RX ORDER — FENTANYL CITRATE 50 UG/ML
50 INJECTION INTRAVENOUS EVERY 4 HOURS
Refills: 0 | Status: DISCONTINUED | OUTPATIENT
Start: 2020-08-27 | End: 2020-08-31

## 2020-08-27 RX ORDER — FENTANYL CITRATE 50 UG/ML
50 INJECTION INTRAVENOUS EVERY 4 HOURS
Refills: 0 | Status: DISCONTINUED | OUTPATIENT
Start: 2020-08-27 | End: 2020-08-27

## 2020-08-27 RX ORDER — MAGNESIUM SULFATE 500 MG/ML
2 VIAL (ML) INJECTION ONCE
Refills: 0 | Status: COMPLETED | OUTPATIENT
Start: 2020-08-27 | End: 2020-08-27

## 2020-08-27 RX ADMIN — Medication 250 MILLIGRAM(S): at 04:38

## 2020-08-27 RX ADMIN — PANTOPRAZOLE SODIUM 40 MILLIGRAM(S): 20 TABLET, DELAYED RELEASE ORAL at 12:20

## 2020-08-27 RX ADMIN — SODIUM CHLORIDE 75 MILLILITER(S): 9 INJECTION, SOLUTION INTRAVENOUS at 08:39

## 2020-08-27 RX ADMIN — DEXMEDETOMIDINE HYDROCHLORIDE IN 0.9% SODIUM CHLORIDE 5.36 MICROGRAM(S)/KG/HR: 4 INJECTION INTRAVENOUS at 08:18

## 2020-08-27 RX ADMIN — FENTANYL CITRATE 50 MICROGRAM(S): 50 INJECTION INTRAVENOUS at 16:22

## 2020-08-27 RX ADMIN — SENNA PLUS 2 TABLET(S): 8.6 TABLET ORAL at 21:11

## 2020-08-27 RX ADMIN — Medication 50 GRAM(S): at 04:38

## 2020-08-27 RX ADMIN — CHLORHEXIDINE GLUCONATE 15 MILLILITER(S): 213 SOLUTION TOPICAL at 05:39

## 2020-08-27 RX ADMIN — MEROPENEM 100 MILLIGRAM(S): 1 INJECTION INTRAVENOUS at 14:41

## 2020-08-27 RX ADMIN — MEROPENEM 100 MILLIGRAM(S): 1 INJECTION INTRAVENOUS at 21:11

## 2020-08-27 RX ADMIN — DEXMEDETOMIDINE HYDROCHLORIDE IN 0.9% SODIUM CHLORIDE 5.36 MICROGRAM(S)/KG/HR: 4 INJECTION INTRAVENOUS at 06:14

## 2020-08-27 RX ADMIN — FENTANYL CITRATE 50 MICROGRAM(S): 50 INJECTION INTRAVENOUS at 23:00

## 2020-08-27 RX ADMIN — Medication 1 TABLET(S): at 12:20

## 2020-08-27 RX ADMIN — PROPOFOL 4.29 MICROGRAM(S)/KG/MIN: 10 INJECTION, EMULSION INTRAVENOUS at 05:46

## 2020-08-27 RX ADMIN — Medication 50 GRAM(S): at 05:39

## 2020-08-27 RX ADMIN — FENTANYL CITRATE 50 MICROGRAM(S): 50 INJECTION INTRAVENOUS at 16:37

## 2020-08-27 RX ADMIN — ENOXAPARIN SODIUM 40 MILLIGRAM(S): 100 INJECTION SUBCUTANEOUS at 12:20

## 2020-08-27 RX ADMIN — QUETIAPINE FUMARATE 25 MILLIGRAM(S): 200 TABLET, FILM COATED ORAL at 12:20

## 2020-08-27 RX ADMIN — DEXMEDETOMIDINE HYDROCHLORIDE IN 0.9% SODIUM CHLORIDE 5.36 MICROGRAM(S)/KG/HR: 4 INJECTION INTRAVENOUS at 04:38

## 2020-08-27 RX ADMIN — PROPOFOL 4.29 MICROGRAM(S)/KG/MIN: 10 INJECTION, EMULSION INTRAVENOUS at 08:18

## 2020-08-27 RX ADMIN — SODIUM CHLORIDE 75 MILLILITER(S): 9 INJECTION, SOLUTION INTRAVENOUS at 01:30

## 2020-08-27 RX ADMIN — FENTANYL CITRATE 50 MICROGRAM(S): 50 INJECTION INTRAVENOUS at 08:40

## 2020-08-27 RX ADMIN — MEROPENEM 100 MILLIGRAM(S): 1 INJECTION INTRAVENOUS at 05:43

## 2020-08-27 RX ADMIN — Medication 1 MILLIGRAM(S): at 12:20

## 2020-08-27 RX ADMIN — FENTANYL CITRATE 50 MICROGRAM(S): 50 INJECTION INTRAVENOUS at 08:55

## 2020-08-27 RX ADMIN — CHLORHEXIDINE GLUCONATE 1 APPLICATION(S): 213 SOLUTION TOPICAL at 05:40

## 2020-08-27 RX ADMIN — Medication 40 MILLIEQUIVALENT(S): at 04:38

## 2020-08-27 RX ADMIN — Medication 100 MILLIGRAM(S): at 12:20

## 2020-08-27 RX ADMIN — FENTANYL CITRATE 50 MICROGRAM(S): 50 INJECTION INTRAVENOUS at 22:37

## 2020-08-27 RX ADMIN — DEXMEDETOMIDINE HYDROCHLORIDE IN 0.9% SODIUM CHLORIDE 5.36 MICROGRAM(S)/KG/HR: 4 INJECTION INTRAVENOUS at 00:43

## 2020-08-27 NOTE — PROGRESS NOTE ADULT - SUBJECTIVE AND OBJECTIVE BOX
44y old  Male who presented with a chief complaint of abdominal and was admitted with PANCREATITIS.     Yesterday, patient failed SBT. No acute events overnight.     Today, patient is intubated and sedated in ICU. Patient in no acute distress.       MEDICATIONS  (STANDING):  chlorhexidine 0.12% Liquid 15 milliLiter(s) Oral Mucosa every 12 hours  chlorhexidine 4% Liquid 1 Application(s) Topical <User Schedule>  dexMEDEtomidine Infusion 0.3 MICROgram(s)/kG/Hr (5.36 mL/Hr) IV Continuous <Continuous>  dextrose 5% + lactated ringers. 1000 milliLiter(s) (75 mL/Hr) IV Continuous <Continuous>  enoxaparin Injectable 40 milliGRAM(s) SubCutaneous daily  fentaNYL   Infusion. 0.5 MICROgram(s)/kG/Hr (3.58 mL/Hr) IV Continuous <Continuous>  folic acid 1 milliGRAM(s) Oral daily  meropenem  IVPB 1000 milliGRAM(s) IV Intermittent every 8 hours  multivitamin 1 Tablet(s) Oral daily  pantoprazole  Injectable 40 milliGRAM(s) IV Push daily  propofol Infusion 10 MICROgram(s)/kG/Min (4.29 mL/Hr) IV Continuous <Continuous>  QUEtiapine 25 milliGRAM(s) Oral daily  senna 2 Tablet(s) Oral at bedtime  thiamine 100 milliGRAM(s) Oral daily  vancomycin  IVPB 1000 milliGRAM(s) IV Intermittent every 8 hours    MEDICATIONS  (PRN):  acetaminophen   Tablet .. 650 milliGRAM(s) Oral every 6 hours PRN Temp greater or equal to 38C (100.4F), Mild Pain (1 - 3)  HYDROmorphone  Injectable 0.5 milliGRAM(s) IV Push every 4 hours PRN Severe Pain (7 - 10)      Vital Signs Last 24 Hrs  T(F): 98.3 (08-27-20 @ 04:30), Max: 102.2 (08-26-20 @ 12:00)  HR: 96 (08-27-20 @ 06:15)  BP: 117/74 (08-27-20 @ 06:00)  RR: 24 (08-27-20 @ 06:15)  SpO2: 98% (08-27-20 @ 06:15)  Wt(kg): --   CAPILLARY BLOOD GLUCOSE      POCT Blood Glucose.: 103 mg/dL (25 Aug 2020 09:39)      GENERAL: Sedated, NAD  HEENT: Intubated  CHEST/LUNG: Vented, respirations nonlabored  HEART: S1S2, RRR  ABDOMEN: + Bowel sounds, softly distended, nontender  EXTREMITIES: 1+pitting edema in bilateral lower extremities, +palpable distal pulses. SCDs b/l LE  : Condom catheter with yellow urine, output: 1450 cc/24hrs    I&O's Detail    25 Aug 2020 07:01  -  26 Aug 2020 07:00  --------------------------------------------------------  IN:    dexmedetomidine Infusion: 465.6 mL    dextrose 5% + lactated ringers.: 1800 mL    IV PiggyBack: 400 mL    propofol Infusion: 331.2 mL  Total IN: 2996.8 mL    OUT:    Incontinent per Condom Catheter: 550 mL    Nasoenteral Tube: 300 mL  Total OUT: 850 mL    Total NET: 2146.8 mL      26 Aug 2020 07:01  -  27 Aug 2020 06:26  --------------------------------------------------------  IN:    dexmedetomidine Infusion: 513.9 mL    dextrose 5% + lactated ringers.: 1800 mL    IV PiggyBack: 950 mL    Lactated Ringers IV Bolus: 1000 mL    ns in tub fed vital1: 190 mL    propofol Infusion: 366.3 mL  Total IN: 4820.2 mL    OUT:    Incontinent per Condom Catheter: 1450 mL    Nasoenteral Tube: 50 mL  Total OUT: 1500 mL    Total NET: 3320.2 mL          LABS:                        9.9    14.06 )-----------( 226      ( 27 Aug 2020 03:40 )             29.0     WBC Count: 14.06 K/uL (08-27 @ 03:40)  WBC Count: 16.87 K/uL (08-26 @ 04:09)  WBC Count: 16.87 K/uL (08-25 @ 02:43)  WBC Count: 19.47 K/uL (08-24 @ 03:01)  WBC Count: 13.63 K/uL (08-23 @ 04:01)    08-27    136  |  105  |  12  ----------------------------<  113<H>  3.6   |  25  |  0.47<L>    Potassium, Serum: 3.6 mmol/L (08-27-20 @ 03:40)  Potassium, Serum: 3.4 mmol/L (08-26-20 @ 04:09)  Potassium, Serum: 3.5 mmol/L (08-25-20 @ 10:15)  Potassium, Serum: 3.6 mmol/L (08-25-20 @ 09:08)  Potassium, Serum: 3.1 mmol/L (08-25-20 @ 02:43)  Potassium, Serum: 3.7 mmol/L (08-24-20 @ 11:44)  Potassium, Serum: 3.2 mmol/L (08-24-20 @ 03:01)  Potassium, Serum: 3.4 mmol/L (08-23-20 @ 14:45)  Potassium, Serum: 2.9 mmol/L (08-23-20 @ 04:01)  Potassium, Serum: 3.0 mmol/L (08-22-20 @ 04:48)  Potassium, Serum: 3.6 mmol/L (08-21-20 @ 02:55)  Potassium, Serum: 3.5 mmol/L (08-20-20 @ 03:34)  Potassium, Serum: 4.2 mmol/L (08-19-20 @ 10:48)  Potassium, Serum: 4.5 mmol/L (08-19-20 @ 03:07)  Potassium, Serum: 4.5 mmol/L (08-18-20 @ 12:55)  Potassium, Serum: 4.0 mmol/L (08-18-20 @ 05:08)  Potassium, Serum: 3.8 mmol/L (08-18-20 @ 04:27)  Potassium, Serum: 2.9 mmol/L (08-17-20 @ 17:27)  Potassium, Serum: 3.3 mmol/L (02-11-20 @ 12:14)  Potassium, Serum: 3.6 mmol/L (02-11-20 @ 02:57)      Ca    7.4<L>      27 Aug 2020 03:40  Phos  3.5     08-27  Mg     1.5     08-27    Calcium, Total Serum: 7.4 mg/dL [8.5 - 10.1] (08-27-20)  Magnesium, Serum: 1.5 mg/dL [1.6 - 2.6] (08-27-20)  Phosphorus Level, Serum: 3.5 mg/dL [2.5 - 4.5] (08-27-20)  Calcium, Total Serum: 7.7 mg/dL [8.5 - 10.1] (08-26-20)  Magnesium, Serum: 1.9 mg/dL [1.6 - 2.6] (08-26-20)  Phosphorus Level, Serum: 3.4 mg/dL [2.5 - 4.5] (08-26-20)  Calcium, Total Serum: 8.0 mg/dL [8.5 - 10.1] (08-25-20)  Magnesium, Serum: 2.0 mg/dL [1.6 - 2.6] (08-25-20)  Calcium, Total Serum: 7.6 mg/dL [8.5 - 10.1] (08-25-20)  Magnesium, Serum: 1.8 mg/dL [1.6 - 2.6] (08-25-20)  Phosphorus Level, Serum: 2.6 mg/dL [2.5 - 4.5] (08-25-20)  Calcium, Total Serum: 7.5 mg/dL [8.5 - 10.1] (08-25-20)  Magnesium, Serum: 1.9 mg/dL [1.6 - 2.6] (08-25-20)  Phosphorus Level, Serum: 2.8 mg/dL [2.5 - 4.5] (08-25-20)  Calcium, Total Serum: 7.7 mg/dL [8.5 - 10.1] (08-24-20)  Magnesium, Serum: 2.0 mg/dL [1.6 - 2.6] (08-24-20)  Phosphorus Level, Serum: 3.3 mg/dL [2.5 - 4.5] (08-24-20)  Calcium, Total Serum: 7.3 mg/dL [8.5 - 10.1] (08-24-20)  Magnesium, Serum: 1.6 mg/dL [1.6 - 2.6] (08-24-20)  Phosphorus Level, Serum: 2.4 mg/dL [2.5 - 4.5] (08-24-20)  Calcium, Total Serum: 7.6 mg/dL [8.5 - 10.1] (08-23-20)  Magnesium, Serum: 1.8 mg/dL [1.6 - 2.6] (08-23-20)  Phosphorus Level, Serum: 3.6 mg/dL [2.5 - 4.5] (08-23-20)  Calcium, Total Serum: 7.4 mg/dL [8.5 - 10.1] (08-23-20)  Magnesium, Serum: 1.3 mg/dL [1.6 - 2.6] (08-23-20)  Phosphorus Level, Serum: 1.9 mg/dL [2.5 - 4.5] (08-23-20)  Magnesium, Serum: 1.5 mg/dL [1.6 - 2.6] (08-22-20)  Phosphorus Level, Serum: 1.2 mg/dL [2.5 - 4.5] (08-22-20)  Calcium, Total Serum: 6.8 mg/dL [8.5 - 10.1] (08-22-20)  Calcium, Total Serum: 6.7 mg/dL [8.5 - 10.1] (08-21-20)  Magnesium, Serum: 2.0 mg/dL [1.6 - 2.6] (08-21-20)  Phosphorus Level, Serum: 1.8 mg/dL [2.5 - 4.5] (08-21-20)  Calcium, Total Serum: 6.6 mg/dL [8.5 - 10.1] (08-20-20)  Magnesium, Serum: 1.9 mg/dL [1.6 - 2.6] (08-20-20)  Phosphorus Level, Serum: 1.7 mg/dL [2.5 - 4.5] (08-20-20)  Calcium, Total Serum: 7.2 mg/dL [8.5 - 10.1] (08-19-20)  Magnesium, Serum: 2.0 mg/dL [1.6 - 2.6] (08-19-20)  Phosphorus Level, Serum: 2.6 mg/dL [2.5 - 4.5] (08-19-20)  Calcium, Total Serum: 7.5 mg/dL [8.5 - 10.1] (08-19-20)  Magnesium, Serum: 1.7 mg/dL [1.6 - 2.6] (08-19-20)  Phosphorus Level, Serum: 3.1 mg/dL [2.5 - 4.5] (08-19-20)  Calcium, Total Serum: 8.3 mg/dL [8.5 - 10.1] (08-18-20)  Magnesium, Serum: 1.3 mg/dL [1.6 - 2.6] (08-18-20)  Phosphorus Level, Serum: 4.0 mg/dL [2.5 - 4.5] (08-18-20)  Calcium, Total Serum: 9.2 mg/dL [8.5 - 10.1] (08-18-20)  Magnesium, Serum: 0.9 mg/dL [1.6 - 2.6] (08-18-20)  Phosphorus Level, Serum: 0.8 mg/dL [2.5 - 4.5] (08-18-20)  Calcium, Total Serum: 9.2 mg/dL [8.5 - 10.1] (08-18-20)  Calcium, Total Serum: 11.5 mg/dL [8.5 - 10.1] (08-17-20)    TPro  5.4<L>  /  Alb  1.1<L>  /  TBili  3.8<H>  /  DBili  x   /  AST  161<H>  /  ALT  93<H>  /  AlkPhos  305<H>  08-27    LIVER FUNCTIONS - ( 27 Aug 2020 03:40 )  Alb: 1.1 [3.3 - 5.0] / Pro: 5.4 [6.0 - 8.3] / ALK PHOS: 305 [40 - 120] / ALT: 93 [12 - 78] / AST: 161 [15 - 37] / GGT: x     LIVER FUNCTIONS - ( 25 Aug 2020 10:15 )  Alb: 1.3 [3.3 - 5.0] / Pro: 5.2 [6.0 - 8.3] / ALK PHOS: 197 [40 - 120] / ALT: 55 [12 - 78] / AST: 91 [15 - 37] / GGT: x     LIVER FUNCTIONS - ( 23 Aug 2020 04:01 )  Alb: 1.7 [3.3 - 5.0] / Pro: 5.4 [6.0 - 8.3] / ALK PHOS: 164 [40 - 120] / ALT: 83 [12 - 78] / AST: 144 [15 - 37] / GGT: x     LIVER FUNCTIONS - ( 22 Aug 2020 04:48 )  Alb: 1.7 [3.3 - 5.0] / Pro: 5.5 [6.0 - 8.3] / ALK PHOS: 140 [40 - 120] / ALT: 64 [12 - 78] / AST: 122 [15 - 37] / GGT: x           RADIOLOGY & ADDITIONAL STUDIES:  IMAGING:      A/P: 44M PMH ETOH abuse, pancreatitis, a/w acute necrotizing pancreatitis. Intubated 2/2 respiratory distress due to ETOH withdrawal and pneumonia. Sedated with known alcohol withdrawal - on precedex and on background ativan.   Leukocytosis dowtrending. Hypokalemia improved and stable.      - merrem and vanc per primary team - respiratory distress likely 2/2 pneumonia.   - remains intubated and sedated. Experiencing alcohol withdrawal and hypoxic respiratory failure.   - IV hydration -  dextrose 5% + lactated ringers.   - monitor output of NGT   - GI ppx, DVT ppx w/ enoxaparin subQ 40 mg qd & SCDs  - replete electrolytes as needed, including supplemental KCl   - no acute surgical intervention warranted at this time, will continue to monitor  - ICU management 44y old  Male who presented with a chief complaint of abdominal and was admitted with PANCREATITIS.     Yesterday, patient failed SBT; critical care team does not plan to repeat SBT. Patient was agitated overnight, propofol and precedex doses increased, patient now in restraints. Patient was also febrile overnight (Tmax 101.8 at 10:30pm); fever resolved.      Today, patient is intubated and sedated in ICU. Patient is responsive - opens eyes during physical exam. Patient in no acute distress.     MEDICATIONS  (STANDING):  chlorhexidine 0.12% Liquid 15 milliLiter(s) Oral Mucosa every 12 hours  chlorhexidine 4% Liquid 1 Application(s) Topical <User Schedule>  dexMEDEtomidine Infusion 0.3 MICROgram(s)/kG/Hr (5.36 mL/Hr) IV Continuous <Continuous>  dextrose 5% + lactated ringers. 1000 milliLiter(s) (75 mL/Hr) IV Continuous <Continuous>  enoxaparin Injectable 40 milliGRAM(s) SubCutaneous daily  fentaNYL   Infusion. 0.5 MICROgram(s)/kG/Hr (3.58 mL/Hr) IV Continuous <Continuous>  folic acid 1 milliGRAM(s) Oral daily  meropenem  IVPB 1000 milliGRAM(s) IV Intermittent every 8 hours  multivitamin 1 Tablet(s) Oral daily  pantoprazole  Injectable 40 milliGRAM(s) IV Push daily  propofol Infusion 10 MICROgram(s)/kG/Min (4.29 mL/Hr) IV Continuous <Continuous>  QUEtiapine 25 milliGRAM(s) Oral daily  senna 2 Tablet(s) Oral at bedtime  thiamine 100 milliGRAM(s) Oral daily  vancomycin  IVPB 1000 milliGRAM(s) IV Intermittent every 8 hours    MEDICATIONS  (PRN):  acetaminophen   Tablet .. 650 milliGRAM(s) Oral every 6 hours PRN Temp greater or equal to 38C (100.4F), Mild Pain (1 - 3)  HYDROmorphone  Injectable 0.5 milliGRAM(s) IV Push every 4 hours PRN Severe Pain (7 - 10)      Vital Signs Last 24 Hrs  T(F): 98.3 (08-27-20 @ 04:30), Max: 102.2 (08-26-20 @ 12:00)  HR: 96 (08-27-20 @ 06:15)  BP: 117/74 (08-27-20 @ 06:00)  RR: 24 (08-27-20 @ 06:15)  SpO2: 98% (08-27-20 @ 06:15)  Wt(kg): --   CAPILLARY BLOOD GLUCOSE      POCT Blood Glucose.: 103 mg/dL (25 Aug 2020 09:39)      GENERAL: Sedated, NAD  HEENT: Intubated  CHEST/LUNG: Vented, respirations nonlabored  HEART: S1S2, RRR  ABDOMEN: + Bowel sounds, softly distended, nontender  EXTREMITIES: 1+pitting edema in bilateral upper and lower extremities, +palpable distal pulses. SCDs b/l LE  : Condom catheter with yellow urine, output: 1450 cc/24hrs    I&O's Detail    25 Aug 2020 07:01  -  26 Aug 2020 07:00  --------------------------------------------------------  IN:    dexmedetomidine Infusion: 465.6 mL    dextrose 5% + lactated ringers.: 1800 mL    IV PiggyBack: 400 mL    propofol Infusion: 331.2 mL  Total IN: 2996.8 mL    OUT:    Incontinent per Condom Catheter: 550 mL    Nasoenteral Tube: 300 mL  Total OUT: 850 mL    Total NET: 2146.8 mL      26 Aug 2020 07:01  -  27 Aug 2020 06:26  --------------------------------------------------------  IN:    dexmedetomidine Infusion: 513.9 mL    dextrose 5% + lactated ringers.: 1800 mL    IV PiggyBack: 950 mL    Lactated Ringers IV Bolus: 1000 mL    ns in tub fed vital1: 190 mL    propofol Infusion: 366.3 mL  Total IN: 4820.2 mL    OUT:    Incontinent per Condom Catheter: 1450 mL    Nasoenteral Tube: 50 mL  Total OUT: 1500 mL    Total NET: 3320.2 mL          LABS:                        9.9    14.06 )-----------( 226      ( 27 Aug 2020 03:40 )             29.0     WBC Count: 14.06 K/uL (08-27 @ 03:40)  WBC Count: 16.87 K/uL (08-26 @ 04:09)  WBC Count: 16.87 K/uL (08-25 @ 02:43)  WBC Count: 19.47 K/uL (08-24 @ 03:01)  WBC Count: 13.63 K/uL (08-23 @ 04:01)    08-27    136  |  105  |  12  ----------------------------<  113<H>  3.6   |  25  |  0.47<L>    Potassium, Serum: 3.6 mmol/L (08-27-20 @ 03:40)  Potassium, Serum: 3.4 mmol/L (08-26-20 @ 04:09)  Potassium, Serum: 3.5 mmol/L (08-25-20 @ 10:15)  Potassium, Serum: 3.6 mmol/L (08-25-20 @ 09:08)  Potassium, Serum: 3.1 mmol/L (08-25-20 @ 02:43)  Potassium, Serum: 3.7 mmol/L (08-24-20 @ 11:44)  Potassium, Serum: 3.2 mmol/L (08-24-20 @ 03:01)  Potassium, Serum: 3.4 mmol/L (08-23-20 @ 14:45)  Potassium, Serum: 2.9 mmol/L (08-23-20 @ 04:01)  Potassium, Serum: 3.0 mmol/L (08-22-20 @ 04:48)  Potassium, Serum: 3.6 mmol/L (08-21-20 @ 02:55)  Potassium, Serum: 3.5 mmol/L (08-20-20 @ 03:34)  Potassium, Serum: 4.2 mmol/L (08-19-20 @ 10:48)  Potassium, Serum: 4.5 mmol/L (08-19-20 @ 03:07)  Potassium, Serum: 4.5 mmol/L (08-18-20 @ 12:55)  Potassium, Serum: 4.0 mmol/L (08-18-20 @ 05:08)  Potassium, Serum: 3.8 mmol/L (08-18-20 @ 04:27)  Potassium, Serum: 2.9 mmol/L (08-17-20 @ 17:27)  Potassium, Serum: 3.3 mmol/L (02-11-20 @ 12:14)  Potassium, Serum: 3.6 mmol/L (02-11-20 @ 02:57)      Ca    7.4<L>      27 Aug 2020 03:40  Phos  3.5     08-27  Mg     1.5     08-27    Calcium, Total Serum: 7.4 mg/dL [8.5 - 10.1] (08-27-20)  Magnesium, Serum: 1.5 mg/dL [1.6 - 2.6] (08-27-20)  Phosphorus Level, Serum: 3.5 mg/dL [2.5 - 4.5] (08-27-20)  Calcium, Total Serum: 7.7 mg/dL [8.5 - 10.1] (08-26-20)  Magnesium, Serum: 1.9 mg/dL [1.6 - 2.6] (08-26-20)  Phosphorus Level, Serum: 3.4 mg/dL [2.5 - 4.5] (08-26-20)  Calcium, Total Serum: 8.0 mg/dL [8.5 - 10.1] (08-25-20)  Magnesium, Serum: 2.0 mg/dL [1.6 - 2.6] (08-25-20)  Calcium, Total Serum: 7.6 mg/dL [8.5 - 10.1] (08-25-20)  Magnesium, Serum: 1.8 mg/dL [1.6 - 2.6] (08-25-20)  Phosphorus Level, Serum: 2.6 mg/dL [2.5 - 4.5] (08-25-20)  Calcium, Total Serum: 7.5 mg/dL [8.5 - 10.1] (08-25-20)  Magnesium, Serum: 1.9 mg/dL [1.6 - 2.6] (08-25-20)  Phosphorus Level, Serum: 2.8 mg/dL [2.5 - 4.5] (08-25-20)  Calcium, Total Serum: 7.7 mg/dL [8.5 - 10.1] (08-24-20)  Magnesium, Serum: 2.0 mg/dL [1.6 - 2.6] (08-24-20)  Phosphorus Level, Serum: 3.3 mg/dL [2.5 - 4.5] (08-24-20)  Calcium, Total Serum: 7.3 mg/dL [8.5 - 10.1] (08-24-20)  Magnesium, Serum: 1.6 mg/dL [1.6 - 2.6] (08-24-20)  Phosphorus Level, Serum: 2.4 mg/dL [2.5 - 4.5] (08-24-20)  Calcium, Total Serum: 7.6 mg/dL [8.5 - 10.1] (08-23-20)  Magnesium, Serum: 1.8 mg/dL [1.6 - 2.6] (08-23-20)  Phosphorus Level, Serum: 3.6 mg/dL [2.5 - 4.5] (08-23-20)  Calcium, Total Serum: 7.4 mg/dL [8.5 - 10.1] (08-23-20)  Magnesium, Serum: 1.3 mg/dL [1.6 - 2.6] (08-23-20)  Phosphorus Level, Serum: 1.9 mg/dL [2.5 - 4.5] (08-23-20)  Magnesium, Serum: 1.5 mg/dL [1.6 - 2.6] (08-22-20)  Phosphorus Level, Serum: 1.2 mg/dL [2.5 - 4.5] (08-22-20)  Calcium, Total Serum: 6.8 mg/dL [8.5 - 10.1] (08-22-20)  Calcium, Total Serum: 6.7 mg/dL [8.5 - 10.1] (08-21-20)  Magnesium, Serum: 2.0 mg/dL [1.6 - 2.6] (08-21-20)  Phosphorus Level, Serum: 1.8 mg/dL [2.5 - 4.5] (08-21-20)  Calcium, Total Serum: 6.6 mg/dL [8.5 - 10.1] (08-20-20)  Magnesium, Serum: 1.9 mg/dL [1.6 - 2.6] (08-20-20)  Phosphorus Level, Serum: 1.7 mg/dL [2.5 - 4.5] (08-20-20)  Calcium, Total Serum: 7.2 mg/dL [8.5 - 10.1] (08-19-20)  Magnesium, Serum: 2.0 mg/dL [1.6 - 2.6] (08-19-20)  Phosphorus Level, Serum: 2.6 mg/dL [2.5 - 4.5] (08-19-20)  Calcium, Total Serum: 7.5 mg/dL [8.5 - 10.1] (08-19-20)  Magnesium, Serum: 1.7 mg/dL [1.6 - 2.6] (08-19-20)  Phosphorus Level, Serum: 3.1 mg/dL [2.5 - 4.5] (08-19-20)  Calcium, Total Serum: 8.3 mg/dL [8.5 - 10.1] (08-18-20)  Magnesium, Serum: 1.3 mg/dL [1.6 - 2.6] (08-18-20)  Phosphorus Level, Serum: 4.0 mg/dL [2.5 - 4.5] (08-18-20)  Calcium, Total Serum: 9.2 mg/dL [8.5 - 10.1] (08-18-20)  Magnesium, Serum: 0.9 mg/dL [1.6 - 2.6] (08-18-20)  Phosphorus Level, Serum: 0.8 mg/dL [2.5 - 4.5] (08-18-20)  Calcium, Total Serum: 9.2 mg/dL [8.5 - 10.1] (08-18-20)  Calcium, Total Serum: 11.5 mg/dL [8.5 - 10.1] (08-17-20)    TPro  5.4<L>  /  Alb  1.1<L>  /  TBili  3.8<H>  /  DBili  x   /  AST  161<H>  /  ALT  93<H>  /  AlkPhos  305<H>  08-27    LIVER FUNCTIONS - ( 27 Aug 2020 03:40 )  Alb: 1.1 [3.3 - 5.0] / Pro: 5.4 [6.0 - 8.3] / ALK PHOS: 305 [40 - 120] / ALT: 93 [12 - 78] / AST: 161 [15 - 37] / GGT: x     LIVER FUNCTIONS - ( 25 Aug 2020 10:15 )  Alb: 1.3 [3.3 - 5.0] / Pro: 5.2 [6.0 - 8.3] / ALK PHOS: 197 [40 - 120] / ALT: 55 [12 - 78] / AST: 91 [15 - 37] / GGT: x     LIVER FUNCTIONS - ( 23 Aug 2020 04:01 )  Alb: 1.7 [3.3 - 5.0] / Pro: 5.4 [6.0 - 8.3] / ALK PHOS: 164 [40 - 120] / ALT: 83 [12 - 78] / AST: 144 [15 - 37] / GGT: x     LIVER FUNCTIONS - ( 22 Aug 2020 04:48 )  Alb: 1.7 [3.3 - 5.0] / Pro: 5.5 [6.0 - 8.3] / ALK PHOS: 140 [40 - 120] / ALT: 64 [12 - 78] / AST: 122 [15 - 37] / GGT: x           RADIOLOGY & ADDITIONAL STUDIES:  IMAGING:      A/P: 44M PMH ETOH abuse, pancreatitis, a/w acute necrotizing pancreatitis. Intubated 2/2 respiratory distress due to ETOH withdrawal and pneumonia. Sedated with known alcohol withdrawal - on precedex and on background ativan.   Leukocytosis dowtrending. Hypokalemia improved and stable.      - merrem and vanc per primary team - respiratory distress likely 2/2 pneumonia.   - remains intubated and sedated. Experiencing alcohol withdrawal and hypoxic respiratory failure.   - IV hydration -  dextrose 5% + lactated ringers.   - monitor output of NGT   - GI ppx, DVT ppx w/ enoxaparin subQ 40 mg qd & SCDs  - replete electrolytes as needed, including supplemental KCl   - no acute surgical intervention warranted at this time, will continue to monitor  - ICU management

## 2020-08-27 NOTE — PROGRESS NOTE ADULT - SUBJECTIVE AND OBJECTIVE BOX
SURGERY PROGRESS HPI:  Patient seen and examined at bedside.   Intubated and sedated.    Vital Signs Last 24 Hrs  T(C): 36.8 (27 Aug 2020 04:30), Max: 39 (26 Aug 2020 12:00)  T(F): 98.3 (27 Aug 2020 04:30), Max: 102.2 (26 Aug 2020 12:00)  HR: 96 (27 Aug 2020 06:15) (81 - 112)  BP: 117/74 (27 Aug 2020 06:00) (92/57 - 124/72)  BP(mean): 82 (27 Aug 2020 06:00) (63 - 89)  RR: 24 (27 Aug 2020 06:15) (14 - 33)  SpO2: 98% (27 Aug 2020 06:15) (89% - 100%)      PHYSICAL EXAM:  GENERAL: Sedated, NAD  HEENT: Intubated  CHEST/LUNG: Vented, respirations nonlabored  HEART: S1S2, RRR  ABDOMEN: softly distended, hypoactive Bowel sounds, nontender  EXTREMITIES: 1+pitting edema x 4 extremities, +palpable distal pulses. SCDs b/l LE  : Condom catheter with clear yellow urine    I&O's Detail    25 Aug 2020 07:01  -  26 Aug 2020 07:00  --------------------------------------------------------  IN:    dexmedetomidine Infusion: 465.6 mL    dextrose 5% + lactated ringers.: 1800 mL    IV PiggyBack: 400 mL    propofol Infusion: 331.2 mL  Total IN: 2996.8 mL    OUT:    Incontinent per Condom Catheter: 550 mL    Nasoenteral Tube: 300 mL  Total OUT: 850 mL    Total NET: 2146.8 mL      26 Aug 2020 07:01  -  27 Aug 2020 06:33  --------------------------------------------------------  IN:    dexmedetomidine Infusion: 511.9 mL    dextrose 5% + lactated ringers.: 1800 mL    IV PiggyBack: 1050 mL    Lactated Ringers IV Bolus: 1000 mL    ns in tub fed vital1: 190 mL    propofol Infusion: 366.3 mL  Total IN: 4918.2 mL    OUT:    Incontinent per Condom Catheter: 1450 mL    Nasoenteral Tube: 50 mL  Total OUT: 1500 mL    Total NET: 3418.2 mL          LABS:                        9.9    14.06 )-----------( 226      ( 27 Aug 2020 03:40 )             29.0     08-27    136  |  105  |  12  ----------------------------<  113<H>  3.6   |  25  |  0.47<L>    Ca    7.4<L>      27 Aug 2020 03:40  Phos  3.5     08-27  Mg     1.5     08-27    TPro  5.4<L>  /  Alb  1.1<L>  /  TBili  3.8<H>  /  DBili  x   /  AST  161<H>  /  ALT  93<H>  /  AlkPhos  305<H>  08-27      Culture Results:   Normal Respiratory Poonam present (08-24 @ 09:16)        A/P: 44M PMH ETOH abuse, pancreatitis, a/w acute necrotizing pancreatitis. Intubated 2/2 respiratory distress due to ETOH withdrawal and pneumonia.   Leukocytosis downtrending.  - merrem/vanco per primary team  - IV hydration, NGT  - GI ppx, DVT ppx  - replete electrolytes  - no acute surgical intervention warranted at this time, will continue to monitor  - ICU management  - will d/w surgical attending

## 2020-08-27 NOTE — PROGRESS NOTE ADULT - ATTENDING COMMENTS
Patient seen and examined.  Sedated intubated.  Febrile, Abdomen soft, mildly distended  Great urine output  Increasing LFT's    Appreciate excellent ICU care  CT and abdominal US today, concern for acalculous cholecystitis  No surgical intervention planned or indicated at this time

## 2020-08-27 NOTE — PROGRESS NOTE ADULT - ASSESSMENT
44M PMH EtOH abuse, alcohol withdrawal, alcohol pancreatitis presents for 3 days of abdominal pain, nausea and vomiting after recent binge drinking. Found to have necrotizing pancreatitis. Admitted to medical service for pancreatitis. Transferred to ICU for alcohol withdrawal/DTs. Course complicated by septic shock, acute renal insufficiency, lactic acidosis, small bowel obstruction.    Neuro: Intubated and sedated with known alcohol withdrawal, now requiring propofol and precedex for sedation; will continue background ativan. Fentanyl PRN for pain.  Daily sbt and sat.   CV: Sepsis with septic shock - multifactorial given pancreatitis and now pneumonia - now off levophed; maintain map>65.  Pulm: Acute hypoxic respiratory failure 2/2 pneumonia with L sided infiltrated; noted to have gram neg diploccoci in sputum culture; continue with antibiotics; wean FiO2 as tolerated;   C/w 12/500/5/45; tachypneic during sbt/sat this am.    GI: NPO; acute pancreatitis noted on admission; Started trickle feeds yesterday; noted to have increased pain with palpation and rising LFTs; will obtain RUQ and CT abd pelvis today.    Renal/Metabolic: FRANKO; replete electrolytes prn; continue with maintenance fluids D5 LR; will start trickle feeds in am and wean fluids.  ID: Sepsis with Septic shock - likely pneumonia now continue with meropenem.  d/c vanc 2/2 neg MRSA swab  Heme: WBC 14; stable the setting of sepsis and acute pneumonia.    Endo: FRANKO  Dispo: Remains intubated and sedated; critically ill 2/2 pneumonia, alcohol withdrawal and acute hypoxic respiratory failure.

## 2020-08-27 NOTE — PROGRESS NOTE ADULT - SUBJECTIVE AND OBJECTIVE BOX
INTERVAL HPI/OVERNIGHT EVENTS:    Noted to have slight rise in LFTs and abdomen remains distended and firm.  CT abd/pelvis ordered;  Fentanyl pushes started fro pain and sedation  CENTRAL LINE: [ ] YES [x ] NO  LOCATION:       QUINTEROS: [] YES [ x ] NO        A-LINE:  [ ] YES [x ] NO  LOCATION:       GLOBAL ISSUE/BEST PRACTICE:  Analgesia: fentanyl  Sedation: Propofol and Precedex  HOB elevation: yes  Stress ulcer prophylaxis: Protonix  VTE prophylaxis: HSQ  Oral Care: Chlorhexidine  Glycemic control: ISS/Lantus  Nutrition: trickle feeds on hold for ct scan.  	  REVIEW OF SYSTEMS: [ x ] Unable to obtain because: intubated and sedated    PHYSICAL EXAM:    Gen: intubated and sedated  HEENT: Intubated with ETT  CHEST/LUNG: Crackles on left with coarse breath sound bilaterally  HEART: Regular rate and rhythm; No murmurs, rubs, or gallops  ABDOMEN: firm, distended; tender; Bs resent  EXTREMITIES:  2+ Peripheral Pulses, No clubbing, cyanosis, or edema  NERVOUS SYSTEM:  intubated and sedated.  ties; DTRs 2+ intact and symmetric    ICU Vital Signs Last 24 Hrs  T(C): 37.6 (27 Aug 2020 07:16), Max: 39 (26 Aug 2020 12:00)  T(F): 99.7 (27 Aug 2020 07:16), Max: 102.2 (26 Aug 2020 12:00)  HR: 97 (27 Aug 2020 09:00) (81 - 112)  BP: 105/62 (27 Aug 2020 09:00) (92/57 - 124/72)  BP(mean): 73 (27 Aug 2020 09:00) (63 - 89)  ABP: --  ABP(mean): --  RR: 18 (27 Aug 2020 09:00) (14 - 33)  SpO2: 99% (27 Aug 2020 09:00) (89% - 100%)      I&O's Detail    26 Aug 2020 07:01  -  27 Aug 2020 07:00  --------------------------------------------------------  IN:    dexmedetomidine Infusion: 511.9 mL    dextrose 5% + lactated ringers.: 1800 mL    IV PiggyBack: 1050 mL    Lactated Ringers IV Bolus: 1000 mL    ns in tub fed vital1: 190 mL    propofol Infusion: 366.3 mL  Total IN: 4918.2 mL    OUT:    Incontinent per Condom Catheter: 1450 mL    Nasoenteral Tube: 50 mL  Total OUT: 1500 mL    Total NET: 3418.2 mL      27 Aug 2020 07:01  -  27 Aug 2020 10:16  --------------------------------------------------------  IN:    dexmedetomidine Infusion: 25.2 mL    dextrose 5% + lactated ringers.: 75 mL    ns in tub fed vital1: 10 mL    propofol Infusion: 14 mL  Total IN: 124.2 mL    OUT:  Total OUT: 0 mL    Total NET: 124.2 mL          MEDICATIONS  NEURO  Meds: dexMEDEtomidine Infusion 0.3 MICROgram(s)/kG/Hr (5.36 mL/Hr) IV Continuous <Continuous>  propofol Infusion 10 MICROgram(s)/kG/Min (4.29 mL/Hr) IV Continuous <Continuous>  QUEtiapine 25 milliGRAM(s) Oral daily    RESPIRATORY    Meds:   CARDIOVASCULAR  Meds:   GI/NUTRITION  Meds: pantoprazole  Injectable 40 milliGRAM(s) IV Push daily  senna 2 Tablet(s) Oral at bedtime    GENITOURINARY  Meds: dextrose 5% + lactated ringers. 1000 milliLiter(s) IV Continuous <Continuous>  folic acid 1 milliGRAM(s) Oral daily  multivitamin 1 Tablet(s) Oral daily  thiamine 100 milliGRAM(s) Oral daily    HEMATOLOGIC  Meds: enoxaparin Injectable 40 milliGRAM(s) SubCutaneous daily    [x] VTE Prophylaxis  INFECTIOUS DISEASES  Meds: meropenem  IVPB 1000 milliGRAM(s) IV Intermittent every 8 hours    ENDOCRINE  CAPILLARY BLOOD GLUCOSE        Meds:   OTHER MEDICATIONS:  chlorhexidine 0.12% Liquid 15 milliLiter(s) Oral Mucosa every 12 hours  chlorhexidine 4% Liquid 1 Application(s) Topical <User Schedule>  :    LABS:                        9.9    14.06 )-----------( 226      ( 27 Aug 2020 03:40 )             29.0      08-27    136  |  105  |  12  ----------------------------<  113<H>  3.6   |  25  |  0.47<L>    Ca    7.4<L>      27 Aug 2020 03:40  Phos  3.5     08-27  Mg     1.5     08-27    TPro  5.4<L>  /  Alb  1.1<L>  /  TBili  3.8<H>  /  DBili  x   /  AST  161<H>  /  ALT  93<H>  /  AlkPhos  305<H>  08-27      ## COVID Panel          Culture - Sputum (collected 08-26-20 @ 18:51)  Source: .Sputum Sputum  Gram Stain (08-26-20 @ 23:20):    No polymorphonuclear leukocytes per low power field    No Squamous epithelial cells per low power field    No organisms seen per oil power field      Mode: AC/ CMV (Assist Control/ Continuous Mandatory Ventilation), RR (machine): 12, TV (machine): 500, FiO2: 40, PEEP: 5, ITime: 1, MAP: 16, PIP: 28      RADIOLOGY & ADDITIONAL STUDIES:

## 2020-08-28 LAB
ALBUMIN SERPL ELPH-MCNC: 1.1 G/DL — LOW (ref 3.3–5)
ALP SERPL-CCNC: 361 U/L — HIGH (ref 40–120)
ALT FLD-CCNC: 105 U/L — HIGH (ref 12–78)
ANION GAP SERPL CALC-SCNC: 8 MMOL/L — SIGNIFICANT CHANGE UP (ref 5–17)
AST SERPL-CCNC: 173 U/L — HIGH (ref 15–37)
BILIRUB SERPL-MCNC: 3.5 MG/DL — HIGH (ref 0.2–1.2)
BUN SERPL-MCNC: 12 MG/DL — SIGNIFICANT CHANGE UP (ref 7–23)
CALCIUM SERPL-MCNC: 7.8 MG/DL — LOW (ref 8.5–10.1)
CHLORIDE SERPL-SCNC: 107 MMOL/L — SIGNIFICANT CHANGE UP (ref 96–108)
CO2 SERPL-SCNC: 21 MMOL/L — LOW (ref 22–31)
CREAT SERPL-MCNC: 0.39 MG/DL — LOW (ref 0.5–1.3)
CULTURE RESULTS: SIGNIFICANT CHANGE UP
GLUCOSE SERPL-MCNC: 85 MG/DL — SIGNIFICANT CHANGE UP (ref 70–99)
GRAM STN FLD: SIGNIFICANT CHANGE UP
HCT VFR BLD CALC: 28.8 % — LOW (ref 39–50)
HGB BLD-MCNC: 10 G/DL — LOW (ref 13–17)
MAGNESIUM SERPL-MCNC: 1.9 MG/DL — SIGNIFICANT CHANGE UP (ref 1.6–2.6)
MCHC RBC-ENTMCNC: 33.3 PG — SIGNIFICANT CHANGE UP (ref 27–34)
MCHC RBC-ENTMCNC: 34.7 GM/DL — SIGNIFICANT CHANGE UP (ref 32–36)
MCV RBC AUTO: 96 FL — SIGNIFICANT CHANGE UP (ref 80–100)
NRBC # BLD: 0 /100 WBCS — SIGNIFICANT CHANGE UP (ref 0–0)
PHOSPHATE SERPL-MCNC: 3.2 MG/DL — SIGNIFICANT CHANGE UP (ref 2.5–4.5)
PLATELET # BLD AUTO: 282 K/UL — SIGNIFICANT CHANGE UP (ref 150–400)
POTASSIUM SERPL-MCNC: 4 MMOL/L — SIGNIFICANT CHANGE UP (ref 3.5–5.3)
POTASSIUM SERPL-SCNC: 4 MMOL/L — SIGNIFICANT CHANGE UP (ref 3.5–5.3)
PROT SERPL-MCNC: 5.4 GM/DL — LOW (ref 6–8.3)
RBC # BLD: 3 M/UL — LOW (ref 4.2–5.8)
RBC # FLD: 14.2 % — SIGNIFICANT CHANGE UP (ref 10.3–14.5)
SODIUM SERPL-SCNC: 136 MMOL/L — SIGNIFICANT CHANGE UP (ref 135–145)
SPECIMEN SOURCE: SIGNIFICANT CHANGE UP
WBC # BLD: 15 K/UL — HIGH (ref 3.8–10.5)
WBC # FLD AUTO: 15 K/UL — HIGH (ref 3.8–10.5)

## 2020-08-28 PROCEDURE — 76705 ECHO EXAM OF ABDOMEN: CPT | Mod: 26

## 2020-08-28 PROCEDURE — 99291 CRITICAL CARE FIRST HOUR: CPT

## 2020-08-28 RX ORDER — LACTULOSE 10 G/15ML
10 SOLUTION ORAL
Refills: 0 | Status: DISCONTINUED | OUTPATIENT
Start: 2020-08-28 | End: 2020-09-10

## 2020-08-28 RX ORDER — KETAMINE HYDROCHLORIDE 100 MG/ML
100 INJECTION INTRAMUSCULAR; INTRAVENOUS ONCE
Refills: 0 | Status: DISCONTINUED | OUTPATIENT
Start: 2020-08-28 | End: 2020-08-28

## 2020-08-28 RX ADMIN — MEROPENEM 100 MILLIGRAM(S): 1 INJECTION INTRAVENOUS at 21:56

## 2020-08-28 RX ADMIN — CHLORHEXIDINE GLUCONATE 15 MILLILITER(S): 213 SOLUTION TOPICAL at 05:16

## 2020-08-28 RX ADMIN — CHLORHEXIDINE GLUCONATE 1 APPLICATION(S): 213 SOLUTION TOPICAL at 05:04

## 2020-08-28 RX ADMIN — ENOXAPARIN SODIUM 40 MILLIGRAM(S): 100 INJECTION SUBCUTANEOUS at 11:03

## 2020-08-28 RX ADMIN — DEXMEDETOMIDINE HYDROCHLORIDE IN 0.9% SODIUM CHLORIDE 5.36 MICROGRAM(S)/KG/HR: 4 INJECTION INTRAVENOUS at 21:57

## 2020-08-28 RX ADMIN — FENTANYL CITRATE 50 MICROGRAM(S): 50 INJECTION INTRAVENOUS at 02:00

## 2020-08-28 RX ADMIN — LACTULOSE 10 GRAM(S): 10 SOLUTION ORAL at 21:55

## 2020-08-28 RX ADMIN — PROPOFOL 4.29 MICROGRAM(S)/KG/MIN: 10 INJECTION, EMULSION INTRAVENOUS at 10:00

## 2020-08-28 RX ADMIN — DEXMEDETOMIDINE HYDROCHLORIDE IN 0.9% SODIUM CHLORIDE 5.36 MICROGRAM(S)/KG/HR: 4 INJECTION INTRAVENOUS at 07:16

## 2020-08-28 RX ADMIN — FENTANYL CITRATE 50 MICROGRAM(S): 50 INJECTION INTRAVENOUS at 01:34

## 2020-08-28 RX ADMIN — DEXMEDETOMIDINE HYDROCHLORIDE IN 0.9% SODIUM CHLORIDE 5.36 MICROGRAM(S)/KG/HR: 4 INJECTION INTRAVENOUS at 10:33

## 2020-08-28 RX ADMIN — CHLORHEXIDINE GLUCONATE 15 MILLILITER(S): 213 SOLUTION TOPICAL at 17:33

## 2020-08-28 RX ADMIN — DEXMEDETOMIDINE HYDROCHLORIDE IN 0.9% SODIUM CHLORIDE 5.36 MICROGRAM(S)/KG/HR: 4 INJECTION INTRAVENOUS at 16:35

## 2020-08-28 RX ADMIN — DEXMEDETOMIDINE HYDROCHLORIDE IN 0.9% SODIUM CHLORIDE 5.36 MICROGRAM(S)/KG/HR: 4 INJECTION INTRAVENOUS at 19:30

## 2020-08-28 RX ADMIN — Medication 1 MILLIGRAM(S): at 11:04

## 2020-08-28 RX ADMIN — Medication 100 MILLIGRAM(S): at 11:04

## 2020-08-28 RX ADMIN — SENNA PLUS 2 TABLET(S): 8.6 TABLET ORAL at 21:55

## 2020-08-28 RX ADMIN — QUETIAPINE FUMARATE 25 MILLIGRAM(S): 200 TABLET, FILM COATED ORAL at 11:04

## 2020-08-28 RX ADMIN — PANTOPRAZOLE SODIUM 40 MILLIGRAM(S): 20 TABLET, DELAYED RELEASE ORAL at 11:04

## 2020-08-28 RX ADMIN — PROPOFOL 4.29 MICROGRAM(S)/KG/MIN: 10 INJECTION, EMULSION INTRAVENOUS at 18:52

## 2020-08-28 RX ADMIN — PROPOFOL 4.29 MICROGRAM(S)/KG/MIN: 10 INJECTION, EMULSION INTRAVENOUS at 14:36

## 2020-08-28 RX ADMIN — MEROPENEM 100 MILLIGRAM(S): 1 INJECTION INTRAVENOUS at 05:16

## 2020-08-28 RX ADMIN — MEROPENEM 100 MILLIGRAM(S): 1 INJECTION INTRAVENOUS at 14:03

## 2020-08-28 RX ADMIN — KETAMINE HYDROCHLORIDE 100 MILLIGRAM(S): 100 INJECTION INTRAMUSCULAR; INTRAVENOUS at 11:03

## 2020-08-28 RX ADMIN — DEXMEDETOMIDINE HYDROCHLORIDE IN 0.9% SODIUM CHLORIDE 5.36 MICROGRAM(S)/KG/HR: 4 INJECTION INTRAVENOUS at 08:58

## 2020-08-28 RX ADMIN — PROPOFOL 4.29 MICROGRAM(S)/KG/MIN: 10 INJECTION, EMULSION INTRAVENOUS at 21:56

## 2020-08-28 RX ADMIN — DEXMEDETOMIDINE HYDROCHLORIDE IN 0.9% SODIUM CHLORIDE 5.36 MICROGRAM(S)/KG/HR: 4 INJECTION INTRAVENOUS at 14:04

## 2020-08-28 RX ADMIN — DEXMEDETOMIDINE HYDROCHLORIDE IN 0.9% SODIUM CHLORIDE 5.36 MICROGRAM(S)/KG/HR: 4 INJECTION INTRAVENOUS at 18:09

## 2020-08-28 RX ADMIN — Medication 2 MILLIGRAM(S): at 03:30

## 2020-08-28 RX ADMIN — Medication 1 TABLET(S): at 11:04

## 2020-08-28 RX ADMIN — SODIUM CHLORIDE 75 MILLILITER(S): 9 INJECTION, SOLUTION INTRAVENOUS at 11:38

## 2020-08-28 NOTE — PROGRESS NOTE ADULT - SUBJECTIVE AND OBJECTIVE BOX
SURGERY PROGRESS HPI:  Patient seen and examined at bedside.   Intubated and sedated.    Vital Signs Last 24 Hrs  T(C): 38.3 (28 Aug 2020 04:00), Max: 38.4 (28 Aug 2020 02:00)  T(F): 100.9 (28 Aug 2020 04:00), Max: 101.1 (28 Aug 2020 02:00)  HR: 85 (28 Aug 2020 06:00) (80 - 108)  BP: 107/63 (28 Aug 2020 06:00) (95/59 - 133/62)  BP(mean): 74 (28 Aug 2020 06:00) (66 - 94)  RR: 19 (28 Aug 2020 06:00) (12 - 29)  SpO2: 97% (28 Aug 2020 06:00) (94% - 100%)      PHYSICAL EXAM:  GENERAL: Sedated, NAD  HEENT: Intubated  CHEST/LUNG: Vented, respirations nonlabored  HEART: S1S2, RRR  ABDOMEN: softly distended, hypoactive Bowel sounds, nontender  EXTREMITIES: 1+pitting edema x 4 extremities, +palpable distal pulses. SCDs b/l LE      I&O's Detail    26 Aug 2020 07:01  -  27 Aug 2020 07:00  --------------------------------------------------------  IN:    dexmedetomidine Infusion: 511.9 mL    dextrose 5% + lactated ringers.: 1800 mL    IV PiggyBack: 1050 mL    Lactated Ringers IV Bolus: 1000 mL    ns in tub fed vital1: 190 mL    propofol Infusion: 366.3 mL  Total IN: 4918.2 mL    OUT:    Incontinent per Condom Catheter: 1450 mL    Nasoenteral Tube: 50 mL  Total OUT: 1500 mL    Total NET: 3418.2 mL      27 Aug 2020 07:01  -  28 Aug 2020 06:16  --------------------------------------------------------  IN:    dexmedetomidine Infusion: 661.5 mL    dextrose 5% + lactated ringers.: 1725 mL    IV PiggyBack: 200 mL    ns in tub fed vital1: 140 mL    propofol Infusion: 428 mL  Total IN: 3154.5 mL    OUT:    Incontinent per Condom Catheter: 500 mL  Total OUT: 500 mL    Total NET: 2654.5 mL      LABS:                        10.0   15.00 )-----------( 282      ( 28 Aug 2020 04:30 )             28.8     08-28    136  |  107  |  12  ----------------------------<  85  4.0   |  21<L>  |  0.39<L>    Ca    7.8<L>      28 Aug 2020 04:30  Phos  3.2     08-28  Mg     1.9     08-28    TPro  5.4<L>  /  Alb  1.1<L>  /  TBili  3.5<H>  /  DBili  x   /  AST  173<H>  /  ALT  105<H>  /  AlkPhos  361<H>  08-28    Culture Results:   Normal Respiratory Poonam present (08-26 @ 18:51)    < from: CT Abdomen and Pelvis w/ IV Cont (08.27.20 @ 15:49) >  IMPRESSION:    Multilobar pneumonia as discussed.    Bilateral pleural effusions with underlying compressive atelectasis/airspace consolidation.    Acute pancreatitis with beginning partially encapsulated complex peripancreatic fluid collections likely reflecting acute or chronic collections.    Diffuse small bowel wall thickening, which could be reactive to abdominal ascites.    Other findings as discussed above.    < end of copied text >        A/P: 44M PMH ETOH abuse, pancreatitis, a/w acute necrotizing pancreatitis. Intubated 2/2 respiratory distress due to ETOH withdrawal and pneumonia.   Leukocytosis.  - Follow up abdominal US  - Antibiotics per primary team (Merrem)  - IV hydration, NGT  - GI ppx, DVT ppx  - replete electrolytes  - no acute surgical intervention warranted at this time, will continue to monitor  - ICU management  - will d/w surgical attending

## 2020-08-28 NOTE — PROGRESS NOTE ADULT - ASSESSMENT
44M with a PMH of EtOH abuse and withdrawal, alcohol pancreatitis p/w pancreatitis, PNA and respiratory failure requiring intubation.     Neuro   gets agitated during sedation vacation  monitor for pain given pancreatitis, prn fentanyl  cont daily sedation vacation    Pulm  cont daily PS trials  has mod secretions  CT w/ extensive PNA     CVS  off pressors  maintain MAP>65    GI  pancreatitis with resolved lipase but CT showing partially encapsulated fluid collections  no intervention as per Surgery  IR also consulted  cont to have fever possibly from fluid collections vs PNA  cont meropenem and f/u cx  check TG level    cont low rate TF    Cont GI and DVT ppx

## 2020-08-28 NOTE — PROGRESS NOTE ADULT - SUBJECTIVE AND OBJECTIVE BOX
INTERVAL HPI/OVERNIGHT EVENTS:  Pt. seen and examined at bedside with Dr. Moreau. Patient remains intubated and sedated in CCU. Trickle feeds started.  Continues to have fevers, Tmax 100.9.    Vital Signs Last 24 Hrs  T(C): 37.9 (28 Aug 2020 15:17), Max: 38.4 (28 Aug 2020 02:00)  T(F): 100.2 (28 Aug 2020 15:17), Max: 101.1 (28 Aug 2020 02:00)  HR: 88 (28 Aug 2020 16:30) (85 - 103)  BP: 110/72 (28 Aug 2020 16:30) (97/58 - 121/73)  BP(mean): 80 (28 Aug 2020 16:30) (67 - 84)  RR: 17 (28 Aug 2020 16:30) (15 - 36)  SpO2: 94% (28 Aug 2020 16:30) (90% - 100%)    PHYSICAL EXAM:  GENERAL: Sedated, NAD. NGT in place receiving trickle feeds.   HEENT: Intubated  CHEST/LUNG: Vented, respirations nonlabored  HEART: S1S2, RRR  ABDOMEN: softly distended, hypoactive Bowel sounds, nontender  EXTREMITIES: 1+pitting edema x 4 extremities, +palpable distal pulses. SCDs b/l LE    I&O's Detail    27 Aug 2020 07:01  -  28 Aug 2020 07:00  --------------------------------------------------------  IN:    dexmedetomidine Infusion: 690.6 mL    dextrose 5% + lactated ringers.: 1800 mL    IV PiggyBack: 200 mL    ns in tub fed vital1: 150 mL    propofol Infusion: 451.3 mL  Total IN: 3291.9 mL    OUT:    Incontinent per Condom Catheter: 1000 mL  Total OUT: 1000 mL    Total NET: 2291.9 mL      28 Aug 2020 07:01  -  28 Aug 2020 17:03  --------------------------------------------------------  IN:    dexmedetomidine Infusion: 261.9 mL    dextrose 5% + lactated ringers.: 675 mL    IV PiggyBack: 50 mL    ns in tub fed vital1: 80 mL    propofol Infusion: 186.4 mL  Total IN: 1253.3 mL    OUT:  Total OUT: 0 mL    Total NET: 1253.3 mL      LABS:                        10.0   15.00 )-----------( 282      ( 28 Aug 2020 04:30 )             28.8     08-28    136  |  107  |  12  ----------------------------<  85  4.0   |  21<L>  |  0.39<L>    Ca    7.8<L>      28 Aug 2020 04:30  Phos  3.2     08-28  Mg     1.9     08-28    TPro  5.4<L>  /  Alb  1.1<L>  /  TBili  3.5<H>  /  DBili  x   /  AST  173<H>  /  ALT  105<H>  /  AlkPhos  361<H>  08-28        Culture Results:   Normal Respiratory Poonam present (08-26 @ 18:51)    type    RADIOLOGY & ADDITIONAL STUDIES:  < from: US Gallbladder (08.28.20 @ 08:10) >    EXAM:  US GALLBLADDER                            PROCEDURE DATE:  08/28/2020          INTERPRETATION:  INDICATIONS:  Distended gallbladder on recent CT scan. This is a follow-up study.  TECHNIQUE:  The examination was performed with the real time apparatus with color flow and spectral doppler imaging. Examination limited to the region of the gallbladder.  DATE AND TIME OF EXAM: 8/28/2020 7:48 AM.  COMPARISON EXAMINATION: CT scan dated 8/27/2020.    FINDINGS:  Gallbladder:  Sludge noted in the gallbladder lumen. No evidence of cholelithiasis. The gallbladder is not distended on the current study. No evidence of wall thickening. There is a small amount of pericholecystic fluid likely representing ascites..  Common Bile Duct:  Normal, measuring 4 mm.  Ascites is noted in the upper abdomen.    IMPRESSION: Sludge in the gallbladder lumen. No evidence of cholelithiasis or acute cholecystitis. Ascites..    < end of copied text >      A/P: 44M PMH ETOH abuse, pancreatitis, a/w acute necrotizing pancreatitis. Intubated 2/2 respiratory distress due to ETOH withdrawal and pneumonia.   Leukocytosis. Febrile.  Abdominal US 8/28: Gallbladder sludge, no signs of acute barrington.  Rpt CT 8/27: Pancreatitis with peripancreatic fluid collections    - Antibiotics per primary team (Merrem)  - IV hydration, NGT  - GI ppx, DVT ppx  - no acute surgical intervention warranted at this time  - Continue ICU management and GI follow up  - Recommend rpt imaging in 4 weeks to reassess peripancreatic collections  - OP cholecystectomy when medically stable  - Discussed with Dr. Moreau. Please call if you have any further questions/concerns INTERVAL HPI/OVERNIGHT EVENTS:  Pt. seen and examined at bedside with Dr. Moreau. Patient remains intubated and sedated in CCU. Trickle feeds started.  Continues to have fevers, Tmax 100.9.    Vital Signs Last 24 Hrs  T(C): 37.9 (28 Aug 2020 15:17), Max: 38.4 (28 Aug 2020 02:00)  T(F): 100.2 (28 Aug 2020 15:17), Max: 101.1 (28 Aug 2020 02:00)  HR: 88 (28 Aug 2020 16:30) (85 - 103)  BP: 110/72 (28 Aug 2020 16:30) (97/58 - 121/73)  BP(mean): 80 (28 Aug 2020 16:30) (67 - 84)  RR: 17 (28 Aug 2020 16:30) (15 - 36)  SpO2: 94% (28 Aug 2020 16:30) (90% - 100%)    PHYSICAL EXAM:  GENERAL: Sedated, NAD. NGT in place receiving trickle feeds.   HEENT: Intubated  CHEST/LUNG: Vented, respirations nonlabored  HEART: S1S2, RRR  ABDOMEN: softly distended, hypoactive Bowel sounds, nontender  EXTREMITIES: 1+pitting edema x 4 extremities, +palpable distal pulses. SCDs b/l LE    I&O's Detail    27 Aug 2020 07:01  -  28 Aug 2020 07:00  --------------------------------------------------------  IN:    dexmedetomidine Infusion: 690.6 mL    dextrose 5% + lactated ringers.: 1800 mL    IV PiggyBack: 200 mL    ns in tub fed vital1: 150 mL    propofol Infusion: 451.3 mL  Total IN: 3291.9 mL    OUT:    Incontinent per Condom Catheter: 1000 mL  Total OUT: 1000 mL    Total NET: 2291.9 mL      28 Aug 2020 07:01  -  28 Aug 2020 17:03  --------------------------------------------------------  IN:    dexmedetomidine Infusion: 261.9 mL    dextrose 5% + lactated ringers.: 675 mL    IV PiggyBack: 50 mL    ns in tub fed vital1: 80 mL    propofol Infusion: 186.4 mL  Total IN: 1253.3 mL    OUT:  Total OUT: 0 mL    Total NET: 1253.3 mL      LABS:                        10.0   15.00 )-----------( 282      ( 28 Aug 2020 04:30 )             28.8     08-28    136  |  107  |  12  ----------------------------<  85  4.0   |  21<L>  |  0.39<L>    Ca    7.8<L>      28 Aug 2020 04:30  Phos  3.2     08-28  Mg     1.9     08-28    TPro  5.4<L>  /  Alb  1.1<L>  /  TBili  3.5<H>  /  DBili  x   /  AST  173<H>  /  ALT  105<H>  /  AlkPhos  361<H>  08-28        Culture Results:   Normal Respiratory Poonam present (08-26 @ 18:51)    type    RADIOLOGY & ADDITIONAL STUDIES:  < from: US Gallbladder (08.28.20 @ 08:10) >    EXAM:  US GALLBLADDER                            PROCEDURE DATE:  08/28/2020          INTERPRETATION:  INDICATIONS:  Distended gallbladder on recent CT scan. This is a follow-up study.  TECHNIQUE:  The examination was performed with the real time apparatus with color flow and spectral doppler imaging. Examination limited to the region of the gallbladder.  DATE AND TIME OF EXAM: 8/28/2020 7:48 AM.  COMPARISON EXAMINATION: CT scan dated 8/27/2020.    FINDINGS:  Gallbladder:  Sludge noted in the gallbladder lumen. No evidence of cholelithiasis. The gallbladder is not distended on the current study. No evidence of wall thickening. There is a small amount of pericholecystic fluid likely representing ascites..  Common Bile Duct:  Normal, measuring 4 mm.  Ascites is noted in the upper abdomen.    IMPRESSION: Sludge in the gallbladder lumen. No evidence of cholelithiasis or acute cholecystitis. Ascites..    < end of copied text >      A/P: 44M PMH ETOH abuse, pancreatitis, a/w acute necrotizing pancreatitis. Intubated 2/2 respiratory distress due to ETOH withdrawal and pneumonia.   Leukocytosis. Febrile.  Abdominal US 8/28: Gallbladder sludge, no signs of acute barrington.  Rpt CT 8/27: Pancreatitis with peripancreatic fluid collections    - Antibiotics per primary team (Merrem)  - IV hydration, NGT  - GI ppx, DVT ppx  - no acute surgical intervention warranted at this time  - Continue ICU management and GI follow up  - Recommend rpt imaging in 4 weeks to reassess peripancreatic collections  - OP cholecystectomy when medically stable, can follow up with Dr. Moreau in the office  - Discussed with Dr. Moreau. Please call if you have any further questions/concerns

## 2020-08-28 NOTE — PROGRESS NOTE ADULT - SUBJECTIVE AND OBJECTIVE BOX
HPI:  Patient is a 44M with a PMH of EtOH abuse and withdrawal, alcohol pancreatitis who presents to the ED with severe abdominal pain.  Patient states that for the past three days he has been on a drinking binge, drinking 5 beers and 5 shots of liquor per day.  Last drink reportedly yesterday.  Patient reports that he woke up today and had severe generalized abdominal pain with multiple episodes of nonbloody, nonbilious vomiting.  Patient also complaining of nausea and generalized tremors.  Vitals stable, labs show leukocytosis, lactic acidosis, and severely elevated lipase levels.  CT shows pancreatitis with early necrosis in the pancreatic body.  Will admit to floor. (17 Aug 2020 23:25)      24 hr events:  Able to tolerate PS trial briefly before becoming too agitated  Cont to have fever    ## Labs:  CBC:                        10.0   15.00 )-----------( 282      ( 28 Aug 2020 04:30 )             28.8     Chem:      136  |  107  |  12  ----------------------------<  85  4.0   |  21<L>  |  0.39<L>    Ca    7.8<L>      28 Aug 2020 04:30  Phos  3.2       Mg     1.9         TPro  5.4<L>  /  Alb  1.1<L>  /  TBili  3.5<H>  /  DBili  x   /  AST  173<H>  /  ALT  105<H>  /  AlkPhos  361<H>      Coags:    culture blood:  --   @ 18:51            culture sputum:     Normal Respiratory Poonam present           culture urine:  --  @ 18:51        ## Imaging:  CT A/P: IMPRESSION:    Multilobar pneumonia as discussed.    Bilateral pleural effusions with underlying compressive atelectasis/airspace consolidation.    Acute pancreatitis with beginning partially encapsulated complex peripancreatic fluid collections likely reflecting acute or chronic collections.    Diffuse small bowel wall thickening, which could be reactive to abdominal ascites.    Other findings as discussed above.      ## Medications:  meropenem  IVPB 1000 milliGRAM(s) IV Intermittent every 8 hours          enoxaparin Injectable 40 milliGRAM(s) SubCutaneous daily    pantoprazole  Injectable 40 milliGRAM(s) IV Push daily  senna 2 Tablet(s) Oral at bedtime    dexMEDEtomidine Infusion 0.3 MICROgram(s)/kG/Hr IV Continuous <Continuous>  fentaNYL    Injectable 50 MICROGram(s) IV Push every 4 hours PRN  propofol Infusion 10 MICROgram(s)/kG/Min IV Continuous <Continuous>  QUEtiapine 25 milliGRAM(s) Oral daily      ## Vitals:  T(C): 38 (20 @ 11:06), Max: 38.4 (20 @ 02:00)  HR: 101 (20 @ 12:39) (80 - 101)  BP: 118/74 (20 @ 12:00) (95/63 - 121/73)  BP(mean): 84 (20 @ 12:00) (67 - 84)  RR: 15 (20 @ 12:00) (15 - 36)  SpO2: 94% (20 @ 12:39) (92% - 100%)  Wt(kg): --  Vent: Mode: CPAP with PS, RR (patient): 31, FiO2: 40, PEEP: 5, PS: 5  AB-27 @ 07:  -   @ 07:00  --------------------------------------------------------  IN: 3291.9 mL / OUT: 1000 mL / NET: 2291.9 mL     @ 07:01  -   @ 12:50  --------------------------------------------------------  IN: 365.6 mL / OUT: 0 mL / NET: 365.6 mL          ## P/E:  Gen: sedated, intubated  Mouth: mmm  Lungs: b/l ae cta  Heart: s1s2 reg no murmur  Abd: soft nontender +BS  Ext: no edema  Neuro: sedated

## 2020-08-28 NOTE — PROGRESS NOTE ADULT - SUBJECTIVE AND OBJECTIVE BOX
44y old  Male who presented with a chief complaint of pancreatitis and was admitted with PANCREATITIS. Patient seen and examined at bedside. Patient intubated and sedated. No acute complaints.      Vital Signs Last 24 Hrs  T(F): 100.9 (08-28-20 @ 04:00), Max: 101.1 (08-28-20 @ 02:00)  HR: 85 (08-28-20 @ 06:00)  BP: 107/63 (08-28-20 @ 06:00)  RR: 19 (08-28-20 @ 06:00)  SpO2: 97% (08-28-20 @ 06:00)  Wt(kg): --   CAPILLARY BLOOD GLUCOSE    POCT Glucose Trend  103 mg/dL08-25 @ 09:39  101 mg/dL08-21 @ 05:01  116 mg/dL08-21 @ 00:40  124 mg/dL08-20 @ 18:06      GENERAL: Alert, NAD  CHEST/LUNG: Clear to auscultation bilaterally, respirations nonlabored, no crackles  HEART: S1S2, Regular rate and rhythm;   ABDOMEN: distended, +bowel sounds, soft, Nontender  EXTREMITIES: 1+pitting edema bilaterally in upper and lower extremities, +palpable distal pulses. SCDs bilaterally LE    I&O's Detail    26 Aug 2020 07:01  -  27 Aug 2020 07:00  --------------------------------------------------------  IN:    dexmedetomidine Infusion: 511.9 mL    dextrose 5% + lactated ringers.: 1800 mL    IV PiggyBack: 1050 mL    Lactated Ringers IV Bolus: 1000 mL    ns in tub fed vital1: 190 mL    propofol Infusion: 366.3 mL  Total IN: 4918.2 mL    OUT:    Incontinent per Condom Catheter: 1450 mL    Nasoenteral Tube: 50 mL  Total OUT: 1500 mL    Total NET: 3418.2 mL      27 Aug 2020 07:01  -  28 Aug 2020 06:25  --------------------------------------------------------  IN:    dexmedetomidine Infusion: 690.6 mL    dextrose 5% + lactated ringers.: 1800 mL    IV PiggyBack: 200 mL    ns in tub fed vital1: 150 mL    propofol Infusion: 451.3 mL  Total IN: 3291.9 mL    OUT:    Incontinent per Condom Catheter: 1000 mL  Total OUT: 1000 mL    Total NET: 2291.9 mL          LABS:                        10.0   15.00 )-----------( 282      ( 28 Aug 2020 04:30 )             28.8     WBC Count: 15.00 K/uL (08-28 @ 04:30)  WBC Count: 14.06 K/uL (08-27 @ 03:40)  WBC Count: 16.87 K/uL (08-26 @ 04:09)  WBC Count: 16.87 K/uL (08-25 @ 02:43)  WBC Count: 19.47 K/uL (08-24 @ 03:01)      08-28    136  |  107  |  12  ----------------------------<  85  4.0   |  21<L>  |  0.39<L>    BUN/Creatinine  08-28 @ 04:30  BUN  12     Creatinine  0.39  08-27 @ 03:40  BUN  12     Creatinine  0.47  08-26 @ 04:09  BUN  14     Creatinine  0.46  08-25 @ 10:15  BUN  15     Creatinine  0.83  08-25 @ 09:08  BUN  14     Creatinine  0.79  08-25 @ 02:43  BUN  14     Creatinine  0.70  08-24 @ 11:44  BUN  11     Creatinine  0.46  08-24 @ 03:01  BUN  11     Creatinine  0.44  08-23 @ 14:45  BUN  12     Creatinine  0.54    Ca    7.8<L>      28 Aug 2020 04:30  Phos  3.2     08-28  Mg     1.9     08-28    Calcium, Total Serum: 7.8 mg/dL [8.5 - 10.1] (08-28-20)  Magnesium, Serum: 1.9 mg/dL [1.6 - 2.6] (08-28-20)  Phosphorus Level, Serum: 3.2 mg/dL [2.5 - 4.5] (08-28-20)  Calcium, Total Serum: 7.4 mg/dL [8.5 - 10.1] (08-27-20)  Magnesium, Serum: 1.5 mg/dL [1.6 - 2.6] (08-27-20)  Phosphorus Level, Serum: 3.5 mg/dL [2.5 - 4.5] (08-27-20)  Calcium, Total Serum: 7.7 mg/dL [8.5 - 10.1] (08-26-20)  Magnesium, Serum: 1.9 mg/dL [1.6 - 2.6] (08-26-20)  Phosphorus Level, Serum: 3.4 mg/dL [2.5 - 4.5] (08-26-20)  Calcium, Total Serum: 8.0 mg/dL [8.5 - 10.1] (08-25-20)  Magnesium, Serum: 2.0 mg/dL [1.6 - 2.6] (08-25-20)  Calcium, Total Serum: 7.6 mg/dL [8.5 - 10.1] (08-25-20)  Magnesium, Serum: 1.8 mg/dL [1.6 - 2.6] (08-25-20)  Phosphorus Level, Serum: 2.6 mg/dL [2.5 - 4.5] (08-25-20)  Calcium, Total Serum: 7.5 mg/dL [8.5 - 10.1] (08-25-20)  Magnesium, Serum: 1.9 mg/dL [1.6 - 2.6] (08-25-20)  Phosphorus Level, Serum: 2.8 mg/dL [2.5 - 4.5] (08-25-20)  Calcium, Total Serum: 7.7 mg/dL [8.5 - 10.1] (08-24-20)  Magnesium, Serum: 2.0 mg/dL [1.6 - 2.6] (08-24-20)  Phosphorus Level, Serum: 3.3 mg/dL [2.5 - 4.5] (08-24-20)  Calcium, Total Serum: 7.3 mg/dL [8.5 - 10.1] (08-24-20)  Magnesium, Serum: 1.6 mg/dL [1.6 - 2.6] (08-24-20)  Phosphorus Level, Serum: 2.4 mg/dL [2.5 - 4.5] (08-24-20)  Calcium, Total Serum: 7.6 mg/dL [8.5 - 10.1] (08-23-20)  Magnesium, Serum: 1.8 mg/dL [1.6 - 2.6] (08-23-20)  Phosphorus Level, Serum: 3.6 mg/dL [2.5 - 4.5] (08-23-20)  Calcium, Total Serum: 7.4 mg/dL [8.5 - 10.1] (08-23-20)  Magnesium, Serum: 1.3 mg/dL [1.6 - 2.6] (08-23-20)  Phosphorus Level, Serum: 1.9 mg/dL [2.5 - 4.5] (08-23-20)  Magnesium, Serum: 1.5 mg/dL [1.6 - 2.6] (08-22-20)  Phosphorus Level, Serum: 1.2 mg/dL [2.5 - 4.5] (08-22-20)  Calcium, Total Serum: 6.8 mg/dL [8.5 - 10.1] (08-22-20)  Calcium, Total Serum: 6.7 mg/dL [8.5 - 10.1] (08-21-20)  Magnesium, Serum: 2.0 mg/dL [1.6 - 2.6] (08-21-20)  Phosphorus Level, Serum: 1.8 mg/dL [2.5 - 4.5] (08-21-20)  Calcium, Total Serum: 6.6 mg/dL [8.5 - 10.1] (08-20-20)  Magnesium, Serum: 1.9 mg/dL [1.6 - 2.6] (08-20-20)  Phosphorus Level, Serum: 1.7 mg/dL [2.5 - 4.5] (08-20-20)  Calcium, Total Serum: 7.2 mg/dL [8.5 - 10.1] (08-19-20)  Magnesium, Serum: 2.0 mg/dL [1.6 - 2.6] (08-19-20)  Phosphorus Level, Serum: 2.6 mg/dL [2.5 - 4.5] (08-19-20)  Calcium, Total Serum: 7.5 mg/dL [8.5 - 10.1] (08-19-20)  Magnesium, Serum: 1.7 mg/dL [1.6 - 2.6] (08-19-20)  Phosphorus Level, Serum: 3.1 mg/dL [2.5 - 4.5] (08-19-20)  Calcium, Total Serum: 8.3 mg/dL [8.5 - 10.1] (08-18-20)  Magnesium, Serum: 1.3 mg/dL [1.6 - 2.6] (08-18-20)  Phosphorus Level, Serum: 4.0 mg/dL [2.5 - 4.5] (08-18-20)  Calcium, Total Serum: 9.2 mg/dL [8.5 - 10.1] (08-18-20)  Magnesium, Serum: 0.9 mg/dL [1.6 - 2.6] (08-18-20)  Phosphorus Level, Serum: 0.8 mg/dL [2.5 - 4.5] (08-18-20)  Calcium, Total Serum: 9.2 mg/dL [8.5 - 10.1] (08-18-20)  Calcium, Total Serum: 11.5 mg/dL [8.5 - 10.1] (08-17-20)      TPro  5.4<L>  /  Alb  1.1<L>  /  TBili  3.5<H>  /  DBili  x   /  AST  173<H>  /  ALT  105<H>  /  AlkPhos  361<H>  08-28    RADIOLOGY & ADDITIONAL STUDIES:  IMAGING:  CT Abdomen and Pelvis w/ IV Cont:   EXAM:  CT ABDOMEN AND PELVIS IC                          EXAM:  CT CHEST IC                            PROCEDURE DATE:  08/27/2020          INTERPRETATION:  CLINICAL INFORMATION: Abdominal distention. Follow-up pancreatitis. Sepsis with acute hypoxic respiratory failure and pneumonia.    COMPARISON: CT scan abdomen 8/19/2020.    PROCEDURE:  CT of the Chest, Abdomen and Pelvis was performed with intravenous contrast.  Intravenous contrast: 90 ml Omnipaque 350. 10 ml discarded.  Oral contrast: None.  Sagittal and coronal reformats were performed.    FINDINGS:    CHEST:    LUNGS AND LARGE AIRWAYS: PLEURA:  There is a moderate size left-sided pleural effusion and small right-sided pleural effusion with underlying compressive atelectasis/airspace consolidation.    Endotracheal tube is present, tip above the level david.  The central airways remain patent.    There is patchy bilateral airspace consolidation in the upper lobes, likely reflecting pneumonia.    VESSELS: Coronary artery calcification.    HEART: Heart size is normal. No pericardial effusion.    MEDIASTINUM AND MARSHALL: No enlarged lymphadenopathy.  Nasogastric tube, tip in stomach.    CHEST WALL AND LOWER NECK: Within normal limits.    ABDOMEN AND PELVIS:    Streak artifact degrades image quality limiting evaluation.    LIVER: Within normal limits.  BILE DUCTS: Normal caliber.  GALLBLADDER: Mildly distended.  SPLEEN: Within normal limits.    PANCREAS: Changes compatible with acute pancreatitis, including heterogeneous enhancement of the pancreas.  There are small heterogeneous peripancreatic fluid collections with beginning partial encapsulation, findings which may reflect acute necrotic collections.    ADRENALS: Within normal limits.  KIDNEYS/URETERS: No hydronephrosis.    BLADDER: Moderately distended, correlate with urine output.  REPRODUCTIVE ORGANS: Prostate within normal limits.    BOWEL: Evaluation of the gastrointestinal tract is limited without distention.  There is diffuse small bowel wall thickening, without bowel obstruction.  Rectal temperature probe.   Appendix contrast filled appendix.  PERITONEUM: Moderate volume of abdominal pelvic ascites.    VESSELS: Within normal limits.  RETROPERITONEUM/LYMPH NODES: No enlarged retroperitoneal lymphadenopathy.  ABDOMINAL WALL: Tiny fat-containing periumbilical hernia. Small bilateral fat-containing inguinal hernias.    BONES: Bilateral spondylolysis L5, without significant spondylolisthesis at L5-S1.  Patchy subchondral sclerosis bilateral femoral heads may reflect osteonecrosis.    IMPRESSION:    Multilobar pneumonia as discussed.    Bilateral pleural effusions with underlying compressive atelectasis/airspace consolidation.    Acute pancreatitis with beginning partially encapsulated complex peripancreatic fluid collections likely reflecting acute or chronic collections.    Diffuse small bowel wall thickening, which could be reactive to abdominal ascites.    Other findings as discussed above.        RADHA PADILLA M.D., ATTENDING RADIOLOGIST  This document has been electronically signed. Aug 27 2020  4:31PM          A/P: 44M PMH ETOH abuse, pancreatitis, a/w acute necrotizing pancreatitis. Intubated 2/2 respiratory distress due to ETOH withdrawal and pneumonia. Sedated with known alcohol withdrawal - on precedex and on background ativan.     Leukocytosis dowtrending and stable. Hypokalemia resolved.    - f/u abdominal ultrasound for r/o evidence of gallstones    - merrem and vanc per primary team - respiratory distress likely 2/2 pneumonia.   - remains intubated and sedated. Experiencing alcohol withdrawal and hypoxic respiratory failure.   - IV hydration -  dextrose 5% + lactated ringers.   - monitor output of NGT   - GI ppx, DVT ppx w/ enoxaparin subQ 40 mg qd & SCDs  - replete electrolytes as needed, including supplemental KCl   - no acute surgical intervention warranted at this time, will continue to monitor  - ICU management 44y old  Male who presented with a chief complaint of abdominal pain and was admitted with PANCREATITIS.     Overnight, patient given PRN ativan for agitation; also experienced fever Tmax 100.9 F in at 11:39pm, which resolved.     Patient seen and examined at bedside. Patient is intubated and sedated. No acute complaints.      Vital Signs Last 24 Hrs  T(F): 100.9 (08-28-20 @ 04:00), Max: 101.1 (08-28-20 @ 02:00)  HR: 85 (08-28-20 @ 06:00)  BP: 107/63 (08-28-20 @ 06:00)  RR: 19 (08-28-20 @ 06:00)  SpO2: 97% (08-28-20 @ 06:00)  Wt(kg): --   CAPILLARY BLOOD GLUCOSE    POCT Glucose Trend  103 mg/dL08-25 @ 09:39  101 mg/dL08-21 @ 05:01  116 mg/dL08-21 @ 00:40  124 mg/dL08-20 @ 18:06      GENERAL: Alert, NAD  CHEST/LUNG: Clear to auscultation bilaterally, respirations nonlabored, no crackles  HEART: S1S2, Regular rate and rhythm;   ABDOMEN: distended, +bowel sounds, soft, Nontender  EXTREMITIES: 1+pitting edema bilaterally in upper and lower extremities, +palpable distal pulses. SCDs bilaterally LE    I&O's Detail    26 Aug 2020 07:01  -  27 Aug 2020 07:00  --------------------------------------------------------  IN:    dexmedetomidine Infusion: 511.9 mL    dextrose 5% + lactated ringers.: 1800 mL    IV PiggyBack: 1050 mL    Lactated Ringers IV Bolus: 1000 mL    ns in tub fed vital1: 190 mL    propofol Infusion: 366.3 mL  Total IN: 4918.2 mL    OUT:    Incontinent per Condom Catheter: 1450 mL    Nasoenteral Tube: 50 mL  Total OUT: 1500 mL    Total NET: 3418.2 mL      27 Aug 2020 07:01  -  28 Aug 2020 06:25  --------------------------------------------------------  IN:    dexmedetomidine Infusion: 690.6 mL    dextrose 5% + lactated ringers.: 1800 mL    IV PiggyBack: 200 mL    ns in tub fed vital1: 150 mL    propofol Infusion: 451.3 mL  Total IN: 3291.9 mL    OUT:    Incontinent per Condom Catheter: 1000 mL  Total OUT: 1000 mL    Total NET: 2291.9 mL          LABS:                        10.0   15.00 )-----------( 282      ( 28 Aug 2020 04:30 )             28.8     WBC Count: 15.00 K/uL (08-28 @ 04:30)  WBC Count: 14.06 K/uL (08-27 @ 03:40)  WBC Count: 16.87 K/uL (08-26 @ 04:09)  WBC Count: 16.87 K/uL (08-25 @ 02:43)  WBC Count: 19.47 K/uL (08-24 @ 03:01)      08-28    136  |  107  |  12  ----------------------------<  85  4.0   |  21<L>  |  0.39<L>    BUN/Creatinine  08-28 @ 04:30  BUN  12     Creatinine  0.39  08-27 @ 03:40  BUN  12     Creatinine  0.47  08-26 @ 04:09  BUN  14     Creatinine  0.46  08-25 @ 10:15  BUN  15     Creatinine  0.83  08-25 @ 09:08  BUN  14     Creatinine  0.79  08-25 @ 02:43  BUN  14     Creatinine  0.70  08-24 @ 11:44  BUN  11     Creatinine  0.46  08-24 @ 03:01  BUN  11     Creatinine  0.44  08-23 @ 14:45  BUN  12     Creatinine  0.54    Ca    7.8<L>      28 Aug 2020 04:30  Phos  3.2     08-28  Mg     1.9     08-28    Calcium, Total Serum: 7.8 mg/dL [8.5 - 10.1] (08-28-20)  Magnesium, Serum: 1.9 mg/dL [1.6 - 2.6] (08-28-20)  Phosphorus Level, Serum: 3.2 mg/dL [2.5 - 4.5] (08-28-20)  Calcium, Total Serum: 7.4 mg/dL [8.5 - 10.1] (08-27-20)  Magnesium, Serum: 1.5 mg/dL [1.6 - 2.6] (08-27-20)  Phosphorus Level, Serum: 3.5 mg/dL [2.5 - 4.5] (08-27-20)  Calcium, Total Serum: 7.7 mg/dL [8.5 - 10.1] (08-26-20)  Magnesium, Serum: 1.9 mg/dL [1.6 - 2.6] (08-26-20)  Phosphorus Level, Serum: 3.4 mg/dL [2.5 - 4.5] (08-26-20)  Calcium, Total Serum: 8.0 mg/dL [8.5 - 10.1] (08-25-20)  Magnesium, Serum: 2.0 mg/dL [1.6 - 2.6] (08-25-20)  Calcium, Total Serum: 7.6 mg/dL [8.5 - 10.1] (08-25-20)  Magnesium, Serum: 1.8 mg/dL [1.6 - 2.6] (08-25-20)  Phosphorus Level, Serum: 2.6 mg/dL [2.5 - 4.5] (08-25-20)  Calcium, Total Serum: 7.5 mg/dL [8.5 - 10.1] (08-25-20)  Magnesium, Serum: 1.9 mg/dL [1.6 - 2.6] (08-25-20)  Phosphorus Level, Serum: 2.8 mg/dL [2.5 - 4.5] (08-25-20)  Calcium, Total Serum: 7.7 mg/dL [8.5 - 10.1] (08-24-20)  Magnesium, Serum: 2.0 mg/dL [1.6 - 2.6] (08-24-20)  Phosphorus Level, Serum: 3.3 mg/dL [2.5 - 4.5] (08-24-20)  Calcium, Total Serum: 7.3 mg/dL [8.5 - 10.1] (08-24-20)  Magnesium, Serum: 1.6 mg/dL [1.6 - 2.6] (08-24-20)  Phosphorus Level, Serum: 2.4 mg/dL [2.5 - 4.5] (08-24-20)  Calcium, Total Serum: 7.6 mg/dL [8.5 - 10.1] (08-23-20)  Magnesium, Serum: 1.8 mg/dL [1.6 - 2.6] (08-23-20)  Phosphorus Level, Serum: 3.6 mg/dL [2.5 - 4.5] (08-23-20)  Calcium, Total Serum: 7.4 mg/dL [8.5 - 10.1] (08-23-20)  Magnesium, Serum: 1.3 mg/dL [1.6 - 2.6] (08-23-20)  Phosphorus Level, Serum: 1.9 mg/dL [2.5 - 4.5] (08-23-20)  Magnesium, Serum: 1.5 mg/dL [1.6 - 2.6] (08-22-20)  Phosphorus Level, Serum: 1.2 mg/dL [2.5 - 4.5] (08-22-20)  Calcium, Total Serum: 6.8 mg/dL [8.5 - 10.1] (08-22-20)  Calcium, Total Serum: 6.7 mg/dL [8.5 - 10.1] (08-21-20)  Magnesium, Serum: 2.0 mg/dL [1.6 - 2.6] (08-21-20)  Phosphorus Level, Serum: 1.8 mg/dL [2.5 - 4.5] (08-21-20)  Calcium, Total Serum: 6.6 mg/dL [8.5 - 10.1] (08-20-20)  Magnesium, Serum: 1.9 mg/dL [1.6 - 2.6] (08-20-20)  Phosphorus Level, Serum: 1.7 mg/dL [2.5 - 4.5] (08-20-20)  Calcium, Total Serum: 7.2 mg/dL [8.5 - 10.1] (08-19-20)  Magnesium, Serum: 2.0 mg/dL [1.6 - 2.6] (08-19-20)  Phosphorus Level, Serum: 2.6 mg/dL [2.5 - 4.5] (08-19-20)  Calcium, Total Serum: 7.5 mg/dL [8.5 - 10.1] (08-19-20)  Magnesium, Serum: 1.7 mg/dL [1.6 - 2.6] (08-19-20)  Phosphorus Level, Serum: 3.1 mg/dL [2.5 - 4.5] (08-19-20)  Calcium, Total Serum: 8.3 mg/dL [8.5 - 10.1] (08-18-20)  Magnesium, Serum: 1.3 mg/dL [1.6 - 2.6] (08-18-20)  Phosphorus Level, Serum: 4.0 mg/dL [2.5 - 4.5] (08-18-20)  Calcium, Total Serum: 9.2 mg/dL [8.5 - 10.1] (08-18-20)  Magnesium, Serum: 0.9 mg/dL [1.6 - 2.6] (08-18-20)  Phosphorus Level, Serum: 0.8 mg/dL [2.5 - 4.5] (08-18-20)  Calcium, Total Serum: 9.2 mg/dL [8.5 - 10.1] (08-18-20)  Calcium, Total Serum: 11.5 mg/dL [8.5 - 10.1] (08-17-20)      TPro  5.4<L>  /  Alb  1.1<L>  /  TBili  3.5<H>  /  DBili  x   /  AST  173<H>  /  ALT  105<H>  /  AlkPhos  361<H>  08-28    RADIOLOGY & ADDITIONAL STUDIES:  IMAGING:  CT Abdomen and Pelvis w/ IV Cont:   EXAM:  CT ABDOMEN AND PELVIS IC                          EXAM:  CT CHEST IC                            PROCEDURE DATE:  08/27/2020          INTERPRETATION:  CLINICAL INFORMATION: Abdominal distention. Follow-up pancreatitis. Sepsis with acute hypoxic respiratory failure and pneumonia.    COMPARISON: CT scan abdomen 8/19/2020.    PROCEDURE:  CT of the Chest, Abdomen and Pelvis was performed with intravenous contrast.  Intravenous contrast: 90 ml Omnipaque 350. 10 ml discarded.  Oral contrast: None.  Sagittal and coronal reformats were performed.    FINDINGS:    CHEST:    LUNGS AND LARGE AIRWAYS: PLEURA:  There is a moderate size left-sided pleural effusion and small right-sided pleural effusion with underlying compressive atelectasis/airspace consolidation.    Endotracheal tube is present, tip above the level david.  The central airways remain patent.    There is patchy bilateral airspace consolidation in the upper lobes, likely reflecting pneumonia.    VESSELS: Coronary artery calcification.    HEART: Heart size is normal. No pericardial effusion.    MEDIASTINUM AND MARSHALL: No enlarged lymphadenopathy.  Nasogastric tube, tip in stomach.    CHEST WALL AND LOWER NECK: Within normal limits.    ABDOMEN AND PELVIS:    Streak artifact degrades image quality limiting evaluation.    LIVER: Within normal limits.  BILE DUCTS: Normal caliber.  GALLBLADDER: Mildly distended.  SPLEEN: Within normal limits.    PANCREAS: Changes compatible with acute pancreatitis, including heterogeneous enhancement of the pancreas.  There are small heterogeneous peripancreatic fluid collections with beginning partial encapsulation, findings which may reflect acute necrotic collections.    ADRENALS: Within normal limits.  KIDNEYS/URETERS: No hydronephrosis.    BLADDER: Moderately distended, correlate with urine output.  REPRODUCTIVE ORGANS: Prostate within normal limits.    BOWEL: Evaluation of the gastrointestinal tract is limited without distention.  There is diffuse small bowel wall thickening, without bowel obstruction.  Rectal temperature probe.   Appendix contrast filled appendix.  PERITONEUM: Moderate volume of abdominal pelvic ascites.    VESSELS: Within normal limits.  RETROPERITONEUM/LYMPH NODES: No enlarged retroperitoneal lymphadenopathy.  ABDOMINAL WALL: Tiny fat-containing periumbilical hernia. Small bilateral fat-containing inguinal hernias.    BONES: Bilateral spondylolysis L5, without significant spondylolisthesis at L5-S1.  Patchy subchondral sclerosis bilateral femoral heads may reflect osteonecrosis.    IMPRESSION:    Multilobar pneumonia as discussed.    Bilateral pleural effusions with underlying compressive atelectasis/airspace consolidation.    Acute pancreatitis with beginning partially encapsulated complex peripancreatic fluid collections likely reflecting acute or chronic collections.    Diffuse small bowel wall thickening, which could be reactive to abdominal ascites.    Other findings as discussed above.        RADHA PADILLA M.D., ATTENDING RADIOLOGIST  This document has been electronically signed. Aug 27 2020  4:31PM          A/P: 44M PMH ETOH abuse, pancreatitis, a/w acute necrotizing pancreatitis. Intubated 2/2 respiratory distress due to ETOH withdrawal and pneumonia. Sedated with known alcohol withdrawal - on precedex and on background ativan.     Leukocytosis dowtrending and stable. Hypokalemia resolved.    - f/u abdominal ultrasound for r/o evidence of gallstones    - merrem and vanc per primary team - respiratory distress likely 2/2 pneumonia.   - remains intubated and sedated. Experiencing alcohol withdrawal and hypoxic respiratory failure.   - IV hydration -  dextrose 5% + lactated ringers.   - monitor output of NGT   - GI ppx, DVT ppx w/ enoxaparin subQ 40 mg qd & SCDs  - replete electrolytes as needed, including supplemental KCl   - no acute surgical intervention warranted at this time, will continue to monitor  - ICU management

## 2020-08-29 LAB
ALBUMIN SERPL ELPH-MCNC: 1 G/DL — LOW (ref 3.3–5)
ALP SERPL-CCNC: 437 U/L — HIGH (ref 40–120)
ALT FLD-CCNC: 118 U/L — HIGH (ref 12–78)
ANION GAP SERPL CALC-SCNC: 7 MMOL/L — SIGNIFICANT CHANGE UP (ref 5–17)
AST SERPL-CCNC: 205 U/L — HIGH (ref 15–37)
BILIRUB SERPL-MCNC: 3.1 MG/DL — HIGH (ref 0.2–1.2)
BUN SERPL-MCNC: 11 MG/DL — SIGNIFICANT CHANGE UP (ref 7–23)
CALCIUM SERPL-MCNC: 7.4 MG/DL — LOW (ref 8.5–10.1)
CHLORIDE SERPL-SCNC: 105 MMOL/L — SIGNIFICANT CHANGE UP (ref 96–108)
CO2 SERPL-SCNC: 23 MMOL/L — SIGNIFICANT CHANGE UP (ref 22–31)
CREAT SERPL-MCNC: 0.4 MG/DL — LOW (ref 0.5–1.3)
GLUCOSE SERPL-MCNC: 104 MG/DL — HIGH (ref 70–99)
HCT VFR BLD CALC: 28.6 % — LOW (ref 39–50)
HGB BLD-MCNC: 9.9 G/DL — LOW (ref 13–17)
LIDOCAIN IGE QN: 606 U/L — HIGH (ref 73–393)
MAGNESIUM SERPL-MCNC: 1.5 MG/DL — LOW (ref 1.6–2.6)
MCHC RBC-ENTMCNC: 33.1 PG — SIGNIFICANT CHANGE UP (ref 27–34)
MCHC RBC-ENTMCNC: 34.6 GM/DL — SIGNIFICANT CHANGE UP (ref 32–36)
MCV RBC AUTO: 95.7 FL — SIGNIFICANT CHANGE UP (ref 80–100)
NRBC # BLD: 0 /100 WBCS — SIGNIFICANT CHANGE UP (ref 0–0)
PHOSPHATE SERPL-MCNC: 3.3 MG/DL — SIGNIFICANT CHANGE UP (ref 2.5–4.5)
PLATELET # BLD AUTO: 250 K/UL — SIGNIFICANT CHANGE UP (ref 150–400)
POTASSIUM SERPL-MCNC: 3.9 MMOL/L — SIGNIFICANT CHANGE UP (ref 3.5–5.3)
POTASSIUM SERPL-SCNC: 3.9 MMOL/L — SIGNIFICANT CHANGE UP (ref 3.5–5.3)
PROT SERPL-MCNC: 5.6 GM/DL — LOW (ref 6–8.3)
RBC # BLD: 2.99 M/UL — LOW (ref 4.2–5.8)
RBC # FLD: 14.2 % — SIGNIFICANT CHANGE UP (ref 10.3–14.5)
SODIUM SERPL-SCNC: 135 MMOL/L — SIGNIFICANT CHANGE UP (ref 135–145)
TRIGL SERPL-MCNC: 372 MG/DL — HIGH (ref 10–149)
WBC # BLD: 15.11 K/UL — HIGH (ref 3.8–10.5)
WBC # FLD AUTO: 15.11 K/UL — HIGH (ref 3.8–10.5)

## 2020-08-29 PROCEDURE — 99291 CRITICAL CARE FIRST HOUR: CPT

## 2020-08-29 RX ORDER — POLYETHYLENE GLYCOL 3350 17 G/17G
17 POWDER, FOR SOLUTION ORAL ONCE
Refills: 0 | Status: COMPLETED | OUTPATIENT
Start: 2020-08-29 | End: 2020-08-29

## 2020-08-29 RX ORDER — FENTANYL CITRATE 50 UG/ML
50 INJECTION INTRAVENOUS ONCE
Refills: 0 | Status: DISCONTINUED | OUTPATIENT
Start: 2020-08-29 | End: 2020-08-29

## 2020-08-29 RX ORDER — OLANZAPINE 15 MG/1
2.5 TABLET, FILM COATED ORAL AT BEDTIME
Refills: 0 | Status: DISCONTINUED | OUTPATIENT
Start: 2020-08-29 | End: 2020-09-01

## 2020-08-29 RX ORDER — MAGNESIUM SULFATE 500 MG/ML
2 VIAL (ML) INJECTION ONCE
Refills: 0 | Status: COMPLETED | OUTPATIENT
Start: 2020-08-29 | End: 2020-08-29

## 2020-08-29 RX ORDER — KETAMINE HYDROCHLORIDE 100 MG/ML
100 INJECTION INTRAMUSCULAR; INTRAVENOUS ONCE
Refills: 0 | Status: DISCONTINUED | OUTPATIENT
Start: 2020-08-29 | End: 2020-08-29

## 2020-08-29 RX ORDER — KETAMINE HYDROCHLORIDE 100 MG/ML
0.25 INJECTION INTRAMUSCULAR; INTRAVENOUS
Qty: 1000 | Refills: 0 | Status: DISCONTINUED | OUTPATIENT
Start: 2020-08-29 | End: 2020-08-31

## 2020-08-29 RX ORDER — FENTANYL CITRATE 50 UG/ML
0.5 INJECTION INTRAVENOUS
Qty: 2500 | Refills: 0 | Status: DISCONTINUED | OUTPATIENT
Start: 2020-08-29 | End: 2020-09-02

## 2020-08-29 RX ADMIN — Medication 100 MILLIGRAM(S): at 12:30

## 2020-08-29 RX ADMIN — FENTANYL CITRATE 50 MICROGRAM(S): 50 INJECTION INTRAVENOUS at 20:18

## 2020-08-29 RX ADMIN — FENTANYL CITRATE 50 MICROGRAM(S): 50 INJECTION INTRAVENOUS at 05:00

## 2020-08-29 RX ADMIN — FENTANYL CITRATE 50 MICROGRAM(S): 50 INJECTION INTRAVENOUS at 01:04

## 2020-08-29 RX ADMIN — FENTANYL CITRATE 50 MICROGRAM(S): 50 INJECTION INTRAVENOUS at 08:39

## 2020-08-29 RX ADMIN — CHLORHEXIDINE GLUCONATE 15 MILLILITER(S): 213 SOLUTION TOPICAL at 05:14

## 2020-08-29 RX ADMIN — MEROPENEM 100 MILLIGRAM(S): 1 INJECTION INTRAVENOUS at 05:14

## 2020-08-29 RX ADMIN — PROPOFOL 4.29 MICROGRAM(S)/KG/MIN: 10 INJECTION, EMULSION INTRAVENOUS at 03:35

## 2020-08-29 RX ADMIN — KETAMINE HYDROCHLORIDE 1.94 MG/KG/HR: 100 INJECTION INTRAMUSCULAR; INTRAVENOUS at 11:55

## 2020-08-29 RX ADMIN — FENTANYL CITRATE 50 MICROGRAM(S): 50 INJECTION INTRAVENOUS at 00:34

## 2020-08-29 RX ADMIN — PROPOFOL 4.29 MICROGRAM(S)/KG/MIN: 10 INJECTION, EMULSION INTRAVENOUS at 06:39

## 2020-08-29 RX ADMIN — KETAMINE HYDROCHLORIDE 100 MILLIGRAM(S): 100 INJECTION INTRAMUSCULAR; INTRAVENOUS at 10:24

## 2020-08-29 RX ADMIN — PROPOFOL 4.29 MICROGRAM(S)/KG/MIN: 10 INJECTION, EMULSION INTRAVENOUS at 00:45

## 2020-08-29 RX ADMIN — CHLORHEXIDINE GLUCONATE 1 APPLICATION(S): 213 SOLUTION TOPICAL at 05:15

## 2020-08-29 RX ADMIN — PROPOFOL 4.29 MICROGRAM(S)/KG/MIN: 10 INJECTION, EMULSION INTRAVENOUS at 05:55

## 2020-08-29 RX ADMIN — Medication 1 TABLET(S): at 12:30

## 2020-08-29 RX ADMIN — FENTANYL CITRATE 50 MICROGRAM(S): 50 INJECTION INTRAVENOUS at 06:00

## 2020-08-29 RX ADMIN — DEXMEDETOMIDINE HYDROCHLORIDE IN 0.9% SODIUM CHLORIDE 5.36 MICROGRAM(S)/KG/HR: 4 INJECTION INTRAVENOUS at 04:45

## 2020-08-29 RX ADMIN — FENTANYL CITRATE 50 MICROGRAM(S): 50 INJECTION INTRAVENOUS at 09:33

## 2020-08-29 RX ADMIN — FENTANYL CITRATE 50 MICROGRAM(S): 50 INJECTION INTRAVENOUS at 08:40

## 2020-08-29 RX ADMIN — OLANZAPINE 2.5 MILLIGRAM(S): 15 TABLET, FILM COATED ORAL at 22:19

## 2020-08-29 RX ADMIN — PANTOPRAZOLE SODIUM 40 MILLIGRAM(S): 20 TABLET, DELAYED RELEASE ORAL at 12:29

## 2020-08-29 RX ADMIN — MEROPENEM 100 MILLIGRAM(S): 1 INJECTION INTRAVENOUS at 14:27

## 2020-08-29 RX ADMIN — DEXMEDETOMIDINE HYDROCHLORIDE IN 0.9% SODIUM CHLORIDE 5.36 MICROGRAM(S)/KG/HR: 4 INJECTION INTRAVENOUS at 17:42

## 2020-08-29 RX ADMIN — DEXMEDETOMIDINE HYDROCHLORIDE IN 0.9% SODIUM CHLORIDE 5.36 MICROGRAM(S)/KG/HR: 4 INJECTION INTRAVENOUS at 08:16

## 2020-08-29 RX ADMIN — Medication 1 MILLIGRAM(S): at 12:29

## 2020-08-29 RX ADMIN — DEXMEDETOMIDINE HYDROCHLORIDE IN 0.9% SODIUM CHLORIDE 5.36 MICROGRAM(S)/KG/HR: 4 INJECTION INTRAVENOUS at 00:45

## 2020-08-29 RX ADMIN — DEXMEDETOMIDINE HYDROCHLORIDE IN 0.9% SODIUM CHLORIDE 5.36 MICROGRAM(S)/KG/HR: 4 INJECTION INTRAVENOUS at 06:40

## 2020-08-29 RX ADMIN — FENTANYL CITRATE 3.88 MICROGRAM(S)/KG/HR: 50 INJECTION INTRAVENOUS at 17:41

## 2020-08-29 RX ADMIN — ENOXAPARIN SODIUM 40 MILLIGRAM(S): 100 INJECTION SUBCUTANEOUS at 12:29

## 2020-08-29 RX ADMIN — Medication 50 GRAM(S): at 05:48

## 2020-08-29 RX ADMIN — LACTULOSE 10 GRAM(S): 10 SOLUTION ORAL at 05:14

## 2020-08-29 RX ADMIN — DEXMEDETOMIDINE HYDROCHLORIDE IN 0.9% SODIUM CHLORIDE 5.36 MICROGRAM(S)/KG/HR: 4 INJECTION INTRAVENOUS at 14:33

## 2020-08-29 RX ADMIN — FENTANYL CITRATE 3.88 MICROGRAM(S)/KG/HR: 50 INJECTION INTRAVENOUS at 07:39

## 2020-08-29 RX ADMIN — DEXMEDETOMIDINE HYDROCHLORIDE IN 0.9% SODIUM CHLORIDE 5.36 MICROGRAM(S)/KG/HR: 4 INJECTION INTRAVENOUS at 16:20

## 2020-08-29 RX ADMIN — DEXMEDETOMIDINE HYDROCHLORIDE IN 0.9% SODIUM CHLORIDE 5.36 MICROGRAM(S)/KG/HR: 4 INJECTION INTRAVENOUS at 02:49

## 2020-08-29 RX ADMIN — MEROPENEM 100 MILLIGRAM(S): 1 INJECTION INTRAVENOUS at 22:19

## 2020-08-29 RX ADMIN — QUETIAPINE FUMARATE 25 MILLIGRAM(S): 200 TABLET, FILM COATED ORAL at 12:49

## 2020-08-29 RX ADMIN — FENTANYL CITRATE 50 MICROGRAM(S): 50 INJECTION INTRAVENOUS at 09:25

## 2020-08-29 RX ADMIN — DEXMEDETOMIDINE HYDROCHLORIDE IN 0.9% SODIUM CHLORIDE 5.36 MICROGRAM(S)/KG/HR: 4 INJECTION INTRAVENOUS at 19:30

## 2020-08-29 RX ADMIN — SODIUM CHLORIDE 75 MILLILITER(S): 9 INJECTION, SOLUTION INTRAVENOUS at 17:44

## 2020-08-29 RX ADMIN — DEXMEDETOMIDINE HYDROCHLORIDE IN 0.9% SODIUM CHLORIDE 5.36 MICROGRAM(S)/KG/HR: 4 INJECTION INTRAVENOUS at 21:00

## 2020-08-29 RX ADMIN — LACTULOSE 10 GRAM(S): 10 SOLUTION ORAL at 01:30

## 2020-08-29 RX ADMIN — SENNA PLUS 2 TABLET(S): 8.6 TABLET ORAL at 22:19

## 2020-08-29 RX ADMIN — CHLORHEXIDINE GLUCONATE 15 MILLILITER(S): 213 SOLUTION TOPICAL at 17:44

## 2020-08-29 RX ADMIN — DEXMEDETOMIDINE HYDROCHLORIDE IN 0.9% SODIUM CHLORIDE 5.36 MICROGRAM(S)/KG/HR: 4 INJECTION INTRAVENOUS at 11:24

## 2020-08-29 NOTE — PROGRESS NOTE ADULT - ASSESSMENT
44M with a PMH of EtOH abuse and withdrawal, alcohol pancreatitis p/w pancreatitis, PNA and respiratory failure requiring intubation.     Neuro   gets agitated during sedation vacation  monitor for pain given pancreatitis, prn fentanyl  will switch to using more ketamine instead of propofol as his lipase increase  TG pending  cont daily sedation vacation    Pulm  cont daily PS trials  has mod secretions  CT w/ extensive PNA, cont abx    [facility]  off pressors  maintain MAP>65    GI  pancreatitis with now slightly increase lipase and CT showing partially encapsulated fluid collections  no intervention as per Surgery  IR also consulted- no intervention now  cont to have fever possibly from fluid collections vs PNA  cont meropenem and f/u cx  cont low rate TF    Cont GI and DVT ppx

## 2020-08-29 NOTE — PROGRESS NOTE ADULT - SUBJECTIVE AND OBJECTIVE BOX
HPI:  Patient is a 44M with a PMH of EtOH abuse and withdrawal, alcohol pancreatitis who presents to the ED with severe abdominal pain.  Patient states that for the past three days he has been on a drinking binge, drinking 5 beers and 5 shots of liquor per day.  Last drink reportedly yesterday.  Patient reports that he woke up today and had severe generalized abdominal pain with multiple episodes of nonbloody, nonbilious vomiting.  Patient also complaining of nausea and generalized tremors.  Vitals stable, labs show leukocytosis, lactic acidosis, and severely elevated lipase levels.  CT shows pancreatitis with early necrosis in the pancreatic body.   (17 Aug 2020 23:25)      24 hr events:  Able to tolerate PS trial briefly before becoming too agitated  Cont to have fever but lower grade    ROS:   Unable as intubated    ## Labs:                         9.9    15.11 )-----------( 250      ( 29 Aug 2020 04:12 )             28.6   08-29    135  |  105  |  11  ----------------------------<  104<H>  3.9   |  23  |  0.40<L>    Ca    7.4<L>      29 Aug 2020 04:12  Phos  3.3     08-29  Mg     1.5     08-29    TPro  5.6<L>  /  Alb  1.0<L>  /  TBili  3.1<H>  /  DBili  x   /  AST  205<H>  /  ALT  118<H>  /  AlkPhos  437<H>  08-29          ## Imaging:  CT A/P: IMPRESSION:    Multilobar pneumonia as discussed.    Bilateral pleural effusions with underlying compressive atelectasis/airspace consolidation.    Acute pancreatitis with beginning partially encapsulated complex peripancreatic fluid collections likely reflecting acute or chronic collections.    Diffuse small bowel wall thickening, which could be reactive to abdominal ascites.    Other findings as discussed above.       MEDICATIONS  (STANDING):  chlorhexidine 0.12% Liquid 15 milliLiter(s) Oral Mucosa every 12 hours  chlorhexidine 4% Liquid 1 Application(s) Topical <User Schedule>  dexMEDEtomidine Infusion 0.3 MICROgram(s)/kG/Hr (5.36 mL/Hr) IV Continuous <Continuous>  dextrose 5% + lactated ringers. 1000 milliLiter(s) (75 mL/Hr) IV Continuous <Continuous>  enoxaparin Injectable 40 milliGRAM(s) SubCutaneous daily  fentaNYL   Infusion. 0.5 MICROgram(s)/kG/Hr (3.88 mL/Hr) IV Continuous <Continuous>  folic acid 1 milliGRAM(s) Oral daily  ketamine Infusion. 0.25 mG/kG/Hr (1.94 mL/Hr) IV Continuous <Continuous>  lactulose Syrup 10 Gram(s) Oral four times a day  meropenem  IVPB 1000 milliGRAM(s) IV Intermittent every 8 hours  multivitamin 1 Tablet(s) Oral daily  pantoprazole  Injectable 40 milliGRAM(s) IV Push daily  polyethylene glycol 3350 17 Gram(s) Oral once  propofol Infusion 10 MICROgram(s)/kG/Min (4.29 mL/Hr) IV Continuous <Continuous>  QUEtiapine 25 milliGRAM(s) Oral daily  senna 2 Tablet(s) Oral at bedtime  thiamine 100 milliGRAM(s) Oral daily         ICU Vital Signs Last 24 Hrs  T(C): 38.2 (29 Aug 2020 07:30), Max: 38.4 (28 Aug 2020 19:22)  T(F): 100.7 (29 Aug 2020 07:30), Max: 101.1 (28 Aug 2020 19:22)  HR: 116 (29 Aug 2020 11:30) (84 - 148)  BP: 93/69 (29 Aug 2020 11:30) (93/69 - 156/86)  BP(mean): 72 (29 Aug 2020 11:30) (69 - 114)  ABP: --  ABP(mean): --  RR: 23 (29 Aug 2020 11:30) (15 - 36)  SpO2: 89% (29 Aug 2020 11:30) (75% - 100%)            ## P/E:  Gen: sedated, intubated  Mouth: mmm  Lungs: b/l ae cta  Heart: s1s2 reg no murmur  Abd: soft nontender +BS  Ext: no edema  Neuro: sedated but moving all extremities on sedation vacation

## 2020-08-30 LAB
ALBUMIN SERPL ELPH-MCNC: 1.1 G/DL — LOW (ref 3.3–5)
ALP SERPL-CCNC: 387 U/L — HIGH (ref 40–120)
ALT FLD-CCNC: 102 U/L — HIGH (ref 12–78)
ANION GAP SERPL CALC-SCNC: 6 MMOL/L — SIGNIFICANT CHANGE UP (ref 5–17)
AST SERPL-CCNC: 151 U/L — HIGH (ref 15–37)
BILIRUB SERPL-MCNC: 3.6 MG/DL — HIGH (ref 0.2–1.2)
BUN SERPL-MCNC: 9 MG/DL — SIGNIFICANT CHANGE UP (ref 7–23)
CALCIUM SERPL-MCNC: 7.9 MG/DL — LOW (ref 8.5–10.1)
CHLORIDE SERPL-SCNC: 106 MMOL/L — SIGNIFICANT CHANGE UP (ref 96–108)
CO2 SERPL-SCNC: 24 MMOL/L — SIGNIFICANT CHANGE UP (ref 22–31)
CREAT SERPL-MCNC: 0.39 MG/DL — LOW (ref 0.5–1.3)
GLUCOSE SERPL-MCNC: 113 MG/DL — HIGH (ref 70–99)
GRAM STN FLD: SIGNIFICANT CHANGE UP
HCT VFR BLD CALC: 26.5 % — LOW (ref 39–50)
HGB BLD-MCNC: 8.8 G/DL — LOW (ref 13–17)
LIDOCAIN IGE QN: 531 U/L — HIGH (ref 73–393)
MAGNESIUM SERPL-MCNC: 1.7 MG/DL — SIGNIFICANT CHANGE UP (ref 1.6–2.6)
MCHC RBC-ENTMCNC: 33.2 GM/DL — SIGNIFICANT CHANGE UP (ref 32–36)
MCHC RBC-ENTMCNC: 33.3 PG — SIGNIFICANT CHANGE UP (ref 27–34)
MCV RBC AUTO: 100.4 FL — HIGH (ref 80–100)
NRBC # BLD: 0 /100 WBCS — SIGNIFICANT CHANGE UP (ref 0–0)
PHOSPHATE SERPL-MCNC: 3.2 MG/DL — SIGNIFICANT CHANGE UP (ref 2.5–4.5)
PLATELET # BLD AUTO: 403 K/UL — HIGH (ref 150–400)
POTASSIUM SERPL-MCNC: 3.6 MMOL/L — SIGNIFICANT CHANGE UP (ref 3.5–5.3)
POTASSIUM SERPL-SCNC: 3.6 MMOL/L — SIGNIFICANT CHANGE UP (ref 3.5–5.3)
PROT SERPL-MCNC: 5.3 GM/DL — LOW (ref 6–8.3)
RBC # BLD: 2.64 M/UL — LOW (ref 4.2–5.8)
RBC # FLD: 14.1 % — SIGNIFICANT CHANGE UP (ref 10.3–14.5)
SODIUM SERPL-SCNC: 136 MMOL/L — SIGNIFICANT CHANGE UP (ref 135–145)
SPECIMEN SOURCE: SIGNIFICANT CHANGE UP
WBC # BLD: 16.56 K/UL — HIGH (ref 3.8–10.5)
WBC # FLD AUTO: 16.56 K/UL — HIGH (ref 3.8–10.5)

## 2020-08-30 PROCEDURE — 99291 CRITICAL CARE FIRST HOUR: CPT

## 2020-08-30 PROCEDURE — 93010 ELECTROCARDIOGRAM REPORT: CPT

## 2020-08-30 RX ORDER — SODIUM CHLORIDE 9 MG/ML
500 INJECTION, SOLUTION INTRAVENOUS ONCE
Refills: 0 | Status: COMPLETED | OUTPATIENT
Start: 2020-08-30 | End: 2020-08-30

## 2020-08-30 RX ORDER — MIDODRINE HYDROCHLORIDE 2.5 MG/1
10 TABLET ORAL ONCE
Refills: 0 | Status: COMPLETED | OUTPATIENT
Start: 2020-08-30 | End: 2020-08-30

## 2020-08-30 RX ORDER — MIDODRINE HYDROCHLORIDE 2.5 MG/1
10 TABLET ORAL EVERY 8 HOURS
Refills: 0 | Status: DISCONTINUED | OUTPATIENT
Start: 2020-08-30 | End: 2020-09-03

## 2020-08-30 RX ORDER — ALBUMIN HUMAN 25 %
50 VIAL (ML) INTRAVENOUS ONCE
Refills: 0 | Status: COMPLETED | OUTPATIENT
Start: 2020-08-30 | End: 2020-08-30

## 2020-08-30 RX ADMIN — MEROPENEM 100 MILLIGRAM(S): 1 INJECTION INTRAVENOUS at 05:18

## 2020-08-30 RX ADMIN — KETAMINE HYDROCHLORIDE 1.94 MG/KG/HR: 100 INJECTION INTRAMUSCULAR; INTRAVENOUS at 16:09

## 2020-08-30 RX ADMIN — Medication 1 MILLIGRAM(S): at 11:44

## 2020-08-30 RX ADMIN — MEROPENEM 100 MILLIGRAM(S): 1 INJECTION INTRAVENOUS at 21:12

## 2020-08-30 RX ADMIN — CHLORHEXIDINE GLUCONATE 1 APPLICATION(S): 213 SOLUTION TOPICAL at 06:41

## 2020-08-30 RX ADMIN — LACTULOSE 10 GRAM(S): 10 SOLUTION ORAL at 23:00

## 2020-08-30 RX ADMIN — KETAMINE HYDROCHLORIDE 1.94 MG/KG/HR: 100 INJECTION INTRAMUSCULAR; INTRAVENOUS at 23:00

## 2020-08-30 RX ADMIN — MIDODRINE HYDROCHLORIDE 10 MILLIGRAM(S): 2.5 TABLET ORAL at 20:36

## 2020-08-30 RX ADMIN — LACTULOSE 10 GRAM(S): 10 SOLUTION ORAL at 00:02

## 2020-08-30 RX ADMIN — Medication 100 MILLIGRAM(S): at 11:44

## 2020-08-30 RX ADMIN — FENTANYL CITRATE 50 MICROGRAM(S): 50 INJECTION INTRAVENOUS at 04:16

## 2020-08-30 RX ADMIN — DEXMEDETOMIDINE HYDROCHLORIDE IN 0.9% SODIUM CHLORIDE 5.36 MICROGRAM(S)/KG/HR: 4 INJECTION INTRAVENOUS at 22:45

## 2020-08-30 RX ADMIN — CHLORHEXIDINE GLUCONATE 15 MILLILITER(S): 213 SOLUTION TOPICAL at 05:18

## 2020-08-30 RX ADMIN — Medication 2 MILLIGRAM(S): at 20:47

## 2020-08-30 RX ADMIN — CHLORHEXIDINE GLUCONATE 15 MILLILITER(S): 213 SOLUTION TOPICAL at 17:14

## 2020-08-30 RX ADMIN — Medication 1 TABLET(S): at 11:44

## 2020-08-30 RX ADMIN — FENTANYL CITRATE 3.88 MICROGRAM(S)/KG/HR: 50 INJECTION INTRAVENOUS at 09:27

## 2020-08-30 RX ADMIN — Medication 50 MILLILITER(S): at 19:48

## 2020-08-30 RX ADMIN — SODIUM CHLORIDE 75 MILLILITER(S): 9 INJECTION, SOLUTION INTRAVENOUS at 20:47

## 2020-08-30 RX ADMIN — PANTOPRAZOLE SODIUM 40 MILLIGRAM(S): 20 TABLET, DELAYED RELEASE ORAL at 11:44

## 2020-08-30 RX ADMIN — SENNA PLUS 2 TABLET(S): 8.6 TABLET ORAL at 21:14

## 2020-08-30 RX ADMIN — QUETIAPINE FUMARATE 25 MILLIGRAM(S): 200 TABLET, FILM COATED ORAL at 11:44

## 2020-08-30 RX ADMIN — LACTULOSE 10 GRAM(S): 10 SOLUTION ORAL at 11:45

## 2020-08-30 RX ADMIN — Medication 2 MILLIGRAM(S): at 15:03

## 2020-08-30 RX ADMIN — MEROPENEM 100 MILLIGRAM(S): 1 INJECTION INTRAVENOUS at 14:19

## 2020-08-30 RX ADMIN — OLANZAPINE 2.5 MILLIGRAM(S): 15 TABLET, FILM COATED ORAL at 21:12

## 2020-08-30 RX ADMIN — FENTANYL CITRATE 3.88 MICROGRAM(S)/KG/HR: 50 INJECTION INTRAVENOUS at 01:37

## 2020-08-30 RX ADMIN — LACTULOSE 10 GRAM(S): 10 SOLUTION ORAL at 17:14

## 2020-08-30 RX ADMIN — KETAMINE HYDROCHLORIDE 1.94 MG/KG/HR: 100 INJECTION INTRAMUSCULAR; INTRAVENOUS at 05:01

## 2020-08-30 RX ADMIN — LACTULOSE 10 GRAM(S): 10 SOLUTION ORAL at 05:18

## 2020-08-30 RX ADMIN — ENOXAPARIN SODIUM 40 MILLIGRAM(S): 100 INJECTION SUBCUTANEOUS at 11:44

## 2020-08-30 RX ADMIN — DEXMEDETOMIDINE HYDROCHLORIDE IN 0.9% SODIUM CHLORIDE 5.36 MICROGRAM(S)/KG/HR: 4 INJECTION INTRAVENOUS at 22:57

## 2020-08-30 RX ADMIN — SODIUM CHLORIDE 500 MILLILITER(S): 9 INJECTION, SOLUTION INTRAVENOUS at 21:33

## 2020-08-30 RX ADMIN — FENTANYL CITRATE 50 MICROGRAM(S): 50 INJECTION INTRAVENOUS at 04:30

## 2020-08-30 NOTE — PROGRESS NOTE ADULT - ASSESSMENT
44M with a PMH of EtOH abuse and withdrawal, alcohol pancreatitis p/w pancreatitis, PNA and respiratory failure requiring intubation.     Neuro   gets agitated during sedation vacation  monitor for pain given pancreatitis, prn fentanyl  transition to ketamine gtt to decrease opiod dose and propofol use and allow for SBT  cont daily sedation vacation    Pulm  cont daily PS trials  still has mod secretions, cont PT and suctioning   check sputum cx  CT w/ extensive PNA     CVS  off pressors  maintain MAP>65    GI  pancreatitis with resolving lipase but CT showing partially encapsulated fluid collections  no intervention as per Surgery  IR also consulted  cont to have fever possibly from fluid collections vs PNA  cont meropenem and f/u cx  TG level  mod high now off propofol  cont low rate TF    Cont GI and DVT ppx

## 2020-08-30 NOTE — PROGRESS NOTE ADULT - SUBJECTIVE AND OBJECTIVE BOX
HPI:  Patient is a 44M with a PMH of EtOH abuse and withdrawal, alcohol pancreatitis who presents to the ED with severe abdominal pain.  Patient states that for the past three days he has been on a drinking binge, drinking 5 beers and 5 shots of liquor per day.  Last drink reportedly yesterday.  Patient reports that he woke up today and had severe generalized abdominal pain with multiple episodes of nonbloody, nonbilious vomiting.  Patient also complaining of nausea and generalized tremors.  Vitals stable, labs show leukocytosis, lactic acidosis, and severely elevated lipase levels.  CT shows pancreatitis with early necrosis in the pancreatic body.  Will admit to floor. (17 Aug 2020 23:25)      24 hr events:  No events overnight    ROS: Unable due to intubation sedation    ## Labs:  CBC:                        8.8    16.56 )-----------( 403      ( 30 Aug 2020 05:05 )             26.5     Chem:      136  |  106  |  9   ----------------------------<  113<H>  3.6   |  24  |  0.39<L>    Ca    7.9<L>      30 Aug 2020 05:05  Phos  3.2     08-  Mg     1.7         TPro  5.3<L>  /  Alb  1.1<L>  /  TBili  3.6<H>  /  DBili  x   /  AST  151<H>  /  ALT  102<H>  /  AlkPhos  387<H>          ## Medications:  meropenem  IVPB 1000 milliGRAM(s) IV Intermittent every 8 hours          enoxaparin Injectable 40 milliGRAM(s) SubCutaneous daily    lactulose Syrup 10 Gram(s) Oral four times a day  pantoprazole  Injectable 40 milliGRAM(s) IV Push daily  senna 2 Tablet(s) Oral at bedtime    dexMEDEtomidine Infusion 0.3 MICROgram(s)/kG/Hr IV Continuous <Continuous>  fentaNYL    Injectable 50 MICROGram(s) IV Push every 4 hours PRN  fentaNYL   Infusion. 0.5 MICROgram(s)/kG/Hr IV Continuous <Continuous>  ketamine Infusion. 0.25 mG/kG/Hr IV Continuous <Continuous>  OLANZapine 2.5 milliGRAM(s) Oral at bedtime  QUEtiapine 25 milliGRAM(s) Oral daily      ## Vitals:  T(C): 38.8 (20 @ 10:00), Max: 39.1 (20 @ 08:00)  HR: 116 (20 @ 10:00) (90 - 121)  BP: 110/69 (20 @ 10:00) (89/59 - 116/64)  BP(mean): 78 (20 @ 10:00) (60 - 84)  RR: 15 (20 @ 10:00) (8 - 25)  SpO2: 92% (20 @ 10:00) (89% - 100%)  Wt(kg): --  Vent: Mode: AC/ CMV (Assist Control/ Continuous Mandatory Ventilation), RR (machine): 12, RR (patient): 16, TV (machine): 500, FiO2: 40, PEEP: 5, PIP: 26  AB-29 @ 07:01  -   @ 07:00  --------------------------------------------------------  IN: 3602.5 mL / OUT: 1210 mL / NET: 2392.5 mL     @ 07:01  -   @ 11:02  --------------------------------------------------------  IN: 451 mL / OUT: 105 mL / NET: 346 mL      ## P/E:  Gen: sedated, intubated  Mouth: mmm  Lungs: b/l ae cta  Heart: s1s2 reg no murmur  Abd: soft nontender +BS  Ext: no edema  Neuro: sedated but when awake today moving all extremities and following commands

## 2020-08-31 LAB
ANION GAP SERPL CALC-SCNC: 5 MMOL/L — SIGNIFICANT CHANGE UP (ref 5–17)
BASE EXCESS BLDA CALC-SCNC: 1.2 MMOL/L — SIGNIFICANT CHANGE UP (ref -2–2)
BASE EXCESS BLDA CALC-SCNC: 1.2 MMOL/L — SIGNIFICANT CHANGE UP (ref -2–2)
BLOOD GAS COMMENTS: SIGNIFICANT CHANGE UP
BLOOD GAS SOURCE: SIGNIFICANT CHANGE UP
BLOOD GAS SOURCE: SIGNIFICANT CHANGE UP
BUN SERPL-MCNC: 9 MG/DL — SIGNIFICANT CHANGE UP (ref 7–23)
CALCIUM SERPL-MCNC: 8 MG/DL — LOW (ref 8.5–10.1)
CHLORIDE SERPL-SCNC: 108 MMOL/L — SIGNIFICANT CHANGE UP (ref 96–108)
CO2 SERPL-SCNC: 24 MMOL/L — SIGNIFICANT CHANGE UP (ref 22–31)
CREAT SERPL-MCNC: 0.44 MG/DL — LOW (ref 0.5–1.3)
GLUCOSE SERPL-MCNC: 95 MG/DL — SIGNIFICANT CHANGE UP (ref 70–99)
HCO3 BLDA-SCNC: 26 MMOL/L — SIGNIFICANT CHANGE UP (ref 21–29)
HCO3 BLDA-SCNC: 26 MMOL/L — SIGNIFICANT CHANGE UP (ref 21–29)
HCT VFR BLD CALC: 26.1 % — LOW (ref 39–50)
HGB BLD-MCNC: 8.2 G/DL — LOW (ref 13–17)
HOROWITZ INDEX BLDA+IHG-RTO: 50 — SIGNIFICANT CHANGE UP
HOROWITZ INDEX BLDA+IHG-RTO: 50 — SIGNIFICANT CHANGE UP
LACTATE SERPL-SCNC: 0.9 MMOL/L — SIGNIFICANT CHANGE UP (ref 0.7–2)
LIDOCAIN IGE QN: 271 U/L — SIGNIFICANT CHANGE UP (ref 73–393)
MAGNESIUM SERPL-MCNC: 1.7 MG/DL — SIGNIFICANT CHANGE UP (ref 1.6–2.6)
MCHC RBC-ENTMCNC: 31.4 GM/DL — LOW (ref 32–36)
MCHC RBC-ENTMCNC: 32 PG — SIGNIFICANT CHANGE UP (ref 27–34)
MCV RBC AUTO: 102 FL — HIGH (ref 80–100)
NRBC # BLD: 0 /100 WBCS — SIGNIFICANT CHANGE UP (ref 0–0)
PCO2 BLDA: 42 MMHG — SIGNIFICANT CHANGE UP (ref 32–46)
PCO2 BLDA: 42 MMHG — SIGNIFICANT CHANGE UP (ref 32–46)
PH BLD: 7.4 — SIGNIFICANT CHANGE UP (ref 7.35–7.45)
PH BLD: 7.4 — SIGNIFICANT CHANGE UP (ref 7.35–7.45)
PHOSPHATE SERPL-MCNC: 3.2 MG/DL — SIGNIFICANT CHANGE UP (ref 2.5–4.5)
PLATELET # BLD AUTO: 403 K/UL — HIGH (ref 150–400)
PO2 BLDA: 65 MMHG — LOW (ref 74–108)
PO2 BLDA: 69 MMHG — LOW (ref 74–108)
POTASSIUM SERPL-MCNC: 3.9 MMOL/L — SIGNIFICANT CHANGE UP (ref 3.5–5.3)
POTASSIUM SERPL-SCNC: 3.9 MMOL/L — SIGNIFICANT CHANGE UP (ref 3.5–5.3)
PROCALCITONIN SERPL-MCNC: 0.53 NG/ML — HIGH (ref 0.02–0.1)
RBC # BLD: 2.56 M/UL — LOW (ref 4.2–5.8)
RBC # FLD: 14.2 % — SIGNIFICANT CHANGE UP (ref 10.3–14.5)
SAO2 % BLDA: 92 % — SIGNIFICANT CHANGE UP (ref 92–96)
SAO2 % BLDA: 94 % — SIGNIFICANT CHANGE UP (ref 92–96)
SODIUM SERPL-SCNC: 137 MMOL/L — SIGNIFICANT CHANGE UP (ref 135–145)
TRIGL SERPL-MCNC: 198 MG/DL — HIGH (ref 10–149)
WBC # BLD: 17.4 K/UL — HIGH (ref 3.8–10.5)
WBC # FLD AUTO: 17.4 K/UL — HIGH (ref 3.8–10.5)

## 2020-08-31 PROCEDURE — 99291 CRITICAL CARE FIRST HOUR: CPT

## 2020-08-31 PROCEDURE — 71045 X-RAY EXAM CHEST 1 VIEW: CPT | Mod: 26

## 2020-08-31 PROCEDURE — 93010 ELECTROCARDIOGRAM REPORT: CPT

## 2020-08-31 RX ORDER — HALOPERIDOL DECANOATE 100 MG/ML
5 INJECTION INTRAMUSCULAR ONCE
Refills: 0 | Status: COMPLETED | OUTPATIENT
Start: 2020-08-31 | End: 2020-08-31

## 2020-08-31 RX ORDER — IPRATROPIUM/ALBUTEROL SULFATE 18-103MCG
3 AEROSOL WITH ADAPTER (GRAM) INHALATION EVERY 6 HOURS
Refills: 0 | Status: DISCONTINUED | OUTPATIENT
Start: 2020-08-31 | End: 2020-09-10

## 2020-08-31 RX ORDER — MAGNESIUM SULFATE 500 MG/ML
2 VIAL (ML) INJECTION ONCE
Refills: 0 | Status: COMPLETED | OUTPATIENT
Start: 2020-08-31 | End: 2020-08-31

## 2020-08-31 RX ORDER — MIDAZOLAM HYDROCHLORIDE 1 MG/ML
2 INJECTION, SOLUTION INTRAMUSCULAR; INTRAVENOUS ONCE
Refills: 0 | Status: DISCONTINUED | OUTPATIENT
Start: 2020-08-31 | End: 2020-08-31

## 2020-08-31 RX ADMIN — DEXMEDETOMIDINE HYDROCHLORIDE IN 0.9% SODIUM CHLORIDE 5.36 MICROGRAM(S)/KG/HR: 4 INJECTION INTRAVENOUS at 21:40

## 2020-08-31 RX ADMIN — MIDAZOLAM HYDROCHLORIDE 2 MILLIGRAM(S): 1 INJECTION, SOLUTION INTRAMUSCULAR; INTRAVENOUS at 01:33

## 2020-08-31 RX ADMIN — Medication 2 MILLIGRAM(S): at 05:50

## 2020-08-31 RX ADMIN — HALOPERIDOL DECANOATE 5 MILLIGRAM(S): 100 INJECTION INTRAMUSCULAR at 01:23

## 2020-08-31 RX ADMIN — FENTANYL CITRATE 50 MICROGRAM(S): 50 INJECTION INTRAVENOUS at 00:59

## 2020-08-31 RX ADMIN — Medication 100 MILLIGRAM(S): at 11:44

## 2020-08-31 RX ADMIN — Medication 2 MILLIGRAM(S): at 07:24

## 2020-08-31 RX ADMIN — DEXMEDETOMIDINE HYDROCHLORIDE IN 0.9% SODIUM CHLORIDE 5.36 MICROGRAM(S)/KG/HR: 4 INJECTION INTRAVENOUS at 08:00

## 2020-08-31 RX ADMIN — CHLORHEXIDINE GLUCONATE 15 MILLILITER(S): 213 SOLUTION TOPICAL at 18:34

## 2020-08-31 RX ADMIN — PANTOPRAZOLE SODIUM 40 MILLIGRAM(S): 20 TABLET, DELAYED RELEASE ORAL at 11:45

## 2020-08-31 RX ADMIN — MIDODRINE HYDROCHLORIDE 10 MILLIGRAM(S): 2.5 TABLET ORAL at 13:51

## 2020-08-31 RX ADMIN — ENOXAPARIN SODIUM 40 MILLIGRAM(S): 100 INJECTION SUBCUTANEOUS at 11:44

## 2020-08-31 RX ADMIN — MIDODRINE HYDROCHLORIDE 10 MILLIGRAM(S): 2.5 TABLET ORAL at 05:16

## 2020-08-31 RX ADMIN — KETAMINE HYDROCHLORIDE 1.94 MG/KG/HR: 100 INJECTION INTRAMUSCULAR; INTRAVENOUS at 11:46

## 2020-08-31 RX ADMIN — Medication 2 MILLIGRAM(S): at 01:08

## 2020-08-31 RX ADMIN — MEROPENEM 100 MILLIGRAM(S): 1 INJECTION INTRAVENOUS at 05:16

## 2020-08-31 RX ADMIN — CHLORHEXIDINE GLUCONATE 15 MILLILITER(S): 213 SOLUTION TOPICAL at 05:16

## 2020-08-31 RX ADMIN — CHLORHEXIDINE GLUCONATE 1 APPLICATION(S): 213 SOLUTION TOPICAL at 05:17

## 2020-08-31 RX ADMIN — KETAMINE HYDROCHLORIDE 1.94 MG/KG/HR: 100 INJECTION INTRAMUSCULAR; INTRAVENOUS at 06:34

## 2020-08-31 RX ADMIN — QUETIAPINE FUMARATE 25 MILLIGRAM(S): 200 TABLET, FILM COATED ORAL at 11:44

## 2020-08-31 RX ADMIN — LACTULOSE 10 GRAM(S): 10 SOLUTION ORAL at 05:16

## 2020-08-31 RX ADMIN — DEXMEDETOMIDINE HYDROCHLORIDE IN 0.9% SODIUM CHLORIDE 5.36 MICROGRAM(S)/KG/HR: 4 INJECTION INTRAVENOUS at 03:50

## 2020-08-31 RX ADMIN — FENTANYL CITRATE 50 MICROGRAM(S): 50 INJECTION INTRAVENOUS at 01:15

## 2020-08-31 RX ADMIN — Medication 1 MILLIGRAM(S): at 11:44

## 2020-08-31 RX ADMIN — FENTANYL CITRATE 3.88 MICROGRAM(S)/KG/HR: 50 INJECTION INTRAVENOUS at 08:00

## 2020-08-31 RX ADMIN — LACTULOSE 10 GRAM(S): 10 SOLUTION ORAL at 11:45

## 2020-08-31 RX ADMIN — DEXMEDETOMIDINE HYDROCHLORIDE IN 0.9% SODIUM CHLORIDE 5.36 MICROGRAM(S)/KG/HR: 4 INJECTION INTRAVENOUS at 23:27

## 2020-08-31 RX ADMIN — LACTULOSE 10 GRAM(S): 10 SOLUTION ORAL at 18:33

## 2020-08-31 RX ADMIN — MEROPENEM 100 MILLIGRAM(S): 1 INJECTION INTRAVENOUS at 13:51

## 2020-08-31 RX ADMIN — LACTULOSE 10 GRAM(S): 10 SOLUTION ORAL at 23:52

## 2020-08-31 RX ADMIN — MEROPENEM 100 MILLIGRAM(S): 1 INJECTION INTRAVENOUS at 21:57

## 2020-08-31 RX ADMIN — MIDODRINE HYDROCHLORIDE 10 MILLIGRAM(S): 2.5 TABLET ORAL at 20:58

## 2020-08-31 RX ADMIN — DEXMEDETOMIDINE HYDROCHLORIDE IN 0.9% SODIUM CHLORIDE 5.36 MICROGRAM(S)/KG/HR: 4 INJECTION INTRAVENOUS at 01:34

## 2020-08-31 RX ADMIN — DEXMEDETOMIDINE HYDROCHLORIDE IN 0.9% SODIUM CHLORIDE 5.36 MICROGRAM(S)/KG/HR: 4 INJECTION INTRAVENOUS at 06:35

## 2020-08-31 RX ADMIN — DEXMEDETOMIDINE HYDROCHLORIDE IN 0.9% SODIUM CHLORIDE 5.36 MICROGRAM(S)/KG/HR: 4 INJECTION INTRAVENOUS at 20:04

## 2020-08-31 RX ADMIN — SODIUM CHLORIDE 75 MILLILITER(S): 9 INJECTION, SOLUTION INTRAVENOUS at 08:00

## 2020-08-31 RX ADMIN — SENNA PLUS 2 TABLET(S): 8.6 TABLET ORAL at 21:57

## 2020-08-31 RX ADMIN — Medication 1 TABLET(S): at 11:44

## 2020-08-31 RX ADMIN — FENTANYL CITRATE 3.88 MICROGRAM(S)/KG/HR: 50 INJECTION INTRAVENOUS at 00:47

## 2020-08-31 RX ADMIN — Medication 50 GRAM(S): at 08:07

## 2020-08-31 RX ADMIN — OLANZAPINE 2.5 MILLIGRAM(S): 15 TABLET, FILM COATED ORAL at 22:38

## 2020-08-31 RX ADMIN — FENTANYL CITRATE 3.88 MICROGRAM(S)/KG/HR: 50 INJECTION INTRAVENOUS at 20:58

## 2020-08-31 NOTE — PROGRESS NOTE ADULT - SUBJECTIVE AND OBJECTIVE BOX
HPI:  Patient is a 44M with a PMH of EtOH abuse and withdrawal, alcohol pancreatitis who presents to the ED with severe abdominal pain.  Patient states that for the past three days he has been on a drinking binge, drinking 5 beers and 5 shots of liquor per day.  Last drink reportedly yesterday.  Patient reports that he woke up today and had severe generalized abdominal pain with multiple episodes of nonbloody, nonbilious vomiting.  Patient also complaining of nausea and generalized tremors.  Vitals stable, labs show leukocytosis, lactic acidosis, and severely elevated lipase levels.  CT shows pancreatitis with early necrosis in the pancreatic body.  Will admit to floor. (17 Aug 2020 23:25)      24 hr events:      ## Labs:  CBC:                        8.2    17.40 )-----------( 403      ( 31 Aug 2020 05:02 )             26.1     Chem:  08-31    137  |  108  |  9   ----------------------------<  95  3.9   |  24  |  0.44<L>    Ca    8.0<L>      31 Aug 2020 05:02  Phos  3.2     08-31  Mg     1.7     08-31    TPro  5.3<L>  /  Alb  1.1<L>  /  TBili  3.6<H>  /  DBili  x   /  AST  151<H>  /  ALT  102<H>  /  AlkPhos  387<H>  08-30    Coags:    culture blood:  --  08-30 @ 12:33            culture sputum:     No growth           culture urine:  -- 08-30 @ 12:33        ## Imaging:    ## Medications:  meropenem  IVPB 1000 milliGRAM(s) IV Intermittent every 8 hours    midodrine 10 milliGRAM(s) Oral every 8 hours        enoxaparin Injectable 40 milliGRAM(s) SubCutaneous daily    lactulose Syrup 10 Gram(s) Oral four times a day  pantoprazole  Injectable 40 milliGRAM(s) IV Push daily  senna 2 Tablet(s) Oral at bedtime    dexMEDEtomidine Infusion 0.3 MICROgram(s)/kG/Hr IV Continuous <Continuous>  fentaNYL   Infusion. 0.5 MICROgram(s)/kG/Hr IV Continuous <Continuous>  ketamine Infusion. 0.25 mG/kG/Hr IV Continuous <Continuous>  OLANZapine 2.5 milliGRAM(s) Oral at bedtime  QUEtiapine 25 milliGRAM(s) Oral daily      ## Vitals:  T(C): 37.2 (08-31-20 @ 07:15), Max: 37.7 (08-31-20 @ 06:30)  HR: 88 (08-31-20 @ 11:30) (85 - 142)  BP: 92/57 (08-31-20 @ 11:30) (75/48 - 121/67)  BP(mean): 65 (08-31-20 @ 11:30) (54 - 81)  RR: 20 (08-31-20 @ 11:30) (12 - 31)  SpO2: 94% (08-31-20 @ 11:30) (89% - 100%)  Wt(kg): --  Vent: Mode: AC/ CMV (Assist Control/ Continuous Mandatory Ventilation), RR (machine): 14, RR (patient): 13, TV (machine): 450, FiO2: 50, PEEP: 5, PIP: 35  ABG: ABG - ( 31 Aug 2020 10:58 )  pH, Arterial: x     pH, Blood: 7.40  /  pCO2: 42    /  pO2: 69    / HCO3: 26    / Base Excess: 1.2   /  SaO2: 94                    08-30 @ 07:01  -  08-31 @ 07:00  --------------------------------------------------------  IN: 3939.9 mL / OUT: 1120 mL / NET: 2819.9 mL    08-31 @ 07:01  -  08-31 @ 12:06  --------------------------------------------------------  IN: 655.7 mL / OUT: 100 mL / NET: 555.7 mL          ## P/E:  Gen: lying comfortably in bed in no apparent distress  Mouth:   Neck:  Lungs:   Heart:   Abd:  Ext:  Neuro:    CENTRAL LINE: [ ] YES [ ] NO  LOCATION:   DATE INSERTED:  REMOVE: [ ] YES [ ] NO      QUINTEROS: [ ] YES [ ] NO    DATE INSERTED:  REMOVE:  [ ] YES [ ] NO      A-LINE:  [ ] YES [ ] NO  LOCATION:   DATE INSERTED:  REMOVE:  [ ] YES [ ] NO  EXPLAIN:    GLOBAL ISSUE/BEST PRACTICE:  Analgesia:  Sedation:  HOB elevation: yes  Stress ulcer prophylaxis:  VTE prophylaxis:  Oral Care:  Glycemic control:  Nutrition:    CODE STATUS: [ ] full code  [ ] DNR  [ ] DNI  [ ] MOLST  Goals of care discussion: [ ] yes HPI:  Pt is a 45 yo M with h/o EtOH abuse/ withdrawal and pancreatitis. Pt presented to the ER 2 to abdominal pain, nausea and vomiting; labs and CT are consistent with pancreatitis and possible early necrosis of pancreas. Pt transferred to ICU om 8/18  for severe alcohol withdrawal. Pt intubated on 8/23 2 to hypoxic resp failure requiring intubation 2 to worsening PNA. CXR today reveals worsening PNA and pt hypoxic c/w ARDS    24 hr events: Pt agitated and hypotensive      ## Labs:  CBC:                        8.2    17.40 )-----------( 403      ( 31 Aug 2020 05:02 )             26.1     Chem:  08-31    137  |  108  |  9   ----------------------------<  95  3.9   |  24  |  0.44<L>    Ca    8.0<L>      31 Aug 2020 05:02  Phos  3.2     08-31  Mg     1.7     08-31    TPro  5.3<L>  /  Alb  1.1<L>  /  TBili  3.6<H>  /  DBili  x   /  AST  151<H>  /  ALT  102<H>  /  AlkPhos  387<H>  08-30    Coags:    culture blood:  --  08-30 @ 12:33            culture sputum:     No growth           culture urine:  -- 08-30 @ 12:33        ## Imaging:    ## Medications:  meropenem  IVPB 1000 milliGRAM(s) IV Intermittent every 8 hours    midodrine 10 milliGRAM(s) Oral every 8 hours        enoxaparin Injectable 40 milliGRAM(s) SubCutaneous daily    lactulose Syrup 10 Gram(s) Oral four times a day  pantoprazole  Injectable 40 milliGRAM(s) IV Push daily  senna 2 Tablet(s) Oral at bedtime    dexMEDEtomidine Infusion 0.3 MICROgram(s)/kG/Hr IV Continuous <Continuous>  fentaNYL   Infusion. 0.5 MICROgram(s)/kG/Hr IV Continuous <Continuous>  ketamine Infusion. 0.25 mG/kG/Hr IV Continuous <Continuous>  OLANZapine 2.5 milliGRAM(s) Oral at bedtime  QUEtiapine 25 milliGRAM(s) Oral daily      ## Vitals:  T(C): 37.2 (08-31-20 @ 07:15), Max: 37.7 (08-31-20 @ 06:30)  HR: 88 (08-31-20 @ 11:30) (85 - 142)  BP: 92/57 (08-31-20 @ 11:30) (75/48 - 121/67)  BP(mean): 65 (08-31-20 @ 11:30) (54 - 81)  RR: 20 (08-31-20 @ 11:30) (12 - 31)  SpO2: 94% (08-31-20 @ 11:30) (89% - 100%)  Wt(kg): --  Vent: Mode: AC/ CMV (Assist Control/ Continuous Mandatory Ventilation), RR (machine): 14, RR (patient): 13, TV (machine): 450, FiO2: 50, PEEP: 5, PIP: 35  ABG: ABG - ( 31 Aug 2020 10:58 )  pH, Arterial: x     pH, Blood: 7.40  /  pCO2: 42    /  pO2: 69    / HCO3: 26    / Base Excess: 1.2   /  SaO2: 94                    08-30 @ 07:01  -  08-31 @ 07:00  --------------------------------------------------------  IN: 3939.9 mL / OUT: 1120 mL / NET: 2819.9 mL    08-31 @ 07:01  -  08-31 @ 12:06  --------------------------------------------------------  IN: 655.7 mL / OUT: 100 mL / NET: 555.7 mL          ## P/E:  Gen: lying comfortably in bed in no apparent distress  Mouth: (+) ETT  Lungs: CTA  Heart: RRR  Abd: Distended but soft with (+) BS  Ext: Edema  Neuro: Calm/ Sedated    CENTRAL LINE: [ ] YES [ ] NO  LOCATION:   DATE INSERTED:  REMOVE: [ ] YES [ ] NO      QUINTEROS: [ ] YES [ ] NO    DATE INSERTED:  REMOVE:  [ ] YES [ ] NO      A-LINE:  [ ] YES [ ] NO  LOCATION:   DATE INSERTED:  REMOVE:  [ ] YES [ ] NO  EXPLAIN:    CODE STATUS: [x] full code  [ ] DNR  [ ] DNI  [ ] MOLST  Goals of care discussion: [ ] yes

## 2020-08-31 NOTE — PROGRESS NOTE ADULT - ASSESSMENT
Pt is a 45 yo M with h/o EtOH abuse/ withdrawal and pancreatitis. Pt presented 2 to abdominal pain, nausea and vomiting. Labs and CT are consistent w/ pancreatitis and possible early necrosis of pancreas. Pt transferred to ICU for severe alcohol withdrawal   44M with a PMH of EtOH abuse and withdrawal, alcohol pancreatitis p/w pancreatitis, PNA and respiratory failure requiring intubation.     A/P: 44M PMH ETOH abuse, pancreatitis, a/w acute necrotizing pancreatitis. Intubated 2/2 respiratory distress due to ETOH withdrawal and pneumonia.   Patient is a 44M with a PMH of EtOH abuse and withdrawal, alcohol pancreatitis who presents to the ED with severe abdominal pain.  Patient states that for the past three days he has been on a drinking binge, drinking 5 beers and 5 shots of liquor per day.  Last drink reportedly yesterday.  Patient reports that he woke up today and had severe generalized abdominal pain with multiple episodes of nonbloody, nonbilious vomiting.  Patient also complaining of nausea and generalized tremors.  Vitals stable, labs show leukocytosis, lactic acidosis, and severely elevated lipase levels.  CT shows pancreatitis with early necrosis in the pancreatic body.  Will admit to floor. Pt is a 43 yo M with h/o EtOH abuse/ withdrawal and pancreatitis. Pt presented to the ER 2 to abdominal pain, nausea and vomiting; labs and CT are consistent with pancreatitis and possible early necrosis of pancreas. Pt transferred to ICU om 8/18  for severe alcohol withdrawal. Pt intubated on 8/23 2 to hypoxic resp failure requiring intubation 2 to worsening PNA. CXR today reveals worsening PNA and pt hypoxic c/w ARDS    Resp: Decrease to TV to 450cc and increase PEEP to 8; repeat ABG  ID: Cont Meropenem/ No sign (+) Cx  CVS: Started on Midodrine for hypotension  FEN: May increase trickle feeds/ Cont daily Thiamine, MVI and Folate   GI: Surgical f/u prn  Neuro/Psych: Wean off Ketamine and increase Fentanyl prn/ Although pt has been agitated try to minimize sedation

## 2020-08-31 NOTE — CHART NOTE - NSCHARTNOTEFT_GEN_A_CORE
Pt w/ pmhx ETOH abuse, pancreatitis ; acute necrotizing pancreatitis. Intubated 2/2 respiratory distress due to ETOH withdrawal & pneumonia.     Factors impacting intake: [ ] none [ ] nausea  [ ] vomiting [ ] diarrhea [ ] constipation  [ ]chewing problems [ ] swallowing issues  [x ] other: NGT feeding. Pt is sedated /Intubated    8/31 - Diet Prescription: Diet, NPO with Tube Feed:   Tube Feeding Modality: Nasogastric  Vital AF 1.2  Total Volume for 24 Hours (mL): 720  Continuous  Starting Tube Feed Rate {mL per Hour}: 30  Until Goal Tube Feed Rate (mL per Hour): 30  Tube Feed Duration (in Hours): 24  Tube Feed Start Time: 11:00 (08-31-20 @ 09:44)    Intake: Vital AF 1.2 @ 30 ml/hr = 720 ml, 864 kcal, 54 g pro & 583 ml free water    Current Weight: Weight (kg): 8/30 - 192.6 (87.4 kg) Edema - 2+ generalized / 3+ (L & R) hand, foot, ankle, Dependent scrotum ; 8/23 - 171 (77.6 kg)   % Weight Change - 11.2 % / 9.8 kg wt gain -> likely fluid related.     Physical Appearance -  2+ Generalized / 3+ ( L & R) hand, foot, ankle, dependent scrotum  Visual findings: orbital - mild ; temples - moderate. Unable to assess other visual signs as noted above 2+/3+ edema      Pertinent Medications: MEDICATIONS  (STANDING):  chlorhexidine 0.12% Liquid 15 milliLiter(s) Oral Mucosa every 12 hours  chlorhexidine 4% Liquid 1 Application(s) Topical <User Schedule>  dexMEDEtomidine Infusion 0.3 MICROgram(s)/kG/Hr (5.36 mL/Hr) IV Continuous <Continuous>  dextrose 5% + lactated ringers. 1000 milliLiter(s) (75 mL/Hr) IV Continuous <Continuous>  enoxaparin Injectable 40 milliGRAM(s) SubCutaneous daily  fentaNYL   Infusion. 0.5 MICROgram(s)/kG/Hr (3.88 mL/Hr) IV Continuous <Continuous>  folic acid 1 milliGRAM(s) Oral daily  ketamine Infusion. 0.25 mG/kG/Hr (1.94 mL/Hr) IV Continuous <Continuous>  lactulose Syrup 10 Gram(s) Oral four times a day  meropenem  IVPB 1000 milliGRAM(s) IV Intermittent every 8 hours  midodrine 10 milliGRAM(s) Oral every 8 hours  multivitamin 1 Tablet(s) Oral daily  OLANZapine 2.5 milliGRAM(s) Oral at bedtime  pantoprazole  Injectable 40 milliGRAM(s) IV Push daily  QUEtiapine 25 milliGRAM(s) Oral daily  senna 2 Tablet(s) Oral at bedtime  thiamine 100 milliGRAM(s) Oral daily    MEDICATIONS  (PRN):    Pertinent Labs: 08-31 Na137 mmol/L Glu 95 mg/dL K+ 3.9 mmol/L Cr  0.44 mg/dL<L> BUN 9 mg/dL 08-31 Phos 3.2 mg/dL 08-30 Alb 1.1 g/dL<L> 08-31 Chol --    LDL --    HDL --    Trig 198 mg/dL<H>08-30  U/L<H>  U/L<H> Alkaline Phosphatase 387 U/L<H>     CAPILLARY BLOOD GLUCOSE        Skin: WDL    Estimated Needs:   [x ] no change since previous assessment (8/18/20 )  [ ] recalculated:     Previous Nutrition Diagnosis:   Nutrition Diagnostic Terminology #1 Inadequate Energy Intake.     Etiology Decreased ability to consume sufficient energy.     Signs/Symptoms Pt has not reached goal rate of TF    Goal/Expected Outcome Pt will consume >75% nutrition needs once goal rate has been reached -> Not met      Nutrition Diagnosis is [x ] ongoing  [ ] resolved [ ] not applicable     New Nutrition Diagnosis: [ x] not applicable       Interventions:   Recommend  [ ] Change Diet To:  [ ] Nutrition Supplement  [x ] Nutrition Support- Vital AF 1.2 @ 30 ml/hr and advance as tolerated to 60 ml/hj=0355 calories, 108 grams protein, 1167 ml free water, 121% RDIs   [ ] Other:     Monitoring and Evaluation:   [ ] PO intake [ x ] Tolerance to diet prescription [ x ] weights [ x ] labs[ x ] follow up per protocol  [ ] other:

## 2020-09-01 LAB
ALBUMIN SERPL ELPH-MCNC: 1.2 G/DL — LOW (ref 3.3–5)
ALP SERPL-CCNC: 401 U/L — HIGH (ref 40–120)
ALT FLD-CCNC: 85 U/L — HIGH (ref 12–78)
ANION GAP SERPL CALC-SCNC: 5 MMOL/L — SIGNIFICANT CHANGE UP (ref 5–17)
AST SERPL-CCNC: 117 U/L — HIGH (ref 15–37)
BASOPHILS # BLD AUTO: 0.09 K/UL — SIGNIFICANT CHANGE UP (ref 0–0.2)
BASOPHILS NFR BLD AUTO: 0.5 % — SIGNIFICANT CHANGE UP (ref 0–2)
BILIRUB SERPL-MCNC: 3.2 MG/DL — HIGH (ref 0.2–1.2)
BUN SERPL-MCNC: 9 MG/DL — SIGNIFICANT CHANGE UP (ref 7–23)
CALCIUM SERPL-MCNC: 8.3 MG/DL — LOW (ref 8.5–10.1)
CHLORIDE SERPL-SCNC: 108 MMOL/L — SIGNIFICANT CHANGE UP (ref 96–108)
CO2 SERPL-SCNC: 27 MMOL/L — SIGNIFICANT CHANGE UP (ref 22–31)
CREAT SERPL-MCNC: 0.39 MG/DL — LOW (ref 0.5–1.3)
CULTURE RESULTS: SIGNIFICANT CHANGE UP
EOSINOPHIL # BLD AUTO: 0.38 K/UL — SIGNIFICANT CHANGE UP (ref 0–0.5)
EOSINOPHIL NFR BLD AUTO: 2.2 % — SIGNIFICANT CHANGE UP (ref 0–6)
GLUCOSE SERPL-MCNC: 128 MG/DL — HIGH (ref 70–99)
GRAM STN FLD: SIGNIFICANT CHANGE UP
HCT VFR BLD CALC: 26.3 % — LOW (ref 39–50)
HGB BLD-MCNC: 8.5 G/DL — LOW (ref 13–17)
IMM GRANULOCYTES NFR BLD AUTO: 1 % — SIGNIFICANT CHANGE UP (ref 0–1.5)
LYMPHOCYTES # BLD AUTO: 1.16 K/UL — SIGNIFICANT CHANGE UP (ref 1–3.3)
LYMPHOCYTES # BLD AUTO: 6.6 % — LOW (ref 13–44)
MAGNESIUM SERPL-MCNC: 1.6 MG/DL — SIGNIFICANT CHANGE UP (ref 1.6–2.6)
MCHC RBC-ENTMCNC: 32.3 GM/DL — SIGNIFICANT CHANGE UP (ref 32–36)
MCHC RBC-ENTMCNC: 32.6 PG — SIGNIFICANT CHANGE UP (ref 27–34)
MCV RBC AUTO: 100.8 FL — HIGH (ref 80–100)
MONOCYTES # BLD AUTO: 1.62 K/UL — HIGH (ref 0–0.9)
MONOCYTES NFR BLD AUTO: 9.3 % — SIGNIFICANT CHANGE UP (ref 2–14)
NEUTROPHILS # BLD AUTO: 14.07 K/UL — HIGH (ref 1.8–7.4)
NEUTROPHILS NFR BLD AUTO: 80.4 % — HIGH (ref 43–77)
NRBC # BLD: 0 /100 WBCS — SIGNIFICANT CHANGE UP (ref 0–0)
PHOSPHATE SERPL-MCNC: 3.1 MG/DL — SIGNIFICANT CHANGE UP (ref 2.5–4.5)
PLATELET # BLD AUTO: 461 K/UL — HIGH (ref 150–400)
POTASSIUM SERPL-MCNC: 3.5 MMOL/L — SIGNIFICANT CHANGE UP (ref 3.5–5.3)
POTASSIUM SERPL-SCNC: 3.5 MMOL/L — SIGNIFICANT CHANGE UP (ref 3.5–5.3)
PROCALCITONIN SERPL-MCNC: 0.43 NG/ML — HIGH (ref 0.02–0.1)
PROT SERPL-MCNC: 5.3 GM/DL — LOW (ref 6–8.3)
RBC # BLD: 2.61 M/UL — LOW (ref 4.2–5.8)
RBC # FLD: 14.4 % — SIGNIFICANT CHANGE UP (ref 10.3–14.5)
SODIUM SERPL-SCNC: 140 MMOL/L — SIGNIFICANT CHANGE UP (ref 135–145)
SPECIMEN SOURCE: SIGNIFICANT CHANGE UP
TRIGL SERPL-MCNC: 185 MG/DL — HIGH (ref 10–149)
WBC # BLD: 17.49 K/UL — HIGH (ref 3.8–10.5)
WBC # FLD AUTO: 17.49 K/UL — HIGH (ref 3.8–10.5)

## 2020-09-01 PROCEDURE — 71045 X-RAY EXAM CHEST 1 VIEW: CPT | Mod: 26

## 2020-09-01 PROCEDURE — 99291 CRITICAL CARE FIRST HOUR: CPT

## 2020-09-01 RX ORDER — QUETIAPINE FUMARATE 200 MG/1
50 TABLET, FILM COATED ORAL
Refills: 0 | Status: DISCONTINUED | OUTPATIENT
Start: 2020-09-01 | End: 2020-09-02

## 2020-09-01 RX ORDER — FENTANYL CITRATE 50 UG/ML
50 INJECTION INTRAVENOUS ONCE
Refills: 0 | Status: DISCONTINUED | OUTPATIENT
Start: 2020-09-01 | End: 2020-09-01

## 2020-09-01 RX ORDER — MAGNESIUM SULFATE 500 MG/ML
2 VIAL (ML) INJECTION ONCE
Refills: 0 | Status: COMPLETED | OUTPATIENT
Start: 2020-09-01 | End: 2020-09-01

## 2020-09-01 RX ORDER — HALOPERIDOL DECANOATE 100 MG/ML
5 INJECTION INTRAMUSCULAR ONCE
Refills: 0 | Status: COMPLETED | OUTPATIENT
Start: 2020-09-01 | End: 2020-09-01

## 2020-09-01 RX ORDER — QUETIAPINE FUMARATE 200 MG/1
25 TABLET, FILM COATED ORAL ONCE
Refills: 0 | Status: COMPLETED | OUTPATIENT
Start: 2020-09-01 | End: 2020-09-01

## 2020-09-01 RX ORDER — POTASSIUM CHLORIDE 20 MEQ
10 PACKET (EA) ORAL
Refills: 0 | Status: COMPLETED | OUTPATIENT
Start: 2020-09-01 | End: 2020-09-01

## 2020-09-01 RX ORDER — PROPOFOL 10 MG/ML
10 INJECTION, EMULSION INTRAVENOUS
Qty: 1000 | Refills: 0 | Status: DISCONTINUED | OUTPATIENT
Start: 2020-09-01 | End: 2020-09-02

## 2020-09-01 RX ORDER — ACETAMINOPHEN 500 MG
650 TABLET ORAL EVERY 6 HOURS
Refills: 0 | Status: DISCONTINUED | OUTPATIENT
Start: 2020-09-01 | End: 2020-09-10

## 2020-09-01 RX ADMIN — DEXMEDETOMIDINE HYDROCHLORIDE IN 0.9% SODIUM CHLORIDE 5.36 MICROGRAM(S)/KG/HR: 4 INJECTION INTRAVENOUS at 08:07

## 2020-09-01 RX ADMIN — FENTANYL CITRATE 50 MICROGRAM(S): 50 INJECTION INTRAVENOUS at 20:00

## 2020-09-01 RX ADMIN — DEXMEDETOMIDINE HYDROCHLORIDE IN 0.9% SODIUM CHLORIDE 5.36 MICROGRAM(S)/KG/HR: 4 INJECTION INTRAVENOUS at 01:11

## 2020-09-01 RX ADMIN — DEXMEDETOMIDINE HYDROCHLORIDE IN 0.9% SODIUM CHLORIDE 5.36 MICROGRAM(S)/KG/HR: 4 INJECTION INTRAVENOUS at 05:07

## 2020-09-01 RX ADMIN — CHLORHEXIDINE GLUCONATE 15 MILLILITER(S): 213 SOLUTION TOPICAL at 05:08

## 2020-09-01 RX ADMIN — FENTANYL CITRATE 3.88 MICROGRAM(S)/KG/HR: 50 INJECTION INTRAVENOUS at 07:01

## 2020-09-01 RX ADMIN — Medication 1 TABLET(S): at 12:07

## 2020-09-01 RX ADMIN — CHLORHEXIDINE GLUCONATE 15 MILLILITER(S): 213 SOLUTION TOPICAL at 18:00

## 2020-09-01 RX ADMIN — MEROPENEM 100 MILLIGRAM(S): 1 INJECTION INTRAVENOUS at 21:24

## 2020-09-01 RX ADMIN — Medication 50 GRAM(S): at 05:07

## 2020-09-01 RX ADMIN — HALOPERIDOL DECANOATE 5 MILLIGRAM(S): 100 INJECTION INTRAMUSCULAR at 21:33

## 2020-09-01 RX ADMIN — MIDODRINE HYDROCHLORIDE 10 MILLIGRAM(S): 2.5 TABLET ORAL at 05:07

## 2020-09-01 RX ADMIN — LACTULOSE 10 GRAM(S): 10 SOLUTION ORAL at 12:06

## 2020-09-01 RX ADMIN — Medication 1 MILLIGRAM(S): at 12:07

## 2020-09-01 RX ADMIN — DEXMEDETOMIDINE HYDROCHLORIDE IN 0.9% SODIUM CHLORIDE 5.36 MICROGRAM(S)/KG/HR: 4 INJECTION INTRAVENOUS at 19:14

## 2020-09-01 RX ADMIN — DEXMEDETOMIDINE HYDROCHLORIDE IN 0.9% SODIUM CHLORIDE 5.36 MICROGRAM(S)/KG/HR: 4 INJECTION INTRAVENOUS at 23:34

## 2020-09-01 RX ADMIN — FENTANYL CITRATE 50 MICROGRAM(S): 50 INJECTION INTRAVENOUS at 09:12

## 2020-09-01 RX ADMIN — QUETIAPINE FUMARATE 25 MILLIGRAM(S): 200 TABLET, FILM COATED ORAL at 12:07

## 2020-09-01 RX ADMIN — DEXMEDETOMIDINE HYDROCHLORIDE IN 0.9% SODIUM CHLORIDE 5.36 MICROGRAM(S)/KG/HR: 4 INJECTION INTRAVENOUS at 11:40

## 2020-09-01 RX ADMIN — DEXMEDETOMIDINE HYDROCHLORIDE IN 0.9% SODIUM CHLORIDE 5.36 MICROGRAM(S)/KG/HR: 4 INJECTION INTRAVENOUS at 03:20

## 2020-09-01 RX ADMIN — MIDODRINE HYDROCHLORIDE 10 MILLIGRAM(S): 2.5 TABLET ORAL at 21:24

## 2020-09-01 RX ADMIN — QUETIAPINE FUMARATE 50 MILLIGRAM(S): 200 TABLET, FILM COATED ORAL at 20:45

## 2020-09-01 RX ADMIN — FENTANYL CITRATE 3.88 MICROGRAM(S)/KG/HR: 50 INJECTION INTRAVENOUS at 16:44

## 2020-09-01 RX ADMIN — DEXMEDETOMIDINE HYDROCHLORIDE IN 0.9% SODIUM CHLORIDE 5.36 MICROGRAM(S)/KG/HR: 4 INJECTION INTRAVENOUS at 09:30

## 2020-09-01 RX ADMIN — LACTULOSE 10 GRAM(S): 10 SOLUTION ORAL at 05:08

## 2020-09-01 RX ADMIN — FENTANYL CITRATE 50 MICROGRAM(S): 50 INJECTION INTRAVENOUS at 09:27

## 2020-09-01 RX ADMIN — DEXMEDETOMIDINE HYDROCHLORIDE IN 0.9% SODIUM CHLORIDE 5.36 MICROGRAM(S)/KG/HR: 4 INJECTION INTRAVENOUS at 06:33

## 2020-09-01 RX ADMIN — DEXMEDETOMIDINE HYDROCHLORIDE IN 0.9% SODIUM CHLORIDE 5.36 MICROGRAM(S)/KG/HR: 4 INJECTION INTRAVENOUS at 21:57

## 2020-09-01 RX ADMIN — DEXMEDETOMIDINE HYDROCHLORIDE IN 0.9% SODIUM CHLORIDE 5.36 MICROGRAM(S)/KG/HR: 4 INJECTION INTRAVENOUS at 13:29

## 2020-09-01 RX ADMIN — Medication 650 MILLIGRAM(S): at 13:35

## 2020-09-01 RX ADMIN — MEROPENEM 100 MILLIGRAM(S): 1 INJECTION INTRAVENOUS at 05:07

## 2020-09-01 RX ADMIN — DEXMEDETOMIDINE HYDROCHLORIDE IN 0.9% SODIUM CHLORIDE 5.36 MICROGRAM(S)/KG/HR: 4 INJECTION INTRAVENOUS at 16:45

## 2020-09-01 RX ADMIN — LACTULOSE 10 GRAM(S): 10 SOLUTION ORAL at 23:34

## 2020-09-01 RX ADMIN — Medication 100 MILLIEQUIVALENT(S): at 07:45

## 2020-09-01 RX ADMIN — DEXMEDETOMIDINE HYDROCHLORIDE IN 0.9% SODIUM CHLORIDE 5.36 MICROGRAM(S)/KG/HR: 4 INJECTION INTRAVENOUS at 20:16

## 2020-09-01 RX ADMIN — LACTULOSE 10 GRAM(S): 10 SOLUTION ORAL at 17:59

## 2020-09-01 RX ADMIN — MEROPENEM 100 MILLIGRAM(S): 1 INJECTION INTRAVENOUS at 13:30

## 2020-09-01 RX ADMIN — Medication 100 MILLIEQUIVALENT(S): at 06:09

## 2020-09-01 RX ADMIN — SENNA PLUS 2 TABLET(S): 8.6 TABLET ORAL at 21:24

## 2020-09-01 RX ADMIN — Medication 100 MILLIEQUIVALENT(S): at 05:45

## 2020-09-01 RX ADMIN — FENTANYL CITRATE 50 MICROGRAM(S): 50 INJECTION INTRAVENOUS at 19:23

## 2020-09-01 RX ADMIN — PROPOFOL 4.66 MICROGRAM(S)/KG/MIN: 10 INJECTION, EMULSION INTRAVENOUS at 21:45

## 2020-09-01 RX ADMIN — PANTOPRAZOLE SODIUM 40 MILLIGRAM(S): 20 TABLET, DELAYED RELEASE ORAL at 12:08

## 2020-09-01 RX ADMIN — MIDODRINE HYDROCHLORIDE 10 MILLIGRAM(S): 2.5 TABLET ORAL at 12:07

## 2020-09-01 RX ADMIN — CHLORHEXIDINE GLUCONATE 1 APPLICATION(S): 213 SOLUTION TOPICAL at 05:08

## 2020-09-01 RX ADMIN — ENOXAPARIN SODIUM 40 MILLIGRAM(S): 100 INJECTION SUBCUTANEOUS at 12:08

## 2020-09-01 RX ADMIN — Medication 100 MILLIGRAM(S): at 12:14

## 2020-09-01 RX ADMIN — Medication 650 MILLIGRAM(S): at 13:05

## 2020-09-01 NOTE — PROGRESS NOTE ADULT - SUBJECTIVE AND OBJECTIVE BOX
HPI:  Pt is a 43 yo M with h/o EtOH abuse/ withdrawal and pancreatitis. His history, physical exam, laboratory findings, and imaging are consistent with pancreatitis and possibly necrotic pancreatitis. Pt transferred to ICU om 8/18 for severe alcohol withdrawal. Pt intubated on 8/23 2 to hypoxic resp failure requiring intubation 2 to worsening PNA. CXR today reveals worsening PNA and pt hypoxic c/w ARDS    24 hr events: No event occurred.      ## Labs:  CBC:                        8.5    17.49 )-----------( 461      ( 01 Sep 2020 04:12 )             26.3                         8.2    17.40 )-----------( 403      ( 31 Aug 2020 05:02 )             26.1     Chem:  09-01    140  |  108  |  9   ----------------------------<  128<H>  3.5   |  27  |  0.39<L>    Ca    8.3<L>      01 Sep 2020 04:12  Phos  3.1     09-01  Mg     1.6     09-01    TPro  5.3<L>  /  Alb  1.2<L>  /  TBili  3.2<H>  /  DBili  x   /  AST  117<H>  /  ALT  85<H>  /  AlkPhos  401<H>  09-01 08-31    137  |  108  |  9   ----------------------------<  95  3.9   |  24  |  0.44<L>    Ca    8.0<L>      31 Aug 2020 05:02  Phos  3.2     08-31  Mg     1.7     08-31    TPro  5.3<L>  /  Alb  1.1<L>  /  TBili  3.6<H>  /  DBili  x   /  AST  151<H>  /  ALT  102<H>  /  AlkPhos  387<H>  08-30    Coags:    culture blood:  --  08-30 @ 12:33            culture sputum:     No growth           culture urine:  -- 08-30 @ 12:33        ## Imaging:  < from: Xray Chest 1 View- PORTABLE-Urgent (08.31.20 @ 09:43) >  IMPRESSION:  Worsening extensive bilateral airspace disease both lungs. Support devices in situ.      ## Medications:  MEDICATIONS  (STANDING):  chlorhexidine 0.12% Liquid 15 milliLiter(s) Oral Mucosa every 12 hours  chlorhexidine 4% Liquid 1 Application(s) Topical <User Schedule>  dexMEDEtomidine Infusion 0.3 MICROgram(s)/kG/Hr (5.36 mL/Hr) IV Continuous <Continuous>  dextrose 5% + lactated ringers. 1000 milliLiter(s) (75 mL/Hr) IV Continuous <Continuous>  enoxaparin Injectable 40 milliGRAM(s) SubCutaneous daily  fentaNYL   Infusion. 0.5 MICROgram(s)/kG/Hr (3.88 mL/Hr) IV Continuous <Continuous>  folic acid 1 milliGRAM(s) Oral daily  lactulose Syrup 10 Gram(s) Oral four times a day  meropenem  IVPB 1000 milliGRAM(s) IV Intermittent every 8 hours  midodrine 10 milliGRAM(s) Oral every 8 hours  multivitamin 1 Tablet(s) Oral daily  OLANZapine 2.5 milliGRAM(s) Oral at bedtime  pantoprazole  Injectable 40 milliGRAM(s) IV Push daily  QUEtiapine 25 milliGRAM(s) Oral daily  senna 2 Tablet(s) Oral at bedtime  thiamine 100 milliGRAM(s) Oral daily    MEDICATIONS  (PRN):  albuterol/ipratropium for Nebulization 3 milliLiter(s) Nebulizer every 6 hours PRN Shortness of Breath and/or Wheezing      ## Vitals:  ICU Vital Signs Last 24 Hrs  T(C): 38 (01 Sep 2020 07:55), Max: 38.4 (31 Aug 2020 18:30)  T(F): 100.4 (01 Sep 2020 07:55), Max: 101.2 (31 Aug 2020 18:30)  HR: 103 (01 Sep 2020 07:30) (88 - 125)  BP: 103/58 (01 Sep 2020 07:30) (90/57 - 124/74)  BP(mean): 69 (01 Sep 2020 07:30) (62 - 88)  ABP: --  ABP(mean): --  RR: 18 (01 Sep 2020 07:30) (17 - 39)  SpO2: 96% (01 Sep 2020 07:30) (90% - 97%)    Vent: Mode: AC/ CMV (Assist Control/ Continuous Mandatory Ventilation), RR (machine): 14, RR (patient): 13, TV (machine): 450, FiO2: 50, PEEP: 5, PIP: 35  ABG: ABG - ( 31 Aug 2020 10:58 )  pH, Arterial: x     pH, Blood: 7.40  /  pCO2: 42    /  pO2: 69    / HCO3: 26    / Base Excess: 1.2   /  SaO2: 94        I&O's Summary    31 Aug 2020 07:01  -  01 Sep 2020 07:00  --------------------------------------------------------  IN: 3910.4 mL / OUT: 1150 mL / NET: 2760.4 mL    01 Sep 2020 07:01  -  01 Sep 2020 08:20  --------------------------------------------------------  IN: 161.3 mL / OUT: 50 mL / NET: 111.3 mL        ## P/E:  Gen: lying comfortably in bed in no apparent distress  Mouth: (+) ETT  Lungs: CTA b/l  Heart: RRR, S1S2 present, no murmur appreciated  Abd: Distended and firm with (+) BS  Ext: Edema on upper and lower extremities b/l  Neuro: Calm/ Sedated    CENTRAL LINE: [ ] YES [x] NO  LOCATION:   DATE INSERTED:  REMOVE: [ ] YES [ ] NO      QUINTEROS: [x] YES [ ] NO    DATE INSERTED:  REMOVE:  [ ] YES [ ] NO      A-LINE:  [ ] YES [x] NO  LOCATION:   DATE INSERTED:  REMOVE:  [ ] YES [ ] NO  EXPLAIN:    CODE STATUS: [x] full code  [ ] DNR  [ ] DNI  [ ] [facility]  Goals of care discussion: [ ] yes HPI:  Pt is a 43 yo M with h/o EtOH abuse/ withdrawal and pancreatitis. His history, physical exam, laboratory findings, and imaging are consistent with pancreatitis and possibly necrotic pancreatitis. Pt transferred to ICU om 8/18 for severe alcohol withdrawal. Pt intubated on 8/23 2 to hypoxic resp failure requiring intubation 2 to worsening PNA. CXR today reveals worsening PNA and pt hypoxic c/w ARDS    24 hr events: No event occurred.      ## Vitals:  ICU Vital Signs Last 24 Hrs  T(C): 38 (01 Sep 2020 07:55), Max: 38.4 (31 Aug 2020 18:30)  T(F): 100.4 (01 Sep 2020 07:55), Max: 101.2 (31 Aug 2020 18:30)  HR: 103 (01 Sep 2020 07:30) (88 - 125)  BP: 103/58 (01 Sep 2020 07:30) (90/57 - 124/74)  BP(mean): 69 (01 Sep 2020 07:30) (62 - 88)  RR: 18 (01 Sep 2020 07:30) (17 - 39)  SpO2: 96% (01 Sep 2020 07:30) (90% - 97%)    Vent: Mode: AC/ CMV (Assist Control/ Continuous Mandatory Ventilation), RR (machine): 14, RR (patient): 13, TV (machine): 450, FiO2: 50, PEEP: 5, PIP: 35  ABG: ABG - ( 31 Aug 2020 10:58 )  pH, Arterial: x     pH, Blood: 7.40  /  pCO2: 42    /  pO2: 69    / HCO3: 26    / Base Excess: 1.2   /  SaO2: 94        I&O's Summary    31 Aug 2020 07:01  -  01 Sep 2020 07:00  --------------------------------------------------------  IN: 3910.4 mL / OUT: 1150 mL / NET: 2760.4 mL    01 Sep 2020 07:01  -  01 Sep 2020 08:20  --------------------------------------------------------  IN: 161.3 mL / OUT: 50 mL / NET: 111.3 mL        ## P/E:  Gen: lying comfortably in bed in no apparent distress  Mouth: (+) ETT  Lungs: CTA b/l  Heart: RRR, S1S2 present, no murmur appreciated  Abd: Distended and firm with (+) BS  Ext: Edema on upper and lower extremities b/l  Neuro: Calm/ Sedated      ## Labs:  CBC:                        8.5    17.49 )-----------( 461      ( 01 Sep 2020 04:12 )             26.3                         8.2    17.40 )-----------( 403      ( 31 Aug 2020 05:02 )             26.1     Chem:  09-01    140  |  108  |  9   ----------------------------<  128<H>  3.5   |  27  |  0.39<L>    Ca    8.3<L>      01 Sep 2020 04:12  Phos  3.1     09-01  Mg     1.6     09-01    TPro  5.3<L>  /  Alb  1.2<L>  /  TBili  3.2<H>  /  DBili  x   /  AST  117<H>  /  ALT  85<H>  /  AlkPhos  401<H>  09-01 08-31    137  |  108  |  9   ----------------------------<  95  3.9   |  24  |  0.44<L>    Ca    8.0<L>      31 Aug 2020 05:02  Phos  3.2     08-31  Mg     1.7     08-31    TPro  5.3<L>  /  Alb  1.1<L>  /  TBili  3.6<H>  /  DBili  x   /  AST  151<H>  /  ALT  102<H>  /  AlkPhos  387<H>  08-30    Coags:    culture blood:  --  08-30 @ 12:33            culture sputum:     No growth           culture urine:  -- 08-30 @ 12:33        ## Imaging:  < from: Xray Chest 1 View- PORTABLE-Urgent (08.31.20 @ 09:43) >  IMPRESSION:  Worsening extensive bilateral airspace disease both lungs. Support devices in situ.      ## Medications:  MEDICATIONS  (STANDING):  chlorhexidine 0.12% Liquid 15 milliLiter(s) Oral Mucosa every 12 hours  chlorhexidine 4% Liquid 1 Application(s) Topical <User Schedule>  dexMEDEtomidine Infusion 0.3 MICROgram(s)/kG/Hr (5.36 mL/Hr) IV Continuous <Continuous>  dextrose 5% + lactated ringers. 1000 milliLiter(s) (75 mL/Hr) IV Continuous <Continuous>  enoxaparin Injectable 40 milliGRAM(s) SubCutaneous daily  fentaNYL   Infusion. 0.5 MICROgram(s)/kG/Hr (3.88 mL/Hr) IV Continuous <Continuous>  folic acid 1 milliGRAM(s) Oral daily  lactulose Syrup 10 Gram(s) Oral four times a day  meropenem  IVPB 1000 milliGRAM(s) IV Intermittent every 8 hours  midodrine 10 milliGRAM(s) Oral every 8 hours  multivitamin 1 Tablet(s) Oral daily  OLANZapine 2.5 milliGRAM(s) Oral at bedtime  pantoprazole  Injectable 40 milliGRAM(s) IV Push daily  QUEtiapine 25 milliGRAM(s) Oral daily  senna 2 Tablet(s) Oral at bedtime  thiamine 100 milliGRAM(s) Oral daily    MEDICATIONS  (PRN):  albuterol/ipratropium for Nebulization 3 milliLiter(s) Nebulizer every 6 hours PRN Shortness of Breath and/or Wheezing        CENTRAL LINE: [ ] YES [x] NO  LOCATION:   DATE INSERTED:  REMOVE: [ ] YES [ ] NO      QUINTEROS: [x] YES [ ] NO    DATE INSERTED:  REMOVE:  [ ] YES [ ] NO      A-LINE:  [ ] YES [x] NO  LOCATION:   DATE INSERTED:  REMOVE:  [ ] YES [ ] NO  EXPLAIN:    CODE STATUS: [x] full code  [ ] DNR  [ ] DNI  [ ] [facility]  Goals of care discussion: [ ] yes HPI:  Pt is a 43 yo M with h/o EtOH abuse/ withdrawal and pancreatitis. His history, physical exam, laboratory findings, and imaging are consistent with pancreatitis and possibly necrotic pancreatitis. Pt transferred to ICU om 8/18 for severe alcohol withdrawal. Pt intubated on 8/23 2 to hypoxic resp failure requiring intubation 2 to worsening PNA. CXR today reveals worsening PNA and pt hypoxic c/w ARDS    24 hr events: No event occurred.      ## Vitals:  ICU Vital Signs Last 24 Hrs  T(C): 38 (01 Sep 2020 07:55), Max: 38.4 (31 Aug 2020 18:30)  T(F): 100.4 (01 Sep 2020 07:55), Max: 101.2 (31 Aug 2020 18:30)  HR: 103 (01 Sep 2020 07:30) (88 - 125)  BP: 103/58 (01 Sep 2020 07:30) (90/57 - 124/74)  BP(mean): 69 (01 Sep 2020 07:30) (62 - 88)  RR: 18 (01 Sep 2020 07:30) (17 - 39)  SpO2: 96% (01 Sep 2020 07:30) (90% - 97%)    Mode: AC/ CMV (Assist Control/ Continuous Mandatory Ventilation)  RR (machine): 14  TV (machine): 450  FiO2: 50  PEEP: 10  ITime: 1  MAP: 12  PIP: 23    ABG: ABG - ( 31 Aug 2020 10:58 )  pH, Arterial: x     pH, Blood: 7.40  /  pCO2: 42    /  pO2: 69    / HCO3: 26    / Base Excess: 1.2   /  SaO2: 94        I&O's Summary    31 Aug 2020 07:01  -  01 Sep 2020 07:00  --------------------------------------------------------  IN: 3910.4 mL / OUT: 1150 mL / NET: 2760.4 mL    01 Sep 2020 07:01  -  01 Sep 2020 08:20  --------------------------------------------------------  IN: 161.3 mL / OUT: 50 mL / NET: 111.3 mL        ## P/E:  Gen: lying comfortably in bed in no apparent distress  Mouth: (+) ETT  Lungs: CTA b/l  Heart: RRR, S1S2 present, no murmur appreciated  Abd: Distended and firm with (+) BS  Ext: Edema on upper and lower extremities b/l  Neuro: Calm/ Sedated      ## Labs:  CBC:                        8.5    17.49 )-----------( 461      ( 01 Sep 2020 04:12 )             26.3                         8.2    17.40 )-----------( 403      ( 31 Aug 2020 05:02 )             26.1     Chem:  09-01    140  |  108  |  9   ----------------------------<  128<H>  3.5   |  27  |  0.39<L>    Ca    8.3<L>      01 Sep 2020 04:12  Phos  3.1     09-01  Mg     1.6     09-01    TPro  5.3<L>  /  Alb  1.2<L>  /  TBili  3.2<H>  /  DBili  x   /  AST  117<H>  /  ALT  85<H>  /  AlkPhos  401<H>  09-01 08-31    137  |  108  |  9   ----------------------------<  95  3.9   |  24  |  0.44<L>    Ca    8.0<L>      31 Aug 2020 05:02  Phos  3.2     08-31  Mg     1.7     08-31    TPro  5.3<L>  /  Alb  1.1<L>  /  TBili  3.6<H>  /  DBili  x   /  AST  151<H>  /  ALT  102<H>  /  AlkPhos  387<H>  08-30    Coags:    culture blood:  --  08-30 @ 12:33            culture sputum:     No growth           culture urine:  -- 08-30 @ 12:33        ## Imaging:  < from: Xray Chest 1 View- PORTABLE-Urgent (08.31.20 @ 09:43) >  IMPRESSION:  Worsening extensive bilateral airspace disease both lungs. Support devices in situ.      ## Medications:  MEDICATIONS  (STANDING):  chlorhexidine 0.12% Liquid 15 milliLiter(s) Oral Mucosa every 12 hours  chlorhexidine 4% Liquid 1 Application(s) Topical <User Schedule>  dexMEDEtomidine Infusion 0.3 MICROgram(s)/kG/Hr (5.36 mL/Hr) IV Continuous <Continuous>  dextrose 5% + lactated ringers. 1000 milliLiter(s) (75 mL/Hr) IV Continuous <Continuous>  enoxaparin Injectable 40 milliGRAM(s) SubCutaneous daily  fentaNYL   Infusion. 0.5 MICROgram(s)/kG/Hr (3.88 mL/Hr) IV Continuous <Continuous>  folic acid 1 milliGRAM(s) Oral daily  lactulose Syrup 10 Gram(s) Oral four times a day  meropenem  IVPB 1000 milliGRAM(s) IV Intermittent every 8 hours  midodrine 10 milliGRAM(s) Oral every 8 hours  multivitamin 1 Tablet(s) Oral daily  OLANZapine 2.5 milliGRAM(s) Oral at bedtime  pantoprazole  Injectable 40 milliGRAM(s) IV Push daily  QUEtiapine 25 milliGRAM(s) Oral daily  senna 2 Tablet(s) Oral at bedtime  thiamine 100 milliGRAM(s) Oral daily    MEDICATIONS  (PRN):  albuterol/ipratropium for Nebulization 3 milliLiter(s) Nebulizer every 6 hours PRN Shortness of Breath and/or Wheezing        CENTRAL LINE: [ ] YES [x] NO  LOCATION:   DATE INSERTED:  REMOVE: [ ] YES [ ] NO      QUINTEROS: [x] YES [ ] NO    DATE INSERTED:  REMOVE:  [ ] YES [ ] NO      A-LINE:  [ ] YES [x] NO  LOCATION:   DATE INSERTED:  REMOVE:  [ ] YES [ ] NO  EXPLAIN:    CODE STATUS: [x] full code  [ ] DNR  [ ] DNI  [ ] [facility]  Goals of care discussion: [ ] yes HPI:  Pt is a 43 yo M with h/o EtOH abuse/ withdrawal and pancreatitis. His history, physical exam, laboratory findings, and imaging are consistent with pancreatitis and possibly necrotic pancreatitis. Pt transferred to ICU om 8/18 for severe alcohol withdrawal. Pt intubated on 8/23 2 to hypoxic resp failure requiring intubation 2 to worsening PNA. Pt is currently hypoxemic 2 to ARDS.     24 hr events: No event occurred. The patient had CPAP trial in this morning but did not tolerate it.       ## Vitals:  ICU Vital Signs Last 24 Hrs  T(C): 38 (01 Sep 2020 07:55), Max: 38.4 (31 Aug 2020 18:30)  T(F): 100.4 (01 Sep 2020 07:55), Max: 101.2 (31 Aug 2020 18:30)  HR: 103 (01 Sep 2020 07:30) (88 - 125)  BP: 103/58 (01 Sep 2020 07:30) (90/57 - 124/74)  BP(mean): 69 (01 Sep 2020 07:30) (62 - 88)  RR: 18 (01 Sep 2020 07:30) (17 - 39)  SpO2: 96% (01 Sep 2020 07:30) (90% - 97%)    Mode: AC/ CMV (Assist Control/ Continuous Mandatory Ventilation)  RR (machine): 14  TV (machine): 450  FiO2: 50  PEEP: 10  ITime: 1  MAP: 12  PIP: 23    ABG: ABG - ( 31 Aug 2020 10:58 )  pH, Arterial: x     pH, Blood: 7.40  /  pCO2: 42    /  pO2: 69    / HCO3: 26    / Base Excess: 1.2   /  SaO2: 94        I&O's Summary    31 Aug 2020 07:01  -  01 Sep 2020 07:00  --------------------------------------------------------  IN: 3910.4 mL / OUT: 1150 mL / NET: 2760.4 mL    01 Sep 2020 07:01  -  01 Sep 2020 08:20  --------------------------------------------------------  IN: 161.3 mL / OUT: 50 mL / NET: 111.3 mL        ## P/E:  Gen: lying comfortably in bed in no apparent distress  Mouth: (+) ETT  Lungs: CTA b/l, no wheezing or crackles  Heart: RRR, S1S2 present, no murmur  Abd: Distended and firm with (+) BS  Ext: non-pitting edema on upper and lower extremities b/l  Neuro: Calm/ sedated      ## Labs:  CBC:                        8.5    17.49 )-----------( 461      ( 01 Sep 2020 04:12 )             26.3                         8.2    17.40 )-----------( 403      ( 31 Aug 2020 05:02 )             26.1     Chem:  09-01    140  |  108  |  9   ----------------------------<  128<H>  3.5   |  27  |  0.39<L>    Ca    8.3<L>      01 Sep 2020 04:12  Phos  3.1     09-01  Mg     1.6     09-01    TPro  5.3<L>  /  Alb  1.2<L>  /  TBili  3.2<H>  /  DBili  x   /  AST  117<H>  /  ALT  85<H>  /  AlkPhos  401<H>  09-01 08-31    137  |  108  |  9   ----------------------------<  95  3.9   |  24  |  0.44<L>    Ca    8.0<L>      31 Aug 2020 05:02  Phos  3.2     08-31  Mg     1.7     08-31    TPro  5.3<L>  /  Alb  1.1<L>  /  TBili  3.6<H>  /  DBili  x   /  AST  151<H>  /  ALT  102<H>  /  AlkPhos  387<H>  08-30    Coags:    culture blood:  --  08-30 @ 12:33            culture sputum:     No growth           culture urine:  -- 08-30 @ 12:33        ## Imaging:  < from: Xray Chest 1 View- PORTABLE-Urgent (08.31.20 @ 09:43) >  IMPRESSION:  Worsening extensive bilateral airspace disease both lungs. Support devices in situ.      ## Medications:  MEDICATIONS  (STANDING):  chlorhexidine 0.12% Liquid 15 milliLiter(s) Oral Mucosa every 12 hours  chlorhexidine 4% Liquid 1 Application(s) Topical <User Schedule>  dexMEDEtomidine Infusion 0.3 MICROgram(s)/kG/Hr (5.36 mL/Hr) IV Continuous <Continuous>  dextrose 5% + lactated ringers. 1000 milliLiter(s) (75 mL/Hr) IV Continuous <Continuous>  enoxaparin Injectable 40 milliGRAM(s) SubCutaneous daily  fentaNYL   Infusion. 0.5 MICROgram(s)/kG/Hr (3.88 mL/Hr) IV Continuous <Continuous>  folic acid 1 milliGRAM(s) Oral daily  lactulose Syrup 10 Gram(s) Oral four times a day  meropenem  IVPB 1000 milliGRAM(s) IV Intermittent every 8 hours  midodrine 10 milliGRAM(s) Oral every 8 hours  multivitamin 1 Tablet(s) Oral daily  OLANZapine 2.5 milliGRAM(s) Oral at bedtime  pantoprazole  Injectable 40 milliGRAM(s) IV Push daily  QUEtiapine 25 milliGRAM(s) Oral daily  senna 2 Tablet(s) Oral at bedtime  thiamine 100 milliGRAM(s) Oral daily    MEDICATIONS  (PRN):  albuterol/ipratropium for Nebulization 3 milliLiter(s) Nebulizer every 6 hours PRN Shortness of Breath and/or Wheezing        CENTRAL LINE: [ ] YES [x] NO  LOCATION:   DATE INSERTED:  REMOVE: [ ] YES [ ] NO      QUINTEROS: [x] YES [ ] NO    DATE INSERTED:  REMOVE:  [ ] YES [ ] NO      A-LINE:  [ ] YES [x] NO  LOCATION:   DATE INSERTED:  REMOVE:  [ ] YES [ ] NO  EXPLAIN:    CODE STATUS: [x] full code  [ ] DNR  [ ] DNI  [ ] [facility]  Goals of care discussion: [ ] yes

## 2020-09-01 NOTE — PROGRESS NOTE ADULT - ASSESSMENT
Pt is a 43 yo M with h/o EtOH abuse/ withdrawal and pancreatitis. He was diagnosed with pancreatitis. His status deteriorated over the course of hospitalization. Pt transferred to ICU om 8/18  for severe alcohol withdrawal. Pt intubated on 8/23 2 to hypoxic resp failure requiring intubation 2 to worsening PNA. CXR today reveals worsening PNA and pt hypoxic c/w ARDS. No clinical improvement has been observed yet.    Neuro/Psych: Continue Fentanyl prn, Olanzapine, Quetiapine; continue minimizing sedation  Resp: continue Tv 450cc, PEEP 10  CVS: Started on Midodrine for hypotension  GI: Surgical f/u prn  ID: Cont Meropenem/ No sign (+) Cx  FEN: continue trickle feeds (30cc/hr) / Cont daily Thiamine, MVI and Folate   HEME: continue DVT prophylaxis with Lovenox and SCD Pt is a 43 yo M with h/o EtOH abuse/ withdrawal and pancreatitis. He was diagnosed with pancreatitis. His status deteriorated over the course of hospitalization. Pt transferred to ICU om 8/18  for severe alcohol withdrawal. Pt intubated on 8/23 2 to hypoxic resp failure requiring intubation 2 to worsening PNA. CXR today reveals worsening PNA and pt hypoxic c/w ARDS. No clinical improvement has been observed yet.    Neuro/Psych: Continue Fentanyl and Precedex, Olanzapine, Quetiapine; continue minimizing sedation if staying calm  Resp: CPAP trial in this morning; if not tolerating CPAP, go back to AC wtih Tv 450cc, PEEP 10  CVS: Started on Midodrine for hypotension  GI: Surgical f/u prn  ID: Cont Meropenem/ No sign (+) Cx  FEN: continue trickle feeds (30cc/hr) / Cont daily Thiamine, MVI and Folate   HEME: continue DVT prophylaxis with Lovenox and SCD Pt is a 43 yo M with h/o EtOH abuse/ withdrawal and pancreatitis. The history, lab findings, and imaging were consistent with acute pancreatitis. His status deteriorated over the course of hospitalization, and pt transferred to ICU om 8/18 for severe alcohol withdrawal. Pt intubated on 8/23 2 to hypoxic resp failure requiring intubation 2 to worsening PNA. Pt hypoxic c/w ARDS. No clinical improvement has been observed yet.    Neuro/Psych: Continue Fentanyl and Precedex, change Olanzapine 25mg to 50mg BID, discontinue Zyprexa; continue minimizing sedation if staying calm  Resp: CPAP trial in this morning; if not tolerating CPAP, go back to AC wtih Tv 450cc, PEEP 10  CVS: Started on Midodrine for hypotension  GI: Surgical f/u prn  Renal: produced adequate output (1150ml/24hr)  ID: Cont Meropenem/ No sign (+) Cx  FEN: discontinue fluid, continue trickle feeds (30cc/hr) (goal: 60cc/hr), Cont daily Thiamine, MVI and Folate   HEME: continue DVT prophylaxis with Lovenox and SCD Pt is a 43 yo M with h/o EtOH abuse/ withdrawal and pancreatitis. The history, lab findings, and imaging were consistent with acute pancreatitis. His status deteriorated over the course of hospitalization, and pt transferred to ICU om 8/18 for severe alcohol withdrawal. Pt intubated on 8/23 2 to hypoxic resp failure requiring intubation 2 to worsening PNA. Pt hypoxic c/w ARDS. No clinical improvement has been observed yet.    Neuro/Psych: Continue Fentanyl and Precedex, change Seroquel 25mg to 50mg BID, discontinue Zyprexa; continue minimizing sedation if staying calm  Resp: CPAP trial in this morning; if not tolerating CPAP, go back to AC wtih Tv 450cc, PEEP 10  CVS: Started on Midodrine for hypotension  GI: Surgical f/u prn  Renal: produced adequate output (1150ml/24hr)  ID: Cont Meropenem/ No sign (+) Cx  FEN: discontinue fluid, continue trickle feeds (30cc/hr) (goal: 60cc/hr), Cont daily Thiamine, MVI and Folate   HEME: continue DVT prophylaxis with Lovenox and SCD

## 2020-09-01 NOTE — PROGRESS NOTE ADULT - ATTENDING COMMENTS
Pt is a 45 yo M with h/o EtOH abuse/ withdrawal and pancreatitis. Pt presented to the ER 2 to abdominal pain, nausea and vomiting; labs and CT are consistent with pancreatitis and possible early necrosis of pancreas. Pt transferred to ICU om 8/18  for severe alcohol withdrawal. Pt intubated on 8/23 2 to hypoxic resp failure requiring intubation 2 to worsening PNA. CXR today again reveals b/l PNA and pt still hypoxic c/w ARDS    Resp: Failed SBT today; cont current vent settings  ID: Cont Meropenem/ No sign (+) Cx  CVS: Cont Midodrine for borderline low BP   FEN: Increase trickle feeds to goal rate/ Cont daily Thiamine, MVI and Folate   GI: Surgical f/u prn  Neuro/Psych: Off Ketamine, dc Zyprexa, increase Seroquel and cont Fentanyl + Precedex drips

## 2020-09-02 LAB
24R-OH-CALCIDIOL SERPL-MCNC: 16.2 NG/ML — LOW (ref 30–80)
ALBUMIN SERPL ELPH-MCNC: 1.1 G/DL — LOW (ref 3.3–5)
ALP SERPL-CCNC: 415 U/L — HIGH (ref 40–120)
ALT FLD-CCNC: 86 U/L — HIGH (ref 12–78)
ANION GAP SERPL CALC-SCNC: 4 MMOL/L — LOW (ref 5–17)
APPEARANCE UR: CLEAR — SIGNIFICANT CHANGE UP
AST SERPL-CCNC: 133 U/L — HIGH (ref 15–37)
BASE EXCESS BLDA CALC-SCNC: 2.2 MMOL/L — HIGH (ref -2–2)
BASOPHILS # BLD AUTO: 0.11 K/UL — SIGNIFICANT CHANGE UP (ref 0–0.2)
BASOPHILS NFR BLD AUTO: 0.6 % — SIGNIFICANT CHANGE UP (ref 0–2)
BILIRUB SERPL-MCNC: 3.2 MG/DL — HIGH (ref 0.2–1.2)
BILIRUB UR-MCNC: NEGATIVE — SIGNIFICANT CHANGE UP
BLOOD GAS COMMENTS: SIGNIFICANT CHANGE UP
BLOOD GAS COMMENTS: SIGNIFICANT CHANGE UP
BLOOD GAS SOURCE: SIGNIFICANT CHANGE UP
BUN SERPL-MCNC: 11 MG/DL — SIGNIFICANT CHANGE UP (ref 7–23)
CALCIUM SERPL-MCNC: 8.5 MG/DL — SIGNIFICANT CHANGE UP (ref 8.5–10.1)
CHLORIDE SERPL-SCNC: 108 MMOL/L — SIGNIFICANT CHANGE UP (ref 96–108)
CO2 SERPL-SCNC: 26 MMOL/L — SIGNIFICANT CHANGE UP (ref 22–31)
COLOR SPEC: YELLOW — SIGNIFICANT CHANGE UP
CREAT SERPL-MCNC: 0.35 MG/DL — LOW (ref 0.5–1.3)
DIFF PNL FLD: ABNORMAL
EOSINOPHIL # BLD AUTO: 0.21 K/UL — SIGNIFICANT CHANGE UP (ref 0–0.5)
EOSINOPHIL NFR BLD AUTO: 1.2 % — SIGNIFICANT CHANGE UP (ref 0–6)
GLUCOSE SERPL-MCNC: 115 MG/DL — HIGH (ref 70–99)
GLUCOSE UR QL: NEGATIVE MG/DL — SIGNIFICANT CHANGE UP
GRAM STN FLD: SIGNIFICANT CHANGE UP
HCO3 BLDA-SCNC: 27 MMOL/L — SIGNIFICANT CHANGE UP (ref 21–29)
HCT VFR BLD CALC: 26.7 % — LOW (ref 39–50)
HGB BLD-MCNC: 8.4 G/DL — LOW (ref 13–17)
HOROWITZ INDEX BLDA+IHG-RTO: 50 — SIGNIFICANT CHANGE UP
HYALINE CASTS # UR AUTO: ABNORMAL /LPF
IMM GRANULOCYTES NFR BLD AUTO: 1 % — SIGNIFICANT CHANGE UP (ref 0–1.5)
KETONES UR-MCNC: NEGATIVE — SIGNIFICANT CHANGE UP
LEUKOCYTE ESTERASE UR-ACNC: ABNORMAL
LYMPHOCYTES # BLD AUTO: 1.23 K/UL — SIGNIFICANT CHANGE UP (ref 1–3.3)
LYMPHOCYTES # BLD AUTO: 6.7 % — LOW (ref 13–44)
MAGNESIUM SERPL-MCNC: 1.6 MG/DL — SIGNIFICANT CHANGE UP (ref 1.6–2.6)
MCHC RBC-ENTMCNC: 31.5 GM/DL — LOW (ref 32–36)
MCHC RBC-ENTMCNC: 32.1 PG — SIGNIFICANT CHANGE UP (ref 27–34)
MCV RBC AUTO: 101.9 FL — HIGH (ref 80–100)
MONOCYTES # BLD AUTO: 1.7 K/UL — HIGH (ref 0–0.9)
MONOCYTES NFR BLD AUTO: 9.3 % — SIGNIFICANT CHANGE UP (ref 2–14)
NEUTROPHILS # BLD AUTO: 14.82 K/UL — HIGH (ref 1.8–7.4)
NEUTROPHILS NFR BLD AUTO: 81.2 % — HIGH (ref 43–77)
NITRITE UR-MCNC: NEGATIVE — SIGNIFICANT CHANGE UP
NRBC # BLD: 0 /100 WBCS — SIGNIFICANT CHANGE UP (ref 0–0)
PCO2 BLDA: 49 MMHG — HIGH (ref 32–46)
PH BLD: 7.36 — SIGNIFICANT CHANGE UP (ref 7.35–7.45)
PH UR: 5 — SIGNIFICANT CHANGE UP (ref 5–8)
PHOSPHATE SERPL-MCNC: 3.6 MG/DL — SIGNIFICANT CHANGE UP (ref 2.5–4.5)
PLATELET # BLD AUTO: 476 K/UL — HIGH (ref 150–400)
PO2 BLDA: 59 MMHG — LOW (ref 74–108)
POTASSIUM SERPL-MCNC: 3.9 MMOL/L — SIGNIFICANT CHANGE UP (ref 3.5–5.3)
POTASSIUM SERPL-SCNC: 3.9 MMOL/L — SIGNIFICANT CHANGE UP (ref 3.5–5.3)
PROT SERPL-MCNC: 5.5 GM/DL — LOW (ref 6–8.3)
PROT UR-MCNC: NEGATIVE MG/DL — SIGNIFICANT CHANGE UP
RBC # BLD: 2.62 M/UL — LOW (ref 4.2–5.8)
RBC # FLD: 14.5 % — SIGNIFICANT CHANGE UP (ref 10.3–14.5)
RBC CASTS # UR COMP ASSIST: SIGNIFICANT CHANGE UP /HPF (ref 0–4)
SAO2 % BLDA: 88 % — LOW (ref 92–96)
SODIUM SERPL-SCNC: 138 MMOL/L — SIGNIFICANT CHANGE UP (ref 135–145)
SP GR SPEC: 1.01 — SIGNIFICANT CHANGE UP (ref 1.01–1.02)
SPECIMEN SOURCE: SIGNIFICANT CHANGE UP
TRIGL SERPL-MCNC: 205 MG/DL — HIGH (ref 10–149)
UROBILINOGEN FLD QL: NEGATIVE MG/DL — SIGNIFICANT CHANGE UP
WBC # BLD: 18.25 K/UL — HIGH (ref 3.8–10.5)
WBC # FLD AUTO: 18.25 K/UL — HIGH (ref 3.8–10.5)
WBC UR QL: SIGNIFICANT CHANGE UP

## 2020-09-02 PROCEDURE — 99291 CRITICAL CARE FIRST HOUR: CPT

## 2020-09-02 PROCEDURE — 76705 ECHO EXAM OF ABDOMEN: CPT | Mod: 26

## 2020-09-02 RX ORDER — ERGOCALCIFEROL 1.25 MG/1
50000 CAPSULE ORAL ONCE
Refills: 0 | Status: COMPLETED | OUTPATIENT
Start: 2020-09-02 | End: 2020-09-02

## 2020-09-02 RX ORDER — MAGNESIUM SULFATE 500 MG/ML
2 VIAL (ML) INJECTION ONCE
Refills: 0 | Status: COMPLETED | OUTPATIENT
Start: 2020-09-02 | End: 2020-09-02

## 2020-09-02 RX ORDER — FENTANYL CITRATE 50 UG/ML
0.5 INJECTION INTRAVENOUS
Qty: 2500 | Refills: 0 | Status: DISCONTINUED | OUTPATIENT
Start: 2020-09-02 | End: 2020-09-07

## 2020-09-02 RX ORDER — PROPOFOL 10 MG/ML
10.01 INJECTION, EMULSION INTRAVENOUS
Qty: 1000 | Refills: 0 | Status: DISCONTINUED | OUTPATIENT
Start: 2020-09-02 | End: 2020-09-10

## 2020-09-02 RX ORDER — QUETIAPINE FUMARATE 200 MG/1
75 TABLET, FILM COATED ORAL
Refills: 0 | Status: DISCONTINUED | OUTPATIENT
Start: 2020-09-02 | End: 2020-09-03

## 2020-09-02 RX ORDER — DEXMEDETOMIDINE HYDROCHLORIDE IN 0.9% SODIUM CHLORIDE 4 UG/ML
0.28 INJECTION INTRAVENOUS
Qty: 200 | Refills: 0 | Status: DISCONTINUED | OUTPATIENT
Start: 2020-09-02 | End: 2020-09-10

## 2020-09-02 RX ORDER — FUROSEMIDE 40 MG
20 TABLET ORAL ONCE
Refills: 0 | Status: COMPLETED | OUTPATIENT
Start: 2020-09-02 | End: 2020-09-02

## 2020-09-02 RX ADMIN — DEXMEDETOMIDINE HYDROCHLORIDE IN 0.9% SODIUM CHLORIDE 5.36 MICROGRAM(S)/KG/HR: 4 INJECTION INTRAVENOUS at 18:15

## 2020-09-02 RX ADMIN — Medication 100 MILLIGRAM(S): at 11:36

## 2020-09-02 RX ADMIN — DEXMEDETOMIDINE HYDROCHLORIDE IN 0.9% SODIUM CHLORIDE 5.36 MICROGRAM(S)/KG/HR: 4 INJECTION INTRAVENOUS at 12:18

## 2020-09-02 RX ADMIN — CHLORHEXIDINE GLUCONATE 15 MILLILITER(S): 213 SOLUTION TOPICAL at 17:23

## 2020-09-02 RX ADMIN — PROPOFOL 4.66 MICROGRAM(S)/KG/MIN: 10 INJECTION, EMULSION INTRAVENOUS at 05:03

## 2020-09-02 RX ADMIN — Medication 20 MILLIGRAM(S): at 10:06

## 2020-09-02 RX ADMIN — Medication 1 TABLET(S): at 11:36

## 2020-09-02 RX ADMIN — LACTULOSE 10 GRAM(S): 10 SOLUTION ORAL at 05:01

## 2020-09-02 RX ADMIN — QUETIAPINE FUMARATE 50 MILLIGRAM(S): 200 TABLET, FILM COATED ORAL at 05:02

## 2020-09-02 RX ADMIN — DEXMEDETOMIDINE HYDROCHLORIDE IN 0.9% SODIUM CHLORIDE 5.36 MICROGRAM(S)/KG/HR: 4 INJECTION INTRAVENOUS at 20:33

## 2020-09-02 RX ADMIN — Medication 650 MILLIGRAM(S): at 08:45

## 2020-09-02 RX ADMIN — LACTULOSE 10 GRAM(S): 10 SOLUTION ORAL at 11:36

## 2020-09-02 RX ADMIN — CHLORHEXIDINE GLUCONATE 15 MILLILITER(S): 213 SOLUTION TOPICAL at 05:01

## 2020-09-02 RX ADMIN — ERGOCALCIFEROL 50000 UNIT(S): 1.25 CAPSULE ORAL at 17:21

## 2020-09-02 RX ADMIN — DEXMEDETOMIDINE HYDROCHLORIDE IN 0.9% SODIUM CHLORIDE 5.36 MICROGRAM(S)/KG/HR: 4 INJECTION INTRAVENOUS at 03:02

## 2020-09-02 RX ADMIN — FENTANYL CITRATE 3.88 MICROGRAM(S)/KG/HR: 50 INJECTION INTRAVENOUS at 01:25

## 2020-09-02 RX ADMIN — DEXMEDETOMIDINE HYDROCHLORIDE IN 0.9% SODIUM CHLORIDE 5.36 MICROGRAM(S)/KG/HR: 4 INJECTION INTRAVENOUS at 16:09

## 2020-09-02 RX ADMIN — ENOXAPARIN SODIUM 40 MILLIGRAM(S): 100 INJECTION SUBCUTANEOUS at 11:36

## 2020-09-02 RX ADMIN — LACTULOSE 10 GRAM(S): 10 SOLUTION ORAL at 23:40

## 2020-09-02 RX ADMIN — MIDODRINE HYDROCHLORIDE 10 MILLIGRAM(S): 2.5 TABLET ORAL at 12:19

## 2020-09-02 RX ADMIN — Medication 1 MILLIGRAM(S): at 11:36

## 2020-09-02 RX ADMIN — Medication 50 GRAM(S): at 05:17

## 2020-09-02 RX ADMIN — DEXMEDETOMIDINE HYDROCHLORIDE IN 0.9% SODIUM CHLORIDE 5.36 MICROGRAM(S)/KG/HR: 4 INJECTION INTRAVENOUS at 10:27

## 2020-09-02 RX ADMIN — PROPOFOL 4.66 MICROGRAM(S)/KG/MIN: 10 INJECTION, EMULSION INTRAVENOUS at 16:10

## 2020-09-02 RX ADMIN — Medication 50 GRAM(S): at 04:47

## 2020-09-02 RX ADMIN — QUETIAPINE FUMARATE 75 MILLIGRAM(S): 200 TABLET, FILM COATED ORAL at 17:27

## 2020-09-02 RX ADMIN — MEROPENEM 100 MILLIGRAM(S): 1 INJECTION INTRAVENOUS at 06:03

## 2020-09-02 RX ADMIN — Medication 650 MILLIGRAM(S): at 09:30

## 2020-09-02 RX ADMIN — FENTANYL CITRATE 3.88 MICROGRAM(S)/KG/HR: 50 INJECTION INTRAVENOUS at 20:33

## 2020-09-02 RX ADMIN — DEXMEDETOMIDINE HYDROCHLORIDE IN 0.9% SODIUM CHLORIDE 5.36 MICROGRAM(S)/KG/HR: 4 INJECTION INTRAVENOUS at 06:48

## 2020-09-02 RX ADMIN — PANTOPRAZOLE SODIUM 40 MILLIGRAM(S): 20 TABLET, DELAYED RELEASE ORAL at 11:36

## 2020-09-02 RX ADMIN — DEXMEDETOMIDINE HYDROCHLORIDE IN 0.9% SODIUM CHLORIDE 5.36 MICROGRAM(S)/KG/HR: 4 INJECTION INTRAVENOUS at 05:02

## 2020-09-02 RX ADMIN — LACTULOSE 10 GRAM(S): 10 SOLUTION ORAL at 17:23

## 2020-09-02 RX ADMIN — DEXMEDETOMIDINE HYDROCHLORIDE IN 0.9% SODIUM CHLORIDE 5.36 MICROGRAM(S)/KG/HR: 4 INJECTION INTRAVENOUS at 23:44

## 2020-09-02 RX ADMIN — DEXMEDETOMIDINE HYDROCHLORIDE IN 0.9% SODIUM CHLORIDE 5.36 MICROGRAM(S)/KG/HR: 4 INJECTION INTRAVENOUS at 21:28

## 2020-09-02 RX ADMIN — DEXMEDETOMIDINE HYDROCHLORIDE IN 0.9% SODIUM CHLORIDE 5.36 MICROGRAM(S)/KG/HR: 4 INJECTION INTRAVENOUS at 08:29

## 2020-09-02 RX ADMIN — FENTANYL CITRATE 3.88 MICROGRAM(S)/KG/HR: 50 INJECTION INTRAVENOUS at 10:27

## 2020-09-02 RX ADMIN — DEXMEDETOMIDINE HYDROCHLORIDE IN 0.9% SODIUM CHLORIDE 5.36 MICROGRAM(S)/KG/HR: 4 INJECTION INTRAVENOUS at 01:25

## 2020-09-02 RX ADMIN — SENNA PLUS 2 TABLET(S): 8.6 TABLET ORAL at 21:28

## 2020-09-02 RX ADMIN — PROPOFOL 4.66 MICROGRAM(S)/KG/MIN: 10 INJECTION, EMULSION INTRAVENOUS at 23:45

## 2020-09-02 RX ADMIN — MIDODRINE HYDROCHLORIDE 10 MILLIGRAM(S): 2.5 TABLET ORAL at 05:02

## 2020-09-02 RX ADMIN — DEXMEDETOMIDINE HYDROCHLORIDE IN 0.9% SODIUM CHLORIDE 5.36 MICROGRAM(S)/KG/HR: 4 INJECTION INTRAVENOUS at 14:18

## 2020-09-02 RX ADMIN — MIDODRINE HYDROCHLORIDE 10 MILLIGRAM(S): 2.5 TABLET ORAL at 21:28

## 2020-09-02 RX ADMIN — CHLORHEXIDINE GLUCONATE 1 APPLICATION(S): 213 SOLUTION TOPICAL at 05:17

## 2020-09-02 NOTE — PROGRESS NOTE ADULT - ATTENDING COMMENTS
Pt is a 43 yo M with h/o EtOH abuse/ withdrawal and pancreatitis. Pt presented to the ER 2 to abdominal pain, nausea and vomiting; labs and CT are consistent with pancreatitis and possible early necrosis of pancreas. Pt transferred to ICU om 8/18  for severe alcohol withdrawal. Pt intubated on 8/23 2 to hypoxic resp failure requiring intubation 2 to worsening PNA. CXR today again reveals b/l PNA and pt still hypoxic c/w ARDS    Resp: Failed SBT today; cont current vent settings  ID: Cont Meropenem/ No sign (+) Cx  CVS: Cont Midodrine for borderline low BP   FEN: Increase trickle feeds to goal rate/ Cont daily Thiamine, MVI and Folate   GI: Surgical f/u prn  Neuro/Psych: Off Ketamine, dc Zyprexa, increase Seroquel and cont Fentanyl + Precedex drips Pt is a 43 yo M with h/o EtOH abuse/ withdrawal and pancreatitis. Pt presented to the ER 2 to abdominal pain, nausea and vomiting; labs and CT were consistent with pancreatitis and possible early necrosis of pancreas. Pt transferred to ICU om 8/18  for severe alcohol withdrawal. Pt intubated on 8/23 2 to hypoxic resp failure requiring intubation 2 to worsening PNA. CXR 9/1/2020 reveals b/l PNA and pt still hypoxic c/w ARDS    Resp: Failed SBT again today; cont current vent settings (may allow O2 sats in the low 90s/ If the pt does not tolerate SBT by the end of this week will need trach  ID: Dc Meropenem/ PanCx pt with temp spike  CVS/Reanl: Cont Midodrine for borderline low BP/ Pt net positive ins; trial of gentle diuresis   FEN: Increase feeds to goal rate of 60cc/hr/ Cont daily Thiamine, MVI and Folate   GI: Surgical f/u prn/ Pancreas U/S  Neuro/Psych: Increase Seroquel to 75 mg bid, cont Fentanyl + Precedex drips and wean off Propofol drip

## 2020-09-02 NOTE — PROGRESS NOTE ADULT - SUBJECTIVE AND OBJECTIVE BOX
HPI:  Pt is a 45 yo M with h/o EtOH abuse/ withdrawal and pancreatitis. His history, physical exam, laboratory findings, and imaging are consistent with acute pancreatitis. Pt transferred to ICU om 8/18 for severe alcohol withdrawal. Pt intubated on 8/23 2 to hypoxic resp failure requiring intubation 2 to worsening PNA. Pt is currently hypoxemic 2 to ARDS.     24 hr events: Overnight, the patient became agitated and had to receive haloperidol and fentanyl to calm down. He is currently sedated. The nurse reported that he was restless and had a temperature of 100.2 when he was agitated. Otherwise no other event occurred. He is tolerating tube feeding with no difficulty. Since yesterday, the patient has not had bowel movement.     ## Vitals:  ICU Vital Signs Last 24 Hrs  T(C): 37.8 (02 Sep 2020 07:20), Max: 39.3 (01 Sep 2020 12:30)  T(F): 100.1 (02 Sep 2020 07:20), Max: 102.7 (01 Sep 2020 12:30)  HR: 101 (02 Sep 2020 07:32) (87 - 170)  BP: 102/69 (02 Sep 2020 07:00) (92/55 - 138/81)  BP(mean): 77 (02 Sep 2020 07:00) (64 - 97)  RR: 19 (02 Sep 2020 07:32) (15 - 36)  SpO2: 94% (02 Sep 2020 07:32) (85% - 97%)    Mode: Auto Mode: PRVC/ Volume Support  RR (machine): 14  TV (machine): 450  FiO2: 60  PEEP: 10  ITime: 0.9  MAP: 15  PIP: 28      I&O's Summary    01 Sep 2020 07:01  -  02 Sep 2020 07:00  --------------------------------------------------------  IN: 2565.7 mL / OUT: 1185 mL / NET: 1380.7 mL        ## P/E:  Gen: lying comfortably in bed in no apparent distress  Eyes: constricted pupils, not responsive to light b/l  Mouth: (+) ETT  Lungs: CTA b/l, no wheezing or crackles  Heart: RRR, S1S2 present, no murmur  Abd: Distended and firm with (+) BS  Ext: warm, moisture skin, 1+ distal pulses and 2+ pitting edema on upper and lower extremities b/l  Neuro: Calm/sedated      ## Labs:  CBC:                        8.4    18.25 )-----------( 476      ( 02 Sep 2020 03:16 )             26.7                      8.5    17.49 )-----------( 461      ( 01 Sep 2020 04:12 )             26.3     Chem:  09-02    138  |  108  |  11  ----------------------------<  115<H>  3.9   |  26  |  0.35<L>    Ca    8.5      02 Sep 2020 03:16  Phos  3.6     09-02  Mg     1.6     09-02    TPro  5.5<L>  /  Alb  1.1<L>  /  TBili  3.2<H>  /  DBili  x   /  AST  133<H>  /  ALT  86<H>  /  AlkPhos  415<H>  09-02    09-01    140  |  108  |  9   ----------------------------<  128<H>  3.5   |  27  |  0.39<L>    Ca    8.3<L>      01 Sep 2020 04:12  Phos  3.1     09-01  Mg     1.6     09-01    TPro  5.3<L>  /  Alb  1.2<L>  /  TBili  3.2<H>  /  DBili  x   /  AST  117<H>  /  ALT  85<H>  /  AlkPhos  401<H>  09-01      Culture Results:   Rare Normal Respiratory Poonam present (08.30.20 @ 12:33)        ## Imaging:  < from: Xray Chest 1 View- PORTABLE-Routine (09.01.20 @ 08:20) >  Frontal expiratory view of the chest shows the heartto be normal in size. Endotracheal tube and nasogastric tube remain in place.    The lungs show similar patchy infiltrates bilaterally and there is no evidence of pneumothorax nor pleural effusion.    < end of copied text >    ## Medications:  MEDICATIONS  (STANDING):  chlorhexidine 0.12% Liquid 15 milliLiter(s) Oral Mucosa every 12 hours  chlorhexidine 4% Liquid 1 Application(s) Topical <User Schedule>  dexMEDEtomidine Infusion 0.3 MICROgram(s)/kG/Hr (5.36 mL/Hr) IV Continuous <Continuous>  enoxaparin Injectable 40 milliGRAM(s) SubCutaneous daily  fentaNYL   Infusion. 0.5 MICROgram(s)/kG/Hr (3.88 mL/Hr) IV Continuous <Continuous>  folic acid 1 milliGRAM(s) Oral daily  lactulose Syrup 10 Gram(s) Oral four times a day  meropenem  IVPB 1000 milliGRAM(s) IV Intermittent every 8 hours  midodrine 10 milliGRAM(s) Oral every 8 hours  multivitamin 1 Tablet(s) Oral daily  pantoprazole  Injectable 40 milliGRAM(s) IV Push daily  propofol Infusion 10 MICROgram(s)/kG/Min (4.66 mL/Hr) IV Continuous <Continuous>  QUEtiapine 50 milliGRAM(s) Oral two times a day  senna 2 Tablet(s) Oral at bedtime  thiamine 100 milliGRAM(s) Oral daily    MEDICATIONS  (PRN):  acetaminophen   Tablet .. 650 milliGRAM(s) Oral every 6 hours PRN Temp greater or equal to 38.5C (101.3F)  albuterol/ipratropium for Nebulization 3 milliLiter(s) Nebulizer every 6 hours PRN Shortness of Breath and/or Wheezing        CENTRAL LINE: [ ] YES [x] NO  LOCATION:   DATE INSERTED:  REMOVE: [ ] YES [ ] NO      QUINTEROS: [x] YES [ ] NO    DATE INSERTED:  REMOVE:  [ ] YES [ ] NO      A-LINE:  [ ] YES [x] NO  LOCATION:   DATE INSERTED:  REMOVE:  [ ] YES [ ] NO  EXPLAIN:    CODE STATUS: [x] full code  [ ] DNR  [ ] DNI  [ ] MOLST  Goals of care discussion: [ ] yes HPI:  Pt is a 45 yo M with h/o EtOH abuse/ withdrawal and pancreatitis. His history, physical exam, laboratory findings, and imaging are consistent with acute pancreatitis. Pt transferred to ICU om 8/18 for severe alcohol withdrawal. Pt intubated on 8/23 2 to hypoxic resp failure requiring intubation 2 to worsening PNA. Pt is currently hypoxemic 2 to ARDS.     24 hr events: Overnight, the patient became agitated and had to receive haloperidol and fentanyl to calm down. He is currently sedated. The nurse reported that he was restless and had a temperature of 100.2 when he was agitated. The nurse attempted to wean off sedatives this morning, but the patient became agitated and was sedated again. He is tolerating tube feeding with no difficulty. Since yesterday, the patient has not had bowel movement.     ## Vitals:  ICU Vital Signs Last 24 Hrs  T(C): 37.8 (02 Sep 2020 07:20), Max: 39.3 (01 Sep 2020 12:30)  T(F): 100.1 (02 Sep 2020 07:20), Max: 102.7 (01 Sep 2020 12:30)  HR: 101 (02 Sep 2020 07:32) (87 - 170)  BP: 102/69 (02 Sep 2020 07:00) (92/55 - 138/81)  BP(mean): 77 (02 Sep 2020 07:00) (64 - 97)  RR: 19 (02 Sep 2020 07:32) (15 - 36)  SpO2: 94% (02 Sep 2020 07:32) (85% - 97%)    Mode: Auto Mode: PRVC/ Volume Support  RR (machine): 14  TV (machine): 450  FiO2: 60  PEEP: 10  ITime: 0.9  MAP: 15  PIP: 28      I&O's Summary    01 Sep 2020 07:01  -  02 Sep 2020 07:00  --------------------------------------------------------  IN: 2565.7 mL / OUT: 1185 mL / NET: 1380.7 mL        ## P/E:  Gen: lying comfortably in bed in no apparent distress  Eyes: constricted pupils, not responsive to light b/l  Mouth: (+) ETT  Lungs: CTA b/l, no wheezing or crackles  Heart: RRR, S1S2 present, no murmur  Abd: Distended and firm with (+) BS  Ext: warm, moisture skin, 1+ distal pulses and 2+ pitting edema on upper and lower extremities b/l  Neuro: Calm/sedated      ## Labs:  CBC:                        8.4    18.25 )-----------( 476      ( 02 Sep 2020 03:16 )             26.7                      8.5    17.49 )-----------( 461      ( 01 Sep 2020 04:12 )             26.3     Chem:  09-02    138  |  108  |  11  ----------------------------<  115<H>  3.9   |  26  |  0.35<L>    Ca    8.5      02 Sep 2020 03:16  Phos  3.6     09-02  Mg     1.6     09-02    TPro  5.5<L>  /  Alb  1.1<L>  /  TBili  3.2<H>  /  DBili  x   /  AST  133<H>  /  ALT  86<H>  /  AlkPhos  415<H>  09-02 09-01    140  |  108  |  9   ----------------------------<  128<H>  3.5   |  27  |  0.39<L>    Ca    8.3<L>      01 Sep 2020 04:12  Phos  3.1     09-01  Mg     1.6     09-01    TPro  5.3<L>  /  Alb  1.2<L>  /  TBili  3.2<H>  /  DBili  x   /  AST  117<H>  /  ALT  85<H>  /  AlkPhos  401<H>  09-01      Culture Results:   Rare Normal Respiratory Poonam present (08.30.20 @ 12:33)        ## Imaging:  < from: Xray Chest 1 View- PORTABLE-Routine (09.01.20 @ 08:20) >  Frontal expiratory view of the chest shows the heartto be normal in size. Endotracheal tube and nasogastric tube remain in place.    The lungs show similar patchy infiltrates bilaterally and there is no evidence of pneumothorax nor pleural effusion.    < end of copied text >    ## Medications:  MEDICATIONS  (STANDING):  chlorhexidine 0.12% Liquid 15 milliLiter(s) Oral Mucosa every 12 hours  chlorhexidine 4% Liquid 1 Application(s) Topical <User Schedule>  dexMEDEtomidine Infusion 0.3 MICROgram(s)/kG/Hr (5.36 mL/Hr) IV Continuous <Continuous>  enoxaparin Injectable 40 milliGRAM(s) SubCutaneous daily  fentaNYL   Infusion. 0.5 MICROgram(s)/kG/Hr (3.88 mL/Hr) IV Continuous <Continuous>  folic acid 1 milliGRAM(s) Oral daily  lactulose Syrup 10 Gram(s) Oral four times a day  meropenem  IVPB 1000 milliGRAM(s) IV Intermittent every 8 hours  midodrine 10 milliGRAM(s) Oral every 8 hours  multivitamin 1 Tablet(s) Oral daily  pantoprazole  Injectable 40 milliGRAM(s) IV Push daily  propofol Infusion 10 MICROgram(s)/kG/Min (4.66 mL/Hr) IV Continuous <Continuous>  QUEtiapine 50 milliGRAM(s) Oral two times a day  senna 2 Tablet(s) Oral at bedtime  thiamine 100 milliGRAM(s) Oral daily    MEDICATIONS  (PRN):  acetaminophen   Tablet .. 650 milliGRAM(s) Oral every 6 hours PRN Temp greater or equal to 38.5C (101.3F)  albuterol/ipratropium for Nebulization 3 milliLiter(s) Nebulizer every 6 hours PRN Shortness of Breath and/or Wheezing        CENTRAL LINE: [ ] YES [x] NO  LOCATION:   DATE INSERTED:  REMOVE: [ ] YES [ ] NO      QUINTEROS: [x] YES [ ] NO    DATE INSERTED:  REMOVE:  [ ] YES [ ] NO      A-LINE:  [ ] YES [x] NO  LOCATION:   DATE INSERTED:  REMOVE:  [ ] YES [ ] NO  EXPLAIN:    CODE STATUS: [x] full code  [ ] DNR  [ ] DNI  [ ] MOLST  Goals of care discussion: [ ] yes

## 2020-09-02 NOTE — PROGRESS NOTE ADULT - ASSESSMENT
Pt is a 45 yo M with h/o EtOH abuse/ withdrawal and pancreatitis. The history, lab findings, and imaging were consistent with acute pancreatitis. His status deteriorated over the course of hospitalization, and pt transferred to ICU on 8/18 for severe alcohol withdrawal. Pt intubated on 8/23 2 to hypoxic resp failure requiring intubation 2 to worsening PNA. Pt hypoxic c/w ARDS. Despite numerous attempts to wean off sedatives, the patient has not been able to stay stable. No clinical improvement has been observed yet.    Neuro/Psych: Continue to try minimizing sedatives, if stable; if not stable, continue Fentanyl, Precedex, and Seroquel  Resp: cont current vent setting  CVS: continue Midodrine for hypotension, consider furosemide to reduce volume overload, if possible  GI: Surgical f/u prn  Renal: produced adequate output (1215ml/24hr)  ID: Cont Meropenem/ No sign (+) Cx  FEN: continue trickle feeds (35cc/hr) (goal: 60cc/hr), Cont daily Thiamine, MVI and Folate   HEME: continue DVT prophylaxis with Lovenox and SCD Pt is a 45 yo M with h/o EtOH abuse/ withdrawal and pancreatitis. The history, lab findings, and imaging were consistent with acute pancreatitis. His status deteriorated over the course of hospitalization, and pt transferred to ICU on 8/18 for severe alcohol withdrawal. Pt intubated on 8/23 2 to hypoxic resp failure requiring intubation 2 to worsening PNA. Pt hypoxic c/w ARDS. Despite numerous attempts to wean off sedatives, the patient has not been able to stay stable. No clinical improvement has been observed yet.    Neuro/Psych: Continue to try minimizing sedatives, if stable; if not stable, continue Fentanyl, Precedex, and Seroquel  Resp: cont current vent setting  CVS: continue Midodrine for hypotension, consider furosemide to reduce volume overload, if possible (net volume: +34L on this admission)  GI: Surgical f/u prn  Renal: produced adequate output (1215ml/24hr)  ID: Cont Meropenem/ No sign (+) Cx  FEN: continue trickle feeds (35cc/hr) (goal: 60cc/hr), Cont daily Thiamine, MVI and Folate   HEME: continue DVT prophylaxis with Lovenox and SCD Pt is a 45 yo M with h/o EtOH abuse/ withdrawal and pancreatitis. The history, lab findings, and imaging were consistent with acute pancreatitis. His status deteriorated over the course of hospitalization, and pt transferred to ICU on 8/18 for severe alcohol withdrawal. Pt intubated on 8/23 2 to hypoxic resp failure requiring intubation 2 to worsening PNA. Pt hypoxic c/w ARDS. Despite numerous attempts to wean off sedatives, the patient has not been able to stay stable. No clinical improvement has been observed yet.    Neuro/Psych: Continue to try minimizing sedatives daily; if not stable after waking up, continue Fentanyl and Precedex / increase Seroquel from 50mg to 75mg BID  Resp: cont current vent setting (FiO2 60%, PEEP 10, RR 14, Td 450)  CVS: continue Midodrine for hypotension / order 20mg furosemide to reduce volume overload (net volume: +34L on this admission) / monitor BP   GI: us abdomen  Renal: produced adequate output (1215ml/24hr)  ID: discontinue Meropenem due to unclear utility in continuing abx / blood and sputum culture, UA / if UA positive, urine culture  FEN: increase trickle feeds to 60cc/hr by the end of today (currently 45cc/hr), Cont daily Thiamine, MVI and Folate   HEME: continue DVT prophylaxis with Lovenox and SCD

## 2020-09-03 LAB
ANION GAP SERPL CALC-SCNC: 4 MMOL/L — LOW (ref 5–17)
BASOPHILS # BLD AUTO: 0.07 K/UL — SIGNIFICANT CHANGE UP (ref 0–0.2)
BASOPHILS NFR BLD AUTO: 0.4 % — SIGNIFICANT CHANGE UP (ref 0–2)
BUN SERPL-MCNC: 15 MG/DL — SIGNIFICANT CHANGE UP (ref 7–23)
CALCIUM SERPL-MCNC: 8.4 MG/DL — LOW (ref 8.5–10.1)
CHLORIDE SERPL-SCNC: 107 MMOL/L — SIGNIFICANT CHANGE UP (ref 96–108)
CO2 SERPL-SCNC: 29 MMOL/L — SIGNIFICANT CHANGE UP (ref 22–31)
CREAT SERPL-MCNC: 0.52 MG/DL — SIGNIFICANT CHANGE UP (ref 0.5–1.3)
EOSINOPHIL # BLD AUTO: 0.47 K/UL — SIGNIFICANT CHANGE UP (ref 0–0.5)
EOSINOPHIL NFR BLD AUTO: 2.9 % — SIGNIFICANT CHANGE UP (ref 0–6)
GLUCOSE SERPL-MCNC: 119 MG/DL — HIGH (ref 70–99)
HCT VFR BLD CALC: 24.6 % — LOW (ref 39–50)
HGB BLD-MCNC: 7.8 G/DL — LOW (ref 13–17)
IMM GRANULOCYTES NFR BLD AUTO: 1.4 % — SIGNIFICANT CHANGE UP (ref 0–1.5)
LYMPHOCYTES # BLD AUTO: 1.29 K/UL — SIGNIFICANT CHANGE UP (ref 1–3.3)
LYMPHOCYTES # BLD AUTO: 8 % — LOW (ref 13–44)
MAGNESIUM SERPL-MCNC: 1.7 MG/DL — SIGNIFICANT CHANGE UP (ref 1.6–2.6)
MCHC RBC-ENTMCNC: 31.7 GM/DL — LOW (ref 32–36)
MCHC RBC-ENTMCNC: 32.6 PG — SIGNIFICANT CHANGE UP (ref 27–34)
MCV RBC AUTO: 102.9 FL — HIGH (ref 80–100)
MONOCYTES # BLD AUTO: 1.47 K/UL — HIGH (ref 0–0.9)
MONOCYTES NFR BLD AUTO: 9.1 % — SIGNIFICANT CHANGE UP (ref 2–14)
NEUTROPHILS # BLD AUTO: 12.62 K/UL — HIGH (ref 1.8–7.4)
NEUTROPHILS NFR BLD AUTO: 78.2 % — HIGH (ref 43–77)
NRBC # BLD: 0 /100 WBCS — SIGNIFICANT CHANGE UP (ref 0–0)
PHOSPHATE SERPL-MCNC: 4 MG/DL — SIGNIFICANT CHANGE UP (ref 2.5–4.5)
PLATELET # BLD AUTO: 414 K/UL — HIGH (ref 150–400)
POTASSIUM SERPL-MCNC: 3.8 MMOL/L — SIGNIFICANT CHANGE UP (ref 3.5–5.3)
POTASSIUM SERPL-SCNC: 3.8 MMOL/L — SIGNIFICANT CHANGE UP (ref 3.5–5.3)
RBC # BLD: 2.39 M/UL — LOW (ref 4.2–5.8)
RBC # FLD: 15 % — HIGH (ref 10.3–14.5)
SODIUM SERPL-SCNC: 140 MMOL/L — SIGNIFICANT CHANGE UP (ref 135–145)
TRIGL SERPL-MCNC: 254 MG/DL — HIGH (ref 10–149)
WBC # BLD: 16.15 K/UL — HIGH (ref 3.8–10.5)
WBC # FLD AUTO: 16.15 K/UL — HIGH (ref 3.8–10.5)

## 2020-09-03 PROCEDURE — 99291 CRITICAL CARE FIRST HOUR: CPT

## 2020-09-03 PROCEDURE — 74018 RADEX ABDOMEN 1 VIEW: CPT | Mod: 26

## 2020-09-03 RX ORDER — METHADONE HYDROCHLORIDE 40 MG/1
10 TABLET ORAL EVERY 6 HOURS
Refills: 0 | Status: DISCONTINUED | OUTPATIENT
Start: 2020-09-03 | End: 2020-09-10

## 2020-09-03 RX ORDER — MIDODRINE HYDROCHLORIDE 2.5 MG/1
15 TABLET ORAL EVERY 8 HOURS
Refills: 0 | Status: DISCONTINUED | OUTPATIENT
Start: 2020-09-03 | End: 2020-09-10

## 2020-09-03 RX ORDER — QUETIAPINE FUMARATE 200 MG/1
100 TABLET, FILM COATED ORAL
Refills: 0 | Status: DISCONTINUED | OUTPATIENT
Start: 2020-09-03 | End: 2020-09-10

## 2020-09-03 RX ORDER — MIDODRINE HYDROCHLORIDE 2.5 MG/1
10 TABLET ORAL ONCE
Refills: 0 | Status: COMPLETED | OUTPATIENT
Start: 2020-09-03 | End: 2020-09-03

## 2020-09-03 RX ORDER — MAGNESIUM SULFATE 500 MG/ML
2 VIAL (ML) INJECTION ONCE
Refills: 0 | Status: COMPLETED | OUTPATIENT
Start: 2020-09-03 | End: 2020-09-03

## 2020-09-03 RX ORDER — FUROSEMIDE 40 MG
40 TABLET ORAL
Refills: 0 | Status: DISCONTINUED | OUTPATIENT
Start: 2020-09-03 | End: 2020-09-09

## 2020-09-03 RX ORDER — METHADONE HYDROCHLORIDE 40 MG/1
10 TABLET ORAL EVERY 6 HOURS
Refills: 0 | Status: DISCONTINUED | OUTPATIENT
Start: 2020-09-03 | End: 2020-09-03

## 2020-09-03 RX ADMIN — Medication 100 MILLIGRAM(S): at 11:18

## 2020-09-03 RX ADMIN — METHADONE HYDROCHLORIDE 10 MILLIGRAM(S): 40 TABLET ORAL at 21:03

## 2020-09-03 RX ADMIN — DEXMEDETOMIDINE HYDROCHLORIDE IN 0.9% SODIUM CHLORIDE 5.36 MICROGRAM(S)/KG/HR: 4 INJECTION INTRAVENOUS at 05:27

## 2020-09-03 RX ADMIN — Medication 40 MILLIGRAM(S): at 17:10

## 2020-09-03 RX ADMIN — MIDODRINE HYDROCHLORIDE 15 MILLIGRAM(S): 2.5 TABLET ORAL at 21:03

## 2020-09-03 RX ADMIN — LACTULOSE 10 GRAM(S): 10 SOLUTION ORAL at 17:10

## 2020-09-03 RX ADMIN — DEXMEDETOMIDINE HYDROCHLORIDE IN 0.9% SODIUM CHLORIDE 5.36 MICROGRAM(S)/KG/HR: 4 INJECTION INTRAVENOUS at 08:27

## 2020-09-03 RX ADMIN — Medication 2 MILLIGRAM(S): at 21:54

## 2020-09-03 RX ADMIN — FENTANYL CITRATE 3.88 MICROGRAM(S)/KG/HR: 50 INJECTION INTRAVENOUS at 19:53

## 2020-09-03 RX ADMIN — PANTOPRAZOLE SODIUM 40 MILLIGRAM(S): 20 TABLET, DELAYED RELEASE ORAL at 11:18

## 2020-09-03 RX ADMIN — SENNA PLUS 2 TABLET(S): 8.6 TABLET ORAL at 21:03

## 2020-09-03 RX ADMIN — PROPOFOL 4.66 MICROGRAM(S)/KG/MIN: 10 INJECTION, EMULSION INTRAVENOUS at 10:17

## 2020-09-03 RX ADMIN — LACTULOSE 10 GRAM(S): 10 SOLUTION ORAL at 05:23

## 2020-09-03 RX ADMIN — Medication 1 MILLIGRAM(S): at 11:18

## 2020-09-03 RX ADMIN — ENOXAPARIN SODIUM 40 MILLIGRAM(S): 100 INJECTION SUBCUTANEOUS at 11:18

## 2020-09-03 RX ADMIN — FENTANYL CITRATE 3.88 MICROGRAM(S)/KG/HR: 50 INJECTION INTRAVENOUS at 03:40

## 2020-09-03 RX ADMIN — Medication 40 MILLIGRAM(S): at 10:16

## 2020-09-03 RX ADMIN — LACTULOSE 10 GRAM(S): 10 SOLUTION ORAL at 11:18

## 2020-09-03 RX ADMIN — MIDODRINE HYDROCHLORIDE 10 MILLIGRAM(S): 2.5 TABLET ORAL at 14:41

## 2020-09-03 RX ADMIN — Medication 50 GRAM(S): at 05:23

## 2020-09-03 RX ADMIN — FENTANYL CITRATE 3.88 MICROGRAM(S)/KG/HR: 50 INJECTION INTRAVENOUS at 10:46

## 2020-09-03 RX ADMIN — Medication 1 TABLET(S): at 11:18

## 2020-09-03 RX ADMIN — DEXMEDETOMIDINE HYDROCHLORIDE IN 0.9% SODIUM CHLORIDE 5.36 MICROGRAM(S)/KG/HR: 4 INJECTION INTRAVENOUS at 15:40

## 2020-09-03 RX ADMIN — CHLORHEXIDINE GLUCONATE 1 APPLICATION(S): 213 SOLUTION TOPICAL at 05:23

## 2020-09-03 RX ADMIN — MIDODRINE HYDROCHLORIDE 10 MILLIGRAM(S): 2.5 TABLET ORAL at 05:23

## 2020-09-03 RX ADMIN — DEXMEDETOMIDINE HYDROCHLORIDE IN 0.9% SODIUM CHLORIDE 5.36 MICROGRAM(S)/KG/HR: 4 INJECTION INTRAVENOUS at 11:23

## 2020-09-03 RX ADMIN — DEXMEDETOMIDINE HYDROCHLORIDE IN 0.9% SODIUM CHLORIDE 5.36 MICROGRAM(S)/KG/HR: 4 INJECTION INTRAVENOUS at 03:40

## 2020-09-03 RX ADMIN — QUETIAPINE FUMARATE 75 MILLIGRAM(S): 200 TABLET, FILM COATED ORAL at 05:23

## 2020-09-03 RX ADMIN — METHADONE HYDROCHLORIDE 10 MILLIGRAM(S): 40 TABLET ORAL at 10:16

## 2020-09-03 RX ADMIN — DEXMEDETOMIDINE HYDROCHLORIDE IN 0.9% SODIUM CHLORIDE 5.36 MICROGRAM(S)/KG/HR: 4 INJECTION INTRAVENOUS at 10:50

## 2020-09-03 RX ADMIN — METHADONE HYDROCHLORIDE 10 MILLIGRAM(S): 40 TABLET ORAL at 17:08

## 2020-09-03 RX ADMIN — DEXMEDETOMIDINE HYDROCHLORIDE IN 0.9% SODIUM CHLORIDE 5.36 MICROGRAM(S)/KG/HR: 4 INJECTION INTRAVENOUS at 19:53

## 2020-09-03 RX ADMIN — PROPOFOL 4.66 MICROGRAM(S)/KG/MIN: 10 INJECTION, EMULSION INTRAVENOUS at 17:10

## 2020-09-03 RX ADMIN — Medication 10 MILLIGRAM(S): at 10:16

## 2020-09-03 RX ADMIN — MIDODRINE HYDROCHLORIDE 10 MILLIGRAM(S): 2.5 TABLET ORAL at 19:53

## 2020-09-03 RX ADMIN — DEXMEDETOMIDINE HYDROCHLORIDE IN 0.9% SODIUM CHLORIDE 5.36 MICROGRAM(S)/KG/HR: 4 INJECTION INTRAVENOUS at 13:40

## 2020-09-03 RX ADMIN — CHLORHEXIDINE GLUCONATE 15 MILLILITER(S): 213 SOLUTION TOPICAL at 05:23

## 2020-09-03 RX ADMIN — DEXMEDETOMIDINE HYDROCHLORIDE IN 0.9% SODIUM CHLORIDE 5.36 MICROGRAM(S)/KG/HR: 4 INJECTION INTRAVENOUS at 17:09

## 2020-09-03 RX ADMIN — CHLORHEXIDINE GLUCONATE 15 MILLILITER(S): 213 SOLUTION TOPICAL at 17:10

## 2020-09-03 RX ADMIN — PROPOFOL 4.66 MICROGRAM(S)/KG/MIN: 10 INJECTION, EMULSION INTRAVENOUS at 03:40

## 2020-09-03 RX ADMIN — Medication 1 ENEMA: at 11:48

## 2020-09-03 RX ADMIN — QUETIAPINE FUMARATE 100 MILLIGRAM(S): 200 TABLET, FILM COATED ORAL at 17:11

## 2020-09-03 NOTE — PROGRESS NOTE ADULT - SUBJECTIVE AND OBJECTIVE BOX
HPI:  Pt is a 43 yo M with h/o EtOH abuse/ withdrawal and pancreatitis. Pt presented to the ER 2 to abdominal pain, nausea and vomiting; labs and CT were consistent with pancreatitis and possible early necrosis of pancreas. Pt transferred to ICU om 8/18  for severe alcohol withdrawal. Pt intubated on 8/23 2 to hypoxic resp failure requiring intubation 2 to worsening PNA. CXR 9/1/2020 reveals b/l PNA and pt still hypoxic c/w ARDS      ## Labs:  CBC:                        7.8    16.15 )-----------( 414      ( 03 Sep 2020 02:53 )             24.6     Chem:  09-03    140  |  107  |  15  ----------------------------<  119<H>  3.8   |  29  |  0.52    Ca    8.4<L>      03 Sep 2020 02:53  Phos  4.0     09-03  Mg     1.7     09-03    TPro  5.5<L>  /  Alb  1.1<L>  /  TBili  3.2<H>  /  DBili  x   /  AST  133<H>  /  ALT  86<H>  /  AlkPhos  415<H>  09-02    Coags:          ## Imaging:    ## Medications:    furosemide    Tablet 40 milliGRAM(s) Oral two times a day  midodrine 10 milliGRAM(s) Oral every 8 hours    albuterol/ipratropium for Nebulization 3 milliLiter(s) Nebulizer every 6 hours PRN      enoxaparin Injectable 40 milliGRAM(s) SubCutaneous daily    lactulose Syrup 10 Gram(s) Oral four times a day  pantoprazole  Injectable 40 milliGRAM(s) IV Push daily  senna 2 Tablet(s) Oral at bedtime    acetaminophen   Tablet .. 650 milliGRAM(s) Oral every 6 hours PRN  dexMEDEtomidine Infusion 0.276 MICROgram(s)/kG/Hr IV Continuous <Continuous>  fentaNYL   Infusion. 0.5 MICROgram(s)/kG/Hr IV Continuous <Continuous>  methadone    Tablet 10 milliGRAM(s) Oral every 6 hours  propofol Infusion 10.009 MICROgram(s)/kG/Min IV Continuous <Continuous>  QUEtiapine 75 milliGRAM(s) Oral two times a day      ## Vitals:  T(C): 38.4 (09-03-20 @ 11:30), Max: 38.4 (09-03-20 @ 11:30)  HR: 102 (09-03-20 @ 12:08) (86 - 156)  BP: 107/67 (09-03-20 @ 12:00) (90/53 - 132/65)  BP(mean): 74 (09-03-20 @ 12:00) (59 - 84)  RR: 16 (09-03-20 @ 12:00) (12 - 29)  SpO2: 93% (09-03-20 @ 12:08) (87% - 100%)  Wt(kg): --  Vent: Mode: AC/ CMV (Assist Control/ Continuous Mandatory Ventilation), RR (machine): 14, RR (patient): 16, TV (machine): 450, FiO2: 50, PEEP: 10, PIP: 22  ABG: ABG - ( 02 Sep 2020 10:24 )  pH, Arterial: x     pH, Blood: 7.36  /  pCO2: 49    /  pO2: 59    / HCO3: 27    / Base Excess: 2.2   /  SaO2: 88                    09-02 @ 07:01  -  09-03 @ 07:00  --------------------------------------------------------  IN: 3001.6 mL / OUT: 1390 mL / NET: 1611.6 mL    09-03 @ 07:01  -  09-03 @ 13:39  --------------------------------------------------------  IN: 274.8 mL / OUT: 555 mL / NET: -280.2 mL          ## P/E:  Gen: lying comfortably in bed in no apparent distress  Mouth: (+) ETT  Lungs: R coarse BS  Heart: Tachy  Abd: Distended but soft with (+) BS  Ext: Edema  Neuro: Calm/ Lightly sedated    CENTRAL LINE: [ ] YES [ ] NO  LOCATION:   DATE INSERTED:  REMOVE: [ ] YES [ ] NO      ALDAIR: [ ] YES [ ] NO    DATE INSERTED:  REMOVE:  [ ] YES [ ] NO      A-LINE:  [ ] YES [ ] NO  LOCATION:   DATE INSERTED:  REMOVE:  [ ] YES [ ] NO  EXPLAIN:    CODE STATUS: [x] full code  [ ] DNR  [ ] DNI  [ ] MOLST  Goals of care discussion: [ ] yes

## 2020-09-03 NOTE — PROGRESS NOTE ADULT - ASSESSMENT
Pt is a 45 yo M with h/o EtOH abuse/ withdrawal and pancreatitis. Pt presented to the ER 2 to abdominal pain, nausea and vomiting; labs and CT were consistent with pancreatitis and possible early necrosis of pancreas. Pt transferred to ICU om 8/18  for severe alcohol withdrawal. Pt intubated on 8/23 2 to hypoxic resp failure requiring intubation 2 to worsening PNA. CXR 9/1/2020 reveals b/l PNA and pt still hypoxic c/w ARDS    Resp: Has been failing SBT when sedation has been decreased; desats and becomes tachycardic; returned to current vent settings (may allow O2 sats in the low 90s/ If the pt does not tolerate SBT by the end of this week will need trach  ID: Meropenem dc'd 9/2/2020/ F/u Cx  CVS/Reanl: Cont Midodrine for borderline low BP/ Pt net positive ins; start standing Lasix  FEN: Feeds held 2 to increased residuals; restart feeds at 20cc/hr/ Cont daily Thiamine, MVI and Folate   GI: Surgical f/u prn/ Pancreas U/S noted  Neuro/Psych:Cont Seroquel 75 mg bid and start standing Methadone with hope of making pt calm and weaning off Fentanyl drip

## 2020-09-03 NOTE — PROGRESS NOTE ADULT - SUBJECTIVE AND OBJECTIVE BOX
Surgery Reconsulted for possible Trach placement due to respiratory failure.    Vital Signs Last 24 Hrs  T(C): 38.4 (03 Sep 2020 11:30), Max: 38.4 (03 Sep 2020 11:30)  T(F): 101.2 (03 Sep 2020 11:30), Max: 101.2 (03 Sep 2020 11:30)  HR: 111 (03 Sep 2020 13:30) (88 - 156)  BP: 109/66 (03 Sep 2020 13:30) (90/53 - 132/65)  BP(mean): 75 (03 Sep 2020 13:30) (59 - 84)  RR: 18 (03 Sep 2020 13:30) (12 - 29)  SpO2: 94% (03 Sep 2020 13:30) (87% - 100%)    PHYSICAL EXAM:    GENERAL: NAD. Intubated, sedated  CHEST/LUNG: Clear to ausculation, bilaterally   HEART: S1S2  ABDOMEN: Softly distended, nontender, no guarding   EXTREMITIES:  calf soft, non tender b/l. 2+ distal pulses b/l.     I&O's Detail    02 Sep 2020 07:  -  03 Sep 2020 07:00  --------------------------------------------------------  IN:    dexmedetomidine Infusion: 145.5 mL    dexmedetomidine Infusion: 541.3 mL    fentaNYL Infusion.: 542.8 mL    fentaNYL Infusion.: 132 mL    IV PiggyBack: 50 mL    ns in tub fed vital1: 1360 mL    propofol Infusion: 190.5 mL    propofol Infusion: 39.6 mL  Total IN: 3001.6 mL    OUT:    Indwelling Catheter - Urethral: 1390 mL  Total OUT: 1390 mL    Total NET: 1611.6 mL      03 Sep 2020 07:01  -  03 Sep 2020 15:16  --------------------------------------------------------  IN:    dexmedetomidine Infusion: 203.7 mL    fentaNYL Infusion.: 217 mL    propofol Infusion: 68.6 mL  Total IN: 489.3 mL    OUT:    Indwelling Catheter - Urethral: 905 mL  Total OUT: 905 mL    Total NET: -415.7 mL      LABS:                        7.8    16.15 )-----------( 414      ( 03 Sep 2020 02:53 )             24.6     -    140  |  107  |  15  ----------------------------<  119<H>  3.8   |  29  |  0.52    Ca    8.4<L>      03 Sep 2020 02:53  Phos  4.0       Mg     1.7         TPro  5.5<L>  /  Alb  1.1<L>  /  TBili  3.2<H>  /  DBili  x   /  AST  133<H>  /  ALT  86<H>  /  AlkPhos  415<H>        Urinalysis Basic - ( 02 Sep 2020 10:49 )    Color: Yellow / Appearance: Clear / S.010 / pH: x  Gluc: x / Ketone: Negative  / Bili: Negative / Urobili: Negative mg/dL   Blood: x / Protein: Negative mg/dL / Nitrite: Negative   Leuk Esterase: Trace / RBC: 0-2 /HPF / WBC 0-2   Sq Epi: x / Non Sq Epi: x / Bacteria: x      A/P: 43 yo M with h/o EtOH abuse/ withdrawal and pancreatitis. Pt presented to the ER with abdominal pain, nausea and vomiting; labs and CT were consistent with pancreatitis and possible early necrosis of pancreas. Pt transferred to ICU om   for severe alcohol withdrawal. Pt intubated on  2 to hypoxic resp failure requiring intubation 2 to worsening PNA. CXR 2020 reveals b/l PNA and pt still hypoxic c/w ARDS  Surgery re consulted for Respiratory failure and possible Trach placement    Continue ICU management, wean to extubate per ICU  Trach planning once medically optimized and if fails trial of extubation as planned by ICU  Discussed with Dr. Adams

## 2020-09-04 LAB
ALBUMIN SERPL ELPH-MCNC: 1 G/DL — LOW (ref 3.3–5)
ALP SERPL-CCNC: 425 U/L — HIGH (ref 40–120)
ALT FLD-CCNC: 67 U/L — SIGNIFICANT CHANGE UP (ref 12–78)
ANION GAP SERPL CALC-SCNC: 6 MMOL/L — SIGNIFICANT CHANGE UP (ref 5–17)
AST SERPL-CCNC: 121 U/L — HIGH (ref 15–37)
BILIRUB SERPL-MCNC: 2 MG/DL — HIGH (ref 0.2–1.2)
BUN SERPL-MCNC: 14 MG/DL — SIGNIFICANT CHANGE UP (ref 7–23)
CALCIUM SERPL-MCNC: 8.6 MG/DL — SIGNIFICANT CHANGE UP (ref 8.5–10.1)
CHLORIDE SERPL-SCNC: 105 MMOL/L — SIGNIFICANT CHANGE UP (ref 96–108)
CO2 SERPL-SCNC: 27 MMOL/L — SIGNIFICANT CHANGE UP (ref 22–31)
CREAT SERPL-MCNC: 0.53 MG/DL — SIGNIFICANT CHANGE UP (ref 0.5–1.3)
CULTURE RESULTS: SIGNIFICANT CHANGE UP
GLUCOSE SERPL-MCNC: 97 MG/DL — SIGNIFICANT CHANGE UP (ref 70–99)
GRAM STN FLD: SIGNIFICANT CHANGE UP
HCT VFR BLD CALC: 23.9 % — LOW (ref 39–50)
HGB BLD-MCNC: 7.8 G/DL — LOW (ref 13–17)
MAGNESIUM SERPL-MCNC: 1.4 MG/DL — LOW (ref 1.6–2.6)
MCHC RBC-ENTMCNC: 32.6 GM/DL — SIGNIFICANT CHANGE UP (ref 32–36)
MCHC RBC-ENTMCNC: 32.8 PG — SIGNIFICANT CHANGE UP (ref 27–34)
MCV RBC AUTO: 100.4 FL — HIGH (ref 80–100)
NRBC # BLD: 0 /100 WBCS — SIGNIFICANT CHANGE UP (ref 0–0)
PHOSPHATE SERPL-MCNC: 5.2 MG/DL — HIGH (ref 2.5–4.5)
PLATELET # BLD AUTO: 319 K/UL — SIGNIFICANT CHANGE UP (ref 150–400)
POTASSIUM SERPL-MCNC: 3.6 MMOL/L — SIGNIFICANT CHANGE UP (ref 3.5–5.3)
POTASSIUM SERPL-SCNC: 3.6 MMOL/L — SIGNIFICANT CHANGE UP (ref 3.5–5.3)
PROT SERPL-MCNC: 5.5 GM/DL — LOW (ref 6–8.3)
RBC # BLD: 2.38 M/UL — LOW (ref 4.2–5.8)
RBC # FLD: 15.3 % — HIGH (ref 10.3–14.5)
SODIUM SERPL-SCNC: 138 MMOL/L — SIGNIFICANT CHANGE UP (ref 135–145)
SPECIMEN SOURCE: SIGNIFICANT CHANGE UP
TRIGL SERPL-MCNC: 263 MG/DL — HIGH (ref 10–149)
WBC # BLD: 15.44 K/UL — HIGH (ref 3.8–10.5)
WBC # FLD AUTO: 15.44 K/UL — HIGH (ref 3.8–10.5)

## 2020-09-04 PROCEDURE — 99291 CRITICAL CARE FIRST HOUR: CPT

## 2020-09-04 RX ORDER — MAGNESIUM SULFATE 500 MG/ML
2 VIAL (ML) INJECTION
Refills: 0 | Status: COMPLETED | OUTPATIENT
Start: 2020-09-04 | End: 2020-09-04

## 2020-09-04 RX ORDER — DIAZEPAM 5 MG
5 TABLET ORAL EVERY 6 HOURS
Refills: 0 | Status: DISCONTINUED | OUTPATIENT
Start: 2020-09-04 | End: 2020-09-05

## 2020-09-04 RX ORDER — HALOPERIDOL DECANOATE 100 MG/ML
5 INJECTION INTRAMUSCULAR ONCE
Refills: 0 | Status: COMPLETED | OUTPATIENT
Start: 2020-09-04 | End: 2020-09-04

## 2020-09-04 RX ORDER — POTASSIUM CHLORIDE 20 MEQ
40 PACKET (EA) ORAL ONCE
Refills: 0 | Status: COMPLETED | OUTPATIENT
Start: 2020-09-04 | End: 2020-09-04

## 2020-09-04 RX ADMIN — LACTULOSE 10 GRAM(S): 10 SOLUTION ORAL at 05:04

## 2020-09-04 RX ADMIN — Medication 50 GRAM(S): at 06:13

## 2020-09-04 RX ADMIN — FENTANYL CITRATE 3.88 MICROGRAM(S)/KG/HR: 50 INJECTION INTRAVENOUS at 11:46

## 2020-09-04 RX ADMIN — Medication 40 MILLIGRAM(S): at 05:05

## 2020-09-04 RX ADMIN — LACTULOSE 10 GRAM(S): 10 SOLUTION ORAL at 23:59

## 2020-09-04 RX ADMIN — MIDODRINE HYDROCHLORIDE 15 MILLIGRAM(S): 2.5 TABLET ORAL at 14:14

## 2020-09-04 RX ADMIN — HALOPERIDOL DECANOATE 5 MILLIGRAM(S): 100 INJECTION INTRAMUSCULAR at 23:59

## 2020-09-04 RX ADMIN — CHLORHEXIDINE GLUCONATE 15 MILLILITER(S): 213 SOLUTION TOPICAL at 05:05

## 2020-09-04 RX ADMIN — METHADONE HYDROCHLORIDE 10 MILLIGRAM(S): 40 TABLET ORAL at 05:04

## 2020-09-04 RX ADMIN — MIDODRINE HYDROCHLORIDE 15 MILLIGRAM(S): 2.5 TABLET ORAL at 05:04

## 2020-09-04 RX ADMIN — SENNA PLUS 2 TABLET(S): 8.6 TABLET ORAL at 22:23

## 2020-09-04 RX ADMIN — PANTOPRAZOLE SODIUM 40 MILLIGRAM(S): 20 TABLET, DELAYED RELEASE ORAL at 11:46

## 2020-09-04 RX ADMIN — METHADONE HYDROCHLORIDE 10 MILLIGRAM(S): 40 TABLET ORAL at 23:59

## 2020-09-04 RX ADMIN — MIDODRINE HYDROCHLORIDE 15 MILLIGRAM(S): 2.5 TABLET ORAL at 22:23

## 2020-09-04 RX ADMIN — DEXMEDETOMIDINE HYDROCHLORIDE IN 0.9% SODIUM CHLORIDE 5.36 MICROGRAM(S)/KG/HR: 4 INJECTION INTRAVENOUS at 09:49

## 2020-09-04 RX ADMIN — Medication 5 MILLIGRAM(S): at 11:54

## 2020-09-04 RX ADMIN — METHADONE HYDROCHLORIDE 10 MILLIGRAM(S): 40 TABLET ORAL at 10:09

## 2020-09-04 RX ADMIN — QUETIAPINE FUMARATE 100 MILLIGRAM(S): 200 TABLET, FILM COATED ORAL at 17:27

## 2020-09-04 RX ADMIN — LACTULOSE 10 GRAM(S): 10 SOLUTION ORAL at 11:46

## 2020-09-04 RX ADMIN — DEXMEDETOMIDINE HYDROCHLORIDE IN 0.9% SODIUM CHLORIDE 5.36 MICROGRAM(S)/KG/HR: 4 INJECTION INTRAVENOUS at 20:34

## 2020-09-04 RX ADMIN — QUETIAPINE FUMARATE 100 MILLIGRAM(S): 200 TABLET, FILM COATED ORAL at 05:04

## 2020-09-04 RX ADMIN — Medication 1 MILLIGRAM(S): at 11:47

## 2020-09-04 RX ADMIN — DEXMEDETOMIDINE HYDROCHLORIDE IN 0.9% SODIUM CHLORIDE 5.36 MICROGRAM(S)/KG/HR: 4 INJECTION INTRAVENOUS at 11:45

## 2020-09-04 RX ADMIN — LACTULOSE 10 GRAM(S): 10 SOLUTION ORAL at 17:21

## 2020-09-04 RX ADMIN — ENOXAPARIN SODIUM 40 MILLIGRAM(S): 100 INJECTION SUBCUTANEOUS at 11:46

## 2020-09-04 RX ADMIN — METHADONE HYDROCHLORIDE 10 MILLIGRAM(S): 40 TABLET ORAL at 17:21

## 2020-09-04 RX ADMIN — Medication 50 GRAM(S): at 05:31

## 2020-09-04 RX ADMIN — DEXMEDETOMIDINE HYDROCHLORIDE IN 0.9% SODIUM CHLORIDE 5.36 MICROGRAM(S)/KG/HR: 4 INJECTION INTRAVENOUS at 00:36

## 2020-09-04 RX ADMIN — Medication 5 MILLIGRAM(S): at 23:59

## 2020-09-04 RX ADMIN — DEXMEDETOMIDINE HYDROCHLORIDE IN 0.9% SODIUM CHLORIDE 5.36 MICROGRAM(S)/KG/HR: 4 INJECTION INTRAVENOUS at 23:27

## 2020-09-04 RX ADMIN — Medication 40 MILLIGRAM(S): at 17:22

## 2020-09-04 RX ADMIN — CHLORHEXIDINE GLUCONATE 1 APPLICATION(S): 213 SOLUTION TOPICAL at 04:17

## 2020-09-04 RX ADMIN — Medication 1 TABLET(S): at 11:47

## 2020-09-04 RX ADMIN — PROPOFOL 4.66 MICROGRAM(S)/KG/MIN: 10 INJECTION, EMULSION INTRAVENOUS at 11:45

## 2020-09-04 RX ADMIN — Medication 5 MILLIGRAM(S): at 17:21

## 2020-09-04 RX ADMIN — Medication 40 MILLIEQUIVALENT(S): at 05:30

## 2020-09-04 RX ADMIN — LACTULOSE 10 GRAM(S): 10 SOLUTION ORAL at 01:19

## 2020-09-04 RX ADMIN — DEXMEDETOMIDINE HYDROCHLORIDE IN 0.9% SODIUM CHLORIDE 5.36 MICROGRAM(S)/KG/HR: 4 INJECTION INTRAVENOUS at 17:22

## 2020-09-04 RX ADMIN — CHLORHEXIDINE GLUCONATE 15 MILLILITER(S): 213 SOLUTION TOPICAL at 17:20

## 2020-09-04 RX ADMIN — Medication 100 MILLIGRAM(S): at 11:47

## 2020-09-04 NOTE — PROGRESS NOTE ADULT - SUBJECTIVE AND OBJECTIVE BOX
HPI:  Pt is a 45 yo M with h/o EtOH abuse/ withdrawal and pancreatitis. Pt presented to the ER 2 to abdominal pain, nausea and vomiting; labs and CT were consistent with pancreatitis and possible early necrosis of pancreas. Pt transferred to ICU om 8/18  for severe alcohol withdrawal. Pt intubated on 8/23 2 to hypoxic resp failure requiring intubation 2 to worsening PNA. CXR 9/1/2020 reveals b/l PNA and pt still hypoxic c/w ARDS.      ## Labs:  CBC:                        7.8    15.44 )-----------( 319      ( 04 Sep 2020 03:35 )             23.9       Chem:  09-04    138  |  105  |  14  ----------------------------<  97  3.6   |  27  |  0.53    Ca    8.6      04 Sep 2020 03:35  Phos  5.2     09-04  Mg     1.4     09-04    TPro  5.5<L>  /  Alb  1.0<L>  /  TBili  2.0<H>  /  DBili  x   /  AST  121<H>  /  ALT  67  /  AlkPhos  425<H>  09-04    Coags:          ## Imaging:  < from: Xray Kidney Ureter Bladder (09.03.20 @ 13:08) >  Nasogastric tube tip in the stomach. There is mild segmental gaseous distention of some small bowel and large bowel loops in the upper abdomen consistent with ileus. No mechanical bowel obstruction. Pelvic phlebolith.      ## Medications:  MEDICATIONS  (STANDING):  dexMEDEtomidine Infusion 0.276 MICROgram(s)/kG/Hr (5.36 mL/Hr) IV Continuous <Continuous>  enoxaparin Injectable 40 milliGRAM(s) SubCutaneous daily  fentaNYL   Infusion. 0.5 MICROgram(s)/kG/Hr (3.88 mL/Hr) IV Continuous <Continuous>  folic acid 1 milliGRAM(s) Oral daily  furosemide    Tablet 40 milliGRAM(s) Oral two times a day  lactulose Syrup 10 Gram(s) Oral four times a day  methadone    Tablet 10 milliGRAM(s) Oral every 6 hours  midodrine 15 milliGRAM(s) Oral every 8 hours  pantoprazole  Injectable 40 milliGRAM(s) IV Push daily  propofol Infusion 10.009 MICROgram(s)/kG/Min (4.66 mL/Hr) IV Continuous <Continuous>  QUEtiapine 100 milliGRAM(s) Oral two times a day  senna 2 Tablet(s) Oral at bedtime  thiamine 100 milliGRAM(s) Oral daily    MEDICATIONS  (PRN):  acetaminophen   Tablet .. 650 milliGRAM(s) Oral every 6 hours PRN Temp greater or equal to 38.5C (101.3F)  albuterol/ipratropium for Nebulization 3 milliLiter(s) Nebulizer every 6 hours PRN Shortness of Breath and/or Wheezing      ## Vitals:  ICU Vital Signs Last 24 Hrs  T(C): 36.8 (04 Sep 2020 07:42), Max: 38.4 (03 Sep 2020 11:30)  T(F): 98.3 (04 Sep 2020 07:42), Max: 101.2 (03 Sep 2020 11:30)  HR: 87 (04 Sep 2020 07:00) (84 - 120)  BP: 95/57 (04 Sep 2020 07:00) (86/48 - 123/74)  BP(mean): 66 (04 Sep 2020 07:00) (57 - 84)  RR: 12 (04 Sep 2020 07:00) (7 - 22)  SpO2: 98% (04 Sep 2020 07:00) (87% - 98%)      I&O's Summary    03 Sep 2020 07:01  -  04 Sep 2020 07:00  --------------------------------------------------------  IN: 1815.5 mL / OUT: 3185 mL / NET: -1369.5 mL    04 Sep 2020 07:01  -  04 Sep 2020 08:15  --------------------------------------------------------  IN: 0 mL / OUT: 360 mL / NET: -360 mL        ## P/E:  Gen: lying comfortably in bed in no apparent distress, eyes slightly opened  Mouth: (+) ETT  Lungs: coarse BS b/l  Heart: Tachy, no murmur or gallop  Abd: Distended and firm with (+) BS  Ext: 2+ pitting edema  Neuro: Calm/ Lightly sedated    CENTRAL LINE: [ ] YES [ ] NO  LOCATION:   DATE INSERTED:  REMOVE: [ ] YES [ ] NO      QUINTEROS: [ ] YES [ ] NO    DATE INSERTED:  REMOVE:  [ ] YES [ ] NO      A-LINE:  [ ] YES [ ] NO  LOCATION:   DATE INSERTED:  REMOVE:  [ ] YES [ ] NO  EXPLAIN:    CODE STATUS: [x] full code  [ ] DNR  [ ] DNI  [ ] MOLST  Goals of care discussion: [ ] yes HPI:  Pt is a 43 yo M with h/o EtOH abuse/ withdrawal and pancreatitis. Pt presented to the ER 2 to abdominal pain, nausea and vomiting; labs and CT were consistent with pancreatitis and possible early necrosis of pancreas. Pt transferred to ICU om 8/18  for severe alcohol withdrawal. Pt intubated on 8/23 2 to hypoxic resp failure requiring intubation 2 to worsening PNA. CXR 9/1/2020 reveals b/l PNA and pt still hypoxic c/w ARDS.      ## Labs:  CBC:                        7.8    15.44 )-----------( 319      ( 04 Sep 2020 03:35 )             23.9       Chem:  09-04    138  |  105  |  14  ----------------------------<  97  3.6   |  27  |  0.53    Ca    8.6      04 Sep 2020 03:35  Phos  5.2     09-04  Mg     1.4     09-04    TPro  5.5<L>  /  Alb  1.0<L>  /  TBili  2.0<H>  /  DBili  x   /  AST  121<H>  /  ALT  67  /  AlkPhos  425<H>  09-04    Coags:          ## Imaging:  < from: Xray Kidney Ureter Bladder (09.03.20 @ 13:08) >  Nasogastric tube tip in the stomach. There is mild segmental gaseous distention of some small bowel and large bowel loops in the upper abdomen consistent with ileus. No mechanical bowel obstruction. Pelvic phlebolith.      ## Medications:  MEDICATIONS  (STANDING):  dexMEDEtomidine Infusion 0.276 MICROgram(s)/kG/Hr (5.36 mL/Hr) IV Continuous <Continuous>  enoxaparin Injectable 40 milliGRAM(s) SubCutaneous daily  fentaNYL   Infusion. 0.5 MICROgram(s)/kG/Hr (3.88 mL/Hr) IV Continuous <Continuous>  folic acid 1 milliGRAM(s) Oral daily  furosemide    Tablet 40 milliGRAM(s) Oral two times a day  lactulose Syrup 10 Gram(s) Oral four times a day  methadone    Tablet 10 milliGRAM(s) Oral every 6 hours  midodrine 15 milliGRAM(s) Oral every 8 hours  pantoprazole  Injectable 40 milliGRAM(s) IV Push daily  propofol Infusion 10.009 MICROgram(s)/kG/Min (4.66 mL/Hr) IV Continuous <Continuous>  QUEtiapine 100 milliGRAM(s) Oral two times a day  senna 2 Tablet(s) Oral at bedtime  thiamine 100 milliGRAM(s) Oral daily    MEDICATIONS  (PRN):  acetaminophen   Tablet .. 650 milliGRAM(s) Oral every 6 hours PRN Temp greater or equal to 38.5C (101.3F)  albuterol/ipratropium for Nebulization 3 milliLiter(s) Nebulizer every 6 hours PRN Shortness of Breath and/or Wheezing      ## Vitals:  ICU Vital Signs Last 24 Hrs  T(C): 36.8 (04 Sep 2020 07:42), Max: 38.4 (03 Sep 2020 11:30)  T(F): 98.3 (04 Sep 2020 07:42), Max: 101.2 (03 Sep 2020 11:30)  HR: 87 (04 Sep 2020 07:00) (84 - 120)  BP: 95/57 (04 Sep 2020 07:00) (86/48 - 123/74)  BP(mean): 66 (04 Sep 2020 07:00) (57 - 84)  RR: 12 (04 Sep 2020 07:00) (7 - 22)  SpO2: 98% (04 Sep 2020 07:00) (87% - 98%)      I&O's Summary    03 Sep 2020 07:01  -  04 Sep 2020 07:00  --------------------------------------------------------  IN: 1815.5 mL / OUT: 3185 mL / NET: -1369.5 mL    04 Sep 2020 07:01  -  04 Sep 2020 08:15  --------------------------------------------------------  IN: 0 mL / OUT: 360 mL / NET: -360 mL    Mode: AC/ CMV (Assist Control/ Continuous Mandatory Ventilation)  RR (machine): 14  TV (machine): 450  FiO2: 50  PEEP: 10  ITime: 1  MAP: 12  PIP: 29    ## P/E:  Gen: lying comfortably in bed in no apparent distress, eyes slightly opened  Mouth: (+) ETT  Lungs: coarse BS b/l  Heart: Tachy, no murmur or gallop  Abd: Distended and firm with (+) BS  Ext: 2+ pitting edema  Neuro: Calm/ Lightly sedated    CENTRAL LINE: [ ] YES [ ] NO  LOCATION:   DATE INSERTED:  REMOVE: [ ] YES [ ] NO      QUINTEROS: [ ] YES [ ] NO    DATE INSERTED:  REMOVE:  [ ] YES [ ] NO      A-LINE:  [ ] YES [ ] NO  LOCATION:   DATE INSERTED:  REMOVE:  [ ] YES [ ] NO  EXPLAIN:    CODE STATUS: [x] full code  [ ] DNR  [ ] DNI  [ ] MOLST  Goals of care discussion: [ ] yes HPI:  Pt is a 45 yo M with h/o EtOH abuse/ withdrawal and pancreatitis. Pt presented to the ER 2 to abdominal pain, nausea and vomiting; labs and CT were consistent with pancreatitis and possible early necrosis of pancreas. Pt transferred to ICU om 8/18  for severe alcohol withdrawal. Pt intubated on 8/23 2 to hypoxic resp failure requiring intubation 2 to worsening PNA. CXR 9/1/2020 reveals b/l PNA and pt still hypoxic c/w ARDS.    Overnight, the patient had fever (Tmax 100.9). His fever resolved in this morning. In addition, the patient had a regular bowel movement last night. The nurse reported that the stool was normal with no concerning findings. Because PEEP was greater than 8 last night, the team did not wean off fentanyl and propofol.     ## Labs:  CBC:                        7.8    15.44 )-----------( 319      ( 04 Sep 2020 03:35 )             23.9       Chem:  09-04    138  |  105  |  14  ----------------------------<  97  3.6   |  27  |  0.53    Ca    8.6      04 Sep 2020 03:35  Phos  5.2     09-04  Mg     1.4     09-04    TPro  5.5<L>  /  Alb  1.0<L>  /  TBili  2.0<H>  /  DBili  x   /  AST  121<H>  /  ALT  67  /  AlkPhos  425<H>  09-04      Culture - Blood #1 (09.02.20 @ 15:04)    Specimen Source: .Blood Blood-Peripheral    Culture Results:   No growth to date.    Culture - Blood #2 (09.02.20 @ 15:04)    Specimen Source: .Blood Blood-Peripheral    Culture Results:   No growth to date.    Culture - Sputum . (09.02.20 @ 15:03)    Gram Stain:   Moderate polymorphonuclear leukocytes per low power field  Rare Squamous epithelial cells per low power field  Rare Gram positive cocci in pairs per oil power field    Specimen Source: .Sputum Sputum    Culture Results:   Normal Respiratory Poonam present        ## Imaging:  < from: Xray Kidney Ureter Bladder (09.03.20 @ 13:08) >  Nasogastric tube tip in the stomach. There is mild segmental gaseous distention of some small bowel and large bowel loops in the upper abdomen consistent with ileus. No mechanical bowel obstruction. Pelvic phlebolith.      ## Medications:  MEDICATIONS  (STANDING):  dexMEDEtomidine Infusion 0.276 MICROgram(s)/kG/Hr (5.36 mL/Hr) IV Continuous <Continuous>  enoxaparin Injectable 40 milliGRAM(s) SubCutaneous daily  fentaNYL   Infusion. 0.5 MICROgram(s)/kG/Hr (3.88 mL/Hr) IV Continuous <Continuous>  folic acid 1 milliGRAM(s) Oral daily  furosemide    Tablet 40 milliGRAM(s) Oral two times a day  lactulose Syrup 10 Gram(s) Oral four times a day  methadone    Tablet 10 milliGRAM(s) Oral every 6 hours  midodrine 15 milliGRAM(s) Oral every 8 hours  pantoprazole  Injectable 40 milliGRAM(s) IV Push daily  propofol Infusion 10.009 MICROgram(s)/kG/Min (4.66 mL/Hr) IV Continuous <Continuous>  QUEtiapine 100 milliGRAM(s) Oral two times a day  senna 2 Tablet(s) Oral at bedtime  thiamine 100 milliGRAM(s) Oral daily    MEDICATIONS  (PRN):  acetaminophen   Tablet .. 650 milliGRAM(s) Oral every 6 hours PRN Temp greater or equal to 38.5C (101.3F)  albuterol/ipratropium for Nebulization 3 milliLiter(s) Nebulizer every 6 hours PRN Shortness of Breath and/or Wheezing      ## Vitals:  ICU Vital Signs Last 24 Hrs  T(C): 36.8 (04 Sep 2020 07:42), Max: 38.4 (03 Sep 2020 11:30)  T(F): 98.3 (04 Sep 2020 07:42), Max: 101.2 (03 Sep 2020 11:30)  HR: 87 (04 Sep 2020 07:00) (84 - 120)  BP: 95/57 (04 Sep 2020 07:00) (86/48 - 123/74)  BP(mean): 66 (04 Sep 2020 07:00) (57 - 84)  RR: 12 (04 Sep 2020 07:00) (7 - 22)  SpO2: 98% (04 Sep 2020 07:00) (87% - 98%)      I&O's Summary    03 Sep 2020 07:01  -  04 Sep 2020 07:00  --------------------------------------------------------  IN: 1815.5 mL / OUT: 3185 mL / NET: -1369.5 mL    04 Sep 2020 07:01  -  04 Sep 2020 08:15  --------------------------------------------------------  IN: 0 mL / OUT: 360 mL / NET: -360 mL    Mode: AC/ CMV (Assist Control/ Continuous Mandatory Ventilation)  RR (machine): 14  TV (machine): 450  FiO2: 50  PEEP: 10  ITime: 1  MAP: 12  PIP: 29    ## P/E:  Gen: lying comfortably in bed in no apparent distress, eyes slightly opened  Mouth: (+) ETT  Lungs: coarse BS b/l  Heart: Tachy, no murmur or gallop  Abd: Distended and firm with (+) BS  Ext: 2+ pitting edema  Neuro: Calm/ Lightly sedated    CENTRAL LINE: [ ] YES [ ] NO  LOCATION:   DATE INSERTED:  REMOVE: [ ] YES [ ] NO      ALDAIR: [ ] YES [ ] NO    DATE INSERTED:  REMOVE:  [ ] YES [ ] NO      A-LINE:  [ ] YES [ ] NO  LOCATION:   DATE INSERTED:  REMOVE:  [ ] YES [ ] NO  EXPLAIN:    CODE STATUS: [x] full code  [ ] DNR  [ ] DNI  [ ] [facility]  Goals of care discussion: [ ] yes HPI:  Pt is a 43 yo M with h/o EtOH abuse/ withdrawal and pancreatitis. Pt presented to the ER 2 to abdominal pain, nausea and vomiting; labs and CT were consistent with pancreatitis and possible early necrosis of pancreas. Pt transferred to ICU om 8/18  for severe alcohol withdrawal. Pt intubated on 8/23 2 to hypoxic resp failure requiring intubation 2 to worsening PNA. CXR 9/1/2020 reveals b/l PNA and pt still hypoxic c/w ARDS.    Overnight, the patient had fever (Tmax 100.9). His fever resolved in this morning. In addition, the patient had a regular bowel movement last night. The nurse reported that the stool was normal with no concerning findings. Because PEEP was greater than 8 last night, the team did not wean off fentanyl and propofol. If possible, SBT will be performed in this morning.     ## Labs:  CBC:                        7.8    15.44 )-----------( 319      ( 04 Sep 2020 03:35 )             23.9       Chem:  09-04    138  |  105  |  14  ----------------------------<  97  3.6   |  27  |  0.53    Ca    8.6      04 Sep 2020 03:35  Phos  5.2     09-04  Mg     1.4     09-04    TPro  5.5<L>  /  Alb  1.0<L>  /  TBili  2.0<H>  /  DBili  x   /  AST  121<H>  /  ALT  67  /  AlkPhos  425<H>  09-04      Culture - Blood #1 (09.02.20 @ 15:04)    Specimen Source: .Blood Blood-Peripheral    Culture Results:   No growth to date.    Culture - Blood #2 (09.02.20 @ 15:04)    Specimen Source: .Blood Blood-Peripheral    Culture Results:   No growth to date.    Culture - Sputum . (09.02.20 @ 15:03)    Gram Stain:   Moderate polymorphonuclear leukocytes per low power field  Rare Squamous epithelial cells per low power field  Rare Gram positive cocci in pairs per oil power field    Specimen Source: .Sputum Sputum    Culture Results:   Normal Respiratory Poonam present        ## Imaging:  < from: Xray Kidney Ureter Bladder (09.03.20 @ 13:08) >  Nasogastric tube tip in the stomach. There is mild segmental gaseous distention of some small bowel and large bowel loops in the upper abdomen consistent with ileus. No mechanical bowel obstruction. Pelvic phlebolith.      ## Medications:  MEDICATIONS  (STANDING):  dexMEDEtomidine Infusion 0.276 MICROgram(s)/kG/Hr (5.36 mL/Hr) IV Continuous <Continuous>  enoxaparin Injectable 40 milliGRAM(s) SubCutaneous daily  fentaNYL   Infusion. 0.5 MICROgram(s)/kG/Hr (3.88 mL/Hr) IV Continuous <Continuous>  folic acid 1 milliGRAM(s) Oral daily  furosemide    Tablet 40 milliGRAM(s) Oral two times a day  lactulose Syrup 10 Gram(s) Oral four times a day  methadone    Tablet 10 milliGRAM(s) Oral every 6 hours  midodrine 15 milliGRAM(s) Oral every 8 hours  pantoprazole  Injectable 40 milliGRAM(s) IV Push daily  propofol Infusion 10.009 MICROgram(s)/kG/Min (4.66 mL/Hr) IV Continuous <Continuous>  QUEtiapine 100 milliGRAM(s) Oral two times a day  senna 2 Tablet(s) Oral at bedtime  thiamine 100 milliGRAM(s) Oral daily    MEDICATIONS  (PRN):  acetaminophen   Tablet .. 650 milliGRAM(s) Oral every 6 hours PRN Temp greater or equal to 38.5C (101.3F)  albuterol/ipratropium for Nebulization 3 milliLiter(s) Nebulizer every 6 hours PRN Shortness of Breath and/or Wheezing      ## Vitals:  ICU Vital Signs Last 24 Hrs  T(C): 36.8 (04 Sep 2020 07:42), Max: 38.4 (03 Sep 2020 11:30)  T(F): 98.3 (04 Sep 2020 07:42), Max: 101.2 (03 Sep 2020 11:30)  HR: 87 (04 Sep 2020 07:00) (84 - 120)  BP: 95/57 (04 Sep 2020 07:00) (86/48 - 123/74)  BP(mean): 66 (04 Sep 2020 07:00) (57 - 84)  RR: 12 (04 Sep 2020 07:00) (7 - 22)  SpO2: 98% (04 Sep 2020 07:00) (87% - 98%)      I&O's Summary    03 Sep 2020 07:01  -  04 Sep 2020 07:00  --------------------------------------------------------  IN: 1815.5 mL / OUT: 3185 mL / NET: -1369.5 mL    04 Sep 2020 07:01  -  04 Sep 2020 08:15  --------------------------------------------------------  IN: 0 mL / OUT: 360 mL / NET: -360 mL    Mode: AC/ CMV (Assist Control/ Continuous Mandatory Ventilation)  RR (machine): 14  TV (machine): 450  FiO2: 50  PEEP: 10  ITime: 1  MAP: 12  PIP: 29    ## P/E:  Gen: lying comfortably in bed in no apparent distress, eyes slightly opened  Mouth: (+) ETT  Lungs: coarse BS b/l  Heart: Tachy, no murmur or gallop  Abd: Distended and firm with (+) BS  Ext: 2+ pitting edema  Neuro: Calm/ Lightly sedated    CENTRAL LINE: [ ] YES [ ] NO  LOCATION:   DATE INSERTED:  REMOVE: [ ] YES [ ] NO      ALDAIR: [ ] YES [ ] NO    DATE INSERTED:  REMOVE:  [ ] YES [ ] NO      A-LINE:  [ ] YES [ ] NO  LOCATION:   DATE INSERTED:  REMOVE:  [ ] YES [ ] NO  EXPLAIN:    CODE STATUS: [x] full code  [ ] DNR  [ ] DNI  [ ] MOLST  Goals of care discussion: [ ] yes HPI:  Pt is a 45 yo M with h/o EtOH abuse/ withdrawal and pancreatitis. Pt presented to the ER 2 to abdominal pain, nausea and vomiting; labs and CT were consistent with pancreatitis and possible early necrosis of pancreas. Pt transferred to ICU om 8/18  for severe alcohol withdrawal. Pt intubated on 8/23 2 to hypoxic resp failure requiring intubation 2 to worsening PNA. CXR 9/1/2020 reveals b/l PNA and pt still hypoxic c/w ARDS.    Overnight, the patient had fever (Tmax 100.9). His fever resolved in this morning. In addition, the patient had 3 regular bowel movements last night. The nurse reported that the stools were normal with no concerning findings. Because PEEP was greater than 8 last night, the team did not wean off fentanyl and propofol. In this morning, the patient failed SBT. The patient became agitated with tachycardia. The patient was put back to sedation.     ## Labs:  CBC:                        7.8    15.44 )-----------( 319      ( 04 Sep 2020 03:35 )             23.9       Chem:  09-04    138  |  105  |  14  ----------------------------<  97  3.6   |  27  |  0.53    Ca    8.6      04 Sep 2020 03:35  Phos  5.2     09-04  Mg     1.4     09-04    TPro  5.5<L>  /  Alb  1.0<L>  /  TBili  2.0<H>  /  DBili  x   /  AST  121<H>  /  ALT  67  /  AlkPhos  425<H>  09-04      Culture - Blood #1 (09.02.20 @ 15:04)    Specimen Source: .Blood Blood-Peripheral    Culture Results:   No growth to date.    Culture - Blood #2 (09.02.20 @ 15:04)    Specimen Source: .Blood Blood-Peripheral    Culture Results:   No growth to date.    Culture - Sputum . (09.02.20 @ 15:03)    Gram Stain:   Moderate polymorphonuclear leukocytes per low power field  Rare Squamous epithelial cells per low power field  Rare Gram positive cocci in pairs per oil power field    Specimen Source: .Sputum Sputum    Culture Results:   Normal Respiratory Poonam present        ## Imaging:  < from: Xray Kidney Ureter Bladder (09.03.20 @ 13:08) >  Nasogastric tube tip in the stomach. There is mild segmental gaseous distention of some small bowel and large bowel loops in the upper abdomen consistent with ileus. No mechanical bowel obstruction. Pelvic phlebolith.      ## Medications:  MEDICATIONS  (STANDING):  dexMEDEtomidine Infusion 0.276 MICROgram(s)/kG/Hr (5.36 mL/Hr) IV Continuous <Continuous>  enoxaparin Injectable 40 milliGRAM(s) SubCutaneous daily  fentaNYL   Infusion. 0.5 MICROgram(s)/kG/Hr (3.88 mL/Hr) IV Continuous <Continuous>  folic acid 1 milliGRAM(s) Oral daily  furosemide    Tablet 40 milliGRAM(s) Oral two times a day  lactulose Syrup 10 Gram(s) Oral four times a day  methadone    Tablet 10 milliGRAM(s) Oral every 6 hours  midodrine 15 milliGRAM(s) Oral every 8 hours  pantoprazole  Injectable 40 milliGRAM(s) IV Push daily  propofol Infusion 10.009 MICROgram(s)/kG/Min (4.66 mL/Hr) IV Continuous <Continuous>  QUEtiapine 100 milliGRAM(s) Oral two times a day  senna 2 Tablet(s) Oral at bedtime  thiamine 100 milliGRAM(s) Oral daily    MEDICATIONS  (PRN):  acetaminophen   Tablet .. 650 milliGRAM(s) Oral every 6 hours PRN Temp greater or equal to 38.5C (101.3F)  albuterol/ipratropium for Nebulization 3 milliLiter(s) Nebulizer every 6 hours PRN Shortness of Breath and/or Wheezing      ## Vitals:  ICU Vital Signs Last 24 Hrs  T(C): 36.8 (04 Sep 2020 07:42), Max: 38.4 (03 Sep 2020 11:30)  T(F): 98.3 (04 Sep 2020 07:42), Max: 101.2 (03 Sep 2020 11:30)  HR: 87 (04 Sep 2020 07:00) (84 - 120)  BP: 95/57 (04 Sep 2020 07:00) (86/48 - 123/74)  BP(mean): 66 (04 Sep 2020 07:00) (57 - 84)  RR: 12 (04 Sep 2020 07:00) (7 - 22)  SpO2: 98% (04 Sep 2020 07:00) (87% - 98%)      I&O's Summary    03 Sep 2020 07:01  -  04 Sep 2020 07:00  --------------------------------------------------------  IN: 1815.5 mL / OUT: 3185 mL / NET: -1369.5 mL    04 Sep 2020 07:01  -  04 Sep 2020 08:15  --------------------------------------------------------  IN: 0 mL / OUT: 360 mL / NET: -360 mL    Mode: AC/ CMV (Assist Control/ Continuous Mandatory Ventilation)  RR (machine): 14  TV (machine): 450  FiO2: 50  PEEP: 10  ITime: 1  MAP: 12  PIP: 29    ## P/E:  Gen: lying comfortably in bed in no apparent distress, eyes slightly opened  Mouth: (+) ETT  Lungs: coarse BS b/l  Heart: Tachy, no murmur or gallop  Abd: Distended and firm with (+) BS  Ext: 2+ pitting edema  Neuro: Calm/ Lightly sedated    CENTRAL LINE: [ ] YES [ ] NO  LOCATION:   DATE INSERTED:  REMOVE: [ ] YES [ ] NO      ALDAIR: [ ] YES [ ] NO    DATE INSERTED:  REMOVE:  [ ] YES [ ] NO      A-LINE:  [ ] YES [ ] NO  LOCATION:   DATE INSERTED:  REMOVE:  [ ] YES [ ] NO  EXPLAIN:    CODE STATUS: [x] full code  [ ] DNR  [ ] DNI  [ ] [facility]  Goals of care discussion: [ ] yes

## 2020-09-04 NOTE — PROGRESS NOTE ADULT - ATTENDING COMMENTS
Pt is a 45 yo M with h/o EtOH abuse/ withdrawal and pancreatitis. Pt presented to the ER 2 to abdominal pain, nausea and vomiting; labs and CT were consistent with pancreatitis and possible early necrosis of pancreas. Pt transferred to ICU om 8/18  for severe alcohol withdrawal. Pt intubated on 8/23 2 to hypoxic resp failure requiring intubation 2 to worsening PNA. CXR 9/1/2020 reveals b/l PNA and pt still hypoxic c/w ARDS    Resp: Again failed SBT when sedation was decreased; became agitated, tachycardic and desat'd/ Schedule trach with Surgery   ID: Meropenem dc'd 9/2/2020/ Cx NTD  CVS/Reanl: Midodrine increased for borderline low BP/ Pt net positive ins; cont standing Lasix  FEN: Increase enteral feeds/ Cont daily Thiamine, MVI and Folate   GI: Surgical f/u prn/ Pancreas U/S noted  Neuro/Psych: Cont Seroquel 100 mg bid, Methadone 10mg q6 and start Valium 5mg q6 with hope of making pt calm and weaning off all IV sedation meds

## 2020-09-04 NOTE — PROGRESS NOTE ADULT - ASSESSMENT
Pt is a 43 yo M with h/o EtOH abuse/ withdrawal and pancreatitis. Pt presented to the ER 2 to abdominal pain, nausea and vomiting; labs and CT were consistent with pancreatitis and possible early necrosis of pancreas. Pt transferred to ICU om 8/18  for severe alcohol withdrawal. Pt intubated on 8/23 2 to hypoxic resp failure requiring intubation 2 to worsening PNA. CXR 9/1/2020 reveals b/l PNA and pt still hypoxic c/w ARDS.    Resp: continue current vent setting, attempt SBT once PEEP is below 8 (may allow O2 sats in the low 90s/ If the pt does not tolerate SBT by the end of this week, will need trach)  ID: Meropenem dc'd 9/2/2020, sputum and blood cultures negative as of 9/4/20  CVS/Reanl: Cont Midodrine for borderline low BP/ Pt net positive ins; continue Lasix  FEN: feeds at 30cc/hr (goal is 60cc/hr), gradually increase feeds if tolerating, Cont daily Thiamine, MVI and Folate   GI: Surgical f/u prn/ Pancreas U/S noted  Neuro/Psych:Cont Seroquel 75 mg bid and continue Methadone with hope of making pt calm and weaning off Fentanyl drip Pt is a 45 yo M with h/o EtOH abuse/ withdrawal and pancreatitis. Pt presented to the ER 2 to abdominal pain, nausea and vomiting; labs and CT were consistent with pancreatitis and possible early necrosis of pancreas. Pt transferred to ICU om 8/18  for severe alcohol withdrawal. Pt intubated on 8/23 2 to hypoxic resp failure requiring intubation 2 to worsening PNA. CXR 9/1/2020 reveals b/l PNA and pt still hypoxic c/w ARDS.    Resp: failed SBT this morning; need tracheostomy; f/u with surgery; continue current vent setting  ID: Meropenem dc'd 9/2/2020, sputum and blood cultures negative as of 9/4/20  CVS/Reanl: Cont Midodrine for borderline low BP/ Pt net negative ins; continue Lasix to manage fluid overload  FEN: currently feeds at 20cc/hr (goal is 60cc/hr), gradually increase feeds if tolerating, Cont daily Thiamine, MVI and Folate   GI: Surgical f/u prn/ Pancreas U/S noted  Neuro/Psych: add Valium as an attempt to wean off propofol and fentanyl; Cont Seroquel 75 mg bid and continue Methadone with hope of making pt calm

## 2020-09-05 LAB
ALBUMIN SERPL ELPH-MCNC: 1.2 G/DL — LOW (ref 3.3–5)
ALP SERPL-CCNC: 486 U/L — HIGH (ref 40–120)
ALT FLD-CCNC: 63 U/L — SIGNIFICANT CHANGE UP (ref 12–78)
ANION GAP SERPL CALC-SCNC: 5 MMOL/L — SIGNIFICANT CHANGE UP (ref 5–17)
AST SERPL-CCNC: 123 U/L — HIGH (ref 15–37)
BILIRUB SERPL-MCNC: 2.2 MG/DL — HIGH (ref 0.2–1.2)
BUN SERPL-MCNC: 13 MG/DL — SIGNIFICANT CHANGE UP (ref 7–23)
CALCIUM SERPL-MCNC: 8.9 MG/DL — SIGNIFICANT CHANGE UP (ref 8.5–10.1)
CHLORIDE SERPL-SCNC: 102 MMOL/L — SIGNIFICANT CHANGE UP (ref 96–108)
CO2 SERPL-SCNC: 32 MMOL/L — HIGH (ref 22–31)
CREAT SERPL-MCNC: 0.48 MG/DL — LOW (ref 0.5–1.3)
GLUCOSE SERPL-MCNC: 123 MG/DL — HIGH (ref 70–99)
HCT VFR BLD CALC: 24.4 % — LOW (ref 39–50)
HGB BLD-MCNC: 8.1 G/DL — LOW (ref 13–17)
MAGNESIUM SERPL-MCNC: 1.2 MG/DL — LOW (ref 1.6–2.6)
MCHC RBC-ENTMCNC: 32.4 PG — SIGNIFICANT CHANGE UP (ref 27–34)
MCHC RBC-ENTMCNC: 33.2 GM/DL — SIGNIFICANT CHANGE UP (ref 32–36)
MCV RBC AUTO: 97.6 FL — SIGNIFICANT CHANGE UP (ref 80–100)
NRBC # BLD: 0 /100 WBCS — SIGNIFICANT CHANGE UP (ref 0–0)
PHOSPHATE SERPL-MCNC: 4.1 MG/DL — SIGNIFICANT CHANGE UP (ref 2.5–4.5)
PLATELET # BLD AUTO: 316 K/UL — SIGNIFICANT CHANGE UP (ref 150–400)
POTASSIUM SERPL-MCNC: 3.3 MMOL/L — LOW (ref 3.5–5.3)
POTASSIUM SERPL-SCNC: 3.3 MMOL/L — LOW (ref 3.5–5.3)
PROT SERPL-MCNC: 6 GM/DL — SIGNIFICANT CHANGE UP (ref 6–8.3)
RBC # BLD: 2.5 M/UL — LOW (ref 4.2–5.8)
RBC # FLD: 15.4 % — HIGH (ref 10.3–14.5)
SODIUM SERPL-SCNC: 139 MMOL/L — SIGNIFICANT CHANGE UP (ref 135–145)
TRIGL SERPL-MCNC: 325 MG/DL — HIGH (ref 10–149)
WBC # BLD: 16.6 K/UL — HIGH (ref 3.8–10.5)
WBC # FLD AUTO: 16.6 K/UL — HIGH (ref 3.8–10.5)

## 2020-09-05 PROCEDURE — 99291 CRITICAL CARE FIRST HOUR: CPT

## 2020-09-05 RX ORDER — MAGNESIUM SULFATE 500 MG/ML
2 VIAL (ML) INJECTION ONCE
Refills: 0 | Status: COMPLETED | OUTPATIENT
Start: 2020-09-05 | End: 2020-09-05

## 2020-09-05 RX ORDER — POTASSIUM CHLORIDE 20 MEQ
40 PACKET (EA) ORAL EVERY 4 HOURS
Refills: 0 | Status: COMPLETED | OUTPATIENT
Start: 2020-09-05 | End: 2020-09-05

## 2020-09-05 RX ORDER — DIAZEPAM 5 MG
10 TABLET ORAL EVERY 6 HOURS
Refills: 0 | Status: DISCONTINUED | OUTPATIENT
Start: 2020-09-05 | End: 2020-09-06

## 2020-09-05 RX ADMIN — DEXMEDETOMIDINE HYDROCHLORIDE IN 0.9% SODIUM CHLORIDE 5.36 MICROGRAM(S)/KG/HR: 4 INJECTION INTRAVENOUS at 12:31

## 2020-09-05 RX ADMIN — Medication 40 MILLIGRAM(S): at 18:20

## 2020-09-05 RX ADMIN — PANTOPRAZOLE SODIUM 40 MILLIGRAM(S): 20 TABLET, DELAYED RELEASE ORAL at 12:33

## 2020-09-05 RX ADMIN — Medication 100 MILLIGRAM(S): at 12:33

## 2020-09-05 RX ADMIN — Medication 40 MILLIEQUIVALENT(S): at 08:38

## 2020-09-05 RX ADMIN — METHADONE HYDROCHLORIDE 10 MILLIGRAM(S): 40 TABLET ORAL at 06:41

## 2020-09-05 RX ADMIN — Medication 40 MILLIGRAM(S): at 06:41

## 2020-09-05 RX ADMIN — MIDODRINE HYDROCHLORIDE 15 MILLIGRAM(S): 2.5 TABLET ORAL at 21:51

## 2020-09-05 RX ADMIN — LACTULOSE 10 GRAM(S): 10 SOLUTION ORAL at 12:32

## 2020-09-05 RX ADMIN — LACTULOSE 10 GRAM(S): 10 SOLUTION ORAL at 18:20

## 2020-09-05 RX ADMIN — QUETIAPINE FUMARATE 100 MILLIGRAM(S): 200 TABLET, FILM COATED ORAL at 18:20

## 2020-09-05 RX ADMIN — PROPOFOL 4.66 MICROGRAM(S)/KG/MIN: 10 INJECTION, EMULSION INTRAVENOUS at 21:39

## 2020-09-05 RX ADMIN — CHLORHEXIDINE GLUCONATE 15 MILLILITER(S): 213 SOLUTION TOPICAL at 18:20

## 2020-09-05 RX ADMIN — MIDODRINE HYDROCHLORIDE 15 MILLIGRAM(S): 2.5 TABLET ORAL at 16:00

## 2020-09-05 RX ADMIN — MIDODRINE HYDROCHLORIDE 15 MILLIGRAM(S): 2.5 TABLET ORAL at 06:41

## 2020-09-05 RX ADMIN — DEXMEDETOMIDINE HYDROCHLORIDE IN 0.9% SODIUM CHLORIDE 5.36 MICROGRAM(S)/KG/HR: 4 INJECTION INTRAVENOUS at 17:09

## 2020-09-05 RX ADMIN — DEXMEDETOMIDINE HYDROCHLORIDE IN 0.9% SODIUM CHLORIDE 5.36 MICROGRAM(S)/KG/HR: 4 INJECTION INTRAVENOUS at 15:00

## 2020-09-05 RX ADMIN — CHLORHEXIDINE GLUCONATE 1 APPLICATION(S): 213 SOLUTION TOPICAL at 06:33

## 2020-09-05 RX ADMIN — Medication 1 MILLIGRAM(S): at 12:32

## 2020-09-05 RX ADMIN — Medication 1 TABLET(S): at 12:33

## 2020-09-05 RX ADMIN — Medication 650 MILLIGRAM(S): at 21:52

## 2020-09-05 RX ADMIN — PROPOFOL 4.66 MICROGRAM(S)/KG/MIN: 10 INJECTION, EMULSION INTRAVENOUS at 08:42

## 2020-09-05 RX ADMIN — Medication 10 MILLIGRAM(S): at 18:20

## 2020-09-05 RX ADMIN — FENTANYL CITRATE 3.88 MICROGRAM(S)/KG/HR: 50 INJECTION INTRAVENOUS at 21:51

## 2020-09-05 RX ADMIN — PROPOFOL 4.66 MICROGRAM(S)/KG/MIN: 10 INJECTION, EMULSION INTRAVENOUS at 01:17

## 2020-09-05 RX ADMIN — DEXMEDETOMIDINE HYDROCHLORIDE IN 0.9% SODIUM CHLORIDE 5.36 MICROGRAM(S)/KG/HR: 4 INJECTION INTRAVENOUS at 08:41

## 2020-09-05 RX ADMIN — Medication 5 MILLIGRAM(S): at 12:32

## 2020-09-05 RX ADMIN — DEXMEDETOMIDINE HYDROCHLORIDE IN 0.9% SODIUM CHLORIDE 5.36 MICROGRAM(S)/KG/HR: 4 INJECTION INTRAVENOUS at 21:39

## 2020-09-05 RX ADMIN — LACTULOSE 10 GRAM(S): 10 SOLUTION ORAL at 06:40

## 2020-09-05 RX ADMIN — METHADONE HYDROCHLORIDE 10 MILLIGRAM(S): 40 TABLET ORAL at 12:33

## 2020-09-05 RX ADMIN — CHLORHEXIDINE GLUCONATE 15 MILLILITER(S): 213 SOLUTION TOPICAL at 06:40

## 2020-09-05 RX ADMIN — QUETIAPINE FUMARATE 100 MILLIGRAM(S): 200 TABLET, FILM COATED ORAL at 06:41

## 2020-09-05 RX ADMIN — Medication 50 GRAM(S): at 09:45

## 2020-09-05 RX ADMIN — DEXMEDETOMIDINE HYDROCHLORIDE IN 0.9% SODIUM CHLORIDE 5.36 MICROGRAM(S)/KG/HR: 4 INJECTION INTRAVENOUS at 03:07

## 2020-09-05 RX ADMIN — Medication 5 MILLIGRAM(S): at 06:41

## 2020-09-05 RX ADMIN — METHADONE HYDROCHLORIDE 10 MILLIGRAM(S): 40 TABLET ORAL at 18:21

## 2020-09-05 RX ADMIN — DEXMEDETOMIDINE HYDROCHLORIDE IN 0.9% SODIUM CHLORIDE 5.36 MICROGRAM(S)/KG/HR: 4 INJECTION INTRAVENOUS at 01:17

## 2020-09-05 RX ADMIN — ENOXAPARIN SODIUM 40 MILLIGRAM(S): 100 INJECTION SUBCUTANEOUS at 12:32

## 2020-09-05 RX ADMIN — DEXMEDETOMIDINE HYDROCHLORIDE IN 0.9% SODIUM CHLORIDE 5.36 MICROGRAM(S)/KG/HR: 4 INJECTION INTRAVENOUS at 18:20

## 2020-09-05 RX ADMIN — DEXMEDETOMIDINE HYDROCHLORIDE IN 0.9% SODIUM CHLORIDE 5.36 MICROGRAM(S)/KG/HR: 4 INJECTION INTRAVENOUS at 06:40

## 2020-09-05 RX ADMIN — Medication 40 MILLIEQUIVALENT(S): at 12:31

## 2020-09-05 RX ADMIN — FENTANYL CITRATE 3.88 MICROGRAM(S)/KG/HR: 50 INJECTION INTRAVENOUS at 08:42

## 2020-09-05 RX ADMIN — Medication 50 GRAM(S): at 08:39

## 2020-09-05 NOTE — PROGRESS NOTE ADULT - SUBJECTIVE AND OBJECTIVE BOX
HPI:  Pt is a 45 yo M with h/o EtOH abuse/ withdrawal and pancreatitis. Pt presented to the ER 2 to abdominal pain, nausea and vomiting; labs and CT were consistent with pancreatitis and possible early necrosis of pancreas. Pt transferred to ICU om   for severe alcohol withdrawal. Pt intubated on  2 to hypoxic resp failure requiring intubation 2 to worsening PNA. CXR 2020 reveals b/l PNA and pt still hypoxic c/w ARDS      ## Labs:  CBC:                        8.1    16.60 )-----------( 316      ( 05 Sep 2020 06:27 )             24.4     Chem:      139  |  102  |  13  ----------------------------<  123<H>  3.3<L>   |  32<H>  |  0.48<L>    Ca    8.9      05 Sep 2020 06:27  Phos  4.1       Mg     1.2         TPro  6.0  /  Alb  1.2<L>  /  TBili  2.2<H>  /  DBili  x   /  AST  123<H>  /  ALT  63  /  AlkPhos  486<H>      Coags:          ## Imaging:    ## Medications:    furosemide    Tablet 40 milliGRAM(s) Oral two times a day  midodrine 15 milliGRAM(s) Oral every 8 hours    albuterol/ipratropium for Nebulization 3 milliLiter(s) Nebulizer every 6 hours PRN      enoxaparin Injectable 40 milliGRAM(s) SubCutaneous daily    lactulose Syrup 10 Gram(s) Oral four times a day  pantoprazole  Injectable 40 milliGRAM(s) IV Push daily  senna 2 Tablet(s) Oral at bedtime    acetaminophen   Tablet .. 650 milliGRAM(s) Oral every 6 hours PRN  dexMEDEtomidine Infusion 0.276 MICROgram(s)/kG/Hr IV Continuous <Continuous>  diazepam    Tablet 5 milliGRAM(s) Oral every 6 hours  fentaNYL   Infusion. 0.5 MICROgram(s)/kG/Hr IV Continuous <Continuous>  methadone    Tablet 10 milliGRAM(s) Oral every 6 hours  propofol Infusion 10.009 MICROgram(s)/kG/Min IV Continuous <Continuous>  QUEtiapine 100 milliGRAM(s) Oral two times a day      ## Vitals:  T(C): 37.2 (20 @ 11:18), Max: 38.3 (20 @ 17:30)  HR: 91 (20 @ 13:30) (78 - 132)  BP: 94/56 (20 @ 13:30) (88/51 - 132/104)  BP(mean): 64 (20 @ 13:30) (59 - 110)  RR: 21 (20 @ 13:30) (11 - 35)  SpO2: 100% (20 @ 12:30) (93% - 100%)  Wt(kg): --  Vent: Mode: AC/ CMV (Assist Control/ Continuous Mandatory Ventilation), RR (machine): 14, RR (patient): 14, TV (machine): 450, FiO2: 50, PEEP: 10, PIP: 23  AB-04 @ 07:01  -   @ 07:00  --------------------------------------------------------  IN: 1651.1 mL / OUT: 3030 mL / NET: -1378.9 mL     @ 07:01  -   @ 14:07  --------------------------------------------------------  IN: 655.4 mL / OUT: 1475 mL / NET: -819.6 mL          ## P/E:  Gen: lying comfortably in bed in no apparent distress  Mouth: (+) ETT  Lungs: Coarse BS  Heart: RRR  Abd: More distended today but soft with (+) BS  Ext: Edema  Neuro: Sedated    CENTRAL LINE: [ ] YES [ ] NO  LOCATION:   DATE INSERTED:  REMOVE: [ ] YES [ ] NO      QUINTEROS: [ ] YES [ ] NO    DATE INSERTED:  REMOVE:  [ ] YES [ ] NO      A-LINE:  [ ] YES [ ] NO  LOCATION:   DATE INSERTED:  REMOVE:  [ ] YES [ ] NO  EXPLAIN:    CODE STATUS: [x ] full code  [ ] DNR  [ ] DNI  [ ] MOLST  Goals of care discussion: [ ] yes

## 2020-09-05 NOTE — PROGRESS NOTE ADULT - ASSESSMENT
Pt is a 43 yo M with h/o EtOH abuse/ withdrawal and pancreatitis. Pt presented to the ER 2 to abdominal pain, nausea and vomiting; labs and CT were consistent with pancreatitis and possible early necrosis of pancreas. Pt transferred to ICU om 8/18  for severe alcohol withdrawal. Pt intubated on 8/23 2 to hypoxic resp failure requiring intubation 2 to worsening PNA. CXR 9/1/2020 reveals b/l PNA and pt still hypoxic c/w ARDS    Resp: May cont daily SBT but schedule trach with Surgery   ID: Meropenem dc'd 9/2/2020/ Cx NTD  CVS/Reanl: Cont Midodrine for borderline low BP/ Pt net positive ins; cont standing Lasix  FEN: Cont enteral feeds/ Replace K and Mg; pt hypokalemic and hypomagnesemic/ Cont daily Thiamine, MVI and Folate   GI: Surgical f/u prn/ Pancreas U/S noted  Neuro/Psych: Cont Seroquel 100 mg bid, Methadone 10mg q6 and Valium 5mg q6 (may need to increase dose) with hope of making pt calm and weaning off all IV sedation meds  Social: Girlfriend at the bedside and updated .

## 2020-09-06 LAB
ALBUMIN SERPL ELPH-MCNC: 1.1 G/DL — LOW (ref 3.3–5)
ALP SERPL-CCNC: 471 U/L — HIGH (ref 40–120)
ALT FLD-CCNC: 61 U/L — SIGNIFICANT CHANGE UP (ref 12–78)
ANION GAP SERPL CALC-SCNC: 6 MMOL/L — SIGNIFICANT CHANGE UP (ref 5–17)
AST SERPL-CCNC: 139 U/L — HIGH (ref 15–37)
BILIRUB SERPL-MCNC: 1.8 MG/DL — HIGH (ref 0.2–1.2)
BUN SERPL-MCNC: 12 MG/DL — SIGNIFICANT CHANGE UP (ref 7–23)
CALCIUM SERPL-MCNC: 8.7 MG/DL — SIGNIFICANT CHANGE UP (ref 8.5–10.1)
CHLORIDE SERPL-SCNC: 102 MMOL/L — SIGNIFICANT CHANGE UP (ref 96–108)
CO2 SERPL-SCNC: 33 MMOL/L — HIGH (ref 22–31)
CREAT SERPL-MCNC: 0.6 MG/DL — SIGNIFICANT CHANGE UP (ref 0.5–1.3)
GLUCOSE SERPL-MCNC: 124 MG/DL — HIGH (ref 70–99)
HCT VFR BLD CALC: 23.8 % — LOW (ref 39–50)
HGB BLD-MCNC: 8.1 G/DL — LOW (ref 13–17)
MAGNESIUM SERPL-MCNC: 1.4 MG/DL — LOW (ref 1.6–2.6)
MCHC RBC-ENTMCNC: 33.8 PG — SIGNIFICANT CHANGE UP (ref 27–34)
MCHC RBC-ENTMCNC: 34 GM/DL — SIGNIFICANT CHANGE UP (ref 32–36)
MCV RBC AUTO: 99.2 FL — SIGNIFICANT CHANGE UP (ref 80–100)
NRBC # BLD: 0 /100 WBCS — SIGNIFICANT CHANGE UP (ref 0–0)
PHOSPHATE SERPL-MCNC: 4.2 MG/DL — SIGNIFICANT CHANGE UP (ref 2.5–4.5)
PLATELET # BLD AUTO: 301 K/UL — SIGNIFICANT CHANGE UP (ref 150–400)
POTASSIUM SERPL-MCNC: 3.5 MMOL/L — SIGNIFICANT CHANGE UP (ref 3.5–5.3)
POTASSIUM SERPL-SCNC: 3.5 MMOL/L — SIGNIFICANT CHANGE UP (ref 3.5–5.3)
PROT SERPL-MCNC: 6.1 GM/DL — SIGNIFICANT CHANGE UP (ref 6–8.3)
RBC # BLD: 2.4 M/UL — LOW (ref 4.2–5.8)
RBC # FLD: 15.9 % — HIGH (ref 10.3–14.5)
SODIUM SERPL-SCNC: 141 MMOL/L — SIGNIFICANT CHANGE UP (ref 135–145)
WBC # BLD: 15.37 K/UL — HIGH (ref 3.8–10.5)
WBC # FLD AUTO: 15.37 K/UL — HIGH (ref 3.8–10.5)

## 2020-09-06 PROCEDURE — 99291 CRITICAL CARE FIRST HOUR: CPT

## 2020-09-06 RX ORDER — MAGNESIUM SULFATE 500 MG/ML
2 VIAL (ML) INJECTION ONCE
Refills: 0 | Status: COMPLETED | OUTPATIENT
Start: 2020-09-06 | End: 2020-09-06

## 2020-09-06 RX ORDER — DIAZEPAM 5 MG
15 TABLET ORAL EVERY 6 HOURS
Refills: 0 | Status: DISCONTINUED | OUTPATIENT
Start: 2020-09-06 | End: 2020-09-06

## 2020-09-06 RX ORDER — POTASSIUM CHLORIDE 20 MEQ
40 PACKET (EA) ORAL EVERY 4 HOURS
Refills: 0 | Status: COMPLETED | OUTPATIENT
Start: 2020-09-06 | End: 2020-09-06

## 2020-09-06 RX ORDER — SENNA PLUS 8.6 MG/1
2 TABLET ORAL AT BEDTIME
Refills: 0 | Status: DISCONTINUED | OUTPATIENT
Start: 2020-09-06 | End: 2020-09-10

## 2020-09-06 RX ORDER — DIAZEPAM 5 MG
10 TABLET ORAL EVERY 6 HOURS
Refills: 0 | Status: DISCONTINUED | OUTPATIENT
Start: 2020-09-06 | End: 2020-09-10

## 2020-09-06 RX ADMIN — DEXMEDETOMIDINE HYDROCHLORIDE IN 0.9% SODIUM CHLORIDE 5.36 MICROGRAM(S)/KG/HR: 4 INJECTION INTRAVENOUS at 14:30

## 2020-09-06 RX ADMIN — MIDODRINE HYDROCHLORIDE 15 MILLIGRAM(S): 2.5 TABLET ORAL at 14:30

## 2020-09-06 RX ADMIN — QUETIAPINE FUMARATE 100 MILLIGRAM(S): 200 TABLET, FILM COATED ORAL at 18:29

## 2020-09-06 RX ADMIN — CHLORHEXIDINE GLUCONATE 15 MILLILITER(S): 213 SOLUTION TOPICAL at 05:10

## 2020-09-06 RX ADMIN — Medication 50 GRAM(S): at 04:37

## 2020-09-06 RX ADMIN — Medication 650 MILLIGRAM(S): at 04:00

## 2020-09-06 RX ADMIN — ENOXAPARIN SODIUM 40 MILLIGRAM(S): 100 INJECTION SUBCUTANEOUS at 11:25

## 2020-09-06 RX ADMIN — MIDODRINE HYDROCHLORIDE 15 MILLIGRAM(S): 2.5 TABLET ORAL at 23:16

## 2020-09-06 RX ADMIN — Medication 1 TABLET(S): at 11:25

## 2020-09-06 RX ADMIN — CHLORHEXIDINE GLUCONATE 1 APPLICATION(S): 213 SOLUTION TOPICAL at 04:00

## 2020-09-06 RX ADMIN — Medication 40 MILLIEQUIVALENT(S): at 11:25

## 2020-09-06 RX ADMIN — LACTULOSE 10 GRAM(S): 10 SOLUTION ORAL at 18:29

## 2020-09-06 RX ADMIN — PROPOFOL 4.66 MICROGRAM(S)/KG/MIN: 10 INJECTION, EMULSION INTRAVENOUS at 14:29

## 2020-09-06 RX ADMIN — Medication 40 MILLIGRAM(S): at 05:11

## 2020-09-06 RX ADMIN — METHADONE HYDROCHLORIDE 10 MILLIGRAM(S): 40 TABLET ORAL at 23:18

## 2020-09-06 RX ADMIN — DEXMEDETOMIDINE HYDROCHLORIDE IN 0.9% SODIUM CHLORIDE 5.36 MICROGRAM(S)/KG/HR: 4 INJECTION INTRAVENOUS at 18:28

## 2020-09-06 RX ADMIN — Medication 10 MILLIGRAM(S): at 00:53

## 2020-09-06 RX ADMIN — QUETIAPINE FUMARATE 100 MILLIGRAM(S): 200 TABLET, FILM COATED ORAL at 05:11

## 2020-09-06 RX ADMIN — Medication 650 MILLIGRAM(S): at 15:00

## 2020-09-06 RX ADMIN — LACTULOSE 10 GRAM(S): 10 SOLUTION ORAL at 23:18

## 2020-09-06 RX ADMIN — Medication 10 MILLIGRAM(S): at 11:25

## 2020-09-06 RX ADMIN — Medication 50 GRAM(S): at 04:36

## 2020-09-06 RX ADMIN — DEXMEDETOMIDINE HYDROCHLORIDE IN 0.9% SODIUM CHLORIDE 5.36 MICROGRAM(S)/KG/HR: 4 INJECTION INTRAVENOUS at 21:40

## 2020-09-06 RX ADMIN — METHADONE HYDROCHLORIDE 10 MILLIGRAM(S): 40 TABLET ORAL at 11:25

## 2020-09-06 RX ADMIN — PROPOFOL 4.66 MICROGRAM(S)/KG/MIN: 10 INJECTION, EMULSION INTRAVENOUS at 06:21

## 2020-09-06 RX ADMIN — METHADONE HYDROCHLORIDE 10 MILLIGRAM(S): 40 TABLET ORAL at 05:11

## 2020-09-06 RX ADMIN — Medication 650 MILLIGRAM(S): at 14:30

## 2020-09-06 RX ADMIN — PROPOFOL 4.66 MICROGRAM(S)/KG/MIN: 10 INJECTION, EMULSION INTRAVENOUS at 21:40

## 2020-09-06 RX ADMIN — Medication 10 MILLIGRAM(S): at 18:29

## 2020-09-06 RX ADMIN — LACTULOSE 10 GRAM(S): 10 SOLUTION ORAL at 11:24

## 2020-09-06 RX ADMIN — CHLORHEXIDINE GLUCONATE 15 MILLILITER(S): 213 SOLUTION TOPICAL at 18:30

## 2020-09-06 RX ADMIN — DEXMEDETOMIDINE HYDROCHLORIDE IN 0.9% SODIUM CHLORIDE 5.36 MICROGRAM(S)/KG/HR: 4 INJECTION INTRAVENOUS at 11:24

## 2020-09-06 RX ADMIN — LACTULOSE 10 GRAM(S): 10 SOLUTION ORAL at 05:10

## 2020-09-06 RX ADMIN — DEXMEDETOMIDINE HYDROCHLORIDE IN 0.9% SODIUM CHLORIDE 5.36 MICROGRAM(S)/KG/HR: 4 INJECTION INTRAVENOUS at 04:42

## 2020-09-06 RX ADMIN — METHADONE HYDROCHLORIDE 10 MILLIGRAM(S): 40 TABLET ORAL at 18:29

## 2020-09-06 RX ADMIN — Medication 10 MILLIGRAM(S): at 05:11

## 2020-09-06 RX ADMIN — Medication 40 MILLIGRAM(S): at 18:29

## 2020-09-06 RX ADMIN — Medication 10 MILLIGRAM(S): at 23:17

## 2020-09-06 RX ADMIN — METHADONE HYDROCHLORIDE 10 MILLIGRAM(S): 40 TABLET ORAL at 00:53

## 2020-09-06 RX ADMIN — Medication 100 MILLIGRAM(S): at 11:25

## 2020-09-06 RX ADMIN — LACTULOSE 10 GRAM(S): 10 SOLUTION ORAL at 00:53

## 2020-09-06 RX ADMIN — MIDODRINE HYDROCHLORIDE 15 MILLIGRAM(S): 2.5 TABLET ORAL at 05:11

## 2020-09-06 RX ADMIN — Medication 40 MILLIEQUIVALENT(S): at 05:10

## 2020-09-06 RX ADMIN — Medication 1 MILLIGRAM(S): at 11:25

## 2020-09-06 RX ADMIN — FENTANYL CITRATE 3.88 MICROGRAM(S)/KG/HR: 50 INJECTION INTRAVENOUS at 23:37

## 2020-09-06 RX ADMIN — PANTOPRAZOLE SODIUM 40 MILLIGRAM(S): 20 TABLET, DELAYED RELEASE ORAL at 11:25

## 2020-09-06 NOTE — PROGRESS NOTE ADULT - ASSESSMENT
Pt is a 43 yo M with h/o EtOH abuse/ withdrawal and pancreatitis. Pt presented to the ER 2 to abdominal pain, nausea and vomiting; labs and CT were consistent with pancreatitis and possible early necrosis of pancreas. Pt transferred to ICU om 8/18  for severe alcohol withdrawal. Pt intubated on 8/23 2 to hypoxic resp failure requiring intubation 2 to worsening PNA. CXR 9/1/2020 reveals b/l PNA and pt still hypoxic c/w ARDS    Resp: Cont daily SBT but schedule trach with Surgery (? 9/8/2020)  ID: Meropenem dc'd 9/2/2020/ Cx NTD  CVS/Renal: Cont Midodrine for borderline low BP/ Pt net positive ins; cont standing Lasix  FEN: Cont enteral feeds/ Replace Mg; pt hypomagnesemic/ Cont daily Thiamine, MVI and Folate   GI: Surgical f/u prn/ Pancreas U/S noted  Neuro/Psych: Cont Seroquel 100 mg bid, Methadone 10mg q6 and Valium 10 mg q6 (was increased yesterday and may need to increase if pt is agitated once IV sedation is held) with hope of making pt calm off all IV sedation meds  Social: Yesterday (9/5) girlfriend at the bedside and updated; pt will need trach .

## 2020-09-06 NOTE — PROGRESS NOTE ADULT - SUBJECTIVE AND OBJECTIVE BOX
HPI:  Pt is a 45 yo M with h/o EtOH abuse/ withdrawal and pancreatitis. Pt presented to the ER 2 to abdominal pain, nausea and vomiting; labs and CT were consistent with pancreatitis and possible early necrosis of pancreas. Pt transferred to ICU om   for severe alcohol withdrawal. Pt intubated on  2 to hypoxic resp failure requiring intubation 2 to worsening PNA. CXR 2020 reveals b/l PNA and pt still hypoxic c/w ARDS      ## Labs:  CBC:                        8.1    15.37 )-----------( 301      ( 06 Sep 2020 03:05 )             23.8     Chem:      141  |  102  |  12  ----------------------------<  124<H>  3.5   |  33<H>  |  0.60    Ca    8.7      06 Sep 2020 03:05  Phos  4.2       Mg     1.4         TPro  6.1  /  Alb  1.1<L>  /  TBili  1.8<H>  /  DBili  x   /  AST  139<H>  /  ALT  61  /  AlkPhos  471<H>      Coags:          ## Imaging:    ## Medications:    furosemide    Tablet 40 milliGRAM(s) Oral two times a day  midodrine 15 milliGRAM(s) Oral every 8 hours    albuterol/ipratropium for Nebulization 3 milliLiter(s) Nebulizer every 6 hours PRN      enoxaparin Injectable 40 milliGRAM(s) SubCutaneous daily    lactulose Syrup 10 Gram(s) Oral four times a day  pantoprazole  Injectable 40 milliGRAM(s) IV Push daily  senna 2 Tablet(s) Oral at bedtime    acetaminophen   Tablet .. 650 milliGRAM(s) Oral every 6 hours PRN  dexMEDEtomidine Infusion 0.276 MICROgram(s)/kG/Hr IV Continuous <Continuous>  diazepam    Tablet 10 milliGRAM(s) Oral every 6 hours  fentaNYL   Infusion. 0.5 MICROgram(s)/kG/Hr IV Continuous <Continuous>  methadone    Tablet 10 milliGRAM(s) Oral every 6 hours  propofol Infusion 10.009 MICROgram(s)/kG/Min IV Continuous <Continuous>  QUEtiapine 100 milliGRAM(s) Oral two times a day      ## Vitals:  T(C): 37.8 (20 @ 11:30), Max: 39.1 (20 @ 21:30)  HR: 96 (20 @ 13:00) (84 - 136)  BP: 97/55 (20 @ 13:00) (93/53 - 113/66)  BP(mean): 65 (20 @ 13:00) (60 - 93)  RR: 15 (20 @ 13:00) (9 - 24)  SpO2: 98% (20 @ 13:00) (95% - 100%)  Wt(kg): --  Vent: Mode: AC/ CMV (Assist Control/ Continuous Mandatory Ventilation), RR (machine): 14, RR (patient): 14, TV (machine): 450, FiO2: 50, PEEP: 10, PIP: 28  AB-05 @ 07:01  -   @ 07:00  --------------------------------------------------------  IN: 2560.4 mL / OUT: 2775 mL / NET: -214.6 mL          ## P/E:  Gen: lying comfortably in bed in no apparent distress  Mouth: (+) ETT  Lungs: L rhonchi  Heart: RRR  Abd: Distended but soft with (+) BS  Ext: Edema  Neuro: Sedated    CENTRAL LINE: [ ] YES [ ] NO  LOCATION:   DATE INSERTED:  REMOVE: [ ] YES [ ] NO      QUINTEROS: [ ] YES [ ] NO    DATE INSERTED:  REMOVE:  [ ] YES [ ] NO      A-LINE:  [ ] YES [ ] NO  LOCATION:   DATE INSERTED:  REMOVE:  [ ] YES [ ] NO  EXPLAIN:    CODE STATUS: [x ] full code  [ ] DNR  [ ] DNI  [ ] [facility]  Goals of care discussion: [ ] yes

## 2020-09-06 NOTE — CHART NOTE - NSCHARTNOTEFT_GEN_A_CORE
Patient's mother Niecy Hussein called for an update.   She lives in [place].   Her number is [phone].   She was updated and all questions were answered.   She is the primary decision maker for the patient.   There is also the patient's brother elliser who can also be contacted.   Numbers placed in the chart.

## 2020-09-07 LAB
ALBUMIN SERPL ELPH-MCNC: 1.2 G/DL — LOW (ref 3.3–5)
ALP SERPL-CCNC: 417 U/L — HIGH (ref 40–120)
ALT FLD-CCNC: 51 U/L — SIGNIFICANT CHANGE UP (ref 12–78)
ANION GAP SERPL CALC-SCNC: 5 MMOL/L — SIGNIFICANT CHANGE UP (ref 5–17)
AST SERPL-CCNC: 108 U/L — HIGH (ref 15–37)
BILIRUB SERPL-MCNC: 1.5 MG/DL — HIGH (ref 0.2–1.2)
BUN SERPL-MCNC: 10 MG/DL — SIGNIFICANT CHANGE UP (ref 7–23)
CALCIUM SERPL-MCNC: 8.8 MG/DL — SIGNIFICANT CHANGE UP (ref 8.5–10.1)
CHLORIDE SERPL-SCNC: 100 MMOL/L — SIGNIFICANT CHANGE UP (ref 96–108)
CO2 SERPL-SCNC: 37 MMOL/L — HIGH (ref 22–31)
CREAT SERPL-MCNC: 0.55 MG/DL — SIGNIFICANT CHANGE UP (ref 0.5–1.3)
CULTURE RESULTS: SIGNIFICANT CHANGE UP
CULTURE RESULTS: SIGNIFICANT CHANGE UP
GLUCOSE SERPL-MCNC: 111 MG/DL — HIGH (ref 70–99)
HCT VFR BLD CALC: 23.2 % — LOW (ref 39–50)
HGB BLD-MCNC: 7.6 G/DL — LOW (ref 13–17)
MAGNESIUM SERPL-MCNC: 1.3 MG/DL — LOW (ref 1.6–2.6)
MCHC RBC-ENTMCNC: 32.8 GM/DL — SIGNIFICANT CHANGE UP (ref 32–36)
MCHC RBC-ENTMCNC: 32.8 PG — SIGNIFICANT CHANGE UP (ref 27–34)
MCV RBC AUTO: 100 FL — SIGNIFICANT CHANGE UP (ref 80–100)
NRBC # BLD: 0 /100 WBCS — SIGNIFICANT CHANGE UP (ref 0–0)
PHOSPHATE SERPL-MCNC: 4 MG/DL — SIGNIFICANT CHANGE UP (ref 2.5–4.5)
PLATELET # BLD AUTO: 314 K/UL — SIGNIFICANT CHANGE UP (ref 150–400)
POTASSIUM SERPL-MCNC: 3.5 MMOL/L — SIGNIFICANT CHANGE UP (ref 3.5–5.3)
POTASSIUM SERPL-SCNC: 3.5 MMOL/L — SIGNIFICANT CHANGE UP (ref 3.5–5.3)
PROT SERPL-MCNC: 6.2 GM/DL — SIGNIFICANT CHANGE UP (ref 6–8.3)
RBC # BLD: 2.32 M/UL — LOW (ref 4.2–5.8)
RBC # FLD: 15.9 % — HIGH (ref 10.3–14.5)
SODIUM SERPL-SCNC: 142 MMOL/L — SIGNIFICANT CHANGE UP (ref 135–145)
SPECIMEN SOURCE: SIGNIFICANT CHANGE UP
SPECIMEN SOURCE: SIGNIFICANT CHANGE UP
WBC # BLD: 16.24 K/UL — HIGH (ref 3.8–10.5)
WBC # FLD AUTO: 16.24 K/UL — HIGH (ref 3.8–10.5)

## 2020-09-07 PROCEDURE — 99291 CRITICAL CARE FIRST HOUR: CPT

## 2020-09-07 RX ORDER — MAGNESIUM SULFATE 500 MG/ML
2 VIAL (ML) INJECTION ONCE
Refills: 0 | Status: COMPLETED | OUTPATIENT
Start: 2020-09-07 | End: 2020-09-07

## 2020-09-07 RX ORDER — POTASSIUM CHLORIDE 20 MEQ
40 PACKET (EA) ORAL EVERY 4 HOURS
Refills: 0 | Status: COMPLETED | OUTPATIENT
Start: 2020-09-07 | End: 2020-09-07

## 2020-09-07 RX ADMIN — MIDODRINE HYDROCHLORIDE 15 MILLIGRAM(S): 2.5 TABLET ORAL at 15:01

## 2020-09-07 RX ADMIN — LACTULOSE 10 GRAM(S): 10 SOLUTION ORAL at 11:57

## 2020-09-07 RX ADMIN — Medication 1 MILLIGRAM(S): at 11:58

## 2020-09-07 RX ADMIN — LACTULOSE 10 GRAM(S): 10 SOLUTION ORAL at 23:53

## 2020-09-07 RX ADMIN — Medication 1 TABLET(S): at 11:57

## 2020-09-07 RX ADMIN — Medication 50 GRAM(S): at 05:27

## 2020-09-07 RX ADMIN — LACTULOSE 10 GRAM(S): 10 SOLUTION ORAL at 18:16

## 2020-09-07 RX ADMIN — METHADONE HYDROCHLORIDE 10 MILLIGRAM(S): 40 TABLET ORAL at 11:57

## 2020-09-07 RX ADMIN — CHLORHEXIDINE GLUCONATE 15 MILLILITER(S): 213 SOLUTION TOPICAL at 05:30

## 2020-09-07 RX ADMIN — Medication 10 MILLIGRAM(S): at 11:57

## 2020-09-07 RX ADMIN — PROPOFOL 4.66 MICROGRAM(S)/KG/MIN: 10 INJECTION, EMULSION INTRAVENOUS at 11:56

## 2020-09-07 RX ADMIN — DEXMEDETOMIDINE HYDROCHLORIDE IN 0.9% SODIUM CHLORIDE 5.36 MICROGRAM(S)/KG/HR: 4 INJECTION INTRAVENOUS at 02:23

## 2020-09-07 RX ADMIN — Medication 10 MILLIGRAM(S): at 05:29

## 2020-09-07 RX ADMIN — LACTULOSE 10 GRAM(S): 10 SOLUTION ORAL at 05:28

## 2020-09-07 RX ADMIN — Medication 40 MILLIGRAM(S): at 18:17

## 2020-09-07 RX ADMIN — DEXMEDETOMIDINE HYDROCHLORIDE IN 0.9% SODIUM CHLORIDE 5.36 MICROGRAM(S)/KG/HR: 4 INJECTION INTRAVENOUS at 05:30

## 2020-09-07 RX ADMIN — DEXMEDETOMIDINE HYDROCHLORIDE IN 0.9% SODIUM CHLORIDE 5.36 MICROGRAM(S)/KG/HR: 4 INJECTION INTRAVENOUS at 11:56

## 2020-09-07 RX ADMIN — CHLORHEXIDINE GLUCONATE 15 MILLILITER(S): 213 SOLUTION TOPICAL at 18:16

## 2020-09-07 RX ADMIN — Medication 40 MILLIEQUIVALENT(S): at 11:55

## 2020-09-07 RX ADMIN — MIDODRINE HYDROCHLORIDE 15 MILLIGRAM(S): 2.5 TABLET ORAL at 05:29

## 2020-09-07 RX ADMIN — CHLORHEXIDINE GLUCONATE 1 APPLICATION(S): 213 SOLUTION TOPICAL at 04:30

## 2020-09-07 RX ADMIN — Medication 40 MILLIGRAM(S): at 05:31

## 2020-09-07 RX ADMIN — Medication 650 MILLIGRAM(S): at 12:27

## 2020-09-07 RX ADMIN — Medication 50 GRAM(S): at 06:07

## 2020-09-07 RX ADMIN — ENOXAPARIN SODIUM 40 MILLIGRAM(S): 100 INJECTION SUBCUTANEOUS at 11:57

## 2020-09-07 RX ADMIN — Medication 650 MILLIGRAM(S): at 07:12

## 2020-09-07 RX ADMIN — Medication 10 MILLIGRAM(S): at 18:16

## 2020-09-07 RX ADMIN — Medication 40 MILLIEQUIVALENT(S): at 05:31

## 2020-09-07 RX ADMIN — METHADONE HYDROCHLORIDE 10 MILLIGRAM(S): 40 TABLET ORAL at 23:53

## 2020-09-07 RX ADMIN — QUETIAPINE FUMARATE 100 MILLIGRAM(S): 200 TABLET, FILM COATED ORAL at 18:16

## 2020-09-07 RX ADMIN — Medication 100 MILLIGRAM(S): at 11:57

## 2020-09-07 RX ADMIN — MIDODRINE HYDROCHLORIDE 15 MILLIGRAM(S): 2.5 TABLET ORAL at 23:53

## 2020-09-07 RX ADMIN — METHADONE HYDROCHLORIDE 10 MILLIGRAM(S): 40 TABLET ORAL at 05:31

## 2020-09-07 RX ADMIN — Medication 650 MILLIGRAM(S): at 05:28

## 2020-09-07 RX ADMIN — QUETIAPINE FUMARATE 100 MILLIGRAM(S): 200 TABLET, FILM COATED ORAL at 06:08

## 2020-09-07 RX ADMIN — Medication 650 MILLIGRAM(S): at 11:57

## 2020-09-07 RX ADMIN — DEXMEDETOMIDINE HYDROCHLORIDE IN 0.9% SODIUM CHLORIDE 5.36 MICROGRAM(S)/KG/HR: 4 INJECTION INTRAVENOUS at 18:00

## 2020-09-07 RX ADMIN — PROPOFOL 4.66 MICROGRAM(S)/KG/MIN: 10 INJECTION, EMULSION INTRAVENOUS at 06:35

## 2020-09-07 RX ADMIN — DEXMEDETOMIDINE HYDROCHLORIDE IN 0.9% SODIUM CHLORIDE 5.36 MICROGRAM(S)/KG/HR: 4 INJECTION INTRAVENOUS at 18:15

## 2020-09-07 RX ADMIN — Medication 10 MILLIGRAM(S): at 23:53

## 2020-09-07 RX ADMIN — PANTOPRAZOLE SODIUM 40 MILLIGRAM(S): 20 TABLET, DELAYED RELEASE ORAL at 11:57

## 2020-09-07 RX ADMIN — DEXMEDETOMIDINE HYDROCHLORIDE IN 0.9% SODIUM CHLORIDE 5.36 MICROGRAM(S)/KG/HR: 4 INJECTION INTRAVENOUS at 06:00

## 2020-09-07 RX ADMIN — METHADONE HYDROCHLORIDE 10 MILLIGRAM(S): 40 TABLET ORAL at 18:16

## 2020-09-07 NOTE — PROGRESS NOTE ADULT - ASSESSMENT
44M PMH alcohol abuse, hx of alcohol withdrawal, alcohol pancreatitis presents for abdominal pain. Admitted with necrotizing pancreatitis, sepsis, septic shock with course complicated by DTs requiring sedation and small bowel obstruction conservatively managed with NGT and NPO. Now with worsening DTs, acute hypoxic respiratory failure requiring intubation secondary to PNA and development of ARDS with failure to wean.     1. NEURO  - sedation has been an issue with underlying agitation  - pt was started on methadone has he has been on fentanyl both for treatement of underlying pain for pancreatits but also for sedation, will d/c fentanyl infusion  - also on standing valium and will cont with that  - on precedex and propofol, hope to wean off both sedation once trach is performed  - at this point this is not alcohol related DTs and likely more underlying delirium from being in ICU and with underlying medical condition with PNA/ARDS    2. PULM  - for trach tomorrow  - cont weaning as tolerates post trach  - will wean down PEEP from 10 to 8, cont to wean to 5 if oxygen saturation holds up > 92%    3. GI  - SBO resolved  - + BMs  - on tube feeds (will keep NPO after MN for planned trach tomorrow)    4. ID  - s/p course of vanco and meropenem for both PNA and necrotizing pancreatitis  - cultures negative  - observe off antibx for now    5. CV  - off IV pressors, cont with midodrine  - cont lasix diuresis for overall volume overloaded state    6. GEN  - full code  - mother is next of kin    Critical Care Time: 35 min

## 2020-09-07 NOTE — CHART NOTE - NSCHARTNOTEFT_GEN_A_CORE
Assessment:  Pt adm c pancreatitis, N/V, hx of ETOH abuse, c withdrawal.  Pt is on vent, pneumonia, ARDS.  Plan for trach on 09/08 noted.     Factors impacting intake: [ ] none [ ] nausea  [ ] vomiting [ ] diarrhea [ ] constipation  [ ]chewing problems [ ] swallowing issues  [x ] other: on NGT feeding     Diet Prescription: Diet, NPO with Tube Feed:   Tube Feeding Modality: Nasogastric  Vital AF 1.2  Total Volume for 24 Hours (mL): 1440  Continuous  Starting Tube Feed Rate {mL per Hour}: 20  Increase Tube Feed Rate by (mL): 10     Every 6 hours  Until Goal Tube Feed Rate (mL per Hour): 60  Tube Feed Duration (in Hours): 24  Tube Feed Start Time: 07:00 (09-03-20 @ 14:38)  Diet, NPO after Midnight:      NPO Start Date: 07-Sep-2020,   NPO Start Time: 23:59 (09-07-20 @ 07:53)    Intake: Vital AF 1.2 @ 60 ml/gm=1559 calories, 108 grams protein, 1167 ml free water, 121% RDIs     Current Weight: 09/07, 91.6 kg, 08/18, 71.5 kg, c wt. gain of 20.1 kg   % Weight Change: 28.1%  09/07, 3+ generalized edema noted   Pt c visible moderate depleted temple area, no other areas were able to be visualized, abdomen distended     Pertinent Medications: MEDICATIONS  (STANDING):  chlorhexidine 0.12% Liquid 15 milliLiter(s) Oral Mucosa every 12 hours  chlorhexidine 4% Liquid 1 Application(s) Topical <User Schedule>  dexMEDEtomidine Infusion 0.276 MICROgram(s)/kG/Hr (5.36 mL/Hr) IV Continuous <Continuous>  diazepam    Tablet 10 milliGRAM(s) Oral every 6 hours  enoxaparin Injectable 40 milliGRAM(s) SubCutaneous daily  folic acid 1 milliGRAM(s) Oral daily  furosemide    Tablet 40 milliGRAM(s) Oral two times a day  lactulose Syrup 10 Gram(s) Oral four times a day  methadone    Tablet 10 milliGRAM(s) Oral every 6 hours  midodrine 15 milliGRAM(s) Oral every 8 hours  multivitamin 1 Tablet(s) Oral daily  pantoprazole  Injectable 40 milliGRAM(s) IV Push daily  propofol Infusion 10.009 MICROgram(s)/kG/Min (4.66 mL/Hr) IV Continuous <Continuous>  QUEtiapine 100 milliGRAM(s) Oral two times a day  thiamine 100 milliGRAM(s) Oral daily    MEDICATIONS  (PRN):  acetaminophen   Tablet .. 650 milliGRAM(s) Oral every 6 hours PRN Temp greater or equal to 38.5C (101.3F)  albuterol/ipratropium for Nebulization 3 milliLiter(s) Nebulizer every 6 hours PRN Shortness of Breath and/or Wheezing  senna 2 Tablet(s) Oral at bedtime PRN Constipation    Pertinent Labs: 09-07 Na142 mmol/L Glu 111 mg/dL<H> K+ 3.5 mmol/L Cr  0.55 mg/dL BUN 10 mg/dL 09-07 Phos 4.0 mg/dL 09-07 Alb 1.2 g/dL<L> 09-05 Chol --    LDL --    HDL --    Trig 325 mg/dL<H>09-07 ALT 51 U/L  U/L<H> Alkaline Phosphatase 417 U/L<H>   CAPILLARY BLOOD GLUCOSE    Skin:   skin tear left scapula noted     Estimated Needs:   [x ] no change since previous assessment(08/18)  [ ] recalculated:     Previous Nutrition Diagnosis:   Inadequate Energy Intake    Etiology Decreased ability to consume sufficient energy.     Signs/Symptoms Pt has not reached goal rate of TF    Goal/Expected Outcome Pt will consume >75% nutrition needs once goal rate has been reached; met    Nutrition Diagnosis is [ ] ongoing  [ x] resolved [ ] not applicable       New Nutrition Diagnosis: [ x] not applicable     Interventions:   Recommend  [x] Continue c current diet regimen @ present   [ ] Change Diet To:  [ ] Nutrition Supplement  [ ] Nutrition Support  [ ] Other    Monitoring and Evaluation:   [ ] PO intake [ x ] Tolerance to diet prescription [ x ] weights [ x ] labs[ x ] follow up per protocol  [ ] other:

## 2020-09-07 NOTE — PROGRESS NOTE ADULT - SUBJECTIVE AND OBJECTIVE BOX
24 hr events:  remains intubated since 8/23, failing weaning due to underlying mental status/agitation/tachypnea/tachycardia   awaiting trach tomorrow  no acute events  + BMs  O2 sat on FIo2: 50% and PEEP 10 has been in the upper 90s    ## ROS:  [x] unable to obtain due to intubation/sedation    ## Labs:  CBC:                        7.6    16.24 )-----------( 314      ( 07 Sep 2020 02:47 )             23.2     Chem:  09-07    142  |  100  |  10  ----------------------------<  111<H>  3.5   |  37<H>  |  0.55    Ca    8.8      07 Sep 2020 02:47  Phos  4.0     09-07  Mg     1.3     09-07    TPro  6.2  /  Alb  1.2<L>  /  TBili  1.5<H>  /  DBili  x   /  AST  108<H>  /  ALT  51  /  AlkPhos  417<H>  09-07    Culture - Blood (09.02.20 @ 15:04)    Specimen Source: .Blood Blood-Peripheral    Culture Results: No Growth Final    Lipase, Serum in AM (08.31.20 @ 05:02)    Lipase, Serum: 271 U/L      ## Imaging:  KUB < from: Xray Kidney Ureter Bladder (09.03.20 @ 13:08) >  Nasogastric tube tip in the stomach. There is mild segmental gaseous distention of some small bowel and large bowel loops in the upper abdomen consistent with ileus. No mechanical bowel obstruction. Pelvic phlebolith.      ## Medications:    furosemide    Tablet 40 milliGRAM(s) Oral two times a day  midodrine 15 milliGRAM(s) Oral every 8 hours    albuterol/ipratropium for Nebulization 3 milliLiter(s) Nebulizer every 6 hours PRN      enoxaparin Injectable 40 milliGRAM(s) SubCutaneous daily    lactulose Syrup 10 Gram(s) Oral four times a day  pantoprazole  Injectable 40 milliGRAM(s) IV Push daily  senna 2 Tablet(s) Oral at bedtime PRN    acetaminophen   Tablet .. 650 milliGRAM(s) Oral every 6 hours PRN  dexMEDEtomidine Infusion 0.276 MICROgram(s)/kG/Hr IV Continuous <Continuous>  diazepam    Tablet 10 milliGRAM(s) Oral every 6 hours  methadone    Tablet 10 milliGRAM(s) Oral every 6 hours  propofol Infusion 10.009 MICROgram(s)/kG/Min IV Continuous <Continuous>  QUEtiapine 100 milliGRAM(s) Oral two times a day      ## Vitals:  T(C): 36.6 (09-07-20 @ 18:59), Max: 39.1 (09-07-20 @ 12:01)  HR: 87 (09-07-20 @ 18:52) (78 - 130)  BP: 96/53 (09-07-20 @ 18:52) (88/49 - 108/60)  BP(mean): 61 (09-07-20 @ 18:52) (56 - 74)  RR: 14 (09-07-20 @ 18:52) (11 - 21)  SpO2: 98% (09-07-20 @ 18:52) (96% - 100%)    Vent: Mode: AC/ CMV (Assist Control/ Continuous Mandatory Ventilation), RR (machine): 14, RR (patient): 15, TV (machine): 450, FiO2: 50, PEEP: 8, PIP: 24        09-06 @ 07:01  -  09-07 @ 07:00  --------------------------------------------------------  IN: 2560 mL / OUT: 2100 mL / NET: 460 mL    09-07 @ 07:01  -  09-07 @ 19:17  --------------------------------------------------------  IN: 1242.6 mL / OUT: 950 mL / NET: 292.6 mL          ## P/E:  Gen: lying comfortably in bed in no apparent distress, sedated, orally intubated  HEENT: ETT and NGT in place  Resp: CTA B/L , mechanical breath sounds  CVS: RRR  Abd: soft tympanic and distended +BS (but hypoactive)  Ext: no cyanosis, bilateral upper and lower extremity pitting edema  Neuro: sedated    CENTRAL LINE: [ ] YES [x] NO  LOCATION:   DATE INSERTED:  REMOVE: [ ] YES [ ] NO      QUINTEROS: [ ] YES [ ] NO    DATE INSERTED:  REMOVE:  [ ] YES [ ] NO      A-LINE:  [ ] YES [x NO  LOCATION:   DATE INSERTED:  REMOVE:  [ ] YES [ ] NO  EXPLAIN:    GLOBAL ISSUE/BEST PRACTICE:  Analgesia: n/a  Sedation: precedex, propofol  HOB elevation: yes  Stress ulcer prophylaxis: protonix  VTE prophylaxis: lovenox  Oral Care: chlorhexidine   Glycemic control:   Nutrition: tube feeds (NPO after MN for trach)    CODE STATUS: [x] full code  [ ] DNR  [ ] DNI  [ ] MOLST  Goals of care discussion: [x] yes

## 2020-09-08 LAB
ALBUMIN SERPL ELPH-MCNC: 1.2 G/DL — LOW (ref 3.3–5)
ALP SERPL-CCNC: 369 U/L — HIGH (ref 40–120)
ALT FLD-CCNC: 42 U/L — SIGNIFICANT CHANGE UP (ref 12–78)
ANION GAP SERPL CALC-SCNC: 7 MMOL/L — SIGNIFICANT CHANGE UP (ref 5–17)
APTT BLD: 37 SEC — HIGH (ref 27.5–35.5)
AST SERPL-CCNC: 91 U/L — HIGH (ref 15–37)
BILIRUB SERPL-MCNC: 1.6 MG/DL — HIGH (ref 0.2–1.2)
BLD GP AB SCN SERPL QL: SIGNIFICANT CHANGE UP
BUN SERPL-MCNC: 11 MG/DL — SIGNIFICANT CHANGE UP (ref 7–23)
CALCIUM SERPL-MCNC: 8.8 MG/DL — SIGNIFICANT CHANGE UP (ref 8.5–10.1)
CHLORIDE SERPL-SCNC: 98 MMOL/L — SIGNIFICANT CHANGE UP (ref 96–108)
CO2 SERPL-SCNC: 36 MMOL/L — HIGH (ref 22–31)
CREAT SERPL-MCNC: 0.52 MG/DL — SIGNIFICANT CHANGE UP (ref 0.5–1.3)
GLUCOSE SERPL-MCNC: 111 MG/DL — HIGH (ref 70–99)
HCT VFR BLD CALC: 23.7 % — LOW (ref 39–50)
HGB BLD-MCNC: 7.6 G/DL — LOW (ref 13–17)
INR BLD: 1.36 RATIO — HIGH (ref 0.88–1.16)
MAGNESIUM SERPL-MCNC: 1.5 MG/DL — LOW (ref 1.6–2.6)
MCHC RBC-ENTMCNC: 32.1 GM/DL — SIGNIFICANT CHANGE UP (ref 32–36)
MCHC RBC-ENTMCNC: 32.2 PG — SIGNIFICANT CHANGE UP (ref 27–34)
MCV RBC AUTO: 100.4 FL — HIGH (ref 80–100)
NRBC # BLD: 0 /100 WBCS — SIGNIFICANT CHANGE UP (ref 0–0)
PHOSPHATE SERPL-MCNC: 3.9 MG/DL — SIGNIFICANT CHANGE UP (ref 2.5–4.5)
PLATELET # BLD AUTO: 323 K/UL — SIGNIFICANT CHANGE UP (ref 150–400)
POTASSIUM SERPL-MCNC: 3.5 MMOL/L — SIGNIFICANT CHANGE UP (ref 3.5–5.3)
POTASSIUM SERPL-SCNC: 3.5 MMOL/L — SIGNIFICANT CHANGE UP (ref 3.5–5.3)
PROT SERPL-MCNC: 6.5 GM/DL — SIGNIFICANT CHANGE UP (ref 6–8.3)
PROTHROM AB SERPL-ACNC: 15.5 SEC — HIGH (ref 10.6–13.6)
RBC # BLD: 2.36 M/UL — LOW (ref 4.2–5.8)
RBC # FLD: 16 % — HIGH (ref 10.3–14.5)
SODIUM SERPL-SCNC: 141 MMOL/L — SIGNIFICANT CHANGE UP (ref 135–145)
WBC # BLD: 18.44 K/UL — HIGH (ref 3.8–10.5)
WBC # FLD AUTO: 18.44 K/UL — HIGH (ref 3.8–10.5)

## 2020-09-08 PROCEDURE — 99291 CRITICAL CARE FIRST HOUR: CPT

## 2020-09-08 RX ORDER — MAGNESIUM SULFATE 500 MG/ML
2 VIAL (ML) INJECTION
Refills: 0 | Status: COMPLETED | OUTPATIENT
Start: 2020-09-08 | End: 2020-09-08

## 2020-09-08 RX ADMIN — METHADONE HYDROCHLORIDE 10 MILLIGRAM(S): 40 TABLET ORAL at 23:10

## 2020-09-08 RX ADMIN — MIDODRINE HYDROCHLORIDE 15 MILLIGRAM(S): 2.5 TABLET ORAL at 14:42

## 2020-09-08 RX ADMIN — MIDODRINE HYDROCHLORIDE 15 MILLIGRAM(S): 2.5 TABLET ORAL at 05:17

## 2020-09-08 RX ADMIN — DEXMEDETOMIDINE HYDROCHLORIDE IN 0.9% SODIUM CHLORIDE 5.36 MICROGRAM(S)/KG/HR: 4 INJECTION INTRAVENOUS at 15:56

## 2020-09-08 RX ADMIN — DEXMEDETOMIDINE HYDROCHLORIDE IN 0.9% SODIUM CHLORIDE 5.36 MICROGRAM(S)/KG/HR: 4 INJECTION INTRAVENOUS at 18:01

## 2020-09-08 RX ADMIN — Medication 40 MILLIGRAM(S): at 05:18

## 2020-09-08 RX ADMIN — DEXMEDETOMIDINE HYDROCHLORIDE IN 0.9% SODIUM CHLORIDE 5.36 MICROGRAM(S)/KG/HR: 4 INJECTION INTRAVENOUS at 13:56

## 2020-09-08 RX ADMIN — Medication 50 GRAM(S): at 05:30

## 2020-09-08 RX ADMIN — Medication 10 MILLIGRAM(S): at 05:18

## 2020-09-08 RX ADMIN — Medication 10 MILLIGRAM(S): at 23:09

## 2020-09-08 RX ADMIN — MIDODRINE HYDROCHLORIDE 15 MILLIGRAM(S): 2.5 TABLET ORAL at 23:10

## 2020-09-08 RX ADMIN — Medication 10 MILLIGRAM(S): at 18:02

## 2020-09-08 RX ADMIN — ENOXAPARIN SODIUM 40 MILLIGRAM(S): 100 INJECTION SUBCUTANEOUS at 12:13

## 2020-09-08 RX ADMIN — Medication 1 MILLIGRAM(S): at 12:12

## 2020-09-08 RX ADMIN — Medication 40 MILLIGRAM(S): at 18:02

## 2020-09-08 RX ADMIN — METHADONE HYDROCHLORIDE 10 MILLIGRAM(S): 40 TABLET ORAL at 18:01

## 2020-09-08 RX ADMIN — CHLORHEXIDINE GLUCONATE 15 MILLILITER(S): 213 SOLUTION TOPICAL at 18:03

## 2020-09-08 RX ADMIN — Medication 10 MILLIGRAM(S): at 12:12

## 2020-09-08 RX ADMIN — Medication 650 MILLIGRAM(S): at 05:33

## 2020-09-08 RX ADMIN — LACTULOSE 10 GRAM(S): 10 SOLUTION ORAL at 23:09

## 2020-09-08 RX ADMIN — QUETIAPINE FUMARATE 100 MILLIGRAM(S): 200 TABLET, FILM COATED ORAL at 18:01

## 2020-09-08 RX ADMIN — Medication 650 MILLIGRAM(S): at 14:42

## 2020-09-08 RX ADMIN — DEXMEDETOMIDINE HYDROCHLORIDE IN 0.9% SODIUM CHLORIDE 5.36 MICROGRAM(S)/KG/HR: 4 INJECTION INTRAVENOUS at 12:12

## 2020-09-08 RX ADMIN — CHLORHEXIDINE GLUCONATE 15 MILLILITER(S): 213 SOLUTION TOPICAL at 05:17

## 2020-09-08 RX ADMIN — LACTULOSE 10 GRAM(S): 10 SOLUTION ORAL at 18:03

## 2020-09-08 RX ADMIN — LACTULOSE 10 GRAM(S): 10 SOLUTION ORAL at 12:12

## 2020-09-08 RX ADMIN — Medication 100 MILLIGRAM(S): at 12:16

## 2020-09-08 RX ADMIN — DEXMEDETOMIDINE HYDROCHLORIDE IN 0.9% SODIUM CHLORIDE 5.36 MICROGRAM(S)/KG/HR: 4 INJECTION INTRAVENOUS at 09:11

## 2020-09-08 RX ADMIN — Medication 1 TABLET(S): at 12:12

## 2020-09-08 RX ADMIN — Medication 650 MILLIGRAM(S): at 15:30

## 2020-09-08 RX ADMIN — LACTULOSE 10 GRAM(S): 10 SOLUTION ORAL at 05:18

## 2020-09-08 RX ADMIN — METHADONE HYDROCHLORIDE 10 MILLIGRAM(S): 40 TABLET ORAL at 12:12

## 2020-09-08 RX ADMIN — QUETIAPINE FUMARATE 100 MILLIGRAM(S): 200 TABLET, FILM COATED ORAL at 05:39

## 2020-09-08 RX ADMIN — PANTOPRAZOLE SODIUM 40 MILLIGRAM(S): 20 TABLET, DELAYED RELEASE ORAL at 12:13

## 2020-09-08 RX ADMIN — Medication 50 GRAM(S): at 06:33

## 2020-09-08 RX ADMIN — DEXMEDETOMIDINE HYDROCHLORIDE IN 0.9% SODIUM CHLORIDE 5.36 MICROGRAM(S)/KG/HR: 4 INJECTION INTRAVENOUS at 08:23

## 2020-09-08 RX ADMIN — CHLORHEXIDINE GLUCONATE 1 APPLICATION(S): 213 SOLUTION TOPICAL at 07:00

## 2020-09-08 RX ADMIN — METHADONE HYDROCHLORIDE 10 MILLIGRAM(S): 40 TABLET ORAL at 05:18

## 2020-09-08 RX ADMIN — Medication 650 MILLIGRAM(S): at 06:00

## 2020-09-08 RX ADMIN — DEXMEDETOMIDINE HYDROCHLORIDE IN 0.9% SODIUM CHLORIDE 5.36 MICROGRAM(S)/KG/HR: 4 INJECTION INTRAVENOUS at 19:44

## 2020-09-08 NOTE — PROGRESS NOTE ADULT - SUBJECTIVE AND OBJECTIVE BOX
Team Surgery Preop Note    Patient is a 44y old  Male who presents with a chief complaint of pancreatitis (08 Sep 2020 08:05)    Diagnosis: Respiratory failure   Procedure: Open Tracheostomy and PEG insertion   Surgeon: Dr. Adams                          7.6    18.44 )-----------( 323      ( 08 Sep 2020 04:01 )             23.7     09-08    141  |  98  |  11  ----------------------------<  111<H>  3.5   |  36<H>  |  0.52    Ca    8.8      08 Sep 2020 04:01  Phos  3.9     09-08  Mg     1.5     09-08    TPro  6.5  /  Alb  1.2<L>  /  TBili  1.6<H>  /  DBili  x   /  AST  91<H>  /  ALT  42  /  AlkPhos  369<H>  09-08    PT/INR - ( 08 Sep 2020 04:01 )   PT: 15.5 sec;   INR: 1.36 ratio         PTT - ( 08 Sep 2020 04:01 )  PTT:37.0 sec    Assessment: 43 yo M with h/o EtOH abuse/ withdrawal and pancreatitis. Pt presented to the ER 2 to abdominal pain, nausea and vomiting; labs and CT were consistent with pancreatitis and possible early necrosis of pancreas. Pt transferred to ICU om 8/18  for severe alcohol withdrawal. Pt intubated on 8/23 2 to hypoxic resp failure requiring intubation 2 to worsening PNA. CXR 9/1/2020 reveals b/l PNA and pt still hypoxic c/w ARDS  Added on to OR schedule Wed 9/9 for Trach and Peg placement.     Plan:   [X] NPO p MN, IVF  [X ] Urinalysis  [X ] Chest X-ray  [X ] EKG  [X ] Added on to OR Schedule  Medical clearance for OR per ICU  Need Preop labs (CBC, BMP, Coags T&S). *Rpt COVID swab  Hold A/C in AM  *Consent to be obtained, will call Mother

## 2020-09-08 NOTE — PROGRESS NOTE ADULT - ASSESSMENT
44M PMH alcohol abuse, hx of alcohol withdrawal, alcohol pancreatitis presents for abdominal pain. Admitted with necrotizing pancreatitis, sepsis, septic shock with course complicated by DTs requiring sedation and small bowel obstruction conservatively managed with NGT and NPO. Now with worsening DTs, acute hypoxic respiratory failure requiring intubation secondary to PNA and development of ARDS with failure to wean. The patient currently is waiting for tracheostomy.     1. NEURO  - sedation has been an issue with underlying agitation  - pt was started on methadone has he has been on fentanyl both for treatement of underlying pain for pancreatits but also for sedation, will d/c fentanyl infusion  - also on standing valium and will cont with that  - on precedex and propofol, hope to wean off both sedation once trach is performed  - at this point this is not alcohol related DTs and likely more underlying delirium from being in ICU and with underlying medical condition with PNA/ARDS    2. PULM  - trach today in late afternoon (f/u with surgery)  - cont weaning as tolerates post trach  - since SpO2 > 92% overnight with PEEP 8, wean PEEP from 8 to 5    3. GI  - SBO resolved  - + BMs  - currently NPO for trach (held feeding at 6am this morning)    4. ID  - s/p course of vanco and meropenem for both PNA and necrotizing pancreatitis  - cultures negative  - observe off antibx for now    5. CV  - off IV pressors, cont with midodrine  - cont lasix diuresis for overall volume overloaded state    6. GEN  - full code  - mother is next of kin    Critical Care Time: 44M PMH alcohol abuse, hx of alcohol withdrawal, alcohol pancreatitis presents for abdominal pain. Admitted with necrotizing pancreatitis, sepsis, septic shock with course complicated by DTs requiring sedation and small bowel obstruction conservatively managed with NGT and NPO. Now with worsening DTs, acute hypoxic respiratory failure requiring intubation secondary to PNA and development of ARDS with failure to wean. The patient currently is waiting for tracheostomy.     1. NEURO  - sedation has been an issue with underlying agitation  - pt was started on methadone has he has been on fentanyl both for treatement of underlying pain for pancreatits but also for sedation, d/c fentanyl infusion (9/7/20)  - on standing valium and will cont with that  - continue precedex and propofol, hope to wean off both sedation once trach is performed  - at this point this is not alcohol related DTs and likely more underlying delirium from being in ICU and with underlying medical condition with PNA/ARDS    2. PULM  - trach scheduled for tomorrow in the morning  - cont weaning as tolerates post trach  - since SpO2 > 92% overnight with PEEP 8, wean PEEP from 8 to 5    3. GI  - SBO resolved  - + BMs  - currently NPO for trach (held feeding at 6am this morning)    4. ID  - s/p course of vanco and meropenem for both PNA and necrotizing pancreatitis  - cultures negative  - Order pro-calcitonin to r/o infectious etiology of fever overnight    5. CV  - off IV pressors, cont with midodrine  - cont lasix diuresis for overall volume overloaded state    6. Renal  - d/c mosqueda and switch to condom catheter  - daily monitor I&O    7. GEN  - full code  - cont DVT prophylaxis  - mother is next of kin 44M PMH alcohol abuse, hx of alcohol withdrawal, alcohol pancreatitis presents for abdominal pain. Admitted with necrotizing pancreatitis, sepsis, septic shock with course complicated by DTs requiring sedation and small bowel obstruction conservatively managed with NGT and NPO. Now with worsening DTs, acute hypoxic respiratory failure requiring intubation secondary to PNA and development of ARDS with failure to wean. The patient currently is waiting for tracheostomy.     1. NEURO  - sedation has been an issue with underlying agitation  - pt was started on methadone has he has been on fentanyl both for treatement of underlying pain for pancreatits but also for sedation, d/c fentanyl infusion (9/7/20)  - on standing valium and will cont with that  - continue precedex and propofol, hope to wean off both sedation once trach is performed  - at this point this is not alcohol related DTs and likely more underlying delirium from being in ICU and with underlying medical condition with PNA/ARDS    2. PULM  - trach scheduled for tomorrow in the morning  - cont weaning as tolerates post trach  - since SpO2 > 92% overnight with PEEP 8, wean PEEP from 8 to 5    3. GI  - SBO resolved  - + BMs  - currently NPO for trach (held feeding at 6am this morning)  - PEG scheduled for tomorrow    4. ID  - s/p course of vanco and meropenem for both PNA and necrotizing pancreatitis  - cultures negative  - Order pro-calcitonin to r/o infectious etiology of fever overnight    5. CV  - off IV pressors, cont with midodrine  - cont lasix diuresis for overall volume overloaded state    6. Renal  - d/c mosqueda and switch to condom catheter  - daily monitor I&O    7. GEN  - full code  - cont DVT prophylaxis  - mother is next of kin

## 2020-09-08 NOTE — CHART NOTE - NSCHARTNOTEFT_GEN_A_CORE
Hospital course reviewed.   Patient with intubations due to respiratory failure secondary to PNA and ARDS. Patient failed weaning trials.   OR planning tomorrow for trach and PEG. Case booked.   Preop; NPO p MN, IV hydration, AM labs, COVID swab.  ICU team notified of plans.   Discussed with Dr. Adams.

## 2020-09-08 NOTE — PROGRESS NOTE ADULT - ATTENDING COMMENTS
44M PMH alcohol abuse with hx of  alcohol withdrawal, alcohol pancreatitis presents for abdominal pain due to necrotizing pancreatitis, sepsis, septic shock with course complicated by DTs, small bowel obstruction which was conservatively managed with NPO/NGT, acute hypoxic respiratory failure requiring intubation secondary to PNA with development of ARDS and failure to wean awaiting tracheostomy/PEG.     1. NEURO  - fentanyl drip d/angela yesterday  - remains on valium and methadone (as pt has been on extended course of fentanyl)  - continue to wean off precedex and propofol, once trach is in place it may be easier to wean off IV sedatives  - current agitation likely not related to alcohol DTs and more likely from delirium with ICU stay and ongoing medical illness with PNA/ARDS    2. PULM  - PEEP weaned from 10 to 8 yesterday, now on PEEP 5 today and maintaining O2 sat >90% with FIO2 at 50%  - trach scheduled for tomorrow morning not today per discussion with surgical team  - pt tolerated some weaning for first time today, however still with thick ETT secretions, thus will pursue for planned trach  - cont weaning as tolerates post trach      3. GI  - SBO resolved  - + BMs  - tube feeds resumed for today as trach is for tomorrow, NPO after midnight for trach in AM  - PEG scheduled for tomorrow as well    4. ID  - s/p course of vanco and meropenem which treated PNA and necrotizing pancreatitis  - has been febrile, white count today with a slight uptrend: cultures negative to date  - will cont to observe off antibx for now. hoping that as ETT and NGT is d/angela with trach and PEG in place there may be some improvement in fever and leukocytosis trend  - can repeat procal and trend to see if there is a rise with persistent fevers but overall procal has also been decreasing      5. CV  - off IV pressors, cont with midodrine  - on standing lasix diuresis for overall volume overloaded state, goal is to keep net negative balance    6. Renal  - cont to monitor I/O and Cr  - will do TOV today d/c mosqueda  - suppplement hypomagnesemia    7. GEN  - full code  - cont DVT prophylaxis  - per chart, pt's mother makes medical decisions and consents for pt    Critical Care Time: 35 min

## 2020-09-08 NOTE — PROGRESS NOTE ADULT - SUBJECTIVE AND OBJECTIVE BOX
24 hr events:  Had fever overnight (Tmax 101.8). Temperature went down this morning. Currently afebrile. Feeding held at 6am this morning. Had bowel movements and produced adequate urine overnight. still remains intubated since 8/23, failed weaning multiple times due to underlying mental status/agitation/tachypnea/tachycardia. Trach was not scheduled for today in this morning. When spoke with the surgery team, they informed us that they are planning to do trach late afternoon today.    O2 sat on FIo2: 50% and PEEP 8 has been in the upper 90s    ## ROS:  [x] unable to obtain due to intubation/sedation    ## Labs:  CBC:                        7.6    18.44 )-----------( 323      ( 08 Sep 2020 04:01 )             23.7     PT/INR - ( 08 Sep 2020 04:01 )   PT: 15.5 sec;   INR: 1.36 ratio         PTT - ( 08 Sep 2020 04:01 )  PTT:37.0 sec    Chem:  09-08    141  |  98  |  11  ----------------------------<  111<H>  3.5   |  36<H>  |  0.52    Ca    8.8      08 Sep 2020 04:01  Phos  3.9     09-08  Mg     1.5     09-08    TPro  6.5  /  Alb  1.2<L>  /  TBili  1.6<H>  /  DBili  x   /  AST  91<H>  /  ALT  42  /  AlkPhos  369<H>  09-08    Culture - Blood (09.02.20 @ 15:04)    Specimen Source: .Blood Blood-Peripheral    Culture Results: No Growth Final    ## Imaging:  KUB < from: Xray Kidney Ureter Bladder (09.03.20 @ 13:08) >  Nasogastric tube tip in the stomach. There is mild segmental gaseous distention of some small bowel and large bowel loops in the upper abdomen consistent with ileus. No mechanical bowel obstruction. Pelvic phlebolith.      ## Medications:  MEDICATIONS  (STANDING):  chlorhexidine 0.12% Liquid 15 milliLiter(s) Oral Mucosa every 12 hours  chlorhexidine 4% Liquid 1 Application(s) Topical <User Schedule>  dexMEDEtomidine Infusion 0.276 MICROgram(s)/kG/Hr (5.36 mL/Hr) IV Continuous <Continuous>  diazepam    Tablet 10 milliGRAM(s) Oral every 6 hours  enoxaparin Injectable 40 milliGRAM(s) SubCutaneous daily  folic acid 1 milliGRAM(s) Oral daily  furosemide    Tablet 40 milliGRAM(s) Oral two times a day  lactulose Syrup 10 Gram(s) Oral four times a day  methadone    Tablet 10 milliGRAM(s) Oral every 6 hours  midodrine 15 milliGRAM(s) Oral every 8 hours  multivitamin 1 Tablet(s) Oral daily  pantoprazole  Injectable 40 milliGRAM(s) IV Push daily  propofol Infusion 10.009 MICROgram(s)/kG/Min (4.66 mL/Hr) IV Continuous <Continuous>  QUEtiapine 100 milliGRAM(s) Oral two times a day  thiamine 100 milliGRAM(s) Oral daily    MEDICATIONS  (PRN):  acetaminophen   Tablet .. 650 milliGRAM(s) Oral every 6 hours PRN Temp greater or equal to 38.5C (101.3F)  albuterol/ipratropium for Nebulization 3 milliLiter(s) Nebulizer every 6 hours PRN Shortness of Breath and/or Wheezing  senna 2 Tablet(s) Oral at bedtime PRN Constipation      ## Vitals:  ICU Vital Signs Last 24 Hrs  T(C): 37.7 (08 Sep 2020 05:00), Max: 39.1 (07 Sep 2020 12:01)  T(F): 99.8 (08 Sep 2020 05:00), Max: 102.3 (07 Sep 2020 12:01)  HR: 84 (08 Sep 2020 07:00) (78 - 130)  BP: 100/54 (08 Sep 2020 07:00) (88/49 - 124/72)  BP(mean): 65 (08 Sep 2020 07:00) (57 - 86)  RR: 14 (08 Sep 2020 07:00) (14 - 26)  SpO2: 98% (08 Sep 2020 07:00) (93% - 99%)    Vent: Mode: AC/ CMV (Assist Control/ Continuous Mandatory Ventilation), RR (machine): 14, RR (patient): 15, TV (machine): 450, FiO2: 50, PEEP: 8, PIP: 24      I&O's Summary    07 Sep 2020 07:01  -  08 Sep 2020 07:00  --------------------------------------------------------  IN: 2464.9 mL / OUT: 2190 mL / NET: 274.9 mL        ## P/E:  Gen: lying comfortably in bed in no apparent distress, sedated, orally intubated  HEENT: pupils constrict to light b/l, ETT and NGT in place  Resp: coarse breath sounds B/L, mechanical breath sounds  CVS: RRR, S1S2 with no murmur or gallop  Abd: firm, distended, and tympanic +BS (but hypoactive)  Ext: no cyanosis, bilateral upper and lower extremity 2+ pitting edema  Neuro: sedated    CENTRAL LINE: [ ] YES [x] NO  LOCATION:   DATE INSERTED:  REMOVE: [ ] YES [ ] NO      QUINTEROS: [ ] YES [ ] NO    DATE INSERTED:  REMOVE:  [ ] YES [ ] NO      A-LINE:  [ ] YES [x NO  LOCATION:   DATE INSERTED:  REMOVE:  [ ] YES [ ] NO  EXPLAIN:    GLOBAL ISSUE/BEST PRACTICE:  Analgesia: n/a  Sedation: precedex, propofol  HOB elevation: yes  Stress ulcer prophylaxis: protonix  VTE prophylaxis: lovenox  Oral Care: chlorhexidine   Glycemic control:   Nutrition: tube feeds (NPO after MN for trach)    CODE STATUS: [x] full code  [ ] DNR  [ ] DNI  [ ] [facility]  Goals of care discussion: [x] yes 24 hr events:  Had fever overnight (Tmax 101.8). Temperature went down this morning. Currently afebrile. Feeding held at 6am this morning. Had bowel movements and produced adequate urine overnight. still remains intubated since 8/23, failed weaning multiple times due to underlying mental status/agitation/tachypnea/tachycardia. Trach was not scheduled for today in this morning. When spoke with the surgery team, they informed us that they are planning to do trach tomorrow in the morning.    O2 sat on FIo2: 50% and PEEP 8 has been in the upper 90s    ## ROS:  [x] unable to obtain due to intubation/sedation    ## Labs:  CBC:                        7.6    18.44 )-----------( 323      ( 08 Sep 2020 04:01 )             23.7     PT/INR - ( 08 Sep 2020 04:01 )   PT: 15.5 sec;   INR: 1.36 ratio         PTT - ( 08 Sep 2020 04:01 )  PTT:37.0 sec    Chem:  09-08    141  |  98  |  11  ----------------------------<  111<H>  3.5   |  36<H>  |  0.52    Ca    8.8      08 Sep 2020 04:01  Phos  3.9     09-08  Mg     1.5     09-08    TPro  6.5  /  Alb  1.2<L>  /  TBili  1.6<H>  /  DBili  x   /  AST  91<H>  /  ALT  42  /  AlkPhos  369<H>  09-08    Culture - Blood (09.02.20 @ 15:04)    Specimen Source: .Blood Blood-Peripheral    Culture Results: No Growth Final    ## Imaging:  KUB < from: Xray Kidney Ureter Bladder (09.03.20 @ 13:08) >  Nasogastric tube tip in the stomach. There is mild segmental gaseous distention of some small bowel and large bowel loops in the upper abdomen consistent with ileus. No mechanical bowel obstruction. Pelvic phlebolith.      ## Medications:  MEDICATIONS  (STANDING):  chlorhexidine 0.12% Liquid 15 milliLiter(s) Oral Mucosa every 12 hours  chlorhexidine 4% Liquid 1 Application(s) Topical <User Schedule>  dexMEDEtomidine Infusion 0.276 MICROgram(s)/kG/Hr (5.36 mL/Hr) IV Continuous <Continuous>  diazepam    Tablet 10 milliGRAM(s) Oral every 6 hours  enoxaparin Injectable 40 milliGRAM(s) SubCutaneous daily  folic acid 1 milliGRAM(s) Oral daily  furosemide    Tablet 40 milliGRAM(s) Oral two times a day  lactulose Syrup 10 Gram(s) Oral four times a day  methadone    Tablet 10 milliGRAM(s) Oral every 6 hours  midodrine 15 milliGRAM(s) Oral every 8 hours  multivitamin 1 Tablet(s) Oral daily  pantoprazole  Injectable 40 milliGRAM(s) IV Push daily  propofol Infusion 10.009 MICROgram(s)/kG/Min (4.66 mL/Hr) IV Continuous <Continuous>  QUEtiapine 100 milliGRAM(s) Oral two times a day  thiamine 100 milliGRAM(s) Oral daily    MEDICATIONS  (PRN):  acetaminophen   Tablet .. 650 milliGRAM(s) Oral every 6 hours PRN Temp greater or equal to 38.5C (101.3F)  albuterol/ipratropium for Nebulization 3 milliLiter(s) Nebulizer every 6 hours PRN Shortness of Breath and/or Wheezing  senna 2 Tablet(s) Oral at bedtime PRN Constipation      ## Vitals:  ICU Vital Signs Last 24 Hrs  T(C): 37.7 (08 Sep 2020 05:00), Max: 39.1 (07 Sep 2020 12:01)  T(F): 99.8 (08 Sep 2020 05:00), Max: 102.3 (07 Sep 2020 12:01)  HR: 84 (08 Sep 2020 07:00) (78 - 130)  BP: 100/54 (08 Sep 2020 07:00) (88/49 - 124/72)  BP(mean): 65 (08 Sep 2020 07:00) (57 - 86)  RR: 14 (08 Sep 2020 07:00) (14 - 26)  SpO2: 98% (08 Sep 2020 07:00) (93% - 99%)    Vent: Mode: AC/ CMV (Assist Control/ Continuous Mandatory Ventilation), RR (machine): 14, RR (patient): 15, TV (machine): 450, FiO2: 50, PEEP: 8, PIP: 24      I&O's Summary    07 Sep 2020 07:01  -  08 Sep 2020 07:00  --------------------------------------------------------  IN: 2464.9 mL / OUT: 2190 mL / NET: 274.9 mL        ## P/E:  Gen: lying comfortably in bed in no apparent distress, sedated, orally intubated  HEENT: pupils constrict to light b/l, ETT and NGT in place  Resp: coarse breath sounds B/L, mechanical breath sounds  CVS: RRR, S1S2 with no murmur or gallop  Abd: firm, distended, and tympanic +BS (but hypoactive)  Ext: no cyanosis, bilateral upper and lower extremity 2+ pitting edema  Neuro: sedated    CENTRAL LINE: [ ] YES [x] NO  LOCATION:   DATE INSERTED:  REMOVE: [ ] YES [ ] NO      QUINTEROS: [ ] YES [ ] NO    DATE INSERTED:  REMOVE:  [ ] YES [ ] NO      A-LINE:  [ ] YES [x NO  LOCATION:   DATE INSERTED:  REMOVE:  [ ] YES [ ] NO  EXPLAIN:    GLOBAL ISSUE/BEST PRACTICE:  Analgesia: n/a  Sedation: precedex, propofol  HOB elevation: yes  Stress ulcer prophylaxis: protonix  VTE prophylaxis: lovenox  Oral Care: chlorhexidine   Glycemic control:   Nutrition: tube feeds (NPO after MN for trach)    CODE STATUS: [x] full code  [ ] DNR  [ ] DNI  [ ] [facility]OLST  Goals of care discussion: [x] yes 24 hr events:  Had fever overnight (Tmax 101.8). Temperature went down this morning. Currently afebrile. Feeding held at 6am this morning. Had bowel movements and produced adequate urine overnight. still remains intubated since 8/23, failed weaning multiple times due to underlying mental status/agitation/tachypnea/tachycardia. Trach was not scheduled for today. When spoke with the surgery team, they informed us that they are planning to do trach and PEG tomorrow in the morning.    O2 sat on FIo2: 50% and PEEP 8 has been in the upper 90s    ## ROS:  [x] unable to obtain due to intubation/sedation    ## Labs:  CBC:                        7.6    18.44 )-----------( 323      ( 08 Sep 2020 04:01 )             23.7     PT/INR - ( 08 Sep 2020 04:01 )   PT: 15.5 sec;   INR: 1.36 ratio         PTT - ( 08 Sep 2020 04:01 )  PTT:37.0 sec    Chem:  09-08    141  |  98  |  11  ----------------------------<  111<H>  3.5   |  36<H>  |  0.52    Ca    8.8      08 Sep 2020 04:01  Phos  3.9     09-08  Mg     1.5     09-08    TPro  6.5  /  Alb  1.2<L>  /  TBili  1.6<H>  /  DBili  x   /  AST  91<H>  /  ALT  42  /  AlkPhos  369<H>  09-08    Culture - Blood (09.02.20 @ 15:04)    Specimen Source: .Blood Blood-Peripheral    Culture Results: No Growth Final    ## Imaging:  KUB < from: Xray Kidney Ureter Bladder (09.03.20 @ 13:08) >  Nasogastric tube tip in the stomach. There is mild segmental gaseous distention of some small bowel and large bowel loops in the upper abdomen consistent with ileus. No mechanical bowel obstruction. Pelvic phlebolith.      ## Medications:  MEDICATIONS  (STANDING):  chlorhexidine 0.12% Liquid 15 milliLiter(s) Oral Mucosa every 12 hours  chlorhexidine 4% Liquid 1 Application(s) Topical <User Schedule>  dexMEDEtomidine Infusion 0.276 MICROgram(s)/kG/Hr (5.36 mL/Hr) IV Continuous <Continuous>  diazepam    Tablet 10 milliGRAM(s) Oral every 6 hours  enoxaparin Injectable 40 milliGRAM(s) SubCutaneous daily  folic acid 1 milliGRAM(s) Oral daily  furosemide    Tablet 40 milliGRAM(s) Oral two times a day  lactulose Syrup 10 Gram(s) Oral four times a day  methadone    Tablet 10 milliGRAM(s) Oral every 6 hours  midodrine 15 milliGRAM(s) Oral every 8 hours  multivitamin 1 Tablet(s) Oral daily  pantoprazole  Injectable 40 milliGRAM(s) IV Push daily  propofol Infusion 10.009 MICROgram(s)/kG/Min (4.66 mL/Hr) IV Continuous <Continuous>  QUEtiapine 100 milliGRAM(s) Oral two times a day  thiamine 100 milliGRAM(s) Oral daily    MEDICATIONS  (PRN):  acetaminophen   Tablet .. 650 milliGRAM(s) Oral every 6 hours PRN Temp greater or equal to 38.5C (101.3F)  albuterol/ipratropium for Nebulization 3 milliLiter(s) Nebulizer every 6 hours PRN Shortness of Breath and/or Wheezing  senna 2 Tablet(s) Oral at bedtime PRN Constipation      ## Vitals:  ICU Vital Signs Last 24 Hrs  T(C): 37.7 (08 Sep 2020 05:00), Max: 39.1 (07 Sep 2020 12:01)  T(F): 99.8 (08 Sep 2020 05:00), Max: 102.3 (07 Sep 2020 12:01)  HR: 84 (08 Sep 2020 07:00) (78 - 130)  BP: 100/54 (08 Sep 2020 07:00) (88/49 - 124/72)  BP(mean): 65 (08 Sep 2020 07:00) (57 - 86)  RR: 14 (08 Sep 2020 07:00) (14 - 26)  SpO2: 98% (08 Sep 2020 07:00) (93% - 99%)    Vent: Mode: AC/ CMV (Assist Control/ Continuous Mandatory Ventilation), RR (machine): 14, RR (patient): 15, TV (machine): 450, FiO2: 50, PEEP: 8, PIP: 24      I&O's Summary    07 Sep 2020 07:01  -  08 Sep 2020 07:00  --------------------------------------------------------  IN: 2464.9 mL / OUT: 2190 mL / NET: 274.9 mL        ## P/E:  Gen: lying comfortably in bed in no apparent distress, sedated, orally intubated  HEENT: pupils constrict to light b/l, ETT and NGT in place  Resp: coarse breath sounds B/L, mechanical breath sounds  CVS: RRR, S1S2 with no murmur or gallop  Abd: firm, distended, and tympanic +BS (but hypoactive)  Ext: no cyanosis, bilateral upper and lower extremity 2+ pitting edema  Neuro: sedated    CENTRAL LINE: [ ] YES [x] NO  LOCATION:   DATE INSERTED:  REMOVE: [ ] YES [ ] NO      QUINTEROS: [ ] YES [ ] NO    DATE INSERTED:  REMOVE:  [ ] YES [ ] NO      A-LINE:  [ ] YES [x NO  LOCATION:   DATE INSERTED:  REMOVE:  [ ] YES [ ] NO  EXPLAIN:    GLOBAL ISSUE/BEST PRACTICE:  Analgesia: n/a  Sedation: precedex, propofol  HOB elevation: yes  Stress ulcer prophylaxis: protonix  VTE prophylaxis: lovenox  Oral Care: chlorhexidine   Glycemic control:   Nutrition: tube feeds (NPO after MN for trach)    CODE STATUS: [x] full code  [ ] DNR  [ ] DNI  [ ] MOLST  Goals of care discussion: [x] yes

## 2020-09-09 ENCOUNTER — TRANSCRIPTION ENCOUNTER (OUTPATIENT)
Age: 45
End: 2020-09-09

## 2020-09-09 LAB
ALBUMIN SERPL ELPH-MCNC: 1.2 G/DL — LOW (ref 3.3–5)
ALP SERPL-CCNC: 325 U/L — HIGH (ref 40–120)
ALT FLD-CCNC: 38 U/L — SIGNIFICANT CHANGE UP (ref 12–78)
ANION GAP SERPL CALC-SCNC: 4 MMOL/L — LOW (ref 5–17)
AST SERPL-CCNC: 92 U/L — HIGH (ref 15–37)
BASE EXCESS BLDA CALC-SCNC: 14 MMOL/L — HIGH (ref -2–2)
BILIRUB SERPL-MCNC: 1.6 MG/DL — HIGH (ref 0.2–1.2)
BLOOD GAS COMMENTS: SIGNIFICANT CHANGE UP
BLOOD GAS COMMENTS: SIGNIFICANT CHANGE UP
BLOOD GAS SOURCE: SIGNIFICANT CHANGE UP
BUN SERPL-MCNC: 10 MG/DL — SIGNIFICANT CHANGE UP (ref 7–23)
CALCIUM SERPL-MCNC: 8.7 MG/DL — SIGNIFICANT CHANGE UP (ref 8.5–10.1)
CHLORIDE SERPL-SCNC: 95 MMOL/L — LOW (ref 96–108)
CO2 SERPL-SCNC: 38 MMOL/L — HIGH (ref 22–31)
CREAT SERPL-MCNC: 0.38 MG/DL — LOW (ref 0.5–1.3)
GLUCOSE SERPL-MCNC: 106 MG/DL — HIGH (ref 70–99)
HCO3 BLDA-SCNC: 39 MMOL/L — HIGH (ref 21–29)
HCT VFR BLD CALC: 24.8 % — LOW (ref 39–50)
HGB BLD-MCNC: 8 G/DL — LOW (ref 13–17)
HOROWITZ INDEX BLDA+IHG-RTO: 50 — SIGNIFICANT CHANGE UP
INR BLD: 1.37 RATIO — HIGH (ref 0.88–1.16)
MAGNESIUM SERPL-MCNC: 1.5 MG/DL — LOW (ref 1.6–2.6)
MCHC RBC-ENTMCNC: 32.3 GM/DL — SIGNIFICANT CHANGE UP (ref 32–36)
MCHC RBC-ENTMCNC: 32.4 PG — SIGNIFICANT CHANGE UP (ref 27–34)
MCV RBC AUTO: 100.4 FL — HIGH (ref 80–100)
NRBC # BLD: 0 /100 WBCS — SIGNIFICANT CHANGE UP (ref 0–0)
PCO2 BLDA: 49 MMHG — HIGH (ref 32–46)
PH BLD: 7.51 — HIGH (ref 7.35–7.45)
PHOSPHATE SERPL-MCNC: 4 MG/DL — SIGNIFICANT CHANGE UP (ref 2.5–4.5)
PLATELET # BLD AUTO: 348 K/UL — SIGNIFICANT CHANGE UP (ref 150–400)
PO2 BLDA: 85 MMHG — SIGNIFICANT CHANGE UP (ref 74–108)
POTASSIUM SERPL-MCNC: 3.3 MMOL/L — LOW (ref 3.5–5.3)
POTASSIUM SERPL-SCNC: 3.3 MMOL/L — LOW (ref 3.5–5.3)
PROCALCITONIN SERPL-MCNC: 0.31 NG/ML — HIGH (ref 0.02–0.1)
PROT SERPL-MCNC: 6.6 GM/DL — SIGNIFICANT CHANGE UP (ref 6–8.3)
PROTHROM AB SERPL-ACNC: 15.7 SEC — HIGH (ref 10.6–13.6)
RBC # BLD: 2.47 M/UL — LOW (ref 4.2–5.8)
RBC # FLD: 15.8 % — HIGH (ref 10.3–14.5)
SAO2 % BLDA: 97 % — HIGH (ref 92–96)
SARS-COV-2 RNA SPEC QL NAA+PROBE: SIGNIFICANT CHANGE UP
SODIUM SERPL-SCNC: 137 MMOL/L — SIGNIFICANT CHANGE UP (ref 135–145)
WBC # BLD: 19.72 K/UL — HIGH (ref 3.8–10.5)
WBC # FLD AUTO: 19.72 K/UL — HIGH (ref 3.8–10.5)

## 2020-09-09 PROCEDURE — 99291 CRITICAL CARE FIRST HOUR: CPT

## 2020-09-09 RX ORDER — ACETAZOLAMIDE 250 MG/1
250 TABLET ORAL ONCE
Refills: 0 | Status: COMPLETED | OUTPATIENT
Start: 2020-09-09 | End: 2020-09-09

## 2020-09-09 RX ORDER — MAGNESIUM SULFATE 500 MG/ML
2 VIAL (ML) INJECTION ONCE
Refills: 0 | Status: COMPLETED | OUTPATIENT
Start: 2020-09-09 | End: 2020-09-09

## 2020-09-09 RX ORDER — POTASSIUM CHLORIDE 20 MEQ
10 PACKET (EA) ORAL
Refills: 0 | Status: COMPLETED | OUTPATIENT
Start: 2020-09-09 | End: 2020-09-09

## 2020-09-09 RX ADMIN — MIDODRINE HYDROCHLORIDE 15 MILLIGRAM(S): 2.5 TABLET ORAL at 22:00

## 2020-09-09 RX ADMIN — ACETAZOLAMIDE 105 MILLIGRAM(S): 250 TABLET ORAL at 13:31

## 2020-09-09 RX ADMIN — METHADONE HYDROCHLORIDE 10 MILLIGRAM(S): 40 TABLET ORAL at 05:04

## 2020-09-09 RX ADMIN — QUETIAPINE FUMARATE 100 MILLIGRAM(S): 200 TABLET, FILM COATED ORAL at 05:04

## 2020-09-09 RX ADMIN — Medication 100 MILLIEQUIVALENT(S): at 09:10

## 2020-09-09 RX ADMIN — Medication 100 MILLIEQUIVALENT(S): at 10:07

## 2020-09-09 RX ADMIN — DEXMEDETOMIDINE HYDROCHLORIDE IN 0.9% SODIUM CHLORIDE 5.36 MICROGRAM(S)/KG/HR: 4 INJECTION INTRAVENOUS at 16:27

## 2020-09-09 RX ADMIN — METHADONE HYDROCHLORIDE 10 MILLIGRAM(S): 40 TABLET ORAL at 11:44

## 2020-09-09 RX ADMIN — DEXMEDETOMIDINE HYDROCHLORIDE IN 0.9% SODIUM CHLORIDE 5.36 MICROGRAM(S)/KG/HR: 4 INJECTION INTRAVENOUS at 10:57

## 2020-09-09 RX ADMIN — LACTULOSE 10 GRAM(S): 10 SOLUTION ORAL at 05:03

## 2020-09-09 RX ADMIN — CHLORHEXIDINE GLUCONATE 15 MILLILITER(S): 213 SOLUTION TOPICAL at 05:03

## 2020-09-09 RX ADMIN — LACTULOSE 10 GRAM(S): 10 SOLUTION ORAL at 23:46

## 2020-09-09 RX ADMIN — DEXMEDETOMIDINE HYDROCHLORIDE IN 0.9% SODIUM CHLORIDE 5.36 MICROGRAM(S)/KG/HR: 4 INJECTION INTRAVENOUS at 23:45

## 2020-09-09 RX ADMIN — Medication 50 GRAM(S): at 08:59

## 2020-09-09 RX ADMIN — Medication 100 MILLIGRAM(S): at 11:45

## 2020-09-09 RX ADMIN — Medication 10 MILLIGRAM(S): at 11:44

## 2020-09-09 RX ADMIN — CHLORHEXIDINE GLUCONATE 15 MILLILITER(S): 213 SOLUTION TOPICAL at 18:13

## 2020-09-09 RX ADMIN — MIDODRINE HYDROCHLORIDE 15 MILLIGRAM(S): 2.5 TABLET ORAL at 13:34

## 2020-09-09 RX ADMIN — Medication 650 MILLIGRAM(S): at 09:30

## 2020-09-09 RX ADMIN — METHADONE HYDROCHLORIDE 10 MILLIGRAM(S): 40 TABLET ORAL at 23:46

## 2020-09-09 RX ADMIN — CHLORHEXIDINE GLUCONATE 1 APPLICATION(S): 213 SOLUTION TOPICAL at 04:00

## 2020-09-09 RX ADMIN — Medication 1 TABLET(S): at 11:45

## 2020-09-09 RX ADMIN — Medication 50 GRAM(S): at 08:08

## 2020-09-09 RX ADMIN — DEXMEDETOMIDINE HYDROCHLORIDE IN 0.9% SODIUM CHLORIDE 5.36 MICROGRAM(S)/KG/HR: 4 INJECTION INTRAVENOUS at 14:40

## 2020-09-09 RX ADMIN — DEXMEDETOMIDINE HYDROCHLORIDE IN 0.9% SODIUM CHLORIDE 5.36 MICROGRAM(S)/KG/HR: 4 INJECTION INTRAVENOUS at 08:45

## 2020-09-09 RX ADMIN — MIDODRINE HYDROCHLORIDE 15 MILLIGRAM(S): 2.5 TABLET ORAL at 05:03

## 2020-09-09 RX ADMIN — Medication 100 MILLIEQUIVALENT(S): at 08:08

## 2020-09-09 RX ADMIN — DEXMEDETOMIDINE HYDROCHLORIDE IN 0.9% SODIUM CHLORIDE 5.36 MICROGRAM(S)/KG/HR: 4 INJECTION INTRAVENOUS at 12:58

## 2020-09-09 RX ADMIN — PANTOPRAZOLE SODIUM 40 MILLIGRAM(S): 20 TABLET, DELAYED RELEASE ORAL at 11:45

## 2020-09-09 RX ADMIN — Medication 10 MILLIGRAM(S): at 23:45

## 2020-09-09 RX ADMIN — DEXMEDETOMIDINE HYDROCHLORIDE IN 0.9% SODIUM CHLORIDE 5.36 MICROGRAM(S)/KG/HR: 4 INJECTION INTRAVENOUS at 18:13

## 2020-09-09 RX ADMIN — Medication 1 MILLIGRAM(S): at 11:44

## 2020-09-09 RX ADMIN — Medication 10 MILLIGRAM(S): at 05:04

## 2020-09-09 RX ADMIN — Medication 650 MILLIGRAM(S): at 10:00

## 2020-09-09 RX ADMIN — DEXMEDETOMIDINE HYDROCHLORIDE IN 0.9% SODIUM CHLORIDE 5.36 MICROGRAM(S)/KG/HR: 4 INJECTION INTRAVENOUS at 20:06

## 2020-09-09 RX ADMIN — Medication 40 MILLIGRAM(S): at 05:04

## 2020-09-09 RX ADMIN — DEXMEDETOMIDINE HYDROCHLORIDE IN 0.9% SODIUM CHLORIDE 5.36 MICROGRAM(S)/KG/HR: 4 INJECTION INTRAVENOUS at 22:00

## 2020-09-09 NOTE — PROGRESS NOTE ADULT - ASSESSMENT
44M PMH alcohol abuse, hx of alcohol withdrawal, alcohol pancreatitis presents for abdominal pain. Admitted with necrotizing pancreatitis, sepsis, septic shock with course complicated by DTs requiring sedation and small bowel obstruction conservatively managed with NGT and NPO. Now with worsening DTs, acute hypoxic respiratory failure requiring intubation secondary to PNA and development of ARDS with failure to wean. The patient is scheduled for tracheostomy and PEG today.     1. NEURO  - sedation has been an issue with underlying agitation  - pt was started on methadone has he has been on fentanyl both for treatement of underlying pain for pancreatits but also for sedation, d/c fentanyl infusion (9/7/20)  - continue standing valium  - continue precedex and propofol, hope to wean off both sedation once trach is performed  - at this point this is not alcohol related DTs and likely more underlying delirium from being in ICU and with underlying medical condition with PNA/ARDS    2. PULM  - trach scheduled for today  - cont weaning as tolerates post trach  - cont current vent setting (FiO2 50%, PEEP 5, RR 14, Tv 450)    3. GI  - SBO resolved  - + BMs  - currently NPO for trach and PEG (held feeding at midnight)    4. ID  - s/p course of vanco and meropenem for both PNA and necrotizing pancreatitis  - cultures negative  - f/u procalcitonin    5. CV  - off IV pressors, cont with midodrine  - cont lasix diuresis for overall volume overloaded state    6. Renal  - d/c mosqueda and switch to condom catheter  - daily monitor I&O    7. GEN  - full code  - cont DVT prophylaxis  - mother is next of kin 44M PMH alcohol abuse, hx of alcohol withdrawal, alcohol pancreatitis presents for abdominal pain. Admitted with necrotizing pancreatitis, sepsis, septic shock with course complicated by DTs requiring sedation and small bowel obstruction conservatively managed with NGT and NPO. Now with worsening DTs, acute hypoxic respiratory failure requiring intubation secondary to PNA and development of ARDS with failure to wean. The patient is scheduled for tracheostomy and PEG today.     1. NEURO  - sedation has been an issue with underlying agitation  - pt was started on methadone has he has been on fentanyl both for treatement of underlying pain for pancreatits but also for sedation, d/c fentanyl infusion (9/7/20)  - continue standing valium  - continue precedex and propofol, hope to wean off both sedation once trach is performed  - at this point this is not alcohol related DTs and likely more underlying delirium from being in ICU and with underlying medical condition with PNA/ARDS    2. PULM  - trach scheduled for today  - cont weaning as tolerates post trach  - cont current vent setting (FiO2 50%, PEEP 5, RR 14, Tv 450)  - based on elevated bicarb, concerned about metabolic alkalosis secondary to use of Lasix  - may consider obtaining ABG     3. GI  - SBO resolved  - + BMs  - currently NPO for trach and PEG (held feeding at midnight)    4. ID  - s/p course of vanco and meropenem for both PNA and necrotizing pancreatitis  - cultures negative  - f/u procalcitonin    5. CV  - off IV pressors, cont with midodrine  - cont lasix diuresis for overall volume overloaded state    6. Renal  - d/c mosqueda and switch to condom catheter  - daily monitor I&O    7. GEN  - full code  - cont DVT prophylaxis  - replete K+, Cl-  - mother is next of kin 44M PMH alcohol abuse, hx of alcohol withdrawal, alcohol pancreatitis presents for abdominal pain. Admitted with necrotizing pancreatitis, sepsis, septic shock with course complicated by DTs requiring sedation and small bowel obstruction conservatively managed with NGT and NPO. Now with worsening DTs, acute hypoxic respiratory failure requiring intubation secondary to PNA and development of ARDS with failure to wean. The patient is scheduled for tracheostomy and PEG today.     1. NEURO  - sedation has been an issue with underlying agitation  - pt was started on methadone has he has been on fentanyl both for treatement of underlying pain for pancreatits but also for sedation, d/c fentanyl infusion (9/7/20)  - wean off propofol if the patient remains stable  - continue standing valium  - continue precedex, hope to wean off once trach is performed  - at this point this is not alcohol related DTs and likely more underlying delirium from being in ICU and with underlying medical condition with PNA/ARDS    2. PULM  - trach scheduled for today  - cont weaning as tolerates post trach  - cont current vent setting (FiO2 50%, PEEP 5, RR 14, Tv 450)  - based on elevated bicarb, concerned about metabolic alkalosis secondary to use of Lasix  - get ABG to check if there needs to be any change made in ventilation setting    3. GI  - SBO resolved  - + BMs  - currently NPO for trach and PEG (held feeding at midnight)    4. ID  - s/p course of vanco and meropenem for both PNA and necrotizing pancreatitis  - cultures negative  - procalcitonin < 0.5: less suspicious for infection at this time    5. CV  - off IV pressors, cont with midodrine  - cont lasix diuresis for overall volume overloaded state    6. Renal  - not producing urine with a condom catheter; do straight catheterization if needed  - if the patient needs frequent straight catheterization, consider putting back on mosqueda  - daily monitor I&O    7. GEN  - full code  - cont DVT prophylaxis  - replete K+, Cl-  - mother is next of kin 44M PMH alcohol abuse, hx of alcohol withdrawal, alcohol pancreatitis presents for abdominal pain. Admitted with necrotizing pancreatitis, sepsis, septic shock with course complicated by DTs requiring sedation and small bowel obstruction conservatively managed with NGT and NPO. Now with worsening DTs, acute hypoxic respiratory failure requiring intubation secondary to PNA and development of ARDS with failure to wean. The patient is scheduled for tracheostomy and PEG today.     1. NEURO  - sedation has been an issue with underlying agitation  - pt was started on methadone has he has been on fentanyl both for treatement of underlying pain for pancreatits but also for sedation, d/c fentanyl infusion (9/7/20)  - wean off propofol if the patient remains stable  - continue standing valium  - continue precedex, hope to wean off once trach is performed  - at this point this is not alcohol related DTs and likely more underlying delirium from being in ICU and with underlying medical condition with PNA/ARDS    2. PULM  - trach scheduled for today  - cont weaning as tolerates post trach  - cont current vent setting (FiO2 50%, PEEP 5, RR 14, Tv 450)  - based on elevated bicarb, concerned about metabolic alkalosis secondary to use of Lasix  - get ABG to check pH  - if metabolic alkalosis, consider adding acetazolamide    3. GI  - SBO resolved  - + BMs  - currently NPO for trach and PEG (held feeding at midnight)    4. ID  - s/p course of vanco and meropenem for both PNA and necrotizing pancreatitis  - cultures negative  - procalcitonin < 0.5: less suspicious for infection at this time    5. CV  - off IV pressors, cont with midodrine  - cont lasix diuresis for overall volume overloaded state    6. Renal  - not producing urine with a condom catheter; do straight catheterization if needed  - if the patient needs frequent straight catheterization, consider putting back on mosqueda  - daily monitor I&O    7. GEN  - full code  - cont DVT prophylaxis  - replete K+, Cl-  - mother is next of kin

## 2020-09-09 NOTE — PROGRESS NOTE ADULT - SUBJECTIVE AND OBJECTIVE BOX
24 hr events:  Had fever last night at 7pm (Tmax 100.8). The patient is currently afebrile. Feeding held at midnight. Had bowel movement this morning and produced adequate urine overnight. Still remains intubated since 8/23. The patient was agitated last night, and propofol was put back on. Trach and PEG were scheduled for today.    O2 sat on FIo2: 50% and PEEP 5 has been in the upper 90s    ## ROS:  [x] unable to obtain due to intubation/sedation    ## Labs:  CBC:                        8.0    19.72 )-----------( 348      ( 09 Sep 2020 05:18 )             24.8     PT/INR - ( 09 Sep 2020 05:18 )   PT: 15.7 sec;   INR: 1.37 ratio      Chem:  09-09    137  |  95<L>  |  10  ----------------------------<  106<H>  3.3<L>   |  38<H>  |  0.38<L>    Ca    8.7      09 Sep 2020 05:18  Phos  4.0     09-09  Mg     1.5     09-09    TPro  6.6  /  Alb  1.2<L>  /  TBili  1.6<H>  /  DBili  x   /  AST  92<H>  /  ALT  38  /  AlkPhos  325<H>  09-09    Culture - Blood (09.02.20 @ 15:04)    Specimen Source: .Blood Blood-Peripheral    Culture Results: No Growth Final    ## Imaging:  KUB < from: Xray Kidney Ureter Bladder (09.03.20 @ 13:08) >  Nasogastric tube tip in the stomach. There is mild segmental gaseous distention of some small bowel and large bowel loops in the upper abdomen consistent with ileus. No mechanical bowel obstruction. Pelvic phlebolith.      ## Medications:  MEDICATIONS  (STANDING):  chlorhexidine 0.12% Liquid 15 milliLiter(s) Oral Mucosa every 12 hours  chlorhexidine 4% Liquid 1 Application(s) Topical <User Schedule>  dexMEDEtomidine Infusion 0.276 MICROgram(s)/kG/Hr (5.36 mL/Hr) IV Continuous <Continuous>  diazepam    Tablet 10 milliGRAM(s) Oral every 6 hours  enoxaparin Injectable 40 milliGRAM(s) SubCutaneous daily  folic acid 1 milliGRAM(s) Oral daily  furosemide    Tablet 40 milliGRAM(s) Oral two times a day  lactulose Syrup 10 Gram(s) Oral four times a day  magnesium sulfate  IVPB 2 Gram(s) IV Intermittent once  magnesium sulfate  IVPB 2 Gram(s) IV Intermittent once  methadone    Tablet 10 milliGRAM(s) Oral every 6 hours  midodrine 15 milliGRAM(s) Oral every 8 hours  multivitamin 1 Tablet(s) Oral daily  pantoprazole  Injectable 40 milliGRAM(s) IV Push daily  potassium chloride  10 mEq/100 mL IVPB 10 milliEquivalent(s) IV Intermittent every 1 hour  propofol Infusion 10.009 MICROgram(s)/kG/Min (4.66 mL/Hr) IV Continuous <Continuous>  QUEtiapine 100 milliGRAM(s) Oral two times a day  thiamine 100 milliGRAM(s) Oral daily    MEDICATIONS  (PRN):  acetaminophen   Tablet .. 650 milliGRAM(s) Oral every 6 hours PRN Temp greater or equal to 38.5C (101.3F)  albuterol/ipratropium for Nebulization 3 milliLiter(s) Nebulizer every 6 hours PRN Shortness of Breath and/or Wheezing  senna 2 Tablet(s) Oral at bedtime PRN Constipation      ## Vitals:  ICU Vital Signs Last 24 Hrs  T(C): 37.7 (08 Sep 2020 23:00), Max: 38.4 (08 Sep 2020 16:00)  T(F): 99.8 (08 Sep 2020 23:00), Max: 101.2 (08 Sep 2020 16:00)  HR: 103 (09 Sep 2020 07:00) (78 - 863)  BP: 98/62 (09 Sep 2020 07:00) (98/57 - 118/73)  BP(mean): 71 (09 Sep 2020 07:00) (66 - 84)  RR: 21 (09 Sep 2020 07:00) (14 - 30)  SpO2: 96% (09 Sep 2020 07:00) (94% - 100%)      Vent: Mode: AC/ CMV (Assist Control/ Continuous Mandatory Ventilation), RR (machine): 14, TV (machine): 450, FiO2: 50, PEEP: 5 PIP: 27    I&O's Summary    08 Sep 2020 07:01  -  09 Sep 2020 07:00  --------------------------------------------------------  IN: 1445 mL / OUT: 1560 mL / NET: -115 mL      ## P/E:  Gen: lying comfortably in bed in no apparent distress, sedated, orally intubated  HEENT: pupils constrict to light b/l, ETT and NGT in place  Resp: clear to auscultation B/L, mechanical breath sounds  CVS: RRR, S1S2 with no murmur or gallop  Abd: firm, distended, and tympanic +BS  Ext: no cyanosis, bilateral upper and lower extremity 2+ pitting edema  : scrotal edema  Neuro: sedated    CENTRAL LINE: [ ] YES [x] NO  LOCATION:   DATE INSERTED:  REMOVE: [ ] YES [ ] NO      QUINTEROS: [ ] YES [ ] NO    DATE INSERTED:  REMOVE:  [ ] YES [ ] NO      A-LINE:  [ ] YES [x NO  LOCATION:   DATE INSERTED:  REMOVE:  [ ] YES [ ] NO  EXPLAIN:    GLOBAL ISSUE/BEST PRACTICE:  Analgesia: n/a  Sedation: precedex, propofol  HOB elevation: yes  Stress ulcer prophylaxis: protonix  VTE prophylaxis: lovenox  Oral Care: chlorhexidine   Glycemic control:   Nutrition: tube feeds (NPO after MN for trach)    CODE STATUS: [x] full code  [ ] DNR  [ ] DNI  [ ] [facility]  Goals of care discussion: [x] yes 24 hr events:  Had fever last night at 7pm (Tmax 100.8). The patient is currently afebrile. Feeding held at midnight. Had bowel movement this morning and produced urine overnight. Still remains intubated since 8/23. The patient was agitated last night, and propofol was put back on. Since 8am, the patient was off from propofol. Trach and PEG are scheduled as add-on for today.     O2 sat on FIo2: 50% and PEEP 5 has been in the upper 90s    ## ROS:  [x] unable to obtain due to intubation/sedation    ## Labs:  CBC:                        8.0    19.72 )-----------( 348      ( 09 Sep 2020 05:18 )             24.8     PT/INR - ( 09 Sep 2020 05:18 )   PT: 15.7 sec;   INR: 1.37 ratio      Chem:  09-09    137  |  95<L>  |  10  ----------------------------<  106<H>  3.3<L>   |  38<H>  |  0.38<L>    Ca    8.7      09 Sep 2020 05:18  Phos  4.0     09-09  Mg     1.5     09-09    TPro  6.6  /  Alb  1.2<L>  /  TBili  1.6<H>  /  DBili  x   /  AST  92<H>  /  ALT  38  /  AlkPhos  325<H>  09-09    Culture - Blood (09.02.20 @ 15:04)    Specimen Source: .Blood Blood-Peripheral    Culture Results: No Growth Final    ## Imaging:  KUB < from: Xray Kidney Ureter Bladder (09.03.20 @ 13:08) >  Nasogastric tube tip in the stomach. There is mild segmental gaseous distention of some small bowel and large bowel loops in the upper abdomen consistent with ileus. No mechanical bowel obstruction. Pelvic phlebolith.      ## Medications:  MEDICATIONS  (STANDING):  chlorhexidine 0.12% Liquid 15 milliLiter(s) Oral Mucosa every 12 hours  chlorhexidine 4% Liquid 1 Application(s) Topical <User Schedule>  dexMEDEtomidine Infusion 0.276 MICROgram(s)/kG/Hr (5.36 mL/Hr) IV Continuous <Continuous>  diazepam    Tablet 10 milliGRAM(s) Oral every 6 hours  enoxaparin Injectable 40 milliGRAM(s) SubCutaneous daily  folic acid 1 milliGRAM(s) Oral daily  furosemide    Tablet 40 milliGRAM(s) Oral two times a day  lactulose Syrup 10 Gram(s) Oral four times a day  magnesium sulfate  IVPB 2 Gram(s) IV Intermittent once  magnesium sulfate  IVPB 2 Gram(s) IV Intermittent once  methadone    Tablet 10 milliGRAM(s) Oral every 6 hours  midodrine 15 milliGRAM(s) Oral every 8 hours  multivitamin 1 Tablet(s) Oral daily  pantoprazole  Injectable 40 milliGRAM(s) IV Push daily  potassium chloride  10 mEq/100 mL IVPB 10 milliEquivalent(s) IV Intermittent every 1 hour  propofol Infusion 10.009 MICROgram(s)/kG/Min (4.66 mL/Hr) IV Continuous <Continuous>  QUEtiapine 100 milliGRAM(s) Oral two times a day  thiamine 100 milliGRAM(s) Oral daily    MEDICATIONS  (PRN):  acetaminophen   Tablet .. 650 milliGRAM(s) Oral every 6 hours PRN Temp greater or equal to 38.5C (101.3F)  albuterol/ipratropium for Nebulization 3 milliLiter(s) Nebulizer every 6 hours PRN Shortness of Breath and/or Wheezing  senna 2 Tablet(s) Oral at bedtime PRN Constipation      ## Vitals:  ICU Vital Signs Last 24 Hrs  T(C): 37.7 (08 Sep 2020 23:00), Max: 38.4 (08 Sep 2020 16:00)  T(F): 99.8 (08 Sep 2020 23:00), Max: 101.2 (08 Sep 2020 16:00)  HR: 103 (09 Sep 2020 07:00) (78 - 863)  BP: 98/62 (09 Sep 2020 07:00) (98/57 - 118/73)  BP(mean): 71 (09 Sep 2020 07:00) (66 - 84)  RR: 21 (09 Sep 2020 07:00) (14 - 30)  SpO2: 96% (09 Sep 2020 07:00) (94% - 100%)      Vent: Mode: AC/ CMV (Assist Control/ Continuous Mandatory Ventilation), RR (machine): 14, TV (machine): 450, FiO2: 50, PEEP: 5 PIP: 27    I&O's Summary    08 Sep 2020 07:01  -  09 Sep 2020 07:00  --------------------------------------------------------  IN: 1445 mL / OUT: 1560 mL / NET: -115 mL      ## P/E:  Gen: lying comfortably in bed in no apparent distress, sedated, orally intubated  HEENT: pupils constrict to light b/l, ETT and NGT in place  Resp: clear to auscultation B/L, mechanical breath sounds  CVS: RRR, S1S2 with no murmur or gallop  Abd: firm, distended, and tympanic +BS  Ext: no cyanosis, bilateral upper and lower extremity 2+ pitting edema  : scrotal edema  Neuro: sedated    CENTRAL LINE: [ ] YES [x] NO  LOCATION:   DATE INSERTED:  REMOVE: [ ] YES [ ] NO      QUINTEROS: [ ] YES [ ] NO    DATE INSERTED:  REMOVE:  [ ] YES [ ] NO      A-LINE:  [ ] YES [x NO  LOCATION:   DATE INSERTED:  REMOVE:  [ ] YES [ ] NO  EXPLAIN:    GLOBAL ISSUE/BEST PRACTICE:  Analgesia: n/a  Sedation: precedex, propofol  HOB elevation: yes  Stress ulcer prophylaxis: protonix  VTE prophylaxis: lovenox  Oral Care: chlorhexidine   Glycemic control:   Nutrition: tube feeds (NPO after MN for trach)    CODE STATUS: [x] full code  [ ] DNR  [ ] DNI  [ ] [facility]  Goals of care discussion: [x] yes

## 2020-09-09 NOTE — PROGRESS NOTE ADULT - ATTENDING COMMENTS
44M PMH alcohol abuse with hx of  alcohol withdrawal, alcohol pancreatitis presents for abdominal pain due to necrotizing pancreatitis, sepsis, septic shock with course complicated by DTs, small bowel obstruction which was conservatively managed with NPO/NGT, acute hypoxic respiratory failure requiring intubation secondary to PNA with development of ARDS and failure to wean awaiting tracheostomy/PEG.     1. NEURO  - fentanyl drip d/angela yesterday  - remains on valium and methadone (as pt has been on extended course of fentanyl)  - continue to wean off precedex and propofol, once trach is in place it may be easier to wean off IV sedatives  - current agitation likely not related to alcohol DTs and more likely from delirium with ICU stay and ongoing medical illness with PNA/ARDS    2. PULM  - PEEP weaned from 10 to 8 yesterday, now on PEEP 5 today and maintaining O2 sat >90% with FIO2 at 50%  - trach scheduled for tomorrow morning not today per discussion with surgical team  - pt tolerated some weaning for first time today, however still with thick ETT secretions, thus will pursue for planned trach  - cont weaning as tolerates post trach      3. GI  - SBO resolved  - + BMs  - tube feeds resumed for today as trach is for tomorrow, NPO after midnight for trach in AM  - PEG scheduled for tomorrow as well    4. ID  - s/p course of vanco and meropenem which treated PNA and necrotizing pancreatitis  - has been febrile, white count today with a slight uptrend: cultures negative to date  - will cont to observe off antibx for now. hoping that as ETT and NGT is d/angela with trach and PEG in place there may be some improvement in fever and leukocytosis trend  - can repeat procal and trend to see if there is a rise with persistent fevers but overall procal has also been decreasing      5. CV  - off IV pressors, cont with midodrine  - on standing lasix diuresis for overall volume overloaded state, goal is to keep net negative balance    6. Renal  - cont to monitor I/O and Cr  - will do TOV today d/c mosqueda  - suppplement hypomagnesemia    7. GEN  - full code  - cont DVT prophylaxis  - per chart, pt's mother makes medical decisions and consents for pt    Critical Care Time: 35 min 44M PMH alcohol abuse with hx of  alcohol withdrawal, alcohol pancreatitis presents for abdominal pain due to necrotizing pancreatitis, sepsis, septic shock with course complicated by DTs, small bowel obstruction which was conservatively managed with NPO/NGT, acute hypoxic respiratory failure requiring intubation secondary to PNA with development of ARDS and failure to wean awaiting tracheostomy/PEG.     1. NEURO  - off fentanyl drip, off propofol drip  - remains on valium and methadone (as pt has been on extended course of fentanyl)  - continue to wean off precedex and propofol, once trach is in place it may be easier to wean off IV sedatives  - current agitation likely not related to alcohol DTs and more likely from delirium with ICU stay and ongoing medical illness with PNA/ARDS    2. PULM  - PEEP weaned to 5 yesterday and maintaining O2 sat >90% with FIO2 at 50%  - trach scheduled for today per discussion with surgical team, pt is an add on case  - pt tolerated some weaning yesterday but with thick ETT secretions  - cont weaning as tolerates       3. GI  - SBO resolved  - + BMs with lactulose  - tube feeds held awaiting trach and PEG      4. ID  - s/p course of vanco and meropenem which treated PNA and necrotizing pancreatitis  - has been febrile, white trending up: cultures negative to date  - will cont to observe off antibx for now. hoping that as ETT and NGT is d/angela with trach and PEG in place there may be some improvement in fever and leukocytosis trend  - procal downtrending, will hold on antibx however if fevers or leukocyotsis persists will reinitiated antibx      5. CV  - off IV pressors, cont with midodrine  - on standing lasix diuresis for overall volume overloaded state, however ABG with metabolic alkalosis  - s/p diamox 250mg IV today for metabolic alkalosis in setting of contraction alkalosis from aggressive diuresis  - d/c standing lasix    6. Renal  - cont to monitor I/O and Cr  - suppplement hypomagnesemia, hypokalemia    7. GEN  - full code  - cont DVT prophylaxis      Critical Care Time: 35 min

## 2020-09-10 LAB
ALBUMIN SERPL ELPH-MCNC: 1.3 G/DL — LOW (ref 3.3–5)
ALP SERPL-CCNC: 322 U/L — HIGH (ref 40–120)
ALT FLD-CCNC: 38 U/L — SIGNIFICANT CHANGE UP (ref 12–78)
ANION GAP SERPL CALC-SCNC: 8 MMOL/L — SIGNIFICANT CHANGE UP (ref 5–17)
APPEARANCE UR: CLEAR — SIGNIFICANT CHANGE UP
AST SERPL-CCNC: 113 U/L — HIGH (ref 15–37)
BACTERIA # UR AUTO: ABNORMAL
BASE EXCESS BLDA CALC-SCNC: 9.6 MMOL/L — HIGH (ref -2–2)
BASOPHILS # BLD AUTO: 0.13 K/UL — SIGNIFICANT CHANGE UP (ref 0–0.2)
BASOPHILS NFR BLD AUTO: 0.5 % — SIGNIFICANT CHANGE UP (ref 0–2)
BILIRUB SERPL-MCNC: 2.7 MG/DL — HIGH (ref 0.2–1.2)
BILIRUB UR-MCNC: ABNORMAL
BLOOD GAS COMMENTS: SIGNIFICANT CHANGE UP
BLOOD GAS COMMENTS: SIGNIFICANT CHANGE UP
BLOOD GAS SOURCE: SIGNIFICANT CHANGE UP
BUN SERPL-MCNC: 13 MG/DL — SIGNIFICANT CHANGE UP (ref 7–23)
CALCIUM SERPL-MCNC: 8.9 MG/DL — SIGNIFICANT CHANGE UP (ref 8.5–10.1)
CHLORIDE SERPL-SCNC: 94 MMOL/L — LOW (ref 96–108)
CO2 SERPL-SCNC: 34 MMOL/L — HIGH (ref 22–31)
COLOR SPEC: YELLOW — SIGNIFICANT CHANGE UP
CREAT SERPL-MCNC: 0.53 MG/DL — SIGNIFICANT CHANGE UP (ref 0.5–1.3)
DIFF PNL FLD: NEGATIVE — SIGNIFICANT CHANGE UP
EOSINOPHIL # BLD AUTO: 1.1 K/UL — HIGH (ref 0–0.5)
EOSINOPHIL NFR BLD AUTO: 4.5 % — SIGNIFICANT CHANGE UP (ref 0–6)
EPI CELLS # UR: SIGNIFICANT CHANGE UP
GLUCOSE SERPL-MCNC: 95 MG/DL — SIGNIFICANT CHANGE UP (ref 70–99)
GLUCOSE UR QL: NEGATIVE MG/DL — SIGNIFICANT CHANGE UP
HCO3 BLDA-SCNC: 35 MMOL/L — HIGH (ref 21–29)
HCT VFR BLD CALC: 26.4 % — LOW (ref 39–50)
HGB BLD-MCNC: 8.4 G/DL — LOW (ref 13–17)
HOROWITZ INDEX BLDA+IHG-RTO: 50 — SIGNIFICANT CHANGE UP
IMM GRANULOCYTES NFR BLD AUTO: 0.9 % — SIGNIFICANT CHANGE UP (ref 0–1.5)
KETONES UR-MCNC: ABNORMAL
LEUKOCYTE ESTERASE UR-ACNC: ABNORMAL
LIDOCAIN IGE QN: 517 U/L — HIGH (ref 73–393)
LYMPHOCYTES # BLD AUTO: 1.68 K/UL — SIGNIFICANT CHANGE UP (ref 1–3.3)
LYMPHOCYTES # BLD AUTO: 6.8 % — LOW (ref 13–44)
MAGNESIUM SERPL-MCNC: 1.8 MG/DL — SIGNIFICANT CHANGE UP (ref 1.6–2.6)
MCHC RBC-ENTMCNC: 31.8 GM/DL — LOW (ref 32–36)
MCHC RBC-ENTMCNC: 32.1 PG — SIGNIFICANT CHANGE UP (ref 27–34)
MCV RBC AUTO: 100.8 FL — HIGH (ref 80–100)
MONOCYTES # BLD AUTO: 1.07 K/UL — HIGH (ref 0–0.9)
MONOCYTES NFR BLD AUTO: 4.3 % — SIGNIFICANT CHANGE UP (ref 2–14)
NEUTROPHILS # BLD AUTO: 20.45 K/UL — HIGH (ref 1.8–7.4)
NEUTROPHILS NFR BLD AUTO: 83 % — HIGH (ref 43–77)
NITRITE UR-MCNC: NEGATIVE — SIGNIFICANT CHANGE UP
NRBC # BLD: 0 /100 WBCS — SIGNIFICANT CHANGE UP (ref 0–0)
PCO2 BLDA: 53 MMHG — HIGH (ref 32–46)
PH BLD: 7.43 — SIGNIFICANT CHANGE UP (ref 7.35–7.45)
PH UR: 7 — SIGNIFICANT CHANGE UP (ref 5–8)
PHOSPHATE SERPL-MCNC: 5 MG/DL — HIGH (ref 2.5–4.5)
PLATELET # BLD AUTO: 422 K/UL — HIGH (ref 150–400)
PO2 BLDA: 73 MMHG — LOW (ref 74–108)
POTASSIUM SERPL-MCNC: 4.2 MMOL/L — SIGNIFICANT CHANGE UP (ref 3.5–5.3)
POTASSIUM SERPL-SCNC: 4.2 MMOL/L — SIGNIFICANT CHANGE UP (ref 3.5–5.3)
PROCALCITONIN SERPL-MCNC: 3.62 NG/ML — HIGH (ref 0.02–0.1)
PROT SERPL-MCNC: 7.1 GM/DL — SIGNIFICANT CHANGE UP (ref 6–8.3)
PROT UR-MCNC: 15 MG/DL
RBC # BLD: 2.62 M/UL — LOW (ref 4.2–5.8)
RBC # FLD: 15.3 % — HIGH (ref 10.3–14.5)
SAO2 % BLDA: 93 % — SIGNIFICANT CHANGE UP (ref 92–96)
SODIUM SERPL-SCNC: 136 MMOL/L — SIGNIFICANT CHANGE UP (ref 135–145)
SP GR SPEC: 1.01 — SIGNIFICANT CHANGE UP (ref 1.01–1.02)
UROBILINOGEN FLD QL: 12 MG/DL
WBC # BLD: 24.65 K/UL — HIGH (ref 3.8–10.5)
WBC # FLD AUTO: 24.65 K/UL — HIGH (ref 3.8–10.5)
WBC UR QL: SIGNIFICANT CHANGE UP

## 2020-09-10 PROCEDURE — 43246 EGD PLACE GASTROSTOMY TUBE: CPT

## 2020-09-10 PROCEDURE — 99291 CRITICAL CARE FIRST HOUR: CPT

## 2020-09-10 PROCEDURE — 71045 X-RAY EXAM CHEST 1 VIEW: CPT | Mod: 26

## 2020-09-10 PROCEDURE — 31600 PLANNED TRACHEOSTOMY: CPT

## 2020-09-10 PROCEDURE — 99232 SBSQ HOSP IP/OBS MODERATE 35: CPT | Mod: 57

## 2020-09-10 RX ORDER — MEROPENEM 1 G/30ML
1000 INJECTION INTRAVENOUS EVERY 8 HOURS
Refills: 0 | Status: DISCONTINUED | OUTPATIENT
Start: 2020-09-10 | End: 2020-09-13

## 2020-09-10 RX ORDER — PROPOFOL 10 MG/ML
10.01 INJECTION, EMULSION INTRAVENOUS
Qty: 1000 | Refills: 0 | Status: DISCONTINUED | OUTPATIENT
Start: 2020-09-10 | End: 2020-09-12

## 2020-09-10 RX ORDER — MIDODRINE HYDROCHLORIDE 2.5 MG/1
15 TABLET ORAL EVERY 8 HOURS
Refills: 0 | Status: DISCONTINUED | OUTPATIENT
Start: 2020-09-10 | End: 2020-09-16

## 2020-09-10 RX ORDER — ENOXAPARIN SODIUM 100 MG/ML
40 INJECTION SUBCUTANEOUS DAILY
Refills: 0 | Status: DISCONTINUED | OUTPATIENT
Start: 2020-09-10 | End: 2020-10-12

## 2020-09-10 RX ORDER — PANTOPRAZOLE SODIUM 20 MG/1
40 TABLET, DELAYED RELEASE ORAL DAILY
Refills: 0 | Status: COMPLETED | OUTPATIENT
Start: 2020-09-10 | End: 2020-09-17

## 2020-09-10 RX ORDER — LACTULOSE 10 G/15ML
10 SOLUTION ORAL
Refills: 0 | Status: ACTIVE | OUTPATIENT
Start: 2020-09-10 | End: 2021-08-09

## 2020-09-10 RX ORDER — QUETIAPINE FUMARATE 200 MG/1
100 TABLET, FILM COATED ORAL
Refills: 0 | Status: DISCONTINUED | OUTPATIENT
Start: 2020-09-10 | End: 2020-09-15

## 2020-09-10 RX ORDER — IPRATROPIUM/ALBUTEROL SULFATE 18-103MCG
3 AEROSOL WITH ADAPTER (GRAM) INHALATION EVERY 6 HOURS
Refills: 0 | Status: DISCONTINUED | OUTPATIENT
Start: 2020-09-10 | End: 2020-09-26

## 2020-09-10 RX ORDER — METHADONE HYDROCHLORIDE 40 MG/1
10 TABLET ORAL EVERY 6 HOURS
Refills: 0 | Status: DISCONTINUED | OUTPATIENT
Start: 2020-09-10 | End: 2020-09-13

## 2020-09-10 RX ORDER — DEXMEDETOMIDINE HYDROCHLORIDE IN 0.9% SODIUM CHLORIDE 4 UG/ML
0.28 INJECTION INTRAVENOUS
Qty: 200 | Refills: 0 | Status: ACTIVE | OUTPATIENT
Start: 2020-09-10 | End: 2021-08-09

## 2020-09-10 RX ORDER — MEROPENEM 1 G/30ML
1000 INJECTION INTRAVENOUS EVERY 8 HOURS
Refills: 0 | Status: DISCONTINUED | OUTPATIENT
Start: 2020-09-10 | End: 2020-09-10

## 2020-09-10 RX ORDER — CHLORHEXIDINE GLUCONATE 213 G/1000ML
15 SOLUTION TOPICAL EVERY 12 HOURS
Refills: 0 | Status: DISCONTINUED | OUTPATIENT
Start: 2020-09-10 | End: 2020-10-01

## 2020-09-10 RX ORDER — FOLIC ACID 0.8 MG
1 TABLET ORAL DAILY
Refills: 0 | Status: DISCONTINUED | OUTPATIENT
Start: 2020-09-10 | End: 2020-10-26

## 2020-09-10 RX ORDER — ACETAMINOPHEN 500 MG
1000 TABLET ORAL ONCE
Refills: 0 | Status: COMPLETED | OUTPATIENT
Start: 2020-09-10 | End: 2020-09-10

## 2020-09-10 RX ORDER — THIAMINE MONONITRATE (VIT B1) 100 MG
100 TABLET ORAL DAILY
Refills: 0 | Status: DISCONTINUED | OUTPATIENT
Start: 2020-09-10 | End: 2020-10-06

## 2020-09-10 RX ORDER — MAGNESIUM SULFATE 500 MG/ML
2 VIAL (ML) INJECTION ONCE
Refills: 0 | Status: COMPLETED | OUTPATIENT
Start: 2020-09-10 | End: 2020-09-10

## 2020-09-10 RX ORDER — MIDAZOLAM HYDROCHLORIDE 1 MG/ML
2 INJECTION, SOLUTION INTRAMUSCULAR; INTRAVENOUS ONCE
Refills: 0 | Status: DISCONTINUED | OUTPATIENT
Start: 2020-09-10 | End: 2020-09-10

## 2020-09-10 RX ORDER — CHLORHEXIDINE GLUCONATE 213 G/1000ML
1 SOLUTION TOPICAL
Refills: 0 | Status: DISCONTINUED | OUTPATIENT
Start: 2020-09-10 | End: 2020-10-26

## 2020-09-10 RX ORDER — FENTANYL CITRATE 50 UG/ML
50 INJECTION INTRAVENOUS ONCE
Refills: 0 | Status: DISCONTINUED | OUTPATIENT
Start: 2020-09-10 | End: 2020-09-10

## 2020-09-10 RX ORDER — DIAZEPAM 5 MG
10 TABLET ORAL EVERY 6 HOURS
Refills: 0 | Status: DISCONTINUED | OUTPATIENT
Start: 2020-09-10 | End: 2020-09-14

## 2020-09-10 RX ORDER — SENNA PLUS 8.6 MG/1
2 TABLET ORAL AT BEDTIME
Refills: 0 | Status: DISCONTINUED | OUTPATIENT
Start: 2020-09-10 | End: 2020-09-21

## 2020-09-10 RX ADMIN — FENTANYL CITRATE 50 MICROGRAM(S): 50 INJECTION INTRAVENOUS at 05:15

## 2020-09-10 RX ADMIN — DEXMEDETOMIDINE HYDROCHLORIDE IN 0.9% SODIUM CHLORIDE 5.36 MICROGRAM(S)/KG/HR: 4 INJECTION INTRAVENOUS at 12:36

## 2020-09-10 RX ADMIN — DEXMEDETOMIDINE HYDROCHLORIDE IN 0.9% SODIUM CHLORIDE 5.36 MICROGRAM(S)/KG/HR: 4 INJECTION INTRAVENOUS at 16:07

## 2020-09-10 RX ADMIN — Medication 1000 MILLIGRAM(S): at 11:10

## 2020-09-10 RX ADMIN — Medication 650 MILLIGRAM(S): at 04:38

## 2020-09-10 RX ADMIN — MIDODRINE HYDROCHLORIDE 15 MILLIGRAM(S): 2.5 TABLET ORAL at 14:17

## 2020-09-10 RX ADMIN — DEXMEDETOMIDINE HYDROCHLORIDE IN 0.9% SODIUM CHLORIDE 5.36 MICROGRAM(S)/KG/HR: 4 INJECTION INTRAVENOUS at 20:21

## 2020-09-10 RX ADMIN — MIDAZOLAM HYDROCHLORIDE 2 MILLIGRAM(S): 1 INJECTION, SOLUTION INTRAMUSCULAR; INTRAVENOUS at 09:23

## 2020-09-10 RX ADMIN — QUETIAPINE FUMARATE 100 MILLIGRAM(S): 200 TABLET, FILM COATED ORAL at 05:26

## 2020-09-10 RX ADMIN — CHLORHEXIDINE GLUCONATE 15 MILLILITER(S): 213 SOLUTION TOPICAL at 05:23

## 2020-09-10 RX ADMIN — DEXMEDETOMIDINE HYDROCHLORIDE IN 0.9% SODIUM CHLORIDE 5.36 MICROGRAM(S)/KG/HR: 4 INJECTION INTRAVENOUS at 17:37

## 2020-09-10 RX ADMIN — Medication 10 MILLIGRAM(S): at 05:23

## 2020-09-10 RX ADMIN — CHLORHEXIDINE GLUCONATE 1 APPLICATION(S): 213 SOLUTION TOPICAL at 03:30

## 2020-09-10 RX ADMIN — Medication 650 MILLIGRAM(S): at 05:24

## 2020-09-10 RX ADMIN — METHADONE HYDROCHLORIDE 10 MILLIGRAM(S): 40 TABLET ORAL at 05:24

## 2020-09-10 RX ADMIN — PROPOFOL 4.66 MICROGRAM(S)/KG/MIN: 10 INJECTION, EMULSION INTRAVENOUS at 20:21

## 2020-09-10 RX ADMIN — Medication 400 MILLIGRAM(S): at 10:44

## 2020-09-10 RX ADMIN — Medication 50 GRAM(S): at 07:39

## 2020-09-10 RX ADMIN — DEXMEDETOMIDINE HYDROCHLORIDE IN 0.9% SODIUM CHLORIDE 5.36 MICROGRAM(S)/KG/HR: 4 INJECTION INTRAVENOUS at 10:14

## 2020-09-10 RX ADMIN — MEROPENEM 100 MILLIGRAM(S): 1 INJECTION INTRAVENOUS at 17:02

## 2020-09-10 RX ADMIN — DEXMEDETOMIDINE HYDROCHLORIDE IN 0.9% SODIUM CHLORIDE 5.36 MICROGRAM(S)/KG/HR: 4 INJECTION INTRAVENOUS at 14:22

## 2020-09-10 RX ADMIN — CHLORHEXIDINE GLUCONATE 15 MILLILITER(S): 213 SOLUTION TOPICAL at 20:22

## 2020-09-10 RX ADMIN — METHADONE HYDROCHLORIDE 10 MILLIGRAM(S): 40 TABLET ORAL at 11:27

## 2020-09-10 RX ADMIN — FENTANYL CITRATE 50 MICROGRAM(S): 50 INJECTION INTRAVENOUS at 05:00

## 2020-09-10 RX ADMIN — DEXMEDETOMIDINE HYDROCHLORIDE IN 0.9% SODIUM CHLORIDE 5.36 MICROGRAM(S)/KG/HR: 4 INJECTION INTRAVENOUS at 23:34

## 2020-09-10 RX ADMIN — MEROPENEM 100 MILLIGRAM(S): 1 INJECTION INTRAVENOUS at 20:50

## 2020-09-10 RX ADMIN — Medication 10 MILLIGRAM(S): at 11:27

## 2020-09-10 RX ADMIN — LACTULOSE 10 GRAM(S): 10 SOLUTION ORAL at 05:23

## 2020-09-10 RX ADMIN — MIDODRINE HYDROCHLORIDE 15 MILLIGRAM(S): 2.5 TABLET ORAL at 05:24

## 2020-09-10 RX ADMIN — DEXMEDETOMIDINE HYDROCHLORIDE IN 0.9% SODIUM CHLORIDE 5.36 MICROGRAM(S)/KG/HR: 4 INJECTION INTRAVENOUS at 01:48

## 2020-09-10 RX ADMIN — DEXMEDETOMIDINE HYDROCHLORIDE IN 0.9% SODIUM CHLORIDE 5.36 MICROGRAM(S)/KG/HR: 4 INJECTION INTRAVENOUS at 05:23

## 2020-09-10 RX ADMIN — DEXMEDETOMIDINE HYDROCHLORIDE IN 0.9% SODIUM CHLORIDE 5.36 MICROGRAM(S)/KG/HR: 4 INJECTION INTRAVENOUS at 21:56

## 2020-09-10 NOTE — BRIEF OPERATIVE NOTE - NSICDXBRIEFPREOP_GEN_ALL_CORE_FT
PRE-OP DIAGNOSIS:  Failure respiratory 11-Sep-2020 00:25:57  Bar Adams  Failure to thrive in adult 11-Sep-2020 00:25:49  Bar Adams

## 2020-09-10 NOTE — PROGRESS NOTE ADULT - MINUTES
36 Hiatal hernia    HLD (hyperlipidemia)    HTN (hypertension)    Pulmonary emboli  20 years ago, n AC currently  PVD (peripheral vascular disease)

## 2020-09-10 NOTE — PROGRESS NOTE ADULT - ATTENDING COMMENTS
44M PMH alcohol abuse with hx of  alcohol withdrawal, alcohol pancreatitis presents for abdominal pain due to necrotizing pancreatitis, sepsis, septic shock with course complicated by DTs, small bowel obstruction which was conservatively managed with NPO/NGT, acute hypoxic respiratory failure requiring intubation secondary to PNA with development of ARDS and failure to wean awaiting tracheostomy/PEG.     1. NEURO  - fentanyl drip d/angela yesterday  - remains on valium and methadone (as pt has been on extended course of fentanyl)  - continue to wean off precedex and propofol, once trach is in place it may be easier to wean off IV sedatives  - current agitation likely not related to alcohol DTs and more likely from delirium with ICU stay and ongoing medical illness with PNA/ARDS    2. PULM  - PEEP weaned from 10 to 8 yesterday, now on PEEP 5 today and maintaining O2 sat >90% with FIO2 at 50%  - trach scheduled for tomorrow morning not today per discussion with surgical team  - pt tolerated some weaning for first time today, however still with thick ETT secretions, thus will pursue for planned trach  - cont weaning as tolerates post trach      3. GI  - SBO resolved  - + BMs  - tube feeds resumed for today as trach is for tomorrow, NPO after midnight for trach in AM  - PEG scheduled for tomorrow as well    4. ID  - s/p course of vanco and meropenem which treated PNA and necrotizing pancreatitis  - has been febrile, white count today with a slight uptrend: cultures negative to date  - will cont to observe off antibx for now. hoping that as ETT and NGT is d/angela with trach and PEG in place there may be some improvement in fever and leukocytosis trend  - can repeat procal and trend to see if there is a rise with persistent fevers but overall procal has also been decreasing      5. CV  - off IV pressors, cont with midodrine  - on standing lasix diuresis for overall volume overloaded state, goal is to keep net negative balance    6. Renal  - cont to monitor I/O and Cr  - will do TOV today d/c mosqueda  - suppplement hypomagnesemia    7. GEN  - full code  - cont DVT prophylaxis  - per chart, pt's mother makes medical decisions and consents for pt    Critical Care Time: 35 min 44M PMH alcohol abuse with hx of  alcohol withdrawal, alcohol pancreatitis presents for abdominal pain due to necrotizing pancreatitis, sepsis, septic shock with course complicated by DTs, small bowel obstruction which was conservatively managed with NPO/NGT, acute hypoxic respiratory failure requiring intubation secondary to PNA with development of ARDS and failure to wean now s/p tracheostomy/PEG.     1. NEURO  - cont on valium and methadone (as pt has been on extended course of fentanyl)  - continue to wean off precedex   - off propofol since yesterday  - current agitation likely not related to alcohol DTs and more likely from delirium with ICU stay and ongoing medical illness with PNA/ARDS    2. PULM  - Tolerating FIo2 of 50% and PEEP 5  - trach postponed yesterday due to OR unavailability, s/p trach/PEG today (PDO #0)  - cont weaning as tolerates   - check post trach CXR      3. GI  - SBO resolved  - + BMs  - s/p PEG today, follow up with surgery when PEG can be used      4. ID  - s/p course of vanco and meropenem which treated PNA and necrotizing pancreatitis  - had been off antibx for several days  - now with worsening leukocytpsis and fevers so meropenem reinitiated  - blood cx, u cx sent: follow up culture results      5. CV  - off IV pressors, cont with midodrine  - had been on standing lasix diuresis for overall volume overloaded state, s/p diamox yesterday for contraction alkalosis, standing lasix d/angela  - diurese as needed    6. Renal  - cont to monitor I/O and Cr  - supplement electrolytes as needed    7. GEN  - full code  - cont DVT prophylaxis      Critical Care Time: 35 min 44M PMH alcohol abuse with hx of  alcohol withdrawal, alcohol pancreatitis presents for abdominal pain due to necrotizing pancreatitis, sepsis, septic shock with course complicated by DTs, small bowel obstruction which was conservatively managed with NPO/NGT, acute hypoxic respiratory failure requiring intubation secondary to PNA with development of ARDS and failure to wean now s/p tracheostomy/PEG today.     1. NEURO  - cont on valium and methadone (as pt has been on extended course of fentanyl)  - continue to wean off precedex   - off propofol since yesterday  - current agitation likely not related to alcohol DTs and more likely from delirium with ICU stay and ongoing medical illness with PNA/ARDS    2. PULM  - Tolerating FIo2 of 50% and PEEP 5  - trach postponed yesterday due to OR unavailability, s/p trach/PEG today (PDO #0)  - cont weaning as tolerates   - check post trach CXR      3. GI  - SBO resolved  - + BMs  - s/p PEG today, follow up with surgery when PEG can be used      4. ID  - s/p course of vanco and meropenem which treated PNA and necrotizing pancreatitis  - had been off antibx for several days  - now with worsening leukocytosis and fevers so meropenem reinitiated  - blood cx, u cx sent: follow up culture results      5. CV  - off IV pressors, cont with midodrine  - had been on standing lasix diuresis for overall volume overloaded state, s/p diamox yesterday for contraction alkalosis, standing lasix d/angela  - diurese as needed    6. Renal  - cont to monitor I/O and Cr  - supplement electrolytes as needed    7. GEN  - full code  - cont DVT prophylaxis      Critical Care Time: 35 min

## 2020-09-10 NOTE — PROGRESS NOTE ADULT - SUBJECTIVE AND OBJECTIVE BOX
Post-op check    S/P Tracheostomy/PEG POD#0  44 year old Male seen and examined at bedside. Intubated and sedated.     Vital Signs Last 24 Hrs  T(F): 100.4 (09-11-20 @ 00:00), Max: 103.6 (09-10-20 @ 05:00)  HR: 110 (09-11-20 @ 00:19)  BP: 105/75 (09-11-20 @ 00:00)  RR: 24 (09-11-20 @ 00:00)  SpO2: 100% (09-11-20 @ 00:19)    GENERAL: NAD  CHEST/LUNG: Intubated. Clear to auscultation bilaterally, respirations nonlabored  HEART: S1S2, Regular rate and rhythm  ABDOMEN: PEG intact. Dressing C/D/I; Nondistended, hypoactive bowel sounds, soft, nontender  EXTREMITIES:  no calf tenderness, No edema, intermittent compression devices in place bilaterally    Assessment: 44M S/P Tracheostomy/PEG POD#0    Plan:  - Tracheostomy/PEG care  - DVT prophylaxis, Incentive Spirometer, OOB, Ambulating, pain control  - f/u labs   - continue current management per ICU  - will discuss with surgical attending Post-op check    S/P Tracheostomy/PEG POD#0  44 year old Male seen and examined at bedside. Intubated and sedated.     Vital Signs Last 24 Hrs  T(F): 100.4 (09-11-20 @ 00:00), Max: 103.6 (09-10-20 @ 05:00)  HR: 110 (09-11-20 @ 00:19)  BP: 105/75 (09-11-20 @ 00:00)  RR: 24 (09-11-20 @ 00:00)  SpO2: 100% (09-11-20 @ 00:19)    GENERAL: NAD  CHEST/LUNG: Intubated. Clear to auscultation bilaterally, respirations nonlabored  HEART: S1S2, Regular rate and rhythm  ABDOMEN: PEG intact. Dressing C/D/I; softly distended, hypoactive bowel sounds, soft, nontender  EXTREMITIES:  no calf tenderness, No edema, intermittent compression devices in place bilaterally    Assessment: 44M S/P Tracheostomy/PEG POD#0    Plan:  - Tracheostomy/PEG care  - DVT prophylaxis, Incentive Spirometer, OOB, Ambulating, pain control  - f/u labs   - continue current management per ICU  - will discuss with surgical attending

## 2020-09-10 NOTE — BRIEF OPERATIVE NOTE - NSICDXBRIEFPOSTOP_GEN_ALL_CORE_FT
POST-OP DIAGNOSIS:  Failure respiratory 11-Sep-2020 00:26:22  Bar Adams  Failure to thrive in adult 11-Sep-2020 00:26:13  Bar Adams

## 2020-09-10 NOTE — PROGRESS NOTE ADULT - ASSESSMENT
44M PMH alcohol abuse, hx of alcohol withdrawal, alcohol pancreatitis presents for abdominal pain. Admitted with necrotizing pancreatitis, sepsis, septic shock with course complicated by DTs requiring sedation and small bowel obstruction conservatively managed with NGT and NPO. Now with worsening DTs, acute hypoxic respiratory failure requiring intubation secondary to PNA and development of ARDS with failure to wean. The patient is waiting for tracheostomy and PEG today.     1. NEURO  - sedation has been an issue with underlying agitation  - pt was started on methadone, he has been on fentanyl both for treatement of underlying pain for pancreatits but also for sedation, d/c fentanyl infusion (9/7/20)  - wean off propofol if the patient remains stable  - continue standing valium  - continue precedex, hope to wean off once trach is performed  - at this point this is not alcohol related DTs and likely more underlying delirium from being in ICU and with underlying medical condition with PNA/ARDS    2. PULM  - trach scheduled for today  - cont weaning as tolerates post trach  - cont current vent setting (FiO2 50%, PEEP 5, RR 14, Tv 450)  - based on elevated bicarb, concerned about metabolic alkalosis secondary to use of Lasix  - if metabolic alkalosis, consider adding acetazolamide    3. GI  - SBO resolved  - + BMs  - currently NPO for trach and PEG     4. ID  - s/p course of vanco and meropenem for both PNA and necrotizing pancreatitis  - cultures negative  - procalcitonin < 0.5: less suspicious for infection at this time  - concern of infection based on persistent fever and significantly elevated WBC; consider panculture and chest X-ray  - f/u morning procalcitonin     5. CV  - off IV pressors, cont with midodrine  - cont lasix diuresis for overall volume overloaded state    6. Renal  - contraction alkalosis secondary to lasix diuresis; continue acetazolamide  - if consistently requiring straight catheterization, consider switch to mosqueda  - daily monitor I&O    7. GEN  - full code  - cont DVT prophylaxis  - mother is next of kin 44M PMH alcohol abuse, hx of alcohol withdrawal, alcohol pancreatitis presents for abdominal pain. Admitted with necrotizing pancreatitis, sepsis, septic shock with course complicated by DTs requiring sedation and small bowel obstruction conservatively managed with NGT and NPO. Now with worsening DTs, acute hypoxic respiratory failure requiring intubation secondary to PNA and development of ARDS with failure to wean. The patient is waiting for tracheostomy and PEG today.     1. NEURO  - sedation has been an issue with underlying agitation  - pt was started on methadone, he has been on fentanyl both for treatement of underlying pain for pancreatits but also for sedation, d/c fentanyl infusion (9/7/20)  - wean off propofol if the patient remains stable  - continue standing valium  - continue precedex, hope to wean off once trach is performed  - at this point this is not alcohol related DTs and likely more underlying delirium from being in ICU and with underlying medical condition with PNA/ARDS    2. PULM  - trach scheduled for today  - cont current vent setting (FiO2 50%, PEEP 5, RR 14, Tv 450)  - cont weaning as tolerates post trach  - d/c Lasix (9/9/20) because of contraction alkalosis  - contraction alkalosis secondary to lasix is resolved with acetazolamide  - cont acetazolamide    3. GI  - SBO resolved  - no bowel movement since yesterday  - currently NPO for trach and PEG   - consider paracentesis    4. ID  - s/p course of vanco and meropenem for both PNA and necrotizing pancreatitis  - cultures negative  - procalcitonin elevated from 0.3 to 0.4  - concern of infection based on persistent fever and significantly elevated WBC  - UA, blood culture ordered  - chest X-ray after operation  - consider urine culture tomorrow    5. CV  - off IV pressors, cont with midodrine  - d/c lasix (9/9/20)    6. Renal  - contraction alkalosis secondary to lasix diuresis; continue acetazolamide  - cont condom catheter and straight catheterization q6hr  - daily monitor I&O    7. GEN  - full code  - cont DVT prophylaxis  - mother is next of kin 44M PMH alcohol abuse, hx of alcohol withdrawal, alcohol pancreatitis presents for abdominal pain. Admitted with necrotizing pancreatitis, sepsis, septic shock with course complicated by DTs requiring sedation and small bowel obstruction conservatively managed with NGT and NPO. Now with worsening DTs, acute hypoxic respiratory failure requiring intubation secondary to PNA and development of ARDS with failure to wean. The patient is waiting for tracheostomy and PEG today.     1. NEURO  - sedation has been an issue with underlying agitation  - pt was started on methadone, he has been on fentanyl both for treatement of underlying pain for pancreatits but also for sedation, d/c fentanyl infusion (9/7/20)  - wean off propofol if the patient remains stable  - continue standing valium  - continue precedex, hope to wean off once trach is performed  - at this point this is not alcohol related DTs and likely more underlying delirium from being in ICU and with underlying medical condition with PNA/ARDS    2. PULM  - trach scheduled for today  - cont current vent setting (FiO2 50%, PEEP 5, RR 14, Tv 450)  - cont weaning as tolerates post trach  - d/c Lasix (9/9/20) because of contraction alkalosis  - contraction alkalosis secondary to lasix is resolved with acetazolamide  - cont acetazolamide    3. GI  - SBO resolved  - no bowel movement since yesterday  - currently NPO for trach and PEG   - consider paracentesis    4. ID  - s/p course of vanco and meropenem for both PNA and necrotizing pancreatitis  - cultures negative  - procalcitonin went down from 0.43 to 0.31  - concern of infection based on persistent fever and significantly elevated WBC  - order UA, blood culture   - repeat chest X-ray after operation  - repeat procalcitonin  - consider urine culture tomorrow    5. CV  - off IV pressors, cont with midodrine  - d/c lasix (9/9/20)    6. Renal  - contraction alkalosis secondary to lasix diuresis; continue acetazolamide  - cont condom catheter and straight catheterization q6hr  - daily monitor I&O    7. GEN  - full code  - cont DVT prophylaxis  - mother is next of kin

## 2020-09-10 NOTE — PROGRESS NOTE ADULT - SUBJECTIVE AND OBJECTIVE BOX
24 hr events:  Did not go to OR last night. When spoke with the surgery team this morning, they are planning to have trach and PEG done today. Since it's an add-on case, it's uncertain when the patient will be going into OR. Had fever throughout night (Tmax 103.6). The patient is still on NPO since yesterday. Produced urine overnight and required straight catheterization. Had no bowel movement since yesterday. Still remains intubated since 8/23. The patient was agitated and tachycardic and calmed down after receiving fentanyl.     O2 sat on FIo2: 50% and PEEP 5 has been in the upper 90s    ## ROS:  [x] unable to obtain due to intubation/sedation    ## Labs:  CBC:                     8.4    24.65 )-----------( 422      ( 10 Sep 2020 05:22 )             26.4       Chem:  09-10    136  |  94<L>  |  13  ----------------------------<  95  4.2   |  34<H>  |  0.53    Ca    8.9      10 Sep 2020 05:22  Phos  5.0     09-10  Mg     1.8     09-10    TPro  7.1  /  Alb  1.3<L>  /  TBili  2.7<H>  /  DBili  x   /  AST  113<H>  /  ALT  38  /  AlkPhos  322<H>  09-10      Culture - Blood (09.02.20 @ 15:04)    Specimen Source: .Blood Blood-Peripheral    Culture Results: No Growth Final    ## Imaging:  KUB < from: Xray Kidney Ureter Bladder (09.03.20 @ 13:08) >  Nasogastric tube tip in the stomach. There is mild segmental gaseous distention of some small bowel and large bowel loops in the upper abdomen consistent with ileus. No mechanical bowel obstruction. Pelvic phlebolith.      ## Medications:  MEDICATIONS  (STANDING):  chlorhexidine 0.12% Liquid 15 milliLiter(s) Oral Mucosa every 12 hours  chlorhexidine 4% Liquid 1 Application(s) Topical <User Schedule>  dexMEDEtomidine Infusion 0.276 MICROgram(s)/kG/Hr (5.36 mL/Hr) IV Continuous <Continuous>  diazepam    Tablet 10 milliGRAM(s) Oral every 6 hours  enoxaparin Injectable 40 milliGRAM(s) SubCutaneous daily  folic acid 1 milliGRAM(s) Oral daily  lactulose Syrup 10 Gram(s) Oral four times a day  methadone    Tablet 10 milliGRAM(s) Oral every 6 hours  midodrine 15 milliGRAM(s) Oral every 8 hours  multivitamin 1 Tablet(s) Oral daily  pantoprazole  Injectable 40 milliGRAM(s) IV Push daily  propofol Infusion 10.009 MICROgram(s)/kG/Min (4.66 mL/Hr) IV Continuous <Continuous>  QUEtiapine 100 milliGRAM(s) Oral two times a day  thiamine 100 milliGRAM(s) Oral daily    MEDICATIONS  (PRN):  acetaminophen   Tablet .. 650 milliGRAM(s) Oral every 6 hours PRN Temp greater or equal to 38.5C (101.3F)  albuterol/ipratropium for Nebulization 3 milliLiter(s) Nebulizer every 6 hours PRN Shortness of Breath and/or Wheezing  senna 2 Tablet(s) Oral at bedtime PRN Constipation        ## Vitals:  ICU Vital Signs Last 24 Hrs  T(C): 39.8 (10 Sep 2020 05:00), Max: 39.8 (10 Sep 2020 05:00)  T(F): 103.6 (10 Sep 2020 05:00), Max: 103.6 (10 Sep 2020 05:00)  HR: 103 (10 Sep 2020 07:20) (78 - 146)  BP: 92/51 (10 Sep 2020 07:20) (84/47 - 128/79)  BP(mean): 59 (10 Sep 2020 07:20) (55 - 99)  RR: 14 (10 Sep 2020 07:20) (14 - 25)  SpO2: 99% (10 Sep 2020 07:20) (92% - 99%)    Vent: Mode: AC/ CMV (Assist Control/ Continuous Mandatory Ventilation), RR (machine): 14, TV (machine): 450, FiO2: 50, PEEP: 5 PIP: 21    I&O's Summary    09 Sep 2020 07:01  -  10 Sep 2020 07:00  --------------------------------------------------------  IN: 986.6 mL / OUT: 1513 mL / NET: -526.4 mL      ## P/E:  Gen: lying comfortably in bed in no apparent distress, sedated, orally intubated  HEENT: pupils constrict to light b/l, ETT and NGT in place  Resp: clear to auscultation B/L, mechanical breath sounds  CVS: RRR, S1S2 with no murmur or gallop  Abd: soft, distended, and tympanic +BS  Ext: no cyanosis, bilateral upper and lower extremity 1+ pitting edema  : scrotal edema  Neuro: sedated    CENTRAL LINE: [ ] YES [x] NO  LOCATION:   DATE INSERTED:  REMOVE: [ ] YES [ ] NO      QUINTEROS: [ ] YES [ ] NO    DATE INSERTED:  REMOVE:  [ ] YES [ ] NO      A-LINE:  [ ] YES [x NO  LOCATION:   DATE INSERTED:  REMOVE:  [ ] YES [ ] NO  EXPLAIN:    GLOBAL ISSUE/BEST PRACTICE:  Analgesia: n/a  Sedation: precedex, propofol  HOB elevation: yes  Stress ulcer prophylaxis: protonix  VTE prophylaxis: lovenox  Oral Care: chlorhexidine   Glycemic control:   Nutrition: tube feeds (NPO after MN for trach)    CODE STATUS: [x] full code  [ ] DNR  [ ] DNI  [ ] MOLST  Goals of care discussion: [x] yes

## 2020-09-10 NOTE — BRIEF OPERATIVE NOTE - NSICDXBRIEFPROCEDURE_GEN_ALL_CORE_FT
PROCEDURES:  Open tracheostomy 11-Sep-2020 00:25:32  Bar Adams  EGD, with PEG 11-Sep-2020 00:25:25  Bar Adams

## 2020-09-11 LAB
-  K. PNEUMONIAE GROUP: SIGNIFICANT CHANGE UP
ALBUMIN SERPL ELPH-MCNC: 1.2 G/DL — LOW (ref 3.3–5)
ALP SERPL-CCNC: 254 U/L — HIGH (ref 40–120)
ALT FLD-CCNC: 32 U/L — SIGNIFICANT CHANGE UP (ref 12–78)
ANION GAP SERPL CALC-SCNC: 6 MMOL/L — SIGNIFICANT CHANGE UP (ref 5–17)
AST SERPL-CCNC: 97 U/L — HIGH (ref 15–37)
BASOPHILS # BLD AUTO: 0.06 K/UL — SIGNIFICANT CHANGE UP (ref 0–0.2)
BASOPHILS NFR BLD AUTO: 0.3 % — SIGNIFICANT CHANGE UP (ref 0–2)
BILIRUB SERPL-MCNC: 2.2 MG/DL — HIGH (ref 0.2–1.2)
BUN SERPL-MCNC: 16 MG/DL — SIGNIFICANT CHANGE UP (ref 7–23)
CALCIUM SERPL-MCNC: 8.8 MG/DL — SIGNIFICANT CHANGE UP (ref 8.5–10.1)
CHLORIDE SERPL-SCNC: 94 MMOL/L — LOW (ref 96–108)
CO2 SERPL-SCNC: 35 MMOL/L — HIGH (ref 22–31)
CREAT SERPL-MCNC: 0.45 MG/DL — LOW (ref 0.5–1.3)
EOSINOPHIL # BLD AUTO: 0.06 K/UL — SIGNIFICANT CHANGE UP (ref 0–0.5)
EOSINOPHIL NFR BLD AUTO: 0.3 % — SIGNIFICANT CHANGE UP (ref 0–6)
GLUCOSE BLDC GLUCOMTR-MCNC: 104 MG/DL — HIGH (ref 70–99)
GLUCOSE BLDC GLUCOMTR-MCNC: 115 MG/DL — HIGH (ref 70–99)
GLUCOSE BLDC GLUCOMTR-MCNC: 97 MG/DL — SIGNIFICANT CHANGE UP (ref 70–99)
GLUCOSE SERPL-MCNC: 99 MG/DL — SIGNIFICANT CHANGE UP (ref 70–99)
GRAM STN FLD: SIGNIFICANT CHANGE UP
HCT VFR BLD CALC: 21.9 % — LOW (ref 39–50)
HCT VFR BLD CALC: 23.4 % — LOW (ref 39–50)
HGB BLD-MCNC: 7.3 G/DL — LOW (ref 13–17)
HGB BLD-MCNC: 7.7 G/DL — LOW (ref 13–17)
IMM GRANULOCYTES NFR BLD AUTO: 0.8 % — SIGNIFICANT CHANGE UP (ref 0–1.5)
LYMPHOCYTES # BLD AUTO: 0.91 K/UL — LOW (ref 1–3.3)
LYMPHOCYTES # BLD AUTO: 4.5 % — LOW (ref 13–44)
MAGNESIUM SERPL-MCNC: 2 MG/DL — SIGNIFICANT CHANGE UP (ref 1.6–2.6)
MCHC RBC-ENTMCNC: 32.6 PG — SIGNIFICANT CHANGE UP (ref 27–34)
MCHC RBC-ENTMCNC: 32.7 PG — SIGNIFICANT CHANGE UP (ref 27–34)
MCHC RBC-ENTMCNC: 32.9 GM/DL — SIGNIFICANT CHANGE UP (ref 32–36)
MCHC RBC-ENTMCNC: 33.3 GM/DL — SIGNIFICANT CHANGE UP (ref 32–36)
MCV RBC AUTO: 98.2 FL — SIGNIFICANT CHANGE UP (ref 80–100)
MCV RBC AUTO: 99.2 FL — SIGNIFICANT CHANGE UP (ref 80–100)
METHOD TYPE: SIGNIFICANT CHANGE UP
MONOCYTES # BLD AUTO: 1.43 K/UL — HIGH (ref 0–0.9)
MONOCYTES NFR BLD AUTO: 7 % — SIGNIFICANT CHANGE UP (ref 2–14)
NEUTROPHILS # BLD AUTO: 17.67 K/UL — HIGH (ref 1.8–7.4)
NEUTROPHILS NFR BLD AUTO: 87.1 % — HIGH (ref 43–77)
NRBC # BLD: 0 /100 WBCS — SIGNIFICANT CHANGE UP (ref 0–0)
NRBC # BLD: 0 /100 WBCS — SIGNIFICANT CHANGE UP (ref 0–0)
PHOSPHATE SERPL-MCNC: 4 MG/DL — SIGNIFICANT CHANGE UP (ref 2.5–4.5)
PLATELET # BLD AUTO: 367 K/UL — SIGNIFICANT CHANGE UP (ref 150–400)
PLATELET # BLD AUTO: 375 K/UL — SIGNIFICANT CHANGE UP (ref 150–400)
POTASSIUM SERPL-MCNC: 3.8 MMOL/L — SIGNIFICANT CHANGE UP (ref 3.5–5.3)
POTASSIUM SERPL-SCNC: 3.8 MMOL/L — SIGNIFICANT CHANGE UP (ref 3.5–5.3)
PROT SERPL-MCNC: 6.5 GM/DL — SIGNIFICANT CHANGE UP (ref 6–8.3)
RBC # BLD: 2.23 M/UL — LOW (ref 4.2–5.8)
RBC # BLD: 2.36 M/UL — LOW (ref 4.2–5.8)
RBC # FLD: 15.5 % — HIGH (ref 10.3–14.5)
RBC # FLD: 15.6 % — HIGH (ref 10.3–14.5)
SODIUM SERPL-SCNC: 135 MMOL/L — SIGNIFICANT CHANGE UP (ref 135–145)
SPECIMEN SOURCE: SIGNIFICANT CHANGE UP
WBC # BLD: 18.35 K/UL — HIGH (ref 3.8–10.5)
WBC # BLD: 20.29 K/UL — HIGH (ref 3.8–10.5)
WBC # FLD AUTO: 18.35 K/UL — HIGH (ref 3.8–10.5)
WBC # FLD AUTO: 20.29 K/UL — HIGH (ref 3.8–10.5)

## 2020-09-11 PROCEDURE — 74018 RADEX ABDOMEN 1 VIEW: CPT | Mod: 26

## 2020-09-11 PROCEDURE — 99291 CRITICAL CARE FIRST HOUR: CPT

## 2020-09-11 RX ORDER — SODIUM CHLORIDE 9 MG/ML
500 INJECTION, SOLUTION INTRAVENOUS ONCE
Refills: 0 | Status: COMPLETED | OUTPATIENT
Start: 2020-09-11 | End: 2020-09-11

## 2020-09-11 RX ORDER — ACETAMINOPHEN 500 MG
650 TABLET ORAL EVERY 6 HOURS
Refills: 0 | Status: DISCONTINUED | OUTPATIENT
Start: 2020-09-11 | End: 2020-09-18

## 2020-09-11 RX ORDER — FENTANYL CITRATE 50 UG/ML
100 INJECTION INTRAVENOUS ONCE
Refills: 0 | Status: DISCONTINUED | OUTPATIENT
Start: 2020-09-11 | End: 2020-09-11

## 2020-09-11 RX ADMIN — DEXMEDETOMIDINE HYDROCHLORIDE IN 0.9% SODIUM CHLORIDE 5.36 MICROGRAM(S)/KG/HR: 4 INJECTION INTRAVENOUS at 12:41

## 2020-09-11 RX ADMIN — DEXMEDETOMIDINE HYDROCHLORIDE IN 0.9% SODIUM CHLORIDE 5.36 MICROGRAM(S)/KG/HR: 4 INJECTION INTRAVENOUS at 10:44

## 2020-09-11 RX ADMIN — SODIUM CHLORIDE 500 MILLILITER(S): 9 INJECTION, SOLUTION INTRAVENOUS at 06:29

## 2020-09-11 RX ADMIN — DEXMEDETOMIDINE HYDROCHLORIDE IN 0.9% SODIUM CHLORIDE 5.36 MICROGRAM(S)/KG/HR: 4 INJECTION INTRAVENOUS at 01:33

## 2020-09-11 RX ADMIN — DEXMEDETOMIDINE HYDROCHLORIDE IN 0.9% SODIUM CHLORIDE 5.36 MICROGRAM(S)/KG/HR: 4 INJECTION INTRAVENOUS at 22:55

## 2020-09-11 RX ADMIN — DEXMEDETOMIDINE HYDROCHLORIDE IN 0.9% SODIUM CHLORIDE 5.36 MICROGRAM(S)/KG/HR: 4 INJECTION INTRAVENOUS at 17:54

## 2020-09-11 RX ADMIN — QUETIAPINE FUMARATE 100 MILLIGRAM(S): 200 TABLET, FILM COATED ORAL at 17:53

## 2020-09-11 RX ADMIN — CHLORHEXIDINE GLUCONATE 15 MILLILITER(S): 213 SOLUTION TOPICAL at 17:43

## 2020-09-11 RX ADMIN — METHADONE HYDROCHLORIDE 10 MILLIGRAM(S): 40 TABLET ORAL at 17:43

## 2020-09-11 RX ADMIN — DEXMEDETOMIDINE HYDROCHLORIDE IN 0.9% SODIUM CHLORIDE 5.36 MICROGRAM(S)/KG/HR: 4 INJECTION INTRAVENOUS at 05:52

## 2020-09-11 RX ADMIN — LACTULOSE 10 GRAM(S): 10 SOLUTION ORAL at 17:43

## 2020-09-11 RX ADMIN — DEXMEDETOMIDINE HYDROCHLORIDE IN 0.9% SODIUM CHLORIDE 5.36 MICROGRAM(S)/KG/HR: 4 INJECTION INTRAVENOUS at 19:26

## 2020-09-11 RX ADMIN — CHLORHEXIDINE GLUCONATE 1 APPLICATION(S): 213 SOLUTION TOPICAL at 05:52

## 2020-09-11 RX ADMIN — SODIUM CHLORIDE 500 MILLILITER(S): 9 INJECTION, SOLUTION INTRAVENOUS at 05:51

## 2020-09-11 RX ADMIN — Medication 650 MILLIGRAM(S): at 11:14

## 2020-09-11 RX ADMIN — MEROPENEM 100 MILLIGRAM(S): 1 INJECTION INTRAVENOUS at 13:19

## 2020-09-11 RX ADMIN — Medication 10 MILLIGRAM(S): at 17:43

## 2020-09-11 RX ADMIN — Medication 650 MILLIGRAM(S): at 12:20

## 2020-09-11 RX ADMIN — CHLORHEXIDINE GLUCONATE 15 MILLILITER(S): 213 SOLUTION TOPICAL at 05:51

## 2020-09-11 RX ADMIN — DEXMEDETOMIDINE HYDROCHLORIDE IN 0.9% SODIUM CHLORIDE 5.36 MICROGRAM(S)/KG/HR: 4 INJECTION INTRAVENOUS at 21:03

## 2020-09-11 RX ADMIN — DEXMEDETOMIDINE HYDROCHLORIDE IN 0.9% SODIUM CHLORIDE 5.36 MICROGRAM(S)/KG/HR: 4 INJECTION INTRAVENOUS at 15:00

## 2020-09-11 RX ADMIN — MEROPENEM 100 MILLIGRAM(S): 1 INJECTION INTRAVENOUS at 05:52

## 2020-09-11 RX ADMIN — DEXMEDETOMIDINE HYDROCHLORIDE IN 0.9% SODIUM CHLORIDE 5.36 MICROGRAM(S)/KG/HR: 4 INJECTION INTRAVENOUS at 17:15

## 2020-09-11 RX ADMIN — ENOXAPARIN SODIUM 40 MILLIGRAM(S): 100 INJECTION SUBCUTANEOUS at 12:36

## 2020-09-11 RX ADMIN — PROPOFOL 4.66 MICROGRAM(S)/KG/MIN: 10 INJECTION, EMULSION INTRAVENOUS at 22:15

## 2020-09-11 RX ADMIN — MEROPENEM 100 MILLIGRAM(S): 1 INJECTION INTRAVENOUS at 21:03

## 2020-09-11 RX ADMIN — DEXMEDETOMIDINE HYDROCHLORIDE IN 0.9% SODIUM CHLORIDE 5.36 MICROGRAM(S)/KG/HR: 4 INJECTION INTRAVENOUS at 03:42

## 2020-09-11 RX ADMIN — FENTANYL CITRATE 100 MICROGRAM(S): 50 INJECTION INTRAVENOUS at 03:48

## 2020-09-11 RX ADMIN — PANTOPRAZOLE SODIUM 40 MILLIGRAM(S): 20 TABLET, DELAYED RELEASE ORAL at 12:41

## 2020-09-11 RX ADMIN — MIDODRINE HYDROCHLORIDE 15 MILLIGRAM(S): 2.5 TABLET ORAL at 21:03

## 2020-09-11 RX ADMIN — DEXMEDETOMIDINE HYDROCHLORIDE IN 0.9% SODIUM CHLORIDE 5.36 MICROGRAM(S)/KG/HR: 4 INJECTION INTRAVENOUS at 08:05

## 2020-09-11 RX ADMIN — FENTANYL CITRATE 100 MICROGRAM(S): 50 INJECTION INTRAVENOUS at 04:00

## 2020-09-11 NOTE — PROGRESS NOTE ADULT - SUBJECTIVE AND OBJECTIVE BOX
SURGERY PROGRESS HPI:  S/P Tracheostomy/PEG POD#1  Patient seen and examined at bedside. Intubated and sedated.     Vital Signs Last 24 Hrs  T(C): 37.4 (11 Sep 2020 04:00), Max: 38.8 (10 Sep 2020 07:25)  T(F): 99.3 (11 Sep 2020 04:00), Max: 101.9 (10 Sep 2020 07:25)  HR: 100 (11 Sep 2020 06:00) (80 - 144)  BP: 108/57 (11 Sep 2020 06:00) (56/28 - 133/72)  BP(mean): 69 (11 Sep 2020 06:00) (36 - 88)  RR: 18 (11 Sep 2020 06:00) (11 - 27)  SpO2: 100% (11 Sep 2020 06:) (90% - 100%)      PHYSICAL EXAM:  GENERAL: NAD  HEENT: Tracheostomy intact and functioning well. Gauze with minimal dried blood stain.   CHEST/LUNG: Intubated. Clear to auscultation bilaterally, respirations nonlabored  HEART: S1S2, Regular rate and rhythm  ABDOMEN: PEG intact. Dressing C/D/I; softly distended, hypoactive bowel sounds, soft, nontender  EXTREMITIES:  no calf tenderness, No edema, intermittent compression devices in place bilaterally    I&O's Detail    09 Sep 2020 07:01  -  10 Sep 2020 07:00  --------------------------------------------------------  IN:    dexmedetomidine Infusion: 578.3 mL    IV PiggyBack: 400 mL    propofol Infusion: 8.3 mL  Total IN: 986.6 mL    OUT:    Intermittent Catheterization - Urethral: 1500 mL    Voided: 13 mL  Total OUT: 1513 mL    Total NET: -526.4 mL      10 Sep 2020 07:01  -  11 Sep 2020 06:41  --------------------------------------------------------  IN:    dexmedetomidine Infusion: 249.7 mL    dexmedetomidine Infusion: 276 mL    IV PiggyBack: 200 mL    propofol Infusion: 28 mL  Total IN: 753.7 mL    OUT:    Intermittent Catheterization - Urethral: 850 mL    Voided: 325 mL  Total OUT: 1175 mL    Total NET: -421.3 mL          LABS:                        7.3    20.29 )-----------( 375      ( 11 Sep 2020 03:25 )             21.9     -    135  |  94<L>  |  16  ----------------------------<  99  3.8   |  35<H>  |  0.45<L>    Ca    8.8      11 Sep 2020 03:25  Phos  4.0       Mg     2.0         TPro  6.5  /  Alb  1.2<L>  /  TBili  2.2<H>  /  DBili  x   /  AST  97<H>  /  ALT  32  /  AlkPhos  254<H>        Urinalysis Basic - ( 10 Sep 2020 17:07 )    Color: Yellow / Appearance: Clear / S.015 / pH: x  Gluc: x / Ketone: Trace  / Bili: Moderate / Urobili: 12 mg/dL   Blood: x / Protein: 15 mg/dL / Nitrite: Negative   Leuk Esterase: Trace / RBC: x / WBC 0-2   Sq Epi: x / Non Sq Epi: Occasional / Bacteria: Few      Culture Results:   Growth in aerobic bottle:  Gram Negative Rods  Growth in anaerobic bottle:  Gram Negative Rods (09-10 @ 21:09)  Culture Results:   Growth in aerobic bottle: Gram Negative Rods  "Due to technical problems, Proteus sp. will Not be reported as part of  the BCID panel until further notice"  ***Blood Panel PCR results on this specimen are available  approximately 3 hours after the Gram stain result.***  Gram stain, PCR, and/or culture results may not always  correspond due to difference in methodologies.  ************************************************************  This PCR assay was performed using [employer].  The following targets are tested for: Enterococcus,  vancomycin resistant enterococci, Listeria monocytogenes,  coagulase negative staphylococci, S. aureus,  methicillin resistant S. aureus, Streptococcus agalactiae  (Group B), S. pneumoniae, S. pyogenes (Group A),  Acinetobacter baumannii, Enterobacter cloacae, E. coli,  Klebsiella oxytoca, K. pneumoniae, Proteus sp.,  Serratia marcescens, Haemophilus influenzae,  Neisseria meningitidis, Pseudomonas aeruginosa, Candida  albicans, C. glabrata, C krusei, C parapsilosis,  C. tropicalis and the KPC resistance gene. (09-10 @ 20:56)      Assessment: 44M S/P Tracheostomy/PEG POD#1    Plan:  - Tracheostomy/PEG care  - DVT prophylaxis, Incentive Spirometer, OOB, Ambulating, pain control  - f/u labs   - continue current management per ICU  - will discuss with surgical attending SURGERY PROGRESS HPI:  S/P Tracheostomy/PEG POD#1  Patient seen and examined at bedside. Intubated and sedated.     Vital Signs Last 24 Hrs  T(C): 37.4 (11 Sep 2020 04:00), Max: 38.8 (10 Sep 2020 07:25)  T(F): 99.3 (11 Sep 2020 04:00), Max: 101.9 (10 Sep 2020 07:25)  HR: 100 (11 Sep 2020 06:00) (80 - 144)  BP: 108/57 (11 Sep 2020 06:00) (56/28 - 133/72)  BP(mean): 69 (11 Sep 2020 06:00) (36 - 88)  RR: 18 (11 Sep 2020 06:00) (11 - 27)  SpO2: 100% (11 Sep 2020 06:) (90% - 100%)      PHYSICAL EXAM:  GENERAL: NAD  HEENT: Tracheostomy intact and functioning well. Gauze with minimal dried blood stain.   CHEST/LUNG: Intubated. Clear to auscultation bilaterally, respirations nonlabored  HEART: S1S2, Regular rate and rhythm  ABDOMEN: PEG intact. Dressing C/D/I; softly distended, hypoactive bowel sounds, soft, nontender  EXTREMITIES:  no calf tenderness, No edema, intermittent compression devices in place bilaterally    I&O's Detail    09 Sep 2020 07:01  -  10 Sep 2020 07:00  --------------------------------------------------------  IN:    dexmedetomidine Infusion: 578.3 mL    IV PiggyBack: 400 mL    propofol Infusion: 8.3 mL  Total IN: 986.6 mL    OUT:    Intermittent Catheterization - Urethral: 1500 mL    Voided: 13 mL  Total OUT: 1513 mL    Total NET: -526.4 mL      10 Sep 2020 07:01  -  11 Sep 2020 06:41  --------------------------------------------------------  IN:    dexmedetomidine Infusion: 249.7 mL    dexmedetomidine Infusion: 276 mL    IV PiggyBack: 200 mL    propofol Infusion: 28 mL  Total IN: 753.7 mL    OUT:    Intermittent Catheterization - Urethral: 850 mL    Voided: 325 mL  Total OUT: 1175 mL    Total NET: -421.3 mL          LABS:                        7.3    20.29 )-----------( 375      ( 11 Sep 2020 03:25 )             21.9     -    135  |  94<L>  |  16  ----------------------------<  99  3.8   |  35<H>  |  0.45<L>    Ca    8.8      11 Sep 2020 03:25  Phos  4.0       Mg     2.0         TPro  6.5  /  Alb  1.2<L>  /  TBili  2.2<H>  /  DBili  x   /  AST  97<H>  /  ALT  32  /  AlkPhos  254<H>        Urinalysis Basic - ( 10 Sep 2020 17:07 )    Color: Yellow / Appearance: Clear / S.015 / pH: x  Gluc: x / Ketone: Trace  / Bili: Moderate / Urobili: 12 mg/dL   Blood: x / Protein: 15 mg/dL / Nitrite: Negative   Leuk Esterase: Trace / RBC: x / WBC 0-2   Sq Epi: x / Non Sq Epi: Occasional / Bacteria: Few      Culture Results:   Growth in aerobic bottle:  Gram Negative Rods  Growth in anaerobic bottle:  Gram Negative Rods (09-10 @ 21:09)  Culture Results:   Growth in aerobic bottle: Gram Negative Rods  "Due to technical problems, Proteus sp. will Not be reported as part of  the BCID panel until further notice"  ***Blood Panel PCR results on this specimen are available  approximately 3 hours after the Gram stain result.***  Gram stain, PCR, and/or culture results may not always  correspond due to difference in methodologies.  ************************************************************  This PCR assay was performed using [employer].  The following targets are tested for: Enterococcus,  vancomycin resistant enterococci, Listeria monocytogenes,  coagulase negative staphylococci, S. aureus,  methicillin resistant S. aureus, Streptococcus agalactiae  (Group B), S. pneumoniae, S. pyogenes (Group A),  Acinetobacter baumannii, Enterobacter cloacae, E. coli,  Klebsiella oxytoca, K. pneumoniae, Proteus sp.,  Serratia marcescens, Haemophilus influenzae,  Neisseria meningitidis, Pseudomonas aeruginosa, Candida  albicans, C. glabrata, C krusei, C parapsilosis,  C. tropicalis and the KPC resistance gene. (09-10 @ 20:56)      Assessment: 44M S/P Tracheostomy/PEG POD#1    Plan:  - Tracheostomy/PEG care  - DVT prophylaxis, Incentive Spirometer, OOB, Ambulating, pain control  - f/u labs   - continue current management per ICU  - Discussed with Dr. Moreau, will follow up as needed.  May use PEG for feedings.  Please call with any further questions.

## 2020-09-11 NOTE — PROGRESS NOTE ADULT - ATTENDING COMMENTS
44M PMH alcohol abuse with hx of  alcohol withdrawal, alcohol pancreatitis presents for abdominal pain due to necrotizing pancreatitis, sepsis, septic shock with course complicated by DTs, small bowel obstruction which was conservatively managed with NPO/NGT, acute hypoxic respiratory failure requiring intubation secondary to PNA with development of ARDS and failure to wean awaiting tracheostomy/PEG.     1. NEURO  - fentanyl drip d/angela yesterday  - remains on valium and methadone (as pt has been on extended course of fentanyl)  - continue to wean off precedex and propofol, once trach is in place it may be easier to wean off IV sedatives  - current agitation likely not related to alcohol DTs and more likely from delirium with ICU stay and ongoing medical illness with PNA/ARDS    2. PULM  - PEEP weaned from 10 to 8 yesterday, now on PEEP 5 today and maintaining O2 sat >90% with FIO2 at 50%  - trach scheduled for tomorrow morning not today per discussion with surgical team  - pt tolerated some weaning for first time today, however still with thick ETT secretions, thus will pursue for planned trach  - cont weaning as tolerates post trach      3. GI  - SBO resolved  - + BMs  - tube feeds resumed for today as trach is for tomorrow, NPO after midnight for trach in AM  - PEG scheduled for tomorrow as well    4. ID  - s/p course of vanco and meropenem which treated PNA and necrotizing pancreatitis  - has been febrile, white count today with a slight uptrend: cultures negative to date  - will cont to observe off antibx for now. hoping that as ETT and NGT is d/angela with trach and PEG in place there may be some improvement in fever and leukocytosis trend  - can repeat procal and trend to see if there is a rise with persistent fevers but overall procal has also been decreasing      5. CV  - off IV pressors, cont with midodrine  - on standing lasix diuresis for overall volume overloaded state, goal is to keep net negative balance    6. Renal  - cont to monitor I/O and Cr  - will do TOV today d/c mosqueda  - suppplement hypomagnesemia    7. GEN  - full code  - cont DVT prophylaxis  - per chart, pt's mother makes medical decisions and consents for pt    Critical Care Time: 35 min 44M PMH alcohol abuse, hx of alcohol withdrawal, alcohol pancreatitis presents for abdominal pain. Admitted with necrotizing pancreatitis, sepsis, septic shock with course complicated by DTs requiring sedation and small bowel obstruction conservatively managed with NGT and NPO. Now with worsening DTs, acute hypoxic respiratory failure requiring intubation secondary to PNA and development of ARDS with failure to wean. s/p trach and PEG on 9/10. Course now with klebsiella bacteremia    1. NEURO  - sedation has been an issue with underlying agitation  - on methadone and valium   - wean off precedex drip as tolerates   - DTs due to alcohol withdrawal is likely over and agitation now is more due to delirium from being in ICU and with underlying medical illness and infection    2. PULM  - s/p trach (POD #1)  - cont weaning as tolerates   - FIo2 on 50% and PEEP 5  - follow up sputum cx    3. GI  - SBO resolved  - s/p PEG (POD #1)  - ok to use PEG, will start PEG feeds    4. ID  - s/p course of vanco and meropenem for both PNA and necrotizing pancreatitis  - however has had recurrent fevers and leukocytosis for which meropenem was restarted yesterday, procal now elevated to 3  - blood cx now + klebsiella  - pt has had a left arm midline in place, would like this line d/angela with underlying bacteremia once peripheral IVs can be established  - follow up culture sensitivities of klebsiella        5. CV  - off IV pressors, cont with midodrine  - standing lasix d/angela due to metabolic alkalosis/popeye alkalosis   - 2L IVF given overnight with borderline hypotension    6. RENAL  - metabolic alkalosis likely from aggressive diuresing  - standing lasix d/angela, diurese as needed  - s/p diamox x1 on 9/9  - cont to monitor bicarb levels which is improving    7. GEN  - full code      Critical Care Time: 35 min

## 2020-09-11 NOTE — PROGRESS NOTE ADULT - SUBJECTIVE AND OBJECTIVE BOX
24 hr events:  Seen a patient at bedside this morning. The patient seemed awake but was not following any command. He was not able to engage in conversation. Had trach and PEG done yesterday. As of now, no post-op complications. Had fever at midnight (Tmax 100.4). Currently afebrile. The patient is still NPO at this time. Unable to urinate on his own but required straight catheterization every 6 hour. Had no bowel movement for past 2 days. Currently on a ventilator. The patient is off from propofol and currently on Precedex.     O2 sat on FIo2: 50% and PEEP 5 has been in the upper 90s    ## ROS:  [x] unable to obtain due to intubation/sedation    ## Labs:  CBC:                     8.4    24.65 )-----------( 422      ( 10 Sep 2020 05:22 )             26.4       Chem:  09-10    136  |  94<L>  |  13  ----------------------------<  95  4.2   |  34<H>  |  0.53    Ca    8.9      10 Sep 2020 05:22  Phos  5.0     09-10  Mg     1.8     09-10    TPro  7.1  /  Alb  1.3<L>  /  TBili  2.7<H>  /  DBili  x   /  AST  113<H>  /  ALT  38  /  AlkPhos  322<H>  09-10      Culture - Blood (09.02.20 @ 15:04)    Specimen Source: .Blood Blood-Peripheral    Culture Results: No Growth Final    ## Imaging:  KUB < from: Xray Kidney Ureter Bladder (09.03.20 @ 13:08) >  Nasogastric tube tip in the stomach. There is mild segmental gaseous distention of some small bowel and large bowel loops in the upper abdomen consistent with ileus. No mechanical bowel obstruction. Pelvic phlebolith.      ## Medications:  MEDICATIONS  (STANDING):  chlorhexidine 0.12% Liquid 15 milliLiter(s) Oral Mucosa every 12 hours  chlorhexidine 4% Liquid 1 Application(s) Topical <User Schedule>  dexMEDEtomidine Infusion 0.276 MICROgram(s)/kG/Hr (5.36 mL/Hr) IV Continuous <Continuous>  diazepam    Tablet 10 milliGRAM(s) Oral every 6 hours  enoxaparin Injectable 40 milliGRAM(s) SubCutaneous daily  folic acid 1 milliGRAM(s) Oral daily  lactulose Syrup 10 Gram(s) Oral four times a day  methadone    Tablet 10 milliGRAM(s) Oral every 6 hours  midodrine 15 milliGRAM(s) Oral every 8 hours  multivitamin 1 Tablet(s) Oral daily  pantoprazole  Injectable 40 milliGRAM(s) IV Push daily  propofol Infusion 10.009 MICROgram(s)/kG/Min (4.66 mL/Hr) IV Continuous <Continuous>  QUEtiapine 100 milliGRAM(s) Oral two times a day  thiamine 100 milliGRAM(s) Oral daily    MEDICATIONS  (PRN):  acetaminophen   Tablet .. 650 milliGRAM(s) Oral every 6 hours PRN Temp greater or equal to 38.5C (101.3F)  albuterol/ipratropium for Nebulization 3 milliLiter(s) Nebulizer every 6 hours PRN Shortness of Breath and/or Wheezing  senna 2 Tablet(s) Oral at bedtime PRN Constipation        ## Vitals:  ICU Vital Signs Last 24 Hrs  T(C): 39.8 (10 Sep 2020 05:00), Max: 39.8 (10 Sep 2020 05:00)  T(F): 103.6 (10 Sep 2020 05:00), Max: 103.6 (10 Sep 2020 05:00)  HR: 103 (10 Sep 2020 07:20) (78 - 146)  BP: 92/51 (10 Sep 2020 07:20) (84/47 - 128/79)  BP(mean): 59 (10 Sep 2020 07:20) (55 - 99)  RR: 14 (10 Sep 2020 07:20) (14 - 25)  SpO2: 99% (10 Sep 2020 07:20) (92% - 99%)    Vent: Mode: AC/ CMV (Assist Control/ Continuous Mandatory Ventilation), RR (machine): 14, TV (machine): 450, FiO2: 50, PEEP: 5 PIP: 21    I&O's Summary    09 Sep 2020 07:01  -  10 Sep 2020 07:00  --------------------------------------------------------  IN: 986.6 mL / OUT: 1513 mL / NET: -526.4 mL      ## P/E:  Gen: lying comfortably in bed in no apparent distress, sedated, orally intubated  HEENT: pupils constrict to light b/l, ETT and NGT in place  Resp: clear to auscultation B/L, mechanical breath sounds  CVS: RRR, S1S2 with no murmur or gallop  Abd: soft, distended, and tympanic +BS  Ext: no cyanosis, bilateral upper and lower extremity 1+ pitting edema  : scrotal edema  Neuro: sedated    CENTRAL LINE: [ ] YES [x] NO  LOCATION:   DATE INSERTED:  REMOVE: [ ] YES [ ] NO      QUINTEROS: [ ] YES [ ] NO    DATE INSERTED:  REMOVE:  [ ] YES [ ] NO      A-LINE:  [ ] YES [x NO  LOCATION:   DATE INSERTED:  REMOVE:  [ ] YES [ ] NO  EXPLAIN:    GLOBAL ISSUE/BEST PRACTICE:  Analgesia: n/a  Sedation: precedex, propofol  HOB elevation: yes  Stress ulcer prophylaxis: protonix  VTE prophylaxis: lovenox  Oral Care: chlorhexidine   Glycemic control:   Nutrition: tube feeds (NPO after MN for trach)    CODE STATUS: [x] full code  [ ] DNR  [ ] DNI  [ ] MOLST  Goals of care discussion: [x] yes 24 hr events:  Seen a patient at bedside this morning. The patient seemed awake but was not following any command. He was not able to engage in conversation. Had trach and PEG done yesterday. As of now, no post-op complications. Had fever at midnight (Tmax 100.4). Currently afebrile. The patient is still NPO at this time. Unable to urinate on his own but required straight catheterization every 6 hour. Had no bowel movement for past 2 days. Currently on a ventilator. The patient is off from propofol and currently on Precedex.     O2 sat on FIo2: 50% and PEEP 5 has been in the upper 90s    ## ROS:  Unable to obtain because the patient was not able to engage in conversation.     ## Labs:  CBC:                        7.3    20.29 )-----------( 375      ( 11 Sep 2020 03:25 )             21.9       Chem:      135  |  94<L>  |  16  ----------------------------<  99  3.8   |  35<H>  |  0.45<L>    Ca    8.8      11 Sep 2020 03:25  Phos  4.0       Mg     2.0         TPro  6.5  /  Alb  1.2<L>  /  TBili  2.2<H>  /  DBili  x   /  AST  97<H>  /  ALT  32  /  AlkPhos  254<H>      Urinalysis Basic - ( 10 Sep 2020 17:07 )    Color: Yellow / Appearance: Clear / S.015 / pH: x  Gluc: x / Ketone: Trace  / Bili: Moderate / Urobili: 12 mg/dL   Blood: x / Protein: 15 mg/dL / Nitrite: Negative   Leuk Esterase: Trace / RBC: x / WBC 0-2   Sq Epi: x / Non Sq Epi: Occasional / Bacteria: Few    Blood Culture - Blood (09.10.20 @ 21:09)    Gram Stain:   Growth in aerobic bottle: Gram Negative Rods  Growth in anaerobic bottle: Gram Negative Rods    Culture - Blood (09.10.20 @ 20:56)    -  Klebsiella pneumoniae: Detec  Growth in aerobic bottle: Gram Negative Rods    Specimen Source: .Blood Blood    Organism: Blood Culture PCR    ## Imaging:  KUB < from: Xray Kidney Ureter Bladder (20 @ 13:08) >  Nasogastric tube tip in the stomach. There is mild segmental gaseous distention of some small bowel and large bowel loops in the upper abdomen consistent with ileus. No mechanical bowel obstruction. Pelvic phlebolith.      ## Medications:  MEDICATIONS  (STANDING):  chlorhexidine 0.12% Liquid 15 milliLiter(s) Oral Mucosa every 12 hours  chlorhexidine 4% Liquid 1 Application(s) Topical <User Schedule>  dexMEDEtomidine Infusion 0.276 MICROgram(s)/kG/Hr (5.36 mL/Hr) IV Continuous <Continuous>  diazepam    Tablet 10 milliGRAM(s) Oral every 6 hours  enoxaparin Injectable 40 milliGRAM(s) SubCutaneous daily  folic acid 1 milliGRAM(s) Oral daily  lactulose Syrup 10 Gram(s) Oral four times a day  meropenem  IVPB 1000 milliGRAM(s) IV Intermittent every 8 hours  methadone    Tablet 10 milliGRAM(s) Oral every 6 hours  midodrine 15 milliGRAM(s) Oral every 8 hours  multivitamin 1 Tablet(s) Oral daily  pantoprazole  Injectable 40 milliGRAM(s) IV Push daily  propofol Infusion 10.009 MICROgram(s)/kG/Min (4.66 mL/Hr) IV Continuous <Continuous>  QUEtiapine 100 milliGRAM(s) Oral two times a day  thiamine 100 milliGRAM(s) Oral daily    MEDICATIONS  (PRN):  albuterol/ipratropium for Nebulization 3 milliLiter(s) Nebulizer every 6 hours PRN Shortness of Breath and/or Wheezing  senna 2 Tablet(s) Oral at bedtime PRN Constipation      ## Vitals:  ICU Vital Signs Last 24 Hrs  ICU Vital Signs Last 24 Hrs  T(C): 37.8 (11 Sep 2020 07:15), Max: 38.6 (10 Sep 2020 08:30)  T(F): 100.1 (11 Sep 2020 07:15), Max: 101.6 (10 Sep 2020 08:30)  HR: 127 (11 Sep 2020 07:30) (80 - 144)  BP: 118/80 (11 Sep 2020 07:00) (56/28 - 133/72)  BP(mean): 84 (11 Sep 2020 07:00) (36 - 88)  ABP: 125/52 (11 Sep 2020 07:30) (105/41 - 155/60)  ABP(mean): 74 (11 Sep 2020 07:30) (51 - 91)  RR: 26 (11 Sep 2020 07:30) (11 - 27)  SpO2: 94% (11 Sep 2020 07:30) (90% - 100%)    Vent: Mode: AC/ CMV (Assist Control/ Continuous Mandatory Ventilation), RR (machine): 16, TV (machine): 450, FiO2: 50, PEEP: 5 PIP: 13    I&O's Summary    10 Sep 2020 07:01  -  11 Sep 2020 07:00  --------------------------------------------------------  IN: 753.7 mL / OUT: 1175 mL / NET: -421.3 mL      ## P/E:  Gen: lying comfortably in bed in no apparent distress, awake, trach in place  Neuro: alert but not oriented, unable to follow commands  HEENT: pupils constrict to light b/l, trach in place  Resp: coarse breath sounds on auscultation B/L  CVS: RRR, S1S2 with no murmur or gallop  Abd: soft, distended, and tympanic +BS  Ext: no cyanosis, distal pulses present, bilateral upper and lower extremity 1+ pitting edema  : scrotal edema    CENTRAL LINE: [ ] YES [x] NO  LOCATION:   DATE INSERTED:  REMOVE: [ ] YES [ ] NO      QUINTEROS: [ ] YES [ ] NO    DATE INSERTED:  REMOVE:  [ ] YES [ ] NO      A-LINE:  [ ] YES [x NO  LOCATION:   DATE INSERTED:  REMOVE:  [ ] YES [ ] NO  EXPLAIN:    GLOBAL ISSUE/BEST PRACTICE:  Analgesia: n/a  Sedation: precedex  HOB elevation: yes  Stress ulcer prophylaxis: protonix  VTE prophylaxis: lovenox  Oral Care: chlorhexidine   Glycemic control:   Nutrition: tube feeds (NPO after MN for trach)    CODE STATUS: [x] full code  [ ] DNR  [ ] DNI  [ ] MOLST  Goals of care discussion: [x] yes 24 hr events:  Seen a patient at bedside this morning. The patient seemed awake but was neither responsive nor following any command. He was not able to engage in conversation. Had trach and PEG done yesterday. As of now, no post-op complications. Had fever at midnight (Tmax 100.4). Currently afebrile. The patient is still NPO at this time. Unable to urinate on his own but required straight catheterization every 6 hour. Had no bowel movement for past 2 days. Currently on a ventilator. The patient is off from propofol and currently on Precedex.     O2 sat on FIo2: 50% and PEEP 5 has been in the upper 90s    ## ROS:  Unable to obtain because the patient was not able to engage in conversation.     ## Labs:  CBC:                        7.3    20.29 )-----------( 375      ( 11 Sep 2020 03:25 )             21.9       Chem:      135  |  94<L>  |  16  ----------------------------<  99  3.8   |  35<H>  |  0.45<L>    Ca    8.8      11 Sep 2020 03:25  Phos  4.0       Mg     2.0         TPro  6.5  /  Alb  1.2<L>  /  TBili  2.2<H>  /  DBili  x   /  AST  97<H>  /  ALT  32  /  AlkPhos  254<H>      Urinalysis Basic - ( 10 Sep 2020 17:07 )    Color: Yellow / Appearance: Clear / S.015 / pH: x  Gluc: x / Ketone: Trace  / Bili: Moderate / Urobili: 12 mg/dL   Blood: x / Protein: 15 mg/dL / Nitrite: Negative   Leuk Esterase: Trace / RBC: x / WBC 0-2   Sq Epi: x / Non Sq Epi: Occasional / Bacteria: Few    Blood Culture - Blood (09.10.20 @ 21:09)    Gram Stain:   Growth in aerobic bottle: Gram Negative Rods  Growth in anaerobic bottle: Gram Negative Rods    Culture - Blood (09.10.20 @ 20:56)    -  Klebsiella pneumoniae: Detec  Growth in aerobic bottle: Gram Negative Rods    Specimen Source: .Blood Blood    Organism: Blood Culture PCR    ## Imaging:  KUB < from: Xray Kidney Ureter Bladder (20 @ 13:08) >  Nasogastric tube tip in the stomach. There is mild segmental gaseous distention of some small bowel and large bowel loops in the upper abdomen consistent with ileus. No mechanical bowel obstruction. Pelvic phlebolith.      ## Medications:  MEDICATIONS  (STANDING):  chlorhexidine 0.12% Liquid 15 milliLiter(s) Oral Mucosa every 12 hours  chlorhexidine 4% Liquid 1 Application(s) Topical <User Schedule>  dexMEDEtomidine Infusion 0.276 MICROgram(s)/kG/Hr (5.36 mL/Hr) IV Continuous <Continuous>  diazepam    Tablet 10 milliGRAM(s) Oral every 6 hours  enoxaparin Injectable 40 milliGRAM(s) SubCutaneous daily  folic acid 1 milliGRAM(s) Oral daily  lactulose Syrup 10 Gram(s) Oral four times a day  meropenem  IVPB 1000 milliGRAM(s) IV Intermittent every 8 hours  methadone    Tablet 10 milliGRAM(s) Oral every 6 hours  midodrine 15 milliGRAM(s) Oral every 8 hours  multivitamin 1 Tablet(s) Oral daily  pantoprazole  Injectable 40 milliGRAM(s) IV Push daily  propofol Infusion 10.009 MICROgram(s)/kG/Min (4.66 mL/Hr) IV Continuous <Continuous>  QUEtiapine 100 milliGRAM(s) Oral two times a day  thiamine 100 milliGRAM(s) Oral daily    MEDICATIONS  (PRN):  albuterol/ipratropium for Nebulization 3 milliLiter(s) Nebulizer every 6 hours PRN Shortness of Breath and/or Wheezing  senna 2 Tablet(s) Oral at bedtime PRN Constipation      ## Vitals:  ICU Vital Signs Last 24 Hrs  ICU Vital Signs Last 24 Hrs  T(C): 37.8 (11 Sep 2020 07:15), Max: 38.6 (10 Sep 2020 08:30)  T(F): 100.1 (11 Sep 2020 07:15), Max: 101.6 (10 Sep 2020 08:30)  HR: 127 (11 Sep 2020 07:30) (80 - 144)  BP: 118/80 (11 Sep 2020 07:00) (56/28 - 133/72)  BP(mean): 84 (11 Sep 2020 07:00) (36 - 88)  ABP: 125/52 (11 Sep 2020 07:30) (105/41 - 155/60)  ABP(mean): 74 (11 Sep 2020 07:30) (51 - 91)  RR: 26 (11 Sep 2020 07:30) (11 - 27)  SpO2: 94% (11 Sep 2020 07:30) (90% - 100%)    Vent: Mode: AC/ CMV (Assist Control/ Continuous Mandatory Ventilation), RR (machine): 16, TV (machine): 450, FiO2: 50, PEEP: 5 PIP: 13    I&O's Summary    10 Sep 2020 07:01  -  11 Sep 2020 07:00  --------------------------------------------------------  IN: 753.7 mL / OUT: 1175 mL / NET: -421.3 mL      ## P/E:  Gen: lying comfortably in bed in no apparent distress, awake, trach in place  Neuro: alert but not oriented, unable to follow commands  HEENT: pupils constrict to light b/l, trach in place, trace of dry blood around trach, no leakage/bleeding  Resp: coarse breath sounds on auscultation B/L  CVS: RRR, S1S2 with no murmur or gallop  Abd: soft, distended, and tympanic +BS, PEG placed in epigastric area, no leakage/bleeding  Ext: no cyanosis, distal pulses present, bilateral upper and lower extremity 1+ pitting edema    CENTRAL LINE: [ ] YES [x] NO  LOCATION:   DATE INSERTED:  REMOVE: [ ] YES [ ] NO      QUINTEORS: [ ] YES [ ] NO    DATE INSERTED:  REMOVE:  [ ] YES [ ] NO      A-LINE:  [ ] YES [x NO  LOCATION:   DATE INSERTED:  REMOVE:  [ ] YES [ ] NO  EXPLAIN:    GLOBAL ISSUE/BEST PRACTICE:  Analgesia: n/a  Sedation: precedex  HOB elevation: yes  Stress ulcer prophylaxis: protonix  VTE prophylaxis: lovenox  Oral Care: chlorhexidine   Glycemic control:   Nutrition: tube feeds (NPO after MN for trach)    CODE STATUS: [x] full code  [ ] DNR  [ ] DNI  [ ] MOLST  Goals of care discussion: [x] yes 24 hr events:  Seen a patient at bedside this morning. The patient seemed awake but was neither responsive nor following any command. Had trach and PEG done yesterday. Overnight,  the patient had fever at midnight (Tmax 101.5). Because his BP was low, the patient received 1 bolus of fluid. He did not require pressor. Because of low BP, meropenem was held. The patient is still NPO at this time. Unable to urinate on his own but required straight catheterization every 6 hour. Had no bowel movement for past 2 days. Currently on a ventilator. The patient is off from propofol and currently on Precedex.     O2 sat on FIo2: 50% and PEEP 5 has been in the upper 90s    ## ROS:  Unable to obtain because the patient was not able to engage in conversation.     ## Labs:  CBC:                        7.3    20.29 )-----------( 375      ( 11 Sep 2020 03:25 )             21.9       Chem:      135  |  94<L>  |  16  ----------------------------<  99  3.8   |  35<H>  |  0.45<L>    Ca    8.8      11 Sep 2020 03:25  Phos  4.0       Mg     2.0         TPro  6.5  /  Alb  1.2<L>  /  TBili  2.2<H>  /  DBili  x   /  AST  97<H>  /  ALT  32  /  AlkPhos  254<H>      Urinalysis Basic - ( 10 Sep 2020 17:07 )    Color: Yellow / Appearance: Clear / S.015 / pH: x  Gluc: x / Ketone: Trace  / Bili: Moderate / Urobili: 12 mg/dL   Blood: x / Protein: 15 mg/dL / Nitrite: Negative   Leuk Esterase: Trace / RBC: x / WBC 0-2   Sq Epi: x / Non Sq Epi: Occasional / Bacteria: Few    Blood Culture - Blood (09.10.20 @ 21:09)    Gram Stain:   Growth in aerobic bottle: Gram Negative Rods  Growth in anaerobic bottle: Gram Negative Rods    Culture - Blood (09.10.20 @ 20:56)    -  Klebsiella pneumoniae: Detec  Growth in aerobic bottle: Gram Negative Rods    Specimen Source: .Blood Blood    Organism: Blood Culture PCR    ## Imaging:  KUB < from: Xray Kidney Ureter Bladder (20 @ 13:08) >  Nasogastric tube tip in the stomach. There is mild segmental gaseous distention of some small bowel and large bowel loops in the upper abdomen consistent with ileus. No mechanical bowel obstruction. Pelvic phlebolith.      ## Medications:  MEDICATIONS  (STANDING):  chlorhexidine 0.12% Liquid 15 milliLiter(s) Oral Mucosa every 12 hours  chlorhexidine 4% Liquid 1 Application(s) Topical <User Schedule>  dexMEDEtomidine Infusion 0.276 MICROgram(s)/kG/Hr (5.36 mL/Hr) IV Continuous <Continuous>  diazepam    Tablet 10 milliGRAM(s) Oral every 6 hours  enoxaparin Injectable 40 milliGRAM(s) SubCutaneous daily  folic acid 1 milliGRAM(s) Oral daily  lactulose Syrup 10 Gram(s) Oral four times a day  meropenem  IVPB 1000 milliGRAM(s) IV Intermittent every 8 hours  methadone    Tablet 10 milliGRAM(s) Oral every 6 hours  midodrine 15 milliGRAM(s) Oral every 8 hours  multivitamin 1 Tablet(s) Oral daily  pantoprazole  Injectable 40 milliGRAM(s) IV Push daily  propofol Infusion 10.009 MICROgram(s)/kG/Min (4.66 mL/Hr) IV Continuous <Continuous>  QUEtiapine 100 milliGRAM(s) Oral two times a day  thiamine 100 milliGRAM(s) Oral daily    MEDICATIONS  (PRN):  albuterol/ipratropium for Nebulization 3 milliLiter(s) Nebulizer every 6 hours PRN Shortness of Breath and/or Wheezing  senna 2 Tablet(s) Oral at bedtime PRN Constipation      ## Vitals:  ICU Vital Signs Last 24 Hrs  ICU Vital Signs Last 24 Hrs  T(C): 37.8 (11 Sep 2020 07:15), Max: 38.6 (10 Sep 2020 08:30)  T(F): 100.1 (11 Sep 2020 07:15), Max: 101.6 (10 Sep 2020 08:30)  HR: 127 (11 Sep 2020 07:30) (80 - 144)  BP: 118/80 (11 Sep 2020 07:00) (56/28 - 133/72)  BP(mean): 84 (11 Sep 2020 07:00) (36 - 88)  ABP: 125/52 (11 Sep 2020 07:30) (105/41 - 155/60)  ABP(mean): 74 (11 Sep 2020 07:30) (51 - 91)  RR: 26 (11 Sep 2020 07:30) (11 - 27)  SpO2: 94% (11 Sep 2020 07:30) (90% - 100%)    Vent: Mode: AC/ CMV (Assist Control/ Continuous Mandatory Ventilation), RR (machine): 16, TV (machine): 450, FiO2: 50, PEEP: 5 PIP: 13    I&O's Summary    10 Sep 2020 07:01  -  11 Sep 2020 07:00  --------------------------------------------------------  IN: 753.7 mL / OUT: 1175 mL / NET: -421.3 mL      ## P/E:  Gen: lying comfortably in bed in no apparent distress, awake, trach in place  Neuro: alert but not oriented, unable to follow commands  HEENT: pupils constrict to light b/l, trach in place, trace of dry blood around trach, no leakage/bleeding  Resp: coarse breath sounds on auscultation B/L  CVS: RRR, S1S2 with no murmur or gallop  Abd: soft, distended, and tympanic +BS, PEG placed in epigastric area, no leakage/bleeding  Ext: no cyanosis, distal pulses present, bilateral upper and lower extremity 1+ pitting edema    CENTRAL LINE: [ ] YES [x] NO  LOCATION:   DATE INSERTED:  REMOVE: [ ] YES [ ] NO      QUINTEROS: [ ] YES [ ] NO    DATE INSERTED:  REMOVE:  [ ] YES [ ] NO      A-LINE:  [ ] YES [x NO  LOCATION:   DATE INSERTED:  REMOVE:  [ ] YES [ ] NO  EXPLAIN:    GLOBAL ISSUE/BEST PRACTICE:  Analgesia: n/a  Sedation: precedex  HOB elevation: yes  Stress ulcer prophylaxis: protonix  VTE prophylaxis: lovenox  Oral Care: chlorhexidine   Glycemic control:   Nutrition: tube feeds (NPO after MN for trach)    CODE STATUS: [x] full code  [ ] DNR  [ ] DNI  [ ] MOLST  Goals of care discussion: [x] yes 24 hr events:  Seen a patient at bedside this morning. The patient seemed awake but was neither responsive nor following any command. Had trach and PEG done yesterday. Overnight,  the patient had fever at midnight (Tmax 101.5). Because his BP was low, the patient received 2L of fluid. The patient was also tachycardic, while having low BP. He did not require pressor. The patient is still NPO at this time. Required straight catheterization every 6 hour and was able to urinate on his own in this morning. Had no bowel movement for past 2 days. Currently on a ventilator. The patient is off from propofol and currently on Precedex.     O2 sat on FIo2: 50% and PEEP 5 has been in the upper 90s    ## ROS:  Unable to obtain because the patient was not able to engage in conversation.     ## Labs:  CBC:                        7.3    20.29 )-----------( 375      ( 11 Sep 2020 03:25 )             21.9       Chem:      135  |  94<L>  |  16  ----------------------------<  99  3.8   |  35<H>  |  0.45<L>    Ca    8.8      11 Sep 2020 03:25  Phos  4.0       Mg     2.0         TPro  6.5  /  Alb  1.2<L>  /  TBili  2.2<H>  /  DBili  x   /  AST  97<H>  /  ALT  32  /  AlkPhos  254<H>      Urinalysis Basic - ( 10 Sep 2020 17:07 )    Color: Yellow / Appearance: Clear / S.015 / pH: x  Gluc: x / Ketone: Trace  / Bili: Moderate / Urobili: 12 mg/dL   Blood: x / Protein: 15 mg/dL / Nitrite: Negative   Leuk Esterase: Trace / RBC: x / WBC 0-2   Sq Epi: x / Non Sq Epi: Occasional / Bacteria: Few    Blood Culture - Blood (09.10.20 @ 21:09)    Gram Stain:   Growth in aerobic bottle: Gram Negative Rods  Growth in anaerobic bottle: Gram Negative Rods    Culture - Blood (09.10.20 @ 20:56)    -  Klebsiella pneumoniae: Detec  Growth in aerobic bottle: Gram Negative Rods    Specimen Source: .Blood Blood    Organism: Blood Culture PCR    ## Imaging:      KUANG < from: Xray Kidney Ureter Bladder (20 @ 13:08) >  Nasogastric tube tip in the stomach. There is mild segmental gaseous distention of some small bowel and large bowel loops in the upper abdomen consistent with ileus. No mechanical bowel obstruction. Pelvic phlebolith.      ## Medications:  MEDICATIONS  (STANDING):  chlorhexidine 0.12% Liquid 15 milliLiter(s) Oral Mucosa every 12 hours  chlorhexidine 4% Liquid 1 Application(s) Topical <User Schedule>  dexMEDEtomidine Infusion 0.276 MICROgram(s)/kG/Hr (5.36 mL/Hr) IV Continuous <Continuous>  diazepam    Tablet 10 milliGRAM(s) Oral every 6 hours  enoxaparin Injectable 40 milliGRAM(s) SubCutaneous daily  folic acid 1 milliGRAM(s) Oral daily  lactulose Syrup 10 Gram(s) Oral four times a day  meropenem  IVPB 1000 milliGRAM(s) IV Intermittent every 8 hours  methadone    Tablet 10 milliGRAM(s) Oral every 6 hours  midodrine 15 milliGRAM(s) Oral every 8 hours  multivitamin 1 Tablet(s) Oral daily  pantoprazole  Injectable 40 milliGRAM(s) IV Push daily  propofol Infusion 10.009 MICROgram(s)/kG/Min (4.66 mL/Hr) IV Continuous <Continuous>  QUEtiapine 100 milliGRAM(s) Oral two times a day  thiamine 100 milliGRAM(s) Oral daily    MEDICATIONS  (PRN):  albuterol/ipratropium for Nebulization 3 milliLiter(s) Nebulizer every 6 hours PRN Shortness of Breath and/or Wheezing  senna 2 Tablet(s) Oral at bedtime PRN Constipation      ## Vitals:  ICU Vital Signs Last 24 Hrs  ICU Vital Signs Last 24 Hrs  T(C): 37.8 (11 Sep 2020 07:15), Max: 38.6 (10 Sep 2020 08:30)  T(F): 100.1 (11 Sep 2020 07:15), Max: 101.6 (10 Sep 2020 08:30)  HR: 127 (11 Sep 2020 07:30) (80 - 144)  BP: 118/80 (11 Sep 2020 07:00) (56/28 - 133/72)  BP(mean): 84 (11 Sep 2020 07:00) (36 - 88)  ABP: 125/52 (11 Sep 2020 07:30) (105/41 - 155/60)  ABP(mean): 74 (11 Sep 2020 07:30) (51 - 91)  RR: 26 (11 Sep 2020 07:30) (11 - 27)  SpO2: 94% (11 Sep 2020 07:30) (90% - 100%)    Vent: Mode: AC/ CMV (Assist Control/ Continuous Mandatory Ventilation), RR (machine): 16, TV (machine): 450, FiO2: 50, PEEP: 5 PIP: 13    I&O's Summary    10 Sep 2020 07:01  -  11 Sep 2020 07:00  --------------------------------------------------------  IN: 753.7 mL / OUT: 1175 mL / NET: -421.3 mL      ## P/E:  Gen: lying comfortably in bed in no apparent distress, awake, trach in place  Neuro: alert but not oriented, unable to follow commands  HEENT: pupils constrict to light b/l, trach in place, trace of dry blood around trach, no leakage/bleeding  Resp: coarse breath sounds on auscultation B/L  CVS: RRR, S1S2 with no murmur or gallop  Abd: soft, distended, and tympanic +BS, PEG placed in epigastric area, no leakage/bleeding  Ext: no cyanosis, distal pulses present, bilateral upper and lower extremity 1+ pitting edema    CENTRAL LINE: [ ] YES [x] NO  LOCATION:   DATE INSERTED:  REMOVE: [ ] YES [ ] NO      QUINTEROS: [ ] YES [ ] NO    DATE INSERTED:  REMOVE:  [ ] YES [ ] NO      A-LINE:  [ ] YES [x NO  LOCATION:   DATE INSERTED:  REMOVE:  [ ] YES [ ] NO  EXPLAIN:    GLOBAL ISSUE/BEST PRACTICE:  Analgesia: n/a  Sedation: precedex  HOB elevation: yes  Stress ulcer prophylaxis: protonix  VTE prophylaxis: lovenox  Oral Care: chlorhexidine   Glycemic control:   Nutrition: tube feeds (NPO after MN for trach)    CODE STATUS: [x] full code  [ ] DNR  [ ] DNI  [ ] MOLST  Goals of care discussion: [x] yes 24 hr events:  Seen a patient at bedside this morning. The patient seemed awake but was neither responsive nor following any command. Had trach and PEG done yesterday. Overnight,  the patient had fever at midnight (Tmax 101.5). Because his BP was low, the patient received 2L of fluid. The patient was also tachycardic, while having low BP. He did not require pressor. The patient is still NPO at this time. Required straight catheterization every 6 hour and was able to urinate on his own in this morning. Had no bowel movement for past 2 days. Currently on a ventilator. The patient is off from propofol and currently on Precedex.     O2 sat on FIo2: 50% and PEEP 5 has been in the upper 90s    ## ROS:  Unable to obtain because the patient was not able to engage in conversation.     ## Labs:  CBC:                        7.3    20.29 )-----------( 375      ( 11 Sep 2020 03:25 )             21.9       Chem:      135  |  94<L>  |  16  ----------------------------<  99  3.8   |  35<H>  |  0.45<L>    Ca    8.8      11 Sep 2020 03:25  Phos  4.0       Mg     2.0         TPro  6.5  /  Alb  1.2<L>  /  TBili  2.2<H>  /  DBili  x   /  AST  97<H>  /  ALT  32  /  AlkPhos  254<H>      Urinalysis Basic - ( 10 Sep 2020 17:07 )    Color: Yellow / Appearance: Clear / S.015 / pH: x  Gluc: x / Ketone: Trace  / Bili: Moderate / Urobili: 12 mg/dL   Blood: x / Protein: 15 mg/dL / Nitrite: Negative   Leuk Esterase: Trace / RBC: x / WBC 0-2   Sq Epi: x / Non Sq Epi: Occasional / Bacteria: Few    Blood Culture - Blood (09.10.20 @ 21:09)    Gram Stain:   Growth in aerobic bottle: Gram Negative Rods  Growth in anaerobic bottle: Gram Negative Rods    Culture - Blood (09.10.20 @ 20:56)    -  Klebsiella pneumoniae: Detec  Growth in aerobic bottle: Gram Negative Rods    Specimen Source: .Blood Blood    Organism: Blood Culture PCR    ## Imaging:  < from: Xray Chest 1 View- PORTABLE-Urgent (09.10.20 @ 20:55) >  New tracheostomy. Partial clearing of the lungs.      KUB < from: Xray Kidney Ureter Bladder (20 @ 13:08) >  Nasogastric tube tip in the stomach. There is mild segmental gaseous distention of some small bowel and large bowel loops in the upper abdomen consistent with ileus. No mechanical bowel obstruction. Pelvic phlebolith.      ## Medications:  MEDICATIONS  (STANDING):  chlorhexidine 0.12% Liquid 15 milliLiter(s) Oral Mucosa every 12 hours  chlorhexidine 4% Liquid 1 Application(s) Topical <User Schedule>  dexMEDEtomidine Infusion 0.276 MICROgram(s)/kG/Hr (5.36 mL/Hr) IV Continuous <Continuous>  diazepam    Tablet 10 milliGRAM(s) Oral every 6 hours  enoxaparin Injectable 40 milliGRAM(s) SubCutaneous daily  folic acid 1 milliGRAM(s) Oral daily  lactulose Syrup 10 Gram(s) Oral four times a day  meropenem  IVPB 1000 milliGRAM(s) IV Intermittent every 8 hours  methadone    Tablet 10 milliGRAM(s) Oral every 6 hours  midodrine 15 milliGRAM(s) Oral every 8 hours  multivitamin 1 Tablet(s) Oral daily  pantoprazole  Injectable 40 milliGRAM(s) IV Push daily  propofol Infusion 10.009 MICROgram(s)/kG/Min (4.66 mL/Hr) IV Continuous <Continuous>  QUEtiapine 100 milliGRAM(s) Oral two times a day  thiamine 100 milliGRAM(s) Oral daily    MEDICATIONS  (PRN):  albuterol/ipratropium for Nebulization 3 milliLiter(s) Nebulizer every 6 hours PRN Shortness of Breath and/or Wheezing  senna 2 Tablet(s) Oral at bedtime PRN Constipation      ## Vitals:  ICU Vital Signs Last 24 Hrs  ICU Vital Signs Last 24 Hrs  T(C): 37.8 (11 Sep 2020 07:15), Max: 38.6 (10 Sep 2020 08:30)  T(F): 100.1 (11 Sep 2020 07:15), Max: 101.6 (10 Sep 2020 08:30)  HR: 127 (11 Sep 2020 07:30) (80 - 144)  BP: 118/80 (11 Sep 2020 07:00) (56/28 - 133/72)  BP(mean): 84 (11 Sep 2020 07:00) (36 - 88)  ABP: 125/52 (11 Sep 2020 07:30) (105/41 - 155/60)  ABP(mean): 74 (11 Sep 2020 07:30) (51 - 91)  RR: 26 (11 Sep 2020 07:30) (11 - 27)  SpO2: 94% (11 Sep 2020 07:30) (90% - 100%)    Vent: Mode: AC/ CMV (Assist Control/ Continuous Mandatory Ventilation), RR (machine): 16, TV (machine): 450, FiO2: 50, PEEP: 5 PIP: 13    I&O's Summary    10 Sep 2020 07:01  -  11 Sep 2020 07:00  --------------------------------------------------------  IN: 753.7 mL / OUT: 1175 mL / NET: -421.3 mL      ## P/E:  Gen: lying comfortably in bed in no apparent distress, awake, trach in place  Neuro: alert but not oriented, unable to follow commands  HEENT: pupils constrict to light b/l, trach in place, trace of dry blood around trach, no leakage/bleeding  Resp: coarse breath sounds on auscultation B/L  CVS: RRR, S1S2 with no murmur or gallop  Abd: soft, distended, and tympanic +BS, PEG placed in epigastric area, no leakage/bleeding  Ext: no cyanosis, distal pulses present, bilateral upper and lower extremity 1+ pitting edema    CENTRAL LINE: [ ] YES [x] NO  LOCATION:   DATE INSERTED:  REMOVE: [ ] YES [ ] NO      QUINTEROS: [ ] YES [ ] NO    DATE INSERTED:  REMOVE:  [ ] YES [ ] NO      A-LINE:  [ ] YES [x NO  LOCATION:   DATE INSERTED:  REMOVE:  [ ] YES [ ] NO  EXPLAIN:    GLOBAL ISSUE/BEST PRACTICE:  Analgesia: n/a  Sedation: precedex  HOB elevation: yes  Stress ulcer prophylaxis: protonix  VTE prophylaxis: lovenox  Oral Care: chlorhexidine   Glycemic control:   Nutrition: tube feeds (NPO after MN for trach)    CODE STATUS: [x] full code  [ ] DNR  [ ] DNI  [ ] MOLST  Goals of care discussion: [x] yes

## 2020-09-11 NOTE — PROGRESS NOTE ADULT - ASSESSMENT
44M PMH alcohol abuse, hx of alcohol withdrawal, alcohol pancreatitis presents for abdominal pain. Admitted with necrotizing pancreatitis, sepsis, septic shock with course complicated by DTs requiring sedation and small bowel obstruction conservatively managed with NGT and NPO. Now with worsening DTs, acute hypoxic respiratory failure requiring intubation secondary to PNA and development of ARDS with failure to wean. The patient is waiting for tracheostomy and PEG today.     1. NEURO  - sedation has been an issue with underlying agitation  - pt was started on methadone, he has been on fentanyl both for treatement of underlying pain for pancreatits but also for sedation, d/c fentanyl infusion (9/7/20)  - wean off propofol if the patient remains stable  - continue standing valium  - continue precedex, hope to wean off once trach is performed  - at this point this is not alcohol related DTs and likely more underlying delirium from being in ICU and with underlying medical condition with PNA/ARDS    2. PULM  - trach scheduled for today  - cont current vent setting (FiO2 50%, PEEP 5, RR 14, Tv 450)  - cont weaning as tolerates post trach  - d/c Lasix (9/9/20) because of contraction alkalosis  - contraction alkalosis secondary to lasix is resolved with acetazolamide  - cont acetazolamide    3. GI  - SBO resolved  - no bowel movement since yesterday  - currently NPO for trach and PEG   - consider paracentesis    4. ID  - s/p course of vanco and meropenem for both PNA and necrotizing pancreatitis  - cultures negative  - procalcitonin went down from 0.43 to 0.31  - concern of infection based on persistent fever and significantly elevated WBC  - order UA, blood culture   - repeat chest X-ray after operation  - repeat procalcitonin  - consider urine culture tomorrow    5. CV  - off IV pressors, cont with midodrine  - d/c lasix (9/9/20)    6. Renal  - contraction alkalosis secondary to lasix diuresis; continue acetazolamide  - cont condom catheter and straight catheterization q6hr  - daily monitor I&O    7. GEN  - full code  - cont DVT prophylaxis  - mother is next of kin 43M PMH alcohol abuse, hx of alcohol withdrawal, alcohol pancreatitis presents for abdominal pain. Admitted with necrotizing pancreatitis, sepsis, septic shock with course complicated by DTs requiring sedation and small bowel obstruction conservatively managed with NGT and NPO. His course of hospitalization was complicated by worsening DTs and acute hypoxic respiratory failure requiring intubation. The patient is currently s/p tracheostomy and PEG and being treated for sepsis secondary to Klebsiella infection.     1. NEURO  - currently alert but not oriented; frequently re-orient the patient throughout the day  - off from propofol and on Precedex  - if remain stable, wean off Precedex  - if agitated, consider putting back on Propofol  - continue standing valium  - at this point this is not alcohol related DTs and likely more underlying delirium from being in ICU and with underlying medical condition with PNA/ARDS    2. PULM  - s/p trach  - cont current vent setting (FiO2 50%, PEEP 5, RR 16, Tv 450)  - cont weaning as tolerates post trach  - consider spontaneous breathing trial if the patient remains stable  - d/c Lasix (9/9/20) because of contraction alkalosis  - contraction alkalosis secondary to lasix is resolved with acetazolamide    3. GI  - SBO resolved  - no bowel movement for past 2 days, most likely due to NPO for past 3 days  - NPO until tomorrow    4. ID  - s/p course of vanco and meropenem for both PNA and necrotizing pancreatitis  - blood cultures positive for gram negative eduard, Klebsiella  - empircally treated with meropenem  - f/u sensitivity result  - UA seemed unremarkable  - Order urine culture to r/o sepsis secondary to UTI  - Order chest X-ray to r/o sepsis secondary to pneumonia    5. CV  - off IV pressors, cont with midodrine  - d/c lasix (9/9/20) because of contraction alkalosis, which is now resolved  - re-start lasix to manage fluid overload    6. Renal  - currently straight catheterization q6hr  - if unable to urinate on his own, consider putting a mosqueda back  - daily monitor I&O    7. GEN  - full code  - cont DVT prophylaxis  - mother is next of kin 43M PMH alcohol abuse, hx of alcohol withdrawal, alcohol pancreatitis presents for abdominal pain. Admitted with necrotizing pancreatitis, sepsis, septic shock with course complicated by DTs requiring sedation and small bowel obstruction conservatively managed with NGT and NPO. His course of hospitalization was complicated by worsening DTs and acute hypoxic respiratory failure requiring intubation. The patient is currently s/p tracheostomy and PEG and being treated for sepsis secondary to Klebsiella infection.     1. NEURO  - currently alert but not oriented; frequently re-orient the patient throughout the day  - off from propofol and on Precedex  - if remain stable, wean off Precedex  - if agitated, consider putting back on Propofol  - continue standing valium  - at this point this is not alcohol related DTs and likely more underlying delirium from being in ICU and with underlying medical condition with PNA/ARDS    2. PULM  - s/p trach  - cont current vent setting (FiO2 50%, PEEP 5, RR 16, Tv 450)  - cont weaning as tolerates post trach  - consider spontaneous breathing trial if the patient remains stable after weaning off    3. GI  - SBO resolved  - no bowel movement for past 2 days, most likely due to NPO for past 3 days  - NPO until tomorrow    4. ID  - s/p course of vanco and meropenem for both PNA and necrotizing pancreatitis  - blood cultures positive for gram negative eduard, Klebsiella  - empirically treated with meropenem  - f/u sensitivity result  - UA seemed unremarkable  - Order urine culture to r/o sepsis secondary to UTI  - Order chest X-ray to r/o sepsis secondary to pneumonia    5. CV  - off IV pressors, cont with midodrine  - d/c lasix (9/9/20) because of contraction alkalosis, which is now resolved  - re-start lasix to manage fluid overload    6. Renal  - currently straight catheterization q6hr  - if unable to urinate on his own, consider putting a mosqueda back  - daily monitor I&O    7. GEN  - full code  - cont DVT prophylaxis  - mother is next of kin 44M PMH alcohol abuse, hx of alcohol withdrawal, alcohol pancreatitis presents for abdominal pain. Admitted with necrotizing pancreatitis, sepsis, septic shock with course complicated by DTs requiring sedation and small bowel obstruction conservatively managed with NGT and NPO. His course of hospitalization was complicated by worsening DTs and acute hypoxic respiratory failure requiring intubation. The patient is currently s/p tracheostomy and PEG and being treated for sepsis secondary to Klebsiella infection.     1. NEURO  - currently alert but not oriented; frequently re-orient the patient throughout the day  - off from propofol and on Precedex  - if remain stable, wean off Precedex  - if agitated, consider putting back on Propofol  - at this point this is not alcohol related DTs and likely more underlying delirium from being in ICU and with underlying medical condition with PNA/ARDS    2. PULM  - s/p trach  - chest x-ray showed improvement in ARDS with no sign of consolidation  - cont current vent setting (FiO2 50%, PEEP 5, RR 16, Tv 450)  - cont weaning as tolerates post trach  - send sputum culture to find source of infection    3. GI  - SBO resolved  - no bowel movement for past 2 days, most likely due to NPO for past 3 days  - NPO until this evening  - start feeding tonight  - order KUB to r/o SBO or GI perforation    4. ID  - s/p course of vanco and meropenem for both PNA and necrotizing pancreatitis  - blood cultures positive for gram negative eduard, Klebsiella  - empirically treated with meropenem  - f/u sensitivity result  - UA is unremarkable  - Order urine culture to r/o sepsis secondary to UTI    5. CV  - off IV pressors, cont with midodrine  - d/c lasix (9/9/20) because of contraction alkalosis, which is now resolved  - Lasix PRN to treat overload as needed    6. Renal  - currently straight catheterization q6hr  - put a condom catheter; if the patient is not producing urine, consider putting back a mosqueda  - daily monitor I&O    7. GEN  - full code  - cont DVT prophylaxis  - mother is next of kin  - order suppository Tylenol for fever

## 2020-09-12 LAB
ALBUMIN SERPL ELPH-MCNC: 1.2 G/DL — LOW (ref 3.3–5)
ALBUMIN SERPL ELPH-MCNC: 1.4 G/DL — LOW (ref 3.3–5)
ALP SERPL-CCNC: 220 U/L — HIGH (ref 40–120)
ALP SERPL-CCNC: 369 U/L — HIGH (ref 40–120)
ALT FLD-CCNC: 28 U/L — SIGNIFICANT CHANGE UP (ref 12–78)
ALT FLD-CCNC: 43 U/L — SIGNIFICANT CHANGE UP (ref 12–78)
ANION GAP SERPL CALC-SCNC: 4 MMOL/L — LOW (ref 5–17)
ANION GAP SERPL CALC-SCNC: 8 MMOL/L — SIGNIFICANT CHANGE UP (ref 5–17)
AST SERPL-CCNC: 146 U/L — HIGH (ref 15–37)
AST SERPL-CCNC: 78 U/L — HIGH (ref 15–37)
BASE EXCESS BLDA CALC-SCNC: 9.1 MMOL/L — HIGH (ref -2–2)
BILIRUB SERPL-MCNC: 1.8 MG/DL — HIGH (ref 0.2–1.2)
BILIRUB SERPL-MCNC: 1.9 MG/DL — HIGH (ref 0.2–1.2)
BLOOD GAS COMMENTS: SIGNIFICANT CHANGE UP
BLOOD GAS COMMENTS: SIGNIFICANT CHANGE UP
BLOOD GAS SOURCE: SIGNIFICANT CHANGE UP
BUN SERPL-MCNC: 12 MG/DL — SIGNIFICANT CHANGE UP (ref 7–23)
BUN SERPL-MCNC: 13 MG/DL — SIGNIFICANT CHANGE UP (ref 7–23)
CALCIUM SERPL-MCNC: 8.5 MG/DL — SIGNIFICANT CHANGE UP (ref 8.5–10.1)
CALCIUM SERPL-MCNC: 9.1 MG/DL — SIGNIFICANT CHANGE UP (ref 8.5–10.1)
CHLORIDE SERPL-SCNC: 100 MMOL/L — SIGNIFICANT CHANGE UP (ref 96–108)
CHLORIDE SERPL-SCNC: 98 MMOL/L — SIGNIFICANT CHANGE UP (ref 96–108)
CO2 SERPL-SCNC: 31 MMOL/L — SIGNIFICANT CHANGE UP (ref 22–31)
CO2 SERPL-SCNC: 35 MMOL/L — HIGH (ref 22–31)
CREAT SERPL-MCNC: 0.36 MG/DL — LOW (ref 0.5–1.3)
CREAT SERPL-MCNC: 0.57 MG/DL — SIGNIFICANT CHANGE UP (ref 0.5–1.3)
CULTURE RESULTS: NO GROWTH — SIGNIFICANT CHANGE UP
GLUCOSE BLDC GLUCOMTR-MCNC: 110 MG/DL — HIGH (ref 70–99)
GLUCOSE BLDC GLUCOMTR-MCNC: 112 MG/DL — HIGH (ref 70–99)
GLUCOSE BLDC GLUCOMTR-MCNC: 117 MG/DL — HIGH (ref 70–99)
GLUCOSE SERPL-MCNC: 103 MG/DL — HIGH (ref 70–99)
GLUCOSE SERPL-MCNC: 115 MG/DL — HIGH (ref 70–99)
HCO3 BLDA-SCNC: 33 MMOL/L — HIGH (ref 21–29)
HCT VFR BLD CALC: 21.6 % — LOW (ref 39–50)
HGB BLD-MCNC: 7.1 G/DL — LOW (ref 13–17)
HOROWITZ INDEX BLDA+IHG-RTO: 40 — SIGNIFICANT CHANGE UP
MAGNESIUM SERPL-MCNC: 1.7 MG/DL — SIGNIFICANT CHANGE UP (ref 1.6–2.6)
MAGNESIUM SERPL-MCNC: 1.9 MG/DL — SIGNIFICANT CHANGE UP (ref 1.6–2.6)
MCHC RBC-ENTMCNC: 31.8 PG — SIGNIFICANT CHANGE UP (ref 27–34)
MCHC RBC-ENTMCNC: 32.9 GM/DL — SIGNIFICANT CHANGE UP (ref 32–36)
MCV RBC AUTO: 96.9 FL — SIGNIFICANT CHANGE UP (ref 80–100)
NRBC # BLD: 0 /100 WBCS — SIGNIFICANT CHANGE UP (ref 0–0)
PCO2 BLDA: 44 MMHG — SIGNIFICANT CHANGE UP (ref 32–46)
PH BLD: 7.49 — HIGH (ref 7.35–7.45)
PHOSPHATE SERPL-MCNC: 2.5 MG/DL — SIGNIFICANT CHANGE UP (ref 2.5–4.5)
PHOSPHATE SERPL-MCNC: 2.8 MG/DL — SIGNIFICANT CHANGE UP (ref 2.5–4.5)
PLATELET # BLD AUTO: 367 K/UL — SIGNIFICANT CHANGE UP (ref 150–400)
PO2 BLDA: 70 MMHG — LOW (ref 74–108)
POTASSIUM SERPL-MCNC: 2.9 MMOL/L — CRITICAL LOW (ref 3.5–5.3)
POTASSIUM SERPL-MCNC: 3.3 MMOL/L — LOW (ref 3.5–5.3)
POTASSIUM SERPL-SCNC: 2.9 MMOL/L — CRITICAL LOW (ref 3.5–5.3)
POTASSIUM SERPL-SCNC: 3.3 MMOL/L — LOW (ref 3.5–5.3)
PROT SERPL-MCNC: 6.1 GM/DL — SIGNIFICANT CHANGE UP (ref 6–8.3)
PROT SERPL-MCNC: 6.7 GM/DL — SIGNIFICANT CHANGE UP (ref 6–8.3)
RBC # BLD: 2.23 M/UL — LOW (ref 4.2–5.8)
RBC # FLD: 15.4 % — HIGH (ref 10.3–14.5)
SAO2 % BLDA: 94 % — SIGNIFICANT CHANGE UP (ref 92–96)
SODIUM SERPL-SCNC: 137 MMOL/L — SIGNIFICANT CHANGE UP (ref 135–145)
SODIUM SERPL-SCNC: 139 MMOL/L — SIGNIFICANT CHANGE UP (ref 135–145)
SPECIMEN SOURCE: SIGNIFICANT CHANGE UP
WBC # BLD: 14.5 K/UL — HIGH (ref 3.8–10.5)
WBC # FLD AUTO: 14.5 K/UL — HIGH (ref 3.8–10.5)

## 2020-09-12 PROCEDURE — 99291 CRITICAL CARE FIRST HOUR: CPT

## 2020-09-12 RX ORDER — ERYTHROMYCIN ETHYLSUCCINATE 400 MG
TABLET ORAL
Refills: 0 | Status: DISCONTINUED | OUTPATIENT
Start: 2020-09-12 | End: 2020-09-16

## 2020-09-12 RX ORDER — DEXTROSE 50 % IN WATER 50 %
25 SYRINGE (ML) INTRAVENOUS ONCE
Refills: 0 | Status: DISCONTINUED | OUTPATIENT
Start: 2020-09-12 | End: 2020-10-18

## 2020-09-12 RX ORDER — GLUCAGON INJECTION, SOLUTION 0.5 MG/.1ML
1 INJECTION, SOLUTION SUBCUTANEOUS ONCE
Refills: 0 | Status: DISCONTINUED | OUTPATIENT
Start: 2020-09-12 | End: 2020-10-26

## 2020-09-12 RX ORDER — INSULIN LISPRO 100/ML
VIAL (ML) SUBCUTANEOUS EVERY 6 HOURS
Refills: 0 | Status: DISCONTINUED | OUTPATIENT
Start: 2020-09-12 | End: 2020-10-18

## 2020-09-12 RX ORDER — POTASSIUM CHLORIDE 20 MEQ
10 PACKET (EA) ORAL
Refills: 0 | Status: COMPLETED | OUTPATIENT
Start: 2020-09-12 | End: 2020-09-12

## 2020-09-12 RX ORDER — SODIUM CHLORIDE 9 MG/ML
1000 INJECTION, SOLUTION INTRAVENOUS
Refills: 0 | Status: DISCONTINUED | OUTPATIENT
Start: 2020-09-12 | End: 2020-10-18

## 2020-09-12 RX ORDER — POTASSIUM PHOSPHATE, MONOBASIC POTASSIUM PHOSPHATE, DIBASIC 236; 224 MG/ML; MG/ML
15 INJECTION, SOLUTION INTRAVENOUS ONCE
Refills: 0 | Status: COMPLETED | OUTPATIENT
Start: 2020-09-12 | End: 2020-09-12

## 2020-09-12 RX ORDER — ERYTHROMYCIN ETHYLSUCCINATE 400 MG
500 TABLET ORAL EVERY 6 HOURS
Refills: 0 | Status: DISCONTINUED | OUTPATIENT
Start: 2020-09-13 | End: 2020-09-16

## 2020-09-12 RX ORDER — ERYTHROMYCIN ETHYLSUCCINATE 400 MG
500 TABLET ORAL ONCE
Refills: 0 | Status: COMPLETED | OUTPATIENT
Start: 2020-09-12 | End: 2020-09-12

## 2020-09-12 RX ORDER — DEXTROSE 50 % IN WATER 50 %
12.5 SYRINGE (ML) INTRAVENOUS ONCE
Refills: 0 | Status: DISCONTINUED | OUTPATIENT
Start: 2020-09-12 | End: 2020-10-18

## 2020-09-12 RX ORDER — MAGNESIUM SULFATE 500 MG/ML
2 VIAL (ML) INJECTION ONCE
Refills: 0 | Status: COMPLETED | OUTPATIENT
Start: 2020-09-12 | End: 2020-09-12

## 2020-09-12 RX ORDER — POTASSIUM CHLORIDE 20 MEQ
40 PACKET (EA) ORAL EVERY 4 HOURS
Refills: 0 | Status: DISCONTINUED | OUTPATIENT
Start: 2020-09-12 | End: 2020-09-12

## 2020-09-12 RX ORDER — DEXTROSE 50 % IN WATER 50 %
15 SYRINGE (ML) INTRAVENOUS ONCE
Refills: 0 | Status: DISCONTINUED | OUTPATIENT
Start: 2020-09-12 | End: 2020-10-18

## 2020-09-12 RX ADMIN — MEROPENEM 100 MILLIGRAM(S): 1 INJECTION INTRAVENOUS at 06:00

## 2020-09-12 RX ADMIN — DEXMEDETOMIDINE HYDROCHLORIDE IN 0.9% SODIUM CHLORIDE 5.36 MICROGRAM(S)/KG/HR: 4 INJECTION INTRAVENOUS at 20:45

## 2020-09-12 RX ADMIN — Medication 10 MILLIGRAM(S): at 17:15

## 2020-09-12 RX ADMIN — LACTULOSE 10 GRAM(S): 10 SOLUTION ORAL at 17:14

## 2020-09-12 RX ADMIN — DEXMEDETOMIDINE HYDROCHLORIDE IN 0.9% SODIUM CHLORIDE 5.36 MICROGRAM(S)/KG/HR: 4 INJECTION INTRAVENOUS at 11:20

## 2020-09-12 RX ADMIN — PANTOPRAZOLE SODIUM 40 MILLIGRAM(S): 20 TABLET, DELAYED RELEASE ORAL at 11:22

## 2020-09-12 RX ADMIN — LACTULOSE 10 GRAM(S): 10 SOLUTION ORAL at 06:00

## 2020-09-12 RX ADMIN — QUETIAPINE FUMARATE 100 MILLIGRAM(S): 200 TABLET, FILM COATED ORAL at 06:00

## 2020-09-12 RX ADMIN — DEXMEDETOMIDINE HYDROCHLORIDE IN 0.9% SODIUM CHLORIDE 5.36 MICROGRAM(S)/KG/HR: 4 INJECTION INTRAVENOUS at 22:40

## 2020-09-12 RX ADMIN — MEROPENEM 100 MILLIGRAM(S): 1 INJECTION INTRAVENOUS at 21:49

## 2020-09-12 RX ADMIN — CHLORHEXIDINE GLUCONATE 1 APPLICATION(S): 213 SOLUTION TOPICAL at 14:04

## 2020-09-12 RX ADMIN — DEXMEDETOMIDINE HYDROCHLORIDE IN 0.9% SODIUM CHLORIDE 5.36 MICROGRAM(S)/KG/HR: 4 INJECTION INTRAVENOUS at 19:00

## 2020-09-12 RX ADMIN — ENOXAPARIN SODIUM 40 MILLIGRAM(S): 100 INJECTION SUBCUTANEOUS at 11:22

## 2020-09-12 RX ADMIN — Medication 100 MILLIGRAM(S): at 11:25

## 2020-09-12 RX ADMIN — Medication 1 TABLET(S): at 11:23

## 2020-09-12 RX ADMIN — QUETIAPINE FUMARATE 100 MILLIGRAM(S): 200 TABLET, FILM COATED ORAL at 17:15

## 2020-09-12 RX ADMIN — MIDODRINE HYDROCHLORIDE 15 MILLIGRAM(S): 2.5 TABLET ORAL at 13:46

## 2020-09-12 RX ADMIN — Medication 10 MILLIGRAM(S): at 00:01

## 2020-09-12 RX ADMIN — METHADONE HYDROCHLORIDE 10 MILLIGRAM(S): 40 TABLET ORAL at 11:23

## 2020-09-12 RX ADMIN — Medication 1 MILLIGRAM(S): at 11:23

## 2020-09-12 RX ADMIN — Medication 250 MILLIGRAM(S): at 17:47

## 2020-09-12 RX ADMIN — Medication 100 MILLIEQUIVALENT(S): at 09:30

## 2020-09-12 RX ADMIN — MIDODRINE HYDROCHLORIDE 15 MILLIGRAM(S): 2.5 TABLET ORAL at 21:49

## 2020-09-12 RX ADMIN — POTASSIUM PHOSPHATE, MONOBASIC POTASSIUM PHOSPHATE, DIBASIC 62.5 MILLIMOLE(S): 236; 224 INJECTION, SOLUTION INTRAVENOUS at 13:57

## 2020-09-12 RX ADMIN — DEXMEDETOMIDINE HYDROCHLORIDE IN 0.9% SODIUM CHLORIDE 5.36 MICROGRAM(S)/KG/HR: 4 INJECTION INTRAVENOUS at 16:15

## 2020-09-12 RX ADMIN — Medication 50 GRAM(S): at 13:46

## 2020-09-12 RX ADMIN — CHLORHEXIDINE GLUCONATE 15 MILLILITER(S): 213 SOLUTION TOPICAL at 17:14

## 2020-09-12 RX ADMIN — METHADONE HYDROCHLORIDE 10 MILLIGRAM(S): 40 TABLET ORAL at 06:00

## 2020-09-12 RX ADMIN — CHLORHEXIDINE GLUCONATE 15 MILLILITER(S): 213 SOLUTION TOPICAL at 06:00

## 2020-09-12 RX ADMIN — Medication 50 GRAM(S): at 06:00

## 2020-09-12 RX ADMIN — MIDODRINE HYDROCHLORIDE 15 MILLIGRAM(S): 2.5 TABLET ORAL at 06:00

## 2020-09-12 RX ADMIN — METHADONE HYDROCHLORIDE 10 MILLIGRAM(S): 40 TABLET ORAL at 00:01

## 2020-09-12 RX ADMIN — LACTULOSE 10 GRAM(S): 10 SOLUTION ORAL at 00:01

## 2020-09-12 RX ADMIN — DEXMEDETOMIDINE HYDROCHLORIDE IN 0.9% SODIUM CHLORIDE 5.36 MICROGRAM(S)/KG/HR: 4 INJECTION INTRAVENOUS at 13:26

## 2020-09-12 RX ADMIN — Medication 100 MILLIEQUIVALENT(S): at 08:00

## 2020-09-12 RX ADMIN — MEROPENEM 100 MILLIGRAM(S): 1 INJECTION INTRAVENOUS at 13:46

## 2020-09-12 RX ADMIN — Medication 100 MILLIEQUIVALENT(S): at 06:00

## 2020-09-12 RX ADMIN — Medication 10 MILLIGRAM(S): at 22:40

## 2020-09-12 RX ADMIN — Medication 10 MILLIGRAM(S): at 11:24

## 2020-09-12 RX ADMIN — Medication 40 MILLIEQUIVALENT(S): at 06:00

## 2020-09-12 RX ADMIN — DEXMEDETOMIDINE HYDROCHLORIDE IN 0.9% SODIUM CHLORIDE 5.36 MICROGRAM(S)/KG/HR: 4 INJECTION INTRAVENOUS at 17:14

## 2020-09-12 RX ADMIN — METHADONE HYDROCHLORIDE 10 MILLIGRAM(S): 40 TABLET ORAL at 17:15

## 2020-09-12 RX ADMIN — LACTULOSE 10 GRAM(S): 10 SOLUTION ORAL at 11:25

## 2020-09-12 NOTE — PROGRESS NOTE ADULT - SUBJECTIVE AND OBJECTIVE BOX
24 hr events:  s/p trach 9/10  overnight with restlessness and agitation while on precedex, propofol was reinitiated and weaned off propofol this morning  leukocytosis improving but still febrile 101, 102.3 yesterday  secretions are improving    ## ROS:  [x] unable to obtain due to mechanical ventilation    ## Labs:  CBC:                        7.1    14.50 )-----------( 367      ( 12 Sep 2020 04:29 )             21.6     Chem:  09-12    139         |  100  |  12  ----------------------------<  115<H>  3.3<L>   |  35<H>  |  0.57    Ca    9.1      12 Sep 2020 12:39  Phos  2.8     09-12  Mg     1.9     09-12    TPro  6.7  /  Alb  1.4<L>  /  TBili  1.9<H>  /  DBili  x   /  AST  146<H>  /  ALT  43  /  AlkPhos  369<H>  09-12    Culture - Blood (09.10.20 @ 21:09)    Specimen Source: .Blood Blood    Culture Results: Growth in aerobic and anaerobic bottles: Klebsiella pneumoniae      Culture - Sputum . (09.11.20 @ 16:29)    Specimen Source: .Sputum Sputum    Culture Results: No growth        ## Imaging:  CXR < from: Xray Chest 1 View- PORTABLE-Urgent (Xray Chest 1 View- PORTABLE-Urgent .) (09.10.20 @ 20:55) >  New tracheostomy. Partial clearing of the lungs.    KUB < from: Xray Kidney Ureter Bladder (09.11.20 @ 12:16) >  PEG tube overlying the stomach. Multiple dilated large and small bowel loops which may represent ileus or obstruction      ## Medications:  erythromycin   IVPB      meropenem  IVPB 1000 milliGRAM(s) IV Intermittent every 8 hours    midodrine 15 milliGRAM(s) Oral every 8 hours    albuterol/ipratropium for Nebulization 3 milliLiter(s) Nebulizer every 6 hours PRN    dextrose 40% Gel 15 Gram(s) Oral once PRN  dextrose 50% Injectable 12.5 Gram(s) IV Push once  dextrose 50% Injectable 25 Gram(s) IV Push once  dextrose 50% Injectable 25 Gram(s) IV Push once  glucagon  Injectable 1 milliGRAM(s) IntraMuscular once PRN  insulin lispro (HumaLOG) corrective regimen sliding scale   SubCutaneous every 6 hours    enoxaparin Injectable 40 milliGRAM(s) SubCutaneous daily    lactulose Syrup 10 Gram(s) Oral four times a day  pantoprazole  Injectable 40 milliGRAM(s) IV Push daily  senna 2 Tablet(s) Oral at bedtime PRN    acetaminophen  Suppository .. 650 milliGRAM(s) Rectal every 6 hours PRN  dexMEDEtomidine Infusion 0.276 MICROgram(s)/kG/Hr IV Continuous <Continuous>  diazepam    Tablet 10 milliGRAM(s) Oral every 6 hours  methadone    Tablet 10 milliGRAM(s) Oral every 6 hours  QUEtiapine 100 milliGRAM(s) Oral two times a day      ## Vitals:  T(C): 37.1 (09-12-20 @ 19:04), Max: 38.6 (09-12-20 @ 15:50)  HR: 91 (09-12-20 @ 22:00) (75 - 142)  BP: 118/81 (09-12-20 @ 22:00) (94/59 - 138/83)  BP(mean): 89 (09-12-20 @ 22:00) (67 - 98)  RR: 16 (09-12-20 @ 22:00) (16 - 30)  SpO2: 96% (09-12-20 @ 22:00) (82% - 100%)    Vent: Mode: AC/ CMV (Assist Control/ Continuous Mandatory Ventilation), RR (machine): 16, RR (patient): 16, TV (machine): 450, FiO2: 40, PEEP: 5, PIP: 27    ABG: ABG - ( 12 Sep 2020 12:54 )  pH, Arterial: x     pH, Blood: 7.49  /  pCO2: 44    /  pO2: 70    / HCO3: 33    / Base Excess: 9.1   /  SaO2: 94                    09-11 @ 07:01  -  09-12 @ 07:00  --------------------------------------------------------  IN: 996.4 mL / OUT: 1100 mL / NET: -103.6 mL    09-12 @ 07:01  -  09-12 @ 22:34  --------------------------------------------------------  IN: 1378.6 mL / OUT: 200 mL / NET: 1178.6 mL          ## P/E:  Gen: lying comfortably in bed in no apparent distress  HEENT: PERRL, EOMI, trach in place  Resp: CTA B/L   CVS: RRR  Abd: soft tympanic hypoactive BS  Ext: bilateral UE and LE edema, left arm midline  Neuro: awake but not following commands    CENTRAL LINE: [ ] YES [x] NO  LOCATION:   DATE INSERTED:  REMOVE: [ ] YES [ ] NO      QUINTEROS: [ ] YES [ ] NO    DATE INSERTED:  REMOVE:  [ ] YES [ ] NO      A-LINE:  [ ] YES [x] NO  LOCATION:   DATE INSERTED:  REMOVE:  [ ] YES [ ] NO  EXPLAIN:    GLOBAL ISSUE/BEST PRACTICE:  Analgesia: as needed  Sedation: precedex  HOB elevation: yes  Stress ulcer prophylaxis: protonix  VTE prophylaxis: lovenox  Oral Care: chlorhexidine   Glycemic control: insulin sliding scale  Nutrition: PEG feeds (on hold - high residual)    CODE STATUS: [x] full code  [ ] DNR  [ ] DNI  [ ] [facility]  Goals of care discussion: [x] yes

## 2020-09-12 NOTE — PROGRESS NOTE ADULT - ASSESSMENT
44M PMH alcohol abuse, hx of alcohol withdrawal, alcohol pancreatitis presents for abdominal pain. Admitted with necrotizing pancreatitis, sepsis, septic shock with course complicated by DTs requiring sedation and small bowel obstruction conservatively managed with NGT and NPO. Now with worsening DTs, acute hypoxic respiratory failure requiring intubation secondary to PNA and development of ARDS with failure to wean. s/p trach and PEG on 9/10. Course now with klebsiella bacteremia    1. NEURO  - sedation has been an issue with underlying agitation  - on methadone and valium   - wean of precedex drip as tolerates   - DTs due to alcohol withdrawal is likely over and agitation now is more due to delirium from being in ICU and with underlying medical illness and infection    2. PULM  - s/p trach  - cont weaning as tolerates   - FIo2 now on 40% and PEEP 5    3. GI  - SBO resolved  - has not had BM in 3 days  - was initiated in PEG feeds however not yet at goal and nurse noted pt with high residuals but also was suctioning mouth with secretions similar in color to peg feeds  - peg feeds currently on hold    4. ID  - s/p course of vanco and meropenem for both PNA and necrotizing pancreatitis  - however had recurrent fevers and leukocytosis for which meropenem was restarted  - blood cx now + klebsiella  - left arm midline d/angela today with underlying bacteremia  - new peripheral IV was obtained today in R forearm  - follow up culture sensitivities of klebsiella  - repeat blood cultures sent today to evaluate for clearance   - observe off antibx for now    5. CV  - off IV pressors, cont with midodrine  - standing lasix d/angela due to metabolic alkalosis/popeye alkalosis    6. RENAL  - metabolic alkalosis likely from aggressive diuresing  - standing lasix d/angela, diurese as needed  - s/p diamox x1 on 9/9  - cont to monitor bicarb levels and blood gas    7. GEN  - full code      Critical Care Time: 35 min       44M PMH alcohol abuse, hx of alcohol withdrawal, alcohol pancreatitis presents for abdominal pain. Admitted with necrotizing pancreatitis, sepsis, septic shock with course complicated by DTs requiring sedation and small bowel obstruction conservatively managed with NGT and NPO. Now with worsening DTs, acute hypoxic respiratory failure requiring intubation secondary to PNA and development of ARDS with failure to wean. s/p trach and PEG on 9/10. Course now with klebsiella bacteremia    1. NEURO  - sedation has been an issue with underlying agitation  - on methadone and valium   - wean of precedex drip as tolerates   - DTs due to alcohol withdrawal is likely over and agitation now is more due to delirium from being in ICU and with underlying medical illness and infection    2. PULM  - s/p trach  - cont weaning as tolerates   - FIo2 now on 40% and PEEP 5    3. GI  - SBO resolved  - has not had BM in 3 days  - was initiated in PEG feeds however not yet at goal and nurse noted pt with high residuals but also was suctioning mouth with secretions similar in color to peg feeds  - peg feeds currently on hold    4. ID  - s/p course of vanco and meropenem for both PNA and necrotizing pancreatitis  - however had recurrent fevers and leukocytosis for which meropenem was restarted  - blood cx now + klebsiella  - left arm midline d/angela today with underlying bacteremia  - new peripheral IV was obtained today in R forearm  - follow up culture sensitivities of klebsiella  - repeat blood cultures sent today to evaluate for clearance       5. CV  - off IV pressors, cont with midodrine  - standing lasix d/angela due to metabolic alkalosis/popeye alkalosis    6. RENAL  - metabolic alkalosis likely from aggressive diuresing  - standing lasix d/angela, diurese as needed  - s/p diamox x1 on 9/9  - cont to monitor bicarb levels and blood gas    7. GEN  - full code      Critical Care Time: 35 min

## 2020-09-13 LAB
-  AMIKACIN: SIGNIFICANT CHANGE UP
-  AMPICILLIN/SULBACTAM: SIGNIFICANT CHANGE UP
-  AMPICILLIN: SIGNIFICANT CHANGE UP
-  AZTREONAM: SIGNIFICANT CHANGE UP
-  CEFAZOLIN: SIGNIFICANT CHANGE UP
-  CEFEPIME: SIGNIFICANT CHANGE UP
-  CEFOXITIN: SIGNIFICANT CHANGE UP
-  CEFTRIAXONE: SIGNIFICANT CHANGE UP
-  CIPROFLOXACIN: SIGNIFICANT CHANGE UP
-  ERTAPENEM: SIGNIFICANT CHANGE UP
-  GENTAMICIN: SIGNIFICANT CHANGE UP
-  IMIPENEM: SIGNIFICANT CHANGE UP
-  LEVOFLOXACIN: SIGNIFICANT CHANGE UP
-  MEROPENEM: SIGNIFICANT CHANGE UP
-  PIPERACILLIN/TAZOBACTAM: SIGNIFICANT CHANGE UP
-  TOBRAMYCIN: SIGNIFICANT CHANGE UP
-  TRIMETHOPRIM/SULFAMETHOXAZOLE: SIGNIFICANT CHANGE UP
ALBUMIN SERPL ELPH-MCNC: 1.2 G/DL — LOW (ref 3.3–5)
ALP SERPL-CCNC: 325 U/L — HIGH (ref 40–120)
ALT FLD-CCNC: 41 U/L — SIGNIFICANT CHANGE UP (ref 12–78)
ANION GAP SERPL CALC-SCNC: 5 MMOL/L — SIGNIFICANT CHANGE UP (ref 5–17)
AST SERPL-CCNC: 129 U/L — HIGH (ref 15–37)
BASOPHILS # BLD AUTO: 0.04 K/UL — SIGNIFICANT CHANGE UP (ref 0–0.2)
BASOPHILS NFR BLD AUTO: 0.3 % — SIGNIFICANT CHANGE UP (ref 0–2)
BILIRUB SERPL-MCNC: 1.7 MG/DL — HIGH (ref 0.2–1.2)
BUN SERPL-MCNC: 9 MG/DL — SIGNIFICANT CHANGE UP (ref 7–23)
CALCIUM SERPL-MCNC: 8.5 MG/DL — SIGNIFICANT CHANGE UP (ref 8.5–10.1)
CHLORIDE SERPL-SCNC: 100 MMOL/L — SIGNIFICANT CHANGE UP (ref 96–108)
CO2 SERPL-SCNC: 31 MMOL/L — SIGNIFICANT CHANGE UP (ref 22–31)
CREAT SERPL-MCNC: 0.33 MG/DL — LOW (ref 0.5–1.3)
CULTURE RESULTS: SIGNIFICANT CHANGE UP
EOSINOPHIL # BLD AUTO: 0.46 K/UL — SIGNIFICANT CHANGE UP (ref 0–0.5)
EOSINOPHIL NFR BLD AUTO: 3.5 % — SIGNIFICANT CHANGE UP (ref 0–6)
GLUCOSE BLDC GLUCOMTR-MCNC: 103 MG/DL — HIGH (ref 70–99)
GLUCOSE BLDC GLUCOMTR-MCNC: 115 MG/DL — HIGH (ref 70–99)
GLUCOSE BLDC GLUCOMTR-MCNC: 98 MG/DL — SIGNIFICANT CHANGE UP (ref 70–99)
GLUCOSE SERPL-MCNC: 113 MG/DL — HIGH (ref 70–99)
GRAM STN FLD: SIGNIFICANT CHANGE UP
HCT VFR BLD CALC: 22.8 % — LOW (ref 39–50)
HGB BLD-MCNC: 7.3 G/DL — LOW (ref 13–17)
IMM GRANULOCYTES NFR BLD AUTO: 0.7 % — SIGNIFICANT CHANGE UP (ref 0–1.5)
LYMPHOCYTES # BLD AUTO: 1.64 K/UL — SIGNIFICANT CHANGE UP (ref 1–3.3)
LYMPHOCYTES # BLD AUTO: 12.6 % — LOW (ref 13–44)
MAGNESIUM SERPL-MCNC: 1.8 MG/DL — SIGNIFICANT CHANGE UP (ref 1.6–2.6)
MCHC RBC-ENTMCNC: 30.9 PG — SIGNIFICANT CHANGE UP (ref 27–34)
MCHC RBC-ENTMCNC: 32 GM/DL — SIGNIFICANT CHANGE UP (ref 32–36)
MCV RBC AUTO: 96.6 FL — SIGNIFICANT CHANGE UP (ref 80–100)
METHOD TYPE: SIGNIFICANT CHANGE UP
MONOCYTES # BLD AUTO: 1.5 K/UL — HIGH (ref 0–0.9)
MONOCYTES NFR BLD AUTO: 11.6 % — SIGNIFICANT CHANGE UP (ref 2–14)
NEUTROPHILS # BLD AUTO: 9.24 K/UL — HIGH (ref 1.8–7.4)
NEUTROPHILS NFR BLD AUTO: 71.3 % — SIGNIFICANT CHANGE UP (ref 43–77)
NRBC # BLD: 0 /100 WBCS — SIGNIFICANT CHANGE UP (ref 0–0)
ORGANISM # SPEC MICROSCOPIC CNT: SIGNIFICANT CHANGE UP
PHOSPHATE SERPL-MCNC: 3 MG/DL — SIGNIFICANT CHANGE UP (ref 2.5–4.5)
PLATELET # BLD AUTO: 350 K/UL — SIGNIFICANT CHANGE UP (ref 150–400)
POTASSIUM SERPL-MCNC: 3.3 MMOL/L — LOW (ref 3.5–5.3)
POTASSIUM SERPL-SCNC: 3.3 MMOL/L — LOW (ref 3.5–5.3)
PROT SERPL-MCNC: 6.1 GM/DL — SIGNIFICANT CHANGE UP (ref 6–8.3)
RBC # BLD: 2.36 M/UL — LOW (ref 4.2–5.8)
RBC # FLD: 15.4 % — HIGH (ref 10.3–14.5)
SODIUM SERPL-SCNC: 136 MMOL/L — SIGNIFICANT CHANGE UP (ref 135–145)
SPECIMEN SOURCE: SIGNIFICANT CHANGE UP
WBC # BLD: 12.97 K/UL — HIGH (ref 3.8–10.5)
WBC # FLD AUTO: 12.97 K/UL — HIGH (ref 3.8–10.5)

## 2020-09-13 PROCEDURE — 74018 RADEX ABDOMEN 1 VIEW: CPT | Mod: 26

## 2020-09-13 PROCEDURE — 99291 CRITICAL CARE FIRST HOUR: CPT

## 2020-09-13 RX ORDER — MIDAZOLAM HYDROCHLORIDE 1 MG/ML
2 INJECTION, SOLUTION INTRAMUSCULAR; INTRAVENOUS ONCE
Refills: 0 | Status: DISCONTINUED | OUTPATIENT
Start: 2020-09-13 | End: 2020-09-13

## 2020-09-13 RX ORDER — POTASSIUM CHLORIDE 20 MEQ
40 PACKET (EA) ORAL ONCE
Refills: 0 | Status: COMPLETED | OUTPATIENT
Start: 2020-09-13 | End: 2020-09-13

## 2020-09-13 RX ORDER — SODIUM CHLORIDE 9 MG/ML
1000 INJECTION, SOLUTION INTRAVENOUS ONCE
Refills: 0 | Status: COMPLETED | OUTPATIENT
Start: 2020-09-13 | End: 2020-09-13

## 2020-09-13 RX ORDER — CEFTRIAXONE 500 MG/1
1000 INJECTION, POWDER, FOR SOLUTION INTRAMUSCULAR; INTRAVENOUS EVERY 24 HOURS
Refills: 0 | Status: COMPLETED | OUTPATIENT
Start: 2020-09-14 | End: 2020-09-20

## 2020-09-13 RX ORDER — METOPROLOL TARTRATE 50 MG
5 TABLET ORAL ONCE
Refills: 0 | Status: COMPLETED | OUTPATIENT
Start: 2020-09-13 | End: 2020-09-13

## 2020-09-13 RX ORDER — CEFTRIAXONE 500 MG/1
1000 INJECTION, POWDER, FOR SOLUTION INTRAMUSCULAR; INTRAVENOUS EVERY 24 HOURS
Refills: 0 | Status: DISCONTINUED | OUTPATIENT
Start: 2020-09-13 | End: 2020-09-13

## 2020-09-13 RX ADMIN — DEXMEDETOMIDINE HYDROCHLORIDE IN 0.9% SODIUM CHLORIDE 5.36 MICROGRAM(S)/KG/HR: 4 INJECTION INTRAVENOUS at 07:39

## 2020-09-13 RX ADMIN — SODIUM CHLORIDE 1000 MILLILITER(S): 9 INJECTION, SOLUTION INTRAVENOUS at 21:01

## 2020-09-13 RX ADMIN — Medication 1 MILLIGRAM(S): at 11:35

## 2020-09-13 RX ADMIN — Medication 5 MILLIGRAM(S): at 21:01

## 2020-09-13 RX ADMIN — MEROPENEM 100 MILLIGRAM(S): 1 INJECTION INTRAVENOUS at 05:41

## 2020-09-13 RX ADMIN — LACTULOSE 10 GRAM(S): 10 SOLUTION ORAL at 05:50

## 2020-09-13 RX ADMIN — Medication 10 MILLIGRAM(S): at 11:35

## 2020-09-13 RX ADMIN — DEXMEDETOMIDINE HYDROCHLORIDE IN 0.9% SODIUM CHLORIDE 5.36 MICROGRAM(S)/KG/HR: 4 INJECTION INTRAVENOUS at 12:22

## 2020-09-13 RX ADMIN — ENOXAPARIN SODIUM 40 MILLIGRAM(S): 100 INJECTION SUBCUTANEOUS at 11:34

## 2020-09-13 RX ADMIN — Medication 250 MILLIGRAM(S): at 05:49

## 2020-09-13 RX ADMIN — MIDODRINE HYDROCHLORIDE 15 MILLIGRAM(S): 2.5 TABLET ORAL at 05:40

## 2020-09-13 RX ADMIN — Medication 1 TABLET(S): at 11:35

## 2020-09-13 RX ADMIN — MIDODRINE HYDROCHLORIDE 15 MILLIGRAM(S): 2.5 TABLET ORAL at 21:45

## 2020-09-13 RX ADMIN — CHLORHEXIDINE GLUCONATE 15 MILLILITER(S): 213 SOLUTION TOPICAL at 17:09

## 2020-09-13 RX ADMIN — Medication 10 MILLIGRAM(S): at 17:11

## 2020-09-13 RX ADMIN — LACTULOSE 10 GRAM(S): 10 SOLUTION ORAL at 17:09

## 2020-09-13 RX ADMIN — LACTULOSE 10 GRAM(S): 10 SOLUTION ORAL at 11:34

## 2020-09-13 RX ADMIN — QUETIAPINE FUMARATE 100 MILLIGRAM(S): 200 TABLET, FILM COATED ORAL at 05:48

## 2020-09-13 RX ADMIN — DEXMEDETOMIDINE HYDROCHLORIDE IN 0.9% SODIUM CHLORIDE 5.36 MICROGRAM(S)/KG/HR: 4 INJECTION INTRAVENOUS at 21:45

## 2020-09-13 RX ADMIN — LACTULOSE 10 GRAM(S): 10 SOLUTION ORAL at 00:35

## 2020-09-13 RX ADMIN — DEXMEDETOMIDINE HYDROCHLORIDE IN 0.9% SODIUM CHLORIDE 5.36 MICROGRAM(S)/KG/HR: 4 INJECTION INTRAVENOUS at 08:54

## 2020-09-13 RX ADMIN — METHADONE HYDROCHLORIDE 10 MILLIGRAM(S): 40 TABLET ORAL at 00:35

## 2020-09-13 RX ADMIN — Medication 250 MILLIGRAM(S): at 17:10

## 2020-09-13 RX ADMIN — Medication 10 MILLIGRAM(S): at 05:40

## 2020-09-13 RX ADMIN — PANTOPRAZOLE SODIUM 40 MILLIGRAM(S): 20 TABLET, DELAYED RELEASE ORAL at 11:34

## 2020-09-13 RX ADMIN — METHADONE HYDROCHLORIDE 10 MILLIGRAM(S): 40 TABLET ORAL at 11:35

## 2020-09-13 RX ADMIN — Medication 250 MILLIGRAM(S): at 00:35

## 2020-09-13 RX ADMIN — MIDAZOLAM HYDROCHLORIDE 2 MILLIGRAM(S): 1 INJECTION, SOLUTION INTRAMUSCULAR; INTRAVENOUS at 21:01

## 2020-09-13 RX ADMIN — DEXMEDETOMIDINE HYDROCHLORIDE IN 0.9% SODIUM CHLORIDE 5.36 MICROGRAM(S)/KG/HR: 4 INJECTION INTRAVENOUS at 05:39

## 2020-09-13 RX ADMIN — DEXMEDETOMIDINE HYDROCHLORIDE IN 0.9% SODIUM CHLORIDE 5.36 MICROGRAM(S)/KG/HR: 4 INJECTION INTRAVENOUS at 00:41

## 2020-09-13 RX ADMIN — QUETIAPINE FUMARATE 100 MILLIGRAM(S): 200 TABLET, FILM COATED ORAL at 17:41

## 2020-09-13 RX ADMIN — METHADONE HYDROCHLORIDE 10 MILLIGRAM(S): 40 TABLET ORAL at 05:40

## 2020-09-13 RX ADMIN — Medication 250 MILLIGRAM(S): at 12:22

## 2020-09-13 RX ADMIN — CHLORHEXIDINE GLUCONATE 1 APPLICATION(S): 213 SOLUTION TOPICAL at 06:02

## 2020-09-13 RX ADMIN — DEXMEDETOMIDINE HYDROCHLORIDE IN 0.9% SODIUM CHLORIDE 5.36 MICROGRAM(S)/KG/HR: 4 INJECTION INTRAVENOUS at 10:29

## 2020-09-13 RX ADMIN — DEXMEDETOMIDINE HYDROCHLORIDE IN 0.9% SODIUM CHLORIDE 5.36 MICROGRAM(S)/KG/HR: 4 INJECTION INTRAVENOUS at 03:24

## 2020-09-13 RX ADMIN — CHLORHEXIDINE GLUCONATE 15 MILLILITER(S): 213 SOLUTION TOPICAL at 05:39

## 2020-09-13 RX ADMIN — Medication 100 MILLIGRAM(S): at 12:27

## 2020-09-13 RX ADMIN — MIDODRINE HYDROCHLORIDE 15 MILLIGRAM(S): 2.5 TABLET ORAL at 14:26

## 2020-09-13 RX ADMIN — Medication 40 MILLIEQUIVALENT(S): at 05:40

## 2020-09-13 NOTE — PROGRESS NOTE ADULT - ASSESSMENT
44M PMH alcohol abuse, hx of alcohol withdrawal, alcohol pancreatitis presents for abdominal pain. Admitted with necrotizing pancreatitis, sepsis, septic shock with course complicated by DTs requiring sedation and small bowel obstruction conservatively managed with NGT and NPO. Now with worsening DTs, acute hypoxic respiratory failure requiring intubation secondary to PNA and development of ARDS with failure to wean. s/p trach and PEG on 9/10. Course with klebsiella bacteremia    1. NEURO  - sedation has been an issue with underlying agitation  - on methadone and valium   - wean off precedex drip as tolerates , will increase methadone to 15mg from 10mg  - DTs due to alcohol withdrawal is over and agitation now is more due to delirium from being in ICU and with underlying medical illness and infection    2. PULM  - s/p trach 9/10 POD #3  - cont weaning as tolerates   - FIo2 now on 40% and PEEP 5    3. GI  - SBO resolved  - had a small BM overnight  - was initiated on PEG feeds however with high residuals and with suctioning of mouth with secretions similar in color to peg feeds, peg feeds had been on hold, will reattempt with trickle feeds at 20cc/hr and monitor for residuals  - cont erythromycin for GI motility     4. ID  - s/p course of vanco and meropenem for both PNA and necrotizing pancreatitis  - however had recurrent fevers and leukocytosis for which meropenem was restarted  - blood cx now + klebsiella  - left arm midline d/angela yesterday 9/12 with underlying bacteremia  - new peripheral IV was obtained in R forearm  - will de-escalate antibx from meropenem to ceftriaxone as klebsiella is sensitive to it  - follow up repeat blood cx sent yesterday 9/12 to evaluate for clearance       5. CV  - off IV pressors, cont with midodrine  - standing lasix d/angela due to metabolic alkalosis/popeye alkalosis       6. RENAL  - metabolic alkalosis likely from aggressive diuresing  - standing lasix d/angela, diurese as needed  - s/p diamox x1 on 9/9  - cont to monitor bicarb levels and blood gas  - supplement hypokalemia    7. GEN  - full code  - remains in ICU while on sedative drips      Critical Care Time: 35 min

## 2020-09-13 NOTE — PROGRESS NOTE ADULT - SUBJECTIVE AND OBJECTIVE BOX
24 hr events:  left arm midline d/angela yesterday  fever curve improving  WBC trending down  restless overnight pulling at lines: precedex continued  had small BM overnight  PEG feeds on hold as pt had high residuals on 50cc PEG feeds with suctioning of tube feeds from mouth noted as well    ## ROS:  [x] unable to obtain due to mechanical ventilation    ## Labs:  CBC:                        7.3    12.97 )-----------( 350      ( 13 Sep 2020 03:33 )             22.8     Chem:  09-13    136  |  100  |  9   ----------------------------<  113<H>  3.3<L>   |  31  |  0.33<L>    Ca    8.5      13 Sep 2020 03:33  Phos  3.0     09-13  Mg     1.8     09-13    TPro  6.1  /  Alb  1.2<L>  /  TBili  1.7<H>  /  DBili  x   /  AST  129<H>  /  ALT  41  /  AlkPhos  325<H>  09-13    Culture - Urine (09.12.20 @ 00:47)    Specimen Source: .Urine Catheterized    Culture Results: No growth    Culture - Sputum . (09.11.20 @ 16:29)    Specimen Source: .Sputum Sputum    Culture Results: No growth at 48 hours      Culture - Blood (09.10.20 @ 20:56)    -  Klebsiella pneumoniae: Detec    Specimen Source: .Blood Blood    Organism: Blood Culture PCR    Organism: Klebsiella pneumoniae    Culture Results: Growth in aerobic bottle: Klebsiella pneumoniae        ## Imaging:  KUB < from: Xray Kidney Ureter Bladder (09.13.20 @ 08:00) >  Nonobstructive bowel gas pattern      ## Medications:  erythromycin   IVPB        midodrine 15 milliGRAM(s) Oral every 8 hours    albuterol/ipratropium for Nebulization 3 milliLiter(s) Nebulizer every 6 hours PRN    dextrose 40% Gel 15 Gram(s) Oral once PRN  dextrose 50% Injectable 12.5 Gram(s) IV Push once  dextrose 50% Injectable 25 Gram(s) IV Push once  dextrose 50% Injectable 25 Gram(s) IV Push once  glucagon  Injectable 1 milliGRAM(s) IntraMuscular once PRN  insulin lispro (HumaLOG) corrective regimen sliding scale   SubCutaneous every 6 hours    enoxaparin Injectable 40 milliGRAM(s) SubCutaneous daily    lactulose Syrup 10 Gram(s) Oral four times a day  pantoprazole  Injectable 40 milliGRAM(s) IV Push daily  senna 2 Tablet(s) Oral at bedtime PRN    acetaminophen  Suppository .. 650 milliGRAM(s) Rectal every 6 hours PRN  dexMEDEtomidine Infusion 0.276 MICROgram(s)/kG/Hr IV Continuous <Continuous>  diazepam    Tablet 10 milliGRAM(s) Oral every 6 hours  methadone    Tablet 15 milliGRAM(s) Oral every 6 hours  QUEtiapine 100 milliGRAM(s) Oral two times a day      ## Vitals:  T(C): 37.6 (09-13-20 @ 19:32), Max: 37.6 (09-13-20 @ 19:32)  HR: 97 (09-13-20 @ 21:30) (66 - 171)  BP: 94/49 (09-13-20 @ 21:30) (88/51 - 133/54)  BP(mean): 59 (09-13-20 @ 21:30) (58 - 99)  RR: 16 (09-13-20 @ 21:30) (11 - 24)  SpO2: 96% (09-13-20 @ 21:30) (88% - 100%)    Vent: Mode: Auto Mode: PRVC/ Volume Support, RR (machine): 16, RR (patient): 20, TV (machine): 450, FiO2: 40, PEEP: 5, PIP: 19  ABG: ABG - ( 12 Sep 2020 12:54 )  pH, Arterial: x     pH, Blood: 7.49  /  pCO2: 44    /  pO2: 70    / HCO3: 33    / Base Excess: 9.1   /  SaO2: 94            09-12 @ 07:01  -  09-13 @ 07:00  --------------------------------------------------------  IN: 1681.6 mL / OUT: 200 mL / NET: 1481.6 mL    09-13 @ 07:01  -  09-13 @ 22:10  --------------------------------------------------------  IN: 1927.8 mL / OUT: 250 mL / NET: 1677.8 mL          ## P/E:  Gen: lying comfortably in bed in no apparent distress  HEENT: PERRL, EOMI, trach in place with sutures  Resp: CTA B/L  CVS: RRR  Abd: soft tympanic +BS, non tender  Ext: anasarca, bilateral upper and lower extremity edema  Neuro: arousable, follows simple commands    CENTRAL LINE: [ ] YES [x] NO  LOCATION:   DATE INSERTED:  REMOVE: [ ] YES [ ] NO      QUINTEROS: [ ] YES [x] NO    DATE INSERTED:  REMOVE:  [ ] YES [ ] NO  - passed TOV yesterday    A-LINE:  [ ] YES [x] NO  LOCATION:   DATE INSERTED:  REMOVE:  [ ] YES [ ] NO  EXPLAIN:    GLOBAL ISSUE/BEST PRACTICE:  Analgesia: n/a  Sedation: precedex  HOB elevation: yes  Stress ulcer prophylaxis: Protonix  VTE prophylaxis: lovenox  Oral Care: n/a  Glycemic control: sliding scale coverage  Nutrition: npo, reattempt peg feeds with trickle feeds    CODE STATUS: [x] full code  [ ] DNR  [ ] DNI  [ ] [facility]  Goals of care discussion: [x] yes

## 2020-09-14 LAB
ALBUMIN SERPL ELPH-MCNC: 1.2 G/DL — LOW (ref 3.3–5)
ALP SERPL-CCNC: 355 U/L — HIGH (ref 40–120)
ALT FLD-CCNC: 39 U/L — SIGNIFICANT CHANGE UP (ref 12–78)
ANION GAP SERPL CALC-SCNC: 6 MMOL/L — SIGNIFICANT CHANGE UP (ref 5–17)
AST SERPL-CCNC: 113 U/L — HIGH (ref 15–37)
BILIRUB SERPL-MCNC: 1.6 MG/DL — HIGH (ref 0.2–1.2)
BUN SERPL-MCNC: 8 MG/DL — SIGNIFICANT CHANGE UP (ref 7–23)
CALCIUM SERPL-MCNC: 8.5 MG/DL — SIGNIFICANT CHANGE UP (ref 8.5–10.1)
CHLORIDE SERPL-SCNC: 102 MMOL/L — SIGNIFICANT CHANGE UP (ref 96–108)
CO2 SERPL-SCNC: 30 MMOL/L — SIGNIFICANT CHANGE UP (ref 22–31)
CREAT SERPL-MCNC: 0.34 MG/DL — LOW (ref 0.5–1.3)
GLUCOSE BLDC GLUCOMTR-MCNC: 105 MG/DL — HIGH (ref 70–99)
GLUCOSE BLDC GLUCOMTR-MCNC: 108 MG/DL — HIGH (ref 70–99)
GLUCOSE BLDC GLUCOMTR-MCNC: 120 MG/DL — HIGH (ref 70–99)
GLUCOSE BLDC GLUCOMTR-MCNC: 121 MG/DL — HIGH (ref 70–99)
GLUCOSE SERPL-MCNC: 116 MG/DL — HIGH (ref 70–99)
HCT VFR BLD CALC: 22 % — LOW (ref 39–50)
HGB BLD-MCNC: 7.2 G/DL — LOW (ref 13–17)
MAGNESIUM SERPL-MCNC: 1.6 MG/DL — SIGNIFICANT CHANGE UP (ref 1.6–2.6)
MCHC RBC-ENTMCNC: 31.6 PG — SIGNIFICANT CHANGE UP (ref 27–34)
MCHC RBC-ENTMCNC: 32.7 GM/DL — SIGNIFICANT CHANGE UP (ref 32–36)
MCV RBC AUTO: 96.5 FL — SIGNIFICANT CHANGE UP (ref 80–100)
NRBC # BLD: 0 /100 WBCS — SIGNIFICANT CHANGE UP (ref 0–0)
PHOSPHATE SERPL-MCNC: 3.6 MG/DL — SIGNIFICANT CHANGE UP (ref 2.5–4.5)
PLATELET # BLD AUTO: 347 K/UL — SIGNIFICANT CHANGE UP (ref 150–400)
POTASSIUM SERPL-MCNC: 3.5 MMOL/L — SIGNIFICANT CHANGE UP (ref 3.5–5.3)
POTASSIUM SERPL-SCNC: 3.5 MMOL/L — SIGNIFICANT CHANGE UP (ref 3.5–5.3)
PROT SERPL-MCNC: 5.9 GM/DL — LOW (ref 6–8.3)
RBC # BLD: 2.28 M/UL — LOW (ref 4.2–5.8)
RBC # FLD: 15.4 % — HIGH (ref 10.3–14.5)
SODIUM SERPL-SCNC: 138 MMOL/L — SIGNIFICANT CHANGE UP (ref 135–145)
WBC # BLD: 11.28 K/UL — HIGH (ref 3.8–10.5)
WBC # FLD AUTO: 11.28 K/UL — HIGH (ref 3.8–10.5)

## 2020-09-14 PROCEDURE — 99291 CRITICAL CARE FIRST HOUR: CPT

## 2020-09-14 RX ORDER — MAGNESIUM SULFATE 500 MG/ML
2 VIAL (ML) INJECTION ONCE
Refills: 0 | Status: COMPLETED | OUTPATIENT
Start: 2020-09-14 | End: 2020-09-14

## 2020-09-14 RX ORDER — POTASSIUM CHLORIDE 20 MEQ
40 PACKET (EA) ORAL ONCE
Refills: 0 | Status: COMPLETED | OUTPATIENT
Start: 2020-09-14 | End: 2020-09-14

## 2020-09-14 RX ORDER — DIAZEPAM 5 MG
15 TABLET ORAL EVERY 6 HOURS
Refills: 0 | Status: DISCONTINUED | OUTPATIENT
Start: 2020-09-14 | End: 2020-09-15

## 2020-09-14 RX ADMIN — DEXMEDETOMIDINE HYDROCHLORIDE IN 0.9% SODIUM CHLORIDE 5.36 MICROGRAM(S)/KG/HR: 4 INJECTION INTRAVENOUS at 09:35

## 2020-09-14 RX ADMIN — CHLORHEXIDINE GLUCONATE 15 MILLILITER(S): 213 SOLUTION TOPICAL at 18:15

## 2020-09-14 RX ADMIN — MIDODRINE HYDROCHLORIDE 15 MILLIGRAM(S): 2.5 TABLET ORAL at 21:39

## 2020-09-14 RX ADMIN — Medication 250 MILLIGRAM(S): at 01:34

## 2020-09-14 RX ADMIN — DEXMEDETOMIDINE HYDROCHLORIDE IN 0.9% SODIUM CHLORIDE 5.36 MICROGRAM(S)/KG/HR: 4 INJECTION INTRAVENOUS at 17:15

## 2020-09-14 RX ADMIN — Medication 250 MILLIGRAM(S): at 13:00

## 2020-09-14 RX ADMIN — MIDODRINE HYDROCHLORIDE 15 MILLIGRAM(S): 2.5 TABLET ORAL at 13:13

## 2020-09-14 RX ADMIN — LACTULOSE 10 GRAM(S): 10 SOLUTION ORAL at 00:29

## 2020-09-14 RX ADMIN — DEXMEDETOMIDINE HYDROCHLORIDE IN 0.9% SODIUM CHLORIDE 5.36 MICROGRAM(S)/KG/HR: 4 INJECTION INTRAVENOUS at 07:57

## 2020-09-14 RX ADMIN — LACTULOSE 10 GRAM(S): 10 SOLUTION ORAL at 05:34

## 2020-09-14 RX ADMIN — Medication 50 GRAM(S): at 05:35

## 2020-09-14 RX ADMIN — Medication 1 MILLIGRAM(S): at 13:00

## 2020-09-14 RX ADMIN — Medication 100 MILLIGRAM(S): at 13:02

## 2020-09-14 RX ADMIN — LACTULOSE 10 GRAM(S): 10 SOLUTION ORAL at 13:00

## 2020-09-14 RX ADMIN — MIDODRINE HYDROCHLORIDE 15 MILLIGRAM(S): 2.5 TABLET ORAL at 05:35

## 2020-09-14 RX ADMIN — LACTULOSE 10 GRAM(S): 10 SOLUTION ORAL at 18:15

## 2020-09-14 RX ADMIN — CHLORHEXIDINE GLUCONATE 15 MILLILITER(S): 213 SOLUTION TOPICAL at 05:34

## 2020-09-14 RX ADMIN — QUETIAPINE FUMARATE 100 MILLIGRAM(S): 200 TABLET, FILM COATED ORAL at 05:35

## 2020-09-14 RX ADMIN — CEFTRIAXONE 100 MILLIGRAM(S): 500 INJECTION, POWDER, FOR SOLUTION INTRAMUSCULAR; INTRAVENOUS at 00:30

## 2020-09-14 RX ADMIN — DEXMEDETOMIDINE HYDROCHLORIDE IN 0.9% SODIUM CHLORIDE 5.36 MICROGRAM(S)/KG/HR: 4 INJECTION INTRAVENOUS at 13:25

## 2020-09-14 RX ADMIN — Medication 250 MILLIGRAM(S): at 19:18

## 2020-09-14 RX ADMIN — Medication 10 MILLIGRAM(S): at 05:35

## 2020-09-14 RX ADMIN — Medication 10 MILLIGRAM(S): at 18:15

## 2020-09-14 RX ADMIN — DEXMEDETOMIDINE HYDROCHLORIDE IN 0.9% SODIUM CHLORIDE 5.36 MICROGRAM(S)/KG/HR: 4 INJECTION INTRAVENOUS at 00:30

## 2020-09-14 RX ADMIN — Medication 1 TABLET(S): at 13:03

## 2020-09-14 RX ADMIN — Medication 10 MILLIGRAM(S): at 00:29

## 2020-09-14 RX ADMIN — QUETIAPINE FUMARATE 100 MILLIGRAM(S): 200 TABLET, FILM COATED ORAL at 19:18

## 2020-09-14 RX ADMIN — ENOXAPARIN SODIUM 40 MILLIGRAM(S): 100 INJECTION SUBCUTANEOUS at 12:59

## 2020-09-14 RX ADMIN — PANTOPRAZOLE SODIUM 40 MILLIGRAM(S): 20 TABLET, DELAYED RELEASE ORAL at 13:02

## 2020-09-14 RX ADMIN — DEXMEDETOMIDINE HYDROCHLORIDE IN 0.9% SODIUM CHLORIDE 5.36 MICROGRAM(S)/KG/HR: 4 INJECTION INTRAVENOUS at 04:46

## 2020-09-14 RX ADMIN — Medication 40 MILLIEQUIVALENT(S): at 05:35

## 2020-09-14 RX ADMIN — Medication 10 MILLIGRAM(S): at 12:59

## 2020-09-14 RX ADMIN — Medication 15 MILLIGRAM(S): at 20:37

## 2020-09-14 RX ADMIN — DEXMEDETOMIDINE HYDROCHLORIDE IN 0.9% SODIUM CHLORIDE 5.36 MICROGRAM(S)/KG/HR: 4 INJECTION INTRAVENOUS at 21:19

## 2020-09-14 RX ADMIN — DEXMEDETOMIDINE HYDROCHLORIDE IN 0.9% SODIUM CHLORIDE 5.36 MICROGRAM(S)/KG/HR: 4 INJECTION INTRAVENOUS at 05:36

## 2020-09-14 RX ADMIN — Medication 250 MILLIGRAM(S): at 05:34

## 2020-09-14 RX ADMIN — CHLORHEXIDINE GLUCONATE 1 APPLICATION(S): 213 SOLUTION TOPICAL at 05:37

## 2020-09-14 NOTE — PHYSICAL THERAPY INITIAL EVALUATION ADULT - PERTINENT HX OF CURRENT PROBLEM, REHAB EVAL
Wound Care Consult:  Chart reviewed and patient assessed for his left foot wound. The wound is chronic and painful for the patient. He is on chronic pain meds for it. Pt. 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 The wound was recently debrided (?) although patient does not remember anything being done to the foot except for wound care each day. Pt. Was pre-medicated for anticipatory pain by the unit nurse and then topical lidocaine was applied to the wound. Assessment of the left foot wound: the wound bed is pink with a slight amount of slough. The periwound skin has mild edema and excoriation but it is much less red than I've seen it since his admission. The skin between the toes in mildly wet due to the drainage but otherwise clear. Wound Foot Left;Mid (Active)   DRESSING STATUS Removed 3/16/2018 12:14 PM   DRESSING TYPE Moist to moist 3/16/2018 12:14 PM   Non-Pressure Injury Full thickness (subcut/muscle) 3/16/2018 12:14 PM   Wound Length (cm) 2.5 cm 3/16/2018 12:14 PM   Wound Width (cm) 2.4 cm 3/16/2018 12:14 PM   Wound Depth (cm) 0.2 3/16/2018 12:14 PM   Wound Surface area (cm^2) 6 cm^2 3/16/2018 12:14 PM   Condition of Base Mallory 3/16/2018 12:14 PM   Condition of Edges Rolled/curled 3/16/2018 12:14 PM   Epithelialization (%) 0 3/16/2018 12:14 PM   Tissue Type Pink;Yellow 3/16/2018 12:14 PM   Tissue Type Percent Pink 95 3/16/2018 12:14 PM   Tissue Type Percent Yellow 5 3/16/2018 12:14 PM   Drainage Amount  Small  3/16/2018 12:14 PM   Drainage Color Yellow 3/16/2018 12:14 PM   Wound Odor None 3/16/2018 12:14 PM   Periwound Skin Condition Edematous; Excoriated 3/16/2018 12:14 PM   Cleansing and Cleansing Agents  Dermal wound cleanser 3/16/2018 12:14 PM   Dressing Type Applied Wound gel;Xeroform;Gauze;Gauze wrap (radha) 3/16/2018 12:14 PM   Procedure Tolerated Well 3/16/2018 12:14 PM         Treatment today and orders written as such: On EVEN DAYS 16, 18, 20, etc..WOUND Care for the Left foot wound:  Pre-medicate for pain AND remove the old dressing. Apply the Topical lidocaine jelly 2% on top of the wound and allow it to set for 2 minutes.  Cleanse the foot by spraying with Carraklenz and wipe Firmly but slowly ONCE with gauze to remove the old drainage and wound debris.  Wipe in between the toes with gauze. Apply the Sensicare cream to the skin around the wound like a picture frame to protect the skin, including between the toes. Apply the iodosorb gel (brown) to the xeroform gauze and apply to the wound bed. Cover with a bulky amount of 4x4's and wrap with radha. Tape to secure the dressing for TWO days. Change every other day.   Plan: The iodosorb wound gel was left in the room  Mery Moreno RN, BSN, CWON ([id number])  Wound care nurse pt admitted secondary to alcohol withdrawl

## 2020-09-14 NOTE — CHART NOTE - NSCHARTNOTEFT_GEN_A_CORE
Pt w/ necrotizing pancreatitis w/ abd pain ; septic shock ; /p trach & PEG 9/10 POD # 4 : SBO resolved ; hx ETOH abuse w/ withdrawal. Course with klebsiella bacteremia. Pt w/ ARDS & failure to wean.     Factors impacting intake: [ ] none [ ] nausea  [ ] vomiting [ ] diarrhea [ ] constipation  [ ]chewing problems [ ] swallowing issues  [x ] other: PEG / Vent to Trach / Sedated    9/13 - Diet Prescription: Diet, NPO with Tube Feed:   Tube Feeding Modality: Nasogastric  Vital AF 1.2  Total Volume for 24 Hours (mL): 480  Continuous  Starting Tube Feed Rate {mL per Hour}: 20  Increase Tube Feed Rate by (mL): 0     Every 6 hours  Until Goal Tube Feed Rate (mL per Hour): 20  Tube Feed Duration (in Hours): 24  Tube Feed Start Time: 12:00 (09-13-20 @ 11:46)    Intake: Vital AF 1.2 via PEG @ 20 ml/hr =  480 ml, 576 kcal, 36 g pro    Current Weight: Weight (kg): 9/14 - 204.3 (92.7 kg) 9/13 - 3+ generalized / 4+ scrotum ; 9/7 - 201.9 (91.6 kg) 3+ generalized edema  % Weight Change - 1.1 % / 1.1 kg gain w/ noted 3+ generalized edema    Physical Appearance - Pt w/ visible moderate depleted temple area. Bilateral upper & lower pitting edema.       Pertinent Medications: MEDICATIONS  (STANDING):  chlorhexidine 0.12% Liquid 15 milliLiter(s) Oral Mucosa every 12 hours  chlorhexidine 4% Liquid 1 Application(s) Topical <User Schedule>  dexMEDEtomidine Infusion 0.276 MICROgram(s)/kG/Hr (5.36 mL/Hr) IV Continuous <Continuous>  dextrose 5%. 1000 milliLiter(s) (50 mL/Hr) IV Continuous <Continuous>  dextrose 50% Injectable 12.5 Gram(s) IV Push once  dextrose 50% Injectable 25 Gram(s) IV Push once  dextrose 50% Injectable 25 Gram(s) IV Push once  diazepam    Tablet 10 milliGRAM(s) Oral every 6 hours  enoxaparin Injectable 40 milliGRAM(s) SubCutaneous daily  erythromycin   IVPB      folic acid 1 milliGRAM(s) Oral daily  insulin lispro (HumaLOG) corrective regimen sliding scale   SubCutaneous every 6 hours  lactulose Syrup 10 Gram(s) Oral four times a day  methadone    Tablet 15 milliGRAM(s) Oral every 6 hours  midodrine 15 milliGRAM(s) Oral every 8 hours  multivitamin 1 Tablet(s) Oral daily  pantoprazole  Injectable 40 milliGRAM(s) IV Push daily  QUEtiapine 100 milliGRAM(s) Oral two times a day  thiamine 100 milliGRAM(s) Oral daily    MEDICATIONS  (PRN):  acetaminophen  Suppository .. 650 milliGRAM(s) Rectal every 6 hours PRN Temp greater or equal to 38C (100.4F)  albuterol/ipratropium for Nebulization 3 milliLiter(s) Nebulizer every 6 hours PRN Shortness of Breath and/or Wheezing  dextrose 40% Gel 15 Gram(s) Oral once PRN Blood Glucose LESS THAN 70 milliGRAM(s)/deciliter  glucagon  Injectable 1 milliGRAM(s) IntraMuscular once PRN Glucose LESS THAN 70 milligrams/deciliter  senna 2 Tablet(s) Oral at bedtime PRN Constipation    Pertinent Labs: 09-14 Na138 mmol/L Glu 116 mg/dL<H> K+ 3.5 mmol/L Cr  0.34 mg/dL<L> BUN 8 mg/dL 09-14 Phos 3.6 mg/dL 09-14 Alb 1.2 g/dL<L> 09-05 Chol --    LDL --    HDL --    Trig 325 mg/dL<H>09-14 ALT 39 U/L  U/L<H> Alkaline Phosphatase 355 U/L<H>     CAPILLARY BLOOD GLUCOSE      POCT Blood Glucose.: 121 mg/dL (14 Sep 2020 12:45)  POCT Blood Glucose.: 120 mg/dL (14 Sep 2020 05:22)  POCT Blood Glucose.: 108 mg/dL (14 Sep 2020 00:20)  POCT Blood Glucose.: 98 mg/dL (13 Sep 2020 17:31)    Skin: skin tear left scapula          sacrum -suspected DTI          sacrum stage 1    Estimated Needs:   [x ] no change since previous assessment (8/18/20)  [ ] recalculated:     Previous Nutrition Diagnosis:   Inadequate Energy Intake    Etiology Decreased ability to consume sufficient energy.     Signs/Symptoms Pt has not reached goal rate of TF    Goal/Expected Outcome Pt will consume >75% nutrition needs once goal rate has been reached; Not Met      Nutrition Diagnosis is [x ] ongoing  [ ] resolved [ ] not applicable     New Nutrition Diagnosis: [x ] not applicable       Interventions:   Recommend  [ ] Change Diet To:  [ ] Nutrition Supplement  [x ] Nutrition Support - Recommend Advance diet as medically feasible Vital AF 1.2 @ 20 ml/hr and advance as tolerated to 60 ml/pn=0066 calories, 108 grams protein, 1167 ml free water, 121% RDIs   [ ] Other:     Monitoring and Evaluation:   [ ] PO intake [ x ] Tolerance to diet prescription [ x ] weights [ x ] labs[ x ] follow up per protocol  [ ] other:

## 2020-09-14 NOTE — PROGRESS NOTE ADULT - ASSESSMENT
44M PMH alcohol abuse, hx of alcohol withdrawal, alcohol pancreatitis presents for abdominal pain. Admitted with necrotizing pancreatitis, sepsis, septic shock with course complicated by DTs requiring sedation and small bowel obstruction conservatively managed with NGT and NPO. Now with worsening DTs, acute hypoxic respiratory failure requiring intubation secondary to PNA and development of ARDS with failure to wean. s/p trach and PEG on 9/10. Course with klebsiella bacteremia    1. NEURO  - sedation has been an issue with underlying agitation  - on methadone and valium   - wean off precedex drip as tolerates , cont methadone 15mg  - DTs due to alcohol withdrawal is over and agitation now is more due to delirium from being in ICU and with underlying medical illness and infection    2. PULM  - s/p trach 9/10 POD #4  - cont weaning as tolerates   - FIo2 on 40% and PEEP 5    3. GI  - SBO resolved  - had BM yesterday but no BM over night  - trickle feeds at 20cc/hr; consider increasing as tolerates  - cont erythromycin for GI motility     4. ID  - s/p course of vanco and meropenem for both PNA and necrotizing pancreatitis  - however had recurrent fevers and leukocytosis for which meropenem was restarted  - blood cx now + klebsiella  - left arm midline d/angela on 9/12 with underlying bacteremia  - new peripheral IV was obtained in R forearm  - de-escalated antibx from meropenem to ceftriaxone as klebsiella is sensitive to it  - blood culture from 9/12 negative      5. CV  - off IV pressors, cont with midodrine  - standing lasix d/angela due to metabolic alkalosis/popeye alkalosis       6. RENAL  - metabolic alkalosis likely from aggressive diuresing  - standing lasix d/angela, diurese as needed  - s/p diamox x1 on 9/9  - cont to monitor bicarb levels and blood gas    7. GEN  - full code  - remains in ICU while on sedative drips         44M PMH alcohol abuse, hx of alcohol withdrawal, alcohol pancreatitis presents for abdominal pain. Admitted with necrotizing pancreatitis, sepsis, septic shock with course complicated by DTs requiring sedation and small bowel obstruction conservatively managed with NGT and NPO. Now with worsening DTs, acute hypoxic respiratory failure requiring intubation secondary to PNA and development of ARDS with failure to wean. s/p trach and PEG on 9/10. Course with klebsiella bacteremia    1. NEURO  - wean off Precedex as tolerates, and increase Seroquel and Valium  - cont methadone 15mg  - DTs due to alcohol withdrawal is over and agitation now is more due to delirium from being in ICU and with underlying medical illness and infection    2. PULM  - s/p trach 9/10 POD #4  - cont weaning as tolerates   - FIo2 on 40% and PEEP 5    3. GI  - SBO resolved  - had BM yesterday but no BM over night  - increase feeding from 20cc/hr to 40cc/hr  - cont erythromycin for GI motility     4. ID  - s/p course of vanco and meropenem for both PNA and necrotizing pancreatitis  - however had recurrent fevers and leukocytosis, and found to have blood cx + for Kelbsiella  - left arm midline d/angela on 9/12 with underlying bacteremia  - new peripheral IV was obtained in R forearm  - de-escalated antibx from meropenem to ceftriaxone as klebsiella is sensitive to it  - blood culture from 9/12 negative  - cont ceftriaxone      5. CV  - off IV pressors, cont with midodrine  - standing lasix d/angela due to metabolic alkalosis/popeye alkalosis       6. RENAL  - metabolic alkalosis likely from aggressive diuresing  - standing lasix d/angela, diurese as needed  - s/p diamox x1 on 9/9  - cont to monitor bicarb levels and blood gas    7. GEN  - full code  - remains in ICU while on sedative drips

## 2020-09-14 NOTE — PROGRESS NOTE ADULT - ATTENDING COMMENTS
Pt is a 45 yo M with h/o EtOH abuse/ withdrawal and pancreatitis. Pt presented to the ER 2 to abdominal pain, nausea and vomiting; labs and CT were consistent with pancreatitis and possible early necrosis of pancreas. Pt transferred to ICU om 8/18  for severe alcohol withdrawal. Pt intubated on 8/23 2 to hypoxic resp failure requiring intubation 2 to worsening PNA. CXR 9/1/2020 reveals b/l PNA and pt still hypoxic c/w ARDS. Pt unable to be weaned from MV; s/p trach + PEG. ICU course complicated by Klebsiella bacteremia/sepsis and delirium    Resp: Cont daily SBT   ID: Finish course of Ceftriaxone  CVS/Renal: Cont Midodrine for borderline low BP/ Diuresis prn  FEN: Restart PEG feeds/ Cont daily Thiamine, MVI and Folate   Neuro/Psych: Cont Seroquel 100 mg bid, Methadone 10mg q6 and Valium 10 mg q6 and wean off Precedex; if doesn't tolerate either increase Valium or Seroquel dose  Social: Pt is full code

## 2020-09-14 NOTE — PROGRESS NOTE ADULT - SUBJECTIVE AND OBJECTIVE BOX
24 hr events:  The patient was seen at bedside. He was responsive to verbal stimuli but not oriented. He was on CPAP until 8:30pm last night. Because the patient's HR elevated and his SpO2 dropped, the patient received 1 bolus of LR. He did not need pressor to maintain BP. The patient became agitated again overnight and needed Precedex to be sedated. No fever overnight. He is adequately producing urine on his own without need of straight catheterization. He has not had bowel movement overnight. Since yesterday afternoon, the patient has been fed via PEG at the rate of 20cc/hr with no complication.      ## ROS:  [x] unable to obtain due to mechanical ventilation    ## Labs:                        7.2    11.28 )-----------( 347      ( 14 Sep 2020 04:06 )             22.0     Chem:  09-14    138  |  102  |  8   ----------------------------<  116<H>  3.5   |  30  |  0.34<L>    Ca    8.5      14 Sep 2020 04:06  Phos  3.6     09-14  Mg     1.6     09-14    TPro  5.9<L>  /  Alb  1.2<L>  /  TBili  1.6<H>  /  DBili  x   /  AST  113<H>  /  ALT  39  /  AlkPhos  355<H>  09-14    Culture - Blood in AM (09.12.20 @ 11:17)   Culture Results: No growth to date.     Culture - Urine (09.12.20 @ 00:47)    Specimen Source: .Urine Catheterized    Culture Results: No growth    Culture - Sputum . (09.11.20 @ 16:29)    Specimen Source: .Sputum Sputum    Culture Results: No growth at 48 hours      Culture - Blood (09.10.20 @ 20:56)    -  Klebsiella pneumoniae: Detec    Specimen Source: .Blood Blood    Organism: Blood Culture PCR    Organism: Klebsiella pneumoniae    Culture Results: Growth in aerobic bottle: Klebsiella pneumoniae        ## Imaging:  KUB < from: Xray Kidney Ureter Bladder (09.13.20 @ 08:00) >  Nonobstructive bowel gas pattern      ## Medications:  MEDICATIONS  (STANDING):  chlorhexidine 0.12% Liquid 15 milliLiter(s) Oral Mucosa every 12 hours  chlorhexidine 4% Liquid 1 Application(s) Topical <User Schedule>  dexMEDEtomidine Infusion 0.276 MICROgram(s)/kG/Hr (5.36 mL/Hr) IV Continuous <Continuous>  dextrose 5%. 1000 milliLiter(s) (50 mL/Hr) IV Continuous <Continuous>  dextrose 50% Injectable 12.5 Gram(s) IV Push once  dextrose 50% Injectable 25 Gram(s) IV Push once  dextrose 50% Injectable 25 Gram(s) IV Push once  diazepam    Tablet 10 milliGRAM(s) Oral every 6 hours  enoxaparin Injectable 40 milliGRAM(s) SubCutaneous daily  erythromycin   IVPB      folic acid 1 milliGRAM(s) Oral daily  insulin lispro (HumaLOG) corrective regimen sliding scale   SubCutaneous every 6 hours  lactulose Syrup 10 Gram(s) Oral four times a day  methadone    Tablet 15 milliGRAM(s) Oral every 6 hours  midodrine 15 milliGRAM(s) Oral every 8 hours  multivitamin 1 Tablet(s) Oral daily  pantoprazole  Injectable 40 milliGRAM(s) IV Push daily  QUEtiapine 100 milliGRAM(s) Oral two times a day  thiamine 100 milliGRAM(s) Oral daily    MEDICATIONS  (PRN):  acetaminophen  Suppository .. 650 milliGRAM(s) Rectal every 6 hours PRN Temp greater or equal to 38C (100.4F)  albuterol/ipratropium for Nebulization 3 milliLiter(s) Nebulizer every 6 hours PRN Shortness of Breath and/or Wheezing  dextrose 40% Gel 15 Gram(s) Oral once PRN Blood Glucose LESS THAN 70 milliGRAM(s)/deciliter  glucagon  Injectable 1 milliGRAM(s) IntraMuscular once PRN Glucose LESS THAN 70 milligrams/deciliter  senna 2 Tablet(s) Oral at bedtime PRN Constipation      ## Vitals:  ICU Vital Signs Last 24 Hrs  T(C): 36.6 (14 Sep 2020 07:15), Max: 37.6 (13 Sep 2020 19:32)  T(F): 97.8 (14 Sep 2020 07:15), Max: 99.6 (13 Sep 2020 19:32)  HR: 75 (14 Sep 2020 08:00) (65 - 171)  BP: 100/67 (14 Sep 2020 08:00) (88/51 - 133/54)  BP(mean): 74 (14 Sep 2020 08:00) (58 - 88)  RR: 16 (14 Sep 2020 08:00) (11 - 24)  SpO2: 97% (14 Sep 2020 08:00) (88% - 100%)    Vent: Mode: Auto Mode: PRVC/ Volume Support, RR (machine): 16, TV (machine): 450, FiO2: 40, PEEP: 5, PIP: 19     I&O's Summary    13 Sep 2020 07:01  -  14 Sep 2020 07:00  --------------------------------------------------------  IN: 3065.9 mL / OUT: 950 mL / NET: 2115.9 mL    14 Sep 2020 07:01  -  14 Sep 2020 08:33  --------------------------------------------------------  IN: 54.5 mL / OUT: 0 mL / NET: 54.5 mL        ## P/E:  Gen: lying comfortably in bed in no apparent distress  Neuro: arousable, follows simple commands  HEENT: PERRL, trach in place with sutures  Resp: coarse breath sounds to auscultation b/l  CVS: RRR, S1S2 with no murmur or gallop  Abd: soft tympanic, less distended, non tender, +BS  Ext: bilateral upper and lower extremity pitting edema      CENTRAL LINE: [ ] YES [x] NO  LOCATION:   DATE INSERTED:  REMOVE: [ ] YES [ ] NO      QUINTEROS: [ ] YES [x] NO    DATE INSERTED:  REMOVE:  [x] YES [ ] NO    A-LINE:  [ ] YES [x] NO  LOCATION:   DATE INSERTED:  REMOVE:  [ ] YES [ ] NO  EXPLAIN:    GLOBAL ISSUE/BEST PRACTICE:  Analgesia: n/a  Sedation: precedex  HOB elevation: yes  Stress ulcer prophylaxis: Protonix  VTE prophylaxis: lovenox  Oral Care: n/a  Glycemic control: sliding scale coverage  Nutrition: npo, reattempt peg feeds with trickle feeds    CODE STATUS: [x] full code  [ ] DNR  [ ] DNI  [ ] [facility]  Goals of care discussion: [x] yes 24 hr events:  The patient was seen at bedside. He was responsive to verbal stimuli but not oriented. He was on CPAP until 8:30pm last night. When the patient's HR elevated and his SpO2 dropped last night, the patient received 1 bolus of LR. He did not need pressor to maintain BP. The patient became agitated overnight and needed Precedex to be sedated. No fever overnight. He is adequately producing urine on his own without need of straight catheterization. He has not had bowel movement overnight. Since yesterday afternoon, the patient has been fed via PEG at the rate of 20cc/hr with no complication.      ## ROS:  [x] unable to obtain due to mechanical ventilation    ## Labs:                        7.2    11.28 )-----------( 347      ( 14 Sep 2020 04:06 )             22.0     Chem:  09-14    138  |  102  |  8   ----------------------------<  116<H>  3.5   |  30  |  0.34<L>    Ca    8.5      14 Sep 2020 04:06  Phos  3.6     09-14  Mg     1.6     09-14    TPro  5.9<L>  /  Alb  1.2<L>  /  TBili  1.6<H>  /  DBili  x   /  AST  113<H>  /  ALT  39  /  AlkPhos  355<H>  09-14    Culture - Blood in AM (09.12.20 @ 11:17)   Culture Results: No growth to date.     Culture - Urine (09.12.20 @ 00:47)    Specimen Source: .Urine Catheterized    Culture Results: No growth    Culture - Sputum . (09.11.20 @ 16:29)    Specimen Source: .Sputum Sputum    Culture Results: No growth at 48 hours      Culture - Blood (09.10.20 @ 20:56)    -  Klebsiella pneumoniae: Detec    Specimen Source: .Blood Blood    Organism: Blood Culture PCR    Organism: Klebsiella pneumoniae    Culture Results: Growth in aerobic bottle: Klebsiella pneumoniae        ## Imaging:  KUB < from: Xray Kidney Ureter Bladder (09.13.20 @ 08:00) >  Nonobstructive bowel gas pattern      ## Medications:  MEDICATIONS  (STANDING):  chlorhexidine 0.12% Liquid 15 milliLiter(s) Oral Mucosa every 12 hours  chlorhexidine 4% Liquid 1 Application(s) Topical <User Schedule>  dexMEDEtomidine Infusion 0.276 MICROgram(s)/kG/Hr (5.36 mL/Hr) IV Continuous <Continuous>  dextrose 5%. 1000 milliLiter(s) (50 mL/Hr) IV Continuous <Continuous>  dextrose 50% Injectable 12.5 Gram(s) IV Push once  dextrose 50% Injectable 25 Gram(s) IV Push once  dextrose 50% Injectable 25 Gram(s) IV Push once  diazepam    Tablet 10 milliGRAM(s) Oral every 6 hours  enoxaparin Injectable 40 milliGRAM(s) SubCutaneous daily  erythromycin   IVPB      folic acid 1 milliGRAM(s) Oral daily  insulin lispro (HumaLOG) corrective regimen sliding scale   SubCutaneous every 6 hours  lactulose Syrup 10 Gram(s) Oral four times a day  methadone    Tablet 15 milliGRAM(s) Oral every 6 hours  midodrine 15 milliGRAM(s) Oral every 8 hours  multivitamin 1 Tablet(s) Oral daily  pantoprazole  Injectable 40 milliGRAM(s) IV Push daily  QUEtiapine 100 milliGRAM(s) Oral two times a day  thiamine 100 milliGRAM(s) Oral daily    MEDICATIONS  (PRN):  acetaminophen  Suppository .. 650 milliGRAM(s) Rectal every 6 hours PRN Temp greater or equal to 38C (100.4F)  albuterol/ipratropium for Nebulization 3 milliLiter(s) Nebulizer every 6 hours PRN Shortness of Breath and/or Wheezing  dextrose 40% Gel 15 Gram(s) Oral once PRN Blood Glucose LESS THAN 70 milliGRAM(s)/deciliter  glucagon  Injectable 1 milliGRAM(s) IntraMuscular once PRN Glucose LESS THAN 70 milligrams/deciliter  senna 2 Tablet(s) Oral at bedtime PRN Constipation      ## Vitals:  ICU Vital Signs Last 24 Hrs  T(C): 36.6 (14 Sep 2020 07:15), Max: 37.6 (13 Sep 2020 19:32)  T(F): 97.8 (14 Sep 2020 07:15), Max: 99.6 (13 Sep 2020 19:32)  HR: 75 (14 Sep 2020 08:00) (65 - 171)  BP: 100/67 (14 Sep 2020 08:00) (88/51 - 133/54)  BP(mean): 74 (14 Sep 2020 08:00) (58 - 88)  RR: 16 (14 Sep 2020 08:00) (11 - 24)  SpO2: 97% (14 Sep 2020 08:00) (88% - 100%)    Vent: Mode: Auto Mode: PRVC/ Volume Support, RR (machine): 16, TV (machine): 450, FiO2: 40, PEEP: 5, PIP: 19     I&O's Summary    13 Sep 2020 07:01  -  14 Sep 2020 07:00  --------------------------------------------------------  IN: 3065.9 mL / OUT: 950 mL / NET: 2115.9 mL    14 Sep 2020 07:01  -  14 Sep 2020 08:33  --------------------------------------------------------  IN: 54.5 mL / OUT: 0 mL / NET: 54.5 mL        ## P/E:  Gen: lying comfortably in bed in no apparent distress  Neuro: arousable, follows simple commands  HEENT: PERRL, trach in place with sutures  Resp: coarse breath sounds to auscultation b/l  CVS: RRR, S1S2 with no murmur or gallop  Abd: soft tympanic, less distended, non tender, +BS  Ext: bilateral upper and lower extremity pitting edema      CENTRAL LINE: [ ] YES [x] NO  LOCATION:   DATE INSERTED:  REMOVE: [ ] YES [ ] NO      QUINTEROS: [ ] YES [x] NO    DATE INSERTED:  REMOVE:  [x] YES [ ] NO    A-LINE:  [ ] YES [x] NO  LOCATION:   DATE INSERTED:  REMOVE:  [ ] YES [ ] NO  EXPLAIN:    GLOBAL ISSUE/BEST PRACTICE:  Analgesia: n/a  Sedation: precedex  HOB elevation: yes  Stress ulcer prophylaxis: Protonix  VTE prophylaxis: lovenox  Oral Care: n/a  Glycemic control: sliding scale coverage  Nutrition: npo, reattempt peg feeds with trickle feeds    CODE STATUS: [x] full code  [ ] DNR  [ ] DNI  [ ] [facility]  Goals of care discussion: [x] yes

## 2020-09-15 LAB
ANION GAP SERPL CALC-SCNC: 4 MMOL/L — LOW (ref 5–17)
ANION GAP SERPL CALC-SCNC: 9 MMOL/L — SIGNIFICANT CHANGE UP (ref 5–17)
BUN SERPL-MCNC: 4 MG/DL — LOW (ref 7–23)
BUN SERPL-MCNC: 5 MG/DL — LOW (ref 7–23)
CALCIUM SERPL-MCNC: 6.1 MG/DL — CRITICAL LOW (ref 8.5–10.1)
CALCIUM SERPL-MCNC: 8.6 MG/DL — SIGNIFICANT CHANGE UP (ref 8.5–10.1)
CHLORIDE SERPL-SCNC: 106 MMOL/L — SIGNIFICANT CHANGE UP (ref 96–108)
CHLORIDE SERPL-SCNC: 116 MMOL/L — HIGH (ref 96–108)
CO2 SERPL-SCNC: 19 MMOL/L — LOW (ref 22–31)
CO2 SERPL-SCNC: 31 MMOL/L — SIGNIFICANT CHANGE UP (ref 22–31)
CREAT SERPL-MCNC: 0.24 MG/DL — LOW (ref 0.5–1.3)
CREAT SERPL-MCNC: <0.15 MG/DL — LOW (ref 0.5–1.3)
GLUCOSE BLDC GLUCOMTR-MCNC: 101 MG/DL — HIGH (ref 70–99)
GLUCOSE BLDC GLUCOMTR-MCNC: 103 MG/DL — HIGH (ref 70–99)
GLUCOSE BLDC GLUCOMTR-MCNC: 88 MG/DL — SIGNIFICANT CHANGE UP (ref 70–99)
GLUCOSE BLDC GLUCOMTR-MCNC: 95 MG/DL — SIGNIFICANT CHANGE UP (ref 70–99)
GLUCOSE BLDC GLUCOMTR-MCNC: 99 MG/DL — SIGNIFICANT CHANGE UP (ref 70–99)
GLUCOSE SERPL-MCNC: 78 MG/DL — SIGNIFICANT CHANGE UP (ref 70–99)
GLUCOSE SERPL-MCNC: 98 MG/DL — SIGNIFICANT CHANGE UP (ref 70–99)
HCT VFR BLD CALC: 24 % — LOW (ref 39–50)
HGB BLD-MCNC: 7.4 G/DL — LOW (ref 13–17)
MAGNESIUM SERPL-MCNC: 1.2 MG/DL — LOW (ref 1.6–2.6)
MAGNESIUM SERPL-MCNC: 1.9 MG/DL — SIGNIFICANT CHANGE UP (ref 1.6–2.6)
MCHC RBC-ENTMCNC: 30.8 GM/DL — LOW (ref 32–36)
MCHC RBC-ENTMCNC: 31 PG — SIGNIFICANT CHANGE UP (ref 27–34)
MCV RBC AUTO: 100.4 FL — HIGH (ref 80–100)
NRBC # BLD: 0 /100 WBCS — SIGNIFICANT CHANGE UP (ref 0–0)
PHOSPHATE SERPL-MCNC: 2.7 MG/DL — SIGNIFICANT CHANGE UP (ref 2.5–4.5)
PHOSPHATE SERPL-MCNC: 4.4 MG/DL — SIGNIFICANT CHANGE UP (ref 2.5–4.5)
PLATELET # BLD AUTO: 380 K/UL — SIGNIFICANT CHANGE UP (ref 150–400)
POTASSIUM SERPL-MCNC: 2.9 MMOL/L — CRITICAL LOW (ref 3.5–5.3)
POTASSIUM SERPL-MCNC: 4.4 MMOL/L — SIGNIFICANT CHANGE UP (ref 3.5–5.3)
POTASSIUM SERPL-SCNC: 2.9 MMOL/L — CRITICAL LOW (ref 3.5–5.3)
POTASSIUM SERPL-SCNC: 4.4 MMOL/L — SIGNIFICANT CHANGE UP (ref 3.5–5.3)
RBC # BLD: 2.39 M/UL — LOW (ref 4.2–5.8)
RBC # FLD: 15.3 % — HIGH (ref 10.3–14.5)
SODIUM SERPL-SCNC: 141 MMOL/L — SIGNIFICANT CHANGE UP (ref 135–145)
SODIUM SERPL-SCNC: 144 MMOL/L — SIGNIFICANT CHANGE UP (ref 135–145)
WBC # BLD: 14.68 K/UL — HIGH (ref 3.8–10.5)
WBC # FLD AUTO: 14.68 K/UL — HIGH (ref 3.8–10.5)

## 2020-09-15 PROCEDURE — 71045 X-RAY EXAM CHEST 1 VIEW: CPT | Mod: 26

## 2020-09-15 PROCEDURE — 36569 INSJ PICC 5 YR+ W/O IMAGING: CPT

## 2020-09-15 PROCEDURE — 99291 CRITICAL CARE FIRST HOUR: CPT

## 2020-09-15 PROCEDURE — 76937 US GUIDE VASCULAR ACCESS: CPT | Mod: 26,59

## 2020-09-15 PROCEDURE — 74018 RADEX ABDOMEN 1 VIEW: CPT | Mod: 26

## 2020-09-15 RX ORDER — FUROSEMIDE 40 MG
20 TABLET ORAL ONCE
Refills: 0 | Status: COMPLETED | OUTPATIENT
Start: 2020-09-15 | End: 2020-09-16

## 2020-09-15 RX ORDER — MAGNESIUM SULFATE 500 MG/ML
2 VIAL (ML) INJECTION ONCE
Refills: 0 | Status: COMPLETED | OUTPATIENT
Start: 2020-09-15 | End: 2020-09-15

## 2020-09-15 RX ORDER — DIAZEPAM 5 MG
5 TABLET ORAL ONCE
Refills: 0 | Status: DISCONTINUED | OUTPATIENT
Start: 2020-09-15 | End: 2020-09-15

## 2020-09-15 RX ORDER — FENTANYL CITRATE 50 UG/ML
100 INJECTION INTRAVENOUS ONCE
Refills: 0 | Status: DISCONTINUED | OUTPATIENT
Start: 2020-09-15 | End: 2020-09-15

## 2020-09-15 RX ORDER — POTASSIUM CHLORIDE 20 MEQ
10 PACKET (EA) ORAL
Refills: 0 | Status: COMPLETED | OUTPATIENT
Start: 2020-09-15 | End: 2020-09-15

## 2020-09-15 RX ORDER — SODIUM CHLORIDE 9 MG/ML
1000 INJECTION, SOLUTION INTRAVENOUS ONCE
Refills: 0 | Status: COMPLETED | OUTPATIENT
Start: 2020-09-15 | End: 2020-09-15

## 2020-09-15 RX ORDER — FUROSEMIDE 40 MG
40 TABLET ORAL ONCE
Refills: 0 | Status: COMPLETED | OUTPATIENT
Start: 2020-09-15 | End: 2020-09-15

## 2020-09-15 RX ORDER — METOPROLOL TARTRATE 50 MG
5 TABLET ORAL ONCE
Refills: 0 | Status: COMPLETED | OUTPATIENT
Start: 2020-09-15 | End: 2020-09-15

## 2020-09-15 RX ORDER — MIDAZOLAM HYDROCHLORIDE 1 MG/ML
2 INJECTION, SOLUTION INTRAMUSCULAR; INTRAVENOUS ONCE
Refills: 0 | Status: DISCONTINUED | OUTPATIENT
Start: 2020-09-15 | End: 2020-09-15

## 2020-09-15 RX ORDER — HALOPERIDOL DECANOATE 100 MG/ML
5 INJECTION INTRAMUSCULAR ONCE
Refills: 0 | Status: COMPLETED | OUTPATIENT
Start: 2020-09-15 | End: 2020-09-15

## 2020-09-15 RX ADMIN — FENTANYL CITRATE 100 MICROGRAM(S): 50 INJECTION INTRAVENOUS at 02:48

## 2020-09-15 RX ADMIN — Medication 1 MILLIGRAM(S): at 11:50

## 2020-09-15 RX ADMIN — SODIUM CHLORIDE 1000 MILLILITER(S): 9 INJECTION, SOLUTION INTRAVENOUS at 06:54

## 2020-09-15 RX ADMIN — CEFTRIAXONE 100 MILLIGRAM(S): 500 INJECTION, POWDER, FOR SOLUTION INTRAMUSCULAR; INTRAVENOUS at 00:22

## 2020-09-15 RX ADMIN — Medication 50 GRAM(S): at 05:53

## 2020-09-15 RX ADMIN — HALOPERIDOL DECANOATE 5 MILLIGRAM(S): 100 INJECTION INTRAMUSCULAR at 01:44

## 2020-09-15 RX ADMIN — Medication 1 TABLET(S): at 11:50

## 2020-09-15 RX ADMIN — Medication 40 MILLIGRAM(S): at 11:49

## 2020-09-15 RX ADMIN — Medication 250 MILLIGRAM(S): at 05:54

## 2020-09-15 RX ADMIN — MIDODRINE HYDROCHLORIDE 15 MILLIGRAM(S): 2.5 TABLET ORAL at 22:29

## 2020-09-15 RX ADMIN — Medication 250 MILLIGRAM(S): at 18:09

## 2020-09-15 RX ADMIN — Medication 100 MILLIEQUIVALENT(S): at 06:54

## 2020-09-15 RX ADMIN — CHLORHEXIDINE GLUCONATE 15 MILLILITER(S): 213 SOLUTION TOPICAL at 05:54

## 2020-09-15 RX ADMIN — Medication 5 MILLIGRAM(S): at 22:29

## 2020-09-15 RX ADMIN — LACTULOSE 10 GRAM(S): 10 SOLUTION ORAL at 00:22

## 2020-09-15 RX ADMIN — DEXMEDETOMIDINE HYDROCHLORIDE IN 0.9% SODIUM CHLORIDE 5.36 MICROGRAM(S)/KG/HR: 4 INJECTION INTRAVENOUS at 06:26

## 2020-09-15 RX ADMIN — DEXMEDETOMIDINE HYDROCHLORIDE IN 0.9% SODIUM CHLORIDE 5.36 MICROGRAM(S)/KG/HR: 4 INJECTION INTRAVENOUS at 00:45

## 2020-09-15 RX ADMIN — CHLORHEXIDINE GLUCONATE 15 MILLILITER(S): 213 SOLUTION TOPICAL at 18:08

## 2020-09-15 RX ADMIN — Medication 250 MILLIGRAM(S): at 00:22

## 2020-09-15 RX ADMIN — Medication 100 MILLIEQUIVALENT(S): at 08:41

## 2020-09-15 RX ADMIN — ENOXAPARIN SODIUM 40 MILLIGRAM(S): 100 INJECTION SUBCUTANEOUS at 11:48

## 2020-09-15 RX ADMIN — CHLORHEXIDINE GLUCONATE 1 APPLICATION(S): 213 SOLUTION TOPICAL at 02:30

## 2020-09-15 RX ADMIN — Medication 5 MILLIGRAM(S): at 12:38

## 2020-09-15 RX ADMIN — Medication 250 MILLIGRAM(S): at 11:53

## 2020-09-15 RX ADMIN — Medication 50 GRAM(S): at 13:19

## 2020-09-15 RX ADMIN — Medication 100 MILLIGRAM(S): at 11:56

## 2020-09-15 RX ADMIN — Medication 5 MILLIGRAM(S): at 05:41

## 2020-09-15 RX ADMIN — Medication 5 MILLIGRAM(S): at 20:30

## 2020-09-15 RX ADMIN — Medication 15 MILLIGRAM(S): at 00:23

## 2020-09-15 RX ADMIN — Medication 100 MILLIEQUIVALENT(S): at 05:53

## 2020-09-15 RX ADMIN — MIDAZOLAM HYDROCHLORIDE 2 MILLIGRAM(S): 1 INJECTION, SOLUTION INTRAMUSCULAR; INTRAVENOUS at 12:23

## 2020-09-15 RX ADMIN — LACTULOSE 10 GRAM(S): 10 SOLUTION ORAL at 11:55

## 2020-09-15 RX ADMIN — MIDODRINE HYDROCHLORIDE 15 MILLIGRAM(S): 2.5 TABLET ORAL at 13:27

## 2020-09-15 RX ADMIN — FENTANYL CITRATE 100 MICROGRAM(S): 50 INJECTION INTRAVENOUS at 12:23

## 2020-09-15 RX ADMIN — LACTULOSE 10 GRAM(S): 10 SOLUTION ORAL at 18:09

## 2020-09-15 RX ADMIN — FENTANYL CITRATE 100 MICROGRAM(S): 50 INJECTION INTRAVENOUS at 12:38

## 2020-09-15 RX ADMIN — PANTOPRAZOLE SODIUM 40 MILLIGRAM(S): 20 TABLET, DELAYED RELEASE ORAL at 11:48

## 2020-09-15 NOTE — PROGRESS NOTE ADULT - SUBJECTIVE AND OBJECTIVE BOX
24 hr events:  The patient was seen at bedside. Precedex was off in this morning. He was responsive to verbal stimuli and followed some verbal commands. He had an episode of vomiting in the early morning. When the nurse checked PEG, 550cc of residual was aspirated. Since then, feeding has been held. The patient had fever overnight and became agitated, requiring fentanyl. He is adequately making urine but has not had bowel movement since yesterday.     ## ROS:  [x] unable to obtain due to mechanical ventilation    ## Labs:                        7.2    11.28 )-----------( 347      ( 14 Sep 2020 04:06 )             22.0     Chem:  09-14    138  |  102  |  8   ----------------------------<  116<H>  3.5   |  30  |  0.34<L>    Ca    8.5      14 Sep 2020 04:06  Phos  3.6     09-14  Mg     1.6     09-14    TPro  5.9<L>  /  Alb  1.2<L>  /  TBili  1.6<H>  /  DBili  x   /  AST  113<H>  /  ALT  39  /  AlkPhos  355<H>  09-14    Culture - Blood in AM (09.12.20 @ 11:17)   Culture Results: No growth to date.     Culture - Urine (09.12.20 @ 00:47)    Specimen Source: .Urine Catheterized    Culture Results: No growth    Culture - Sputum . (09.11.20 @ 16:29)    Specimen Source: .Sputum Sputum    Culture Results: No growth at 48 hours      Culture - Blood (09.10.20 @ 20:56)    -  Klebsiella pneumoniae: Detec    Specimen Source: .Blood Blood    Organism: Blood Culture PCR    Organism: Klebsiella pneumoniae    Culture Results: Growth in aerobic bottle: Klebsiella pneumoniae        ## Imaging:  KUB < from: Xray Kidney Ureter Bladder (09.13.20 @ 08:00) >  Nonobstructive bowel gas pattern      ## Medications:  MEDICATIONS  (STANDING):  chlorhexidine 0.12% Liquid 15 milliLiter(s) Oral Mucosa every 12 hours  chlorhexidine 4% Liquid 1 Application(s) Topical <User Schedule>  dexMEDEtomidine Infusion 0.276 MICROgram(s)/kG/Hr (5.36 mL/Hr) IV Continuous <Continuous>  dextrose 5%. 1000 milliLiter(s) (50 mL/Hr) IV Continuous <Continuous>  dextrose 50% Injectable 12.5 Gram(s) IV Push once  dextrose 50% Injectable 25 Gram(s) IV Push once  dextrose 50% Injectable 25 Gram(s) IV Push once  diazepam    Tablet 10 milliGRAM(s) Oral every 6 hours  enoxaparin Injectable 40 milliGRAM(s) SubCutaneous daily  erythromycin   IVPB      folic acid 1 milliGRAM(s) Oral daily  insulin lispro (HumaLOG) corrective regimen sliding scale   SubCutaneous every 6 hours  lactulose Syrup 10 Gram(s) Oral four times a day  methadone    Tablet 15 milliGRAM(s) Oral every 6 hours  midodrine 15 milliGRAM(s) Oral every 8 hours  multivitamin 1 Tablet(s) Oral daily  pantoprazole  Injectable 40 milliGRAM(s) IV Push daily  QUEtiapine 100 milliGRAM(s) Oral two times a day  thiamine 100 milliGRAM(s) Oral daily    MEDICATIONS  (PRN):  acetaminophen  Suppository .. 650 milliGRAM(s) Rectal every 6 hours PRN Temp greater or equal to 38C (100.4F)  albuterol/ipratropium for Nebulization 3 milliLiter(s) Nebulizer every 6 hours PRN Shortness of Breath and/or Wheezing  dextrose 40% Gel 15 Gram(s) Oral once PRN Blood Glucose LESS THAN 70 milliGRAM(s)/deciliter  glucagon  Injectable 1 milliGRAM(s) IntraMuscular once PRN Glucose LESS THAN 70 milligrams/deciliter  senna 2 Tablet(s) Oral at bedtime PRN Constipation      ## Vitals:  ICU Vital Signs Last 24 Hrs  T(C): 36.6 (14 Sep 2020 07:15), Max: 37.6 (13 Sep 2020 19:32)  T(F): 97.8 (14 Sep 2020 07:15), Max: 99.6 (13 Sep 2020 19:32)  HR: 75 (14 Sep 2020 08:00) (65 - 171)  BP: 100/67 (14 Sep 2020 08:00) (88/51 - 133/54)  BP(mean): 74 (14 Sep 2020 08:00) (58 - 88)  RR: 16 (14 Sep 2020 08:00) (11 - 24)  SpO2: 97% (14 Sep 2020 08:00) (88% - 100%)    Vent: Mode: Auto Mode: PRVC/ Volume Support, RR (machine): 16, TV (machine): 450, FiO2: 40, PEEP: 5, PIP: 19     I&O's Summary    13 Sep 2020 07:01  -  14 Sep 2020 07:00  --------------------------------------------------------  IN: 3065.9 mL / OUT: 950 mL / NET: 2115.9 mL    14 Sep 2020 07:01  -  14 Sep 2020 08:33  --------------------------------------------------------  IN: 54.5 mL / OUT: 0 mL / NET: 54.5 mL        ## P/E:  Gen: lying comfortably in bed in no apparent distress  Neuro: arousable, follows simple commands  HEENT: PERRL, trach in place with sutures  Resp: coarse breath sounds to auscultation b/l  CVS: RRR, S1S2 with no murmur or gallop  Abd: soft tympanic, less distended, non tender, +BS  Ext: bilateral upper and lower extremity pitting edema      CENTRAL LINE: [ ] YES [x] NO  LOCATION:   DATE INSERTED:  REMOVE: [ ] YES [ ] NO      QUINTEROS: [ ] YES [x] NO    DATE INSERTED:  REMOVE:  [x] YES [ ] NO    A-LINE:  [ ] YES [x] NO  LOCATION:   DATE INSERTED:  REMOVE:  [ ] YES [ ] NO  EXPLAIN:    GLOBAL ISSUE/BEST PRACTICE:  Analgesia: n/a  Sedation: precedex  HOB elevation: yes  Stress ulcer prophylaxis: Protonix  VTE prophylaxis: lovenox  Oral Care: n/a  Glycemic control: sliding scale coverage  Nutrition: npo, reattempt peg feeds with trickle feeds    CODE STATUS: [x] full code  [ ] DNR  [ ] DNI  [ ] [facility]  Goals of care discussion: [x] yes 24 hr events:  The patient was seen at bedside. He seemed flat on appearance. Overnight, the patient's R IV line was infiltrated. Midline was inserted in the early morning. Precedex was off in this morning. He was responsive to verbal stimuli and followed some verbal commands. He had an episode of vomiting in the early morning. No blood was noted. When the nurse checked PEG, 550cc of residual was aspirated. Since then, feeding has been held. The patient had fever overnight and became agitated, requiring fentanyl and haloperidol. He is adequately making urine but has not had bowel movement since yesterday.     ## ROS:  [x] unable to obtain due to mechanical ventilation    ## Labs:  CBC:                      7.4    14.68 )-----------( 380      ( 15 Sep 2020 04:02 )             24.0       Chem:  09-15    141  |  106  |  5<L>  ----------------------------<  98  4.4   |  31  |  0.24<L>    Ca    8.6      15 Sep 2020 10:14  Phos  4.4     09-15  Mg     1.9     09-15    TPro  5.9<L>  /  Alb  1.2<L>  /  TBili  1.6<H>  /  DBili  x   /  AST  113<H>  /  ALT  39  /  AlkPhos  355<H>  09-14    Culture - Blood in AM (09.12.20 @ 11:17)   Culture Results: No growth to date.     Culture - Urine (09.12.20 @ 00:47)    Specimen Source: .Urine Catheterized    Culture Results: No growth    Culture - Sputum . (09.11.20 @ 16:29)    Specimen Source: .Sputum Sputum    Culture Results: No growth at 48 hours      Culture - Blood (09.10.20 @ 20:56)    -  Klebsiella pneumoniae: Detec    Specimen Source: .Blood Blood    Organism: Blood Culture PCR    Organism: Klebsiella pneumoniae    Culture Results: Growth in aerobic bottle: Klebsiella pneumoniae      ## Imaging:  KUB < from: Xray Kidney Ureter Bladder (09.13.20 @ 08:00) >  Nonobstructive bowel gas pattern      ## Medications:  MEDICATIONS  (STANDING):  cefTRIAXone   IVPB 1000 milliGRAM(s) IV Intermittent every 24 hours  chlorhexidine 0.12% Liquid 15 milliLiter(s) Oral Mucosa every 12 hours  chlorhexidine 4% Liquid 1 Application(s) Topical <User Schedule>  dexMEDEtomidine Infusion 0.276 MICROgram(s)/kG/Hr (5.36 mL/Hr) IV Continuous <Continuous>  dextrose 5%. 1000 milliLiter(s) (50 mL/Hr) IV Continuous <Continuous>  dextrose 50% Injectable 12.5 Gram(s) IV Push once  dextrose 50% Injectable 25 Gram(s) IV Push once  dextrose 50% Injectable 25 Gram(s) IV Push once  diazepam    Tablet 10 milliGRAM(s) Oral every 6 hours  enoxaparin Injectable 40 milliGRAM(s) SubCutaneous daily  erythromycin   IVPB      erythromycin   IVPB 500 milliGRAM(s) IV Intermittent every 6 hours  folic acid 1 milliGRAM(s) Oral daily  furosemide   Injectable 40 milliGRAM(s) IV Push once  insulin lispro (HumaLOG) corrective regimen sliding scale   SubCutaneous every 6 hours  lactulose Syrup 10 Gram(s) Oral four times a day  methadone    Tablet 15 milliGRAM(s) Oral every 6 hours  midodrine 15 milliGRAM(s) Oral every 8 hours  multivitamin 1 Tablet(s) Oral daily  OLANZapine Injectable 10 milliGRAM(s) IntraMuscular every 12 hours  pantoprazole  Injectable 40 milliGRAM(s) IV Push daily  thiamine 100 milliGRAM(s) Oral daily    MEDICATIONS  (PRN):  acetaminophen  Suppository .. 650 milliGRAM(s) Rectal every 6 hours PRN Temp greater or equal to 38C (100.4F)  albuterol/ipratropium for Nebulization 3 milliLiter(s) Nebulizer every 6 hours PRN Shortness of Breath and/or Wheezing  dextrose 40% Gel 15 Gram(s) Oral once PRN Blood Glucose LESS THAN 70 milliGRAM(s)/deciliter  glucagon  Injectable 1 milliGRAM(s) IntraMuscular once PRN Glucose LESS THAN 70 milligrams/deciliter  senna 2 Tablet(s) Oral at bedtime PRN Constipation      ## Vitals:  ICU Vital Signs Last 24 Hrs  T(C): 37.9 (15 Sep 2020 08:00), Max: 38.9 (15 Sep 2020 02:30)  T(F): 100.3 (15 Sep 2020 08:00), Max: 102 (15 Sep 2020 02:30)  HR: 136 (15 Sep 2020 11:00) (82 - 172)  BP: 117/54 (15 Sep 2020 10:30) (88/50 - 137/63)  BP(mean): 68 (15 Sep 2020 10:30) (56 - 99)  RR: 20 (15 Sep 2020 11:00) (14 - 28)  SpO2: 97% (15 Sep 2020 11:00) (87% - 100%)    Vent: Mode: Auto Mode: PRVC/ Volume Support, RR (machine): 16, TV (machine): 450, FiO2: 50, PEEP: 5, PIP: 29     I&O's Summary    14 Sep 2020 07:01  -  15 Sep 2020 07:00  --------------------------------------------------------  IN: 2130.2 mL / OUT: 1450 mL / NET: 680.2 mL    15 Sep 2020 07:01  -  15 Sep 2020 11:39  --------------------------------------------------------  IN: 100 mL / OUT: 0 mL / NET: 100 mL      ## P/E:  Gen: lying comfortably in bed, seemed anxious but not in apparent distress  Neuro: arousable to verbal stimuli, follows simple commands  HEENT: PERRL, trach in place  Resp: coarse breath sounds to auscultation b/l  CVS: RRR, S1S2 with no murmur or gallop  Abd: soft tympanic, distended, non tender, +BS  Ext: distal pulses present, bilateral upper and lower extremity pitting edema, no cyanosis      CENTRAL LINE: [ ] YES [x] NO  LOCATION:   DATE INSERTED:  REMOVE: [ ] YES [ ] NO      QUINTEROS: [ ] YES [x] NO    DATE INSERTED:  REMOVE:  [x] YES [ ] NO    A-LINE:  [ ] YES [x] NO  LOCATION:   DATE INSERTED:  REMOVE:  [ ] YES [ ] NO  EXPLAIN:    GLOBAL ISSUE/BEST PRACTICE:  Analgesia: n/a  Sedation: precedex  HOB elevation: yes  Stress ulcer prophylaxis: Protonix  VTE prophylaxis: lovenox  Oral Care: n/a  Glycemic control: sliding scale coverage  Nutrition: npo, reattempt peg feeds with trickle feeds    CODE STATUS: [x] full code  [ ] DNR  [ ] DNI  [ ] [facility]  Goals of care discussion: [x] yes

## 2020-09-15 NOTE — CHART NOTE - NSCHARTNOTEFT_GEN_A_CORE
Patient's peripheral line was infiltrated.   Multiple attempts tried with ultrasound unsuccessful.   Contacted Surgery PA for placement of midline.   Multiple attempts to contact the mother Niecy Hussein for consent were unsuccessful.   Will proceed with emergency consent as pt is in need of IV access for his IV medications.

## 2020-09-15 NOTE — PROCEDURE NOTE - NSPOSTCAREGUIDE_GEN_A_CORE
Verbal/written post procedure instructions were given to patient/caregiver/Care for catheter as per unit/ICU protocols/Instructed patient/caregiver regarding signs and symptoms of infection/Instructed patient/caregiver to follow-up with primary care physician/Keep the cast/splint/dressing clean and dry

## 2020-09-15 NOTE — PROCEDURAL SAFETY CHECKLIST WITH OR WITHOUT SEDATION - NSGUIDEWIRESREMOVEDSD_GEN_ALL_CORE
Per radiology protocol CT chest/abdomen/pelvis is usually only done with contrast. Will need to clarify with providers if imaging order needs to be changed to with contrast only.
done
done

## 2020-09-15 NOTE — PROCEDURE NOTE - NSPROCDETAILS_GEN_ALL_CORE
sterile technique, indwelling urinary device inserted/a urinary catheter insertion kit was used for all insertion materials
patient pre-oxygenated, tube inserted, placement confirmed
sterile dressing applied/supine position/location identified, draped/prepped, sterile technique used/sterile technique, catheter placed/ultrasound guidance/ultrasound assessment

## 2020-09-15 NOTE — PROGRESS NOTE ADULT - ATTENDING COMMENTS
44 M with history of alcohol use disorder c/b pancreatitis admitted for necrotizing pancreatitis with course c/b septic shock, acute hypoxemic respiratory failure, acute respiratory distress syndrome requiring intubation s/p tracheostomy, delirium tremens, and small bowel obstruction. He is off vasopressors. Attempts to wean sedation marked by multiple episodes of agitation.    NEURO: Encephalopathy: hepatic vs ICU delirium - wean benzodiazepine, continue methadone, start zyprexa for agitation  PULM: Tracheostomy in place - daily SBT  CVS: Persistent borderline hypotension - continue midodrine  RENAL: Volume overload - diurese  GI: Ileus - erythromycin for motility  FEN: TF, thiamine, multivitamine, folate  ID: Klebsiella bacteremia - continue ceftriaxone - repeat cultures negative  Code Status Full

## 2020-09-15 NOTE — PHARMACY COMMUNICATION NOTE - COMMENTS
MD wants librium 25mg.
confirmed administration via IVP, sited several articles in administration and confirmed with intensivist.
Clarified with the PA and dose increase confirmed. Thank you!

## 2020-09-16 LAB
ALBUMIN SERPL ELPH-MCNC: 1.5 G/DL — LOW (ref 3.3–5)
ALP SERPL-CCNC: 306 U/L — HIGH (ref 40–120)
ALT FLD-CCNC: 32 U/L — SIGNIFICANT CHANGE UP (ref 12–78)
ANION GAP SERPL CALC-SCNC: 6 MMOL/L — SIGNIFICANT CHANGE UP (ref 5–17)
AST SERPL-CCNC: 86 U/L — HIGH (ref 15–37)
BASE EXCESS BLDA CALC-SCNC: 8 MMOL/L — HIGH (ref -2–2)
BILIRUB SERPL-MCNC: 1.8 MG/DL — HIGH (ref 0.2–1.2)
BLOOD GAS COMMENTS: SIGNIFICANT CHANGE UP
BLOOD GAS SOURCE: SIGNIFICANT CHANGE UP
BUN SERPL-MCNC: 5 MG/DL — LOW (ref 7–23)
CALCIUM SERPL-MCNC: 9.3 MG/DL — SIGNIFICANT CHANGE UP (ref 8.5–10.1)
CHLORIDE SERPL-SCNC: 98 MMOL/L — SIGNIFICANT CHANGE UP (ref 96–108)
CO2 SERPL-SCNC: 31 MMOL/L — SIGNIFICANT CHANGE UP (ref 22–31)
CREAT SERPL-MCNC: 0.39 MG/DL — LOW (ref 0.5–1.3)
GLUCOSE BLDC GLUCOMTR-MCNC: 100 MG/DL — HIGH (ref 70–99)
GLUCOSE BLDC GLUCOMTR-MCNC: 107 MG/DL — HIGH (ref 70–99)
GLUCOSE BLDC GLUCOMTR-MCNC: 91 MG/DL — SIGNIFICANT CHANGE UP (ref 70–99)
GLUCOSE BLDC GLUCOMTR-MCNC: 99 MG/DL — SIGNIFICANT CHANGE UP (ref 70–99)
GLUCOSE SERPL-MCNC: 101 MG/DL — HIGH (ref 70–99)
HCO3 BLDA-SCNC: 33 MMOL/L — HIGH (ref 21–29)
HCT VFR BLD CALC: 23.7 % — LOW (ref 39–50)
HGB BLD-MCNC: 7.4 G/DL — LOW (ref 13–17)
HOROWITZ INDEX BLDA+IHG-RTO: 50 — SIGNIFICANT CHANGE UP
MAGNESIUM SERPL-MCNC: 1.8 MG/DL — SIGNIFICANT CHANGE UP (ref 1.6–2.6)
MCHC RBC-ENTMCNC: 31.2 GM/DL — LOW (ref 32–36)
MCHC RBC-ENTMCNC: 31.2 PG — SIGNIFICANT CHANGE UP (ref 27–34)
MCV RBC AUTO: 100 FL — SIGNIFICANT CHANGE UP (ref 80–100)
NRBC # BLD: 0 /100 WBCS — SIGNIFICANT CHANGE UP (ref 0–0)
PCO2 BLDA: 49 MMHG — HIGH (ref 32–46)
PH BLD: 7.44 — SIGNIFICANT CHANGE UP (ref 7.35–7.45)
PHOSPHATE SERPL-MCNC: 3.7 MG/DL — SIGNIFICANT CHANGE UP (ref 2.5–4.5)
PLATELET # BLD AUTO: 383 K/UL — SIGNIFICANT CHANGE UP (ref 150–400)
PO2 BLDA: 60 MMHG — LOW (ref 74–108)
POTASSIUM SERPL-MCNC: 4.1 MMOL/L — SIGNIFICANT CHANGE UP (ref 3.5–5.3)
POTASSIUM SERPL-SCNC: 4.1 MMOL/L — SIGNIFICANT CHANGE UP (ref 3.5–5.3)
PROT SERPL-MCNC: 7.1 GM/DL — SIGNIFICANT CHANGE UP (ref 6–8.3)
RBC # BLD: 2.37 M/UL — LOW (ref 4.2–5.8)
RBC # FLD: 15.3 % — HIGH (ref 10.3–14.5)
SAO2 % BLDA: 90 % — LOW (ref 92–96)
SODIUM SERPL-SCNC: 135 MMOL/L — SIGNIFICANT CHANGE UP (ref 135–145)
WBC # BLD: 22.29 K/UL — HIGH (ref 3.8–10.5)
WBC # FLD AUTO: 22.29 K/UL — HIGH (ref 3.8–10.5)

## 2020-09-16 PROCEDURE — 99291 CRITICAL CARE FIRST HOUR: CPT

## 2020-09-16 RX ORDER — MAGNESIUM SULFATE 500 MG/ML
2 VIAL (ML) INJECTION ONCE
Refills: 0 | Status: COMPLETED | OUTPATIENT
Start: 2020-09-16 | End: 2020-09-16

## 2020-09-16 RX ORDER — VANCOMYCIN HCL 1 G
1000 VIAL (EA) INTRAVENOUS ONCE
Refills: 0 | Status: COMPLETED | OUTPATIENT
Start: 2020-09-16 | End: 2020-09-16

## 2020-09-16 RX ORDER — VANCOMYCIN HCL 1 G
1000 VIAL (EA) INTRAVENOUS EVERY 12 HOURS
Refills: 0 | Status: DISCONTINUED | OUTPATIENT
Start: 2020-09-16 | End: 2020-09-18

## 2020-09-16 RX ORDER — FUROSEMIDE 40 MG
40 TABLET ORAL ONCE
Refills: 0 | Status: COMPLETED | OUTPATIENT
Start: 2020-09-16 | End: 2020-09-16

## 2020-09-16 RX ORDER — MIDODRINE HYDROCHLORIDE 2.5 MG/1
10 TABLET ORAL EVERY 8 HOURS
Refills: 0 | Status: ACTIVE | OUTPATIENT
Start: 2020-09-16 | End: 2021-08-15

## 2020-09-16 RX ADMIN — ENOXAPARIN SODIUM 40 MILLIGRAM(S): 100 INJECTION SUBCUTANEOUS at 12:27

## 2020-09-16 RX ADMIN — Medication 100 MILLIGRAM(S): at 12:28

## 2020-09-16 RX ADMIN — LACTULOSE 10 GRAM(S): 10 SOLUTION ORAL at 17:17

## 2020-09-16 RX ADMIN — PANTOPRAZOLE SODIUM 40 MILLIGRAM(S): 20 TABLET, DELAYED RELEASE ORAL at 12:27

## 2020-09-16 RX ADMIN — MIDODRINE HYDROCHLORIDE 10 MILLIGRAM(S): 2.5 TABLET ORAL at 23:01

## 2020-09-16 RX ADMIN — Medication 250 MILLIGRAM(S): at 17:18

## 2020-09-16 RX ADMIN — MIDODRINE HYDROCHLORIDE 15 MILLIGRAM(S): 2.5 TABLET ORAL at 05:27

## 2020-09-16 RX ADMIN — LACTULOSE 10 GRAM(S): 10 SOLUTION ORAL at 12:28

## 2020-09-16 RX ADMIN — CHLORHEXIDINE GLUCONATE 15 MILLILITER(S): 213 SOLUTION TOPICAL at 17:17

## 2020-09-16 RX ADMIN — Medication 250 MILLIGRAM(S): at 05:26

## 2020-09-16 RX ADMIN — Medication 250 MILLIGRAM(S): at 02:13

## 2020-09-16 RX ADMIN — Medication 40 MILLIGRAM(S): at 12:27

## 2020-09-16 RX ADMIN — CHLORHEXIDINE GLUCONATE 15 MILLILITER(S): 213 SOLUTION TOPICAL at 05:26

## 2020-09-16 RX ADMIN — Medication 250 MILLIGRAM(S): at 07:30

## 2020-09-16 RX ADMIN — LACTULOSE 10 GRAM(S): 10 SOLUTION ORAL at 23:00

## 2020-09-16 RX ADMIN — Medication 1 TABLET(S): at 12:28

## 2020-09-16 RX ADMIN — LACTULOSE 10 GRAM(S): 10 SOLUTION ORAL at 05:26

## 2020-09-16 RX ADMIN — Medication 20 MILLIGRAM(S): at 01:43

## 2020-09-16 RX ADMIN — LACTULOSE 10 GRAM(S): 10 SOLUTION ORAL at 00:00

## 2020-09-16 RX ADMIN — Medication 50 GRAM(S): at 04:49

## 2020-09-16 RX ADMIN — CHLORHEXIDINE GLUCONATE 1 APPLICATION(S): 213 SOLUTION TOPICAL at 12:50

## 2020-09-16 RX ADMIN — Medication 1 MILLIGRAM(S): at 12:28

## 2020-09-16 NOTE — PROGRESS NOTE ADULT - ASSESSMENT
KS is a 44M PMH alcohol abuse, hx of alcohol withdrawal and alcohol pancreatitis who presented for abdominal pain. Admitted with necrotizing pancreatitis, sepsis, septic shock with course complicated by acute hypoxic respiratory failure secondary to PNA and ARDS requiring intubation and DTs requiring sedation and small bowel obstruction conservatively managed with NGT and NPO. Currently, s/p trach and PEG on 9/10 and treated for klebsiella bacteremia. The patient is making gradual clinical improvements in delirium, respiratory failure, and infection.     1. NEURO  - d/angela Precedex, Seroquel since 9/15  - cont Valium 10mg q6r, Zyprexa 10mg q12r  - cont methadone 15mg  - DTs due to alcohol withdrawal is over and agitation now is more due to delirium from being in ICU and with underlying medical illness and infection    2. PULM  - s/p trach 9/10 POD #6  - cont weaning as tolerates   - currently on FIo2 on 50% and PEEP 8    3. GI  - SBO resolved  - no BM for 2 days  - KUB (9/15) showed nonobstructive distended bowels  - cont lactulose  - cont erythromycin for GI motility  - trickle feed today    4. ID  - s/p course of vanco and meropenem for both PNA and necrotizing pancreatitis  - however had recurrent fevers and leukocytosis, and found to have blood cx + for Kelbsiella  - left arm midline d/angela on 9/12 with underlying bacteremia  - new peripheral IV was obtained in R forearm  - midline was inserted on L arm on 9/15  - de-escalated antibx from meropenem to ceftriaxone as klebsiella is sensitive to it  - blood culture from 9/12 negative  - cont ceftriaxone  - received Vanco in the morning b/c of elevation in WBC from 15 to 22      5. CV  - off IV pressors, cont with midodrine  - cont Lasix 40mg for overfluid  - net output of -3750ml over 24 hrs  - cont monitor BP and I&O    6. RENAL  - metabolic alkalosis resolved  - s/p diamox x1 on 9/9  - cont to monitor bicarb levels and blood gas as needed  - monitor daily I&O    7. SKIN  - stage 4 pressure ulcer on sacral area  - daily wound change     7. GEN  - full code  - once off from IV sedatives and ventilator, consider downgrading to regular floor           KS is a 44M PMH alcohol abuse, hx of alcohol withdrawal and alcohol pancreatitis who presented for abdominal pain. Admitted with necrotizing pancreatitis, sepsis, septic shock with course complicated by acute hypoxic respiratory failure secondary to PNA and ARDS requiring intubation and DTs requiring sedation and small bowel obstruction conservatively managed with NGT and NPO. Currently, s/p trach and PEG on 9/10 and treated for klebsiella bacteremia. The patient is making gradual clinical improvements in delirium, respiratory failure, and infection.     1. NEURO  - d/angela Precedex, Seroquel since 9/15  - cont  Zyprexa 10mg q12r, methadone 15mg  - decrease Valium from 10mg to 5mg  - DTs due to alcohol withdrawal is over and agitation now is more due to delirium from being in ICU and with underlying medical illness and infection    2. CV  - off IV pressors  - decrease midodrine from 15mg to 10mg  - cont Lasix 40mg for overfluid  - net output of -3750ml over 24 hrs  - cont monitor BP and I&O      3. PULM  - s/p trach 9/10 POD #6  - cont weaning as tolerates   - currently on FIo2 on 50% and PEEP 8    4. GI  - SBO resolved  - no BM for 2 days  - KUB (9/15) showed nonobstructive distended bowels  - cont lactulose  - d/c erythromycin  - start trickle feed today    5. ID  - s/p course of vanco and meropenem for both PNA and necrotizing pancreatitis  - however had recurrent fevers and leukocytosis, and found to have blood cx + for Klebsiella  - left arm midline d/angela on 9/12 with underlying bacteremia  - new peripheral IV was obtained in R forearm  - midline was inserted on L arm on 9/15  - de-escalated antibx from meropenem to ceftriaxone as klebsiella is sensitive to it  - blood culture from 9/12 negative  - cont ceftriaxone for Klebsiella  - cont vanco for cellulitis on right arm    6. RENAL  - metabolic alkalosis resolved  - s/p diamox x1 on 9/9  - cont to monitor bicarb levels and blood gas as needed  - cont Lasix  - monitor daily I&O    7. SKIN  - unstageable pressure ulcer on sacral area; no sign of necrosis or infection noted on exam  - wound care f/u not required  - daily dressing change     7. GEN  - full code  - once off from IV sedatives and ventilator, consider downgrading to regular floor

## 2020-09-16 NOTE — PROGRESS NOTE ADULT - SUBJECTIVE AND OBJECTIVE BOX
24 hr events:  Overnight,     The patient was seen at bedside. He seemed flat on appearance. Overnight, the patient's R IV line was infiltrated. Midline was inserted in the early morning. Precedex was off in this morning. He was responsive to verbal stimuli and followed some verbal commands. He had an episode of vomiting in the early morning. No blood was noted. When the nurse checked PEG, 550cc of residual was aspirated. Since then, feeding has been held. The patient had fever overnight and became agitated, requiring fentanyl and haloperidol. He is adequately making urine but has not had bowel movement since yesterday.     ## ROS:  [x] unable to obtain due to mechanical ventilation    ## Labs:                        7.4    22.29 )-----------( 383      ( 16 Sep 2020 02:47 )             23.7       Chem:  09-16    135  |  98  |  5<L>  ----------------------------<  101<H>  4.1   |  31  |  0.39<L>    Ca    9.3      16 Sep 2020 02:47  Phos  3.7     09-16  Mg     1.8     09-16    TPro  7.1  /  Alb  1.5<L>  /  TBili  1.8<H>  /  DBili  x   /  AST  86<H>  /  ALT  32  /  AlkPhos  306<H>  09-16  Culture - Blood in AM (09.12.20 @ 11:17)   Culture Results: No growth to date.     Culture - Urine (09.12.20 @ 00:47)    Specimen Source: .Urine Catheterized    Culture Results: No growth    Culture - Sputum . (09.11.20 @ 16:29)    Specimen Source: .Sputum Sputum    Culture Results: No growth at 48 hours      Culture - Blood (09.10.20 @ 20:56)    -  Klebsiella pneumoniae: Detec    Specimen Source: .Blood Blood    Organism: Blood Culture PCR    Organism: Klebsiella pneumoniae    Culture Results: Growth in aerobic bottle: Klebsiella pneumoniae      ## Imaging:  KUB< from: Xray Kidney Ureter Bladder (09.15.20 @ 09:45) >  IMPRESSION: No dilated bowel is identified. There is nonobstructive bowel gas pattern.    Catheter projecting over the left abdomen may represent a percutaneous gastric feeding tube.    Rectal tube is again noted.      ## Medications:  MEDICATIONS  (STANDING):  cefTRIAXone   IVPB 1000 milliGRAM(s) IV Intermittent every 24 hours  chlorhexidine 0.12% Liquid 15 milliLiter(s) Oral Mucosa every 12 hours  chlorhexidine 4% Liquid 1 Application(s) Topical <User Schedule>  dexMEDEtomidine Infusion 0.276 MICROgram(s)/kG/Hr (5.36 mL/Hr) IV Continuous <Continuous>  dextrose 5%. 1000 milliLiter(s) (50 mL/Hr) IV Continuous <Continuous>  dextrose 50% Injectable 12.5 Gram(s) IV Push once  dextrose 50% Injectable 25 Gram(s) IV Push once  dextrose 50% Injectable 25 Gram(s) IV Push once  diazepam    Tablet 10 milliGRAM(s) Oral every 6 hours  enoxaparin Injectable 40 milliGRAM(s) SubCutaneous daily  erythromycin   IVPB      erythromycin   IVPB 500 milliGRAM(s) IV Intermittent every 6 hours  folic acid 1 milliGRAM(s) Oral daily  insulin lispro (HumaLOG) corrective regimen sliding scale   SubCutaneous every 6 hours  lactulose Syrup 10 Gram(s) Oral four times a day  methadone    Tablet 15 milliGRAM(s) Oral every 6 hours  midodrine 15 milliGRAM(s) Oral every 8 hours  multivitamin 1 Tablet(s) Oral daily  OLANZapine Injectable 10 milliGRAM(s) IntraMuscular every 12 hours  pantoprazole  Injectable 40 milliGRAM(s) IV Push daily  thiamine 100 milliGRAM(s) Oral daily    MEDICATIONS  (PRN):  acetaminophen  Suppository .. 650 milliGRAM(s) Rectal every 6 hours PRN Temp greater or equal to 38C (100.4F)  albuterol/ipratropium for Nebulization 3 milliLiter(s) Nebulizer every 6 hours PRN Shortness of Breath and/or Wheezing  dextrose 40% Gel 15 Gram(s) Oral once PRN Blood Glucose LESS THAN 70 milliGRAM(s)/deciliter  glucagon  Injectable 1 milliGRAM(s) IntraMuscular once PRN Glucose LESS THAN 70 milligrams/deciliter  senna 2 Tablet(s) Oral at bedtime PRN Constipation      ## Vitals:  ICU Vital Signs Last 24 Hrs  T(C): 37.4 (16 Sep 2020 07:03), Max: 37.7 (15 Sep 2020 15:00)  T(F): 99.4 (16 Sep 2020 07:03), Max: 99.9 (15 Sep 2020 15:00)  HR: 125 (16 Sep 2020 06:30) (93 - 172)  BP: 128/69 (16 Sep 2020 06:30) (108/56 - 148/87)  BP(mean): 83 (16 Sep 2020 06:30) (68 - 103)  RR: 15 (16 Sep 2020 06:30) (15 - 31)  SpO2: 98% (16 Sep 2020 06:30) (79% - 100%)      Vent: Mode: Auto Mode: PRVC/ Volume Support, RR (machine): 16, TV (machine): 450, FiO2: 50, PEEP: 8, PIP: 24     I&O's Summary    15 Sep 2020 07:01  -  16 Sep 2020 07:00  --------------------------------------------------------  IN: 1050 mL / OUT: 4800 mL / NET: -3750 mL        ## P/E:  Gen: lying comfortably in bed, not in distress  Neuro: arousable to verbal stimuli, didn't follow simple commands  HEENT: PERRL, trach in place  Resp: coarse breath sounds to auscultation b/l  CVS: tachycardic, S1S2 with no murmur or gallop  Abd: soft tympanic, distended, non tender, bowel sound absent  Ext: distal pulses present, bilateral upper and lower extremity pitting edema, no cyanosis      CENTRAL LINE: [ ] YES [x] NO  LOCATION:   DATE INSERTED:  REMOVE: [ ] YES [ ] NO      QUINTEROS: [ ] YES [x] NO    DATE INSERTED:  REMOVE:  [x] YES [ ] NO    A-LINE:  [ ] YES [x] NO  LOCATION:   DATE INSERTED:  REMOVE:  [ ] YES [ ] NO  EXPLAIN:    GLOBAL ISSUE/BEST PRACTICE:  Analgesia: n/a  Sedation: precedex  HOB elevation: yes  Stress ulcer prophylaxis: Protonix  VTE prophylaxis: lovenox  Oral Care: n/a  Glycemic control: sliding scale coverage  Nutrition: npo, reattempt peg feeds with trickle feeds    CODE STATUS: [x] full code  [ ] DNR  [ ] DNI  [ ] MOLST  Goals of care discussion: [x] yes 24 hr events:  The patient was seen at bedside. He seemed calm on appearance. He was awake but not alert and oriented. Overnight, the patient stayed off of Precedex. Per nurse, the patient was frequently tachycardic and a bit restless. The patient seemed to have some pain on right arm, where IV line was removed. Because the nurse was concerned about cellulitis on right arm, she gave vancomycin in the morning. He did not have fever, nausea, or vomiting. The patient is making adequate urine but has not had bowel movement for 2 days. He has been NPO since yesterday.     ## ROS:  [x] unable to obtain due to mechanical ventilation    ## Labs:                        7.4    22.29 )-----------( 383      ( 16 Sep 2020 02:47 )             23.7       Chem:  09-16    135  |  98  |  5<L>  ----------------------------<  101<H>  4.1   |  31  |  0.39<L>    Ca    9.3      16 Sep 2020 02:47  Phos  3.7     09-16  Mg     1.8     09-16    TPro  7.1  /  Alb  1.5<L>  /  TBili  1.8<H>  /  DBili  x   /  AST  86<H>  /  ALT  32  /  AlkPhos  306<H>  09-16  Culture - Blood in AM (09.12.20 @ 11:17)   Culture Results: No growth to date.     Culture - Urine (09.12.20 @ 00:47)    Specimen Source: .Urine Catheterized    Culture Results: No growth    Culture - Sputum . (09.11.20 @ 16:29)    Specimen Source: .Sputum Sputum    Culture Results: No growth at 48 hours      Culture - Blood (09.10.20 @ 20:56)    -  Klebsiella pneumoniae: Detec    Specimen Source: .Blood Blood    Organism: Blood Culture PCR    Organism: Klebsiella pneumoniae    Culture Results: Growth in aerobic bottle: Klebsiella pneumoniae      ## Imaging:  KUB< from: Xray Kidney Ureter Bladder (09.15.20 @ 09:45) >  IMPRESSION: No dilated bowel is identified. There is nonobstructive bowel gas pattern.    Catheter projecting over the left abdomen may represent a percutaneous gastric feeding tube.    Rectal tube is again noted.      ## Medications:  MEDICATIONS  (STANDING):  cefTRIAXone   IVPB 1000 milliGRAM(s) IV Intermittent every 24 hours  chlorhexidine 0.12% Liquid 15 milliLiter(s) Oral Mucosa every 12 hours  chlorhexidine 4% Liquid 1 Application(s) Topical <User Schedule>  dexMEDEtomidine Infusion 0.276 MICROgram(s)/kG/Hr (5.36 mL/Hr) IV Continuous <Continuous>  dextrose 5%. 1000 milliLiter(s) (50 mL/Hr) IV Continuous <Continuous>  dextrose 50% Injectable 12.5 Gram(s) IV Push once  dextrose 50% Injectable 25 Gram(s) IV Push once  dextrose 50% Injectable 25 Gram(s) IV Push once  diazepam    Tablet 10 milliGRAM(s) Oral every 6 hours  enoxaparin Injectable 40 milliGRAM(s) SubCutaneous daily  erythromycin   IVPB      erythromycin   IVPB 500 milliGRAM(s) IV Intermittent every 6 hours  folic acid 1 milliGRAM(s) Oral daily  insulin lispro (HumaLOG) corrective regimen sliding scale   SubCutaneous every 6 hours  lactulose Syrup 10 Gram(s) Oral four times a day  methadone    Tablet 15 milliGRAM(s) Oral every 6 hours  midodrine 15 milliGRAM(s) Oral every 8 hours  multivitamin 1 Tablet(s) Oral daily  OLANZapine Injectable 10 milliGRAM(s) IntraMuscular every 12 hours  pantoprazole  Injectable 40 milliGRAM(s) IV Push daily  thiamine 100 milliGRAM(s) Oral daily    MEDICATIONS  (PRN):  acetaminophen  Suppository .. 650 milliGRAM(s) Rectal every 6 hours PRN Temp greater or equal to 38C (100.4F)  albuterol/ipratropium for Nebulization 3 milliLiter(s) Nebulizer every 6 hours PRN Shortness of Breath and/or Wheezing  dextrose 40% Gel 15 Gram(s) Oral once PRN Blood Glucose LESS THAN 70 milliGRAM(s)/deciliter  glucagon  Injectable 1 milliGRAM(s) IntraMuscular once PRN Glucose LESS THAN 70 milligrams/deciliter  senna 2 Tablet(s) Oral at bedtime PRN Constipation      ## Vitals:  ICU Vital Signs Last 24 Hrs  T(C): 37.4 (16 Sep 2020 07:03), Max: 37.7 (15 Sep 2020 15:00)  T(F): 99.4 (16 Sep 2020 07:03), Max: 99.9 (15 Sep 2020 15:00)  HR: 125 (16 Sep 2020 06:30) (93 - 172)  BP: 128/69 (16 Sep 2020 06:30) (108/56 - 148/87)  BP(mean): 83 (16 Sep 2020 06:30) (68 - 103)  RR: 15 (16 Sep 2020 06:30) (15 - 31)  SpO2: 98% (16 Sep 2020 06:30) (79% - 100%)      Vent: Mode: Auto Mode: PRVC/ Volume Support, RR (machine): 16, TV (machine): 450, FiO2: 50, PEEP: 8, PIP: 24     I&O's Summary    15 Sep 2020 07:01  -  16 Sep 2020 07:00  --------------------------------------------------------  IN: 1050 mL / OUT: 4800 mL / NET: -3750 mL        ## P/E:  Gen: lying comfortably in bed, not in distress  Neuro: arousable to verbal stimuli, didn't follow simple commands  HEENT: PERRL, trach in place  Resp: coarse breath sounds to auscultation b/l  CVS: tachycardic, S1S2 with no murmur or gallop  Abd: soft tympanic, distended, non tender, bowel sound absent  Ext: distal pulses present, bilateral upper and lower extremity pitting edema, no cyanosis      CENTRAL LINE: [ ] YES [x] NO  LOCATION:   DATE INSERTED:  REMOVE: [ ] YES [ ] NO      QUINTEROS: [ ] YES [x] NO    DATE INSERTED:  REMOVE:  [x] YES [ ] NO    A-LINE:  [ ] YES [x] NO  LOCATION:   DATE INSERTED:  REMOVE:  [ ] YES [ ] NO  EXPLAIN:    GLOBAL ISSUE/BEST PRACTICE:  Analgesia: n/a  Sedation: off from precedex  HOB elevation: yes  Stress ulcer prophylaxis: Protonix  VTE prophylaxis: lovenox  Oral Care: n/a  Glycemic control: sliding scale coverage  Nutrition: npo, reattempt peg feeds with trickle feeds    CODE STATUS: [x] full code  [ ] DNR  [ ] DNI  [ ] MOLST  Goals of care discussion: [x] yes

## 2020-09-16 NOTE — PROGRESS NOTE ADULT - ATTENDING COMMENTS
44 M with history of alcohol use disorder c/b pancreatitis admitted for necrotizing pancreatitis with course c/b septic shock, acute hypoxemic respiratory failure, acute respiratory distress syndrome requiring intubation s/p tracheostomy, delirium tremens, and small bowel obstruction. He is off vasopressors. Attempts to wean sedation marked by multiple episodes of agitation.    NEURO: Encephalopathy: hepatic vs ICU delirium - wean benzodiazepine, continue methadone, zyprexa  PULM: Tracheostomy in place - daily SBT  CVS: Persistent borderline hypotension - improved - taper midodrine  RENAL: Volume overload - diuresing well  GI: Ileus - restart trickle feeds  FEN: TF, thiamine, multivitamin, folate  ID: Klebsiella bacteremia - continue ceftriaxone (9/14) - repeat cultures negative - now with LUE cellulitis - add vancomycin (9/16)  Code Status Full 44 M with history of alcohol use disorder c/b pancreatitis admitted for necrotizing pancreatitis with course c/b septic shock, acute hypoxemic respiratory failure, acute respiratory distress syndrome requiring intubation s/p tracheostomy, delirium tremens, and small bowel obstruction. He is off vasopressors. Attempts to wean sedation marked by multiple episodes of agitation.    NEURO: Encephalopathy: hepatic vs ICU delirium - wean benzodiazepine, continue methadone, zyprexa  PULM: Tracheostomy in place - daily SBT  CVS: Persistent borderline hypotension - improved - taper midodrine  RENAL: Volume overload - diuresing well  GI: Ileus - restart trickle feeds  FEN: TF, thiamine, multivitamin, folate  ID: Klebsiella bacteremia - continue ceftriaxone (9/14) - repeat cultures negative - now with RUE cellulitis - add vancomycin (9/16)  Code Status Full

## 2020-09-17 LAB
ANION GAP SERPL CALC-SCNC: 8 MMOL/L — SIGNIFICANT CHANGE UP (ref 5–17)
BLD GP AB SCN SERPL QL: SIGNIFICANT CHANGE UP
BLD GP AB SCN SERPL QL: SIGNIFICANT CHANGE UP
BUN SERPL-MCNC: 5 MG/DL — LOW (ref 7–23)
CALCIUM SERPL-MCNC: 9.4 MG/DL — SIGNIFICANT CHANGE UP (ref 8.5–10.1)
CHLORIDE SERPL-SCNC: 97 MMOL/L — SIGNIFICANT CHANGE UP (ref 96–108)
CO2 SERPL-SCNC: 30 MMOL/L — SIGNIFICANT CHANGE UP (ref 22–31)
CREAT SERPL-MCNC: 0.43 MG/DL — LOW (ref 0.5–1.3)
CULTURE RESULTS: SIGNIFICANT CHANGE UP
GLUCOSE BLDC GLUCOMTR-MCNC: 101 MG/DL — HIGH (ref 70–99)
GLUCOSE BLDC GLUCOMTR-MCNC: 108 MG/DL — HIGH (ref 70–99)
GLUCOSE BLDC GLUCOMTR-MCNC: 117 MG/DL — HIGH (ref 70–99)
GLUCOSE BLDC GLUCOMTR-MCNC: 98 MG/DL — SIGNIFICANT CHANGE UP (ref 70–99)
GLUCOSE SERPL-MCNC: 103 MG/DL — HIGH (ref 70–99)
HCT VFR BLD CALC: 21.8 % — LOW (ref 39–50)
HGB BLD-MCNC: 7 G/DL — CRITICAL LOW (ref 13–17)
MAGNESIUM SERPL-MCNC: 1.5 MG/DL — LOW (ref 1.6–2.6)
MCHC RBC-ENTMCNC: 31.7 PG — SIGNIFICANT CHANGE UP (ref 27–34)
MCHC RBC-ENTMCNC: 32.1 GM/DL — SIGNIFICANT CHANGE UP (ref 32–36)
MCV RBC AUTO: 98.6 FL — SIGNIFICANT CHANGE UP (ref 80–100)
NRBC # BLD: 0 /100 WBCS — SIGNIFICANT CHANGE UP (ref 0–0)
PHOSPHATE SERPL-MCNC: 3.5 MG/DL — SIGNIFICANT CHANGE UP (ref 2.5–4.5)
PLATELET # BLD AUTO: 330 K/UL — SIGNIFICANT CHANGE UP (ref 150–400)
POTASSIUM SERPL-MCNC: 3.4 MMOL/L — LOW (ref 3.5–5.3)
POTASSIUM SERPL-SCNC: 3.4 MMOL/L — LOW (ref 3.5–5.3)
RBC # BLD: 2.21 M/UL — LOW (ref 4.2–5.8)
RBC # FLD: 15.2 % — HIGH (ref 10.3–14.5)
SODIUM SERPL-SCNC: 135 MMOL/L — SIGNIFICANT CHANGE UP (ref 135–145)
SPECIMEN SOURCE: SIGNIFICANT CHANGE UP
WBC # BLD: 16.98 K/UL — HIGH (ref 3.8–10.5)
WBC # FLD AUTO: 16.98 K/UL — HIGH (ref 3.8–10.5)

## 2020-09-17 PROCEDURE — 99291 CRITICAL CARE FIRST HOUR: CPT

## 2020-09-17 PROCEDURE — 93971 EXTREMITY STUDY: CPT | Mod: 26,RT

## 2020-09-17 RX ORDER — MAGNESIUM SULFATE 500 MG/ML
2 VIAL (ML) INJECTION ONCE
Refills: 0 | Status: DISCONTINUED | OUTPATIENT
Start: 2020-09-17 | End: 2020-09-17

## 2020-09-17 RX ORDER — POTASSIUM CHLORIDE 20 MEQ
40 PACKET (EA) ORAL ONCE
Refills: 0 | Status: COMPLETED | OUTPATIENT
Start: 2020-09-17 | End: 2020-09-17

## 2020-09-17 RX ORDER — PANTOPRAZOLE SODIUM 20 MG/1
40 TABLET, DELAYED RELEASE ORAL DAILY
Refills: 0 | Status: DISCONTINUED | OUTPATIENT
Start: 2020-09-18 | End: 2020-10-26

## 2020-09-17 RX ORDER — MAGNESIUM SULFATE 500 MG/ML
2 VIAL (ML) INJECTION ONCE
Refills: 0 | Status: COMPLETED | OUTPATIENT
Start: 2020-09-17 | End: 2020-09-17

## 2020-09-17 RX ORDER — ACETAMINOPHEN 500 MG
650 TABLET ORAL ONCE
Refills: 0 | Status: COMPLETED | OUTPATIENT
Start: 2020-09-17 | End: 2020-09-17

## 2020-09-17 RX ADMIN — Medication 250 MILLIGRAM(S): at 17:19

## 2020-09-17 RX ADMIN — Medication 50 GRAM(S): at 07:27

## 2020-09-17 RX ADMIN — Medication 100 MILLIGRAM(S): at 11:17

## 2020-09-17 RX ADMIN — Medication 1 TABLET(S): at 11:17

## 2020-09-17 RX ADMIN — CHLORHEXIDINE GLUCONATE 15 MILLILITER(S): 213 SOLUTION TOPICAL at 05:57

## 2020-09-17 RX ADMIN — Medication 650 MILLIGRAM(S): at 18:27

## 2020-09-17 RX ADMIN — PANTOPRAZOLE SODIUM 40 MILLIGRAM(S): 20 TABLET, DELAYED RELEASE ORAL at 11:17

## 2020-09-17 RX ADMIN — Medication 1 MILLIGRAM(S): at 11:17

## 2020-09-17 RX ADMIN — Medication 250 MILLIGRAM(S): at 06:03

## 2020-09-17 RX ADMIN — Medication 650 MILLIGRAM(S): at 16:16

## 2020-09-17 RX ADMIN — CHLORHEXIDINE GLUCONATE 1 APPLICATION(S): 213 SOLUTION TOPICAL at 06:03

## 2020-09-17 RX ADMIN — ENOXAPARIN SODIUM 40 MILLIGRAM(S): 100 INJECTION SUBCUTANEOUS at 11:17

## 2020-09-17 RX ADMIN — CHLORHEXIDINE GLUCONATE 15 MILLILITER(S): 213 SOLUTION TOPICAL at 17:26

## 2020-09-17 RX ADMIN — Medication 40 MILLIEQUIVALENT(S): at 08:12

## 2020-09-17 NOTE — PROGRESS NOTE ADULT - SUBJECTIVE AND OBJECTIVE BOX
Interval Events:  Hgb drop today to 6.7  Relatively calm this morning  Tachycardia improved    REVIEW OF SYSTEMS:  [ ] All other systems negative  [x] Unable to assess ROS because patient is critically ill with poor mentation    OBJECTIVE:  ICU Vital Signs Last 24 Hrs  T(C): 37.4 (17 Sep 2020 12:42), Max: 38.6 (17 Sep 2020 04:00)  T(F): 99.3 (17 Sep 2020 12:42), Max: 101.4 (17 Sep 2020 04:00)  HR: 119 (17 Sep 2020 12:42) (89 - 145)  BP: 134/81 (17 Sep 2020 12:42) (125/66 - 167/117)  BP(mean): 93 (17 Sep 2020 12:42) (79 - 148)  ABP: --  ABP(mean): --  RR: 18 (17 Sep 2020 12:42) (14 - 26)  SpO2: 100% (17 Sep 2020 12:06) (98% - 100%)    Mode: AC/ CMV (Assist Control/ Continuous Mandatory Ventilation), RR (machine): 16, TV (machine): 450, FiO2: 50, PEEP: 8, ITime: 1, MAP: 12, PIP: 21    09-16 @ 07:01  -  09-17 @ 07:00  --------------------------------------------------------  IN: 250 mL / OUT: 4050 mL / NET: -3800 mL    09-17 @ 07:01 - 09-17 @ 13:00  --------------------------------------------------------  IN: 380 mL / OUT: 1000 mL / NET: -620 mL      CAPILLARY BLOOD GLUCOSE      POCT Blood Glucose.: 101 mg/dL (17 Sep 2020 11:12)      PHYSICAL EXAM:  General: NAD  HEENT: Tracheostomy in place  Neck: Supple  Respiratory: CTAB  Cardiovascular: Tachycardic, no murmur, anasarca  Abdomen: PEG in place, soft, nontender  Extremities: Warm  Skin: RUE erythema  Neurological: A&Ox0      [facility] MEDICATIONS:  enoxaparin Injectable 40 milliGRAM(s) SubCutaneous daily    cefTRIAXone   IVPB 1000 milliGRAM(s) IV Intermittent every 24 hours  vancomycin  IVPB 1000 milliGRAM(s) IV Intermittent every 12 hours    midodrine 5 milliGRAM(s) Oral every 8 hours    dextrose 40% Gel 15 Gram(s) Oral once PRN  dextrose 50% Injectable 12.5 Gram(s) IV Push once  dextrose 50% Injectable 25 Gram(s) IV Push once  dextrose 50% Injectable 25 Gram(s) IV Push once  glucagon  Injectable 1 milliGRAM(s) IntraMuscular once PRN  insulin lispro (HumaLOG) corrective regimen sliding scale   SubCutaneous every 6 hours    albuterol/ipratropium for Nebulization 3 milliLiter(s) Nebulizer every 6 hours PRN    acetaminophen  Suppository .. 650 milliGRAM(s) Rectal every 6 hours PRN  diazepam    Tablet 10 milliGRAM(s) Oral every 8 hours  methadone    Tablet 15 milliGRAM(s) Oral every 6 hours  QUEtiapine 100 milliGRAM(s) Oral two times a day    senna 2 Tablet(s) Oral at bedtime PRN        dextrose 5%. 1000 milliLiter(s) IV Continuous <Continuous>  folic acid 1 milliGRAM(s) Oral daily  multivitamin 1 Tablet(s) Oral daily  thiamine 100 milliGRAM(s) Oral daily      chlorhexidine 0.12% Liquid 15 milliLiter(s) Oral Mucosa every 12 hours  chlorhexidine 4% Liquid 1 Application(s) Topical <User Schedule>        LABS:                        6.7    16.03 )-----------( 311      ( 17 Sep 2020 10:00 )             20.9     Hgb Trend: 6.7<--, 7.0<--, 7.4<--, 7.4<--, 7.2<--  09-17    135  |  97  |  5<L>  ----------------------------<  103<H>  3.4<L>   |  30  |  0.43<L>    Ca    9.4      17 Sep 2020 05:54  Phos  3.5     09-17  Mg     1.5     09-17    TPro  7.1  /  Alb  1.5<L>  /  TBili  1.8<H>  /  DBili  x   /  AST  86<H>  /  ALT  32  /  AlkPhos  306<H>  09-16    Creatinine Trend: 0.43<--, 0.39<--, 0.24<--, <0.15<--, 0.34<--, 0.33<--      Arterial Blood Gas:  09-16 @ 08:53  --/49/60/33/90/8.0  ABG lactate: --        MICROBIOLOGY:     RADIOLOGY:  [ ] Reviewed and interpreted by me    ASSESSMENT AND RECOMMENDATION:    44 M with history of alcohol use disorder c/b pancreatitis admitted for necrotizing pancreatitis with course c/b septic shock, acute hypoxemic respiratory failure, acute respiratory distress syndrome requiring intubation s/p tracheostomy, delirium tremens, and small bowel obstruction. He is off vasopressors and sedation. Attempts to wean sedation marked by multiple episodes of agitation. Gradual improvement.    NEURO: Encephalopathy: hepatic vs ICU delirium - diazepam, methadone. switch zyprexa to po seroquel.  PULM: Tracheostomy in place - daily SBT  CVS: Persistent borderline hypotension - improved - taper midodrine down to 5 mg Q8H  RENAL: Volume overload - diuresing well with daily lasix  GI: Ileus - restart trickle feeds  FEN: TF, thiamine, multivitamin, folate  ID: Klebsiella bacteremia - continue ceftriaxone (9/14) - repeat cultures negative - now with RUE cellulitis - added vancomycin (9/16)  HEME: anemia to 6.7 - no source of bleed - transfuse today  Code Status Full

## 2020-09-18 LAB
ANION GAP SERPL CALC-SCNC: 6 MMOL/L — SIGNIFICANT CHANGE UP (ref 5–17)
ANION GAP SERPL CALC-SCNC: 7 MMOL/L — SIGNIFICANT CHANGE UP (ref 5–17)
BUN SERPL-MCNC: 3 MG/DL — LOW (ref 7–23)
BUN SERPL-MCNC: 4 MG/DL — LOW (ref 7–23)
CALCIUM SERPL-MCNC: 9.5 MG/DL — SIGNIFICANT CHANGE UP (ref 8.5–10.1)
CALCIUM SERPL-MCNC: 9.9 MG/DL — SIGNIFICANT CHANGE UP (ref 8.5–10.1)
CHLORIDE SERPL-SCNC: 97 MMOL/L — SIGNIFICANT CHANGE UP (ref 96–108)
CHLORIDE SERPL-SCNC: 98 MMOL/L — SIGNIFICANT CHANGE UP (ref 96–108)
CO2 SERPL-SCNC: 32 MMOL/L — HIGH (ref 22–31)
CO2 SERPL-SCNC: 33 MMOL/L — HIGH (ref 22–31)
CREAT SERPL-MCNC: 0.49 MG/DL — LOW (ref 0.5–1.3)
CREAT SERPL-MCNC: 0.5 MG/DL — SIGNIFICANT CHANGE UP (ref 0.5–1.3)
GLUCOSE BLDC GLUCOMTR-MCNC: 105 MG/DL — HIGH (ref 70–99)
GLUCOSE BLDC GLUCOMTR-MCNC: 111 MG/DL — HIGH (ref 70–99)
GLUCOSE BLDC GLUCOMTR-MCNC: 116 MG/DL — HIGH (ref 70–99)
GLUCOSE BLDC GLUCOMTR-MCNC: 117 MG/DL — HIGH (ref 70–99)
GLUCOSE SERPL-MCNC: 101 MG/DL — HIGH (ref 70–99)
GLUCOSE SERPL-MCNC: 111 MG/DL — HIGH (ref 70–99)
HCT VFR BLD CALC: 30 % — LOW (ref 39–50)
HGB BLD-MCNC: 9.8 G/DL — LOW (ref 13–17)
MAGNESIUM SERPL-MCNC: 1.9 MG/DL — SIGNIFICANT CHANGE UP (ref 1.6–2.6)
MAGNESIUM SERPL-MCNC: 2.3 MG/DL — SIGNIFICANT CHANGE UP (ref 1.6–2.6)
MCHC RBC-ENTMCNC: 29.7 PG — SIGNIFICANT CHANGE UP (ref 27–34)
MCHC RBC-ENTMCNC: 32.7 GM/DL — SIGNIFICANT CHANGE UP (ref 32–36)
MCV RBC AUTO: 90.9 FL — SIGNIFICANT CHANGE UP (ref 80–100)
NRBC # BLD: 0 /100 WBCS — SIGNIFICANT CHANGE UP (ref 0–0)
PHOSPHATE SERPL-MCNC: 3.5 MG/DL — SIGNIFICANT CHANGE UP (ref 2.5–4.5)
PHOSPHATE SERPL-MCNC: 4.4 MG/DL — SIGNIFICANT CHANGE UP (ref 2.5–4.5)
PLATELET # BLD AUTO: 348 K/UL — SIGNIFICANT CHANGE UP (ref 150–400)
POTASSIUM SERPL-MCNC: 3.3 MMOL/L — LOW (ref 3.5–5.3)
POTASSIUM SERPL-MCNC: 3.8 MMOL/L — SIGNIFICANT CHANGE UP (ref 3.5–5.3)
POTASSIUM SERPL-SCNC: 3.3 MMOL/L — LOW (ref 3.5–5.3)
POTASSIUM SERPL-SCNC: 3.8 MMOL/L — SIGNIFICANT CHANGE UP (ref 3.5–5.3)
RBC # BLD: 3.3 M/UL — LOW (ref 4.2–5.8)
RBC # FLD: 17.4 % — HIGH (ref 10.3–14.5)
SODIUM SERPL-SCNC: 136 MMOL/L — SIGNIFICANT CHANGE UP (ref 135–145)
SODIUM SERPL-SCNC: 137 MMOL/L — SIGNIFICANT CHANGE UP (ref 135–145)
VANCOMYCIN TROUGH SERPL-MCNC: 9.2 UG/ML — LOW (ref 10–20)
WBC # BLD: 13.01 K/UL — HIGH (ref 3.8–10.5)
WBC # FLD AUTO: 13.01 K/UL — HIGH (ref 3.8–10.5)

## 2020-09-18 PROCEDURE — 99291 CRITICAL CARE FIRST HOUR: CPT

## 2020-09-18 RX ORDER — POTASSIUM CHLORIDE 20 MEQ
40 PACKET (EA) ORAL EVERY 4 HOURS
Refills: 0 | Status: COMPLETED | OUTPATIENT
Start: 2020-09-18 | End: 2020-09-18

## 2020-09-18 RX ORDER — MAGNESIUM SULFATE 500 MG/ML
2 VIAL (ML) INJECTION
Refills: 0 | Status: COMPLETED | OUTPATIENT
Start: 2020-09-18 | End: 2020-09-18

## 2020-09-18 RX ORDER — DIAZEPAM 5 MG
10 TABLET ORAL EVERY 12 HOURS
Refills: 0 | Status: DISCONTINUED | OUTPATIENT
Start: 2020-09-18 | End: 2020-09-20

## 2020-09-18 RX ORDER — ACETAMINOPHEN 500 MG
650 TABLET ORAL EVERY 6 HOURS
Refills: 0 | Status: DISCONTINUED | OUTPATIENT
Start: 2020-09-18 | End: 2020-10-26

## 2020-09-18 RX ORDER — POTASSIUM CHLORIDE 20 MEQ
40 PACKET (EA) ORAL EVERY 6 HOURS
Refills: 0 | Status: DISCONTINUED | OUTPATIENT
Start: 2020-09-18 | End: 2020-09-18

## 2020-09-18 RX ORDER — METHADONE HYDROCHLORIDE 40 MG/1
10 TABLET ORAL EVERY 6 HOURS
Refills: 0 | Status: DISCONTINUED | OUTPATIENT
Start: 2020-09-18 | End: 2020-09-24

## 2020-09-18 RX ORDER — FUROSEMIDE 40 MG
40 TABLET ORAL ONCE
Refills: 0 | Status: COMPLETED | OUTPATIENT
Start: 2020-09-18 | End: 2020-09-18

## 2020-09-18 RX ORDER — POTASSIUM CHLORIDE 20 MEQ
40 PACKET (EA) ORAL ONCE
Refills: 0 | Status: COMPLETED | OUTPATIENT
Start: 2020-09-18 | End: 2020-09-18

## 2020-09-18 RX ORDER — VANCOMYCIN HCL 1 G
1250 VIAL (EA) INTRAVENOUS EVERY 12 HOURS
Refills: 0 | Status: DISCONTINUED | OUTPATIENT
Start: 2020-09-18 | End: 2020-09-20

## 2020-09-18 RX ADMIN — CEFTRIAXONE 100 MILLIGRAM(S): 500 INJECTION, POWDER, FOR SOLUTION INTRAMUSCULAR; INTRAVENOUS at 23:27

## 2020-09-18 RX ADMIN — Medication 650 MILLIGRAM(S): at 05:00

## 2020-09-18 RX ADMIN — CHLORHEXIDINE GLUCONATE 1 APPLICATION(S): 213 SOLUTION TOPICAL at 06:09

## 2020-09-18 RX ADMIN — CHLORHEXIDINE GLUCONATE 15 MILLILITER(S): 213 SOLUTION TOPICAL at 17:30

## 2020-09-18 RX ADMIN — Medication 50 GRAM(S): at 02:51

## 2020-09-18 RX ADMIN — Medication 40 MILLIEQUIVALENT(S): at 01:48

## 2020-09-18 RX ADMIN — METHADONE HYDROCHLORIDE 10 MILLIGRAM(S): 40 TABLET ORAL at 23:26

## 2020-09-18 RX ADMIN — Medication 10 MILLIGRAM(S): at 17:30

## 2020-09-18 RX ADMIN — Medication 250 MILLIGRAM(S): at 06:51

## 2020-09-18 RX ADMIN — Medication 650 MILLIGRAM(S): at 04:15

## 2020-09-18 RX ADMIN — METHADONE HYDROCHLORIDE 10 MILLIGRAM(S): 40 TABLET ORAL at 17:30

## 2020-09-18 RX ADMIN — PANTOPRAZOLE SODIUM 40 MILLIGRAM(S): 20 TABLET, DELAYED RELEASE ORAL at 11:14

## 2020-09-18 RX ADMIN — ENOXAPARIN SODIUM 40 MILLIGRAM(S): 100 INJECTION SUBCUTANEOUS at 11:13

## 2020-09-18 RX ADMIN — Medication 40 MILLIEQUIVALENT(S): at 07:31

## 2020-09-18 RX ADMIN — CHLORHEXIDINE GLUCONATE 15 MILLILITER(S): 213 SOLUTION TOPICAL at 05:48

## 2020-09-18 RX ADMIN — CEFTRIAXONE 100 MILLIGRAM(S): 500 INJECTION, POWDER, FOR SOLUTION INTRAMUSCULAR; INTRAVENOUS at 00:03

## 2020-09-18 RX ADMIN — Medication 40 MILLIEQUIVALENT(S): at 05:47

## 2020-09-18 RX ADMIN — Medication 1 TABLET(S): at 11:14

## 2020-09-18 RX ADMIN — Medication 100 MILLIGRAM(S): at 11:13

## 2020-09-18 RX ADMIN — Medication 40 MILLIGRAM(S): at 04:16

## 2020-09-18 RX ADMIN — Medication 50 GRAM(S): at 01:48

## 2020-09-18 RX ADMIN — Medication 1 MILLIGRAM(S): at 11:13

## 2020-09-18 RX ADMIN — Medication 166.67 MILLIGRAM(S): at 18:10

## 2020-09-18 NOTE — PROGRESS NOTE ADULT - ATTENDING COMMENTS
Agree with above  Alcoholic pancreatitis c/b ARDS, now improving  Prolonged respiratory failure s/p trache / PEG  Klebsiella pneumonia on ceftriaxone  Empirically on vanco for cellulitis RUE  Titrate midodrine to off  Remains on pressure support. Keep on for as long as tolerated. Once tolerating for 24 hours, will do trials of trache collar.  PT eval / OOB  Titrate feeds upwards    I have personally provided 45 minutes of critical care time.

## 2020-09-18 NOTE — PROGRESS NOTE ADULT - ASSESSMENT
KS is a 44M PMH alcohol abuse, hx of alcohol withdrawal and alcohol pancreatitis who presented for abdominal pain. Admitted with necrotizing pancreatitis, sepsis, septic shock with course complicated by acute hypoxic respiratory failure secondary to PNA and ARDS requiring intubation and DTs requiring sedation and small bowel obstruction conservatively managed with NGT and NPO. Currently, s/p trach and PEG on 9/10 and treated for klebsiella bacteremia. The patient is making gradual clinical improvements in delirium, respiratory failure, and infection.     1. NEURO  - d/angela Precedex, Seroquel since 9/15  - cont  Zyprexa 10mg q12r, methadone 15mg  - decrease Valium from 10mg to 5mg  - DTs due to alcohol withdrawal is over and agitation now is more due to delirium from being in ICU and with underlying medical illness and infection    2. CV  - off IV pressors  - decrease midodrine from 15mg to 10mg  - cont Lasix 40mg for overfluid  - net output of -3750ml over 24 hrs  - cont monitor BP and I&O      3. PULM  - s/p trach 9/10 POD #6  - cont weaning as tolerates   - currently on FIo2 on 50% and PEEP 8    4. GI  - SBO resolved  - no BM for 2 days  - KUB (9/15) showed nonobstructive distended bowels  - cont lactulose  - d/c erythromycin  - start trickle feed today    5. ID  - s/p course of vanco and meropenem for both PNA and necrotizing pancreatitis  - however had recurrent fevers and leukocytosis, and found to have blood cx + for Klebsiella  - left arm midline d/angela on 9/12 with underlying bacteremia  - new peripheral IV was obtained in R forearm  - midline was inserted on L arm on 9/15  - de-escalated antibx from meropenem to ceftriaxone as klebsiella is sensitive to it  - blood culture from 9/12 negative  - cont ceftriaxone for Klebsiella  - cont vanco for cellulitis on right arm    6. RENAL  - metabolic alkalosis resolved  - s/p diamox x1 on 9/9  - cont to monitor bicarb levels and blood gas as needed  - cont Lasix  - monitor daily I&O    7. SKIN  - unstageable pressure ulcer on sacral area; no sign of necrosis or infection noted on exam  - wound care f/u not required  - daily dressing change     7. GEN  - full code  - once off from IV sedatives and ventilator, consider downgrading to regular floor           KS is a 44M PMH alcohol abuse, hx of alcohol withdrawal and alcohol pancreatitis who presented for abdominal pain. Admitted with necrotizing pancreatitis, sepsis, septic shock with course complicated by acute hypoxic respiratory failure secondary to PNA and ARDS requiring intubation and DTs requiring sedation and small bowel obstruction conservatively managed with NGT and NPO. Currently, s/p trach and PEG on 9/10 and treated for klebsiella bacteremia. The patient is making gradual clinical improvements in delirium, respiratory failure, and infection.     1. NEURO  - Encephalopathy: hepatic vs. ICU delirium  - dec diazepam, methadone  - switched from Zyprexa to PO Seroquel 9/17  - d/angela Precedex since 9/15    2. CV  - off IV pressors  - decrease midodrine from 10mg to 5mg  - cont Lasix 40mg for overfluid  - net output of -1852ml over 24 hrs  - cont monitor BP and I&O      3. PULM  - s/p trach 9/10 POD #8  - cont weaning as tolerates   - currently on FIo2 on 50% and PEEP 8  - daily SBT    4. GI  - SBO resolved  - last BM yesterday  - cont lactulose  - increase feed from 20cc/hr to 40cc/hr    5. ID  - s/p course of vanco and meropenem for both PNA and necrotizing pancreatitis  - however had recurrent fevers and leukocytosis, and found to have blood cx + for Klebsiella  - left arm midline d/angela on 9/12 with underlying bacteremia  - new peripheral IV was obtained in R forearm  - midline was inserted on L arm on 9/15  - de-escalated antibx from meropenem to ceftriaxone as klebsiella is sensitive to it  - blood culture from 9/12 negative  - cont ceftriaxone for Klebsiella  - cont vanco for cellulitis on right arm    6. RENAL  - metabolic alkalosis resolved  - s/p diamox x1 on 9/9  - cont to monitor bicarb levels and blood gas as needed  - cont Lasix  - monitor daily I&O    7. SKIN  - unstageable pressure ulcer on sacral area; no sign of necrosis or infection noted on exam  - daily dressing change   - wound care consult    7. GEN  - full code  - PT consult  - encourage a patient to get out of bed if possible  - once off from IV sedatives and ventilator, consider downgrading to regular floor           KS is a 44M PMH alcohol abuse, hx of alcohol withdrawal and alcohol pancreatitis who presented for abdominal pain. Admitted with necrotizing pancreatitis, sepsis, septic shock with course complicated by acute hypoxic respiratory failure secondary to PNA and ARDS requiring intubation and DTs requiring sedation and small bowel obstruction conservatively managed with NGT and NPO. Currently, s/p trach and PEG on 9/10 and treated for klebsiella bacteremia. The patient is making gradual clinical improvements in delirium, respiratory failure, and infection.     1. NEURO  - Encephalopathy: hepatic vs. ICU delirium  - decrease diazepam 10mg q8r to q12r  - cont methadone  - switched from Zyprexa to PO Seroquel 9/17  - d/angela Precedex since 9/15    2. CV  - off IV pressors  - d/c midodrine  - net output of -1852ml over 24 hrs  - cont monitor BP and I&O      3. PULM  - s/p trach 9/10 POD #8  - cont pressure support and weaning as tolerates   - currently on FIo2 on 50% and PEEP 8    4. GI  - SBO resolved  - last BM yesterday  - cont lactulose  - increase feed from 20cc/hr to 30cc/hr    5. ID  - s/p course of vanco and meropenem for both PNA and necrotizing pancreatitis  - however had recurrent fevers and leukocytosis, and found to have blood cx + for Klebsiella  - left arm midline d/angela on 9/12 with underlying bacteremia  - new peripheral IV was obtained in R forearm  - midline was inserted on L arm on 9/15  - de-escalated antibx from meropenem to ceftriaxone as klebsiella is sensitive to it  - blood culture from 9/12 negative  - cont ceftriaxone for Klebsiella  - Van level is low; increase vanc from 1000mg to 1250mg q12r  - cont vanco for cellulitis on right arm    6. RENAL  - metabolic alkalosis resolved  - s/p diamox x1 on 9/9  - cont to monitor bicarb levels and blood gas as needed  - cont Lasix  - monitor daily I&O    7. SKIN  - unstageable pressure ulcer on sacral area; no sign of necrosis or infection noted on exam  - daily dressing change   - wound care consult    7. GEN  - full code  - PT consult  - plan to move a patient to a chair, if possible  - once off from IV sedatives and ventilator, consider downgrading to regular floor

## 2020-09-18 NOTE — PROGRESS NOTE ADULT - SUBJECTIVE AND OBJECTIVE BOX
24 hr events:  The patient was seen at bedside. He seemed calm on appearance. He was awake but not alert and oriented. The patient continued to stay off from Precedex. No event of agitation occurred. However, the patient had fever (Tmax 101.1) and was a bit tachycardic. He has been fed via PEG at 20cc/hr with no complication. He had two BMs yesterday but has not had overnight. He is producing adequate urine.     ## ROS:  [x] unable to obtain due to mechanical ventilation    ## Labs:  CBC:                        9.8    13.01 )-----------( 348      ( 18 Sep 2020 05:42 )             30.0       Chem:  09-18    137  |  97  |  3<L>  ----------------------------<  101<H>  3.3<L>   |  33<H>  |  0.50    Ca    9.5      18 Sep 2020 05:42  Phos  3.5     09-18  Mg     2.3     09-18    Culture - Blood in AM (09.12.20 @ 11:17)   Culture Results: No growth to date.     Culture - Urine (09.12.20 @ 00:47)    Specimen Source: .Urine Catheterized    Culture Results: No growth    Culture - Sputum . (09.11.20 @ 16:29)    Specimen Source: .Sputum Sputum    Culture Results: No growth at 48 hours      Culture - Blood (09.10.20 @ 20:56)    -  Klebsiella pneumoniae: Detec    Specimen Source: .Blood Blood    Organism: Blood Culture PCR    Organism: Klebsiella pneumoniae    Culture Results: Growth in aerobic bottle: Klebsiella pneumoniae    Vancomycin Level, Trough: 9.2    ## Imaging:  KUB< from: Xray Kidney Ureter Bladder (09.15.20 @ 09:45) >  IMPRESSION: No dilated bowel is identified. There is nonobstructive bowel gas pattern.    Catheter projecting over the left abdomen may represent a percutaneous gastric feeding tube.    Rectal tube is again noted.      ## Medications:  MEDICATIONS  (STANDING):  cefTRIAXone   IVPB 1000 milliGRAM(s) IV Intermittent every 24 hours  chlorhexidine 0.12% Liquid 15 milliLiter(s) Oral Mucosa every 12 hours  chlorhexidine 4% Liquid 1 Application(s) Topical <User Schedule>  dextrose 5%. 1000 milliLiter(s) (50 mL/Hr) IV Continuous <Continuous>  dextrose 50% Injectable 12.5 Gram(s) IV Push once  dextrose 50% Injectable 25 Gram(s) IV Push once  dextrose 50% Injectable 25 Gram(s) IV Push once  diazepam    Tablet 10 milliGRAM(s) Oral every 8 hours  enoxaparin Injectable 40 milliGRAM(s) SubCutaneous daily  folic acid 1 milliGRAM(s) Oral daily  insulin lispro (HumaLOG) corrective regimen sliding scale   SubCutaneous every 6 hours  methadone    Tablet 15 milliGRAM(s) Oral every 6 hours  midodrine 5 milliGRAM(s) Oral every 8 hours  multivitamin 1 Tablet(s) Oral daily  pantoprazole   Suspension 40 milliGRAM(s) Oral daily  QUEtiapine 100 milliGRAM(s) Oral two times a day  thiamine 100 milliGRAM(s) Oral daily  vancomycin  IVPB 1000 milliGRAM(s) IV Intermittent every 12 hours    MEDICATIONS  (PRN):  acetaminophen   Tablet .. 650 milliGRAM(s) Oral every 6 hours PRN Temp greater or equal to 38C (100.4F), Mild Pain (1 - 3)  albuterol/ipratropium for Nebulization 3 milliLiter(s) Nebulizer every 6 hours PRN Shortness of Breath and/or Wheezing  dextrose 40% Gel 15 Gram(s) Oral once PRN Blood Glucose LESS THAN 70 milliGRAM(s)/deciliter  glucagon  Injectable 1 milliGRAM(s) IntraMuscular once PRN Glucose LESS THAN 70 milligrams/deciliter  senna 2 Tablet(s) Oral at bedtime PRN Constipation      ## Vitals:  ICU Vital Signs Last 24 Hrs  T(C): 37.6 (18 Sep 2020 07:38), Max: 38.4 (18 Sep 2020 05:10)  T(F): 99.7 (18 Sep 2020 07:38), Max: 101.1 (18 Sep 2020 05:10)  HR: 112 (18 Sep 2020 07:30) (108 - 145)  BP: 133/81 (18 Sep 2020 07:00) (118/67 - 166/93)  BP(mean): 92 (18 Sep 2020 07:00) (78 - 148)  RR: 16 (18 Sep 2020 07:30) (14 - 23)  SpO2: 100% (18 Sep 2020 07:30) (99% - 100%)    Vent: Mode: Auto Mode: AC/CMV, RR (machine): 16, TV (machine): 450, FiO2: 50, PEEP: 8, PIP: 21     I&O's Summary    17 Sep 2020 07:01  -  18 Sep 2020 07:00  --------------------------------------------------------  IN: 1548 mL / OUT: 3400 mL / NET: -1852 mL      ## P/E:  Gen: lying comfortably in bed, not in distress  Neuro: arousable to verbal stimuli, not alert & oriented, didn't follow simple commands  HEENT: PERRL, trach in place  Resp: clear to auscultation b/l  CVS: tachycardic, S1S2 with no murmur or gallop  Abd: soft tympanic, distended, bowel sound present, tenderness on palpation at RUQ  Ext: distal pulses present, bilateral upper and lower extremity 2+ pitting edema, no cyanosis      CENTRAL LINE: [ ] YES [x] NO  LOCATION:   DATE INSERTED:  REMOVE: [ ] YES [ ] NO      QUINTEROS: [ ] YES [x] NO    DATE INSERTED:  REMOVE:  [x] YES [ ] NO    A-LINE:  [ ] YES [x] NO  LOCATION:   DATE INSERTED:  REMOVE:  [ ] YES [ ] NO  EXPLAIN:    GLOBAL ISSUE/BEST PRACTICE:  Analgesia: n/a  Sedation: off from precedex  HOB elevation: yes  Stress ulcer prophylaxis: Protonix  VTE prophylaxis: lovenox  Oral Care: n/a  Glycemic control: sliding scale coverage  Nutrition: increase feeds from 20cc/hr to 40cc/hr    CODE STATUS: [x] full code  [ ] DNR  [ ] DNI  [ ] MOLST  Goals of care discussion: [x] yes 24 hr events:  The patient was seen at bedside. He seemed calm on appearance. The patient  was awake but not alert and oriented.  Because of low hemoglobin (Hgb 6.7), the patient received 2 units of blood yesterday. The patient has not been on any sedatives for past 4 days. No event of agitation occurred. However, the patient had fever (Tmax 101.1) and was a bit tachycardic. He has been fed via PEG at 20cc/hr with no complication. He had two BMs yesterday but has not had any BM overnight. He is producing adequate urine.     ## ROS:  [x] unable to obtain due to mechanical ventilation    ## Labs:  CBC:                        9.8    13.01 )-----------( 348      ( 18 Sep 2020 05:42 )             30.0       Chem:  09-18    137  |  97  |  3<L>  ----------------------------<  101<H>  3.3<L>   |  33<H>  |  0.50    Ca    9.5      18 Sep 2020 05:42  Phos  3.5     09-18  Mg     2.3     09-18    Culture - Blood in AM (09.12.20 @ 11:17)   Culture Results: No growth to date.     Culture - Urine (09.12.20 @ 00:47)    Specimen Source: .Urine Catheterized    Culture Results: No growth    Culture - Sputum . (09.11.20 @ 16:29)    Specimen Source: .Sputum Sputum    Culture Results: No growth at 48 hours      Culture - Blood (09.10.20 @ 20:56)    -  Klebsiella pneumoniae: Detec    Specimen Source: .Blood Blood    Organism: Blood Culture PCR    Organism: Klebsiella pneumoniae    Culture Results: Growth in aerobic bottle: Klebsiella pneumoniae    Vancomycin Level, Trough: 9.2    ## Imaging:  KUB< from: Xray Kidney Ureter Bladder (09.15.20 @ 09:45) >  IMPRESSION: No dilated bowel is identified. There is nonobstructive bowel gas pattern.    Catheter projecting over the left abdomen may represent a percutaneous gastric feeding tube.    Rectal tube is again noted.      ## Medications:  MEDICATIONS  (STANDING):  cefTRIAXone   IVPB 1000 milliGRAM(s) IV Intermittent every 24 hours  chlorhexidine 0.12% Liquid 15 milliLiter(s) Oral Mucosa every 12 hours  chlorhexidine 4% Liquid 1 Application(s) Topical <User Schedule>  dextrose 5%. 1000 milliLiter(s) (50 mL/Hr) IV Continuous <Continuous>  dextrose 50% Injectable 12.5 Gram(s) IV Push once  dextrose 50% Injectable 25 Gram(s) IV Push once  dextrose 50% Injectable 25 Gram(s) IV Push once  diazepam    Tablet 10 milliGRAM(s) Oral every 8 hours  enoxaparin Injectable 40 milliGRAM(s) SubCutaneous daily  folic acid 1 milliGRAM(s) Oral daily  insulin lispro (HumaLOG) corrective regimen sliding scale   SubCutaneous every 6 hours  methadone    Tablet 15 milliGRAM(s) Oral every 6 hours  midodrine 5 milliGRAM(s) Oral every 8 hours  multivitamin 1 Tablet(s) Oral daily  pantoprazole   Suspension 40 milliGRAM(s) Oral daily  QUEtiapine 100 milliGRAM(s) Oral two times a day  thiamine 100 milliGRAM(s) Oral daily  vancomycin  IVPB 1000 milliGRAM(s) IV Intermittent every 12 hours    MEDICATIONS  (PRN):  acetaminophen   Tablet .. 650 milliGRAM(s) Oral every 6 hours PRN Temp greater or equal to 38C (100.4F), Mild Pain (1 - 3)  albuterol/ipratropium for Nebulization 3 milliLiter(s) Nebulizer every 6 hours PRN Shortness of Breath and/or Wheezing  dextrose 40% Gel 15 Gram(s) Oral once PRN Blood Glucose LESS THAN 70 milliGRAM(s)/deciliter  glucagon  Injectable 1 milliGRAM(s) IntraMuscular once PRN Glucose LESS THAN 70 milligrams/deciliter  senna 2 Tablet(s) Oral at bedtime PRN Constipation      ## Vitals:  ICU Vital Signs Last 24 Hrs  T(C): 37.6 (18 Sep 2020 07:38), Max: 38.4 (18 Sep 2020 05:10)  T(F): 99.7 (18 Sep 2020 07:38), Max: 101.1 (18 Sep 2020 05:10)  HR: 112 (18 Sep 2020 07:30) (108 - 145)  BP: 133/81 (18 Sep 2020 07:00) (118/67 - 166/93)  BP(mean): 92 (18 Sep 2020 07:00) (78 - 148)  RR: 16 (18 Sep 2020 07:30) (14 - 23)  SpO2: 100% (18 Sep 2020 07:30) (99% - 100%)    Vent: Mode: Auto Mode: AC/CMV, RR (machine): 16, TV (machine): 450, FiO2: 50, PEEP: 8, PIP: 21     I&O's Summary    17 Sep 2020 07:01  -  18 Sep 2020 07:00  --------------------------------------------------------  IN: 1548 mL / OUT: 3400 mL / NET: -1852 mL      ## P/E:  Gen: lying comfortably in bed, not in distress  Neuro: arousable to verbal stimuli, not alert & oriented, didn't follow simple commands  HEENT: PERRL, trach in place  Resp: clear to auscultation b/l  CVS: tachycardic, S1S2 with no murmur or gallop  Abd: soft tympanic, distended, bowel sound present, tenderness on palpation at RUQ  Ext: distal pulses present, bilateral upper and lower extremity 2+ pitting edema, no cyanosis      CENTRAL LINE: [ ] YES [x] NO  LOCATION:   DATE INSERTED:  REMOVE: [ ] YES [ ] NO      QUINTEROS: [ ] YES [x] NO    DATE INSERTED:  REMOVE:  [x] YES [ ] NO    A-LINE:  [ ] YES [x] NO  LOCATION:   DATE INSERTED:  REMOVE:  [ ] YES [ ] NO  EXPLAIN:    GLOBAL ISSUE/BEST PRACTICE:  Analgesia: n/a  Sedation: off from precedex  HOB elevation: yes  Stress ulcer prophylaxis: Protonix  VTE prophylaxis: lovenox  Oral Care: n/a  Glycemic control: sliding scale coverage  Nutrition: increase feeds from 20cc/hr to 40cc/hr    CODE STATUS: [x] full code  [ ] DNR  [ ] DNI  [ ] MOLST  Goals of care discussion: [x] yes 24 hr events:  The patient was seen at bedside. He seemed calm on appearance. The patient  was awake but not alert and oriented.  Because of low hemoglobin (Hgb 6.7), the patient received 2 units of blood yesterday. The patient has not been on any sedatives for past 4 days. No event of agitation occurred. However, the patient had fever (Tmax 101.1) and was a bit tachycardic. He has been fed via PEG at 20cc/hr with no complication. He had two BMs yesterday but has not had any BM overnight. He is producing adequate urine.     ## ROS:  [x] unable to obtain due to mechanical ventilation    ## Labs:  CBC:                        9.8    13.01 )-----------( 348      ( 18 Sep 2020 05:42 )             30.0       Chem:  09-18    137  |  97  |  3<L>  ----------------------------<  101<H>  3.3<L>   |  33<H>  |  0.50    Ca    9.5      18 Sep 2020 05:42  Phos  3.5     09-18  Mg     2.3     09-18    Culture - Blood in AM (09.12.20 @ 11:17)   Culture Results: No growth to date.     Culture - Urine (09.12.20 @ 00:47)    Specimen Source: .Urine Catheterized    Culture Results: No growth    Culture - Sputum . (09.11.20 @ 16:29)    Specimen Source: .Sputum Sputum    Culture Results: No growth at 48 hours      Culture - Blood (09.10.20 @ 20:56)    -  Klebsiella pneumoniae: Detec    Specimen Source: .Blood Blood    Organism: Blood Culture PCR    Organism: Klebsiella pneumoniae    Culture Results: Growth in aerobic bottle: Klebsiella pneumoniae    Vancomycin Level, Trough: 9.2    ## Imaging:  KUB< from: Xray Kidney Ureter Bladder (09.15.20 @ 09:45) >  IMPRESSION: No dilated bowel is identified. There is nonobstructive bowel gas pattern.    Catheter projecting over the left abdomen may represent a percutaneous gastric feeding tube.    Rectal tube is again noted.      ## Medications:  MEDICATIONS  (STANDING):  cefTRIAXone   IVPB 1000 milliGRAM(s) IV Intermittent every 24 hours  chlorhexidine 0.12% Liquid 15 milliLiter(s) Oral Mucosa every 12 hours  chlorhexidine 4% Liquid 1 Application(s) Topical <User Schedule>  dextrose 5%. 1000 milliLiter(s) (50 mL/Hr) IV Continuous <Continuous>  dextrose 50% Injectable 12.5 Gram(s) IV Push once  dextrose 50% Injectable 25 Gram(s) IV Push once  dextrose 50% Injectable 25 Gram(s) IV Push once  diazepam    Tablet 10 milliGRAM(s) Oral every 8 hours  enoxaparin Injectable 40 milliGRAM(s) SubCutaneous daily  folic acid 1 milliGRAM(s) Oral daily  insulin lispro (HumaLOG) corrective regimen sliding scale   SubCutaneous every 6 hours  methadone    Tablet 15 milliGRAM(s) Oral every 6 hours  midodrine 5 milliGRAM(s) Oral every 8 hours  multivitamin 1 Tablet(s) Oral daily  pantoprazole   Suspension 40 milliGRAM(s) Oral daily  QUEtiapine 100 milliGRAM(s) Oral two times a day  thiamine 100 milliGRAM(s) Oral daily  vancomycin  IVPB 1000 milliGRAM(s) IV Intermittent every 12 hours    MEDICATIONS  (PRN):  acetaminophen   Tablet .. 650 milliGRAM(s) Oral every 6 hours PRN Temp greater or equal to 38C (100.4F), Mild Pain (1 - 3)  albuterol/ipratropium for Nebulization 3 milliLiter(s) Nebulizer every 6 hours PRN Shortness of Breath and/or Wheezing  dextrose 40% Gel 15 Gram(s) Oral once PRN Blood Glucose LESS THAN 70 milliGRAM(s)/deciliter  glucagon  Injectable 1 milliGRAM(s) IntraMuscular once PRN Glucose LESS THAN 70 milligrams/deciliter  senna 2 Tablet(s) Oral at bedtime PRN Constipation      ## Vitals:  ICU Vital Signs Last 24 Hrs  T(C): 37.6 (18 Sep 2020 07:38), Max: 38.4 (18 Sep 2020 05:10)  T(F): 99.7 (18 Sep 2020 07:38), Max: 101.1 (18 Sep 2020 05:10)  HR: 112 (18 Sep 2020 07:30) (108 - 145)  BP: 133/81 (18 Sep 2020 07:00) (118/67 - 166/93)  BP(mean): 92 (18 Sep 2020 07:00) (78 - 148)  RR: 16 (18 Sep 2020 07:30) (14 - 23)  SpO2: 100% (18 Sep 2020 07:30) (99% - 100%)    Vent: Mode: Auto Mode: AC/CMV, RR (machine): 16, TV (machine): 450, FiO2: 50, PEEP: 8, PIP: 21     I&O's Summary    17 Sep 2020 07:01  -  18 Sep 2020 07:00  --------------------------------------------------------  IN: 1548 mL / OUT: 3400 mL / NET: -1852 mL      ## P/E:  Gen: lying comfortably in bed, not in distress  Neuro: arousable to verbal stimuli, not alert & oriented, didn't follow simple commands  HEENT: PERRL, trach in place  Resp: clear to auscultation b/l  CVS: tachycardic, S1S2 with no murmur or gallop  Abd: soft tympanic, distended, bowel sound present, tenderness on palpation at RUQ  Ext: distal pulses present, bilateral upper and lower extremity 2+ pitting edema, no cyanosis  Skin: erythematous, tenderness on right antecubital site      CENTRAL LINE: [ ] YES [x] NO  LOCATION:   DATE INSERTED:  REMOVE: [ ] YES [ ] NO      QUINTEROS: [ ] YES [x] NO    DATE INSERTED:  REMOVE:  [x] YES [ ] NO    A-LINE:  [ ] YES [x] NO  LOCATION:   DATE INSERTED:  REMOVE:  [ ] YES [ ] NO  EXPLAIN:    GLOBAL ISSUE/BEST PRACTICE:  Analgesia: n/a  Sedation: off from precedex  HOB elevation: yes  Stress ulcer prophylaxis: Protonix  VTE prophylaxis: lovenox  Oral Care: n/a  Glycemic control: sliding scale coverage  Nutrition: increase feeds from 20cc/hr to 40cc/hr    CODE STATUS: [x] full code  [ ] DNR  [ ] DNI  [ ] MOLST  Goals of care discussion: [x] yes

## 2020-09-19 LAB
ALBUMIN SERPL ELPH-MCNC: 1.4 G/DL — LOW (ref 3.3–5)
ALP SERPL-CCNC: 236 U/L — HIGH (ref 40–120)
ALT FLD-CCNC: 25 U/L — SIGNIFICANT CHANGE UP (ref 12–78)
ANION GAP SERPL CALC-SCNC: 5 MMOL/L — SIGNIFICANT CHANGE UP (ref 5–17)
AST SERPL-CCNC: 69 U/L — HIGH (ref 15–37)
BILIRUB SERPL-MCNC: 1.6 MG/DL — HIGH (ref 0.2–1.2)
BUN SERPL-MCNC: 5 MG/DL — LOW (ref 7–23)
CALCIUM SERPL-MCNC: 9.4 MG/DL — SIGNIFICANT CHANGE UP (ref 8.5–10.1)
CHLORIDE SERPL-SCNC: 101 MMOL/L — SIGNIFICANT CHANGE UP (ref 96–108)
CO2 SERPL-SCNC: 32 MMOL/L — HIGH (ref 22–31)
CREAT SERPL-MCNC: 0.45 MG/DL — LOW (ref 0.5–1.3)
GLUCOSE BLDC GLUCOMTR-MCNC: 104 MG/DL — HIGH (ref 70–99)
GLUCOSE BLDC GLUCOMTR-MCNC: 119 MG/DL — HIGH (ref 70–99)
GLUCOSE BLDC GLUCOMTR-MCNC: 93 MG/DL — SIGNIFICANT CHANGE UP (ref 70–99)
GLUCOSE SERPL-MCNC: 93 MG/DL — SIGNIFICANT CHANGE UP (ref 70–99)
HCT VFR BLD CALC: 26.1 % — LOW (ref 39–50)
HGB BLD-MCNC: 8.5 G/DL — LOW (ref 13–17)
MAGNESIUM SERPL-MCNC: 1.4 MG/DL — LOW (ref 1.6–2.6)
MCHC RBC-ENTMCNC: 30.1 PG — SIGNIFICANT CHANGE UP (ref 27–34)
MCHC RBC-ENTMCNC: 32.6 GM/DL — SIGNIFICANT CHANGE UP (ref 32–36)
MCV RBC AUTO: 92.6 FL — SIGNIFICANT CHANGE UP (ref 80–100)
NRBC # BLD: 0 /100 WBCS — SIGNIFICANT CHANGE UP (ref 0–0)
PHOSPHATE SERPL-MCNC: 3.2 MG/DL — SIGNIFICANT CHANGE UP (ref 2.5–4.5)
PLATELET # BLD AUTO: 290 K/UL — SIGNIFICANT CHANGE UP (ref 150–400)
POTASSIUM SERPL-MCNC: 3.5 MMOL/L — SIGNIFICANT CHANGE UP (ref 3.5–5.3)
POTASSIUM SERPL-SCNC: 3.5 MMOL/L — SIGNIFICANT CHANGE UP (ref 3.5–5.3)
PROT SERPL-MCNC: 6.9 GM/DL — SIGNIFICANT CHANGE UP (ref 6–8.3)
RBC # BLD: 2.82 M/UL — LOW (ref 4.2–5.8)
RBC # FLD: 17.3 % — HIGH (ref 10.3–14.5)
SODIUM SERPL-SCNC: 138 MMOL/L — SIGNIFICANT CHANGE UP (ref 135–145)
WBC # BLD: 11.16 K/UL — HIGH (ref 3.8–10.5)
WBC # FLD AUTO: 11.16 K/UL — HIGH (ref 3.8–10.5)

## 2020-09-19 PROCEDURE — 99233 SBSQ HOSP IP/OBS HIGH 50: CPT

## 2020-09-19 RX ORDER — COLLAGENASE CLOSTRIDIUM HIST. 250 UNIT/G
1 OINTMENT (GRAM) TOPICAL DAILY
Refills: 0 | Status: DISCONTINUED | OUTPATIENT
Start: 2020-09-19 | End: 2020-10-26

## 2020-09-19 RX ORDER — POTASSIUM CHLORIDE 20 MEQ
40 PACKET (EA) ORAL ONCE
Refills: 0 | Status: COMPLETED | OUTPATIENT
Start: 2020-09-19 | End: 2020-09-19

## 2020-09-19 RX ORDER — POTASSIUM CHLORIDE 20 MEQ
40 PACKET (EA) ORAL ONCE
Refills: 0 | Status: DISCONTINUED | OUTPATIENT
Start: 2020-09-19 | End: 2020-09-19

## 2020-09-19 RX ORDER — MAGNESIUM SULFATE 500 MG/ML
2 VIAL (ML) INJECTION ONCE
Refills: 0 | Status: COMPLETED | OUTPATIENT
Start: 2020-09-19 | End: 2020-09-19

## 2020-09-19 RX ADMIN — ENOXAPARIN SODIUM 40 MILLIGRAM(S): 100 INJECTION SUBCUTANEOUS at 12:18

## 2020-09-19 RX ADMIN — Medication 40 MILLIEQUIVALENT(S): at 05:43

## 2020-09-19 RX ADMIN — METHADONE HYDROCHLORIDE 10 MILLIGRAM(S): 40 TABLET ORAL at 05:09

## 2020-09-19 RX ADMIN — Medication 100 MILLIGRAM(S): at 12:18

## 2020-09-19 RX ADMIN — CHLORHEXIDINE GLUCONATE 15 MILLILITER(S): 213 SOLUTION TOPICAL at 17:54

## 2020-09-19 RX ADMIN — Medication 10 MILLIGRAM(S): at 05:09

## 2020-09-19 RX ADMIN — Medication 166.67 MILLIGRAM(S): at 17:56

## 2020-09-19 RX ADMIN — Medication 10 MILLIGRAM(S): at 17:54

## 2020-09-19 RX ADMIN — METHADONE HYDROCHLORIDE 10 MILLIGRAM(S): 40 TABLET ORAL at 12:17

## 2020-09-19 RX ADMIN — Medication 1 TABLET(S): at 12:18

## 2020-09-19 RX ADMIN — Medication 1 MILLIGRAM(S): at 12:18

## 2020-09-19 RX ADMIN — Medication 1 APPLICATION(S): at 17:54

## 2020-09-19 RX ADMIN — PROPOFOL 20 MILLIGRAM(S): 10 INJECTION, EMULSION INTRAVENOUS at 20:40

## 2020-09-19 RX ADMIN — Medication 50 GRAM(S): at 05:43

## 2020-09-19 RX ADMIN — CHLORHEXIDINE GLUCONATE 15 MILLILITER(S): 213 SOLUTION TOPICAL at 05:09

## 2020-09-19 RX ADMIN — CHLORHEXIDINE GLUCONATE 1 APPLICATION(S): 213 SOLUTION TOPICAL at 04:30

## 2020-09-19 RX ADMIN — METHADONE HYDROCHLORIDE 10 MILLIGRAM(S): 40 TABLET ORAL at 17:54

## 2020-09-19 RX ADMIN — Medication 166.67 MILLIGRAM(S): at 05:09

## 2020-09-19 RX ADMIN — PANTOPRAZOLE SODIUM 40 MILLIGRAM(S): 20 TABLET, DELAYED RELEASE ORAL at 12:18

## 2020-09-19 NOTE — PROGRESS NOTE ADULT - ASSESSMENT
KS is a 44M PMH alcohol abuse, hx of alcohol withdrawal and alcohol pancreatitis who presented for abdominal pain. Admitted with necrotizing pancreatitis, sepsis, septic shock with course complicated by acute hypoxic respiratory failure secondary to PNA and ARDS requiring intubation and DTs requiring sedation and small bowel obstruction conservatively managed with NGT and NPO. Currently, s/p trach and PEG on 9/10 and treated for klebsiella bacteremia. The patient is making gradual clinical improvements in delirium, respiratory failure, and infection.     1. NEURO  - Encephalopathy: hepatic vs. ICU delirium  - decrease diazepam 10mg q8r to q12r  - cont methadone  - switched from Zyprexa to PO Seroquel 9/17  - d/angela Precedex since 9/15    2. CV  - off IV pressors  - d/c midodrine  - net output of -1852ml over 24 hrs  - cont monitor BP and I&O      3. PULM  - s/p trach 9/10 POD #8  - cont pressure support and weaning as tolerates   - currently on FIo2 on 50% and PEEP 8    4. GI  - SBO resolved  - last BM yesterday  - cont lactulose  - increase feed from 20cc/hr to 30cc/hr    5. ID  - s/p course of vanco and meropenem for both PNA and necrotizing pancreatitis  - however had recurrent fevers and leukocytosis, and found to have blood cx + for Klebsiella  - left arm midline d/angela on 9/12 with underlying bacteremia  - new peripheral IV was obtained in R forearm  - midline was inserted on L arm on 9/15  - de-escalated antibx from meropenem to ceftriaxone as klebsiella is sensitive to it  - blood culture from 9/12 negative  - cont ceftriaxone for Klebsiella  - Van level is low; increase vanc from 1000mg to 1250mg q12r  - cont vanco for cellulitis on right arm    6. RENAL  - metabolic alkalosis resolved  - s/p diamox x1 on 9/9  - cont to monitor bicarb levels and blood gas as needed  - cont Lasix  - monitor daily I&O    7. SKIN  - unstageable pressure ulcer on sacral area; no sign of necrosis or infection noted on exam  - daily dressing change   - wound care consult    7. GEN  - full code  - PT consult  - plan to move a patient to a chair, if possible  - once off from IV sedatives and ventilator, consider downgrading to regular floor

## 2020-09-19 NOTE — PROGRESS NOTE ADULT - SUBJECTIVE AND OBJECTIVE BOX
Overnight:  Placed on full vent overnight.  Remains minimally responsive.    ## ROS:  [x] unable to obtain due to mechanical ventilation    ## Labs:    Culture - Blood in AM (09.12.20 @ 11:17)   Culture Results: No growth to date.     Culture - Urine (09.12.20 @ 00:47)    Specimen Source: .Urine Catheterized    Culture Results: No growth    Culture - Sputum . (09.11.20 @ 16:29)    Specimen Source: .Sputum Sputum    Culture Results: No growth at 48 hours      Culture - Blood (09.10.20 @ 20:56)    -  Klebsiella pneumoniae: Detec    Specimen Source: .Blood Blood    Organism: Blood Culture PCR    Organism: Klebsiella pneumoniae    Culture Results: Growth in aerobic bottle: Klebsiella pneumoniae    Vancomycin Level, Trough: 9.2    ## Imaging:  KUB< from: Xray Kidney Ureter Bladder (09.15.20 @ 09:45) >  IMPRESSION: No dilated bowel is identified. There is nonobstructive bowel gas pattern.    Catheter projecting over the left abdomen may represent a percutaneous gastric feeding tube.    Rectal tube is again noted.      ## Medications:  MEDICATIONS  (STANDING):  cefTRIAXone   IVPB 1000 milliGRAM(s) IV Intermittent every 24 hours  chlorhexidine 0.12% Liquid 15 milliLiter(s) Oral Mucosa every 12 hours  chlorhexidine 4% Liquid 1 Application(s) Topical <User Schedule>  dextrose 5%. 1000 milliLiter(s) (50 mL/Hr) IV Continuous <Continuous>  dextrose 50% Injectable 12.5 Gram(s) IV Push once  dextrose 50% Injectable 25 Gram(s) IV Push once  dextrose 50% Injectable 25 Gram(s) IV Push once  diazepam    Tablet 10 milliGRAM(s) Oral every 8 hours  enoxaparin Injectable 40 milliGRAM(s) SubCutaneous daily  folic acid 1 milliGRAM(s) Oral daily  insulin lispro (HumaLOG) corrective regimen sliding scale   SubCutaneous every 6 hours  methadone    Tablet 15 milliGRAM(s) Oral every 6 hours  midodrine 5 milliGRAM(s) Oral every 8 hours  multivitamin 1 Tablet(s) Oral daily  pantoprazole   Suspension 40 milliGRAM(s) Oral daily  QUEtiapine 100 milliGRAM(s) Oral two times a day  thiamine 100 milliGRAM(s) Oral daily  vancomycin  IVPB 1000 milliGRAM(s) IV Intermittent every 12 hours    MEDICATIONS  (PRN):  acetaminophen   Tablet .. 650 milliGRAM(s) Oral every 6 hours PRN Temp greater or equal to 38C (100.4F), Mild Pain (1 - 3)  albuterol/ipratropium for Nebulization 3 milliLiter(s) Nebulizer every 6 hours PRN Shortness of Breath and/or Wheezing  dextrose 40% Gel 15 Gram(s) Oral once PRN Blood Glucose LESS THAN 70 milliGRAM(s)/deciliter  glucagon  Injectable 1 milliGRAM(s) IntraMuscular once PRN Glucose LESS THAN 70 milligrams/deciliter  senna 2 Tablet(s) Oral at bedtime PRN Constipation      ## Vitals:  ICU Vital Signs Last 24 Hrs  T(C): 37.6 (18 Sep 2020 07:38), Max: 38.4 (18 Sep 2020 05:10)  T(F): 99.7 (18 Sep 2020 07:38), Max: 101.1 (18 Sep 2020 05:10)  HR: 112 (18 Sep 2020 07:30) (108 - 145)  BP: 133/81 (18 Sep 2020 07:00) (118/67 - 166/93)  BP(mean): 92 (18 Sep 2020 07:00) (78 - 148)  RR: 16 (18 Sep 2020 07:30) (14 - 23)  SpO2: 100% (18 Sep 2020 07:30) (99% - 100%)    Vent: Mode: Auto Mode: AC/CMV, RR (machine): 16, TV (machine): 450, FiO2: 50, PEEP: 8, PIP: 21     I&O's Summary    17 Sep 2020 07:01  -  18 Sep 2020 07:00  --------------------------------------------------------  IN: 1548 mL / OUT: 3400 mL / NET: -1852 mL      ## P/E:  Gen: lying comfortably in bed, not in distress  Neuro: arousable to verbal stimuli, not alert & oriented, didn't follow simple commands  HEENT: PERRL, trach in place  Resp: clear to auscultation b/l  CVS: tachycardic, S1S2 with no murmur or gallop  Abd: soft tympanic, distended, bowel sound present, tenderness on palpation at RUQ  Ext: distal pulses present, bilateral upper and lower extremity 2+ pitting edema, no cyanosis  Skin: erythematous, tenderness on right antecubital site      CENTRAL LINE: [ ] YES [x] NO  LOCATION:   DATE INSERTED:  REMOVE: [ ] YES [ ] NO      QUINTEROS: [ ] YES [x] NO    DATE INSERTED:  REMOVE:  [x] YES [ ] NO    A-LINE:  [ ] YES [x] NO  LOCATION:   DATE INSERTED:  REMOVE:  [ ] YES [ ] NO  EXPLAIN:    GLOBAL ISSUE/BEST PRACTICE:  Analgesia: n/a  Sedation: off from precedex  HOB elevation: yes  Stress ulcer prophylaxis: Protonix  VTE prophylaxis: lovenox  Oral Care: n/a  Glycemic control: sliding scale coverage  Nutrition: increase feeds from 20cc/hr to 40cc/hr    CODE STATUS: [x] full code  [ ] DNR  [ ] DNI  [ ] MOLST  Goals of care discussion: [x] yes

## 2020-09-20 LAB
ALBUMIN SERPL ELPH-MCNC: 1.5 G/DL — LOW (ref 3.3–5)
ALP SERPL-CCNC: 211 U/L — HIGH (ref 40–120)
ALT FLD-CCNC: 20 U/L — SIGNIFICANT CHANGE UP (ref 12–78)
ANION GAP SERPL CALC-SCNC: 5 MMOL/L — SIGNIFICANT CHANGE UP (ref 5–17)
AST SERPL-CCNC: 78 U/L — HIGH (ref 15–37)
BASOPHILS # BLD AUTO: 0.06 K/UL — SIGNIFICANT CHANGE UP (ref 0–0.2)
BASOPHILS NFR BLD AUTO: 0.5 % — SIGNIFICANT CHANGE UP (ref 0–2)
BILIRUB SERPL-MCNC: 1.2 MG/DL — SIGNIFICANT CHANGE UP (ref 0.2–1.2)
BUN SERPL-MCNC: 6 MG/DL — LOW (ref 7–23)
CALCIUM SERPL-MCNC: 10 MG/DL — SIGNIFICANT CHANGE UP (ref 8.5–10.1)
CHLORIDE SERPL-SCNC: 104 MMOL/L — SIGNIFICANT CHANGE UP (ref 96–108)
CO2 SERPL-SCNC: 30 MMOL/L — SIGNIFICANT CHANGE UP (ref 22–31)
CREAT SERPL-MCNC: 0.61 MG/DL — SIGNIFICANT CHANGE UP (ref 0.5–1.3)
EOSINOPHIL # BLD AUTO: 1.23 K/UL — HIGH (ref 0–0.5)
EOSINOPHIL NFR BLD AUTO: 10 % — HIGH (ref 0–6)
GLUCOSE BLDC GLUCOMTR-MCNC: 105 MG/DL — HIGH (ref 70–99)
GLUCOSE BLDC GLUCOMTR-MCNC: 107 MG/DL — HIGH (ref 70–99)
GLUCOSE BLDC GLUCOMTR-MCNC: 111 MG/DL — HIGH (ref 70–99)
GLUCOSE BLDC GLUCOMTR-MCNC: 130 MG/DL — HIGH (ref 70–99)
GLUCOSE SERPL-MCNC: 102 MG/DL — HIGH (ref 70–99)
HCT VFR BLD CALC: 28.7 % — LOW (ref 39–50)
HGB BLD-MCNC: 8.7 G/DL — LOW (ref 13–17)
IMM GRANULOCYTES NFR BLD AUTO: 0.7 % — SIGNIFICANT CHANGE UP (ref 0–1.5)
LYMPHOCYTES # BLD AUTO: 1.35 K/UL — SIGNIFICANT CHANGE UP (ref 1–3.3)
LYMPHOCYTES # BLD AUTO: 11 % — LOW (ref 13–44)
MAGNESIUM SERPL-MCNC: 1.6 MG/DL — SIGNIFICANT CHANGE UP (ref 1.6–2.6)
MCHC RBC-ENTMCNC: 29.8 PG — SIGNIFICANT CHANGE UP (ref 27–34)
MCHC RBC-ENTMCNC: 30.3 GM/DL — LOW (ref 32–36)
MCV RBC AUTO: 98.3 FL — SIGNIFICANT CHANGE UP (ref 80–100)
MONOCYTES # BLD AUTO: 1.19 K/UL — HIGH (ref 0–0.9)
MONOCYTES NFR BLD AUTO: 9.7 % — SIGNIFICANT CHANGE UP (ref 2–14)
NEUTROPHILS # BLD AUTO: 8.36 K/UL — HIGH (ref 1.8–7.4)
NEUTROPHILS NFR BLD AUTO: 68.1 % — SIGNIFICANT CHANGE UP (ref 43–77)
NRBC # BLD: 0 /100 WBCS — SIGNIFICANT CHANGE UP (ref 0–0)
PHOSPHATE SERPL-MCNC: 3.9 MG/DL — SIGNIFICANT CHANGE UP (ref 2.5–4.5)
PLATELET # BLD AUTO: 284 K/UL — SIGNIFICANT CHANGE UP (ref 150–400)
POTASSIUM SERPL-MCNC: 4.2 MMOL/L — SIGNIFICANT CHANGE UP (ref 3.5–5.3)
POTASSIUM SERPL-SCNC: 4.2 MMOL/L — SIGNIFICANT CHANGE UP (ref 3.5–5.3)
PROT SERPL-MCNC: 7 GM/DL — SIGNIFICANT CHANGE UP (ref 6–8.3)
RBC # BLD: 2.92 M/UL — LOW (ref 4.2–5.8)
RBC # FLD: 17.1 % — HIGH (ref 10.3–14.5)
SODIUM SERPL-SCNC: 139 MMOL/L — SIGNIFICANT CHANGE UP (ref 135–145)
VANCOMYCIN TROUGH SERPL-MCNC: 14.8 UG/ML — SIGNIFICANT CHANGE UP (ref 10–20)
WBC # BLD: 12.27 K/UL — HIGH (ref 3.8–10.5)
WBC # FLD AUTO: 12.27 K/UL — HIGH (ref 3.8–10.5)

## 2020-09-20 PROCEDURE — 99233 SBSQ HOSP IP/OBS HIGH 50: CPT

## 2020-09-20 PROCEDURE — 99232 SBSQ HOSP IP/OBS MODERATE 35: CPT

## 2020-09-20 RX ORDER — CEFAZOLIN SODIUM 1 G
1000 VIAL (EA) INJECTION EVERY 8 HOURS
Refills: 0 | Status: DISCONTINUED | OUTPATIENT
Start: 2020-09-20 | End: 2020-09-29

## 2020-09-20 RX ORDER — PROPOFOL 10 MG/ML
20 INJECTION, EMULSION INTRAVENOUS ONCE
Refills: 0 | Status: COMPLETED | OUTPATIENT
Start: 2020-09-20 | End: 2020-09-19

## 2020-09-20 RX ORDER — CEFAZOLIN SODIUM 1 G
1000 VIAL (EA) INJECTION ONCE
Refills: 0 | Status: COMPLETED | OUTPATIENT
Start: 2020-09-20 | End: 2020-09-20

## 2020-09-20 RX ORDER — MAGNESIUM SULFATE 500 MG/ML
1 VIAL (ML) INJECTION ONCE
Refills: 0 | Status: COMPLETED | OUTPATIENT
Start: 2020-09-20 | End: 2020-09-20

## 2020-09-20 RX ORDER — CEFAZOLIN SODIUM 1 G
VIAL (EA) INJECTION
Refills: 0 | Status: DISCONTINUED | OUTPATIENT
Start: 2020-09-20 | End: 2020-09-29

## 2020-09-20 RX ORDER — ERYTHROMYCIN ETHYLSUCCINATE 400 MG
500 TABLET ORAL EVERY 6 HOURS
Refills: 0 | Status: DISCONTINUED | OUTPATIENT
Start: 2020-09-20 | End: 2020-09-24

## 2020-09-20 RX ORDER — DIAZEPAM 5 MG
10 TABLET ORAL DAILY
Refills: 0 | Status: DISCONTINUED | OUTPATIENT
Start: 2020-09-20 | End: 2020-09-23

## 2020-09-20 RX ADMIN — Medication 100 MILLIGRAM(S): at 11:28

## 2020-09-20 RX ADMIN — Medication 10 MILLIGRAM(S): at 05:28

## 2020-09-20 RX ADMIN — Medication 1 APPLICATION(S): at 11:28

## 2020-09-20 RX ADMIN — Medication 100 GRAM(S): at 05:28

## 2020-09-20 RX ADMIN — Medication 1 TABLET(S): at 11:28

## 2020-09-20 RX ADMIN — ENOXAPARIN SODIUM 40 MILLIGRAM(S): 100 INJECTION SUBCUTANEOUS at 11:29

## 2020-09-20 RX ADMIN — Medication 250 MILLIGRAM(S): at 11:30

## 2020-09-20 RX ADMIN — CHLORHEXIDINE GLUCONATE 1 APPLICATION(S): 213 SOLUTION TOPICAL at 03:53

## 2020-09-20 RX ADMIN — Medication 650 MILLIGRAM(S): at 09:01

## 2020-09-20 RX ADMIN — CHLORHEXIDINE GLUCONATE 15 MILLILITER(S): 213 SOLUTION TOPICAL at 17:08

## 2020-09-20 RX ADMIN — Medication 166.67 MILLIGRAM(S): at 05:28

## 2020-09-20 RX ADMIN — METHADONE HYDROCHLORIDE 10 MILLIGRAM(S): 40 TABLET ORAL at 11:28

## 2020-09-20 RX ADMIN — METHADONE HYDROCHLORIDE 10 MILLIGRAM(S): 40 TABLET ORAL at 00:22

## 2020-09-20 RX ADMIN — CHLORHEXIDINE GLUCONATE 15 MILLILITER(S): 213 SOLUTION TOPICAL at 05:27

## 2020-09-20 RX ADMIN — Medication 250 MILLIGRAM(S): at 17:08

## 2020-09-20 RX ADMIN — PANTOPRAZOLE SODIUM 40 MILLIGRAM(S): 20 TABLET, DELAYED RELEASE ORAL at 11:30

## 2020-09-20 RX ADMIN — Medication 100 MILLIGRAM(S): at 22:58

## 2020-09-20 RX ADMIN — METHADONE HYDROCHLORIDE 10 MILLIGRAM(S): 40 TABLET ORAL at 18:10

## 2020-09-20 RX ADMIN — METHADONE HYDROCHLORIDE 10 MILLIGRAM(S): 40 TABLET ORAL at 05:28

## 2020-09-20 RX ADMIN — Medication 1 MILLIGRAM(S): at 11:28

## 2020-09-20 RX ADMIN — CEFTRIAXONE 100 MILLIGRAM(S): 500 INJECTION, POWDER, FOR SOLUTION INTRAMUSCULAR; INTRAVENOUS at 00:21

## 2020-09-20 RX ADMIN — Medication 650 MILLIGRAM(S): at 10:00

## 2020-09-20 NOTE — PROGRESS NOTE ADULT - ASSESSMENT
44M etOH pancreatitis p/w abdominal pain, found to have necrotizing pancreatitis c/b septic shock and chronic respiratory failure requiring trache / PEG (9/10)  - Continue to titrate benzos downwards, given diminished mental status  - Once benzos are lower, can then start titrating down methadone as well (as not on it at home)  - c/w Seroquel  - off pressors  - Tolerated PS 5/5 for >24 hours; transition to trache collar, can place back on PS QHS and PRN if he tires.  - High residuals, re-started erythromycin  - s/p course of ceftriaxone for Klebsiella.  - cellulitis / thrombophlebitis: no significant improvement on vancomycin, making MRSA less likely etiology. Most cases of cellulitis are likely Streptococci, for which penicillin or Ancef should be sufficient. There is no need to prescribe a beta-lactam/beta-lactamase combination, because no Streptococcus makes a beta-lactamase.    Transfer to general medicine floor.

## 2020-09-20 NOTE — PROGRESS NOTE ADULT - SUBJECTIVE AND OBJECTIVE BOX
Overnight:  Remained on pressure support all night    ## ROS:  [x] unable to obtain due to mechanical ventilation    ## Labs:  ** CBC: **             8.7    12.27 )-----------( 284      ( 20 Sep 2020 04:18 )             28.7     ** Chem:  ** 09-20  139  |  104  |  6<L>  ----------------------------<  102<H>  4.2   |  30  |  0.61    Ca    10.0      20 Sep 2020 04:18  Phos  3.9     09-20  Mg     1.6     09-20    TPro  7.0  /  Alb  1.5<L>  /  TBili  1.2  /  DBili  x   /  AST  78<H>  /  ALT  20  /  AlkPhos  211<H>  09-20    POCT Blood Glucose.: 107 mg/dL (20 Sep 2020 16:29)  POCT Blood Glucose.: 105 mg/dL (20 Sep 2020 11:40)  POCT Blood Glucose.: 130 mg/dL (20 Sep 2020 05:42)  POCT Blood Glucose.: 111 mg/dL (20 Sep 2020 00:25)  POCT Blood Glucose.: 119 mg/dL (19 Sep 2020 17:45)    Culture - Urine (09.12.20 @ 00:47): Culture Results: No growth  Culture - Sputum . (09.11.20 @ 16:29): Culture Results: No growth at 48 hours  Culture - Blood (09.10.20 @ 20:56)    Culture Results: Growth in aerobic bottle: Klebsiella pneumoniae    ## Imaging: No new imaging    ## Meds:  ceFAZolin   IVPB 1000 milliGRAM(s) IV Intermittent every 8 hours  erythromycin   IVPB 500 milliGRAM(s) IV Intermittent every 6 hours    albuterol/ipratropium for Nebulization 3 milliLiter(s) Nebulizer every 6 hours PRN    acetaminophen   Tablet .. 650 milliGRAM(s) Oral every 6 hours PRN  diazepam    Tablet 10 milliGRAM(s) Oral daily  methadone    Tablet 10 milliGRAM(s) Oral every 6 hours  QUEtiapine 100 milliGRAM(s) Oral two times a day      enoxaparin Injectable 40 milliGRAM(s) SubCutaneous daily    pantoprazole   Suspension 40 milliGRAM(s) Oral daily  senna 2 Tablet(s) Oral at bedtime PRN      dextrose 40% Gel 15 Gram(s) Oral once PRN  dextrose 50% Injectable 12.5 Gram(s) IV Push once  dextrose 50% Injectable 25 Gram(s) IV Push once  dextrose 50% Injectable 25 Gram(s) IV Push once  glucagon  Injectable 1 milliGRAM(s) IntraMuscular once PRN  insulin lispro (HumaLOG) corrective regimen sliding scale   SubCutaneous every 6 hours    ## O/E:  T(F): 99.5 (09-20-20 @ 15:45), Max: 100.7 (09-20-20 @ 09:00)  HR: 116 (09-20-20 @ 16:46) (101 - 122)  BP: 126/82 (09-20-20 @ 15:00) (109/69 - 132/85)  RR: 9 (09-20-20 @ 15:00) (9 - 18)  SpO2: 95% (09-20-20 @ 16:46) (95% - 100%)  IN: 1800 mL / OUT: 1860 mL / NET: -60 mL  Mode: standby,trach collar 40% humidified 02, FiO2: 40    ## P/E:  Gen: lying comfortably in bed, not in distress  Neuro: minimally responsive, but opens to voice  HEENT: PERRL, trach in place  Resp: clear to auscultation b/l  CVS: tachycardic, S1S2 with no murmur or gallop  Abd: soft NT/ND +BS  Ext: 1+ edema throughout cellulitis RUE over tattoo    CODE STATUS: [x] full code  [ ] DNR  [ ] DNI  [ ] MOLST  Goals of care discussion: [x] yes

## 2020-09-20 NOTE — PROGRESS NOTE ADULT - ASSESSMENT
44M PMH alcohol abuse, hx of alcohol withdrawal and alcohol pancreatitis who presented for abdominal pain. Admitted with necrotizing pancreatitis, sepsis, septic shock with course complicated by acute hypoxic respiratory failure secondary to PNA and ARDS requiring intubation and DTs requiring sedation and small bowel obstruction conservatively managed with NGT and NPO. Currently, s/p trach and PEG on 9/10 and treated for klebsiella bacteremia. The patient is making gradual clinical improvements in delirium, respiratory failure, and infection.     # Hepatic / Metabolic Encephalopathy - Supportive care.  Taper diazepam 10mg daily.  Taper Methadone, Seroquel.    # Acute on Chronic Hypoxic Respiratory Failure - difficulty weaning, s/p trach 9/10.  Weaning / trach collar trials.  Pulmonary evaluation requested with Dr. Gilmer Moody.  # Pneumonia / Necrotizing Pancreatitis, Klebsiella Bacteremia - remains on Cefazolin.  Had been on Meropenem / Vancomycin.  ID evaluation in am.  # Metabolic Alkalosis - resolved.  # Unstageable pressure Ulcer - Wound care following.  Continue daily dressing and position changes, wound care  # PT consult - plan to move a patient to a chair, if possible - once off from IV sedatives and ventilator, consider downgrading to regular floor  # DVT Prophylaxis - Lovenox subcut

## 2020-09-20 NOTE — CHART NOTE - NSCHARTNOTEFT_GEN_A_CORE
ICU/CCU Transfer Note    Transfer from: ICU/ CCU  Transfer to: telemetry   Accepting physican: Dr French  Case discussed with: Dr Harrell       MICU COURSE:    KS is a 44M PMH alcohol abuse, hx of alcohol withdrawal and alcohol pancreatitis who presented for abdominal pain. Admitted with necrotizing pancreatitis, sepsis, septic shock with course complicated by acute hypoxic respiratory failure secondary to PNA and ARDS requiring intubation and DTs requiring sedation and small bowel obstruction conservatively managed with NGT and NPO. Currently, s/p trach and PEG on 9/10 and treated for klebsiella bacteremia. The patient is making gradual clinical improvements in delirium, respiratory failure, and infection.      ASSESSMENT & Plan:     1. NEURO  - Encephalopathy: hepatic vs. ICU delirium  - decrease diazepam 10mg daily   - cont methadone   - continue  PO Seroquel 9/17    2. CV  - net output of -1852ml over 24 hrs  - cont monitor BP and I&O      3. PULM  - s/p trach 9/10 POD #8  -spoke with surgery to follow up on suture removal   - cont pressure support and weaning as tolerates   - currently on FIo2 on 50% and PEEP 8  -trach collar trials     4. GI  - last BM yesterday  - cont lactulose  -increased tube feed residuals - tube feeds held today  -erythromycin IV started   - increase feeds as tolerated     5. ID  - s/p course of vanco and meropenem for both PNA and necrotizing pancreatitis  - however had recurrent fevers and leukocytosis, and found to have blood cx + for Klebsiella  - left arm midline d/angela on 9/12 with underlying bacteremia  - new peripheral IV was obtained in R forearm  - midline was inserted on L arm on 9/15  - de-escalated antibx from meropenem to ceftriaxone as klebsiella is sensitive to it- blood culture from 9/12 negative  - cont ceftriaxone for Klebsiella  - discontinue vancomycin added ancef for cellulitis on right arm    6. RENAL  - metabolic alkalosis resolved  - s/p diamox x1 on 9/9  - cont to monitor bicarb levels and blood gas as needed  - cont Lasix  - monitor daily I&O    7. SKIN  - unstageable pressure ulcer on sacral area; no sign of necrosis or infection noted on exam  - daily dressing change   - wound care consult    7. GEN  - full code  - PT consult  - plan to move a patient to a chair, if possible  - once off from IV sedatives and ventilator, consider downgrading to regular floor      VITALS  ========  Vital Signs Last 24 Hrs  T(C): 37.4 (20 Sep 2020 11:12), Max: 38.2 (20 Sep 2020 09:00)  T(F): 99.4 (20 Sep 2020 11:12), Max: 100.7 (20 Sep 2020 09:00)  HR: 102 (20 Sep 2020 13:00) (102 - 122)  BP: 119/73 (20 Sep 2020 13:00) (109/69 - 139/90)  BP(mean): 82 (20 Sep 2020 13:00) (74 - 101)  RR: 10 (20 Sep 2020 13:00) (10 - 18)  SpO2: 97% (20 Sep 2020 13:00) (95% - 100%)  I&O's Summary    19 Sep 2020 07:01  -  20 Sep 2020 07:00  --------------------------------------------------------  IN: 1800 mL / OUT: 1860 mL / NET: -60 mL    20 Sep 2020 07:01  -  20 Sep 2020 13:19  --------------------------------------------------------  IN: 40 mL / OUT: 400 mL / NET: -360 mL          LABS                                            8.7                   Neurophils% (auto):   68.1   (09-20 @ 04:18):    12.27)-----------(284          Lymphocytes% (auto):  11.0                                          28.7                   Eosinphils% (auto):   10.0     Manual%: Neutrophils x    ; Lymphocytes x    ; Eosinophils x    ; Bands%: x    ; Blasts x                                    139    |  104    |  6                   Calcium: 10.0  / iCa: x      (09-20 @ 04:18)    ----------------------------<  102       Magnesium: 1.6                              4.2     |  30     |  0.61             Phosphorous: 3.9      TPro  7.0    /  Alb  1.5    /  TBili  1.2    /  DBili  x      /  AST  78     /  ALT  20     /  AlkPhos  211    20 Sep 2020 04:18

## 2020-09-20 NOTE — PROGRESS NOTE ADULT - SUBJECTIVE AND OBJECTIVE BOX
Patient: DEEPIKA RUCKER [id number] 44y Male                           Internal Medicine Hospitalist Progress Note    44M PMH alcohol abuse, hx of alcohol withdrawal and alcohol pancreatitis who presented for abdominal pain. Admitted with necrotizing pancreatitis, sepsis, septic shock with course complicated by acute hypoxic respiratory failure secondary to PNA and ARDS requiring intubation and DTs requiring sedation and small bowel obstruction conservatively managed with NGT and NPO. Currently, s/p trach and PEG on 9/10 and treated for klebsiella bacteremia. The patient is making gradual clinical improvements in delirium, respiratory failure, and infection.     Patient unable to offer complaints.     ____________________PHYSICAL EXAM:  GENERAL:  NAD, somnolent  HEENT: NCAT  NECK: trach in place  CARDIOVASCULAR:  S1, S2  LUNGS: coarse BS b/l  ABDOMEN:  soft, (-) tenderness, (-) distension, (+) bowel sounds, (-) guarding, (-) rebound (-) rigidity  EXTREMITIES:  no cyanosis / clubbing / edema.   ____________________     VITALS:  Vital Signs Last 24 Hrs  T(C): 37.5 (20 Sep 2020 15:45), Max: 38.2 (20 Sep 2020 09:00)  T(F): 99.5 (20 Sep 2020 15:45), Max: 100.7 (20 Sep 2020 09:00)  HR: 102 (20 Sep 2020 15:00) (101 - 122)  BP: 126/82 (20 Sep 2020 15:00) (109/69 - 132/85)  BP(mean): 91 (20 Sep 2020 15:00) (74 - 96)  RR: 9 (20 Sep 2020 15:00) (9 - 18)  SpO2: 96% (20 Sep 2020 15:00) (95% - 100%) Daily     Daily Weight in k.8 (20 Sep 2020 05:00)  CAPILLARY BLOOD GLUCOSE      POCT Blood Glucose.: 105 mg/dL (20 Sep 2020 11:40)  POCT Blood Glucose.: 130 mg/dL (20 Sep 2020 05:42)  POCT Blood Glucose.: 111 mg/dL (20 Sep 2020 00:25)  POCT Blood Glucose.: 119 mg/dL (19 Sep 2020 17:45)    I&O's Summary    19 Sep 2020 07:01  -  20 Sep 2020 07:00  --------------------------------------------------------  IN: 1800 mL / OUT: 1860 mL / NET: -60 mL    20 Sep 2020 07:01  -  20 Sep 2020 16:19  --------------------------------------------------------  IN: 120 mL / OUT: 800 mL / NET: -680 mL          BACKGROUND:  HEALTH ISSUES - PROBLEM Dx:  Preventive measure  Preventive measure    Hypokalemia  Hypokalemia    Transaminitis  Transaminitis    Hypercalcemia  Hypercalcemia    Lactic acid acidosis  Lactic acid acidosis    Alcohol withdrawal syndrome with complication  Alcohol withdrawal syndrome with complication    Alcohol-induced acute pancreatitis, unspecified complication status  Alcohol-induced acute pancreatitis, unspecified complication status          Allergies    No Known Allergies    Intolerances      PAST MEDICAL & SURGICAL HISTORY:  History of acute pancreatitis    ETOH abuse    Closed fracture of left mandibular angle, sequela          LABS:                        8.7    12.27 )-----------( 284      ( 20 Sep 2020 04:18 )             28.7     -20    139  |  104  |  6<L>  ----------------------------<  102<H>  4.2   |  30  |  0.61    Ca    10.0      20 Sep 2020 04:18  Phos  3.9       Mg     1.6         TPro  7.0  /  Alb  1.5<L>  /  TBili  1.2  /  DBili  x   /  AST  78<H>  /  ALT  20  /  AlkPhos  211<H>        LIVER FUNCTIONS - ( 20 Sep 2020 04:18 )  Alb: 1.5 g/dL / Pro: 7.0 gm/dL / ALK PHOS: 211 U/L / ALT: 20 U/L / AST: 78 U/L / GGT: x                     MEDICATIONS:  MEDICATIONS  (STANDING):  ceFAZolin   IVPB 1000 milliGRAM(s) IV Intermittent every 8 hours  ceFAZolin   IVPB      chlorhexidine 0.12% Liquid 15 milliLiter(s) Oral Mucosa every 12 hours  chlorhexidine 4% Liquid 1 Application(s) Topical <User Schedule>  collagenase Ointment 1 Application(s) Topical daily  dextrose 5%. 1000 milliLiter(s) (50 mL/Hr) IV Continuous <Continuous>  dextrose 50% Injectable 12.5 Gram(s) IV Push once  dextrose 50% Injectable 25 Gram(s) IV Push once  dextrose 50% Injectable 25 Gram(s) IV Push once  diazepam    Tablet 10 milliGRAM(s) Oral daily  enoxaparin Injectable 40 milliGRAM(s) SubCutaneous daily  erythromycin   IVPB 500 milliGRAM(s) IV Intermittent every 6 hours  folic acid 1 milliGRAM(s) Oral daily  insulin lispro (HumaLOG) corrective regimen sliding scale   SubCutaneous every 6 hours  methadone    Tablet 10 milliGRAM(s) Oral every 6 hours  multivitamin 1 Tablet(s) Oral daily  pantoprazole   Suspension 40 milliGRAM(s) Oral daily  QUEtiapine 100 milliGRAM(s) Oral two times a day  thiamine 100 milliGRAM(s) Oral daily    MEDICATIONS  (PRN):  acetaminophen   Tablet .. 650 milliGRAM(s) Oral every 6 hours PRN Temp greater or equal to 38C (100.4F), Mild Pain (1 - 3)  albuterol/ipratropium for Nebulization 3 milliLiter(s) Nebulizer every 6 hours PRN Shortness of Breath and/or Wheezing  dextrose 40% Gel 15 Gram(s) Oral once PRN Blood Glucose LESS THAN 70 milliGRAM(s)/deciliter  glucagon  Injectable 1 milliGRAM(s) IntraMuscular once PRN Glucose LESS THAN 70 milligrams/deciliter  senna 2 Tablet(s) Oral at bedtime PRN Constipation

## 2020-09-21 LAB
ANION GAP SERPL CALC-SCNC: 5 MMOL/L — SIGNIFICANT CHANGE UP (ref 5–17)
APPEARANCE UR: CLEAR — SIGNIFICANT CHANGE UP
BACTERIA # UR AUTO: ABNORMAL
BILIRUB UR-MCNC: NEGATIVE — SIGNIFICANT CHANGE UP
BUN SERPL-MCNC: 5 MG/DL — LOW (ref 7–23)
CALCIUM SERPL-MCNC: 10.2 MG/DL — HIGH (ref 8.5–10.1)
CHLORIDE SERPL-SCNC: 100 MMOL/L — SIGNIFICANT CHANGE UP (ref 96–108)
CO2 SERPL-SCNC: 33 MMOL/L — HIGH (ref 22–31)
COLOR SPEC: YELLOW — SIGNIFICANT CHANGE UP
CREAT SERPL-MCNC: 0.41 MG/DL — LOW (ref 0.5–1.3)
DIFF PNL FLD: NEGATIVE — SIGNIFICANT CHANGE UP
EPI CELLS # UR: SIGNIFICANT CHANGE UP
GLUCOSE BLDC GLUCOMTR-MCNC: 100 MG/DL — HIGH (ref 70–99)
GLUCOSE BLDC GLUCOMTR-MCNC: 101 MG/DL — HIGH (ref 70–99)
GLUCOSE BLDC GLUCOMTR-MCNC: 103 MG/DL — HIGH (ref 70–99)
GLUCOSE BLDC GLUCOMTR-MCNC: 104 MG/DL — HIGH (ref 70–99)
GLUCOSE SERPL-MCNC: 95 MG/DL — SIGNIFICANT CHANGE UP (ref 70–99)
GLUCOSE UR QL: NEGATIVE MG/DL — SIGNIFICANT CHANGE UP
HCT VFR BLD CALC: 27.3 % — LOW (ref 39–50)
HGB BLD-MCNC: 8.2 G/DL — LOW (ref 13–17)
HYALINE CASTS # UR AUTO: ABNORMAL /LPF
KETONES UR-MCNC: NEGATIVE — SIGNIFICANT CHANGE UP
LACTATE SERPL-SCNC: 0.4 MMOL/L — LOW (ref 0.7–2)
LEUKOCYTE ESTERASE UR-ACNC: ABNORMAL
MAGNESIUM SERPL-MCNC: 1.5 MG/DL — LOW (ref 1.6–2.6)
MCHC RBC-ENTMCNC: 29.6 PG — SIGNIFICANT CHANGE UP (ref 27–34)
MCHC RBC-ENTMCNC: 30 GM/DL — LOW (ref 32–36)
MCV RBC AUTO: 98.6 FL — SIGNIFICANT CHANGE UP (ref 80–100)
NITRITE UR-MCNC: NEGATIVE — SIGNIFICANT CHANGE UP
NRBC # BLD: 0 /100 WBCS — SIGNIFICANT CHANGE UP (ref 0–0)
PH UR: 6 — SIGNIFICANT CHANGE UP (ref 5–8)
PHOSPHATE SERPL-MCNC: 3.3 MG/DL — SIGNIFICANT CHANGE UP (ref 2.5–4.5)
PLATELET # BLD AUTO: 240 K/UL — SIGNIFICANT CHANGE UP (ref 150–400)
POTASSIUM SERPL-MCNC: 3.9 MMOL/L — SIGNIFICANT CHANGE UP (ref 3.5–5.3)
POTASSIUM SERPL-SCNC: 3.9 MMOL/L — SIGNIFICANT CHANGE UP (ref 3.5–5.3)
PROT UR-MCNC: NEGATIVE MG/DL — SIGNIFICANT CHANGE UP
RBC # BLD: 2.77 M/UL — LOW (ref 4.2–5.8)
RBC # FLD: 16.8 % — HIGH (ref 10.3–14.5)
SODIUM SERPL-SCNC: 138 MMOL/L — SIGNIFICANT CHANGE UP (ref 135–145)
SP GR SPEC: 1.02 — SIGNIFICANT CHANGE UP (ref 1.01–1.02)
UROBILINOGEN FLD QL: 8 MG/DL
WBC # BLD: 11.78 K/UL — HIGH (ref 3.8–10.5)
WBC # FLD AUTO: 11.78 K/UL — HIGH (ref 3.8–10.5)
WBC UR QL: SIGNIFICANT CHANGE UP

## 2020-09-21 PROCEDURE — 71045 X-RAY EXAM CHEST 1 VIEW: CPT | Mod: 26

## 2020-09-21 PROCEDURE — 99233 SBSQ HOSP IP/OBS HIGH 50: CPT

## 2020-09-21 PROCEDURE — 74018 RADEX ABDOMEN 1 VIEW: CPT | Mod: 26

## 2020-09-21 RX ORDER — METOPROLOL TARTRATE 50 MG
12.5 TABLET ORAL
Refills: 0 | Status: DISCONTINUED | OUTPATIENT
Start: 2020-09-21 | End: 2020-10-01

## 2020-09-21 RX ORDER — METOPROLOL TARTRATE 50 MG
5 TABLET ORAL EVERY 6 HOURS
Refills: 0 | Status: DISCONTINUED | OUTPATIENT
Start: 2020-09-21 | End: 2020-10-26

## 2020-09-21 RX ORDER — SODIUM CHLORIDE 9 MG/ML
1000 INJECTION INTRAMUSCULAR; INTRAVENOUS; SUBCUTANEOUS
Refills: 0 | Status: DISCONTINUED | OUTPATIENT
Start: 2020-09-21 | End: 2020-09-22

## 2020-09-21 RX ORDER — MAGNESIUM SULFATE 500 MG/ML
2 VIAL (ML) INJECTION ONCE
Refills: 0 | Status: COMPLETED | OUTPATIENT
Start: 2020-09-21 | End: 2020-09-21

## 2020-09-21 RX ORDER — METOPROLOL TARTRATE 50 MG
5 TABLET ORAL EVERY 6 HOURS
Refills: 0 | Status: DISCONTINUED | OUTPATIENT
Start: 2020-09-21 | End: 2020-09-21

## 2020-09-21 RX ORDER — SENNA PLUS 8.6 MG/1
2 TABLET ORAL AT BEDTIME
Refills: 0 | Status: DISCONTINUED | OUTPATIENT
Start: 2020-09-21 | End: 2020-09-29

## 2020-09-21 RX ORDER — POLYETHYLENE GLYCOL 3350 17 G/17G
17 POWDER, FOR SOLUTION ORAL DAILY
Refills: 0 | Status: DISCONTINUED | OUTPATIENT
Start: 2020-09-21 | End: 2020-09-30

## 2020-09-21 RX ORDER — SODIUM CHLORIDE 9 MG/ML
1000 INJECTION INTRAMUSCULAR; INTRAVENOUS; SUBCUTANEOUS
Refills: 0 | Status: DISCONTINUED | OUTPATIENT
Start: 2020-09-21 | End: 2020-09-21

## 2020-09-21 RX ADMIN — METHADONE HYDROCHLORIDE 10 MILLIGRAM(S): 40 TABLET ORAL at 13:03

## 2020-09-21 RX ADMIN — METHADONE HYDROCHLORIDE 10 MILLIGRAM(S): 40 TABLET ORAL at 18:15

## 2020-09-21 RX ADMIN — Medication 1 MILLIGRAM(S): at 13:03

## 2020-09-21 RX ADMIN — Medication 250 MILLIGRAM(S): at 00:29

## 2020-09-21 RX ADMIN — Medication 50 GRAM(S): at 11:34

## 2020-09-21 RX ADMIN — Medication 1 APPLICATION(S): at 11:43

## 2020-09-21 RX ADMIN — Medication 100 MILLIGRAM(S): at 13:04

## 2020-09-21 RX ADMIN — METHADONE HYDROCHLORIDE 10 MILLIGRAM(S): 40 TABLET ORAL at 05:58

## 2020-09-21 RX ADMIN — Medication 12.5 MILLIGRAM(S): at 18:15

## 2020-09-21 RX ADMIN — Medication 250 MILLIGRAM(S): at 13:06

## 2020-09-21 RX ADMIN — METHADONE HYDROCHLORIDE 10 MILLIGRAM(S): 40 TABLET ORAL at 00:37

## 2020-09-21 RX ADMIN — ENOXAPARIN SODIUM 40 MILLIGRAM(S): 100 INJECTION SUBCUTANEOUS at 13:03

## 2020-09-21 RX ADMIN — CHLORHEXIDINE GLUCONATE 15 MILLILITER(S): 213 SOLUTION TOPICAL at 18:27

## 2020-09-21 RX ADMIN — Medication 650 MILLIGRAM(S): at 15:04

## 2020-09-21 RX ADMIN — PANTOPRAZOLE SODIUM 40 MILLIGRAM(S): 20 TABLET, DELAYED RELEASE ORAL at 14:27

## 2020-09-21 RX ADMIN — Medication 100 MILLIGRAM(S): at 14:27

## 2020-09-21 RX ADMIN — Medication 250 MILLIGRAM(S): at 05:58

## 2020-09-21 RX ADMIN — SODIUM CHLORIDE 250 MILLILITER(S): 9 INJECTION INTRAMUSCULAR; INTRAVENOUS; SUBCUTANEOUS at 17:56

## 2020-09-21 RX ADMIN — Medication 250 MILLIGRAM(S): at 18:14

## 2020-09-21 RX ADMIN — Medication 650 MILLIGRAM(S): at 13:38

## 2020-09-21 RX ADMIN — CHLORHEXIDINE GLUCONATE 15 MILLILITER(S): 213 SOLUTION TOPICAL at 05:59

## 2020-09-21 RX ADMIN — SENNA PLUS 2 TABLET(S): 8.6 TABLET ORAL at 21:42

## 2020-09-21 RX ADMIN — CHLORHEXIDINE GLUCONATE 1 APPLICATION(S): 213 SOLUTION TOPICAL at 09:04

## 2020-09-21 RX ADMIN — METHADONE HYDROCHLORIDE 10 MILLIGRAM(S): 40 TABLET ORAL at 23:56

## 2020-09-21 RX ADMIN — Medication 100 MILLIGRAM(S): at 05:58

## 2020-09-21 RX ADMIN — Medication 100 MILLIGRAM(S): at 21:42

## 2020-09-21 RX ADMIN — Medication 10 MILLIGRAM(S): at 13:03

## 2020-09-21 RX ADMIN — Medication 1 TABLET(S): at 13:03

## 2020-09-21 NOTE — CHART NOTE - NSCHARTNOTEFT_GEN_A_CORE
Assessment:  Pt adm c pancreatitis, nausea, vomiting, transferred from ICU, c hepatic/metabolic encephalopathy, acute on chronic respiratory failure, pneumonia, necrotizing pancreatitis, unstageable pressure ulcer, s/p trach & PEG 09/11.   PMH: alcohol abuse, acute pancreatitis.       Factors impacting intake: [ ] none [ ] nausea  [ ] vomiting [ ] diarrhea [ ] constipation  [ ]chewing problems [ ] swallowing issues  [x ] other: on G-Tube feeding, c tracheostomy, AMS/lethargic     Diet Prescription: Diet, NPO with Tube Feed:   Tube Feeding Modality: Nasogastric  Vital AF 1.2  Total Volume for 24 Hours (mL): 960  Continuous  Starting Tube Feed Rate {mL per Hour}: 30  Increase Tube Feed Rate by (mL): 10     Every 12 hours  Until Goal Tube Feed Rate (mL per Hour): 40  Tube Feed Duration (in Hours): 24  Tube Feed Start Time: 10:00  Heraclio(7 Gm Arginine/7 Gm Glut/1.2 Gm HMB     Qty per Day:  2  No Carb Prosource (1pkg = 15gms Protein)     Qty per Day:  2 (09-19-20 @ 05:30)    Intake: < 50% nutrition needs since 09/10->09/18 noted   Vital AF 1.2 40 ml/hr as tolerated = 960 ml. 1152 Calories, 72 grams protein, 81% RDIs, 779 ml fluid   + Heraclio (c specialized AA to support tissue  building & help maintain lean body mass) 2 pkg daily = 160 calories   + No Carb Prosource 2 pkg daily=120 calories, 30 grams protein   + free water 200 q 6 hours= 800 ml daily  = total 1432 calories, 102 grams protein+ AA & collagen from Heraclio, 1579 ml water daily     Current Weight: 09/21, 83.2 kg, 08/22, 82.6 kg, 08/21, 98.7 kg, 08/18, 71.5 kg, wt. fluctuation c wt gain of 11.7 kg noted  % Weight Change: 16.4%  09/20, 2+ edema of right arm, ankles, feet noted  I/O: 120/800(-680), ? accuracy     Nutrition focused physical exam conducted; Subcutaneous fat Exam;  [ Moderate  ]  Orbital fat pads region,  [ Moderate  ]Buccal fat region,  [ Unable  ]triceps region, [ Unable  ]ribs region.  Muscle Exam; [  Mild ]temples region, [ Moderate  ]clavicle region, [ Moderate  ]shoulder region, [ Unable  ]Scapula region, [ Unable  ]Interosseous region, [ Moderate  ]thigh region, [ Unable   ]Calf region, edema of hands noted, ? c edema of temple region     Pertinent Medications: MEDICATIONS  (STANDING):  ceFAZolin   IVPB      ceFAZolin   IVPB 1000 milliGRAM(s) IV Intermittent every 8 hours  chlorhexidine 0.12% Liquid 15 milliLiter(s) Oral Mucosa every 12 hours  chlorhexidine 4% Liquid 1 Application(s) Topical <User Schedule>  collagenase Ointment 1 Application(s) Topical daily  dextrose 5%. 1000 milliLiter(s) (50 mL/Hr) IV Continuous <Continuous>  dextrose 50% Injectable 12.5 Gram(s) IV Push once  dextrose 50% Injectable 25 Gram(s) IV Push once  dextrose 50% Injectable 25 Gram(s) IV Push once  diazepam    Tablet 10 milliGRAM(s) Oral daily  enoxaparin Injectable 40 milliGRAM(s) SubCutaneous daily  erythromycin   IVPB 500 milliGRAM(s) IV Intermittent every 6 hours  folic acid 1 milliGRAM(s) Oral daily  insulin lispro (HumaLOG) corrective regimen sliding scale   SubCutaneous every 6 hours  magnesium sulfate  IVPB 2 Gram(s) IV Intermittent once  methadone    Tablet 10 milliGRAM(s) Oral every 6 hours  multivitamin 1 Tablet(s) Oral daily  pantoprazole   Suspension 40 milliGRAM(s) Oral daily  QUEtiapine 100 milliGRAM(s) Oral two times a day  thiamine 100 milliGRAM(s) Oral daily    MEDICATIONS  (PRN):  acetaminophen   Tablet .. 650 milliGRAM(s) Oral every 6 hours PRN Temp greater or equal to 38C (100.4F), Mild Pain (1 - 3)  albuterol/ipratropium for Nebulization 3 milliLiter(s) Nebulizer every 6 hours PRN Shortness of Breath and/or Wheezing  dextrose 40% Gel 15 Gram(s) Oral once PRN Blood Glucose LESS THAN 70 milliGRAM(s)/deciliter  glucagon  Injectable 1 milliGRAM(s) IntraMuscular once PRN Glucose LESS THAN 70 milligrams/deciliter  senna 2 Tablet(s) Oral at bedtime PRN Constipation    Pertinent Labs: 09-21 Na138 mmol/L Glu 95 mg/dL K+ 3.9 mmol/L Cr  0.41 mg/dL<L> BUN 5 mg/dL<L> 09-21 Phos 3.3 mg/dL 09-20 Alb 1.5 g/dL<L> 09-05 Chol --    LDL --    HDL --    Trig 325 mg/dL<H>09-20 ALT 20 U/L AST 78 U/L<H> Alkaline Phosphatase 211 U/L<H>  09/10 Lipase 517 U/L <H>   CAPILLARY BLOOD GLUCOSE      POCT Blood Glucose.: 101 mg/dL (21 Sep 2020 05:54)  POCT Blood Glucose.: 103 mg/dL (21 Sep 2020 00:36)  POCT Blood Glucose.: 107 mg/dL (20 Sep 2020 16:29)  POCT Blood Glucose.: 105 mg/dL (20 Sep 2020 11:40)    Skin:   skin tear; left scapula  wound: scrotum blister improved; just redness, no blister observed  Pressure ulcer: sacrum; unstageable(pressure injury documented as of 09/11 suspected deep tissue injury -> unstageable )    Estimated Needs:   [ ] no change since previous assessment  [ x] recalculated: change from critical care ->regular unit, unstageable, pressure ulcer,  edema   IBW: 75 kg  Energy: 2250 calories (30-35 Kcal/kg IBW)  Protein:90 grams protein-113 grams protein(1.2-1.5 gram protein/kg IBW)  fluid: 8621-2044(25-30 ml/kg IBW)    Previous Nutrition Diagnosis:     Inadequate Energy Intake    Etiology Decreased ability to consume sufficient energy.     Signs/Symptoms Pt has not reached goal rate of TF    Goal/Expected Outcome Pt will consume >75% nutrition needs once goal rate has been reached(unmet for energy, RDIs, met for protein)     Nutrition Diagnosis is [ x] ongoing ( see new nutrition diagnosis below) [ ] resolved [ ] not applicable     New Nutrition Diagnosis:     [x ] Malnutrition; severe malnutrition in context of acute illness     Related to: inadequate protein-energy intake in setting of pancreatitis,  respiratory failure, hepatic/metabolic encephalopathy, pressure ulcer     As evidenced by: <50% nutrition needs >5 days, physical findings of moderate muscle loss     Goal: pt to meet >75% of protein-energy needs via enteral feeding;     Interventions:   Recommend  [ ] Change Diet To:  [ ] Nutrition Supplement  [x ] Nutrition Support; Recommend increase Vital AF 1.2 @ 50 ml/hr and advance as tolerated to 60 ml/df=1636 ml, 1728 calories, 108 grams protein, 1167 ml free water, 121% RDIs, Heraclio (c specialized AA to support tissue  building & help maintain lean body mass) 1 pkg daily = 80 calories, No Carb Prosource 1 pkg daily=60 calories, 15 grams protein per pkg   [ ] Other:     Monitoring and Evaluation:   [ ] PO intake [ x ] Tolerance to diet prescription [ x ] weights [ x ] labs; Trig, Lipase [ x ] follow up per protocol  [ x] other: I/O's

## 2020-09-21 NOTE — PROGRESS NOTE ADULT - ASSESSMENT
44M EtOH pancreatitis p/w abdominal pain 8/17/20, found to have necrotizing pancreatitis c/b septic shock and respiratory failure secondary to PNA and ARDS requiring intubation and DTs requiring sedation and small bowel obstruction conservatively managed with NGT and NPO for which he was admitted to ICU. Currently, s/p trach and PEG on 9/10 and treated for klebsiella bacteremia.   Pt was doing well in ICU and so downgraded to  floor 9/20/20 .     fever today, possibly sec to cellulitis/thrombophlebitis of RUE,  will repeat blood Cx today , obtain UA, procal in AM   had been intermittently febrile   will repeat abd US for monitoring of ascites, if worsening, may need paracentesis       Assessment and Plan:     #Metabolic encephalopathy   hepatic vs. ICU delirium   d/angela Precedex since 9/15 in ICU   diazepam 10mg daily, will continue to titrate off , Once benzos are lower, can then start titrating down methadone as well (as not on it at home)  continue with methadone for now , will titrate off as tolerated   continue with  Seroquel 9/17    #Acute respiratory failure secondary to PNA/ARDS   s/p TRACH 9/10/20   tolerating trach collar today   can place back on PS QHS and PRN if he tires    #s/p necrotizing pancreatitis , s/p septic shock   s/p course of vanco and meropenem for both PNA and necrotizing pancreatitis  however had recurrent fevers and leukocytosis, and found to have blood cx + for Klebsiella. left arm midline d/angela on 9/12 with underlying bacteremia.   new midline was inserted on L arm on 9/15  de-escalated antibx from meropenem to ceftriaxone as klebsiella is sensitive to it- blood culture from 9/12 negative.     #Ascites   distended gallbladder and ascites seen on CT, abd US neg for acute cholecystitis   will obtain repeat abd US     #Presumed cellulitis of the RUE --on ancef   RUQ sono - neg for DVT     # metabolic alkalosis  sec to diuresis  s/p diamox x 1 dose in ICU 9/9/20   added free water   monitor chem     #s/p PEG placement 9/10/20  s/p SBO which was treated conservatively   Ileus , KUB 9/15/20 No dilated bowel is identified. There is nonobstructive bowel gas pattern  monitor residuals   high residuals in ICU, erythromycin IV started in ICU for improvement of motility of gut   added free water 200cc Q6H  will repeat KUB today     #unstageable pressure ulcer on sacral area ,  no sign of necrosis or infection noted   -wound care consult     #Hypomagnesemia --replete and monitor     #normocytic anemia , probably related to sepsis and acute illness   requiring transfusion on 9/17/20  monitor H/H   will send anemia work-up   transfuse prn of H/H <7/21      #Preventative measures   lovenox SQ-dvt ppx  fall, aspiration precautions, HOBE   8/20/20 ECHO: pEF   PT                      44M EtOH pancreatitis p/w abdominal pain 8/17/20, found to have necrotizing pancreatitis c/b septic shock and respiratory failure secondary to PNA and ARDS requiring intubation and DTs requiring sedation and small bowel obstruction conservatively managed with NGT and NPO for which he was admitted to ICU. Currently, s/p trach and PEG on 9/10 and treated for klebsiella bacteremia.   Pt was doing well in ICU and so downgraded to  floor 9/20/20 .     fever today, possibly sec to cellulitis/thrombophlebitis of RUE,  will repeat blood Cx today , obtain UA, procal in AM   no noted increased in secretions or oxygenation requirements, in fact, he was doing well on 40% trach collar today   had been intermittently febrile   will repeat abd US for monitoring of ascites, if worsening, may need paracentesis         Assessment and Plan:     #Metabolic encephalopathy   hepatic vs. ICU delirium   d/angela Precedex since 9/15 in ICU   diazepam 10mg daily, will continue to titrate off , Once benzos are lower, can then start titrating down methadone as well (as not on it at home)  continue with methadone for now , will titrate off as tolerated   continue with  Seroquel 9/17    #Acute respiratory failure secondary to PNA/ARDS   s/p TRACH 9/10/20   tolerating trach collar today   can place back on PS QHS and PRN if he tires    #s/p necrotizing pancreatitis , s/p septic shock   s/p course of vanco and meropenem for both PNA and necrotizing pancreatitis  however had recurrent fevers and leukocytosis, and found to have blood cx + for Klebsiella. left arm midline d/angela on 9/12 with underlying bacteremia.   new midline was inserted on L arm on 9/15  de-escalated antibx from meropenem to ceftriaxone as klebsiella is sensitive to it- blood culture from 9/12 negative.     #Ascites   distended gallbladder and ascites seen on CT, abd US neg for acute cholecystitis   will obtain repeat abd US     #Presumed cellulitis of the RUE --on ancef   RUQ sono - neg for DVT     # metabolic alkalosis  sec to diuresis  s/p diamox x 1 dose in ICU 9/9/20   added free water   monitor chem     #s/p PEG placement 9/10/20  s/p SBO which was treated conservatively   Ileus , KUB 9/15/20 No dilated bowel is identified. There is nonobstructive bowel gas pattern  monitor residuals   high residuals in ICU, erythromycin IV started in ICU for improvement of motility of gut   added free water 200cc Q6H  will repeat KUB today     #unstageable pressure ulcer on sacral area ,  no sign of necrosis or infection noted   -wound care consult     #Hypomagnesemia --replete and monitor     #normocytic anemia , probably related to sepsis and acute illness   requiring transfusion on 9/17/20  monitor H/H   will send anemia work-up   transfuse prn of H/H <7/21      #Preventative measures   lovenox SQ-dvt ppx  fall, aspiration precautions, HOBE   8/20/20 ECHO: pEF   PT                      44M EtOH pancreatitis p/w abdominal pain 8/17/20, found to have necrotizing pancreatitis c/b septic shock and respiratory failure secondary to PNA and ARDS requiring intubation and DTs requiring sedation and small bowel obstruction conservatively managed with NGT and NPO for which he was admitted to ICU. Currently, s/p trach and PEG on 9/10 and treated for klebsiella bacteremia.   Pt was doing well in ICU and so downgraded to  floor 9/20/20 .     fever today, possibly sec to cellulitis/thrombophlebitis of RUE,  will repeat blood Cx today , obtain UA, procal in AM   no noted increased in secretions or oxygenation requirements, in fact, he was doing well on 40% trach collar today   had been intermittently febrile   will repeat abd US for monitoring of ascites, if worsening, may need paracentesis         Assessment and Plan:     #Metabolic encephalopathy   hepatic vs. ICU delirium   d/angela Precedex since 9/15 in ICU   diazepam 10mg daily, will continue to titrate off , Once benzos are lower, can then start titrating down methadone as well (as not on it at home)  continue with methadone for now , will titrate off as tolerated   continue with  Seroquel 9/17    #Acute respiratory failure secondary to PNA/ARDS   s/p TRACH 9/10/20   tolerating trach collar today   can place back on PS QHS and PRN if he tires    #s/p necrotizing pancreatitis , s/p septic shock   s/p course of vanco and meropenem for both PNA and necrotizing pancreatitis  however had recurrent fevers and leukocytosis, and found to have blood cx + for Klebsiella. left arm midline d/angela on 9/12 with underlying bacteremia.   new midline was inserted on L arm on 9/15  de-escalated antibx from meropenem to ceftriaxone as klebsiella is sensitive to it- blood culture from 9/12 negative.     #Ascites   distended gallbladder and ascites seen on CT, abd US neg for acute cholecystitis   will obtain repeat abd US     #Presumed cellulitis of the RUE --on ancef   RUQ sono - neg for DVT     # metabolic alkalosis  sec to diuresis  s/p diamox x 1 dose in ICU 9/9/20   added free water   monitor chem     #s/p PEG placement 9/10/20  s/p SBO which was treated conservatively   Ileus , KUB 9/15/20 No dilated bowel is identified. There is nonobstructive bowel gas pattern  monitor residuals   high residuals in ICU, erythromycin IV started in ICU for improvement of motility of gut   added free water 200cc Q6H  will repeat KUB today     #unstageable pressure ulcers x 2 on sacral area ,  no sign of necrosis or infection noted   -wound care consult appreciated , continue with nursing wound care per recs     #Hypomagnesemia --replete and monitor     #normocytic anemia , probably related to sepsis and acute illness   requiring transfusion on 9/17/20  monitor H/H   will send anemia work-up   transfuse prn of H/H <7/21      #Preventative measures   lovenox SQ-dvt ppx  fall, aspiration precautions, HOBE   8/20/20 ECHO: pEF   PT , patient unable to participate, signed off                      44M EtOH pancreatitis p/w abdominal pain 8/17/20, found to have necrotizing pancreatitis c/b septic shock and respiratory failure secondary to PNA and ARDS requiring intubation and DTs requiring sedation and small bowel obstruction conservatively managed with NGT and NPO for which he was admitted to ICU. Currently, s/p trach and PEG on 9/10 and treated for klebsiella bacteremia.   Pt was doing well in ICU and so downgraded to  floor 9/20/20 .     fever today, possibly sec to cellulitis/thrombophlebitis of RUE,    will repeat blood Cx today , obtain UA, procal , lactate, KUB    noted increased in clear secretions but without  oxygenation requirements, in fact, he was doing well on 40% trach collar today   frequent suctioning   had been intermittently febrile   will repeat abd US for monitoring of ascites, if worsening, may need paracentesis         Assessment and Plan:     #Metabolic encephalopathy   hepatic vs. ICU delirium   d/angela Precedex since 9/15 in ICU   diazepam 10mg daily, will continue to titrate off , Once benzos are lower, can then start titrating down methadone as well (as not on it at home)  continue with methadone for now , will titrate off as tolerated   continue with  Seroquel 9/17    #Acute respiratory failure secondary to PNA/ARDS   s/p TRACH 9/10/20   tolerating trach collar today   can place back on PS QHS and PRN if he tires    #s/p necrotizing pancreatitis , s/p septic shock   s/p course of vanco and meropenem for both PNA and necrotizing pancreatitis  however had recurrent fevers and leukocytosis, and found to have blood cx + for Klebsiella. left arm midline d/angela on 9/12 with underlying bacteremia.   new midline was inserted on L arm on 9/15  de-escalated antibx from meropenem to ceftriaxone as klebsiella is sensitive to it- blood culture from 9/12 negative.     #Ascites   distended gallbladder and ascites seen on CT, abd US neg for acute cholecystitis   will obtain repeat abd US     #Presumed cellulitis of the RUE --on ancef   RUQ sono - neg for DVT     # metabolic alkalosis  sec to diuresis  s/p diamox x 1 dose in ICU 9/9/20   added free water   monitor chem     #s/p PEG placement 9/10/20  s/p SBO which was treated conservatively   Ileus , KUB 9/15/20 No dilated bowel is identified. There is nonobstructive bowel gas pattern  monitor residuals   high residuals in ICU, erythromycin IV started in ICU for improvement of motility of gut   added free water 200cc Q6H  will repeat KUB today     #unstageable pressure ulcers x 2 on sacral area ,  no sign of necrosis or infection noted   -wound care consult appreciated , continue with nursing wound care per recs     #Hypomagnesemia --replete and monitor     #normocytic anemia , probably related to sepsis and acute illness   requiring transfusion on 9/17/20  monitor H/H   will send anemia work-up   transfuse prn of H/H <7/21      #Preventative measures   lovenox SQ-dvt ppx  fall, aspiration precautions, HOBE   8/20/20 ECHO: pEF   PT , patient unable to participate, signed off                      44M EtOH pancreatitis p/w abdominal pain 8/17/20, found to have necrotizing pancreatitis c/b septic shock and respiratory failure secondary to PNA and ARDS requiring intubation and DTs requiring sedation and small bowel obstruction conservatively managed with NGT and NPO for which he was admitted to ICU. Currently, s/p trach and PEG on 9/10 and treated for klebsiella bacteremia.   Pt was doing well in ICU and so downgraded to  floor 9/20/20 .     fever today, possibly sec to cellulitis/thrombophlebitis of RUE,    will repeat blood Cx today , obtain UA, procal , lactate, KUB    noted increased in clear secretions but without  oxygenation requirements, in fact, he was doing well on 40% trach collar today   frequent suctioning   had been intermittently febrile   will repeat abd US for monitoring of ascites, if worsening, may need paracentesis   ID consult      Assessment and Plan:     #Metabolic encephalopathy   hepatic vs. ICU delirium   d/angela Precedex since 9/15 in ICU   diazepam 10mg daily, will continue to titrate off , Once benzos are lower, can then start titrating down methadone as well (as not on it at home)  continue with methadone for now , will titrate off as tolerated   continue with  Seroquel 9/17    #Acute respiratory failure secondary to PNA/ARDS   s/p TRACH 9/10/20   tolerating trach collar today   can place back on PS QHS and PRN if he tires    #s/p necrotizing pancreatitis , s/p septic shock   s/p course of vanco and meropenem for both PNA and necrotizing pancreatitis  however had recurrent fevers and leukocytosis, and found to have blood cx + for Klebsiella. left arm midline d/angela on 9/12 with underlying bacteremia.   new midline was inserted on L arm on 9/15  de-escalated antibx from meropenem to ceftriaxone as klebsiella is sensitive to it- blood culture from 9/12 negative.     #Ascites   distended gallbladder and ascites seen on CT, abd US neg for acute cholecystitis   will obtain repeat abd US     #Presumed cellulitis of the RUE --on ancef   RUQ sono - neg for DVT     # metabolic alkalosis  sec to diuresis  s/p diamox x 1 dose in ICU 9/9/20   added free water   monitor chem     #s/p PEG placement 9/10/20  s/p SBO which was treated conservatively   Ileus , KUB 9/15/20 No dilated bowel is identified. There is nonobstructive bowel gas pattern  monitor residuals   high residuals in ICU, erythromycin IV started in ICU for improvement of motility of gut   added free water 200cc Q6H  will repeat KUB today     #unstageable pressure ulcers x 2 on sacral area ,  no sign of necrosis or infection noted   -wound care consult appreciated , continue with nursing wound care per recs     #Hypomagnesemia --replete and monitor     #normocytic anemia , probably related to sepsis and acute illness   requiring transfusion on 9/17/20  monitor H/H   will send anemia work-up   transfuse prn of H/H <7/21      #Preventative measures   lovenox SQ-dvt ppx  fall, aspiration precautions, HOBE   8/20/20 ECHO: pEF   PT , patient unable to participate, signed off

## 2020-09-21 NOTE — H&P ADULT - NSHPPOACENTRALVENOUSCATHETER_GEN_ALL_CORE
September 21, 2020      Criselda Alonso, NP  [address] [facility] [address]           [facility] [address]  [address]  [place] [address]  Phone: [phone]          Patient: Roberto Avelar   MR Number: [id number]   YOB: 1980   Date of Visit: 9/21/2020       Dear Criselda Alonso:    Thank you for referring Roberto Avelar to me for evaluation. Attached you will find relevant portions of my assessment and plan of care.    If you have questions, please do not hesitate to call me. I look forward to following Roberto Avelar along with you.    Sincerely,    Nenita Reaves PA-C    Enclosure  CC:  No Recipients    If you would like to receive this communication electronically, please contact [email or web address] or [phone] to request more information on [employer] access.    For providers and/or their staff who would like to refer a patient to [facility], please contact us through our one-stop-shop provider referral line, [facility], at [phone].    If you feel you have received this communication in error or would no longer like to receive these types of communications, please e-mail [email or web address]          no

## 2020-09-21 NOTE — CONSULT NOTE ADULT - SUBJECTIVE AND OBJECTIVE BOX
//Patient is a 44y old  Male who presents with a chief complaint of pancreatitis (21 Sep 2020 14:09)    HPI:  44M with ETOH Abuse and withdrawal, Alcohol Pancreatitis who presents to the ED with severe abdominal pain.  Found to have Necrotizing Pancreatitis, Septic  Shock, Pneumonia/ARDS with Hypoxic Respiratory failure. Admitted to ICU, had prolonged hospital course with Vent support. Eventually required Tracheostomy and Peg placement.  Also with acute metabolic Encephalopathy.  09/20/20:  Transferred to .    Not able to provide details due to encephalopathy.    PAST MEDICAL & SURGICAL HISTORY:  History of acute pancreatitis    ETOH abuse    Closed fracture of left mandibular angle, sequela    FAMILY HISTORY:  Family history of diabetes mellitus (Father)    Allergies  No Known Allergies    MEDICATIONS  (STANDING):  ceFAZolin   IVPB 1000 milliGRAM(s) IV Intermittent every 8 hours  ceFAZolin   IVPB      chlorhexidine 0.12% Liquid 15 milliLiter(s) Oral Mucosa every 12 hours  chlorhexidine 4% Liquid 1 Application(s) Topical <User Schedule>  collagenase Ointment 1 Application(s) Topical daily  dextrose 5%. 1000 milliLiter(s) (50 mL/Hr) IV Continuous <Continuous>  dextrose 50% Injectable 12.5 Gram(s) IV Push once  dextrose 50% Injectable 25 Gram(s) IV Push once  dextrose 50% Injectable 25 Gram(s) IV Push once  diazepam    Tablet 10 milliGRAM(s) Oral daily  enoxaparin Injectable 40 milliGRAM(s) SubCutaneous daily  erythromycin   IVPB 500 milliGRAM(s) IV Intermittent every 6 hours  folic acid 1 milliGRAM(s) Oral daily  insulin lispro (HumaLOG) corrective regimen sliding scale   SubCutaneous every 6 hours  methadone    Tablet 10 milliGRAM(s) Oral every 6 hours  metoprolol tartrate 12.5 milliGRAM(s) Oral two times a day  multivitamin 1 Tablet(s) Oral daily  pantoprazole   Suspension 40 milliGRAM(s) Oral daily  polyethylene glycol 3350 17 Gram(s) Oral daily  QUEtiapine 100 milliGRAM(s) Oral two times a day  senna 2 Tablet(s) Oral at bedtime  sodium chloride 0.9%. 1000 milliLiter(s) (250 mL/Hr) IV Continuous <Continuous>  thiamine 100 milliGRAM(s) Oral daily    MEDICATIONS  (PRN):  acetaminophen   Tablet .. 650 milliGRAM(s) Oral every 6 hours PRN Temp greater or equal to 38C (100.4F), Mild Pain (1 - 3)  albuterol/ipratropium for Nebulization 3 milliLiter(s) Nebulizer every 6 hours PRN Shortness of Breath and/or Wheezing  dextrose 40% Gel 15 Gram(s) Oral once PRN Blood Glucose LESS THAN 70 milliGRAM(s)/deciliter  glucagon  Injectable 1 milliGRAM(s) IntraMuscular once PRN Glucose LESS THAN 70 milligrams/deciliter  metoprolol tartrate Injectable 5 milliGRAM(s) IV Push every 6 hours PRN if sbp>160 or HR >110 hold if SBP<100 or HR<60    Vital Signs Last 24 Hrs  T(C): 37.4 (21 Sep 2020 16:15), Max: 38.3 (21 Sep 2020 11:51)  T(F): 99.3 (21 Sep 2020 16:15), Max: 101 (21 Sep 2020 11:51)  HR: 120 (21 Sep 2020 16:15) (101 - 132)  BP: 120/76 (21 Sep 2020 16:15) (111/64 - 140/96)  BP(mean): --  RR: 20 (21 Sep 2020 11:51) (16 - 20)  SpO2: 99% (21 Sep 2020 16:15) (93% - 99%)    PHYSICAL EXAM:  GEN:        Lethargic, comfortable.  HEENT:     Tracheostomy  RESP:        crackles.  CVS:          Regular rate and rhythm.   ABD:        peg  SKIN:           Warm and dry.  EXTR:            No clubbing, cyanosis or edema  CNS:           lethargic  PSYCH:        Lethargic    LABS:                        8.2    11.78 )-----------( 240      ( 21 Sep 2020 07:44 )             27.3     09-21    138  |  100  |  5<L>  ----------------------------<  95  3.9   |  33<H>  |  0.41<L>    Ca    10.2<H>      21 Sep 2020 07:44  Phos  3.3     09-21  Mg     1.5     09-21    TPro  7.0  /  Alb  1.5<L>  /  TBili  1.2  /  DBili  x   /  AST  78<H>  /  ALT  20  /  AlkPhos  211<H>  09-20 09-16 @ 08:53  pH: 7.44  pCO2: 49  pO2: 60  SaO2: 90    Culture - Blood (collected 09-12-20 @ 11:17)  Source: .Blood Blood  Final Report (09-17-20 @ 12:00):    No Growth Final    Culture - Urine (collected 09-12-20 @ 00:47)  Source: .Urine Catheterized  Final Report (09-12-20 @ 20:16):    No growth    Culture - Sputum (collected 09-11-20 @ 16:29)  Source: .Sputum Sputum  Gram Stain (09-13-20 @ 16:51):    Few polymorphonuclear leukocytes per low power field    Rare Squamous epithelial cells per low power field    No organisms seen per oil power field  Final Report (09-13-20 @ 16:51):    No growth at 48 hours    Culture - Blood (collected 09-10-20 @ 21:09)  Source: .Blood Blood  Gram Stain (09-13-20 @ 07:26):    Growth in aerobic bottle:    Gram Negative Rods    Growth in anaerobic bottle:    Gram Negative Rods  Final Report (09-13-20 @ 07:26):    Growth in aerobic and anaerobic bottles: Klebsiella pneumoniae    See previous culture [id number]    Culture - Blood (collected 09-10-20 @ 20:56)  Source: .Blood Blood  Gram Stain (09-13-20 @ 06:41):    Growth in aerobic bottle: Gram Negative Rods  Final Report (09-13-20 @ 06:41):    Growth in aerobic bottle: Klebsiella pneumoniae    "Due to technical problems, Proteus sp. will Not be reported as part of    the BCID panel until further notice"    ***Blood Panel PCR results on this specimen are available    approximately 3 hours after the Gram stain result.***    Gram stain, PCR, and/or culture results may not always    correspond due to difference in methodologies.    ************************************************************    This PCR assay was performed using [email or web address].    The following targets are tested for: Enterococcus,    vancomycin resistant enterococci, Listeria monocytogenes,    coagulase negative staphylococci, S. aureus,    methicillin resistant S. aureus, Streptococcus agalactiae    (Group B), S. pneumoniae, S. pyogenes (Group A),    Acinetobacter baumannii, Enterobacter cloacae, E. coli,    Klebsiella oxytoca, K. pneumoniae, Proteus sp.,    Serratia marcescens, Haemophilus influenzae,    Neisseria meningitidis, Pseudomonas aeruginosa, Candida    albicans, C. glabrata, C krusei, C parapsilosis,    C. tropicalis and the KPC resistance gene.  Organism: Blood Culture PCR  Klebsiella pneumoniae (09-13-20 @ 06:41)  Organism: Klebsiella pneumoniae (09-13-20 @ 06:41)      -  Amikacin: S <=16      -  Ampicillin: R >16 These ampicillin results predict results for amoxicillin      -  Ampicillin/Sulbactam: S <=4/2 Enterobacter, Citrobacter, and Serratia may develop resistance during prolonged therapy (3-4 days)      -  Aztreonam: S <=4      -  Cefazolin: S <=2 Enterobacter, Citrobacter, and Serratia may develop resistance during prolonged therapy (3-4 days)      -  Cefepime: S <=2      -  Cefoxitin: S <=8      -  Ceftriaxone: S <=1 Enterobacter, Citrobacter, and Serratia may develop resistance during prolonged therapy      -  Ciprofloxacin: S <=0.25      -  Ertapenem: S <=0.5      -  Gentamicin: S <=2      -  Imipenem: S <=1      -  Levofloxacin: S <=0.5      -  Meropenem: S <=1      -  Piperacillin/Tazobactam: S <=8      -  Tobramycin: S <=2      -  Trimethoprim/Sulfamethoxazole: S <=0.5/9.5      Method Type: KEITH  Organism: Blood Culture PCR (09-13-20 @ 06:41)      -  Klebsiella pneumoniae: Detec      Method Type: PCR    EKG: sinus rhythm    RADIOLOGY & ADDITIONAL STUDIES:  < from: Xray Chest 1 View- PORTABLE-Urgent (Xray Chest 1 View- PORTABLE-Urgent .) (09.15.20 @ 23:55) >  EXAM:  XR CHEST PORTABLE URGENT 1V                          PROCEDURE DATE:  09/15/2020      INTERPRETATION:  DATE OF STUDY: 9/15/20    PRIOR:9/10/20    CLINICAL INDICATION: Respiratory failure and pneumonia. Followup.    TECHNIQUE: portable chest.    FINDINGS:  Tracheostomy tube tip appropriately positioned above the david.  The cardiomediastinal silhouette is within normal limits.  Persistent bilateral multifocal pneumonia - left lung more than right.  Persistent small bilateral pleural effusions - left more than right.  No pneumothorax.  No acute bony finding    IMPRESSION:  Persistent bilateral multifocal pneumonia and small bilateral pleural effusions.    SHILOH HEREDIA M.D., ATTENDING RADIOLOGIST  This document has beenelectronically signed. Sep 16 2020  8:55AM    ASSESSMENT AND PLAN:  ·	Hypoxic Respiratory failure.  ·	S/P tracheostomy.  ·	Bilateral pneumonia.  ·	S/P Septic Shock.  ·	Necrotizing Pancreatis.  ·	Acute metabolic Encephalopathy.  ·	Alcohol abuse.  ·	Klebsiella Bacteremia.  ·	Anemia.    Significant Trach secretions per RN, so will continue Trach collar with back up vent support.  Continue antibiotics.

## 2020-09-21 NOTE — CHART NOTE - TREATMENT: THE FOLLOWING DIET HAS BEEN RECOMMENDED
Diet, NPO with Tube Feed:   Tube Feeding Modality: Gastrostomy  Vital AF 1.2  Total Volume for 24 Hours (mL): 1440  Continuous  Starting Tube Feed Rate {mL per Hour}: 50     Every 24 hours  Until Goal Tube Feed Rate (mL per Hour): 60  Tube Feed Duration (in Hours): 24  Tube Feed Start Time: 13:00  Heraclio(7 Gm Arginine/7 Gm Glut/1.2 Gm HMB     Qty per Day:  1  No Carb Prosource (1pkg = 15gms Protein)     Qty per Day:  1 (09-21-20 @ 12:25) [Pending Verification By Attending]  Diet, NPO with Tube Feed:   Tube Feeding Modality: Nasogastric  Vital AF 1.2  Total Volume for 24 Hours (mL): 960  Continuous  Starting Tube Feed Rate {mL per Hour}: 30  Increase Tube Feed Rate by (mL): 10     Every 12 hours  Until Goal Tube Feed Rate (mL per Hour): 40  Tube Feed Duration (in Hours): 24  Tube Feed Start Time: 10:00  Heraclio(7 Gm Arginine/7 Gm Glut/1.2 Gm HMB     Qty per Day:  2  No Carb Prosource (1pkg = 15gms Protein)     Qty per Day:  2 (09-19-20 @ 05:30) [Active]

## 2020-09-21 NOTE — PROGRESS NOTE ADULT - SUBJECTIVE AND OBJECTIVE BOX
HPI:  Patient is a 44M with a PMH of EtOH abuse and withdrawal, alcohol pancreatitis who presents to the ED with severe abdominal pain.  Patient states that for the past three days he has been on a drinking binge, drinking 5 beers and 5 shots of liquor per day.  Last drink reportedly yesterday.  Patient reports that he woke up today and had severe generalized abdominal pain with multiple episodes of nonbloody, nonbilious vomiting.  Patient also complaining of nausea and generalized tremors.  Vitals stable, labs show leukocytosis, lactic acidosis, and severely elevated lipase levels.  CT shows pancreatitis with early necrosis in the pancreatic body.  Will admit to floor. (17 Aug 2020 23:25)      SUBJECTIVE & OBJECTIVE: Pt seen and examined at bedside.   no overnight events.   patient lethargic, opens eyes to voice   does not follow commands.   today febrile 101F     ros unable to examine due to [x ] Encephalopathy  [ ] Advanced Dementia  [ ] Expressive Aphasia  [ ] Non-verbal patient     PHYSICAL EXAM:  T(C): 38.3 (09-21-20 @ 11:51), Max: 38.3 (09-21-20 @ 11:51)  HR: 124 (09-21-20 @ 12:19) (101 - 132)  BP: 140/96 (09-21-20 @ 11:51) (111/64 - 140/96)  RR: 20 (09-21-20 @ 11:51) (9 - 20)  SpO2: 94% (09-21-20 @ 12:19) (93% - 98%)  Wt(kg): --   I&O's Detail    20 Sep 2020 07:01  -  21 Sep 2020 07:00  --------------------------------------------------------  IN:    Vital1.0: 120 mL  Total IN: 120 mL    OUT:    Voided (mL): 800 mL  Total OUT: 800 mL    Total NET: -680 mL        GENERAL: NAD , lethargic   HEAD:  Atraumatic, Normocephalic  EYES: EOMI, PERRLA, conjunctiva and sclera clear  ENMT: Moist mucous membranes, + TRACH, on trach collar   NECK: Supple, No JVD  NERVOUS SYSTEM:  lethargic, moving extremities spontaneously   CHEST/LUNG: good b/l air entry   HEART: Regular rate and rhythm; No murmurs, rubs, or gallops  ABDOMEN: Soft, Nontender, Nondistended; Bowel sounds present  EXTREMITIES:  2+ Peripheral Pulses b/l, No clubbing, cyanosis b/l   edematous feet and hands   cellulitis RUE over tattoo  +PEG     MEDICATIONS  (STANDING):  ceFAZolin   IVPB 1000 milliGRAM(s) IV Intermittent every 8 hours  ceFAZolin   IVPB      chlorhexidine 0.12% Liquid 15 milliLiter(s) Oral Mucosa every 12 hours  chlorhexidine 4% Liquid 1 Application(s) Topical <User Schedule>  collagenase Ointment 1 Application(s) Topical daily  dextrose 5%. 1000 milliLiter(s) (50 mL/Hr) IV Continuous <Continuous>  dextrose 50% Injectable 12.5 Gram(s) IV Push once  dextrose 50% Injectable 25 Gram(s) IV Push once  dextrose 50% Injectable 25 Gram(s) IV Push once  diazepam    Tablet 10 milliGRAM(s) Oral daily  enoxaparin Injectable 40 milliGRAM(s) SubCutaneous daily  erythromycin   IVPB 500 milliGRAM(s) IV Intermittent every 6 hours  folic acid 1 milliGRAM(s) Oral daily  insulin lispro (HumaLOG) corrective regimen sliding scale   SubCutaneous every 6 hours  methadone    Tablet 10 milliGRAM(s) Oral every 6 hours  multivitamin 1 Tablet(s) Oral daily  pantoprazole   Suspension 40 milliGRAM(s) Oral daily  QUEtiapine 100 milliGRAM(s) Oral two times a day  thiamine 100 milliGRAM(s) Oral daily    MEDICATIONS  (PRN):  acetaminophen   Tablet .. 650 milliGRAM(s) Oral every 6 hours PRN Temp greater or equal to 38C (100.4F), Mild Pain (1 - 3)  albuterol/ipratropium for Nebulization 3 milliLiter(s) Nebulizer every 6 hours PRN Shortness of Breath and/or Wheezing  dextrose 40% Gel 15 Gram(s) Oral once PRN Blood Glucose LESS THAN 70 milliGRAM(s)/deciliter  glucagon  Injectable 1 milliGRAM(s) IntraMuscular once PRN Glucose LESS THAN 70 milligrams/deciliter  senna 2 Tablet(s) Oral at bedtime PRN Constipation      LABS:                        8.2    11.78 )-----------( 240      ( 21 Sep 2020 07:44 )             27.3     09-21    138  |  100  |  5<L>  ----------------------------<  95  3.9   |  33<H>  |  0.41<L>    Ca    10.2<H>      21 Sep 2020 07:44  Phos  3.3     09-21  Mg     1.5     09-21    TPro  7.0  /  Alb  1.5<L>  /  TBili  1.2  /  DBili  x   /  AST  78<H>  /  ALT  20  /  AlkPhos  211<H>  09-20        Magnesium, Serum: 1.5 mg/dL (09-21 @ 07:44)    CAPILLARY BLOOD GLUCOSE      POCT Blood Glucose.: 104 mg/dL (21 Sep 2020 12:56)  POCT Blood Glucose.: 101 mg/dL (21 Sep 2020 05:54)  POCT Blood Glucose.: 103 mg/dL (21 Sep 2020 00:36)  POCT Blood Glucose.: 107 mg/dL (20 Sep 2020 16:29)      RECENT CULTURES:      RADIOLOGY & ADDITIONAL TESTS:  8/31 EKG: NSR, QTc 423    Imaging Personally Reviewed:  [ ] YES  [ ] NO    Consultant(s) Notes Reviewed:  [x ] YES  [ ] NO    Care Discussed with Consultants/Other Providers [x ] YES  [ ] NO

## 2020-09-21 NOTE — PATIENT PROFILE ADULT - FUNCTIONAL SCREEN CURRENT LEVEL: BATHING, MLM
Mid-Level Procedure Text (A): After obtaining clear surgical margins the patient was sent to a mid-level provider for surgical repair.  The patient understands they will receive post-surgical care and follow-up from the mid-level provider. 0 = independent

## 2020-09-22 ENCOUNTER — RESULT REVIEW (OUTPATIENT)
Age: 45
End: 2020-09-22

## 2020-09-22 LAB
ALBUMIN FLD-MCNC: 1.4 G/DL — SIGNIFICANT CHANGE UP
ALBUMIN SERPL ELPH-MCNC: 1.6 G/DL — LOW (ref 3.3–5)
ALP SERPL-CCNC: 173 U/L — HIGH (ref 40–120)
ALT FLD-CCNC: 16 U/L — SIGNIFICANT CHANGE UP (ref 12–78)
ANION GAP SERPL CALC-SCNC: 4 MMOL/L — LOW (ref 5–17)
AST SERPL-CCNC: 50 U/L — HIGH (ref 15–37)
BILIRUB DIRECT SERPL-MCNC: 0.9 MG/DL — HIGH (ref 0.05–0.2)
BILIRUB INDIRECT FLD-MCNC: 0.4 MG/DL — SIGNIFICANT CHANGE UP (ref 0.2–1)
BILIRUB SERPL-MCNC: 1.3 MG/DL — HIGH (ref 0.2–1.2)
BUN SERPL-MCNC: 5 MG/DL — LOW (ref 7–23)
CALCIUM SERPL-MCNC: 10.8 MG/DL — HIGH (ref 8.5–10.1)
CHLORIDE SERPL-SCNC: 101 MMOL/L — SIGNIFICANT CHANGE UP (ref 96–108)
CO2 SERPL-SCNC: 34 MMOL/L — HIGH (ref 22–31)
CREAT SERPL-MCNC: 0.49 MG/DL — LOW (ref 0.5–1.3)
FERRITIN SERPL-MCNC: 740 NG/ML — HIGH (ref 30–400)
FOLATE SERPL-MCNC: >20 NG/ML — SIGNIFICANT CHANGE UP
GLUCOSE BLDC GLUCOMTR-MCNC: 100 MG/DL — HIGH (ref 70–99)
GLUCOSE BLDC GLUCOMTR-MCNC: 91 MG/DL — SIGNIFICANT CHANGE UP (ref 70–99)
GLUCOSE BLDC GLUCOMTR-MCNC: 94 MG/DL — SIGNIFICANT CHANGE UP (ref 70–99)
GLUCOSE BLDC GLUCOMTR-MCNC: 94 MG/DL — SIGNIFICANT CHANGE UP (ref 70–99)
GLUCOSE BLDC GLUCOMTR-MCNC: 98 MG/DL — SIGNIFICANT CHANGE UP (ref 70–99)
GLUCOSE BLDC GLUCOMTR-MCNC: 99 MG/DL — SIGNIFICANT CHANGE UP (ref 70–99)
GLUCOSE FLD-MCNC: 81 MG/DL — SIGNIFICANT CHANGE UP
GLUCOSE SERPL-MCNC: 101 MG/DL — HIGH (ref 70–99)
GRAM STN FLD: SIGNIFICANT CHANGE UP
HCT VFR BLD CALC: 27.8 % — LOW (ref 39–50)
HGB BLD-MCNC: 8.4 G/DL — LOW (ref 13–17)
IRON SATN MFR SERPL: 26 % — SIGNIFICANT CHANGE UP (ref 16–55)
IRON SATN MFR SERPL: 38 UG/DL — LOW (ref 45–165)
LDH SERPL L TO P-CCNC: 105 U/L — SIGNIFICANT CHANGE UP
MAGNESIUM SERPL-MCNC: 1.4 MG/DL — LOW (ref 1.6–2.6)
MCHC RBC-ENTMCNC: 29.5 PG — SIGNIFICANT CHANGE UP (ref 27–34)
MCHC RBC-ENTMCNC: 30.2 GM/DL — LOW (ref 32–36)
MCV RBC AUTO: 97.5 FL — SIGNIFICANT CHANGE UP (ref 80–100)
NRBC # BLD: 0 /100 WBCS — SIGNIFICANT CHANGE UP (ref 0–0)
PHOSPHATE SERPL-MCNC: 3.1 MG/DL — SIGNIFICANT CHANGE UP (ref 2.5–4.5)
PLATELET # BLD AUTO: 222 K/UL — SIGNIFICANT CHANGE UP (ref 150–400)
POTASSIUM SERPL-MCNC: 4 MMOL/L — SIGNIFICANT CHANGE UP (ref 3.5–5.3)
POTASSIUM SERPL-SCNC: 4 MMOL/L — SIGNIFICANT CHANGE UP (ref 3.5–5.3)
PROCALCITONIN SERPL-MCNC: 0.16 NG/ML — HIGH (ref 0.02–0.1)
PROCALCITONIN SERPL-MCNC: 0.17 NG/ML — HIGH (ref 0.02–0.1)
PROT FLD-MCNC: 3.5 G/DL — SIGNIFICANT CHANGE UP
PROT SERPL-MCNC: 6.9 GM/DL — SIGNIFICANT CHANGE UP (ref 6–8.3)
RBC # BLD: 2.85 M/UL — LOW (ref 4.2–5.8)
RBC # FLD: 16.1 % — HIGH (ref 10.3–14.5)
SODIUM SERPL-SCNC: 139 MMOL/L — SIGNIFICANT CHANGE UP (ref 135–145)
SPECIMEN SOURCE: SIGNIFICANT CHANGE UP
TIBC SERPL-MCNC: 144 UG/DL — LOW (ref 220–430)
UIBC SERPL-MCNC: 106 UG/DL — LOW (ref 110–370)
VIT B12 SERPL-MCNC: 1447 PG/ML — HIGH (ref 232–1245)
WBC # BLD: 12.55 K/UL — HIGH (ref 3.8–10.5)
WBC # FLD AUTO: 12.55 K/UL — HIGH (ref 3.8–10.5)

## 2020-09-22 PROCEDURE — 88305 TISSUE EXAM BY PATHOLOGIST: CPT | Mod: 26

## 2020-09-22 PROCEDURE — 49083 ABD PARACENTESIS W/IMAGING: CPT

## 2020-09-22 PROCEDURE — 99233 SBSQ HOSP IP/OBS HIGH 50: CPT

## 2020-09-22 PROCEDURE — 88112 CYTOPATH CELL ENHANCE TECH: CPT | Mod: 26

## 2020-09-22 PROCEDURE — 76705 ECHO EXAM OF ABDOMEN: CPT | Mod: 26

## 2020-09-22 PROCEDURE — 99223 1ST HOSP IP/OBS HIGH 75: CPT

## 2020-09-22 PROCEDURE — 99221 1ST HOSP IP/OBS SF/LOW 40: CPT

## 2020-09-22 RX ORDER — FUROSEMIDE 40 MG
40 TABLET ORAL
Refills: 0 | Status: DISCONTINUED | OUTPATIENT
Start: 2020-09-22 | End: 2020-09-26

## 2020-09-22 RX ADMIN — Medication 100 MILLIGRAM(S): at 14:32

## 2020-09-22 RX ADMIN — Medication 12.5 MILLIGRAM(S): at 17:57

## 2020-09-22 RX ADMIN — CHLORHEXIDINE GLUCONATE 15 MILLILITER(S): 213 SOLUTION TOPICAL at 06:28

## 2020-09-22 RX ADMIN — METHADONE HYDROCHLORIDE 10 MILLIGRAM(S): 40 TABLET ORAL at 14:32

## 2020-09-22 RX ADMIN — Medication 250 MILLIGRAM(S): at 17:30

## 2020-09-22 RX ADMIN — Medication 100 MILLIGRAM(S): at 22:41

## 2020-09-22 RX ADMIN — Medication 650 MILLIGRAM(S): at 17:58

## 2020-09-22 RX ADMIN — Medication 100 MILLIGRAM(S): at 15:21

## 2020-09-22 RX ADMIN — PANTOPRAZOLE SODIUM 40 MILLIGRAM(S): 20 TABLET, DELAYED RELEASE ORAL at 14:31

## 2020-09-22 RX ADMIN — CHLORHEXIDINE GLUCONATE 15 MILLILITER(S): 213 SOLUTION TOPICAL at 17:59

## 2020-09-22 RX ADMIN — SENNA PLUS 2 TABLET(S): 8.6 TABLET ORAL at 22:40

## 2020-09-22 RX ADMIN — POLYETHYLENE GLYCOL 3350 17 GRAM(S): 17 POWDER, FOR SOLUTION ORAL at 15:22

## 2020-09-22 RX ADMIN — Medication 1 TABLET(S): at 14:32

## 2020-09-22 RX ADMIN — Medication 100 MILLIGRAM(S): at 06:28

## 2020-09-22 RX ADMIN — CHLORHEXIDINE GLUCONATE 1 APPLICATION(S): 213 SOLUTION TOPICAL at 09:00

## 2020-09-22 RX ADMIN — Medication 10 MILLIGRAM(S): at 14:32

## 2020-09-22 RX ADMIN — METHADONE HYDROCHLORIDE 10 MILLIGRAM(S): 40 TABLET ORAL at 06:28

## 2020-09-22 RX ADMIN — Medication 1 APPLICATION(S): at 17:30

## 2020-09-22 RX ADMIN — METHADONE HYDROCHLORIDE 10 MILLIGRAM(S): 40 TABLET ORAL at 22:40

## 2020-09-22 RX ADMIN — Medication 250 MILLIGRAM(S): at 11:59

## 2020-09-22 RX ADMIN — Medication 250 MILLIGRAM(S): at 04:21

## 2020-09-22 RX ADMIN — ENOXAPARIN SODIUM 40 MILLIGRAM(S): 100 INJECTION SUBCUTANEOUS at 14:31

## 2020-09-22 RX ADMIN — Medication 250 MILLIGRAM(S): at 22:41

## 2020-09-22 RX ADMIN — Medication 1 MILLIGRAM(S): at 14:32

## 2020-09-22 RX ADMIN — Medication 40 MILLIGRAM(S): at 22:40

## 2020-09-22 RX ADMIN — Medication 650 MILLIGRAM(S): at 19:10

## 2020-09-22 RX ADMIN — Medication 12.5 MILLIGRAM(S): at 06:28

## 2020-09-22 NOTE — CONSULT NOTE ADULT - SUBJECTIVE AND OBJECTIVE BOX
Patient is a 44y old  Male who presents with a chief complaint of pancreatitis (22 Sep 2020 09:32)    IR consulted for paracentesis.  Pt with a long complicated hospital stay.  ETOH abuse.  Fatty liver, enlarging ascites.  Pt with tracheostomy      HPI:  Patient is a 44M with a PMH of EtOH abuse and withdrawal, alcohol pancreatitis who presents to the ED with severe abdominal pain.  Patient states that for the past three days he has been on a drinking binge, drinking 5 beers and 5 shots of liquor per day.  Last drink reportedly yesterday.  Patient reports that he woke up today and had severe generalized abdominal pain with multiple episodes of nonbloody, nonbilious vomiting.  Patient also complaining of nausea and generalized tremors.  Vitals stable, labs show leukocytosis, lactic acidosis, and severely elevated lipase levels.  CT shows pancreatitis with early necrosis in the pancreatic body.  Will admit to floor. (17 Aug 2020 23:25)      General:  No wt loss, fevers, chills, night sweats  Eyes:  Good vision, no reported pain  CV:  No pain, palpitations,   Resp:  No dyspnea, cough, tachypnea, wheezing  GI:  No pain, nausea, vomiting, diarrhea, constipation  :  No pain, bleeding, incontinence, nocturia  Muscle:  No pain, weakness  Neuro:  No weakness, tingling, memory problems  Psych:  No fatigue, insomnia, mood problems, depression  Endocrine:  No polyuria, polydypsia, cold/heat intolerance  Heme:  No petechiae, ecchymosis, easy bruisability  Skin:  No rash, tattoos, scars, edema    PAST MEDICAL & SURGICAL HISTORY:  History of acute pancreatitis    ETOH abuse    Closed fracture of left mandibular angle, sequela        Allergies    No Known Allergies    Intolerances        MEDICATIONS  (STANDING):  ceFAZolin   IVPB 1000 milliGRAM(s) IV Intermittent every 8 hours  ceFAZolin   IVPB      chlorhexidine 0.12% Liquid 15 milliLiter(s) Oral Mucosa every 12 hours  chlorhexidine 4% Liquid 1 Application(s) Topical <User Schedule>  collagenase Ointment 1 Application(s) Topical daily  dextrose 5%. 1000 milliLiter(s) (50 mL/Hr) IV Continuous <Continuous>  dextrose 50% Injectable 12.5 Gram(s) IV Push once  dextrose 50% Injectable 25 Gram(s) IV Push once  dextrose 50% Injectable 25 Gram(s) IV Push once  diazepam    Tablet 10 milliGRAM(s) Oral daily  enoxaparin Injectable 40 milliGRAM(s) SubCutaneous daily  erythromycin   IVPB 500 milliGRAM(s) IV Intermittent every 6 hours  folic acid 1 milliGRAM(s) Oral daily  insulin lispro (HumaLOG) corrective regimen sliding scale   SubCutaneous every 6 hours  methadone    Tablet 10 milliGRAM(s) Oral every 6 hours  metoprolol tartrate 12.5 milliGRAM(s) Oral two times a day  multivitamin 1 Tablet(s) Oral daily  pantoprazole   Suspension 40 milliGRAM(s) Oral daily  polyethylene glycol 3350 17 Gram(s) Oral daily  QUEtiapine 100 milliGRAM(s) Oral two times a day  senna 2 Tablet(s) Oral at bedtime  sodium chloride 0.9%. 1000 milliLiter(s) (250 mL/Hr) IV Continuous <Continuous>  thiamine 100 milliGRAM(s) Oral daily    MEDICATIONS  (PRN):  acetaminophen   Tablet .. 650 milliGRAM(s) Oral every 6 hours PRN Temp greater or equal to 38C (100.4F), Mild Pain (1 - 3)  albuterol/ipratropium for Nebulization 3 milliLiter(s) Nebulizer every 6 hours PRN Shortness of Breath and/or Wheezing  dextrose 40% Gel 15 Gram(s) Oral once PRN Blood Glucose LESS THAN 70 milliGRAM(s)/deciliter  glucagon  Injectable 1 milliGRAM(s) IntraMuscular once PRN Glucose LESS THAN 70 milligrams/deciliter  metoprolol tartrate Injectable 5 milliGRAM(s) IV Push every 6 hours PRN if sbp>160 or HR >110 hold if SBP<100 or HR<60        SOCIAL HISTORY:    FAMILY HISTORY:  Family history of diabetes mellitus (Father)          PHYSICAL EXAM:    Vital Signs Last 24 Hrs  T(C): 37.1 (22 Sep 2020 05:53), Max: 38.3 (21 Sep 2020 11:51)  T(F): 98.7 (22 Sep 2020 05:53), Max: 101 (21 Sep 2020 11:51)  HR: 100 (22 Sep 2020 08:41) (91 - 132)  BP: 130/74 (22 Sep 2020 05:53) (116/70 - 140/96)  BP(mean): --  RR: 16 (22 Sep 2020 05:53) (16 - 20)  SpO2: 97% (22 Sep 2020 08:41) (93% - 99%)    General:  Appears stated age, well-groomed, well-nourished, no distress  Lungs:  CTAB  Cardiovascular:  good S1, S2,   Abdomen:  Soft, non-tender, non-distended,   Extremities:  no calf tenderness/swelling b/l  Musculoskeletal:  Full ROM in all joints w/o swelling/tenderness/effusion  Neuro/Psych:  A &O x 3    LABS:                        8.4    12.55 )-----------( 222      ( 22 Sep 2020 07:46 )             27.8         139  |  101  |  5<L>  ----------------------------<  101<H>  4.0   |  34<H>  |  0.49<L>    Ca    10.8<H>      22 Sep 2020 07:46  Phos  3.1       Mg     1.4         TPro  6.9  /  Alb  1.6<L>  /  TBili  1.3<H>  /  DBili  .90<H>  /  AST  50<H>  /  ALT  16  /  AlkPhos  173<H>        Urinalysis Basic - ( 21 Sep 2020 21:11 )    Color: Yellow / Appearance: Clear / S.020 / pH: x  Gluc: x / Ketone: Negative  / Bili: Negative / Urobili: 8 mg/dL   Blood: x / Protein: Negative mg/dL / Nitrite: Negative   Leuk Esterase: Trace / RBC: x / WBC 0-2   Sq Epi: x / Non Sq Epi: Occasional / Bacteria: Occasional        RADIOLOGY & ADDITIONAL STUDIES:           Patient is a 44y old  Male who presents with a chief complaint of pancreatitis (22 Sep 2020 09:32)    IR consulted for paracentesis.        HPI:  Patient is a 44M with a PMH of EtOH abuse and withdrawal, alcohol pancreatitis who presents to the ED with severe abdominal pain.  Patient states that for the past three days he has been on a drinking binge, drinking 5 beers and 5 shots of liquor per day.  Last drink reportedly yesterday.  Patient reports that he woke up today and had severe generalized abdominal pain with multiple episodes of nonbloody, nonbilious vomiting.  Patient also complaining of nausea and generalized tremors.  Vitals stable, labs show leukocytosis, lactic acidosis, and severely elevated lipase levels.  CT shows pancreatitis with early necrosis in the pancreatic body.  Will admit to floor. (17 Aug 2020 23:25)      uisability  Skin:  No rash, tattoos, scars, edema    PAST MEDICAL & SURGICAL HISTORY:  History of acute pancreatitis    ETOH abuse    Closed fracture of left mandibular angle, sequela        Allergies    No Known Allergies    Intolerances        MEDICATIONS  (STANDING):  ceFAZolin   IVPB 1000 milliGRAM(s) IV Intermittent every 8 hours  ceFAZolin   IVPB      chlorhexidine 0.12% Liquid 15 milliLiter(s) Oral Mucosa every 12 hours  chlorhexidine 4% Liquid 1 Application(s) Topical <User Schedule>  collagenase Ointment 1 Application(s) Topical daily  dextrose 5%. 1000 milliLiter(s) (50 mL/Hr) IV Continuous <Continuous>  dextrose 50% Injectable 12.5 Gram(s) IV Push once  dextrose 50% Injectable 25 Gram(s) IV Push once  dextrose 50% Injectable 25 Gram(s) IV Push once  diazepam    Tablet 10 milliGRAM(s) Oral daily  enoxaparin Injectable 40 milliGRAM(s) SubCutaneous daily  erythromycin   IVPB 500 milliGRAM(s) IV Intermittent every 6 hours  folic acid 1 milliGRAM(s) Oral daily  insulin lispro (HumaLOG) corrective regimen sliding scale   SubCutaneous every 6 hours  methadone    Tablet 10 milliGRAM(s) Oral every 6 hours  metoprolol tartrate 12.5 milliGRAM(s) Oral two times a day  multivitamin 1 Tablet(s) Oral daily  pantoprazole   Suspension 40 milliGRAM(s) Oral daily  polyethylene glycol 3350 17 Gram(s) Oral daily  QUEtiapine 100 milliGRAM(s) Oral two times a day  senna 2 Tablet(s) Oral at bedtime  sodium chloride 0.9%. 1000 milliLiter(s) (250 mL/Hr) IV Continuous <Continuous>  thiamine 100 milliGRAM(s) Oral daily    MEDICATIONS  (PRN):  acetaminophen   Tablet .. 650 milliGRAM(s) Oral every 6 hours PRN Temp greater or equal to 38C (100.4F), Mild Pain (1 - 3)  albuterol/ipratropium for Nebulization 3 milliLiter(s) Nebulizer every 6 hours PRN Shortness of Breath and/or Wheezing  dextrose 40% Gel 15 Gram(s) Oral once PRN Blood Glucose LESS THAN 70 milliGRAM(s)/deciliter  glucagon  Injectable 1 milliGRAM(s) IntraMuscular once PRN Glucose LESS THAN 70 milligrams/deciliter  metoprolol tartrate Injectable 5 milliGRAM(s) IV Push every 6 hours PRN if sbp>160 or HR >110 hold if SBP<100 or HR<60        SOCIAL HISTORY:    FAMILY HISTORY:  Family history of diabetes mellitus (Father)          PHYSICAL EXAM:    Vital Signs Last 24 Hrs  T(C): 37.1 (22 Sep 2020 05:53), Max: 38.3 (21 Sep 2020 11:51)  T(F): 98.7 (22 Sep 2020 05:53), Max: 101 (21 Sep 2020 11:51)  HR: 100 (22 Sep 2020 08:41) (91 - 132)  BP: 130/74 (22 Sep 2020 05:53) (116/70 - 140/96)  BP(mean): --  RR: 16 (22 Sep 2020 05:53) (16 - 20)  SpO2: 97% (22 Sep 2020 08:41) (93% - 99%)    General:  Appears stated age, well-groomed, well-nourished, no distress  Lungs:  CTAB  Cardiovascular:  good S1, S2,   Abdomen:  Soft, non-tender, non-distended,   Extremities:  no calf tenderness/swelling b/l  Musculoskeletal:  Full ROM in all joints w/o swelling/tenderness/effusion  Neuro/Psych:  A &O x 3    LABS:                        8.4    12.55 )-----------( 222      ( 22 Sep 2020 07:46 )             27.8     09-22    139  |  101  |  5<L>  ----------------------------<  101<H>  4.0   |  34<H>  |  0.49<L>    Ca    10.8<H>      22 Sep 2020 07:46  Phos  3.1       Mg     1.4         TPro  6.9  /  Alb  1.6<L>  /  TBili  1.3<H>  /  DBili  .90<H>  /  AST  50<H>  /  ALT  16  /  AlkPhos  173<H>        Urinalysis Basic - ( 21 Sep 2020 21:11 )    Color: Yellow / Appearance: Clear / S.020 / pH: x  Gluc: x / Ketone: Negative  / Bili: Negative / Urobili: 8 mg/dL   Blood: x / Protein: Negative mg/dL / Nitrite: Negative   Leuk Esterase: Trace / RBC: x / WBC 0-2   Sq Epi: x / Non Sq Epi: Occasional / Bacteria: Occasional        RADIOLOGY & ADDITIONAL STUDIES:           Patient is a 44y old  Male who presents with a chief complaint of pancreatitis (22 Sep 2020 09:32)    IR consulted for paracentesis.        HPI:  Patient is a 44M with a PMH of EtOH abuse and withdrawal, alcohol pancreatitis who presents to the ED with severe abdominal pain.  Patient states that for the past three days he has been on a drinking binge, drinking 5 beers and 5 shots of liquor per day.  Last drink reportedly yesterday.  Patient reports that he woke up today and had severe generalized abdominal pain with multiple episodes of nonbloody, nonbilious vomiting.  Patient also complaining of nausea and generalized tremors.  Vitals stable, labs show leukocytosis, lactic acidosis, and severely elevated lipase levels.  CT shows pancreatitis with early necrosis in the pancreatic body.  Will admit to floor. (17 Aug 2020 23:25)      uisability  Skin:  No rash, tattoos, scars, edema    PAST MEDICAL & SURGICAL HISTORY:  History of acute pancreatitis    ETOH abuse    Closed fracture of left mandibular angle, sequela        Allergies    No Known Allergies    Intolerances        MEDICATIONS  (STANDING):  ceFAZolin   IVPB 1000 milliGRAM(s) IV Intermittent every 8 hours  ceFAZolin   IVPB      chlorhexidine 0.12% Liquid 15 milliLiter(s) Oral Mucosa every 12 hours  chlorhexidine 4% Liquid 1 Application(s) Topical <User Schedule>  collagenase Ointment 1 Application(s) Topical daily  dextrose 5%. 1000 milliLiter(s) (50 mL/Hr) IV Continuous <Continuous>  dextrose 50% Injectable 12.5 Gram(s) IV Push once  dextrose 50% Injectable 25 Gram(s) IV Push once  dextrose 50% Injectable 25 Gram(s) IV Push once  diazepam    Tablet 10 milliGRAM(s) Oral daily  enoxaparin Injectable 40 milliGRAM(s) SubCutaneous daily  erythromycin   IVPB 500 milliGRAM(s) IV Intermittent every 6 hours  folic acid 1 milliGRAM(s) Oral daily  insulin lispro (HumaLOG) corrective regimen sliding scale   SubCutaneous every 6 hours  methadone    Tablet 10 milliGRAM(s) Oral every 6 hours  metoprolol tartrate 12.5 milliGRAM(s) Oral two times a day  multivitamin 1 Tablet(s) Oral daily  pantoprazole   Suspension 40 milliGRAM(s) Oral daily  polyethylene glycol 3350 17 Gram(s) Oral daily  QUEtiapine 100 milliGRAM(s) Oral two times a day  senna 2 Tablet(s) Oral at bedtime  sodium chloride 0.9%. 1000 milliLiter(s) (250 mL/Hr) IV Continuous <Continuous>  thiamine 100 milliGRAM(s) Oral daily    MEDICATIONS  (PRN):  acetaminophen   Tablet .. 650 milliGRAM(s) Oral every 6 hours PRN Temp greater or equal to 38C (100.4F), Mild Pain (1 - 3)  albuterol/ipratropium for Nebulization 3 milliLiter(s) Nebulizer every 6 hours PRN Shortness of Breath and/or Wheezing  dextrose 40% Gel 15 Gram(s) Oral once PRN Blood Glucose LESS THAN 70 milliGRAM(s)/deciliter  glucagon  Injectable 1 milliGRAM(s) IntraMuscular once PRN Glucose LESS THAN 70 milligrams/deciliter  metoprolol tartrate Injectable 5 milliGRAM(s) IV Push every 6 hours PRN if sbp>160 or HR >110 hold if SBP<100 or HR<60        SOCIAL HISTORY:    FAMILY HISTORY:  Family history of diabetes mellitus (Father)          PHYSICAL EXAM:    Vital Signs Last 24 Hrs  T(C): 37.1 (22 Sep 2020 05:53), Max: 38.3 (21 Sep 2020 11:51)  T(F): 98.7 (22 Sep 2020 05:53), Max: 101 (21 Sep 2020 11:51)  HR: 100 (22 Sep 2020 08:41) (91 - 132)  BP: 130/74 (22 Sep 2020 05:53) (116/70 - 140/96)  BP(mean): --  RR: 16 (22 Sep 2020 05:53) (16 - 20)  SpO2: 97% (22 Sep 2020 08:41) (93% - 99%)    General:  Appears stated age, well-groomed, well-nourished, no distress  Lungs:  CTAB  Cardiovascular:  good S1, S2,   Abdomen:  Soft, non-tender, non-distended,   Extremities:  no calf tenderness/swelling b/l  Neuro/Psych:  non-arousable    LABS:                        8.4    12.55 )-----------( 222      ( 22 Sep 2020 07:46 )             27.8     09-22    139  |  101  |  5<L>  ----------------------------<  101<H>  4.0   |  34<H>  |  0.49<L>    Ca    10.8<H>      22 Sep 2020 07:46  Phos  3.1       Mg     1.4         TPro  6.9  /  Alb  1.6<L>  /  TBili  1.3<H>  /  DBili  .90<H>  /  AST  50<H>  /  ALT  16  /  AlkPhos  173<H>        Urinalysis Basic - ( 21 Sep 2020 21:11 )    Color: Yellow / Appearance: Clear / S.020 / pH: x  Gluc: x / Ketone: Negative  / Bili: Negative / Urobili: 8 mg/dL   Blood: x / Protein: Negative mg/dL / Nitrite: Negative   Leuk Esterase: Trace / RBC: x / WBC 0-2   Sq Epi: x / Non Sq Epi: Occasional / Bacteria: Occasional        RADIOLOGY & ADDITIONAL STUDIES:  < from: US Abdomen Limited (20 @ 09:30) >  FINDINGS AND  IMPRESSION: There is large abdominal and pelvic ascites in all 4 quadrants, increased compared to 2020.    < end of copied text >           Patient is a 44y old  Male who presents with a chief complaint of pancreatitis (22 Sep 2020 09:32)    IR consulted for paracentesis.        HPI:  Patient is a 44M with a PMH of EtOH abuse and withdrawal, alcohol pancreatitis who presents to the ED with severe abdominal pain.  Patient states that for the past three days he has been on a drinking binge, drinking 5 beers and 5 shots of liquor per day.  Last drink reportedly yesterday.  Patient reports that he woke up today and had severe generalized abdominal pain with multiple episodes of nonbloody, nonbilious vomiting.  Patient also complaining of nausea and generalized tremors.  Vitals stable, labs show leukocytosis, lactic acidosis, and severely elevated lipase levels.  CT shows pancreatitis with early necrosis in the pancreatic body.  Will admit to floor. (17 Aug 2020 23:25)      uisability  Skin:  No rash, tattoos, scars, edema    PAST MEDICAL & SURGICAL HISTORY:  History of acute pancreatitis    ETOH abuse    Closed fracture of left mandibular angle, sequela        Allergies    No Known Allergies    Intolerances        MEDICATIONS  (STANDING):  ceFAZolin   IVPB 1000 milliGRAM(s) IV Intermittent every 8 hours  ceFAZolin   IVPB      chlorhexidine 0.12% Liquid 15 milliLiter(s) Oral Mucosa every 12 hours  chlorhexidine 4% Liquid 1 Application(s) Topical <User Schedule>  collagenase Ointment 1 Application(s) Topical daily  dextrose 5%. 1000 milliLiter(s) (50 mL/Hr) IV Continuous <Continuous>  dextrose 50% Injectable 12.5 Gram(s) IV Push once  dextrose 50% Injectable 25 Gram(s) IV Push once  dextrose 50% Injectable 25 Gram(s) IV Push once  diazepam    Tablet 10 milliGRAM(s) Oral daily  enoxaparin Injectable 40 milliGRAM(s) SubCutaneous daily  erythromycin   IVPB 500 milliGRAM(s) IV Intermittent every 6 hours  folic acid 1 milliGRAM(s) Oral daily  insulin lispro (HumaLOG) corrective regimen sliding scale   SubCutaneous every 6 hours  methadone    Tablet 10 milliGRAM(s) Oral every 6 hours  metoprolol tartrate 12.5 milliGRAM(s) Oral two times a day  multivitamin 1 Tablet(s) Oral daily  pantoprazole   Suspension 40 milliGRAM(s) Oral daily  polyethylene glycol 3350 17 Gram(s) Oral daily  QUEtiapine 100 milliGRAM(s) Oral two times a day  senna 2 Tablet(s) Oral at bedtime  sodium chloride 0.9%. 1000 milliLiter(s) (250 mL/Hr) IV Continuous <Continuous>  thiamine 100 milliGRAM(s) Oral daily    MEDICATIONS  (PRN):  acetaminophen   Tablet .. 650 milliGRAM(s) Oral every 6 hours PRN Temp greater or equal to 38C (100.4F), Mild Pain (1 - 3)  albuterol/ipratropium for Nebulization 3 milliLiter(s) Nebulizer every 6 hours PRN Shortness of Breath and/or Wheezing  dextrose 40% Gel 15 Gram(s) Oral once PRN Blood Glucose LESS THAN 70 milliGRAM(s)/deciliter  glucagon  Injectable 1 milliGRAM(s) IntraMuscular once PRN Glucose LESS THAN 70 milligrams/deciliter  metoprolol tartrate Injectable 5 milliGRAM(s) IV Push every 6 hours PRN if sbp>160 or HR >110 hold if SBP<100 or HR<60        SOCIAL HISTORY:    FAMILY HISTORY:  Family history of diabetes mellitus (Father)          PHYSICAL EXAM:    Vital Signs Last 24 Hrs  T(C): 37.1 (22 Sep 2020 05:53), Max: 38.3 (21 Sep 2020 11:51)  T(F): 98.7 (22 Sep 2020 05:53), Max: 101 (21 Sep 2020 11:51)  HR: 100 (22 Sep 2020 08:41) (91 - 132)  BP: 130/74 (22 Sep 2020 05:53) (116/70 - 140/96)  BP(mean): --  RR: 16 (22 Sep 2020 05:53) (16 - 20)  SpO2: 97% (22 Sep 2020 08:41) (93% - 99%)    General:  Appears stated age, well-groomed, well-nourished, no distress  Lungs:  CTAB  Cardiovascular:  good S1, S2,   Abdomen:  Soft, non-tender, +distended,   Extremities:  no calf tenderness/swelling b/l  Neuro/Psych:  non-arousable    LABS:                        8.4    12.55 )-----------( 222      ( 22 Sep 2020 07:46 )             27.8     09-22    139  |  101  |  5<L>  ----------------------------<  101<H>  4.0   |  34<H>  |  0.49<L>    Ca    10.8<H>      22 Sep 2020 07:46  Phos  3.1       Mg     1.4         TPro  6.9  /  Alb  1.6<L>  /  TBili  1.3<H>  /  DBili  .90<H>  /  AST  50<H>  /  ALT  16  /  AlkPhos  173<H>        Urinalysis Basic - ( 21 Sep 2020 21:11 )    Color: Yellow / Appearance: Clear / S.020 / pH: x  Gluc: x / Ketone: Negative  / Bili: Negative / Urobili: 8 mg/dL   Blood: x / Protein: Negative mg/dL / Nitrite: Negative   Leuk Esterase: Trace / RBC: x / WBC 0-2   Sq Epi: x / Non Sq Epi: Occasional / Bacteria: Occasional        RADIOLOGY & ADDITIONAL STUDIES:  < from: US Abdomen Limited (20 @ 09:30) >  FINDINGS AND  IMPRESSION: There is large abdominal and pelvic ascites in all 4 quadrants, increased compared to 2020.    < end of copied text >

## 2020-09-22 NOTE — CONSULT NOTE ADULT - ASSESSMENT
IR consulted for paracentesis.    Pt with a long complicated hospital stay.  ETOH abuse.  Fatty liver, enlarging ascites.  Pt with tracheostomy/peg    Plan  -will perform today  -meds, labs and vitals reviewed  -consent obtained from mother

## 2020-09-22 NOTE — CONSULT NOTE ADULT - SUBJECTIVE AND OBJECTIVE BOX
Patient is a 44y old  Male who presents with a chief complaint of pancreatitis (22 Sep 2020 15:56)      HPI:  Patient is a 44M with a PMH of EtOH abuse and withdrawal, alcohol pancreatitis who presents to the ED with severe abdominal pain.  Patient states that for the past three days he has been on a drinking binge, drinking 5 beers and 5 shots of liquor per day.  Last drink reportedly yesterday.  Patient reports that he woke up today and had severe generalized abdominal pain with multiple episodes of nonbloody, nonbilious vomiting.  Patient also complaining of nausea and generalized tremors.  Vitals stable, labs show leukocytosis, lactic acidosis, and severely elevated lipase levels.  CT shows pancreatitis with early necrosis in the pancreatic body.  Will admit to floor. (17 Aug 2020 23:25)    Above reviewed:   DEEPIKA RUCKER is a 44M PMH alcohol abuse, hx of alcohol withdrawal and alcohol pancreatitis who presented for abdominal pain. Admitted with necrotizing pancreatitis, sepsis, septic shock with course complicated by acute hypoxic respiratory failure secondary to PNA and ARDS requiring intubation and DTs requiring sedation and small bowel obstruction conservatively managed with NGT and NPO. Currently, s/p trach and PEG on 9/10 and treated for klebsiella bacteremia. Patient has had intermittent fevers. ID consulted for workup and antibiotic management     PAST MEDICAL & SURGICAL HISTORY:  History of acute pancreatitis    ETOH abuse    Closed fracture of left mandibular angle, sequela        Allergies  No Known Allergies        ANTIMICROBIALS:  ceFAZolin   IVPB 1000 every 8 hours  ceFAZolin   IVPB    erythromycin   IVPB 500 every 6 hours      MEDICATIONS  (STANDING):  ceFAZolin   IVPB   100 mL/Hr IV Intermittent (20 @ 11:28)    ceFAZolin   IVPB   100 mL/Hr IV Intermittent (20 @ 15:21)   100 mL/Hr IV Intermittent (20 @ 06:28)   100 mL/Hr IV Intermittent (20 @ 21:42)   100 mL/Hr IV Intermittent (20 @ 14:27)   100 mL/Hr IV Intermittent (20 @ 05:58)   100 mL/Hr IV Intermittent (20 @ 22:58)    cefTRIAXone   IVPB   100 mL/Hr IV Intermittent (20 @ 00:21)   100 mL/Hr IV Intermittent (20 @ 23:27)   100 mL/Hr IV Intermittent (20 @ 00:03)   100 mL/Hr IV Intermittent (20 @ 01:13)   100 mL/Hr IV Intermittent (20 @ 00:00)   100 mL/Hr IV Intermittent (09-15-20 @ 00:22)   100 mL/Hr IV Intermittent (20 @ 00:30)    erythromycin   IVPB   250 mL/Hr IV Intermittent (20 @ 17:47)    erythromycin   IVPB   250 mL/Hr IV Intermittent (20 @ 05:26)   250 mL/Hr IV Intermittent (20 @ 02:13)   250 mL/Hr IV Intermittent (09-15-20 @ 18:09)   250 mL/Hr IV Intermittent (09-15-20 @ 11:53)   250 mL/Hr IV Intermittent (09-15-20 @ 05:54)   250 mL/Hr IV Intermittent (09-15-20 @ 00:22)   250 mL/Hr IV Intermittent (20 @ 19:18)   250 mL/Hr IV Intermittent (20 @ 13:00)   250 mL/Hr IV Intermittent (20 @ 05:34)   250 mL/Hr IV Intermittent (20 @ 01:34)   250 mL/Hr IV Intermittent (20 @ 17:10)   250 mL/Hr IV Intermittent (20 @ 12:22)   250 mL/Hr IV Intermittent (20 @ 05:49)   250 mL/Hr IV Intermittent (20 @ 00:35)    erythromycin   IVPB   250 mL/Hr IV Intermittent (20 @ 17:30)   250 mL/Hr IV Intermittent (20 @ 11:59)   250 mL/Hr IV Intermittent (20 @ 04:21)   250 mL/Hr IV Intermittent (20 @ 18:14)   250 mL/Hr IV Intermittent (20 @ 13:06)   250 mL/Hr IV Intermittent (20 @ 05:58)   250 mL/Hr IV Intermittent (20 @ 00:29)   250 mL/Hr IV Intermittent (20 @ 17:08)   250 mL/Hr IV Intermittent (20 @ 11:30)    meropenem  IVPB   100 mL/Hr IV Intermittent (20 @ 06:03)   100 mL/Hr IV Intermittent (20 @ 21:24)   100 mL/Hr IV Intermittent (20 @ 13:30)   100 mL/Hr IV Intermittent (20 @ 05:07)   100 mL/Hr IV Intermittent (20 @ 21:57)   100 mL/Hr IV Intermittent (20 @ 13:51)   100 mL/Hr IV Intermittent (20 @ 05:16)   100 mL/Hr IV Intermittent (20 @ 21:12)   100 mL/Hr IV Intermittent (20 @ 14:19)   100 mL/Hr IV Intermittent (20 @ 05:18)   100 mL/Hr IV Intermittent (20 @ 22:19)   100 mL/Hr IV Intermittent (20 @ 14:27)   100 mL/Hr IV Intermittent (20 @ 05:14)   100 mL/Hr IV Intermittent (20 @ 21:56)   100 mL/Hr IV Intermittent (20 @ 14:03)   100 mL/Hr IV Intermittent (20 @ 05:16)   100 mL/Hr IV Intermittent (20 @ 21:11)   100 mL/Hr IV Intermittent (20 @ 14:41)   100 mL/Hr IV Intermittent (20 @ 05:43)   100 mL/Hr IV Intermittent (20 @ 22:41)   100 mL/Hr IV Intermittent (20 @ 15:48)   100 mL/Hr IV Intermittent (20 @ 05:37)   100 mL/Hr IV Intermittent (20 @ 21:30)   100 mL/Hr IV Intermittent (20 @ 14:09)   100 mL/Hr IV Intermittent (20 @ 05:06)   100 mL/Hr IV Intermittent (20 @ 21:25)   100 mL/Hr IV Intermittent (20 @ 13:16)   100 mL/Hr IV Intermittent (20 @ 05:10)   100 mL/Hr IV Intermittent (20 @ 22:59)    meropenem  IVPB   100 mL/Hr IV Intermittent (09-10-20 @ 17:02)    meropenem  IVPB   100 mL/Hr IV Intermittent (20 @ 05:41)   100 mL/Hr IV Intermittent (20 @ 21:49)   100 mL/Hr IV Intermittent (20 @ 13:46)   100 mL/Hr IV Intermittent (20 @ 06:00)   100 mL/Hr IV Intermittent (20 @ 21:03)   100 mL/Hr IV Intermittent (20 @ 13:19)   100 mL/Hr IV Intermittent (20 @ 05:52)   100 mL/Hr IV Intermittent (09-10-20 @ 20:50)    piperacillin/tazobactam IVPB.   200 mL/Hr IV Intermittent (20 @ 06:42)    piperacillin/tazobactam IVPB..   25 mL/Hr IV Intermittent (20 @ 13:17)   25 mL/Hr IV Intermittent (20 @ 21:25)   25 mL/Hr IV Intermittent (20 @ 18:00)   25 mL/Hr IV Intermittent (20 @ 14:29)   25 mL/Hr IV Intermittent (20 @ 05:35)   25 mL/Hr IV Intermittent (20 @ 21:11)   25 mL/Hr IV Intermittent (20 @ 13:18)   25 mL/Hr IV Intermittent (20 @ 05:07)   25 mL/Hr IV Intermittent (20 @ 21:37)   25 mL/Hr IV Intermittent (20 @ 13:46)   25 mL/Hr IV Intermittent (20 @ 05:18)   25 mL/Hr IV Intermittent (20 @ 21:15)   25 mL/Hr IV Intermittent (20 @ 13:28)   25 mL/Hr IV Intermittent (20 @ 05:21)   25 mL/Hr IV Intermittent (20 @ 21:35)   25 mL/Hr IV Intermittent (20 @ 13:42)    vancomycin  IVPB   250 mL/Hr IV Intermittent (20 @ 04:38)   250 mL/Hr IV Intermittent (20 @ 20:41)   250 mL/Hr IV Intermittent (20 @ 12:14)    vancomycin  IVPB   166.67 mL/Hr IV Intermittent (20 @ 05:28)   166.67 mL/Hr IV Intermittent (20 @ 17:56)   166.67 mL/Hr IV Intermittent (20 @ 05:09)   166.67 mL/Hr IV Intermittent (20 @ 18:10)    vancomycin  IVPB   250 mL/Hr IV Intermittent (20 @ 06:51)   250 mL/Hr IV Intermittent (20 @ 17:19)   250 mL/Hr IV Intermittent (20 @ 06:03)   250 mL/Hr IV Intermittent (20 @ 17:18)    vancomycin  IVPB   250 mL/Hr IV Intermittent (20 @ 07:30)        OTHER MEDS: MEDICATIONS  (STANDING):  acetaminophen   Tablet .. 650 every 6 hours PRN  albuterol/ipratropium for Nebulization 3 every 6 hours PRN  dextrose 40% Gel 15 once PRN  dextrose 50% Injectable 12.5 once  dextrose 50% Injectable 25 once  dextrose 50% Injectable 25 once  diazepam    Tablet 10 daily  enoxaparin Injectable 40 daily  glucagon  Injectable 1 once PRN  insulin lispro (HumaLOG) corrective regimen sliding scale  every 6 hours  methadone    Tablet 10 every 6 hours  metoprolol tartrate 12.5 two times a day  metoprolol tartrate Injectable 5 every 6 hours PRN  pantoprazole   Suspension 40 daily  polyethylene glycol 3350 17 daily  QUEtiapine 100 two times a day  senna 2 at bedtime      SOCIAL HISTORY:     Daily ETOH use  unknown smoking or drug hx    FAMILY HISTORY:  Family history of diabetes mellitus (Father)        REVIEW OF SYSTEMS  [ X ] ROS unobtainable because:  metabolic encephalopathy  [  ] All other systems negative except as noted below:	    Constitutional:  [ ] fever [ ] chills  [ ] weight loss  [ ] weakness  Skin:  [ ] rash [ ] phlebitis	  Eyes: [ ] icterus [ ] pain  [ ] discharge	  ENMT: [ ] sore throat  [ ] thrush [ ] ulcers [ ] exudates  Respiratory: [ ] dyspnea [ ] hemoptysis [ ] cough [ ] sputum	  Cardiovascular:  [ ] chest pain [ ] palpitations [ ] edema	  Gastrointestinal:  [ ] nausea [ ] vomiting [ ] diarrhea [ ] constipation [ ] pain	  Genitourinary:  [ ] dysuria [ ] frequency [ ] hematuria [ ] discharge [ ] flank pain  [ ] incontinence  Musculoskeletal:  [ ] myalgias [ ] arthralgias [ ] arthritis  [ ] back pain  Neurological:  [ ] headache [ ] seizures  [ ] confusion/altered mental status  Psychiatric:  [ ] anxiety [ ] depression	  Hematology/Lymphatics:  [ ] lymphadenopathy  Endocrine:  [ ] adrenal [ ] thyroid  Allergic/Immunologic:	 [ ] transplant [ ] seasonal    Vital Signs Last 24 Hrs  T(F): 99.6 (20 @ 16:25), Max: 101.4 (20 @ 04:00)    Vital Signs Last 24 Hrs  HR: 107 (20 @ 17:18) (91 - 112)  BP: 119/80 (20 @ 16:25) (116/70 - 151/87)  RR: 18 (20 @ 16:25)  SpO2: 92% (20 @ 17:18) (92% - 98%)  Wt(kg): --    PHYSICAL EXAM:  Constitutional: non-toxic, no distress  HEAD/EYES: anicteric, no conjunctival injection  ENT:  supple, no thrush, + trach collar   Cardiovascular:   normal S1, S2, no murmur, no edema  Respiratory:  coarse breath sounds bilaterally   GI:  soft, +tender, normal bowel sounds, +peg tube c/d/i, s/p paracentesis covered with dressing   Musculoskeletal:  no synovitis  Neurologic: poor mental status  Skin:  psoriasis on the scalp and face, right forearm with tattoo and questionable redness and warmth   Heme/Onc: no lymphadenopathy   Psychiatric:  patient not really awake or alert          WBC Count: 12.55 K/uL ( @ 07:46)  WBC Count: 11.78 K/uL ( @ 07:44)  WBC Count: 12.27 K/uL ( @ 04:18)  WBC Count: 11.16 K/uL ( @ 03:32)  WBC Count: 13.01 K/uL ( @ 05:42)  WBC Count: 12.36 K/uL ( @ 00:09)  WBC Count: 16.03 K/uL ( @ 10:00)                            8.4    12.55 )-----------( 222      ( 22 Sep 2020 07:46 )             27.8           139  |  101  |  5<L>  ----------------------------<  101<H>  4.0   |  34<H>  |  0.49<L>    Ca    10.8<H>      22 Sep 2020 07:46  Phos  3.1       Mg     1.4         TPro  6.9  /  Alb  1.6<L>  /  TBili  1.3<H>  /  DBili  .90<H>  /  AST  50<H>  /  ALT  16  /  AlkPhos  173<H>        Creatinine Trend: 0.49<--, 0.41<--, 0.61<--, 0.45<--, 0.49<--, 0.50<--    Urinalysis Basic - ( 21 Sep 2020 21:11 )    Color: Yellow / Appearance: Clear / S.020 / pH: x  Gluc: x / Ketone: Negative  / Bili: Negative / Urobili: 8 mg/dL   Blood: x / Protein: Negative mg/dL / Nitrite: Negative   Leuk Esterase: Trace / RBC: x / WBC 0-2   Sq Epi: x / Non Sq Epi: Occasional / Bacteria: Occasional        MICROBIOLOGY:  Culture - Blood in AM (20 @ 11:17)    Specimen Source: .Blood Blood    Culture Results:   No Growth Final        Culture - Blood (09.10.20 @ 21:09)    Gram Stain:   Growth in aerobic bottle:  Gram Negative Rods  Growth in anaerobic bottle:  Gram Negative Rods    Specimen Source: .Blood Blood    Culture Results:   Growth in aerobic and anaerobic bottles: Klebsiella pneumoniae  See previous culture [id number]        Culture - Blood (09.10.20 @ 20:56)    -  Tobramycin: S <=2    -  Trimethoprim/Sulfamethoxazole: S <=0.5/9.5    -  Meropenem: S <=1    -  Piperacillin/Tazobactam: S <=8    -  Klebsiella pneumoniae: Detec    -  Amikacin: S <=16    -  Ampicillin: R >16 These ampicillin results predict results for amoxicillin    -  Ampicillin/Sulbactam: S <=4/2 Enterobacter, Citrobacter, and Serratia may develop resistance during prolonged therapy (3-4 days)    Gram Stain:   Growth in aerobic bottle: Gram Negative Rods    -  Aztreonam: S <=4    -  Cefazolin: S <=2 Enterobacter, Citrobacter, and Serratia may develop resistance during prolonged therapy (3-4 days)    -  Cefepime: S <=2    -  Cefoxitin: S <=8    -  Ceftriaxone: S <=1 Enterobacter, Citrobacter, and Serratia may develop resistance during prolonged therapy    -  Ciprofloxacin: S <=0.25    -  Ertapenem: S <=0.5    -  Gentamicin: S <=2    -  Imipenem: S <=1    -  Levofloxacin: S <=0.5    Specimen Source: .Blood Blood    Organism: Blood Culture PCR    Organism: Klebsiella pneumoniae    Culture Results:   Growth in aerobic bottle: Klebsiella pneumoniae    Organism Identification: Blood Culture PCR  Klebsiella pneumoniae    Method Type: PCR    Method Type: KEITH    Ferritin, Serum: 740 ()      Protein Total, Fluid (20 @ 18:22)    Protein Total, Fluid: 3.5 g/dL    Lactate Dehydrogenase, Fluid (20 @ 18:22)    Lactate Dehydrogenase, Fluid: 105 U/L    Glucose, Fluid (20 @ 18:22)    Glucose, Fluid: 81 mg/dL    Albumin, Fluid (20 @ 18:22)    Albumin, Fluid: 1.4 g/dL      Procalcitonin, Serum: 0.16 (20 @ 11:40)  Procalcitonin, Serum: 0.17 (20 @ 02:21)    RADIOLOGY:  Xray Chest 1 View-PORTABLE IMMEDIATE (Xray Chest 1 View-PORTABLE IMMEDIATE .) (20 @ 17:56)   IMPRESSION: Increasing lung and pleural findings.    Xray Kidney Ureter Bladder (20 @ 17:56)  IMPRESSION:  Similar colonic gas pattern. No radiographic evidence of ileus    US Duplex Venous Upper Ext Ltd, Right (20 @ 10:40)  IMPRESSION:  No evidence of right upper extremity deep venous thrombosis.      CT Abdomen and Pelvis w/ IV Cont (20 @ 15:49)  IMPRESSION:  Multilobar pneumonia as discussed.  Bilateral pleural effusions with underlying compressive atelectasis/airspace consolidation.  Acute pancreatitis with beginning partially encapsulated complex peripancreatic fluid collections likely reflecting acute or chronic collections.  Diffuse small bowel wall thickening, which could be reactive to abdominal ascites.

## 2020-09-22 NOTE — CONSULT NOTE ADULT - ASSESSMENT
DEEPIKA RUCKER is a 44M PMH alcohol abuse, hx of alcohol withdrawal and alcohol pancreatitis who presented for abdominal pain. Admitted with necrotizing pancreatitis, sepsis, septic shock with course complicated by acute hypoxic respiratory failure secondary to PNA and ARDS requiring intubation and DTs requiring sedation and small bowel obstruction conservatively managed with NGT and NPO. Currently, s/p trach and PEG on 9/10 and treated for klebsiella bacteremia.  Intermittently having fevers, last fever 9/21, 101.0  WBC staying in the 12-16k range  Had Abdominal US today which showed large amount of ascites and IR performed paracentesis which removed 1500cc fluid  SAAG <1.1 which goes against portal hypertension  CXR from 9/21 with increased lung markings and effusions as well as trach (personally reviewed)   Blood culture from 9/10 was positive for klebsiella quite sensitive and one set from 9/12 is negative   Patient has multiple possible sources of fever:  Abdomen such as SBP, pancreatitis, hepatobiliary disease   Pulmonary: CXR very unclear but he is on trach collar thus unlikely   Skin and soft tissue: cellulitis of the arm (US didnt show DVT or thrombophlebitis)  Thromboembolic: could have DVT       Antibiotics this admission:  Vancomycin: 8/26-27; 9/16-20  Zosyn: 8/18-8/27  Meropenem: 8/23-9/2  ceftriaxone: 9/14-20  Cefazolin: 9/20->    Overall, 44M pancreatitis, fever, leukocytosis, abdominal ascites, cellulitis   -continue cefazolin for cellulitis  -If ascitic fluid positive for SBP->change antibiotics to zosyn  -please send 2 sets of blood cultures with next fever  -if continues to spike fevers will request CT C/A/P with oral and IV contrast   -consider DVT of LE bilaterally   -f/u all studies sent from paracentesis

## 2020-09-22 NOTE — PROGRESS NOTE ADULT - ASSESSMENT
44M EtOH pancreatitis p/w abdominal pain 8/17/20, found to have necrotizing pancreatitis c/b septic shock and respiratory failure secondary to PNA and ARDS requiring intubation and DTs requiring sedation and small bowel obstruction conservatively managed with NGT and NPO for which he was admitted to ICU. Currently, s/p trach and PEG on 9/10 and treated for klebsiella bacteremia.   Pt was doing well in ICU and so downgraded to  floor 9/20/20 .     fever,  possibly sec to cellulitis/thrombophlebitis of RUE,    will repeat blood Cx today , obtain UA, procal , lactate, KUB    noted increased in clear secretions but without  oxygenation requirements, in fact, he was doing well on 40% trach collar today   frequent suctioning   had been intermittently febrile   will repeat abd US for monitoring of ascites, if worsening, may need paracentesis   ID consult      Assessment and Plan:     #Metabolic encephalopathy   hepatic vs. ICU delirium   d/angela Precedex since 9/15 in ICU   diazepam 10mg daily, will continue to titrate off , Once benzos are lower, can then start titrating down methadone as well (as not on it at home)  continue with methadone for now , will titrate off as tolerated   continue with  Seroquel 9/17    #Acute respiratory failure secondary to PNA/ARDS   s/p TRACH 9/10/20   tolerating trach collar today   can place back on PS QHS and PRN if he tires    #s/p necrotizing pancreatitis , s/p septic shock   s/p course of vanco and meropenem for both PNA and necrotizing pancreatitis  however had recurrent fevers and leukocytosis, and found to have blood cx + for Klebsiella. left arm midline d/angela on 9/12 with underlying bacteremia.   new midline was inserted on L arm on 9/15  de-escalated antibx from meropenem to ceftriaxone as klebsiella is sensitive to it- blood culture from 9/12 negative.     #Ascites   distended gallbladder and ascites seen on CT, abd US neg for acute cholecystitis   will obtain repeat abd US     #Presumed cellulitis of the RUE --on ancef   RUQ sono - neg for DVT     # metabolic alkalosis  sec to diuresis  s/p diamox x 1 dose in ICU 9/9/20   added free water   monitor chem     #s/p PEG placement 9/10/20  s/p SBO which was treated conservatively   Ileus , KUB 9/15/20 No dilated bowel is identified. There is nonobstructive bowel gas pattern  monitor residuals   high residuals in ICU, erythromycin IV started in ICU for improvement of motility of gut   added free water 200cc Q6H  will repeat KUB today     #unstageable pressure ulcers x 2 on sacral area ,  no sign of necrosis or infection noted   -wound care consult appreciated , continue with nursing wound care per recs     #Hypomagnesemia --replete and monitor     #normocytic anemia , probably related to sepsis and acute illness   requiring transfusion on 9/17/20  monitor H/H   will send anemia work-up   transfuse prn of H/H <7/21      #Preventative measures   lovenox SQ-dvt ppx  fall, aspiration precautions, HOBE   8/20/20 ECHO: pEF   PT , patient unable to participate, signed off                      44M EtOH pancreatitis p/w abdominal pain 8/17/20, found to have necrotizing pancreatitis c/b septic shock and respiratory failure secondary to PNA and ARDS requiring intubation and DTs requiring sedation and small bowel obstruction conservatively managed with NGT and NPO for which he was admitted to ICU. Currently, s/p trach and PEG on 9/10 and treated for klebsiella bacteremia.   Pt was doing well in ICU and so downgraded to  floor 9/20/20 .     Assessment and Plan:     #Fever, unclear source ; cellulitis vs SBP vs pancreatitis vs hepatobiliary disease ?   intermittent fevers since admission, last fever 9/21 101F   WBC in 12-16 range   ABD US showing worsening ascites, s/p paracentesis by IR 9/22 with 1.5L straw colored fluid removed   9/21 CXR with worsening lung markings with worsening Rt effusion   9/21 KUB shows nonspecific bowel gas pattern without e/o obstruction or ileus   9/21 lactate wnl , procal unremarkable   Blood culture from 9/10 was positive for klebsiella quite sensitive and one set from 9/12 is negative  SAAG < 1.1 going against portal HTN   still tolerating 40% trach collar, did well overnight, discussed with RN to continue with frequent suctioning, I resumed lasix   follow Blood Cx   will obtain LE duplex   ID consult appreciated   follow ascitic fluid results, if SBP will start zosyn   send 2 Blood Cx if spikes again and obtain CT C/A/P C+       #Metabolic encephalopathy   hepatic vs. ICU delirium   d/angela Precedex since 9/15 in ICU   diazepam 10mg daily, will continue to titrate off , Once benzos are lower, can then start titrating down methadone as well (as not on it at home)  continue with methadone for now , will titrate off as tolerated   continue with  Seroquel 9/17    #Acute respiratory failure secondary to PNA/ARDS   s/p TRACH 9/10/20   tolerating trach collar 40%  can place back on PS QHS and PRN if he tires  continue with frequent suctioning     #s/p necrotizing pancreatitis , s/p septic shock   s/p course of vanco and meropenem for both PNA and necrotizing pancreatitis  however had recurrent fevers and leukocytosis, and found to have blood cx + for Klebsiella. left arm midline d/angela on 9/12 with underlying bacteremia.   new midline was inserted on L arm on 9/15  de-escalated antibx from meropenem to ceftriaxone as klebsiella is sensitive to it- blood culture from 9/12 negative.     #Ascites   distended gallbladder and ascites seen on CT, abd US neg for acute cholecystitis   s/p paracentesis 9/22 by IR   see above     #Presumed cellulitis of the RUE --on ancef   RUQ sono - neg for DVT     # metabolic alkalosis  sec to diuresis  s/p diamox x 1 dose in ICU 9/9/20   added free water   monitor chem     #s/p PEG placement 9/10/20  s/p SBO which was treated conservatively   Ileus , KUB 9/15/20 No dilated bowel is identified. There is nonobstructive bowel gas pattern  monitor residuals   high residuals in ICU, erythromycin IV started in ICU for improvement of motility of gut   continue with free water 200cc Q6H  repeat KUB 9/21 without e/o ileus or bowel obstruction   feeds were d/c'd yesterday due to high residuals, abd much softer after paracentesis. restarted feeds today at rate 20cc/hr with monitoring of residuals     #unstageable pressure ulcers x 2 on sacral area ,  no sign of necrosis or infection noted   -wound care consult appreciated , continue with nursing wound care per recs     #Hypomagnesemia --replete and monitor     #normocytic anemia , probably related to sepsis and acute illness   requiring transfusion on 9/17/20  monitor H/H   follow anemia work-up   transfuse prn of H/H <7/21      #Preventative measures   lovenox SQ-dvt ppx  fall, aspiration precautions, HOBE   8/20/20 ECHO: pEF   PT , patient unable to participate, signed off

## 2020-09-22 NOTE — PROGRESS NOTE ADULT - SUBJECTIVE AND OBJECTIVE BOX
INTERVAL HPI:  44M with ETOH Abuse and withdrawal, Alcohol Pancreatitis who presents to the ED with severe abdominal pain.  Found to have Necrotizing Pancreatitis, Septic  Shock, Pneumonia/ARDS with Hypoxic Respiratory failure. Admitted to ICU, had prolonged hospital course with Vent support. Eventually required Tracheostomy and Peg placement.  Also with acute metabolic Encephalopathy.  20:  Transferred to .    Not able to provide details due to encephalopathy.  20: 1500 ml paracentesis.    OVERNIGHT EVENTS:  Reported with thick secretions.    Vital Signs Last 24 Hrs  T(C): 37.7 (22 Sep 2020 10:45), Max: 37.7 (22 Sep 2020 10:45)  T(F): 99.9 (22 Sep 2020 10:45), Max: 99.9 (22 Sep 2020 10:45)  HR: 112 (22 Sep 2020 12:37) (91 - 120)  BP: 151/87 (22 Sep 2020 10:45) (116/70 - 151/87)  BP(mean): --  RR: 20 (22 Sep 2020 10:45) (16 - 20)  SpO2: 93% (22 Sep 2020 12:37) (93% - 99%)    Mode: standby  FiO2: 40    PHYSICAL EXAM:  GEN:        Lethargic, comfortable.  HEENT:    Trach  RESP:        no distress  CVS:          Regular rate and rhythm.   ABD:         Soft, non-tender, non-distended;     MEDICATIONS  (STANDING):  ceFAZolin   IVPB 1000 milliGRAM(s) IV Intermittent every 8 hours  ceFAZolin   IVPB      chlorhexidine 0.12% Liquid 15 milliLiter(s) Oral Mucosa every 12 hours  chlorhexidine 4% Liquid 1 Application(s) Topical <User Schedule>  collagenase Ointment 1 Application(s) Topical daily  dextrose 5%. 1000 milliLiter(s) (50 mL/Hr) IV Continuous <Continuous>  dextrose 50% Injectable 12.5 Gram(s) IV Push once  dextrose 50% Injectable 25 Gram(s) IV Push once  dextrose 50% Injectable 25 Gram(s) IV Push once  diazepam    Tablet 10 milliGRAM(s) Oral daily  enoxaparin Injectable 40 milliGRAM(s) SubCutaneous daily  erythromycin   IVPB 500 milliGRAM(s) IV Intermittent every 6 hours  folic acid 1 milliGRAM(s) Oral daily  insulin lispro (HumaLOG) corrective regimen sliding scale   SubCutaneous every 6 hours  methadone    Tablet 10 milliGRAM(s) Oral every 6 hours  metoprolol tartrate 12.5 milliGRAM(s) Oral two times a day  multivitamin 1 Tablet(s) Oral daily  pantoprazole   Suspension 40 milliGRAM(s) Oral daily  polyethylene glycol 3350 17 Gram(s) Oral daily  QUEtiapine 100 milliGRAM(s) Oral two times a day  senna 2 Tablet(s) Oral at bedtime  sodium chloride 0.9%. 1000 milliLiter(s) (250 mL/Hr) IV Continuous <Continuous>  thiamine 100 milliGRAM(s) Oral daily    MEDICATIONS  (PRN):  acetaminophen   Tablet .. 650 milliGRAM(s) Oral every 6 hours PRN Temp greater or equal to 38C (100.4F), Mild Pain (1 - 3)  albuterol/ipratropium for Nebulization 3 milliLiter(s) Nebulizer every 6 hours PRN Shortness of Breath and/or Wheezing  dextrose 40% Gel 15 Gram(s) Oral once PRN Blood Glucose LESS THAN 70 milliGRAM(s)/deciliter  glucagon  Injectable 1 milliGRAM(s) IntraMuscular once PRN Glucose LESS THAN 70 milligrams/deciliter  metoprolol tartrate Injectable 5 milliGRAM(s) IV Push every 6 hours PRN if sbp>160 or HR >110 hold if SBP<100 or HR<60    LABS:                        8.4    12.55 )-----------( 222      ( 22 Sep 2020 07:46 )             27.8         139  |  101  |  5<L>  ----------------------------<  101<H>  4.0   |  34<H>  |  0.49<L>    Ca    10.8<H>      22 Sep 2020 07:46  Phos  3.1       Mg     1.4         TPro  6.9  /  Alb  1.6<L>  /  TBili  1.3<H>  /  DBili  .90<H>  /  AST  50<H>  /  ALT  16  /  AlkPhos  173<H>   @ 08:53  pH: 7.44  pCO2: 49  pO2: 60  SaO2: 90    Urinalysis Basic - ( 21 Sep 2020 21:11 )    Color: Yellow / Appearance: Clear / S.020 / pH: x  Gluc: x / Ketone: Negative  / Bili: Negative / Urobili: 8 mg/dL   Blood: x / Protein: Negative mg/dL / Nitrite: Negative   Leuk Esterase: Trace / RBC: x / WBC 0-2   Sq Epi: x / Non Sq Epi: Occasional / Bacteria: Occasional    ASSESSMENT AND PLAN:  ·	Hypoxic Respiratory failure.  ·	S/P tracheostomy.  ·	Bilateral pneumonia.  ·	S/P Septic Shock.  ·	Necrotizing Pancreatis.  ·	Acute metabolic Encephalopathy.  ·	Alcohol abuse.  ·	Klebsiella Bacteremia.  ·	Anemia.    Continue antibiotics.  Suction PRN.

## 2020-09-22 NOTE — CONSULT NOTE ADULT - ATTENDING COMMENTS
Angelito Morrison DO  Cell: [phone]  Pager [phone]  Infectious Disease Attending  After 5pm/weekends please call [phone] for all inquiries and new consults

## 2020-09-22 NOTE — PROGRESS NOTE ADULT - SUBJECTIVE AND OBJECTIVE BOX
HPI:  Patient is a 44M with a PMH of EtOH abuse and withdrawal, alcohol pancreatitis who presents to the ED with severe abdominal pain.  Patient states that for the past three days he has been on a drinking binge, drinking 5 beers and 5 shots of liquor per day.  Last drink reportedly yesterday.  Patient reports that he woke up today and had severe generalized abdominal pain with multiple episodes of nonbloody, nonbilious vomiting.  Patient also complaining of nausea and generalized tremors.  Vitals stable, labs show leukocytosis, lactic acidosis, and severely elevated lipase levels.  CT shows pancreatitis with early necrosis in the pancreatic body.  Will admit to floor. (17 Aug 2020 23:25)      SUBJECTIVE & OBJECTIVE: Pt seen and examined at bedside.   no overnight events.     ros unable to examine due to [x ] Encephalopathy  [ ] Advanced Dementia  [ ] Expressive Aphasia  [ ] Non-verbal patient     PHYSICAL EXAM:  Vital Signs Last 24 Hrs  T(C): 37.1 (22 Sep 2020 05:53), Max: 38.3 (21 Sep 2020 11:51)  T(F): 98.7 (22 Sep 2020 05:53), Max: 101 (21 Sep 2020 11:51)  HR: 100 (22 Sep 2020 08:41) (91 - 132)  BP: 130/74 (22 Sep 2020 05:53) (116/70 - 140/96)  BP(mean): --  RR: 16 (22 Sep 2020 05:53) (16 - 20)  SpO2: 97% (22 Sep 2020 08:41) (93% - 99%) trach collar       GENERAL: NAD , lethargic   HEAD:  Atraumatic, Normocephalic  EYES: EOMI, PERRLA, conjunctiva and sclera clear  ENMT: Moist mucous membranes, + TRACH, on trach collar   NECK: Supple, No JVD  NERVOUS SYSTEM:  lethargic, moving extremities spontaneously   CHEST/LUNG: good b/l air entry   HEART: Regular rate and rhythm; No murmurs, rubs, or gallops  ABDOMEN: Soft, Nontender, Nondistended; Bowel sounds present  EXTREMITIES:  2+ Peripheral Pulses b/l, No clubbing, cyanosis b/l   edematous feet and hands   cellulitis RUE over tattoo  +PEG     MEDICATIONS  (STANDING):  ceFAZolin   IVPB 1000 milliGRAM(s) IV Intermittent every 8 hours  ceFAZolin   IVPB      chlorhexidine 0.12% Liquid 15 milliLiter(s) Oral Mucosa every 12 hours  chlorhexidine 4% Liquid 1 Application(s) Topical <User Schedule>  collagenase Ointment 1 Application(s) Topical daily  dextrose 5%. 1000 milliLiter(s) (50 mL/Hr) IV Continuous <Continuous>  dextrose 50% Injectable 12.5 Gram(s) IV Push once  dextrose 50% Injectable 25 Gram(s) IV Push once  dextrose 50% Injectable 25 Gram(s) IV Push once  diazepam    Tablet 10 milliGRAM(s) Oral daily  enoxaparin Injectable 40 milliGRAM(s) SubCutaneous daily  erythromycin   IVPB 500 milliGRAM(s) IV Intermittent every 6 hours  folic acid 1 milliGRAM(s) Oral daily  insulin lispro (HumaLOG) corrective regimen sliding scale   SubCutaneous every 6 hours  methadone    Tablet 10 milliGRAM(s) Oral every 6 hours  multivitamin 1 Tablet(s) Oral daily  pantoprazole   Suspension 40 milliGRAM(s) Oral daily  QUEtiapine 100 milliGRAM(s) Oral two times a day  thiamine 100 milliGRAM(s) Oral daily    MEDICATIONS  (PRN):  acetaminophen   Tablet .. 650 milliGRAM(s) Oral every 6 hours PRN Temp greater or equal to 38C (100.4F), Mild Pain (1 - 3)  albuterol/ipratropium for Nebulization 3 milliLiter(s) Nebulizer every 6 hours PRN Shortness of Breath and/or Wheezing  dextrose 40% Gel 15 Gram(s) Oral once PRN Blood Glucose LESS THAN 70 milliGRAM(s)/deciliter  glucagon  Injectable 1 milliGRAM(s) IntraMuscular once PRN Glucose LESS THAN 70 milligrams/deciliter  senna 2 Tablet(s) Oral at bedtime PRN Constipation      LABS:                                     8.4    12.55 )-----------( 222      ( 22 Sep 2020 07:46 )             27.8   09-22    139  |  101  |  5<L>  ----------------------------<  101<H>  4.0   |  34<H>  |  0.49<L>    Ca    10.8<H>      22 Sep 2020 07:46  Phos  3.1     09-22  Mg     1.4     09-22    TPro  6.9  /  Alb  1.6<L>  /  TBili  1.3<H>  /  DBili  .90<H>  /  AST  50<H>  /  ALT  16  /  AlkPhos  173<H>  09-22      Magnesium, Serum: 1.5 mg/dL (09-21 @ 07:44)      RECENT CULTURES:      RADIOLOGY & ADDITIONAL TESTS:  8/31 EKG: NSR, QTc 423    Imaging Personally Reviewed:  [ ] YES  [ ] NO    Consultant(s) Notes Reviewed:  [x ] YES  [ ] NO    Care Discussed with Consultants/Other Providers [x ] YES  [ ] NO     HPI:  Patient is a 44M with a PMH of EtOH abuse and withdrawal, alcohol pancreatitis who presents to the ED with severe abdominal pain.  Patient states that for the past three days he has been on a drinking binge, drinking 5 beers and 5 shots of liquor per day.  Last drink reportedly yesterday.  Patient reports that he woke up today and had severe generalized abdominal pain with multiple episodes of nonbloody, nonbilious vomiting.  Patient also complaining of nausea and generalized tremors.  Vitals stable, labs show leukocytosis, lactic acidosis, and severely elevated lipase levels.  CT shows pancreatitis with early necrosis in the pancreatic body.  Will admit to floor. (17 Aug 2020 23:25)      SUBJECTIVE & OBJECTIVE: Pt seen and examined at bedside.   no overnight events.     ros unable to examine due to [x ] Encephalopathy  [ ] Advanced Dementia  [ ] Expressive Aphasia  [ ] Non-verbal patient     PHYSICAL EXAM:  Vital Signs Last 24 Hrs  T(C): 37.1 (22 Sep 2020 05:53), Max: 38.3 (21 Sep 2020 11:51)  T(F): 98.7 (22 Sep 2020 05:53), Max: 101 (21 Sep 2020 11:51)  HR: 100 (22 Sep 2020 08:41) (91 - 132)  BP: 130/74 (22 Sep 2020 05:53) (116/70 - 140/96)  BP(mean): --  RR: 16 (22 Sep 2020 05:53) (16 - 20)  SpO2: 97% (22 Sep 2020 08:41) (93% - 99%) 40% trach collar       GENERAL: NAD , lethargic   HEAD:  Atraumatic, Normocephalic  EYES: EOMI, PERRLA, conjunctiva and sclera clear  ENMT: Moist mucous membranes, + TRACH, on trach collar   NECK: Supple, No JVD  NERVOUS SYSTEM:  lethargic, moving extremities spontaneously   CHEST/LUNG: good b/l air entry   HEART: Regular rate and rhythm; No murmurs, rubs, or gallops  ABDOMEN: Soft, Nontender, Nondistended; Bowel sounds present  EXTREMITIES:  2+ Peripheral Pulses b/l, No clubbing, cyanosis b/l   edematous feet and hands   cellulitis RUE over tattoo  +PEG     MEDICATIONS  (STANDING):  ceFAZolin   IVPB 1000 milliGRAM(s) IV Intermittent every 8 hours  ceFAZolin   IVPB      chlorhexidine 0.12% Liquid 15 milliLiter(s) Oral Mucosa every 12 hours  chlorhexidine 4% Liquid 1 Application(s) Topical <User Schedule>  collagenase Ointment 1 Application(s) Topical daily  dextrose 5%. 1000 milliLiter(s) (50 mL/Hr) IV Continuous <Continuous>  dextrose 50% Injectable 12.5 Gram(s) IV Push once  dextrose 50% Injectable 25 Gram(s) IV Push once  dextrose 50% Injectable 25 Gram(s) IV Push once  diazepam    Tablet 10 milliGRAM(s) Oral daily  enoxaparin Injectable 40 milliGRAM(s) SubCutaneous daily  erythromycin   IVPB 500 milliGRAM(s) IV Intermittent every 6 hours  folic acid 1 milliGRAM(s) Oral daily  insulin lispro (HumaLOG) corrective regimen sliding scale   SubCutaneous every 6 hours  methadone    Tablet 10 milliGRAM(s) Oral every 6 hours  multivitamin 1 Tablet(s) Oral daily  pantoprazole   Suspension 40 milliGRAM(s) Oral daily  QUEtiapine 100 milliGRAM(s) Oral two times a day  thiamine 100 milliGRAM(s) Oral daily    MEDICATIONS  (PRN):  acetaminophen   Tablet .. 650 milliGRAM(s) Oral every 6 hours PRN Temp greater or equal to 38C (100.4F), Mild Pain (1 - 3)  albuterol/ipratropium for Nebulization 3 milliLiter(s) Nebulizer every 6 hours PRN Shortness of Breath and/or Wheezing  dextrose 40% Gel 15 Gram(s) Oral once PRN Blood Glucose LESS THAN 70 milliGRAM(s)/deciliter  glucagon  Injectable 1 milliGRAM(s) IntraMuscular once PRN Glucose LESS THAN 70 milligrams/deciliter  senna 2 Tablet(s) Oral at bedtime PRN Constipation      LABS:                                     8.4    12.55 )-----------( 222      ( 22 Sep 2020 07:46 )             27.8   09-22    139  |  101  |  5<L>  ----------------------------<  101<H>  4.0   |  34<H>  |  0.49<L>    Ca    10.8<H>      22 Sep 2020 07:46  Phos  3.1     09-22  Mg     1.4     09-22    TPro  6.9  /  Alb  1.6<L>  /  TBili  1.3<H>  /  DBili  .90<H>  /  AST  50<H>  /  ALT  16  /  AlkPhos  173<H>  09-22      Magnesium, Serum: 1.5 mg/dL (09-21 @ 07:44)      RECENT CULTURES:      RADIOLOGY & ADDITIONAL TESTS:  8/31 EKG: NSR, QTc 423    Imaging Personally Reviewed:  [ ] YES  [ ] NO    Consultant(s) Notes Reviewed:  [x ] YES  [ ] NO    Care Discussed with Consultants/Other Providers [x ] YES  [ ] NO

## 2020-09-23 LAB
24R-OH-CALCIDIOL SERPL-MCNC: 16.1 NG/ML — LOW (ref 30–80)
ALBUMIN SERPL ELPH-MCNC: 1.8 G/DL — LOW (ref 3.3–5)
ALP SERPL-CCNC: 174 U/L — HIGH (ref 40–120)
ALT FLD-CCNC: 20 U/L — SIGNIFICANT CHANGE UP (ref 12–78)
ANION GAP SERPL CALC-SCNC: 4 MMOL/L — LOW (ref 5–17)
AST SERPL-CCNC: 60 U/L — HIGH (ref 15–37)
B PERT IGG+IGM PNL SER: CLEAR — SIGNIFICANT CHANGE UP
BILIRUB SERPL-MCNC: 1.3 MG/DL — HIGH (ref 0.2–1.2)
BUN SERPL-MCNC: 6 MG/DL — LOW (ref 7–23)
CALCIUM SERPL-MCNC: 11.1 MG/DL — HIGH (ref 8.5–10.1)
CHLORIDE SERPL-SCNC: 97 MMOL/L — SIGNIFICANT CHANGE UP (ref 96–108)
CO2 SERPL-SCNC: 38 MMOL/L — HIGH (ref 22–31)
COLOR FLD: YELLOW — SIGNIFICANT CHANGE UP
CREAT SERPL-MCNC: 0.43 MG/DL — LOW (ref 0.5–1.3)
EOSINOPHIL # FLD: 2 % — SIGNIFICANT CHANGE UP
FLUID INTAKE SUBSTANCE CLASS: SIGNIFICANT CHANGE UP
FLUID SEGMENTED GRANULOCYTES: 6 % — SIGNIFICANT CHANGE UP
FOLATE+VIT B12 SERBLD-IMP: 0 % — SIGNIFICANT CHANGE UP
GLUCOSE BLDC GLUCOMTR-MCNC: 101 MG/DL — HIGH (ref 70–99)
GLUCOSE BLDC GLUCOMTR-MCNC: 101 MG/DL — HIGH (ref 70–99)
GLUCOSE BLDC GLUCOMTR-MCNC: 104 MG/DL — HIGH (ref 70–99)
GLUCOSE BLDC GLUCOMTR-MCNC: 108 MG/DL — HIGH (ref 70–99)
GLUCOSE BLDC GLUCOMTR-MCNC: 96 MG/DL — SIGNIFICANT CHANGE UP (ref 70–99)
GLUCOSE BLDC GLUCOMTR-MCNC: 98 MG/DL — SIGNIFICANT CHANGE UP (ref 70–99)
GLUCOSE SERPL-MCNC: 99 MG/DL — SIGNIFICANT CHANGE UP (ref 70–99)
HCT VFR BLD CALC: 28.2 % — LOW (ref 39–50)
HGB BLD-MCNC: 8.9 G/DL — LOW (ref 13–17)
LYMPHOCYTES # FLD: 60 % — SIGNIFICANT CHANGE UP
MAGNESIUM SERPL-MCNC: 1.1 MG/DL — LOW (ref 1.6–2.6)
MCHC RBC-ENTMCNC: 29.7 PG — SIGNIFICANT CHANGE UP (ref 27–34)
MCHC RBC-ENTMCNC: 31.6 GM/DL — LOW (ref 32–36)
MCV RBC AUTO: 94 FL — SIGNIFICANT CHANGE UP (ref 80–100)
MESOTHL CELL # FLD: 0 % — SIGNIFICANT CHANGE UP
MONOS+MACROS # FLD: 32 % — SIGNIFICANT CHANGE UP
NRBC # BLD: 0 /100 WBCS — SIGNIFICANT CHANGE UP (ref 0–0)
NRBC # FLD: 0 — SIGNIFICANT CHANGE UP
NT-PROBNP SERPL-SCNC: 86 PG/ML — SIGNIFICANT CHANGE UP (ref 0–125)
OTHER CELLS FLD MANUAL: 0 % — SIGNIFICANT CHANGE UP
PHOSPHATE SERPL-MCNC: 3.5 MG/DL — SIGNIFICANT CHANGE UP (ref 2.5–4.5)
PLATELET # BLD AUTO: 241 K/UL — SIGNIFICANT CHANGE UP (ref 150–400)
POTASSIUM SERPL-MCNC: 3.5 MMOL/L — SIGNIFICANT CHANGE UP (ref 3.5–5.3)
POTASSIUM SERPL-SCNC: 3.5 MMOL/L — SIGNIFICANT CHANGE UP (ref 3.5–5.3)
PROT SERPL-MCNC: 7.6 GM/DL — SIGNIFICANT CHANGE UP (ref 6–8.3)
RBC # BLD: 3 M/UL — LOW (ref 4.2–5.8)
RBC # FLD: 16 % — HIGH (ref 10.3–14.5)
RCV VOL RI: 85 /UL — HIGH (ref 0–0)
SODIUM SERPL-SCNC: 139 MMOL/L — SIGNIFICANT CHANGE UP (ref 135–145)
SPECIMEN SOURCE FLD: SIGNIFICANT CHANGE UP
TOTAL NUCLEATED CELL COUNT, BODY FLUID: 290 /UL — SIGNIFICANT CHANGE UP
TUBE TYPE: SIGNIFICANT CHANGE UP
WBC # BLD: 11.76 K/UL — HIGH (ref 3.8–10.5)
WBC # FLD AUTO: 11.76 K/UL — HIGH (ref 3.8–10.5)

## 2020-09-23 PROCEDURE — 99233 SBSQ HOSP IP/OBS HIGH 50: CPT

## 2020-09-23 PROCEDURE — 99232 SBSQ HOSP IP/OBS MODERATE 35: CPT

## 2020-09-23 RX ORDER — CHOLECALCIFEROL (VITAMIN D3) 125 MCG
2000 CAPSULE ORAL DAILY
Refills: 0 | Status: DISCONTINUED | OUTPATIENT
Start: 2020-09-23 | End: 2020-10-01

## 2020-09-23 RX ORDER — QUETIAPINE FUMARATE 200 MG/1
50 TABLET, FILM COATED ORAL
Refills: 0 | Status: DISCONTINUED | OUTPATIENT
Start: 2020-09-23 | End: 2020-09-28

## 2020-09-23 RX ORDER — MAGNESIUM SULFATE 500 MG/ML
2 VIAL (ML) INJECTION ONCE
Refills: 0 | Status: COMPLETED | OUTPATIENT
Start: 2020-09-23 | End: 2020-09-23

## 2020-09-23 RX ORDER — DIAZEPAM 5 MG
5 TABLET ORAL DAILY
Refills: 0 | Status: DISCONTINUED | OUTPATIENT
Start: 2020-09-24 | End: 2020-09-28

## 2020-09-23 RX ADMIN — Medication 1 TABLET(S): at 11:35

## 2020-09-23 RX ADMIN — Medication 250 MILLIGRAM(S): at 21:29

## 2020-09-23 RX ADMIN — CHLORHEXIDINE GLUCONATE 15 MILLILITER(S): 213 SOLUTION TOPICAL at 18:19

## 2020-09-23 RX ADMIN — ENOXAPARIN SODIUM 40 MILLIGRAM(S): 100 INJECTION SUBCUTANEOUS at 11:35

## 2020-09-23 RX ADMIN — Medication 0: at 06:06

## 2020-09-23 RX ADMIN — POLYETHYLENE GLYCOL 3350 17 GRAM(S): 17 POWDER, FOR SOLUTION ORAL at 11:35

## 2020-09-23 RX ADMIN — Medication 12.5 MILLIGRAM(S): at 05:32

## 2020-09-23 RX ADMIN — Medication 100 MILLIGRAM(S): at 21:29

## 2020-09-23 RX ADMIN — QUETIAPINE FUMARATE 50 MILLIGRAM(S): 200 TABLET, FILM COATED ORAL at 18:32

## 2020-09-23 RX ADMIN — Medication 250 MILLIGRAM(S): at 09:47

## 2020-09-23 RX ADMIN — Medication 100 MILLIGRAM(S): at 05:31

## 2020-09-23 RX ADMIN — Medication 100 MILLIGRAM(S): at 13:45

## 2020-09-23 RX ADMIN — CHLORHEXIDINE GLUCONATE 1 APPLICATION(S): 213 SOLUTION TOPICAL at 06:06

## 2020-09-23 RX ADMIN — Medication 250 MILLIGRAM(S): at 03:50

## 2020-09-23 RX ADMIN — METHADONE HYDROCHLORIDE 10 MILLIGRAM(S): 40 TABLET ORAL at 05:32

## 2020-09-23 RX ADMIN — METHADONE HYDROCHLORIDE 10 MILLIGRAM(S): 40 TABLET ORAL at 18:19

## 2020-09-23 RX ADMIN — Medication 1 APPLICATION(S): at 12:05

## 2020-09-23 RX ADMIN — PANTOPRAZOLE SODIUM 40 MILLIGRAM(S): 20 TABLET, DELAYED RELEASE ORAL at 12:05

## 2020-09-23 RX ADMIN — Medication 12.5 MILLIGRAM(S): at 18:19

## 2020-09-23 RX ADMIN — Medication 100 GRAM(S): at 13:45

## 2020-09-23 RX ADMIN — Medication 40 MILLIGRAM(S): at 05:32

## 2020-09-23 RX ADMIN — METHADONE HYDROCHLORIDE 10 MILLIGRAM(S): 40 TABLET ORAL at 00:30

## 2020-09-23 RX ADMIN — Medication 100 MILLIGRAM(S): at 11:35

## 2020-09-23 RX ADMIN — METHADONE HYDROCHLORIDE 10 MILLIGRAM(S): 40 TABLET ORAL at 11:35

## 2020-09-23 RX ADMIN — Medication 40 MILLIGRAM(S): at 18:19

## 2020-09-23 RX ADMIN — Medication 10 MILLIGRAM(S): at 11:35

## 2020-09-23 RX ADMIN — Medication 1 MILLIGRAM(S): at 11:35

## 2020-09-23 RX ADMIN — Medication 250 MILLIGRAM(S): at 16:41

## 2020-09-23 RX ADMIN — CHLORHEXIDINE GLUCONATE 15 MILLILITER(S): 213 SOLUTION TOPICAL at 05:35

## 2020-09-23 RX ADMIN — SENNA PLUS 2 TABLET(S): 8.6 TABLET ORAL at 21:28

## 2020-09-23 NOTE — PROGRESS NOTE ADULT - SUBJECTIVE AND OBJECTIVE BOX
INTERVAL HPI:  44M with ETOH Abuse and withdrawal, Alcohol Pancreatitis who presents to the ED with severe abdominal pain.  Found to have Necrotizing Pancreatitis, Septic  Shock, Pneumonia/ARDS with Hypoxic Respiratory failure. Admitted to ICU, had prolonged hospital course with Vent support. Eventually required Tracheostomy and Peg placement.  Also with acute metabolic Encephalopathy.  20:  Transferred to .    Not able to provide details due to encephalopathy.  20: 1500 ml paracentesis.    OVERNIGHT EVENTS:  Remains unresponsive.    Vital Signs Last 24 Hrs  T(C): 37.9 (23 Sep 2020 14:00), Max: 37.9 (23 Sep 2020 10:16)  T(F): 100.2 (23 Sep 2020 14:00), Max: 100.3 (23 Sep 2020 10:16)  HR: 98 (23 Sep 2020 11:20) (96 - 113)  BP: 112/73 (23 Sep 2020 10:16) (112/73 - 151/93)  BP(mean): --  RR: 19 (23 Sep 2020 10:16) (19 - 19)  SpO2: 96% (23 Sep 2020 11:20) (92% - 96%)    PHYSICAL EXAM:  GEN:        comfortable.  HEENT:    Normal.    RESP:        crackles.  CVS:             Regular rate and rhythm.   ABD:         Soft, non-tender, non-distended;     MEDICATIONS  (STANDING):  ceFAZolin   IVPB 1000 milliGRAM(s) IV Intermittent every 8 hours  ceFAZolin   IVPB      chlorhexidine 0.12% Liquid 15 milliLiter(s) Oral Mucosa every 12 hours  chlorhexidine 4% Liquid 1 Application(s) Topical <User Schedule>  cholecalciferol 2000 Unit(s) Oral daily  collagenase Ointment 1 Application(s) Topical daily  dextrose 5%. 1000 milliLiter(s) (50 mL/Hr) IV Continuous <Continuous>  dextrose 50% Injectable 12.5 Gram(s) IV Push once  dextrose 50% Injectable 25 Gram(s) IV Push once  dextrose 50% Injectable 25 Gram(s) IV Push once  diazepam    Tablet 5 milliGRAM(s) Oral daily  enoxaparin Injectable 40 milliGRAM(s) SubCutaneous daily  erythromycin   IVPB 500 milliGRAM(s) IV Intermittent every 6 hours  folic acid 1 milliGRAM(s) Oral daily  furosemide   Injectable 40 milliGRAM(s) IV Push two times a day  insulin lispro (HumaLOG) corrective regimen sliding scale   SubCutaneous every 6 hours  methadone    Tablet 10 milliGRAM(s) Oral every 6 hours  metoprolol tartrate 12.5 milliGRAM(s) Oral two times a day  multivitamin 1 Tablet(s) Oral daily  pantoprazole   Suspension 40 milliGRAM(s) Oral daily  polyethylene glycol 3350 17 Gram(s) Oral daily  QUEtiapine 50 milliGRAM(s) Oral two times a day  senna 2 Tablet(s) Oral at bedtime  thiamine 100 milliGRAM(s) Oral daily    MEDICATIONS  (PRN):  acetaminophen   Tablet .. 650 milliGRAM(s) Oral every 6 hours PRN Temp greater or equal to 38C (100.4F), Mild Pain (1 - 3)  albuterol/ipratropium for Nebulization 3 milliLiter(s) Nebulizer every 6 hours PRN Shortness of Breath and/or Wheezing  dextrose 40% Gel 15 Gram(s) Oral once PRN Blood Glucose LESS THAN 70 milliGRAM(s)/deciliter  glucagon  Injectable 1 milliGRAM(s) IntraMuscular once PRN Glucose LESS THAN 70 milligrams/deciliter  metoprolol tartrate Injectable 5 milliGRAM(s) IV Push every 6 hours PRN if sbp>160 or HR >110 hold if SBP<100 or HR<60    LABS:                        8.9    11.76 )-----------( 241      ( 23 Sep 2020 08:13 )             28.2         139  |  97  |  6<L>  ----------------------------<  99  3.5   |  38<H>  |  0.43<L>    Ca    11.1<H>      23 Sep 2020 08:13  Phos  3.5       Mg     1.1         TPro  7.6  /  Alb  1.8<L>  /  TBili  1.3<H>  /  DBili  x   /  AST  60<H>  /  ALT  20  /  AlkPhos  174<H>      Urinalysis Basic - ( 21 Sep 2020 21:11 )    Color: Yellow / Appearance: Clear / S.020 / pH: x  Gluc: x / Ketone: Negative  / Bili: Negative / Urobili: 8 mg/dL   Blood: x / Protein: Negative mg/dL / Nitrite: Negative   Leuk Esterase: Trace / RBC: x / WBC 0-2   Sq Epi: x / Non Sq Epi: Occasional / Bacteria: Occasional    ASSESSMENT AND PLAN:  ·	Hypoxic Respiratory failure.  ·	S/P tracheostomy.  ·	Bilateral pneumonia.  ·	S/P Septic Shock.  ·	Necrotizing Pancreatis.  ·	Acute metabolic Encephalopathy.  ·	Alcohol abuse.  ·	Klebsiella Bacteremia.  ·	Anemia.    Try to cut down Diazepam and methadone so neuro status can bed assessed.  Continue Trach collar, suction as needed.

## 2020-09-23 NOTE — PROGRESS NOTE ADULT - ATTENDING COMMENTS
Angelito Morrison DO  Cell: [phone]  Pager [phone]  Infectious Disease Attending  After 5pm/weekends please call [phone] for all inquiries and new consults

## 2020-09-23 NOTE — PROGRESS NOTE ADULT - SUBJECTIVE AND OBJECTIVE BOX
44M with a PMH of EtOH abuse and withdrawal, alcohol pancreatitis who presents to the ED with severe abdominal pain.     Patient is a 44y old  Male who presents with a chief complaint of pancreatitis (22 Sep 2020 21:12)      INTERVAL HPI/OVERNIGHT EVENTS: Unable to examine patient, due to altered mental status    MEDICATIONS  (STANDING):  ceFAZolin   IVPB 1000 milliGRAM(s) IV Intermittent every 8 hours  ceFAZolin   IVPB      chlorhexidine 0.12% Liquid 15 milliLiter(s) Oral Mucosa every 12 hours  chlorhexidine 4% Liquid 1 Application(s) Topical <User Schedule>  collagenase Ointment 1 Application(s) Topical daily  dextrose 5%. 1000 milliLiter(s) (50 mL/Hr) IV Continuous <Continuous>  dextrose 50% Injectable 12.5 Gram(s) IV Push once  dextrose 50% Injectable 25 Gram(s) IV Push once  dextrose 50% Injectable 25 Gram(s) IV Push once  diazepam    Tablet 10 milliGRAM(s) Oral daily  enoxaparin Injectable 40 milliGRAM(s) SubCutaneous daily  erythromycin   IVPB 500 milliGRAM(s) IV Intermittent every 6 hours  folic acid 1 milliGRAM(s) Oral daily  furosemide   Injectable 40 milliGRAM(s) IV Push two times a day  insulin lispro (HumaLOG) corrective regimen sliding scale   SubCutaneous every 6 hours  methadone    Tablet 10 milliGRAM(s) Oral every 6 hours  metoprolol tartrate 12.5 milliGRAM(s) Oral two times a day  multivitamin 1 Tablet(s) Oral daily  pantoprazole   Suspension 40 milliGRAM(s) Oral daily  polyethylene glycol 3350 17 Gram(s) Oral daily  QUEtiapine 100 milliGRAM(s) Oral two times a day  senna 2 Tablet(s) Oral at bedtime  thiamine 100 milliGRAM(s) Oral daily    MEDICATIONS  (PRN):  acetaminophen   Tablet .. 650 milliGRAM(s) Oral every 6 hours PRN Temp greater or equal to 38C (100.4F), Mild Pain (1 - 3)  albuterol/ipratropium for Nebulization 3 milliLiter(s) Nebulizer every 6 hours PRN Shortness of Breath and/or Wheezing  dextrose 40% Gel 15 Gram(s) Oral once PRN Blood Glucose LESS THAN 70 milliGRAM(s)/deciliter  glucagon  Injectable 1 milliGRAM(s) IntraMuscular once PRN Glucose LESS THAN 70 milligrams/deciliter  metoprolol tartrate Injectable 5 milliGRAM(s) IV Push every 6 hours PRN if sbp>160 or HR >110 hold if SBP<100 or HR<60      Allergies    No Known Allergies    Intolerances        REVIEW OF SYSTEMS:  unable to obtain, laying in bed sleeping, hard to awaken    Vital Signs Last 24 Hrs  T(C): 37.9 (23 Sep 2020 10:16), Max: 37.9 (23 Sep 2020 10:16)  T(F): 100.3 (23 Sep 2020 10:16), Max: 100.3 (23 Sep 2020 10:16)  HR: 96 (23 Sep 2020 10:16) (96 - 113)  BP: 112/73 (23 Sep 2020 10:16) (112/73 - 151/93)  BP(mean): --  RR: 19 (23 Sep 2020 10:16) (18 - 19)  SpO2: 96% (23 Sep 2020 10:16) (92% - 96%)    GENERAL: NAD , lethargic   HEAD:  Atraumatic, Normocephalic  EYES: EOMI, PERRLA, conjunctiva and sclera clear  ENMT: Moist mucous membranes, + TRACH, on trach collar   NECK: Supple, No JVD  NERVOUS SYSTEM:  lethargic, moving extremities spontaneously   CHEST/LUNG: good b/l air entry   HEART: Regular rate and rhythm; No murmurs, rubs, or gallops  ABDOMEN: Soft, Nontender, Nondistended; Bowel sounds present  EXTREMITIES:  2+ Peripheral Pulses b/l, No clubbing, cyanosis b/l   edematous feet and hands   cellulitis RUE over tattoo  +PEG     LABS:                        8.9    11.76 )-----------( 241      ( 23 Sep 2020 08:13 )             28.2     09-23    139  |  97  |  6<L>  ----------------------------<  99  3.5   |  38<H>  |  0.43<L>    Ca    11.1<H>      23 Sep 2020 08:13  Phos  3.5     -  Mg     1.1     -    TPro  7.6  /  Alb  1.8<L>  /  TBili  1.3<H>  /  DBili  x   /  AST  60<H>  /  ALT  20  /  AlkPhos  174<H>        Urinalysis Basic - ( 21 Sep 2020 21:11 )    Color: Yellow / Appearance: Clear / S.020 / pH: x  Gluc: x / Ketone: Negative  / Bili: Negative / Urobili: 8 mg/dL   Blood: x / Protein: Negative mg/dL / Nitrite: Negative   Leuk Esterase: Trace / RBC: x / WBC 0-2   Sq Epi: x / Non Sq Epi: Occasional / Bacteria: Occasional      CAPILLARY BLOOD GLUCOSE      POCT Blood Glucose.: 108 mg/dL (23 Sep 2020 12:09)  POCT Blood Glucose.: 104 mg/dL (23 Sep 2020 11:17)  POCT Blood Glucose.: 101 mg/dL (23 Sep 2020 07:49)  POCT Blood Glucose.: 96 mg/dL (23 Sep 2020 05:37)  POCT Blood Glucose.: 101 mg/dL (23 Sep 2020 00:26)  POCT Blood Glucose.: 94 mg/dL (22 Sep 2020 22:38)  POCT Blood Glucose.: 98 mg/dL (22 Sep 2020 18:09)      RADIOLOGY & ADDITIONAL TESTS:    Imaging Personally Reviewed:  [ ] YES  [ ] NO    Consultant(s) Notes Reviewed:  [ ] YES  [ ] NO    Care Discussed with Consultants/Other Providers [ ] YES  [ ] NO

## 2020-09-23 NOTE — PROGRESS NOTE ADULT - SUBJECTIVE AND OBJECTIVE BOX
DEEPIKA RUCKER  MRN-[id number]    Follow Up:  ID following for cellulitis and fever     Interval History: Had para yesterday but not fitting with SBP. Arm looks better, Mental status still not great.    ROS:    [X] Unobtainable because: heavily sedated  [ ] All other systems negative    Constitutional: no fever, no chills  Head: no trauma  Eyes: no vision changes, no eye pain  ENT:  no sore throat, no rhinorrhea  Cardiovascular:  no chest pain, no palpitation  Respiratory:  no SOB, no cough  GI:  no abd pain, no vomiting, no diarrhea  urinary: no dysuria, no hematuria, no flank pain  musculoskeletal:  no joint pain, no joint swelling  skin:  no rash  neurology:  no headache, no seizure, no change in mental status  psych: no anxiety, no depression         Allergies  No Known Allergies        ANTIMICROBIALS:  ceFAZolin   IVPB 1000 every 8 hours  ceFAZolin   IVPB    erythromycin   IVPB 500 every 6 hours      OTHER MEDS:  acetaminophen   Tablet .. 650 milliGRAM(s) Oral every 6 hours PRN  albuterol/ipratropium for Nebulization 3 milliLiter(s) Nebulizer every 6 hours PRN  chlorhexidine 0.12% Liquid 15 milliLiter(s) Oral Mucosa every 12 hours  chlorhexidine 4% Liquid 1 Application(s) Topical <User Schedule>  collagenase Ointment 1 Application(s) Topical daily  dextrose 40% Gel 15 Gram(s) Oral once PRN  dextrose 5%. 1000 milliLiter(s) IV Continuous <Continuous>  dextrose 50% Injectable 12.5 Gram(s) IV Push once  dextrose 50% Injectable 25 Gram(s) IV Push once  dextrose 50% Injectable 25 Gram(s) IV Push once  diazepam    Tablet 5 milliGRAM(s) Oral daily  enoxaparin Injectable 40 milliGRAM(s) SubCutaneous daily  folic acid 1 milliGRAM(s) Oral daily  furosemide   Injectable 40 milliGRAM(s) IV Push two times a day  glucagon  Injectable 1 milliGRAM(s) IntraMuscular once PRN  insulin lispro (HumaLOG) corrective regimen sliding scale   SubCutaneous every 6 hours  methadone    Tablet 10 milliGRAM(s) Oral every 6 hours  metoprolol tartrate 12.5 milliGRAM(s) Oral two times a day  metoprolol tartrate Injectable 5 milliGRAM(s) IV Push every 6 hours PRN  multivitamin 1 Tablet(s) Oral daily  pantoprazole   Suspension 40 milliGRAM(s) Oral daily  polyethylene glycol 3350 17 Gram(s) Oral daily  QUEtiapine 100 milliGRAM(s) Oral two times a day  QUEtiapine 50 milliGRAM(s) Oral two times a day  senna 2 Tablet(s) Oral at bedtime  thiamine 100 milliGRAM(s) Oral daily      Vital Signs Last 24 Hrs  T(C): 37.9 (23 Sep 2020 10:16), Max: 37.9 (23 Sep 2020 10:16)  T(F): 100.3 (23 Sep 2020 10:16), Max: 100.3 (23 Sep 2020 10:16)  HR: 96 (23 Sep 2020 10:16) (96 - 113)  BP: 112/73 (23 Sep 2020 10:16) (112/73 - 151/93)  BP(mean): --  RR: 19 (23 Sep 2020 10:16) (18 - 19)  SpO2: 96% (23 Sep 2020 10:16) (92% - 96%)    Physical Exam:  Constitutional: non-toxic, no distress  HEAD/EYES: anicteric, no conjunctival injection  ENT:  supple, + trach collar   Cardiovascular:   normal S1, S2, no murmur, +pitting edema in b/l ankles/feet   Respiratory:  coarse breath sounds bilaterally + wheezing  GI:  soft, +tender, normal bowel sounds, +peg tube c/d/i, s/p paracentesis covered with dressing   Musculoskeletal:  no synovitis  Neurologic: poor mental status  Skin:  psoriasis on the scalp and face, right forearm with tattoo and questionable redness and warmth   Heme/Onc: no lymphadenopathy   Psychiatric:  patient not really awake or alert    WBC Count: 11.76 K/uL ( @ 08:13)  WBC Count: 12.55 K/uL ( @ 07:46)  WBC Count: 11.78 K/uL ( @ 07:44)  WBC Count: 12.27 K/uL ( @ 04:18)  WBC Count: 11.16 K/uL ( @ 03:32)  WBC Count: 13.01 K/uL ( @ 05:42)  WBC Count: 12.36 K/uL ( @ 00:09)                            8.9    11.76 )-----------( 241      ( 23 Sep 2020 08:13 )             28.2       09-23    139  |  97  |  6<L>  ----------------------------<  99  3.5   |  38<H>  |  0.43<L>    Ca    11.1<H>      23 Sep 2020 08:13  Phos  3.5       Mg     1.1         TPro  7.6  /  Alb  1.8<L>  /  TBili  1.3<H>  /  DBili  x   /  AST  60<H>  /  ALT  20  /  AlkPhos  174<H>        Urinalysis Basic - ( 21 Sep 2020 21:11 )    Color: Yellow / Appearance: Clear / S.020 / pH: x  Gluc: x / Ketone: Negative  / Bili: Negative / Urobili: 8 mg/dL   Blood: x / Protein: Negative mg/dL / Nitrite: Negative   Leuk Esterase: Trace / RBC: x / WBC 0-2   Sq Epi: x / Non Sq Epi: Occasional / Bacteria: Occasional        Creatinine Trend: 0.43<--, 0.49<--, 0.41<--, 0.61<--, 0.45<--, 0.49<--  Lactate, Blood: 0.4 mmol/L (20 @ 18:24)      MICROBIOLOGY:  v  Ascites Fl paracentesis ascites  20 --  --    No polymorphonuclear cells seen per low power field  White blood cells seen per oil power field  No organisms seen per oil power field  by cytocentrifuge      .Blood Blood-Peripheral  20   No growth to date.  --  --      .Blood Blood  20   No Growth Final  --  --      .Urine Catheterized  20   No growth  --  --      .Sputum Sputum  20   No growth at 48 hours  --    Few polymorphonuclear leukocytes per low power field  Rare Squamous epithelial cells per low power field  No organisms seen per oil power field      .Blood Blood  09-10-20   Growth in aerobic and anaerobic bottles: Klebsiella pneumoniae  See previous culture [id number]  --    Growth in aerobic bottle:  Gram Negative Rods  Growth in anaerobic bottle:  Gram Negative Rods      .Blood Blood  09-10-20   Growth in aerobic bottle: Klebsiella pneumoniae  -  Blood Culture PCR  Klebsiella pneumoniae      .Blood Blood-Peripheral  20   No Growth Final  --  --      .Sputum Sputum  20   Normal Respiratory Poonam present  --    Moderate polymorphonuclear leukocytes per low power field  Rare Squamous epithelial cells per low power field  Rare Gram positive cocci in pairs per oil power field      .Sputum Sputum  20   Rare Normal Respiratory Poonam present  --    Numerous polymorphonuclear leukocytes per low power field  Few Squamous epithelial cells per low power field  No organisms seen per oil power field      .Sputum Sputum  20   Normal Respiratory Poonam present  --    No polymorphonuclear leukocytes per low power field  No Squamous epithelial cells per low power field  No organisms seen per oil power field    Ferritin, Serum: 740 ()      Procalcitonin, Serum: 0.16 (20 @ 11:40)  Procalcitonin, Serum: 0.17 (20 @ 02:21)      RADIOLOGY:

## 2020-09-23 NOTE — PROGRESS NOTE ADULT - ASSESSMENT
DEEPIKA RUCKER is a 44M PMH alcohol abuse, hx of alcohol withdrawal and alcohol pancreatitis who presented for abdominal pain. Admitted with necrotizing pancreatitis, sepsis, septic shock with course complicated by acute hypoxic respiratory failure secondary to PNA and ARDS requiring intubation and DTs requiring sedation and small bowel obstruction conservatively managed with NGT and NPO. Currently, s/p trach and PEG on 9/10 and treated for klebsiella bacteremia.  Intermittently having fevers, last fever 9/21, 101.0  WBC staying in the 12-16k range  Had Abdominal US today which showed large amount of ascites and IR performed paracentesis which removed 1500cc fluid  SAAG <1.1 which goes against portal hypertension and more in line with pancreatitis vs SBP vs some other cause   CXR from 9/21 with increased lung markings and effusions as well as trach (personally reviewed)   Blood culture from 9/10 was positive for klebsiella quite sensitive and one set from 9/12 is negative   Patient has multiple possible sources of fever:  Abdomen such as SBP, pancreatitis, hepatobiliary disease   Pulmonary: CXR very unclear but he is on trach collar thus unlikely   Skin and soft tissue: cellulitis of the arm (US didnt show DVT or thrombophlebitis)  Thromboembolic: could have DVT       Antibiotics this admission:  Vancomycin: 8/26-27; 9/16-20  Zosyn: 8/18-8/27  Meropenem: 8/23-9/2  ceftriaxone: 9/14-20  Cefazolin: 9/20->    9/23: Continues to respond to cefazolin, fluid analysis not consistent with SBP and negative culture thus far, heavily sedated though meds cut in half,    Overall, 44M pancreatitis, fever, leukocytosis, abdominal ascites, cellulitis   -continue cefazolin for cellulitis  -If ascitic fluid positive for SBP->change antibiotics to zosyn  -please send 2 sets of blood cultures with next fever >101  -if continues to spike fevers will request CT C/A/P with oral and IV contrast   -consider DVT of LE bilaterally   -f/u all studies sent from paracentesis   -agree with reducing sedation though monitor for withdrawal

## 2020-09-23 NOTE — PROGRESS NOTE ADULT - ASSESSMENT
44M EtOH pancreatitis p/w abdominal pain 8/17/20, found to have necrotizing pancreatitis c/b septic shock and respiratory failure secondary to PNA and ARDS requiring intubation and DTs requiring sedation and small bowel obstruction conservatively managed with NGT and NPO for which he was admitted to ICU. Currently, s/p trach and PEG on 9/10 and treated for klebsiella bacteremia.   Pt was doing well in ICU and so downgraded to  floor 9/20/20 .     Assessment and Plan:     #Fever, unclear source ; cellulitis vs SBP vs pancreatitis vs hepatobiliary disease ?   intermittent fevers since admission, last fever today 100.3  WBC in 12-16 range   ABD US showing worsening ascites, s/p paracentesis by IR 9/22 with 1.5L straw colored fluid removed -cultures did not show any specific organisms  9/21 CXR with worsening lung markings with worsening Rt effusion   9/21 KUB shows nonspecific bowel gas pattern without e/o obstruction or ileus   9/21 lactate wnl , procal unremarkable   Blood culture from 9/10 was positive for klebsiella quite sensitive and one set from 9/12 is negative  SAAG < 1.1 going against portal HTN   still tolerating 40% trach collar, did well overnight, discussed with RN to continue with frequent suctioning,   continue lasix   follow Blood Cx -no growth to date (pre-saldivar)  will obtain LE duplex, RLE showing no dvt     ID consult appreciated   follow ascitic fluid results-no SBP   send 2 Blood Cx if spikes again and obtain CT C/A/P C+ -fever still lower than 101 today      #Metabolic encephalopathy   hepatic vs. ICU delirium   d/angela Precedex since 9/15 in ICU     diazepam 10mg daily-cut in half to 5mg, methadone being tapered and Seroquel cut in half.   Will monitor patient closely for withdrawal     Will continue to titrate all sedatives down    #Acute respiratory failure secondary to PNA/ARDS   s/p TRACH 9/10/20   tolerating trach collar 40%  can place back on PS QHS and PRN if he tires  continue with frequent suctioning     #s/p necrotizing pancreatitis , s/p septic shock   s/p course of vanco and meropenem for both PNA and necrotizing pancreatitis  however had recurrent fevers and leukocytosis, and found to have blood cx + for Klebsiella. left arm midline d/angela on 9/12 with underlying bacteremia.   new midline was inserted on L arm on 9/15  de-escalated antibx from meropenem to ceftriaxone as klebsiella is sensitive to it- blood culture from 9/12 negative.     #Ascites   distended gallbladder and ascites seen on CT, abd US neg for acute cholecystitis   s/p paracentesis 9/22 by IR   see above     #Presumed cellulitis of the RUE --on cefazolin   RUQ sono - neg for DVT     # metabolic alkalosis  sec to diuresis  s/p diamox x 1 dose in ICU 9/9/20   added free water   monitor chem     #s/p PEG placement 9/10/20  s/p SBO which was treated conservatively   Ileus , KUB 9/15/20 No dilated bowel is identified. There is nonobstructive bowel gas pattern  monitor residuals   high residuals in ICU, erythromycin IV started in ICU for improvement of motility of gut   continue with free water 200cc Q6H  repeat KUB 9/21 without e/o ileus or bowel obstruction   feeds were d/c'd yesterday due to high residuals, abd much softer after paracentesis. restarted feeds today at rate 20cc/hr with monitoring of residuals     #unstageable pressure ulcers x 2 on sacral area ,  no sign of necrosis or infection noted   -wound care consult appreciated , continue with nursing wound care per recs     #Hypomagnesemia --replete and monitor     #normocytic anemia , probably related to sepsis and acute illness   requiring transfusion on 9/17/20  monitor H/H   follow anemia work-up   transfuse prn of H/H <7/21      #Preventative measures   lovenox SQ-dvt ppx  fall, aspiration precautions, HOBE   8/20/20 ECHO: pEF   PT , patient unable to participate, signed off          44M EtOH pancreatitis p/w abdominal pain 8/17/20, found to have necrotizing pancreatitis c/b septic shock and respiratory failure secondary to PNA and ARDS requiring intubation and DTs requiring sedation and small bowel obstruction conservatively managed with NGT and NPO for which he was admitted to ICU. Currently, s/p trach and PEG on 9/10 and treated for klebsiella bacteremia.   Pt was doing well in ICU and so downgraded to  floor 9/20/20 .     Assessment and Plan:     #Fever, unclear source ; cellulitis vs SBP vs pancreatitis vs hepatobiliary disease ?   intermittent fevers since admission, last fever today 100.3  Less likely cellulitis- doing much better  Not SBP -since fluid negative for bacteria   WBC in 12-16 range     ABD US showing worsening ascites, s/p paracentesis by IR 9/22 with 1.5L straw colored fluid removed -cultures did not show any specific organisms  9/21 CXR with worsening lung markings with worsening Rt effusion   9/21 KUB shows nonspecific bowel gas pattern without e/o obstruction or ileus   9/21 lactate wnl , procal unremarkable   Blood culture from 9/10 was positive for klebsiella quite sensitive and one set from 9/12 is negative  SAAG < 1.1 going against portal HTN   still tolerating 40% trach collar, did well overnight, discussed with RN to continue with frequent suctioning,   continue lasix   follow Blood Cx -no growth to date (pre-saldivar)   RLE showing no dvt     ID consult appreciated -discussed plan with ID today again  send 2 Blood Cx if spikes again and obtain CT C/A/P C+ -fever still lower than 101 today, continue to monitor      #Metabolic encephalopathy   hepatic vs. ICU delirium   d/angela Precedex since 9/15 in ICU     diazepam 10mg daily-cut in half to 5mg, methadone being tapered and Seroquel cut in half.   Will monitor patient closely for withdrawal     Will continue to titrate all sedatives down    #Acute respiratory failure secondary to PNA/ARDS   s/p TRACH 9/10/20   tolerating trach collar 40%  can place back on PS QHS and PRN if he tires  continue with frequent suctioning , continue lasix    #s/p necrotizing pancreatitis , s/p septic shock   s/p course of vanco and meropenem for both PNA and necrotizing pancreatitis  however had recurrent fevers and leukocytosis, and found to have blood cx + for Klebsiella. left arm midline d/angela on 9/12 with underlying bacteremia.   new midline was inserted on L arm on 9/15  de-escalated antibx from meropenem to ceftriaxone as klebsiella is sensitive to it- blood culture from 9/12 negative.     #Ascites   distended gallbladder and ascites seen on CT, abd US neg for acute cholecystitis   s/p paracentesis 9/22 by IR   see above     #Presumed cellulitis of the RUE --on cefazolin , improving   RUQ sono - neg for DVT     # metabolic alkalosis  sec to diuresis  s/p diamox x 1 dose in ICU 9/9/20   added free water   monitor chem     #s/p PEG placement 9/10/20  s/p SBO which was treated conservatively   Ileus , KUB 9/15/20 No dilated bowel is identified. There is nonobstructive bowel gas pattern  monitor residuals   high residuals in ICU, erythromycin IV started in ICU for improvement of motility of gut   continue with free water 200cc Q6H  repeat KUB 9/21 without e/o ileus or bowel obstruction   feeds were d/c'd yesterday due to high residuals, abd much softer after paracentesis. restarted feeds today at rate 20cc/hr with monitoring of residuals     #unstageable pressure ulcers x 2 on sacral area ,  no sign of necrosis or infection noted   -wound care consult appreciated , continue with nursing wound care per recs     #Hypomagnesemia --replete and monitor     #normocytic anemia , probably related to sepsis and acute illness   requiring transfusion on 9/17/20  monitor H/H   follow anemia work-up   transfuse prn of H/H <7/21      #Preventative measures   lovenox SQ-dvt ppx  fall, aspiration precautions, HOBE   8/20/20 ECHO: pEF   PT , patient unable to participate, signed off   Low vit D-supplement

## 2020-09-24 DIAGNOSIS — F10.29 ALCOHOL DEPENDENCE WITH UNSPECIFIED ALCOHOL-INDUCED DISORDER: ICD-10-CM

## 2020-09-24 DIAGNOSIS — F05 DELIRIUM DUE TO KNOWN PHYSIOLOGICAL CONDITION: ICD-10-CM

## 2020-09-24 LAB
GLUCOSE BLDC GLUCOMTR-MCNC: 107 MG/DL — HIGH (ref 70–99)
GLUCOSE BLDC GLUCOMTR-MCNC: 110 MG/DL — HIGH (ref 70–99)
GLUCOSE BLDC GLUCOMTR-MCNC: 117 MG/DL — HIGH (ref 70–99)
GLUCOSE BLDC GLUCOMTR-MCNC: 120 MG/DL — HIGH (ref 70–99)
GLUCOSE BLDC GLUCOMTR-MCNC: 126 MG/DL — HIGH (ref 70–99)
NON-GYNECOLOGICAL CYTOLOGY STUDY: SIGNIFICANT CHANGE UP

## 2020-09-24 PROCEDURE — 90792 PSYCH DIAG EVAL W/MED SRVCS: CPT

## 2020-09-24 PROCEDURE — 99233 SBSQ HOSP IP/OBS HIGH 50: CPT

## 2020-09-24 PROCEDURE — 99232 SBSQ HOSP IP/OBS MODERATE 35: CPT

## 2020-09-24 PROCEDURE — 71045 X-RAY EXAM CHEST 1 VIEW: CPT | Mod: 26

## 2020-09-24 RX ORDER — METHADONE HYDROCHLORIDE 40 MG/1
10 TABLET ORAL EVERY 8 HOURS
Refills: 0 | Status: DISCONTINUED | OUTPATIENT
Start: 2020-09-24 | End: 2020-09-25

## 2020-09-24 RX ORDER — SCOPALAMINE 1 MG/3D
1 PATCH, EXTENDED RELEASE TRANSDERMAL
Refills: 0 | Status: DISCONTINUED | OUTPATIENT
Start: 2020-09-24 | End: 2020-10-04

## 2020-09-24 RX ADMIN — ENOXAPARIN SODIUM 40 MILLIGRAM(S): 100 INJECTION SUBCUTANEOUS at 11:30

## 2020-09-24 RX ADMIN — Medication 12.5 MILLIGRAM(S): at 17:28

## 2020-09-24 RX ADMIN — CHLORHEXIDINE GLUCONATE 15 MILLILITER(S): 213 SOLUTION TOPICAL at 17:28

## 2020-09-24 RX ADMIN — Medication 100 MILLIGRAM(S): at 22:43

## 2020-09-24 RX ADMIN — Medication 5 MILLIGRAM(S): at 11:30

## 2020-09-24 RX ADMIN — METHADONE HYDROCHLORIDE 10 MILLIGRAM(S): 40 TABLET ORAL at 05:09

## 2020-09-24 RX ADMIN — Medication 650 MILLIGRAM(S): at 12:54

## 2020-09-24 RX ADMIN — Medication 1 APPLICATION(S): at 11:31

## 2020-09-24 RX ADMIN — Medication 100 MILLIGRAM(S): at 05:09

## 2020-09-24 RX ADMIN — Medication 250 MILLIGRAM(S): at 11:29

## 2020-09-24 RX ADMIN — QUETIAPINE FUMARATE 50 MILLIGRAM(S): 200 TABLET, FILM COATED ORAL at 17:28

## 2020-09-24 RX ADMIN — PANTOPRAZOLE SODIUM 40 MILLIGRAM(S): 20 TABLET, DELAYED RELEASE ORAL at 11:30

## 2020-09-24 RX ADMIN — METHADONE HYDROCHLORIDE 10 MILLIGRAM(S): 40 TABLET ORAL at 11:29

## 2020-09-24 RX ADMIN — METHADONE HYDROCHLORIDE 10 MILLIGRAM(S): 40 TABLET ORAL at 01:13

## 2020-09-24 RX ADMIN — QUETIAPINE FUMARATE 50 MILLIGRAM(S): 200 TABLET, FILM COATED ORAL at 05:09

## 2020-09-24 RX ADMIN — SENNA PLUS 2 TABLET(S): 8.6 TABLET ORAL at 22:43

## 2020-09-24 RX ADMIN — CHLORHEXIDINE GLUCONATE 1 APPLICATION(S): 213 SOLUTION TOPICAL at 06:05

## 2020-09-24 RX ADMIN — Medication 1 TABLET(S): at 11:30

## 2020-09-24 RX ADMIN — Medication 1 MILLIGRAM(S): at 11:30

## 2020-09-24 RX ADMIN — Medication 12.5 MILLIGRAM(S): at 05:09

## 2020-09-24 RX ADMIN — POLYETHYLENE GLYCOL 3350 17 GRAM(S): 17 POWDER, FOR SOLUTION ORAL at 11:29

## 2020-09-24 RX ADMIN — Medication 2000 UNIT(S): at 11:30

## 2020-09-24 RX ADMIN — Medication 40 MILLIGRAM(S): at 17:28

## 2020-09-24 RX ADMIN — Medication 100 MILLIGRAM(S): at 15:03

## 2020-09-24 RX ADMIN — Medication 100 MILLIGRAM(S): at 11:29

## 2020-09-24 RX ADMIN — CHLORHEXIDINE GLUCONATE 15 MILLILITER(S): 213 SOLUTION TOPICAL at 05:09

## 2020-09-24 RX ADMIN — METHADONE HYDROCHLORIDE 10 MILLIGRAM(S): 40 TABLET ORAL at 22:43

## 2020-09-24 RX ADMIN — Medication 250 MILLIGRAM(S): at 05:09

## 2020-09-24 RX ADMIN — Medication 40 MILLIGRAM(S): at 05:09

## 2020-09-24 NOTE — BEHAVIORAL HEALTH ASSESSMENT NOTE - SUMMARY
Persistent multifactorial delirium with difficulty weaning off of sedation and IV treatment in ICU with episodes of breakthrough agitation

## 2020-09-24 NOTE — BEHAVIORAL HEALTH ASSESSMENT NOTE - NSBHCHARTREVIEWVS_PSY_A_CORE FT
T(C): 38.3 (09-24-20 @ 10:27), Max: 38.3 (09-24-20 @ 10:27)  HR: 92 (09-24-20 @ 10:27) (92 - 111)  BP: 126/52 (09-24-20 @ 10:27) (114/64 - 132/76)  RR: 17 (09-24-20 @ 10:27) (17 - 20)  SpO2: 96% (09-24-20 @ 10:27) (95% - 96%)

## 2020-09-24 NOTE — PROGRESS NOTE ADULT - SUBJECTIVE AND OBJECTIVE BOX
DEEPIKA RUCKER  MRN-[id number]    Follow Up:  ID following for cellulitis and fever     Interval History: Spiked a fever, sounds very junky respiratory wise and has thick secretions, Dr. Agarwal consulted to help with tapering down meds    ROS:    [X] Unobtainable because: heavily sedated  [ ] All other systems negative    Constitutional: no fever, no chills  Head: no trauma  Eyes: no vision changes, no eye pain  ENT:  no sore throat, no rhinorrhea  Cardiovascular:  no chest pain, no palpitation  Respiratory:  no SOB, no cough  GI:  no abd pain, no vomiting, no diarrhea  urinary: no dysuria, no hematuria, no flank pain  musculoskeletal:  no joint pain, no joint swelling  skin:  no rash  neurology:  no headache, no seizure, no change in mental status  psych: no anxiety, no depression         Allergies  No Known Allergies        ANTIMICROBIALS:    ceFAZolin   IVPB 1000 every 8 hours  ceFAZolin   IVPB        MEDICATIONS  (STANDING):  dextrose 50% Injectable 12.5 once  dextrose 50% Injectable 25 once  dextrose 50% Injectable 25 once  diazepam    Tablet 5 daily  enoxaparin Injectable 40 daily  furosemide   Injectable 40 two times a day  insulin lispro (HumaLOG) corrective regimen sliding scale  every 6 hours  methadone    Tablet 10 every 8 hours  metoprolol tartrate 12.5 two times a day  pantoprazole   Suspension 40 daily  polyethylene glycol 3350 17 daily  QUEtiapine 50 two times a day  senna 2 at bedtime      PRN  acetaminophen   Tablet .. 650 milliGRAM(s) Oral every 6 hours PRN  albuterol/ipratropium for Nebulization 3 milliLiter(s) Nebulizer every 6 hours PRN  dextrose 40% Gel 15 Gram(s) Oral once PRN  glucagon  Injectable 1 milliGRAM(s) IntraMuscular once PRN  metoprolol tartrate Injectable 5 milliGRAM(s) IV Push every 6 hours PRN      Vital Signs Last 24 Hrs  T(F): 100.9 (20 @ 10:27), Max: 100.9 (20 @ 10:27)  HR: 94 (20 @ 14:17)  BP: 126/52 (20 @ 10:27)  RR: 19 (20 @ 14:17)  SpO2: 96% (20 @ 14:17) (96% - 96%)  Wt(kg): --      Physical Exam:  Constitutional: non-toxic, no distress  HEAD/EYES: anicteric, no conjunctival injection  ENT:  supple, + trach collar   Cardiovascular:   normal S1, S2, no murmur, +pitting edema in b/l ankles/feet   Respiratory:  coarse breath sounds bilaterally + wheezing  GI:  soft, +tenderness, normal bowel sounds, +peg tube c/d/i, s/p paracentesis covered with dressing   Musculoskeletal:  no synovitis  Neurologic: poor mental status  Skin:  psoriasis on the scalp and face, right forearm with tattoo and questionable redness and warmth   Heme/Onc: no lymphadenopathy   Psychiatric:  patient not really awake or alert    WBC Count: 11.76 K/uL ( @ 08:13)  WBC Count: 12.55 K/uL ( @ 07:46)  WBC Count: 11.78 K/uL ( @ 07:44)  WBC Count: 12.27 K/uL ( @ 04:18)  WBC Count: 11.16 K/uL ( @ 03:32)  WBC Count: 13.01 K/uL ( @ 05:42)  WBC Count: 12.36 K/uL ( @ 00:09)                            8.9    11.76 )-----------( 241      ( 23 Sep 2020 08:13 )             28.2           139  |  97  |  6<L>  ----------------------------<  99  3.5   |  38<H>  |  0.43<L>    Ca    11.1<H>      23 Sep 2020 08:13  Phos  3.5       Mg     1.1         TPro  7.6  /  Alb  1.8<L>  /  TBili  1.3<H>  /  DBili  x   /  AST  60<H>  /  ALT  20  /  AlkPhos  174<H>        Creatinine Trend: 0.43<--, 0.49<--, 0.41<--, 0.61<--, 0.45<--, 0.49<--            Urinalysis Basic - ( 21 Sep 2020 21:11 )    Color: Yellow / Appearance: Clear / S.020 / pH: x  Gluc: x / Ketone: Negative  / Bili: Negative / Urobili: 8 mg/dL   Blood: x / Protein: Negative mg/dL / Nitrite: Negative   Leuk Esterase: Trace / RBC: x / WBC 0-2   Sq Epi: x / Non Sq Epi: Occasional / Bacteria: Occasional        Creatinine Trend: 0.43<--, 0.49<--, 0.41<--, 0.61<--, 0.45<--, 0.49<--  Lactate, Blood: 0.4 mmol/L (20 @ 18:24)      MICROBIOLOGY:  v  Ascites Fl paracentesis ascites  20 --  --    No polymorphonuclear cells seen per low power field  White blood cells seen per oil power field  No organisms seen per oil power field  by cytocentrifuge      .Blood Blood-Peripheral  20   No growth to date.  --  --      .Blood Blood  20   No Growth Final  --  --      .Urine Catheterized  20   No growth  --  --      .Sputum Sputum  20   No growth at 48 hours  --    Few polymorphonuclear leukocytes per low power field  Rare Squamous epithelial cells per low power field  No organisms seen per oil power field      .Blood Blood  09-10-20   Growth in aerobic and anaerobic bottles: Klebsiella pneumoniae  See previous culture [id number]  --    Growth in aerobic bottle:  Gram Negative Rods  Growth in anaerobic bottle:  Gram Negative Rods      .Blood Blood  09-10-20   Growth in aerobic bottle: Klebsiella pneumoniae  -  Blood Culture PCR  Klebsiella pneumoniae      .Blood Blood-Peripheral  20   No Growth Final  --  --      .Sputum Sputum  20   Normal Respiratory Poonam present  --    Moderate polymorphonuclear leukocytes per low power field  Rare Squamous epithelial cells per low power field  Rare Gram positive cocci in pairs per oil power field      .Sputum Sputum  20   Rare Normal Respiratory Poonam present  --    Numerous polymorphonuclear leukocytes per low power field  Few Squamous epithelial cells per low power field  No organisms seen per oil power field      .Sputum Sputum  20   Normal Respiratory Poonam present  --    No polymorphonuclear leukocytes per low power field  No Squamous epithelial cells per low power field  No organisms seen per oil power field    Ferritin, Serum: 740 ()      Procalcitonin, Serum: 0.16 (20 @ 11:40)  Procalcitonin, Serum: 0.17 (20 @ 02:21)      RADIOLOGY:

## 2020-09-24 NOTE — BEHAVIORAL HEALTH ASSESSMENT NOTE - NSBHCHARTREVIEWLAB_PSY_A_CORE FT
09-23    139  |  97  |  6<L>  ----------------------------<  99  3.5   |  38<H>  |  0.43<L>    Ca    11.1<H>      23 Sep 2020 08:13  Phos  3.5     09-23  Mg     1.1     09-23    TPro  7.6  /  Alb  1.8<L>  /  TBili  1.3<H>  /  DBili  x   /  AST  60<H>  /  ALT  20  /  AlkPhos  174<H>  09-23

## 2020-09-24 NOTE — PROGRESS NOTE ADULT - ASSESSMENT
44M EtOH pancreatitis p/w abdominal pain 8/17/20, found to have necrotizing pancreatitis c/b septic shock and respiratory failure secondary to PNA and ARDS requiring intubation and DTs requiring sedation and small bowel obstruction conservatively managed with NGT and NPO for which he was admitted to ICU. Currently, s/p trach and PEG on 9/10 and treated for klebsiella bacteremia.   Pt was doing well in ICU and so downgraded to  floor 9/20/20 .     Assessment and Plan:     #Fever, unclear source ; cellulitis vs SBP vs pancreatitis vs hepatobiliary disease ?   intermittent fevers since admission, spiked fever again today  Less likely cellulitis- doing much better  Not SBP -since fluid negative for bacteria   WBC in 12-16 range     ABD US showing worsening ascites, s/p paracentesis by IR 9/22 with 1.5L straw colored fluid removed -cultures did not show any specific organisms  9/21 CXR with worsening lung markings with worsening Rt effusion   9/21 KUB shows nonspecific bowel gas pattern without e/o obstruction or ileus   9/21 lactate wnl , procal unremarkable   Blood culture from 9/10 was positive for klebsiella quite sensitive and one set from 9/12 is negative  SAAG < 1.1 going against portal HTN   still tolerating 40% trach collar, did well overnight, discussed with RN to continue with frequent suctioning,   continue lasix   follow Blood Cx -no growth to date (pre-saldivar)   RLE showing no dvt     ID consult appreciated -discussed plan with ID  -blood cultures sent again  -CT Chest/Abdomen/Pelvis ordered     #Metabolic encephalopathy   hepatic vs. ICU delirium   d/angela Precedex since 9/15 in ICU     diazepam 10mg daily-cut in half to 5mg, methadone being tapered and Seroquel cut in half.   Will monitor patient closely for withdrawal     Will continue to titrate all sedatives down    Psych recs appreciated     #Acute respiratory failure secondary to PNA/ARDS   s/p TRACH 9/10/20   tolerating trach collar 40%  can place back on PS QHS and PRN if he tires  continue with frequent suctioning , continue lasix    #s/p necrotizing pancreatitis , s/p septic shock   s/p course of vanco and meropenem for both PNA and necrotizing pancreatitis  however had recurrent fevers and leukocytosis, and found to have blood cx + for Klebsiella. left arm midline d/angela on 9/12 with underlying bacteremia.   new midline was inserted on L arm on 9/15  de-escalated antibx from meropenem to ceftriaxone as klebsiella is sensitive to it- blood culture from 9/12 negative.     #Ascites   distended gallbladder and ascites seen on CT, abd US neg for acute cholecystitis   s/p paracentesis 9/22 by IR   see above     #Presumed cellulitis of the RUE --on cefazolin , improving   RUQ sono - neg for DVT     # metabolic alkalosis  sec to diuresis  s/p diamox x 1 dose in ICU 9/9/20   added free water   monitor chem     #s/p PEG placement 9/10/20  s/p SBO which was treated conservatively   Ileus , KUB 9/15/20 No dilated bowel is identified. There is nonobstructive bowel gas pattern  monitor residuals   high residuals in ICU, erythromycin IV started in ICU for improvement of motility of gut   continue with free water 200cc Q6H  repeat KUB 9/21 without e/o ileus or bowel obstruction   feeds were d/c'd yesterday due to high residuals, abd much softer after paracentesis. restarted feeds today at rate 20cc/hr with monitoring of residuals     #unstageable pressure ulcers x 2 on sacral area ,  no sign of necrosis or infection noted   -wound care consult appreciated , continue with nursing wound care per recs     #Hypomagnesemia --replete and monitor     #normocytic anemia , probably related to sepsis and acute illness   requiring transfusion on 9/17/20  monitor H/H   follow anemia work-up   transfuse prn of H/H <7/21      #Preventative measures   lovenox SQ-dvt ppx  fall, aspiration precautions, HOBE   8/20/20 ECHO: pEF   PT , patient unable to participate, signed off   Low vit D-supplement

## 2020-09-24 NOTE — BEHAVIORAL HEALTH ASSESSMENT NOTE - NSBHCONSULTMEDS_PSY_A_CORE FT
can start weaning off the Methadone - now at 10mg PO q6hrs so getting 40mg in a 24hrs period; reduce slowly. Decrease to 10mg PO q8hrs with perimeter to HOLD dose for sedation can start weaning off the Methadone - now at 10mg PO q6hrs so getting 40mg in a 24hrs period; reduce slowly. Decrease to 10mg PO q8hrs with perimeter to HOLD dose for sedation  can continue Seroquel 50mg PO via PEG for now, and Valium 5mg qd   GOAL:  weaning off methadone and benzo one agent at a time

## 2020-09-24 NOTE — BEHAVIORAL HEALTH ASSESSMENT NOTE - NSBHCHARTREVIEWIMAGING_PSY_A_CORE FT
CT Abdomen and Pelvis w/ IV Cont (08.27.20); Multilobar pneumonia; Bilateral pleural effusions with underlying compressive atelectasis/airspace consolidation; Acute pancreatitis with beginning partially encapsulated complex peripancreatic fluid collections likely reflecting acute or chronic collections; Diffuse small bowel wall thickening, which could be reactive to abdominal ascites.

## 2020-09-24 NOTE — PROGRESS NOTE ADULT - ASSESSMENT
DEEPIKA RUCKER is a 44M PMH alcohol abuse, hx of alcohol withdrawal and alcohol pancreatitis who presented for abdominal pain. Admitted with necrotizing pancreatitis, sepsis, septic shock with course complicated by acute hypoxic respiratory failure secondary to PNA and ARDS requiring intubation and DTs requiring sedation and small bowel obstruction conservatively managed with NGT and NPO. Currently, s/p trach and PEG on 9/10 and treated for klebsiella bacteremia.  Intermittently having fevers, last fever 9/21, 101.0  WBC staying in the 12-16k range  Had Abdominal US today which showed large amount of ascites and IR performed paracentesis which removed 1500cc fluid  SAAG <1.1 which goes against portal hypertension and more in line with pancreatitis vs SBP vs some other cause   CXR from 9/21 with increased lung markings and effusions as well as trach (personally reviewed)   Blood culture from 9/10 was positive for klebsiella quite sensitive and one set from 9/12 is negative   Patient has multiple possible sources of fever:  Abdomen such as SBP, pancreatitis, hepatobiliary disease   Pulmonary: CXR very unclear but he is on trach collar thus unlikely   Skin and soft tissue: cellulitis of the arm (US didnt show DVT or thrombophlebitis)  Thromboembolic: could have DVT       Antibiotics this admission:  Vancomycin: 8/26-27; 9/16-20  Zosyn: 8/18-8/27  Meropenem: 8/23-9/2  ceftriaxone: 9/14-20  Cefazolin: 9/20->    9/23: Continues to respond to cefazolin, fluid analysis not consistent with SBP and negative culture thus far, heavily sedated though meds cut in half,  9/24: Spiked a fever, panculture and repeat imaging, Behavioral health consulted, ascites fluid culture negative     Overall, 44M pancreatitis, fever, leukocytosis, abdominal ascites, cellulitis   -continue cefazolin for cellulitis  -If ascitic fluid positive for SBP->change antibiotics to zosyn  -ordered 2 sets of blood cultures as patient spiked   -send respiratory culture  -please obtain CT C without contrast and A/P with oral and IV contrast   -consider DVT of LE bilaterally   -agree with reducing sedation though monitor for withdrawal

## 2020-09-24 NOTE — PROGRESS NOTE ADULT - SUBJECTIVE AND OBJECTIVE BOX
INTERVAL HPI:   44M with ETOH Abuse and withdrawal, Alcohol Pancreatitis who presents to the ED with severe abdominal pain.  Found to have Necrotizing Pancreatitis, Septic  Shock, Pneumonia/ARDS with Hypoxic Respiratory failure. Admitted to ICU, had prolonged hospital course with Vent support. Eventually required Tracheostomy and Peg placement.  Also with acute metabolic Encephalopathy.  09/20/20:  Transferred to .    Not able to provide details due to encephalopathy.  09/22/20: 1500 ml paracentesis.    OVERNIGHT EVENTS:  Temperature spike to 100-.9 noted    Vital Signs Last 24 Hrs  T(C): 38.3 (24 Sep 2020 10:27), Max: 38.3 (24 Sep 2020 10:27)  T(F): 100.9 (24 Sep 2020 10:27), Max: 100.9 (24 Sep 2020 10:27)  HR: 92 (24 Sep 2020 10:27) (92 - 111)  BP: 126/52 (24 Sep 2020 10:27) (114/64 - 132/76)  BP(mean): --  RR: 17 (24 Sep 2020 10:27) (17 - 20)  SpO2: 96% (24 Sep 2020 10:27) (95% - 96%)    Mode: standby  FiO2: 40    PHYSICAL EXAM:  GEN:        unresponsive.  HEENT:    Trach.    RESP:        significant trach secretions.  CVS:          Regular rate and rhythm.   ABD:         Soft, non-tender, non-distended;     MEDICATIONS  (STANDING):  ceFAZolin   IVPB 1000 milliGRAM(s) IV Intermittent every 8 hours  ceFAZolin   IVPB      chlorhexidine 0.12% Liquid 15 milliLiter(s) Oral Mucosa every 12 hours  chlorhexidine 4% Liquid 1 Application(s) Topical <User Schedule>  cholecalciferol 2000 Unit(s) Oral daily  collagenase Ointment 1 Application(s) Topical daily  dextrose 5%. 1000 milliLiter(s) (50 mL/Hr) IV Continuous <Continuous>  dextrose 50% Injectable 12.5 Gram(s) IV Push once  dextrose 50% Injectable 25 Gram(s) IV Push once  dextrose 50% Injectable 25 Gram(s) IV Push once  diazepam    Tablet 5 milliGRAM(s) Oral daily  enoxaparin Injectable 40 milliGRAM(s) SubCutaneous daily  erythromycin   IVPB 500 milliGRAM(s) IV Intermittent every 6 hours  folic acid 1 milliGRAM(s) Oral daily  furosemide   Injectable 40 milliGRAM(s) IV Push two times a day  insulin lispro (HumaLOG) corrective regimen sliding scale   SubCutaneous every 6 hours  methadone    Tablet 10 milliGRAM(s) Oral every 6 hours  metoprolol tartrate 12.5 milliGRAM(s) Oral two times a day  multivitamin 1 Tablet(s) Oral daily  pantoprazole   Suspension 40 milliGRAM(s) Oral daily  polyethylene glycol 3350 17 Gram(s) Oral daily  QUEtiapine 50 milliGRAM(s) Oral two times a day  senna 2 Tablet(s) Oral at bedtime  thiamine 100 milliGRAM(s) Oral daily    MEDICATIONS  (PRN):  acetaminophen   Tablet .. 650 milliGRAM(s) Oral every 6 hours PRN Temp greater or equal to 38C (100.4F), Mild Pain (1 - 3)  albuterol/ipratropium for Nebulization 3 milliLiter(s) Nebulizer every 6 hours PRN Shortness of Breath and/or Wheezing  dextrose 40% Gel 15 Gram(s) Oral once PRN Blood Glucose LESS THAN 70 milliGRAM(s)/deciliter  glucagon  Injectable 1 milliGRAM(s) IntraMuscular once PRN Glucose LESS THAN 70 milligrams/deciliter  metoprolol tartrate Injectable 5 milliGRAM(s) IV Push every 6 hours PRN if sbp>160 or HR >110 hold if SBP<100 or HR<60    LABS:                        8.9    11.76 )-----------( 241      ( 23 Sep 2020 08:13 )             28.2     09-23    139  |  97  |  6<L>  ----------------------------<  99  3.5   |  38<H>  |  0.43<L>    Ca    11.1<H>      23 Sep 2020 08:13  Phos  3.5     09-23  Mg     1.1     09-23    TPro  7.6  /  Alb  1.8<L>  /  TBili  1.3<H>  /  DBili  x   /  AST  60<H>  /  ALT  20  /  AlkPhos  174<H>  09-23    ASSESSMENT AND PLAN:  ·	Hypoxic Respiratory failure.  ·	S/P tracheostomy.  ·	Bilateral pneumonia.  ·	S/P Septic Shock.  ·	Necrotizing Pancreatis.  ·	Acute metabolic Encephalopathy.  ·	Alcohol abuse.  ·	Klebsiella Bacteremia.  ·	Anemia.    Temperature spike noted  Will get CXR.  ID following.

## 2020-09-24 NOTE — BEHAVIORAL HEALTH ASSESSMENT NOTE - OTHER
tenuous as per notes deferred at this time not able to ascertain at this time due to limitation of exam see HPI to be determined once able to tolerate full psychiatric evaluation n/a - in deep sleep

## 2020-09-24 NOTE — BEHAVIORAL HEALTH ASSESSMENT NOTE - HPI (INCLUDE ILLNESS QUALITY, SEVERITY, DURATION, TIMING, CONTEXT, MODIFYING FACTORS, ASSOCIATED SIGNS AND SYMPTOMS)
PATIENT LAST SEEN BY CL PSYCHIATRY AT  IN MAY 2018: 45 yo M, single, unemployed, moved from [place] to [place] ~ 3 years ago, living with his father, used to work as a /, with hx of Alcohol dependence w/ multiple rehabs/detox admissions; + DWI; + daily Cannabis use, x of intubation for ARF for MSSA PNA in 05/18; BIB A 8/17/20 for severe abdominal pain, nausea, vomiting Patient reported binge drinking for the previous 3 days, drinking 5 beers and 5 shots of liquor per day. BAl < 10 in ED; UTOX not done. Admitted to ICU for alcohol withdrawal, leukocytosis, lactic acidosis, severely elevated lipase levels; necrotizing pancreatitis, sepsis, septic shock. Hospital course complicated by acute hypoxic respiratory failure secondary to PNA and ARDS requiring intubation and DTs requiring sedation; small bowel obstruction conservatively managed with NGT and NPO. Currently, s/p trach and PEG on 9/10 and treated for klebsiella bacteremia. Transferred to regular floors on 9/20/20.     Of note, -Pt was started on methadone in ICU as he has he has been on fentanyl both for treatement of underlying pain due to pancreatits & also for sedation after weaning off of fentanyl infusion (9/7/20) PATIENT LAST SEEN BY CL PSYCHIATRY AT  IN MAY 2018: 43 yo M, single, unemployed, moved from [place] to [place] ~ 3 years ago, living with his father, used to work as a /, with hx of Alcohol dependence w/ multiple rehabs/detox admissions; + DWI; + daily Cannabis use, x of intubation for ARF for MSSA PNA in 05/18; BIB A 8/17/20 for severe abdominal pain, nausea, vomiting Patient reported binge drinking for the previous 3 days, drinking 5 beers and 5 shots of liquor per day. BAl < 10 in ED; UTOX not done. Admitted to ICU for alcohol withdrawal, leukocytosis, lactic acidosis, severely elevated lipase levels; necrotizing pancreatitis, sepsis, septic shock. Hospital course complicated by acute hypoxic respiratory failure secondary to PNA and ARDS requiring intubation and DTs requiring sedation; small bowel obstruction conservatively managed with NGT and NPO. Currently, s/p trach and PEG on 9/10 and treated for klebsiella bacteremia. Transferred to regular floors on 9/20/20.     Of note, -Pt was started on methadone in ICU as he has he has been on fentanyl both for treatement of underlying pain due to pancreatits & also for sedation after weaning off of fentanyl infusion (9/7/20)    ISTOP Reference #:[id number]   02/11/2020 02/12/2020 chlordiazepoxide 10 mg capsule  30 15 Nasra Robin (NP) Insurance [facility] #[id number]   CVM no record of patient PATIENT LAST SEEN BY CL PSYCHIATRY AT  IN MAY 2018: 43 yo M, single, unemployed, moved from [place] to [place] ~ 3 years ago, living with his father, used to work as a /, with hx of Alcohol dependence w/ multiple rehabs/detox admissions; + DWI; + daily Cannabis use, x of intubation for ARF for MSSA PNA in 05/18; BIB A 8/17/20 for severe abdominal pain, nausea, vomiting Patient reported binge drinking for the previous 3 days, drinking 5 beers and 5 shots of liquor per day. BAl < 10 in ED; UTOX not done. Admitted to ICU for alcohol withdrawal, leukocytosis, lactic acidosis, severely elevated lipase levels; necrotizing pancreatitis, sepsis, septic shock. Hospital course complicated by acute hypoxic respiratory failure secondary to PNA and ARDS requiring intubation and DTs requiring sedation; small bowel obstruction conservatively managed with NGT and NPO. Currently, s/p trach and PEG on 9/10 and treated for klebsiella bacteremia. Transferred to regular floors on 9/20/20.     Of note, -Pt was started on methadone in ICU as he has he has been on fentanyl both for treatement of underlying pain due to pancreatits & also for sedation after weaning off of fentanyl infusion (9/7/20)  EXAM: Patient is deep sleep/sedated and not able to awaken enough to engage in conversation/psychiatric assessment.     ISTOP Reference #:[id number]   02/11/2020 02/12/2020 chlordiazepoxide 10 mg capsule  30 15 Nasra Robin (NP) Insurance [facility] #[id number]   CVM no record of patient

## 2020-09-24 NOTE — PROGRESS NOTE ADULT - SUBJECTIVE AND OBJECTIVE BOX
INTERVAL HPI/OVERNIGHT EVENTS: Unable to examine patient, due to altered mental status. No changes, pt still sleeping     Patient is a 44y old  Male who presents with a chief complaint of pancreatitis (24 Sep 2020 17:14)      MEDICATIONS  (STANDING):  ceFAZolin   IVPB 1000 milliGRAM(s) IV Intermittent every 8 hours  ceFAZolin   IVPB      chlorhexidine 0.12% Liquid 15 milliLiter(s) Oral Mucosa every 12 hours  chlorhexidine 4% Liquid 1 Application(s) Topical <User Schedule>  cholecalciferol 2000 Unit(s) Oral daily  collagenase Ointment 1 Application(s) Topical daily  dextrose 5%. 1000 milliLiter(s) (50 mL/Hr) IV Continuous <Continuous>  dextrose 50% Injectable 12.5 Gram(s) IV Push once  dextrose 50% Injectable 25 Gram(s) IV Push once  dextrose 50% Injectable 25 Gram(s) IV Push once  diazepam    Tablet 5 milliGRAM(s) Oral daily  enoxaparin Injectable 40 milliGRAM(s) SubCutaneous daily  folic acid 1 milliGRAM(s) Oral daily  furosemide   Injectable 40 milliGRAM(s) IV Push two times a day  insulin lispro (HumaLOG) corrective regimen sliding scale   SubCutaneous every 6 hours  methadone    Tablet 10 milliGRAM(s) Oral every 8 hours  metoprolol tartrate 12.5 milliGRAM(s) Oral two times a day  multivitamin 1 Tablet(s) Oral daily  pantoprazole   Suspension 40 milliGRAM(s) Oral daily  polyethylene glycol 3350 17 Gram(s) Oral daily  QUEtiapine 50 milliGRAM(s) Oral two times a day  senna 2 Tablet(s) Oral at bedtime  thiamine 100 milliGRAM(s) Oral daily    MEDICATIONS  (PRN):  acetaminophen   Tablet .. 650 milliGRAM(s) Oral every 6 hours PRN Temp greater or equal to 38C (100.4F), Mild Pain (1 - 3)  albuterol/ipratropium for Nebulization 3 milliLiter(s) Nebulizer every 6 hours PRN Shortness of Breath and/or Wheezing  dextrose 40% Gel 15 Gram(s) Oral once PRN Blood Glucose LESS THAN 70 milliGRAM(s)/deciliter  glucagon  Injectable 1 milliGRAM(s) IntraMuscular once PRN Glucose LESS THAN 70 milligrams/deciliter  metoprolol tartrate Injectable 5 milliGRAM(s) IV Push every 6 hours PRN if sbp>160 or HR >110 hold if SBP<100 or HR<60      Allergies    No Known Allergies    Intolerances        REVIEW OF SYSTEMS:  CONSTITUTIONAL: No fever, weight loss, or fatigue  EYES: No eye pain, visual disturbances, or discharge  ENMT:  No difficulty hearing, tinnitus, vertigo; No sinus or throat pain  NECK: No pain or stiffness  BREASTS: No pain, masses, or nipple discharge  RESPIRATORY: No cough, wheezing, chills or hemoptysis; No shortness of breath  CARDIOVASCULAR: No chest pain, palpitations, dizziness, or leg swelling  GASTROINTESTINAL: No abdominal or epigastric pain. No nausea, vomiting, or hematemesis; No diarrhea or constipation. No melena or hematochezia.  GENITOURINARY: No dysuria, frequency, hematuria, or incontinence  NEUROLOGICAL: No headaches, memory loss, loss of strength, numbness, or tremors  SKIN: No itching, burning, rashes, or lesions   LYMPH NODES: No enlarged glands  ENDOCRINE: No heat or cold intolerance; No hair loss  MUSCULOSKELETAL: No joint pain or swelling; No muscle, back, or extremity pain  PSYCHIATRIC: No depression, anxiety, mood swings, or difficulty sleeping  HEME/LYMPH: No easy bruising, or bleeding gums  ALLERGY AND IMMUNOLOGIC: No hives or eczema    Vital Signs Last 24 Hrs  T(C): 37.3 (24 Sep 2020 17:23), Max: 38.3 (24 Sep 2020 10:27)  T(F): 99.1 (24 Sep 2020 17:23), Max: 100.9 (24 Sep 2020 10:27)  HR: 85 (24 Sep 2020 17:29) (75 - 111)  BP: 109/68 (24 Sep 2020 17:23) (109/68 - 132/76)  BP(mean): --  RR: 20 (24 Sep 2020 17:23) (17 - 20)  SpO2: 95% (24 Sep 2020 17:29) (94% - 96%)    PHYSICAL EXAM:  GENERAL: NAD, well-groomed, well-developed  HEAD:  Atraumatic, Normocephalic  EYES: EOMI, PERRLA, conjunctiva and sclera clear  ENMT: No tonsillar erythema, exudates, or enlargement; Moist mucous membranes, Good dentition, No lesions  NECK: Supple, No JVD, Normal thyroid  NERVOUS SYSTEM:  Alert & Oriented X3, Good concentration; Motor Strength 5/5 B/L upper and lower extremities; DTRs 2+ intact and symmetric  CHEST/LUNG: Clear to percussion bilaterally; No rales, rhonchi, wheezing, or rubs  HEART: Regular rate and rhythm; No murmurs, rubs, or gallops  ABDOMEN: Soft, Nontender, Nondistended; Bowel sounds present  EXTREMITIES:  2+ Peripheral Pulses, No clubbing, cyanosis, or edema  LYMPH: No lymphadenopathy noted  SKIN: No rashes or lesions    LABS:                        8.9    11.76 )-----------( 241      ( 23 Sep 2020 08:13 )             28.2     09-23    139  |  97  |  6<L>  ----------------------------<  99  3.5   |  38<H>  |  0.43<L>    Ca    11.1<H>      23 Sep 2020 08:13  Phos  3.5     09-23  Mg     1.1     09-23    TPro  7.6  /  Alb  1.8<L>  /  TBili  1.3<H>  /  DBili  x   /  AST  60<H>  /  ALT  20  /  AlkPhos  174<H>  09-23        CAPILLARY BLOOD GLUCOSE      POCT Blood Glucose.: 107 mg/dL (24 Sep 2020 17:01)  POCT Blood Glucose.: 110 mg/dL (24 Sep 2020 11:21)  POCT Blood Glucose.: 117 mg/dL (24 Sep 2020 07:43)  POCT Blood Glucose.: 126 mg/dL (24 Sep 2020 06:34)  POCT Blood Glucose.: 120 mg/dL (24 Sep 2020 01:24)      RADIOLOGY & ADDITIONAL TESTS:    Imaging Personally Reviewed:  [ ] YES  [ ] NO    Consultant(s) Notes Reviewed:  [ ] YES  [ ] NO    Care Discussed with Consultants/Other Providers [ ] YES  [ ] NO

## 2020-09-24 NOTE — PROGRESS NOTE ADULT - ATTENDING COMMENTS
Angelito Morrison DO  Cell: [phone]  Pager [phone]  Infectious Disease Attending  After 5pm/weekends please call [phone] for all inquiries and new consults

## 2020-09-25 LAB
ANION GAP SERPL CALC-SCNC: 2 MMOL/L — LOW (ref 5–17)
ANION GAP SERPL CALC-SCNC: 5 MMOL/L — SIGNIFICANT CHANGE UP (ref 5–17)
BUN SERPL-MCNC: 7 MG/DL — SIGNIFICANT CHANGE UP (ref 7–23)
BUN SERPL-MCNC: 7 MG/DL — SIGNIFICANT CHANGE UP (ref 7–23)
CALCIUM SERPL-MCNC: 11.2 MG/DL — HIGH (ref 8.5–10.1)
CALCIUM SERPL-MCNC: 11.3 MG/DL — HIGH (ref 8.5–10.1)
CHLORIDE SERPL-SCNC: 95 MMOL/L — LOW (ref 96–108)
CHLORIDE SERPL-SCNC: 96 MMOL/L — SIGNIFICANT CHANGE UP (ref 96–108)
CO2 SERPL-SCNC: 38 MMOL/L — HIGH (ref 22–31)
CO2 SERPL-SCNC: 40 MMOL/L — HIGH (ref 22–31)
CREAT SERPL-MCNC: 0.44 MG/DL — LOW (ref 0.5–1.3)
CREAT SERPL-MCNC: 0.54 MG/DL — SIGNIFICANT CHANGE UP (ref 0.5–1.3)
GLUCOSE BLDC GLUCOMTR-MCNC: 113 MG/DL — HIGH (ref 70–99)
GLUCOSE BLDC GLUCOMTR-MCNC: 114 MG/DL — HIGH (ref 70–99)
GLUCOSE BLDC GLUCOMTR-MCNC: 116 MG/DL — HIGH (ref 70–99)
GLUCOSE BLDC GLUCOMTR-MCNC: 117 MG/DL — HIGH (ref 70–99)
GLUCOSE SERPL-MCNC: 104 MG/DL — HIGH (ref 70–99)
GLUCOSE SERPL-MCNC: 98 MG/DL — SIGNIFICANT CHANGE UP (ref 70–99)
HCT VFR BLD CALC: 27 % — LOW (ref 39–50)
HGB BLD-MCNC: 8.7 G/DL — LOW (ref 13–17)
MAGNESIUM SERPL-MCNC: 1.1 MG/DL — LOW (ref 1.6–2.6)
MAGNESIUM SERPL-MCNC: 1.1 MG/DL — LOW (ref 1.6–2.6)
MCHC RBC-ENTMCNC: 29.6 PG — SIGNIFICANT CHANGE UP (ref 27–34)
MCHC RBC-ENTMCNC: 32.2 GM/DL — SIGNIFICANT CHANGE UP (ref 32–36)
MCV RBC AUTO: 91.8 FL — SIGNIFICANT CHANGE UP (ref 80–100)
NRBC # BLD: 0 /100 WBCS — SIGNIFICANT CHANGE UP (ref 0–0)
PLATELET # BLD AUTO: 219 K/UL — SIGNIFICANT CHANGE UP (ref 150–400)
POTASSIUM SERPL-MCNC: 2.9 MMOL/L — CRITICAL LOW (ref 3.5–5.3)
POTASSIUM SERPL-MCNC: 3.5 MMOL/L — SIGNIFICANT CHANGE UP (ref 3.5–5.3)
POTASSIUM SERPL-SCNC: 2.9 MMOL/L — CRITICAL LOW (ref 3.5–5.3)
POTASSIUM SERPL-SCNC: 3.5 MMOL/L — SIGNIFICANT CHANGE UP (ref 3.5–5.3)
RBC # BLD: 2.94 M/UL — LOW (ref 4.2–5.8)
RBC # FLD: 15.7 % — HIGH (ref 10.3–14.5)
SODIUM SERPL-SCNC: 137 MMOL/L — SIGNIFICANT CHANGE UP (ref 135–145)
SODIUM SERPL-SCNC: 139 MMOL/L — SIGNIFICANT CHANGE UP (ref 135–145)
WBC # BLD: 12.38 K/UL — HIGH (ref 3.8–10.5)
WBC # FLD AUTO: 12.38 K/UL — HIGH (ref 3.8–10.5)

## 2020-09-25 PROCEDURE — 71260 CT THORAX DX C+: CPT | Mod: 26

## 2020-09-25 PROCEDURE — 99231 SBSQ HOSP IP/OBS SF/LOW 25: CPT

## 2020-09-25 PROCEDURE — 99232 SBSQ HOSP IP/OBS MODERATE 35: CPT

## 2020-09-25 PROCEDURE — 74177 CT ABD & PELVIS W/CONTRAST: CPT | Mod: 26

## 2020-09-25 PROCEDURE — 99233 SBSQ HOSP IP/OBS HIGH 50: CPT

## 2020-09-25 RX ORDER — MAGNESIUM SULFATE 500 MG/ML
2 VIAL (ML) INJECTION ONCE
Refills: 0 | Status: COMPLETED | OUTPATIENT
Start: 2020-09-25 | End: 2020-09-25

## 2020-09-25 RX ORDER — POTASSIUM CHLORIDE 20 MEQ
10 PACKET (EA) ORAL
Refills: 0 | Status: COMPLETED | OUTPATIENT
Start: 2020-09-25 | End: 2020-09-25

## 2020-09-25 RX ORDER — METHADONE HYDROCHLORIDE 40 MG/1
10 TABLET ORAL
Refills: 0 | Status: DISCONTINUED | OUTPATIENT
Start: 2020-09-25 | End: 2020-09-25

## 2020-09-25 RX ORDER — POTASSIUM CHLORIDE 20 MEQ
40 PACKET (EA) ORAL ONCE
Refills: 0 | Status: COMPLETED | OUTPATIENT
Start: 2020-09-25 | End: 2020-09-25

## 2020-09-25 RX ORDER — METHADONE HYDROCHLORIDE 40 MG/1
10 TABLET ORAL DAILY
Refills: 0 | Status: DISCONTINUED | OUTPATIENT
Start: 2020-09-26 | End: 2020-09-26

## 2020-09-25 RX ORDER — METHADONE HYDROCHLORIDE 40 MG/1
10 TABLET ORAL THREE TIMES A DAY
Refills: 0 | Status: DISCONTINUED | OUTPATIENT
Start: 2020-09-25 | End: 2020-09-26

## 2020-09-25 RX ADMIN — SCOPALAMINE 1 PATCH: 1 PATCH, EXTENDED RELEASE TRANSDERMAL at 01:00

## 2020-09-25 RX ADMIN — CHLORHEXIDINE GLUCONATE 15 MILLILITER(S): 213 SOLUTION TOPICAL at 05:23

## 2020-09-25 RX ADMIN — Medication 40 MILLIGRAM(S): at 18:27

## 2020-09-25 RX ADMIN — QUETIAPINE FUMARATE 50 MILLIGRAM(S): 200 TABLET, FILM COATED ORAL at 18:43

## 2020-09-25 RX ADMIN — Medication 650 MILLIGRAM(S): at 04:04

## 2020-09-25 RX ADMIN — QUETIAPINE FUMARATE 50 MILLIGRAM(S): 200 TABLET, FILM COATED ORAL at 05:19

## 2020-09-25 RX ADMIN — PANTOPRAZOLE SODIUM 40 MILLIGRAM(S): 20 TABLET, DELAYED RELEASE ORAL at 15:04

## 2020-09-25 RX ADMIN — Medication 650 MILLIGRAM(S): at 14:23

## 2020-09-25 RX ADMIN — ENOXAPARIN SODIUM 40 MILLIGRAM(S): 100 INJECTION SUBCUTANEOUS at 13:12

## 2020-09-25 RX ADMIN — Medication 100 MILLIEQUIVALENT(S): at 15:52

## 2020-09-25 RX ADMIN — Medication 40 MILLIEQUIVALENT(S): at 13:11

## 2020-09-25 RX ADMIN — Medication 100 MILLIGRAM(S): at 05:19

## 2020-09-25 RX ADMIN — Medication 100 MILLIEQUIVALENT(S): at 13:11

## 2020-09-25 RX ADMIN — Medication 1 TABLET(S): at 13:14

## 2020-09-25 RX ADMIN — Medication 650 MILLIGRAM(S): at 05:19

## 2020-09-25 RX ADMIN — POLYETHYLENE GLYCOL 3350 17 GRAM(S): 17 POWDER, FOR SOLUTION ORAL at 13:14

## 2020-09-25 RX ADMIN — Medication 5 MILLIGRAM(S): at 13:12

## 2020-09-25 RX ADMIN — METHADONE HYDROCHLORIDE 10 MILLIGRAM(S): 40 TABLET ORAL at 06:13

## 2020-09-25 RX ADMIN — Medication 100 MILLIGRAM(S): at 13:43

## 2020-09-25 RX ADMIN — Medication 1 APPLICATION(S): at 14:55

## 2020-09-25 RX ADMIN — CHLORHEXIDINE GLUCONATE 1 APPLICATION(S): 213 SOLUTION TOPICAL at 06:13

## 2020-09-25 RX ADMIN — METHADONE HYDROCHLORIDE 10 MILLIGRAM(S): 40 TABLET ORAL at 21:29

## 2020-09-25 RX ADMIN — Medication 2000 UNIT(S): at 13:39

## 2020-09-25 RX ADMIN — Medication 100 MILLIGRAM(S): at 22:24

## 2020-09-25 RX ADMIN — SCOPALAMINE 1 PATCH: 1 PATCH, EXTENDED RELEASE TRANSDERMAL at 07:03

## 2020-09-25 RX ADMIN — Medication 12.5 MILLIGRAM(S): at 06:13

## 2020-09-25 RX ADMIN — Medication 3 MILLIGRAM(S): at 20:23

## 2020-09-25 RX ADMIN — Medication 100 MILLIEQUIVALENT(S): at 18:30

## 2020-09-25 RX ADMIN — Medication 12.5 MILLIGRAM(S): at 18:25

## 2020-09-25 RX ADMIN — METHADONE HYDROCHLORIDE 10 MILLIGRAM(S): 40 TABLET ORAL at 18:30

## 2020-09-25 RX ADMIN — Medication 50 GRAM(S): at 21:22

## 2020-09-25 RX ADMIN — CHLORHEXIDINE GLUCONATE 15 MILLILITER(S): 213 SOLUTION TOPICAL at 18:24

## 2020-09-25 RX ADMIN — SCOPALAMINE 1 PATCH: 1 PATCH, EXTENDED RELEASE TRANSDERMAL at 19:21

## 2020-09-25 RX ADMIN — Medication 650 MILLIGRAM(S): at 13:23

## 2020-09-25 RX ADMIN — Medication 100 MILLIGRAM(S): at 13:15

## 2020-09-25 RX ADMIN — SENNA PLUS 2 TABLET(S): 8.6 TABLET ORAL at 21:30

## 2020-09-25 RX ADMIN — Medication 1 MILLIGRAM(S): at 13:13

## 2020-09-25 RX ADMIN — Medication 40 MILLIGRAM(S): at 06:11

## 2020-09-25 NOTE — PROGRESS NOTE BEHAVIORAL HEALTH - NSBHCHARTREVIEWLAB_PSY_A_CORE FT
09-25    137  |  95<L>  |  7   ----------------------------<  104<H>  2.9<LL>   |  40<H>  |  0.54    Ca    11.2<H>      25 Sep 2020 09:11  Mg     1.1     09-25

## 2020-09-25 NOTE — PROGRESS NOTE ADULT - SUBJECTIVE AND OBJECTIVE BOX
DEEPIKA RUCKER  MRN-[id number]    Follow Up:  ID following for cellulitis and fever     Interval History: still spiking fevers, lots of respiratory secretions, Dr. Agarwal to help with tapering down meds    ROS:    [X] Unobtainable because: heavily sedated  [ ] All other systems negative    Constitutional: no fever, no chills  Head: no trauma  Eyes: no vision changes, no eye pain  ENT:  no sore throat, no rhinorrhea  Cardiovascular:  no chest pain, no palpitation  Respiratory:  no SOB, no cough  GI:  no abd pain, no vomiting, no diarrhea  urinary: no dysuria, no hematuria, no flank pain  musculoskeletal:  no joint pain, no joint swelling  skin:  no rash  neurology:  no headache, no seizure, no change in mental status  psych: no anxiety, no depression         Allergies  No Known Allergies        ANTIMICROBIALS:    ceFAZolin   IVPB 1000 every 8 hours  ceFAZolin   IVPB        MEDICATIONS  (STANDING):  dextrose 50% Injectable 12.5 once  dextrose 50% Injectable 25 once  dextrose 50% Injectable 25 once  diazepam    Tablet 5 daily  enoxaparin Injectable 40 daily  furosemide   Injectable 40 two times a day  insulin lispro (HumaLOG) corrective regimen sliding scale  every 6 hours  methadone    Tablet 10 two times a day  metoprolol tartrate 12.5 two times a day  pantoprazole   Suspension 40 daily  polyethylene glycol 3350 17 daily  QUEtiapine 50 two times a day  scopolamine 1 mG/72 Hr(s) Patch 1 every 72 hours  senna 2 at bedtime      PRN  acetaminophen   Tablet .. 650 milliGRAM(s) Oral every 6 hours PRN  albuterol/ipratropium for Nebulization 3 milliLiter(s) Nebulizer every 6 hours PRN  dextrose 40% Gel 15 Gram(s) Oral once PRN  glucagon  Injectable 1 milliGRAM(s) IntraMuscular once PRN  metoprolol tartrate Injectable 5 milliGRAM(s) IV Push every 6 hours PRN      Vital Signs Last 24 Hrs  T(F): 100.3 (20 @ 13:10), Max: 101 (20 @ 05:12)  HR: 95 (20 @ 11:21)  BP: 126/82 (20 @ 11:21)  RR: 20 (20 @ 11:21)  SpO2: 95% (20 @ 11:21) (92% - 95%)  Wt(kg): --    Physical Exam:  Constitutional: non-toxic, no distress  HEAD/EYES: anicteric, no conjunctival injection  ENT:  supple, + trach collar   Cardiovascular:   normal S1, S2, no murmur, +pitting edema in b/l ankles/feet   Respiratory:  coarse breath sounds bilaterally   GI:  soft, +tenderness, normal bowel sounds, +peg tube c/d/i  Musculoskeletal:  no synovitis  Neurologic: poor mental status  Skin:  psoriasis on the scalp and face, right forearm with tattoo and questionable redness and warmth is improved  Heme/Onc: no lymphadenopathy   Psychiatric:  patient not really awake or alert    WBC Count: 11.76 K/uL ( @ 08:13)  WBC Count: 12.55 K/uL ( @ 07:46)  WBC Count: 11.78 K/uL ( @ 07:44)  WBC Count: 12.27 K/uL ( @ 04:18)  WBC Count: 11.16 K/uL ( @ 03:32)  WBC Count: 13.01 K/uL ( @ 05:42)  WBC Count: 12.36 K/uL ( @ 00:09)                            8.9    11.76 )-----------( 241      ( 23 Sep 2020 08:13 )             28.2           139  |  97  |  6<L>  ----------------------------<  99  3.5   |  38<H>  |  0.43<L>    Ca    11.1<H>      23 Sep 2020 08:13  Phos  3.5       Mg     1.1         TPro  7.6  /  Alb  1.8<L>  /  TBili  1.3<H>  /  DBili  x   /  AST  60<H>  /  ALT  20  /  AlkPhos  174<H>        Creatinine Trend: 0.43<--, 0.49<--, 0.41<--, 0.61<--, 0.45<--, 0.49<--            Urinalysis Basic - ( 21 Sep 2020 21:11 )    Color: Yellow / Appearance: Clear / S.020 / pH: x  Gluc: x / Ketone: Negative  / Bili: Negative / Urobili: 8 mg/dL   Blood: x / Protein: Negative mg/dL / Nitrite: Negative   Leuk Esterase: Trace / RBC: x / WBC 0-2   Sq Epi: x / Non Sq Epi: Occasional / Bacteria: Occasional        Creatinine Trend: 0.43<--, 0.49<--, 0.41<--, 0.61<--, 0.45<--, 0.49<--  Lactate, Blood: 0.4 mmol/L (20 @ 18:24)      MICROBIOLOGY:  Ascites Fl paracentesis ascites  20 --  --    No polymorphonuclear cells seen per low power field  White blood cells seen per oil power field  No organisms seen per oil power field  by cytocentrifuge      .Blood Blood-Peripheral  20   No growth to date.  --  --      .Blood Blood  20   No Growth Final  --  --      .Urine Catheterized  20   No growth  --  --      .Sputum Sputum  20   No growth at 48 hours  --    Few polymorphonuclear leukocytes per low power field  Rare Squamous epithelial cells per low power field  No organisms seen per oil power field      .Blood Blood  09-10-20   Growth in aerobic and anaerobic bottles: Klebsiella pneumoniae  See previous culture [id number]  --    Growth in aerobic bottle:  Gram Negative Rods  Growth in anaerobic bottle:  Gram Negative Rods      .Blood Blood  09-10-20   Growth in aerobic bottle: Klebsiella pneumoniae  -  Blood Culture PCR  Klebsiella pneumoniae      .Blood Blood-Peripheral  20   No Growth Final  --  --      .Sputum Sputum  20   Normal Respiratory Poonam present  --    Moderate polymorphonuclear leukocytes per low power field  Rare Squamous epithelial cells per low power field  Rare Gram positive cocci in pairs per oil power field      .Sputum Sputum  20   Rare Normal Respiratory Poonam present  --    Numerous polymorphonuclear leukocytes per low power field  Few Squamous epithelial cells per low power field  No organisms seen per oil power field      .Sputum Sputum  20   Normal Respiratory Poonam present  --    No polymorphonuclear leukocytes per low power field  No Squamous epithelial cells per low power field  No organisms seen per oil power field    Ferritin, Serum: 740 ()      Procalcitonin, Serum: 0.16 (20 @ 11:40)  Procalcitonin, Serum: 0.17 (20 @ 02:21)      RADIOLOGY:

## 2020-09-25 NOTE — PROGRESS NOTE BEHAVIORAL HEALTH - NSBHCHARTREVIEWVS_PSY_A_CORE FT
T(C): 38.3 (09-25-20 @ 05:12), Max: 38.3 (09-25-20 @ 05:12)  HR: 95 (09-25-20 @ 10:58) (75 - 118)  BP: 125/56 (09-25-20 @ 05:12) (109/68 - 125/56)  RR: 20 (09-25-20 @ 05:12) (19 - 20)  SpO2: 95% (09-25-20 @ 10:58) (92% - 96%)

## 2020-09-25 NOTE — PROGRESS NOTE BEHAVIORAL HEALTH - OTHER
tenuous as per notes deferred at this time n/a - in deep sleep not able to ascertain at this time due to limitation of exam

## 2020-09-25 NOTE — PROGRESS NOTE ADULT - SUBJECTIVE AND OBJECTIVE BOX
INTERVAL HPI:   44M with ETOH Abuse and withdrawal, Alcohol Pancreatitis who presents to the ED with severe abdominal pain.  Found to have Necrotizing Pancreatitis, Septic  Shock, Pneumonia/ARDS with Hypoxic Respiratory failure. Admitted to ICU, had prolonged hospital course with Vent support. Eventually required Tracheostomy and Peg placement.  Also with acute metabolic Encephalopathy.  09/20/20:  Transferred to .    Not able to provide details due to encephalopathy.  09/22/20: 1500 ml paracentesis.    OVERNIGHT EVENTS:  Febrile to 101.    Vital Signs Last 24 Hrs  T(C): 37.9 (25 Sep 2020 13:10), Max: 38.3 (25 Sep 2020 05:12)  T(F): 100.3 (25 Sep 2020 13:10), Max: 101 (25 Sep 2020 05:12)  HR: 95 (25 Sep 2020 11:21) (75 - 118)  BP: 126/82 (25 Sep 2020 11:21) (109/68 - 126/82)  BP(mean): --  RR: 20 (25 Sep 2020 11:21) (20 - 20)  SpO2: 95% (25 Sep 2020 11:21) (92% - 95%)    PHYSICAL EXAM:  GEN:        comfortable.  HEENT:     TRACH   RESP:   CVS:             Regular rate and rhythm.   ABD:         Soft, non-tender, non-distended;   :             No costovertebral angle tenderness  EXTR:            No clubbing, cyanosis or edema  CNS:              Intact sensory and motor function.    MEDICATIONS  (STANDING):  ceFAZolin   IVPB 1000 milliGRAM(s) IV Intermittent every 8 hours  ceFAZolin   IVPB      chlorhexidine 0.12% Liquid 15 milliLiter(s) Oral Mucosa every 12 hours  chlorhexidine 4% Liquid 1 Application(s) Topical <User Schedule>  cholecalciferol 2000 Unit(s) Oral daily  collagenase Ointment 1 Application(s) Topical daily  dextrose 5%. 1000 milliLiter(s) (50 mL/Hr) IV Continuous <Continuous>  dextrose 50% Injectable 12.5 Gram(s) IV Push once  dextrose 50% Injectable 25 Gram(s) IV Push once  dextrose 50% Injectable 25 Gram(s) IV Push once  diazepam    Tablet 5 milliGRAM(s) Oral daily  enoxaparin Injectable 40 milliGRAM(s) SubCutaneous daily  folic acid 1 milliGRAM(s) Oral daily  furosemide   Injectable 40 milliGRAM(s) IV Push two times a day  insulin lispro (HumaLOG) corrective regimen sliding scale   SubCutaneous every 6 hours  magnesium sulfate  IVPB 2 Gram(s) IV Intermittent once  methadone    Tablet 10 milliGRAM(s) Oral two times a day  metoprolol tartrate 12.5 milliGRAM(s) Oral two times a day  multivitamin 1 Tablet(s) Oral daily  pantoprazole   Suspension 40 milliGRAM(s) Oral daily  polyethylene glycol 3350 17 Gram(s) Oral daily  potassium chloride  10 mEq/100 mL IVPB 10 milliEquivalent(s) IV Intermittent every 1 hour  QUEtiapine 50 milliGRAM(s) Oral two times a day  scopolamine 1 mG/72 Hr(s) Patch 1 Patch Transdermal every 72 hours  senna 2 Tablet(s) Oral at bedtime  thiamine 100 milliGRAM(s) Oral daily    MEDICATIONS  (PRN):  acetaminophen   Tablet .. 650 milliGRAM(s) Oral every 6 hours PRN Temp greater or equal to 38C (100.4F), Mild Pain (1 - 3)  albuterol/ipratropium for Nebulization 3 milliLiter(s) Nebulizer every 6 hours PRN Shortness of Breath and/or Wheezing  dextrose 40% Gel 15 Gram(s) Oral once PRN Blood Glucose LESS THAN 70 milliGRAM(s)/deciliter  glucagon  Injectable 1 milliGRAM(s) IntraMuscular once PRN Glucose LESS THAN 70 milligrams/deciliter  metoprolol tartrate Injectable 5 milliGRAM(s) IV Push every 6 hours PRN if sbp>160 or HR >110 hold if SBP<100 or HR<60    LABS:                        8.7    12.38 )-----------( 219      ( 25 Sep 2020 09:11 )             27.0     09-25    137  |  95<L>  |  7   ----------------------------<  104<H>  2.9<LL>   |  40<H>  |  0.54    Ca    11.2<H>      25 Sep 2020 09:11  Mg     1.1     09-25    ASSESSMENT AND PLAN:  ·	Hypoxic Respiratory failure.  ·	S/P tracheostomy.  ·	Bilateral pneumonia.  ·	S/P Septic Shock.  ·	Necrotizing Pancreatis.  ·	Acute metabolic Encephalopathy.  ·	Alcohol abuse.  ·	Klebsiella Bacteremia.  ·	Anemia.    Continue Trach collar.  Suction as needed.

## 2020-09-25 NOTE — PROGRESS NOTE ADULT - SUBJECTIVE AND OBJECTIVE BOX
INTERVAL HPI/OVERNIGHT EVENTS: Unable to examine patient, due to altered mental status. No changes, pt still sleeping     Patient is a 44y old  Male who presents with a chief complaint of pancreatitis (24 Sep 2020 17:14)      MEDICATIONS  (STANDING):  ceFAZolin   IVPB 1000 milliGRAM(s) IV Intermittent every 8 hours  ceFAZolin   IVPB      chlorhexidine 0.12% Liquid 15 milliLiter(s) Oral Mucosa every 12 hours  chlorhexidine 4% Liquid 1 Application(s) Topical <User Schedule>  cholecalciferol 2000 Unit(s) Oral daily  collagenase Ointment 1 Application(s) Topical daily  dextrose 5%. 1000 milliLiter(s) (50 mL/Hr) IV Continuous <Continuous>  dextrose 50% Injectable 12.5 Gram(s) IV Push once  dextrose 50% Injectable 25 Gram(s) IV Push once  dextrose 50% Injectable 25 Gram(s) IV Push once  diazepam    Tablet 5 milliGRAM(s) Oral daily  enoxaparin Injectable 40 milliGRAM(s) SubCutaneous daily  folic acid 1 milliGRAM(s) Oral daily  furosemide   Injectable 40 milliGRAM(s) IV Push two times a day  insulin lispro (HumaLOG) corrective regimen sliding scale   SubCutaneous every 6 hours  methadone    Tablet 10 milliGRAM(s) Oral every 8 hours  metoprolol tartrate 12.5 milliGRAM(s) Oral two times a day  multivitamin 1 Tablet(s) Oral daily  pantoprazole   Suspension 40 milliGRAM(s) Oral daily  polyethylene glycol 3350 17 Gram(s) Oral daily  QUEtiapine 50 milliGRAM(s) Oral two times a day  senna 2 Tablet(s) Oral at bedtime  thiamine 100 milliGRAM(s) Oral daily    MEDICATIONS  (PRN):  acetaminophen   Tablet .. 650 milliGRAM(s) Oral every 6 hours PRN Temp greater or equal to 38C (100.4F), Mild Pain (1 - 3)  albuterol/ipratropium for Nebulization 3 milliLiter(s) Nebulizer every 6 hours PRN Shortness of Breath and/or Wheezing  dextrose 40% Gel 15 Gram(s) Oral once PRN Blood Glucose LESS THAN 70 milliGRAM(s)/deciliter  glucagon  Injectable 1 milliGRAM(s) IntraMuscular once PRN Glucose LESS THAN 70 milligrams/deciliter  metoprolol tartrate Injectable 5 milliGRAM(s) IV Push every 6 hours PRN if sbp>160 or HR >110 hold if SBP<100 or HR<60      Allergies    No Known Allergies    Intolerances        REVIEW OF SYSTEMS:  CONSTITUTIONAL: No fever, weight loss, or fatigue  EYES: No eye pain, visual disturbances, or discharge  ENMT:  No difficulty hearing, tinnitus, vertigo; No sinus or throat pain  NECK: No pain or stiffness  BREASTS: No pain, masses, or nipple discharge  RESPIRATORY: No cough, wheezing, chills or hemoptysis; No shortness of breath  CARDIOVASCULAR: No chest pain, palpitations, dizziness, or leg swelling  GASTROINTESTINAL: No abdominal or epigastric pain. No nausea, vomiting, or hematemesis; No diarrhea or constipation. No melena or hematochezia.  GENITOURINARY: No dysuria, frequency, hematuria, or incontinence  NEUROLOGICAL: No headaches, memory loss, loss of strength, numbness, or tremors  SKIN: No itching, burning, rashes, or lesions   LYMPH NODES: No enlarged glands  ENDOCRINE: No heat or cold intolerance; No hair loss  MUSCULOSKELETAL: No joint pain or swelling; No muscle, back, or extremity pain  PSYCHIATRIC: No depression, anxiety, mood swings, or difficulty sleeping  HEME/LYMPH: No easy bruising, or bleeding gums  ALLERGY AND IMMUNOLOGIC: No hives or eczema    Vital Signs Last 24 Hrs  T(C): 37.3 (24 Sep 2020 17:23), Max: 38.3 (24 Sep 2020 10:27)  T(F): 99.1 (24 Sep 2020 17:23), Max: 100.9 (24 Sep 2020 10:27)  HR: 85 (24 Sep 2020 17:29) (75 - 111)  BP: 109/68 (24 Sep 2020 17:23) (109/68 - 132/76)  BP(mean): --  RR: 20 (24 Sep 2020 17:23) (17 - 20)  SpO2: 95% (24 Sep 2020 17:29) (94% - 96%)    PHYSICAL EXAM:  GENERAL: NAD, well-groomed, well-developed  HEAD:  Atraumatic, Normocephalic  NERVOUS SYSTEM:  Alert & Oriented X0, slowly waking up   CHEST/LUNG: coarse sounds bilaterally, lots of secretions   HEART: Regular rate and rhythm; No murmurs, rubs, or gallops  ABDOMEN: Soft, Nontender, Nondistended; Bowel sounds present  EXTREMITIES:  2+ Peripheral Pulses, No clubbing, cyanosis, or edema      LABS:                        8.9    11.76 )-----------( 241      ( 23 Sep 2020 08:13 )             28.2     09-23    139  |  97  |  6<L>  ----------------------------<  99  3.5   |  38<H>  |  0.43<L>    Ca    11.1<H>      23 Sep 2020 08:13  Phos  3.5     09-23  Mg     1.1     09-23    TPro  7.6  /  Alb  1.8<L>  /  TBili  1.3<H>  /  DBili  x   /  AST  60<H>  /  ALT  20  /  AlkPhos  174<H>  09-23        CAPILLARY BLOOD GLUCOSE      POCT Blood Glucose.: 107 mg/dL (24 Sep 2020 17:01)  POCT Blood Glucose.: 110 mg/dL (24 Sep 2020 11:21)  POCT Blood Glucose.: 117 mg/dL (24 Sep 2020 07:43)  POCT Blood Glucose.: 126 mg/dL (24 Sep 2020 06:34)  POCT Blood Glucose.: 120 mg/dL (24 Sep 2020 01:24)      RADIOLOGY & ADDITIONAL TESTS:    Imaging Personally Reviewed:  [ ] YES  [ ] NO    Consultant(s) Notes Reviewed:  [ ] YES  [ ] NO    Care Discussed with Consultants/Other Providers [ ] YES  [ ] NO   INTERVAL HPI/OVERNIGHT EVENTS: Unable to examine patient, due to altered mental status. Patient however waking up slowly, opens eyes when tapped on shoulder and called his name.     Patient is a 44y old  Male who presents with a chief complaint of pancreatitis (24 Sep 2020 17:14)      MEDICATIONS  (STANDING):  ceFAZolin   IVPB 1000 milliGRAM(s) IV Intermittent every 8 hours  ceFAZolin   IVPB      chlorhexidine 0.12% Liquid 15 milliLiter(s) Oral Mucosa every 12 hours  chlorhexidine 4% Liquid 1 Application(s) Topical <User Schedule>  cholecalciferol 2000 Unit(s) Oral daily  collagenase Ointment 1 Application(s) Topical daily  dextrose 5%. 1000 milliLiter(s) (50 mL/Hr) IV Continuous <Continuous>  dextrose 50% Injectable 12.5 Gram(s) IV Push once  dextrose 50% Injectable 25 Gram(s) IV Push once  dextrose 50% Injectable 25 Gram(s) IV Push once  diazepam    Tablet 5 milliGRAM(s) Oral daily  enoxaparin Injectable 40 milliGRAM(s) SubCutaneous daily  folic acid 1 milliGRAM(s) Oral daily  furosemide   Injectable 40 milliGRAM(s) IV Push two times a day  insulin lispro (HumaLOG) corrective regimen sliding scale   SubCutaneous every 6 hours  methadone    Tablet 10 milliGRAM(s) Oral every 8 hours  metoprolol tartrate 12.5 milliGRAM(s) Oral two times a day  multivitamin 1 Tablet(s) Oral daily  pantoprazole   Suspension 40 milliGRAM(s) Oral daily  polyethylene glycol 3350 17 Gram(s) Oral daily  QUEtiapine 50 milliGRAM(s) Oral two times a day  senna 2 Tablet(s) Oral at bedtime  thiamine 100 milliGRAM(s) Oral daily    MEDICATIONS  (PRN):  acetaminophen   Tablet .. 650 milliGRAM(s) Oral every 6 hours PRN Temp greater or equal to 38C (100.4F), Mild Pain (1 - 3)  albuterol/ipratropium for Nebulization 3 milliLiter(s) Nebulizer every 6 hours PRN Shortness of Breath and/or Wheezing  dextrose 40% Gel 15 Gram(s) Oral once PRN Blood Glucose LESS THAN 70 milliGRAM(s)/deciliter  glucagon  Injectable 1 milliGRAM(s) IntraMuscular once PRN Glucose LESS THAN 70 milligrams/deciliter  metoprolol tartrate Injectable 5 milliGRAM(s) IV Push every 6 hours PRN if sbp>160 or HR >110 hold if SBP<100 or HR<60      Allergies    No Known Allergies    Intolerances        REVIEW OF SYSTEMS:  CONSTITUTIONAL: No fever, weight loss, or fatigue  EYES: No eye pain, visual disturbances, or discharge  ENMT:  No difficulty hearing, tinnitus, vertigo; No sinus or throat pain  NECK: No pain or stiffness  BREASTS: No pain, masses, or nipple discharge  RESPIRATORY: No cough, wheezing, chills or hemoptysis; No shortness of breath  CARDIOVASCULAR: No chest pain, palpitations, dizziness, or leg swelling  GASTROINTESTINAL: No abdominal or epigastric pain. No nausea, vomiting, or hematemesis; No diarrhea or constipation. No melena or hematochezia.  GENITOURINARY: No dysuria, frequency, hematuria, or incontinence  NEUROLOGICAL: No headaches, memory loss, loss of strength, numbness, or tremors  SKIN: No itching, burning, rashes, or lesions   LYMPH NODES: No enlarged glands  ENDOCRINE: No heat or cold intolerance; No hair loss  MUSCULOSKELETAL: No joint pain or swelling; No muscle, back, or extremity pain  PSYCHIATRIC: No depression, anxiety, mood swings, or difficulty sleeping  HEME/LYMPH: No easy bruising, or bleeding gums  ALLERGY AND IMMUNOLOGIC: No hives or eczema    Vital Signs Last 24 Hrs  T(C): 37.3 (24 Sep 2020 17:23), Max: 38.3 (24 Sep 2020 10:27)  T(F): 99.1 (24 Sep 2020 17:23), Max: 100.9 (24 Sep 2020 10:27)  HR: 85 (24 Sep 2020 17:29) (75 - 111)  BP: 109/68 (24 Sep 2020 17:23) (109/68 - 132/76)  BP(mean): --  RR: 20 (24 Sep 2020 17:23) (17 - 20)  SpO2: 95% (24 Sep 2020 17:29) (94% - 96%)    PHYSICAL EXAM:  GENERAL: NAD, well-groomed, well-developed  HEAD:  Atraumatic, Normocephalic  NERVOUS SYSTEM:  Alert & Oriented X0, slowly waking up   CHEST/LUNG: coarse sounds bilaterally, lots of secretions   HEART: Regular rate and rhythm; No murmurs, rubs, or gallops  ABDOMEN: Soft, Nontender, Nondistended; Bowel sounds present  EXTREMITIES:  2+ Peripheral Pulses, No clubbing, cyanosis, or edema      LABS:                                   8.7    12.38 )-----------( 219      ( 25 Sep 2020 09:11 )             27.0     09-25    137  |  95<L>  |  7   ----------------------------<  104<H>  2.9<LL>   |  40<H>  |  0.54    Ca    11.2<H>      25 Sep 2020 09:11  Mg     1.1     09-25                CAPILLARY BLOOD GLUCOSE      POCT Blood Glucose.: 107 mg/dL (24 Sep 2020 17:01)  POCT Blood Glucose.: 110 mg/dL (24 Sep 2020 11:21)  POCT Blood Glucose.: 117 mg/dL (24 Sep 2020 07:43)  POCT Blood Glucose.: 126 mg/dL (24 Sep 2020 06:34)  POCT Blood Glucose.: 120 mg/dL (24 Sep 2020 01:24)      RADIOLOGY & ADDITIONAL TESTS:    Imaging Personally Reviewed:  [ ] YES  [ ] NO    Consultant(s) Notes Reviewed:  [ ] YES  [ ] NO    Care Discussed with Consultants/Other Providers [ ] YES  [ ] NO

## 2020-09-25 NOTE — CHART NOTE - NSCHARTNOTEFT_GEN_A_CORE
Pt was seen bedside for tracheostomy suture removal.  Sutures x 4 identified and removed. Pt tolerated well.  Continue medical management and trache to vent.

## 2020-09-25 NOTE — PROGRESS NOTE ADULT - ASSESSMENT
44M EtOH pancreatitis p/w abdominal pain 8/17/20, found to have necrotizing pancreatitis c/b septic shock and respiratory failure secondary to PNA and ARDS requiring intubation and DTs requiring sedation and small bowel obstruction conservatively managed with NGT and NPO for which he was admitted to ICU. Currently, s/p trach and PEG on 9/10 and treated for klebsiella bacteremia.   Pt was doing well in ICU and so downgraded to  floor 9/20/20.     Assessment and Plan:  Pt waking up slowly    #Fever, unclear source ; cellulitis vs SBP vs pancreatitis vs hepatobiliary disease ?   intermittent fevers since admission, spiked fever again today  Less likely cellulitis- doing much better  Not SBP -since fluid negative for bacteria   WBC in 12-16 continues to be in range     ABD US showing worsening ascites, s/p paracentesis by IR 9/22 with 1.5L straw colored fluid removed -cultures did not show any specific organisms  9/24 CXR   IMPRESSION:   Residual perihilar diffuse airspace disease and moderate RIGHT effusion.  9/21 KUB shows nonspecific bowel gas pattern without e/o obstruction or ileus   9/21 lactate wnl , procal unremarkable   Blood culture from 9/10 was positive for klebsiella quite sensitive and one set from 9/12 is negative  SAAG < 1.1 going against portal HTN   still tolerating 40% trach collar, did well overnight, discussed with RN to continue with frequent suctioning,   continue lasix   follow Blood Cx -no growth to date (pre-saldivar)   RLE showing no dvt     ID consult appreciated -discussed plan with ID  -blood cultures sent again  -CT Chest/Abdomen/Pelvis ordered -awaiting results     #Metabolic encephalopathy   -pt waking up slowly   hepatic vs. ICU delirium   d/angela Precedex since 9/15 in ICU     diazepam, methodone and seroquel - being tapered   Psych following     #Acute respiratory failure secondary to PNA/ARDS   s/p TRACH 9/10/20   tolerating trach collar 40%  can place back on PS QHS and PRN if he tires  continue with frequent suctioning , continue lasix    #s/p necrotizing pancreatitis , s/p septic shock   s/p course of vanco and meropenem for both PNA and necrotizing pancreatitis  however had recurrent fevers and leukocytosis, and found to have blood cx + for Klebsiella. left arm midline d/angela on 9/12 with underlying bacteremia.   new midline was inserted on L arm on 9/15  de-escalated antibx from meropenem to ceftriaxone as klebsiella is sensitive to it- blood culture from 9/12 negative.     #Ascites   distended gallbladder and ascites seen on CT, abd US neg for acute cholecystitis   s/p paracentesis 9/22 by IR   see above     #Presumed cellulitis of the RUE --on cefazolin , improving   RUQ sono - neg for DVT     # metabolic alkalosis  sec to diuresis  s/p diamox x 1 dose in ICU 9/9/20   added free water   monitor chem     #s/p PEG placement 9/10/20  s/p SBO which was treated conservatively   Ileus , KUB 9/15/20 No dilated bowel is identified. There is nonobstructive bowel gas pattern  monitor residuals   high residuals in ICU, erythromycin IV started in ICU for improvement of motility of gut   continue with free water 200cc Q6H  repeat KUB 9/21 without e/o ileus or bowel obstruction   feeds were d/c'd yesterday due to high residuals, abd much softer after paracentesis. restarted feeds today at rate 20cc/hr with monitoring of residuals     #unstageable pressure ulcers x 2 on sacral area ,  no sign of necrosis or infection noted   -wound care consult appreciated , continue with nursing wound care per recs     #Hypokalememia--replete and monitor  #Hypomagnesemia --replete and monitor     #normocytic anemia , probably related to sepsis and acute illness   requiring transfusion on 9/17/20  monitor H/H   follow anemia work-up   transfuse prn of H/H <7/21      #Preventative measures   lovenox SQ-dvt ppx  fall, aspiration precautions, HOBE   8/20/20 ECHO: pEF   PT , patient unable to participate, signed off   Low vit D-supplement         44M EtOH pancreatitis p/w abdominal pain 8/17/20, found to have necrotizing pancreatitis c/b septic shock and respiratory failure secondary to PNA and ARDS requiring intubation and DTs requiring sedation and small bowel obstruction conservatively managed with NGT and NPO for which he was admitted to ICU. Currently, s/p trach and PEG on 9/10 and treated for klebsiella bacteremia.   Pt was doing well in ICU and so downgraded to  floor 9/20/20.     Assessment and Plan:  Pt waking up slowly    #Fever, unclear source ; cellulitis vs SBP vs pancreatitis vs hepatobiliary disease ?   intermittent fevers since admission, spiked fever intermittently  Less likely cellulitis of upper extremity- doing much better  Not SBP -since fluid negative for bacteria   WBC in 12-16 continues to be in range     ABD US showing worsening ascites, s/p paracentesis by IR 9/22 with 1.5L straw colored fluid removed -cultures did not show any specific organisms  9/24 CXR   IMPRESSION:   Residual perihilar diffuse airspace disease and moderate RIGHT effusion.  9/21 KUB shows nonspecific bowel gas pattern without e/o obstruction or ileus   9/21 lactate wnl , procal unremarkable   Blood culture from 9/10 was positive for klebsiella quite sensitive and one set from 9/12 is negative  SAAG < 1.1 going against portal HTN   still tolerating 40% trach collar, did well overnight, discussed with RN to continue with frequent suctioning,   continue lasix   follow Blood Cx -no growth to date (pre-saldivar)   RLE showing no dvt     ID consult appreciated -discussed plan with ID  -blood cultures sent again  -CT Chest/Abdomen/Pelvis ordered -awaiting results   -Continues to be on Cefazolin     #Metabolic encephalopathy   -pt waking up slowly   hepatic vs. ICU delirium   d/angela Precedex since 9/15 in ICU     diazepam, methodone and seroquel - being tapered   Psych following     #Acute respiratory failure secondary to PNA/ARDS   s/p TRACH 9/10/20   tolerating trach collar 40%  can place back on PS QHS and PRN if he tires  continue with frequent suctioning , continue lasix    #s/p necrotizing pancreatitis , s/p septic shock   s/p course of vanco and meropenem for both PNA and necrotizing pancreatitis  however had recurrent fevers and leukocytosis, and found to have blood cx + for Klebsiella. left arm midline d/angela on 9/12 with underlying bacteremia.   new midline was inserted on L arm on 9/15  de-escalated antibx from meropenem to ceftriaxone as klebsiella is sensitive to it- blood culture from 9/12 negative.     #Ascites   distended gallbladder and ascites seen on CT, abd US neg for acute cholecystitis   s/p paracentesis 9/22 by IR   see above     #Presumed cellulitis of the RUE --on cefazolin , improving   RUQ sono - neg for DVT     # metabolic alkalosis  sec to diuresis  s/p diamox x 1 dose in ICU 9/9/20   added free water   monitor chem     #s/p PEG placement 9/10/20  s/p SBO which was treated conservatively   Ileus , KUB 9/15/20 No dilated bowel is identified. There is nonobstructive bowel gas pattern  monitor residuals   high residuals in ICU, erythromycin IV started in ICU for improvement of motility of gut   continue with free water 200cc Q6H  repeat KUB 9/21 without e/o ileus or bowel obstruction   feeds were d/c'd yesterday due to high residuals, abd much softer after paracentesis. restarted feeds today at rate 20cc/hr with monitoring of residuals     #unstageable pressure ulcers x 2 on sacral area ,  no sign of necrosis or infection noted   -wound care consult appreciated , continue with nursing wound care per recs     #Hypokalememia--replete and monitor  #Hypomagnesemia --replete and monitor     #normocytic anemia , probably related to sepsis and acute illness   requiring transfusion on 9/17/20  monitor H/H   follow anemia work-up   transfuse prn of H/H <7/21      #Preventative measures   lovenox SQ-dvt ppx  fall, aspiration precautions, HOBE   8/20/20 ECHO: pEF   PT , patient unable to participate, signed off   Low vit D-supplement

## 2020-09-25 NOTE — PROGRESS NOTE ADULT - ATTENDING COMMENTS
Angelito Morrison DO  Cell: [phone]  Pager [phone]  Infectious Disease Attending  After 5pm/weekends please call [phone] for all inquiries and new consults

## 2020-09-25 NOTE — PROGRESS NOTE ADULT - ASSESSMENT
DEEPIKA RUCKER is a 44M PMH alcohol abuse, hx of alcohol withdrawal and alcohol pancreatitis who presented for abdominal pain. Admitted with necrotizing pancreatitis, sepsis, septic shock with course complicated by acute hypoxic respiratory failure secondary to PNA and ARDS requiring intubation and DTs requiring sedation and small bowel obstruction conservatively managed with NGT and NPO. Currently, s/p trach and PEG on 9/10 and treated for klebsiella bacteremia.  Intermittently having fevers, last fever 9/21, 101.0  WBC staying in the 12-16k range  Had Abdominal US today which showed large amount of ascites and IR performed paracentesis which removed 1500cc fluid  SAAG <1.1 which goes against portal hypertension and more in line with pancreatitis vs SBP vs some other cause   CXR from 9/21 with increased lung markings and effusions as well as trach (personally reviewed)   Blood culture from 9/10 was positive for klebsiella quite sensitive and one set from 9/12 is negative   Patient has multiple possible sources of fever:  Abdomen such as SBP, pancreatitis, hepatobiliary disease   Pulmonary: CXR very unclear but he is on trach collar thus unlikely   Skin and soft tissue: cellulitis of the arm (US didnt show DVT or thrombophlebitis)  Thromboembolic: could have DVT       Antibiotics this admission:  Vancomycin: 8/26-27; 9/16-20  Zosyn: 8/18-8/27  Meropenem: 8/23-9/2  ceftriaxone: 9/14-20  Cefazolin: 9/20->    9/23: Continues to respond to cefazolin, fluid analysis not consistent with SBP and negative culture thus far, heavily sedated though meds cut in half,  9/24: Spiked a fever, panculture and repeat imaging, Behavioral health consulted, ascites fluid culture negative   9/25: still spiking, getting CT c/a/p, continue cefazolin for cellulitis which is much better     Overall, 44M pancreatitis, fever, leukocytosis, abdominal ascites, cellulitis   -continue cefazolin for cellulitis  -f/u 2 sets of blood cultures  -send respiratory culture  -pending CT C without contrast and A/P with oral and IV contrast   -consider DVT of LE bilaterally   -agree with reducing sedation though monitor for withdrawal

## 2020-09-25 NOTE — PROGRESS NOTE BEHAVIORAL HEALTH - NSBHFUPINTERVALHXFT_PSY_A_CORE
Patient thus far is tolerating the decrease in the methadone daily dose. No breakthrough agitation. Same clinical presentation - sedated/highly somnolent throughout the day.

## 2020-09-25 NOTE — PROGRESS NOTE BEHAVIORAL HEALTH - NSBHCONSULTMEDS_PSY_A_CORE FT
continue to wean off the Methadone - initially at 10mg PO q6hrs so getting 40mg in a 24hrs period; reduce slowly.   9/25: Decreased to 10mg PO q8hrs with perimeter to HOLD dose for sedation  9/26: decrease to 10mg PO via PEG BID  9/27: decrease to 10mg PO via PEG qd then stop  can continue Seroquel 50mg PO via PEG for now, and Valium 5mg qd   GOAL:  weaning off methadone and benzo one agent at a time

## 2020-09-25 NOTE — CHART NOTE - NSCHARTNOTEFT_GEN_A_CORE
Pt w/ ETOH pancreatitis p/w abd pain 8/17/20 , found to have necrotizing pancreatitis c/b septic shock & respiratory failure 2/2 to PNA & ARDS requiring intubation & DT's requiring sedation & SBO. Pt is s/p trach & PEG on 9/10 & treated for klebsiella bacteremia. Pt transferred from ICU -> medical floor on 9/20. 9/22 -s/p paracentesis -> 1500 ml serous fluid aspirated.     Factors impacting intake: [ ] none [ ] nausea  [ ] vomiting [ ] diarrhea [ ] constipation  [ ]chewing problems [ ] swallowing issues  [x ] other: PEG / Trach    9/21 - Diet Prescription: Diet, NPO with Tube Feed:   Tube Feeding Modality: Gastrostomy  Vital AF 1.2  Total Volume for 24 Hours (mL): 1440  Continuous  Starting Tube Feed Rate {mL per Hour}: 50     Every 24 hours  Until Goal Tube Feed Rate (mL per Hour): 60  Tube Feed Duration (in Hours): 24  Tube Feed Start Time: 13:00  Heraclio(7 Gm Arginine/7 Gm Glut/1.2 Gm HMB     Qty per Day:  1  No Carb Prosource (1pkg = 15gms Protein)     Qty per Day:  1 (09-21-20 @ 12:25)    Intake: Vital AF @ 20 ml/her x 3 days = 480 ml , 576 kcal & 36 g pro    Current Weight: 9/25 - 174.6 (79.2 kg) Edema - 9/24 - 1+ generalized, (R) arm ; 9/21 - 183.4 (83.2 kg) Edema - 2+ (L & R) foot, ankle, hand, arm  % Weight Change - 4.7 % / 4 kg loss x 4 days w/ decreased edema noted. s/p paracentesis on 9/22.       Pertinent Medications: MEDICATIONS  (STANDING):  ceFAZolin   IVPB 1000 milliGRAM(s) IV Intermittent every 8 hours  ceFAZolin   IVPB      chlorhexidine 0.12% Liquid 15 milliLiter(s) Oral Mucosa every 12 hours  chlorhexidine 4% Liquid 1 Application(s) Topical <User Schedule>  cholecalciferol 2000 Unit(s) Oral daily  collagenase Ointment 1 Application(s) Topical daily  dextrose 5%. 1000 milliLiter(s) (50 mL/Hr) IV Continuous <Continuous>  dextrose 50% Injectable 12.5 Gram(s) IV Push once  dextrose 50% Injectable 25 Gram(s) IV Push once  dextrose 50% Injectable 25 Gram(s) IV Push once  diazepam    Tablet 5 milliGRAM(s) Oral daily  enoxaparin Injectable 40 milliGRAM(s) SubCutaneous daily  folic acid 1 milliGRAM(s) Oral daily  furosemide   Injectable 40 milliGRAM(s) IV Push two times a day  insulin lispro (HumaLOG) corrective regimen sliding scale   SubCutaneous every 6 hours  methadone    Tablet 10 milliGRAM(s) Oral two times a day  metoprolol tartrate 12.5 milliGRAM(s) Oral two times a day  multivitamin 1 Tablet(s) Oral daily  pantoprazole   Suspension 40 milliGRAM(s) Oral daily  polyethylene glycol 3350 17 Gram(s) Oral daily  potassium chloride  10 mEq/100 mL IVPB 10 milliEquivalent(s) IV Intermittent every 1 hour  QUEtiapine 50 milliGRAM(s) Oral two times a day  scopolamine 1 mG/72 Hr(s) Patch 1 Patch Transdermal every 72 hours  senna 2 Tablet(s) Oral at bedtime  thiamine 100 milliGRAM(s) Oral daily    MEDICATIONS  (PRN):  acetaminophen   Tablet .. 650 milliGRAM(s) Oral every 6 hours PRN Temp greater or equal to 38C (100.4F), Mild Pain (1 - 3)  albuterol/ipratropium for Nebulization 3 milliLiter(s) Nebulizer every 6 hours PRN Shortness of Breath and/or Wheezing  dextrose 40% Gel 15 Gram(s) Oral once PRN Blood Glucose LESS THAN 70 milliGRAM(s)/deciliter  glucagon  Injectable 1 milliGRAM(s) IntraMuscular once PRN Glucose LESS THAN 70 milligrams/deciliter  metoprolol tartrate Injectable 5 milliGRAM(s) IV Push every 6 hours PRN if sbp>160 or HR >110 hold if SBP<100 or HR<60    Pertinent Labs: 09-25 Na137 mmol/L Glu 104 mg/dL<H> K+ 2.9 mmol/L<LL> Cr  0.54 mg/dL BUN 7 mg/dL 09-23 Phos 3.5 mg/dL 09-23 Alb 1.8 g/dL<L> 09-05 Chol --    LDL --    HDL --    Trig 325 mg/dL<H>09-23 ALT 20 U/L AST 60 U/L<H> Alkaline Phosphatase 174 U/L<H>     CAPILLARY BLOOD GLUCOSE      POCT Blood Glucose.: 114 mg/dL (25 Sep 2020 11:11)  POCT Blood Glucose.: 117 mg/dL (25 Sep 2020 05:57)  POCT Blood Glucose.: 116 mg/dL (25 Sep 2020 00:27)  POCT Blood Glucose.: 107 mg/dL (24 Sep 2020 17:01)    Skin: tear L scapula         sacrum - unstageable    Estimated Needs:   [x ] no change since previous assessment (9/21/20)  [ ] recalculated:     Previous Nutrition Diagnosis:     Inadequate Energy Intake    Etiology Decreased ability to consume sufficient energy.     Signs/Symptoms Pt has not reached goal rate of TF    Goal/Expected Outcome Pt will consume >75% nutrition needs once goal rate has been reached(unmet for energy, RDIs, met for protein)     Nutrition Diagnosis is [ x] ongoing ( see new nutrition diagnosis below)   New Nutrition Diagnosis:     [x ] Malnutrition; severe malnutrition in context of acute illness     Related to: inadequate protein-energy intake in setting of pancreatitis,  respiratory failure, hepatic/metabolic encephalopathy, pressure ulcer     As evidenced by: <50% nutrition needs >5 days, physical findings of moderate muscle loss     Goal: pt to meet >75% of protein-energy needs via enteral feeding; Not MET    Nutrition Diagnosis is [x ] ongoing  [ ] resolved [ ] not applicable     New Nutrition Diagnosis: [x ] not applicable       Interventions:   Recommend  [ ] Change Diet To:   [ ] Nutrition Supplement  [x ] Nutrition Support - Vital AF 1.2 @ 20 ml/hr and advance as tolerated to 60 ml/xd=7806 calories, 108 grams protein, 1167 ml free water, 121% RDIs, Heraclio (specialized AA to support tissue building & help       maintain lean body mass) 1 pkg daily = 80 kcal, 1 pkg No carb pro source daily = 60 kcal & 15 g pro.    [ ] Other:     Monitoring and Evaluation:   [ ] PO intake [ x ] Tolerance to diet prescription [ x ] weights [ x ] labs[ x ] follow up per protocol  [ ] other: Pt w/ ETOH pancreatitis p/w abd pain 8/17/20 , found to have necrotizing pancreatitis c/b septic shock & respiratory failure 2/2 to PNA & ARDS requiring intubation & DT's requiring sedation & SBO. Pt is s/p trach & PEG on 9/10 & treated for klebsiella bacteremia. Pt transferred from ICU -> medical floor on 9/20. 9/22 -s/p paracentesis -> 1500 ml serous fluid aspirated.     Factors impacting intake: [ ] none [ ] nausea  [ ] vomiting [ ] diarrhea [ ] constipation  [ ]chewing problems [ ] swallowing issues  [x ] other: PEG / Trach    9/21 - Diet Prescription: Diet, NPO with Tube Feed:   Tube Feeding Modality: Gastrostomy  Vital AF 1.2  Total Volume for 24 Hours (mL): 1440  Continuous  Starting Tube Feed Rate {mL per Hour}: 50     Every 24 hours  Until Goal Tube Feed Rate (mL per Hour): 60  Tube Feed Duration (in Hours): 24  Tube Feed Start Time: 13:00  Heraclio(7 Gm Arginine/7 Gm Glut/1.2 Gm HMB     Qty per Day:  1  No Carb Prosource (1pkg = 15gms Protein)     Qty per Day:  1 (09-21-20 @ 12:25)    Intake: Vital AF @ 20 ml/her x 3 days (due to large residuals) = 480 ml , 576 kcal & 36 g pro    Current Weight: 9/25 - 174.6 (79.2 kg) Edema - 9/24 - 1+ generalized, (R) arm ; 9/21 - 183.4 (83.2 kg) Edema - 2+ (L & R) foot, ankle, hand, arm  % Weight Change - 4.7 % / 4 kg loss x 4 days w/ decreased edema noted. s/p paracentesis on 9/22.       Pertinent Medications: MEDICATIONS  (STANDING):  ceFAZolin   IVPB 1000 milliGRAM(s) IV Intermittent every 8 hours  ceFAZolin   IVPB      chlorhexidine 0.12% Liquid 15 milliLiter(s) Oral Mucosa every 12 hours  chlorhexidine 4% Liquid 1 Application(s) Topical <User Schedule>  cholecalciferol 2000 Unit(s) Oral daily  collagenase Ointment 1 Application(s) Topical daily  dextrose 5%. 1000 milliLiter(s) (50 mL/Hr) IV Continuous <Continuous>  dextrose 50% Injectable 12.5 Gram(s) IV Push once  dextrose 50% Injectable 25 Gram(s) IV Push once  dextrose 50% Injectable 25 Gram(s) IV Push once  diazepam    Tablet 5 milliGRAM(s) Oral daily  enoxaparin Injectable 40 milliGRAM(s) SubCutaneous daily  folic acid 1 milliGRAM(s) Oral daily  furosemide   Injectable 40 milliGRAM(s) IV Push two times a day  insulin lispro (HumaLOG) corrective regimen sliding scale   SubCutaneous every 6 hours  methadone    Tablet 10 milliGRAM(s) Oral two times a day  metoprolol tartrate 12.5 milliGRAM(s) Oral two times a day  multivitamin 1 Tablet(s) Oral daily  pantoprazole   Suspension 40 milliGRAM(s) Oral daily  polyethylene glycol 3350 17 Gram(s) Oral daily  potassium chloride  10 mEq/100 mL IVPB 10 milliEquivalent(s) IV Intermittent every 1 hour  QUEtiapine 50 milliGRAM(s) Oral two times a day  scopolamine 1 mG/72 Hr(s) Patch 1 Patch Transdermal every 72 hours  senna 2 Tablet(s) Oral at bedtime  thiamine 100 milliGRAM(s) Oral daily    MEDICATIONS  (PRN):  acetaminophen   Tablet .. 650 milliGRAM(s) Oral every 6 hours PRN Temp greater or equal to 38C (100.4F), Mild Pain (1 - 3)  albuterol/ipratropium for Nebulization 3 milliLiter(s) Nebulizer every 6 hours PRN Shortness of Breath and/or Wheezing  dextrose 40% Gel 15 Gram(s) Oral once PRN Blood Glucose LESS THAN 70 milliGRAM(s)/deciliter  glucagon  Injectable 1 milliGRAM(s) IntraMuscular once PRN Glucose LESS THAN 70 milligrams/deciliter  metoprolol tartrate Injectable 5 milliGRAM(s) IV Push every 6 hours PRN if sbp>160 or HR >110 hold if SBP<100 or HR<60    Pertinent Labs: 09-25 Na137 mmol/L Glu 104 mg/dL<H> K+ 2.9 mmol/L<LL> Cr  0.54 mg/dL BUN 7 mg/dL 09-23 Phos 3.5 mg/dL 09-23 Alb 1.8 g/dL<L> 09-05 Chol --    LDL --    HDL --    Trig 325 mg/dL<H>09-23 ALT 20 U/L AST 60 U/L<H> Alkaline Phosphatase 174 U/L<H>     CAPILLARY BLOOD GLUCOSE      POCT Blood Glucose.: 114 mg/dL (25 Sep 2020 11:11)  POCT Blood Glucose.: 117 mg/dL (25 Sep 2020 05:57)  POCT Blood Glucose.: 116 mg/dL (25 Sep 2020 00:27)  POCT Blood Glucose.: 107 mg/dL (24 Sep 2020 17:01)    Skin: tear L scapula         sacrum - unstageable    Estimated Needs:   [x ] no change since previous assessment (9/21/20)  [ ] recalculated:     Previous Nutrition Diagnosis:     Inadequate Energy Intake    Etiology Decreased ability to consume sufficient energy.     Signs/Symptoms Pt has not reached goal rate of TF    Goal/Expected Outcome Pt will consume >75% nutrition needs once goal rate has been reached(unmet for energy, RDIs, met for protein)     Nutrition Diagnosis is [ x] ongoing ( see new nutrition diagnosis below)   New Nutrition Diagnosis:     [x ] Malnutrition; severe malnutrition in context of acute illness     Related to: inadequate protein-energy intake in setting of pancreatitis,  respiratory failure, hepatic/metabolic encephalopathy, pressure ulcer     As evidenced by: <50% nutrition needs >5 days, physical findings of moderate muscle loss     Goal: pt to meet >75% of protein-energy needs via enteral feeding; Not MET    Nutrition Diagnosis is [x ] ongoing  [ ] resolved [ ] not applicable     New Nutrition Diagnosis: [x ] not applicable       Interventions:   Recommend  [ ] Change Diet To:   [ ] Nutrition Supplement  [x ] Nutrition Support - Vital AF 1.2 @ 20 ml/hr and advance as tolerated to 60 ml/bc=4099 calories, 108 grams protein, 1167 ml free water, 121% RDIs, Heraclio (specialized AA to support tissue building & help       maintain lean body mass) 1 pkg daily = 80 kcal, 1 pkg No carb pro source daily = 60 kcal & 15 g pro.    [ ] Other:     Monitoring and Evaluation:   [ ] PO intake [ x ] Tolerance to diet prescription [ x ] weights [ x ] labs[ x ] follow up per protocol  [ ] other:

## 2020-09-26 LAB
ANION GAP SERPL CALC-SCNC: 7 MMOL/L — SIGNIFICANT CHANGE UP (ref 5–17)
BUN SERPL-MCNC: 9 MG/DL — SIGNIFICANT CHANGE UP (ref 7–23)
CALCIUM SERPL-MCNC: 11.2 MG/DL — HIGH (ref 8.5–10.1)
CHLORIDE SERPL-SCNC: 95 MMOL/L — LOW (ref 96–108)
CO2 SERPL-SCNC: 37 MMOL/L — HIGH (ref 22–31)
CREAT SERPL-MCNC: 0.53 MG/DL — SIGNIFICANT CHANGE UP (ref 0.5–1.3)
GLUCOSE BLDC GLUCOMTR-MCNC: 110 MG/DL — HIGH (ref 70–99)
GLUCOSE BLDC GLUCOMTR-MCNC: 110 MG/DL — HIGH (ref 70–99)
GLUCOSE BLDC GLUCOMTR-MCNC: 116 MG/DL — HIGH (ref 70–99)
GLUCOSE BLDC GLUCOMTR-MCNC: 131 MG/DL — HIGH (ref 70–99)
GLUCOSE BLDC GLUCOMTR-MCNC: 135 MG/DL — HIGH (ref 70–99)
GLUCOSE SERPL-MCNC: 102 MG/DL — HIGH (ref 70–99)
GRAM STN FLD: SIGNIFICANT CHANGE UP
HCT VFR BLD CALC: 27.2 % — LOW (ref 39–50)
HGB BLD-MCNC: 9 G/DL — LOW (ref 13–17)
MAGNESIUM SERPL-MCNC: 1.3 MG/DL — LOW (ref 1.6–2.6)
MCHC RBC-ENTMCNC: 30.2 PG — SIGNIFICANT CHANGE UP (ref 27–34)
MCHC RBC-ENTMCNC: 33.1 GM/DL — SIGNIFICANT CHANGE UP (ref 32–36)
MCV RBC AUTO: 91.3 FL — SIGNIFICANT CHANGE UP (ref 80–100)
NRBC # BLD: 0 /100 WBCS — SIGNIFICANT CHANGE UP (ref 0–0)
PHOSPHATE SERPL-MCNC: 2.7 MG/DL — SIGNIFICANT CHANGE UP (ref 2.5–4.5)
PLATELET # BLD AUTO: 241 K/UL — SIGNIFICANT CHANGE UP (ref 150–400)
POTASSIUM SERPL-MCNC: 2.8 MMOL/L — CRITICAL LOW (ref 3.5–5.3)
POTASSIUM SERPL-SCNC: 2.8 MMOL/L — CRITICAL LOW (ref 3.5–5.3)
RBC # BLD: 2.98 M/UL — LOW (ref 4.2–5.8)
RBC # FLD: 15.8 % — HIGH (ref 10.3–14.5)
SODIUM SERPL-SCNC: 139 MMOL/L — SIGNIFICANT CHANGE UP (ref 135–145)
SPECIMEN SOURCE: SIGNIFICANT CHANGE UP
WBC # BLD: 16.86 K/UL — HIGH (ref 3.8–10.5)
WBC # FLD AUTO: 16.86 K/UL — HIGH (ref 3.8–10.5)

## 2020-09-26 PROCEDURE — 99233 SBSQ HOSP IP/OBS HIGH 50: CPT

## 2020-09-26 RX ORDER — IPRATROPIUM/ALBUTEROL SULFATE 18-103MCG
3 AEROSOL WITH ADAPTER (GRAM) INHALATION EVERY 6 HOURS
Refills: 0 | Status: DISCONTINUED | OUTPATIENT
Start: 2020-09-26 | End: 2020-10-20

## 2020-09-26 RX ORDER — MAGNESIUM SULFATE 500 MG/ML
2 VIAL (ML) INJECTION ONCE
Refills: 0 | Status: COMPLETED | OUTPATIENT
Start: 2020-09-26 | End: 2020-09-26

## 2020-09-26 RX ORDER — FUROSEMIDE 40 MG
40 TABLET ORAL DAILY
Refills: 0 | Status: DISCONTINUED | OUTPATIENT
Start: 2020-09-27 | End: 2020-10-01

## 2020-09-26 RX ORDER — POTASSIUM CHLORIDE 20 MEQ
40 PACKET (EA) ORAL EVERY 4 HOURS
Refills: 0 | Status: COMPLETED | OUTPATIENT
Start: 2020-09-26 | End: 2020-09-27

## 2020-09-26 RX ADMIN — Medication 2000 UNIT(S): at 13:57

## 2020-09-26 RX ADMIN — SCOPALAMINE 1 PATCH: 1 PATCH, EXTENDED RELEASE TRANSDERMAL at 19:14

## 2020-09-26 RX ADMIN — METHADONE HYDROCHLORIDE 10 MILLIGRAM(S): 40 TABLET ORAL at 14:03

## 2020-09-26 RX ADMIN — Medication 100 MILLIGRAM(S): at 21:03

## 2020-09-26 RX ADMIN — METHADONE HYDROCHLORIDE 10 MILLIGRAM(S): 40 TABLET ORAL at 05:15

## 2020-09-26 RX ADMIN — CHLORHEXIDINE GLUCONATE 15 MILLILITER(S): 213 SOLUTION TOPICAL at 18:38

## 2020-09-26 RX ADMIN — Medication 1 TABLET(S): at 13:58

## 2020-09-26 RX ADMIN — Medication 2 MILLIGRAM(S): at 20:47

## 2020-09-26 RX ADMIN — Medication 3 MILLILITER(S): at 23:19

## 2020-09-26 RX ADMIN — CHLORHEXIDINE GLUCONATE 15 MILLILITER(S): 213 SOLUTION TOPICAL at 05:16

## 2020-09-26 RX ADMIN — POLYETHYLENE GLYCOL 3350 17 GRAM(S): 17 POWDER, FOR SOLUTION ORAL at 14:04

## 2020-09-26 RX ADMIN — Medication 100 MILLIGRAM(S): at 05:15

## 2020-09-26 RX ADMIN — Medication 5 MILLIGRAM(S): at 14:03

## 2020-09-26 RX ADMIN — SCOPALAMINE 1 PATCH: 1 PATCH, EXTENDED RELEASE TRANSDERMAL at 14:31

## 2020-09-26 RX ADMIN — Medication 1 APPLICATION(S): at 13:57

## 2020-09-26 RX ADMIN — Medication 40 MILLIEQUIVALENT(S): at 22:07

## 2020-09-26 RX ADMIN — Medication 12.5 MILLIGRAM(S): at 19:32

## 2020-09-26 RX ADMIN — Medication 12.5 MILLIGRAM(S): at 05:15

## 2020-09-26 RX ADMIN — QUETIAPINE FUMARATE 50 MILLIGRAM(S): 200 TABLET, FILM COATED ORAL at 05:15

## 2020-09-26 RX ADMIN — Medication 100 MILLIGRAM(S): at 14:04

## 2020-09-26 RX ADMIN — PANTOPRAZOLE SODIUM 40 MILLIGRAM(S): 20 TABLET, DELAYED RELEASE ORAL at 14:04

## 2020-09-26 RX ADMIN — Medication 100 MILLIGRAM(S): at 14:09

## 2020-09-26 RX ADMIN — QUETIAPINE FUMARATE 50 MILLIGRAM(S): 200 TABLET, FILM COATED ORAL at 18:34

## 2020-09-26 RX ADMIN — Medication 50 GRAM(S): at 22:07

## 2020-09-26 RX ADMIN — CHLORHEXIDINE GLUCONATE 1 APPLICATION(S): 213 SOLUTION TOPICAL at 05:16

## 2020-09-26 RX ADMIN — Medication 650 MILLIGRAM(S): at 14:05

## 2020-09-26 RX ADMIN — Medication 40 MILLIGRAM(S): at 05:15

## 2020-09-26 RX ADMIN — ENOXAPARIN SODIUM 40 MILLIGRAM(S): 100 INJECTION SUBCUTANEOUS at 13:56

## 2020-09-26 RX ADMIN — Medication 1 MILLIGRAM(S): at 14:03

## 2020-09-26 RX ADMIN — SENNA PLUS 2 TABLET(S): 8.6 TABLET ORAL at 22:08

## 2020-09-26 NOTE — PROGRESS NOTE ADULT - SUBJECTIVE AND OBJECTIVE BOX
INTERVAL HPI/OVERNIGHT EVENTS: Patient waking up slowly.     Patient is a 44y old  Male who presents with a chief complaint of pancreatitis (24 Sep 2020 17:14)      MEDICATIONS  (STANDING):  ceFAZolin   IVPB 1000 milliGRAM(s) IV Intermittent every 8 hours  ceFAZolin   IVPB      chlorhexidine 0.12% Liquid 15 milliLiter(s) Oral Mucosa every 12 hours  chlorhexidine 4% Liquid 1 Application(s) Topical <User Schedule>  cholecalciferol 2000 Unit(s) Oral daily  collagenase Ointment 1 Application(s) Topical daily  dextrose 5%. 1000 milliLiter(s) (50 mL/Hr) IV Continuous <Continuous>  dextrose 50% Injectable 12.5 Gram(s) IV Push once  dextrose 50% Injectable 25 Gram(s) IV Push once  dextrose 50% Injectable 25 Gram(s) IV Push once  diazepam    Tablet 5 milliGRAM(s) Oral daily  enoxaparin Injectable 40 milliGRAM(s) SubCutaneous daily  folic acid 1 milliGRAM(s) Oral daily  furosemide   Injectable 40 milliGRAM(s) IV Push two times a day  insulin lispro (HumaLOG) corrective regimen sliding scale   SubCutaneous every 6 hours  metoprolol tartrate 12.5 milliGRAM(s) Oral two times a day  multivitamin 1 Tablet(s) Oral daily  pantoprazole   Suspension 40 milliGRAM(s) Oral daily  polyethylene glycol 3350 17 Gram(s) Oral daily  QUEtiapine 50 milliGRAM(s) Oral two times a day  scopolamine 1 mG/72 Hr(s) Patch 1 Patch Transdermal every 72 hours  senna 2 Tablet(s) Oral at bedtime  thiamine 100 milliGRAM(s) Oral daily    MEDICATIONS  (PRN):  acetaminophen   Tablet .. 650 milliGRAM(s) Oral every 6 hours PRN Temp greater or equal to 38C (100.4F), Mild Pain (1 - 3)  albuterol/ipratropium for Nebulization 3 milliLiter(s) Nebulizer every 6 hours PRN Shortness of Breath and/or Wheezing  dextrose 40% Gel 15 Gram(s) Oral once PRN Blood Glucose LESS THAN 70 milliGRAM(s)/deciliter  glucagon  Injectable 1 milliGRAM(s) IntraMuscular once PRN Glucose LESS THAN 70 milligrams/deciliter  metoprolol tartrate Injectable 5 milliGRAM(s) IV Push every 6 hours PRN if sbp>160 or HR >110 hold if SBP<100 or HR<60      Allergies    No Known Allergies    Intolerances        REVIEW OF SYSTEMS:  unable to obtain due poor mentation     Vital Signs Last 24 Hrs  ICU Vital Signs Last 24 Hrs  T(C): 36.8 (26 Sep 2020 11:06), Max: 37.6 (25 Sep 2020 17:45)  T(F): 98.2 (26 Sep 2020 11:06), Max: 99.6 (25 Sep 2020 17:45)  HR: 106 (26 Sep 2020 11:06) (98 - 120)  BP: 126/82 (26 Sep 2020 11:06) (113/68 - 144/92)  BP(mean): --  ABP: --  ABP(mean): --  RR: 20 (26 Sep 2020 11:06) (20 - 20)  SpO2: 96% (26 Sep 2020 11:06) (93% - 96%)    PHYSICAL EXAM:  GENERAL: NAD, well-groomed, well-developed  HEAD:  Atraumatic, Normocephalic  NERVOUS SYSTEM:  Alert & Oriented X0, slowly waking up   CHEST/LUNG: coarse sounds bilaterally, lots of secretions   HEART: Regular rate and rhythm; No murmurs, rubs, or gallops  ABDOMEN: Soft, Nontender, Nondistended; Bowel sounds present  EXTREMITIES:  2+ Peripheral Pulses, No clubbing, cyanosis, or edema      LABS:                                                    8.7    12.38 )-----------( 219      ( 25 Sep 2020 09:11 )             27.0     09-25    139  |  96  |  7   ----------------------------<  98  3.5   |  38<H>  |  0.44<L>    Ca    11.3<H>      25 Sep 2020 18:49  Mg     1.1     09-25

## 2020-09-26 NOTE — PROGRESS NOTE ADULT - ASSESSMENT
44M EtOH pancreatitis p/w abdominal pain 8/17/20, found to have necrotizing pancreatitis c/b septic shock and respiratory failure secondary to PNA and ARDS requiring intubation and DTs requiring sedation and small bowel obstruction conservatively managed with NGT and NPO for which he was admitted to ICU. Currently, s/p trach and PEG on 9/10 and treated for klebsiella bacteremia.   Pt was doing well in ICU and so downgraded to  floor 9/20/20.     Assessment and Plan:  Pt waking up slowly    #Fever, unclear source ; cellulitis vs SBP vs pancreatitis vs hepatobiliary disease ?   intermittent fevers since admission, spiked fever intermittently  Less likely cellulitis of upper extremity- doing much better  Not SBP -since fluid negative for bacteria   WBC in 12-16 continues to be in range     ABD US showing worsening ascites, s/p paracentesis by IR 9/22 with 1.5L straw colored fluid removed -cultures did not show any specific organisms  9/24 CXR   IMPRESSION:   Residual perihilar diffuse airspace disease and moderate RIGHT effusion.  9/21 KUB shows nonspecific bowel gas pattern without e/o obstruction or ileus   9/21 lactate wnl , procal unremarkable   Blood culture from 9/10 was positive for klebsiella quite sensitive and one set from 9/12 is negative  SAAG < 1.1 going against portal HTN   still tolerating 40% trach collar, did well overnight, discussed with RN to continue with frequent suctioning,   continue lasix   follow Blood Cx -no growth to date (pre-saldivar)   RLE showing no dvt     ID consult appreciated -discussed plan with ID  -blood cultures sent again-   -CT Chest/Abdomen/Pelvis ordered   - Moderate right and small left pleural effusion.    Bilateral patchy airspace disease has improved compared to the prior.    Improving pancreatitis with decreased size of the pancreas as well as improved peripancreatic edema. Small collection adjacent to the undersurface of the tail is also decreased. No evidence of necrosis.    -Continues to be on Cefazolin as per ID    #Metabolic encephalopathy   -pt waking up slowly   hepatic vs. ICU delirium   d/angela Precedex since 9/15 in ICU     diazepam, methodone and seroquel - being tapered   Psych following     #Acute respiratory failure secondary to PNA/ARDS   s/p TRACH 9/10/20   tolerating trach collar 40%  can place back on PS QHS and PRN if he tires  continue with frequent suctioning , continue lasix    #s/p necrotizing pancreatitis , s/p septic shock   s/p course of vanco and meropenem for both PNA and necrotizing pancreatitis  however had recurrent fevers and leukocytosis, and found to have blood cx + for Klebsiella. left arm midline d/angela on 9/12 with underlying bacteremia.   new midline was inserted on L arm on 9/15  de-escalated antibx from meropenem to ceftriaxone as klebsiella is sensitive to it- blood culture from 9/12 negative.     #Ascites   distended gallbladder and ascites seen on CT, abd US neg for acute cholecystitis   s/p paracentesis 9/22 by IR   see above     #Presumed cellulitis of the RUE --on cefazolin , improving   RUQ sono - neg for DVT     # metabolic alkalosis  sec to diuresis  s/p diamox x 1 dose in ICU 9/9/20   added free water   monitor chem     #s/p PEG placement 9/10/20  s/p SBO which was treated conservatively   Ileus , KUB 9/15/20 No dilated bowel is identified. There is nonobstructive bowel gas pattern  monitor residuals   high residuals in ICU, erythromycin IV started in ICU for improvement of motility of gut   continue with free water 200cc Q6H  repeat KUB 9/21 without e/o ileus or bowel obstruction   feeds were d/c'd yesterday due to high residuals, abd much softer after paracentesis. restarted feeds today at rate 20cc/hr with monitoring of residuals     #unstageable pressure ulcers x 2 on sacral area ,  no sign of necrosis or infection noted   -wound care consult appreciated , continue with nursing wound care per recs     #Hypokalememia--replete and monitor  #Hypomagnesemia --replete and monitor     #normocytic anemia , probably related to sepsis and acute illness   requiring transfusion on 9/17/20  monitor H/H   follow anemia work-up   transfuse prn of H/H <7/21      #Preventative measures   lovenox SQ-dvt ppx  fall, aspiration precautions, HOBE   8/20/20 ECHO: pEF   PT , patient unable to participate, signed off   Low vit D-supplement         44M EtOH pancreatitis p/w abdominal pain 8/17/20, found to have necrotizing pancreatitis c/b septic shock and respiratory failure secondary to PNA and ARDS requiring intubation and DTs requiring sedation and small bowel obstruction conservatively managed with NGT and NPO for which he was admitted to ICU. Currently, s/p trach and PEG on 9/10 and treated for klebsiella bacteremia.   Pt was doing well in ICU and so downgraded to  floor 9/20/20.     Assessment and Plan:  Pt waking up slowly    #Fever, unclear source ; cellulitis vs SBP vs pancreatitis vs hepatobiliary disease ?   intermittent fevers since admission, spiked fever intermittently  Less likely cellulitis of upper extremity- doing much better  Not SBP -since fluid negative for bacteria   WBC in 12-16 continues to be in range     ABD US showing worsening ascites, s/p paracentesis by IR 9/22 with 1.5L straw colored fluid removed -cultures did not show any specific organisms  9/24 CXR   IMPRESSION:   Residual perihilar diffuse airspace disease and moderate RIGHT effusion.  9/21 KUB shows nonspecific bowel gas pattern without e/o obstruction or ileus   9/21 lactate wnl , procal unremarkable   Blood culture from 9/10 was positive for klebsiella quite sensitive and one set from 9/12 is negative  SAAG < 1.1 going against portal HTN   still tolerating 40% trach collar, did well overnight, discussed with RN to continue with frequent suctioning,   continue lasix   follow Blood Cx -no growth to date (pre-saldivar)   RLE showing no dvt     ID consult appreciated -discussed plan with ID  -blood cultures sent again-   -CT Chest/Abdomen/Pelvis ordered   - Moderate right and small left pleural effusion.    Bilateral patchy airspace disease has improved compared to the prior.    Improving pancreatitis with decreased size of the pancreas as well as improved peripancreatic edema. Small collection adjacent to the undersurface of the tail is also decreased. No evidence of necrosis.    -Continues to be on Cefazolin as per ID    #Metabolic encephalopathy   -pt waking up slowly   hepatic vs. ICU delirium   d/angela Precedex since 9/15 in ICU     diazepam, methodone and seroquel - being tapered   Psych following     #Acute respiratory failure secondary to PNA/ARDS   s/p TRACH 9/10/20   tolerating trach collar 40%  can place back on PS QHS and PRN if he tires  continue with frequent suctioning , continue lasix    #s/p necrotizing pancreatitis , s/p septic shock   s/p course of vanco and meropenem for both PNA and necrotizing pancreatitis  however had recurrent fevers and leukocytosis, and found to have blood cx + for Klebsiella. left arm midline d/angela on 9/12 with underlying bacteremia.   new midline was inserted on L arm on 9/15  de-escalated antibx from meropenem to ceftriaxone as klebsiella is sensitive to it- blood culture from 9/12 negative.     #Ascites   distended gallbladder and ascites seen on CT, abd US neg for acute cholecystitis   s/p paracentesis 9/22 by IR   see above     #Presumed cellulitis of the RUE --on cefazolin , improving   RUQ sono - neg for DVT     # metabolic alkalosis  sec to diuresis  s/p diamox x 1 dose in ICU 9/9/20   added free water   monitor chem     #s/p PEG placement 9/10/20  s/p SBO which was treated conservatively   Ileus , KUB 9/15/20 No dilated bowel is identified. There is nonobstructive bowel gas pattern  monitor residuals   high residuals in ICU, erythromycin IV started in ICU for improvement of motility of gut   continue with free water 200cc Q6H  repeat KUB 9/21 without e/o ileus or bowel obstruction   Nutritions reconsulted regarding tube feeds     #unstageable pressure ulcers x 2 on sacral area ,  no sign of necrosis or infection noted   -wound care consult appreciated , continue with nursing wound care per recs     #Hypokalememia--replete and monitor  #Hypomagnesemia --replete and monitor     #normocytic anemia , probably related to sepsis and acute illness   requiring transfusion on 9/17/20  monitor H/H   follow anemia work-up   transfuse prn of H/H <7/21      #Preventative measures   lovenox SQ-dvt ppx  fall, aspiration precautions, HOBE   8/20/20 ECHO: pEF   PT , patient unable to participate, signed off   Low vit D-supplement        Spoke with pt's Mom today- Niecy- [phone] (who lives in [place]) and updated her about plan of action.

## 2020-09-26 NOTE — PROGRESS NOTE ADULT - SUBJECTIVE AND OBJECTIVE BOX
INTERVAL HPI:   44M with ETOH Abuse and withdrawal, Alcohol Pancreatitis who presents to the ED with severe abdominal pain.  Found to have Necrotizing Pancreatitis, Septic  Shock, Pneumonia/ARDS with Hypoxic Respiratory failure. Admitted to ICU, had prolonged hospital course with Vent support. Eventually required Tracheostomy and Peg placement.  Also with acute metabolic Encephalopathy.  09/20/20:  Transferred to .    Not able to provide details due to encephalopathy.  09/22/20: 1500 ml paracentesis.    OVERNIGHT EVENTS:  Thick tracheal secretions.    Vital Signs Last 24 Hrs  T(C): 36.7 (26 Sep 2020 17:29), Max: 37.3 (26 Sep 2020 05:08)  T(F): 98.1 (26 Sep 2020 17:29), Max: 99.2 (26 Sep 2020 05:08)  HR: 108 (26 Sep 2020 17:29) (98 - 120)  BP: 130/85 (26 Sep 2020 17:29) (113/68 - 144/92)  BP(mean): --  RR: 20 (26 Sep 2020 17:29) (20 - 20)  SpO2: 97% (26 Sep 2020 17:29) (93% - 97%)    Mode: standby  FiO2: 40    PHYSICAL EXAM:  GEN:         More Awake, .  HEENT:     trach    RESP:       crackles.  CVS:          Regular rate and rhythm.   ABD:         Soft, non-tender, non-distended;     MEDICATIONS  (STANDING):  ceFAZolin   IVPB 1000 milliGRAM(s) IV Intermittent every 8 hours  ceFAZolin   IVPB      chlorhexidine 0.12% Liquid 15 milliLiter(s) Oral Mucosa every 12 hours  chlorhexidine 4% Liquid 1 Application(s) Topical <User Schedule>  cholecalciferol 2000 Unit(s) Oral daily  collagenase Ointment 1 Application(s) Topical daily  dextrose 5%. 1000 milliLiter(s) (50 mL/Hr) IV Continuous <Continuous>  dextrose 50% Injectable 12.5 Gram(s) IV Push once  dextrose 50% Injectable 25 Gram(s) IV Push once  dextrose 50% Injectable 25 Gram(s) IV Push once  diazepam    Tablet 5 milliGRAM(s) Oral daily  enoxaparin Injectable 40 milliGRAM(s) SubCutaneous daily  folic acid 1 milliGRAM(s) Oral daily  furosemide   Injectable 40 milliGRAM(s) IV Push two times a day  insulin lispro (HumaLOG) corrective regimen sliding scale   SubCutaneous every 6 hours  metoprolol tartrate 12.5 milliGRAM(s) Oral two times a day  multivitamin 1 Tablet(s) Oral daily  pantoprazole   Suspension 40 milliGRAM(s) Oral daily  polyethylene glycol 3350 17 Gram(s) Oral daily  QUEtiapine 50 milliGRAM(s) Oral two times a day  scopolamine 1 mG/72 Hr(s) Patch 1 Patch Transdermal every 72 hours  senna 2 Tablet(s) Oral at bedtime  thiamine 100 milliGRAM(s) Oral daily    MEDICATIONS  (PRN):  acetaminophen   Tablet .. 650 milliGRAM(s) Oral every 6 hours PRN Temp greater or equal to 38C (100.4F), Mild Pain (1 - 3)  albuterol/ipratropium for Nebulization 3 milliLiter(s) Nebulizer every 6 hours PRN Shortness of Breath and/or Wheezing  dextrose 40% Gel 15 Gram(s) Oral once PRN Blood Glucose LESS THAN 70 milliGRAM(s)/deciliter  glucagon  Injectable 1 milliGRAM(s) IntraMuscular once PRN Glucose LESS THAN 70 milligrams/deciliter  metoprolol tartrate Injectable 5 milliGRAM(s) IV Push every 6 hours PRN if sbp>160 or HR >110 hold if SBP<100 or HR<60    LABS:                        8.7    12.38 )-----------( 219      ( 25 Sep 2020 09:11 )             27.0     09-25    139  |  96  |  7   ----------------------------<  98  3.5   |  38<H>  |  0.44<L>    Ca    11.3<H>      25 Sep 2020 18:49  Mg     1.1     09-25    ASSESSMENT AND PLAN:  ·	Hypoxic Respiratory failure.  ·	S/P tracheostomy.  ·	Bilateral pneumonia.  ·	Bilateral pleural effusion.  ·	S/P Septic Shock.  ·	Necrotizing Pancreatis.  ·	Acute metabolic Encephalopathy.  ·	Alcohol abuse.  ·	Klebsiella Bacteremia.  ·	Anemia    Will reduce Lasix to once a day.  Change nebulizer to every 6 hours.  Suction as needed.  Antibiotics per ID.  May benefit fro right thoracentesis.

## 2020-09-27 LAB
ANION GAP SERPL CALC-SCNC: 6 MMOL/L — SIGNIFICANT CHANGE UP (ref 5–17)
BUN SERPL-MCNC: 9 MG/DL — SIGNIFICANT CHANGE UP (ref 7–23)
CALCIUM SERPL-MCNC: 11.4 MG/DL — HIGH (ref 8.5–10.1)
CHLORIDE SERPL-SCNC: 96 MMOL/L — SIGNIFICANT CHANGE UP (ref 96–108)
CO2 SERPL-SCNC: 37 MMOL/L — HIGH (ref 22–31)
CREAT SERPL-MCNC: 0.54 MG/DL — SIGNIFICANT CHANGE UP (ref 0.5–1.3)
CULTURE RESULTS: NO GROWTH — SIGNIFICANT CHANGE UP
CULTURE RESULTS: SIGNIFICANT CHANGE UP
GLUCOSE BLDC GLUCOMTR-MCNC: 116 MG/DL — HIGH (ref 70–99)
GLUCOSE BLDC GLUCOMTR-MCNC: 122 MG/DL — HIGH (ref 70–99)
GLUCOSE BLDC GLUCOMTR-MCNC: 124 MG/DL — HIGH (ref 70–99)
GLUCOSE BLDC GLUCOMTR-MCNC: 135 MG/DL — HIGH (ref 70–99)
GLUCOSE SERPL-MCNC: 124 MG/DL — HIGH (ref 70–99)
GRAM STN FLD: SIGNIFICANT CHANGE UP
HCT VFR BLD CALC: 27.4 % — LOW (ref 39–50)
HGB BLD-MCNC: 8.6 G/DL — LOW (ref 13–17)
MAGNESIUM SERPL-MCNC: 1.6 MG/DL — SIGNIFICANT CHANGE UP (ref 1.6–2.6)
MCHC RBC-ENTMCNC: 29.7 PG — SIGNIFICANT CHANGE UP (ref 27–34)
MCHC RBC-ENTMCNC: 31.4 GM/DL — LOW (ref 32–36)
MCV RBC AUTO: 94.5 FL — SIGNIFICANT CHANGE UP (ref 80–100)
NRBC # BLD: 0 /100 WBCS — SIGNIFICANT CHANGE UP (ref 0–0)
PHOSPHATE SERPL-MCNC: 2 MG/DL — LOW (ref 2.5–4.5)
PLATELET # BLD AUTO: 247 K/UL — SIGNIFICANT CHANGE UP (ref 150–400)
POTASSIUM SERPL-MCNC: 3.1 MMOL/L — LOW (ref 3.5–5.3)
POTASSIUM SERPL-SCNC: 3.1 MMOL/L — LOW (ref 3.5–5.3)
RBC # BLD: 2.9 M/UL — LOW (ref 4.2–5.8)
RBC # FLD: 15.8 % — HIGH (ref 10.3–14.5)
SODIUM SERPL-SCNC: 139 MMOL/L — SIGNIFICANT CHANGE UP (ref 135–145)
SPECIMEN SOURCE: SIGNIFICANT CHANGE UP
SPECIMEN SOURCE: SIGNIFICANT CHANGE UP
WBC # BLD: 14.46 K/UL — HIGH (ref 3.8–10.5)
WBC # FLD AUTO: 14.46 K/UL — HIGH (ref 3.8–10.5)

## 2020-09-27 PROCEDURE — 93970 EXTREMITY STUDY: CPT | Mod: 26

## 2020-09-27 PROCEDURE — 99233 SBSQ HOSP IP/OBS HIGH 50: CPT

## 2020-09-27 RX ORDER — POTASSIUM PHOSPHATE, MONOBASIC POTASSIUM PHOSPHATE, DIBASIC 236; 224 MG/ML; MG/ML
15 INJECTION, SOLUTION INTRAVENOUS ONCE
Refills: 0 | Status: COMPLETED | OUTPATIENT
Start: 2020-09-27 | End: 2020-09-27

## 2020-09-27 RX ORDER — POTASSIUM CHLORIDE 20 MEQ
40 PACKET (EA) ORAL ONCE
Refills: 0 | Status: COMPLETED | OUTPATIENT
Start: 2020-09-27 | End: 2020-09-27

## 2020-09-27 RX ADMIN — QUETIAPINE FUMARATE 50 MILLIGRAM(S): 200 TABLET, FILM COATED ORAL at 05:02

## 2020-09-27 RX ADMIN — Medication 2000 UNIT(S): at 14:19

## 2020-09-27 RX ADMIN — Medication 100 MILLIGRAM(S): at 14:24

## 2020-09-27 RX ADMIN — CHLORHEXIDINE GLUCONATE 1 APPLICATION(S): 213 SOLUTION TOPICAL at 05:04

## 2020-09-27 RX ADMIN — Medication 100 MILLIGRAM(S): at 05:03

## 2020-09-27 RX ADMIN — Medication 40 MILLIEQUIVALENT(S): at 14:24

## 2020-09-27 RX ADMIN — PANTOPRAZOLE SODIUM 40 MILLIGRAM(S): 20 TABLET, DELAYED RELEASE ORAL at 14:19

## 2020-09-27 RX ADMIN — CHLORHEXIDINE GLUCONATE 15 MILLILITER(S): 213 SOLUTION TOPICAL at 19:07

## 2020-09-27 RX ADMIN — Medication 2 MILLIGRAM(S): at 02:50

## 2020-09-27 RX ADMIN — POTASSIUM PHOSPHATE, MONOBASIC POTASSIUM PHOSPHATE, DIBASIC 62.5 MILLIMOLE(S): 236; 224 INJECTION, SOLUTION INTRAVENOUS at 16:23

## 2020-09-27 RX ADMIN — Medication 1 MILLIGRAM(S): at 14:12

## 2020-09-27 RX ADMIN — Medication 1 APPLICATION(S): at 14:18

## 2020-09-27 RX ADMIN — Medication 12.5 MILLIGRAM(S): at 14:13

## 2020-09-27 RX ADMIN — Medication 1 TABLET(S): at 14:19

## 2020-09-27 RX ADMIN — Medication 12.5 MILLIGRAM(S): at 19:07

## 2020-09-27 RX ADMIN — Medication 100 MILLIGRAM(S): at 22:13

## 2020-09-27 RX ADMIN — Medication 40 MILLIEQUIVALENT(S): at 02:27

## 2020-09-27 RX ADMIN — CHLORHEXIDINE GLUCONATE 15 MILLILITER(S): 213 SOLUTION TOPICAL at 05:04

## 2020-09-27 RX ADMIN — Medication 3 MILLILITER(S): at 23:33

## 2020-09-27 RX ADMIN — Medication 100 MILLIGRAM(S): at 14:12

## 2020-09-27 RX ADMIN — Medication 3 MILLILITER(S): at 11:02

## 2020-09-27 RX ADMIN — QUETIAPINE FUMARATE 50 MILLIGRAM(S): 200 TABLET, FILM COATED ORAL at 19:07

## 2020-09-27 RX ADMIN — POLYETHYLENE GLYCOL 3350 17 GRAM(S): 17 POWDER, FOR SOLUTION ORAL at 14:15

## 2020-09-27 RX ADMIN — Medication 650 MILLIGRAM(S): at 05:01

## 2020-09-27 RX ADMIN — Medication 3 MILLILITER(S): at 05:03

## 2020-09-27 RX ADMIN — Medication 40 MILLIEQUIVALENT(S): at 05:01

## 2020-09-27 RX ADMIN — Medication 3 MILLILITER(S): at 17:07

## 2020-09-27 RX ADMIN — ENOXAPARIN SODIUM 40 MILLIGRAM(S): 100 INJECTION SUBCUTANEOUS at 14:15

## 2020-09-27 RX ADMIN — Medication 5 MILLIGRAM(S): at 14:21

## 2020-09-27 RX ADMIN — Medication 40 MILLIGRAM(S): at 05:01

## 2020-09-27 NOTE — PROGRESS NOTE ADULT - ASSESSMENT
44M EtOH pancreatitis p/w abdominal pain 8/17/20, found to have necrotizing pancreatitis c/b septic shock and respiratory failure secondary to PNA and ARDS requiring intubation and DTs requiring sedation and small bowel obstruction conservatively managed with NGT and NPO for which he was admitted to ICU. Currently, s/p trach and PEG on 9/10 and treated for klebsiella bacteremia.    downgraded to [facility] floor 9/20/20 from ICU    Assessment and Plan:  Pt waking up slowly    #Fever, unclear source ; cellulitis vs SBP vs pancreatitis vs hepatobiliary disease ?   intermittent fevers since admission, still spiking low grade fevers with elevated HR  WBC in 12-16 continues to be in range, up and down     SBP-less likely-ABD US showing worsening ascites, s/p paracentesis by IR 9/22 with 1.5L straw colored fluid removed -cultures did not show any specific organisms  9/21 KUB shows nonspecific bowel gas pattern without e/o obstruction or ileus   9/21 lactate wnl , procal unremarkable   Blood culture from 9/10 was positive for klebsiella quite sensitive and one set from 9/12 is negative  SAAG < 1.1 going against portal HTN     continue lasix -K decreasing, thus lasix decreased   -RLE showing no dvt , cellulitis improved  -blood cultures sent again- no growth to date   -CT Chest/Abdomen/Pelvis ordered   - Moderate right and small left pleural effusion.  Bilateral patchy airspace disease has improved compared to the prior.  Improving pancreatitis with decreased size of the pancreas as well as improved peripancreatic edema. Small collection adjacent to the undersurface of the tail is also decreased. No evidence of necrosis.  -as per Pulmonary may benefit from right thoracentesis.   -Continues to be on Cefazolin as per ID    #Metabolic encephalopathy   -pt waking up slowly   hepatic vs. ICU delirium   d/angela Precedex since 9/15 in ICU     diazepam, methodone and seroquel - being tapered   Psych following     #Acute respiratory failure secondary to PNA/ARDS   s/p TRACH 9/10/20   tolerating trach collar 40%  can place back on PS QHS and PRN if he tires  continue with frequent suctioning , continue lasix    #s/p necrotizing pancreatitis , s/p septic shock   s/p course of vanco and meropenem for both PNA and necrotizing pancreatitis  however had recurrent fevers and leukocytosis, and found to have blood cx + for Klebsiella. left arm midline d/angela on 9/12 with underlying bacteremia.   new midline was inserted on L arm on 9/15  de-escalated antibx from meropenem to ceftriaxone as klebsiella is sensitive to it- blood culture from 9/12 negative, and on repeat continue to be negative    #Ascites   distended gallbladder and ascites seen on CT, abd US neg for acute cholecystitis   s/p paracentesis 9/22 by IR   see above     #Presumed cellulitis of the RUE --on cefazolin , improving   RUQ sono - neg for DVT     # metabolic alkalosis  sec to diuresis  s/p diamox x 1 dose in ICU 9/9/20   added free water   monitor chem     #s/p PEG placement 9/10/20  s/p SBO which was treated conservatively   Ileus , KUB 9/15/20 No dilated bowel is identified. There is nonobstructive bowel gas pattern  monitor residuals   high residuals in ICU, erythromycin IV started in ICU for improvement of motility of gut   continue with free water 200cc Q6H  repeat KUB 9/21 without e/o ileus or bowel obstruction   Nutritions reconsulted regarding tube feeds     #unstageable pressure ulcers x 2 on sacral area ,  no sign of necrosis or infection noted   -wound care consult appreciated , continue with nursing wound care per recs     #Hypokalememia--replete and monitor, decreased lasix   #Hypomagnesemia --replete and monitor     #normocytic anemia , probably related to sepsis and acute illness   requiring transfusion on 9/17/20  monitor H/H   follow anemia work-up   transfuse prn of H/H <7/21      #Preventative measures   lovenox SQ-dvt ppx  fall, aspiration precautions, HOBE   8/20/20 ECHO: pEF   PT , patient unable to participate, signed off, will reconsult once more awake  Low vit D-supplement        Bárbara Pemberton- [phone] (who lives in [place]) and updated her about plan of action.

## 2020-09-27 NOTE — PROGRESS NOTE ADULT - SUBJECTIVE AND OBJECTIVE BOX
INTERVAL HPI/OVERNIGHT EVENTS: Patient waking up slowly.     Patient is a 44y old  Male who presents with a chief complaint of pancreatitis (24 Sep 2020 17:14)      MEDICATIONS  (STANDING):  albuterol/ipratropium for Nebulization 3 milliLiter(s) Nebulizer every 6 hours  ceFAZolin   IVPB 1000 milliGRAM(s) IV Intermittent every 8 hours  ceFAZolin   IVPB      chlorhexidine 0.12% Liquid 15 milliLiter(s) Oral Mucosa every 12 hours  chlorhexidine 4% Liquid 1 Application(s) Topical <User Schedule>  cholecalciferol 2000 Unit(s) Oral daily  collagenase Ointment 1 Application(s) Topical daily  dextrose 5%. 1000 milliLiter(s) (50 mL/Hr) IV Continuous <Continuous>  dextrose 50% Injectable 12.5 Gram(s) IV Push once  dextrose 50% Injectable 25 Gram(s) IV Push once  dextrose 50% Injectable 25 Gram(s) IV Push once  diazepam    Tablet 5 milliGRAM(s) Oral daily  enoxaparin Injectable 40 milliGRAM(s) SubCutaneous daily  folic acid 1 milliGRAM(s) Oral daily  furosemide   Injectable 40 milliGRAM(s) IV Push daily  insulin lispro (HumaLOG) corrective regimen sliding scale   SubCutaneous every 6 hours  metoprolol tartrate 12.5 milliGRAM(s) Oral two times a day  multivitamin 1 Tablet(s) Oral daily  pantoprazole   Suspension 40 milliGRAM(s) Oral daily  polyethylene glycol 3350 17 Gram(s) Oral daily  QUEtiapine 50 milliGRAM(s) Oral two times a day  scopolamine 1 mG/72 Hr(s) Patch 1 Patch Transdermal every 72 hours  senna 2 Tablet(s) Oral at bedtime  thiamine 100 milliGRAM(s) Oral daily    MEDICATIONS  (PRN):  acetaminophen   Tablet .. 650 milliGRAM(s) Oral every 6 hours PRN Temp greater or equal to 38C (100.4F), Mild Pain (1 - 3)  dextrose 40% Gel 15 Gram(s) Oral once PRN Blood Glucose LESS THAN 70 milliGRAM(s)/deciliter  glucagon  Injectable 1 milliGRAM(s) IntraMuscular once PRN Glucose LESS THAN 70 milligrams/deciliter  metoprolol tartrate Injectable 5 milliGRAM(s) IV Push every 6 hours PRN if sbp>160 or HR >110 hold if SBP<100 or HR<60    Allergies    No Known Allergies    Intolerances        REVIEW OF SYSTEMS:  unable to obtain due poor mentation     ICU Vital Signs Last 24 Hrs  T(C): 38.2 (27 Sep 2020 05:42), Max: 38.2 (27 Sep 2020 05:42)  T(F): 100.7 (27 Sep 2020 05:42), Max: 100.7 (27 Sep 2020 05:42)  HR: 114 (27 Sep 2020 08:45) (100 - 118)  BP: 104/74 (27 Sep 2020 05:42) (104/74 - 141/75)  BP(mean): --  ABP: --  ABP(mean): --  RR: 20 (27 Sep 2020 05:42) (20 - 20)  SpO2: 94% (27 Sep 2020 08:45) (94% - 99%)    PHYSICAL EXAM:  GENERAL: NAD, well-groomed, well-developed  HEAD:  Atraumatic, Normocephalic  NERVOUS SYSTEM:  Alert & Oriented X0, slowly waking up   CHEST/LUNG: coarse sounds bilaterally, lots of secretions   HEART: Regular rate and rhythm; No murmurs, rubs, or gallops  ABDOMEN: Soft, Nontender, Nondistended; Bowel sounds present  EXTREMITIES:  2+ Peripheral Pulses, No clubbing, cyanosis, or edema      LABS:                                                 8.6    14.46 )-----------( 247      ( 27 Sep 2020 06:56 )             27.4     09-27    139  |  96  |  9   ----------------------------<  124<H>  3.1<L>   |  37<H>  |  0.54    Ca    11.4<H>      27 Sep 2020 06:56  Phos  2.0     09-27  Mg     1.6     09-27                   INTERVAL HPI/OVERNIGHT EVENTS: Patient waking up slowly. No acute events, same as yesterday.    Patient is a 44y old  Male who presents with a chief complaint of pancreatitis (24 Sep 2020 17:14)      MEDICATIONS  (STANDING):  albuterol/ipratropium for Nebulization 3 milliLiter(s) Nebulizer every 6 hours  ceFAZolin   IVPB 1000 milliGRAM(s) IV Intermittent every 8 hours  ceFAZolin   IVPB      chlorhexidine 0.12% Liquid 15 milliLiter(s) Oral Mucosa every 12 hours  chlorhexidine 4% Liquid 1 Application(s) Topical <User Schedule>  cholecalciferol 2000 Unit(s) Oral daily  collagenase Ointment 1 Application(s) Topical daily  dextrose 5%. 1000 milliLiter(s) (50 mL/Hr) IV Continuous <Continuous>  dextrose 50% Injectable 12.5 Gram(s) IV Push once  dextrose 50% Injectable 25 Gram(s) IV Push once  dextrose 50% Injectable 25 Gram(s) IV Push once  diazepam    Tablet 5 milliGRAM(s) Oral daily  enoxaparin Injectable 40 milliGRAM(s) SubCutaneous daily  folic acid 1 milliGRAM(s) Oral daily  furosemide   Injectable 40 milliGRAM(s) IV Push daily  insulin lispro (HumaLOG) corrective regimen sliding scale   SubCutaneous every 6 hours  metoprolol tartrate 12.5 milliGRAM(s) Oral two times a day  multivitamin 1 Tablet(s) Oral daily  pantoprazole   Suspension 40 milliGRAM(s) Oral daily  polyethylene glycol 3350 17 Gram(s) Oral daily  QUEtiapine 50 milliGRAM(s) Oral two times a day  scopolamine 1 mG/72 Hr(s) Patch 1 Patch Transdermal every 72 hours  senna 2 Tablet(s) Oral at bedtime  thiamine 100 milliGRAM(s) Oral daily    MEDICATIONS  (PRN):  acetaminophen   Tablet .. 650 milliGRAM(s) Oral every 6 hours PRN Temp greater or equal to 38C (100.4F), Mild Pain (1 - 3)  dextrose 40% Gel 15 Gram(s) Oral once PRN Blood Glucose LESS THAN 70 milliGRAM(s)/deciliter  glucagon  Injectable 1 milliGRAM(s) IntraMuscular once PRN Glucose LESS THAN 70 milligrams/deciliter  metoprolol tartrate Injectable 5 milliGRAM(s) IV Push every 6 hours PRN if sbp>160 or HR >110 hold if SBP<100 or HR<60    Allergies    No Known Allergies    Intolerances        REVIEW OF SYSTEMS:  unable to obtain due poor mentation     ICU Vital Signs Last 24 Hrs  T(C): 38.2 (27 Sep 2020 05:42), Max: 38.2 (27 Sep 2020 05:42)  T(F): 100.7 (27 Sep 2020 05:42), Max: 100.7 (27 Sep 2020 05:42)  HR: 114 (27 Sep 2020 08:45) (100 - 118)  BP: 104/74 (27 Sep 2020 05:42) (104/74 - 141/75)  BP(mean): --  ABP: --  ABP(mean): --  RR: 20 (27 Sep 2020 05:42) (20 - 20)  SpO2: 94% (27 Sep 2020 08:45) (94% - 99%)    PHYSICAL EXAM:  GENERAL: NAD, well-groomed, well-developed  HEAD:  Atraumatic, Normocephalic  NERVOUS SYSTEM:  Alert & Oriented X0, awakable but sleeping   CHEST/LUNG: coarse sounds bilaterally, lots of secretions   HEART: Regular rate and rhythm; No murmurs, rubs, or gallops  ABDOMEN: Soft, Nontender, Nondistended; Bowel sounds present. PEG tube present  EXTREMITIES:  2+ Peripheral Pulses, No clubbing, cyanosis, or edema      LABS:                                                 8.6    14.46 )-----------( 247      ( 27 Sep 2020 06:56 )             27.4     09-27    139  |  96  |  9   ----------------------------<  124<H>  3.1<L>   |  37<H>  |  0.54    Ca    11.4<H>      27 Sep 2020 06:56  Phos  2.0     09-27  Mg     1.6     09-27

## 2020-09-27 NOTE — PROGRESS NOTE ADULT - SUBJECTIVE AND OBJECTIVE BOX
INTERVAL HPI:  44M with ETOH Abuse and withdrawal, Alcohol Pancreatitis who presents to the ED with severe abdominal pain.  Found to have Necrotizing Pancreatitis, Septic  Shock, Pneumonia/ARDS with Hypoxic Respiratory failure. Admitted to ICU, had prolonged hospital course with Vent support. Eventually required Tracheostomy and Peg placement.  Also with acute metabolic Encephalopathy.  09/20/20:  Transferred to .    Not able to provide details due to encephalopathy.  09/22/20: 1500 ml paracentesis.    OVERNIGHT EVENTS:  Still with tracheal secretions.    Vital Signs Last 24 Hrs  T(C): 36.7 (27 Sep 2020 16:31), Max: 38.2 (27 Sep 2020 05:42)  T(F): 98.1 (27 Sep 2020 16:31), Max: 100.7 (27 Sep 2020 05:42)  HR: 110 (27 Sep 2020 17:50) (100 - 124)  BP: 126/81 (27 Sep 2020 16:31) (104/74 - 141/75)  BP(mean): --  RR: 20 (27 Sep 2020 10:50) (20 - 20)  SpO2: 95% (27 Sep 2020 17:50) (94% - 99%)    PHYSICAL EXAM:  GEN:        Resting, comfortable.  HEENT:    Trach   RESP:      no distress  CVS:          Regular rate and rhythm.   ABD:         Soft, non-tender, non-distended;     MEDICATIONS  (STANDING):  albuterol/ipratropium for Nebulization 3 milliLiter(s) Nebulizer every 6 hours  ceFAZolin   IVPB 1000 milliGRAM(s) IV Intermittent every 8 hours  ceFAZolin   IVPB      chlorhexidine 0.12% Liquid 15 milliLiter(s) Oral Mucosa every 12 hours  chlorhexidine 4% Liquid 1 Application(s) Topical <User Schedule>  cholecalciferol 2000 Unit(s) Oral daily  collagenase Ointment 1 Application(s) Topical daily  dextrose 5%. 1000 milliLiter(s) (50 mL/Hr) IV Continuous <Continuous>  dextrose 50% Injectable 12.5 Gram(s) IV Push once  dextrose 50% Injectable 25 Gram(s) IV Push once  dextrose 50% Injectable 25 Gram(s) IV Push once  diazepam    Tablet 5 milliGRAM(s) Oral daily  enoxaparin Injectable 40 milliGRAM(s) SubCutaneous daily  folic acid 1 milliGRAM(s) Oral daily  furosemide   Injectable 40 milliGRAM(s) IV Push daily  insulin lispro (HumaLOG) corrective regimen sliding scale   SubCutaneous every 6 hours  metoprolol tartrate 12.5 milliGRAM(s) Oral two times a day  multivitamin 1 Tablet(s) Oral daily  pantoprazole   Suspension 40 milliGRAM(s) Oral daily  polyethylene glycol 3350 17 Gram(s) Oral daily  QUEtiapine 50 milliGRAM(s) Oral two times a day  scopolamine 1 mG/72 Hr(s) Patch 1 Patch Transdermal every 72 hours  senna 2 Tablet(s) Oral at bedtime  thiamine 100 milliGRAM(s) Oral daily    MEDICATIONS  (PRN):  acetaminophen   Tablet .. 650 milliGRAM(s) Oral every 6 hours PRN Temp greater or equal to 38C (100.4F), Mild Pain (1 - 3)  dextrose 40% Gel 15 Gram(s) Oral once PRN Blood Glucose LESS THAN 70 milliGRAM(s)/deciliter  glucagon  Injectable 1 milliGRAM(s) IntraMuscular once PRN Glucose LESS THAN 70 milligrams/deciliter  metoprolol tartrate Injectable 5 milliGRAM(s) IV Push every 6 hours PRN if sbp>160 or HR >110 hold if SBP<100 or HR<60    LABS:                        8.6    14.46 )-----------( 247      ( 27 Sep 2020 06:56 )             27.4     09-27    139  |  96  |  9   ----------------------------<  124<H>  3.1<L>   |  37<H>  |  0.54    Ca    11.4<H>      27 Sep 2020 06:56  Phos  2.0     09-27  Mg     1.6     09-27    ASSESSMENT AND PLAN:  ·	Hypoxic Respiratory failure.  ·	S/P tracheostomy.  ·	Bilateral pneumonia.  ·	Bilateral pleural effusion.  ·	S/P Septic Shock.  ·	Necrotizing Pancreatis.  ·	Acute metabolic Encephalopathy.  ·	Alcohol abuse.  ·	Klebsiella Bacteremia.  ·	Anemia    nebulizer to every 6 hours.  Suction as needed.  Antibiotics per ID.  Ascites and low albumin are likely causes of pleural effusion which will re accumulate after thoracentesis.  Will discuss with IR.

## 2020-09-28 LAB
-  AMIKACIN: SIGNIFICANT CHANGE UP
-  AZTREONAM: SIGNIFICANT CHANGE UP
-  CEFEPIME: SIGNIFICANT CHANGE UP
-  CEFTAZIDIME: SIGNIFICANT CHANGE UP
-  CIPROFLOXACIN: SIGNIFICANT CHANGE UP
-  GENTAMICIN: SIGNIFICANT CHANGE UP
-  IMIPENEM: SIGNIFICANT CHANGE UP
-  LEVOFLOXACIN: SIGNIFICANT CHANGE UP
-  MEROPENEM: SIGNIFICANT CHANGE UP
-  PIPERACILLIN/TAZOBACTAM: SIGNIFICANT CHANGE UP
-  TOBRAMYCIN: SIGNIFICANT CHANGE UP
ANION GAP SERPL CALC-SCNC: 7 MMOL/L — SIGNIFICANT CHANGE UP (ref 5–17)
BUN SERPL-MCNC: 14 MG/DL — SIGNIFICANT CHANGE UP (ref 7–23)
CALCIUM SERPL-MCNC: 11.5 MG/DL — HIGH (ref 8.5–10.1)
CHLORIDE SERPL-SCNC: 100 MMOL/L — SIGNIFICANT CHANGE UP (ref 96–108)
CO2 SERPL-SCNC: 33 MMOL/L — HIGH (ref 22–31)
CREAT SERPL-MCNC: 0.52 MG/DL — SIGNIFICANT CHANGE UP (ref 0.5–1.3)
CULTURE RESULTS: SIGNIFICANT CHANGE UP
GLUCOSE BLDC GLUCOMTR-MCNC: 112 MG/DL — HIGH (ref 70–99)
GLUCOSE BLDC GLUCOMTR-MCNC: 119 MG/DL — HIGH (ref 70–99)
GLUCOSE BLDC GLUCOMTR-MCNC: 124 MG/DL — HIGH (ref 70–99)
GLUCOSE BLDC GLUCOMTR-MCNC: 125 MG/DL — HIGH (ref 70–99)
GLUCOSE BLDC GLUCOMTR-MCNC: 134 MG/DL — HIGH (ref 70–99)
GLUCOSE BLDC GLUCOMTR-MCNC: 150 MG/DL — HIGH (ref 70–99)
GLUCOSE SERPL-MCNC: 125 MG/DL — HIGH (ref 70–99)
GRAM STN FLD: SIGNIFICANT CHANGE UP
HCT VFR BLD CALC: 27.8 % — LOW (ref 39–50)
HGB BLD-MCNC: 8.5 G/DL — LOW (ref 13–17)
MAGNESIUM SERPL-MCNC: 1.3 MG/DL — LOW (ref 1.6–2.6)
MCHC RBC-ENTMCNC: 29.3 PG — SIGNIFICANT CHANGE UP (ref 27–34)
MCHC RBC-ENTMCNC: 30.6 GM/DL — LOW (ref 32–36)
MCV RBC AUTO: 95.9 FL — SIGNIFICANT CHANGE UP (ref 80–100)
METHOD TYPE: SIGNIFICANT CHANGE UP
NRBC # BLD: 0 /100 WBCS — SIGNIFICANT CHANGE UP (ref 0–0)
ORGANISM # SPEC MICROSCOPIC CNT: SIGNIFICANT CHANGE UP
ORGANISM # SPEC MICROSCOPIC CNT: SIGNIFICANT CHANGE UP
PHOSPHATE SERPL-MCNC: 3.6 MG/DL — SIGNIFICANT CHANGE UP (ref 2.5–4.5)
PLATELET # BLD AUTO: 222 K/UL — SIGNIFICANT CHANGE UP (ref 150–400)
POTASSIUM SERPL-MCNC: 2.9 MMOL/L — CRITICAL LOW (ref 3.5–5.3)
POTASSIUM SERPL-SCNC: 2.9 MMOL/L — CRITICAL LOW (ref 3.5–5.3)
RBC # BLD: 2.9 M/UL — LOW (ref 4.2–5.8)
RBC # FLD: 15.9 % — HIGH (ref 10.3–14.5)
SODIUM SERPL-SCNC: 140 MMOL/L — SIGNIFICANT CHANGE UP (ref 135–145)
SPECIMEN SOURCE: SIGNIFICANT CHANGE UP
WBC # BLD: 11.75 K/UL — HIGH (ref 3.8–10.5)
WBC # FLD AUTO: 11.75 K/UL — HIGH (ref 3.8–10.5)

## 2020-09-28 PROCEDURE — 99233 SBSQ HOSP IP/OBS HIGH 50: CPT

## 2020-09-28 PROCEDURE — 99231 SBSQ HOSP IP/OBS SF/LOW 25: CPT

## 2020-09-28 RX ORDER — MAGNESIUM SULFATE 500 MG/ML
2 VIAL (ML) INJECTION ONCE
Refills: 0 | Status: COMPLETED | OUTPATIENT
Start: 2020-09-28 | End: 2020-09-28

## 2020-09-28 RX ORDER — POTASSIUM CHLORIDE 20 MEQ
10 PACKET (EA) ORAL
Refills: 0 | Status: COMPLETED | OUTPATIENT
Start: 2020-09-28 | End: 2020-09-28

## 2020-09-28 RX ORDER — DIAZEPAM 5 MG
5 TABLET ORAL
Refills: 0 | Status: DISCONTINUED | OUTPATIENT
Start: 2020-09-28 | End: 2020-09-30

## 2020-09-28 RX ORDER — POTASSIUM CHLORIDE 20 MEQ
40 PACKET (EA) ORAL ONCE
Refills: 0 | Status: COMPLETED | OUTPATIENT
Start: 2020-09-28 | End: 2020-09-28

## 2020-09-28 RX ORDER — QUETIAPINE FUMARATE 200 MG/1
100 TABLET, FILM COATED ORAL
Refills: 0 | Status: DISCONTINUED | OUTPATIENT
Start: 2020-09-28 | End: 2020-09-29

## 2020-09-28 RX ORDER — POTASSIUM CHLORIDE 20 MEQ
40 PACKET (EA) ORAL ONCE
Refills: 0 | Status: COMPLETED | OUTPATIENT
Start: 2020-09-28 | End: 2020-09-29

## 2020-09-28 RX ADMIN — Medication 650 MILLIGRAM(S): at 06:00

## 2020-09-28 RX ADMIN — SCOPALAMINE 1 PATCH: 1 PATCH, EXTENDED RELEASE TRANSDERMAL at 01:17

## 2020-09-28 RX ADMIN — ENOXAPARIN SODIUM 40 MILLIGRAM(S): 100 INJECTION SUBCUTANEOUS at 11:55

## 2020-09-28 RX ADMIN — Medication 12.5 MILLIGRAM(S): at 05:19

## 2020-09-28 RX ADMIN — Medication 40 MILLIEQUIVALENT(S): at 11:56

## 2020-09-28 RX ADMIN — Medication 3 MILLILITER(S): at 11:11

## 2020-09-28 RX ADMIN — Medication 50 GRAM(S): at 17:56

## 2020-09-28 RX ADMIN — Medication 100 MILLIEQUIVALENT(S): at 09:08

## 2020-09-28 RX ADMIN — Medication 100 MILLIGRAM(S): at 13:44

## 2020-09-28 RX ADMIN — Medication 650 MILLIGRAM(S): at 05:30

## 2020-09-28 RX ADMIN — CHLORHEXIDINE GLUCONATE 1 APPLICATION(S): 213 SOLUTION TOPICAL at 07:02

## 2020-09-28 RX ADMIN — QUETIAPINE FUMARATE 100 MILLIGRAM(S): 200 TABLET, FILM COATED ORAL at 17:01

## 2020-09-28 RX ADMIN — SCOPALAMINE 1 PATCH: 1 PATCH, EXTENDED RELEASE TRANSDERMAL at 19:16

## 2020-09-28 RX ADMIN — SCOPALAMINE 1 PATCH: 1 PATCH, EXTENDED RELEASE TRANSDERMAL at 00:07

## 2020-09-28 RX ADMIN — Medication 650 MILLIGRAM(S): at 16:59

## 2020-09-28 RX ADMIN — Medication 1 APPLICATION(S): at 11:55

## 2020-09-28 RX ADMIN — QUETIAPINE FUMARATE 50 MILLIGRAM(S): 200 TABLET, FILM COATED ORAL at 05:19

## 2020-09-28 RX ADMIN — Medication 2000 UNIT(S): at 13:45

## 2020-09-28 RX ADMIN — Medication 100 MILLIGRAM(S): at 05:11

## 2020-09-28 RX ADMIN — CHLORHEXIDINE GLUCONATE 15 MILLILITER(S): 213 SOLUTION TOPICAL at 17:02

## 2020-09-28 RX ADMIN — CHLORHEXIDINE GLUCONATE 15 MILLILITER(S): 213 SOLUTION TOPICAL at 05:19

## 2020-09-28 RX ADMIN — Medication 1 MILLIGRAM(S): at 11:54

## 2020-09-28 RX ADMIN — Medication 12.5 MILLIGRAM(S): at 17:01

## 2020-09-28 RX ADMIN — Medication 3 MILLILITER(S): at 05:08

## 2020-09-28 RX ADMIN — Medication 100 MILLIGRAM(S): at 21:48

## 2020-09-28 RX ADMIN — Medication 5 MILLIGRAM(S): at 11:54

## 2020-09-28 RX ADMIN — Medication 3 MILLILITER(S): at 17:04

## 2020-09-28 RX ADMIN — Medication 650 MILLIGRAM(S): at 17:50

## 2020-09-28 RX ADMIN — Medication 5 MILLIGRAM(S): at 17:00

## 2020-09-28 RX ADMIN — Medication 40 MILLIGRAM(S): at 05:11

## 2020-09-28 RX ADMIN — Medication 1 MILLIGRAM(S): at 14:23

## 2020-09-28 RX ADMIN — SENNA PLUS 2 TABLET(S): 8.6 TABLET ORAL at 21:48

## 2020-09-28 RX ADMIN — SCOPALAMINE 1 PATCH: 1 PATCH, EXTENDED RELEASE TRANSDERMAL at 08:23

## 2020-09-28 RX ADMIN — PANTOPRAZOLE SODIUM 40 MILLIGRAM(S): 20 TABLET, DELAYED RELEASE ORAL at 11:54

## 2020-09-28 RX ADMIN — POLYETHYLENE GLYCOL 3350 17 GRAM(S): 17 POWDER, FOR SOLUTION ORAL at 11:55

## 2020-09-28 RX ADMIN — Medication 100 MILLIGRAM(S): at 11:54

## 2020-09-28 RX ADMIN — Medication 100 MILLIEQUIVALENT(S): at 08:20

## 2020-09-28 RX ADMIN — Medication 1 TABLET(S): at 11:54

## 2020-09-28 NOTE — PROGRESS NOTE ADULT - SUBJECTIVE AND OBJECTIVE BOX
INTERVAL HPI:  44M with ETOH Abuse and withdrawal, Alcohol Pancreatitis who presents to the ED with severe abdominal pain.  Found to have Necrotizing Pancreatitis, Septic  Shock, Pneumonia/ARDS with Hypoxic Respiratory failure. Admitted to ICU, had prolonged hospital course with Vent support. Eventually required Tracheostomy and Peg placement.  Also with acute metabolic Encephalopathy.  09/20/20:  Transferred to .    Not able to provide details due to encephalopathy.  09/22/20: 1500 ml paracentesis.    OVERNIGHT EVENTS:  Temperature s[pike to 101 noted.. Agitated at times.    Vital Signs Last 24 Hrs  T(C): 38.6 (28 Sep 2020 16:57), Max: 38.6 (28 Sep 2020 16:57)  T(F): 101.4 (28 Sep 2020 16:57), Max: 101.4 (28 Sep 2020 16:57)  HR: 125 (28 Sep 2020 16:57) (105 - 125)  BP: 126/81 (28 Sep 2020 16:44) (123/72 - 134/68)  BP(mean): --  RR: --  SpO2: 95% (28 Sep 2020 16:57) (95% - 96%)    Mode: standby    PHYSICAL EXAM:  GEN:        Restless.  HEENT:     Trach  RESP:         no distress  CVS:           Regular rate and rhythm.   ABD:         Soft, non-tender, non-distended;     MEDICATIONS  (STANDING):  albuterol/ipratropium for Nebulization 3 milliLiter(s) Nebulizer every 6 hours  ceFAZolin   IVPB 1000 milliGRAM(s) IV Intermittent every 8 hours  ceFAZolin   IVPB      chlorhexidine 0.12% Liquid 15 milliLiter(s) Oral Mucosa every 12 hours  chlorhexidine 4% Liquid 1 Application(s) Topical <User Schedule>  cholecalciferol 2000 Unit(s) Oral daily  collagenase Ointment 1 Application(s) Topical daily  dextrose 5%. 1000 milliLiter(s) (50 mL/Hr) IV Continuous <Continuous>  dextrose 50% Injectable 12.5 Gram(s) IV Push once  dextrose 50% Injectable 25 Gram(s) IV Push once  dextrose 50% Injectable 25 Gram(s) IV Push once  diazepam    Tablet 5 milliGRAM(s) Oral <User Schedule>  enoxaparin Injectable 40 milliGRAM(s) SubCutaneous daily  folic acid 1 milliGRAM(s) Oral daily  furosemide   Injectable 40 milliGRAM(s) IV Push daily  insulin lispro (HumaLOG) corrective regimen sliding scale   SubCutaneous every 6 hours  metoprolol tartrate 12.5 milliGRAM(s) Oral two times a day  multivitamin 1 Tablet(s) Oral daily  pantoprazole   Suspension 40 milliGRAM(s) Oral daily  polyethylene glycol 3350 17 Gram(s) Oral daily  QUEtiapine 100 milliGRAM(s) Oral two times a day  scopolamine 1 mG/72 Hr(s) Patch 1 Patch Transdermal every 72 hours  senna 2 Tablet(s) Oral at bedtime  thiamine 100 milliGRAM(s) Oral daily    MEDICATIONS  (PRN):  acetaminophen   Tablet .. 650 milliGRAM(s) Oral every 6 hours PRN Temp greater or equal to 38C (100.4F), Mild Pain (1 - 3)  dextrose 40% Gel 15 Gram(s) Oral once PRN Blood Glucose LESS THAN 70 milliGRAM(s)/deciliter  glucagon  Injectable 1 milliGRAM(s) IntraMuscular once PRN Glucose LESS THAN 70 milligrams/deciliter  LORazepam   Injectable 1 milliGRAM(s) IV Push every 4 hours PRN Agitation  metoprolol tartrate Injectable 5 milliGRAM(s) IV Push every 6 hours PRN if sbp>160 or HR >110 hold if SBP<100 or HR<60    LABS:                        8.5    11.75 )-----------( 222      ( 28 Sep 2020 07:38 )             27.8     09-28    140  |  100  |  14  ----------------------------<  125<H>  2.9<LL>   |  33<H>  |  0.52    Ca    11.5<H>      28 Sep 2020 07:38  Phos  3.6     09-28  Mg     1.3     09-28    ASSESSMENT AND PLAN:  ·	Hypoxic Respiratory failure.  ·	S/P tracheostomy.  ·	Bilateral pneumonia.  ·	Bilateral pleural effusion.  ·	S/P Septic Shock.  ·	Necrotizing Pancreatis.  ·	Acute metabolic Encephalopathy.  ·	Alcohol abuse.  ·	Klebsiella Bacteremia.  ·	Anemia    Got 1 mg of ativan.  nebulizer to every 6 hours.  Suction as needed.  Antibiotics per ID.

## 2020-09-28 NOTE — PROGRESS NOTE ADULT - ASSESSMENT
44M EtOH pancreatitis p/w abdominal pain 8/17/20, found to have necrotizing pancreatitis c/b septic shock and respiratory failure secondary to PNA and ARDS requiring intubation and DTs requiring sedation and small bowel obstruction conservatively managed with NGT and NPO for which he was admitted to ICU. Currently, s/p trach and PEG on 9/10 and treated for klebsiella bacteremia.    downgraded to  floor 9/20/20 from ICU    Assessment and Plan:  Pt agitated     #Fever, unclear source ; cellulitis vs SBP vs pancreatitis vs hepatobiliary disease ?   intermittent fevers since admission, still spiking low grade fevers with elevated HR  WBC in 12-16 continues to be in range, up and down     SBP-less likely-ABD US showing worsening ascites, s/p paracentesis by IR 9/22 with 1.5L straw colored fluid removed -cultures did not show any specific organisms  9/21 KUB shows nonspecific bowel gas pattern without e/o obstruction or ileus   9/21 lactate wnl , procal unremarkable   Blood culture from 9/10 was positive for klebsiella quite sensitive and one set from 9/12 is negative  SAAG < 1.1 going against portal HTN     continue lasix -K decreasing, thus lasix decreased   -RLE showing no dvt , cellulitis improved  -blood cultures sent again- no growth to date   -CT Chest/Abdomen/Pelvis ordered   - Moderate right and small left pleural effusion.  Bilateral patchy airspace disease has improved compared to the prior.  Improving pancreatitis with decreased size of the pancreas as well as improved peripancreatic edema. Small collection adjacent to the undersurface of the tail is also decreased. No evidence of necrosis.  -as per Pulmonary may benefit from right thoracentesis.   -Continues to be on Cefazolin as per ID    #Metabolic encephalopathy   -pt waking up slowly   hepatic vs. ICU delirium   d/angela Precedex since 9/15 in ICU     diazepam, methodone and seroquel - being tapered   Psych following     #Acute respiratory failure secondary to PNA/ARDS   s/p TRACH 9/10/20   tolerating trach collar 40%  can place back on PS QHS and PRN if he tires  continue with frequent suctioning , continue lasix    #s/p necrotizing pancreatitis , s/p septic shock   s/p course of vanco and meropenem for both PNA and necrotizing pancreatitis  however had recurrent fevers and leukocytosis, and found to have blood cx + for Klebsiella. left arm midline d/angela on 9/12 with underlying bacteremia.   new midline was inserted on L arm on 9/15  de-escalated antibx from meropenem to ceftriaxone as klebsiella is sensitive to it- blood culture from 9/12 negative, and on repeat continue to be negative    #Ascites   distended gallbladder and ascites seen on CT, abd US neg for acute cholecystitis   s/p paracentesis 9/22 by IR   see above     #Presumed cellulitis of the RUE --on cefazolin , improving   RUQ sono - neg for DVT     # metabolic alkalosis  sec to diuresis  s/p diamox x 1 dose in ICU 9/9/20   added free water   monitor chem     #s/p PEG placement 9/10/20  s/p SBO which was treated conservatively   Ileus , KUB 9/15/20 No dilated bowel is identified. There is nonobstructive bowel gas pattern  monitor residuals   high residuals in ICU, erythromycin IV started in ICU for improvement of motility of gut   continue with free water 200cc Q6H  repeat KUB 9/21 without e/o ileus or bowel obstruction   Nutritions reconsulted regarding tube feeds     #unstageable pressure ulcers x 2 on sacral area ,  no sign of necrosis or infection noted   -wound care consult appreciated , continue with nursing wound care per recs     #Hypokalememia--replete and monitor, decreased lasix   #Hypomagnesemia --replete and monitor     #normocytic anemia , probably related to sepsis and acute illness   requiring transfusion on 9/17/20  monitor H/H   follow anemia work-up   transfuse prn of H/H <7/21      #Preventative measures   lovenox SQ-dvt ppx  fall, aspiration precautions, HOBE   8/20/20 ECHO: pEF   PT , patient unable to participate, signed off, will reconsult once more awake  Low vit D-supplement        Bárbara Pemberton- [phone] (who lives in [place])       Rapid response called on patient today- due to him pulling all his lines out- patient given ativan prn, secretions managed. Discussed plan with ICU attending- if secretions unmanageable transfer to ICU again.   Meanwhile will obtain Neurology consult regarding prognosis of brain function.

## 2020-09-28 NOTE — PROGRESS NOTE BEHAVIORAL HEALTH - NSBHCONSULTMEDS_PSY_A_CORE FT
methadone weaned off; increase Seroquel 50mg PO bid to 100mg via PEG BID and increase Valium to 5mg PO bid

## 2020-09-28 NOTE — PROGRESS NOTE BEHAVIORAL HEALTH - NSBHCHARTREVIEWLAB_PSY_A_CORE FT
09-28    140  |  100  |  14  ----------------------------<  125<H>  2.9<LL>   |  33<H>  |  0.52    Ca    11.5<H>      28 Sep 2020 07:38  Phos  3.6     09-28  Mg     1.3     09-28

## 2020-09-28 NOTE — PROGRESS NOTE BEHAVIORAL HEALTH - NSBHFUPINTERVALHXFT_PSY_A_CORE
Patient needs q10 minute suctioning as per nursing; phlegm is thick and yellow. Patient found lying in bed, eyes opening/closing, slight PMA. Not able to follow direction or meaningfully engage in communications. Weaned off of methadone; vitals stable. no evidence of withdrawal sxs - will continue to monitor

## 2020-09-28 NOTE — PROVIDER CONTACT NOTE (CRITICAL VALUE NOTIFICATION) - NAME OF MD/NP/PA/DO NOTIFIED:
CHASE Muñoz
CHASE Pettit
CHASE Rojo
CHASE Sheikh
CHASE Vee notified
CHASE cárdenas
Dr Farrell
Dr Scherer
Linda Velazco
NAVEED Baldwin NP
Dr Sarkar
CHASE morales

## 2020-09-28 NOTE — PROGRESS NOTE ADULT - SUBJECTIVE AND OBJECTIVE BOX
INTERVAL HPI/OVERNIGHT EVENTS: Patient very agitated today. Pulling on all lines. Still lots of secretions- being suctioned very frequently.     Patient is a 44y old  Male who presents with a chief complaint of pancreatitis (24 Sep 2020 17:14)    MEDICATIONS  (STANDING):  albuterol/ipratropium for Nebulization 3 milliLiter(s) Nebulizer every 6 hours  ceFAZolin   IVPB 1000 milliGRAM(s) IV Intermittent every 8 hours  ceFAZolin   IVPB      chlorhexidine 0.12% Liquid 15 milliLiter(s) Oral Mucosa every 12 hours  chlorhexidine 4% Liquid 1 Application(s) Topical <User Schedule>  cholecalciferol 2000 Unit(s) Oral daily  collagenase Ointment 1 Application(s) Topical daily  dextrose 5%. 1000 milliLiter(s) (50 mL/Hr) IV Continuous <Continuous>  dextrose 50% Injectable 12.5 Gram(s) IV Push once  dextrose 50% Injectable 25 Gram(s) IV Push once  dextrose 50% Injectable 25 Gram(s) IV Push once  diazepam    Tablet 5 milliGRAM(s) Oral <User Schedule>  enoxaparin Injectable 40 milliGRAM(s) SubCutaneous daily  folic acid 1 milliGRAM(s) Oral daily  furosemide   Injectable 40 milliGRAM(s) IV Push daily  insulin lispro (HumaLOG) corrective regimen sliding scale   SubCutaneous every 6 hours  metoprolol tartrate 12.5 milliGRAM(s) Oral two times a day  multivitamin 1 Tablet(s) Oral daily  pantoprazole   Suspension 40 milliGRAM(s) Oral daily  polyethylene glycol 3350 17 Gram(s) Oral daily  QUEtiapine 100 milliGRAM(s) Oral two times a day  scopolamine 1 mG/72 Hr(s) Patch 1 Patch Transdermal every 72 hours  senna 2 Tablet(s) Oral at bedtime  thiamine 100 milliGRAM(s) Oral daily    MEDICATIONS  (PRN):  acetaminophen   Tablet .. 650 milliGRAM(s) Oral every 6 hours PRN Temp greater or equal to 38C (100.4F), Mild Pain (1 - 3)  dextrose 40% Gel 15 Gram(s) Oral once PRN Blood Glucose LESS THAN 70 milliGRAM(s)/deciliter  glucagon  Injectable 1 milliGRAM(s) IntraMuscular once PRN Glucose LESS THAN 70 milligrams/deciliter  LORazepam   Injectable 1 milliGRAM(s) IV Push every 4 hours PRN Agitation  metoprolol tartrate Injectable 5 milliGRAM(s) IV Push every 6 hours PRN if sbp>160 or HR >110 hold if SBP<100 or HR<60    Allergies    No Known Allergies    Intolerances        REVIEW OF SYSTEMS:  unable to obtain due poor mentation     ICU Vital Signs Last 24 Hrs  T(C): 38.6 (28 Sep 2020 16:57), Max: 38.6 (28 Sep 2020 16:57)  T(F): 101.4 (28 Sep 2020 16:57), Max: 101.4 (28 Sep 2020 16:57)  HR: 125 (28 Sep 2020 16:57) (105 - 125)  BP: 126/81 (28 Sep 2020 16:44) (123/72 - 134/68)  BP(mean): --  ABP: --  ABP(mean): --  RR: --  SpO2: 95% (28 Sep 2020 16:57) (95% - 96%)    PHYSICAL EXAM:  GENERAL: NAD, well-groomed, well-developed  HEAD:  Atraumatic, Normocephalic  NERVOUS SYSTEM:  Alert & Oriented X0, with open eyes, very agitated  CHEST/LUNG: coarse sounds bilaterally, lots of secretions   HEART: Regular rate and rhythm; No murmurs, rubs, or gallops  ABDOMEN: Soft, Nontender, Nondistended; Bowel sounds present. PEG tube present  EXTREMITIES:  2+ Peripheral Pulses, No clubbing, cyanosis, or edema      LABS:                                                   8.5    11.75 )-----------( 222      ( 28 Sep 2020 07:38 )             27.8     09-28    140  |  100  |  14  ----------------------------<  125<H>  2.9<LL>   |  33<H>  |  0.52    Ca    11.5<H>      28 Sep 2020 07:38  Phos  3.6     09-28  Mg     1.3     09-28

## 2020-09-28 NOTE — PROGRESS NOTE BEHAVIORAL HEALTH - NSBHCHARTREVIEWVS_PSY_A_CORE FT
T(C): 37.7 (09-28-20 @ 10:39), Max: 38.1 (09-28-20 @ 05:31)  HR: 109 (09-28-20 @ 11:46) (105 - 124)  BP: 134/68 (09-28-20 @ 10:39) (123/72 - 134/68)  RR: --  SpO2: 95% (09-28-20 @ 11:46) (95% - 97%)

## 2020-09-29 LAB
A2 MACROGLOB SERPL-MCNC: 168 MG/DL — SIGNIFICANT CHANGE UP (ref 110–276)
ALBUMIN SERPL ELPH-MCNC: 2.5 G/DL — LOW (ref 3.3–5)
ALP SERPL-CCNC: 151 U/L — HIGH (ref 40–120)
ALT FLD-CCNC: 27 U/L — SIGNIFICANT CHANGE UP (ref 12–78)
ALT SERPL W P-5'-P-CCNC: 16 IU/L — SIGNIFICANT CHANGE UP (ref 0–55)
ANION GAP SERPL CALC-SCNC: 5 MMOL/L — SIGNIFICANT CHANGE UP (ref 5–17)
APO A-I SERPL-MCNC: 42 MG/DL — LOW (ref 101–178)
AST SERPL-CCNC: 57 U/L — HIGH (ref 15–37)
BILIRUB SERPL-MCNC: 1.3 MG/DL — HIGH (ref 0–1.2)
BILIRUB SERPL-MCNC: 1.4 MG/DL — HIGH (ref 0.2–1.2)
BUN SERPL-MCNC: 16 MG/DL — SIGNIFICANT CHANGE UP (ref 7–23)
CALCIUM SERPL-MCNC: 12 MG/DL — HIGH (ref 8.5–10.1)
CHLORIDE SERPL-SCNC: 103 MMOL/L — SIGNIFICANT CHANGE UP (ref 96–108)
CO2 SERPL-SCNC: 33 MMOL/L — HIGH (ref 22–31)
COMMENT 2:: SIGNIFICANT CHANGE UP
CREAT SERPL-MCNC: 0.59 MG/DL — SIGNIFICANT CHANGE UP (ref 0.5–1.3)
FIBROSIS SCORE: 0.7 — HIGH (ref 0–0.21)
FIBROSIS SCORING:: SIGNIFICANT CHANGE UP
FIBROSIS STAGE: SIGNIFICANT CHANGE UP
GGT SERPL-CCNC: 288 IU/L — HIGH (ref 0–65)
GLUCOSE BLDC GLUCOMTR-MCNC: 110 MG/DL — HIGH (ref 70–99)
GLUCOSE BLDC GLUCOMTR-MCNC: 113 MG/DL — HIGH (ref 70–99)
GLUCOSE BLDC GLUCOMTR-MCNC: 114 MG/DL — HIGH (ref 70–99)
GLUCOSE BLDC GLUCOMTR-MCNC: 117 MG/DL — HIGH (ref 70–99)
GLUCOSE BLDC GLUCOMTR-MCNC: 118 MG/DL — HIGH (ref 70–99)
GLUCOSE BLDC GLUCOMTR-MCNC: 136 MG/DL — HIGH (ref 70–99)
GLUCOSE SERPL-MCNC: 107 MG/DL — HIGH (ref 70–99)
HAPTOGLOB SERPL-MCNC: 223 MG/DL — SIGNIFICANT CHANGE UP (ref 23–355)
HCT VFR BLD CALC: 27.6 % — LOW (ref 39–50)
HGB BLD-MCNC: 8.6 G/DL — LOW (ref 13–17)
INTERPRETATIONS:: SIGNIFICANT CHANGE UP
LIMITATIONS:: SIGNIFICANT CHANGE UP
MAGNESIUM SERPL-MCNC: 1.6 MG/DL — SIGNIFICANT CHANGE UP (ref 1.6–2.6)
MCHC RBC-ENTMCNC: 29.4 PG — SIGNIFICANT CHANGE UP (ref 27–34)
MCHC RBC-ENTMCNC: 31.2 GM/DL — LOW (ref 32–36)
MCV RBC AUTO: 94.2 FL — SIGNIFICANT CHANGE UP (ref 80–100)
NECROINFLAMM ACTIVITY SCORING:: SIGNIFICANT CHANGE UP
NECROINFLAMMAT ACTIVITY GRADE: SIGNIFICANT CHANGE UP
NECROINFLAMMAT ACTIVITY SCORE: 0.1 — SIGNIFICANT CHANGE UP (ref 0–0.17)
NRBC # BLD: 0 /100 WBCS — SIGNIFICANT CHANGE UP (ref 0–0)
PHOSPHATE SERPL-MCNC: 2.7 MG/DL — SIGNIFICANT CHANGE UP (ref 2.5–4.5)
PLATELET # BLD AUTO: 214 K/UL — SIGNIFICANT CHANGE UP (ref 150–400)
POTASSIUM SERPL-MCNC: 3.4 MMOL/L — LOW (ref 3.5–5.3)
POTASSIUM SERPL-SCNC: 3.4 MMOL/L — LOW (ref 3.5–5.3)
PROT SERPL-MCNC: 9.5 GM/DL — HIGH (ref 6–8.3)
RBC # BLD: 2.93 M/UL — LOW (ref 4.2–5.8)
RBC # FLD: 15.9 % — HIGH (ref 10.3–14.5)
SODIUM SERPL-SCNC: 141 MMOL/L — SIGNIFICANT CHANGE UP (ref 135–145)
WBC # BLD: 11.42 K/UL — HIGH (ref 3.8–10.5)
WBC # FLD AUTO: 11.42 K/UL — HIGH (ref 3.8–10.5)

## 2020-09-29 PROCEDURE — 99231 SBSQ HOSP IP/OBS SF/LOW 25: CPT

## 2020-09-29 PROCEDURE — 99233 SBSQ HOSP IP/OBS HIGH 50: CPT

## 2020-09-29 RX ORDER — POTASSIUM CHLORIDE 20 MEQ
40 PACKET (EA) ORAL ONCE
Refills: 0 | Status: COMPLETED | OUTPATIENT
Start: 2020-09-29 | End: 2020-09-29

## 2020-09-29 RX ORDER — QUETIAPINE FUMARATE 200 MG/1
150 TABLET, FILM COATED ORAL
Refills: 0 | Status: DISCONTINUED | OUTPATIENT
Start: 2020-09-29 | End: 2020-10-04

## 2020-09-29 RX ORDER — CEFEPIME 1 G/1
2000 INJECTION, POWDER, FOR SOLUTION INTRAMUSCULAR; INTRAVENOUS ONCE
Refills: 0 | Status: COMPLETED | OUTPATIENT
Start: 2020-09-29 | End: 2020-09-29

## 2020-09-29 RX ORDER — CEFEPIME 1 G/1
INJECTION, POWDER, FOR SOLUTION INTRAMUSCULAR; INTRAVENOUS
Refills: 0 | Status: DISCONTINUED | OUTPATIENT
Start: 2020-09-29 | End: 2020-10-06

## 2020-09-29 RX ORDER — CEFEPIME 1 G/1
2000 INJECTION, POWDER, FOR SOLUTION INTRAMUSCULAR; INTRAVENOUS EVERY 8 HOURS
Refills: 0 | Status: DISCONTINUED | OUTPATIENT
Start: 2020-09-29 | End: 2020-10-06

## 2020-09-29 RX ORDER — POTASSIUM CHLORIDE 20 MEQ
40 PACKET (EA) ORAL ONCE
Refills: 0 | Status: DISCONTINUED | OUTPATIENT
Start: 2020-09-29 | End: 2020-09-29

## 2020-09-29 RX ADMIN — PANTOPRAZOLE SODIUM 40 MILLIGRAM(S): 20 TABLET, DELAYED RELEASE ORAL at 12:37

## 2020-09-29 RX ADMIN — Medication 5 MILLIGRAM(S): at 18:08

## 2020-09-29 RX ADMIN — CHLORHEXIDINE GLUCONATE 15 MILLILITER(S): 213 SOLUTION TOPICAL at 18:09

## 2020-09-29 RX ADMIN — CHLORHEXIDINE GLUCONATE 15 MILLILITER(S): 213 SOLUTION TOPICAL at 06:24

## 2020-09-29 RX ADMIN — Medication 2 MILLIGRAM(S): at 13:44

## 2020-09-29 RX ADMIN — CHLORHEXIDINE GLUCONATE 1 APPLICATION(S): 213 SOLUTION TOPICAL at 06:24

## 2020-09-29 RX ADMIN — Medication 1 MILLIGRAM(S): at 12:33

## 2020-09-29 RX ADMIN — Medication 650 MILLIGRAM(S): at 03:38

## 2020-09-29 RX ADMIN — Medication 3 MILLILITER(S): at 11:17

## 2020-09-29 RX ADMIN — Medication 100 MILLIGRAM(S): at 12:33

## 2020-09-29 RX ADMIN — SCOPALAMINE 1 PATCH: 1 PATCH, EXTENDED RELEASE TRANSDERMAL at 07:10

## 2020-09-29 RX ADMIN — Medication 1 MILLIGRAM(S): at 03:38

## 2020-09-29 RX ADMIN — ENOXAPARIN SODIUM 40 MILLIGRAM(S): 100 INJECTION SUBCUTANEOUS at 12:32

## 2020-09-29 RX ADMIN — Medication 100 MILLIGRAM(S): at 06:07

## 2020-09-29 RX ADMIN — Medication 40 MILLIEQUIVALENT(S): at 12:34

## 2020-09-29 RX ADMIN — Medication 40 MILLIGRAM(S): at 06:24

## 2020-09-29 RX ADMIN — Medication 3 MILLILITER(S): at 17:39

## 2020-09-29 RX ADMIN — Medication 1 MILLIGRAM(S): at 01:02

## 2020-09-29 RX ADMIN — QUETIAPINE FUMARATE 150 MILLIGRAM(S): 200 TABLET, FILM COATED ORAL at 18:08

## 2020-09-29 RX ADMIN — Medication 3 MILLILITER(S): at 00:34

## 2020-09-29 RX ADMIN — Medication 3 MILLILITER(S): at 05:50

## 2020-09-29 RX ADMIN — Medication 12.5 MILLIGRAM(S): at 06:25

## 2020-09-29 RX ADMIN — Medication 2000 UNIT(S): at 12:34

## 2020-09-29 RX ADMIN — Medication 1 TABLET(S): at 12:33

## 2020-09-29 RX ADMIN — CEFEPIME 100 MILLIGRAM(S): 1 INJECTION, POWDER, FOR SOLUTION INTRAMUSCULAR; INTRAVENOUS at 15:36

## 2020-09-29 RX ADMIN — SCOPALAMINE 1 PATCH: 1 PATCH, EXTENDED RELEASE TRANSDERMAL at 19:23

## 2020-09-29 RX ADMIN — Medication 1 MILLIGRAM(S): at 17:19

## 2020-09-29 RX ADMIN — Medication 1 APPLICATION(S): at 12:34

## 2020-09-29 RX ADMIN — Medication 12.5 MILLIGRAM(S): at 18:08

## 2020-09-29 RX ADMIN — Medication 5 MILLIGRAM(S): at 08:54

## 2020-09-29 RX ADMIN — CEFEPIME 100 MILLIGRAM(S): 1 INJECTION, POWDER, FOR SOLUTION INTRAMUSCULAR; INTRAVENOUS at 21:34

## 2020-09-29 RX ADMIN — QUETIAPINE FUMARATE 100 MILLIGRAM(S): 200 TABLET, FILM COATED ORAL at 06:07

## 2020-09-29 RX ADMIN — Medication 40 MILLIEQUIVALENT(S): at 00:38

## 2020-09-29 RX ADMIN — Medication 650 MILLIGRAM(S): at 19:54

## 2020-09-29 NOTE — CHART NOTE - NSCHARTNOTEFT_GEN_A_CORE
Assessment:  Pt c ETOH pancreatitis, necrotizing pancreatitis, s/p septic shock, s/p respiratory failure secondary to pneumonia, ARDS requiring intubation, DTs, s/p trach and PEG 09/10/20, bacteremia, s/p fever, ascites, s/p paracentesis 09/22, metabolic encephalopathy, delirium, agitation, presumed cellulitis of the RUE, pressure ulcers, normocytic anemia, low vitamin D(on supplement)    Factors impacting intake: [ ] none [ ] nausea  [ ] vomiting [x ] diarrhea [ ] constipation  [ ]chewing problems [ ] swallowing issues  [ x] other: G-Tube feeding, previously c elevated residuals, G-T feeding  @ 50 ml/hr, c 75 ml residual today.     Diet Prescription: Diet, NPO with Tube Feed:   Tube Feeding Modality: Gastrostomy  Vital AF  Total Volume for 24 Hours (mL): 1440  Continuous  Starting Tube Feed Rate {mL per Hour}: 20  Increase Tube Feed Rate by (mL): 10     Every 8 hours  Until Goal Tube Feed Rate (mL per Hour): 60  Tube Feed Duration (in Hours): 24  Tube Feed Start Time: 15:00  Heraclio(7 Gm Arginine/7 Gm Glut/1.2 Gm HMB     Qty per Day:  1 pkg/day  No Carb Prosource (1pkg = 15gms Protein)     Qty per Day:  1 pkg daily (09-25-20 @ 14:17)    Intake: Vital AF 1.2 @  50 ml/hr =1200 ml, 1440 calories, 90 grams protein, 973 ml free water, 101% RDIs + 80 calories from Heraclio & 60 calories from Prosource    Current Weight: 09/29, 63.4 kg, 08/18, 71.5 kg, c wt. loss of 7.6 kg   % Weight Change: 10.62%  09/28, no edema noted(previous edema noted), ascites noted  Limited nutrition focus physical exam due to mental status, agitation, c moderate depletion of orbital, temple, clavicle, shoulder, thigh areas.     Pertinent Medications: MEDICATIONS  (STANDING):  albuterol/ipratropium for Nebulization 3 milliLiter(s) Nebulizer every 6 hours  ceFAZolin   IVPB 1000 milliGRAM(s) IV Intermittent every 8 hours  ceFAZolin   IVPB      chlorhexidine 0.12% Liquid 15 milliLiter(s) Oral Mucosa every 12 hours  chlorhexidine 4% Liquid 1 Application(s) Topical <User Schedule>  cholecalciferol 2000 Unit(s) Oral daily  collagenase Ointment 1 Application(s) Topical daily  dextrose 5%. 1000 milliLiter(s) (50 mL/Hr) IV Continuous <Continuous>  dextrose 50% Injectable 12.5 Gram(s) IV Push once  dextrose 50% Injectable 25 Gram(s) IV Push once  dextrose 50% Injectable 25 Gram(s) IV Push once  diazepam    Tablet 5 milliGRAM(s) Oral <User Schedule>  enoxaparin Injectable 40 milliGRAM(s) SubCutaneous daily  folic acid 1 milliGRAM(s) Oral daily  furosemide   Injectable 40 milliGRAM(s) IV Push daily  insulin lispro (HumaLOG) corrective regimen sliding scale   SubCutaneous every 6 hours  metoprolol tartrate 12.5 milliGRAM(s) Oral two times a day  multivitamin 1 Tablet(s) Oral daily  pantoprazole   Suspension 40 milliGRAM(s) Oral daily  polyethylene glycol 3350 17 Gram(s) Oral daily  QUEtiapine 150 milliGRAM(s) Oral two times a day  scopolamine 1 mG/72 Hr(s) Patch 1 Patch Transdermal every 72 hours  senna 2 Tablet(s) Oral at bedtime  thiamine 100 milliGRAM(s) Oral daily    MEDICATIONS  (PRN):  acetaminophen   Tablet .. 650 milliGRAM(s) Oral every 6 hours PRN Temp greater or equal to 38C (100.4F), Mild Pain (1 - 3)  dextrose 40% Gel 15 Gram(s) Oral once PRN Blood Glucose LESS THAN 70 milliGRAM(s)/deciliter  glucagon  Injectable 1 milliGRAM(s) IntraMuscular once PRN Glucose LESS THAN 70 milligrams/deciliter  LORazepam   Injectable 2 milliGRAM(s) IV Push every 4 hours PRN breakthrough agitation  metoprolol tartrate Injectable 5 milliGRAM(s) IV Push every 6 hours PRN if sbp>160 or HR >110 hold if SBP<100 or HR<60    Pertinent Labs: 09-29 Na141 mmol/L Glu 107 mg/dL<H> K+ 3.4 mmol/L<L> Cr  0.59 mg/dL BUN 16 mg/dL 09-29 Phos 2.7 mg/dL 09-29 Alb 2.5 g/dL<L> 09-05 Chol --    LDL --    HDL --    Trig 325 mg/dL<H>09-29 ALT 27 U/L AST 57 U/L<H> Alkaline Phosphatase 151 U/L<H>09/23, vitamin D-25 hydroxy 16.1<L>  09/10, Lipase 517 U/L    CAPILLARY BLOOD GLUCOSE      POCT Blood Glucose.: 118 mg/dL (29 Sep 2020 11:16)  POCT Blood Glucose.: 110 mg/dL (29 Sep 2020 07:43)  POCT Blood Glucose.: 117 mg/dL (29 Sep 2020 06:38)  POCT Blood Glucose.: 113 mg/dL (29 Sep 2020 00:30)  POCT Blood Glucose.: 124 mg/dL (28 Sep 2020 16:48)  POCT Blood Glucose.: 119 mg/dL (28 Sep 2020 14:09)    Skin:   skin tear; left scapula  wound/redness; scrotum  Pressure ulcer x 2  1. sacrum; unstageable   2. trachea area; stage II    Estimated Needs:   [x ] no change since previous assessment (09/21)  due to change in medical condition, transfer to regular unit from critical care, unstageable, pressure ulcer,  edema   IBW: 75 kg  Energy: 2250 calories (30-35 Kcal/kg IBW)  Protein: 90 grams protein-113 grams protein(1.2-1.5 gram protein/kg IBW)  fluid: 7415-0917(25-30 ml/kg IBW)    [ ] recalculated:     Previous Nutrition Diagnosis: (09/21)  [x ] Malnutrition; severe malnutrition in context of acute illness     Related to: inadequate protein-energy intake in setting of pancreatitis,  respiratory failure, hepatic/metabolic encephalopathy, pressure ulcer     As evidenced by: <50% nutrition needs >5 days, physical findings of moderate muscle loss     Goal: pt to meet >75% of protein-energy needs via enteral feeding; not met     Nutrition Diagnosis is [x ] ongoing  [ ] resolved [ ] not applicable     New Nutrition Diagnosis: [x ] not applicable     Interventions:   Recommend  [ ] Change Diet To:  [ ] Nutrition Supplement  [ x] Nutrition Support; increase Vital AF 1.2 @ 55 ml/hr= 1320 ml, 1584 calories, 99 grams protein, 1070 ml free water, 111% RDIs and if tolerated increase Vital AF 1.2 @ 60 ml/lk=2143 calories, 108 grams protein, 1167 ml free water, 121% RDIs   Continue c Heraclio c specialized AA to support tissue  building & help maintain lean body mass) 1 pkg daily = 80 calories   No Carb Prosource 1 pkg daily=60 calories, 15 grams protein per pkg   [ x] Other: d/c polyethylene glycol due to loose BMs, Priyank Active(probiotic) 1 serving 2 x daily    Monitoring and Evaluation:   [ ] PO intake [ x ] Tolerance to diet prescription [ x ] weights [ x ] labs; Lipase [ x ] follow up per protocol  [ ] other: Assessment:  Pt c ETOH pancreatitis, necrotizing pancreatitis, s/p septic shock, s/p respiratory failure secondary to pneumonia, ARDS requiring intubation, DTs, s/p trach and PEG 09/10/20, bacteremia, s/p fever, ascites, s/p paracentesis 09/22, metabolic encephalopathy, delirium, agitation, presumed cellulitis of the RUE, pressure ulcers, normocytic anemia, low vitamin D(on supplement)    Factors impacting intake: [ ] none [ ] nausea  [ ] vomiting [x ] diarrhea [ ] constipation  [ ]chewing problems [ ] swallowing issues  [ x] other: G-Tube feeding, previously c elevated residuals, G-T feeding  @ 50 ml/hr, c 75 ml residual today.     Diet Prescription: Diet, NPO with Tube Feed:   Tube Feeding Modality: Gastrostomy  Vital AF  Total Volume for 24 Hours (mL): 1440  Continuous  Starting Tube Feed Rate {mL per Hour}: 20  Increase Tube Feed Rate by (mL): 10     Every 8 hours  Until Goal Tube Feed Rate (mL per Hour): 60  Tube Feed Duration (in Hours): 24  Tube Feed Start Time: 15:00  Heraclio(7 Gm Arginine/7 Gm Glut/1.2 Gm HMB     Qty per Day:  1 pkg/day  No Carb Prosource (1pkg = 15gms Protein)     Qty per Day:  1 pkg daily (09-25-20 @ 14:17)    Intake: Vital AF 1.2 @  50 ml/hr =1200 ml, 1440 calories, 90 grams protein, 973 ml free water, 101% RDIs + 80 calories from Heraclio & 60 calories from Prosource    Current Weight: 09/29, 63.4 kg, 08/18, 71.5 kg, c wt. loss of 7.6 kg   % Weight Change: 10.62%  09/28, no edema noted(previous edema noted), ascites noted  Limited nutrition focus physical exam due to mental status, agitation, c moderate depletion of orbital, temple, clavicle, shoulder, thigh areas.     Pertinent Medications: MEDICATIONS  (STANDING):  albuterol/ipratropium for Nebulization 3 milliLiter(s) Nebulizer every 6 hours  ceFAZolin   IVPB 1000 milliGRAM(s) IV Intermittent every 8 hours  ceFAZolin   IVPB      chlorhexidine 0.12% Liquid 15 milliLiter(s) Oral Mucosa every 12 hours  chlorhexidine 4% Liquid 1 Application(s) Topical <User Schedule>  cholecalciferol 2000 Unit(s) Oral daily  collagenase Ointment 1 Application(s) Topical daily  dextrose 5%. 1000 milliLiter(s) (50 mL/Hr) IV Continuous <Continuous>  dextrose 50% Injectable 12.5 Gram(s) IV Push once  dextrose 50% Injectable 25 Gram(s) IV Push once  dextrose 50% Injectable 25 Gram(s) IV Push once  diazepam    Tablet 5 milliGRAM(s) Oral <User Schedule>  enoxaparin Injectable 40 milliGRAM(s) SubCutaneous daily  folic acid 1 milliGRAM(s) Oral daily  furosemide   Injectable 40 milliGRAM(s) IV Push daily  insulin lispro (HumaLOG) corrective regimen sliding scale   SubCutaneous every 6 hours  metoprolol tartrate 12.5 milliGRAM(s) Oral two times a day  multivitamin 1 Tablet(s) Oral daily  pantoprazole   Suspension 40 milliGRAM(s) Oral daily  polyethylene glycol 3350 17 Gram(s) Oral daily  QUEtiapine 150 milliGRAM(s) Oral two times a day  scopolamine 1 mG/72 Hr(s) Patch 1 Patch Transdermal every 72 hours  senna 2 Tablet(s) Oral at bedtime  thiamine 100 milliGRAM(s) Oral daily    MEDICATIONS  (PRN):  acetaminophen   Tablet .. 650 milliGRAM(s) Oral every 6 hours PRN Temp greater or equal to 38C (100.4F), Mild Pain (1 - 3)  dextrose 40% Gel 15 Gram(s) Oral once PRN Blood Glucose LESS THAN 70 milliGRAM(s)/deciliter  glucagon  Injectable 1 milliGRAM(s) IntraMuscular once PRN Glucose LESS THAN 70 milligrams/deciliter  LORazepam   Injectable 2 milliGRAM(s) IV Push every 4 hours PRN breakthrough agitation  metoprolol tartrate Injectable 5 milliGRAM(s) IV Push every 6 hours PRN if sbp>160 or HR >110 hold if SBP<100 or HR<60    Pertinent Labs: 09-29 Na141 mmol/L Glu 107 mg/dL<H> K+ 3.4 mmol/L<L> Cr  0.59 mg/dL BUN 16 mg/dL 09-29 Phos 2.7 mg/dL 09-29 Alb 2.5 g/dL<L> 09-05 Chol --    LDL --    HDL --    Trig 325 mg/dL<H>09-29 ALT 27 U/L AST 57 U/L<H> Alkaline Phosphatase 151 U/L<H>09/23, vitamin D-25 hydroxy 16.1<L>  09/10, Lipase 517 U/L    CAPILLARY BLOOD GLUCOSE      POCT Blood Glucose.: 118 mg/dL (29 Sep 2020 11:16)  POCT Blood Glucose.: 110 mg/dL (29 Sep 2020 07:43)  POCT Blood Glucose.: 117 mg/dL (29 Sep 2020 06:38)  POCT Blood Glucose.: 113 mg/dL (29 Sep 2020 00:30)  POCT Blood Glucose.: 124 mg/dL (28 Sep 2020 16:48)  POCT Blood Glucose.: 119 mg/dL (28 Sep 2020 14:09)    Skin:   skin tear; left scapula  wound/redness; scrotum  Pressure ulcer x 2  1. sacrum; unstageable   2. trachea area; stage II    Estimated Needs:   [x ] no change since previous assessment (09/21)  due to change in medical condition, transfer to regular unit from critical care, unstageable, pressure ulcer,  edema   IBW: 75 kg  Energy: 2250 calories (30-35 Kcal/kg IBW)  Protein: 90 grams protein-113 grams protein(1.2-1.5 gram protein/kg IBW)  fluid: 6784-0545(25-30 ml/kg IBW)    [ ] recalculated:     Previous Nutrition Diagnosis: (09/21)  [x ] Malnutrition; severe malnutrition in context of acute illness     Related to: inadequate protein-energy intake in setting of pancreatitis,  respiratory failure, hepatic/metabolic encephalopathy, pressure ulcer     As evidenced by: <50% nutrition needs >5 days, physical findings of moderate muscle loss     Goal: pt to meet >75% of protein-energy needs via enteral feeding; not met     Nutrition Diagnosis is [x ] ongoing  [ ] resolved [ ] not applicable     New Nutrition Diagnosis: [x ] not applicable     Interventions:   Recommend  [ ] Change Diet To:  [ ] Nutrition Supplement  [ x] Nutrition Support; increase Vital AF 1.2 @ 55 ml/hr= 1320 ml, 1584 calories, 99 grams protein, 1070 ml free water, 111% RDIs and if tolerated increase Vital AF 1.2 @ 60 ml/so=6322 calories, 108 grams protein, 1167 ml free water, 121% RDIs (will hold Heraclio due to elevated residual issues)  No Carb Prosource 1 pkg daily=60 calories, 15 grams protein per pkg   [ x] Other: d/c polyethylene glycol due to loose BMs     Monitoring and Evaluation:   [ ] PO intake [ x ] Tolerance to diet prescription [ x ] weights [ x ] labs; Lipase [ x ] follow up per protocol  [ ] other: Assessment:  Pt c ETOH pancreatitis, necrotizing pancreatitis, s/p septic shock, s/p respiratory failure secondary to pneumonia, ARDS requiring intubation, DTs, s/p trach and PEG 09/10/20, bacteremia, s/p fever, ascites, s/p paracentesis 09/22, metabolic encephalopathy, delirium, agitation, presumed cellulitis of the RUE, pressure ulcers, normocytic anemia, low vitamin D(on supplement)    Factors impacting intake: [ ] none [ ] nausea  [ ] vomiting [x ] diarrhea [ ] constipation  [ ]chewing problems [ ] swallowing issues  [ x] other: G-Tube feeding, 09/22, feeding brought down to 20 ml/hr due to elevated residuals, G-T feeding @ 50 ml/hr @ present, c 75 ml residual today as per RN.     Diet Prescription: Diet, NPO with Tube Feed:   Tube Feeding Modality: Gastrostomy  Vital AF  Total Volume for 24 Hours (mL): 1440  Continuous  Starting Tube Feed Rate {mL per Hour}: 20  Increase Tube Feed Rate by (mL): 10     Every 8 hours  Until Goal Tube Feed Rate (mL per Hour): 60  Tube Feed Duration (in Hours): 24  Tube Feed Start Time: 15:00  Heraclio(7 Gm Arginine/7 Gm Glut/1.2 Gm HMB     Qty per Day:  1 pkg/day  No Carb Prosource (1pkg = 15gms Protein)     Qty per Day:  1 pkg daily (09-25-20 @ 14:17)    Intake: Vital AF 1.2 @  50 ml/hr =1200 ml, 1440 calories, 90 grams protein, 973 ml free water, 101% RDIs + 80 calories from Heraclio & 60 calories from Prosource  Free water decreased to 50 ml q 6 hours (09/21)= 200 ml additional free water     Current Weight: 09/29, 63.4 kg, 08/18, 71.5 kg, c wt. loss of 7.6 kg   % Weight Change: 10.62%  09/28, no edema noted(previous edema noted), ascites noted  Limited nutrition focus physical exam due to mental status, agitation, c moderate depletion of orbital, temple, clavicle, shoulder, thigh areas.     Pertinent Medications: MEDICATIONS  (STANDING):  albuterol/ipratropium for Nebulization 3 milliLiter(s) Nebulizer every 6 hours  ceFAZolin   IVPB 1000 milliGRAM(s) IV Intermittent every 8 hours  ceFAZolin   IVPB      chlorhexidine 0.12% Liquid 15 milliLiter(s) Oral Mucosa every 12 hours  chlorhexidine 4% Liquid 1 Application(s) Topical <User Schedule>  cholecalciferol 2000 Unit(s) Oral daily  collagenase Ointment 1 Application(s) Topical daily  dextrose 5%. 1000 milliLiter(s) (50 mL/Hr) IV Continuous <Continuous>  dextrose 50% Injectable 12.5 Gram(s) IV Push once  dextrose 50% Injectable 25 Gram(s) IV Push once  dextrose 50% Injectable 25 Gram(s) IV Push once  diazepam    Tablet 5 milliGRAM(s) Oral <User Schedule>  enoxaparin Injectable 40 milliGRAM(s) SubCutaneous daily  folic acid 1 milliGRAM(s) Oral daily  furosemide   Injectable 40 milliGRAM(s) IV Push daily  insulin lispro (HumaLOG) corrective regimen sliding scale   SubCutaneous every 6 hours  metoprolol tartrate 12.5 milliGRAM(s) Oral two times a day  multivitamin 1 Tablet(s) Oral daily  pantoprazole   Suspension 40 milliGRAM(s) Oral daily  polyethylene glycol 3350 17 Gram(s) Oral daily  QUEtiapine 150 milliGRAM(s) Oral two times a day  scopolamine 1 mG/72 Hr(s) Patch 1 Patch Transdermal every 72 hours  senna 2 Tablet(s) Oral at bedtime  thiamine 100 milliGRAM(s) Oral daily    MEDICATIONS  (PRN):  acetaminophen   Tablet .. 650 milliGRAM(s) Oral every 6 hours PRN Temp greater or equal to 38C (100.4F), Mild Pain (1 - 3)  dextrose 40% Gel 15 Gram(s) Oral once PRN Blood Glucose LESS THAN 70 milliGRAM(s)/deciliter  glucagon  Injectable 1 milliGRAM(s) IntraMuscular once PRN Glucose LESS THAN 70 milligrams/deciliter  LORazepam   Injectable 2 milliGRAM(s) IV Push every 4 hours PRN breakthrough agitation  metoprolol tartrate Injectable 5 milliGRAM(s) IV Push every 6 hours PRN if sbp>160 or HR >110 hold if SBP<100 or HR<60    Pertinent Labs: 09-29 Na141 mmol/L Glu 107 mg/dL<H> K+ 3.4 mmol/L<L> Cr  0.59 mg/dL BUN 16 mg/dL 09-29 Phos 2.7 mg/dL 09-29 Alb 2.5 g/dL<L> 09-05 Chol --    LDL --    HDL --    Trig 325 mg/dL<H>09-29 ALT 27 U/L AST 57 U/L<H> Alkaline Phosphatase 151 U/L<H>09/23, vitamin D-25 hydroxy 16.1<L>  09/10, Lipase 517 U/L    CAPILLARY BLOOD GLUCOSE      POCT Blood Glucose.: 118 mg/dL (29 Sep 2020 11:16)  POCT Blood Glucose.: 110 mg/dL (29 Sep 2020 07:43)  POCT Blood Glucose.: 117 mg/dL (29 Sep 2020 06:38)  POCT Blood Glucose.: 113 mg/dL (29 Sep 2020 00:30)  POCT Blood Glucose.: 124 mg/dL (28 Sep 2020 16:48)  POCT Blood Glucose.: 119 mg/dL (28 Sep 2020 14:09)    Skin:   skin tear; left scapula  wound/redness; scrotum  Pressure ulcer x 2  1. sacrum; unstageable   2. trachea area; stage II    Estimated Needs:   [x ] no change since previous assessment (09/21)  due to change in medical condition, transfer to regular unit from critical care, unstageable, pressure ulcer,  edema   IBW: 75 kg  Energy: 2250 calories (30-35 Kcal/kg IBW)  Protein: 90 grams protein-113 grams protein(1.2-1.5 gram protein/kg IBW)  fluid: 1401-7663(25-30 ml/kg IBW)    [ ] recalculated:     Previous Nutrition Diagnosis: (09/21)  [x ] Malnutrition; severe malnutrition in context of acute illness     Related to: inadequate protein-energy intake in setting of pancreatitis,  respiratory failure, hepatic/metabolic encephalopathy, pressure ulcer     As evidenced by: <50% nutrition needs >5 days, physical findings of moderate muscle loss     Goal: pt to meet >75% of protein-energy needs via enteral feeding; not met     Nutrition Diagnosis is [x ] ongoing  [ ] resolved [ ] not applicable     New Nutrition Diagnosis: [x ] not applicable     Interventions:   Recommend  [ ] Change Diet To:  [ ] Nutrition Supplement  [ x] Nutrition Support; increase Vital AF 1.2 @ 55 ml/hr= 1320 ml, 1584 calories, 99 grams protein, 1070 ml free water, 111% RDIs and if tolerated increase Vital AF 1.2 @ 60 ml/nv=0068 calories, 108 grams protein, 1167 ml free water, 121% RDIs (will hold Heraclio due to elevated residual issues)  No Carb Prosource 1 pkg daily=60 calories, 15 grams protein per pkg   [ x] Other: d/c polyethylene glycol due to loose BMs     Monitoring and Evaluation:   [ ] PO intake [ x ] Tolerance to diet prescription [ x ] weights [ x ] labs; Lipase, potassium  [ x ] follow up per protocol  [x ] other

## 2020-09-29 NOTE — PROGRESS NOTE ADULT - SUBJECTIVE AND OBJECTIVE BOX
INTERVAL HPI/OVERNIGHT EVENTS: Patient very agitated today. Pulling on all lines. Still lots of secretions- being suctioned very frequently.     Patient is a 44y old  Male who presents with a chief complaint of pancreatitis (24 Sep 2020 17:14)    MEDICATIONS  (STANDING):  albuterol/ipratropium for Nebulization 3 milliLiter(s) Nebulizer every 6 hours  ceFAZolin   IVPB 1000 milliGRAM(s) IV Intermittent every 8 hours  ceFAZolin   IVPB      chlorhexidine 0.12% Liquid 15 milliLiter(s) Oral Mucosa every 12 hours  chlorhexidine 4% Liquid 1 Application(s) Topical <User Schedule>  cholecalciferol 2000 Unit(s) Oral daily  collagenase Ointment 1 Application(s) Topical daily  dextrose 5%. 1000 milliLiter(s) (50 mL/Hr) IV Continuous <Continuous>  dextrose 50% Injectable 12.5 Gram(s) IV Push once  dextrose 50% Injectable 25 Gram(s) IV Push once  dextrose 50% Injectable 25 Gram(s) IV Push once  diazepam    Tablet 5 milliGRAM(s) Oral <User Schedule>  enoxaparin Injectable 40 milliGRAM(s) SubCutaneous daily  folic acid 1 milliGRAM(s) Oral daily  furosemide   Injectable 40 milliGRAM(s) IV Push daily  insulin lispro (HumaLOG) corrective regimen sliding scale   SubCutaneous every 6 hours  metoprolol tartrate 12.5 milliGRAM(s) Oral two times a day  multivitamin 1 Tablet(s) Oral daily  pantoprazole   Suspension 40 milliGRAM(s) Oral daily  polyethylene glycol 3350 17 Gram(s) Oral daily  QUEtiapine 100 milliGRAM(s) Oral two times a day  scopolamine 1 mG/72 Hr(s) Patch 1 Patch Transdermal every 72 hours  senna 2 Tablet(s) Oral at bedtime  thiamine 100 milliGRAM(s) Oral daily    MEDICATIONS  (PRN):  acetaminophen   Tablet .. 650 milliGRAM(s) Oral every 6 hours PRN Temp greater or equal to 38C (100.4F), Mild Pain (1 - 3)  dextrose 40% Gel 15 Gram(s) Oral once PRN Blood Glucose LESS THAN 70 milliGRAM(s)/deciliter  glucagon  Injectable 1 milliGRAM(s) IntraMuscular once PRN Glucose LESS THAN 70 milligrams/deciliter  LORazepam   Injectable 1 milliGRAM(s) IV Push every 4 hours PRN Agitation  metoprolol tartrate Injectable 5 milliGRAM(s) IV Push every 6 hours PRN if sbp>160 or HR >110 hold if SBP<100 or HR<60    Allergies    No Known Allergies    Intolerances        REVIEW OF SYSTEMS:  unable to obtain due poor mentation     Vital Signs Last 24 Hrs  T(C): 37.4 (29 Sep 2020 06:00), Max: 38.6 (28 Sep 2020 16:57)  T(F): 99.3 (29 Sep 2020 06:00), Max: 101.4 (28 Sep 2020 16:57)  HR: 108 (29 Sep 2020 06:17) (98 - 125)  BP: 124/76 (29 Sep 2020 04:50) (124/76 - 141/91)  BP(mean): --  RR: 20 (29 Sep 2020 04:50) (19 - 20)  SpO2: 98% (29 Sep 2020 06:17) (94% - 99%)    PHYSICAL EXAM:  GENERAL: NAD, well-groomed, well-developed  HEAD:  Atraumatic, Normocephalic  NERVOUS SYSTEM:  Alert & Oriented X0, with open eyes, very agitated  CHEST/LUNG: coarse sounds bilaterally, lots of secretions   HEART: Regular rate and rhythm; No murmurs, rubs, or gallops  ABDOMEN: Soft, Nontender, Nondistended; Bowel sounds present. PEG tube present  EXTREMITIES:  2+ Peripheral Pulses, No clubbing, cyanosis, or edema      LABS:                                                            8.6    11.42 )-----------( 214      ( 29 Sep 2020 07:41 )             27.6     09-28    140  |  100  |  14  ----------------------------<  125<H>  2.9<LL>   |  33<H>  |  0.52    Ca    11.5<H>      28 Sep 2020 07:38  Phos  3.6     09-28  Mg     1.3     09-28                                 INTERVAL HPI/OVERNIGHT EVENTS: Patient less agitated today, still having secretions from trach     Patient is a 44y old  Male who presents with a chief complaint of pancreatitis (24 Sep 2020 17:14)    MEDICATIONS  (STANDING):  albuterol/ipratropium for Nebulization 3 milliLiter(s) Nebulizer every 6 hours  ceFAZolin   IVPB 1000 milliGRAM(s) IV Intermittent every 8 hours  ceFAZolin   IVPB      chlorhexidine 0.12% Liquid 15 milliLiter(s) Oral Mucosa every 12 hours  chlorhexidine 4% Liquid 1 Application(s) Topical <User Schedule>  cholecalciferol 2000 Unit(s) Oral daily  collagenase Ointment 1 Application(s) Topical daily  dextrose 5%. 1000 milliLiter(s) (50 mL/Hr) IV Continuous <Continuous>  dextrose 50% Injectable 12.5 Gram(s) IV Push once  dextrose 50% Injectable 25 Gram(s) IV Push once  dextrose 50% Injectable 25 Gram(s) IV Push once  diazepam    Tablet 5 milliGRAM(s) Oral <User Schedule>  enoxaparin Injectable 40 milliGRAM(s) SubCutaneous daily  folic acid 1 milliGRAM(s) Oral daily  furosemide   Injectable 40 milliGRAM(s) IV Push daily  insulin lispro (HumaLOG) corrective regimen sliding scale   SubCutaneous every 6 hours  metoprolol tartrate 12.5 milliGRAM(s) Oral two times a day  multivitamin 1 Tablet(s) Oral daily  pantoprazole   Suspension 40 milliGRAM(s) Oral daily  polyethylene glycol 3350 17 Gram(s) Oral daily  QUEtiapine 100 milliGRAM(s) Oral two times a day  scopolamine 1 mG/72 Hr(s) Patch 1 Patch Transdermal every 72 hours  senna 2 Tablet(s) Oral at bedtime  thiamine 100 milliGRAM(s) Oral daily    MEDICATIONS  (PRN):  acetaminophen   Tablet .. 650 milliGRAM(s) Oral every 6 hours PRN Temp greater or equal to 38C (100.4F), Mild Pain (1 - 3)  dextrose 40% Gel 15 Gram(s) Oral once PRN Blood Glucose LESS THAN 70 milliGRAM(s)/deciliter  glucagon  Injectable 1 milliGRAM(s) IntraMuscular once PRN Glucose LESS THAN 70 milligrams/deciliter  LORazepam   Injectable 1 milliGRAM(s) IV Push every 4 hours PRN Agitation  metoprolol tartrate Injectable 5 milliGRAM(s) IV Push every 6 hours PRN if sbp>160 or HR >110 hold if SBP<100 or HR<60    Allergies    No Known Allergies    Intolerances        REVIEW OF SYSTEMS:  unable to obtain due poor mentation     ICU Vital Signs Last 24 Hrs  T(C): 37 (29 Sep 2020 11:28), Max: 38.6 (28 Sep 2020 16:57)  T(F): 98.6 (29 Sep 2020 11:28), Max: 101.4 (28 Sep 2020 16:57)  HR: 115 (29 Sep 2020 12:11) (98 - 125)  BP: 126/74 (29 Sep 2020 11:28) (124/76 - 141/91)  BP(mean): --  ABP: --  ABP(mean): --  RR: 24 (29 Sep 2020 12:11) (18 - 24)  SpO2: 98% (29 Sep 2020 12:11) (94% - 99%)      PHYSICAL EXAM:  GENERAL: NAD, well-groomed, well-developed  HEAD:  Atraumatic, Normocephalic  NERVOUS SYSTEM:  Alert & Oriented X0, with open eyes, very agitated  CHEST/LUNG: coarse sounds bilaterally, lots of secretions   HEART: Regular rate and rhythm; No murmurs, rubs, or gallops  ABDOMEN: Soft, Nontender, Nondistended; Bowel sounds present. PEG tube present  EXTREMITIES:  2+ Peripheral Pulses, No clubbing, cyanosis, or edema      LABS:                                                            8.6    11.42 )-----------( 214      ( 29 Sep 2020 07:41 )             27.6     09-28    140  |  100  |  14  ----------------------------<  125<H>  2.9<LL>   |  33<H>  |  0.52    Ca    11.5<H>      28 Sep 2020 07:38  Phos  3.6     09-28  Mg     1.3     09-28

## 2020-09-29 NOTE — PROGRESS NOTE ADULT - ATTENDING COMMENTS
Angelito Morrison DO  Cell: [phone]  Pager [phone]  Infectious Disease Attending  After 5pm/weekends please call [phone] for all inquiries and new consults

## 2020-09-29 NOTE — PROGRESS NOTE BEHAVIORAL HEALTH - NSBHCONSULTMEDS_PSY_A_CORE FT
methadone weaned off; increased Seroquel 50mg PO bid to 100mg via PEG BID and increase Valium to 5mg PO bid on 9/28  - continued restlessness; increase Seroquel 100bid to 150bid  - PRN Ativan dose increased to 2mg

## 2020-09-29 NOTE — PROGRESS NOTE BEHAVIORAL HEALTH - NSBHFUPINTERVALHXFT_PSY_A_CORE
Received 1 dose of Ativan IVP PRN at midnight. Continues to need q10 minute suctioning as per nursing

## 2020-09-29 NOTE — PROGRESS NOTE BEHAVIORAL HEALTH - NSBHCHARTREVIEWVS_PSY_A_CORE FT
T(C): 37 (09-29-20 @ 11:28), Max: 38.6 (09-28-20 @ 16:57)  HR: 114 (09-29-20 @ 11:28) (98 - 125)  BP: 126/74 (09-29-20 @ 11:28) (124/76 - 141/91)  RR: 18 (09-29-20 @ 11:28) (18 - 20)  SpO2: 98% (09-29-20 @ 11:28) (94% - 99%)

## 2020-09-29 NOTE — CONSULT NOTE ADULT - ASSESSMENT
Toxic metabolic encephalopathy  Necrotizing pancreatitis  Respiratory failure  Alcohol withdrawal  SBO  HAIDER  Anemia  Pneumonia  Klebsiella bacteremia Toxic metabolic encephalopathy  Necrotizing pancreatitis  Respiratory failure  Alcohol withdrawal  SBO  HAIDER  Anemia  Pneumonia  Klebsiella bacteremia    - CT head wo contrast. Will likely need MRI brain wwo peewee, but mother states there is "clip in his left jaw" placed by dentist in 2018.  - EEG to rule out seizure  - wean off sedating medication as tolerated   - Agitation being treated by psychiatry.   - discussed with mother, Niecy, over the phone.

## 2020-09-29 NOTE — CONSULT NOTE ADULT - SUBJECTIVE AND OBJECTIVE BOX
HPI: 44 year old man with hx of alcohol abuse and pancreatitis admitted on 8/17/20 with acute pancreatitis and alcohol withdrawal and in ICU for severe alcohol withdrawal. He developed HAIDER, SBO, and pneumonia. He had been on pressors for hypotension. Started to hallucinate on 8/22/20. He was intubated on 8/23/20. Failed weaning due to agitation/tachypnea/tachycardia. On 9/10/20 patient had tracheostomy and PEG placed. Persistent fevers until ~9/15/20. Calm and not oriented noted on 9/18/20. Not very responsive as per notes. Transferred out of ICU on 9/20/20. Had paracentesis on 9/22/20.     PMHx: Alcohol abuse, Pancreatitis, closed fx of left mandible  FHx: diabetes in father  Social Hx: alcohol abuse  SHx: Trach, PEG  ROS:  Meds: See EMR  Allergies: NKDA    Vitals: Temp 100.4F   RR 22       /75  General:  Neuro Exam: HPI: 44 year old man with hx of alcohol abuse and pancreatitis admitted on 8/17/20 with acute pancreatitis and alcohol withdrawal and in ICU for severe alcohol withdrawal. He developed HAIDER, SBO, and pneumonia. He had been on pressors for hypotension. Started to hallucinate on 8/22/20. He was intubated on 8/23/20. Failed weaning due to agitation/tachypnea/tachycardia. On 9/10/20 patient had tracheostomy and PEG placed. Persistent fevers until ~9/15/20. Calm and not oriented noted on 9/18/20. Not very responsive as per notes. Transferred out of ICU on 9/20/20. Had paracentesis on 9/22/20. Psychiatry adjusting medications and weaning off sedating meds. At times agitated and needs Ativan. As per motherNiecy, patient was a normal walking, talking person 1 week prior to hospitalization. Was playing basketball with his nephew.     PMHx: Alcohol abuse, Pancreatitis, closed fx of left mandible  FHx: diabetes in father  Social Hx: alcohol abuse  SHx: Trach, PEG, clamp in left jaw (2018)  ROS: unable to obtain due to AMS  Meds: See EMR  Allergies: NKDA    Vitals: Temp 100.4F   RR 22       /75  General: NAD, on trach collar  Neck: supple  Neuro Exam: Lethargic. Does not open eyes to voice. Does not make eye contact. Does not follow commands. PERRL. VOR intact. No facial droop. Grimaces to pain with no obvious facial droop. Muscle atrophy noted in arms and legs. Withdraws to pain in all four extremities (more in the arms than the legs), No focal weakness noted. Reflexes diminished and toes down. Gait exam and coordination exam deferred.     Labs reviewed. HPI: 44 year old man with hx of alcohol abuse and pancreatitis admitted on 8/17/20 with acute pancreatitis and alcohol withdrawal and in ICU for severe alcohol withdrawal. He developed HAIDER, SBO, and pneumonia. He had been on pressors for hypotension. Started to hallucinate on 8/22/20. He was intubated on 8/23/20. Failed weaning due to agitation/tachypnea/tachycardia. On 9/10/20 patient had tracheostomy and PEG placed. Persistent fevers until ~9/15/20. Calm and not oriented noted on 9/18/20. Not very responsive as per notes. Transferred out of ICU on 9/20/20. Had paracentesis on 9/22/20. Psychiatry adjusting medications and weaning off sedating meds. At times agitated and needs Ativan. As per motherNiecy, patient was a normal walking, talking person 1 week prior to hospitalization. Was playing basketball with his nephew.     PMHx: Alcohol abuse, Pancreatitis, closed fx of left mandible  FHx: diabetes in father  Social Hx: alcohol abuse  SHx: Trach, PEG, clamp in left jaw (2018)  ROS: unable to obtain due to AMS  Meds: See EMR  Allergies: NKDA    Vitals: Temp 100.4F   RR 22       /75  General: NAD, on trach collar  Neck: supple  Neuro Exam: Lethargic. Does not open eyes to voice. Does not make eye contact. Does not follow commands. PERRL. VOR intact. No facial droop. Grimaces to pain with no obvious facial droop. Muscle atrophy noted in arms and legs. Withdraws to pain in all four extremities (more in the arms than the legs), No focal weakness noted. Reflexes diminished and toes down. Gait exam and coordination exam deferred.     NIHSS-[id number]    Labs reviewed.

## 2020-09-29 NOTE — PROGRESS NOTE ADULT - SUBJECTIVE AND OBJECTIVE BOX
INTERVAL HPI:  44M with ETOH Abuse and withdrawal, Alcohol Pancreatitis who presents to the ED with severe abdominal pain.  Found to have Necrotizing Pancreatitis, Septic  Shock, Pneumonia/ARDS with Hypoxic Respiratory failure. Admitted to ICU, had prolonged hospital course with Vent support. Eventually required Tracheostomy and Peg placement.  Also with acute metabolic Encephalopathy.  09/20/20:  Transferred to .    Not able to provide details due to encephalopathy.  09/22/20: 1500 ml paracentesis.    OVERNIGHT EVENTS:  Still with trach secretions.    Vital Signs Last 24 Hrs  T(C): 37 (29 Sep 2020 11:28), Max: 38.4 (29 Sep 2020 03:20)  T(F): 98.6 (29 Sep 2020 11:28), Max: 101.1 (29 Sep 2020 03:20)  HR: 115 (29 Sep 2020 17:56) (98 - 125)  BP: 126/74 (29 Sep 2020 11:28) (124/76 - 141/91)  BP(mean): --  RR: 22 (29 Sep 2020 17:56) (18 - 24)  SpO2: 98% (29 Sep 2020 17:56) (97% - 99%)    PHYSICAL EXAM:  GEN:        Restless.  HEENT:    Trach    RESP:       no distress  CVS:           Regular rate and rhythm.   ABD:         Soft, non-tender, non-distended; .    MEDICATIONS  (STANDING):  albuterol/ipratropium for Nebulization 3 milliLiter(s) Nebulizer every 6 hours  cefepime   IVPB      cefepime   IVPB 2000 milliGRAM(s) IV Intermittent every 8 hours  chlorhexidine 0.12% Liquid 15 milliLiter(s) Oral Mucosa every 12 hours  chlorhexidine 4% Liquid 1 Application(s) Topical <User Schedule>  cholecalciferol 2000 Unit(s) Oral daily  collagenase Ointment 1 Application(s) Topical daily  dextrose 5%. 1000 milliLiter(s) (50 mL/Hr) IV Continuous <Continuous>  dextrose 50% Injectable 12.5 Gram(s) IV Push once  dextrose 50% Injectable 25 Gram(s) IV Push once  dextrose 50% Injectable 25 Gram(s) IV Push once  diazepam    Tablet 5 milliGRAM(s) Oral <User Schedule>  enoxaparin Injectable 40 milliGRAM(s) SubCutaneous daily  folic acid 1 milliGRAM(s) Oral daily  furosemide   Injectable 40 milliGRAM(s) IV Push daily  insulin lispro (HumaLOG) corrective regimen sliding scale   SubCutaneous every 6 hours  metoprolol tartrate 12.5 milliGRAM(s) Oral two times a day  multivitamin 1 Tablet(s) Oral daily  pantoprazole   Suspension 40 milliGRAM(s) Oral daily  polyethylene glycol 3350 17 Gram(s) Oral daily  QUEtiapine 150 milliGRAM(s) Oral two times a day  scopolamine 1 mG/72 Hr(s) Patch 1 Patch Transdermal every 72 hours  thiamine 100 milliGRAM(s) Oral daily    MEDICATIONS  (PRN):  acetaminophen   Tablet .. 650 milliGRAM(s) Oral every 6 hours PRN Temp greater or equal to 38C (100.4F), Mild Pain (1 - 3)  dextrose 40% Gel 15 Gram(s) Oral once PRN Blood Glucose LESS THAN 70 milliGRAM(s)/deciliter  glucagon  Injectable 1 milliGRAM(s) IntraMuscular once PRN Glucose LESS THAN 70 milligrams/deciliter  LORazepam   Injectable 2 milliGRAM(s) IV Push every 4 hours PRN breakthrough agitation  metoprolol tartrate Injectable 5 milliGRAM(s) IV Push every 6 hours PRN if sbp>160 or HR >110 hold if SBP<100 or HR<60    LABS:                        8.6    11.42 )-----------( 214      ( 29 Sep 2020 07:41 )             27.6     09-29    141  |  103  |  16  ----------------------------<  107<H>  3.4<L>   |  33<H>  |  0.59    Ca    12.0<H>      29 Sep 2020 07:41  Phos  2.7     09-29  Mg     1.6     09-29    TPro  9.5<H>  /  Alb  2.5<L>  /  TBili  1.4<H>  /  DBili  x   /  AST  57<H>  /  ALT  27  /  AlkPhos  151<H>  09-29    ASSESSMENT AND PLAN:  ·	Hypoxic Respiratory failure.  ·	S/P tracheostomy.  ·	Bilateral pneumonia.  ·	Bilateral pleural effusion.  ·	S/P Septic Shock.  ·	Necrotizing Pancreatis.  ·	Acute metabolic Encephalopathy.  ·	Alcohol abuse.  ·	Klebsiella Bacteremia.  ·	Anemia    Continue trach collar.  Nebulizer every 6 hours.  On antibiotics.

## 2020-09-29 NOTE — PROGRESS NOTE ADULT - ASSESSMENT
DEEPIKA RUCKER is a 44M PMH alcohol abuse, hx of alcohol withdrawal and alcohol pancreatitis who presented for abdominal pain. Admitted with necrotizing pancreatitis, sepsis, septic shock with course complicated by acute hypoxic respiratory failure secondary to PNA and ARDS requiring intubation and DTs requiring sedation and small bowel obstruction conservatively managed with NGT and NPO. Currently, s/p trach and PEG on 9/10 and treated for klebsiella bacteremia.  Intermittently having fevers, last fever 9/21, 101.0  WBC staying in the 12-16k range  Had Abdominal US today which showed large amount of ascites and IR performed paracentesis which removed 1500cc fluid  SAAG <1.1 which goes against portal hypertension and more in line with pancreatitis vs SBP vs some other cause   CXR from 9/21 with increased lung markings and effusions as well as trach (personally reviewed)   Blood culture from 9/10 was positive for klebsiella quite sensitive and one set from 9/12 is negative   Patient has multiple possible sources of fever:  Abdomen such as SBP, pancreatitis, hepatobiliary disease   Pulmonary: CXR very unclear but he is on trach collar thus unlikely   Skin and soft tissue: cellulitis of the arm (US didnt show DVT or thrombophlebitis)  Thromboembolic: could have DVT       Antibiotics this admission:  Vancomycin: 8/26-27; 9/16-20  Zosyn: 8/18-8/27  Meropenem: 8/23-9/2  ceftriaxone: 9/14-20  Cefazolin: 9/20->9/29  Cefepime 9/29->    9/23: Continues to respond to cefazolin, fluid analysis not consistent with SBP and negative culture thus far, heavily sedated though meds cut in half,  9/24: Spiked a fever, panculture and repeat imaging, Behavioral health consulted, ascites fluid culture negative   9/25: still spiking, getting CT c/a/p, continue cefazolin for cellulitis which is much better   9/29: still febrile, slowly peeling off sedatives, pseudomonas growing in sputum     Overall, 44M pancreatitis, fever, leukocytosis, abdominal ascites, cellulitis (resolved), pneumonia   -Stop Cefazolin  -Start Cefepime 2g q8hrs  -needs extensive pulmonary toilet/pulmonary PT/suctioning  -DVT study of LE bilaterally was negative  -agree with reducing sedation though monitor for withdrawal   -CT head pending

## 2020-09-29 NOTE — PROGRESS NOTE BEHAVIORAL HEALTH - NSBHCHARTREVIEWLAB_PSY_A_CORE FT
09-29    141  |  103  |  16  ----------------------------<  107<H>  3.4<L>   |  33<H>  |  0.59    Ca    12.0<H>      29 Sep 2020 07:41  Phos  2.7     09-29  Mg     1.6     09-29    TPro  9.5<H>  /  Alb  2.5<L>  /  TBili  1.4<H>  /  DBili  x   /  AST  57<H>  /  ALT  27  /  AlkPhos  151<H>  09-29

## 2020-09-29 NOTE — PROGRESS NOTE ADULT - SUBJECTIVE AND OBJECTIVE BOX
RUCKERDEEPIKA ABRAHAM  MRN-[id number]    Follow Up:  ID following for cellulitis and fever     Interval History: Patient seen and examined. Still spiking fevers, lots of respiratory secretions, Dr. Agarwal to help with tapering down meds, more awake but also with more agitation     ROS:    [X] Unobtainable because: toxic metabolic encephalopathy   [ ] All other systems negative    Constitutional: no fever, no chills  Head: no trauma  Eyes: no vision changes, no eye pain  ENT:  no sore throat, no rhinorrhea  Cardiovascular:  no chest pain, no palpitation  Respiratory:  no SOB, no cough  GI:  no abd pain, no vomiting, no diarrhea  urinary: no dysuria, no hematuria, no flank pain  musculoskeletal:  no joint pain, no joint swelling  skin:  no rash  neurology:  no headache, no seizure, no change in mental status  psych: no anxiety, no depression         Allergies  No Known Allergies        ANTIMICROBIALS:    cefepime   IVPB    cefepime   IVPB 2000 every 8 hours      MEDICATIONS  (STANDING):  albuterol/ipratropium for Nebulization 3 every 6 hours  dextrose 50% Injectable 12.5 once  dextrose 50% Injectable 25 once  dextrose 50% Injectable 25 once  diazepam    Tablet 5 <User Schedule>  enoxaparin Injectable 40 daily  furosemide   Injectable 40 daily  insulin lispro (HumaLOG) corrective regimen sliding scale  every 6 hours  metoprolol tartrate 12.5 two times a day  pantoprazole   Suspension 40 daily  polyethylene glycol 3350 17 daily  QUEtiapine 150 two times a day  scopolamine 1 mG/72 Hr(s) Patch 1 every 72 hours      PRN  acetaminophen   Tablet .. 650 milliGRAM(s) Oral every 6 hours PRN  dextrose 40% Gel 15 Gram(s) Oral once PRN  glucagon  Injectable 1 milliGRAM(s) IntraMuscular once PRN  LORazepam   Injectable 2 milliGRAM(s) IV Push every 4 hours PRN  metoprolol tartrate Injectable 5 milliGRAM(s) IV Push every 6 hours PRN      Vital Signs Last 24 Hrs  T(F): 100.4 (20 @ 17:32), Max: 101.1 (20 @ 03:20)  HR: 115 (20 @ 17:56)  BP: 126/75 (20 @ 17:32)  RR: 22 (20 @ 17:56)  SpO2: 98% (20 @ 17:56) (97% - 99%)  Wt(kg): --      Physical Exam:  Constitutional: non-toxic, no distress  HEAD/EYES: anicteric, no conjunctival injection  ENT:  supple, + trach collar with thick secretions   Cardiovascular:   normal S1, S2, no murmur, +pitting edema in b/l ankles/feet   Respiratory:  coarse breath sounds bilaterally   GI:  soft, +tenderness, normal bowel sounds, +peg tube c/d/i  Musculoskeletal:  no synovitis  Neurologic: poor mental status, tried to raise arm when asked  Skin:  psoriasis on the scalp and face, right forearm with tattoo and questionable redness and warmth is improved  Heme/Onc: no lymphadenopathy   Psychiatric:  patient not following commands appropriately     WBC Count: 11.42 K/uL ( @ 07:41)  WBC Count: 11.75 K/uL ( @ 07:38)  WBC Count: 14.46 K/uL ( @ 06:56)  WBC Count: 16.86 K/uL ( @ 20:20)  WBC Count: 12.38 K/uL ( @ 09:11)  WBC Count: 11.76 K/uL ( @ 08:13)                            8.6    11.42 )-----------( 214      ( 29 Sep 2020 07:41 )             27.6           141  |  103  |  16  ----------------------------<  107<H>  3.4<L>   |  33<H>  |  0.59    Ca    12.0<H>      29 Sep 2020 07:41  Phos  2.7       Mg     1.6         TPro  9.5<H>  /  Alb  2.5<L>  /  TBili  1.4<H>  /  DBili  x   /  AST  57<H>  /  ALT  27  /  AlkPhos  151<H>        Creatinine Trend: 0.59<--, 0.52<--, 0.54<--, 0.53<--, 0.44<--, 0.54<--        Urinalysis Basic - ( 21 Sep 2020 21:11 )    Color: Yellow / Appearance: Clear / S.020 / pH: x  Gluc: x / Ketone: Negative  / Bili: Negative / Urobili: 8 mg/dL   Blood: x / Protein: Negative mg/dL / Nitrite: Negative   Leuk Esterase: Trace / RBC: x / WBC 0-2   Sq Epi: x / Non Sq Epi: Occasional / Bacteria: Occasional        Creatinine Trend: 0.43<--, 0.49<--, 0.41<--, 0.61<--, 0.45<--, 0.49<--  Lactate, Blood: 0.4 mmol/L (20 @ 18:24)      MICROBIOLOGY:  Culture - Sputum . (20 @ 15:16)    Gram Stain:   Numerous polymorphonuclear leukocytes per low power field  Few Squamous epithelial cells per low power field  Numerous Gram Negative Rods per oil power field  Few Gram Positive Cocci in Clusters per oil power field    -  Cefepime: S <=2    Specimen Source: .Sputum Sputum    Culture Results:   Moderate Pseudomonas aeruginosa    Organism Identification: Pseudomonas aeruginosa    Organism: Pseudomonas aeruginosa    Method Type: KEITH        Ascites Fl paracentesis ascites  20 --  --    No polymorphonuclear cells seen per low power field  White blood cells seen per oil power field  No organisms seen per oil power field  by cytocentrifuge      .Blood Blood-Peripheral  20   No growth to date.  --  --      .Blood Blood  20   No Growth Final  --  --      .Urine Catheterized  20   No growth  --  --      .Sputum Sputum  20   No growth at 48 hours  --    Few polymorphonuclear leukocytes per low power field  Rare Squamous epithelial cells per low power field  No organisms seen per oil power field      .Blood Blood  09-10-20   Growth in aerobic and anaerobic bottles: Klebsiella pneumoniae  See previous culture [id number]  --    Growth in aerobic bottle:  Gram Negative Rods  Growth in anaerobic bottle:  Gram Negative Rods      .Blood Blood  09-10-20   Growth in aerobic bottle: Klebsiella pneumoniae  -  Blood Culture PCR  Klebsiella pneumoniae      .Blood Blood-Peripheral  20   No Growth Final  --  --      .Sputum Sputum  20   Normal Respiratory Poonam present  --    Moderate polymorphonuclear leukocytes per low power field  Rare Squamous epithelial cells per low power field  Rare Gram positive cocci in pairs per oil power field      .Sputum Sputum  20   Rare Normal Respiratory Poonam present  --    Numerous polymorphonuclear leukocytes per low power field  Few Squamous epithelial cells per low power field  No organisms seen per oil power field      .Sputum Sputum  20   Normal Respiratory Poonam present  --    No polymorphonuclear leukocytes per low power field  No Squamous epithelial cells per low power field  No organisms seen per oil power field    Ferritin, Serum: 740 ()      Procalcitonin, Serum: 0.16 (20 @ 11:40)  Procalcitonin, Serum: 0.17 (20 @ 02:21)      RADIOLOGY:    CT Abdomen and Pelvis w/ IV Cont (20 @ 21:11)  IMPRESSION:    Moderate right and small left pleural effusion.

## 2020-09-29 NOTE — PROGRESS NOTE ADULT - ASSESSMENT
44M EtOH pancreatitis p/w abdominal pain 8/17/20, found to have necrotizing pancreatitis c/b septic shock and respiratory failure secondary to PNA and ARDS requiring intubation and DTs requiring sedation and small bowel obstruction conservatively managed with NGT and NPO for which he was admitted to ICU. Currently, s/p trach and PEG on 9/10 and treated for klebsiella bacteremia.    downgraded to  floor 9/20/20 from ICU    Assessment and Plan:  Pt agitated     #Fever, unclear source  #s/p necrotizing pancreatitis , s/p septic shock   intermittent fevers since admission, still spiking low grade fevers with elevated HR  WBC in 12-16 continues to be in range, up and down     SBP-less likely-ABD US showing worsening ascites, s/p paracentesis by IR 9/22 with 1.5L straw colored fluid removed -cultures did not show any specific organisms  9/21 KUB shows nonspecific bowel gas pattern without e/o obstruction or ileus   9/21 lactate wnl , procal unremarkable   Blood culture from 9/10 was positive for klebsiella quite sensitive and one set from 9/12 is negative, 9/25 repeat blood cultures negative, sputum cultures from 9/26- showing pseudomonas aeruginosa   SAAG < 1.1 going against portal HTN   -RLE showing no dvt , cellulitis improved  -CT Chest/Abdomen/Pelvis ordered   - Moderate right and small left pleural effusion.  Bilateral patchy airspace disease has improved compared to the prior.  Improving pancreatitis with decreased size of the pancreas as well as improved peripancreatic edema. Small collection adjacent to the undersurface of the tail is also decreased. No evidence of necrosis.  -as per Pulmonary may benefit from right thoracentesis.   -Continues to be on Cefazolin as per ID  -ID following     #Metabolic encephalopathy   -pt agitated  hepatic vs. ICU delirium   d/angela Precedex since 9/15 in ICU  -diazepam, Methadone and Seroquel - were tapered. Pt was tapered off all meds, however was very agitated, thus diazepam restarted daily, seroquel twice a day, ativan prn  Psych following   -will also consult Neurology- to assess brain function     #Acute respiratory failure secondary to PNA/ARDS   s/p TRACH 9/10/20   tolerating trach collar , off ventilator   continue with frequent suctioning , continue lasix      #Ascites   distended gallbladder and ascites seen on CT, abd US neg for acute cholecystitis   s/p paracentesis 9/22 by IR   see above   -on lasix    #Presumed cellulitis of the RUE --on cefazolin , improving   RUQ sono - neg for DVT     # metabolic alkalosis  sec to diuresis  s/p diamox x 1 dose in ICU 9/9/20   monitor chem     #s/p PEG placement 9/10/20  s/p SBO which was treated conservatively   Ileus , KUB 9/15/20 No dilated bowel is identified. There is nonobstructive bowel gas pattern  monitor residuals   high residuals in ICU, erythromycin IV started in ICU for improvement of motility of gut   continue with free water 200cc Q6H  repeat KUB 9/21 without e/o ileus or bowel obstruction   Nutritions reconsulted regarding tube feeds     #unstageable pressure ulcers x 2 on sacral area ,  no sign of necrosis or infection noted   -wound care consult appreciated , continue with nursing wound care per recs     #Hypokalememia--replete and monitor, decreased lasix   #Hypomagnesemia --replete and monitor     #normocytic anemia , probably related to sepsis and acute illness   requiring transfusion on 9/17/20  monitor H/H   follow anemia work-up   transfuse prn of H/H <7/21      #Preventative measures   lovenox SQ-dvt ppx  fall, aspiration precautions, HOBE   On scopolamine patch to prevent excess secretions, plus suctioning very often as per nursing  8/20/20 ECHO: pEF   PT , patient unable to participate, signed off, will reconsult once more awake  Low vit D-supplement        Bárbara Pemberton- [phone] (who lives in [place])    44M EtOH pancreatitis p/w abdominal pain 8/17/20, found to have necrotizing pancreatitis c/b septic shock and respiratory failure secondary to PNA and ARDS requiring intubation and DTs requiring sedation and small bowel obstruction conservatively managed with NGT and NPO for which he was admitted to ICU. Currently, s/p trach and PEG on 9/10 and treated for klebsiella bacteremia.    downgraded to  floor 9/20/20 from ICU    Assessment and Plan:  Pt agitated     #Fever, unclear source but most likely underlying PNA since new sputum cultures showed Pseudomonas  #s/p necrotizing pancreatitis , s/p septic shock   intermittent fevers since admission, still spiking low grade fevers with elevated HR  WBC in 12-16 continues to be in range, up and down   -antibiotics being changed from Cefazolin to Cefepime    SBP-less likely-ABD US showing worsening ascites, s/p paracentesis by IR 9/22 with 1.5L straw colored fluid removed -cultures did not show any specific organisms  9/21 KUB shows nonspecific bowel gas pattern without e/o obstruction or ileus   9/21 lactate wnl , procal unremarkable   Blood culture from 9/10 was positive for klebsiella quite sensitive and one set from 9/12 is negative, 9/25 repeat blood cultures negative, sputum cultures from 9/26- showing pseudomonas aeruginosa   SAAG < 1.1 going against portal HTN   -RLE showing no dvt , cellulitis improved  -CT Chest/Abdomen/Pelvis ordered   - Moderate right and small left pleural effusion.  Bilateral patchy airspace disease has improved compared to the prior.  Improving pancreatitis with decreased size of the pancreas as well as improved peripancreatic edema. Small collection adjacent to the undersurface of the tail is also decreased. No evidence of necrosis.  -as per Pulmonary may benefit from right thoracentesis.   -ID following     #Metabolic encephalopathy   -pt agitated  hepatic vs. ICU delirium   d/angela Precedex since 9/15 in ICU  -diazepam, Methadone and Seroquel - were tapered. Pt was tapered off all meds, however was very agitated, thus diazepam restarted daily, seroquel twice a day, ativan prn  Psych following   -will also consult Neurology- to assess brain function     #Acute respiratory failure secondary to PNA/ARDS   s/p TRACH 9/10/20   tolerating trach collar , off ventilator   continue with frequent suctioning , continue lasix      #Ascites   distended gallbladder and ascites seen on CT, abd US neg for acute cholecystitis   s/p paracentesis 9/22 by IR   see above   -on lasix    #Presumed cellulitis of the RUE --on cefazolin , improving   RUQ sono - neg for DVT     # metabolic alkalosis  sec to diuresis  s/p diamox x 1 dose in ICU 9/9/20   monitor chem     #s/p PEG placement 9/10/20  s/p SBO which was treated conservatively   Ileus , KUB 9/15/20 No dilated bowel is identified. There is nonobstructive bowel gas pattern  monitor residuals   high residuals in ICU, erythromycin IV started in ICU for improvement of motility of gut   continue with free water 200cc Q6H  repeat KUB 9/21 without e/o ileus or bowel obstruction   Nutritions reconsulted regarding tube feeds     #unstageable pressure ulcers x 2 on sacral area ,  no sign of necrosis or infection noted   -wound care consult appreciated , continue with nursing wound care per recs     #Hypokalememia--replete and monitor, decreased lasix   #Hypomagnesemia --replete and monitor     #normocytic anemia , probably related to sepsis and acute illness   requiring transfusion on 9/17/20  monitor H/H   follow anemia work-up   transfuse prn of H/H <7/21      #Preventative measures   lovenox SQ-dvt ppx  fall, aspiration precautions, HOBE   On scopolamine patch to prevent excess secretions, plus suctioning very often as per nursing  8/20/20 ECHO: pEF   PT , patient unable to participate, signed off, will reconsult once more awake  Low vit D-supplement        Bárbara Pemberton- [phone] (who lives in [place])

## 2020-09-30 LAB
24R-OH-CALCIDIOL SERPL-MCNC: 25 NG/ML — LOW (ref 30–80)
ALBUMIN SERPL ELPH-MCNC: 2.4 G/DL — LOW (ref 3.3–5)
ALP SERPL-CCNC: 138 U/L — HIGH (ref 40–120)
ALT FLD-CCNC: 22 U/L — SIGNIFICANT CHANGE UP (ref 12–78)
ANION GAP SERPL CALC-SCNC: 6 MMOL/L — SIGNIFICANT CHANGE UP (ref 5–17)
AST SERPL-CCNC: 45 U/L — HIGH (ref 15–37)
BILIRUB SERPL-MCNC: 1.5 MG/DL — HIGH (ref 0.2–1.2)
BUN SERPL-MCNC: 21 MG/DL — SIGNIFICANT CHANGE UP (ref 7–23)
CA-I BLD-SCNC: 1.52 MMOL/L — HIGH (ref 1.12–1.3)
CALCIUM SERPL-MCNC: 11.5 MG/DL — HIGH (ref 8.4–10.5)
CALCIUM SERPL-MCNC: 11.7 MG/DL — HIGH (ref 8.5–10.1)
CHLORIDE SERPL-SCNC: 104 MMOL/L — SIGNIFICANT CHANGE UP (ref 96–108)
CO2 SERPL-SCNC: 31 MMOL/L — SIGNIFICANT CHANGE UP (ref 22–31)
CREAT SERPL-MCNC: 0.53 MG/DL — SIGNIFICANT CHANGE UP (ref 0.5–1.3)
CULTURE RESULTS: SIGNIFICANT CHANGE UP
CULTURE RESULTS: SIGNIFICANT CHANGE UP
GLUCOSE BLDC GLUCOMTR-MCNC: 122 MG/DL — HIGH (ref 70–99)
GLUCOSE BLDC GLUCOMTR-MCNC: 128 MG/DL — HIGH (ref 70–99)
GLUCOSE BLDC GLUCOMTR-MCNC: 132 MG/DL — HIGH (ref 70–99)
GLUCOSE BLDC GLUCOMTR-MCNC: 134 MG/DL — HIGH (ref 70–99)
GLUCOSE SERPL-MCNC: 114 MG/DL — HIGH (ref 70–99)
POTASSIUM SERPL-MCNC: 3.3 MMOL/L — LOW (ref 3.5–5.3)
POTASSIUM SERPL-SCNC: 3.3 MMOL/L — LOW (ref 3.5–5.3)
PROT SERPL-MCNC: 9.4 GM/DL — HIGH (ref 6–8.3)
PTH-INTACT FLD-MCNC: 19 PG/ML — SIGNIFICANT CHANGE UP (ref 15–65)
SODIUM SERPL-SCNC: 141 MMOL/L — SIGNIFICANT CHANGE UP (ref 135–145)
SPECIMEN SOURCE: SIGNIFICANT CHANGE UP
SPECIMEN SOURCE: SIGNIFICANT CHANGE UP

## 2020-09-30 PROCEDURE — 70450 CT HEAD/BRAIN W/O DYE: CPT | Mod: 26

## 2020-09-30 PROCEDURE — 99233 SBSQ HOSP IP/OBS HIGH 50: CPT

## 2020-09-30 PROCEDURE — 99232 SBSQ HOSP IP/OBS MODERATE 35: CPT

## 2020-09-30 PROCEDURE — 99231 SBSQ HOSP IP/OBS SF/LOW 25: CPT

## 2020-09-30 RX ORDER — PAMIDRONATE DISODIUM 9 MG/ML
60 INJECTION, SOLUTION INTRAVENOUS ONCE
Refills: 0 | Status: COMPLETED | OUTPATIENT
Start: 2020-09-30 | End: 2020-10-01

## 2020-09-30 RX ORDER — POTASSIUM CHLORIDE 20 MEQ
40 PACKET (EA) ORAL ONCE
Refills: 0 | Status: COMPLETED | OUTPATIENT
Start: 2020-09-30 | End: 2020-09-30

## 2020-09-30 RX ORDER — POLYETHYLENE GLYCOL 3350 17 G/17G
17 POWDER, FOR SOLUTION ORAL DAILY
Refills: 0 | Status: DISCONTINUED | OUTPATIENT
Start: 2020-09-30 | End: 2020-10-07

## 2020-09-30 RX ADMIN — Medication 40 MILLIGRAM(S): at 05:35

## 2020-09-30 RX ADMIN — SCOPALAMINE 1 PATCH: 1 PATCH, EXTENDED RELEASE TRANSDERMAL at 23:28

## 2020-09-30 RX ADMIN — Medication 40 MILLIEQUIVALENT(S): at 17:24

## 2020-09-30 RX ADMIN — Medication 3 MILLILITER(S): at 23:44

## 2020-09-30 RX ADMIN — ENOXAPARIN SODIUM 40 MILLIGRAM(S): 100 INJECTION SUBCUTANEOUS at 12:40

## 2020-09-30 RX ADMIN — Medication 2 MILLIGRAM(S): at 03:36

## 2020-09-30 RX ADMIN — Medication 5 MILLIGRAM(S): at 08:31

## 2020-09-30 RX ADMIN — Medication 1 MILLIGRAM(S): at 12:40

## 2020-09-30 RX ADMIN — CEFEPIME 100 MILLIGRAM(S): 1 INJECTION, POWDER, FOR SOLUTION INTRAMUSCULAR; INTRAVENOUS at 15:05

## 2020-09-30 RX ADMIN — Medication 5 MILLIGRAM(S): at 12:39

## 2020-09-30 RX ADMIN — CHLORHEXIDINE GLUCONATE 1 APPLICATION(S): 213 SOLUTION TOPICAL at 05:36

## 2020-09-30 RX ADMIN — Medication 1 TABLET(S): at 12:40

## 2020-09-30 RX ADMIN — Medication 12.5 MILLIGRAM(S): at 17:23

## 2020-09-30 RX ADMIN — Medication 2 MILLIGRAM(S): at 17:20

## 2020-09-30 RX ADMIN — CEFEPIME 100 MILLIGRAM(S): 1 INJECTION, POWDER, FOR SOLUTION INTRAMUSCULAR; INTRAVENOUS at 22:07

## 2020-09-30 RX ADMIN — Medication 12.5 MILLIGRAM(S): at 05:35

## 2020-09-30 RX ADMIN — Medication 1 APPLICATION(S): at 11:36

## 2020-09-30 RX ADMIN — Medication 3 MILLILITER(S): at 00:10

## 2020-09-30 RX ADMIN — SCOPALAMINE 1 PATCH: 1 PATCH, EXTENDED RELEASE TRANSDERMAL at 07:14

## 2020-09-30 RX ADMIN — Medication 2 MILLIGRAM(S): at 12:32

## 2020-09-30 RX ADMIN — CEFEPIME 100 MILLIGRAM(S): 1 INJECTION, POWDER, FOR SOLUTION INTRAMUSCULAR; INTRAVENOUS at 05:35

## 2020-09-30 RX ADMIN — PANTOPRAZOLE SODIUM 40 MILLIGRAM(S): 20 TABLET, DELAYED RELEASE ORAL at 12:40

## 2020-09-30 RX ADMIN — Medication 3 MILLILITER(S): at 05:17

## 2020-09-30 RX ADMIN — QUETIAPINE FUMARATE 150 MILLIGRAM(S): 200 TABLET, FILM COATED ORAL at 05:35

## 2020-09-30 RX ADMIN — CHLORHEXIDINE GLUCONATE 15 MILLILITER(S): 213 SOLUTION TOPICAL at 17:21

## 2020-09-30 RX ADMIN — Medication 3 MILLILITER(S): at 17:23

## 2020-09-30 RX ADMIN — Medication 100 MILLIGRAM(S): at 12:40

## 2020-09-30 RX ADMIN — CHLORHEXIDINE GLUCONATE 15 MILLILITER(S): 213 SOLUTION TOPICAL at 05:36

## 2020-09-30 RX ADMIN — SCOPALAMINE 1 PATCH: 1 PATCH, EXTENDED RELEASE TRANSDERMAL at 19:32

## 2020-09-30 RX ADMIN — Medication 2000 UNIT(S): at 12:43

## 2020-09-30 RX ADMIN — Medication 3 MILLILITER(S): at 12:34

## 2020-09-30 RX ADMIN — QUETIAPINE FUMARATE 150 MILLIGRAM(S): 200 TABLET, FILM COATED ORAL at 17:20

## 2020-09-30 NOTE — PROGRESS NOTE ADULT - SUBJECTIVE AND OBJECTIVE BOX
INTERVAL HPI/OVERNIGHT EVENTS: Patient less agitated today, still having secretions from trach     Patient is a 44y old  Male who presents with a chief complaint of pancreatitis (24 Sep 2020 17:14)    no events. still with sig secretions     MEDICATIONS  (STANDING):  albuterol/ipratropium for Nebulization 3 milliLiter(s) Nebulizer every 6 hours  cefepime   IVPB      cefepime   IVPB 2000 milliGRAM(s) IV Intermittent every 8 hours  chlorhexidine 0.12% Liquid 15 milliLiter(s) Oral Mucosa every 12 hours  chlorhexidine 4% Liquid 1 Application(s) Topical <User Schedule>  cholecalciferol 2000 Unit(s) Oral daily  collagenase Ointment 1 Application(s) Topical daily  dextrose 5%. 1000 milliLiter(s) (50 mL/Hr) IV Continuous <Continuous>  dextrose 50% Injectable 12.5 Gram(s) IV Push once  dextrose 50% Injectable 25 Gram(s) IV Push once  dextrose 50% Injectable 25 Gram(s) IV Push once  enoxaparin Injectable 40 milliGRAM(s) SubCutaneous daily  folic acid 1 milliGRAM(s) Oral daily  furosemide   Injectable 40 milliGRAM(s) IV Push daily  insulin lispro (HumaLOG) corrective regimen sliding scale   SubCutaneous every 6 hours  LORazepam     Tablet 2 milliGRAM(s) Oral <User Schedule>  metoprolol tartrate 12.5 milliGRAM(s) Oral two times a day  multivitamin 1 Tablet(s) Oral daily  pamidronate IVPB 60 milliGRAM(s) IV Intermittent once  pantoprazole   Suspension 40 milliGRAM(s) Oral daily  potassium chloride   Powder 40 milliEquivalent(s) Oral once  QUEtiapine 150 milliGRAM(s) Oral two times a day  scopolamine 1 mG/72 Hr(s) Patch 1 Patch Transdermal every 72 hours  thiamine 100 milliGRAM(s) Oral daily    MEDICATIONS  (PRN):  acetaminophen   Tablet .. 650 milliGRAM(s) Oral every 6 hours PRN Temp greater or equal to 38C (100.4F), Mild Pain (1 - 3)  dextrose 40% Gel 15 Gram(s) Oral once PRN Blood Glucose LESS THAN 70 milliGRAM(s)/deciliter  glucagon  Injectable 1 milliGRAM(s) IntraMuscular once PRN Glucose LESS THAN 70 milligrams/deciliter  LORazepam   Injectable 2 milliGRAM(s) IV Push every 4 hours PRN breakthrough agitation  metoprolol tartrate Injectable 5 milliGRAM(s) IV Push every 6 hours PRN if sbp>160 or HR >110 hold if SBP<100 or HR<60  polyethylene glycol 3350 17 Gram(s) Oral daily PRN Constipation        Allergies    No Known Allergies    Intolerances        REVIEW OF SYSTEMS:  unable to obtain due poor mentation     Vital Signs Last 24 Hrs  T(C): 37.2 (30 Sep 2020 12:37), Max: 38 (29 Sep 2020 17:32)  T(F): 99 (30 Sep 2020 12:37), Max: 100.4 (29 Sep 2020 17:32)  HR: 123 (30 Sep 2020 12:46) (97 - 125)  BP: 133/87 (30 Sep 2020 12:37) (117/81 - 133/87)  BP(mean): --  RR: 22 (30 Sep 2020 12:46) (20 - 23)  SpO2: 99% (30 Sep 2020 12:46) (98% - 100%)      PHYSICAL EXAM:  GENERAL: NAD, well-groomed, well-developed  HEAD:  Atraumatic, Normocephalic  NERVOUS SYSTEM:  Alert & Oriented X0, with open eyes, very agitated  CHEST/LUNG: coarse sounds bilaterally, lots of secretions   HEART: Regular rate and rhythm; No murmurs, rubs, or gallops  ABDOMEN: Soft, Nontender, Nondistended; Bowel sounds present. PEG tube present  EXTREMITIES:  2+ Peripheral Pulses, No clubbing, cyanosis, or edema      LABS:                          8.6    11.42 )-----------( 214      ( 29 Sep 2020 07:41 )             27.6     09-30    141  |  104  |  21  ----------------------------<  114<H>  3.3<L>   |  31  |  0.53    Ca    11.7<H>      30 Sep 2020 10:25  Phos  2.7     09-29  Mg     1.6     09-29    TPro  9.4<H>  /  Alb  2.4<L>  /  TBili  1.5<H>  /  DBili  x   /  AST  45<H>  /  ALT  22  /  AlkPhos  138<H>  09-30

## 2020-09-30 NOTE — PROGRESS NOTE ADULT - ATTENDING COMMENTS
Angelito Morrison DO  Cell: [phone]  Pager [phone]  Infectious Disease Attending  After 5pm/weekends please call [phone] for all inquiries and new consults

## 2020-09-30 NOTE — PROGRESS NOTE BEHAVIORAL HEALTH - NSBHCHARTREVIEWVS_PSY_A_CORE FT
T(C): 37.4 (09-30-20 @ 05:22), Max: 38 (09-29-20 @ 17:32)  HR: 114 (09-30-20 @ 08:26) (97 - 123)  BP: 117/81 (09-30-20 @ 05:22) (117/81 - 126/75)  RR: 23 (09-30-20 @ 08:26) (18 - 24)  SpO2: 99% (09-30-20 @ 08:26) (98% - 100%)

## 2020-09-30 NOTE — PROGRESS NOTE ADULT - SUBJECTIVE AND OBJECTIVE BOX
INTERVAL HPI:  44M with ETOH Abuse and withdrawal, Alcohol Pancreatitis who presents to the ED with severe abdominal pain.  Found to have Necrotizing Pancreatitis, Septic  Shock, Pneumonia/ARDS with Hypoxic Respiratory failure. Admitted to ICU, had prolonged hospital course with Vent support. Eventually required Tracheostomy and Peg placement.  Also with acute metabolic Encephalopathy.  09/20/20:  Transferred to .    Not able to provide details due to encephalopathy.  09/22/20: 1500 ml paracentesis.    OVERNIGHT EVENTS:  No change in clinical status.    Vital Signs Last 24 Hrs  T(C): 37.2 (30 Sep 2020 12:37), Max: 38 (29 Sep 2020 17:32)  T(F): 99 (30 Sep 2020 12:37), Max: 100.4 (29 Sep 2020 17:32)  HR: 123 (30 Sep 2020 12:46) (97 - 125)  BP: 133/87 (30 Sep 2020 12:37) (117/81 - 133/87)  BP(mean): --  RR: 22 (30 Sep 2020 12:46) (20 - 23)  SpO2: 99% (30 Sep 2020 12:46) (98% - 100%)    Mode: standby    PHYSICAL EXAM:  GEN:        comfortable.  HEENT:    Normal.    RESP:        crackles.  CVS:          Regular rate and rhythm.   ABD:         Soft, non-tender, non-distended;     MEDICATIONS  (STANDING):  albuterol/ipratropium for Nebulization 3 milliLiter(s) Nebulizer every 6 hours  cefepime   IVPB      cefepime   IVPB 2000 milliGRAM(s) IV Intermittent every 8 hours  chlorhexidine 0.12% Liquid 15 milliLiter(s) Oral Mucosa every 12 hours  chlorhexidine 4% Liquid 1 Application(s) Topical <User Schedule>  cholecalciferol 2000 Unit(s) Oral daily  collagenase Ointment 1 Application(s) Topical daily  dextrose 5%. 1000 milliLiter(s) (50 mL/Hr) IV Continuous <Continuous>  dextrose 50% Injectable 12.5 Gram(s) IV Push once  dextrose 50% Injectable 25 Gram(s) IV Push once  dextrose 50% Injectable 25 Gram(s) IV Push once  enoxaparin Injectable 40 milliGRAM(s) SubCutaneous daily  folic acid 1 milliGRAM(s) Oral daily  furosemide   Injectable 40 milliGRAM(s) IV Push daily  insulin lispro (HumaLOG) corrective regimen sliding scale   SubCutaneous every 6 hours  LORazepam     Tablet 2 milliGRAM(s) Oral <User Schedule>  metoprolol tartrate 12.5 milliGRAM(s) Oral two times a day  multivitamin 1 Tablet(s) Oral daily  pamidronate IVPB 60 milliGRAM(s) IV Intermittent once  pantoprazole   Suspension 40 milliGRAM(s) Oral daily  potassium chloride   Powder 40 milliEquivalent(s) Oral once  QUEtiapine 150 milliGRAM(s) Oral two times a day  scopolamine 1 mG/72 Hr(s) Patch 1 Patch Transdermal every 72 hours  thiamine 100 milliGRAM(s) Oral daily    MEDICATIONS  (PRN):  acetaminophen   Tablet .. 650 milliGRAM(s) Oral every 6 hours PRN Temp greater or equal to 38C (100.4F), Mild Pain (1 - 3)  dextrose 40% Gel 15 Gram(s) Oral once PRN Blood Glucose LESS THAN 70 milliGRAM(s)/deciliter  glucagon  Injectable 1 milliGRAM(s) IntraMuscular once PRN Glucose LESS THAN 70 milligrams/deciliter  LORazepam   Injectable 2 milliGRAM(s) IV Push every 4 hours PRN breakthrough agitation  metoprolol tartrate Injectable 5 milliGRAM(s) IV Push every 6 hours PRN if sbp>160 or HR >110 hold if SBP<100 or HR<60  polyethylene glycol 3350 17 Gram(s) Oral daily PRN Constipation    LABS:                        8.6    11.42 )-----------( 214      ( 29 Sep 2020 07:41 )             27.6     09-30    141  |  104  |  21  ----------------------------<  114<H>  3.3<L>   |  31  |  0.53    Ca    11.7<H>      30 Sep 2020 10:25  Phos  2.7     09-29  Mg     1.6     09-29    TPro  9.4<H>  /  Alb  2.4<L>  /  TBili  1.5<H>  /  DBili  x   /  AST  45<H>  /  ALT  22  /  AlkPhos  138<H>  09-30    ASSESSMENT AND PLAN:  ·	Hypoxic Respiratory failure.  ·	S/P tracheostomy.  ·	Bilateral pneumonia.  ·	Bilateral pleural effusion.  ·	S/P Septic Shock.  ·	Necrotizing Pancreatis.  ·	Acute metabolic Encephalopathy.  ·	Alcohol abuse.  ·	Klebsiella Bacteremia.  ·	Anemia    Continue antibiotics, nebulizer.  Suction as needed  DC Vent as has not required for many days.

## 2020-09-30 NOTE — PROGRESS NOTE BEHAVIORAL HEALTH - NSBHFUPINTERVALHXFT_PSY_A_CORE
Received 1 dose of Ativan IVP PRN early this morning. Continues to need frequent minute suctioning as per nursing; started on Maxipime yesterday for Pseudomonas. Patient remains restless throughout the day, moving around in his bed. When his eyes are open, he makes weak eye contact, not able to follow direction and does not communicate in any meaningful manner.

## 2020-09-30 NOTE — PROGRESS NOTE ADULT - SUBJECTIVE AND OBJECTIVE BOX
Neurology Progress Note    No acute events. No agitation noted.     Neuro Exam: Alert. Making eye contact. Nodding appropriately to my questions. Mild to moderate psychomotor delay. Need to repeat questions a few times for him to answer. Following simple commands like 'stick out your tongue', 'give me a thumbs up'. Spontaneously moving all four extremities against gravity. Muscle atrophy noted in all four extremities.     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  - EEG pending  - wean off sedating medication as tolerated   - Agitation being treated by psychiatry.   - neuro improving slowly. Patient was critically ill for many weeks and will take time to improve possibly back to baseline.   - PT/OT

## 2020-09-30 NOTE — PROGRESS NOTE ADULT - ASSESSMENT
44M EtOH pancreatitis p/w abdominal pain 8/17/20, found to have necrotizing pancreatitis c/b septic shock and respiratory failure secondary to PNA and ARDS requiring intubation and DTs requiring sedation and small bowel obstruction conservatively managed with NGT and NPO for which he was admitted to ICU. Currently, s/p trach and PEG on 9/10 and treated for klebsiella bacteremia.    downgraded to  floor 9/20/20 from ICU    Assessment and Plan:  Pt agitated     #Fever, unclear source but most likely underlying PNA since new sputum cultures showed Pseudomonas    #s/p necrotizing pancreatitis , s/p septic shock   intermittent fevers since admission,  WBC in 12-16 continues to be in range, up and down   -antibiotics being changed from Cefazolin to Cefepime    SBP-less likely-ABD US showing worsening ascites, s/p paracentesis by IR 9/22 with 1.5L straw colored fluid removed -cultures did not show any specific organisms  9/21 KUB shows nonspecific bowel gas pattern without e/o obstruction or ileus   9/21 lactate wnl , procal unremarkable   Blood culture from 9/10 was positive for klebsiella quite sensitive and one set from 9/12 is negative, 9/25 repeat blood cultures negative, sputum cultures from 9/26- showing pseudomonas aeruginosa   SAAG < 1.1 going against portal HTN   -RLE showing no dvt , cellulitis improved  -CT Chest/Abdomen/Pelvis ordered   - Moderate right and small left pleural effusion.  Bilateral patchy airspace disease has improved compared to the prior.  Improving pancreatitis with decreased size of the pancreas as well as improved peripancreatic edema. Small collection adjacent to the undersurface of the tail is also decreased. No evidence of necrosis.  -as per Pulmonary may benefit from right thoracentesis.   -ID following     #Metabolic encephalopathy   -pt agitated  hepatic vs. ICU delirium vs sepisis   - mri pending to r/o cva    d/angela Precedex since 9/15 in ICU  -diazepam, Methadone and Seroquel - were tapered. Pt was tapered off all meds, however was very agitated, thus diazepam restarted daily, seroquel twice a day, ativan prn  Psych following   -neuro  consult appreciated      #Acute respiratory failure secondary to PNA/ARDS   s/p TRACH 9/10/20   tolerating trach collar , off ventilator   continue with frequent suctioning , continue lasix      #Ascites   distended gallbladder and ascites seen on CT, abd US neg for acute cholecystitis   s/p paracentesis 9/22 by IR   see above   -on lasix    #Presumed cellulitis of the RUE --on cefazolin , improving   RUQ sono - neg for DVT     # metabolic alkalosis  sec to diuresis  s/p diamox x 1 dose in ICU 9/9/20   monitor chem     #s/p PEG placement 9/10/20    s/p SBO which was treated conservatively   Ileus , KUB 9/15/20 No dilated bowel is identified. There is nonobstructive bowel gas pattern  monitor residuals   high residuals in ICU, erythromycin IV started in ICU for improvement of motility of gut   continue with free water 200cc Q6H  repeat KUB 9/21 without e/o ileus or bowel obstruction   Nutritions reconsulted regarding tube feeds     #unstageable pressure ulcers x 2 on sacral area ,  no sign of necrosis or infection noted   -wound care consult appreciated , continue with nursing wound care per recs     #Hypokalememia--replete and monitor, decreased lasix     #Hypomagnesemia --replete and monitor     #normocytic anemia , probably related to sepsis and acute illness   requiring transfusion on 9/17/20  monitor H/H   follow anemia work-up   transfuse prn of H/H <7/21      hypercalcemia   - 2/2 to immobilization vs vitamin d supplementation although levels are only 25   - will give one dose of pamidronate and bolus fluids     #Preventative measures   lovenox SQ-dvt ppx  fall, aspiration precautions, HOBE   On scopolamine patch to prevent excess secretions, plus suctioning very often as per nursing  8/20/20 ECHO: pEF   PT , patient unable to participate, signed off, will reconsult once more awake          Bárbara Pemberton- [phone] (who lives in [place])

## 2020-09-30 NOTE — PROGRESS NOTE ADULT - ASSESSMENT
DEEPIKA RUCKER is a 44M PMH alcohol abuse, hx of alcohol withdrawal and alcohol pancreatitis who presented for abdominal pain. Admitted with necrotizing pancreatitis, sepsis, septic shock with course complicated by acute hypoxic respiratory failure secondary to PNA and ARDS requiring intubation and DTs requiring sedation and small bowel obstruction conservatively managed with NGT and NPO. Currently, s/p trach and PEG on 9/10 and treated for klebsiella bacteremia.  Intermittently having fevers, last fever 9/21, 101.0  WBC staying in the 12-16k range  Had Abdominal US today which showed large amount of ascites and IR performed paracentesis which removed 1500cc fluid  SAAG <1.1 which goes against portal hypertension and more in line with pancreatitis vs SBP vs some other cause   CXR from 9/21 with increased lung markings and effusions as well as trach (personally reviewed)   Blood culture from 9/10 was positive for klebsiella quite sensitive and one set from 9/12 is negative   Patient has multiple possible sources of fever:  Abdomen such as SBP, pancreatitis, hepatobiliary disease   Pulmonary: CXR very unclear but he is on trach collar thus unlikely   Skin and soft tissue: cellulitis of the arm (US didnt show DVT or thrombophlebitis)  Thromboembolic: could have DVT       Antibiotics this admission:  Vancomycin: 8/26-27; 9/16-20  Zosyn: 8/18-8/27  Meropenem: 8/23-9/2  ceftriaxone: 9/14-20  Cefazolin: 9/20->9/29  Cefepime 9/29->    9/23: Continues to respond to cefazolin, fluid analysis not consistent with SBP and negative culture thus far, heavily sedated though meds cut in half,  9/24: Spiked a fever, panculture and repeat imaging, Behavioral health consulted, ascites fluid culture negative   9/25: still spiking, getting CT c/a/p, continue cefazolin for cellulitis which is much better   9/29: still febrile, slowly peeling off sedatives, pseudomonas growing in sputum   9/30: Fever curve improving, CT head negative, planned for MRI brain, continue cefepime    Overall, 44M pancreatitis, fever, leukocytosis, abdominal ascites, cellulitis (resolved), pneumonia   -Continue Cefepime 2g q8hrs with plan to give for 7 days   -needs extensive pulmonary toilet/pulmonary PT/suctioning  -DVT study of LE bilaterally was negative  -agree with reducing sedation though monitor for withdrawal   -CT head negative MRI pending   -ensure offloading and adequate nutrition to avoid bed sores

## 2020-09-30 NOTE — PROGRESS NOTE ADULT - SUBJECTIVE AND OBJECTIVE BOX
DEEPIKA RUCKER  MRN-[id number]    Follow Up:  ID following for cellulitis and fever     Interval History: Patient seen and examined. Fever curve improving. Abx were changed yesterday, CT head unremarkable     ROS:    [X] Unobtainable because: toxic metabolic encephalopathy   [ ] All other systems negative    Constitutional: no fever, no chills  Head: no trauma  Eyes: no vision changes, no eye pain  ENT:  no sore throat, no rhinorrhea  Cardiovascular:  no chest pain, no palpitation  Respiratory:  no SOB, no cough  GI:  no abd pain, no vomiting, no diarrhea  urinary: no dysuria, no hematuria, no flank pain  musculoskeletal:  no joint pain, no joint swelling  skin:  no rash  neurology:  no headache, no seizure, no change in mental status  psych: no anxiety, no depression         Allergies  No Known Allergies        ANTIMICROBIALS:    cefepime   IVPB    cefepime   IVPB 2000 every 8 hours      MEDICATIONS  (STANDING):  albuterol/ipratropium for Nebulization 3 every 6 hours  dextrose 50% Injectable 12.5 once  dextrose 50% Injectable 25 once  dextrose 50% Injectable 25 once  enoxaparin Injectable 40 daily  furosemide   Injectable 40 daily  insulin lispro (HumaLOG) corrective regimen sliding scale  every 6 hours  LORazepam     Tablet 2 <User Schedule>  metoprolol tartrate 12.5 two times a day  pantoprazole   Suspension 40 daily  QUEtiapine 150 two times a day  scopolamine 1 mG/72 Hr(s) Patch 1 every 72 hours      PRN  acetaminophen   Tablet .. 650 milliGRAM(s) Oral every 6 hours PRN  dextrose 40% Gel 15 Gram(s) Oral once PRN  glucagon  Injectable 1 milliGRAM(s) IntraMuscular once PRN  LORazepam   Injectable 2 milliGRAM(s) IV Push every 4 hours PRN  metoprolol tartrate Injectable 5 milliGRAM(s) IV Push every 6 hours PRN  polyethylene glycol 3350 17 Gram(s) Oral daily PRN      Vital Signs Last 24 Hrs  T(F): 99 (20 @ 12:37), Max: 100.4 (20 @ 17:32)  HR: 123 (20 @ 12:46)  BP: 133/87 (20 @ 12:37)  RR: 22 (20 @ 12:46)  SpO2: 99% (20 @ 12:46) (98% - 100%)  Wt(kg): --    Physical Exam:  Constitutional: non-toxic, no distress  HEAD/EYES: anicteric, no conjunctival injection  ENT:  supple, + trach collar   Cardiovascular:   normal S1, S2, no murmur  Respiratory:  coarse breath sounds bilaterally though improved today   GI:  soft, no tenderness, normal bowel sounds, +peg tube c/d/i  Musculoskeletal:  no synovitis  Neurologic: poor mental status, seems to follow some simple commands  Skin:  psoriasis on the scalp and face, right forearm with tattoo, cellulitis resolved  Heme/Onc: no lymphadenopathy   Psychiatric:  patient not following commands appropriately     WBC Count: 11.42 K/uL ( @ 07:41)  WBC Count: 11.75 K/uL ( @ 07:38)  WBC Count: 14.46 K/uL ( @ 06:56)  WBC Count: 16.86 K/uL ( @ 20:20)  WBC Count: 12.38 K/uL ( @ 09:11)                            8.6    11.42 )-----------( 214      ( 29 Sep 2020 07:41 )             27.6           141  |  104  |  21  ----------------------------<  114<H>  3.3<L>   |  31  |  0.53    Ca    11.7<H>      30 Sep 2020 10:25  Phos  2.7       Mg     1.6         TPro  9.4<H>  /  Alb  2.4<L>  /  TBili  1.5<H>  /  DBili  x   /  AST  45<H>  /  ALT  22  /  AlkPhos  138<H>        Creatinine Trend: 0.53<--, 0.59<--, 0.52<--, 0.54<--, 0.53<--, 0.44<--          Urinalysis Basic - ( 21 Sep 2020 21:11 )    Color: Yellow / Appearance: Clear / S.020 / pH: x  Gluc: x / Ketone: Negative  / Bili: Negative / Urobili: 8 mg/dL   Blood: x / Protein: Negative mg/dL / Nitrite: Negative   Leuk Esterase: Trace / RBC: x / WBC 0-2   Sq Epi: x / Non Sq Epi: Occasional / Bacteria: Occasional        Creatinine Trend: 0.43<--, 0.49<--, 0.41<--, 0.61<--, 0.45<--, 0.49<--  Lactate, Blood: 0.4 mmol/L (20 @ 18:24)      MICROBIOLOGY:  Culture - Sputum . (20 @ 15:16)    Gram Stain:   Numerous polymorphonuclear leukocytes per low power field  Few Squamous epithelial cells per low power field  Numerous Gram Negative Rods per oil power field  Few Gram Positive Cocci in Clusters per oil power field    -  Cefepime: S <=2    Specimen Source: .Sputum Sputum    Culture Results:   Moderate Pseudomonas aeruginosa    Organism Identification: Pseudomonas aeruginosa    Organism: Pseudomonas aeruginosa    Method Type: KEITH        Ascites Fl paracentesis ascites  20 --  --    No polymorphonuclear cells seen per low power field  White blood cells seen per oil power field  No organisms seen per oil power field  by cytocentrifuge      .Blood Blood-Peripheral  20   No growth to date.  --  --      .Blood Blood  20   No Growth Final  --  --      .Urine Catheterized  20   No growth  --  --      .Sputum Sputum  20   No growth at 48 hours  --    Few polymorphonuclear leukocytes per low power field  Rare Squamous epithelial cells per low power field  No organisms seen per oil power field      .Blood Blood  09-10-20   Growth in aerobic and anaerobic bottles: Klebsiella pneumoniae  See previous culture [id number]  --    Growth in aerobic bottle:  Gram Negative Rods  Growth in anaerobic bottle:  Gram Negative Rods      .Blood Blood  09-10-20   Growth in aerobic bottle: Klebsiella pneumoniae  -  Blood Culture PCR  Klebsiella pneumoniae      .Blood Blood-Peripheral  20   No Growth Final  --  --      .Sputum Sputum  20   Normal Respiratory Poonam present  --    Moderate polymorphonuclear leukocytes per low power field  Rare Squamous epithelial cells per low power field  Rare Gram positive cocci in pairs per oil power field      .Sputum Sputum  20   Rare Normal Respiratory Poonam present  --    Numerous polymorphonuclear leukocytes per low power field  Few Squamous epithelial cells per low power field  No organisms seen per oil power field      .Sputum Sputum  20   Normal Respiratory Poonam present  --    No polymorphonuclear leukocytes per low power field  No Squamous epithelial cells per low power field  No organisms seen per oil power field    Ferritin, Serum: 740 ()      Procalcitonin, Serum: 0.16 (20 @ 11:40)  Procalcitonin, Serum: 0.17 (20 @ 02:21)      RADIOLOGY:  CT Head No Cont (20 @ 12:38) Impression:  1. Unremarkable noncontrast CT scan of the brain.      CT Abdomen and Pelvis w/ IV Cont (20 @ 21:11)  IMPRESSION:    Moderate right and small left pleural effusion.

## 2020-09-30 NOTE — PROGRESS NOTE BEHAVIORAL HEALTH - OTHER
tenuous weak tenuous as per notes deferred at this time none looks confused as expected / appropriate to context not able to ascertain at this time due to limitation of exam

## 2020-09-30 NOTE — PROGRESS NOTE BEHAVIORAL HEALTH - NSBHCONSULTMEDS_PSY_A_CORE FT
methadone weaned off; increased Seroquel 50mg PO bid to 100mg via PEG BID and increase Valium to 5mg PO bid on 9/28  - continued restlessness; increase Seroquel 100bid to 150bid  - PRN Ativan dose increased to 2mg with continued need so DC Valium on 9/30/20 and use Ativan 2mg PO q8am, q4pm but HOLD for sedation with goal to stabilize agitation to the point that Patient will not need any PRNs and will have episodes of being awake

## 2020-09-30 NOTE — PROGRESS NOTE BEHAVIORAL HEALTH - NSBHCONSULTFOLLOWAFTERCARE_PSY_A_CORE FT
suspecting Patient is at his current new baseline / ie mental status with low probability to regain significant cognition in the near future while at [facility]

## 2020-10-01 LAB
ALBUMIN SERPL ELPH-MCNC: 2.7 G/DL — LOW (ref 3.3–5)
ALP SERPL-CCNC: 149 U/L — HIGH (ref 40–120)
ALT FLD-CCNC: 28 U/L — SIGNIFICANT CHANGE UP (ref 12–78)
ANION GAP SERPL CALC-SCNC: 8 MMOL/L — SIGNIFICANT CHANGE UP (ref 5–17)
AST SERPL-CCNC: 57 U/L — HIGH (ref 15–37)
BILIRUB SERPL-MCNC: 1.4 MG/DL — HIGH (ref 0.2–1.2)
BUN SERPL-MCNC: 25 MG/DL — HIGH (ref 7–23)
CA-I BLD-SCNC: 1.53 MMOL/L — HIGH (ref 1.12–1.3)
CALCIUM SERPL-MCNC: 11.5 MG/DL — HIGH (ref 8.5–10.1)
CHLORIDE SERPL-SCNC: 100 MMOL/L — SIGNIFICANT CHANGE UP (ref 96–108)
CO2 SERPL-SCNC: 29 MMOL/L — SIGNIFICANT CHANGE UP (ref 22–31)
CREAT SERPL-MCNC: 0.58 MG/DL — SIGNIFICANT CHANGE UP (ref 0.5–1.3)
GLUCOSE BLDC GLUCOMTR-MCNC: 108 MG/DL — HIGH (ref 70–99)
GLUCOSE BLDC GLUCOMTR-MCNC: 123 MG/DL — HIGH (ref 70–99)
GLUCOSE BLDC GLUCOMTR-MCNC: 133 MG/DL — HIGH (ref 70–99)
GLUCOSE SERPL-MCNC: 109 MG/DL — HIGH (ref 70–99)
HCT VFR BLD CALC: 26.8 % — LOW (ref 39–50)
HGB BLD-MCNC: 8.2 G/DL — LOW (ref 13–17)
MCHC RBC-ENTMCNC: 29.2 PG — SIGNIFICANT CHANGE UP (ref 27–34)
MCHC RBC-ENTMCNC: 30.6 GM/DL — LOW (ref 32–36)
MCV RBC AUTO: 95.4 FL — SIGNIFICANT CHANGE UP (ref 80–100)
NRBC # BLD: 0 /100 WBCS — SIGNIFICANT CHANGE UP (ref 0–0)
PLATELET # BLD AUTO: 229 K/UL — SIGNIFICANT CHANGE UP (ref 150–400)
POTASSIUM SERPL-MCNC: 3.5 MMOL/L — SIGNIFICANT CHANGE UP (ref 3.5–5.3)
POTASSIUM SERPL-SCNC: 3.5 MMOL/L — SIGNIFICANT CHANGE UP (ref 3.5–5.3)
PROT SERPL-MCNC: 10 GM/DL — HIGH (ref 6–8.3)
RBC # BLD: 2.81 M/UL — LOW (ref 4.2–5.8)
RBC # FLD: 15.5 % — HIGH (ref 10.3–14.5)
SODIUM SERPL-SCNC: 137 MMOL/L — SIGNIFICANT CHANGE UP (ref 135–145)
WBC # BLD: 12.6 K/UL — HIGH (ref 3.8–10.5)
WBC # FLD AUTO: 12.6 K/UL — HIGH (ref 3.8–10.5)

## 2020-10-01 PROCEDURE — 99233 SBSQ HOSP IP/OBS HIGH 50: CPT

## 2020-10-01 PROCEDURE — 99231 SBSQ HOSP IP/OBS SF/LOW 25: CPT

## 2020-10-01 RX ORDER — METOPROLOL TARTRATE 50 MG
25 TABLET ORAL
Refills: 0 | Status: DISCONTINUED | OUTPATIENT
Start: 2020-10-01 | End: 2020-10-07

## 2020-10-01 RX ADMIN — Medication 1 MILLIGRAM(S): at 11:05

## 2020-10-01 RX ADMIN — PAMIDRONATE DISODIUM 67.5 MILLIGRAM(S): 9 INJECTION, SOLUTION INTRAVENOUS at 15:23

## 2020-10-01 RX ADMIN — QUETIAPINE FUMARATE 150 MILLIGRAM(S): 200 TABLET, FILM COATED ORAL at 05:21

## 2020-10-01 RX ADMIN — QUETIAPINE FUMARATE 150 MILLIGRAM(S): 200 TABLET, FILM COATED ORAL at 17:04

## 2020-10-01 RX ADMIN — ENOXAPARIN SODIUM 40 MILLIGRAM(S): 100 INJECTION SUBCUTANEOUS at 11:06

## 2020-10-01 RX ADMIN — CHLORHEXIDINE GLUCONATE 1 APPLICATION(S): 213 SOLUTION TOPICAL at 05:18

## 2020-10-01 RX ADMIN — Medication 25 MILLIGRAM(S): at 17:04

## 2020-10-01 RX ADMIN — Medication 2 MILLIGRAM(S): at 15:09

## 2020-10-01 RX ADMIN — CEFEPIME 100 MILLIGRAM(S): 1 INJECTION, POWDER, FOR SOLUTION INTRAMUSCULAR; INTRAVENOUS at 05:20

## 2020-10-01 RX ADMIN — Medication 1 TABLET(S): at 11:05

## 2020-10-01 RX ADMIN — CEFEPIME 100 MILLIGRAM(S): 1 INJECTION, POWDER, FOR SOLUTION INTRAMUSCULAR; INTRAVENOUS at 13:25

## 2020-10-01 RX ADMIN — PANTOPRAZOLE SODIUM 40 MILLIGRAM(S): 20 TABLET, DELAYED RELEASE ORAL at 11:05

## 2020-10-01 RX ADMIN — Medication 3 MILLILITER(S): at 11:21

## 2020-10-01 RX ADMIN — Medication 2 MILLIGRAM(S): at 16:46

## 2020-10-01 RX ADMIN — Medication 3 MILLILITER(S): at 23:41

## 2020-10-01 RX ADMIN — Medication 3 MILLILITER(S): at 17:14

## 2020-10-01 RX ADMIN — SCOPALAMINE 1 PATCH: 1 PATCH, EXTENDED RELEASE TRANSDERMAL at 19:15

## 2020-10-01 RX ADMIN — Medication 650 MILLIGRAM(S): at 13:24

## 2020-10-01 RX ADMIN — Medication 650 MILLIGRAM(S): at 11:00

## 2020-10-01 RX ADMIN — Medication 1 APPLICATION(S): at 11:05

## 2020-10-01 RX ADMIN — Medication 2 MILLIGRAM(S): at 01:37

## 2020-10-01 RX ADMIN — CEFEPIME 100 MILLIGRAM(S): 1 INJECTION, POWDER, FOR SOLUTION INTRAMUSCULAR; INTRAVENOUS at 21:21

## 2020-10-01 RX ADMIN — SCOPALAMINE 1 PATCH: 1 PATCH, EXTENDED RELEASE TRANSDERMAL at 01:14

## 2020-10-01 RX ADMIN — Medication 3 MILLILITER(S): at 05:30

## 2020-10-01 RX ADMIN — CHLORHEXIDINE GLUCONATE 15 MILLILITER(S): 213 SOLUTION TOPICAL at 05:17

## 2020-10-01 RX ADMIN — Medication 100 MILLIGRAM(S): at 11:05

## 2020-10-01 RX ADMIN — Medication 40 MILLIGRAM(S): at 05:20

## 2020-10-01 RX ADMIN — SCOPALAMINE 1 PATCH: 1 PATCH, EXTENDED RELEASE TRANSDERMAL at 11:40

## 2020-10-01 RX ADMIN — Medication 12.5 MILLIGRAM(S): at 05:21

## 2020-10-01 NOTE — PROGRESS NOTE ADULT - SUBJECTIVE AND OBJECTIVE BOX
Neurology Progress Note    No acute events. Agitation improved.    Neuro Exam: Alert. Makes eye contact. Nodding appropriately to my questions. Mild psychomotor delay. Following simple commands like 'wiggles your toes', 'give me a thumbs up'. Spontaneously moving all four extremities against gravity. Muscle atrophy noted in all four extremities.     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  - EEG pending  - wean off sedating medication as tolerated   - Agitation being treated by psychiatry.   - neuro improving slowly. Patient was critically ill for many weeks and will take time to improve possibly back to baseline.   - PT/OT

## 2020-10-01 NOTE — PROGRESS NOTE BEHAVIORAL HEALTH - NSBHCONSULTMEDS_PSY_A_CORE FT
methadone weaned off; increased Seroquel 50mg PO bid to 100mg via PEG BID and increase Valium to 5mg PO bid on 9/28  - continued restlessness; increase Seroquel 100bid to 150bid on 9/29/10  - PRN Ativan dose increased to 2mg with continued need so DC Valium on 9/30/20 and use Ativan 2mg PO q8am, q4pm but HOLD for sedation with goal to stabilize agitation to the point that Patient will not need any PRNs and will have episodes of being awake

## 2020-10-01 NOTE — PROGRESS NOTE BEHAVIORAL HEALTH - NSBHCHARTREVIEWLAB_PSY_A_CORE FT
10-01    137  |  100  |  25<H>  ----------------------------<  109<H>  3.5   |  29  |  0.58    Ca    11.5<H>      01 Oct 2020 08:46    TPro  10.0<H>  /  Alb  2.7<L>  /  TBili  1.4<H>  /  DBili  x   /  AST  57<H>  /  ALT  28  /  AlkPhos  149<H>  10-01

## 2020-10-01 NOTE — PROGRESS NOTE BEHAVIORAL HEALTH - NSBHCHARTREVIEWVS_PSY_A_CORE FT
T(C): 38.2 (10-01-20 @ 10:38), Max: 38.2 (10-01-20 @ 10:38)  HR: 107 (10-01-20 @ 10:38) (97 - 125)  BP: 118/77 (10-01-20 @ 10:38) (96/52 - 133/87)  RR: 20 (10-01-20 @ 10:38) (19 - 22)  SpO2: 96% (10-01-20 @ 10:38) (96% - 100%)

## 2020-10-01 NOTE — PROGRESS NOTE ADULT - SUBJECTIVE AND OBJECTIVE BOX
INTERVAL HPI:  44M with ETOH Abuse and withdrawal, Alcohol Pancreatitis who presents to the ED with severe abdominal pain.  Found to have Necrotizing Pancreatitis, Septic  Shock, Pneumonia/ARDS with Hypoxic Respiratory failure. Admitted to ICU, had prolonged hospital course with Vent support. Eventually required Tracheostomy and Peg placement.  Also with acute metabolic Encephalopathy.  09/20/20:  Transferred to .    Not able to provide details due to encephalopathy.  09/22/20: 1500 ml paracentesis.    OVERNIGHT EVENTS:  Opens eyes to verbal commands.    Vital Signs Last 24 Hrs  T(C): 38.2 (01 Oct 2020 10:38), Max: 38.2 (01 Oct 2020 10:38)  T(F): 100.7 (01 Oct 2020 10:38), Max: 100.7 (01 Oct 2020 10:38)  HR: 107 (01 Oct 2020 10:38) (97 - 125)  BP: 118/77 (01 Oct 2020 10:38) (96/52 - 133/87)  BP(mean): --  RR: 20 (01 Oct 2020 10:38) (19 - 22)  SpO2: 96% (01 Oct 2020 10:38) (96% - 100%)    Mode: standby  FiO2: 40    PHYSICAL EXAM:  GEN:        Opens eyes to verbal commands, comfortable.  HEENT:    Trach  RESP:       no wheezing.  CVS:          Regular rate and rhythm.   ABD:         Soft, non-tender, non-distended;     MEDICATIONS  (STANDING):  albuterol/ipratropium for Nebulization 3 milliLiter(s) Nebulizer every 6 hours  cefepime   IVPB      cefepime   IVPB 2000 milliGRAM(s) IV Intermittent every 8 hours  chlorhexidine 0.12% Liquid 15 milliLiter(s) Oral Mucosa every 12 hours  chlorhexidine 4% Liquid 1 Application(s) Topical <User Schedule>  collagenase Ointment 1 Application(s) Topical daily  dextrose 5%. 1000 milliLiter(s) (50 mL/Hr) IV Continuous <Continuous>  dextrose 50% Injectable 12.5 Gram(s) IV Push once  dextrose 50% Injectable 25 Gram(s) IV Push once  dextrose 50% Injectable 25 Gram(s) IV Push once  enoxaparin Injectable 40 milliGRAM(s) SubCutaneous daily  folic acid 1 milliGRAM(s) Oral daily  furosemide   Injectable 40 milliGRAM(s) IV Push daily  insulin lispro (HumaLOG) corrective regimen sliding scale   SubCutaneous every 6 hours  LORazepam     Tablet 2 milliGRAM(s) Oral <User Schedule>  metoprolol tartrate 12.5 milliGRAM(s) Oral two times a day  multivitamin 1 Tablet(s) Oral daily  pamidronate IVPB 60 milliGRAM(s) IV Intermittent once  pantoprazole   Suspension 40 milliGRAM(s) Oral daily  QUEtiapine 150 milliGRAM(s) Oral two times a day  scopolamine 1 mG/72 Hr(s) Patch 1 Patch Transdermal every 72 hours  thiamine 100 milliGRAM(s) Oral daily    MEDICATIONS  (PRN):  acetaminophen   Tablet .. 650 milliGRAM(s) Oral every 6 hours PRN Temp greater or equal to 38C (100.4F), Mild Pain (1 - 3)  dextrose 40% Gel 15 Gram(s) Oral once PRN Blood Glucose LESS THAN 70 milliGRAM(s)/deciliter  glucagon  Injectable 1 milliGRAM(s) IntraMuscular once PRN Glucose LESS THAN 70 milligrams/deciliter  LORazepam   Injectable 2 milliGRAM(s) IV Push every 4 hours PRN breakthrough agitation  metoprolol tartrate Injectable 5 milliGRAM(s) IV Push every 6 hours PRN if sbp>160 or HR >110 hold if SBP<100 or HR<60  polyethylene glycol 3350 17 Gram(s) Oral daily PRN Constipation    LABS:                        8.2    12.60 )-----------( 229      ( 01 Oct 2020 08:46 )             26.8     10-01    137  |  100  |  25<H>  ----------------------------<  109<H>  3.5   |  29  |  0.58    Ca    11.5<H>      01 Oct 2020 08:46    TPro  10.0<H>  /  Alb  2.7<L>  /  TBili  1.4<H>  /  DBili  x   /  AST  57<H>  /  ALT  28  /  AlkPhos  149<H>  10-01    ASSESSMENT AND PLAN:  ·	Hypoxic Respiratory failure.  ·	S/P tracheostomy.  ·	Bilateral pneumonia.  ·	Bilateral pleural effusion.  ·	S/P Septic Shock.  ·	Necrotizing Pancreatis.  ·	Acute metabolic Encephalopathy.  ·	Alcohol abuse.  ·	Klebsiella Bacteremia.  ·	Anemia    Has not required Vent for many days, will DC it.  Continue antibiotics, nebulizer.  Suction as needed

## 2020-10-01 NOTE — PROGRESS NOTE BEHAVIORAL HEALTH - NSBHFUPINTERVALHXFT_PSY_A_CORE
Received 2 dose of Ativan IVP PRN yesterday and 1 dose early this morning. Continues to need frequent minute suctioning as per nursing but has improved since antibiotics were changed; + on Maxipime yesterday for Pseudomonas. Patient remains restless throughout the day, moving around in his bed but has shown reduction in the frequency/extent of his psychomotor agitation after medication adjustment. When his eyes are open, he makes weak eye contact, not able to follow direction and does not communicate in any meaningful manner. EXAM: lying in bed, legs pulled up, askew in the bed and looks comfortable. Patient is dozing off and occasionally coughs.

## 2020-10-01 NOTE — PROGRESS NOTE ADULT - ASSESSMENT
44M EtOH pancreatitis p/w abdominal pain 8/17/20, found to have necrotizing pancreatitis c/b septic shock and respiratory failure secondary to PNA and ARDS requiring intubation and DTs requiring sedation and small bowel obstruction conservatively managed with NGT and NPO for which he was admitted to ICU. Currently, s/p trach and PEG on 9/10 and treated for klebsiella bacteremia.    downgraded to  floor 9/20/20 from ICU    Assessment and Plan:  Pt agitated     #Fever, unclear source but most likely underlying PNA since new sputum cultures showed Pseudomonas. febrile this am. will check mrsa pcr      #s/p necrotizing pancreatitis , s/p septic shock   intermittent fevers since admission,  WBC in 12-16 continues to be in range, up and down   -antibiotics being changed from Cefazolin to Cefepime    SBP-less likely-ABD US showing worsening ascites, s/p paracentesis by IR 9/22 with 1.5L straw colored fluid removed -cultures did not show any specific organisms  9/21 KUB shows nonspecific bowel gas pattern without e/o obstruction or ileus   9/21 lactate wnl , procal unremarkable   Blood culture from 9/10 was positive for klebsiella quite sensitive and one set from 9/12 is negative, 9/25 repeat blood cultures negative, sputum cultures from 9/26- showing pseudomonas aeruginosa   SAAG < 1.1 going against portal HTN     -RLE showing no dvt , cellulitis improved  -CT Chest/Abdomen/Pelvis ordered   - Moderate right and small left pleural effusion.  Bilateral patchy airspace disease has improved compared to the prior.  Improving pancreatitis with decreased size of the pancreas as well as improved peripancreatic edema. Small collection adjacent to the undersurface of the tail is also decreased. No evidence of necrosis.  -as per Pulmonary may benefit from right thoracentesis.   -ID following     #Metabolic encephalopathy   -pt agitated  hepatic vs. ICU delirium vs sepisis   - mri pending to r/o cva    d/angela Precedex since 9/15 in ICU  -diazepam, Methadone and Seroquel - were tapered. Pt was tapered off all meds, however was very agitated, thus diazepam restarted daily, seroquel twice a day, ativan prn  Psych following   -neuro  consult appreciated      #Acute respiratory failure secondary to PNA/ARDS   s/p TRACH 9/10/20   tolerating trach collar , off ventilator   continue with frequent suctioning , continue lasix      #Ascites   distended gallbladder and ascites seen on CT, abd US neg for acute cholecystitis   s/p paracentesis 9/22 by IR   see above       #s/p PEG placement 9/10/20    s/p SBO which was treated conservatively   Ileus , KUB 9/15/20 No dilated bowel is identified. There is nonobstructive bowel gas pattern  monitor residuals   high residuals in ICU, erythromycin IV started in ICU for improvement of motility of gut   continue with free water 200cc Q6H  repeat KUB 9/21 without e/o ileus or bowel obstruction     #unstageable pressure ulcers x 2 on sacral area ,  no sign of necrosis or infection noted   -wound care consult appreciated , continue with nursing wound care per recs     #Hypokalememia--replete and monitor    #Hypomagnesemia --replete and monitor     #normocytic anemia , probably related to sepsis and acute illness   requiring transfusion on 9/17/20  monitor H/H   follow anemia work-up   transfuse prn of H/H <7/21      hypercalcemia   - 2/2 to immobilization vs vitamin d supplementation although levels are only 25   - will give one dose of pamidronate     #Preventative measures   lovenox SQ-dvt ppx  fall, aspiration precautions, HOBE   On scopolamine patch to prevent excess secretions, plus suctioning very often as per nursing  8/20/20 ECHO: pEF   PT , patient unable to participate, signed off, will reconsult once more awake          Bárbara Pemberton- [phone] (who lives in [place])

## 2020-10-01 NOTE — PROGRESS NOTE ADULT - SUBJECTIVE AND OBJECTIVE BOX
INTERVAL HPI/OVERNIGHT EVENTS: Patient less agitated today, still having secretions from trach     Patient is a 44y old  Male who presents with a chief complaint of pancreatitis (24 Sep 2020 17:14)    febrile this am to 100.7 no overnight events     MEDICATIONS  (STANDING):  albuterol/ipratropium for Nebulization 3 milliLiter(s) Nebulizer every 6 hours  cefepime   IVPB      cefepime   IVPB 2000 milliGRAM(s) IV Intermittent every 8 hours  chlorhexidine 4% Liquid 1 Application(s) Topical <User Schedule>  collagenase Ointment 1 Application(s) Topical daily  dextrose 5%. 1000 milliLiter(s) (50 mL/Hr) IV Continuous <Continuous>  dextrose 50% Injectable 12.5 Gram(s) IV Push once  dextrose 50% Injectable 25 Gram(s) IV Push once  dextrose 50% Injectable 25 Gram(s) IV Push once  enoxaparin Injectable 40 milliGRAM(s) SubCutaneous daily  folic acid 1 milliGRAM(s) Oral daily  furosemide   Injectable 40 milliGRAM(s) IV Push daily  insulin lispro (HumaLOG) corrective regimen sliding scale   SubCutaneous every 6 hours  LORazepam     Tablet 2 milliGRAM(s) Oral <User Schedule>  metoprolol tartrate 25 milliGRAM(s) Oral two times a day  multivitamin 1 Tablet(s) Oral daily  pamidronate IVPB 60 milliGRAM(s) IV Intermittent once  pantoprazole   Suspension 40 milliGRAM(s) Oral daily  QUEtiapine 150 milliGRAM(s) Oral two times a day  scopolamine 1 mG/72 Hr(s) Patch 1 Patch Transdermal every 72 hours  thiamine 100 milliGRAM(s) Oral daily    MEDICATIONS  (PRN):  acetaminophen   Tablet .. 650 milliGRAM(s) Oral every 6 hours PRN Temp greater or equal to 38C (100.4F), Mild Pain (1 - 3)  dextrose 40% Gel 15 Gram(s) Oral once PRN Blood Glucose LESS THAN 70 milliGRAM(s)/deciliter  glucagon  Injectable 1 milliGRAM(s) IntraMuscular once PRN Glucose LESS THAN 70 milligrams/deciliter  LORazepam   Injectable 2 milliGRAM(s) IV Push every 4 hours PRN breakthrough agitation  metoprolol tartrate Injectable 5 milliGRAM(s) IV Push every 6 hours PRN if sbp>160 or HR >110 hold if SBP<100 or HR<60  polyethylene glycol 3350 17 Gram(s) Oral daily PRN Constipation        Allergies    No Known Allergies    Intolerances        REVIEW OF SYSTEMS:  unable to obtain due poor mentation     Vital Signs Last 24 Hrs      PHYSICAL EXAM:  GENERAL: NAD, well-groomed, well-developed  HEAD:  Atraumatic, Normocephalic  NERVOUS SYSTEM:  Alert & Oriented X0, with open eyes,   CHEST/LUNG: coarse sounds bilaterally, lots of secretions   HEART: Regular rate and rhythm; No murmurs, rubs, or gallops  ABDOMEN: Soft, Nontender, Nondistended; Bowel sounds present. PEG tube present  EXTREMITIES:  2+ Peripheral Pulses, No clubbing, cyanosis, or edema      LABS:                                 8.2    12.60 )-----------( 229      ( 01 Oct 2020 08:46 )             26.8     10-01    137  |  100  |  25<H>  ----------------------------<  109<H>  3.5   |  29  |  0.58    Ca    11.5<H>      01 Oct 2020 08:46    TPro  10.0<H>  /  Alb  2.7<L>  /  TBili  1.4<H>  /  DBili  x   /  AST  57<H>  /  ALT  28  /  AlkPhos  149<H>  10-01

## 2020-10-01 NOTE — CHART NOTE - NSCHARTNOTEFT_GEN_A_CORE
MRI safety form completed with mother Niecy via phone. She notes that patient has plate in jaw after surgery 1-2 years ago.  Via charts, noted to have left mandibular ORIF in 1/2019 - DW OMFS at [facility] who states that pt has titanium plate and is MRI compatible.  RN notified that pt is cleared for MRI.  Safety sheet in chart.

## 2020-10-02 LAB
GLUCOSE BLDC GLUCOMTR-MCNC: 104 MG/DL — HIGH (ref 70–99)
GLUCOSE BLDC GLUCOMTR-MCNC: 112 MG/DL — HIGH (ref 70–99)
GLUCOSE BLDC GLUCOMTR-MCNC: 114 MG/DL — HIGH (ref 70–99)
GLUCOSE BLDC GLUCOMTR-MCNC: 120 MG/DL — HIGH (ref 70–99)
GLUCOSE BLDC GLUCOMTR-MCNC: 131 MG/DL — HIGH (ref 70–99)
MRSA PCR RESULT.: SIGNIFICANT CHANGE UP
S AUREUS DNA NOSE QL NAA+PROBE: SIGNIFICANT CHANGE UP

## 2020-10-02 PROCEDURE — 99231 SBSQ HOSP IP/OBS SF/LOW 25: CPT

## 2020-10-02 PROCEDURE — 99233 SBSQ HOSP IP/OBS HIGH 50: CPT

## 2020-10-02 PROCEDURE — 70551 MRI BRAIN STEM W/O DYE: CPT | Mod: 26

## 2020-10-02 RX ADMIN — Medication 650 MILLIGRAM(S): at 18:30

## 2020-10-02 RX ADMIN — CEFEPIME 100 MILLIGRAM(S): 1 INJECTION, POWDER, FOR SOLUTION INTRAMUSCULAR; INTRAVENOUS at 21:37

## 2020-10-02 RX ADMIN — Medication 25 MILLIGRAM(S): at 05:22

## 2020-10-02 RX ADMIN — ENOXAPARIN SODIUM 40 MILLIGRAM(S): 100 INJECTION SUBCUTANEOUS at 12:00

## 2020-10-02 RX ADMIN — Medication 650 MILLIGRAM(S): at 05:21

## 2020-10-02 RX ADMIN — Medication 650 MILLIGRAM(S): at 17:56

## 2020-10-02 RX ADMIN — Medication 1 MILLIGRAM(S): at 12:00

## 2020-10-02 RX ADMIN — Medication 2 MILLIGRAM(S): at 22:17

## 2020-10-02 RX ADMIN — Medication 3 MILLILITER(S): at 11:16

## 2020-10-02 RX ADMIN — QUETIAPINE FUMARATE 150 MILLIGRAM(S): 200 TABLET, FILM COATED ORAL at 17:40

## 2020-10-02 RX ADMIN — Medication 3 MILLILITER(S): at 23:42

## 2020-10-02 RX ADMIN — SCOPALAMINE 1 PATCH: 1 PATCH, EXTENDED RELEASE TRANSDERMAL at 07:30

## 2020-10-02 RX ADMIN — Medication 25 MILLIGRAM(S): at 17:40

## 2020-10-02 RX ADMIN — SCOPALAMINE 1 PATCH: 1 PATCH, EXTENDED RELEASE TRANSDERMAL at 19:15

## 2020-10-02 RX ADMIN — CEFEPIME 100 MILLIGRAM(S): 1 INJECTION, POWDER, FOR SOLUTION INTRAMUSCULAR; INTRAVENOUS at 13:22

## 2020-10-02 RX ADMIN — CEFEPIME 100 MILLIGRAM(S): 1 INJECTION, POWDER, FOR SOLUTION INTRAMUSCULAR; INTRAVENOUS at 05:14

## 2020-10-02 RX ADMIN — Medication 3 MILLILITER(S): at 17:22

## 2020-10-02 RX ADMIN — Medication 2 MILLIGRAM(S): at 17:40

## 2020-10-02 RX ADMIN — QUETIAPINE FUMARATE 150 MILLIGRAM(S): 200 TABLET, FILM COATED ORAL at 05:06

## 2020-10-02 RX ADMIN — Medication 3 MILLILITER(S): at 05:30

## 2020-10-02 RX ADMIN — Medication 2 MILLIGRAM(S): at 08:15

## 2020-10-02 RX ADMIN — Medication 100 MILLIGRAM(S): at 12:00

## 2020-10-02 RX ADMIN — Medication 1 APPLICATION(S): at 12:00

## 2020-10-02 RX ADMIN — Medication 1 TABLET(S): at 12:00

## 2020-10-02 RX ADMIN — CHLORHEXIDINE GLUCONATE 1 APPLICATION(S): 213 SOLUTION TOPICAL at 08:16

## 2020-10-02 RX ADMIN — PANTOPRAZOLE SODIUM 40 MILLIGRAM(S): 20 TABLET, DELAYED RELEASE ORAL at 12:00

## 2020-10-02 RX ADMIN — Medication 2 MILLIGRAM(S): at 02:58

## 2020-10-02 NOTE — PROGRESS NOTE ADULT - ASSESSMENT
44M EtOH pancreatitis p/w abdominal pain 8/17/20, found to have necrotizing pancreatitis c/b septic shock and respiratory failure secondary to PNA and ARDS requiring intubation and DTs requiring sedation and small bowel obstruction conservatively managed with NGT and NPO for which he was admitted to ICU. Currently, s/p trach and PEG on 9/10 and treated for klebsiella bacteremia.    downgraded to  floor 9/20/20 from ICU    Assessment and Plan:  Pt agitated     #Fever, unclear source but most likely underlying PNA since new sputum cultures showed Pseudomonas. febrile this am.   MRSA neg     #s/p necrotizing pancreatitis , s/p septic shock   intermittent fevers since admission,  WBC in 12-16 continues to be in range, up and down   -antibiotics being changed from Cefazolin to Cefepime    SBP-less likely-ABD US showing worsening ascites, s/p paracentesis by IR 9/22 with 1.5L straw colored fluid removed -cultures did not show any specific organisms  9/21 KUB shows nonspecific bowel gas pattern without e/o obstruction or ileus   9/21 lactate wnl , procal unremarkable   Blood culture from 9/10 was positive for klebsiella quite sensitive and one set from 9/12 is negative, 9/25 repeat blood cultures negative, sputum cultures from 9/26- showing pseudomonas aeruginosa   SAAG < 1.1 going against portal HTN     -RLE showing no dvt , cellulitis improved  -CT Chest/Abdomen/Pelvis ordered   - Moderate right and small left pleural effusion.  Bilateral patchy airspace disease has improved compared to the prior.  Improving pancreatitis with decreased size of the pancreas as well as improved peripancreatic edema. Small collection adjacent to the undersurface of the tail is also decreased. No evidence of necrosis.  -as per Pulmonary may benefit from right thoracentesis.   -ID following     #Metabolic encephalopathy   -pt agitated  hepatic vs. ICU delirium vs sepisis   - mri pending to r/o cva    d/angela Precedex since 9/15 in ICU  -diazepam, Methadone and Seroquel - were tapered. Pt was tapered off all meds, however was very agitated, thus diazepam restarted daily, seroquel twice a day, ativan prn  Psych following   -neuro  consult appreciated      #Acute respiratory failure secondary to PNA/ARDS   s/p TRACH 9/10/20   tolerating trach collar , off ventilator   continue with frequent suctioning , continue lasix      #Ascites   distended gallbladder and ascites seen on CT, abd US neg for acute cholecystitis   s/p paracentesis 9/22 by IR   see above       #s/p PEG placement 9/10/20    s/p SBO which was treated conservatively   Ileus , KUB 9/15/20 No dilated bowel is identified. There is nonobstructive bowel gas pattern  monitor residuals   high residuals in ICU, erythromycin IV started in ICU for improvement of motility of gut   continue with free water 200cc Q6H  repeat KUB 9/21 without e/o ileus or bowel obstruction     #unstageable pressure ulcers x 2 on sacral area ,  no sign of necrosis or infection noted   -wound care consult appreciated , continue with nursing wound care per recs     #Hypokalememia--replete and monitor    #Hypomagnesemia --replete and monitor     #normocytic anemia , probably related to sepsis and acute illness   requiring transfusion on 9/17/20  monitor H/H   follow anemia work-up   transfuse prn of H/H <7/21      hypercalcemia   - 2/2 to immobilization vs vitamin d supplementation although levels are only 25   - will give one dose of pamidronate     #Preventative measures   lovenox SQ-dvt ppx  fall, aspiration precautions, HOBE   On scopolamine patch to prevent excess secretions, plus suctioning very often as per nursing  8/20/20 ECHO: pEF   PT , patient unable to participate, signed off, will reconsult once more awake          Bárbara Pemberton- [phone] (who lives in [place])

## 2020-10-02 NOTE — CHART NOTE - NSCHARTNOTEFT_GEN_A_CORE
Pt w/ hx ETOH pancreatitis p/w abd pain 9/17/20, found to have necrotizing pancreatitis c/b septic shock & respiratory failure 2/2 to PNA & ARDS requiring intubation & DT's requiring sedation & SBO conservatively managed w/ NGT & NPO. S/P trach & PEG (9/10) & treated for klebsiella bacteremia.  s/p paracentesis (9/22); improved cellulitis ; metabolic encephalopathy.    Factors impacting intake: [ ] none [ ] nausea  [ ] vomiting [ ] diarrhea [ ] constipation  [ ]chewing problems [ ] swallowing issues  [x ] other: PEG / Trach -> Vent    9/29 -Diet Prescription: Diet, NPO with Tube Feed:   Tube Feeding Modality: Gastrostomy  Vital AF 1.2  Total Volume for 24 Hours (mL): 1440  Continuous  Starting Tube Feed Rate {mL per Hour}: 55     Every 24 hours  Until Goal Tube Feed Rate (mL per Hour): 60  Tube Feed Duration (in Hours): 24  Tube Feed Start Time: 15:00  No Carb Prosource (1pkg = 15gms Protein)     Qty per Day:  1 (09-29-20 @ 14:01)    Intake: Vital AF 1.2 @ 55 ml/hr= 1320 ml, 1584 calories, 99 grams protein, 1070 ml free water, 111% RDIs w/ 200 ml free water q 6 hours. Additional calories & protein from pro source daily = 15 g pro & 60 calories    Current Weight: 10/2 - 139.1 (63.1 kg) no edema noted ; 9/29 - 139.7 (63.4 kg) no edema noted  % Weight Change - Basically stable -> no edema noted    Nutrition focused physical exam conducted; Subcutaneous fat Exam;  [ Moderate  ]  Orbital fat pads region,  [ Moderate  ]Buccal fat region,  [ Unable  ]triceps region, [ Unable  ]ribs region.  Muscle Exam; [  Mild ]temples region, [ Moderate  ]clavicle region, [ Moderate  ]shoulder region, [ Unable  ]Scapula region, [ Unable  ]Interosseous region, [ Moderate  ]thigh region, [ Unable   ]Calf region,       Pertinent Medications: MEDICATIONS  (STANDING):  albuterol/ipratropium for Nebulization 3 milliLiter(s) Nebulizer every 6 hours  cefepime   IVPB      cefepime   IVPB 2000 milliGRAM(s) IV Intermittent every 8 hours  chlorhexidine 4% Liquid 1 Application(s) Topical <User Schedule>  collagenase Ointment 1 Application(s) Topical daily  dextrose 5%. 1000 milliLiter(s) (50 mL/Hr) IV Continuous <Continuous>  dextrose 50% Injectable 12.5 Gram(s) IV Push once  dextrose 50% Injectable 25 Gram(s) IV Push once  dextrose 50% Injectable 25 Gram(s) IV Push once  enoxaparin Injectable 40 milliGRAM(s) SubCutaneous daily  folic acid 1 milliGRAM(s) Oral daily  insulin lispro (HumaLOG) corrective regimen sliding scale   SubCutaneous every 6 hours  LORazepam     Tablet 2 milliGRAM(s) Oral <User Schedule>  metoprolol tartrate 25 milliGRAM(s) Oral two times a day  multivitamin 1 Tablet(s) Oral daily  pantoprazole   Suspension 40 milliGRAM(s) Oral daily  QUEtiapine 150 milliGRAM(s) Oral two times a day  scopolamine 1 mG/72 Hr(s) Patch 1 Patch Transdermal every 72 hours  thiamine 100 milliGRAM(s) Oral daily    MEDICATIONS  (PRN):  acetaminophen   Tablet .. 650 milliGRAM(s) Oral every 6 hours PRN Temp greater or equal to 38C (100.4F), Mild Pain (1 - 3)  dextrose 40% Gel 15 Gram(s) Oral once PRN Blood Glucose LESS THAN 70 milliGRAM(s)/deciliter  glucagon  Injectable 1 milliGRAM(s) IntraMuscular once PRN Glucose LESS THAN 70 milligrams/deciliter  LORazepam   Injectable 2 milliGRAM(s) IV Push every 4 hours PRN breakthrough agitation  metoprolol tartrate Injectable 5 milliGRAM(s) IV Push every 6 hours PRN if sbp>160 or HR >110 hold if SBP<100 or HR<60  polyethylene glycol 3350 17 Gram(s) Oral daily PRN Constipation    Pertinent Labs: 10-01 Na137 mmol/L Glu 109 mg/dL<H> K+ 3.5 mmol/L Cr  0.58 mg/dL BUN 25 mg/dL<H> 09-29 Phos 2.7 mg/dL 10-01 Alb 2.7 g/dL<L> 09-05 Chol --    LDL --    HDL --    Trig 325 mg/dL<H>10-01 ALT 28 U/L AST 57 U/L<H> Alkaline Phosphatase 149 U/L<H>     CAPILLARY BLOOD GLUCOSE      POCT Blood Glucose.: 104 mg/dL (02 Oct 2020 11:33)  POCT Blood Glucose.: 131 mg/dL (02 Oct 2020 06:07)  POCT Blood Glucose.: 112 mg/dL (02 Oct 2020 00:16)  POCT Blood Glucose.: 133 mg/dL (01 Oct 2020 17:31)    Skin: sacrum unstageable         tracheal area stage 2    Estimated Needs:   [x ] no change since previous assessment ( 9/29/20 )  [ ] recalculated:     Previous Nutrition Diagnosis: (09/21)  [x ] Malnutrition; severe malnutrition in context of acute illness     Related to: inadequate protein-energy intake in setting of pancreatitis,  respiratory failure, hepatic/metabolic encephalopathy, pressure ulcer     As evidenced by: <50% nutrition needs >5 days, physical findings of moderate muscle loss     Goal: pt to meet >75% of protein-energy needs via enteral feeding; not met         Nutrition Diagnosis is [x ] ongoing  [ ] resolved [ ] not applicable     New Nutrition Diagnosis: [x ] not applicable       Interventions:   Recommend  [ ] Change Diet To:  [ ] Nutrition Supplement  [x ] Nutrition Support - Vital AF 1.2 @ 30 ml/hr and advance as tolerated to 60 ml/fn=9444 calories, 108 grams protein, 1167 ml free water, 121% RDIs w/ 1 pkg pro source daily=15g pro & 60kcal   [ ] Other:     Monitoring and Evaluation:   [ ] PO intake [ x ] Tolerance to diet prescription [ x ] weights [ x ] labs[ x ] follow up per protocol  [ ] other:

## 2020-10-02 NOTE — PROGRESS NOTE ADULT - SUBJECTIVE AND OBJECTIVE BOX
INTERVAL HPI:  44M with ETOH Abuse and withdrawal, Alcohol Pancreatitis who presents to the ED with severe abdominal pain.  Found to have Necrotizing Pancreatitis, Septic  Shock, Pneumonia/ARDS with Hypoxic Respiratory failure. Admitted to ICU, had prolonged hospital course with Vent support. Eventually required Tracheostomy and Peg placement.  Also with acute metabolic Encephalopathy.  09/20/20:  Transferred to .    Not able to provide details due to encephalopathy.  09/22/20: 1500 ml paracentesis.    OVERNIGHT EVENTS:  Some what more responsive.    Vital Signs Last 24 Hrs  T(C): 37.4 (02 Oct 2020 06:51), Max: 38.4 (02 Oct 2020 05:23)  T(F): 99.3 (02 Oct 2020 06:51), Max: 101.2 (02 Oct 2020 05:23)  HR: 110 (02 Oct 2020 11:16) (63 - 125)  BP: 104/68 (02 Oct 2020 10:47) (104/68 - 135/92)  BP(mean): --  RR: 24 (02 Oct 2020 10:47) (18 - 24)  SpO2: 97% (02 Oct 2020 11:16) (92% - 98%)    PHYSICAL EXAM:  GEN:         responsive and comfortable.  HEENT:    Normal.    RESP:        no wheezing.  CVS:           Regular rate and rhythm.   ABD:         Soft, non-tender, non-distended;     MEDICATIONS  (STANDING):  albuterol/ipratropium for Nebulization 3 milliLiter(s) Nebulizer every 6 hours  cefepime   IVPB      cefepime   IVPB 2000 milliGRAM(s) IV Intermittent every 8 hours  chlorhexidine 4% Liquid 1 Application(s) Topical <User Schedule>  collagenase Ointment 1 Application(s) Topical daily  dextrose 5%. 1000 milliLiter(s) (50 mL/Hr) IV Continuous <Continuous>  dextrose 50% Injectable 12.5 Gram(s) IV Push once  dextrose 50% Injectable 25 Gram(s) IV Push once  dextrose 50% Injectable 25 Gram(s) IV Push once  enoxaparin Injectable 40 milliGRAM(s) SubCutaneous daily  folic acid 1 milliGRAM(s) Oral daily  insulin lispro (HumaLOG) corrective regimen sliding scale   SubCutaneous every 6 hours  LORazepam     Tablet 2 milliGRAM(s) Oral <User Schedule>  metoprolol tartrate 25 milliGRAM(s) Oral two times a day  multivitamin 1 Tablet(s) Oral daily  pantoprazole   Suspension 40 milliGRAM(s) Oral daily  QUEtiapine 150 milliGRAM(s) Oral two times a day  scopolamine 1 mG/72 Hr(s) Patch 1 Patch Transdermal every 72 hours  thiamine 100 milliGRAM(s) Oral daily    MEDICATIONS  (PRN):  acetaminophen   Tablet .. 650 milliGRAM(s) Oral every 6 hours PRN Temp greater or equal to 38C (100.4F), Mild Pain (1 - 3)  dextrose 40% Gel 15 Gram(s) Oral once PRN Blood Glucose LESS THAN 70 milliGRAM(s)/deciliter  glucagon  Injectable 1 milliGRAM(s) IntraMuscular once PRN Glucose LESS THAN 70 milligrams/deciliter  LORazepam   Injectable 2 milliGRAM(s) IV Push every 4 hours PRN breakthrough agitation  metoprolol tartrate Injectable 5 milliGRAM(s) IV Push every 6 hours PRN if sbp>160 or HR >110 hold if SBP<100 or HR<60  polyethylene glycol 3350 17 Gram(s) Oral daily PRN Constipation    LABS:                        8.2    12.60 )-----------( 229      ( 01 Oct 2020 08:46 )             26.8     10-01    137  |  100  |  25<H>  ----------------------------<  109<H>  3.5   |  29  |  0.58    Ca    11.5<H>      01 Oct 2020 08:46    TPro  10.0<H>  /  Alb  2.7<L>  /  TBili  1.4<H>  /  DBili  x   /  AST  57<H>  /  ALT  28  /  AlkPhos  149<H>  10-01    ASSESSMENT AND PLAN:  ·	Hypoxic Respiratory failure.  ·	S/P tracheostomy.  ·	Bilateral pneumonia.  ·	Bilateral pleural effusion.  ·	S/P Septic Shock.  ·	Necrotizing Pancreatis.  ·	Acute metabolic Encephalopathy.  ·	Alcohol abuse.  ·	Klebsiella Bacteremia.  ·	Anemia    Mental status some what better.  Again temperature spike to 101 noted.  Will continue trach collar.  Discussed with girl friend.

## 2020-10-02 NOTE — PROGRESS NOTE BEHAVIORAL HEALTH - NSBHFUPINTERVALHXFT_PSY_A_CORE
Patient could not tolerate MRI, was moving around and it could not be performed. He received PO Ativan prior to study which was not efficient. Otherwise, he has been awake more, less sedated, opens his eyes looking around (not making significant eye contact) which is improvement as to when initially seen. As per nursing, PRN Ativan IVP calms him down without oversedation.

## 2020-10-02 NOTE — PROGRESS NOTE BEHAVIORAL HEALTH - NSBHCHARTREVIEWVS_PSY_A_CORE FT
T(C): 37.4 (10-02-20 @ 06:51), Max: 38.4 (10-02-20 @ 05:23)  HR: 63 (10-02-20 @ 10:47) (63 - 125)  BP: 104/68 (10-02-20 @ 10:47) (104/68 - 135/92)  RR: 24 (10-02-20 @ 10:47) (18 - 24)  SpO2: 93% (10-02-20 @ 10:47) (92% - 98%)

## 2020-10-02 NOTE — PROGRESS NOTE ADULT - SUBJECTIVE AND OBJECTIVE BOX
INTERVAL HPI/OVERNIGHT EVENTS: Patient less agitated today, still having secretions from trach     Patient is a 44y old  Male who presents with a chief complaint of pancreatitis (24 Sep 2020 17:14)    101F this AM    ROS: unable to examine due to [ x] Encephalopathy  [ ] Advanced Dementia  [ ] Expressive Aphasia  [ ] Non-verbal patient     MEDICATIONS  (STANDING):  albuterol/ipratropium for Nebulization 3 milliLiter(s) Nebulizer every 6 hours  cefepime   IVPB      cefepime   IVPB 2000 milliGRAM(s) IV Intermittent every 8 hours  chlorhexidine 4% Liquid 1 Application(s) Topical <User Schedule>  collagenase Ointment 1 Application(s) Topical daily  dextrose 5%. 1000 milliLiter(s) (50 mL/Hr) IV Continuous <Continuous>  dextrose 50% Injectable 12.5 Gram(s) IV Push once  dextrose 50% Injectable 25 Gram(s) IV Push once  dextrose 50% Injectable 25 Gram(s) IV Push once  enoxaparin Injectable 40 milliGRAM(s) SubCutaneous daily  folic acid 1 milliGRAM(s) Oral daily  furosemide   Injectable 40 milliGRAM(s) IV Push daily  insulin lispro (HumaLOG) corrective regimen sliding scale   SubCutaneous every 6 hours  LORazepam     Tablet 2 milliGRAM(s) Oral <User Schedule>  metoprolol tartrate 25 milliGRAM(s) Oral two times a day  multivitamin 1 Tablet(s) Oral daily  pamidronate IVPB 60 milliGRAM(s) IV Intermittent once  pantoprazole   Suspension 40 milliGRAM(s) Oral daily  QUEtiapine 150 milliGRAM(s) Oral two times a day  scopolamine 1 mG/72 Hr(s) Patch 1 Patch Transdermal every 72 hours  thiamine 100 milliGRAM(s) Oral daily    MEDICATIONS  (PRN):  acetaminophen   Tablet .. 650 milliGRAM(s) Oral every 6 hours PRN Temp greater or equal to 38C (100.4F), Mild Pain (1 - 3)  dextrose 40% Gel 15 Gram(s) Oral once PRN Blood Glucose LESS THAN 70 milliGRAM(s)/deciliter  glucagon  Injectable 1 milliGRAM(s) IntraMuscular once PRN Glucose LESS THAN 70 milligrams/deciliter  LORazepam   Injectable 2 milliGRAM(s) IV Push every 4 hours PRN breakthrough agitation  metoprolol tartrate Injectable 5 milliGRAM(s) IV Push every 6 hours PRN if sbp>160 or HR >110 hold if SBP<100 or HR<60  polyethylene glycol 3350 17 Gram(s) Oral daily PRN Constipation        Allergies    No Known Allergies    Intolerances    Vital Signs Last 24 Hrs  T(C): 37.3 (02 Oct 2020 19:15), Max: 38.6 (02 Oct 2020 17:48)  T(F): 99.1 (02 Oct 2020 19:15), Max: 101.5 (02 Oct 2020 17:48)  HR: 99 (02 Oct 2020 21:53) (63 - 125)  BP: 106/72 (02 Oct 2020 21:53) (104/68 - 135/92)  BP(mean): --  RR: 19 (02 Oct 2020 17:48) (18 - 24)  SpO2: 95% (02 Oct 2020 17:48) (92% - 98%)      PHYSICAL EXAM:  GENERAL: NAD,   HEAD:  Atraumatic, Normocephalic  NERVOUS SYSTEM:  Alert & Oriented X0, with open eyes, doesn't follow commands   CHEST/LUNG: coarse sounds bilaterally, lots of secretions   HEART: Regular rate and rhythm; No murmurs, rubs, or gallops  ABDOMEN: Soft, Nontender, Nondistended; Bowel sounds present. PEG tube present  EXTREMITIES:  No clubbing, cyanosis, or edema b/l       LABS:                          8.2    12.60 )-----------( 229      ( 01 Oct 2020 08:46 )             26.8   10-01    137  |  100  |  25<H>  ----------------------------<  109<H>  3.5   |  29  |  0.58    Ca    11.5<H>      01 Oct 2020 08:46    TPro  10.0<H>  /  Alb  2.7<L>  /  TBili  1.4<H>  /  DBili  x   /  AST  57<H>  /  ALT  28  /  AlkPhos  149<H>  10-01

## 2020-10-02 NOTE — PROGRESS NOTE ADULT - SUBJECTIVE AND OBJECTIVE BOX
Neurology Progress Note    No acute events.    Neuro Exam: Alert. Makes eye contact. Nodding appropriately to my questions. Mild psychomotor delay. Following simple commands like 'wiggles your toes'. Spontaneously moving all four extremities against gravity. Muscle atrophy noted in all four extremities.     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 Will do outpatient.  - EEG shows no seizure focus.  - wean off sedating medication as tolerated   - neuro improving slowly. Patient was critically ill for many weeks and will take time to improve possibly back to baseline.   - PT/OT

## 2020-10-02 NOTE — PROGRESS NOTE BEHAVIORAL HEALTH - OTHER
tenuous as per notes deferred at this time none looks confused as expected / appropriate to context not able to ascertain at this time due to limitation of exam tenuous limited

## 2020-10-03 LAB
ALBUMIN SERPL ELPH-MCNC: 2.4 G/DL — LOW (ref 3.3–5)
ALP SERPL-CCNC: 154 U/L — HIGH (ref 40–120)
ALT FLD-CCNC: 38 U/L — SIGNIFICANT CHANGE UP (ref 12–78)
ANION GAP SERPL CALC-SCNC: 7 MMOL/L — SIGNIFICANT CHANGE UP (ref 5–17)
AST SERPL-CCNC: 62 U/L — HIGH (ref 15–37)
BASOPHILS # BLD AUTO: 0.03 K/UL — SIGNIFICANT CHANGE UP (ref 0–0.2)
BASOPHILS NFR BLD AUTO: 0.4 % — SIGNIFICANT CHANGE UP (ref 0–2)
BILIRUB SERPL-MCNC: 1 MG/DL — SIGNIFICANT CHANGE UP (ref 0.2–1.2)
BUN SERPL-MCNC: 27 MG/DL — HIGH (ref 7–23)
CALCIUM SERPL-MCNC: 10.4 MG/DL — HIGH (ref 8.5–10.1)
CHLORIDE SERPL-SCNC: 102 MMOL/L — SIGNIFICANT CHANGE UP (ref 96–108)
CO2 SERPL-SCNC: 30 MMOL/L — SIGNIFICANT CHANGE UP (ref 22–31)
CREAT SERPL-MCNC: 0.57 MG/DL — SIGNIFICANT CHANGE UP (ref 0.5–1.3)
EOSINOPHIL # BLD AUTO: 0.62 K/UL — HIGH (ref 0–0.5)
EOSINOPHIL NFR BLD AUTO: 9.2 % — HIGH (ref 0–6)
GLUCOSE BLDC GLUCOMTR-MCNC: 111 MG/DL — HIGH (ref 70–99)
GLUCOSE BLDC GLUCOMTR-MCNC: 114 MG/DL — HIGH (ref 70–99)
GLUCOSE BLDC GLUCOMTR-MCNC: 119 MG/DL — HIGH (ref 70–99)
GLUCOSE BLDC GLUCOMTR-MCNC: 130 MG/DL — HIGH (ref 70–99)
GLUCOSE SERPL-MCNC: 102 MG/DL — HIGH (ref 70–99)
HCT VFR BLD CALC: 27.7 % — LOW (ref 39–50)
HGB BLD-MCNC: 8.5 G/DL — LOW (ref 13–17)
IMM GRANULOCYTES NFR BLD AUTO: 0.6 % — SIGNIFICANT CHANGE UP (ref 0–1.5)
LYMPHOCYTES # BLD AUTO: 0.97 K/UL — LOW (ref 1–3.3)
LYMPHOCYTES # BLD AUTO: 14.4 % — SIGNIFICANT CHANGE UP (ref 13–44)
MAGNESIUM SERPL-MCNC: 1.7 MG/DL — SIGNIFICANT CHANGE UP (ref 1.6–2.6)
MCHC RBC-ENTMCNC: 29 PG — SIGNIFICANT CHANGE UP (ref 27–34)
MCHC RBC-ENTMCNC: 30.7 GM/DL — LOW (ref 32–36)
MCV RBC AUTO: 94.5 FL — SIGNIFICANT CHANGE UP (ref 80–100)
MONOCYTES # BLD AUTO: 0.66 K/UL — SIGNIFICANT CHANGE UP (ref 0–0.9)
MONOCYTES NFR BLD AUTO: 9.8 % — SIGNIFICANT CHANGE UP (ref 2–14)
NEUTROPHILS # BLD AUTO: 4.4 K/UL — SIGNIFICANT CHANGE UP (ref 1.8–7.4)
NEUTROPHILS NFR BLD AUTO: 65.6 % — SIGNIFICANT CHANGE UP (ref 43–77)
NRBC # BLD: 0 /100 WBCS — SIGNIFICANT CHANGE UP (ref 0–0)
PHOSPHATE SERPL-MCNC: 1.9 MG/DL — LOW (ref 2.5–4.5)
PLATELET # BLD AUTO: 241 K/UL — SIGNIFICANT CHANGE UP (ref 150–400)
POTASSIUM SERPL-MCNC: 3.4 MMOL/L — LOW (ref 3.5–5.3)
POTASSIUM SERPL-SCNC: 3.4 MMOL/L — LOW (ref 3.5–5.3)
PROT SERPL-MCNC: 9.5 GM/DL — HIGH (ref 6–8.3)
RBC # BLD: 2.93 M/UL — LOW (ref 4.2–5.8)
RBC # FLD: 15.2 % — HIGH (ref 10.3–14.5)
SODIUM SERPL-SCNC: 139 MMOL/L — SIGNIFICANT CHANGE UP (ref 135–145)
WBC # BLD: 6.72 K/UL — SIGNIFICANT CHANGE UP (ref 3.8–10.5)
WBC # FLD AUTO: 6.72 K/UL — SIGNIFICANT CHANGE UP (ref 3.8–10.5)

## 2020-10-03 PROCEDURE — 99233 SBSQ HOSP IP/OBS HIGH 50: CPT

## 2020-10-03 RX ORDER — MAGNESIUM SULFATE 500 MG/ML
1 VIAL (ML) INJECTION ONCE
Refills: 0 | Status: COMPLETED | OUTPATIENT
Start: 2020-10-03 | End: 2020-10-03

## 2020-10-03 RX ORDER — SODIUM,POTASSIUM PHOSPHATES 278-250MG
2 POWDER IN PACKET (EA) ORAL EVERY 8 HOURS
Refills: 0 | Status: DISCONTINUED | OUTPATIENT
Start: 2020-10-03 | End: 2020-10-09

## 2020-10-03 RX ORDER — POTASSIUM PHOSPHATE, MONOBASIC POTASSIUM PHOSPHATE, DIBASIC 236; 224 MG/ML; MG/ML
15 INJECTION, SOLUTION INTRAVENOUS ONCE
Refills: 0 | Status: COMPLETED | OUTPATIENT
Start: 2020-10-03 | End: 2020-10-03

## 2020-10-03 RX ORDER — POTASSIUM PHOSPHATE, MONOBASIC POTASSIUM PHOSPHATE, DIBASIC 236; 224 MG/ML; MG/ML
30 INJECTION, SOLUTION INTRAVENOUS ONCE
Refills: 0 | Status: COMPLETED | OUTPATIENT
Start: 2020-10-03 | End: 2020-10-03

## 2020-10-03 RX ADMIN — PANTOPRAZOLE SODIUM 40 MILLIGRAM(S): 20 TABLET, DELAYED RELEASE ORAL at 11:31

## 2020-10-03 RX ADMIN — Medication 100 MILLIGRAM(S): at 11:31

## 2020-10-03 RX ADMIN — Medication 1 TABLET(S): at 11:31

## 2020-10-03 RX ADMIN — Medication 3 MILLILITER(S): at 05:12

## 2020-10-03 RX ADMIN — Medication 25 MILLIGRAM(S): at 17:00

## 2020-10-03 RX ADMIN — Medication 2 MILLIGRAM(S): at 11:31

## 2020-10-03 RX ADMIN — CEFEPIME 100 MILLIGRAM(S): 1 INJECTION, POWDER, FOR SOLUTION INTRAMUSCULAR; INTRAVENOUS at 13:28

## 2020-10-03 RX ADMIN — Medication 25 MILLIGRAM(S): at 05:17

## 2020-10-03 RX ADMIN — Medication 3 MILLILITER(S): at 17:09

## 2020-10-03 RX ADMIN — Medication 2 MILLIGRAM(S): at 16:58

## 2020-10-03 RX ADMIN — Medication 3 MILLILITER(S): at 11:20

## 2020-10-03 RX ADMIN — QUETIAPINE FUMARATE 150 MILLIGRAM(S): 200 TABLET, FILM COATED ORAL at 17:00

## 2020-10-03 RX ADMIN — Medication 1 MILLIGRAM(S): at 11:31

## 2020-10-03 RX ADMIN — SCOPALAMINE 1 PATCH: 1 PATCH, EXTENDED RELEASE TRANSDERMAL at 07:25

## 2020-10-03 RX ADMIN — Medication 2 MILLIGRAM(S): at 20:07

## 2020-10-03 RX ADMIN — Medication 2 PACKET(S): at 21:38

## 2020-10-03 RX ADMIN — CEFEPIME 100 MILLIGRAM(S): 1 INJECTION, POWDER, FOR SOLUTION INTRAMUSCULAR; INTRAVENOUS at 05:17

## 2020-10-03 RX ADMIN — Medication 1 APPLICATION(S): at 11:31

## 2020-10-03 RX ADMIN — Medication 2 MILLIGRAM(S): at 02:50

## 2020-10-03 RX ADMIN — CHLORHEXIDINE GLUCONATE 1 APPLICATION(S): 213 SOLUTION TOPICAL at 06:53

## 2020-10-03 RX ADMIN — Medication 2 PACKET(S): at 19:14

## 2020-10-03 RX ADMIN — CEFEPIME 100 MILLIGRAM(S): 1 INJECTION, POWDER, FOR SOLUTION INTRAMUSCULAR; INTRAVENOUS at 23:59

## 2020-10-03 RX ADMIN — ENOXAPARIN SODIUM 40 MILLIGRAM(S): 100 INJECTION SUBCUTANEOUS at 11:31

## 2020-10-03 RX ADMIN — Medication 2 MILLIGRAM(S): at 08:15

## 2020-10-03 RX ADMIN — QUETIAPINE FUMARATE 150 MILLIGRAM(S): 200 TABLET, FILM COATED ORAL at 05:17

## 2020-10-03 RX ADMIN — POTASSIUM PHOSPHATE, MONOBASIC POTASSIUM PHOSPHATE, DIBASIC 83.33 MILLIMOLE(S): 236; 224 INJECTION, SOLUTION INTRAVENOUS at 18:28

## 2020-10-03 RX ADMIN — SCOPALAMINE 1 PATCH: 1 PATCH, EXTENDED RELEASE TRANSDERMAL at 18:49

## 2020-10-03 NOTE — PROGRESS NOTE ADULT - SUBJECTIVE AND OBJECTIVE BOX
INTERVAL HPI/OVERNIGHT EVENTS: Patient less agitated today, still having  copious secretions from trach     Patient is a 44y old  Male who presents with a chief complaint of pancreatitis (24 Sep 2020 17:14)    no more fevers since yesterday AM     ROS: unable to examine due to [ x] Encephalopathy  [ ] Advanced Dementia  [ ] Expressive Aphasia  [ ] Non-verbal patient     MEDICATIONS  (STANDING):  albuterol/ipratropium for Nebulization 3 milliLiter(s) Nebulizer every 6 hours  cefepime   IVPB      cefepime   IVPB 2000 milliGRAM(s) IV Intermittent every 8 hours  chlorhexidine 4% Liquid 1 Application(s) Topical <User Schedule>  collagenase Ointment 1 Application(s) Topical daily  dextrose 5%. 1000 milliLiter(s) (50 mL/Hr) IV Continuous <Continuous>  dextrose 50% Injectable 12.5 Gram(s) IV Push once  dextrose 50% Injectable 25 Gram(s) IV Push once  dextrose 50% Injectable 25 Gram(s) IV Push once  enoxaparin Injectable 40 milliGRAM(s) SubCutaneous daily  folic acid 1 milliGRAM(s) Oral daily  furosemide   Injectable 40 milliGRAM(s) IV Push daily  insulin lispro (HumaLOG) corrective regimen sliding scale   SubCutaneous every 6 hours  LORazepam     Tablet 2 milliGRAM(s) Oral <User Schedule>  metoprolol tartrate 25 milliGRAM(s) Oral two times a day  multivitamin 1 Tablet(s) Oral daily  pamidronate IVPB 60 milliGRAM(s) IV Intermittent once  pantoprazole   Suspension 40 milliGRAM(s) Oral daily  QUEtiapine 150 milliGRAM(s) Oral two times a day  scopolamine 1 mG/72 Hr(s) Patch 1 Patch Transdermal every 72 hours  thiamine 100 milliGRAM(s) Oral daily    MEDICATIONS  (PRN):  acetaminophen   Tablet .. 650 milliGRAM(s) Oral every 6 hours PRN Temp greater or equal to 38C (100.4F), Mild Pain (1 - 3)  dextrose 40% Gel 15 Gram(s) Oral once PRN Blood Glucose LESS THAN 70 milliGRAM(s)/deciliter  glucagon  Injectable 1 milliGRAM(s) IntraMuscular once PRN Glucose LESS THAN 70 milligrams/deciliter  LORazepam   Injectable 2 milliGRAM(s) IV Push every 4 hours PRN breakthrough agitation  metoprolol tartrate Injectable 5 milliGRAM(s) IV Push every 6 hours PRN if sbp>160 or HR >110 hold if SBP<100 or HR<60  polyethylene glycol 3350 17 Gram(s) Oral daily PRN Constipation        Allergies    No Known Allergies    Intolerances    Vital Signs Last 24 Hrs  T(C): 36.8 (03 Oct 2020 16:24), Max: 38.6 (02 Oct 2020 17:48)  T(F): 98.2 (03 Oct 2020 16:24), Max: 101.5 (02 Oct 2020 17:48)  HR: 113 (03 Oct 2020 17:24) (99 - 118)  BP: 116/66 (03 Oct 2020 16:24) (106/72 - 119/71)  BP(mean): --  RR: 18 (03 Oct 2020 16:24) (18 - 19)  SpO2: 97% (03 Oct 2020 17:24) (94% - 99%)        PHYSICAL EXAM:  GENERAL: NAD,   HEAD:  Atraumatic, Normocephalic  NERVOUS SYSTEM:  Alert & Oriented X0, with open eyes, doesn't follow commands   CHEST/LUNG: coarse sounds bilaterally, lots of secretions   HEART: Regular rate and rhythm; No murmurs, rubs, or gallops  ABDOMEN: Soft, Nontender, Nondistended; Bowel sounds present. PEG tube present  EXTREMITIES:  No clubbing, cyanosis, or edema b/l       LABS:                          8.5    6.72  )-----------( 241      ( 03 Oct 2020 08:27 )             27.7   10-03    139  |  102  |  27<H>  ----------------------------<  102<H>  3.4<L>   |  30  |  0.57    Ca    10.4<H>      03 Oct 2020 08:27  Phos  1.9     10-03  Mg     1.7     10-03    TPro  9.5<H>  /  Alb  2.4<L>  /  TBili  1.0  /  DBili  x   /  AST  62<H>  /  ALT  38  /  AlkPhos  154<H>  10-03

## 2020-10-03 NOTE — PROGRESS NOTE ADULT - SUBJECTIVE AND OBJECTIVE BOX
INTERVAL HPI:  44M with ETOH Abuse and withdrawal, Alcohol Pancreatitis who presents to the ED with severe abdominal pain.  Found to have Necrotizing Pancreatitis, Septic  Shock, Pneumonia/ARDS with Hypoxic Respiratory failure. Admitted to ICU, had prolonged hospital course with Vent support. Eventually required Tracheostomy and Peg placement.  Also with acute metabolic Encephalopathy.  09/20/20:  Transferred to .    Not able to provide details due to encephalopathy.  09/22/20: 1500 ml paracentesis.    OVERNIGHT EVENTS:  Still requires suctioning from time to time.    Vital Signs Last 24 Hrs  T(C): 36.8 (03 Oct 2020 16:24), Max: 38.6 (02 Oct 2020 17:48)  T(F): 98.2 (03 Oct 2020 16:24), Max: 101.5 (02 Oct 2020 17:48)  HR: 113 (03 Oct 2020 17:24) (99 - 118)  BP: 116/66 (03 Oct 2020 16:24) (106/72 - 119/71)  BP(mean): --  RR: 18 (03 Oct 2020 16:24) (18 - 19)  SpO2: 97% (03 Oct 2020 17:24) (94% - 99%)    PHYSICAL EXAM:  GEN:        Resting, comfortable.  HEENT:     Trach   RESP:       no distress  CVS:          Regular rate and rhythm.   ABD:         Soft, non-tender, non-distended;     MEDICATIONS  (STANDING):  albuterol/ipratropium for Nebulization 3 milliLiter(s) Nebulizer every 6 hours  cefepime   IVPB      cefepime   IVPB 2000 milliGRAM(s) IV Intermittent every 8 hours  chlorhexidine 4% Liquid 1 Application(s) Topical <User Schedule>  collagenase Ointment 1 Application(s) Topical daily  dextrose 5%. 1000 milliLiter(s) (50 mL/Hr) IV Continuous <Continuous>  dextrose 50% Injectable 12.5 Gram(s) IV Push once  dextrose 50% Injectable 25 Gram(s) IV Push once  dextrose 50% Injectable 25 Gram(s) IV Push once  enoxaparin Injectable 40 milliGRAM(s) SubCutaneous daily  folic acid 1 milliGRAM(s) Oral daily  insulin lispro (HumaLOG) corrective regimen sliding scale   SubCutaneous every 6 hours  LORazepam     Tablet 2 milliGRAM(s) Oral <User Schedule>  metoprolol tartrate 25 milliGRAM(s) Oral two times a day  multivitamin 1 Tablet(s) Oral daily  pantoprazole   Suspension 40 milliGRAM(s) Oral daily  QUEtiapine 150 milliGRAM(s) Oral two times a day  scopolamine 1 mG/72 Hr(s) Patch 1 Patch Transdermal every 72 hours  thiamine 100 milliGRAM(s) Oral daily    MEDICATIONS  (PRN):  acetaminophen   Tablet .. 650 milliGRAM(s) Oral every 6 hours PRN Temp greater or equal to 38C (100.4F), Mild Pain (1 - 3)  dextrose 40% Gel 15 Gram(s) Oral once PRN Blood Glucose LESS THAN 70 milliGRAM(s)/deciliter  glucagon  Injectable 1 milliGRAM(s) IntraMuscular once PRN Glucose LESS THAN 70 milligrams/deciliter  LORazepam   Injectable 2 milliGRAM(s) IV Push every 4 hours PRN breakthrough agitation  metoprolol tartrate Injectable 5 milliGRAM(s) IV Push every 6 hours PRN if sbp>160 or HR >110 hold if SBP<100 or HR<60  polyethylene glycol 3350 17 Gram(s) Oral daily PRN Constipation    LABS:                        8.5    6.72  )-----------( 241      ( 03 Oct 2020 08:27 )             27.7     10-03    139  |  102  |  27<H>  ----------------------------<  102<H>  3.4<L>   |  30  |  0.57    Ca    10.4<H>      03 Oct 2020 08:27  Phos  1.9     10-03  Mg     1.7     10-03    TPro  9.5<H>  /  Alb  2.4<L>  /  TBili  1.0  /  DBili  x   /  AST  62<H>  /  ALT  38  /  AlkPhos  154<H>  10-03      ASSESSMENT AND PLAN:  ·	Hypoxic Respiratory failure.  ·	S/P tracheostomy.  ·	Bilateral pneumonia.  ·	Bilateral pleural effusion.  ·	S/P Septic Shock.  ·	Necrotizing Pancreatis.  ·	Acute metabolic Encephalopathy.  ·	Alcohol abuse.  ·	Klebsiella Bacteremia.  ·	Anemia    Continue antibiotics and trach collar.  Suction as needed.

## 2020-10-03 NOTE — PROGRESS NOTE ADULT - ASSESSMENT
44M EtOH pancreatitis p/w abdominal pain 8/17/20, found to have necrotizing pancreatitis c/b septic shock and respiratory failure secondary to PNA and ARDS requiring intubation and DTs requiring sedation and small bowel obstruction conservatively managed with NGT and NPO for which he was admitted to ICU. Currently, s/p trach and PEG on 9/10 and treated for klebsiella bacteremia.    downgraded to  floor 9/20/20 from ICU    Assessment and Plan:  Pt agitated     #Pseudomonas PNA   sputum cultures showed Pseudomonas. no fevers today 10/3  MRSA neg   continue with cefepime   ID following     #s/p necrotizing pancreatitis , s/p septic shock   intermittent fevers since admission,  WBC in 12-16 continues to be in range, up and down   -antibiotics being changed from Cefazolin to Cefepime    ABD US showing worsening ascites, s/p paracentesis by IR 9/22 with 1.5L straw colored fluid removed -cultures did not show any specific organisms  fluid analysis of paracentesis not consistent with SBP.   9/21 KUB shows nonspecific bowel gas pattern without e/o obstruction or ileus   9/21 lactate wnl , procal unremarkable   Blood culture from 9/10 was positive for klebsiella quite sensitive and one set from 9/12 is negative, 9/25 repeat blood cultures negative, sputum cultures from 9/26- showing pseudomonas aeruginosa   SAAG < 1.1 going against portal HTN     -RLE showing no dvt , cellulitis improved  -CT Chest/Abdomen/Pelvis ordered   - Moderate right and small left pleural effusion.  Bilateral patchy airspace disease has improved compared to the prior.  Improving pancreatitis with decreased size of the pancreas as well as improved peripancreatic edema. Small collection adjacent to the undersurface of the tail is also decreased. No evidence of necrosis.  -as per Pulmonary may benefit from right thoracentesis.   -ID following     #Metabolic encephalopathy   -pt agitated at times, but today calm, does not follow commands   hepatic vs. ICU delirium vs sepisis   - mri pending to r/o cva    d/angela Precedex since 9/15 in ICU  -diazepam, Methadone and Seroquel - were tapered. Pt was tapered off all meds, however was very agitated, thus diazepam restarted daily, seroquel twice a day, ativan prn  Psych following   -neuro following   9/30 CT head unremarkable   10/2 MRI could not be completed as patient became agitated     #Acute respiratory failure secondary to PNA/ARDS , copious secretions  s/p TRACH 9/10/20   tolerating trach collar , off ventilator   continue with frequent suctioning/pulmonary toilet   d/c scopolamine , not sure why it was ordered ??       #Ascites   distended gallbladder and ascites seen on CT, abd US neg for acute cholecystitis   s/p paracentesis 9/22 by IR   see above       #s/p PEG placement 9/10/20    s/p SBO which was treated conservatively   Ileus , KUB 9/15/20 No dilated bowel is identified. There is nonobstructive bowel gas pattern  monitor residuals   high residuals in ICU, erythromycin IV started in ICU for improvement of motility of gut   continue with free water 200cc Q6H  repeat KUB 9/21 without e/o ileus or bowel obstruction     #unstageable pressure ulcers x 2 on sacral area ,  no sign of necrosis or infection noted   -wound care consult appreciated , continue with nursing wound care per recs     #Hypokalememia-- replete and monitor    #Hypomagnesemia --replete and monitor     #Hypophosphatemia --replete and monitor     #normocytic anemia , probably related to sepsis and acute illness   requiring transfusion on 9/17/20  monitor H/H   follow anemia work-up   transfuse prn of H/H <7/21      hypercalcemia   - 2/2 to immobilization vs vitamin d supplementation although levels are only 25   - s/p pamidronate x 1    #Preventative measures   lovenox SQ-dvt ppx  fall, aspiration precautions, HOBE   On scopolamine patch to prevent excess secretions, plus suctioning very often as per nursing  8/20/20 ECHO: pEF   PT , patient unable to participate, signed off, will reconsult once more awake          Bárbara Pemberton- [phone] (who lives in [place])

## 2020-10-04 LAB
ALBUMIN SERPL ELPH-MCNC: 2.5 G/DL — LOW (ref 3.3–5)
ALP SERPL-CCNC: 131 U/L — HIGH (ref 40–120)
ALT FLD-CCNC: 40 U/L — SIGNIFICANT CHANGE UP (ref 12–78)
ANION GAP SERPL CALC-SCNC: 6 MMOL/L — SIGNIFICANT CHANGE UP (ref 5–17)
AST SERPL-CCNC: 65 U/L — HIGH (ref 15–37)
BILIRUB SERPL-MCNC: 0.9 MG/DL — SIGNIFICANT CHANGE UP (ref 0.2–1.2)
BUN SERPL-MCNC: 25 MG/DL — HIGH (ref 7–23)
CALCIUM SERPL-MCNC: 9.3 MG/DL — SIGNIFICANT CHANGE UP (ref 8.5–10.1)
CHLORIDE SERPL-SCNC: 107 MMOL/L — SIGNIFICANT CHANGE UP (ref 96–108)
CO2 SERPL-SCNC: 27 MMOL/L — SIGNIFICANT CHANGE UP (ref 22–31)
CREAT SERPL-MCNC: 0.51 MG/DL — SIGNIFICANT CHANGE UP (ref 0.5–1.3)
GLUCOSE BLDC GLUCOMTR-MCNC: 103 MG/DL — HIGH (ref 70–99)
GLUCOSE BLDC GLUCOMTR-MCNC: 109 MG/DL — HIGH (ref 70–99)
GLUCOSE BLDC GLUCOMTR-MCNC: 109 MG/DL — HIGH (ref 70–99)
GLUCOSE BLDC GLUCOMTR-MCNC: 131 MG/DL — HIGH (ref 70–99)
GLUCOSE BLDC GLUCOMTR-MCNC: 138 MG/DL — HIGH (ref 70–99)
GLUCOSE SERPL-MCNC: 111 MG/DL — HIGH (ref 70–99)
HCT VFR BLD CALC: 26 % — LOW (ref 39–50)
HGB BLD-MCNC: 8.6 G/DL — LOW (ref 13–17)
MAGNESIUM SERPL-MCNC: 1.9 MG/DL — SIGNIFICANT CHANGE UP (ref 1.6–2.6)
MCHC RBC-ENTMCNC: 29.9 PG — SIGNIFICANT CHANGE UP (ref 27–34)
MCHC RBC-ENTMCNC: 33.1 GM/DL — SIGNIFICANT CHANGE UP (ref 32–36)
MCV RBC AUTO: 90.3 FL — SIGNIFICANT CHANGE UP (ref 80–100)
NRBC # BLD: 0 /100 WBCS — SIGNIFICANT CHANGE UP (ref 0–0)
PHOSPHATE SERPL-MCNC: 2.9 MG/DL — SIGNIFICANT CHANGE UP (ref 2.5–4.5)
PLATELET # BLD AUTO: 274 K/UL — SIGNIFICANT CHANGE UP (ref 150–400)
POTASSIUM SERPL-MCNC: 3.4 MMOL/L — LOW (ref 3.5–5.3)
POTASSIUM SERPL-SCNC: 3.4 MMOL/L — LOW (ref 3.5–5.3)
PROT SERPL-MCNC: 9 GM/DL — HIGH (ref 6–8.3)
RBC # BLD: 2.88 M/UL — LOW (ref 4.2–5.8)
RBC # FLD: 15.2 % — HIGH (ref 10.3–14.5)
SODIUM SERPL-SCNC: 140 MMOL/L — SIGNIFICANT CHANGE UP (ref 135–145)
WBC # BLD: 10.67 K/UL — HIGH (ref 3.8–10.5)
WBC # FLD AUTO: 10.67 K/UL — HIGH (ref 3.8–10.5)

## 2020-10-04 PROCEDURE — 99233 SBSQ HOSP IP/OBS HIGH 50: CPT

## 2020-10-04 RX ORDER — QUETIAPINE FUMARATE 200 MG/1
200 TABLET, FILM COATED ORAL DAILY
Refills: 0 | Status: DISCONTINUED | OUTPATIENT
Start: 2020-10-05 | End: 2020-10-08

## 2020-10-04 RX ORDER — QUETIAPINE FUMARATE 200 MG/1
300 TABLET, FILM COATED ORAL DAILY
Refills: 0 | Status: DISCONTINUED | OUTPATIENT
Start: 2020-10-04 | End: 2020-10-05

## 2020-10-04 RX ORDER — POTASSIUM CHLORIDE 20 MEQ
40 PACKET (EA) ORAL ONCE
Refills: 0 | Status: COMPLETED | OUTPATIENT
Start: 2020-10-04 | End: 2020-10-04

## 2020-10-04 RX ORDER — QUETIAPINE FUMARATE 200 MG/1
200 TABLET, FILM COATED ORAL ONCE
Refills: 0 | Status: COMPLETED | OUTPATIENT
Start: 2020-10-04 | End: 2020-10-04

## 2020-10-04 RX ADMIN — CEFEPIME 100 MILLIGRAM(S): 1 INJECTION, POWDER, FOR SOLUTION INTRAMUSCULAR; INTRAVENOUS at 05:13

## 2020-10-04 RX ADMIN — CEFEPIME 100 MILLIGRAM(S): 1 INJECTION, POWDER, FOR SOLUTION INTRAMUSCULAR; INTRAVENOUS at 22:25

## 2020-10-04 RX ADMIN — Medication 2 PACKET(S): at 13:35

## 2020-10-04 RX ADMIN — QUETIAPINE FUMARATE 300 MILLIGRAM(S): 200 TABLET, FILM COATED ORAL at 22:25

## 2020-10-04 RX ADMIN — Medication 1 TABLET(S): at 11:55

## 2020-10-04 RX ADMIN — QUETIAPINE FUMARATE 200 MILLIGRAM(S): 200 TABLET, FILM COATED ORAL at 15:37

## 2020-10-04 RX ADMIN — Medication 3 MILLILITER(S): at 00:40

## 2020-10-04 RX ADMIN — Medication 40 MILLIEQUIVALENT(S): at 22:24

## 2020-10-04 RX ADMIN — Medication 2 MILLIGRAM(S): at 00:25

## 2020-10-04 RX ADMIN — Medication 25 MILLIGRAM(S): at 17:19

## 2020-10-04 RX ADMIN — Medication 3 MILLILITER(S): at 05:25

## 2020-10-04 RX ADMIN — Medication 1 APPLICATION(S): at 13:36

## 2020-10-04 RX ADMIN — Medication 1 MILLIGRAM(S): at 11:55

## 2020-10-04 RX ADMIN — QUETIAPINE FUMARATE 150 MILLIGRAM(S): 200 TABLET, FILM COATED ORAL at 05:11

## 2020-10-04 RX ADMIN — PANTOPRAZOLE SODIUM 40 MILLIGRAM(S): 20 TABLET, DELAYED RELEASE ORAL at 11:55

## 2020-10-04 RX ADMIN — CHLORHEXIDINE GLUCONATE 1 APPLICATION(S): 213 SOLUTION TOPICAL at 07:40

## 2020-10-04 RX ADMIN — Medication 2 MILLIGRAM(S): at 15:37

## 2020-10-04 RX ADMIN — Medication 25 MILLIGRAM(S): at 05:11

## 2020-10-04 RX ADMIN — CEFEPIME 100 MILLIGRAM(S): 1 INJECTION, POWDER, FOR SOLUTION INTRAMUSCULAR; INTRAVENOUS at 13:36

## 2020-10-04 RX ADMIN — Medication 3 MILLILITER(S): at 17:10

## 2020-10-04 RX ADMIN — Medication 100 MILLIGRAM(S): at 11:55

## 2020-10-04 RX ADMIN — Medication 2 MILLIGRAM(S): at 13:43

## 2020-10-04 RX ADMIN — Medication 2 MILLIGRAM(S): at 07:40

## 2020-10-04 RX ADMIN — Medication 2 MILLIGRAM(S): at 23:22

## 2020-10-04 RX ADMIN — ENOXAPARIN SODIUM 40 MILLIGRAM(S): 100 INJECTION SUBCUTANEOUS at 11:55

## 2020-10-04 RX ADMIN — Medication 100 GRAM(S): at 00:50

## 2020-10-04 RX ADMIN — SCOPALAMINE 1 PATCH: 1 PATCH, EXTENDED RELEASE TRANSDERMAL at 01:16

## 2020-10-04 RX ADMIN — Medication 2 MILLIGRAM(S): at 08:06

## 2020-10-04 RX ADMIN — Medication 3 MILLILITER(S): at 11:22

## 2020-10-04 RX ADMIN — Medication 2 PACKET(S): at 23:19

## 2020-10-04 RX ADMIN — Medication 2 PACKET(S): at 05:11

## 2020-10-04 NOTE — PROGRESS NOTE ADULT - SUBJECTIVE AND OBJECTIVE BOX
INTERVAL HPI/OVERNIGHT EVENTS: Patient less agitated today, still having  copious secretions from trach     Patient is a 44y old  Male who presents with a chief complaint of pancreatitis (24 Sep 2020 17:14)    Patient seen and examined bedside.   no overnight events.      patient more awake today, mental status significantly improved  out of bed to chair   agitated/angry at times but mostly calm   doesn't consistently follow commands     MEDICATIONS  (STANDING):  albuterol/ipratropium for Nebulization 3 milliLiter(s) Nebulizer every 6 hours  cefepime   IVPB      cefepime   IVPB 2000 milliGRAM(s) IV Intermittent every 8 hours  chlorhexidine 4% Liquid 1 Application(s) Topical <User Schedule>  collagenase Ointment 1 Application(s) Topical daily  dextrose 5%. 1000 milliLiter(s) (50 mL/Hr) IV Continuous <Continuous>  dextrose 50% Injectable 12.5 Gram(s) IV Push once  dextrose 50% Injectable 25 Gram(s) IV Push once  dextrose 50% Injectable 25 Gram(s) IV Push once  enoxaparin Injectable 40 milliGRAM(s) SubCutaneous daily  folic acid 1 milliGRAM(s) Oral daily  furosemide   Injectable 40 milliGRAM(s) IV Push daily  insulin lispro (HumaLOG) corrective regimen sliding scale   SubCutaneous every 6 hours  LORazepam     Tablet 2 milliGRAM(s) Oral <User Schedule>  metoprolol tartrate 25 milliGRAM(s) Oral two times a day  multivitamin 1 Tablet(s) Oral daily  pamidronate IVPB 60 milliGRAM(s) IV Intermittent once  pantoprazole   Suspension 40 milliGRAM(s) Oral daily  QUEtiapine 150 milliGRAM(s) Oral two times a day  scopolamine 1 mG/72 Hr(s) Patch 1 Patch Transdermal every 72 hours  thiamine 100 milliGRAM(s) Oral daily    MEDICATIONS  (PRN):  acetaminophen   Tablet .. 650 milliGRAM(s) Oral every 6 hours PRN Temp greater or equal to 38C (100.4F), Mild Pain (1 - 3)  dextrose 40% Gel 15 Gram(s) Oral once PRN Blood Glucose LESS THAN 70 milliGRAM(s)/deciliter  glucagon  Injectable 1 milliGRAM(s) IntraMuscular once PRN Glucose LESS THAN 70 milligrams/deciliter  LORazepam   Injectable 2 milliGRAM(s) IV Push every 4 hours PRN breakthrough agitation  metoprolol tartrate Injectable 5 milliGRAM(s) IV Push every 6 hours PRN if sbp>160 or HR >110 hold if SBP<100 or HR<60  polyethylene glycol 3350 17 Gram(s) Oral daily PRN Constipation        Allergies    No Known Allergies    Intolerances    Vital Signs Last 24 Hrs  T(C): 37.8 (04 Oct 2020 10:05), Max: 37.8 (04 Oct 2020 10:05)  T(F): 100.1 (04 Oct 2020 10:05), Max: 100.1 (04 Oct 2020 10:05)  HR: 116 (04 Oct 2020 11:30) (101 - 118)  BP: 126/77 (04 Oct 2020 10:05) (111/65 - 126/77)  BP(mean): --  RR: 20 (04 Oct 2020 10:05) (18 - 20)  SpO2: 97% (04 Oct 2020 11:30) (94% - 99%) on 40% trach collar           PHYSICAL EXAM:  GENERAL: NAD,   HEAD:  Atraumatic, Normocephalic  NERVOUS SYSTEM:  Alert & Oriented X0, with open eyes, doesn't follow commands   CHEST/LUNG: coarse sounds bilaterally, lots of secretions   HEART: Regular rate and rhythm; No murmurs, rubs, or gallops  ABDOMEN: Soft, Nontender, Nondistended; Bowel sounds present. PEG tube present  EXTREMITIES:  No clubbing, cyanosis, or edema b/l       LABS:                              8.9    9.42  )-----------( 255      ( 05 Oct 2020 06:39 )             28.2   10-04    140  |  107  |  25<H>  ----------------------------<  111<H>  3.4<L>   |  27  |  0.51    Ca    9.3      04 Oct 2020 12:39  Phos  2.9     10-04  Mg     1.9     10-04    TPro  9.0<H>  /  Alb  2.5<L>  /  TBili  0.9  /  DBili  x   /  AST  65<H>  /  ALT  40  /  AlkPhos  131<H>  10-04    Consultant(s) Notes Reveiwed [x ] Yes     Care Discussed with [x ] Consultants  [ x] Patient  [x ] Family  [x ] /   [x ] Other; RN

## 2020-10-04 NOTE — PROGRESS NOTE ADULT - ASSESSMENT
44M EtOH pancreatitis p/w abdominal pain 8/17/20, found to have necrotizing pancreatitis c/b septic shock and respiratory failure secondary to PNA and ARDS requiring intubation and DTs requiring sedation and small bowel obstruction conservatively managed with NGT and NPO for which he was admitted to ICU. Currently, s/p trach and PEG on 9/10 and treated for klebsiella bacteremia.    downgraded to [facility] floor 9/20/20 from ICU    Assessment and Plan:      #Pseudomonas PNA   sputum cultures showed Pseudomonas. no fevers today 10/3  MRSA neg   continue with cefepime   ID following     #s/p necrotizing pancreatitis , s/p septic shock   intermittent fevers since admission,  WBC in 12-16 continues to be in range, up and down   -antibiotics being changed from Cefazolin to Cefepime    ABD US showing worsening ascites, s/p paracentesis by IR 9/22 with 1.5L straw colored fluid removed -cultures did not show any specific organisms  fluid analysis of paracentesis not consistent with SBP.   9/21 KUB shows nonspecific bowel gas pattern without e/o obstruction or ileus   9/21 lactate wnl , procal unremarkable   Blood culture from 9/10 was positive for klebsiella quite sensitive and one set from 9/12 is negative, 9/25 repeat blood cultures negative, sputum cultures from 9/26- showing pseudomonas aeruginosa   SAAG < 1.1 going against portal HTN     -RLE showing no dvt , cellulitis improved  -CT Chest/Abdomen/Pelvis ordered   - Moderate right and small left pleural effusion.  Bilateral patchy airspace disease has improved compared to the prior.  Improving pancreatitis with decreased size of the pancreas as well as improved peripancreatic edema. Small collection adjacent to the undersurface of the tail is also decreased. No evidence of necrosis.  -as per Pulmonary may benefit from right thoracentesis.   -ID following     #Metabolic encephalopathy   -pt agitated at times, but today calm, does not follow commands   hepatic vs. ICU delirium vs sepisis   - mri pending to r/o cva    d/angela Precedex since 9/15 in ICU  -diazepam, Methadone and Seroquel - were tapered. Pt was tapered off all meds, however was very agitated, thus diazepam restarted daily, seroquel twice a day, ativan prn  Psych following   -neuro following   9/30 CT head unremarkable   10/2 MRI could not be completed as patient became agitated     #Acute respiratory failure secondary to PNA/ARDS , copious secretions  s/p TRACH 9/10/20   tolerating trach collar , off ventilator   continue with frequent suctioning/pulmonary toilet   d/c scopolamine , not sure why it was ordered ??       #Ascites   distended gallbladder and ascites seen on CT, abd US neg for acute cholecystitis   s/p paracentesis 9/22 by IR   see above       #s/p PEG placement 9/10/20    s/p SBO which was treated conservatively   Ileus , KUB 9/15/20 No dilated bowel is identified. There is nonobstructive bowel gas pattern  monitor residuals   high residuals in ICU, erythromycin IV started in ICU for improvement of motility of gut   continue with free water 200cc Q6H  repeat KUB 9/21 without e/o ileus or bowel obstruction     #unstageable pressure ulcers x 2 on sacral area ,  no sign of necrosis or infection noted   -wound care consult appreciated , continue with nursing wound care per recs     #Hypokalememia-- replete and monitor    #Hypomagnesemia --replete and monitor     #Hypophosphatemia --replete and monitor     #normocytic anemia , probably related to sepsis and acute illness   requiring transfusion on 9/17/20  monitor H/H   follow anemia work-up   transfuse prn of H/H <7/21      hypercalcemia   - 2/2 to immobilization vs vitamin d supplementation although levels are only 25   - s/p pamidronate x 1    #Preventative measures   lovenox SQ-dvt ppx  fall, aspiration precautions, HOBE   On scopolamine patch to prevent excess secretions, plus suctioning very often as per nursing  8/20/20 ECHO: pEF   PT , patient unable to participate, signed off, will reconsult once more awake          Bárbara Pemberton- [phone] (who lives in [place])

## 2020-10-04 NOTE — PROGRESS NOTE ADULT - SUBJECTIVE AND OBJECTIVE BOX
INTERVAL HPI:  44M with ETOH Abuse and withdrawal, Alcohol Pancreatitis who presents to the ED with severe abdominal pain.  Found to have Necrotizing Pancreatitis, Septic  Shock, Pneumonia/ARDS with Hypoxic Respiratory failure. Admitted to ICU, had prolonged hospital course with Vent support. Eventually required Tracheostomy and Peg placement.  Also with acute metabolic Encephalopathy.  09/20/20:  Transferred to .    Not able to provide details due to encephalopathy.  09/22/20: 1500 ml paracentesis.    OVERNIGHT EVENTS:  Restless at times. Low grade temperature.    Vital Signs Last 24 Hrs  T(C): 37.8 (04 Oct 2020 10:05), Max: 37.8 (04 Oct 2020 10:05)  T(F): 100.1 (04 Oct 2020 10:05), Max: 100.1 (04 Oct 2020 10:05)  HR: 116 (04 Oct 2020 11:30) (101 - 118)  BP: 126/77 (04 Oct 2020 10:05) (111/65 - 126/77)  BP(mean): --  RR: 20 (04 Oct 2020 10:05) (18 - 20)  SpO2: 97% (04 Oct 2020 11:30) (94% - 99%)    PHYSICAL EXAM:  GEN:        Restless.  HEENT:    Trach   RESP:       no distress  CVS:           Regular rate and rhythm.   ABD:         Soft, non-tender, non-distended;     MEDICATIONS  (STANDING):  albuterol/ipratropium for Nebulization 3 milliLiter(s) Nebulizer every 6 hours  cefepime   IVPB      cefepime   IVPB 2000 milliGRAM(s) IV Intermittent every 8 hours  chlorhexidine 4% Liquid 1 Application(s) Topical <User Schedule>  collagenase Ointment 1 Application(s) Topical daily  dextrose 5%. 1000 milliLiter(s) (50 mL/Hr) IV Continuous <Continuous>  dextrose 50% Injectable 12.5 Gram(s) IV Push once  dextrose 50% Injectable 25 Gram(s) IV Push once  dextrose 50% Injectable 25 Gram(s) IV Push once  enoxaparin Injectable 40 milliGRAM(s) SubCutaneous daily  folic acid 1 milliGRAM(s) Oral daily  insulin lispro (HumaLOG) corrective regimen sliding scale   SubCutaneous every 6 hours  LORazepam     Tablet 2 milliGRAM(s) Oral <User Schedule>  metoprolol tartrate 25 milliGRAM(s) Oral two times a day  multivitamin 1 Tablet(s) Oral daily  pantoprazole   Suspension 40 milliGRAM(s) Oral daily  potassium phosphate / sodium phosphate Powder (PHOS-NaK) 2 Packet(s) Oral every 8 hours  QUEtiapine 200 milliGRAM(s) Oral once  QUEtiapine 300 milliGRAM(s) Oral daily  thiamine 100 milliGRAM(s) Oral daily    MEDICATIONS  (PRN):  acetaminophen   Tablet .. 650 milliGRAM(s) Oral every 6 hours PRN Temp greater or equal to 38C (100.4F), Mild Pain (1 - 3)  dextrose 40% Gel 15 Gram(s) Oral once PRN Blood Glucose LESS THAN 70 milliGRAM(s)/deciliter  glucagon  Injectable 1 milliGRAM(s) IntraMuscular once PRN Glucose LESS THAN 70 milligrams/deciliter  LORazepam   Injectable 2 milliGRAM(s) IV Push every 4 hours PRN breakthrough agitation  metoprolol tartrate Injectable 5 milliGRAM(s) IV Push every 6 hours PRN if sbp>160 or HR >110 hold if SBP<100 or HR<60  polyethylene glycol 3350 17 Gram(s) Oral daily PRN Constipation    LABS:                        8.6    10.67 )-----------( 274      ( 04 Oct 2020 12:39 )             26.0     10-04    140  |  107  |  25<H>  ----------------------------<  111<H>  3.4<L>   |  27  |  0.51    Ca    9.3      04 Oct 2020 12:39  Phos  2.9     10-04  Mg     1.9     10-04    TPro  9.0<H>  /  Alb  2.5<L>  /  TBili  0.9  /  DBili  x   /  AST  65<H>  /  ALT  40  /  AlkPhos  131<H>  10-04    ASSESSMENT AND PLAN:  ·	Hypoxic Respiratory failure.  ·	S/P tracheostomy.  ·	Bilateral pneumonia.  ·	Bilateral pleural effusion.  ·	S/P Septic Shock.  ·	Necrotizing Pancreatis.  ·	Acute metabolic Encephalopathy.  ·	Alcohol abuse.  ·	Klebsiella Bacteremia.  ·	Anemia    Continue antibiotics, trach collar and nebulizer.

## 2020-10-04 NOTE — PROGRESS NOTE ADULT - ASSESSMENT
Subjective Complaints:  Historian:             Vital Signs Last 24 Hrs  T(C): 37.3 (04 Oct 2020 15:44), Max: 37.8 (04 Oct 2020 10:05)  T(F): 99.1 (04 Oct 2020 15:44), Max: 100.1 (04 Oct 2020 10:05)  HR: 115 (04 Oct 2020 17:16) (101 - 118)  BP: 110/73 (04 Oct 2020 15:44) (110/73 - 126/77)  BP(mean): --  RR: 20 (04 Oct 2020 10:05) (20 - 20)  SpO2: 96% (04 Oct 2020 17:16) (94% - 97%)    GENERAL PHYSICAL EXAM:  General:  Appears stated age, well-groomed, well-nourished, no distress  HEENT:  NC/AT, patent nares w/ pink mucosa, OP clear w/o lesions, PERRL, EOMI, conjunctivae clear, no thyromegaly, nodules, adenopathy, no JVD  Chest:  Full & symmetric excursion, no increased effort, breath sounds clear  Cardiovascular:  Regular rhythm, S1, S2, no murmur/rub/S3/S4, no carotid/femoral/abdominal bruit, radial/pedal pulses 2+, no edema  Abdomen:  Soft, non-tender, non-distended, normoactive bowel sounds, no HSM  Extremities:  Gait & station:   Digits:   Nails:   Joints, Bones, Muscles:   ROM:   Stability:  Skin:  No rash/erythema/ecchymoses/petechiae/wounds/abscess/warm/dry  Musculoskeletal:  Full ROM in all joints w/o swelling/tenderness/effusion        LABS:                        8.6    10.67 )-----------( 274      ( 04 Oct 2020 12:39 )             26.0     10-04    140  |  107  |  25<H>  ----------------------------<  111<H>  3.4<L>   |  27  |  0.51    Ca    9.3      04 Oct 2020 12:39  Phos  2.9     10-04  Mg     1.9     10-04    TPro  9.0<H>  /  Alb  2.5<L>  /  TBili  0.9  /  DBili  x   /  AST  65<H>  /  ALT  40  /  AlkPhos  131<H>  10-04          RADIOLOGY & ADDITIONAL STUDIES:        Neurology Progress Note:      Mental Status: awake open eyes restless in bed does not follows commands       Cranial Nerves: defrd      Motor:   restless in bed  arm kegs 3/5 wasting of arm legs         Sensory: defrd      Cerebellar: defrd      Gait: defrd      Assesment/Plan:  hx of etoh pancreatitis  s/p extubation critical care neuropathy and myopathy  s/p trach  and peg  encephalaothy

## 2020-10-05 ENCOUNTER — TRANSCRIPTION ENCOUNTER (OUTPATIENT)
Age: 45
End: 2020-10-05

## 2020-10-05 LAB
ALBUMIN SERPL ELPH-MCNC: 2.5 G/DL — LOW (ref 3.3–5)
ALP SERPL-CCNC: 123 U/L — HIGH (ref 40–120)
ALT FLD-CCNC: 37 U/L — SIGNIFICANT CHANGE UP (ref 12–78)
AMYLASE P1 CFR SERPL: 160 U/L — HIGH (ref 25–115)
ANION GAP SERPL CALC-SCNC: 5 MMOL/L — SIGNIFICANT CHANGE UP (ref 5–17)
AST SERPL-CCNC: 52 U/L — HIGH (ref 15–37)
BILIRUB SERPL-MCNC: 1.1 MG/DL — SIGNIFICANT CHANGE UP (ref 0.2–1.2)
BUN SERPL-MCNC: 24 MG/DL — HIGH (ref 7–23)
CALCIUM SERPL-MCNC: 8.9 MG/DL — SIGNIFICANT CHANGE UP (ref 8.5–10.1)
CHLORIDE SERPL-SCNC: 106 MMOL/L — SIGNIFICANT CHANGE UP (ref 96–108)
CO2 SERPL-SCNC: 26 MMOL/L — SIGNIFICANT CHANGE UP (ref 22–31)
CREAT SERPL-MCNC: 0.52 MG/DL — SIGNIFICANT CHANGE UP (ref 0.5–1.3)
GLUCOSE BLDC GLUCOMTR-MCNC: 105 MG/DL — HIGH (ref 70–99)
GLUCOSE BLDC GLUCOMTR-MCNC: 107 MG/DL — HIGH (ref 70–99)
GLUCOSE BLDC GLUCOMTR-MCNC: 112 MG/DL — HIGH (ref 70–99)
GLUCOSE SERPL-MCNC: 103 MG/DL — HIGH (ref 70–99)
HCT VFR BLD CALC: 28.2 % — LOW (ref 39–50)
HGB BLD-MCNC: 8.9 G/DL — LOW (ref 13–17)
LIDOCAIN IGE QN: 590 U/L — HIGH (ref 73–393)
MAGNESIUM SERPL-MCNC: 2 MG/DL — SIGNIFICANT CHANGE UP (ref 1.6–2.6)
MCHC RBC-ENTMCNC: 29 PG — SIGNIFICANT CHANGE UP (ref 27–34)
MCHC RBC-ENTMCNC: 31.6 GM/DL — LOW (ref 32–36)
MCV RBC AUTO: 91.9 FL — SIGNIFICANT CHANGE UP (ref 80–100)
NRBC # BLD: 0 /100 WBCS — SIGNIFICANT CHANGE UP (ref 0–0)
PHOSPHATE SERPL-MCNC: 2.8 MG/DL — SIGNIFICANT CHANGE UP (ref 2.5–4.5)
PLATELET # BLD AUTO: 255 K/UL — SIGNIFICANT CHANGE UP (ref 150–400)
POTASSIUM SERPL-MCNC: 3.6 MMOL/L — SIGNIFICANT CHANGE UP (ref 3.5–5.3)
POTASSIUM SERPL-SCNC: 3.6 MMOL/L — SIGNIFICANT CHANGE UP (ref 3.5–5.3)
PROT SERPL-MCNC: 9 GM/DL — HIGH (ref 6–8.3)
RBC # BLD: 3.07 M/UL — LOW (ref 4.2–5.8)
RBC # FLD: 15.2 % — HIGH (ref 10.3–14.5)
SODIUM SERPL-SCNC: 137 MMOL/L — SIGNIFICANT CHANGE UP (ref 135–145)
WBC # BLD: 9.42 K/UL — SIGNIFICANT CHANGE UP (ref 3.8–10.5)
WBC # FLD AUTO: 9.42 K/UL — SIGNIFICANT CHANGE UP (ref 3.8–10.5)

## 2020-10-05 PROCEDURE — 99231 SBSQ HOSP IP/OBS SF/LOW 25: CPT

## 2020-10-05 PROCEDURE — 99233 SBSQ HOSP IP/OBS HIGH 50: CPT

## 2020-10-05 RX ORDER — METHADONE HYDROCHLORIDE 40 MG/1
5 TABLET ORAL
Refills: 0 | Status: DISCONTINUED | OUTPATIENT
Start: 2020-10-05 | End: 2020-10-05

## 2020-10-05 RX ORDER — METHADONE HYDROCHLORIDE 40 MG/1
5 TABLET ORAL ONCE
Refills: 0 | Status: DISCONTINUED | OUTPATIENT
Start: 2020-10-05 | End: 2020-10-05

## 2020-10-05 RX ORDER — QUETIAPINE FUMARATE 200 MG/1
300 TABLET, FILM COATED ORAL
Refills: 0 | Status: DISCONTINUED | OUTPATIENT
Start: 2020-10-05 | End: 2020-10-12

## 2020-10-05 RX ADMIN — Medication 25 MILLIGRAM(S): at 17:28

## 2020-10-05 RX ADMIN — Medication 25 MILLIGRAM(S): at 05:48

## 2020-10-05 RX ADMIN — Medication 1 TABLET(S): at 12:28

## 2020-10-05 RX ADMIN — ENOXAPARIN SODIUM 40 MILLIGRAM(S): 100 INJECTION SUBCUTANEOUS at 12:25

## 2020-10-05 RX ADMIN — QUETIAPINE FUMARATE 300 MILLIGRAM(S): 200 TABLET, FILM COATED ORAL at 21:49

## 2020-10-05 RX ADMIN — Medication 1 MILLIGRAM(S): at 12:24

## 2020-10-05 RX ADMIN — Medication 2 PACKET(S): at 13:42

## 2020-10-05 RX ADMIN — QUETIAPINE FUMARATE 200 MILLIGRAM(S): 200 TABLET, FILM COATED ORAL at 12:24

## 2020-10-05 RX ADMIN — CHLORHEXIDINE GLUCONATE 1 APPLICATION(S): 213 SOLUTION TOPICAL at 05:51

## 2020-10-05 RX ADMIN — CEFEPIME 100 MILLIGRAM(S): 1 INJECTION, POWDER, FOR SOLUTION INTRAMUSCULAR; INTRAVENOUS at 05:48

## 2020-10-05 RX ADMIN — Medication 2 PACKET(S): at 21:49

## 2020-10-05 RX ADMIN — Medication 3 MILLILITER(S): at 05:12

## 2020-10-05 RX ADMIN — Medication 2 MILLIGRAM(S): at 07:57

## 2020-10-05 RX ADMIN — Medication 2 MILLIGRAM(S): at 12:39

## 2020-10-05 RX ADMIN — METHADONE HYDROCHLORIDE 5 MILLIGRAM(S): 40 TABLET ORAL at 13:41

## 2020-10-05 RX ADMIN — CEFEPIME 100 MILLIGRAM(S): 1 INJECTION, POWDER, FOR SOLUTION INTRAMUSCULAR; INTRAVENOUS at 21:48

## 2020-10-05 RX ADMIN — Medication 3 MILLILITER(S): at 23:26

## 2020-10-05 RX ADMIN — Medication 100 MILLIGRAM(S): at 12:24

## 2020-10-05 RX ADMIN — PANTOPRAZOLE SODIUM 40 MILLIGRAM(S): 20 TABLET, DELAYED RELEASE ORAL at 12:24

## 2020-10-05 RX ADMIN — CEFEPIME 100 MILLIGRAM(S): 1 INJECTION, POWDER, FOR SOLUTION INTRAMUSCULAR; INTRAVENOUS at 13:41

## 2020-10-05 RX ADMIN — Medication 2 MILLIGRAM(S): at 03:51

## 2020-10-05 RX ADMIN — Medication 2 MILLIGRAM(S): at 16:37

## 2020-10-05 RX ADMIN — Medication 2 PACKET(S): at 05:49

## 2020-10-05 RX ADMIN — Medication 1 APPLICATION(S): at 12:28

## 2020-10-05 RX ADMIN — Medication 3 MILLILITER(S): at 11:03

## 2020-10-05 RX ADMIN — Medication 2 MILLIGRAM(S): at 09:24

## 2020-10-05 RX ADMIN — Medication 3 MILLILITER(S): at 00:42

## 2020-10-05 RX ADMIN — Medication 3 MILLILITER(S): at 17:19

## 2020-10-05 NOTE — PROGRESS NOTE BEHAVIORAL HEALTH - OTHER
tenuous limited tenuous as per notes deferred at this time none looks confused as expected / appropriate to context not able to ascertain at this time due to limitation of exam

## 2020-10-05 NOTE — PROGRESS NOTE BEHAVIORAL HEALTH - NSBHCHARTREVIEWVS_PSY_A_CORE FT
T(C): 37 (10-05-20 @ 10:23), Max: 37.3 (10-04-20 @ 15:44)  HR: 110 (10-05-20 @ 11:13) (102 - 124)  BP: 122/83 (10-05-20 @ 10:23) (110/73 - 128/71)  RR: 18 (10-05-20 @ 10:23) (18 - 20)  SpO2: 95% (10-05-20 @ 11:13) (94% - 100%)

## 2020-10-05 NOTE — PROGRESS NOTE ADULT - SUBJECTIVE AND OBJECTIVE BOX
INTERVAL HPI:  44M with ETOH Abuse and withdrawal, Alcohol Pancreatitis who presents to the ED with severe abdominal pain.  Found to have Necrotizing Pancreatitis, Septic  Shock, Pneumonia/ARDS with Hypoxic Respiratory failure. Admitted to ICU, had prolonged hospital course with Vent support. Eventually required Tracheostomy and Peg placement.  Also with acute metabolic Encephalopathy.  09/20/20:  Transferred to .    Not able to provide details due to encephalopathy.  09/22/20: 1500 ml paracentesis.    OVERNIGHT EVENTS:  Reported restless    Vital Signs Last 24 Hrs  T(C): 36.4 (05 Oct 2020 16:07), Max: 37 (04 Oct 2020 23:31)  T(F): 97.5 (05 Oct 2020 16:07), Max: 98.6 (04 Oct 2020 23:31)  HR: 106 (05 Oct 2020 17:32) (102 - 124)  BP: 119/75 (05 Oct 2020 16:07) (111/70 - 128/71)  BP(mean): --  RR: 18 (05 Oct 2020 16:07) (18 - 20)  SpO2: 96% (05 Oct 2020 17:32) (94% - 100%)    PHYSICAL EXAM:  GEN:        Restless.  HEENT:    TRach  RESP:        no distress  CVS:          Regular rate and rhythm.     MEDICATIONS  (STANDING):  albuterol/ipratropium for Nebulization 3 milliLiter(s) Nebulizer every 6 hours  cefepime   IVPB      cefepime   IVPB 2000 milliGRAM(s) IV Intermittent every 8 hours  chlorhexidine 4% Liquid 1 Application(s) Topical <User Schedule>  collagenase Ointment 1 Application(s) Topical daily  dextrose 5%. 1000 milliLiter(s) (50 mL/Hr) IV Continuous <Continuous>  dextrose 50% Injectable 12.5 Gram(s) IV Push once  dextrose 50% Injectable 25 Gram(s) IV Push once  dextrose 50% Injectable 25 Gram(s) IV Push once  enoxaparin Injectable 40 milliGRAM(s) SubCutaneous daily  folic acid 1 milliGRAM(s) Oral daily  insulin lispro (HumaLOG) corrective regimen sliding scale   SubCutaneous every 6 hours  LORazepam     Tablet 2 milliGRAM(s) Oral <User Schedule>  metoprolol tartrate 25 milliGRAM(s) Oral two times a day  multivitamin 1 Tablet(s) Oral daily  pantoprazole   Suspension 40 milliGRAM(s) Oral daily  potassium phosphate / sodium phosphate Powder (PHOS-NaK) 2 Packet(s) Oral every 8 hours  QUEtiapine 200 milliGRAM(s) Oral daily  QUEtiapine 300 milliGRAM(s) Oral <User Schedule>  thiamine 100 milliGRAM(s) Oral daily    MEDICATIONS  (PRN):  acetaminophen   Tablet .. 650 milliGRAM(s) Oral every 6 hours PRN Temp greater or equal to 38C (100.4F), Mild Pain (1 - 3)  dextrose 40% Gel 15 Gram(s) Oral once PRN Blood Glucose LESS THAN 70 milliGRAM(s)/deciliter  glucagon  Injectable 1 milliGRAM(s) IntraMuscular once PRN Glucose LESS THAN 70 milligrams/deciliter  LORazepam   Injectable 2 milliGRAM(s) IV Push every 4 hours PRN breakthrough agitation  metoprolol tartrate Injectable 5 milliGRAM(s) IV Push every 6 hours PRN if sbp>160 or HR >110 hold if SBP<100 or HR<60  polyethylene glycol 3350 17 Gram(s) Oral daily PRN Constipation    LABS:                        8.9    9.42  )-----------( 255      ( 05 Oct 2020 06:39 )             28.2     10-05    137  |  106  |  24<H>  ----------------------------<  103<H>  3.6   |  26  |  0.52    Ca    8.9      05 Oct 2020 06:39  Phos  2.8     10-05  Mg     2.0     10-05    TPro  9.0<H>  /  Alb  2.5<L>  /  TBili  1.1  /  DBili  x   /  AST  52<H>  /  ALT  37  /  AlkPhos  123<H>  10-05    ASSESSMENT AND PLAN:  ·	Hypoxic Respiratory failure.  ·	S/P tracheostomy.  ·	Bilateral pneumonia.  ·	Bilateral pleural effusion.  ·	S/P Septic Shock.  ·	Necrotizing Pancreatis.  ·	Acute metabolic Encephalopathy.  ·	Alcohol abuse.  ·	Klebsiella Bacteremia.  ·	Anemia    Continue trach collar and nebulizer

## 2020-10-05 NOTE — PROGRESS NOTE BEHAVIORAL HEALTH - NSBHFUPINTERVALHXFT_PSY_A_CORE
Patient was more awake this weekend and also more active including trying to get out of bed, not cooperating or following direction. Seroquel adjusted to accommodate this agitation to 200mg PO in am and 300mg PO in hs. Some described "agitation" is need-based and consistent with physical discomfort such as wanting to finally get up from 3 weeks of lying on one's back.

## 2020-10-05 NOTE — PROGRESS NOTE ADULT - SUBJECTIVE AND OBJECTIVE BOX
Neurology Progress Note    No acute events.    Neuro Exam: Alert. Makes eye contact. Nodding appropriately to my questions. Mild psychomotor delay. Following simple commands. Spontaneously moving all four extremities against gravity. Muscle atrophy noted in all four extremities.     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 Will do outpatient.  - EEG shows no seizure focus.  - wean off sedating medication as tolerated   - neuro improving slowly. Patient was critically ill for many weeks and will take time to improve possibly back to baseline.   - PT/OT and probable rehab

## 2020-10-05 NOTE — PROGRESS NOTE ADULT - ASSESSMENT
44M EtOH pancreatitis p/w abdominal pain 8/17/20, found to have necrotizing pancreatitis c/b septic shock and respiratory failure secondary to PNA and ARDS requiring intubation and DTs requiring sedation and small bowel obstruction conservatively managed with NGT and NPO for which he was admitted to ICU. Currently, s/p trach and PEG on 9/10 and treated for klebsiella bacteremia.    downgraded to  floor 9/20/20 from ICU    Assessment and Plan:      #Pseudomonas PNA   sputum cultures showed Pseudomonas. no fevers   secretions much improved and clear now   MRSA neg   s/p Cefepime course , last dose today     #Loose Bms,   could be multifactorial, --PEG feeds, abx, pancreatic insufficiency   no leukocytosis or fevers   will monitor for now   d/c abx today   may need to change peg feeds   follow stool elastase and trypsinogen levels     #s/p necrotizing pancreatitis , s/p septic shock , resolved     -RLE showing no dvt , cellulitis improved  -CT Chest/Abdomen/Pelvis ordered   - Moderate right and small left pleural effusion.  Bilateral patchy airspace disease has improved compared to the prior.  Improving pancreatitis with decreased size of the pancreas as well as improved peripancreatic edema. Small collection adjacent to the undersurface of the tail is also decreased. No evidence of necrosis.  -ID following     #Metabolic encephalopathy   hepatic vs. ICU delirium vs sepisis   patient awake , mouthing words to staff . agitated at times    d/angela Precedex since 9/15 in ICU  -diazepam, Methadone and Seroquel - were tapered. Pt was tapered off all meds, however was very agitated, thus diazepam restarted daily, seroquel twice a day, increased dose to 200mg AM and 300mg PM, ativan prn  Psych following   -neuro following   9/30 CT head unremarkable   10/2 MRI could not be completed as patient became agitated , can be done outpt     #Acute respiratory failure secondary to PNA/ARDS , copious secretions  s/p TRACH 9/10/20   tolerating trach collar , off ventilator   continue with frequent suctioning/pulmonary toilet ; secretions much improved     #Ascites , abd is soft.   distended gallbladder and ascites seen on CT, abd US neg for acute cholecystitis   s/p paracentesis 9/22 by IR   ABD US showing worsening ascites, s/p paracentesis by IR 9/22 with 1.5L straw colored fluid removed -cultures did not show any specific organisms  fluid analysis of paracentesis not consistent with SBP.   9/21 KUB shows nonspecific bowel gas pattern without e/o obstruction or ileus   9/21 lactate wnl , procal unremarkable   Blood culture from 9/10 was positive for klebsiella quite sensitive and one set from 9/12 is negative, 9/25 repeat blood cultures negative, sputum cultures from 9/26- showing pseudomonas aeruginosa   SAAG < 1.1 going against portal HTN       #s/p PEG placement 9/10/20    s/p SBO which was treated conservatively   Ileus , KUB 9/15/20 No dilated bowel is identified. There is nonobstructive bowel gas pattern  monitor residuals   high residuals in ICU, erythromycin IV started in ICU for improvement of motility of gut   continue with free water 200cc Q6H  repeat KUB 9/21 without e/o ileus or bowel obstruction     #unstageable pressure ulcers x 2 on sacral area ,    -vascular consult for evaluation for need for debridement     #Hypokalememia-- repleted    #Hypomagnesemia --repleted    #Hypophosphatemia --repleted    #normocytic anemia , probably related to sepsis and acute illness   requiring transfusion on 9/17/20  monitor H/H   follow anemia work-up   transfuse prn of H/H <7/21      hypercalcemia   - 2/2 to immobilization vs vitamin d supplementation although levels are only 25   - s/p pamidronate x 1    #Preventative measures   lovenox SQ-dvt ppx  fall, aspiration precautions, HOBE   On scopolamine patch to prevent excess secretions, plus suctioning very often as per nursing  8/20/20 ECHO: pEF   PT , patient unable to participate, signed off, will reconsult once more awake          Bárbara Pemberton- [phone] (who lives in [place])

## 2020-10-05 NOTE — PROGRESS NOTE BEHAVIORAL HEALTH - NSBHCHARTREVIEWLAB_PSY_A_CORE FT
10-05    137  |  106  |  24<H>  ----------------------------<  103<H>  3.6   |  26  |  0.52    Ca    8.9      05 Oct 2020 06:39  Phos  2.8     10-05  Mg     2.0     10-05    TPro  9.0<H>  /  Alb  2.5<L>  /  TBili  1.1  /  DBili  x   /  AST  52<H>  /  ALT  37  /  AlkPhos  123<H>  10-05

## 2020-10-05 NOTE — PROGRESS NOTE ADULT - SUBJECTIVE AND OBJECTIVE BOX
INTERVAL HPI/OVERNIGHT EVENTS: Patient less agitated today, still having  copious secretions from trach     Patient is a 44y old  Male who presents with a chief complaint of pancreatitis (24 Sep 2020 17:14)    Patient seen and examined bedside.   no overnight events.   agitated but awake , trying to mouth words to staff , agitated because staff doesn't always understand   was out of bed to chair yesterday and did well   continues with to do well with trach collar 40%   RN informed that patient has been having loose stools since last night , no fevers     ROS: unable to examine due to [ x] Encephalopathy  [ ] Advamced Dementia  [ ] Expressive Aphasia  [x ] Non-verbal patient d/t trach    MEDICATIONS  (STANDING):  albuterol/ipratropium for Nebulization 3 milliLiter(s) Nebulizer every 6 hours  cefepime   IVPB      cefepime   IVPB 2000 milliGRAM(s) IV Intermittent every 8 hours  chlorhexidine 4% Liquid 1 Application(s) Topical <User Schedule>  collagenase Ointment 1 Application(s) Topical daily  dextrose 5%. 1000 milliLiter(s) (50 mL/Hr) IV Continuous <Continuous>  dextrose 50% Injectable 12.5 Gram(s) IV Push once  dextrose 50% Injectable 25 Gram(s) IV Push once  dextrose 50% Injectable 25 Gram(s) IV Push once  enoxaparin Injectable 40 milliGRAM(s) SubCutaneous daily  folic acid 1 milliGRAM(s) Oral daily  furosemide   Injectable 40 milliGRAM(s) IV Push daily  insulin lispro (HumaLOG) corrective regimen sliding scale   SubCutaneous every 6 hours  LORazepam     Tablet 2 milliGRAM(s) Oral <User Schedule>  metoprolol tartrate 25 milliGRAM(s) Oral two times a day  multivitamin 1 Tablet(s) Oral daily  pamidronate IVPB 60 milliGRAM(s) IV Intermittent once  pantoprazole   Suspension 40 milliGRAM(s) Oral daily  QUEtiapine 150 milliGRAM(s) Oral two times a day  scopolamine 1 mG/72 Hr(s) Patch 1 Patch Transdermal every 72 hours  thiamine 100 milliGRAM(s) Oral daily    MEDICATIONS  (PRN):  acetaminophen   Tablet .. 650 milliGRAM(s) Oral every 6 hours PRN Temp greater or equal to 38C (100.4F), Mild Pain (1 - 3)  dextrose 40% Gel 15 Gram(s) Oral once PRN Blood Glucose LESS THAN 70 milliGRAM(s)/deciliter  glucagon  Injectable 1 milliGRAM(s) IntraMuscular once PRN Glucose LESS THAN 70 milligrams/deciliter  LORazepam   Injectable 2 milliGRAM(s) IV Push every 4 hours PRN breakthrough agitation  metoprolol tartrate Injectable 5 milliGRAM(s) IV Push every 6 hours PRN if sbp>160 or HR >110 hold if SBP<100 or HR<60  polyethylene glycol 3350 17 Gram(s) Oral daily PRN Constipation        aller: NKDA       Vitals:  afebrile, P 107, 119/75, R 97% on 40% trach collar RR 18      PHYSICAL EXAM:  GENERAL: NAD,   HEAD:  Atraumatic, Normocephalic  NERVOUS SYSTEM:  Alert & Oriented X0, with open eyes, doesn't follow commands   CHEST/LUNG: coarse sounds bilaterally, lots of secretions   HEART: Regular rate and rhythm; No murmurs, rubs, or gallops  ABDOMEN: Soft, Nontender, Nondistended; Bowel sounds present. PEG tube present  EXTREMITIES:  No clubbing, cyanosis, or edema b/l       LABS:                        8.9    9.42  )-----------( 255      ( 05 Oct 2020 06:39 )             28.2     10-05    137  |  106  |  24<H>  ----------------------------<  103<H>  3.6   |  26  |  0.52    Ca    8.9      05 Oct 2020 06:39  Phos  2.8     10-05  Mg     2.0     10-05    TPro  9.0<H>  /  Alb  2.5<L>  /  TBili  1.1  /  DBili  x   /  AST  52<H>  /  ALT  37  /  AlkPhos  123<H>  10-05    Consultant(s) Notes Reveiwed [x ] Yes     Care Discussed with [x ] Consultants  [ x] Patient  [x ] Family  [x ] /   [x ] Other; RN  discussed with brother-in-law

## 2020-10-06 LAB
ALBUMIN SERPL ELPH-MCNC: 2.6 G/DL — LOW (ref 3.3–5)
ALP SERPL-CCNC: 139 U/L — HIGH (ref 40–120)
ALT FLD-CCNC: 40 U/L — SIGNIFICANT CHANGE UP (ref 12–78)
ANION GAP SERPL CALC-SCNC: 6 MMOL/L — SIGNIFICANT CHANGE UP (ref 5–17)
AST SERPL-CCNC: 54 U/L — HIGH (ref 15–37)
BILIRUB SERPL-MCNC: 1 MG/DL — SIGNIFICANT CHANGE UP (ref 0.2–1.2)
BUN SERPL-MCNC: 23 MG/DL — SIGNIFICANT CHANGE UP (ref 7–23)
CALCIUM SERPL-MCNC: 8.8 MG/DL — SIGNIFICANT CHANGE UP (ref 8.5–10.1)
CHLORIDE SERPL-SCNC: 107 MMOL/L — SIGNIFICANT CHANGE UP (ref 96–108)
CO2 SERPL-SCNC: 25 MMOL/L — SIGNIFICANT CHANGE UP (ref 22–31)
CREAT SERPL-MCNC: 0.51 MG/DL — SIGNIFICANT CHANGE UP (ref 0.5–1.3)
GLUCOSE BLDC GLUCOMTR-MCNC: 105 MG/DL — HIGH (ref 70–99)
GLUCOSE BLDC GLUCOMTR-MCNC: 107 MG/DL — HIGH (ref 70–99)
GLUCOSE BLDC GLUCOMTR-MCNC: 112 MG/DL — HIGH (ref 70–99)
GLUCOSE BLDC GLUCOMTR-MCNC: 116 MG/DL — HIGH (ref 70–99)
GLUCOSE BLDC GLUCOMTR-MCNC: 117 MG/DL — HIGH (ref 70–99)
GLUCOSE SERPL-MCNC: 98 MG/DL — SIGNIFICANT CHANGE UP (ref 70–99)
HCT VFR BLD CALC: 29 % — LOW (ref 39–50)
HGB BLD-MCNC: 9.4 G/DL — LOW (ref 13–17)
MAGNESIUM SERPL-MCNC: 2.2 MG/DL — SIGNIFICANT CHANGE UP (ref 1.6–2.6)
MCHC RBC-ENTMCNC: 29.6 PG — SIGNIFICANT CHANGE UP (ref 27–34)
MCHC RBC-ENTMCNC: 32.4 GM/DL — SIGNIFICANT CHANGE UP (ref 32–36)
MCV RBC AUTO: 91.2 FL — SIGNIFICANT CHANGE UP (ref 80–100)
NRBC # BLD: 0 /100 WBCS — SIGNIFICANT CHANGE UP (ref 0–0)
PHOSPHATE SERPL-MCNC: 2.6 MG/DL — SIGNIFICANT CHANGE UP (ref 2.5–4.5)
PLATELET # BLD AUTO: 281 K/UL — SIGNIFICANT CHANGE UP (ref 150–400)
POTASSIUM SERPL-MCNC: 3.8 MMOL/L — SIGNIFICANT CHANGE UP (ref 3.5–5.3)
POTASSIUM SERPL-SCNC: 3.8 MMOL/L — SIGNIFICANT CHANGE UP (ref 3.5–5.3)
PROT SERPL-MCNC: 9.4 GM/DL — HIGH (ref 6–8.3)
RBC # BLD: 3.18 M/UL — LOW (ref 4.2–5.8)
RBC # FLD: 15.1 % — HIGH (ref 10.3–14.5)
SODIUM SERPL-SCNC: 138 MMOL/L — SIGNIFICANT CHANGE UP (ref 135–145)
WBC # BLD: 11.72 K/UL — HIGH (ref 3.8–10.5)
WBC # FLD AUTO: 11.72 K/UL — HIGH (ref 3.8–10.5)

## 2020-10-06 PROCEDURE — 99233 SBSQ HOSP IP/OBS HIGH 50: CPT

## 2020-10-06 PROCEDURE — 99231 SBSQ HOSP IP/OBS SF/LOW 25: CPT

## 2020-10-06 RX ORDER — THIAMINE MONONITRATE (VIT B1) 100 MG
100 TABLET ORAL DAILY
Refills: 0 | Status: DISCONTINUED | OUTPATIENT
Start: 2020-10-10 | End: 2020-10-26

## 2020-10-06 RX ORDER — CLONAZEPAM 1 MG
1.5 TABLET ORAL
Refills: 0 | Status: DISCONTINUED | OUTPATIENT
Start: 2020-10-06 | End: 2020-10-13

## 2020-10-06 RX ORDER — THIAMINE MONONITRATE (VIT B1) 100 MG
100 TABLET ORAL DAILY
Refills: 0 | Status: COMPLETED | OUTPATIENT
Start: 2020-10-07 | End: 2020-10-09

## 2020-10-06 RX ORDER — METHADONE HYDROCHLORIDE 40 MG/1
5 TABLET ORAL ONCE
Refills: 0 | Status: DISCONTINUED | OUTPATIENT
Start: 2020-10-06 | End: 2020-10-06

## 2020-10-06 RX ADMIN — CHLORHEXIDINE GLUCONATE 1 APPLICATION(S): 213 SOLUTION TOPICAL at 05:56

## 2020-10-06 RX ADMIN — Medication 1 MILLIGRAM(S): at 11:32

## 2020-10-06 RX ADMIN — Medication 2 MILLIGRAM(S): at 21:02

## 2020-10-06 RX ADMIN — QUETIAPINE FUMARATE 300 MILLIGRAM(S): 200 TABLET, FILM COATED ORAL at 21:59

## 2020-10-06 RX ADMIN — Medication 2 MILLIGRAM(S): at 08:19

## 2020-10-06 RX ADMIN — Medication 1.5 MILLIGRAM(S): at 17:01

## 2020-10-06 RX ADMIN — METHADONE HYDROCHLORIDE 5 MILLIGRAM(S): 40 TABLET ORAL at 12:59

## 2020-10-06 RX ADMIN — ENOXAPARIN SODIUM 40 MILLIGRAM(S): 100 INJECTION SUBCUTANEOUS at 11:32

## 2020-10-06 RX ADMIN — Medication 2 PACKET(S): at 22:05

## 2020-10-06 RX ADMIN — Medication 1 TABLET(S): at 11:31

## 2020-10-06 RX ADMIN — Medication 2 PACKET(S): at 05:44

## 2020-10-06 RX ADMIN — Medication 3 MILLILITER(S): at 11:54

## 2020-10-06 RX ADMIN — Medication 2 MILLIGRAM(S): at 02:53

## 2020-10-06 RX ADMIN — Medication 3 MILLILITER(S): at 05:34

## 2020-10-06 RX ADMIN — Medication 1 APPLICATION(S): at 12:59

## 2020-10-06 RX ADMIN — Medication 1 MILLIGRAM(S): at 05:45

## 2020-10-06 RX ADMIN — Medication 3 MILLILITER(S): at 17:04

## 2020-10-06 RX ADMIN — Medication 3 MILLILITER(S): at 23:14

## 2020-10-06 RX ADMIN — Medication 25 MILLIGRAM(S): at 17:01

## 2020-10-06 RX ADMIN — QUETIAPINE FUMARATE 200 MILLIGRAM(S): 200 TABLET, FILM COATED ORAL at 11:32

## 2020-10-06 RX ADMIN — Medication 25 MILLIGRAM(S): at 05:44

## 2020-10-06 RX ADMIN — Medication 2 MILLIGRAM(S): at 13:34

## 2020-10-06 RX ADMIN — Medication 2 PACKET(S): at 12:59

## 2020-10-06 RX ADMIN — Medication 2 MILLIGRAM(S): at 09:20

## 2020-10-06 RX ADMIN — PANTOPRAZOLE SODIUM 40 MILLIGRAM(S): 20 TABLET, DELAYED RELEASE ORAL at 11:32

## 2020-10-06 RX ADMIN — Medication 100 MILLIGRAM(S): at 11:32

## 2020-10-06 NOTE — PROGRESS NOTE ADULT - SUBJECTIVE AND OBJECTIVE BOX
INTERVAL HPI:  44M with ETOH Abuse and withdrawal, Alcohol Pancreatitis who presents to the ED with severe abdominal pain.  Found to have Necrotizing Pancreatitis, Septic  Shock, Pneumonia/ARDS with Hypoxic Respiratory failure. Admitted to ICU, had prolonged hospital course with Vent support. Eventually required Tracheostomy and Peg placement.  Also with acute metabolic Encephalopathy.  09/20/20:  Transferred to .    Not able to provide details due to encephalopathy.  09/22/20: 1500 ml paracentesis.    OVERNIGHT EVENTS:  Remains restless, pulled trach earlier    Vital Signs Last 24 Hrs  T(C): 36.8 (06 Oct 2020 10:39), Max: 37 (06 Oct 2020 05:13)  T(F): 98.2 (06 Oct 2020 10:39), Max: 98.6 (06 Oct 2020 05:13)  HR: 109 (06 Oct 2020 12:11) (98 - 111)  BP: 114/81 (06 Oct 2020 10:39) (106/68 - 123/72)  BP(mean): --  RR: 20 (06 Oct 2020 10:39) (18 - 20)  SpO2: 95% (06 Oct 2020 12:11) (94% - 100%)    PHYSICAL EXAM:  GEN:         Awake, restless.  HEENT:     Trach   RESP:      mild distress  CVS:          Regular rate and rhythm.   ABD:         Soft, non-tender, non-distended;     MEDICATIONS  (STANDING):  albuterol/ipratropium for Nebulization 3 milliLiter(s) Nebulizer every 6 hours  chlorhexidine 4% Liquid 1 Application(s) Topical <User Schedule>  collagenase Ointment 1 Application(s) Topical daily  dextrose 5%. 1000 milliLiter(s) (50 mL/Hr) IV Continuous <Continuous>  dextrose 50% Injectable 12.5 Gram(s) IV Push once  dextrose 50% Injectable 25 Gram(s) IV Push once  dextrose 50% Injectable 25 Gram(s) IV Push once  enoxaparin Injectable 40 milliGRAM(s) SubCutaneous daily  folic acid 1 milliGRAM(s) Oral daily  insulin lispro (HumaLOG) corrective regimen sliding scale   SubCutaneous every 6 hours  LORazepam     Tablet 2 milliGRAM(s) Oral <User Schedule>  methadone    Tablet 5 milliGRAM(s) Oral once  metoprolol tartrate 25 milliGRAM(s) Oral two times a day  multivitamin 1 Tablet(s) Oral daily  pantoprazole   Suspension 40 milliGRAM(s) Oral daily  potassium phosphate / sodium phosphate Powder (PHOS-NaK) 2 Packet(s) Oral every 8 hours  QUEtiapine 300 milliGRAM(s) Oral <User Schedule>  QUEtiapine 200 milliGRAM(s) Oral daily  thiamine 100 milliGRAM(s) Oral daily    MEDICATIONS  (PRN):  acetaminophen   Tablet .. 650 milliGRAM(s) Oral every 6 hours PRN Temp greater or equal to 38C (100.4F), Mild Pain (1 - 3)  dextrose 40% Gel 15 Gram(s) Oral once PRN Blood Glucose LESS THAN 70 milliGRAM(s)/deciliter  glucagon  Injectable 1 milliGRAM(s) IntraMuscular once PRN Glucose LESS THAN 70 milligrams/deciliter  LORazepam   Injectable 2 milliGRAM(s) IV Push every 4 hours PRN breakthrough agitation  metoprolol tartrate Injectable 5 milliGRAM(s) IV Push every 6 hours PRN if sbp>160 or HR >110 hold if SBP<100 or HR<60  polyethylene glycol 3350 17 Gram(s) Oral daily PRN Constipation    LABS:                        9.4    11.72 )-----------( 281      ( 06 Oct 2020 10:02 )             29.0     10-06    138  |  107  |  23  ----------------------------<  98  3.8   |  25  |  0.51    Ca    8.8      06 Oct 2020 10:02  Phos  2.6     10-06  Mg     2.2     10-06    TPro  9.4<H>  /  Alb  2.6<L>  /  TBili  1.0  /  DBili  x   /  AST  54<H>  /  ALT  40  /  AlkPhos  139<H>  10-06    ASSESSMENT AND PLAN:  ·	Hypoxic Respiratory failure.  ·	S/P tracheostomy.  ·	Bilateral pneumonia.  ·	Bilateral pleural effusion.  ·	S/P Septic Shock.  ·	Necrotizing Pancreatis.  ·	Acute metabolic Encephalopathy.  ·	Alcohol abuse.  ·	Klebsiella Bacteremia.  ·	Anemia    Pulled trach earlier, was re inserted per RN.  Still with significant secretion, requiring frequent suctioning, so not ready for Trach CAP.

## 2020-10-06 NOTE — PROGRESS NOTE BEHAVIORAL HEALTH - ORIENTED TO PLACE
----- Message from Lori Tavarez sent at 1/8/2018 10:01 AM CST -----  Contact: Self  Mr. Hendrix called to speak with someone regarding paperwork he needs completed for his insurance disability.    He stated that it's due on or about 01/19/18 and he needs to speak with someone regarding it's completion as soon as possible.    Please advise.    Mr. Hendrix can be reached at [phone] (c) or [phone] (h) regarding this message.    Thanks.   Other

## 2020-10-06 NOTE — PROGRESS NOTE BEHAVIORAL HEALTH - NSBHCHARTREVIEWLAB_PSY_A_CORE FT
10-06    138  |  107  |  23  ----------------------------<  98  3.8   |  25  |  0.51    Ca    8.8      06 Oct 2020 10:02  Phos  2.6     10-06  Mg     2.2     10-06    TPro  9.4<H>  /  Alb  2.6<L>  /  TBili  1.0  /  DBili  x   /  AST  54<H>  /  ALT  40  /  AlkPhos  139<H>  10-06

## 2020-10-06 NOTE — PROGRESS NOTE ADULT - SUBJECTIVE AND OBJECTIVE BOX
INTERVAL HPI/OVERNIGHT EVENTS: Patient less agitated today, still having  copious secretions from trach     Patient is a 44y old  Male who presents with a chief complaint of pancreatitis (24 Sep 2020 17:14)    Patient seen and examined bedside.   this AM pulled trach out, was reinserted by ACP  still agitated , can mouth words but still confused at times       ROS: unable to examine due to [ x] Encephalopathy  [ ] Advanced Dementia  [ ] Expressive Aphasia  [x ] Non-verbal patient d/t trach    MEDICATIONS  (STANDING):  albuterol/ipratropium for Nebulization 3 milliLiter(s) Nebulizer every 6 hours  cefepime   IVPB      cefepime   IVPB 2000 milliGRAM(s) IV Intermittent every 8 hours  chlorhexidine 4% Liquid 1 Application(s) Topical <User Schedule>  collagenase Ointment 1 Application(s) Topical daily  dextrose 5%. 1000 milliLiter(s) (50 mL/Hr) IV Continuous <Continuous>  dextrose 50% Injectable 12.5 Gram(s) IV Push once  dextrose 50% Injectable 25 Gram(s) IV Push once  dextrose 50% Injectable 25 Gram(s) IV Push once  enoxaparin Injectable 40 milliGRAM(s) SubCutaneous daily  folic acid 1 milliGRAM(s) Oral daily  furosemide   Injectable 40 milliGRAM(s) IV Push daily  insulin lispro (HumaLOG) corrective regimen sliding scale   SubCutaneous every 6 hours  LORazepam     Tablet 2 milliGRAM(s) Oral <User Schedule>  metoprolol tartrate 25 milliGRAM(s) Oral two times a day  multivitamin 1 Tablet(s) Oral daily  pamidronate IVPB 60 milliGRAM(s) IV Intermittent once  pantoprazole   Suspension 40 milliGRAM(s) Oral daily  QUEtiapine 150 milliGRAM(s) Oral two times a day  scopolamine 1 mG/72 Hr(s) Patch 1 Patch Transdermal every 72 hours  thiamine 100 milliGRAM(s) Oral daily    MEDICATIONS  (PRN):  acetaminophen   Tablet .. 650 milliGRAM(s) Oral every 6 hours PRN Temp greater or equal to 38C (100.4F), Mild Pain (1 - 3)  dextrose 40% Gel 15 Gram(s) Oral once PRN Blood Glucose LESS THAN 70 milliGRAM(s)/deciliter  glucagon  Injectable 1 milliGRAM(s) IntraMuscular once PRN Glucose LESS THAN 70 milligrams/deciliter  LORazepam   Injectable 2 milliGRAM(s) IV Push every 4 hours PRN breakthrough agitation  metoprolol tartrate Injectable 5 milliGRAM(s) IV Push every 6 hours PRN if sbp>160 or HR >110 hold if SBP<100 or HR<60  polyethylene glycol 3350 17 Gram(s) Oral daily PRN Constipation        aller: NKDA     Vital Signs Last 24 Hrs  T(C): 38 (06 Oct 2020 15:51), Max: 38 (06 Oct 2020 15:51)  T(F): 100.4 (06 Oct 2020 15:51), Max: 100.4 (06 Oct 2020 15:51)  HR: 112 (06 Oct 2020 18:07) (100 - 123)  BP: 118/77 (06 Oct 2020 15:51) (106/68 - 123/72)  BP(mean): --  RR: 17 (06 Oct 2020 15:51) (17 - 20)  SpO2: 95% (06 Oct 2020 18:07) (91% - 100%) on 40% trach collar       PHYSICAL EXAM:  GENERAL: NAD,   HEAD:  Atraumatic, Normocephalic  NERVOUS SYSTEM:  Alert & Oriented X0, with open eyes, doesn't follow commands   CHEST/LUNG: coarse sounds bilaterally, lots of secretions   HEART: Regular rate and rhythm; No murmurs, rubs, or gallops  ABDOMEN: Soft, Nontender, Nondistended; Bowel sounds present. PEG tube present  EXTREMITIES:  No clubbing, cyanosis, or edema b/l       LABS:                                   9.4    11.72 )-----------( 281      ( 06 Oct 2020 10:02 )             29.0   10-06    138  |  107  |  23  ----------------------------<  98  3.8   |  25  |  0.51    Ca    8.8      06 Oct 2020 10:02  Phos  2.6     10-06  Mg     2.2     10-06    TPro  9.4<H>  /  Alb  2.6<L>  /  TBili  1.0  /  DBili  x   /  AST  54<H>  /  ALT  40  /  AlkPhos  139<H>  10-06      Consultant(s) Notes Reveiwed [x ] Yes     Care Discussed with [x ] Consultants  [ x] Patient  [x ] Family  [x ] /   [x ] Other; RN  discussed care plan at length with father  who stated he will speak with family and update   all questions and concerns addressed

## 2020-10-06 NOTE — PROGRESS NOTE BEHAVIORAL HEALTH - OTHER
tenuous limited; does not make, meaningful eye contact tenuous as per notes deferred at this time none looks confused as expected / appropriate to context not able to ascertain at this time due to limitation of exam

## 2020-10-06 NOTE — CONSULT NOTE ADULT - SUBJECTIVE AND OBJECTIVE BOX
Vascular Attending:    10-05-20  1PM The pt is seen and examined and the sacral pressure ulcer is evaluated, the H/o noted from the chart and also the pt is combative and restless    HPI:  Patient is a 44M with a PMH of EtOH abuse and withdrawal, alcohol pancreatitis who presents to the ED with severe abdominal pain.  Patient states that for the past three days he has been on a drinking binge, drinking 5 beers and 5 shots of liquor per day.  Last drink reportedly yesterday.  Patient reports that he woke up today and had severe generalized abdominal pain with multiple episodes of nonbloody, nonbilious vomiting.  Patient also complaining of nausea and generalized tremors.  Vitals stable, labs show leukocytosis, lactic acidosis, and severely elevated lipase levels.  CT shows pancreatitis with early necrosis in the pancreatic body.  Will admit to floor. (17 Aug 2020 23:25)      PAST MEDICAL & SURGICAL HISTORY:  History of acute pancreatitis    ETOH abuse    Closed fracture of left mandibular angle, sequela          MEDICATIONS  (STANDING):  albuterol/ipratropium for Nebulization 3 milliLiter(s) Nebulizer every 6 hours  chlorhexidine 4% Liquid 1 Application(s) Topical <User Schedule>  clonazePAM  Tablet 1.5 milliGRAM(s) Oral two times a day  collagenase Ointment 1 Application(s) Topical daily  dextrose 5%. 1000 milliLiter(s) (50 mL/Hr) IV Continuous <Continuous>  dextrose 50% Injectable 12.5 Gram(s) IV Push once  dextrose 50% Injectable 25 Gram(s) IV Push once  dextrose 50% Injectable 25 Gram(s) IV Push once  enoxaparin Injectable 40 milliGRAM(s) SubCutaneous daily  folic acid 1 milliGRAM(s) Oral daily  insulin lispro (HumaLOG) corrective regimen sliding scale   SubCutaneous every 6 hours  metoprolol tartrate 25 milliGRAM(s) Oral two times a day  multivitamin 1 Tablet(s) Oral daily  pantoprazole   Suspension 40 milliGRAM(s) Oral daily  potassium phosphate / sodium phosphate Powder (PHOS-NaK) 2 Packet(s) Oral every 8 hours  QUEtiapine 300 milliGRAM(s) Oral <User Schedule>  QUEtiapine 200 milliGRAM(s) Oral daily  thiamine 100 milliGRAM(s) Oral daily    MEDICATIONS  (PRN):  acetaminophen   Tablet .. 650 milliGRAM(s) Oral every 6 hours PRN Temp greater or equal to 38C (100.4F), Mild Pain (1 - 3)  dextrose 40% Gel 15 Gram(s) Oral once PRN Blood Glucose LESS THAN 70 milliGRAM(s)/deciliter  glucagon  Injectable 1 milliGRAM(s) IntraMuscular once PRN Glucose LESS THAN 70 milligrams/deciliter  LORazepam   Injectable 2 milliGRAM(s) IV Push every 4 hours PRN breakthrough agitation  metoprolol tartrate Injectable 5 milliGRAM(s) IV Push every 6 hours PRN if sbp>160 or HR >110 hold if SBP<100 or HR<60  polyethylene glycol 3350 17 Gram(s) Oral daily PRN Constipation      Allergies    No Known Allergies    Intolerances        SOCIAL HISTORY:      Vital Signs Last 24 Hrs  T(C): 36.8 (06 Oct 2020 10:39), Max: 37 (06 Oct 2020 05:13)  T(F): 98.2 (06 Oct 2020 10:39), Max: 98.6 (06 Oct 2020 05:13)  HR: 109 (06 Oct 2020 12:11) (98 - 111)  BP: 114/81 (06 Oct 2020 10:39) (106/68 - 123/72)  BP(mean): --  RR: 20 (06 Oct 2020 10:39) (18 - 20)  SpO2: 95% (06 Oct 2020 12:11) (94% - 100%)    P/E:-  Awake and is confused, restless, tracheal collar.  CAROTIDS:- Bilateral carotids with no Bruits. No scars of previous catheterisation.  UPPER EXTREMITIES:- Bilateral radial artery pulses are normal and no ischemia of the Hands. No edema of the arms.  ABDOMEN:- No pulsatile mass in the abdomen and no ascites.  LOWER EXTREMITIES:- Bilateral LE with No Edema and no CVI, No varicose veins, no ulcers.The arterial pulse are examined with palpation and Dopplers and the findings are as follows,    Wounds:- The pt has following wounds and measurements.  Sacrum:-  The pt has 4x4x2 cms stage 4 scaral pressure ulcer with necrotic tissue, the Bone is palpable , no deep abscess but need s debridement    LABS:                        9.4    11.72 )-----------( 281      ( 06 Oct 2020 10:02 )             29.0     10-06    138  |  107  |  23  ----------------------------<  98  3.8   |  25  |  0.51    Ca    8.8      06 Oct 2020 10:02  Phos  2.6     10-06  Mg     2.2     10-06    TPro  9.4<H>  /  Alb  2.6<L>  /  TBili  1.0  /  DBili  x   /  AST  54<H>  /  ALT  40  /  AlkPhos  139<H>  10-06          RADIOLOGY & ADDITIONAL STUDIES    Impression and Plan:  Stage 4 sacral pressure ulcer which has slough and necrotic tissue that needs debridement.

## 2020-10-06 NOTE — CHART NOTE - NSCHARTNOTEFT_GEN_A_CORE
Assessment:  Pt adm c pancreatitis, c ETOH pancreatitis, c pneumonia, loose BMs(multifactorial,  ABX therapy, pancreatic insufficiency, enteral feeding), s/p necrotizing pancreatitis, s/p septic shock, acute respiratory failure, pleural effusion, s/ trach collar, ascites, s/p PEG 09/10, s/p paracentesis 09/22, s/p SBO.     Factors impacting intake: [ ] none [ ] nausea  [ ] vomiting [ x] diarrhea; continues to have loose BMs [ ] constipation  [ ]chewing problems [ ] swallowing issues  [ x] other: PEG feeding, w/o problems c elevated residuals @ present     Diet Prescription: Diet, NPO with Tube Feed:   Tube Feeding Modality: Gastrostomy  Vital AF 1.2  Total Volume for 24 Hours (mL): 1440  Continuous  Starting Tube Feed Rate {mL per Hour}: 55     Every 24 hours  Until Goal Tube Feed Rate (mL per Hour): 60  Tube Feed Duration (in Hours): 24  Tube Feed Start Time: 15:00  No Carb Prosource (1pkg = 15gms Protein)     Qty per Day:  1 (09-29-20 @ 14:01)    Intake: Vital AF 1.2 @ 60 ml/wj=1835 calories, 108 grams protein, 1167 ml free water, 121% RDIs.  +on free water 200 ml q 6 hours(10/04)=additional 800 ml water daily.  + on No Carb Prosource 1 pkg daily=60 calories, 15 grams protein per pkg.  (Vital AF 1.2 is formulated to manage GI inflammation and promote GI tolerance, hydrolyzed peptide based, MCT/fish oil lipids/53.9 gram fat/liter)    Current Weight: 10/06, 62.5 kg, 08/18, 71.5 kg, c wt. loss of 9 kg since adm(>2 months)  % Weight Change:12.58%  10/06, no edema noted     Pertinent Medications: MEDICATIONS  (STANDING):  albuterol/ipratropium for Nebulization 3 milliLiter(s) Nebulizer every 6 hours  chlorhexidine 4% Liquid 1 Application(s) Topical <User Schedule>  collagenase Ointment 1 Application(s) Topical daily  dextrose 5%. 1000 milliLiter(s) (50 mL/Hr) IV Continuous <Continuous>  dextrose 50% Injectable 12.5 Gram(s) IV Push once  dextrose 50% Injectable 25 Gram(s) IV Push once  dextrose 50% Injectable 25 Gram(s) IV Push once  enoxaparin Injectable 40 milliGRAM(s) SubCutaneous daily  folic acid 1 milliGRAM(s) Oral daily  insulin lispro (HumaLOG) corrective regimen sliding scale   SubCutaneous every 6 hours  LORazepam     Tablet 2 milliGRAM(s) Oral <User Schedule>  metoprolol tartrate 25 milliGRAM(s) Oral two times a day  multivitamin 1 Tablet(s) Oral daily  pantoprazole   Suspension 40 milliGRAM(s) Oral daily  potassium phosphate / sodium phosphate Powder (PHOS-NaK) 2 Packet(s) Oral every 8 hours  QUEtiapine 200 milliGRAM(s) Oral daily  QUEtiapine 300 milliGRAM(s) Oral <User Schedule>  thiamine 100 milliGRAM(s) Oral daily    MEDICATIONS  (PRN):  acetaminophen   Tablet .. 650 milliGRAM(s) Oral every 6 hours PRN Temp greater or equal to 38C (100.4F), Mild Pain (1 - 3)  dextrose 40% Gel 15 Gram(s) Oral once PRN Blood Glucose LESS THAN 70 milliGRAM(s)/deciliter  glucagon  Injectable 1 milliGRAM(s) IntraMuscular once PRN Glucose LESS THAN 70 milligrams/deciliter  LORazepam   Injectable 2 milliGRAM(s) IV Push every 4 hours PRN breakthrough agitation  metoprolol tartrate Injectable 5 milliGRAM(s) IV Push every 6 hours PRN if sbp>160 or HR >110 hold if SBP<100 or HR<60  polyethylene glycol 3350 17 Gram(s) Oral daily PRN Constipation    Pertinent Labs: 10-05 Na137 mmol/L Glu 103 mg/dL<H> K+ 3.6 mmol/L Cr  0.52 mg/dL BUN 24 mg/dL<H> 10-05 Phos 2.8 mg/dL 10-05 Alb 2.5 g/dL<L>10-05 ALT 37 U/L AST 52 U/L<H> Alkaline Phosphatase 123 U/L<H>  10/05, Lipase 590 U/L <H> Amylase 160 U/L <H> 09/10, Lipase 517 U/L    CAPILLARY BLOOD GLUCOSE      POCT Blood Glucose.: 105 mg/dL (06 Oct 2020 07:50)  POCT Blood Glucose.: 107 mg/dL (06 Oct 2020 05:51)  POCT Blood Glucose.: 117 mg/dL (06 Oct 2020 00:22)  POCT Blood Glucose.: 105 mg/dL (05 Oct 2020 16:33)  POCT Blood Glucose.: 107 mg/dL (05 Oct 2020 11:22)    Skin:   10/07, skin tear; left scapula noted  09/20, scrotum; redness, no blister  Pressure ulcer x 2  1. 09/30, scrotum; unstageable   2. 09/27, trach area; stage II    Estimated Needs:   [x ] no change since previous assessment (09/21) due to change from critical care ->regular unit, unstageable, pressure ulcer,  edema   IBW: 75 kg  Energy: 2250 calories (30-35 Kcal/kg IBW)  Protein: 90 grams protein-113 grams protein(1.2-1.5 gram protein/kg IBW)  fluid: 5778-2214(25-30 ml/kg IBW)    [ ] recalculated:     Previous Nutrition Diagnosis:   [x ] Malnutrition; severe malnutrition in context of acute illness     Related to: inadequate protein-energy intake in setting of pancreatitis, respiratory failure, hepatic/metabolic encephalopathy, pressure ulcer     As evidenced by: <50% nutrition needs >5 days, physical findings of moderate muscle loss     Goal: pt to meet >75% of protein-energy needs via enteral feeding; met     Nutrition Diagnosis is [x ] ongoing  [ ] resolved [ ] not applicable     New Nutrition Diagnosis: [ x] not applicable     Interventions:   Recommend  [ ] Change Diet To:  [ ] Nutrition Supplement  [x ] Nutrition Support; Continue c current enteral feeding regimen  [ x] Other: consider pancreatic enzyme, consider GI consult     Monitoring and Evaluation:   [ ] PO intake [ x ] Tolerance to diet prescription [ x ] weights [ x ] labs; amylase, lipase [ x ] follow up per protocol  [ ] other: Assessment:  Pt adm c pancreatitis, c ETOH pancreatitis, c pneumonia, loose BMs(multifactorial,  ABX therapy, pancreatic insufficiency, enteral feeding), s/p necrotizing pancreatitis, s/p septic shock, acute respiratory failure, pleural effusion, s/ trach collar, ascites, s/p PEG 09/10, s/p paracentesis 09/22, s/p SBO.     Factors impacting intake: [ ] none [ ] nausea  [ ] vomiting [ x] diarrhea; continues to have loose BMs [ ] constipation  [ ]chewing problems [ ] swallowing issues  [ x] other: PEG feeding, w/o problems c elevated residuals @ present     Diet Prescription: Diet, NPO with Tube Feed:   Tube Feeding Modality: Gastrostomy  Vital AF 1.2  Total Volume for 24 Hours (mL): 1440  Continuous  Starting Tube Feed Rate {mL per Hour}: 55     Every 24 hours  Until Goal Tube Feed Rate (mL per Hour): 60  Tube Feed Duration (in Hours): 24  Tube Feed Start Time: 15:00  No Carb Prosource (1pkg = 15gms Protein)     Qty per Day:  1 (09-29-20 @ 14:01)    Intake: Vital AF 1.2 @ 60 ml/fx=1718 calories, 108 grams protein, 1167 ml free water, 121% RDIs.  +on free water 200 ml q 6 hours(10/04)=additional 800 ml water daily.  + on No Carb Prosource 1 pkg daily=60 calories, 15 grams protein per pkg.  (Vital AF 1.2 is formulated to manage GI inflammation and promote GI tolerance, hydrolyzed peptide based, MCT/fish oil lipids/53.9 gram fat/liter)    Current Weight: 10/06, 62.5 kg, 08/18, 71.5 kg, c wt. loss of 9 kg since adm(>2 months)  % Weight Change:12.58%  10/06, no edema noted     Pertinent Medications: MEDICATIONS  (STANDING):  albuterol/ipratropium for Nebulization 3 milliLiter(s) Nebulizer every 6 hours  chlorhexidine 4% Liquid 1 Application(s) Topical <User Schedule>  collagenase Ointment 1 Application(s) Topical daily  dextrose 5%. 1000 milliLiter(s) (50 mL/Hr) IV Continuous <Continuous>  dextrose 50% Injectable 12.5 Gram(s) IV Push once  dextrose 50% Injectable 25 Gram(s) IV Push once  dextrose 50% Injectable 25 Gram(s) IV Push once  enoxaparin Injectable 40 milliGRAM(s) SubCutaneous daily  folic acid 1 milliGRAM(s) Oral daily  insulin lispro (HumaLOG) corrective regimen sliding scale   SubCutaneous every 6 hours  LORazepam     Tablet 2 milliGRAM(s) Oral <User Schedule>  metoprolol tartrate 25 milliGRAM(s) Oral two times a day  multivitamin 1 Tablet(s) Oral daily  pantoprazole   Suspension 40 milliGRAM(s) Oral daily  potassium phosphate / sodium phosphate Powder (PHOS-NaK) 2 Packet(s) Oral every 8 hours  QUEtiapine 200 milliGRAM(s) Oral daily  QUEtiapine 300 milliGRAM(s) Oral <User Schedule>  thiamine 100 milliGRAM(s) Oral daily    MEDICATIONS  (PRN):  acetaminophen   Tablet .. 650 milliGRAM(s) Oral every 6 hours PRN Temp greater or equal to 38C (100.4F), Mild Pain (1 - 3)  dextrose 40% Gel 15 Gram(s) Oral once PRN Blood Glucose LESS THAN 70 milliGRAM(s)/deciliter  glucagon  Injectable 1 milliGRAM(s) IntraMuscular once PRN Glucose LESS THAN 70 milligrams/deciliter  LORazepam   Injectable 2 milliGRAM(s) IV Push every 4 hours PRN breakthrough agitation  metoprolol tartrate Injectable 5 milliGRAM(s) IV Push every 6 hours PRN if sbp>160 or HR >110 hold if SBP<100 or HR<60  polyethylene glycol 3350 17 Gram(s) Oral daily PRN Constipation    Pertinent Labs: 10-05 Na137 mmol/L Glu 103 mg/dL<H> K+ 3.6 mmol/L Cr  0.52 mg/dL BUN 24 mg/dL<H> 10-05 Phos 2.8 mg/dL 10-05 Alb 2.5 g/dL<L>10-05 ALT 37 U/L AST 52 U/L<H> Alkaline Phosphatase 123 U/L<H>  10/05, Lipase 590 U/L <H> Amylase 160 U/L <H> 09/10, Lipase 517 U/L    CAPILLARY BLOOD GLUCOSE      POCT Blood Glucose.: 105 mg/dL (06 Oct 2020 07:50)  POCT Blood Glucose.: 107 mg/dL (06 Oct 2020 05:51)  POCT Blood Glucose.: 117 mg/dL (06 Oct 2020 00:22)  POCT Blood Glucose.: 105 mg/dL (05 Oct 2020 16:33)  POCT Blood Glucose.: 107 mg/dL (05 Oct 2020 11:22)    Skin:   10/07, skin tear; left scapula noted  09/20, scrotum; redness, no blister  Pressure ulcer x 2  1. 09/30, scrotum; unstageable   2. 09/27, trach area; stage II    Estimated Needs:   [x ] no change since previous assessment (09/21) due to change from critical care ->regular unit, unstageable, pressure ulcer,  edema   IBW: 75 kg  Energy: 2250 calories (30-35 Kcal/kg IBW)  Protein: 90 grams protein-113 grams protein(1.2-1.5 gram protein/kg IBW)  fluid: 3053-9056(25-30 ml/kg IBW)    [ ] recalculated:     Previous Nutrition Diagnosis:   [x ] Malnutrition; severe malnutrition in context of acute illness     Related to: inadequate protein-energy intake in setting of pancreatitis, respiratory failure, hepatic/metabolic encephalopathy, pressure ulcer     As evidenced by: <50% nutrition needs >5 days, physical findings of moderate muscle loss     Goal: pt to meet >75% of protein-energy needs via enteral feeding; met     Nutrition Diagnosis is [x ] ongoing  [ ] resolved [ ] not applicable     New Nutrition Diagnosis: [ x] not applicable     Interventions:   Recommend  [ ] Change Diet To:  [ ] Nutrition Supplement  [x ] Nutrition Support; Continue c current enteral feeding regimen  [ x] Other: consider pancreatic enzyme, consider GI consult, consider DanActive(lactobacillus supplement) 1 serving 2 x daily via G-Tube    Monitoring and Evaluation:   [ ] PO intake [ x ] Tolerance to diet prescription [ x ] weights [ x ] labs; amylase, lipase [ x ] follow up per protocol  [ ] other:

## 2020-10-06 NOTE — PROGRESS NOTE ADULT - ASSESSMENT
44M EtOH pancreatitis p/w abdominal pain 8/17/20, found to have necrotizing pancreatitis c/b septic shock and respiratory failure secondary to PNA and ARDS requiring intubation and DTs requiring sedation and small bowel obstruction conservatively managed with NGT and NPO for which he was admitted to ICU. Currently, s/p trach and PEG on 9/10 and treated for klebsiella bacteremia.    downgraded to  floor 9/20/20 from ICU    Assessment and Plan:      #Pseudomonas PNA   sputum cultures showed Pseudomonas. no fevers   secretions much improved and clear now   MRSA neg   s/p Cefepime course    #Loose Bms, improved after abx d/c   could be multifactorial, --PEG feeds, abx, pancreatic insufficiency   no leukocytosis or fevers   will monitor for now   spoke with nutrition --cause is unlikely to be peg feeds   follow stool elastase and trypsinogen levels , if diagnostic of pancreatic insuff will add replacement enzyme     #unstageable pressure ulcers x 2 on sacral area ,    -vascular consult appreciated , awaiting family consent for debridement   per vascular, patient might benefit from wound vac after debridement   vascular to follow for recs and wound care     #s/p necrotizing pancreatitis , s/p septic shock , resolved     -RLE showing no dvt , cellulitis improved  -CT Chest/Abdomen/Pelvis ordered   - Moderate right and small left pleural effusion.  Bilateral patchy airspace disease has improved compared to the prior.  Improving pancreatitis with decreased size of the pancreas as well as improved peripancreatic edema. Small collection adjacent to the undersurface of the tail is also decreased. No evidence of necrosis.  -ID following     #Metabolic encephalopathy   hepatic vs. ICU delirium vs sepisis   patient awake , mouthing words to staff . agitated at times    d/angela Precedex since 9/15 in ICU  -diazepam, Methadone and Seroquel - were tapered. Pt was tapered off all meds, however was very agitated, thus diazepam restarted daily, seroquel twice a day, increased dose to 200mg AM and 300mg PM, ativan prn  Psych following   -neuro following   9/30 CT head unremarkable   10/2 MRI could not be completed as patient became agitated , can be done outpt     #Acute respiratory failure secondary to PNA/ARDS , copious secretions  s/p TRACH 9/10/20   tolerating trach collar , off ventilator   continue with frequent suctioning/pulmonary toilet ; secretions much improved     #Ascites , abd is soft.   distended gallbladder and ascites seen on CT, abd US neg for acute cholecystitis   s/p paracentesis 9/22 by IR   ABD US showing worsening ascites, s/p paracentesis by IR 9/22 with 1.5L straw colored fluid removed -cultures did not show any specific organisms  fluid analysis of paracentesis not consistent with SBP.   9/21 KUB shows nonspecific bowel gas pattern without e/o obstruction or ileus   9/21 lactate wnl , procal unremarkable   Blood culture from 9/10 was positive for klebsiella quite sensitive and one set from 9/12 is negative, 9/25 repeat blood cultures negative, sputum cultures from 9/26- showing pseudomonas aeruginosa   SAAG < 1.1 going against portal HTN       #s/p PEG placement 9/10/20    s/p SBO which was treated conservatively   Ileus , KUB 9/15/20 No dilated bowel is identified. There is nonobstructive bowel gas pattern  monitor residuals   high residuals in ICU, erythromycin IV started in ICU for improvement of motility of gut   continue with free water 200cc Q6H  repeat KUB 9/21 without e/o ileus or bowel obstruction     #Hypokalememia-- repleted    #Hypomagnesemia --repleted    #Hypophosphatemia --repleted    #normocytic anemia , probably related to sepsis and acute illness   requiring transfusion on 9/17/20  monitor H/H   follow anemia work-up   transfuse prn of H/H <7/21      hypercalcemia   - 2/2 to immobilization vs vitamin d supplementation although levels are only 25   - s/p pamidronate x 1    #Preventative measures   lovenox SQ-dvt ppx  fall, aspiration precautions, HOBE   On scopolamine patch to prevent excess secretions, plus suctioning very often as per nursing  8/20/20 ECHO: pEF   PT consult placed now that patient more awake           Bárbara Pemberton- [phone] (who lives in [place])    44M EtOH pancreatitis p/w abdominal pain 8/17/20, found to have necrotizing pancreatitis c/b septic shock and respiratory failure secondary to PNA and ARDS requiring intubation and DTs requiring sedation and small bowel obstruction conservatively managed with NGT and NPO for which he was admitted to ICU. Currently, s/p trach and PEG on 9/10 and treated for klebsiella bacteremia.    downgraded to  floor 9/20/20 from ICU    Assessment and Plan:      #Pseudomonas PNA   sputum cultures showed Pseudomonas. no fevers   secretions much improved and clear now   MRSA neg   s/p Cefepime course    #Loose Bms, improved after abx d/c   could be multifactorial, --PEG feeds, abx, pancreatic insufficiency   no leukocytosis or fevers   will monitor for now   spoke with nutrition --cause is unlikely to be peg feeds   follow stool elastase and trypsinogen levels , if diagnostic of pancreatic insuff will add replacement enzyme     #unstageable pressure ulcers x 2 on sacral area ,    -vascular consult appreciated , awaiting family consent for debridement   per vascular, patient might benefit from wound vac after debridement   vascular to follow for recs and wound care     #s/p necrotizing pancreatitis , s/p septic shock , resolved     -RLE showing no dvt , cellulitis improved  -CT Chest/Abdomen/Pelvis ordered   - Moderate right and small left pleural effusion.  Bilateral patchy airspace disease has improved compared to the prior.  Improving pancreatitis with decreased size of the pancreas as well as improved peripancreatic edema. Small collection adjacent to the undersurface of the tail is also decreased. No evidence of necrosis.  -ID following     #Metabolic encephalopathy   AGITATION   hepatic vs. ICU delirium vs sepisis   patient awake , mouthing words to staff . agitated at times    d/angela Precedex since 9/15 in ICU  -diazepam, Methadone and Seroquel - were tapered. Pt was tapered off all meds, however was very agitated (pulling at lines/trying to get out of bed and almost falling off bed), thus diazepam restarted daily, seroquel twice a day, increased dose to 200mg AM and 300mg PM, and ativan changed to Klonopin (as ativan ineffective)  , appreciate psych recs   Psych following   -neuro following   9/30 CT head unremarkable   10/2 MRI could not be completed as patient became agitated , can be done outpt     #Acute respiratory failure secondary to PNA/ARDS , copious secretions  s/p TRACH 9/10/20   tolerating trach collar , off ventilator   continue with frequent suctioning/pulmonary toilet ; secretions much improved     #Ascites , abd is soft.   distended gallbladder and ascites seen on CT, abd US neg for acute cholecystitis   s/p paracentesis 9/22 by IR   ABD US showing worsening ascites, s/p paracentesis by IR 9/22 with 1.5L straw colored fluid removed -cultures did not show any specific organisms  fluid analysis of paracentesis not consistent with SBP.   9/21 KUB shows nonspecific bowel gas pattern without e/o obstruction or ileus   9/21 lactate wnl , procal unremarkable   Blood culture from 9/10 was positive for klebsiella quite sensitive and one set from 9/12 is negative, 9/25 repeat blood cultures negative, sputum cultures from 9/26- showing pseudomonas aeruginosa   SAAG < 1.1 going against portal HTN       #s/p PEG placement 9/10/20    s/p SBO which was treated conservatively   Ileus , KUB 9/15/20 No dilated bowel is identified. There is nonobstructive bowel gas pattern  monitor residuals   high residuals in ICU, erythromycin IV started in ICU for improvement of motility of gut   continue with free water 200cc Q6H  repeat KUB 9/21 without e/o ileus or bowel obstruction     #Hypokalememia-- repleted    #Hypomagnesemia --repleted    #Hypophosphatemia --repleted    #normocytic anemia , probably related to sepsis and acute illness   requiring transfusion on 9/17/20  monitor H/H   follow anemia work-up   transfuse prn of H/H <7/21      hypercalcemia   - 2/2 to immobilization vs vitamin d supplementation although levels are only 25   - s/p pamidronate x 1    SEVERE DECONDITIONING d/t prolonged hospitalization   PT     #Preventative measures   lovenox SQ-dvt ppx  fall, aspiration precautions, HOBE   On scopolamine patch to prevent excess secretions, plus suctioning very often as per nursing  8/20/20 ECHO: pEF   PT consult placed now that patient more awake           Bárbara Pemberton- [phone] (who lives in [place])

## 2020-10-06 NOTE — PROGRESS NOTE ADULT - ASSESSMENT
Subjective Complaints:  Historian:             Vital Signs Last 24 Hrs  T(C): 38 (06 Oct 2020 15:51), Max: 38 (06 Oct 2020 15:51)  T(F): 100.4 (06 Oct 2020 15:51), Max: 100.4 (06 Oct 2020 15:51)  HR: 112 (06 Oct 2020 18:07) (98 - 123)  BP: 118/77 (06 Oct 2020 15:51) (106/68 - 123/72)  BP(mean): --  RR: 17 (06 Oct 2020 15:51) (17 - 20)  SpO2: 95% (06 Oct 2020 18:07) (91% - 100%)    GENERAL PHYSICAL EXAM:  General:  Appears stated age, well-groomed, well-nourished, no distress  HEENT:  NC/AT, patent nares w/ pink mucosa, OP clear w/o lesions, PERRL, EOMI, conjunctivae clear, no thyromegaly, nodules, adenopathy, no JVD  Chest:  Full & symmetric excursion, no increased effort, breath sounds clear  Cardiovascular:  Regular rhythm, S1, S2, no murmur/rub/S3/S4, no carotid/femoral/abdominal bruit, radial/pedal pulses 2+, no edema  Abdomen:  Soft, non-tender, non-distended, normoactive bowel sounds, no HSM  Extremities:  Gait & station:   Digits:   Nails:   Joints, Bones, Muscles:   ROM:   Stability:  Skin:  No rash/erythema/ecchymoses/petechiae/wounds/abscess/warm/dry  Musculoskeletal:  Full ROM in all joints w/o swelling/tenderness/effusion        LABS:                        9.4    11.72 )-----------( 281      ( 06 Oct 2020 10:02 )             29.0     10-06    138  |  107  |  23  ----------------------------<  98  3.8   |  25  |  0.51    Ca    8.8      06 Oct 2020 10:02  Phos  2.6     10-06  Mg     2.2     10-06    TPro  9.4<H>  /  Alb  2.6<L>  /  TBili  1.0  /  DBili  x   /  AST  54<H>  /  ALT  40  /  AlkPhos  139<H>  10-06          RADIOLOGY & ADDITIONAL STUDIES:        Neurology Progress Note:      Mental Status: letahrgic arousable does not folows commands restless in bed       Cranial Nerves:defrd      Motor:    restless in bed         Sensory:intact to pain       Cerebellar: defrd      Gait:defrd      Assesment/Plan: etoh hx pancreatitis  restlless in bed no seizure encephalothy on thiamine

## 2020-10-06 NOTE — PROGRESS NOTE BEHAVIORAL HEALTH - NSBHCHARTREVIEWVS_PSY_A_CORE FT
T(C): 36.8 (10-06-20 @ 10:39), Max: 37 (10-06-20 @ 05:13)  HR: 109 (10-06-20 @ 12:11) (98 - 111)  BP: 114/81 (10-06-20 @ 10:39) (106/68 - 123/72)  RR: 20 (10-06-20 @ 10:39) (18 - 20)  SpO2: 95% (10-06-20 @ 12:11) (94% - 100%)

## 2020-10-06 NOTE — PROGRESS NOTE BEHAVIORAL HEALTH - NSBHFUPINTERVALHXFT_PSY_A_CORE
Patient has been needing several PRN IVP Ativan for agitation, at one time nearly jumping out of the bed and almost falling to the floor (staff caught him). Other interventions like turning on the TV for him did not work this time. Patient trying to yank out his trach. Patient seen at bedside - Surgical team at bedside examining his decubitus ulcer. Patient calm, confused, not able to engage in meaningful conversation.  As per nursing, the PO Ativan does not seem to have much effect.

## 2020-10-06 NOTE — PROGRESS NOTE BEHAVIORAL HEALTH - NSBHCONSULTRECOMMENDOTHER_PSY_A_CORE FT
if MSE does not improve, reorder MRI as he needs head imaging; give Ativan 3mg IVP 30 minutes prior to MRI study

## 2020-10-07 LAB
ALBUMIN SERPL ELPH-MCNC: 2.7 G/DL — LOW (ref 3.3–5)
ALP SERPL-CCNC: 125 U/L — HIGH (ref 40–120)
ALT FLD-CCNC: 35 U/L — SIGNIFICANT CHANGE UP (ref 12–78)
ANION GAP SERPL CALC-SCNC: 8 MMOL/L — SIGNIFICANT CHANGE UP (ref 5–17)
AST SERPL-CCNC: 42 U/L — HIGH (ref 15–37)
BILIRUB SERPL-MCNC: 1.1 MG/DL — SIGNIFICANT CHANGE UP (ref 0.2–1.2)
BUN SERPL-MCNC: 20 MG/DL — SIGNIFICANT CHANGE UP (ref 7–23)
CALCIUM SERPL-MCNC: 8.5 MG/DL — SIGNIFICANT CHANGE UP (ref 8.5–10.1)
CHLORIDE SERPL-SCNC: 107 MMOL/L — SIGNIFICANT CHANGE UP (ref 96–108)
CO2 SERPL-SCNC: 24 MMOL/L — SIGNIFICANT CHANGE UP (ref 22–31)
CREAT SERPL-MCNC: 0.48 MG/DL — LOW (ref 0.5–1.3)
GLUCOSE BLDC GLUCOMTR-MCNC: 106 MG/DL — HIGH (ref 70–99)
GLUCOSE BLDC GLUCOMTR-MCNC: 108 MG/DL — HIGH (ref 70–99)
GLUCOSE BLDC GLUCOMTR-MCNC: 114 MG/DL — HIGH (ref 70–99)
GLUCOSE BLDC GLUCOMTR-MCNC: 115 MG/DL — HIGH (ref 70–99)
GLUCOSE BLDC GLUCOMTR-MCNC: 121 MG/DL — HIGH (ref 70–99)
GLUCOSE BLDC GLUCOMTR-MCNC: 93 MG/DL — SIGNIFICANT CHANGE UP (ref 70–99)
GLUCOSE SERPL-MCNC: 106 MG/DL — HIGH (ref 70–99)
HCT VFR BLD CALC: 26.2 % — LOW (ref 39–50)
HGB BLD-MCNC: 8.5 G/DL — LOW (ref 13–17)
LIDOCAIN IGE QN: 427 U/L — HIGH (ref 73–393)
MAGNESIUM SERPL-MCNC: 1.7 MG/DL — SIGNIFICANT CHANGE UP (ref 1.6–2.6)
MCHC RBC-ENTMCNC: 29.3 PG — SIGNIFICANT CHANGE UP (ref 27–34)
MCHC RBC-ENTMCNC: 32.4 GM/DL — SIGNIFICANT CHANGE UP (ref 32–36)
MCV RBC AUTO: 90.3 FL — SIGNIFICANT CHANGE UP (ref 80–100)
NRBC # BLD: 0 /100 WBCS — SIGNIFICANT CHANGE UP (ref 0–0)
PHOSPHATE SERPL-MCNC: 2.7 MG/DL — SIGNIFICANT CHANGE UP (ref 2.5–4.5)
PLATELET # BLD AUTO: 261 K/UL — SIGNIFICANT CHANGE UP (ref 150–400)
POTASSIUM SERPL-MCNC: 3.7 MMOL/L — SIGNIFICANT CHANGE UP (ref 3.5–5.3)
POTASSIUM SERPL-SCNC: 3.7 MMOL/L — SIGNIFICANT CHANGE UP (ref 3.5–5.3)
PROT SERPL-MCNC: 9.1 GM/DL — HIGH (ref 6–8.3)
RBC # BLD: 2.9 M/UL — LOW (ref 4.2–5.8)
RBC # FLD: 15 % — HIGH (ref 10.3–14.5)
SODIUM SERPL-SCNC: 139 MMOL/L — SIGNIFICANT CHANGE UP (ref 135–145)
WBC # BLD: 11.11 K/UL — HIGH (ref 3.8–10.5)
WBC # FLD AUTO: 11.11 K/UL — HIGH (ref 3.8–10.5)

## 2020-10-07 PROCEDURE — 93010 ELECTROCARDIOGRAM REPORT: CPT

## 2020-10-07 PROCEDURE — 99233 SBSQ HOSP IP/OBS HIGH 50: CPT

## 2020-10-07 PROCEDURE — 99231 SBSQ HOSP IP/OBS SF/LOW 25: CPT

## 2020-10-07 PROCEDURE — 99232 SBSQ HOSP IP/OBS MODERATE 35: CPT

## 2020-10-07 RX ORDER — ACETAMINOPHEN 500 MG
650 TABLET ORAL EVERY 6 HOURS
Refills: 0 | Status: COMPLETED | OUTPATIENT
Start: 2020-10-07 | End: 2020-10-08

## 2020-10-07 RX ORDER — METOPROLOL TARTRATE 50 MG
25 TABLET ORAL EVERY 8 HOURS
Refills: 0 | Status: DISCONTINUED | OUTPATIENT
Start: 2020-10-07 | End: 2020-10-26

## 2020-10-07 RX ADMIN — QUETIAPINE FUMARATE 200 MILLIGRAM(S): 200 TABLET, FILM COATED ORAL at 12:34

## 2020-10-07 RX ADMIN — CHLORHEXIDINE GLUCONATE 1 APPLICATION(S): 213 SOLUTION TOPICAL at 06:13

## 2020-10-07 RX ADMIN — Medication 1 APPLICATION(S): at 13:51

## 2020-10-07 RX ADMIN — Medication 1.5 MILLIGRAM(S): at 06:03

## 2020-10-07 RX ADMIN — Medication 3 MILLILITER(S): at 05:39

## 2020-10-07 RX ADMIN — Medication 650 MILLIGRAM(S): at 17:50

## 2020-10-07 RX ADMIN — Medication 2 PACKET(S): at 05:09

## 2020-10-07 RX ADMIN — Medication 25 MILLIGRAM(S): at 21:39

## 2020-10-07 RX ADMIN — PANTOPRAZOLE SODIUM 40 MILLIGRAM(S): 20 TABLET, DELAYED RELEASE ORAL at 12:34

## 2020-10-07 RX ADMIN — Medication 2 MILLIGRAM(S): at 17:51

## 2020-10-07 RX ADMIN — Medication 2 MILLIGRAM(S): at 09:46

## 2020-10-07 RX ADMIN — ENOXAPARIN SODIUM 40 MILLIGRAM(S): 100 INJECTION SUBCUTANEOUS at 12:34

## 2020-10-07 RX ADMIN — Medication 650 MILLIGRAM(S): at 12:36

## 2020-10-07 RX ADMIN — Medication 650 MILLIGRAM(S): at 23:58

## 2020-10-07 RX ADMIN — Medication 2 MILLIGRAM(S): at 21:32

## 2020-10-07 RX ADMIN — Medication 650 MILLIGRAM(S): at 19:11

## 2020-10-07 RX ADMIN — Medication 1 MILLIGRAM(S): at 00:07

## 2020-10-07 RX ADMIN — QUETIAPINE FUMARATE 300 MILLIGRAM(S): 200 TABLET, FILM COATED ORAL at 21:38

## 2020-10-07 RX ADMIN — Medication 2 MILLIGRAM(S): at 13:50

## 2020-10-07 RX ADMIN — Medication 3 MILLILITER(S): at 11:15

## 2020-10-07 RX ADMIN — Medication 3 MILLILITER(S): at 17:00

## 2020-10-07 RX ADMIN — Medication 100 MILLIGRAM(S): at 12:35

## 2020-10-07 RX ADMIN — Medication 25 MILLIGRAM(S): at 13:53

## 2020-10-07 RX ADMIN — Medication 1 TABLET(S): at 12:35

## 2020-10-07 RX ADMIN — Medication 2 PACKET(S): at 21:36

## 2020-10-07 RX ADMIN — Medication 2 PACKET(S): at 15:46

## 2020-10-07 RX ADMIN — Medication 650 MILLIGRAM(S): at 13:36

## 2020-10-07 RX ADMIN — Medication 1 MILLIGRAM(S): at 12:34

## 2020-10-07 RX ADMIN — Medication 1.5 MILLIGRAM(S): at 17:50

## 2020-10-07 RX ADMIN — Medication 3 MILLILITER(S): at 23:56

## 2020-10-07 RX ADMIN — Medication 25 MILLIGRAM(S): at 06:04

## 2020-10-07 NOTE — PROGRESS NOTE ADULT - ASSESSMENT
DEEPIKA RUCKER is a 44M PMH alcohol abuse, hx of alcohol withdrawal and alcohol pancreatitis who presented for abdominal pain. Admitted with necrotizing pancreatitis, sepsis, septic shock with course complicated by acute hypoxic respiratory failure secondary to PNA and ARDS requiring intubation and DTs requiring sedation and small bowel obstruction conservatively managed with NGT and NPO. Currently, s/p trach and PEG on 9/10 and treated for klebsiella bacteremia.  Intermittently having fevers, last fever 9/21, 101.0  WBC staying in the 12-16k range  Had Abdominal US today which showed large amount of ascites and IR performed paracentesis which removed 1500cc fluid  SAAG <1.1 which goes against portal hypertension and more in line with pancreatitis vs SBP vs some other cause   CXR from 9/21 with increased lung markings and effusions as well as trach (personally reviewed)   Blood culture from 9/10 was positive for klebsiella quite sensitive and one set from 9/12 is negative   Patient has multiple possible sources of fever:  Abdomen such as SBP, pancreatitis, hepatobiliary disease   Pulmonary: CXR very unclear but he is on trach collar thus unlikely   Skin and soft tissue: cellulitis of the arm (US didnt show DVT or thrombophlebitis)  Thromboembolic: could have DVT       Antibiotics this admission:  Vancomycin: 8/26-27; 9/16-20  Zosyn: 8/18-8/27  Meropenem: 8/23-9/2  ceftriaxone: 9/14-20  Cefazolin: 9/20->9/29  Cefepime 9/29->    9/23: Continues to respond to cefazolin, fluid analysis not consistent with SBP and negative culture thus far, heavily sedated though meds cut in half,  9/24: Spiked a fever, panculture and repeat imaging, Behavioral health consulted, ascites fluid culture negative   9/25: still spiking, getting CT c/a/p, continue cefazolin for cellulitis which is much better   9/29: still febrile, slowly peeling off sedatives, pseudomonas growing in sputum   9/30: Fever curve improving, CT head negative, planned for MRI brain, continue cefepime  10/7: Off antibiotics, s/p course of cefepime. Low grade fever, isolated. Unfortunately mental status remains poor.     Suggestions--  Local care to pressure sore  Offloading  Optimize nutritional status  High threshold for empiric antibiotics  Monitor temperature curve    Prognosis remains poor. Will be at risk for both infectious and noninfectious complications indefinitely here.    Jonnie Zarate MD  Attending Physician  [facility]  Division of Infectious Diseases  [phone]

## 2020-10-07 NOTE — PHYSICAL THERAPY INITIAL EVALUATION ADULT - CRITERIA FOR SKILLED THERAPEUTIC INTERVENTIONS
anticipated discharge recommendation/Pt at this time is not a rehab candidate and is unable to actively participate in PT, therefore d/c from PT program at this time. pt not appropriate at this time as pt not stable for OOB mobility at this time.
impairments found/anticipated discharge recommendation

## 2020-10-07 NOTE — PROGRESS NOTE BEHAVIORAL HEALTH - NSBHCONSULTMEDS_PSY_A_CORE FT
COURSE OF TX: methadone weaned off as of 9/26/20; increased Seroquel 50mg PO bid to 100mg via PEG BID and increase Valium to 5mg PO bid on 9/28  - continued restlessness; increase Seroquel 100bid to 150 bid on 9/29/10  - PRN Ativan dose increased to 2mg with continued need so DC Valium on 9/30/20 and use Ativan 2mg PO q8am, q4pm but HOLD for sedation with goal to stabilize agitation to the point that Patient will not need any PRNs and will have episodes of being awake  - PO Ativan ineffective; convert to Klonopin equivalent (Ativan 2mg = Klonopin 1mg) and increase to Klonopin 1.5mg PO via PEG BID on 10/6/20

## 2020-10-07 NOTE — PROGRESS NOTE ADULT - SUBJECTIVE AND OBJECTIVE BOX
[facility]  Division of Infectious Diseases  [phone]    Name: DEEPIKA RUCKER  Age: 44y  Gender: Male  MRN: [id number]    Interval History--  Notes reviewed. Remains agitated and combative.  Seen by surgery for debridement of pressure sore.    Past Medical History--  History of acute pancreatitis    Anxiety    Anemia    Fracture of angle of mandible    Fracture of tooth (traumatic), initial encounter for open fracture    ETOH abuse    No pertinent past medical history    Closed fracture of left mandibular angle, sequela    No significant past surgical history        For details regarding the patient's social history, family history, and other miscellaneous elements, please refer the initial infectious diseases consultation and/or the admitting history and physical examination for this admission.    Allergies    No Known Allergies    Intolerances        Medications--  Antibiotics:    Immunologic:    Other:  acetaminophen   Tablet .. PRN  acetaminophen   Tablet ..  albuterol/ipratropium for Nebulization  chlorhexidine 4% Liquid  clonazePAM  Tablet  collagenase Ointment  dextrose 40% Gel PRN  dextrose 5%.  dextrose 50% Injectable  dextrose 50% Injectable  dextrose 50% Injectable  enoxaparin Injectable  folic acid  glucagon  Injectable PRN  insulin lispro (HumaLOG) corrective regimen sliding scale  LORazepam   Injectable PRN  metoprolol tartrate  metoprolol tartrate Injectable PRN  multivitamin  pantoprazole   Suspension  potassium phosphate / sodium phosphate Powder (PHOS-NaK)  QUEtiapine  QUEtiapine  thiamine Injectable      Review of Systems--  Review of systems unable secondary to clinical condition.     Physical Examination--  Vital Signs: T(F): 99 (10-07-20 @ 10:13), Max: 100.4 (10-06-20 @ 15:51)  HR: 117 (10-07-20 @ 13:25)  BP: 119/83 (10-07-20 @ 13:25)  RR: 17 (10-07-20 @ 13:25)  SpO2: 100% (10-07-20 @ 13:25)  Wt(kg): --  General: Nontoxic-appearing Male in no acute distress.  HEENT: AT/NC. Pupils/EOM unable secondary to patient compliance. Oropharynx/dentition unable secondary to patient compliance. Anicteric. Conj pink/moist  Neck: Not rigid. No sense of mass. Trach C?D/I.  Nodes: None palpable.  Lungs: Uncooperative. Grossly with decreased BS and rhonchi.  Heart: Uncooperative/ Tachycardic with grossly regular rhythm.   Abdomen: Bowel sounds present and normoactive. Soft. Nondistended. Nontender. GT C/D/I  Extremities: No cyanosis or clubbing. No edema.   Skin: Warm. Dry. Good turgor. No rash. No vasculitic stigmata.  Psychiatric: Unable. Agitated        Laboratory Studies--  CBC                        8.5    11.11 )-----------( 261      ( 07 Oct 2020 06:52 )             26.2     WBC trend  WBC Count: 11.11 K/uL (10-07-20 @ 06:52)  WBC Count: 11.72 K/uL (10-06-20 @ 10:02)  WBC Count: 9.42 K/uL (10-05-20 @ 06:39)  WBC Count: 10.67 K/uL (10-04-20 @ 12:39)  WBC Count: 6.72 K/uL (10-03-20 @ 08:27)  WBC Count: 12.60 K/uL (10-01-20 @ 08:46)  WBC Count: 11.42 K/uL (09-29-20 @ 07:41)  WBC Count: 11.75 K/uL (09-28-20 @ 07:38)  WBC Count: 14.46 K/uL (09-27-20 @ 06:56)  WBC Count: 16.86 K/uL (09-26-20 @ 20:20)  WBC Count: 12.38 K/uL (09-25-20 @ 09:11)        Chemistries  10-07    139  |  107  |  20  ----------------------------<  106<H>  3.7   |  24  |  0.48<L>    Ca    8.5      07 Oct 2020 06:52  Phos  2.7     10-07  Mg     1.7     10-07    TPro  9.1<H>  /  Alb  2.7<L>  /  TBili  1.1  /  DBili  x   /  AST  42<H>  /  ALT  35  /  AlkPhos  125<H>  10-07      Culture Data  No new data

## 2020-10-07 NOTE — PROGRESS NOTE ADULT - SUBJECTIVE AND OBJECTIVE BOX
INTERVAL HPI:  44M with ETOH Abuse and withdrawal, Alcohol Pancreatitis who presents to the ED with severe abdominal pain.  Found to have Necrotizing Pancreatitis, Septic  Shock, Pneumonia/ARDS with Hypoxic Respiratory failure. Admitted to ICU, had prolonged hospital course with Vent support. Eventually required Tracheostomy and Peg placement.  Also with acute metabolic Encephalopathy.  09/20/20:  Transferred to .    Not able to provide details due to encephalopathy.  09/22/20: 1500 ml paracentesis.    OVERNIGHT EVENTS:  Awake, responsive    Vital Signs Last 24 Hrs  T(C): 37 (07 Oct 2020 16:30), Max: 37.6 (07 Oct 2020 05:20)  T(F): 98.6 (07 Oct 2020 16:30), Max: 99.6 (07 Oct 2020 05:20)  HR: 117 (07 Oct 2020 16:30) (65 - 126)  BP: 120/80 (07 Oct 2020 16:30) (116/82 - 122/73)  BP(mean): --  RR: 18 (07 Oct 2020 16:30) (16 - 18)  SpO2: 97% (07 Oct 2020 16:30) (93% - 100%)    PHYSICAL EXAM:  GEN:        responsive and comfortable.  HEENT:     Trach    RESP:       no distress  CVS:          Regular rate and rhythm.   ABD:         Soft, non-tender, non-distended;     MEDICATIONS  (STANDING):  acetaminophen   Tablet .. 650 milliGRAM(s) Oral every 6 hours  albuterol/ipratropium for Nebulization 3 milliLiter(s) Nebulizer every 6 hours  chlorhexidine 4% Liquid 1 Application(s) Topical <User Schedule>  clonazePAM  Tablet 1.5 milliGRAM(s) Oral two times a day  collagenase Ointment 1 Application(s) Topical daily  dextrose 5%. 1000 milliLiter(s) (50 mL/Hr) IV Continuous <Continuous>  dextrose 50% Injectable 12.5 Gram(s) IV Push once  dextrose 50% Injectable 25 Gram(s) IV Push once  dextrose 50% Injectable 25 Gram(s) IV Push once  enoxaparin Injectable 40 milliGRAM(s) SubCutaneous daily  folic acid 1 milliGRAM(s) Oral daily  insulin lispro (HumaLOG) corrective regimen sliding scale   SubCutaneous every 6 hours  metoprolol tartrate 25 milliGRAM(s) Enteral Tube every 8 hours  multivitamin 1 Tablet(s) Oral daily  pantoprazole   Suspension 40 milliGRAM(s) Oral daily  potassium phosphate / sodium phosphate Powder (PHOS-NaK) 2 Packet(s) Oral every 8 hours  QUEtiapine 300 milliGRAM(s) Oral <User Schedule>  QUEtiapine 200 milliGRAM(s) Oral daily  thiamine Injectable 100 milliGRAM(s) IV Push daily    MEDICATIONS  (PRN):  acetaminophen   Tablet .. 650 milliGRAM(s) Oral every 6 hours PRN Temp greater or equal to 38C (100.4F), Mild Pain (1 - 3)  dextrose 40% Gel 15 Gram(s) Oral once PRN Blood Glucose LESS THAN 70 milliGRAM(s)/deciliter  glucagon  Injectable 1 milliGRAM(s) IntraMuscular once PRN Glucose LESS THAN 70 milligrams/deciliter  LORazepam   Injectable 2 milliGRAM(s) IV Push every 4 hours PRN breakthrough agitation  metoprolol tartrate Injectable 5 milliGRAM(s) IV Push every 6 hours PRN if sbp>160 or HR >110 hold if SBP<100 or HR<60    LABS:                        8.5    11.11 )-----------( 261      ( 07 Oct 2020 06:52 )             26.2     10-07    139  |  107  |  20  ----------------------------<  106<H>  3.7   |  24  |  0.48<L>    Ca    8.5      07 Oct 2020 06:52  Phos  2.7     10-07  Mg     1.7     10-07    TPro  9.1<H>  /  Alb  2.7<L>  /  TBili  1.1  /  DBili  x   /  AST  42<H>  /  ALT  35  /  AlkPhos  125<H>  10-07    ASSESSMENT AND PLAN:  ·	Hypoxic Respiratory failure.  ·	S/P tracheostomy.  ·	Bilateral pneumonia.  ·	Bilateral pleural effusion.  ·	S/P Septic Shock.  ·	Necrotizing Pancreatis.  ·	Acute metabolic Encephalopathy.  ·	Alcohol abuse.  ·	Klebsiella Bacteremia.  ·	Anemia    Still with significant trach secretions, so not safe for Trach CAP.  Suction as needed.

## 2020-10-07 NOTE — PHYSICAL THERAPY INITIAL EVALUATION ADULT - TRANSFER TRAINING, PT EVAL
Pt will independently perform sit to/from stand transfers without LOB using rolling walker by 6-8 weeks

## 2020-10-07 NOTE — PROGRESS NOTE BEHAVIORAL HEALTH - NSBHFUPINTERVALHXFT_PSY_A_CORE
Event Note from this morning noted; Patient tried to climb out of bed ans pulled out his trach collar. Patient is s/p debridement of Stage 4 sacral pressure ulcer which had slough and necrotic tissue (done 10/6/20). Event Note from this morning noted; Patient tried to climb out of bed ans pulled out his trach collar. Patient is s/p debridement of Stage 4 sacral pressure ulcer which had slough and necrotic tissue (done 10/6/20). Patient seen later on when he was out of bed-to chair which he was tolerating. He was mouthing and gesturing without goal direction and without a specific target. Eye contact remains weak and shifting. Elizondo snot look to be in physical pain. Noted thin extremities etc and chart listing his weight as 92.1kg - staff confirms he lost notable amount of weight since admission, hence will order weight to be re-checked.

## 2020-10-07 NOTE — PHYSICAL THERAPY INITIAL EVALUATION ADULT - BALANCE TRAINING, PT EVAL
Pt will increase static/dynamic standing balance to WFL to perform all functional mobility without LOB, by 6-8 weeks

## 2020-10-07 NOTE — PROGRESS NOTE ADULT - ASSESSMENT
Subjective Complaints:  Historian:             Vital Signs Last 24 Hrs  T(C): 37 (07 Oct 2020 16:30), Max: 37.6 (07 Oct 2020 05:20)  T(F): 98.6 (07 Oct 2020 16:30), Max: 99.6 (07 Oct 2020 05:20)  HR: 71 (07 Oct 2020 18:35) (65 - 126)  BP: 120/80 (07 Oct 2020 16:30) (116/82 - 122/73)  BP(mean): --  RR: 18 (07 Oct 2020 16:30) (16 - 18)  SpO2: 97% (07 Oct 2020 18:35) (93% - 100%)    GENERAL PHYSICAL EXAM:  General:  Appears stated age, well-groomed, well-nourished, no distress  HEENT:  NC/AT, patent nares w/ pink mucosa, OP clear w/o lesions, PERRL, EOMI, conjunctivae clear, no thyromegaly, nodules, adenopathy, no JVD  Chest:  Full & symmetric excursion, no increased effort, breath sounds clear  Cardiovascular:  Regular rhythm, S1, S2, no murmur/rub/S3/S4, no carotid/femoral/abdominal bruit, radial/pedal pulses 2+, no edema  Abdomen:  Soft, non-tender, non-distended, normoactive bowel sounds, no HSM  Extremities:  Gait & station:   Digits:   Nails:   Joints, Bones, Muscles:   ROM:   Stability:  Skin:  No rash/erythema/ecchymoses/petechiae/wounds/abscess/warm/dry  Musculoskeletal:  Full ROM in all joints w/o swelling/tenderness/effusion        LABS:                        8.5    11.11 )-----------( 261      ( 07 Oct 2020 06:52 )             26.2     10-07    139  |  107  |  20  ----------------------------<  106<H>  3.7   |  24  |  0.48<L>    Ca    8.5      07 Oct 2020 06:52  Phos  2.7     10-07  Mg     1.7     10-07    TPro  9.1<H>  /  Alb  2.7<L>  /  TBili  1.1  /  DBili  x   /  AST  42<H>  /  ALT  35  /  AlkPhos  125<H>  10-07          RADIOLOGY & ADDITIONAL STUDIES:        Neurology Progress Note:      Mental Status: letahrgic arousable  tries to open eyes does not follows commands       Cranial Nerves:defrd      Motor:    restless in bed arm leg moves in bed         Sensory: intact to pain       Cerebellar: defrd      Gait:defrd      Assesment/Plan:  s/p etoh pancreatitis  delerium s/p peg  restless in bed no seizure on thiamnine for sub acute rehab

## 2020-10-07 NOTE — PROGRESS NOTE ADULT - NUTRITIONAL ASSESSMENT
`
This patient has been assessed with a concern for Malnutrition and has been determined to have a diagnosis/diagnoses of Severe protein-calorie malnutrition.    This patient is being managed with:   Diet NPO with Tube Feed-  Tube Feeding Modality: Gastrostomy  Vital AF  Total Volume for 24 Hours (mL): 1440  Continuous  Starting Tube Feed Rate {mL per Hour}: 20  Increase Tube Feed Rate by (mL): 10     Every 8 hours  Until Goal Tube Feed Rate (mL per Hour): 60  Tube Feed Duration (in Hours): 24  Tube Feed Start Time: 15:00  Heraclio(7 Gm Arginine/7 Gm Glut/1.2 Gm HMB     Qty per Day:  1 pkg/day  No Carb Prosource (1pkg = 15gms Protein)     Qty per Day:  1 pkg daily  Entered: Sep 25 2020  2:17PM    
This patient has been assessed with a concern for Malnutrition and has been determined to have a diagnosis/diagnoses of Severe protein-calorie malnutrition.    This patient is being managed with:   Diet NPO with Tube Feed-  Tube Feeding Modality: Gastrostomy  Vital AF  Total Volume for 24 Hours (mL): 1440  Continuous  Starting Tube Feed Rate {mL per Hour}: 20  Increase Tube Feed Rate by (mL): 10     Every 8 hours  Until Goal Tube Feed Rate (mL per Hour): 60  Tube Feed Duration (in Hours): 24  Tube Feed Start Time: 15:00  Heraclio(7 Gm Arginine/7 Gm Glut/1.2 Gm HMB     Qty per Day:  1 pkg/day  No Carb Prosource (1pkg = 15gms Protein)     Qty per Day:  1 pkg daily  Entered: Sep 25 2020  2:17PM    Diet NPO with Tube Feed-  Tube Feeding Modality: Gastrostomy  Vital AF 1.2  Total Volume for 24 Hours (mL): 1440  Continuous  Starting Tube Feed Rate {mL per Hour}: 50     Every 24 hours  Until Goal Tube Feed Rate (mL per Hour): 60  Tube Feed Duration (in Hours): 24  Tube Feed Start Time: 13:00  Heraclio(7 Gm Arginine/7 Gm Glut/1.2 Gm HMB     Qty per Day:  1  No Carb Prosource (1pkg = 15gms Protein)     Qty per Day:  1  Entered: Sep 21 2020 12:25PM    The following pending diet order is being considered for treatment of Severe protein-calorie malnutrition:null
This patient has been assessed with a concern for Malnutrition and has been determined to have a diagnosis/diagnoses of Severe protein-calorie malnutrition.    This patient is being managed with:   Diet NPO with Tube Feed-  Tube Feeding Modality: Gastrostomy  Vital AF 1.2  Total Volume for 24 Hours (mL): 1440  Continuous  Starting Tube Feed Rate {mL per Hour}: 50     Every 24 hours  Until Goal Tube Feed Rate (mL per Hour): 60  Tube Feed Duration (in Hours): 24  Tube Feed Start Time: 13:00  Heraclio(7 Gm Arginine/7 Gm Glut/1.2 Gm HMB     Qty per Day:  1  No Carb Prosource (1pkg = 15gms Protein)     Qty per Day:  1  Entered: Sep 21 2020 12:25PM    Diet NPO with Tube Feed-  Tube Feeding Modality: Nasogastric  Vital AF 1.2  Total Volume for 24 Hours (mL): 960  Continuous  Starting Tube Feed Rate {mL per Hour}: 30  Increase Tube Feed Rate by (mL): 10     Every 12 hours  Until Goal Tube Feed Rate (mL per Hour): 40  Tube Feed Duration (in Hours): 24  Tube Feed Start Time: 10:00  Heraclio(7 Gm Arginine/7 Gm Glut/1.2 Gm HMB     Qty per Day:  2  No Carb Prosource (1pkg = 15gms Protein)     Qty per Day:  2  Entered: Sep 19 2020  5:30AM    The following pending diet order is being considered for treatment of Severe protein-calorie malnutrition:null
This patient has been assessed with a concern for Malnutrition and has been determined to have a diagnosis/diagnoses of Severe protein-calorie malnutrition.    This patient is being managed with:   Diet NPO with Tube Feed-  Tube Feeding Modality: Gastrostomy  Vital AF  Total Volume for 24 Hours (mL): 1440  Continuous  Starting Tube Feed Rate {mL per Hour}: 20  Increase Tube Feed Rate by (mL): 10     Every 8 hours  Until Goal Tube Feed Rate (mL per Hour): 60  Tube Feed Duration (in Hours): 24  Tube Feed Start Time: 15:00  Heraclio(7 Gm Arginine/7 Gm Glut/1.2 Gm HMB     Qty per Day:  1 pkg/day  No Carb Prosource (1pkg = 15gms Protein)     Qty per Day:  1 pkg daily  Entered: Sep 25 2020  2:17PM    
This patient has been assessed with a concern for Malnutrition and has been determined to have a diagnosis/diagnoses of Severe protein-calorie malnutrition.    This patient is being managed with:   Diet NPO with Tube Feed-  Tube Feeding Modality: Gastrostomy  Vital AF 1.2  Total Volume for 24 Hours (mL): 1440  Continuous  Starting Tube Feed Rate {mL per Hour}: 50     Every 24 hours  Until Goal Tube Feed Rate (mL per Hour): 60  Tube Feed Duration (in Hours): 24  Tube Feed Start Time: 13:00  Heraclio(7 Gm Arginine/7 Gm Glut/1.2 Gm HMB     Qty per Day:  1  No Carb Prosource (1pkg = 15gms Protein)     Qty per Day:  1  Entered: Sep 21 2020 12:25PM    
This patient has been assessed with a concern for Malnutrition and has been determined to have a diagnosis/diagnoses of Severe protein-calorie malnutrition.    This patient is being managed with:   Diet NPO with Tube Feed-  Tube Feeding Modality: Gastrostomy  Vital AF  Total Volume for 24 Hours (mL): 1440  Continuous  Starting Tube Feed Rate {mL per Hour}: 20  Increase Tube Feed Rate by (mL): 10     Every 8 hours  Until Goal Tube Feed Rate (mL per Hour): 60  Tube Feed Duration (in Hours): 24  Tube Feed Start Time: 15:00  Heraclio(7 Gm Arginine/7 Gm Glut/1.2 Gm HMB     Qty per Day:  1 pkg/day  No Carb Prosource (1pkg = 15gms Protein)     Qty per Day:  1 pkg daily  Entered: Sep 25 2020  2:17PM    Diet NPO with Tube Feed-  Tube Feeding Modality: Gastrostomy  Vital AF 1.2  Total Volume for 24 Hours (mL): 1440  Continuous  Starting Tube Feed Rate {mL per Hour}: 50     Every 24 hours  Until Goal Tube Feed Rate (mL per Hour): 60  Tube Feed Duration (in Hours): 24  Tube Feed Start Time: 13:00  Heraclio(7 Gm Arginine/7 Gm Glut/1.2 Gm HMB     Qty per Day:  1  No Carb Prosource (1pkg = 15gms Protein)     Qty per Day:  1  Entered: Sep 21 2020 12:25PM    The following pending diet order is being considered for treatment of Severe protein-calorie malnutrition:null
This patient has been assessed with a concern for Malnutrition and has been determined to have a diagnosis/diagnoses of Severe protein-calorie malnutrition.    This patient is being managed with:   Diet NPO with Tube Feed-  Tube Feeding Modality: Gastrostomy  Vital AF 1.2  Total Volume for 24 Hours (mL): 1440  Continuous  Starting Tube Feed Rate {mL per Hour}: 50     Every 24 hours  Until Goal Tube Feed Rate (mL per Hour): 60  Tube Feed Duration (in Hours): 24  Tube Feed Start Time: 13:00  Heraclio(7 Gm Arginine/7 Gm Glut/1.2 Gm HMB     Qty per Day:  1  No Carb Prosource (1pkg = 15gms Protein)     Qty per Day:  1  Entered: Sep 21 2020 12:25PM    
This patient has been assessed with a concern for Malnutrition and has been determined to have a diagnosis/diagnoses of Severe protein-calorie malnutrition.    This patient is being managed with:   Diet NPO with Tube Feed-  Tube Feeding Modality: Gastrostomy  Vital AF 1.2  Total Volume for 24 Hours (mL): 1440  Continuous  Starting Tube Feed Rate {mL per Hour}: 50     Every 24 hours  Until Goal Tube Feed Rate (mL per Hour): 60  Tube Feed Duration (in Hours): 24  Tube Feed Start Time: 13:00  Heraclio(7 Gm Arginine/7 Gm Glut/1.2 Gm HMB     Qty per Day:  1  No Carb Prosource (1pkg = 15gms Protein)     Qty per Day:  1  Entered: Sep 21 2020 12:25PM

## 2020-10-07 NOTE — PROGRESS NOTE ADULT - ASSESSMENT
44M EtOH pancreatitis p/w abdominal pain 8/17/20, found to have necrotizing pancreatitis c/b septic shock and respiratory failure secondary to PNA and ARDS requiring intubation and DTs requiring sedation and small bowel obstruction conservatively managed with NGT and NPO for which he was admitted to ICU. Currently, s/p trach and PEG on 9/10 and treated for klebsiella bacteremia.    downgraded to  floor 9/20/20 from ICU    Assessment and Plan:      #Pseudomonas PNA   sputum cultures showed Pseudomonas. no fevers   secretions much improved and clear now   MRSA neg   s/p Cefepime course    #Diarrhea  could be multifactorial, --PEG feeds, abx, pancreatic insufficiency, also On laxative  DC laxative.   prn imodium can be given if no improvement in next 24 hours.   Pending stool studies for pancreatic enzyme supplementation eval.     #unstageable pressure ulcers x 2 on sacral area ,    -vascular consult appreciated. Follow recs. local wound care.     #s/p necrotizing pancreatitis , s/p septic shock , resolved     -RLE showing no dvt , cellulitis improved  -CT Chest/Abdomen/Pelvis ordered   - Moderate right and small left pleural effusion.  Bilateral patchy airspace disease has improved compared to the prior.  Improving pancreatitis with decreased size of the pancreas as well as improved peripancreatic edema. Small collection adjacent to the undersurface of the tail is also decreased. No evidence of necrosis.  -ID followed.     #Metabolic encephalopathy   AGITATION   hepatic vs. ICU delirium vs sepisis   patient awake , mouthing words to staff . agitated at times    d/angela Precedex since 9/15 in ICU  -diazepam, Methadone and Seroquel - were tapered. Pt was tapered off all meds, however was very agitated (pulling at lines/trying to get out of bed and almost falling off bed), thus diazepam restarted daily, seroquel twice a day, increased dose to 200mg AM and 300mg PM, and ativan changed to Klonopin (as ativan ineffective)  , appreciate psych recs   Psych following.   check EKG for QTc because of high dose seroquel.   cont BZDs.   Follow MRI brain after administration of iv ativan.   Repeat swallow study to see if he can eat some orally and it might help with agitation.   Increase metoprolol with holding parameters for tachycardia.     #Acute respiratory failure secondary to PNA/ARDS , copious secretions  s/p TRACH 9/10/20   tolerating trach collar , off ventilator   continue with frequent suctioning/pulmonary toilet ; secretions much improved     #Ascites , abd is soft.   distended gallbladder and ascites seen on CT, abd US neg for acute cholecystitis   s/p paracentesis 9/22 by IR   ABD US showing worsening ascites, s/p paracentesis by IR 9/22 with 1.5L straw colored fluid removed -cultures did not show any specific organisms  fluid analysis of paracentesis not consistent with SBP.   9/21 KUB shows nonspecific bowel gas pattern without e/o obstruction or ileus   9/21 lactate wnl , procal unremarkable   Blood culture from 9/10 was positive for klebsiella quite sensitive and one set from 9/12 is negative, 9/25 repeat blood cultures negative, sputum cultures from 9/26- showing pseudomonas aeruginosa   SAAG < 1.1 going against portal HTN       #s/p PEG placement 9/10/20    s/p SBO which was treated conservatively   Ileus.      #Hypokalememia-- repleted    #Hypomagnesemia --repleted    #Hypophosphatemia --replete and recheck.     #normocytic anemia , probably related to sepsis and acute illness   requiring transfusion on 9/17/20  monitor H/H   follow anemia work-up   transfuse prn of H/H <7/21      hypercalcemia   - 2/2 to immobilization vs vitamin d supplementation although levels are only 25   - s/p pamidronate x 1    Vitamin D insufficiency. cont supplement.     SEVERE DECONDITIONING d/t prolonged hospitalization   PT     #Preventative measures   lovenox SQ-dvt ppx  fall, aspiration precautions, HOBE

## 2020-10-07 NOTE — PHYSICAL THERAPY INITIAL EVALUATION ADULT - BED MOBILITY TRAINING, PT EVAL
Pt will independently perform all aspects of bed mobility to help prevent pressure ulcers, by 6-8 weeks

## 2020-10-07 NOTE — PROGRESS NOTE ADULT - SUBJECTIVE AND OBJECTIVE BOX
CHIEF COMPLAINT: Follow up of agitation, diarrhea  Tolerating PEG tube feeding  ? wants to eat orally.   no fever  no active gross bleeding  agitated night time  on high dose seroquel.       PHYSICAL EXAM:    GENERAL: Thinly built, Trach and PEG +  CHEST/LUNG: Clear to ausculation bilaterally, no wheezing, no crackles   HEART: Regular rate and rhythm; No murmurs, Tachycardic   ABDOMEN: Soft, Nontender, Nondistended; Bowel sounds present  EXTREMITIES:  , No clubbing, cyanosis, or edema  NERVOUS SYSTEM:  Limited exam. was trying to talk but did not follow any commands.   Psychiatry: AA but agitated.       OBJECTIVE DATA:   Vital Signs Last 24 Hrs  T(C): 37.2 (07 Oct 2020 10:13), Max: 38 (06 Oct 2020 15:51)  T(F): 99 (07 Oct 2020 10:13), Max: 100.4 (06 Oct 2020 15:51)  HR: 117 (07 Oct 2020 13:25) (65 - 126)  BP: 119/83 (07 Oct 2020 13:25) (116/82 - 122/73)  BP(mean): --  RR: 17 (07 Oct 2020 13:25) (16 - 17)  SpO2: 100% (07 Oct 2020 13:25) (91% - 100%)           Daily     Daily Weight in k.4 (07 Oct 2020 05:20)  LABS:                        8.5    11.11 )-----------( 261      ( 07 Oct 2020 06:52 )             26.2             10-07    139  |  107  |  20  ----------------------------<  106<H>  3.7   |  24  |  0.48<L>    Ca    8.5      07 Oct 2020 06:52  Phos  2.7     10-07  Mg     1.7     10-07    TPro  9.1<H>  /  Alb  2.7<L>  /  TBili  1.1  /  DBili  x   /  AST  42<H>  /  ALT  35  /  AlkPhos  125<H>  10-07                        CAPILLARY BLOOD GLUCOSE      POCT Blood Glucose.: 108 mg/dL (07 Oct 2020 11:20)      MEDICATIONS  (STANDING):  acetaminophen   Tablet .. 650 milliGRAM(s) Oral every 6 hours  albuterol/ipratropium for Nebulization 3 milliLiter(s) Nebulizer every 6 hours  chlorhexidine 4% Liquid 1 Application(s) Topical <User Schedule>  clonazePAM  Tablet 1.5 milliGRAM(s) Oral two times a day  collagenase Ointment 1 Application(s) Topical daily  dextrose 5%. 1000 milliLiter(s) (50 mL/Hr) IV Continuous <Continuous>  dextrose 50% Injectable 12.5 Gram(s) IV Push once  dextrose 50% Injectable 25 Gram(s) IV Push once  dextrose 50% Injectable 25 Gram(s) IV Push once  enoxaparin Injectable 40 milliGRAM(s) SubCutaneous daily  folic acid 1 milliGRAM(s) Oral daily  insulin lispro (HumaLOG) corrective regimen sliding scale   SubCutaneous every 6 hours  metoprolol tartrate 25 milliGRAM(s) Enteral Tube every 8 hours  multivitamin 1 Tablet(s) Oral daily  pantoprazole   Suspension 40 milliGRAM(s) Oral daily  potassium phosphate / sodium phosphate Powder (PHOS-NaK) 2 Packet(s) Oral every 8 hours  QUEtiapine 300 milliGRAM(s) Oral <User Schedule>  QUEtiapine 200 milliGRAM(s) Oral daily  thiamine Injectable 100 milliGRAM(s) IV Push daily    MEDICATIONS  (PRN):  acetaminophen   Tablet .. 650 milliGRAM(s) Oral every 6 hours PRN Temp greater or equal to 38C (100.4F), Mild Pain (1 - 3)  dextrose 40% Gel 15 Gram(s) Oral once PRN Blood Glucose LESS THAN 70 milliGRAM(s)/deciliter  glucagon  Injectable 1 milliGRAM(s) IntraMuscular once PRN Glucose LESS THAN 70 milligrams/deciliter  LORazepam   Injectable 2 milliGRAM(s) IV Push every 4 hours PRN breakthrough agitation  metoprolol tartrate Injectable 5 milliGRAM(s) IV Push every 6 hours PRN if sbp>160 or HR >110 hold if SBP<100 or HR<60

## 2020-10-07 NOTE — CHART NOTE - NSCHARTNOTEFT_GEN_A_CORE
Pt agitated and is on 1:1 observation .Pt trying to climb out of bed . Ativan renewed . In spite of ativan admn , Pt pulled out his trach collar . Respiratory Reinserted . B/L wrist restraints ordered. VSS and remained the same . CIWA score monitored and protocol followed.

## 2020-10-07 NOTE — PROGRESS NOTE BEHAVIORAL HEALTH - SUMMARY
Persistent multifactorial delirium with difficulty weaning off of sedation and IV treatment in ICU with episodes of breakthrough agitation with high tolerance to medications

## 2020-10-07 NOTE — PHYSICAL THERAPY INITIAL EVALUATION ADULT - ADDITIONAL COMMENTS
Pt still confused, unable to get accurate  PLOF
EMR chart reviewed. Pt has 2-4 steps to enter and lives with family in a Private house

## 2020-10-07 NOTE — PROGRESS NOTE BEHAVIORAL HEALTH - NSBHCONSULTRECOMMENDOTHER_PSY_A_CORE FT
I will START or STAY ON the medications listed below when I get home from the hospital:    Advil 200 mg oral tablet  -- 1 tab(s) by mouth every 6 hours, As Needed last dose 12/31/2017  -- Indication: For Pain    aspirin 81 mg oral delayed release tablet  -- 1 tab(s) by mouth once a day  -- Indication: For CAD (coronary artery disease)    valsartan 160 mg oral tablet  -- 1 tab(s) by mouth once a day  -- Indication: For HTN (hypertension)    simvastatin 40 mg oral tablet  -- 1 tab(s) by mouth once a day (at bedtime)  -- Indication: For HLD (hyperlipidemia)    clopidogrel 75 mg oral tablet  -- 1 tab(s) by mouth once a day  -- Indication: For CAD (coronary artery disease)    Toprol-XL 50 mg oral tablet, extended release  -- 1 tab(s) by mouth once a day   -- It is very important that you take or use this exactly as directed.  Do not skip doses or discontinue unless directed by your doctor.  May cause drowsiness.  Alcohol may intensify this effect.  Use care when operating dangerous machinery.  Some non-prescription drugs may aggravate your condition.  Read all labels carefully.  If a warning appears, check with your doctor before taking.  Swallow whole.  Do not crush.  Take with food or milk.  This drug may impair the ability to drive or operate machinery.  Use care until you become familiar with its effects.    -- Indication: For CAD (coronary artery disease)    Breo Ellipta 100 mcg-25 mcg/inh inhalation powder  -- 1 puff(s) inhaled 2 times a day  -- Indication: For Asthma/COPD given continued AMS with no significant improvement and inability to cooperate/follow commands, reorder MRI as he needs head imaging to rule out any intracranial pathology; give Ativan 3mg IVP WITH haldol 2mg IVP 30 minutes prior to MRI study  - repeat EKG given antipsychotic usage given continued AMS with no significant improvement and inability to cooperate/follow commands, reorder MRI as he needs head imaging to rule out any intracranial pathology; give Ativan 3mg IVP WITH haldol 2mg IVP 30 minutes prior to MRI study  - repeat EKG given antipsychotic usage  - RULE OUT PAIN: Pt has a Stage IV; prolonged supine position lading to muscle, joint stiffness: Tylenol trial

## 2020-10-07 NOTE — PROGRESS NOTE BEHAVIORAL HEALTH - NSBHCHARTREVIEWVS_PSY_A_CORE FT
T(C): 37.6 (10-07-20 @ 05:20), Max: 38 (10-06-20 @ 15:51)  HR: 69 (10-07-20 @ 05:41) (69 - 126)  BP: 121/84 (10-07-20 @ 05:20) (114/81 - 121/84)  RR: 17 (10-07-20 @ 05:20) (17 - 20)  SpO2: 95% (10-07-20 @ 05:41) (91% - 97%)

## 2020-10-07 NOTE — PROGRESS NOTE BEHAVIORAL HEALTH - NSBHCHARTREVIEWLAB_PSY_A_CORE FT
10-07    139  |  107  |  20  ----------------------------<  106<H>  3.7   |  24  |  0.48<L>    Ca    8.5      07 Oct 2020 06:52  Phos  2.7     10-07  Mg     1.7     10-07    TPro  9.1<H>  /  Alb  2.7<L>  /  TBili  1.1  /  DBili  x   /  AST  42<H>  /  ALT  35  /  AlkPhos  125<H>  10-07

## 2020-10-08 LAB
ALBUMIN SERPL ELPH-MCNC: 2.4 G/DL — LOW (ref 3.3–5)
ALP SERPL-CCNC: 133 U/L — HIGH (ref 40–120)
ALT FLD-CCNC: 33 U/L — SIGNIFICANT CHANGE UP (ref 12–78)
ANION GAP SERPL CALC-SCNC: 5 MMOL/L — SIGNIFICANT CHANGE UP (ref 5–17)
AST SERPL-CCNC: 36 U/L — SIGNIFICANT CHANGE UP (ref 15–37)
BILIRUB SERPL-MCNC: 0.9 MG/DL — SIGNIFICANT CHANGE UP (ref 0.2–1.2)
BUN SERPL-MCNC: 22 MG/DL — SIGNIFICANT CHANGE UP (ref 7–23)
CALCIUM SERPL-MCNC: 8.4 MG/DL — LOW (ref 8.5–10.1)
CHLORIDE SERPL-SCNC: 108 MMOL/L — SIGNIFICANT CHANGE UP (ref 96–108)
CO2 SERPL-SCNC: 25 MMOL/L — SIGNIFICANT CHANGE UP (ref 22–31)
CREAT SERPL-MCNC: 0.49 MG/DL — LOW (ref 0.5–1.3)
GLUCOSE BLDC GLUCOMTR-MCNC: 101 MG/DL — HIGH (ref 70–99)
GLUCOSE BLDC GLUCOMTR-MCNC: 110 MG/DL — HIGH (ref 70–99)
GLUCOSE BLDC GLUCOMTR-MCNC: 111 MG/DL — HIGH (ref 70–99)
GLUCOSE BLDC GLUCOMTR-MCNC: 111 MG/DL — HIGH (ref 70–99)
GLUCOSE BLDC GLUCOMTR-MCNC: 122 MG/DL — HIGH (ref 70–99)
GLUCOSE SERPL-MCNC: 98 MG/DL — SIGNIFICANT CHANGE UP (ref 70–99)
HCT VFR BLD CALC: 29.2 % — LOW (ref 39–50)
HGB BLD-MCNC: 9 G/DL — LOW (ref 13–17)
MCHC RBC-ENTMCNC: 29 PG — SIGNIFICANT CHANGE UP (ref 27–34)
MCHC RBC-ENTMCNC: 30.8 GM/DL — LOW (ref 32–36)
MCV RBC AUTO: 94.2 FL — SIGNIFICANT CHANGE UP (ref 80–100)
NRBC # BLD: 0 /100 WBCS — SIGNIFICANT CHANGE UP (ref 0–0)
PLATELET # BLD AUTO: 268 K/UL — SIGNIFICANT CHANGE UP (ref 150–400)
POTASSIUM SERPL-MCNC: 3.6 MMOL/L — SIGNIFICANT CHANGE UP (ref 3.5–5.3)
POTASSIUM SERPL-SCNC: 3.6 MMOL/L — SIGNIFICANT CHANGE UP (ref 3.5–5.3)
PROT SERPL-MCNC: 8.9 GM/DL — HIGH (ref 6–8.3)
RBC # BLD: 3.1 M/UL — LOW (ref 4.2–5.8)
RBC # FLD: 15.1 % — HIGH (ref 10.3–14.5)
SODIUM SERPL-SCNC: 138 MMOL/L — SIGNIFICANT CHANGE UP (ref 135–145)
WBC # BLD: 10.95 K/UL — HIGH (ref 3.8–10.5)
WBC # FLD AUTO: 10.95 K/UL — HIGH (ref 3.8–10.5)

## 2020-10-08 PROCEDURE — 99233 SBSQ HOSP IP/OBS HIGH 50: CPT

## 2020-10-08 PROCEDURE — 99232 SBSQ HOSP IP/OBS MODERATE 35: CPT

## 2020-10-08 PROCEDURE — 99231 SBSQ HOSP IP/OBS SF/LOW 25: CPT

## 2020-10-08 RX ORDER — ACETAMINOPHEN 500 MG
650 TABLET ORAL ONCE
Refills: 0 | Status: COMPLETED | OUTPATIENT
Start: 2020-10-08 | End: 2020-10-08

## 2020-10-08 RX ORDER — BACITRACIN ZINC 500 UNIT/G
1 OINTMENT IN PACKET (EA) TOPICAL
Refills: 0 | Status: DISCONTINUED | OUTPATIENT
Start: 2020-10-08 | End: 2020-10-26

## 2020-10-08 RX ORDER — QUETIAPINE FUMARATE 200 MG/1
150 TABLET, FILM COATED ORAL DAILY
Refills: 0 | Status: DISCONTINUED | OUTPATIENT
Start: 2020-10-08 | End: 2020-10-08

## 2020-10-08 RX ORDER — QUETIAPINE FUMARATE 200 MG/1
150 TABLET, FILM COATED ORAL DAILY
Refills: 0 | Status: DISCONTINUED | OUTPATIENT
Start: 2020-10-08 | End: 2020-10-13

## 2020-10-08 RX ORDER — HALOPERIDOL DECANOATE 100 MG/ML
2 INJECTION INTRAMUSCULAR ONCE
Refills: 0 | Status: COMPLETED | OUTPATIENT
Start: 2020-10-08 | End: 2020-10-08

## 2020-10-08 RX ADMIN — QUETIAPINE FUMARATE 300 MILLIGRAM(S): 200 TABLET, FILM COATED ORAL at 22:04

## 2020-10-08 RX ADMIN — Medication 3 MILLILITER(S): at 17:07

## 2020-10-08 RX ADMIN — Medication 2 MILLIGRAM(S): at 14:22

## 2020-10-08 RX ADMIN — Medication 650 MILLIGRAM(S): at 14:37

## 2020-10-08 RX ADMIN — Medication 25 MILLIGRAM(S): at 22:05

## 2020-10-08 RX ADMIN — Medication 2 MILLIGRAM(S): at 11:31

## 2020-10-08 RX ADMIN — ENOXAPARIN SODIUM 40 MILLIGRAM(S): 100 INJECTION SUBCUTANEOUS at 11:16

## 2020-10-08 RX ADMIN — HALOPERIDOL DECANOATE 2 MILLIGRAM(S): 100 INJECTION INTRAMUSCULAR at 11:30

## 2020-10-08 RX ADMIN — CHLORHEXIDINE GLUCONATE 1 APPLICATION(S): 213 SOLUTION TOPICAL at 05:43

## 2020-10-08 RX ADMIN — Medication 650 MILLIGRAM(S): at 05:38

## 2020-10-08 RX ADMIN — Medication 3 MILLILITER(S): at 05:48

## 2020-10-08 RX ADMIN — Medication 650 MILLIGRAM(S): at 15:37

## 2020-10-08 RX ADMIN — Medication 2 MILLIGRAM(S): at 19:37

## 2020-10-08 RX ADMIN — Medication 1 APPLICATION(S): at 18:22

## 2020-10-08 RX ADMIN — Medication 1.5 MILLIGRAM(S): at 18:22

## 2020-10-08 RX ADMIN — Medication 2 MILLIGRAM(S): at 01:54

## 2020-10-08 RX ADMIN — Medication 1.5 MILLIGRAM(S): at 05:37

## 2020-10-08 RX ADMIN — PANTOPRAZOLE SODIUM 40 MILLIGRAM(S): 20 TABLET, DELAYED RELEASE ORAL at 11:17

## 2020-10-08 RX ADMIN — Medication 2 MILLIGRAM(S): at 05:37

## 2020-10-08 RX ADMIN — Medication 25 MILLIGRAM(S): at 14:35

## 2020-10-08 RX ADMIN — Medication 25 MILLIGRAM(S): at 05:38

## 2020-10-08 RX ADMIN — Medication 2 MILLIGRAM(S): at 23:41

## 2020-10-08 RX ADMIN — Medication 2 PACKET(S): at 05:38

## 2020-10-08 RX ADMIN — QUETIAPINE FUMARATE 150 MILLIGRAM(S): 200 TABLET, FILM COATED ORAL at 11:17

## 2020-10-08 RX ADMIN — Medication 2 PACKET(S): at 22:04

## 2020-10-08 RX ADMIN — Medication 2 PACKET(S): at 14:35

## 2020-10-08 RX ADMIN — Medication 3 MILLILITER(S): at 11:13

## 2020-10-08 RX ADMIN — Medication 650 MILLIGRAM(S): at 00:05

## 2020-10-08 RX ADMIN — Medication 100 MILLIGRAM(S): at 14:34

## 2020-10-08 RX ADMIN — Medication 1 TABLET(S): at 11:17

## 2020-10-08 RX ADMIN — Medication 1 MILLIGRAM(S): at 11:16

## 2020-10-08 RX ADMIN — Medication 1 APPLICATION(S): at 11:20

## 2020-10-08 NOTE — SWALLOW BEDSIDE ASSESSMENT ADULT - SWALLOW EVAL: RECOMMENDED DIET
continue NPO at this time; Peg feedings as primary nutrition/hydration continue NPO at this time pending trach tube downsize; Peg feedings as primary nutrition/hydration continue NPO at this time; RECOMMEND TRACH TUBE DOWNSIZE AS TOLERATED FOR SWALLOW AND VOICE FUNCTIONS; Peg feedings as primary nutrition/hydration

## 2020-10-08 NOTE — PROGRESS NOTE ADULT - SUBJECTIVE AND OBJECTIVE BOX
INTERVAL HPI:   44M with ETOH Abuse and withdrawal, Alcohol Pancreatitis who presents to the ED with severe abdominal pain.  Found to have Necrotizing Pancreatitis, Septic  Shock, Pneumonia/ARDS with Hypoxic Respiratory failure. Admitted to ICU, had prolonged hospital course with Vent support. Eventually required Tracheostomy and Peg placement.  Also with acute metabolic Encephalopathy.  09/20/20:  Transferred to .    Not able to provide details due to encephalopathy.  09/22/20: 1500 ml paracentesis.    OVERNIGHT EVENTS:  Responsive, on trach tracher    Vital Signs Last 24 Hrs  T(C): 36.8 (08 Oct 2020 10:18), Max: 37.3 (07 Oct 2020 23:55)  T(F): 98.2 (08 Oct 2020 10:18), Max: 99.1 (07 Oct 2020 23:55)  HR: 107 (08 Oct 2020 11:27) (65 - 117)  BP: 111/76 (08 Oct 2020 10:18) (98/62 - 120/80)  BP(mean): --  RR: 18 (08 Oct 2020 10:18) (17 - 18)  SpO2: 99% (08 Oct 2020 11:27) (96% - 100%)    PHYSICAL EXAM:  GEN:         responsive and comfortable.  HEENT:     trach    RESP:       no distress  CVS:          Regular rate and rhythm.     MEDICATIONS  (STANDING):  albuterol/ipratropium for Nebulization 3 milliLiter(s) Nebulizer every 6 hours  chlorhexidine 4% Liquid 1 Application(s) Topical <User Schedule>  clonazePAM  Tablet 1.5 milliGRAM(s) Oral two times a day  collagenase Ointment 1 Application(s) Topical daily  dextrose 5%. 1000 milliLiter(s) (50 mL/Hr) IV Continuous <Continuous>  dextrose 50% Injectable 12.5 Gram(s) IV Push once  dextrose 50% Injectable 25 Gram(s) IV Push once  dextrose 50% Injectable 25 Gram(s) IV Push once  enoxaparin Injectable 40 milliGRAM(s) SubCutaneous daily  folic acid 1 milliGRAM(s) Oral daily  insulin lispro (HumaLOG) corrective regimen sliding scale   SubCutaneous every 6 hours  metoprolol tartrate 25 milliGRAM(s) Enteral Tube every 8 hours  multivitamin 1 Tablet(s) Oral daily  pantoprazole   Suspension 40 milliGRAM(s) Oral daily  potassium phosphate / sodium phosphate Powder (PHOS-NaK) 2 Packet(s) Oral every 8 hours  QUEtiapine 300 milliGRAM(s) Oral <User Schedule>  QUEtiapine 150 milliGRAM(s) Oral daily  thiamine Injectable 100 milliGRAM(s) IV Push daily    MEDICATIONS  (PRN):  acetaminophen   Tablet .. 650 milliGRAM(s) Oral every 6 hours PRN Temp greater or equal to 38C (100.4F), Mild Pain (1 - 3)  dextrose 40% Gel 15 Gram(s) Oral once PRN Blood Glucose LESS THAN 70 milliGRAM(s)/deciliter  glucagon  Injectable 1 milliGRAM(s) IntraMuscular once PRN Glucose LESS THAN 70 milligrams/deciliter  LORazepam   Injectable 2 milliGRAM(s) IV Push every 4 hours PRN breakthrough agitation  metoprolol tartrate Injectable 5 milliGRAM(s) IV Push every 6 hours PRN if sbp>160 or HR >110 hold if SBP<100 or HR<60    LABS:                        9.0    10.95 )-----------( 268      ( 08 Oct 2020 10:00 )             29.2     10-08    138  |  108  |  22  ----------------------------<  98  3.6   |  25  |  0.49<L>    Ca    8.4<L>      08 Oct 2020 10:00  Phos  2.7     10-07  Mg     1.7     10-07    TPro  8.9<H>  /  Alb  2.4<L>  /  TBili  0.9  /  DBili  x   /  AST  36  /  ALT  33  /  AlkPhos  133<H>  10-08    ASSESSMENT AND PLAN:  ·	Hypoxic Respiratory failure.  ·	S/P tracheostomy.  ·	Bilateral pneumonia.  ·	Bilateral pleural effusion.  ·	S/P Septic Shock.  ·	Necrotizing Pancreatis.  ·	Acute metabolic Encephalopathy.  ·	Alcohol abuse.  ·	Klebsiella Bacteremia.  ·	Anemia    Continue Trach collar.  For MRI brain.

## 2020-10-08 NOTE — PROGRESS NOTE BEHAVIORAL HEALTH - OTHER
tenuous; not able to volitionally cooperate limited; does not make, meaningful eye contact tenuous as per notes deferred at this time none looks confused as expected / appropriate to context not able to ascertain at this time due to limitation of exam tenuous; has little improvement improved still limited but better tenuous as per notes with slight improvement mouthing words which do not make sense / unable to lip read

## 2020-10-08 NOTE — PROGRESS NOTE ADULT - ASSESSMENT
Assessment and Plan:   · Assessment	  DEEPIKA RUCKER is a 44M PMH alcohol abuse, hx of alcohol withdrawal and alcohol pancreatitis who presented for abdominal pain. Admitted with necrotizing pancreatitis, sepsis, septic shock with course complicated by acute hypoxic respiratory failure secondary to PNA and ARDS requiring intubation and DTs requiring sedation and small bowel obstruction conservatively managed with NGT and NPO. Currently, s/p trach and PEG on 9/10 and treated for klebsiella bacteremia.  Intermittently having fevers, last fever 9/21, 101.0  WBC trending down, 10.95 today.   SAAG <1.1 which goes against portal hypertension and more in line with pancreatitis vs SBP vs some other cause   CXR from 9/21 with increased lung markings and effusions as well as trach  Blood culture from 9/10 was positive for klebsiella quite sensitive and one set from 9/12 is negative   Patient has multiple possible sources of fever:  Abdomen such as SBP, pancreatitis, hepatobiliary disease   Pulmonary: CXR very unclear but he is on trach collar thus unlikely   Skin and soft tissue: cellulitis of the arm (US didnt show DVT or thrombophlebitis)        Antibiotics this admission:  Vancomycin: 8/26-27; 9/16-20  Zosyn: 8/18-8/27  Meropenem: 8/23-9/2  ceftriaxone: 9/14-20  Cefazolin: 9/20->9/29  Cefepime 9/29->    9/23: Continues to respond to cefazolin, fluid analysis not consistent with SBP and negative culture thus far, heavily sedated though meds cut in half,  9/24: Spiked a fever, panculture and repeat imaging, Behavioral health consulted, ascites fluid culture negative   9/25: still spiking, getting CT c/a/p, continue cefazolin for cellulitis which is much better   9/29: still febrile, slowly peeling off sedatives, pseudomonas growing in sputum   9/30: Fever curve improving, CT head negative, planned for MRI brain, continue cefepime  10/7: Off antibiotics, s/p course of cefepime. Low grade fever, isolated. Unfortunately mental status remains poor.   10/8: remains off antibiotics, s/p course of cefepime, remains afebrile.    Suggestions--  Local care to pressure sore  Offloading  Optimize nutritional status  High threshold for empiric antibiotics  Monitor temperature curve    Prognosis remains poor. Will be at risk for both infectious and noninfectious complications. Assessment and Plan:   · Assessment	  DEEPIKA RUCKER is a 44M PMH alcohol abuse, hx of alcohol withdrawal and alcohol pancreatitis who presented for abdominal pain. Admitted with necrotizing pancreatitis, sepsis, septic shock with course complicated by acute hypoxic respiratory failure secondary to PNA and ARDS requiring intubation and DTs requiring sedation and small bowel obstruction conservatively managed with NGT and NPO. Currently, s/p trach and PEG on 9/10 and treated for klebsiella bacteremia.  Intermittently having fevers, last fever 9/21, 101.0  WBC trending down, 10.95 today.   SAAG <1.1 which goes against portal hypertension and more in line with pancreatitis vs SBP vs some other cause   CXR from 9/21 with increased lung markings and effusions as well as trach  Blood culture from 9/10 was positive for klebsiella quite sensitive and one set from 9/12 is negative   Patient has multiple possible sources of fever:  Abdomen such as SBP, pancreatitis, hepatobiliary disease   Pulmonary: CXR very unclear but he is on trach collar thus unlikely   Skin and soft tissue: cellulitis of the arm (US didnt show DVT or thrombophlebitis)        Antibiotics this admission:  Vancomycin: 8/26-27; 9/16-20  Zosyn: 8/18-8/27  Meropenem: 8/23-9/2  ceftriaxone: 9/14-20  Cefazolin: 9/20->9/29  Cefepime 9/29->    9/23: Continues to respond to cefazolin, fluid analysis not consistent with SBP and negative culture thus far, heavily sedated though meds cut in half,  9/24: Spiked a fever, panculture and repeat imaging, Behavioral health consulted, ascites fluid culture negative   9/25: still spiking, getting CT c/a/p, continue cefazolin for cellulitis which is much better   9/29: still febrile, slowly peeling off sedatives, pseudomonas growing in sputum   9/30: Fever curve improving, CT head negative, planned for MRI brain, continue cefepime  10/7: Off antibiotics, s/p course of cefepime. Low grade fever, isolated. Unfortunately mental status remains poor.   10/8: remains off antibiotics, s/p course of cefepime, remains afebrile.      Suggestions--  Local care to pressure sore  Offloading  Optimize nutritional status  High threshold for empiric antibiotics  Monitor temperature curve    Prognosis remains poor. Will be at risk for both infectious and noninfectious complications.

## 2020-10-08 NOTE — PROGRESS NOTE BEHAVIORAL HEALTH - NSBHCONSULTRECOMMENDOTHER_PSY_A_CORE FT
given continued AMS with no significant improvement and inability to cooperate/follow commands, reorder MRI as he needs head imaging to rule out any intracranial pathology; give Ativan 2mg IVP WITH haldol 2mg IVP 30 minutes prior to MRI study  - called MRI suite today to update with regard to the metal plate in jaw   - repeat EKG given antipsychotic usage is QTc 462 (10/7/20)  - RULE OUT PAIN: Pt has a Stage IV; prolonged supine position lading to muscle, joint stiffness: Tylenol trial with some improvement in degree of agitation

## 2020-10-08 NOTE — SWALLOW BEDSIDE ASSESSMENT ADULT - ADDITIONAL RECOMMENDATIONS
STRICT ORAL CARE FOR DRY MOUTH AS PT IS NPO TRACH TUBE IS ELONGATED AND PRESSES BELOW CHIN AREA WITH DISCOMFORT EVIDENT; MAINTAIN STRICT ORAL CARE FOR DRY MOUTH AS PT IS NPO

## 2020-10-08 NOTE — PROGRESS NOTE BEHAVIORAL HEALTH - NSBHCONSULTMEDS_PSY_A_CORE FT
COURSE OF TX: methadone weaned off as of 9/26/20; increased Seroquel 50mg PO bid to 100mg via PEG BID and increase Valium to 5mg PO bid on 9/28  - continued restlessness; increase Seroquel 100bid to 150 bid on 9/29/10  - PRN Ativan dose increased to 2mg with continued need so DC Valium on 9/30/20 and use Ativan 2mg PO q8am, q4pm but HOLD for sedation with goal to stabilize agitation to the point that Patient will not need any PRNs and will have episodes of being awake  - PO Ativan ineffective; convert to Klonopin equivalent (Ativan 2mg = Klonopin 1mg) and increase to Klonopin 1.5mg PO via PEG BID on 10/6/20 COURSE OF TX: methadone weaned off as of 9/26/20; increased Seroquel 50mg PO bid to 100mg via PEG BID and increase Valium to 5mg PO bid on 9/28  - continued restlessness; increase Seroquel 100bid to 150 bid on 9/29/10; decrease Seroquel from 200 qd to 150qd on 10/8/20 (trying to find lowest effective dose)  - PRN Ativan dose increased to 2mg with continued need so DC Valium on 9/30/20 and use Ativan 2mg PO q8am, q4pm but HOLD for sedation with goal to stabilize agitation to the point that Patient will not need any PRNs and will have episodes of being awake  - PO Ativan ineffective; convert to Klonopin equivalent (Ativan 2mg = Klonopin 1mg) and increase to Klonopin 1.5mg PO via PEG BID on 10/6/20 COURSE OF TX: methadone weaned off as of 9/26/20; increased Seroquel 50mg PO bid to 100mg via PEG BID and increase Valium to 5mg PO bid on 9/28  - continued restlessness; increase Seroquel 100bid to 150 bid on 9/29/10; decrease Seroquel from 200 qd to 150qd on 10/8/20 (trying to find lowest effective dose with ultimate goal is to minimize / maybe even wean off Patient from an antipsychotic)  - PRN Ativan dose increased to 2mg with continued need so DC Valium on 9/30/20 and use Ativan 2mg PO q8am, q4pm but HOLD for sedation with goal to stabilize agitation to the point that Patient will not need any PRNs and will have episodes of being awake  - PO Ativan ineffective; convert to Klonopin equivalent (Ativan 2mg = Klonopin 1mg) and increase to Klonopin 1.5mg PO via PEG BID on 10/6/20

## 2020-10-08 NOTE — SWALLOW BEDSIDE ASSESSMENT ADULT - SLP PERTINENT HISTORY OF CURRENT PROBLEM
admitted 8/17/20 Hypoxic Respiratory failure; s/p tracheostomy; bilateral pna; Acute metabolic Encephalopathy.

## 2020-10-08 NOTE — PROGRESS NOTE ADULT - SUBJECTIVE AND OBJECTIVE BOX
[facility]  Division of Infectious Diseases  [phone]    Name: DEEPIKA RUCKER  Age: 44y  Gender: Male  MRN: [id number]    Interval History--  Notes reviewed. The patient is in restrains secondary to being agitated and combative.   The patient remains afebrile, leukocytosis is trending down.     Past Medical History--  History of acute pancreatitis    Anxiety    Anemia    Fracture of angle of mandible    Fracture of tooth (traumatic), initial encounter for open fracture    ETOH abuse    No pertinent past medical history    Closed fracture of left mandibular angle, sequela    No significant past surgical history        For details regarding the patient's social history, family history, and other miscellaneous elements, please refer the initial infectious diseases consultation and/or the admitting history and physical examination for this admission.    Allergies    No Known Allergies        VITALS:  Vital Signs Last 24 Hrs  T(C): 36.8 (08 Oct 2020 10:18), Max: 37.3 (07 Oct 2020 23:55)  T(F): 98.2 (08 Oct 2020 10:18), Max: 99.1 (07 Oct 2020 23:55)  HR: 107 (08 Oct 2020 11:27) (71 - 117)  BP: 111/76 (08 Oct 2020 10:18) (98/62 - 120/80)  BP(mean): --  RR: 18 (08 Oct 2020 10:18) (18 - 18)  SpO2: 99% (08 Oct 2020 11:27) (96% - 100%)    LABS/DIAGNOSTIC TESTS:                          9.0    10.95 )-----------( 268      ( 08 Oct 2020 10:00 )             29.2         10-08    138  |  108  |  22  ----------------------------<  98  3.6   |  25  |  0.49<L>    Ca    8.4<L>      08 Oct 2020 10:00  Phos  2.7     10-07  Mg     1.7     10-07    TPro  8.9<H>  /  Alb  2.4<L>  /  TBili  0.9  /  DBili  x   /  AST  36  /  ALT  33  /  AlkPhos  133<H>  10-08      CULTURES:   .Sputum Sputum  09-26 @ 15:16   Moderate Pseudomonas aeruginosa  --  Pseudomonas aeruginosa      .Blood Blood-Peripheral  09-25 @ 00:32   No Growth Final  --  --      Ascites Fl paracentesis ascites  09-22 @ 18:59   No growth  --    No polymorphonuclear cells seen per low power field  White blood cells seen per oil power field  No organisms seen per oil power field  by cytocentrifuge      .Blood Blood-Peripheral  09-21 @ 23:04   No Growth Final  --  --      .Blood Blood  09-12 @ 11:17   No Growth Final  --  --      .Urine Catheterized  09-12 @ 00:47   No growth  --  --      .Sputum Sputum  09-11 @ 16:29   No growth at 48 hours  --    Few polymorphonuclear leukocytes per low power field  Rare Squamous epithelial cells per low power field  No organisms seen per oil power field      .Blood Blood  09-10 @ 21:09   Growth in aerobic and anaerobic bottles: Klebsiella pneumoniae  See previous culture [id number]  --    Growth in aerobic bottle:  Gram Negative Rods  Growth in anaerobic bottle:  Gram Negative Rods      .Blood Blood  09-10 @ 20:56   Growth in aerobic bottle: Klebsiella pneumoniae  "Due to technical problems, Proteus sp. will Not be reported as part of  the BCID panel until further notice"  ***Blood Panel PCR results on this specimen are available  approximately 3 hours after the Gram stain result.***  Gram stain, PCR, and/or culture results may not always  correspond due to difference in methodologies.  ************************************************************  This PCR assay was performed using [employer].  The following targets are tested for: Enterococcus,  vancomycin resistant enterococci, Listeria monocytogenes,  coagulase negative staphylococci, S. aureus,  methicillin resistant S. aureus, Streptococcus agalactiae  (Group B), S. pneumoniae, S. pyogenes (Group A),  Acinetobacter baumannii, Enterobacter cloacae, E. coli,  Klebsiella oxytoca, K. pneumoniae, Proteus sp.,  Serratia marcescens, Haemophilus influenzae,  Neisseria meningitidis, Pseudomonas aeruginosa, Candida  albicans, C. glabrata, C krusei, C parapsilosis,  C. tropicalis and the KPC resistance gene.  --  Blood Culture PCR  Klebsiella pneumoniae      .Blood Blood-Peripheral  09-02 @ 15:04   No Growth Final  --  --      .Sputum Sputum  09-02 @ 15:03   Normal Respiratory Poonam present  --    Moderate polymorphonuclear leukocytes per low power field  Rare Squamous epithelial cells per low power field  Rare Gram positive cocci in pairs per oil power field      .Sputum Sputum  08-30 @ 12:33   Rare Normal Respiratory Poonam present  --    Numerous polymorphonuclear leukocytes per low power field  Few Squamous epithelial cells per low power field  No organisms seen per oil power field      .Sputum Sputum  08-26 @ 18:51   Normal Respiratory Poonam present  --    No polymorphonuclear leukocytes per low power field  No Squamous epithelial cells per low power field  No organisms seen per oil power field      .Sputum Sputum trap  08-24 @ 09:16   Normal Respiratory Poonam present  --    Few polymorphonuclear leukocytes per low power field  Few Squamous epithelial cells per low power field  Rare Gram Negative Diplococci per oil power field      .Blood Blood-Peripheral  08-23 @ 01:14   No Growth Final  --  --      .Blood Blood-Arterial  08-23 @ 01:10   No Growth Final  --  --      .Urine Catheterized  08-18 @ 18:16   No growth  --  --      .Blood Blood  08-18 @ 08:38   No Growth Final  --  --      .Blood Blood  08-18 @ 08:35   No Growth Final  --  --          RADIOLOGY:    ROS:  [ x ] UNABLE TO ELICIT

## 2020-10-08 NOTE — PROGRESS NOTE BEHAVIORAL HEALTH - NSBHCHARTREVIEWVS_PSY_A_CORE FT
T(C): 36.9 (10-08-20 @ 05:10), Max: 37.3 (10-07-20 @ 23:55)  HR: 96 (10-08-20 @ 06:20) (65 - 117)  BP: 106/74 (10-08-20 @ 05:10) (98/62 - 122/73)  RR: 18 (10-08-20 @ 05:10) (16 - 18)  SpO2: 100% (10-08-20 @ 06:20) (95% - 100%) T(C): 36.8 (10-08-20 @ 10:18), Max: 37.3 (10-07-20 @ 23:55)  HR: 98 (10-08-20 @ 10:18) (65 - 117)  BP: 111/76 (10-08-20 @ 10:18) (98/62 - 120/80)  RR: 18 (10-08-20 @ 10:18) (17 - 18)  SpO2: 99% (10-08-20 @ 10:18) (96% - 100%)

## 2020-10-08 NOTE — SWALLOW BEDSIDE ASSESSMENT ADULT - NS SPL SWALLOW CLINIC TRIAL FT
food and liquid trials deferred on this exam; water droplets attempted but pt with agitation and refused because wanted to drink from bottle food and liquid trials deferred on this exam; water droplets attempted to facilitate laryngeal movements however pt with agitation and refused as he wanted to drink from bottle

## 2020-10-08 NOTE — PROGRESS NOTE ADULT - ASSESSMENT
44M EtOH pancreatitis p/w abdominal pain 8/17/20, found to have necrotizing pancreatitis c/b septic shock and respiratory failure secondary to PNA and ARDS requiring intubation and DTs requiring sedation and small bowel obstruction conservatively managed with NGT and NPO for which he was admitted to ICU. Currently, s/p trach and PEG on 9/10 and treated for klebsiella bacteremia.    downgraded to  floor 9/20/20 from ICU    Assessment and Plan:      #Pseudomonas PNA   sputum cultures showed Pseudomonas. no fevers   thick secretions. discussed with Dr Moody about trach tube downsizing and weaning. Due to thick secretions, its not possible to do either. also speech valve placement will not be possible.   MRSA neg   s/p Cefepime course    #Diarrhea  could be multifactorial, --PEG feeds, abx, pancreatic insufficiency, also On laxative  Off miralax.       #unstageable pressure ulcers x 2 on sacral area ,    -vascular consult appreciated. Follow recs. local wound care.     #s/p necrotizing pancreatitis , s/p septic shock , resolved     -RLE showing no dvt , cellulitis improved  -CT Chest/Abdomen/Pelvis ordered   - Moderate right and small left pleural effusion.  Bilateral patchy airspace disease has improved compared to the prior.  Improving pancreatitis with decreased size of the pancreas as well as improved peripancreatic edema. Small collection adjacent to the undersurface of the tail is also decreased. No evidence of necrosis.  -ID followed.     #Metabolic encephalopathy   AGITATION   hepatic vs. ICU delirium vs sepisis   patient awake , mouthing words to staff . agitated at times    d/angela Precedex since 9/15 in ICU  -diazepam, Methadone and Seroquel - were tapered. Pt was tapered off all meds, however was very agitated (pulling at lines/trying to get out of bed and almost falling off bed), thus diazepam restarted daily, seroquel twice a day, increased dose to 200mg AM and 300mg PM, and ativan changed to Klonopin (as ativan ineffective)  , appreciate psych recs   Psych following.   EKG reviewed by me showed QTc 462 msec.   cont BZDs.   Pending MRI brain. Discussed with nurse to try iv ativan and haldol prior to it. discussed with Dr. Agarwal in person.   Tachycardia better with in creased metoprolol.  speech eval recommended no change at this time.     #Acute respiratory failure secondary to PNA/ARDS , copious secretions  s/p TRACH 9/10/20   tolerating trach collar , off ventilator   continue with frequent suctioning/pulmonary toilet    #Ascites , abd is soft.   distended gallbladder and ascites seen on CT, abd US neg for acute cholecystitis   s/p paracentesis 9/22 by IR   ABD US showing worsening ascites, s/p paracentesis by IR 9/22 with 1.5L straw colored fluid removed -cultures did not show any specific organisms  fluid analysis of paracentesis not consistent with SBP.   9/21 KUB shows nonspecific bowel gas pattern without e/o obstruction or ileus   9/21 lactate wnl , procal unremarkable   Blood culture from 9/10 was positive for klebsiella quite sensitive and one set from 9/12 is negative, 9/25 repeat blood cultures negative, sputum cultures from 9/26- showing pseudomonas aeruginosa   SAAG < 1.1 going against portal HTN       #s/p PEG placement 9/10/20    s/p SBO which was treated conservatively   Ileus.      #Hypokalememia-- repleted    #Hypomagnesemia --repleted    #Hypophosphatemia --repleted    #normocytic anemia , probably related to sepsis and acute illness   requiring transfusion on 9/17/20  monitor H/H   follow anemia work-up   transfuse prn of H/H <7/21      hypercalcemia   - 2/2 to immobilization vs vitamin d supplementation although levels are only 25   - s/p pamidronate x 1    Vitamin D insufficiency. cont supplement.     SEVERE DECONDITIONING d/t prolonged hospitalization   PT     #Preventative measures   lovenox SQ-dvt ppx  fall, aspiration precautions

## 2020-10-08 NOTE — SWALLOW BEDSIDE ASSESSMENT ADULT - SPECIFY REASON(S)
Clinical assessment of swallow function Clinical assessment of swallow function; tolerating tube feeds, Pt wants to eat orally

## 2020-10-08 NOTE — SWALLOW BEDSIDE ASSESSMENT ADULT - SWALLOW EVAL: DIAGNOSIS
This 44 yr old male s/p Trach and Peg with excessive tracheal blood tinged secretions; pharyngeal dysphagia at this time due to poor laryngeal movement likely associated with large trach tube This is a 44 yr old male s/p Trach and Peg with excessive tracheal blood tinged secretions; presents Pharyngeal dysphagia with poor laryngeal movement; Pt with large/long size Shiley trach tube in place requiring frequent suctioning

## 2020-10-08 NOTE — PROGRESS NOTE ADULT - SUBJECTIVE AND OBJECTIVE BOX
CHIEF COMPLAINT: Follow up of agitation. diarrhea better off miralax   Tolerating PEG tube feeding  more calm in last 24 hours per nurse.   no fever  no active gross bleeding  thick secretions trach area        PHYSICAL EXAM:    GENERAL: Thinly built, Trach and PEG +  CHEST/LUNG: Clear to ausculation bilaterally, no wheezing, no crackles   HEART: Regular rate and rhythm; No murmurs, Tachycardic   ABDOMEN: Soft, Nontender, Nondistended; Bowel sounds present  EXTREMITIES:  , No clubbing, cyanosis, or edema  NERVOUS SYSTEM:  Limited exam. was trying to talk but did not follow any commands.   Psychiatry: AA but agitated.       OBJECTIVE DATA:       Vital Signs Last 24 Hrs  T(C): 36.8 (08 Oct 2020 10:18), Max: 37.3 (07 Oct 2020 23:55)  T(F): 98.2 (08 Oct 2020 10:18), Max: 99.1 (07 Oct 2020 23:55)  HR: 107 (08 Oct 2020 11:27) (71 - 117)  BP: 111/76 (08 Oct 2020 10:18) (98/62 - 120/80)  BP(mean): --  RR: 18 (08 Oct 2020 10:18) (18 - 18)  SpO2: 99% (08 Oct 2020 11:27) (96% - 100%)           Daily     Daily Weight in k.6 (08 Oct 2020 05:10)  LABS:                        9.0    10.95 )-----------( 268      ( 08 Oct 2020 10:00 )             29.2             10-08    138  |  108  |  22  ----------------------------<  98  3.6   |  25  |  0.49<L>    Ca    8.4<L>      08 Oct 2020 10:00  Phos  2.7     10-07  Mg     1.7     10-07    TPro  8.9<H>  /  Alb  2.4<L>  /  TBili  0.9  /  DBili  x   /  AST  36  /  ALT  33  /  AlkPhos  133<H>  10-08                       CAPILLARY BLOOD GLUCOSE      POCT Blood Glucose.: 111 mg/dL (08 Oct 2020 11:25)    MEDICATIONS  (STANDING):  acetaminophen   Tablet .. 650 milliGRAM(s) Oral once  albuterol/ipratropium for Nebulization 3 milliLiter(s) Nebulizer every 6 hours  chlorhexidine 4% Liquid 1 Application(s) Topical <User Schedule>  clonazePAM  Tablet 1.5 milliGRAM(s) Oral two times a day  collagenase Ointment 1 Application(s) Topical daily  dextrose 5%. 1000 milliLiter(s) (50 mL/Hr) IV Continuous <Continuous>  dextrose 50% Injectable 12.5 Gram(s) IV Push once  dextrose 50% Injectable 25 Gram(s) IV Push once  dextrose 50% Injectable 25 Gram(s) IV Push once  enoxaparin Injectable 40 milliGRAM(s) SubCutaneous daily  folic acid 1 milliGRAM(s) Oral daily  insulin lispro (HumaLOG) corrective regimen sliding scale   SubCutaneous every 6 hours  metoprolol tartrate 25 milliGRAM(s) Enteral Tube every 8 hours  multivitamin 1 Tablet(s) Oral daily  pantoprazole   Suspension 40 milliGRAM(s) Oral daily  potassium phosphate / sodium phosphate Powder (PHOS-NaK) 2 Packet(s) Oral every 8 hours  QUEtiapine 300 milliGRAM(s) Oral <User Schedule>  QUEtiapine 150 milliGRAM(s) Oral daily  thiamine Injectable 100 milliGRAM(s) IV Push daily    MEDICATIONS  (PRN):  acetaminophen   Tablet .. 650 milliGRAM(s) Oral every 6 hours PRN Temp greater or equal to 38C (100.4F), Mild Pain (1 - 3)  dextrose 40% Gel 15 Gram(s) Oral once PRN Blood Glucose LESS THAN 70 milliGRAM(s)/deciliter  glucagon  Injectable 1 milliGRAM(s) IntraMuscular once PRN Glucose LESS THAN 70 milligrams/deciliter  LORazepam   Injectable 2 milliGRAM(s) IV Push every 4 hours PRN breakthrough agitation  metoprolol tartrate Injectable 5 milliGRAM(s) IV Push every 6 hours PRN if sbp>160 or HR >110 hold if SBP<100 or HR<60

## 2020-10-08 NOTE — SWALLOW BEDSIDE ASSESSMENT ADULT - SWALLOW EVAL: PATIENT/FAMILY GOALS STATEMENT
"my mouth is so dry, I want to talk to the manager, I want another bed" produced with intermittent phonation on tracheal occlusion pt produced intermittent phonation with tracheal occlusion: "my mouth is so dry, I want to talk to the manager, I want another bed"

## 2020-10-08 NOTE — SWALLOW BEDSIDE ASSESSMENT ADULT - SLP GENERAL OBSERVATIONS
agitated, requesting something to drink, followed few directions related to context, limited use of communication board

## 2020-10-08 NOTE — PROGRESS NOTE ADULT - ATTENDING COMMENTS
Case discussed with Aminata Saab NP. Her notes reviewed and modified where necessary.   Patient seen and examined.  Exam essentially unchanged, though calmer than yesterday.  Per D/W Dr. Deleon some secretions, thought to be related to irritation from tracheostomy rather than on an infectious basis.   Patient will remain at risk for infection indefinitely.    I'll sign off at this time.   Thank you for the courtesy of this referral.    Jonnie Zarate MD  Attending Physician  [facility]  Division of Infectous Diseases  [phone]

## 2020-10-08 NOTE — SWALLOW BEDSIDE ASSESSMENT ADULT - COMMENTS
EtOH pancreatitis p/w abdominal pain 8/17/20, found to have necrotizing pancreatitis c/b septic shock and respiratory failure secondary to PNA and ARDS requiring intubation and DTs requiring sedation and small bowel obstruction conservatively managed with NGT and NPO for which he was admitted to ICU. Currently, s/p trach and PEG on 9/10 and treated for klebsiella bacteremia.   10/7 Neuro note s/p etoh pancreatitis, delerium s/p peg  restless in bed no seizure, for sub acute rehab   10/7 Pulm note: Still with significant trach secretions, so not safe for Trach CAP; Suction as needed; prolonged hospital course with Vent support. Eventually required Tracheostomy and Peg placement; Also with acute metabolic Encephalopathy

## 2020-10-08 NOTE — PROGRESS NOTE BEHAVIORAL HEALTH - SUMMARY
Persistent multifactorial delirium with difficulty weaning off of sedation and IV treatment in ICU with episodes of breakthrough agitation with high tolerance to medications  - collateral from girlfriend: Patient and her have been together for 1.5 years. Pt lives with his elderly father who has physical disability (leg) and was taking care of him. Patient is not working. They went on vacation to [place] and para-sailed few weeks prior to admission. Patient has no formal prior psychiatric history. + had mandibular fracture and has a metal plate in his jaw. + alcohol abuse.

## 2020-10-08 NOTE — PROGRESS NOTE ADULT - ASSESSMENT
Subjective Complaints:  Historian:             Vital Signs Last 24 Hrs  T(C): 36.9 (08 Oct 2020 16:58), Max: 37.3 (07 Oct 2020 23:55)  T(F): 98.5 (08 Oct 2020 16:58), Max: 99.1 (07 Oct 2020 23:55)  HR: 96 (08 Oct 2020 17:28) (90 - 108)  BP: 109/69 (08 Oct 2020 16:58) (98/62 - 111/76)  BP(mean): --  RR: 18 (08 Oct 2020 16:58) (18 - 18)  SpO2: 96% (08 Oct 2020 17:28) (96% - 100%)    GENERAL PHYSICAL EXAM:  General:  Appears stated age, well-groomed, well-nourished, no distress  HEENT:  NC/AT, patent nares w/ pink mucosa, OP clear w/o lesions, PERRL, EOMI, conjunctivae clear, no thyromegaly, nodules, adenopathy, no JVD  Chest:  Full & symmetric excursion, no increased effort, breath sounds clear  Cardiovascular:  Regular rhythm, S1, S2, no murmur/rub/S3/S4, no carotid/femoral/abdominal bruit, radial/pedal pulses 2+, no edema  Abdomen:  Soft, non-tender, non-distended, normoactive bowel sounds, no HSM  Extremities:  Gait & station:   Digits:   Nails:   Joints, Bones, Muscles:   ROM:   Stability:  Skin:  No rash/erythema/ecchymoses/petechiae/wounds/abscess/warm/dry  Musculoskeletal:  Full ROM in all joints w/o swelling/tenderness/effusion        LABS:                        9.0    10.95 )-----------( 268      ( 08 Oct 2020 10:00 )             29.2     10-08    138  |  108  |  22  ----------------------------<  98  3.6   |  25  |  0.49<L>    Ca    8.4<L>      08 Oct 2020 10:00  Phos  2.7     10-07  Mg     1.7     10-07    TPro  8.9<H>  /  Alb  2.4<L>  /  TBili  0.9  /  DBili  x   /  AST  36  /  ALT  33  /  AlkPhos  133<H>  10-08          RADIOLOGY & ADDITIONAL STUDIES:        Neurology Progress Note:      Mental Status: awaek alert speech fluent       Cranial Nerves:defrd      Motor:    restless in bed arm leg moving         Sensory: inatct      Cerebellar: defrd      Gait:defrd      Assesment/Plan:  s/p peg  hx of etoh pancreatitis  restless in bed no seizure s/p trach will folow

## 2020-10-08 NOTE — PROGRESS NOTE BEHAVIORAL HEALTH - NSBHFUPINTERVALHXFT_PSY_A_CORE
Patient is more calm today, awake, alert but does not engage in meaningful communication which is persistent as of now. He is lounging in the bed with legs pulled up and looks comfortable. Patient's girlfriend came yesterday afternoon and reported that Patient and her have been together for 1.5 years, he is living with his elderly father who has physical disability (leg) and took care of him. Patient is not working. They went on vacation to [place] and [facility] few weeks prior to admission. Patient has no formal prior psychiatric history. + had mandibular fracture and has a metal plate in his jaw. + alcohol abuse. Patient did recognize his girlfriend yesterday but is not able to communicate in a meaningful manner and is not mouthing words that seem to make sense. Patient is more calm today, awake, alert and BETTER - able to follow direction (ie. "close your eyes" "lift right hand and make a fist" while mirroring Writer). Waves back to Writer. He is lounging in the bed with legs pulled up and looks comfortable. Patient's girlfriend came yesterday afternoon and reported that Patient and her have been together for 1.5 years, he is living with his elderly father who has physical disability (leg) and took care of him. Patient is not working. They went on vacation to [place] and [facility] few weeks prior to admission. Patient has no formal prior psychiatric history. + had mandibular fracture and has a metal plate in his jaw. + alcohol abuse. Patient did recognize his girlfriend yesterday but is not able to communicate in a meaningful manner and is not mouthing words that seem to make sense.

## 2020-10-08 NOTE — PROGRESS NOTE BEHAVIORAL HEALTH - NSBHCHARTREVIEWLAB_PSY_A_CORE FT
10-07    139  |  107  |  20  ----------------------------<  106<H>  3.7   |  24  |  0.48<L>    Ca    8.5      07 Oct 2020 06:52  Phos  2.7     10-07  Mg     1.7     10-07    TPro  9.1<H>  /  Alb  2.7<L>  /  TBili  1.1  /  DBili  x   /  AST  42<H>  /  ALT  35  /  AlkPhos  125<H>  10-07 10-08    138  |  108  |  22  ----------------------------<  98  3.6   |  25  |  0.49<L>    Ca    8.4<L>      08 Oct 2020 10:00  Phos  2.7     10-07  Mg     1.7     10-07    TPro  8.9<H>  /  Alb  2.4<L>  /  TBili  0.9  /  DBili  x   /  AST  36  /  ALT  33  /  AlkPhos  133<H>  10-08

## 2020-10-09 LAB
ALBUMIN SERPL ELPH-MCNC: 2.6 G/DL — LOW (ref 3.3–5)
ALP SERPL-CCNC: 125 U/L — HIGH (ref 40–120)
ALT FLD-CCNC: 27 U/L — SIGNIFICANT CHANGE UP (ref 12–78)
ANION GAP SERPL CALC-SCNC: 9 MMOL/L — SIGNIFICANT CHANGE UP (ref 5–17)
AST SERPL-CCNC: 30 U/L — SIGNIFICANT CHANGE UP (ref 15–37)
BASOPHILS # BLD AUTO: 0.04 K/UL — SIGNIFICANT CHANGE UP (ref 0–0.2)
BASOPHILS NFR BLD AUTO: 0.4 % — SIGNIFICANT CHANGE UP (ref 0–2)
BILIRUB SERPL-MCNC: 0.9 MG/DL — SIGNIFICANT CHANGE UP (ref 0.2–1.2)
BUN SERPL-MCNC: 22 MG/DL — SIGNIFICANT CHANGE UP (ref 7–23)
CALCIUM SERPL-MCNC: 8.4 MG/DL — LOW (ref 8.5–10.1)
CHLORIDE SERPL-SCNC: 108 MMOL/L — SIGNIFICANT CHANGE UP (ref 96–108)
CO2 SERPL-SCNC: 24 MMOL/L — SIGNIFICANT CHANGE UP (ref 22–31)
CREAT SERPL-MCNC: 0.45 MG/DL — LOW (ref 0.5–1.3)
EOSINOPHIL # BLD AUTO: 0.41 K/UL — SIGNIFICANT CHANGE UP (ref 0–0.5)
EOSINOPHIL NFR BLD AUTO: 3.9 % — SIGNIFICANT CHANGE UP (ref 0–6)
GLUCOSE BLDC GLUCOMTR-MCNC: 103 MG/DL — HIGH (ref 70–99)
GLUCOSE BLDC GLUCOMTR-MCNC: 103 MG/DL — HIGH (ref 70–99)
GLUCOSE BLDC GLUCOMTR-MCNC: 114 MG/DL — HIGH (ref 70–99)
GLUCOSE BLDC GLUCOMTR-MCNC: 115 MG/DL — HIGH (ref 70–99)
GLUCOSE BLDC GLUCOMTR-MCNC: 96 MG/DL — SIGNIFICANT CHANGE UP (ref 70–99)
GLUCOSE SERPL-MCNC: 104 MG/DL — HIGH (ref 70–99)
HCT VFR BLD CALC: 26.5 % — LOW (ref 39–50)
HGB BLD-MCNC: 8.5 G/DL — LOW (ref 13–17)
IMM GRANULOCYTES NFR BLD AUTO: 0.2 % — SIGNIFICANT CHANGE UP (ref 0–1.5)
LACTATE SERPL-SCNC: 0.9 MMOL/L — SIGNIFICANT CHANGE UP (ref 0.7–2)
LIDOCAIN IGE QN: 660 U/L — HIGH (ref 73–393)
LYMPHOCYTES # BLD AUTO: 1.58 K/UL — SIGNIFICANT CHANGE UP (ref 1–3.3)
LYMPHOCYTES # BLD AUTO: 15.1 % — SIGNIFICANT CHANGE UP (ref 13–44)
MAGNESIUM SERPL-MCNC: 1.8 MG/DL — SIGNIFICANT CHANGE UP (ref 1.6–2.6)
MCHC RBC-ENTMCNC: 29.1 PG — SIGNIFICANT CHANGE UP (ref 27–34)
MCHC RBC-ENTMCNC: 32.1 GM/DL — SIGNIFICANT CHANGE UP (ref 32–36)
MCV RBC AUTO: 90.8 FL — SIGNIFICANT CHANGE UP (ref 80–100)
MONOCYTES # BLD AUTO: 0.71 K/UL — SIGNIFICANT CHANGE UP (ref 0–0.9)
MONOCYTES NFR BLD AUTO: 6.8 % — SIGNIFICANT CHANGE UP (ref 2–14)
NEUTROPHILS # BLD AUTO: 7.71 K/UL — HIGH (ref 1.8–7.4)
NEUTROPHILS NFR BLD AUTO: 73.6 % — SIGNIFICANT CHANGE UP (ref 43–77)
NRBC # BLD: 0 /100 WBCS — SIGNIFICANT CHANGE UP (ref 0–0)
PHOSPHATE SERPL-MCNC: 2.5 MG/DL — SIGNIFICANT CHANGE UP (ref 2.5–4.5)
PLATELET # BLD AUTO: 314 K/UL — SIGNIFICANT CHANGE UP (ref 150–400)
POTASSIUM SERPL-MCNC: 3.3 MMOL/L — LOW (ref 3.5–5.3)
POTASSIUM SERPL-SCNC: 3.3 MMOL/L — LOW (ref 3.5–5.3)
PROT SERPL-MCNC: 9 GM/DL — HIGH (ref 6–8.3)
RBC # BLD: 2.92 M/UL — LOW (ref 4.2–5.8)
RBC # FLD: 14.9 % — HIGH (ref 10.3–14.5)
SODIUM SERPL-SCNC: 141 MMOL/L — SIGNIFICANT CHANGE UP (ref 135–145)
WBC # BLD: 10.47 K/UL — SIGNIFICANT CHANGE UP (ref 3.8–10.5)
WBC # FLD AUTO: 10.47 K/UL — SIGNIFICANT CHANGE UP (ref 3.8–10.5)

## 2020-10-09 PROCEDURE — 71045 X-RAY EXAM CHEST 1 VIEW: CPT | Mod: 26

## 2020-10-09 PROCEDURE — 99232 SBSQ HOSP IP/OBS MODERATE 35: CPT

## 2020-10-09 PROCEDURE — 99231 SBSQ HOSP IP/OBS SF/LOW 25: CPT

## 2020-10-09 PROCEDURE — 32555 ASPIRATE PLEURA W/ IMAGING: CPT

## 2020-10-09 RX ORDER — POTASSIUM CHLORIDE 20 MEQ
40 PACKET (EA) ORAL ONCE
Refills: 0 | Status: COMPLETED | OUTPATIENT
Start: 2020-10-09 | End: 2020-10-09

## 2020-10-09 RX ORDER — CEFEPIME 1 G/1
2000 INJECTION, POWDER, FOR SOLUTION INTRAMUSCULAR; INTRAVENOUS EVERY 8 HOURS
Refills: 0 | Status: DISCONTINUED | OUTPATIENT
Start: 2020-10-09 | End: 2020-10-14

## 2020-10-09 RX ORDER — SODIUM CHLORIDE 9 MG/ML
4 INJECTION INTRAMUSCULAR; INTRAVENOUS; SUBCUTANEOUS EVERY 6 HOURS
Refills: 0 | Status: DISCONTINUED | OUTPATIENT
Start: 2020-10-09 | End: 2020-10-10

## 2020-10-09 RX ORDER — SODIUM CHLORIDE 9 MG/ML
10 INJECTION INTRAMUSCULAR; INTRAVENOUS; SUBCUTANEOUS EVERY 6 HOURS
Refills: 0 | Status: DISCONTINUED | OUTPATIENT
Start: 2020-10-09 | End: 2020-10-09

## 2020-10-09 RX ADMIN — QUETIAPINE FUMARATE 150 MILLIGRAM(S): 200 TABLET, FILM COATED ORAL at 11:55

## 2020-10-09 RX ADMIN — Medication 1 MILLIGRAM(S): at 11:56

## 2020-10-09 RX ADMIN — QUETIAPINE FUMARATE 300 MILLIGRAM(S): 200 TABLET, FILM COATED ORAL at 21:33

## 2020-10-09 RX ADMIN — CEFEPIME 100 MILLIGRAM(S): 1 INJECTION, POWDER, FOR SOLUTION INTRAMUSCULAR; INTRAVENOUS at 21:33

## 2020-10-09 RX ADMIN — Medication 1.5 MILLIGRAM(S): at 05:04

## 2020-10-09 RX ADMIN — Medication 2 PACKET(S): at 05:04

## 2020-10-09 RX ADMIN — Medication 3 MILLILITER(S): at 05:59

## 2020-10-09 RX ADMIN — PANTOPRAZOLE SODIUM 40 MILLIGRAM(S): 20 TABLET, DELAYED RELEASE ORAL at 11:55

## 2020-10-09 RX ADMIN — Medication 2 MILLIGRAM(S): at 20:17

## 2020-10-09 RX ADMIN — Medication 2 MILLIGRAM(S): at 14:14

## 2020-10-09 RX ADMIN — Medication 650 MILLIGRAM(S): at 12:19

## 2020-10-09 RX ADMIN — Medication 25 MILLIGRAM(S): at 14:14

## 2020-10-09 RX ADMIN — Medication 2 MILLIGRAM(S): at 03:47

## 2020-10-09 RX ADMIN — Medication 2 MILLIGRAM(S): at 09:41

## 2020-10-09 RX ADMIN — Medication 25 MILLIGRAM(S): at 05:04

## 2020-10-09 RX ADMIN — Medication 1.5 MILLIGRAM(S): at 19:01

## 2020-10-09 RX ADMIN — Medication 1 APPLICATION(S): at 18:57

## 2020-10-09 RX ADMIN — SODIUM CHLORIDE 4 MILLILITER(S): 9 INJECTION INTRAMUSCULAR; INTRAVENOUS; SUBCUTANEOUS at 17:45

## 2020-10-09 RX ADMIN — Medication 650 MILLIGRAM(S): at 19:11

## 2020-10-09 RX ADMIN — Medication 3 MILLILITER(S): at 00:02

## 2020-10-09 RX ADMIN — Medication 1 APPLICATION(S): at 11:58

## 2020-10-09 RX ADMIN — ENOXAPARIN SODIUM 40 MILLIGRAM(S): 100 INJECTION SUBCUTANEOUS at 11:55

## 2020-10-09 RX ADMIN — Medication 25 MILLIGRAM(S): at 21:34

## 2020-10-09 RX ADMIN — Medication 3 MILLILITER(S): at 17:44

## 2020-10-09 RX ADMIN — Medication 3 MILLILITER(S): at 11:08

## 2020-10-09 RX ADMIN — Medication 100 MILLIGRAM(S): at 11:55

## 2020-10-09 RX ADMIN — CHLORHEXIDINE GLUCONATE 1 APPLICATION(S): 213 SOLUTION TOPICAL at 06:18

## 2020-10-09 RX ADMIN — Medication 1 APPLICATION(S): at 05:04

## 2020-10-09 RX ADMIN — Medication 40 MILLIEQUIVALENT(S): at 19:01

## 2020-10-09 RX ADMIN — Medication 1 TABLET(S): at 11:55

## 2020-10-09 NOTE — PROGRESS NOTE BEHAVIORAL HEALTH - NSBHFUPINTERVALHXFT_PSY_A_CORE
MRI of head report still not back from yesterday. Patient s/p Swallow Eval yesterday with recommendation "TRACH TUBE IS ELONGATED AND PRESSES BELOW CHIN AREA WITH DISCOMFORT EVIDENT; MAINTAIN STRICT ORAL CARE FOR DRY MOUTH AS PT IS NPO." This would explain ongoing agitation. Patient wants to eat orally as per note. Also, "pt produced intermittent phonation with tracheal occlusion: "my mouth is so dry, I want to talk to the manager, I want another bed" indicating he is able to verbalize his needs better than manifested w/o tracheal occlusion. MRI of head not performed due to agitation once Patient was moved onto MRI bed. Patient's bed changed out today and mattress doubled for comfort as he kept pointing to it.  Patient s/p Swallow Eval yesterday with recommendation "TRACH TUBE IS ELONGATED AND PRESSES BELOW CHIN AREA WITH DISCOMFORT EVIDENT; MAINTAIN STRICT ORAL CARE FOR DRY MOUTH AS PT IS NPO." This would explain ongoing agitation. Patient wants to eat orally as per note. Also, "pt produced intermittent phonation with tracheal occlusion: "my mouth is so dry, I want to talk to the manager, I want another bed" indicating he is able to verbalize his needs better than manifested w/o tracheal occlusion.

## 2020-10-09 NOTE — PROGRESS NOTE BEHAVIORAL HEALTH - SUMMARY
Persistent multifactorial delirium with difficulty weaning off of sedation and IV treatment in ICU with episodes of breakthrough agitation with high tolerance to medications  - collateral from girlfriend: Patient and her have been together for 1.5 years. Pt lives with his elderly father who has physical disability (leg) and was taking care of him. Patient is not working. They went on vacation to [place] and para-sailed few weeks prior to admission. Patient has no formal prior psychiatric history. + had mandibular fracture and has a metal plate in his jaw. + alcohol abuse.

## 2020-10-09 NOTE — CHART NOTE - NSCHARTNOTEFT_GEN_A_CORE
Informed that patient has fever 102.9,     HPI:   patient 44M EtOH pancreatitis p/w abdominal pain 8/17/20, found to have necrotizing pancreatitis c/b septic shock and respiratory failure secondary to PNA and ARDS requiring intubation and DTs requiring sedation and small bowel obstruction conservatively managed with NGT and NPO for which he was admitted to ICU. Currently, s/p trach and PEG on 9/10 and treated for klebsiella bacteremia.    downgraded to  floor 9/20/20 from ICU.   Patient with increased agitation, increased secretions via Trach, CXR worsening congestion on the right,   discussed with Dr Moody, will add 3% saline inhalation, Chest PT, and repeat CXR in a couple of days.     Patient noted to have fever, will send off complete labs and pan culture, started on cefipime.

## 2020-10-09 NOTE — PROGRESS NOTE BEHAVIORAL HEALTH - NSBHCHARTREVIEWVS_PSY_A_CORE FT
T(C): 36.8 (10-09-20 @ 05:08), Max: 36.9 (10-08-20 @ 16:58)  HR: 92 (10-09-20 @ 06:16) (90 - 108)  BP: 119/52 (10-09-20 @ 05:08) (109/69 - 126/67)  RR: 18 (10-09-20 @ 05:08) (16 - 18)  SpO2: 98% (10-09-20 @ 06:16) (96% - 99%)

## 2020-10-09 NOTE — PROGRESS NOTE BEHAVIORAL HEALTH - NSBHCHARTREVIEWLAB_PSY_A_CORE FT
10-08    138  |  108  |  22  ----------------------------<  98  3.6   |  25  |  0.49<L>    Ca    8.4<L>      08 Oct 2020 10:00    TPro  8.9<H>  /  Alb  2.4<L>  /  TBili  0.9  /  DBili  x   /  AST  36  /  ALT  33  /  AlkPhos  133<H>  10-08

## 2020-10-09 NOTE — PROGRESS NOTE ADULT - ASSESSMENT
44M EtOH pancreatitis p/w abdominal pain 8/17/20, found to have necrotizing pancreatitis c/b septic shock and respiratory failure secondary to PNA and ARDS requiring intubation and DTs requiring sedation and small bowel obstruction conservatively managed with NGT and NPO for which he was admitted to ICU. Currently, s/p trach and PEG on 9/10 and treated for klebsiella bacteremia.    downgraded to  floor 9/20/20 from ICU    Assessment and Plan:      #Pseudomonas PNA   sputum cultures showed Pseudomonas. no fevers   thick secretions. discussed with Dr Moody>>> will get surgeon to downsize the trach tube. Patient will also need shorter length trach tube.   MRSA neg   s/p Cefepime course    #Diarrhea  could be multifactorial, --PEG feeds, abx, pancreatic insufficiency, also On laxative  Off miralax.       #unstageable pressure ulcers x 2 on sacral area ,    -vascular consult appreciated. Follow recs. local wound care.     #s/p necrotizing pancreatitis , s/p septic shock , resolved     -RLE showing no dvt , cellulitis improved  -CT Chest/Abdomen/Pelvis ordered   - Moderate right and small left pleural effusion.  Bilateral patchy airspace disease has improved compared to the prior.  Improving pancreatitis with decreased size of the pancreas as well as improved peripancreatic edema. Small collection adjacent to the undersurface of the tail is also decreased. No evidence of necrosis.  -ID followed.     #Metabolic encephalopathy   AGITATION   hepatic vs. ICU delirium vs sepisis   patient awake , mouthing words to staff . agitated at times    d/angela Precedex since 9/15 in ICU  -diazepam, Methadone and Seroquel - were tapered. Pt was tapered off all meds, however was very agitated (pulling at lines/trying to get out of bed and almost falling off bed), thus diazepam restarted daily, seroquel twice a day, increased dose to 200mg AM and 300mg PM, and ativan changed to Klonopin (as ativan ineffective)  , appreciate psych recs   Psych following.   cont BZDs.   Could not complete MRI brain again yesterday due to agitation despite haldol and ativan       #Acute respiratory failure secondary to PNA/ARDS , copious secretions  s/p TRACH 9/10/20   tolerating trach collar , off ventilator   continue with frequent suctioning/pulmonary toilet    #Ascites , abd is soft.   distended gallbladder and ascites seen on CT, abd US neg for acute cholecystitis   s/p paracentesis 9/22 by IR   ABD US showing worsening ascites, s/p paracentesis by IR 9/22 with 1.5L straw colored fluid removed -cultures did not show any specific organisms  fluid analysis of paracentesis not consistent with SBP.   9/21 KUB shows nonspecific bowel gas pattern without e/o obstruction or ileus   9/21 lactate wnl , procal unremarkable   Blood culture from 9/10 was positive for klebsiella quite sensitive and one set from 9/12 is negative, 9/25 repeat blood cultures negative, sputum cultures from 9/26- showing pseudomonas aeruginosa   SAAG < 1.1 going against portal HTN       #s/p PEG placement 9/10/20    s/p SBO which was treated conservatively   Ileus.      #Hypokalememia-- repleted    #Hypomagnesemia --repleted    #Hypophosphatemia --repleted    #normocytic anemia , probably related to sepsis and acute illness   requiring transfusion on 9/17/20  monitor H/H   follow anemia work-up   transfuse prn of H/H <7/21      hypercalcemia   - 2/2 to immobilization vs vitamin d supplementation although levels are only 25   - s/p pamidronate x 1    Vitamin D insufficiency. cont supplement.     SEVERE DECONDITIONING d/t prolonged hospitalization   PT     #Preventative measures   lovenox SQ-dvt ppx  fall, aspiration precautions

## 2020-10-09 NOTE — PROGRESS NOTE ADULT - SUBJECTIVE AND OBJECTIVE BOX
CHIEF COMPLAINT: Follow up of agitation  No fever no vomiting   Tolerating PEG tube feeding    PHYSICAL EXAM:    GENERAL: Thinly built, Trach and PEG +  HEENT: thick secretions trach site. trach tube putting pressure on chin.   CHEST/LUNG: Clear to ausculation bilaterally, no wheezing, no crackles   HEART: Regular rate and rhythm; No murmur  ABDOMEN: Soft, Nontender, Nondistended; Bowel sounds present  EXTREMITIES:  , No clubbing, cyanosis, or edema  NERVOUS SYSTEM:  Limited exam. was trying to talk but did not follow any commands.   Psychiatry: AA but agitated.       OBJECTIVE DATA:       Vital Signs Last 24 Hrs  T(C): 39.4 (09 Oct 2020 11:00), Max: 39.4 (09 Oct 2020 11:00)  T(F): 102.9 (09 Oct 2020 11:00), Max: 102.9 (09 Oct 2020 11:00)  HR: 98 (09 Oct 2020 11:33) (90 - 104)  BP: 117/80 (09 Oct 2020 11:00) (109/69 - 126/67)  BP(mean): --  RR: 18 (09 Oct 2020 11:00) (16 - 18)  SpO2: 97% (09 Oct 2020 11:33) (96% - 99%)           Daily     Daily   LABS:                        9.0    10.95 )-----------( 268      ( 08 Oct 2020 10:00 )             29.2             10-08    138  |  108  |  22  ----------------------------<  98  3.6   |  25  |  0.49<L>    Ca    8.4<L>      08 Oct 2020 10:00    TPro  8.9<H>  /  Alb  2.4<L>  /  TBili  0.9  /  DBili  x   /  AST  36  /  ALT  33  /  AlkPhos  133<H>  10-08                       CAPILLARY BLOOD GLUCOSE      POCT Blood Glucose.: 96 mg/dL (09 Oct 2020 11:27)      MEDICATIONS  (STANDING):  albuterol/ipratropium for Nebulization 3 milliLiter(s) Nebulizer every 6 hours  BACItracin   Ointment 1 Application(s) Topical two times a day  chlorhexidine 4% Liquid 1 Application(s) Topical <User Schedule>  clonazePAM  Tablet 1.5 milliGRAM(s) Oral two times a day  collagenase Ointment 1 Application(s) Topical daily  dextrose 5%. 1000 milliLiter(s) (50 mL/Hr) IV Continuous <Continuous>  dextrose 50% Injectable 12.5 Gram(s) IV Push once  dextrose 50% Injectable 25 Gram(s) IV Push once  dextrose 50% Injectable 25 Gram(s) IV Push once  enoxaparin Injectable 40 milliGRAM(s) SubCutaneous daily  folic acid 1 milliGRAM(s) Oral daily  insulin lispro (HumaLOG) corrective regimen sliding scale   SubCutaneous every 6 hours  metoprolol tartrate 25 milliGRAM(s) Enteral Tube every 8 hours  multivitamin 1 Tablet(s) Oral daily  pantoprazole   Suspension 40 milliGRAM(s) Oral daily  QUEtiapine 300 milliGRAM(s) Oral <User Schedule>  QUEtiapine 150 milliGRAM(s) Oral daily    MEDICATIONS  (PRN):  acetaminophen   Tablet .. 650 milliGRAM(s) Oral every 6 hours PRN Temp greater or equal to 38C (100.4F), Mild Pain (1 - 3)  dextrose 40% Gel 15 Gram(s) Oral once PRN Blood Glucose LESS THAN 70 milliGRAM(s)/deciliter  glucagon  Injectable 1 milliGRAM(s) IntraMuscular once PRN Glucose LESS THAN 70 milligrams/deciliter  LORazepam   Injectable 2 milliGRAM(s) IV Push every 4 hours PRN breakthrough agitation  metoprolol tartrate Injectable 5 milliGRAM(s) IV Push every 6 hours PRN if sbp>160 or HR >110 hold if SBP<100 or HR<60

## 2020-10-09 NOTE — PROGRESS NOTE BEHAVIORAL HEALTH - NSBHCONSULTMEDS_PSY_A_CORE FT
COURSE OF TX: methadone weaned off as of 9/26/20; increased Seroquel 50mg PO bid to 100mg via PEG BID and increase Valium to 5mg PO bid on 9/28  - continued restlessness; increase Seroquel 100bid to 150 bid on 9/29/10; decrease Seroquel from 200 qd to 150qd on 10/8/20 (trying to find lowest effective dose with ultimate goal is to minimize / maybe even wean off Patient from an antipsychotic)  - PRN Ativan dose increased to 2mg with continued need so DC Valium on 9/30/20 and use Ativan 2mg PO q8am, q4pm but HOLD for sedation with goal to stabilize agitation to the point that Patient will not need any PRNs and will have episodes of being awake  - PO Ativan ineffective; convert to Klonopin equivalent (Ativan 2mg = Klonopin 1mg) and increase to Klonopin 1.5mg PO via PEG BID on 10/6/20

## 2020-10-09 NOTE — PROGRESS NOTE ADULT - SUBJECTIVE AND OBJECTIVE BOX
INTERVAL HPI:  44M with ETOH Abuse and withdrawal, Alcohol Pancreatitis who presents to the ED with severe abdominal pain.  Found to have Necrotizing Pancreatitis, Septic  Shock, Pneumonia/ARDS with Hypoxic Respiratory failure. Admitted to ICU, had prolonged hospital course with Vent support. Eventually required Tracheostomy and Peg placement.  Also with acute metabolic Encephalopathy.  09/20/20:  Transferred to .    Not able to provide details due to encephalopathy.  09/22/20: 1500 ml paracentesis.    OVERNIGHT EVENTS:  Awake and responsive.    Vital Signs Last 24 Hrs  T(C): 39.4 (09 Oct 2020 11:00), Max: 39.4 (09 Oct 2020 11:00)  T(F): 102.9 (09 Oct 2020 11:00), Max: 102.9 (09 Oct 2020 11:00)  HR: 98 (09 Oct 2020 11:33) (90 - 104)  BP: 117/80 (09 Oct 2020 11:00) (109/69 - 126/67)  BP(mean): --  RR: 18 (09 Oct 2020 11:00) (16 - 18)  SpO2: 97% (09 Oct 2020 11:33) (96% - 99%)    PHYSICAL EXAM:  GEN:        responsive and comfortable.  HEENT:     Trach   RESP:       no wheezing.  CVS:           Regular rate and rhythm.   ABD:         Soft, non-tender, non-distended;     MEDICATIONS  (STANDING):  albuterol/ipratropium for Nebulization 3 milliLiter(s) Nebulizer every 6 hours  BACItracin   Ointment 1 Application(s) Topical two times a day  chlorhexidine 4% Liquid 1 Application(s) Topical <User Schedule>  clonazePAM  Tablet 1.5 milliGRAM(s) Oral two times a day  collagenase Ointment 1 Application(s) Topical daily  dextrose 5%. 1000 milliLiter(s) (50 mL/Hr) IV Continuous <Continuous>  dextrose 50% Injectable 12.5 Gram(s) IV Push once  dextrose 50% Injectable 25 Gram(s) IV Push once  dextrose 50% Injectable 25 Gram(s) IV Push once  enoxaparin Injectable 40 milliGRAM(s) SubCutaneous daily  folic acid 1 milliGRAM(s) Oral daily  insulin lispro (HumaLOG) corrective regimen sliding scale   SubCutaneous every 6 hours  metoprolol tartrate 25 milliGRAM(s) Enteral Tube every 8 hours  multivitamin 1 Tablet(s) Oral daily  pantoprazole   Suspension 40 milliGRAM(s) Oral daily  QUEtiapine 300 milliGRAM(s) Oral <User Schedule>  QUEtiapine 150 milliGRAM(s) Oral daily    MEDICATIONS  (PRN):  acetaminophen   Tablet .. 650 milliGRAM(s) Oral every 6 hours PRN Temp greater or equal to 38C (100.4F), Mild Pain (1 - 3)  dextrose 40% Gel 15 Gram(s) Oral once PRN Blood Glucose LESS THAN 70 milliGRAM(s)/deciliter  glucagon  Injectable 1 milliGRAM(s) IntraMuscular once PRN Glucose LESS THAN 70 milligrams/deciliter  LORazepam   Injectable 2 milliGRAM(s) IV Push every 4 hours PRN breakthrough agitation  metoprolol tartrate Injectable 5 milliGRAM(s) IV Push every 6 hours PRN if sbp>160 or HR >110 hold if SBP<100 or HR<60    LABS:                        9.0    10.95 )-----------( 268      ( 08 Oct 2020 10:00 )             29.2     10-08    138  |  108  |  22  ----------------------------<  98  3.6   |  25  |  0.49<L>    Ca    8.4<L>      08 Oct 2020 10:00    TPro  8.9<H>  /  Alb  2.4<L>  /  TBili  0.9  /  DBili  x   /  AST  36  /  ALT  33  /  AlkPhos  133<H>  10-08    ASSESSMENT AND PLAN:  ·	Hypoxic Respiratory failure.  ·	S/P tracheostomy.  ·	Bilateral pneumonia.  ·	Bilateral pleural effusion.  ·	S/P Septic Shock.  ·	Necrotizing Pancreatis.  ·	Acute metabolic Encephalopathy.  ·	Alcohol abuse.  ·	Klebsiella Bacteremia.  ·	Anemia    Was not able to cooperate for brain MRI.  Will ask surgery for trach change to # 6, smaller size.  Obtain CXR as continues to have significant secretions.

## 2020-10-09 NOTE — PROCEDURE NOTE - NSICDXPROCEDURE_GEN_ALL_CORE_FT
PROCEDURES:  Insertion of Sanchez catheter 19-Aug-2020 10:33:59  Keila Kitchen
PROCEDURES:  Change, tracheostomy tube 09-Oct-2020 14:54:45  Bar Adams  Open tracheostomy 11-Sep-2020 00:25:32  Bar Adams  EGD, with PEG 11-Sep-2020 00:25:25  Bar Adams  
PROCEDURES:  Midline catheter insertion 15-Sep-2020 04:18:14  Janay Guillen

## 2020-10-09 NOTE — CHART NOTE - NSCHARTNOTEFT_GEN_A_CORE
patient spiked fever 102.   CXR showed possible right sided infiltrates ? aspiration.   panculture, lactic acid.   iv cefepime.  follow CBC, BMP, electrolytes and procalcitonin  s/p trach downsizing by surgery.   Aspiration precautions.

## 2020-10-09 NOTE — PROGRESS NOTE BEHAVIORAL HEALTH - OTHER
tenuous; has little improvement improved still limited but better tenuous as per notes with slight improvement deferred at this time none mouthing words which do not make sense / unable to lip read looks confused as expected / appropriate to context not able to ascertain at this time due to limitation of exam

## 2020-10-09 NOTE — PROGRESS NOTE ADULT - ASSESSMENT
Subjective Complaints:  Historian:             Vital Signs Last 24 Hrs  T(C): 37.2 (09 Oct 2020 16:32), Max: 39.4 (09 Oct 2020 11:00)  T(F): 99 (09 Oct 2020 16:32), Max: 102.9 (09 Oct 2020 11:00)  HR: 105 (09 Oct 2020 17:50) (92 - 107)  BP: 119/60 (09 Oct 2020 16:32) (117/80 - 126/67)  BP(mean): 80 (09 Oct 2020 16:32) (80 - 80)  RR: 16 (09 Oct 2020 16:32) (16 - 18)  SpO2: 96% (09 Oct 2020 17:50) (96% - 99%)    GENERAL PHYSICAL EXAM:  General:  Appears stated age, well-groomed, well-nourished, no distress  HEENT:  NC/AT, patent nares w/ pink mucosa, OP clear w/o lesions, PERRL, EOMI, conjunctivae clear, no thyromegaly, nodules, adenopathy, no JVD  Chest:  Full & symmetric excursion, no increased effort, breath sounds clear  Cardiovascular:  Regular rhythm, S1, S2, no murmur/rub/S3/S4, no carotid/femoral/abdominal bruit, radial/pedal pulses 2+, no edema  Abdomen:  Soft, non-tender, non-distended, normoactive bowel sounds, no HSM  Extremities:  Gait & station:   Digits:   Nails:   Joints, Bones, Muscles:   ROM:   Stability:  Skin:  No rash/erythema/ecchymoses/petechiae/wounds/abscess/warm/dry  Musculoskeletal:  Full ROM in all joints w/o swelling/tenderness/effusion        LABS:                        8.5    10.47 )-----------( 314      ( 09 Oct 2020 17:18 )             26.5     10-09    141  |  108  |  22  ----------------------------<  104<H>  3.3<L>   |  24  |  0.45<L>    Ca    8.4<L>      09 Oct 2020 17:18  Phos  2.5     10-09  Mg     1.8     10-09    TPro  9.0<H>  /  Alb  2.6<L>  /  TBili  0.9  /  DBili  x   /  AST  30  /  ALT  27  /  AlkPhos  125<H>  10-09          RADIOLOGY & ADDITIONAL STUDIES:        Neurology Progress Note:      Mental Status:  awake open eyes restless in bed no seziure     Cranial Nerves:defrd      Motor:   restless in bed moves arm ,leg     Sensory: intact to pain       Cerebellar: defrd      Gait:defrd      Assesment/Plan: s/p etoh pancreatitis s/p trach and peg restless in bed no seziure on thiamine  for rehab

## 2020-10-10 LAB
APPEARANCE UR: CLEAR — SIGNIFICANT CHANGE UP
BACTERIA # UR AUTO: ABNORMAL
BILIRUB UR-MCNC: ABNORMAL
COLOR SPEC: YELLOW — SIGNIFICANT CHANGE UP
DIFF PNL FLD: NEGATIVE — SIGNIFICANT CHANGE UP
EPI CELLS # UR: SIGNIFICANT CHANGE UP
GLUCOSE BLDC GLUCOMTR-MCNC: 112 MG/DL — HIGH (ref 70–99)
GLUCOSE BLDC GLUCOMTR-MCNC: 116 MG/DL — HIGH (ref 70–99)
GLUCOSE BLDC GLUCOMTR-MCNC: 122 MG/DL — HIGH (ref 70–99)
GLUCOSE BLDC GLUCOMTR-MCNC: 130 MG/DL — HIGH (ref 70–99)
GLUCOSE UR QL: NEGATIVE MG/DL — SIGNIFICANT CHANGE UP
GRAM STN FLD: SIGNIFICANT CHANGE UP
KETONES UR-MCNC: NEGATIVE — SIGNIFICANT CHANGE UP
LEUKOCYTE ESTERASE UR-ACNC: ABNORMAL
NITRITE UR-MCNC: NEGATIVE — SIGNIFICANT CHANGE UP
PH UR: 7 — SIGNIFICANT CHANGE UP (ref 5–8)
PROCALCITONIN SERPL-MCNC: 0.06 NG/ML — SIGNIFICANT CHANGE UP (ref 0.02–0.1)
PROT UR-MCNC: 30 MG/DL
RBC CASTS # UR COMP ASSIST: SIGNIFICANT CHANGE UP /HPF (ref 0–4)
SP GR SPEC: 1.01 — SIGNIFICANT CHANGE UP (ref 1.01–1.02)
SPECIMEN SOURCE: SIGNIFICANT CHANGE UP
UROBILINOGEN FLD QL: 8 MG/DL
WBC UR QL: SIGNIFICANT CHANGE UP

## 2020-10-10 PROCEDURE — 99233 SBSQ HOSP IP/OBS HIGH 50: CPT

## 2020-10-10 RX ORDER — SODIUM CHLORIDE 9 MG/ML
4 INJECTION INTRAMUSCULAR; INTRAVENOUS; SUBCUTANEOUS EVERY 6 HOURS
Refills: 0 | Status: COMPLETED | OUTPATIENT
Start: 2020-10-10 | End: 2020-10-14

## 2020-10-10 RX ORDER — POTASSIUM CHLORIDE 20 MEQ
40 PACKET (EA) ORAL ONCE
Refills: 0 | Status: COMPLETED | OUTPATIENT
Start: 2020-10-10 | End: 2020-10-10

## 2020-10-10 RX ADMIN — PANTOPRAZOLE SODIUM 40 MILLIGRAM(S): 20 TABLET, DELAYED RELEASE ORAL at 12:23

## 2020-10-10 RX ADMIN — Medication 1 APPLICATION(S): at 06:35

## 2020-10-10 RX ADMIN — CHLORHEXIDINE GLUCONATE 1 APPLICATION(S): 213 SOLUTION TOPICAL at 06:21

## 2020-10-10 RX ADMIN — Medication 25 MILLIGRAM(S): at 15:03

## 2020-10-10 RX ADMIN — Medication 650 MILLIGRAM(S): at 06:19

## 2020-10-10 RX ADMIN — Medication 3 MILLILITER(S): at 12:07

## 2020-10-10 RX ADMIN — Medication 40 MILLIEQUIVALENT(S): at 12:20

## 2020-10-10 RX ADMIN — CEFEPIME 100 MILLIGRAM(S): 1 INJECTION, POWDER, FOR SOLUTION INTRAMUSCULAR; INTRAVENOUS at 06:19

## 2020-10-10 RX ADMIN — Medication 2 MILLIGRAM(S): at 11:41

## 2020-10-10 RX ADMIN — Medication 100 MILLIGRAM(S): at 12:21

## 2020-10-10 RX ADMIN — Medication 3 MILLILITER(S): at 00:57

## 2020-10-10 RX ADMIN — Medication 1.5 MILLIGRAM(S): at 05:34

## 2020-10-10 RX ADMIN — Medication 650 MILLIGRAM(S): at 07:20

## 2020-10-10 RX ADMIN — Medication 2 MILLIGRAM(S): at 06:34

## 2020-10-10 RX ADMIN — Medication 2 MILLIGRAM(S): at 01:03

## 2020-10-10 RX ADMIN — CEFEPIME 100 MILLIGRAM(S): 1 INJECTION, POWDER, FOR SOLUTION INTRAMUSCULAR; INTRAVENOUS at 21:35

## 2020-10-10 RX ADMIN — CEFEPIME 100 MILLIGRAM(S): 1 INJECTION, POWDER, FOR SOLUTION INTRAMUSCULAR; INTRAVENOUS at 15:01

## 2020-10-10 RX ADMIN — Medication 1 APPLICATION(S): at 12:20

## 2020-10-10 RX ADMIN — Medication 1 TABLET(S): at 12:21

## 2020-10-10 RX ADMIN — Medication 25 MILLIGRAM(S): at 06:19

## 2020-10-10 RX ADMIN — Medication 1.5 MILLIGRAM(S): at 18:39

## 2020-10-10 RX ADMIN — QUETIAPINE FUMARATE 150 MILLIGRAM(S): 200 TABLET, FILM COATED ORAL at 12:21

## 2020-10-10 RX ADMIN — SODIUM CHLORIDE 4 MILLILITER(S): 9 INJECTION INTRAMUSCULAR; INTRAVENOUS; SUBCUTANEOUS at 12:08

## 2020-10-10 RX ADMIN — Medication 3 MILLILITER(S): at 17:04

## 2020-10-10 RX ADMIN — SODIUM CHLORIDE 4 MILLILITER(S): 9 INJECTION INTRAMUSCULAR; INTRAVENOUS; SUBCUTANEOUS at 17:04

## 2020-10-10 RX ADMIN — QUETIAPINE FUMARATE 300 MILLIGRAM(S): 200 TABLET, FILM COATED ORAL at 21:35

## 2020-10-10 RX ADMIN — Medication 1 MILLIGRAM(S): at 12:21

## 2020-10-10 RX ADMIN — SODIUM CHLORIDE 4 MILLILITER(S): 9 INJECTION INTRAMUSCULAR; INTRAVENOUS; SUBCUTANEOUS at 00:57

## 2020-10-10 RX ADMIN — ENOXAPARIN SODIUM 40 MILLIGRAM(S): 100 INJECTION SUBCUTANEOUS at 12:20

## 2020-10-10 RX ADMIN — SODIUM CHLORIDE 4 MILLILITER(S): 9 INJECTION INTRAMUSCULAR; INTRAVENOUS; SUBCUTANEOUS at 06:10

## 2020-10-10 RX ADMIN — Medication 25 MILLIGRAM(S): at 21:35

## 2020-10-10 RX ADMIN — Medication 1 APPLICATION(S): at 18:39

## 2020-10-10 RX ADMIN — Medication 2 MILLIGRAM(S): at 21:15

## 2020-10-10 RX ADMIN — Medication 3 MILLILITER(S): at 06:09

## 2020-10-10 NOTE — CHART NOTE - NSCHARTNOTEFT_GEN_A_CORE
Assessment: Pt seen for and remains with severe malnutrition. Pt admitted with ETOH induced pancreatitis c N/V. Pt c  delirium, s/p necrotizing pancreatitis and septic shock, acute respiratory failure s/p trache and PEG placement 09/10/20; SLP swallow eval consult 10/08/20 rec NPO & to downsize trache for PO trails & comfort of pt; s/p Trache replacement to a smaller one on 10/09/20. PT tolerating trache and continues to tolerate PEG feedings. No GI distress noted.  Per chart review last BM 10/10; PEG residuals: 0mL.     Factors impacting intake: [x ] none [ ] nausea  [ ] vomiting [ ] diarrhea [ ] constipation  [ ]chewing problems [ ] swallowing issues  [ ] other:     Diet Prescription: Diet, NPO with Tube Feed:   Tube Feeding Modality: Gastrostomy  Vital AF 1.2  Total Volume for 24 Hours (mL): 1440  Continuous  Until Goal Tube Feed Rate (mL per Hour): 60  Tube Feed Duration (in Hours): 24  Tube Feed Start Time: 11:45  No Carb Prosource (1pkg = 15gms Protein)     Qty per Day:  1  Supplement Feeding Modality:  Gastrostomy  Probiotic Yogurt/Smoothie Cans or Servings Per Day:  2       Frequency:  Daily (10-06-20 @ 11:42)    Current TF order provides: 1728 kcal, 108 g of protein, 1166 mL H2O  +Prosource: 60 kcal, 15 g of protein  >100% DRI's     Intake: Held for procedure (exchanging of trache size)   Per Pump History-->   Total Volume Received in 24 hrs: 977 mL  Current rate @: 60mL/hr   Nutrition received in 24 hr: 1172 kcal,  73 grams of protein,  790 mL H2O     Current Weight: 59.2 kg (10/10/2020)    ;   67.2 kg ( 10/03/2020)   % Weight Change: 11% loss (?questionable wts)     Physical appearance: BMI 18.6; no edema noted; Nutrition focused physical exam conducted:  Subcutaneous fat loss: [ severe ] Orbital fat pads region, [ moderate ]Buccal fat region, [ severe ]Triceps region,  [severe  ]Ribs region.  Muscle wasting: [severe  ]Temples region, [severe ]Clavicle region, [severe  ]Shoulder region, [severe ]Scapula region, [ severe]Interosseous region,  [severe ]thigh region, [severe ]Calf region    Pertinent Medications: MEDICATIONS  (STANDING):  albuterol/ipratropium for Nebulization 3 milliLiter(s) Nebulizer every 6 hours  BACItracin   Ointment 1 Application(s) Topical two times a day  cefepime   IVPB 2000 milliGRAM(s) IV Intermittent every 8 hours  chlorhexidine 4% Liquid 1 Application(s) Topical <User Schedule>  clonazePAM  Tablet 1.5 milliGRAM(s) Oral two times a day  collagenase Ointment 1 Application(s) Topical daily  dextrose 5%. 1000 milliLiter(s) (50 mL/Hr) IV Continuous <Continuous>  dextrose 50% Injectable 12.5 Gram(s) IV Push once  dextrose 50% Injectable 25 Gram(s) IV Push once  dextrose 50% Injectable 25 Gram(s) IV Push once  enoxaparin Injectable 40 milliGRAM(s) SubCutaneous daily  folic acid 1 milliGRAM(s) Oral daily  insulin lispro (HumaLOG) corrective regimen sliding scale   SubCutaneous every 6 hours  metoprolol tartrate 25 milliGRAM(s) Enteral Tube every 8 hours  multivitamin 1 Tablet(s) Oral daily  pantoprazole   Suspension 40 milliGRAM(s) Oral daily  potassium chloride   Powder 40 milliEquivalent(s) Enteral Tube once  QUEtiapine 300 milliGRAM(s) Oral <User Schedule>  QUEtiapine 150 milliGRAM(s) Oral daily  sodium chloride 3%  Inhalation 4 milliLiter(s) Inhalation every 6 hours  thiamine 100 milliGRAM(s) Oral daily    MEDICATIONS  (PRN):  acetaminophen   Tablet .. 650 milliGRAM(s) Oral every 6 hours PRN Temp greater or equal to 38C (100.4F), Mild Pain (1 - 3)  dextrose 40% Gel 15 Gram(s) Oral once PRN Blood Glucose LESS THAN 70 milliGRAM(s)/deciliter  glucagon  Injectable 1 milliGRAM(s) IntraMuscular once PRN Glucose LESS THAN 70 milligrams/deciliter  LORazepam   Injectable 2 milliGRAM(s) IV Push every 4 hours PRN breakthrough agitation  metoprolol tartrate Injectable 5 milliGRAM(s) IV Push every 6 hours PRN if sbp>160 or HR >110 hold if SBP<100 or HR<60    Pertinent Labs: 10-09 Na 141 mmol/L Glu 104 mg/dL<H> K+ 3.3 mmol/L<L> Cr 0.45 mg/dL<L> BUN 22 mg/dL Phos 2.5 mg/dL Alb 2.6 g/dL<L> PAB n/a   Hgb 8.5 g/dL<L> Hct 26.5 %<L> HgA1C n/a    Glucose, Serum: 104 mg/dL <H>   24Hr FS:116 mg/dL  130 mg/dL  114 mg/dL  115 mg/dL    Skin: Tracheal area stage II, Sacrum unstageable    Estimated Needs:   [ x] no change since previous assessment (recalculated on 10/06/2020)   [ ] recalculated:     Previous Nutrition Diagnosis:   [x ] Malnutrition; severe malnutrition in context of acute illness   Related to: inadequate protein-energy intake in setting of pancreatitis, respiratory failure, hepatic/metabolic encephalopathy, pressure ulcer     As evidenced by: <50% nutrition needs >5 days, physical findings of moderate muscle loss   NEW (10/10): Severe subq fat loss and muscle wasting as noted on NFPA     Goal: pt to meet >75% of protein-energy needs via enteral feeding; mostly met     Nutrition Diagnosis is [x ] ongoing  [ ] resolved  [ ] improved  [ ] not applicable       New Nutrition Diagnosis: [x ] not applicable       Interventions:   Recommend  [x ] Continue: Current TF order   [ ] Change Diet To:  [ ] Nutrition Supplement:  [ ] Nutrition Support:  [ ] Other:     Monitoring and Evaluation:   [ ] PO intake [ x ] Tolerance to diet prescription [ x ] weights [ x ] labs[ x ] follow up per protocol  [ ] other:

## 2020-10-10 NOTE — PROGRESS NOTE ADULT - SUBJECTIVE AND OBJECTIVE BOX
CHIEF COMPLAINT: Trach tube downsized.   + fever low grade last night  no vomiting  no active gross bleeding  + secretions from trach site     PHYSICAL EXAM:    GENERAL: Thinly built, Trach and PEG +  HEENT: Thick secretions trach site. Trach tube downsized.   CHEST/LUNG: Coarse sounds bilaterally, No wheezing   HEART: Regular rate and rhythm; No murmur. + Tachycardic  ABDOMEN: Soft, Nontender, Nondistended; Bowel sounds present  EXTREMITIES:  , No clubbing, cyanosis, or edema  NERVOUS SYSTEM:  Limited exam. Was trying to talk but did not follow any commands.   Psychiatry: AA but agitated.       OBJECTIVE DATA:       Vital Signs Last 24 Hrs  T(C): 37.6 (10 Oct 2020 09:34), Max: 38 (10 Oct 2020 06:17)  T(F): 99.7 (10 Oct 2020 09:34), Max: 100.4 (10 Oct 2020 06:17)  HR: 112 (10 Oct 2020 06:17) (89 - 112)  BP: 110/73 (10 Oct 2020 06:17) (106/71 - 122/85)  BP(mean): 80 (10 Oct 2020 06:17) (80 - 94)  RR: 18 (10 Oct 2020 06:17) (16 - 25)  SpO2: 100% (10 Oct 2020 06:17) (96% - 100%)           Daily     Daily Weight in k.2 (10 Oct 2020 06:17)  LABS:                        8.5    10.47 )-----------( 314      ( 09 Oct 2020 17:18 )             26.5             10-09    141  |  108  |  22  ----------------------------<  104<H>  3.3<L>   |  24  |  0.45<L>    Ca    8.4<L>      09 Oct 2020 17:18  Phos  2.5     10-09  Mg     1.8     10-09    TPro  9.0<H>  /  Alb  2.6<L>  /  TBili  0.9  /  DBili  x   /  AST  30  /  ALT  27  /  AlkPhos  125<H>  10-09                       CAPILLARY BLOOD GLUCOSE      POCT Blood Glucose.: 130 mg/dL (10 Oct 2020 06:25)          Interval Radiology studies: reviewed     < from: Xray Chest 1 View- PORTABLE-Routine (Xray Chest 1 View- PORTABLE-Routine .) (10.09.20 @ 14:16) >  IMPRESSION:  Right effusion with possible underlying infiltrate.        MEDICATIONS  (STANDING):  albuterol/ipratropium for Nebulization 3 milliLiter(s) Nebulizer every 6 hours  BACItracin   Ointment 1 Application(s) Topical two times a day  cefepime   IVPB 2000 milliGRAM(s) IV Intermittent every 8 hours  chlorhexidine 4% Liquid 1 Application(s) Topical <User Schedule>  clonazePAM  Tablet 1.5 milliGRAM(s) Oral two times a day  collagenase Ointment 1 Application(s) Topical daily  dextrose 5%. 1000 milliLiter(s) (50 mL/Hr) IV Continuous <Continuous>  dextrose 50% Injectable 12.5 Gram(s) IV Push once  dextrose 50% Injectable 25 Gram(s) IV Push once  dextrose 50% Injectable 25 Gram(s) IV Push once  enoxaparin Injectable 40 milliGRAM(s) SubCutaneous daily  folic acid 1 milliGRAM(s) Oral daily  insulin lispro (HumaLOG) corrective regimen sliding scale   SubCutaneous every 6 hours  metoprolol tartrate 25 milliGRAM(s) Enteral Tube every 8 hours  multivitamin 1 Tablet(s) Oral daily  pantoprazole   Suspension 40 milliGRAM(s) Oral daily  potassium chloride   Powder 40 milliEquivalent(s) Enteral Tube once  QUEtiapine 300 milliGRAM(s) Oral <User Schedule>  QUEtiapine 150 milliGRAM(s) Oral daily  sodium chloride 3%  Inhalation 4 milliLiter(s) Inhalation every 6 hours  thiamine 100 milliGRAM(s) Oral daily    MEDICATIONS  (PRN):  acetaminophen   Tablet .. 650 milliGRAM(s) Oral every 6 hours PRN Temp greater or equal to 38C (100.4F), Mild Pain (1 - 3)  dextrose 40% Gel 15 Gram(s) Oral once PRN Blood Glucose LESS THAN 70 milliGRAM(s)/deciliter  glucagon  Injectable 1 milliGRAM(s) IntraMuscular once PRN Glucose LESS THAN 70 milligrams/deciliter  LORazepam   Injectable 2 milliGRAM(s) IV Push every 4 hours PRN breakthrough agitation  metoprolol tartrate Injectable 5 milliGRAM(s) IV Push every 6 hours PRN if sbp>160 or HR >110 hold if SBP<100 or HR<60

## 2020-10-10 NOTE — PROGRESS NOTE ADULT - ASSESSMENT
Subjective Complaints:  Historian:             Vital Signs Last 24 Hrs  T(C): 37.2 (10 Oct 2020 11:25), Max: 38 (10 Oct 2020 06:17)  T(F): 99 (10 Oct 2020 11:25), Max: 100.4 (10 Oct 2020 06:17)  HR: 103 (10 Oct 2020 12:24) (89 - 112)  BP: 120/79 (10 Oct 2020 11:25) (106/71 - 122/85)  BP(mean): 80 (10 Oct 2020 06:17) (80 - 94)  RR: 20 (10 Oct 2020 11:25) (16 - 25)  SpO2: 99% (10 Oct 2020 12:24) (96% - 100%)    GENERAL PHYSICAL EXAM:  General:  Appears stated age, well-groomed, well-nourished, no distress  HEENT:  NC/AT, patent nares w/ pink mucosa, OP clear w/o lesions, PERRL, EOMI, conjunctivae clear, no thyromegaly, nodules, adenopathy, no JVD  Chest:  Full & symmetric excursion, no increased effort, breath sounds clear  Cardiovascular:  Regular rhythm, S1, S2, no murmur/rub/S3/S4, no carotid/femoral/abdominal bruit, radial/pedal pulses 2+, no edema  Abdomen:  Soft, non-tender, non-distended, normoactive bowel sounds, no HSM  Extremities:  Gait & station:   Digits:   Nails:   Joints, Bones, Muscles:   ROM:   Stability:  Skin:  No rash/erythema/ecchymoses/petechiae/wounds/abscess/warm/dry  Musculoskeletal:  Full ROM in all joints w/o swelling/tenderness/effusion        LABS:                        8.5    10.47 )-----------( 314      ( 09 Oct 2020 17:18 )             26.5     10-09    141  |  108  |  22  ----------------------------<  104<H>  3.3<L>   |  24  |  0.45<L>    Ca    8.4<L>      09 Oct 2020 17:18  Phos  2.5     10-09  Mg     1.8     10-09    TPro  9.0<H>  /  Alb  2.6<L>  /  TBili  0.9  /  DBili  x   /  AST  30  /  ALT  27  /  AlkPhos  125<H>  10-09          RADIOLOGY & ADDITIONAL STUDIES:        Neurology Progress Note:      Mental Status: awaek alert   restless in bed open eyes       Cranial Nerves:defrd      Motor:   restless in bed arm leg moving in bed         Sensory:intact      Cerebellar: defrd      Gait:defrd      Assesment/Plan:  s/p pancreatitis delerium s/p tracjh and peg   restless in bed no seizure on thjiamine crutical car neuropathy and myopathy  for rehab

## 2020-10-10 NOTE — PROGRESS NOTE ADULT - ASSESSMENT
44M EtOH pancreatitis p/w abdominal pain 8/17/20, found to have necrotizing pancreatitis c/b septic shock and respiratory failure secondary to PNA and ARDS requiring intubation and DTs requiring sedation and small bowel obstruction conservatively managed with NGT and NPO for which he was admitted to ICU. Currently, s/p trach and PEG on 9/10 and treated for klebsiella bacteremia.    downgraded to  floor 9/20/20 from ICU    Assessment and Plan:      #Aspiration right sided pneumonitis/pneumonia. + Fever and tachycardia. Cont cefepime for now. Aspiration precautions. Trach care. Follow culture results. Follow labs and procalcitonin. Pulm following.     #Diarrhea  could be multifactorial, --PEG feeds, abx, pancreatic insufficiency, also On laxative  Off miralax.       #unstageable pressure ulcers x 2 on sacral area ,    -vascular consult appreciated. Follow recs. local wound care.     #s/p necrotizing pancreatitis , s/p septic shock , resolved     -RLE showing no dvt , cellulitis improved  -CT Chest/Abdomen/Pelvis ordered   - Moderate right and small left pleural effusion.  Bilateral patchy airspace disease has improved compared to the prior.  Improving pancreatitis with decreased size of the pancreas as well as improved peripancreatic edema. Small collection adjacent to the undersurface of the tail is also decreased. No evidence of necrosis.  -ID followed.     #Metabolic encephalopathy   AGITATION   hepatic vs. ICU delirium vs sepisis   patient awake , mouthing words to staff . agitated at times    d/angela Precedex since 9/15 in ICU  -diazepam, Methadone and Seroquel - were tapered. Follow Psych for further med adjustment.   Trach tube has been downsized.       #Acute respiratory failure secondary to PNA/ARDS , copious secretions  s/p TRACH 9/10/20   continue with frequent suctioning/pulmonary toilet    #Ascites , abd is soft.   distended gallbladder and ascites seen on CT, abd US neg for acute cholecystitis   s/p paracentesis 9/22 by IR   ABD US showing worsening ascites, s/p paracentesis by IR 9/22 with 1.5L straw colored fluid removed -cultures did not show any specific organisms  fluid analysis of paracentesis not consistent with SBP.   9/21 KUB shows nonspecific bowel gas pattern without e/o obstruction or ileus   9/21 lactate wnl , procal unremarkable   Blood culture from 9/10 was positive for klebsiella quite sensitive and one set from 9/12 is negative, 9/25 repeat blood cultures negative, sputum cultures from 9/26- showing pseudomonas aeruginosa   SAAG < 1.1 going against portal HTN       #s/p PEG placement 9/10/20    s/p SBO which was treated conservatively   Ileus.      #Hypokalememia-- repleted    #Hypomagnesemia --repleted    #Hypophosphatemia --repleted    #normocytic anemia , probably related to sepsis and acute illness   requiring transfusion on 9/17/20  monitor H/H   follow anemia work-up   transfuse prn of H/H <7/21      hypercalcemia   - 2/2 to immobilization vs vitamin d supplementation although levels are only 25   - s/p pamidronate x 1    Vitamin D insufficiency. cont supplement.     SEVERE DECONDITIONING d/t prolonged hospitalization   PT     #Preventative measures   lovenox SQ-dvt ppx  fall, aspiration precautions

## 2020-10-10 NOTE — PROGRESS NOTE ADULT - SUBJECTIVE AND OBJECTIVE BOX
INTERVAL HPI:  44M with ETOH Abuse and withdrawal, Alcohol Pancreatitis who presents to the ED with severe abdominal pain.  Found to have Necrotizing Pancreatitis, Septic  Shock, Pneumonia/ARDS with Hypoxic Respiratory failure. Admitted to ICU, had prolonged hospital course with Vent support. Eventually required Tracheostomy and Peg placement.  Also with acute metabolic Encephalopathy.  20:  Transferred to .    Not able to provide details due to encephalopathy.  20: 1500 ml paracentesis.    OVERNIGHT EVENTS:  Restlessness persists.    Vital Signs Last 24 Hrs  T(C): 37.2 (10 Oct 2020 11:25), Max: 38 (10 Oct 2020 06:17)  T(F): 99 (10 Oct 2020 11:25), Max: 100.4 (10 Oct 2020 06:17)  HR: 105 (10 Oct 2020 18:17) (89 - 115)  BP: 112/76 (10 Oct 2020 18:17) (106/71 - 122/85)  BP(mean): 80 (10 Oct 2020 06:17) (80 - 94)  RR: 20 (10 Oct 2020 11:25) (18 - 25)  SpO2: 97% (10 Oct 2020 18:17) (97% - 100%)    PHYSICAL EXAM:  GEN:         restless.  HEENT:    trach   RESP:       no distress  CVS:           Regular rate and rhythm.     MEDICATIONS  (STANDING):  albuterol/ipratropium for Nebulization 3 milliLiter(s) Nebulizer every 6 hours  BACItracin   Ointment 1 Application(s) Topical two times a day  cefepime   IVPB 2000 milliGRAM(s) IV Intermittent every 8 hours  chlorhexidine 4% Liquid 1 Application(s) Topical <User Schedule>  clonazePAM  Tablet 1.5 milliGRAM(s) Oral two times a day  collagenase Ointment 1 Application(s) Topical daily  dextrose 5%. 1000 milliLiter(s) (50 mL/Hr) IV Continuous <Continuous>  dextrose 50% Injectable 12.5 Gram(s) IV Push once  dextrose 50% Injectable 25 Gram(s) IV Push once  dextrose 50% Injectable 25 Gram(s) IV Push once  enoxaparin Injectable 40 milliGRAM(s) SubCutaneous daily  folic acid 1 milliGRAM(s) Oral daily  insulin lispro (HumaLOG) corrective regimen sliding scale   SubCutaneous every 6 hours  metoprolol tartrate 25 milliGRAM(s) Enteral Tube every 8 hours  multivitamin 1 Tablet(s) Oral daily  pantoprazole   Suspension 40 milliGRAM(s) Oral daily  QUEtiapine 300 milliGRAM(s) Oral <User Schedule>  QUEtiapine 150 milliGRAM(s) Oral daily  sodium chloride 3%  Inhalation 4 milliLiter(s) Inhalation every 6 hours  thiamine 100 milliGRAM(s) Oral daily    MEDICATIONS  (PRN):  acetaminophen   Tablet .. 650 milliGRAM(s) Oral every 6 hours PRN Temp greater or equal to 38C (100.4F), Mild Pain (1 - 3)  dextrose 40% Gel 15 Gram(s) Oral once PRN Blood Glucose LESS THAN 70 milliGRAM(s)/deciliter  glucagon  Injectable 1 milliGRAM(s) IntraMuscular once PRN Glucose LESS THAN 70 milligrams/deciliter  LORazepam   Injectable 2 milliGRAM(s) IV Push every 4 hours PRN breakthrough agitation  metoprolol tartrate Injectable 5 milliGRAM(s) IV Push every 6 hours PRN if sbp>160 or HR >110 hold if SBP<100 or HR<60    LABS:                        8.5    10.47 )-----------( 314      ( 09 Oct 2020 17:18 )             26.5     10-09    141  |  108  |  22  ----------------------------<  104<H>  3.3<L>   |  24  |  0.45<L>    Ca    8.4<L>      09 Oct 2020 17:18  Phos  2.5     10-09  Mg     1.8     10-09    TPro  9.0<H>  /  Alb  2.6<L>  /  TBili  0.9  /  DBili  x   /  AST  30  /  ALT  27  /  AlkPhos  125<H>  10-09    Urinalysis Basic - ( 10 Oct 2020 15:11 )    Color: Yellow / Appearance: Clear / S.010 / pH: x  Gluc: x / Ketone: Negative  / Bili: Small / Urobili: 8 mg/dL   Blood: x / Protein: 30 mg/dL / Nitrite: Negative   Leuk Esterase: Trace / RBC: 0-2 /HPF / WBC 0-2   Sq Epi: x / Non Sq Epi: Few / Bacteria: Few    ASSESSMENT AND PLAN:  ·	Hypoxic Respiratory failure.  ·	S/P tracheostomy.  ·	Bilateral pneumonia.  ·	Worsening right pleural effusion.  ·	S/P Septic Shock.  ·	Necrotizing Pancreatis.  ·	Acute metabolic Encephalopathy.  ·	Alcohol abuse.  ·	Klebsiella Bacteremia.  ·	Anemia  ·	    Tracheostomy down size noted.  Will continue 3% saline nebulization, repeat CXR in am.  Likely will need thoracentesis.

## 2020-10-11 LAB
ANION GAP SERPL CALC-SCNC: 7 MMOL/L — SIGNIFICANT CHANGE UP (ref 5–17)
BUN SERPL-MCNC: 18 MG/DL — SIGNIFICANT CHANGE UP (ref 7–23)
CALCIUM SERPL-MCNC: 9.2 MG/DL — SIGNIFICANT CHANGE UP (ref 8.5–10.1)
CHLORIDE SERPL-SCNC: 105 MMOL/L — SIGNIFICANT CHANGE UP (ref 96–108)
CO2 SERPL-SCNC: 24 MMOL/L — SIGNIFICANT CHANGE UP (ref 22–31)
CREAT SERPL-MCNC: 0.49 MG/DL — LOW (ref 0.5–1.3)
CULTURE RESULTS: SIGNIFICANT CHANGE UP
GLUCOSE BLDC GLUCOMTR-MCNC: 100 MG/DL — HIGH (ref 70–99)
GLUCOSE BLDC GLUCOMTR-MCNC: 107 MG/DL — HIGH (ref 70–99)
GLUCOSE BLDC GLUCOMTR-MCNC: 117 MG/DL — HIGH (ref 70–99)
GLUCOSE BLDC GLUCOMTR-MCNC: 97 MG/DL — SIGNIFICANT CHANGE UP (ref 70–99)
GLUCOSE SERPL-MCNC: 106 MG/DL — HIGH (ref 70–99)
POTASSIUM SERPL-MCNC: 4.3 MMOL/L — SIGNIFICANT CHANGE UP (ref 3.5–5.3)
POTASSIUM SERPL-SCNC: 4.3 MMOL/L — SIGNIFICANT CHANGE UP (ref 3.5–5.3)
PROCALCITONIN SERPL-MCNC: 0.07 NG/ML — SIGNIFICANT CHANGE UP (ref 0.02–0.1)
SODIUM SERPL-SCNC: 136 MMOL/L — SIGNIFICANT CHANGE UP (ref 135–145)
SPECIMEN SOURCE: SIGNIFICANT CHANGE UP

## 2020-10-11 PROCEDURE — 93010 ELECTROCARDIOGRAM REPORT: CPT

## 2020-10-11 PROCEDURE — 99232 SBSQ HOSP IP/OBS MODERATE 35: CPT

## 2020-10-11 RX ORDER — HALOPERIDOL DECANOATE 100 MG/ML
2.5 INJECTION INTRAMUSCULAR ONCE
Refills: 0 | Status: COMPLETED | OUTPATIENT
Start: 2020-10-11 | End: 2020-10-11

## 2020-10-11 RX ORDER — DIPHENHYDRAMINE HCL 50 MG
50 CAPSULE ORAL ONCE
Refills: 0 | Status: COMPLETED | OUTPATIENT
Start: 2020-10-11 | End: 2020-10-11

## 2020-10-11 RX ADMIN — ENOXAPARIN SODIUM 40 MILLIGRAM(S): 100 INJECTION SUBCUTANEOUS at 11:51

## 2020-10-11 RX ADMIN — Medication 3 MILLILITER(S): at 12:13

## 2020-10-11 RX ADMIN — HALOPERIDOL DECANOATE 2.5 MILLIGRAM(S): 100 INJECTION INTRAMUSCULAR at 14:39

## 2020-10-11 RX ADMIN — Medication 25 MILLIGRAM(S): at 05:56

## 2020-10-11 RX ADMIN — PANTOPRAZOLE SODIUM 40 MILLIGRAM(S): 20 TABLET, DELAYED RELEASE ORAL at 11:50

## 2020-10-11 RX ADMIN — CEFEPIME 100 MILLIGRAM(S): 1 INJECTION, POWDER, FOR SOLUTION INTRAMUSCULAR; INTRAVENOUS at 05:17

## 2020-10-11 RX ADMIN — SODIUM CHLORIDE 4 MILLILITER(S): 9 INJECTION INTRAMUSCULAR; INTRAVENOUS; SUBCUTANEOUS at 12:14

## 2020-10-11 RX ADMIN — Medication 2 MILLIGRAM(S): at 15:57

## 2020-10-11 RX ADMIN — Medication 2 MILLIGRAM(S): at 13:45

## 2020-10-11 RX ADMIN — Medication 2 MILLIGRAM(S): at 04:13

## 2020-10-11 RX ADMIN — Medication 25 MILLIGRAM(S): at 21:49

## 2020-10-11 RX ADMIN — CEFEPIME 100 MILLIGRAM(S): 1 INJECTION, POWDER, FOR SOLUTION INTRAMUSCULAR; INTRAVENOUS at 21:28

## 2020-10-11 RX ADMIN — Medication 3 MILLILITER(S): at 17:00

## 2020-10-11 RX ADMIN — Medication 1 APPLICATION(S): at 11:49

## 2020-10-11 RX ADMIN — Medication 3 MILLILITER(S): at 05:03

## 2020-10-11 RX ADMIN — Medication 3 MILLILITER(S): at 00:40

## 2020-10-11 RX ADMIN — Medication 25 MILLIGRAM(S): at 13:45

## 2020-10-11 RX ADMIN — SODIUM CHLORIDE 4 MILLILITER(S): 9 INJECTION INTRAMUSCULAR; INTRAVENOUS; SUBCUTANEOUS at 00:41

## 2020-10-11 RX ADMIN — SODIUM CHLORIDE 4 MILLILITER(S): 9 INJECTION INTRAMUSCULAR; INTRAVENOUS; SUBCUTANEOUS at 05:03

## 2020-10-11 RX ADMIN — Medication 1 APPLICATION(S): at 17:17

## 2020-10-11 RX ADMIN — Medication 1.5 MILLIGRAM(S): at 17:17

## 2020-10-11 RX ADMIN — QUETIAPINE FUMARATE 300 MILLIGRAM(S): 200 TABLET, FILM COATED ORAL at 21:49

## 2020-10-11 RX ADMIN — Medication 2 MILLIGRAM(S): at 08:18

## 2020-10-11 RX ADMIN — Medication 50 MILLIGRAM(S): at 15:12

## 2020-10-11 RX ADMIN — Medication 1.5 MILLIGRAM(S): at 05:56

## 2020-10-11 RX ADMIN — QUETIAPINE FUMARATE 150 MILLIGRAM(S): 200 TABLET, FILM COATED ORAL at 11:50

## 2020-10-11 RX ADMIN — Medication 2 MILLIGRAM(S): at 21:52

## 2020-10-11 RX ADMIN — CEFEPIME 100 MILLIGRAM(S): 1 INJECTION, POWDER, FOR SOLUTION INTRAMUSCULAR; INTRAVENOUS at 13:45

## 2020-10-11 RX ADMIN — Medication 1 MILLIGRAM(S): at 11:50

## 2020-10-11 RX ADMIN — Medication 1 APPLICATION(S): at 05:55

## 2020-10-11 RX ADMIN — CHLORHEXIDINE GLUCONATE 1 APPLICATION(S): 213 SOLUTION TOPICAL at 05:55

## 2020-10-11 RX ADMIN — Medication 1 TABLET(S): at 11:50

## 2020-10-11 RX ADMIN — Medication 100 MILLIGRAM(S): at 11:50

## 2020-10-11 RX ADMIN — SODIUM CHLORIDE 4 MILLILITER(S): 9 INJECTION INTRAMUSCULAR; INTRAVENOUS; SUBCUTANEOUS at 17:01

## 2020-10-11 RX ADMIN — HALOPERIDOL DECANOATE 2.5 MILLIGRAM(S): 100 INJECTION INTRAMUSCULAR at 18:33

## 2020-10-11 NOTE — PROGRESS NOTE ADULT - SUBJECTIVE AND OBJECTIVE BOX
INTERVAL HPI:   44M with ETOH Abuse and withdrawal, Alcohol Pancreatitis who presents to the ED with severe abdominal pain.  Found to have Necrotizing Pancreatitis, Septic  Shock, Pneumonia/ARDS with Hypoxic Respiratory failure. Admitted to ICU, had prolonged hospital course with Vent support. Eventually required Tracheostomy and Peg placement.  Also with acute metabolic Encephalopathy.  20:  Transferred to .    Not able to provide details due to encephalopathy.  20: 1500 ml paracentesis.    OVERNIGHT EVENTS:  Remains restless    Vital Signs Last 24 Hrs  T(C): 36.7 (11 Oct 2020 16:11), Max: 37.2 (11 Oct 2020 01:03)  T(F): 98 (11 Oct 2020 16:11), Max: 99 (11 Oct 2020 01:03)  HR: 116 (11 Oct 2020 18:18) (91 - 116)  BP: 114/70 (11 Oct 2020 18:18) (114/70 - 123/72)  BP(mean): --  RR: 20 (11 Oct 2020 10:48) (19 - 21)  SpO2: 99% (11 Oct 2020 18:18) (98% - 100%)    PHYSICAL EXAM:  GEN:         Awake, responsive,  uncomfortable.  HEENT:     Trach   RESP:       no distress  CVS:          Regular rate and rhythm.   ABD:         Soft, non-tender, non-distended;     MEDICATIONS  (STANDING):  albuterol/ipratropium for Nebulization 3 milliLiter(s) Nebulizer every 6 hours  BACItracin   Ointment 1 Application(s) Topical two times a day  cefepime   IVPB 2000 milliGRAM(s) IV Intermittent every 8 hours  chlorhexidine 4% Liquid 1 Application(s) Topical <User Schedule>  clonazePAM  Tablet 1.5 milliGRAM(s) Oral two times a day  collagenase Ointment 1 Application(s) Topical daily  dextrose 5%. 1000 milliLiter(s) (50 mL/Hr) IV Continuous <Continuous>  dextrose 50% Injectable 12.5 Gram(s) IV Push once  dextrose 50% Injectable 25 Gram(s) IV Push once  dextrose 50% Injectable 25 Gram(s) IV Push once  enoxaparin Injectable 40 milliGRAM(s) SubCutaneous daily  folic acid 1 milliGRAM(s) Oral daily  insulin lispro (HumaLOG) corrective regimen sliding scale   SubCutaneous every 6 hours  metoprolol tartrate 25 milliGRAM(s) Enteral Tube every 8 hours  multivitamin 1 Tablet(s) Oral daily  pantoprazole   Suspension 40 milliGRAM(s) Oral daily  QUEtiapine 300 milliGRAM(s) Oral <User Schedule>  QUEtiapine 150 milliGRAM(s) Oral daily  sodium chloride 3%  Inhalation 4 milliLiter(s) Inhalation every 6 hours  thiamine 100 milliGRAM(s) Oral daily    MEDICATIONS  (PRN):  acetaminophen   Tablet .. 650 milliGRAM(s) Oral every 6 hours PRN Temp greater or equal to 38C (100.4F), Mild Pain (1 - 3)  dextrose 40% Gel 15 Gram(s) Oral once PRN Blood Glucose LESS THAN 70 milliGRAM(s)/deciliter  glucagon  Injectable 1 milliGRAM(s) IntraMuscular once PRN Glucose LESS THAN 70 milligrams/deciliter  LORazepam   Injectable 2 milliGRAM(s) IV Push every 4 hours PRN breakthrough agitation  metoprolol tartrate Injectable 5 milliGRAM(s) IV Push every 6 hours PRN if sbp>160 or HR >110 hold if SBP<100 or HR<60    LABS:    10-11    136  |  105  |  18  ----------------------------<  106<H>  4.3   |  24  |  0.49<L>    Ca    9.2      11 Oct 2020 07:13    Urinalysis Basic - ( 10 Oct 2020 15:11 )    Color: Yellow / Appearance: Clear / S.010 / pH: x  Gluc: x / Ketone: Negative  / Bili: Small / Urobili: 8 mg/dL   Blood: x / Protein: 30 mg/dL / Nitrite: Negative   Leuk Esterase: Trace / RBC: 0-2 /HPF / WBC 0-2   Sq Epi: x / Non Sq Epi: Few / Bacteria: Few    ASSESSMENT AND PLAN:  ·	Hypoxic Respiratory failure.  ·	S/P tracheostomy.  ·	Bilateral pneumonia.  ·	Worsening right pleural effusion.  ·	S/P Septic Shock.  ·	Necrotizing Pancreatis.  ·	Acute metabolic Encephalopathy.  ·	Alcohol abuse.  ·	Klebsiella Bacteremia.  ·	Anemia    CXR from today is not available.  Large right pleural effusion, will ask for thoracentesis.  Continue nebulizer and antibiotics,.

## 2020-10-11 NOTE — PROGRESS NOTE ADULT - SUBJECTIVE AND OBJECTIVE BOX
CHIEF COMPLAINT: Trach tube downsized.   + fever low grade off and on. + agitation   no vomiting  no active gross bleeding      PHYSICAL EXAM:    GENERAL: Thinly built, Trach and PEG +  HEENT: Thick secretions trach site. Trach tube downsized.   CHEST/LUNG: Coarse sounds bilaterally, No wheezing   HEART: Regular rate and rhythm; No murmur. + Tachycardic  ABDOMEN: Soft, Nontender, Nondistended; Bowel sounds present  EXTREMITIES:  , No clubbing, cyanosis, or edema  NERVOUS SYSTEM:  Limited exam. Was trying to talk but did not follow any commands.   Psychiatry: AA but agitated.       OBJECTIVE DATA:       Vital Signs Last 24 Hrs  T(C): 37 (11 Oct 2020 10:48), Max: 37.2 (10 Oct 2020 11:25)  T(F): 98.6 (11 Oct 2020 10:48), Max: 99 (10 Oct 2020 11:25)  HR: 104 (11 Oct 2020 10:48) (103 - 115)  BP: 120/84 (11 Oct 2020 10:48) (111/80 - 123/72)  BP(mean): --  RR: 20 (11 Oct 2020 10:48) (19 - 21)  SpO2: 98% (11 Oct 2020 10:48) (97% - 100%)           Daily     Daily Weight in k.2 (11 Oct 2020 05:34)  LABS:                        8.5    10.47 )-----------( 314      ( 09 Oct 2020 17:18 )             26.5             10-11    136  |  105  |  18  ----------------------------<  106<H>  4.3   |  24  |  0.49<L>    Ca    9.2      11 Oct 2020 07:13  Phos  2.5     10  Mg     1.8     10-09    TPro  9.0<H>  /  Alb  2.6<L>  /  TBili  0.9  /  DBili  x   /  AST  30  /  ALT  27  /  AlkPhos  125<H>  10-09                Urinalysis Basic - ( 10 Oct 2020 15:11 )    Color: Yellow / Appearance: Clear / S.010 / pH: x  Gluc: x / Ketone: Negative  / Bili: Small / Urobili: 8 mg/dL   Blood: x / Protein: 30 mg/dL / Nitrite: Negative   Leuk Esterase: Trace / RBC: 0-2 /HPF / WBC 0-2   Sq Epi: x / Non Sq Epi: Few / Bacteria: Few           CAPILLARY BLOOD GLUCOSE      POCT Blood Glucose.: 100 mg/dL (11 Oct 2020 05:22)      Culture - Sputum (collected 10-10)  Source: .Sputum Sputum  Gram Stain (10-10):    Moderate Squamous epithelial cells per low power field    Moderate polymorphonuclear leukocytes per low power field    Moderate Gram Variable Rods per oil power field    Culture - Blood (collected 10-10)  Source: .Blood Blood-Peripheral  Preliminary Report (10-11):    No growth to date.    Culture - Blood (collected 10-10)  Source: .Blood Blood-Peripheral  Preliminary Report (10-11):    No growth to date.      MEDICATIONS  (STANDING):  albuterol/ipratropium for Nebulization 3 milliLiter(s) Nebulizer every 6 hours  BACItracin   Ointment 1 Application(s) Topical two times a day  cefepime   IVPB 2000 milliGRAM(s) IV Intermittent every 8 hours  chlorhexidine 4% Liquid 1 Application(s) Topical <User Schedule>  clonazePAM  Tablet 1.5 milliGRAM(s) Oral two times a day  collagenase Ointment 1 Application(s) Topical daily  dextrose 5%. 1000 milliLiter(s) (50 mL/Hr) IV Continuous <Continuous>  dextrose 50% Injectable 12.5 Gram(s) IV Push once  dextrose 50% Injectable 25 Gram(s) IV Push once  dextrose 50% Injectable 25 Gram(s) IV Push once  enoxaparin Injectable 40 milliGRAM(s) SubCutaneous daily  folic acid 1 milliGRAM(s) Oral daily  insulin lispro (HumaLOG) corrective regimen sliding scale   SubCutaneous every 6 hours  metoprolol tartrate 25 milliGRAM(s) Enteral Tube every 8 hours  multivitamin 1 Tablet(s) Oral daily  pantoprazole   Suspension 40 milliGRAM(s) Oral daily  QUEtiapine 300 milliGRAM(s) Oral <User Schedule>  QUEtiapine 150 milliGRAM(s) Oral daily  sodium chloride 3%  Inhalation 4 milliLiter(s) Inhalation every 6 hours  thiamine 100 milliGRAM(s) Oral daily    MEDICATIONS  (PRN):  acetaminophen   Tablet .. 650 milliGRAM(s) Oral every 6 hours PRN Temp greater or equal to 38C (100.4F), Mild Pain (1 - 3)  dextrose 40% Gel 15 Gram(s) Oral once PRN Blood Glucose LESS THAN 70 milliGRAM(s)/deciliter  glucagon  Injectable 1 milliGRAM(s) IntraMuscular once PRN Glucose LESS THAN 70 milligrams/deciliter  LORazepam   Injectable 2 milliGRAM(s) IV Push every 4 hours PRN breakthrough agitation  metoprolol tartrate Injectable 5 milliGRAM(s) IV Push every 6 hours PRN if sbp>160 or HR >110 hold if SBP<100 or HR<60

## 2020-10-11 NOTE — PROGRESS NOTE ADULT - ASSESSMENT
44M EtOH pancreatitis p/w abdominal pain 8/17/20, found to have necrotizing pancreatitis c/b septic shock and respiratory failure secondary to PNA and ARDS requiring intubation and DTs requiring sedation and small bowel obstruction conservatively managed with NGT and NPO for which he was admitted to ICU. Currently, s/p trach and PEG on 9/10 and treated for klebsiella bacteremia.    downgraded to  floor 9/20/20 from ICU    Assessment and Plan:      #Aspiration right sided pneumonitis/pneumonia. + Fever and tachycardia. Cont cefepime for now. Aspiration precautions. Trach care. Follow culture results. Pending procalcitonin. Follow ID recs again.     #Diarrhea  could be multifactorial, --PEG feeds, abx, pancreatic insufficiency, also On laxative  Off miralax.       #unstageable pressure ulcers x 2 on sacral area ,    -vascular consult appreciated. Follow recs. local wound care.     #s/p necrotizing pancreatitis , s/p septic shock , resolved     -RLE showing no dvt , cellulitis improved  -CT Chest/Abdomen/Pelvis ordered   - Moderate right and small left pleural effusion.  Bilateral patchy airspace disease has improved compared to the prior.  Improving pancreatitis with decreased size of the pancreas as well as improved peripancreatic edema. Small collection adjacent to the undersurface of the tail is also decreased. No evidence of necrosis.  -ID followed.     #Metabolic encephalopathy   AGITATION   hepatic vs. ICU delirium vs sepisis   patient awake , mouthing words to staff . agitated at times    d/angela Precedex since 9/15 in ICU  -diazepam, Methadone and Seroquel - were tapered. Follow Psych for further med adjustment.   Trach tube has been downsized. repeat swallow eval if possible.       #Acute respiratory failure secondary to PNA/ARDS , copious secretions  s/p TRACH 9/10/20   continue with frequent suctioning/pulmonary toilet    #Ascites , abd is soft.   distended gallbladder and ascites seen on CT, abd US neg for acute cholecystitis   s/p paracentesis 9/22 by IR   ABD US showing worsening ascites, s/p paracentesis by IR 9/22 with 1.5L straw colored fluid removed -cultures did not show any specific organisms  fluid analysis of paracentesis not consistent with SBP.   9/21 KUB shows nonspecific bowel gas pattern without e/o obstruction or ileus   9/21 lactate wnl , procal unremarkable   Blood culture from 9/10 was positive for klebsiella quite sensitive and one set from 9/12 is negative, 9/25 repeat blood cultures negative, sputum cultures from 9/26- showing pseudomonas aeruginosa   SAAG < 1.1 going against portal HTN       #s/p PEG placement 9/10/20    s/p SBO which was treated conservatively   Ileus.      #Hypokalememia-- repleted    #Hypomagnesemia --repleted    #Hypophosphatemia --repleted    #normocytic anemia , probably related to sepsis and acute illness   requiring transfusion on 9/17/20  follow anemia work-up   transfuse prn of H/H <7/21      hypercalcemia   - 2/2 to immobilization vs vitamin d supplementation although levels are only 25   - s/p pamidronate x 1    Vitamin D insufficiency. cont supplement.     SEVERE DECONDITIONING d/t prolonged hospitalization   PT     #Preventative measures   lovenox SQ-dvt ppx  fall, aspiration precautions  Discussed with nursing staff.

## 2020-10-11 NOTE — PROGRESS NOTE ADULT - ASSESSMENT
Subjective Complaints:  Historian:             Vital Signs Last 24 Hrs  T(C): 36.7 (11 Oct 2020 16:11), Max: 37.2 (11 Oct 2020 01:03)  T(F): 98 (11 Oct 2020 16:11), Max: 99 (11 Oct 2020 01:03)  HR: 111 (11 Oct 2020 16:11) (104 - 115)  BP: 116/82 (11 Oct 2020 16:11) (112/76 - 123/72)  BP(mean): --  RR: 20 (11 Oct 2020 10:48) (19 - 21)  SpO2: 100% (11 Oct 2020 16:11) (97% - 100%)    GENERAL PHYSICAL EXAM:  General:  Appears stated age, well-groomed, well-nourished, no distress  HEENT:  NC/AT, patent nares w/ pink mucosa, OP clear w/o lesions, PERRL, EOMI, conjunctivae clear, no thyromegaly, nodules, adenopathy, no JVD  Chest:  Full & symmetric excursion, no increased effort, breath sounds clear  Cardiovascular:  Regular rhythm, S1, S2, no murmur/rub/S3/S4, no carotid/femoral/abdominal bruit, radial/pedal pulses 2+, no edema  Abdomen:  Soft, non-tender, non-distended, normoactive bowel sounds, no HSM  Extremities:  Gait & station:   Digits:   Nails:   Joints, Bones, Muscles:   ROM:   Stability:  Skin:  No rash/erythema/ecchymoses/petechiae/wounds/abscess/warm/dry  Musculoskeletal:  Full ROM in all joints w/o swelling/tenderness/effusion        LABS:                        8.5    10.47 )-----------( 314      ( 09 Oct 2020 17:18 )             26.5     10-11    136  |  105  |  18  ----------------------------<  106<H>  4.3   |  24  |  0.49<L>    Ca    9.2      11 Oct 2020 07:13  Phos  2.5     10-09  Mg     1.8     10-09    TPro  9.0<H>  /  Alb  2.6<L>  /  TBili  0.9  /  DBili  x   /  AST  30  /  ALT  27  /  AlkPhos  125<H>  10-09      Urinalysis Basic - ( 10 Oct 2020 15:11 )    Color: Yellow / Appearance: Clear / S.010 / pH: x  Gluc: x / Ketone: Negative  / Bili: Small / Urobili: 8 mg/dL   Blood: x / Protein: 30 mg/dL / Nitrite: Negative   Leuk Esterase: Trace / RBC: 0-2 /HPF / WBC 0-2   Sq Epi: x / Non Sq Epi: Few / Bacteria: Few        RADIOLOGY & ADDITIONAL STUDIES:        Neurology Progress Note:      Mental Status:  awaek open eyes restles in bed       Cranial Nerves:defrd      Motor:  arm leg 3/5 restless in bed         Sensory:intact      Cerebellar: defrd      Gait:defrd      Assesment/Plan:  s/p etoh pancreatitis restless in bed s/p  traCH  AND PEG CRITICAL  CARE NEUROPATHY and myopathy on ativan  and haldol delerium  no seizure

## 2020-10-12 ENCOUNTER — RESULT REVIEW (OUTPATIENT)
Age: 45
End: 2020-10-12

## 2020-10-12 LAB
-  AMIKACIN: SIGNIFICANT CHANGE UP
-  AZTREONAM: SIGNIFICANT CHANGE UP
-  CEFEPIME: SIGNIFICANT CHANGE UP
-  CEFTAZIDIME: SIGNIFICANT CHANGE UP
-  CIPROFLOXACIN: SIGNIFICANT CHANGE UP
-  GENTAMICIN: SIGNIFICANT CHANGE UP
-  IMIPENEM: SIGNIFICANT CHANGE UP
-  LEVOFLOXACIN: SIGNIFICANT CHANGE UP
-  MEROPENEM: SIGNIFICANT CHANGE UP
-  PIPERACILLIN/TAZOBACTAM: SIGNIFICANT CHANGE UP
-  TOBRAMYCIN: SIGNIFICANT CHANGE UP
CULTURE RESULTS: SIGNIFICANT CHANGE UP
GLUCOSE BLDC GLUCOMTR-MCNC: 111 MG/DL — HIGH (ref 70–99)
GLUCOSE BLDC GLUCOMTR-MCNC: 114 MG/DL — HIGH (ref 70–99)
GLUCOSE BLDC GLUCOMTR-MCNC: 122 MG/DL — HIGH (ref 70–99)
GRAM STN FLD: SIGNIFICANT CHANGE UP
LDH SERPL L TO P-CCNC: 192 U/L — SIGNIFICANT CHANGE UP (ref 50–242)
METHOD TYPE: SIGNIFICANT CHANGE UP
ORGANISM # SPEC MICROSCOPIC CNT: SIGNIFICANT CHANGE UP
ORGANISM # SPEC MICROSCOPIC CNT: SIGNIFICANT CHANGE UP
PROT SERPL-MCNC: 8.5 GM/DL — HIGH (ref 6–8.3)
SPECIMEN SOURCE: SIGNIFICANT CHANGE UP

## 2020-10-12 PROCEDURE — 71045 X-RAY EXAM CHEST 1 VIEW: CPT | Mod: 26

## 2020-10-12 PROCEDURE — 99231 SBSQ HOSP IP/OBS SF/LOW 25: CPT

## 2020-10-12 PROCEDURE — 99221 1ST HOSP IP/OBS SF/LOW 40: CPT | Mod: 25

## 2020-10-12 PROCEDURE — 88112 CYTOPATH CELL ENHANCE TECH: CPT | Mod: 26

## 2020-10-12 PROCEDURE — 88305 TISSUE EXAM BY PATHOLOGIST: CPT | Mod: 26

## 2020-10-12 PROCEDURE — 99232 SBSQ HOSP IP/OBS MODERATE 35: CPT

## 2020-10-12 PROCEDURE — 71250 CT THORAX DX C-: CPT | Mod: 26

## 2020-10-12 RX ORDER — DIPHENHYDRAMINE HCL 50 MG
50 CAPSULE ORAL ONCE
Refills: 0 | Status: COMPLETED | OUTPATIENT
Start: 2020-10-12 | End: 2020-10-12

## 2020-10-12 RX ORDER — QUETIAPINE FUMARATE 200 MG/1
250 TABLET, FILM COATED ORAL
Refills: 0 | Status: DISCONTINUED | OUTPATIENT
Start: 2020-10-12 | End: 2020-10-14

## 2020-10-12 RX ORDER — ENOXAPARIN SODIUM 100 MG/ML
40 INJECTION SUBCUTANEOUS DAILY
Refills: 0 | Status: DISCONTINUED | OUTPATIENT
Start: 2020-10-13 | End: 2020-10-23

## 2020-10-12 RX ORDER — HALOPERIDOL DECANOATE 100 MG/ML
2 INJECTION INTRAMUSCULAR ONCE
Refills: 0 | Status: COMPLETED | OUTPATIENT
Start: 2020-10-12 | End: 2020-10-12

## 2020-10-12 RX ADMIN — Medication 650 MILLIGRAM(S): at 18:24

## 2020-10-12 RX ADMIN — CHLORHEXIDINE GLUCONATE 1 APPLICATION(S): 213 SOLUTION TOPICAL at 05:37

## 2020-10-12 RX ADMIN — Medication 3 MILLILITER(S): at 00:42

## 2020-10-12 RX ADMIN — SODIUM CHLORIDE 4 MILLILITER(S): 9 INJECTION INTRAMUSCULAR; INTRAVENOUS; SUBCUTANEOUS at 00:42

## 2020-10-12 RX ADMIN — Medication 25 MILLIGRAM(S): at 05:43

## 2020-10-12 RX ADMIN — SODIUM CHLORIDE 4 MILLILITER(S): 9 INJECTION INTRAMUSCULAR; INTRAVENOUS; SUBCUTANEOUS at 18:16

## 2020-10-12 RX ADMIN — CEFEPIME 100 MILLIGRAM(S): 1 INJECTION, POWDER, FOR SOLUTION INTRAMUSCULAR; INTRAVENOUS at 13:08

## 2020-10-12 RX ADMIN — CEFEPIME 100 MILLIGRAM(S): 1 INJECTION, POWDER, FOR SOLUTION INTRAMUSCULAR; INTRAVENOUS at 05:20

## 2020-10-12 RX ADMIN — Medication 1.5 MILLIGRAM(S): at 05:42

## 2020-10-12 RX ADMIN — Medication 2 MILLIGRAM(S): at 22:08

## 2020-10-12 RX ADMIN — Medication 100 MILLIGRAM(S): at 11:20

## 2020-10-12 RX ADMIN — Medication 3 MILLILITER(S): at 05:17

## 2020-10-12 RX ADMIN — SODIUM CHLORIDE 4 MILLILITER(S): 9 INJECTION INTRAMUSCULAR; INTRAVENOUS; SUBCUTANEOUS at 11:00

## 2020-10-12 RX ADMIN — QUETIAPINE FUMARATE 150 MILLIGRAM(S): 200 TABLET, FILM COATED ORAL at 11:20

## 2020-10-12 RX ADMIN — Medication 25 MILLIGRAM(S): at 22:08

## 2020-10-12 RX ADMIN — Medication 1 APPLICATION(S): at 18:25

## 2020-10-12 RX ADMIN — Medication 1.5 MILLIGRAM(S): at 18:25

## 2020-10-12 RX ADMIN — Medication 1 MILLIGRAM(S): at 11:20

## 2020-10-12 RX ADMIN — Medication 1 APPLICATION(S): at 05:37

## 2020-10-12 RX ADMIN — SODIUM CHLORIDE 4 MILLILITER(S): 9 INJECTION INTRAMUSCULAR; INTRAVENOUS; SUBCUTANEOUS at 05:17

## 2020-10-12 RX ADMIN — Medication 650 MILLIGRAM(S): at 11:19

## 2020-10-12 RX ADMIN — CEFEPIME 100 MILLIGRAM(S): 1 INJECTION, POWDER, FOR SOLUTION INTRAMUSCULAR; INTRAVENOUS at 22:29

## 2020-10-12 RX ADMIN — Medication 3 MILLILITER(S): at 18:16

## 2020-10-12 RX ADMIN — Medication 2 MILLIGRAM(S): at 12:28

## 2020-10-12 RX ADMIN — Medication 3 MILLILITER(S): at 11:00

## 2020-10-12 RX ADMIN — PANTOPRAZOLE SODIUM 40 MILLIGRAM(S): 20 TABLET, DELAYED RELEASE ORAL at 11:20

## 2020-10-12 RX ADMIN — QUETIAPINE FUMARATE 250 MILLIGRAM(S): 200 TABLET, FILM COATED ORAL at 22:28

## 2020-10-12 RX ADMIN — Medication 1 TABLET(S): at 11:20

## 2020-10-12 RX ADMIN — Medication 650 MILLIGRAM(S): at 12:19

## 2020-10-12 RX ADMIN — Medication 1 APPLICATION(S): at 11:19

## 2020-10-12 RX ADMIN — Medication 25 MILLIGRAM(S): at 13:08

## 2020-10-12 NOTE — PROGRESS NOTE BEHAVIORAL HEALTH - SUMMARY
Persistent multifactorial delirium with difficulty weaning off of sedation and IV treatment in ICU with episodes of breakthrough agitation with high tolerance to medications  - collateral from girlfriend: Patient and her have been together for 1.5 years. Pt lives with his elderly father who has physical disability (leg) and was taking care of him. Patient is not working. They went on vacation to [place] and para-sailed few weeks prior to admission. Patient has no formal prior psychiatric history. + had mandibular fracture and has a metal plate in his jaw. + alcohol abuse. Persistent multifactorial delirium with difficulty weaning off of sedation and IV treatment in ICU with episodes of breakthrough agitation with high tolerance to medications. Prolonged hospital course.   - collateral from girlfriend: Patient and her have been together for 1.5 years. Pt lives with his elderly father who has physical disability (leg) and was taking care of him. Patient is not working. They went on vacation to [place] and para-sailed few weeks prior to admission. Patient has no formal prior psychiatric history. + had mandibular fracture and has a metal plate in his jaw. + alcohol abuse.

## 2020-10-12 NOTE — PROGRESS NOTE BEHAVIORAL HEALTH - NSBHCHARTREVIEWLAB_PSY_A_CORE FT
10-11    136  |  105  |  18  ----------------------------<  106<H>  4.3   |  24  |  0.49<L>    Ca    9.2      11 Oct 2020 07:13    TPro  8.5<H>  /  Alb  x   /  TBili  x   /  DBili  x   /  AST  x   /  ALT  x   /  AlkPhos  x   10-12

## 2020-10-12 NOTE — PROGRESS NOTE BEHAVIORAL HEALTH - NSBHCONSULTMEDS_PSY_A_CORE FT
COURSE OF TX: methadone weaned off as of 9/26/20; increased Seroquel 50mg PO bid to 100mg via PEG BID and increase Valium to 5mg PO bid on 9/28  - continued restlessness; increase Seroquel 100bid to 150 bid on 9/29/10; decrease Seroquel from 200 qd to 150qd on 10/8/20 (trying to find lowest effective dose with ultimate goal is to minimize / maybe even wean off Patient from an antipsychotic). Decrease HS dose of Seroquel from 300mg to 250mg PO on 10/12/20  - PRN Ativan dose increased to 2mg with continued need so DC Valium on 9/30/20 and use Ativan 2mg PO q8am, q4pm but HOLD for sedation with goal to stabilize agitation to the point that Patient will not need any PRNs and will have episodes of being awake  - PO Ativan ineffective; convert to Klonopin equivalent (Ativan 2mg = Klonopin 1mg) and increase to Klonopin 1.5mg PO via PEG BID on 10/6/20 COURSE OF TX: methadone weaned off as of 9/26/20; increased Seroquel 50mg PO bid to 100mg via PEG BID and increase Valium to 5mg PO bid on 9/28  - continued restlessness; increase Seroquel 100bid to 150 bid on 9/29/10; decrease Seroquel from 200 qd to 150qd on 10/8/20 (trying to find lowest effective dose with ultimate goal is to minimize / maybe even wean off Patient from an antipsychotic). Decrease HS dose of Seroquel from 300mg to 250mg PO on 10/12/20 (ie. restlessness causes found to be environmental, need-based in part)  - PRN Ativan dose increased to 2mg with continued need so DC Valium on 9/30/20 and use Ativan 2mg PO q8am, q4pm but HOLD for sedation with goal to stabilize agitation to the point that Patient will not need any PRNs and will have episodes of being awake  - PO Ativan ineffective; convert to Klonopin equivalent (Ativan 2mg = Klonopin 1mg) and increase to Klonopin 1.5mg PO via PEG BID on 10/6/20

## 2020-10-12 NOTE — PROGRESS NOTE ADULT - SUBJECTIVE AND OBJECTIVE BOX
DEEPIKA RUCKER  MRN-[id number]    Follow Up:  ID following for pneumonia     Interval History: Called back for follow up as patient spiked a fever 2 days ago, blood and sputum culture sent, CXR was done which showed almost complete white out of the right lung and sputum culture again growing pseudomonas though his procal has been normal and he remains on trach collar    ROS:    [ ] Unobtainable because:  [X ] All other systems negative    Constitutional: no fever, no chills  Head: no trauma  Eyes: no vision changes, no eye pain  ENT:  no sore throat, no rhinorrhea  Cardiovascular:  no chest pain, no palpitation  Respiratory:  no SOB, no cough  GI:  no abd pain, no vomiting, no diarrhea  urinary: no dysuria, no hematuria, no flank pain  musculoskeletal:  no joint pain, no joint swelling  skin:  no rash  neurology:  no headache, no seizure, no change in mental status  psych: no anxiety, no depression         Allergies  No Known Allergies        ANTIMICROBIALS:  cefepime   IVPB 2000 every 8 hours      OTHER MEDS:  acetaminophen   Tablet .. 650 milliGRAM(s) Oral every 6 hours PRN  albuterol/ipratropium for Nebulization 3 milliLiter(s) Nebulizer every 6 hours  BACItracin   Ointment 1 Application(s) Topical two times a day  chlorhexidine 4% Liquid 1 Application(s) Topical <User Schedule>  clonazePAM  Tablet 1.5 milliGRAM(s) Oral two times a day  collagenase Ointment 1 Application(s) Topical daily  dextrose 40% Gel 15 Gram(s) Oral once PRN  dextrose 5%. 1000 milliLiter(s) IV Continuous <Continuous>  dextrose 50% Injectable 12.5 Gram(s) IV Push once  dextrose 50% Injectable 25 Gram(s) IV Push once  dextrose 50% Injectable 25 Gram(s) IV Push once  folic acid 1 milliGRAM(s) Oral daily  glucagon  Injectable 1 milliGRAM(s) IntraMuscular once PRN  insulin lispro (HumaLOG) corrective regimen sliding scale   SubCutaneous every 6 hours  LORazepam   Injectable 2 milliGRAM(s) IV Push every 4 hours PRN  metoprolol tartrate 25 milliGRAM(s) Enteral Tube every 8 hours  metoprolol tartrate Injectable 5 milliGRAM(s) IV Push every 6 hours PRN  multivitamin 1 Tablet(s) Oral daily  pantoprazole   Suspension 40 milliGRAM(s) Oral daily  QUEtiapine 300 milliGRAM(s) Oral <User Schedule>  QUEtiapine 150 milliGRAM(s) Oral daily  sodium chloride 3%  Inhalation 4 milliLiter(s) Inhalation every 6 hours  thiamine 100 milliGRAM(s) Oral daily      Vital Signs Last 24 Hrs  T(C): 36.9 (12 Oct 2020 10:30), Max: 36.9 (12 Oct 2020 10:30)  T(F): 98.5 (12 Oct 2020 10:30), Max: 98.5 (12 Oct 2020 10:30)  HR: 101 (12 Oct 2020 11:29) (91 - 122)  BP: 109/80 (12 Oct 2020 10:30) (109/80 - 121/82)  BP(mean): 88 (12 Oct 2020 10:30) (88 - 88)  RR: 22 (12 Oct 2020 10:30) (20 - 22)  SpO2: 98% (12 Oct 2020 11:29) (96% - 100%)    Physical Exam:  General:    NAD,  non toxic, breathing comfortably with the new, smaller trach   Head: atraumatic, normocephalic  Eye: normal sclera and conjunctiva  ENT:    no oral lesions, neck supple  Cardio:     regular S1, S2,  no murmur  Respiratory:    still with rhonchi but on trach collar   abd:     soft,   BS +,   no tenderness, PEG site clean   :   no CVAT,  no suprapubic tenderness,   condom cath  Musculoskeletal:   no joint swelling,   no edema  vascular: left arm midline   Skin:    psoriasis on face appears worse then previous exams   Neurologic:     no focal deficit  psych: anxious but redirectable, seems frustrated with inability to talk     WBC Count: 10.47 K/uL (10-09 @ 17:18)  WBC Count: 10.95 K/uL (10-08 @ 10:00)  WBC Count: 11.11 K/uL (10-07 @ 06:52)  WBC Count: 11.72 K/uL (10-06 @ 10:02)          10-11    136  |  105  |  18  ----------------------------<  106<H>  4.3   |  24  |  0.49<L>    Ca    9.2      11 Oct 2020 07:13    TPro  8.5<H>  /  Alb  x   /  TBili  x   /  DBili  x   /  AST  x   /  ALT  x   /  AlkPhos  x   10-12      Urinalysis Basic - ( 10 Oct 2020 15:11 )    Color: Yellow / Appearance: Clear / S.010 / pH: x  Gluc: x / Ketone: Negative  / Bili: Small / Urobili: 8 mg/dL   Blood: x / Protein: 30 mg/dL / Nitrite: Negative   Leuk Esterase: Trace / RBC: 0-2 /HPF / WBC 0-2   Sq Epi: x / Non Sq Epi: Few / Bacteria: Few        Creatinine Trend: 0.49<--, 0.45<--, 0.49<--, 0.48<--, 0.51<--, 0.52<--      MICROBIOLOGY:  v  .Urine Clean Catch (Midstream)  10-11-20   <10,000 CFU/mL Normal Urogenital Poonam  --  --      .Sputum Sputum  10-10-20   Few Pseudomonas aeruginosa  Normal Respiratory Poonam absent  --  Pseudomonas aeruginosa      .Blood Blood-Peripheral  10-10-20   No growth to date.  --  --      .Sputum Sputum  20   Moderate Pseudomonas aeruginosa  --  Pseudomonas aeruginosa      .Blood Blood-Peripheral  20   No Growth Final  --  --      Ascites Fl paracentesis ascites  20   No growth  --    No polymorphonuclear cells seen per low power field  White blood cells seen per oil power field  No organisms seen per oil power field  by cytocentrifuge      .Blood Blood-Peripheral  20   No Growth Final  --  --    Procalcitonin, Serum: 0.07 (10-11-20 @ 11:51)  Procalcitonin, Serum: 0.06 (10-10-20 @ 02:13)      RADIOLOGY:  < from: Xray Chest 1 View- PORTABLE-Routine (Xray Chest 1 View- PORTABLE-Routine .) (10.09.20 @ 14:16) >  IMPRESSION:  Right effusion with possible underlying infiltrate.    (I personally reviewed)

## 2020-10-12 NOTE — PROGRESS NOTE BEHAVIORAL HEALTH - NSBHCHARTREVIEWVS_PSY_A_CORE FT
T(C): 36.9 (10-12-20 @ 10:30), Max: 36.9 (10-12-20 @ 10:30)  HR: 101 (10-12-20 @ 11:29) (91 - 122)  BP: 109/80 (10-12-20 @ 10:30) (109/80 - 121/82)  RR: 22 (10-12-20 @ 10:30) (20 - 22)  SpO2: 98% (10-12-20 @ 11:29) (96% - 100%)

## 2020-10-12 NOTE — PROGRESS NOTE ADULT - ASSESSMENT
44M EtOH pancreatitis p/w abdominal pain 8/17/20, found to have necrotizing pancreatitis c/b septic shock and respiratory failure secondary to PNA and ARDS requiring intubation and DTs requiring sedation and small bowel obstruction conservatively managed with NGT and NPO for which he was admitted to ICU. Currently, s/p trach and PEG on 9/10 and treated for klebsiella bacteremia.    downgraded to  floor 9/20/20 from ICU    Assessment and Plan:      #Aspiration right sided pneumonitis/pneumonia. procalcitonin negative x 2. recurrent aspirations likely. aspiration precautions. sputum +ve pseudomonas. cont iv cefepime. consulted ID. follow recs.      #Diarrhea  could be multifactorial, --PEG feeds, abx, pancreatic insufficiency, also On laxative  Off miralax.       #unstageable pressure ulcers x 2 on sacral area ,    -vascular consult appreciated. Follow recs. local wound care.     #s/p necrotizing pancreatitis , s/p septic shock , resolved     -RLE showing no dvt , cellulitis improved  -CT Chest/Abdomen/Pelvis ordered   - Moderate right and small left pleural effusion.  Bilateral patchy airspace disease has improved compared to the prior.  Improving pancreatitis with decreased size of the pancreas as well as improved peripancreatic edema. Small collection adjacent to the undersurface of the tail is also decreased. No evidence of necrosis.  -ID followed.     #Metabolic encephalopathy   AGITATION   hepatic vs. ICU delirium vs sepisis   patient awake , mouthing words to staff . agitated at times    d/angela Precedex since 9/15 in ICU  -diazepam, Methadone and Seroquel - were tapered. Psychiatrist following. might need further med adjustments.   Trach tube has been downsized. repeat swallow eval today.       #Acute respiratory failure secondary to PNA/ARDS , copious secretions  s/p TRACH 9/10/20   continue with frequent suctioning/pulmonary toilet    #Ascites , abd is soft.   distended gallbladder and ascites seen on CT, abd US neg for acute cholecystitis   s/p paracentesis 9/22 by IR   ABD US showing worsening ascites, s/p paracentesis by IR 9/22 with 1.5L straw colored fluid removed -cultures did not show any specific organisms  fluid analysis of paracentesis not consistent with SBP.   9/21 KUB shows nonspecific bowel gas pattern without e/o obstruction or ileus   9/21 lactate wnl , procal unremarkable   Blood culture from 9/10 was positive for klebsiella quite sensitive and one set from 9/12 is negative, 9/25 repeat blood cultures negative, sputum cultures from 9/26- showing pseudomonas aeruginosa   SAAG < 1.1 going against portal HTN       #s/p PEG placement 9/10/20    s/p SBO which was treated conservatively   Ileus.      #Hypokalememia-- repleted    #Hypomagnesemia --repleted    #Hypophosphatemia --repleted    #normocytic anemia , probably related to sepsis and acute illness   requiring transfusion on 9/17/20  follow anemia work-up   transfuse prn of H/H <7/21      hypercalcemia   - 2/2 to immobilization vs vitamin d supplementation although levels are only 25   - s/p pamidronate x 1    Vitamin D insufficiency. cont supplement.     SEVERE DECONDITIONING d/t prolonged hospitalization   PT     #Preventative measures   lovenox SQ-dvt ppx  fall, aspiration precautions  Discussed with nursing staff. 44M EtOH pancreatitis p/w abdominal pain 8/17/20, found to have necrotizing pancreatitis c/b septic shock and respiratory failure secondary to PNA and ARDS requiring intubation and DTs requiring sedation and small bowel obstruction conservatively managed with NGT and NPO for which he was admitted to ICU. Currently, s/p trach and PEG on 9/10 and treated for klebsiella bacteremia.    downgraded to  floor 9/20/20 from ICU    Assessment and Plan:      #Aspiration right sided pneumonitis/pneumonia. procalcitonin negative x 2. recurrent aspirations likely. aspiration precautions. sputum +ve pseudomonas. cont iv cefepime. consulted ID. follow recs.      #Diarrhea  could be multifactorial, --PEG feeds, abx, pancreatic insufficiency, also On laxative  Off miralax.       #unstageable pressure ulcers x 2 on sacral area ,    -vascular consult appreciated. Follow recs. local wound care.     #s/p necrotizing pancreatitis , s/p septic shock , resolved     -RLE showing no dvt , cellulitis improved  -CT Chest/Abdomen/Pelvis ordered   - Moderate right and small left pleural effusion.  Bilateral patchy airspace disease has improved compared to the prior.  Improving pancreatitis with decreased size of the pancreas as well as improved peripancreatic edema. Small collection adjacent to the undersurface of the tail is also decreased. No evidence of necrosis.  -ID followed.     #Metabolic encephalopathy   AGITATION   hepatic vs. ICU delirium vs sepisis   patient awake , mouthing words to staff . agitated at times    d/angela Precedex since 9/15 in ICU  -diazepam, Methadone and Seroquel - were tapered. Psychiatrist following. might need further med adjustments.   Trach tube has been downsized. repeat swallow eval today.       #Acute respiratory failure secondary to PNA/ARDS.   s/p TRACH 9/10/20   continue with frequent suctioning/pulmonary toilet  Right pleural effusion. Follow right thoracocentesis today. Discussed with IR Dr. Boss.     #Ascites , abd is soft.   distended gallbladder and ascites seen on CT, abd US neg for acute cholecystitis   s/p paracentesis 9/22 by IR   ABD US showing worsening ascites, s/p paracentesis by IR 9/22 with 1.5L straw colored fluid removed -cultures did not show any specific organisms  fluid analysis of paracentesis not consistent with SBP.   9/21 KUB shows nonspecific bowel gas pattern without e/o obstruction or ileus   Blood culture from 9/10 was positive for klebsiella quite sensitive and one set from 9/12 is negative, 9/25 repeat blood cultures negative, sputum cultures from 9/26- showing pseudomonas aeruginosa   SAAG < 1.1 going against portal HTN       #s/p PEG placement 9/10/20    s/p SBO which was treated conservatively   Ileus.      #Hypokalememia-- repleted    #Hypomagnesemia --repleted    #Hypophosphatemia --repleted    #normocytic anemia , probably related to sepsis and acute illness   requiring transfusion on 9/17/20  follow anemia work-up   transfuse prn of H/H <7/21      hypercalcemia   - 2/2 to immobilization vs vitamin d supplementation although levels are only 25   - s/p pamidronate x 1    Vitamin D insufficiency. cont supplement.     SEVERE DECONDITIONING d/t prolonged hospitalization   PT     #Preventative measures   lovenox SQ-dvt ppx  fall, aspiration precautions  Discussed with nursing staff. 44M EtOH pancreatitis p/w abdominal pain 8/17/20, found to have necrotizing pancreatitis c/b septic shock and respiratory failure secondary to PNA and ARDS requiring intubation and DTs requiring sedation and small bowel obstruction conservatively managed with NGT and NPO for which he was admitted to ICU. Currently, s/p trach and PEG on 9/10 and treated for klebsiella bacteremia.    downgraded to  floor 9/20/20 from ICU    Assessment and Plan:      #Aspiration right sided pneumonitis/pneumonia. procalcitonin negative x 2. recurrent aspirations likely. aspiration precautions. sputum +ve pseudomonas. cont iv cefepime. consulted ID. follow recs.      #Diarrhea  could be multifactorial, --PEG feeds, abx, pancreatic insufficiency, also On laxative  Off miralax.       #unstageable pressure ulcers x 2 on sacral area ,    -vascular consult appreciated. Follow recs. local wound care.     #s/p necrotizing pancreatitis , s/p septic shock , resolved     -RLE showing no dvt , cellulitis improved  -CT Chest/Abdomen/Pelvis ordered   - Moderate right and small left pleural effusion.  Bilateral patchy airspace disease has improved compared to the prior.  Improving pancreatitis with decreased size of the pancreas as well as improved peripancreatic edema. Small collection adjacent to the undersurface of the tail is also decreased. No evidence of necrosis.  -ID followed.     #Metabolic encephalopathy   AGITATION   hepatic vs. ICU delirium vs sepisis   patient awake , mouthing words to staff . agitated at times    d/angela Precedex since 9/15 in ICU  -diazepam, Methadone and Seroquel - were tapered. Psychiatrist following. might need further med adjustments.   Trach tube has been downsized. repeat swallow eval today.       #Acute respiratory failure secondary to PNA/ARDS.   s/p TRACH 9/10/20   continue with frequent suctioning/pulmonary toilet  Right pleural effusion. Follow right thoracocentesis today. Discussed with IR Dr. Boss.     #Ascites , abd is soft.   distended gallbladder and ascites seen on CT, abd US neg for acute cholecystitis   s/p paracentesis 9/22 by IR   ABD US showing worsening ascites, s/p paracentesis by IR 9/22 with 1.5L straw colored fluid removed -cultures did not show any specific organisms  fluid analysis of paracentesis not consistent with SBP.   9/21 KUB shows nonspecific bowel gas pattern without e/o obstruction or ileus   Blood culture from 9/10 was positive for klebsiella quite sensitive and one set from 9/12 is negative, 9/25 repeat blood cultures negative, sputum cultures from 9/26- showing pseudomonas aeruginosa   SAAG < 1.1 going against portal HTN       #s/p PEG placement 9/10/20    s/p SBO which was treated conservatively   Ileus.      #Hypokalememia-- repleted    #Hypomagnesemia --repleted    #Hypophosphatemia --repleted    #normocytic anemia , probably related to sepsis and acute illness   requiring transfusion on 9/17/20  follow anemia work-up   transfuse prn of H/H <7/21      hypercalcemia   - 2/2 to immobilization vs vitamin d supplementation although levels are only 25   - s/p pamidronate x 1    Vitamin D insufficiency. cont supplement.     SEVERE DECONDITIONING d/t prolonged hospitalization   PT   speech eval for alberto valve if possible as patient is trying to speak and it might help with agitation.   repeat swallow to see if patient cant eat and it can also help with agitation.     #Preventative measures   -dvt ppx. AC discontinued for thoracocentesis. will restart afterwards.   fall, aspiration precautions  Discussed with nursing staff.

## 2020-10-12 NOTE — PROGRESS NOTE BEHAVIORAL HEALTH - NSBHCONSULTRECOMMENDOTHER_PSY_A_CORE FT
recommending MRI of head (2 unsuccessful attempts). If Pt will go to IR for a thoracentesis for which he will receive sedation, would get MRI right after the thoracentesis

## 2020-10-12 NOTE — CONSULT NOTE ADULT - REASON FOR ADMISSION
pancreatitis

## 2020-10-12 NOTE — PROGRESS NOTE ADULT - ASSESSMENT
Subjective Complaints:  Historian:             Vital Signs Last 24 Hrs  T(C): 37.2 (12 Oct 2020 18:59), Max: 37.2 (12 Oct 2020 18:59)  T(F): 98.9 (12 Oct 2020 18:59), Max: 98.9 (12 Oct 2020 18:59)  HR: 117 (12 Oct 2020 18:59) (96 - 122)  BP: 108/78 (12 Oct 2020 18:59) (104/74 - 121/82)  BP(mean): 88 (12 Oct 2020 18:59) (88 - 88)  RR: 20 (12 Oct 2020 18:59) (18 - 22)  SpO2: 96% (12 Oct 2020 18:59) (95% - 99%)    GENERAL PHYSICAL EXAM:  General:  Appears stated age, well-groomed, well-nourished, no distress  HEENT:  NC/AT, patent nares w/ pink mucosa, OP clear w/o lesions, PERRL, EOMI, conjunctivae clear, no thyromegaly, nodules, adenopathy, no JVD  Chest:  Full & symmetric excursion, no increased effort, breath sounds clear  Cardiovascular:  Regular rhythm, S1, S2, no murmur/rub/S3/S4, no carotid/femoral/abdominal bruit, radial/pedal pulses 2+, no edema  Abdomen:  Soft, non-tender, non-distended, normoactive bowel sounds, no HSM  Extremities:  Gait & station:   Digits:   Nails:   Joints, Bones, Muscles:   ROM:   Stability:  Skin:  No rash/erythema/ecchymoses/petechiae/wounds/abscess/warm/dry  Musculoskeletal:  Full ROM in all joints w/o swelling/tenderness/effusion        LABS:    10-11    136  |  105  |  18  ----------------------------<  106<H>  4.3   |  24  |  0.49<L>    Ca    9.2      11 Oct 2020 07:13    TPro  8.5<H>  /  Alb  x   /  TBili  x   /  DBili  x   /  AST  x   /  ALT  x   /  AlkPhos  x   10-12          RADIOLOGY & ADDITIONAL STUDIES:        Neurology Progress Note:      Mental Status: awaek alert  speech fluent open eyes       Cranial Nerves:defrd      Motor:  restless in bed arm   leg moving         Sensory: inatct      Cerebellar: defrd      Gait:defrd      Assesment/Plan: s/p trach and peg psoriasis face rash no seizure  on thiamine for sub acute rehab delerium

## 2020-10-12 NOTE — CONSULT NOTE ADULT - PROVIDER SPECIALTY LIST ADULT
Neurology
Critical Care
Intervent Radiology
Pulmonology
TeleCritical Care
Intervent Radiology
Surgery
Wound Care
Infectious Disease

## 2020-10-12 NOTE — PROGRESS NOTE BEHAVIORAL HEALTH - NSBHCONSULTFOLLOWAFTERCARE_PSY_A_CORE FT
suspecting Patient is at his current new baseline / ie mental status with low probability to regain significant cognition in the near future while at VS to be determined

## 2020-10-12 NOTE — CONSULT NOTE ADULT - ASSESSMENT
IR consulted for right thoracentesis  43 yo male with a prolonged hospital course and a h/o ETOH abuse and alcoholic pancreatitis,   s/p paracentesis (cytology showing negative for malignant cells),   SAAG gradient 0.4 which is not consistent with portal hypertension  Also found to have fatty liver and hypoalbuminemia  S/P peg and trach to wall O2 2/2 respiratory failure    Plan  -will repeat CT chest to further assess effusion  -will perform today if amenable  -lovenox on hold today  -consent obtained from mother

## 2020-10-12 NOTE — PROGRESS NOTE BEHAVIORAL HEALTH - OTHER
tenuous; has little improvement improved still limited but better tenuous as per notes with slight improvement deferred at this time none mouthing words which do not make sense / unable to lip read looks confused as expected / appropriate to context not able to ascertain at this time due to limitation of exam cooperative during exam improved with residual limitations has shown some improvement; remains semi-tenuous poor / not able ot produce audible sound mouthing words which looks to be within normal rate slight delay due to effort in attempting to speak mouthing words indicates "so so" less confused, looks serious/appropriate to context improving has shown some improvement; remains semi-tenuousbut with some improvement

## 2020-10-12 NOTE — CONSULT NOTE ADULT - SUBJECTIVE AND OBJECTIVE BOX
IR consulted for right thoracentesis        HPI:  Patient is a 44M with a PMH of EtOH abuse and withdrawal, alcohol pancreatitis who presents to the ED with severe abdominal pain.  Patient states that for the past three days he has been on a drinking binge, drinking 5 beers and 5 shots of liquor per day.  Last drink reportedly yesterday.  Patient reports that he woke up today and had severe generalized abdominal pain with multiple episodes of nonbloody, nonbilious vomiting.  Patient also complaining of nausea and generalized tremors.  Vitals stable, labs show leukocytosis, lactic acidosis, and severely elevated lipase levels.  CT shows pancreatitis with early necrosis in the pancreatic body.  Will admit to floor. (17 Aug 2020 23:25)          PAST MEDICAL & SURGICAL HISTORY:  History of acute pancreatitis    ETOH abuse    Closed fracture of left mandibular angle, sequela        Allergies    No Known Allergies    Intolerances        MEDICATIONS  (STANDING):  albuterol/ipratropium for Nebulization 3 milliLiter(s) Nebulizer every 6 hours  BACItracin   Ointment 1 Application(s) Topical two times a day  cefepime   IVPB 2000 milliGRAM(s) IV Intermittent every 8 hours  chlorhexidine 4% Liquid 1 Application(s) Topical <User Schedule>  clonazePAM  Tablet 1.5 milliGRAM(s) Oral two times a day  collagenase Ointment 1 Application(s) Topical daily  dextrose 5%. 1000 milliLiter(s) (50 mL/Hr) IV Continuous <Continuous>  dextrose 50% Injectable 12.5 Gram(s) IV Push once  dextrose 50% Injectable 25 Gram(s) IV Push once  dextrose 50% Injectable 25 Gram(s) IV Push once  folic acid 1 milliGRAM(s) Oral daily  insulin lispro (HumaLOG) corrective regimen sliding scale   SubCutaneous every 6 hours  metoprolol tartrate 25 milliGRAM(s) Enteral Tube every 8 hours  multivitamin 1 Tablet(s) Oral daily  pantoprazole   Suspension 40 milliGRAM(s) Oral daily  QUEtiapine 300 milliGRAM(s) Oral <User Schedule>  QUEtiapine 150 milliGRAM(s) Oral daily  sodium chloride 3%  Inhalation 4 milliLiter(s) Inhalation every 6 hours  thiamine 100 milliGRAM(s) Oral daily    MEDICATIONS  (PRN):  acetaminophen   Tablet .. 650 milliGRAM(s) Oral every 6 hours PRN Temp greater or equal to 38C (100.4F), Mild Pain (1 - 3)  dextrose 40% Gel 15 Gram(s) Oral once PRN Blood Glucose LESS THAN 70 milliGRAM(s)/deciliter  glucagon  Injectable 1 milliGRAM(s) IntraMuscular once PRN Glucose LESS THAN 70 milligrams/deciliter  LORazepam   Injectable 2 milliGRAM(s) IV Push every 4 hours PRN breakthrough agitation  metoprolol tartrate Injectable 5 milliGRAM(s) IV Push every 6 hours PRN if sbp>160 or HR >110 hold if SBP<100 or HR<60        SOCIAL HISTORY:    FAMILY HISTORY:  Family history of diabetes mellitus (Father)          PHYSICAL EXAM:    Vital Signs Last 24 Hrs  T(C): 36.9 (12 Oct 2020 10:30), Max: 36.9 (12 Oct 2020 10:30)  T(F): 98.5 (12 Oct 2020 10:30), Max: 98.5 (12 Oct 2020 10:30)  HR: 99 (12 Oct 2020 10:30) (91 - 122)  BP: 109/80 (12 Oct 2020 10:30) (109/80 - 121/82)  BP(mean): 88 (12 Oct 2020 10:30) (88 - 88)  RR: 22 (12 Oct 2020 10:30) (20 - 22)  SpO2: 98% (12 Oct 2020 10:30) (96% - 100%)    General:  Appears stated age, Alert  Lungs:  decreased BS to right  Cardiovascular:  good S1, S2,   Abdomen:  Soft, non-tender, non-distended,   Extremities:  no calf tenderness/swelling b/l  Neuro/Psych:  A &O x 3    LABS:    10-11    136  |  105  |  18  ----------------------------<  106<H>  4.3   |  24  |  0.49<L>    Ca    9.2      11 Oct 2020 07:13    TPro  8.5<H>  /  Alb  x   /  TBili  x   /  DBili  x   /  AST  x   /  ALT  x   /  AlkPhos  x   10-12      Urinalysis Basic - ( 10 Oct 2020 15:11 )    Color: Yellow / Appearance: Clear / S.010 / pH: x  Gluc: x / Ketone: Negative  / Bili: Small / Urobili: 8 mg/dL   Blood: x / Protein: 30 mg/dL / Nitrite: Negative   Leuk Esterase: Trace / RBC: 0-2 /HPF / WBC 0-2   Sq Epi: x / Non Sq Epi: Few / Bacteria: Few        RADIOLOGY & ADDITIONAL STUDIES:  < from: Xray Chest 1 View- PORTABLE-Routine (Xray Chest 1 View- PORTABLE-Routine .) (10.09.20 @ 14:16) >  IMPRESSION:  Right effusion with possible underlying infiltrate.    < end of copied text >

## 2020-10-12 NOTE — PROGRESS NOTE ADULT - SUBJECTIVE AND OBJECTIVE BOX
CHIEF COMPLAINT: Trach tube downsized.   more agitated yesterday and received haldol, benadryl and ativan.   little calmer today  no fever no vomiting      PHYSICAL EXAM:    GENERAL: Thinly built, Trach and PEG +  HEENT: Thick secretions trach site. Trach tube downsized.   CHEST/LUNG: Coarse sounds bilaterally, No wheezing   HEART: Regular rate and rhythm; No murmur. + Tachycardic  ABDOMEN: Soft, Nontender, Nondistended; Bowel sounds present  EXTREMITIES:  , No clubbing, cyanosis, or edema  NERVOUS SYSTEM:  Limited exam. Was trying to talk but did not follow any commands.   Psychiatry: AA but agitated.       OBJECTIVE DATA:       Vital Signs Last 24 Hrs  T(C): 36.9 (12 Oct 2020 10:30), Max: 37 (11 Oct 2020 10:48)  T(F): 98.5 (12 Oct 2020 10:30), Max: 98.6 (11 Oct 2020 10:48)  HR: 99 (12 Oct 2020 10:30) (91 - 122)  BP: 109/80 (12 Oct 2020 10:30) (109/80 - 121/82)  BP(mean): 88 (12 Oct 2020 10:30) (88 - 88)  RR: 22 (12 Oct 2020 10:30) (20 - 22)  SpO2: 98% (12 Oct 2020 10:30) (96% - 100%)           Daily     Daily   LABS:            10-11    136  |  105  |  18  ----------------------------<  106<H>  4.3   |  24  |  0.49<L>    Ca    9.2      11 Oct 2020 07:13    TPro  8.5<H>  /  Alb  x   /  TBili  x   /  DBili  x   /  AST  x   /  ALT  x   /  AlkPhos  x   10-12                Urinalysis Basic - ( 10 Oct 2020 15:11 )    Color: Yellow / Appearance: Clear / S.010 / pH: x  Gluc: x / Ketone: Negative  / Bili: Small / Urobili: 8 mg/dL   Blood: x / Protein: 30 mg/dL / Nitrite: Negative   Leuk Esterase: Trace / RBC: 0-2 /HPF / WBC 0-2   Sq Epi: x / Non Sq Epi: Few / Bacteria: Few           CAPILLARY BLOOD GLUCOSE      POCT Blood Glucose.: 122 mg/dL (12 Oct 2020 05:30)      Culture - Urine (collected 10-11)  Source: .Urine Clean Catch (Midstream)  Final Report (10-11):    <10,000 CFU/mL Normal Urogenital Poonam    Culture - Sputum (collected 10-10)  Source: .Sputum Sputum  Gram Stain (10-12):    Moderate Squamous epithelial cells per low power field    Moderate polymorphonuclear leukocytes per low power field    Moderate Gram Variable Rods per oil power field  Final Report (10-12):    Few Pseudomonas aeruginosa    Normal Respiratory Poonam absent  Organism: Pseudomonas aeruginosa (10-12)  Organism: Pseudomonas aeruginosa (10-12)    Sensitivities:      -  Amikacin: S <=16      -  Aztreonam: S <=4      -  Cefepime: S <=2      -  Ceftazidime: S 4      -  Ciprofloxacin: S <=0.25      -  Gentamicin: S <=2      -  Imipenem: S <=1      -  Levofloxacin: S <=0.5      -  Meropenem: S <=1      -  Piperacillin/Tazobactam: S <=8      -  Tobramycin: S <=2      Method Type: KEITH    Culture - Blood (collected 10-10)  Source: .Blood Blood-Peripheral  Preliminary Report (10-11):    No growth to date.    Culture - Blood (collected 10-10)  Source: .Blood Blood-Peripheral  Preliminary Report (10-11):    No growth to date.        MEDICATIONS  (STANDING):  albuterol/ipratropium for Nebulization 3 milliLiter(s) Nebulizer every 6 hours  BACItracin   Ointment 1 Application(s) Topical two times a day  cefepime   IVPB 2000 milliGRAM(s) IV Intermittent every 8 hours  chlorhexidine 4% Liquid 1 Application(s) Topical <User Schedule>  clonazePAM  Tablet 1.5 milliGRAM(s) Oral two times a day  collagenase Ointment 1 Application(s) Topical daily  dextrose 5%. 1000 milliLiter(s) (50 mL/Hr) IV Continuous <Continuous>  dextrose 50% Injectable 12.5 Gram(s) IV Push once  dextrose 50% Injectable 25 Gram(s) IV Push once  dextrose 50% Injectable 25 Gram(s) IV Push once  folic acid 1 milliGRAM(s) Oral daily  insulin lispro (HumaLOG) corrective regimen sliding scale   SubCutaneous every 6 hours  metoprolol tartrate 25 milliGRAM(s) Enteral Tube every 8 hours  multivitamin 1 Tablet(s) Oral daily  pantoprazole   Suspension 40 milliGRAM(s) Oral daily  QUEtiapine 300 milliGRAM(s) Oral <User Schedule>  QUEtiapine 150 milliGRAM(s) Oral daily  sodium chloride 3%  Inhalation 4 milliLiter(s) Inhalation every 6 hours  thiamine 100 milliGRAM(s) Oral daily    MEDICATIONS  (PRN):  acetaminophen   Tablet .. 650 milliGRAM(s) Oral every 6 hours PRN Temp greater or equal to 38C (100.4F), Mild Pain (1 - 3)  dextrose 40% Gel 15 Gram(s) Oral once PRN Blood Glucose LESS THAN 70 milliGRAM(s)/deciliter  glucagon  Injectable 1 milliGRAM(s) IntraMuscular once PRN Glucose LESS THAN 70 milligrams/deciliter  LORazepam   Injectable 2 milliGRAM(s) IV Push every 4 hours PRN breakthrough agitation  metoprolol tartrate Injectable 5 milliGRAM(s) IV Push every 6 hours PRN if sbp>160 or HR >110 hold if SBP<100 or HR<60         CHIEF COMPLAINT: Trach tube downsized.   more agitated yesterday and received haldol, benadryl and ativan.   little calmer today  no fever no vomiting      PHYSICAL EXAM:    GENERAL: Thinly built, Trach and PEG +  HEENT: Thick secretions trach site. Trach tube downsized.   CHEST/LUNG: Coarse sounds bilaterally, Decreased air entry right base. No wheezing   HEART: Regular rate and rhythm; No murmur. + Tachycardic  ABDOMEN: Soft, Nontender, Nondistended; Bowel sounds present  EXTREMITIES:  , No clubbing, cyanosis, or edema  NERVOUS SYSTEM:  Limited exam. Was trying to talk but did not follow any commands.   Psychiatry: AA but agitated.       OBJECTIVE DATA:       Vital Signs Last 24 Hrs  T(C): 36.9 (12 Oct 2020 10:30), Max: 37 (11 Oct 2020 10:48)  T(F): 98.5 (12 Oct 2020 10:30), Max: 98.6 (11 Oct 2020 10:48)  HR: 99 (12 Oct 2020 10:30) (91 - 122)  BP: 109/80 (12 Oct 2020 10:30) (109/80 - 121/82)  BP(mean): 88 (12 Oct 2020 10:30) (88 - 88)  RR: 22 (12 Oct 2020 10:30) (20 - 22)  SpO2: 98% (12 Oct 2020 10:30) (96% - 100%)           Daily     Daily   LABS:            10-11    136  |  105  |  18  ----------------------------<  106<H>  4.3   |  24  |  0.49<L>    Ca    9.2      11 Oct 2020 07:13    TPro  8.5<H>  /  Alb  x   /  TBili  x   /  DBili  x   /  AST  x   /  ALT  x   /  AlkPhos  x   10-12                Urinalysis Basic - ( 10 Oct 2020 15:11 )    Color: Yellow / Appearance: Clear / S.010 / pH: x  Gluc: x / Ketone: Negative  / Bili: Small / Urobili: 8 mg/dL   Blood: x / Protein: 30 mg/dL / Nitrite: Negative   Leuk Esterase: Trace / RBC: 0-2 /HPF / WBC 0-2   Sq Epi: x / Non Sq Epi: Few / Bacteria: Few           CAPILLARY BLOOD GLUCOSE      POCT Blood Glucose.: 122 mg/dL (12 Oct 2020 05:30)      Culture - Urine (collected 10-11)  Source: .Urine Clean Catch (Midstream)  Final Report (10-11):    <10,000 CFU/mL Normal Urogenital Poonam    Culture - Sputum (collected 10-10)  Source: .Sputum Sputum  Gram Stain (10-12):    Moderate Squamous epithelial cells per low power field    Moderate polymorphonuclear leukocytes per low power field    Moderate Gram Variable Rods per oil power field  Final Report (10-12):    Few Pseudomonas aeruginosa    Normal Respiratory Poonam absent  Organism: Pseudomonas aeruginosa (10-12)  Organism: Pseudomonas aeruginosa (10-12)    Sensitivities:      -  Amikacin: S <=16      -  Aztreonam: S <=4      -  Cefepime: S <=2      -  Ceftazidime: S 4      -  Ciprofloxacin: S <=0.25      -  Gentamicin: S <=2      -  Imipenem: S <=1      -  Levofloxacin: S <=0.5      -  Meropenem: S <=1      -  Piperacillin/Tazobactam: S <=8      -  Tobramycin: S <=2      Method Type: KEITH    Culture - Blood (collected 10-10)  Source: .Blood Blood-Peripheral  Preliminary Report (10-11):    No growth to date.    Culture - Blood (collected 10-10)  Source: .Blood Blood-Peripheral  Preliminary Report (10-11):    No growth to date.        MEDICATIONS  (STANDING):  albuterol/ipratropium for Nebulization 3 milliLiter(s) Nebulizer every 6 hours  BACItracin   Ointment 1 Application(s) Topical two times a day  cefepime   IVPB 2000 milliGRAM(s) IV Intermittent every 8 hours  chlorhexidine 4% Liquid 1 Application(s) Topical <User Schedule>  clonazePAM  Tablet 1.5 milliGRAM(s) Oral two times a day  collagenase Ointment 1 Application(s) Topical daily  dextrose 5%. 1000 milliLiter(s) (50 mL/Hr) IV Continuous <Continuous>  dextrose 50% Injectable 12.5 Gram(s) IV Push once  dextrose 50% Injectable 25 Gram(s) IV Push once  dextrose 50% Injectable 25 Gram(s) IV Push once  folic acid 1 milliGRAM(s) Oral daily  insulin lispro (HumaLOG) corrective regimen sliding scale   SubCutaneous every 6 hours  metoprolol tartrate 25 milliGRAM(s) Enteral Tube every 8 hours  multivitamin 1 Tablet(s) Oral daily  pantoprazole   Suspension 40 milliGRAM(s) Oral daily  QUEtiapine 300 milliGRAM(s) Oral <User Schedule>  QUEtiapine 150 milliGRAM(s) Oral daily  sodium chloride 3%  Inhalation 4 milliLiter(s) Inhalation every 6 hours  thiamine 100 milliGRAM(s) Oral daily    MEDICATIONS  (PRN):  acetaminophen   Tablet .. 650 milliGRAM(s) Oral every 6 hours PRN Temp greater or equal to 38C (100.4F), Mild Pain (1 - 3)  dextrose 40% Gel 15 Gram(s) Oral once PRN Blood Glucose LESS THAN 70 milliGRAM(s)/deciliter  glucagon  Injectable 1 milliGRAM(s) IntraMuscular once PRN Glucose LESS THAN 70 milligrams/deciliter  LORazepam   Injectable 2 milliGRAM(s) IV Push every 4 hours PRN breakthrough agitation  metoprolol tartrate Injectable 5 milliGRAM(s) IV Push every 6 hours PRN if sbp>160 or HR >110 hold if SBP<100 or HR<60

## 2020-10-12 NOTE — PROGRESS NOTE BEHAVIORAL HEALTH - NSBHFUPINTERVALHXFT_PSY_A_CORE
Interval events from the weekend: 10/9/20 chest xray showing large right pleural effusion, with possible thoracentesis with IR consulting Interval events from the weekend: 10/9/20 chest xray showing large right pleural effusion, with possible thoracentesis with IR consulting. Patient seemingly physically more comfortable after trach downsize and his mattress changed. He gave Writer the thumbs up today and pointed to his trach. IMPROVEMENT: Patient more able to engage in a meaningful manner and able to write down his needs - handwriting of more "chicken scratch" quality but able to read "dry throat" which he complained to others before. Also, he wants to drink and eat regular food. Support and reassurance provided.

## 2020-10-12 NOTE — PROGRESS NOTE BEHAVIORAL HEALTH - ORIENTATION OTHER
some reaction to name being called reacts to his name being called; seems to be aware that he is in the hospital

## 2020-10-12 NOTE — CONSULT NOTE ADULT - CONSULT REQUESTED DATE/TIME
06-Oct-2020 14:24
12-Oct-2020 11:30
18-Aug-2020
18-Aug-2020 03:30
18-Aug-2020 03:47
21-Sep-2020 17:58
22-Sep-2020 10:36
22-Sep-2020 21:13
29-Sep-2020 18:32

## 2020-10-13 LAB
ANION GAP SERPL CALC-SCNC: 9 MMOL/L — SIGNIFICANT CHANGE UP (ref 5–17)
BUN SERPL-MCNC: 16 MG/DL — SIGNIFICANT CHANGE UP (ref 7–23)
CALCIUM SERPL-MCNC: 9.2 MG/DL — SIGNIFICANT CHANGE UP (ref 8.5–10.1)
CHLORIDE SERPL-SCNC: 103 MMOL/L — SIGNIFICANT CHANGE UP (ref 96–108)
CO2 SERPL-SCNC: 21 MMOL/L — LOW (ref 22–31)
CREAT SERPL-MCNC: 0.43 MG/DL — LOW (ref 0.5–1.3)
GLUCOSE BLDC GLUCOMTR-MCNC: 103 MG/DL — HIGH (ref 70–99)
GLUCOSE BLDC GLUCOMTR-MCNC: 103 MG/DL — HIGH (ref 70–99)
GLUCOSE BLDC GLUCOMTR-MCNC: 109 MG/DL — HIGH (ref 70–99)
GLUCOSE BLDC GLUCOMTR-MCNC: 112 MG/DL — HIGH (ref 70–99)
GLUCOSE BLDC GLUCOMTR-MCNC: 119 MG/DL — HIGH (ref 70–99)
GLUCOSE BLDC GLUCOMTR-MCNC: 119 MG/DL — HIGH (ref 70–99)
GLUCOSE FLD-MCNC: 106 MG/DL — SIGNIFICANT CHANGE UP
GLUCOSE SERPL-MCNC: 109 MG/DL — HIGH (ref 70–99)
GRAM STN FLD: SIGNIFICANT CHANGE UP
HAV IGM SER-ACNC: SIGNIFICANT CHANGE UP
HBV CORE AB SER-ACNC: SIGNIFICANT CHANGE UP
HBV CORE IGM SER-ACNC: SIGNIFICANT CHANGE UP
HBV SURFACE AB SER-ACNC: SIGNIFICANT CHANGE UP
HBV SURFACE AG SER-ACNC: SIGNIFICANT CHANGE UP
HCT VFR BLD CALC: 27.7 % — LOW (ref 39–50)
HCV AB S/CO SERPL IA: 0.1 S/CO — SIGNIFICANT CHANGE UP (ref 0–0.99)
HCV AB SERPL-IMP: SIGNIFICANT CHANGE UP
HGB BLD-MCNC: 9 G/DL — LOW (ref 13–17)
HIV 1+2 AB+HIV1 P24 AG SERPL QL IA: SIGNIFICANT CHANGE UP
LDH SERPL L TO P-CCNC: 132 U/L — SIGNIFICANT CHANGE UP
MCHC RBC-ENTMCNC: 29 PG — SIGNIFICANT CHANGE UP (ref 27–34)
MCHC RBC-ENTMCNC: 32.5 GM/DL — SIGNIFICANT CHANGE UP (ref 32–36)
MCV RBC AUTO: 89.4 FL — SIGNIFICANT CHANGE UP (ref 80–100)
NRBC # BLD: 0 /100 WBCS — SIGNIFICANT CHANGE UP (ref 0–0)
PH FLD: 7.6 — SIGNIFICANT CHANGE UP
PLATELET # BLD AUTO: 338 K/UL — SIGNIFICANT CHANGE UP (ref 150–400)
POTASSIUM SERPL-MCNC: 4 MMOL/L — SIGNIFICANT CHANGE UP (ref 3.5–5.3)
POTASSIUM SERPL-SCNC: 4 MMOL/L — SIGNIFICANT CHANGE UP (ref 3.5–5.3)
PROT FLD-MCNC: 6.6 G/DL — SIGNIFICANT CHANGE UP
RBC # BLD: 3.1 M/UL — LOW (ref 4.2–5.8)
RBC # FLD: 15.1 % — HIGH (ref 10.3–14.5)
SODIUM SERPL-SCNC: 133 MMOL/L — LOW (ref 135–145)
SPECIMEN SOURCE FLD: SIGNIFICANT CHANGE UP
SPECIMEN SOURCE: SIGNIFICANT CHANGE UP
WBC # BLD: 12.2 K/UL — HIGH (ref 3.8–10.5)
WBC # FLD AUTO: 12.2 K/UL — HIGH (ref 3.8–10.5)

## 2020-10-13 PROCEDURE — 99232 SBSQ HOSP IP/OBS MODERATE 35: CPT | Mod: 25

## 2020-10-13 PROCEDURE — 99232 SBSQ HOSP IP/OBS MODERATE 35: CPT

## 2020-10-13 PROCEDURE — 99231 SBSQ HOSP IP/OBS SF/LOW 25: CPT

## 2020-10-13 RX ORDER — QUETIAPINE FUMARATE 200 MG/1
100 TABLET, FILM COATED ORAL
Refills: 0 | Status: DISCONTINUED | OUTPATIENT
Start: 2020-10-13 | End: 2020-10-15

## 2020-10-13 RX ORDER — CLONAZEPAM 1 MG
1 TABLET ORAL
Refills: 0 | Status: DISCONTINUED | OUTPATIENT
Start: 2020-10-13 | End: 2020-10-19

## 2020-10-13 RX ORDER — INFLUENZA VIRUS VACCINE 15; 15; 15; 15 UG/.5ML; UG/.5ML; UG/.5ML; UG/.5ML
0.5 SUSPENSION INTRAMUSCULAR ONCE
Refills: 0 | Status: COMPLETED | OUTPATIENT
Start: 2020-10-13 | End: 2020-10-13

## 2020-10-13 RX ORDER — HYDROCORTISONE 1 %
1 OINTMENT (GRAM) TOPICAL
Refills: 0 | Status: COMPLETED | OUTPATIENT
Start: 2020-10-13 | End: 2020-10-20

## 2020-10-13 RX ADMIN — SODIUM CHLORIDE 4 MILLILITER(S): 9 INJECTION INTRAMUSCULAR; INTRAVENOUS; SUBCUTANEOUS at 11:17

## 2020-10-13 RX ADMIN — Medication 1 MILLIGRAM(S): at 18:15

## 2020-10-13 RX ADMIN — Medication 650 MILLIGRAM(S): at 11:50

## 2020-10-13 RX ADMIN — Medication 1 APPLICATION(S): at 12:44

## 2020-10-13 RX ADMIN — CHLORHEXIDINE GLUCONATE 1 APPLICATION(S): 213 SOLUTION TOPICAL at 06:04

## 2020-10-13 RX ADMIN — Medication 25 MILLIGRAM(S): at 13:18

## 2020-10-13 RX ADMIN — ENOXAPARIN SODIUM 40 MILLIGRAM(S): 100 INJECTION SUBCUTANEOUS at 12:44

## 2020-10-13 RX ADMIN — Medication 1 APPLICATION(S): at 18:15

## 2020-10-13 RX ADMIN — SODIUM CHLORIDE 4 MILLILITER(S): 9 INJECTION INTRAMUSCULAR; INTRAVENOUS; SUBCUTANEOUS at 23:22

## 2020-10-13 RX ADMIN — QUETIAPINE FUMARATE 250 MILLIGRAM(S): 200 TABLET, FILM COATED ORAL at 22:20

## 2020-10-13 RX ADMIN — CEFEPIME 100 MILLIGRAM(S): 1 INJECTION, POWDER, FOR SOLUTION INTRAMUSCULAR; INTRAVENOUS at 06:03

## 2020-10-13 RX ADMIN — QUETIAPINE FUMARATE 150 MILLIGRAM(S): 200 TABLET, FILM COATED ORAL at 12:45

## 2020-10-13 RX ADMIN — Medication 1 MILLIGRAM(S): at 12:45

## 2020-10-13 RX ADMIN — HALOPERIDOL DECANOATE 2 MILLIGRAM(S): 100 INJECTION INTRAMUSCULAR at 00:28

## 2020-10-13 RX ADMIN — Medication 25 MILLIGRAM(S): at 22:20

## 2020-10-13 RX ADMIN — PANTOPRAZOLE SODIUM 40 MILLIGRAM(S): 20 TABLET, DELAYED RELEASE ORAL at 12:45

## 2020-10-13 RX ADMIN — Medication 3 MILLILITER(S): at 11:17

## 2020-10-13 RX ADMIN — Medication 50 MILLIGRAM(S): at 00:25

## 2020-10-13 RX ADMIN — CEFEPIME 100 MILLIGRAM(S): 1 INJECTION, POWDER, FOR SOLUTION INTRAMUSCULAR; INTRAVENOUS at 13:17

## 2020-10-13 RX ADMIN — Medication 3 MILLILITER(S): at 17:11

## 2020-10-13 RX ADMIN — Medication 3 MILLILITER(S): at 23:22

## 2020-10-13 RX ADMIN — Medication 1.5 MILLIGRAM(S): at 06:03

## 2020-10-13 RX ADMIN — Medication 100 MILLIGRAM(S): at 12:45

## 2020-10-13 RX ADMIN — Medication 1 APPLICATION(S): at 18:14

## 2020-10-13 RX ADMIN — CEFEPIME 100 MILLIGRAM(S): 1 INJECTION, POWDER, FOR SOLUTION INTRAMUSCULAR; INTRAVENOUS at 22:21

## 2020-10-13 RX ADMIN — Medication 3 MILLILITER(S): at 00:30

## 2020-10-13 RX ADMIN — SODIUM CHLORIDE 4 MILLILITER(S): 9 INJECTION INTRAMUSCULAR; INTRAVENOUS; SUBCUTANEOUS at 05:59

## 2020-10-13 RX ADMIN — Medication 650 MILLIGRAM(S): at 10:50

## 2020-10-13 RX ADMIN — Medication 1 TABLET(S): at 12:45

## 2020-10-13 RX ADMIN — Medication 3 MILLILITER(S): at 05:58

## 2020-10-13 RX ADMIN — Medication 25 MILLIGRAM(S): at 06:05

## 2020-10-13 RX ADMIN — Medication 1 APPLICATION(S): at 06:03

## 2020-10-13 RX ADMIN — SODIUM CHLORIDE 4 MILLILITER(S): 9 INJECTION INTRAMUSCULAR; INTRAVENOUS; SUBCUTANEOUS at 00:30

## 2020-10-13 RX ADMIN — SODIUM CHLORIDE 4 MILLILITER(S): 9 INJECTION INTRAMUSCULAR; INTRAVENOUS; SUBCUTANEOUS at 17:11

## 2020-10-13 NOTE — PROGRESS NOTE BEHAVIORAL HEALTH - NSBHCONSULTMEDS_PSY_A_CORE FT
COURSE OF TX: methadone weaned off as of 9/26/20; increased Seroquel 50mg PO bid to 100mg via PEG BID and increase Valium to 5mg PO bid on 9/28  - continued restlessness; increase Seroquel 100bid to 150 bid on 9/29/10; decrease Seroquel from 200 qd to 150qd on 10/8/20 (trying to find lowest effective dose with ultimate goal is to minimize / maybe even wean off Patient from an antipsychotic). Decrease HS dose of Seroquel from 300mg to 250mg PO on 10/12/20 (ie. restlessness causes found to be environmental, need-based in part); decrease AM dose of Seroquel from 150 to 109mg PO on 10/13/20  - PRN Ativan dose increased to 2mg with continued need so DC Valium on 9/30/20 and use Ativan 2mg PO q8am, q4pm but HOLD for sedation with goal to stabilize agitation to the point that Patient will not need any PRNs and will have episodes of being awake  - PO Ativan ineffective; convert to Klonopin equivalent (Ativan 2mg = Klonopin 1mg) and increase to Klonopin 1.5mg PO via PEG BID on 10/6/20, REDUCE to 1mg PO bid on 10/13/20 with goal to simplify regimen and limit overmedicated states

## 2020-10-13 NOTE — PROGRESS NOTE ADULT - SUBJECTIVE AND OBJECTIVE BOX
CHIEF COMPLAINT:   Intermittent agitation despite high dose seroquel and BZD.  no fever  tolerating PEG tube feeding but concern was raised for aspiration with patient's non compliance.   patient pulled midline yesterday.   now has peripheral iv.       PHYSICAL EXAM:    GENERAL: Thinly built, Trach and PEG +  HEENT: Thick secretions trach site. Trach tube downsized.   CHEST/LUNG: Coarse sounds bilaterally, Decreased air entry right base. No wheezing   HEART: Regular rate and rhythm; No murmur. + Tachycardic  ABDOMEN: Soft, Nontender, Nondistended; Bowel sounds present  EXTREMITIES:  , No clubbing, cyanosis, or edema  NERVOUS SYSTEM:  Limited exam. Was trying to talk but did not follow any commands.   Psychiatry: AA but agitated.       OBJECTIVE DATA:       Vital Signs Last 24 Hrs  T(C): 36.6 (13 Oct 2020 04:59), Max: 37.4 (12 Oct 2020 21:49)  T(F): 97.8 (13 Oct 2020 04:59), Max: 99.3 (12 Oct 2020 21:49)  HR: 101 (13 Oct 2020 08:15) (99 - 121)  BP: 118/78 (13 Oct 2020 04:59) (104/74 - 118/78)  BP(mean): 81 (12 Oct 2020 21:49) (81 - 88)  RR: 20 (13 Oct 2020 08:15) (18 - 22)  SpO2: 99% (13 Oct 2020 08:15) (95% - 99%)           Daily     Daily   LABS:                        9.0    12.20 )-----------( 338      ( 13 Oct 2020 07:23 )             27.7             10-13    133<L>  |  103  |  16  ----------------------------<  109<H>  4.0   |  21<L>  |  0.43<L>    Ca    9.2      13 Oct 2020 07:23    TPro  8.5<H>  /  Alb  x   /  TBili  x   /  DBili  x   /  AST  x   /  ALT  x   /  AlkPhos  x   10-12                       CAPILLARY BLOOD GLUCOSE      POCT Blood Glucose.: 109 mg/dL (13 Oct 2020 05:28)      Culture - Body Fluid with Gram Stain (collected 10-13)  Source: .Body Fluid RIGHT THORACENTESIS  Gram Stain (10-13):    polymorphonuclear leukocytes seen    No organisms seen    by cytocentrifuge    Culture - Urine (collected 10-11)  Source: .Urine Clean Catch (Midstream)  Final Report (10-11):    <10,000 CFU/mL Normal Urogenital Poonam    Culture - Sputum (collected 10-10)  Source: .Sputum Sputum  Gram Stain (10-12):    Moderate Squamous epithelial cells per low power field    Moderate polymorphonuclear leukocytes per low power field    Moderate Gram Variable Rods per oil power field  Final Report (10-12):    Few Pseudomonas aeruginosa    Normal Respiratory Poonam absent  Organism: Pseudomonas aeruginosa (10-12)  Organism: Pseudomonas aeruginosa (10-12)    Sensitivities:      -  Amikacin: S <=16      -  Aztreonam: S <=4      -  Cefepime: S <=2      -  Ceftazidime: S 4      -  Ciprofloxacin: S <=0.25      -  Gentamicin: S <=2      -  Imipenem: S <=1      -  Levofloxacin: S <=0.5      -  Meropenem: S <=1      -  Piperacillin/Tazobactam: S <=8      -  Tobramycin: S <=2      Method Type: KEITH    Culture - Blood (collected 10-10)  Source: .Blood Blood-Peripheral  Preliminary Report (10-11):    No growth to date.    Culture - Blood (collected 10-10)  Source: .Blood Blood-Peripheral  Preliminary Report (10-11):    No growth to date.          Interval Radiology studies: reviewed   MEDICATIONS  (STANDING):  albuterol/ipratropium for Nebulization 3 milliLiter(s) Nebulizer every 6 hours  BACItracin   Ointment 1 Application(s) Topical two times a day  cefepime   IVPB 2000 milliGRAM(s) IV Intermittent every 8 hours  chlorhexidine 4% Liquid 1 Application(s) Topical <User Schedule>  clonazePAM  Tablet 1.5 milliGRAM(s) Oral two times a day  collagenase Ointment 1 Application(s) Topical daily  dextrose 5%. 1000 milliLiter(s) (50 mL/Hr) IV Continuous <Continuous>  dextrose 50% Injectable 12.5 Gram(s) IV Push once  dextrose 50% Injectable 25 Gram(s) IV Push once  dextrose 50% Injectable 25 Gram(s) IV Push once  enoxaparin Injectable 40 milliGRAM(s) SubCutaneous daily  folic acid 1 milliGRAM(s) Oral daily  hydrocortisone 2.5% Ointment 1 Application(s) Topical two times a day  influenza   Vaccine 0.5 milliLiter(s) IntraMuscular once  insulin lispro (HumaLOG) corrective regimen sliding scale   SubCutaneous every 6 hours  metoprolol tartrate 25 milliGRAM(s) Enteral Tube every 8 hours  multivitamin 1 Tablet(s) Oral daily  pantoprazole   Suspension 40 milliGRAM(s) Oral daily  QUEtiapine 150 milliGRAM(s) Oral daily  QUEtiapine 250 milliGRAM(s) Oral <User Schedule>  sodium chloride 3%  Inhalation 4 milliLiter(s) Inhalation every 6 hours  thiamine 100 milliGRAM(s) Oral daily    MEDICATIONS  (PRN):  acetaminophen   Tablet .. 650 milliGRAM(s) Oral every 6 hours PRN Temp greater or equal to 38C (100.4F), Mild Pain (1 - 3)  dextrose 40% Gel 15 Gram(s) Oral once PRN Blood Glucose LESS THAN 70 milliGRAM(s)/deciliter  glucagon  Injectable 1 milliGRAM(s) IntraMuscular once PRN Glucose LESS THAN 70 milligrams/deciliter  LORazepam   Injectable 2 milliGRAM(s) IV Push every 4 hours PRN breakthrough agitation  metoprolol tartrate Injectable 5 milliGRAM(s) IV Push every 6 hours PRN if sbp>160 or HR >110 hold if SBP<100 or HR<60       CHIEF COMPLAINT:   Intermittent agitation despite high dose seroquel and BZD.  no fever  tolerating PEG tube feeding but concern was raised for aspiration with patient's non compliance.   patient pulled midline yesterday.   now has peripheral iv.       PHYSICAL EXAM:    GENERAL: Thinly built, Trach and PEG +  HEENT: Thick secretions trach site. Trach tube downsized.   CHEST/LUNG: Coarse sounds bilaterally, Decreased air entry right base. No wheezing   HEART: Regular rate and rhythm; No murmur. + Tachycardic  ABDOMEN: Soft, Nontender, Nondistended; Bowel sounds present  EXTREMITIES:  , No clubbing, cyanosis, or edema  NERVOUS SYSTEM:  Limited exam. Was trying to talk but did not follow any commands.   Psychiatry: AA but agitated.       OBJECTIVE DATA:       Vital Signs Last 24 Hrs  T(C): 36.6 (13 Oct 2020 04:59), Max: 37.4 (12 Oct 2020 21:49)  T(F): 97.8 (13 Oct 2020 04:59), Max: 99.3 (12 Oct 2020 21:49)  HR: 101 (13 Oct 2020 08:15) (99 - 121)  BP: 118/78 (13 Oct 2020 04:59) (104/74 - 118/78)  BP(mean): 81 (12 Oct 2020 21:49) (81 - 88)  RR: 20 (13 Oct 2020 08:15) (18 - 22)  SpO2: 99% (13 Oct 2020 08:15) (95% - 99%)           Daily     Daily   LABS:                        9.0    12.20 )-----------( 338      ( 13 Oct 2020 07:23 )             27.7             10-13    133<L>  |  103  |  16  ----------------------------<  109<H>  4.0   |  21<L>  |  0.43<L>    Ca    9.2      13 Oct 2020 07:23    TPro  8.5<H>  /  Alb  x   /  TBili  x   /  DBili  x   /  AST  x   /  ALT  x   /  AlkPhos  x   10-12                       CAPILLARY BLOOD GLUCOSE      POCT Blood Glucose.: 109 mg/dL (13 Oct 2020 05:28)      Culture - Body Fluid with Gram Stain (collected 10-13)  Source: .Body Fluid RIGHT THORACENTESIS  Gram Stain (10-13):    polymorphonuclear leukocytes seen    No organisms seen    by cytocentrifuge    Culture - Urine (collected 10-11)  Source: .Urine Clean Catch (Midstream)  Final Report (10-11):    <10,000 CFU/mL Normal Urogenital Poonam    Culture - Sputum (collected 10-10)  Source: .Sputum Sputum  Gram Stain (10-12):    Moderate Squamous epithelial cells per low power field    Moderate polymorphonuclear leukocytes per low power field    Moderate Gram Variable Rods per oil power field  Final Report (10-12):    Few Pseudomonas aeruginosa    Normal Respiratory Poonam absent  Organism: Pseudomonas aeruginosa (10-12)  Organism: Pseudomonas aeruginosa (10-12)    Sensitivities:      -  Amikacin: S <=16      -  Aztreonam: S <=4      -  Cefepime: S <=2      -  Ceftazidime: S 4      -  Ciprofloxacin: S <=0.25      -  Gentamicin: S <=2      -  Imipenem: S <=1      -  Levofloxacin: S <=0.5      -  Meropenem: S <=1      -  Piperacillin/Tazobactam: S <=8      -  Tobramycin: S <=2      Method Type: KEITH    Culture - Blood (collected 10-10)  Source: .Blood Blood-Peripheral  Preliminary Report (10-11):    No growth to date.    Culture - Blood (collected 10-10)  Source: .Blood Blood-Peripheral  Preliminary Report (10-11):    No growth to date.          Interval Radiology studies: reviewed     < from: Xray Chest 1 View AP/PA. (10.12.20 @ 18:27) >  IMPRESSION:   No evidence of active chest disease.    < end of copied text >    MEDICATIONS  (STANDING):  albuterol/ipratropium for Nebulization 3 milliLiter(s) Nebulizer every 6 hours  BACItracin   Ointment 1 Application(s) Topical two times a day  cefepime   IVPB 2000 milliGRAM(s) IV Intermittent every 8 hours  chlorhexidine 4% Liquid 1 Application(s) Topical <User Schedule>  clonazePAM  Tablet 1.5 milliGRAM(s) Oral two times a day  collagenase Ointment 1 Application(s) Topical daily  dextrose 5%. 1000 milliLiter(s) (50 mL/Hr) IV Continuous <Continuous>  dextrose 50% Injectable 12.5 Gram(s) IV Push once  dextrose 50% Injectable 25 Gram(s) IV Push once  dextrose 50% Injectable 25 Gram(s) IV Push once  enoxaparin Injectable 40 milliGRAM(s) SubCutaneous daily  folic acid 1 milliGRAM(s) Oral daily  hydrocortisone 2.5% Ointment 1 Application(s) Topical two times a day  influenza   Vaccine 0.5 milliLiter(s) IntraMuscular once  insulin lispro (HumaLOG) corrective regimen sliding scale   SubCutaneous every 6 hours  metoprolol tartrate 25 milliGRAM(s) Enteral Tube every 8 hours  multivitamin 1 Tablet(s) Oral daily  pantoprazole   Suspension 40 milliGRAM(s) Oral daily  QUEtiapine 150 milliGRAM(s) Oral daily  QUEtiapine 250 milliGRAM(s) Oral <User Schedule>  sodium chloride 3%  Inhalation 4 milliLiter(s) Inhalation every 6 hours  thiamine 100 milliGRAM(s) Oral daily    MEDICATIONS  (PRN):  acetaminophen   Tablet .. 650 milliGRAM(s) Oral every 6 hours PRN Temp greater or equal to 38C (100.4F), Mild Pain (1 - 3)  dextrose 40% Gel 15 Gram(s) Oral once PRN Blood Glucose LESS THAN 70 milliGRAM(s)/deciliter  glucagon  Injectable 1 milliGRAM(s) IntraMuscular once PRN Glucose LESS THAN 70 milligrams/deciliter  LORazepam   Injectable 2 milliGRAM(s) IV Push every 4 hours PRN breakthrough agitation  metoprolol tartrate Injectable 5 milliGRAM(s) IV Push every 6 hours PRN if sbp>160 or HR >110 hold if SBP<100 or HR<60

## 2020-10-13 NOTE — PROGRESS NOTE BEHAVIORAL HEALTH - OTHER
cooperative during exam improved improved with residual limitations has shown some improvement; remains semi-tenuous deferred at this time poor / not able ot produce audible sound mouthing words which looks to be within normal rate slight delay due to effort in attempting to speak mouthing words indicates "so so" less confused, looks serious/appropriate to context as expected / appropriate to context not able to ascertain at this time due to limitation of exam improving has shown some improvement; remains semi-tenuousbut with some improvement

## 2020-10-13 NOTE — PROGRESS NOTE BEHAVIORAL HEALTH - SUMMARY
Persistent multifactorial delirium with difficulty weaning off of sedation and IV treatment in ICU with episodes of breakthrough agitation with high tolerance to medications. Prolonged hospital course.   - collateral from girlfriend: Patient and her have been together for 1.5 years. Pt lives with his elderly father who has physical disability (leg) and was taking care of him. Patient is not working. They went on vacation to [place] and para-sailed few weeks prior to admission. Patient has no formal prior psychiatric history. + had mandibular fracture and has a metal plate in his jaw. + alcohol abuse.

## 2020-10-13 NOTE — PROGRESS NOTE ADULT - ASSESSMENT
Subjective Complaints:  Historian:             Vital Signs Last 24 Hrs  T(C): 36.8 (13 Oct 2020 16:17), Max: 37.4 (12 Oct 2020 21:49)  T(F): 98.3 (13 Oct 2020 16:17), Max: 99.3 (12 Oct 2020 21:49)  HR: 105 (13 Oct 2020 17:25) (101 - 121)  BP: 100/69 (13 Oct 2020 16:17) (100/69 - 118/78)  BP(mean): 81 (12 Oct 2020 21:49) (81 - 83)  RR: 20 (13 Oct 2020 16:17) (19 - 22)  SpO2: 98% (13 Oct 2020 17:25) (96% - 99%)    GENERAL PHYSICAL EXAM:  General:  Appears stated age, well-groomed, well-nourished, no distress  HEENT:  NC/AT, patent nares w/ pink mucosa, OP clear w/o lesions, PERRL, EOMI, conjunctivae clear, no thyromegaly, nodules, adenopathy, no JVD  Chest:  Full & symmetric excursion, no increased effort, breath sounds clear  Cardiovascular:  Regular rhythm, S1, S2, no murmur/rub/S3/S4, no carotid/femoral/abdominal bruit, radial/pedal pulses 2+, no edema  Abdomen:  Soft, non-tender, non-distended, normoactive bowel sounds, no HSM  Extremities:  Gait & station:   Digits:   Nails:   Joints, Bones, Muscles:   ROM:   Stability:  Skin:  No rash/erythema/ecchymoses/petechiae/wounds/abscess/warm/dry  Musculoskeletal:  Full ROM in all joints w/o swelling/tenderness/effusion        LABS:                        9.0    12.20 )-----------( 338      ( 13 Oct 2020 07:23 )             27.7     10-13    133<L>  |  103  |  16  ----------------------------<  109<H>  4.0   |  21<L>  |  0.43<L>    Ca    9.2      13 Oct 2020 07:23    TPro  8.5<H>  /  Alb  x   /  TBili  x   /  DBili  x   /  AST  x   /  ALT  x   /  AlkPhos  x   10-12          RADIOLOGY & ADDITIONAL STUDIES:        Neurology Progress Note:      Mental Status: awake alert s/p trach and peg rash face       Cranial Nerves:defrd      Motor:   restless in bed move s arm leg         Sensory:intact      Cerebellar: defrd      Gait:defrd      Assesment/Plan:  s/p peg trach  hx of etoh pancreatitis   delerium  on thiamine no seizure

## 2020-10-13 NOTE — PROGRESS NOTE BEHAVIORAL HEALTH - NSBHFUPINTERVALHXFT_PSY_A_CORE
Noted interval event - most consistent with "need-based" agitation that is first addressed by eliminating noxious stimuli from the environment, optimizing Pt's comfort and addressing needs. This was clearly reflected in Pt having decreased episodes of agitation after his trach was downsized and more comfortable, PRN Tylenol administered and his mattress changed.

## 2020-10-13 NOTE — PROGRESS NOTE BEHAVIORAL HEALTH - NSBHCHARTREVIEWVS_PSY_A_CORE FT
T(C): 37.4 (10-13-20 @ 11:13), Max: 37.4 (10-12-20 @ 21:49)  HR: 111 (10-13-20 @ 11:37) (101 - 121)  BP: 117/77 (10-13-20 @ 11:13) (104/74 - 118/78)  RR: 20 (10-13-20 @ 11:13) (18 - 22)  SpO2: 96% (10-13-20 @ 11:37) (95% - 99%)

## 2020-10-13 NOTE — CHART NOTE - NSCHARTNOTEFT_GEN_A_CORE
Called by RN because pt pulled out his midline. Upon arrival pt with trach , agitated , confused and wanting to go home. Pt at present alert however presents to be confused and asking to call a cab. Explained risks about leaving at this time , and climbing OOB unable to decide , decision making capacity on this patient at this time . Pt not answering questions appropriately . Pt is on observation at present , however pulled ou the midline . Haldol and benadryl IM ordered . If pt remains combative given the risk of Hx of pulling out the trach , will consider the need for restraints .   VSS   PHYSICAL EXAM:  GENERAL: NAD, lying in bed  but climbing out   HEAD:  Atraumatic, Normocephalic  ENT: Moist mucous membranes , trach   NECK: Supple, No JVD , trach   CHEST/LUNG: Clear to auscultation bilaterally  HEART: Regular rate and rhythm  ABDOMEN: Bowel sounds present; Soft, Nontender, Nondistended.  NERVOUS SYSTEM:  Alert but confused   A&P  Agitated   Pulled out Midline   Risk for fall    Benadryl  Haldol  Continue observation   Call bell within reach   Side rails up

## 2020-10-13 NOTE — PROGRESS NOTE ADULT - ASSESSMENT
44M EtOH pancreatitis p/w abdominal pain 8/17/20, found to have necrotizing pancreatitis c/b septic shock and respiratory failure secondary to PNA and ARDS requiring intubation and DTs requiring sedation and small bowel obstruction conservatively managed with NGT and NPO for which he was admitted to ICU. Currently, s/p trach and PEG on 9/10 and treated for klebsiella bacteremia.    downgraded to  floor 9/20/20 from ICU    Assessment and Plan:      #Aspiration right sided pneumonitis/pneumonia. procalcitonin negative x 2. recurrent aspirations likely. aspiration precautions. sputum +ve pseudomonas. cont iv cefepime. consulted ID. follow recs.      #Diarrhea  could be multifactorial, --PEG feeds, abx, pancreatic insufficiency, also On laxative  Off miralax.       #unstageable pressure ulcers x 2 on sacral area ,    -vascular consult appreciated. Follow recs. local wound care.     #s/p necrotizing pancreatitis , s/p septic shock , resolved     -RLE showing no dvt , cellulitis improved  -CT Chest/Abdomen/Pelvis ordered   - Moderate right and small left pleural effusion.  Bilateral patchy airspace disease has improved compared to the prior.  Improving pancreatitis with decreased size of the pancreas as well as improved peripancreatic edema. Small collection adjacent to the undersurface of the tail is also decreased. No evidence of necrosis.  -ID followed.     #Metabolic encephalopathy   AGITATION   hepatic vs. ICU delirium vs sepisis   patient awake , mouthing words to staff . agitated at times    d/angela Precedex since 9/15 in ICU  -diazepam, Methadone and Seroquel - were tapered. Psychiatrist following. might need further med adjustments.   Trach tube has been downsized. repeat swallow eval today.       #Acute respiratory failure secondary to PNA/ARDS.   s/p TRACH 9/10/20   continue with frequent suctioning/pulmonary toilet  Right pleural effusion. Follow right thoracocentesis today. Discussed with IR Dr. Boss.     #Ascites , abd is soft.   distended gallbladder and ascites seen on CT, abd US neg for acute cholecystitis   s/p paracentesis 9/22 by IR   ABD US showing worsening ascites, s/p paracentesis by IR 9/22 with 1.5L straw colored fluid removed -cultures did not show any specific organisms  fluid analysis of paracentesis not consistent with SBP.   9/21 KUB shows nonspecific bowel gas pattern without e/o obstruction or ileus   Blood culture from 9/10 was positive for klebsiella quite sensitive and one set from 9/12 is negative, 9/25 repeat blood cultures negative, sputum cultures from 9/26- showing pseudomonas aeruginosa   SAAG < 1.1 going against portal HTN       #s/p PEG placement 9/10/20    s/p SBO which was treated conservatively   Ileus.      #Hypokalememia-- repleted    #Hypomagnesemia --repleted    #Hypophosphatemia --repleted    #normocytic anemia , probably related to sepsis and acute illness   requiring transfusion on 9/17/20  follow anemia work-up   transfuse prn of H/H <7/21      hypercalcemia   - 2/2 to immobilization vs vitamin d supplementation although levels are only 25   - s/p pamidronate x 1    Vitamin D insufficiency. cont supplement.     SEVERE DECONDITIONING d/t prolonged hospitalization   PT   speech eval for alberto valve if possible as patient is trying to speak and it might help with agitation.   repeat swallow to see if patient cant eat and it can also help with agitation.     #Preventative measures   -dvt ppx. AC discontinued for thoracocentesis. will restart afterwards.   fall, aspiration precautions  Discussed with nursing staff. 44M EtOH pancreatitis p/w abdominal pain 8/17/20, found to have necrotizing pancreatitis c/b septic shock and respiratory failure secondary to PNA and ARDS requiring intubation and DTs requiring sedation and small bowel obstruction conservatively managed with NGT and NPO for which he was admitted to ICU. Currently, s/p trach and PEG on 9/10 and treated for klebsiella bacteremia.    downgraded to  floor 9/20/20 from ICU    Assessment and Plan:      #Aspiration right sided pneumonitis/pneumonia. procalcitonin negative x 2. recurrent aspirations likely. aspiration precautions. sputum +ve pseudomonas. cont iv cefepime for now. discussed with ID. pleural fluid studies consistent with exudative fluid. follow final fluid studies.     #Diarrhea  could be multifactorial, --PEG feeds, abx, pancreatic insufficiency  Off miralax.       #unstageable pressure ulcers x 2 on sacral area ,    -vascular consult appreciated. Follow recs. local wound care.     #s/p necrotizing pancreatitis , s/p septic shock , resolved     -RLE showing no dvt , cellulitis improved  -CT Chest/Abdomen/Pelvis ordered   - Moderate right and small left pleural effusion.  Bilateral patchy airspace disease has improved compared to the prior.  Improving pancreatitis with decreased size of the pancreas as well as improved peripancreatic edema. Small collection adjacent to the undersurface of the tail is also decreased. No evidence of necrosis.  -ID followed.     #Metabolic encephalopathy   AGITATION   hepatic vs. ICU delirium vs sepisis   patient awake , mouthing words to staff . agitated at times    d/angela Precedex since 9/15 in ICU  - on increased seroquel and klonopin.   cont iv ativan prn  supervision to avoid him harming himself.   discussed his care with his brother.       #Acute respiratory failure secondary to PNA/ARDS.   s/p TRACH 9/10/20   continue with frequent suctioning/pulmonary toilet  Right exudative pleural effusion. s/p right thoracocentesis.  follow final fluid studies.     #Ascites , abd is soft.   distended gallbladder and ascites seen on CT, abd US neg for acute cholecystitis   s/p paracentesis 9/22 by IR   ABD US showing worsening ascites, s/p paracentesis by IR 9/22 with 1.5L straw colored fluid removed -cultures did not show any specific organisms  fluid analysis of paracentesis not consistent with SBP.   9/21 KUB shows nonspecific bowel gas pattern without e/o obstruction or ileus   Blood culture from 9/10 was positive for klebsiella quite sensitive and one set from 9/12 is negative, 9/25 repeat blood cultures negative, sputum cultures from 9/26- showing pseudomonas aeruginosa   SAAG < 1.1 going against portal HTN       #s/p PEG placement 9/10/20    s/p SBO which was treated conservatively   Ileus.      #Hypokalememia-- repleted    #Hypomagnesemia --repleted    #Hypophosphatemia --repleted    #normocytic anemia , probably related to sepsis and acute illness   requiring transfusion on 9/17/20  follow anemia work-up   transfuse prn of H/H <7/21      hypercalcemia   - 2/2 to immobilization vs vitamin d supplementation although levels are only 25   - s/p pamidronate x 1    Vitamin D insufficiency. cont supplement.     SEVERE DECONDITIONING d/t prolonged hospitalization   pending speech and swallow eval recs.     #Preventative measures   -dvt ppx. restart AC.   fall, aspiration precautions  Discussed with nursing staff.

## 2020-10-13 NOTE — CHART NOTE - NSCHARTNOTEFT_GEN_A_CORE
Assessment:  Pt c ETOH pancreatitis, necrotizing pancreatitis, s/p septic shock, respiratory failure, pneumonia, ARDS, s/p intubation, s/p trach, s/p PEG, s/p SBO, DTs, ascites, s/p paracentesis, c persistent multifactorial delirium, diarrhea, pressure ulcers, vitamin D deficiency, metabolic encephalopathy.  Hx of mandibular fracture c plate noted.   RN suggested considering bolus feeding for pt.     Factors impacting intake: [ ] none [ ] nausea  [ ] vomiting [x ] diarrhea [ ] constipation  [ ]chewing problems;  [ x] swallowing issues; 10/08, swallow evaluation recommended NPO  [ ] other:     Diet Prescription: Diet, NPO with Tube Feed:   Tube Feeding Modality: Gastrostomy  Vital AF 1.2  Total Volume for 24 Hours (mL): 1440  Continuous  Until Goal Tube Feed Rate (mL per Hour): 60  Tube Feed Duration (in Hours): 24  Tube Feed Start Time: 11:45  No Carb Prosource (1pkg = 15gms Protein)     Qty per Day:  1  Supplement Feeding Modality:  Gastrostomy  Probiotic Yogurt/Smoothie Cans or Servings Per Day:  2       Frequency:  Daily (10-06-20 @ 11:42)    Intake: Vital AF 1.2 @ 60 ml/fg=1458 calories, 108 grams protein, 1167 ml free water, 121% RDIs+ No Carb Prosource 1 pkg daily=60 calories, 15 grams protein per pkg + on free water 200 ml q 6 hours via G-Tube     Current Weight: 10/11, 60.2 kg, 08/18, 71.5 kg, c 11.3 kg   % Weight Change: 15.8%  No edema noted     Pertinent Medications: MEDICATIONS  (STANDING):  albuterol/ipratropium for Nebulization 3 milliLiter(s) Nebulizer every 6 hours  BACItracin   Ointment 1 Application(s) Topical two times a day  cefepime   IVPB 2000 milliGRAM(s) IV Intermittent every 8 hours  chlorhexidine 4% Liquid 1 Application(s) Topical <User Schedule>  clonazePAM  Tablet 1 milliGRAM(s) Oral two times a day  collagenase Ointment 1 Application(s) Topical daily  dextrose 5%. 1000 milliLiter(s) (50 mL/Hr) IV Continuous <Continuous>  dextrose 50% Injectable 12.5 Gram(s) IV Push once  dextrose 50% Injectable 25 Gram(s) IV Push once  dextrose 50% Injectable 25 Gram(s) IV Push once  enoxaparin Injectable 40 milliGRAM(s) SubCutaneous daily  folic acid 1 milliGRAM(s) Oral daily  hydrocortisone 2.5% Ointment 1 Application(s) Topical two times a day  influenza   Vaccine 0.5 milliLiter(s) IntraMuscular once  insulin lispro (HumaLOG) corrective regimen sliding scale   SubCutaneous every 6 hours  metoprolol tartrate 25 milliGRAM(s) Enteral Tube every 8 hours  multivitamin 1 Tablet(s) Oral daily  pantoprazole   Suspension 40 milliGRAM(s) Oral daily  QUEtiapine 250 milliGRAM(s) Oral <User Schedule>  QUEtiapine 100 milliGRAM(s) Oral <User Schedule>  sodium chloride 3%  Inhalation 4 milliLiter(s) Inhalation every 6 hours  thiamine 100 milliGRAM(s) Oral daily    MEDICATIONS  (PRN):  acetaminophen   Tablet .. 650 milliGRAM(s) Oral every 6 hours PRN Temp greater or equal to 38C (100.4F), Mild Pain (1 - 3)  dextrose 40% Gel 15 Gram(s) Oral once PRN Blood Glucose LESS THAN 70 milliGRAM(s)/deciliter  glucagon  Injectable 1 milliGRAM(s) IntraMuscular once PRN Glucose LESS THAN 70 milligrams/deciliter  LORazepam   Injectable 2 milliGRAM(s) IV Push every 4 hours PRN breakthrough agitation  metoprolol tartrate Injectable 5 milliGRAM(s) IV Push every 6 hours PRN if sbp>160 or HR >110 hold if SBP<100 or HR<60    Pertinent Labs: 10-13 Na133 mmol/L<L> Glu 109 mg/dL<H> K+ 4.0 mmol/L Cr  0.43 mg/dL<L> BUN 16 mg/dL 10-09 Phos 2.5 mg/dL 10-09 Alb 2.6 g/dL<L>10-09 ALT 27 U/L AST 30 U/L Alkaline Phosphatase 125 U/L<H>  10/09; Lipase 660 U/L <H>, vitamin D 25-hydroxy ng/ml<L>   CAPILLARY BLOOD GLUCOSE      POCT Blood Glucose.: 119 mg/dL (13 Oct 2020 12:34)  POCT Blood Glucose.: 112 mg/dL (13 Oct 2020 11:13)  POCT Blood Glucose.: 109 mg/dL (13 Oct 2020 05:28)  POCT Blood Glucose.: 103 mg/dL (13 Oct 2020 00:33)  POCT Blood Glucose.: 114 mg/dL (12 Oct 2020 18:22)    Skin:   skin tear; Left:  scapula noted  wound; scrotum; redness, no blister   pressure ulcer x 2  1. sacrum; unstageable   2. tracheal area; stage II    Estimated Needs:   [x ] no change since previous assessment (10/06)  [ ] recalculated:     Previous Nutrition Diagnosis:   [x ] Malnutrition; severe malnutrition in context of acute illness     Related to: inadequate protein-energy intake in setting of pancreatitis, respiratory failure, hepatic/metabolic encephalopathy, pressure ulcer     As evidenced by: <50% nutrition needs >5 days, physical findings of moderate muscle loss   NEW (10/10): Severe subq fat loss and muscle wasting as noted on NFPA     Goal: pt to meet >75% of protein-energy needs via enteral feeding; met c goal rate    Nutrition Diagnosis is [x ] ongoing  [ ] resolved [ ] not applicable     New Nutrition Diagnosis: [ x] not applicable     Interventions:   Recommend  [ ] Change Diet To:  [ ] Nutrition Supplement  [x ] Nutrition Support; recommend consider bolus feeding; Vital AF 1.2, 360 ml q 6 hours via G-Tube=1728 calories, 108 grams protein, 1167 ml free water, 121% RDIs + No Carb Prosource 1 pkg daily=60 calories, 15 grams protein per pkg via G-Tube  + probiotic yogurt supplement 1 serving 2 x daily via G-Tube   [ ] Other:     Monitoring and Evaluation:   [ ] PO intake [ x ] Tolerance to diet prescription [ x ] weights [ x ] labs; Lipase, vitamin D [ x ] follow up per protocol  [x ] other: BMs Assessment:  Pt c ETOH pancreatitis, necrotizing pancreatitis, s/p septic shock, respiratory failure, pneumonia, ARDS, s/p intubation, s/p trach, s/p PEG, s/p SBO, DTs, ascites, s/p paracentesis, c persistent multifactorial delirium, diarrhea, pressure ulcers, vitamin D deficiency, metabolic encephalopathy.  Hx of mandibular fracture c plate noted.   RN suggested considering bolus feeding for pt.     Factors impacting intake: [ ] none [ ] nausea  [ ] vomiting [x ] diarrhea [ ] constipation  [ ]chewing problems;  [ x] swallowing issues; 10/08, swallow evaluation recommended NPO, 10/13, repeat follow up recommended NPO @ present [ ] other:     Diet Prescription: Diet, NPO with Tube Feed:   Tube Feeding Modality: Gastrostomy  Vital AF 1.2  Total Volume for 24 Hours (mL): 1440  Continuous  Until Goal Tube Feed Rate (mL per Hour): 60  Tube Feed Duration (in Hours): 24  Tube Feed Start Time: 11:45  No Carb Prosource (1pkg = 15gms Protein)     Qty per Day:  1  Supplement Feeding Modality:  Gastrostomy  Probiotic Yogurt/Smoothie Cans or Servings Per Day:  2       Frequency:  Daily (10-06-20 @ 11:42)    Intake: Vital AF 1.2 @ 60 ml/qm=2505 calories, 108 grams protein, 1167 ml free water, 121% RDIs+ No Carb Prosource 1 pkg daily=60 calories, 15 grams protein per pkg + on free water 200 ml q 6 hours via G-Tube     Current Weight: 10/11, 60.2 kg, 08/18, 71.5 kg, c 11.3 kg   % Weight Change: 15.8%  No edema noted     Pertinent Medications: MEDICATIONS  (STANDING):  albuterol/ipratropium for Nebulization 3 milliLiter(s) Nebulizer every 6 hours  BACItracin   Ointment 1 Application(s) Topical two times a day  cefepime   IVPB 2000 milliGRAM(s) IV Intermittent every 8 hours  chlorhexidine 4% Liquid 1 Application(s) Topical <User Schedule>  clonazePAM  Tablet 1 milliGRAM(s) Oral two times a day  collagenase Ointment 1 Application(s) Topical daily  dextrose 5%. 1000 milliLiter(s) (50 mL/Hr) IV Continuous <Continuous>  dextrose 50% Injectable 12.5 Gram(s) IV Push once  dextrose 50% Injectable 25 Gram(s) IV Push once  dextrose 50% Injectable 25 Gram(s) IV Push once  enoxaparin Injectable 40 milliGRAM(s) SubCutaneous daily  folic acid 1 milliGRAM(s) Oral daily  hydrocortisone 2.5% Ointment 1 Application(s) Topical two times a day  influenza   Vaccine 0.5 milliLiter(s) IntraMuscular once  insulin lispro (HumaLOG) corrective regimen sliding scale   SubCutaneous every 6 hours  metoprolol tartrate 25 milliGRAM(s) Enteral Tube every 8 hours  multivitamin 1 Tablet(s) Oral daily  pantoprazole   Suspension 40 milliGRAM(s) Oral daily  QUEtiapine 250 milliGRAM(s) Oral <User Schedule>  QUEtiapine 100 milliGRAM(s) Oral <User Schedule>  sodium chloride 3%  Inhalation 4 milliLiter(s) Inhalation every 6 hours  thiamine 100 milliGRAM(s) Oral daily    MEDICATIONS  (PRN):  acetaminophen   Tablet .. 650 milliGRAM(s) Oral every 6 hours PRN Temp greater or equal to 38C (100.4F), Mild Pain (1 - 3)  dextrose 40% Gel 15 Gram(s) Oral once PRN Blood Glucose LESS THAN 70 milliGRAM(s)/deciliter  glucagon  Injectable 1 milliGRAM(s) IntraMuscular once PRN Glucose LESS THAN 70 milligrams/deciliter  LORazepam   Injectable 2 milliGRAM(s) IV Push every 4 hours PRN breakthrough agitation  metoprolol tartrate Injectable 5 milliGRAM(s) IV Push every 6 hours PRN if sbp>160 or HR >110 hold if SBP<100 or HR<60    Pertinent Labs: 10-13 Na133 mmol/L<L> Glu 109 mg/dL<H> K+ 4.0 mmol/L Cr  0.43 mg/dL<L> BUN 16 mg/dL 10-09 Phos 2.5 mg/dL 10-09 Alb 2.6 g/dL<L>10-09 ALT 27 U/L AST 30 U/L Alkaline Phosphatase 125 U/L<H>  10/09; Lipase 660 U/L <H>, vitamin D 25-hydroxy ng/ml<L>   CAPILLARY BLOOD GLUCOSE      POCT Blood Glucose.: 119 mg/dL (13 Oct 2020 12:34)  POCT Blood Glucose.: 112 mg/dL (13 Oct 2020 11:13)  POCT Blood Glucose.: 109 mg/dL (13 Oct 2020 05:28)  POCT Blood Glucose.: 103 mg/dL (13 Oct 2020 00:33)  POCT Blood Glucose.: 114 mg/dL (12 Oct 2020 18:22)    Skin:   skin tear; Left:  scapula noted  wound; scrotum; redness, no blister   pressure ulcer x 2  1. sacrum; unstageable   2. tracheal area; stage II    Estimated Needs:   [x ] no change since previous assessment (10/06)  [ ] recalculated:     Previous Nutrition Diagnosis:   [x ] Malnutrition; severe malnutrition in context of acute illness     Related to: inadequate protein-energy intake in setting of pancreatitis, respiratory failure, hepatic/metabolic encephalopathy, pressure ulcer     As evidenced by: <50% nutrition needs >5 days, physical findings of moderate muscle loss   NEW (10/10): Severe subq fat loss and muscle wasting as noted on NFPA     Goal: pt to meet >75% of protein-energy needs via enteral feeding; met c goal rate    Nutrition Diagnosis is [x ] ongoing  [ ] resolved [ ] not applicable     New Nutrition Diagnosis: [ x] not applicable     Interventions:   Recommend  [ ] Change Diet To:  [ ] Nutrition Supplement  [x ] Nutrition Support; recommend consider bolus feeding; Vital AF 1.2, 360 ml q 6 hours via G-Tube=1728 calories, 108 grams protein, 1167 ml free water, 121% RDIs + No Carb Prosource 1 pkg daily=60 calories, 15 grams protein per pkg via G-Tube  + probiotic yogurt supplement 1 serving 2 x daily via G-Tube   [ x] Other: repeat swallow c improved mental status     Monitoring and Evaluation:   [ ] PO intake [ x ] Tolerance to diet prescription [ x ] weights [ x ] labs; Lipase, vitamin D [ x ] follow up per protocol  [x ] other: BMs

## 2020-10-13 NOTE — PROGRESS NOTE BEHAVIORAL HEALTH - NSBHCHARTREVIEWLAB_PSY_A_CORE FT
10-13    133<L>  |  103  |  16  ----------------------------<  109<H>  4.0   |  21<L>  |  0.43<L>    Ca    9.2      13 Oct 2020 07:23    TPro  8.5<H>  /  Alb  x   /  TBili  x   /  DBili  x   /  AST  x   /  ALT  x   /  AlkPhos  x   10-12

## 2020-10-13 NOTE — PROGRESS NOTE ADULT - ASSESSMENT
Patient s/p right thoracentesis.    No acute events overnight.  Pt states he feels better post procedure.     Now without supplement O2, no tachypnea or accessory muscle use.  O2sat is high 90s, other VSS.  Insertion site is CDI.    Grams stain shows no growth.  Culture and cytology pending.  Per Lights Criteria, pleural fluid is Exudative.      Plan  -f/u final culture and cytology results  -continue abx

## 2020-10-13 NOTE — PROGRESS NOTE ADULT - SUBJECTIVE AND OBJECTIVE BOX
INTERVAL HPI:  44M with ETOH Abuse and withdrawal, Alcohol Pancreatitis who presents to the ED with severe abdominal pain.  Found to have Necrotizing Pancreatitis, Septic  Shock, Pneumonia/ARDS with Hypoxic Respiratory failure. Admitted to ICU, had prolonged hospital course with Vent support. Eventually required Tracheostomy and Peg placement.  Also with acute metabolic Encephalopathy.  09/20/20:  Transferred to .    Not able to provide details due to encephalopathy.  09/22/20: 1500 ml paracentesis.  10/12/20: 2 litre right thoracentesis.    OVERNIGHT EVENTS:  More calm, awake and comfortable    Vital Signs Last 24 Hrs  T(C): 36.8 (13 Oct 2020 16:17), Max: 37.4 (12 Oct 2020 21:49)  T(F): 98.3 (13 Oct 2020 16:17), Max: 99.3 (12 Oct 2020 21:49)  HR: 105 (13 Oct 2020 17:25) (101 - 121)  BP: 100/69 (13 Oct 2020 16:17) (100/69 - 118/78)  BP(mean): 81 (12 Oct 2020 21:49) (81 - 88)  RR: 20 (13 Oct 2020 16:17) (18 - 22)  SpO2: 98% (13 Oct 2020 17:25) (95% - 99%)    PHYSICAL EXAM:  GEN:         Awake, responsive and comfortable.  HEENT:    Normal.    RESP:         no distress  CVS:          Regular rate and rhythm.   ABD:         Soft, non-tender, non-distended;     MEDICATIONS  (STANDING):  albuterol/ipratropium for Nebulization 3 milliLiter(s) Nebulizer every 6 hours  BACItracin   Ointment 1 Application(s) Topical two times a day  cefepime   IVPB 2000 milliGRAM(s) IV Intermittent every 8 hours  chlorhexidine 4% Liquid 1 Application(s) Topical <User Schedule>  clonazePAM  Tablet 1 milliGRAM(s) Oral two times a day  collagenase Ointment 1 Application(s) Topical daily  dextrose 5%. 1000 milliLiter(s) (50 mL/Hr) IV Continuous <Continuous>  dextrose 50% Injectable 12.5 Gram(s) IV Push once  dextrose 50% Injectable 25 Gram(s) IV Push once  dextrose 50% Injectable 25 Gram(s) IV Push once  enoxaparin Injectable 40 milliGRAM(s) SubCutaneous daily  folic acid 1 milliGRAM(s) Oral daily  hydrocortisone 2.5% Ointment 1 Application(s) Topical two times a day  influenza   Vaccine 0.5 milliLiter(s) IntraMuscular once  insulin lispro (HumaLOG) corrective regimen sliding scale   SubCutaneous every 6 hours  metoprolol tartrate 25 milliGRAM(s) Enteral Tube every 8 hours  multivitamin 1 Tablet(s) Oral daily  pantoprazole   Suspension 40 milliGRAM(s) Oral daily  QUEtiapine 250 milliGRAM(s) Oral <User Schedule>  QUEtiapine 100 milliGRAM(s) Oral <User Schedule>  sodium chloride 3%  Inhalation 4 milliLiter(s) Inhalation every 6 hours  thiamine 100 milliGRAM(s) Oral daily    MEDICATIONS  (PRN):  acetaminophen   Tablet .. 650 milliGRAM(s) Oral every 6 hours PRN Temp greater or equal to 38C (100.4F), Mild Pain (1 - 3)  dextrose 40% Gel 15 Gram(s) Oral once PRN Blood Glucose LESS THAN 70 milliGRAM(s)/deciliter  glucagon  Injectable 1 milliGRAM(s) IntraMuscular once PRN Glucose LESS THAN 70 milligrams/deciliter  LORazepam   Injectable 2 milliGRAM(s) IV Push every 4 hours PRN breakthrough agitation  metoprolol tartrate Injectable 5 milliGRAM(s) IV Push every 6 hours PRN if sbp>160 or HR >110 hold if SBP<100 or HR<60    LABS:                        9.0    12.20 )-----------( 338      ( 13 Oct 2020 07:23 )             27.7     10-13    133<L>  |  103  |  16  ----------------------------<  109<H>  4.0   |  21<L>  |  0.43<L>    Ca    9.2      13 Oct 2020 07:23    TPro  8.5<H>  /  Alb  x   /  TBili  x   /  DBili  x   /  AST  x   /  ALT  x   /  AlkPhos  x   10-12  < from: CT Chest No Cont (10.12.20 @ 17:12) >  EXAM:  CT CHEST                          PROCEDURE DATE:  10/12/2020      INTERPRETATION:  CLINICAL INFORMATION: Right pleural effusion. Follow-up assessment.    COMPARISON: 9/25/2020.    PROCEDURE:  CT of the Chest was performed without intravenous contrast.  Sagittal and coronal reformats were performed.    FINDINGS:    LUNGS AND AIRWAYS: Patent central airways.    Total atelectasis of the right lower lobe is identified. Atelectatic changes are also noted within the right upper lobe andright middle lobe. Underlying infiltrate, consolidation or mass lesion within the atelectatic lung parenchyma cannot be excluded. Interval groundglass opacities identified within the left upper lobe and linear scarlike opacities are identified withinthe left lower lobe.  PLEURA: Large right pleural effusion, increased in size. No left pleural effusion. No pneumothorax.  MEDIASTINUM AND MARSHALL: Midline tracheostomy. A new subcentimeter anterior mediastinal lymph node is noted.  VESSELS: Within normal limits.  HEART: Heart size is normal. No pericardial effusion.  CHEST WALL AND LOWER NECK: Within normal limits.  VISUALIZED UPPER ABDOMEN: Hepatosplenomegaly. Gastrostomy tube. Stranding of the upper abdominal mesenteric fat identified, stable. The adrenal glands appear unremarkable.  BONES: Chronic appearing fracture deformity of 2 left lower thoracic ribs noted. Metallic densities are identified adjacent to the posterior elements at the T11 vertebral level as well as T11-12 intervertebral disc space. Clinical correlation is suggested.    IMPRESSION: Interval increase in right pleural effusion. Near total atelectasis of the right lower lobe identified with atelectatic changes also noted within the right upper lobe and right middle lobe. See full discussion.    SCOTT HOUSE M.D., ATTENDING RADIOLOGIST  This document has been electronically signed. Oct 12 2020  5:37PM    ASSESSMENT AND PLAN:  ·	Hypoxic Respiratory failure.  ·	S/P tracheostomy.  ·	Bilateral pneumonia.  ·	Worsening right pleural effusion.  ·	S/P Septic Shock.  ·	Necrotizing Pancreatis.  ·	Acute metabolic Encephalopathy.  ·	Alcohol abuse.  ·	Klebsiella Bacteremia.  ·	Anemia    2 litre right thoracentesis yesterday.  More awake and comfortable.

## 2020-10-14 LAB
GLUCOSE BLDC GLUCOMTR-MCNC: 106 MG/DL — HIGH (ref 70–99)
GLUCOSE BLDC GLUCOMTR-MCNC: 122 MG/DL — HIGH (ref 70–99)

## 2020-10-14 PROCEDURE — 99232 SBSQ HOSP IP/OBS MODERATE 35: CPT

## 2020-10-14 PROCEDURE — 93010 ELECTROCARDIOGRAM REPORT: CPT

## 2020-10-14 PROCEDURE — 70371 SPEECH EVALUATION COMPLEX: CPT | Mod: 26

## 2020-10-14 PROCEDURE — 99231 SBSQ HOSP IP/OBS SF/LOW 25: CPT

## 2020-10-14 RX ORDER — QUETIAPINE FUMARATE 200 MG/1
200 TABLET, FILM COATED ORAL
Refills: 0 | Status: DISCONTINUED | OUTPATIENT
Start: 2020-10-14 | End: 2020-10-16

## 2020-10-14 RX ORDER — OXYCODONE AND ACETAMINOPHEN 5; 325 MG/1; MG/1
1 TABLET ORAL ONCE
Refills: 0 | Status: DISCONTINUED | OUTPATIENT
Start: 2020-10-14 | End: 2020-10-14

## 2020-10-14 RX ADMIN — QUETIAPINE FUMARATE 200 MILLIGRAM(S): 200 TABLET, FILM COATED ORAL at 22:35

## 2020-10-14 RX ADMIN — Medication 25 MILLIGRAM(S): at 05:11

## 2020-10-14 RX ADMIN — Medication 1 MILLIGRAM(S): at 16:47

## 2020-10-14 RX ADMIN — Medication 1 APPLICATION(S): at 17:40

## 2020-10-14 RX ADMIN — CEFEPIME 100 MILLIGRAM(S): 1 INJECTION, POWDER, FOR SOLUTION INTRAMUSCULAR; INTRAVENOUS at 05:15

## 2020-10-14 RX ADMIN — PANTOPRAZOLE SODIUM 40 MILLIGRAM(S): 20 TABLET, DELAYED RELEASE ORAL at 16:44

## 2020-10-14 RX ADMIN — OXYCODONE AND ACETAMINOPHEN 1 TABLET(S): 5; 325 TABLET ORAL at 06:12

## 2020-10-14 RX ADMIN — SODIUM CHLORIDE 4 MILLILITER(S): 9 INJECTION INTRAMUSCULAR; INTRAVENOUS; SUBCUTANEOUS at 11:42

## 2020-10-14 RX ADMIN — Medication 3 MILLILITER(S): at 05:49

## 2020-10-14 RX ADMIN — Medication 3 MILLILITER(S): at 17:00

## 2020-10-14 RX ADMIN — Medication 1 MILLIGRAM(S): at 05:22

## 2020-10-14 RX ADMIN — Medication 3 MILLILITER(S): at 11:42

## 2020-10-14 RX ADMIN — ENOXAPARIN SODIUM 40 MILLIGRAM(S): 100 INJECTION SUBCUTANEOUS at 16:44

## 2020-10-14 RX ADMIN — Medication 1 APPLICATION(S): at 16:49

## 2020-10-14 RX ADMIN — Medication 100 MILLIGRAM(S): at 16:50

## 2020-10-14 RX ADMIN — Medication 1 APPLICATION(S): at 16:43

## 2020-10-14 RX ADMIN — Medication 1 APPLICATION(S): at 05:19

## 2020-10-14 RX ADMIN — Medication 1 TABLET(S): at 16:45

## 2020-10-14 RX ADMIN — Medication 25 MILLIGRAM(S): at 16:43

## 2020-10-14 RX ADMIN — Medication 1 APPLICATION(S): at 05:11

## 2020-10-14 RX ADMIN — Medication 1 MILLIGRAM(S): at 17:40

## 2020-10-14 RX ADMIN — CHLORHEXIDINE GLUCONATE 1 APPLICATION(S): 213 SOLUTION TOPICAL at 06:45

## 2020-10-14 RX ADMIN — SODIUM CHLORIDE 4 MILLILITER(S): 9 INJECTION INTRAMUSCULAR; INTRAVENOUS; SUBCUTANEOUS at 17:00

## 2020-10-14 RX ADMIN — SODIUM CHLORIDE 4 MILLILITER(S): 9 INJECTION INTRAMUSCULAR; INTRAVENOUS; SUBCUTANEOUS at 05:49

## 2020-10-14 NOTE — PROGRESS NOTE ADULT - SUBJECTIVE AND OBJECTIVE BOX
CHIEF COMPLAINT: Trach tube downsized.   calmer but has moments of severe agitation requiring close observation.   no fever reported  no vomiting       PHYSICAL EXAM:    GENERAL: Thinly built, Trach and PEG +  HEENT: Thick secretions trach site. Trach tube  # 6  CHEST/LUNG: Coarse sounds bilaterally, Decreased air entry right base. No wheezing   HEART: Regular rate and rhythm; No murmur. + Tachycardic  ABDOMEN: Soft, Nontender, Nondistended; Bowel sounds present  EXTREMITIES:  , No clubbing, cyanosis, or edema  NERVOUS SYSTEM:  Limited exam. Was trying to talk but did not follow any commands.   Psychiatry: AA and calm at this time      OBJECTIVE DATA:       Vital Signs Last 24 Hrs  T(C): 37.4 (14 Oct 2020 05:10), Max: 37.4 (14 Oct 2020 05:10)  T(F): 99.3 (14 Oct 2020 05:10), Max: 99.3 (14 Oct 2020 05:10)  HR: 104 (14 Oct 2020 11:50) (104 - 120)  BP: 106/69 (14 Oct 2020 05:10) (100/69 - 108/56)  BP(mean): --  RR: 17 (14 Oct 2020 05:10) (17 - 20)  SpO2: 96% (14 Oct 2020 11:50) (96% - 99%)           Daily     Daily   LABS:                        9.0    12.20 )-----------( 338      ( 13 Oct 2020 07:23 )             27.7             10-13    133<L>  |  103  |  16  ----------------------------<  109<H>  4.0   |  21<L>  |  0.43<L>    Ca    9.2      13 Oct 2020 07:23                         CAPILLARY BLOOD GLUCOSE      POCT Blood Glucose.: 106 mg/dL (14 Oct 2020 05:10)      Culture - Body Fluid with Gram Stain (collected 10-13)  Source: .Body Fluid RIGHT THORACENTESIS  Gram Stain (prelim) (10-13):    polymorphonuclear leukocytes seen    No organisms seen    by cytocentrifuge  Preliminary Report (10-13):    No growth    Culture - Urine (collected 10-11)  Source: .Urine Clean Catch (Midstream)  Final Report (10-11):    <10,000 CFU/mL Normal Urogenital Poonam    Culture - Sputum (collected 10-10)  Source: .Sputum Sputum  Gram Stain (10-12):    Moderate Squamous epithelial cells per low power field    Moderate polymorphonuclear leukocytes per low power field    Moderate Gram Variable Rods per oil power field  Final Report (10-12):    Few Pseudomonas aeruginosa    Normal Respiratory Poonam absent  Organism: Pseudomonas aeruginosa (10-12)  Organism: Pseudomonas aeruginosa (10-12)    Sensitivities:      -  Amikacin: S <=16      -  Aztreonam: S <=4      -  Cefepime: S <=2      -  Ceftazidime: S 4      -  Ciprofloxacin: S <=0.25      -  Gentamicin: S <=2      -  Imipenem: S <=1      -  Levofloxacin: S <=0.5      -  Meropenem: S <=1      -  Piperacillin/Tazobactam: S <=8      -  Tobramycin: S <=2      Method Type: KEITH    Culture - Blood (collected 10-10)  Source: .Blood Blood-Peripheral  Preliminary Report (10-11):    No growth to date.    Culture - Blood (collected 10-10)  Source: .Blood Blood-Peripheral  Preliminary Report (10-11):    No growth to date.          Interval Radiology studies: reviewed     < from: Xray Pharynx & Speech w/ Cine/Video (10.14.20 @ 11:03) >  IMPRESSION:    Swallowing mechanism observed under fluoroscopy.    < end of copied text >    MEDICATIONS  (STANDING):  albuterol/ipratropium for Nebulization 3 milliLiter(s) Nebulizer every 6 hours  BACItracin   Ointment 1 Application(s) Topical two times a day  cefepime   IVPB 2000 milliGRAM(s) IV Intermittent every 8 hours  chlorhexidine 4% Liquid 1 Application(s) Topical <User Schedule>  clonazePAM  Tablet 1 milliGRAM(s) Oral two times a day  collagenase Ointment 1 Application(s) Topical daily  dextrose 5%. 1000 milliLiter(s) (50 mL/Hr) IV Continuous <Continuous>  dextrose 50% Injectable 12.5 Gram(s) IV Push once  dextrose 50% Injectable 25 Gram(s) IV Push once  dextrose 50% Injectable 25 Gram(s) IV Push once  enoxaparin Injectable 40 milliGRAM(s) SubCutaneous daily  folic acid 1 milliGRAM(s) Oral daily  hydrocortisone 2.5% Ointment 1 Application(s) Topical two times a day  influenza   Vaccine 0.5 milliLiter(s) IntraMuscular once  insulin lispro (HumaLOG) corrective regimen sliding scale   SubCutaneous every 6 hours  metoprolol tartrate 25 milliGRAM(s) Enteral Tube every 8 hours  multivitamin 1 Tablet(s) Oral daily  pantoprazole   Suspension 40 milliGRAM(s) Oral daily  QUEtiapine 200 milliGRAM(s) Oral <User Schedule>  QUEtiapine 100 milliGRAM(s) Oral <User Schedule>  sodium chloride 3%  Inhalation 4 milliLiter(s) Inhalation every 6 hours  thiamine 100 milliGRAM(s) Oral daily    MEDICATIONS  (PRN):  acetaminophen   Tablet .. 650 milliGRAM(s) Oral every 6 hours PRN Temp greater or equal to 38C (100.4F), Mild Pain (1 - 3)  dextrose 40% Gel 15 Gram(s) Oral once PRN Blood Glucose LESS THAN 70 milliGRAM(s)/deciliter  glucagon  Injectable 1 milliGRAM(s) IntraMuscular once PRN Glucose LESS THAN 70 milligrams/deciliter  LORazepam   Injectable 2 milliGRAM(s) IV Push every 4 hours PRN breakthrough agitation  metoprolol tartrate Injectable 5 milliGRAM(s) IV Push every 6 hours PRN if sbp>160 or HR >110 hold if SBP<100 or HR<60

## 2020-10-14 NOTE — PROGRESS NOTE BEHAVIORAL HEALTH - NSBHCHARTREVIEWLAB_PSY_A_CORE FT
10-13    133<L>  |  103  |  16  ----------------------------<  109<H>  4.0   |  21<L>  |  0.43<L>    Ca    9.2      13 Oct 2020 07:23

## 2020-10-14 NOTE — PROGRESS NOTE BEHAVIORAL HEALTH - NSBHFUPINTERVALHXFT_PSY_A_CORE
Patient was seen outside of the Radiology Department awaiting a study. He looked the best thus far - he engaged, made appropriate eye contact and communicated effectively via mouthing words, gestures.  The gist again is Patient wondering when he would be able to talk, eat / drink regularly and how much longer he has to spend in the hospital. Encouragement, positive feedback as to his progress and reassurance provided.

## 2020-10-14 NOTE — PROGRESS NOTE BEHAVIORAL HEALTH - SUMMARY
Persistent multifactorial delirium with difficulty weaning off of sedation and IV treatment in ICU with episodes of breakthrough agitation with high tolerance to medications. Prolonged hospital course.   - collateral from girlfriend: Patient and her have been together for 1.5 years. Pt lives with his elderly father who has physical disability (leg) and was taking care of him. Patient is not working. They went on vacation to [place] and para-sailed few weeks prior to admission. Patient has no formal prior psychiatric history. + had mandibular fracture and has a metal plate in his jaw. + alcohol abuse.  - MSE much better starting week of 10/12/2020

## 2020-10-14 NOTE — SWALLOW VFSS/MBS ASSESSMENT ADULT - COMMENTS
CT chest no contrast 10/12/2020 IMPRESSION: Interval increase in right pleural effusion. Near total atelectasis of the right lower lobe identified with atelectatic changes also noted within the right upper lobe and right middle lobe. See full discussion.    CT head no contrast 9/30/2020 Impression:1. Unremarkable noncontrast CT scan of the brain.

## 2020-10-14 NOTE — SWALLOW VFSS/MBS ASSESSMENT ADULT - ROSENBEK'S PENETRATION ASPIRATION SCALE
(7) contrast passes glottis, visible subglottic residue remains despite patient’s response (aspiration) (1) no aspiration, contrast does not enter airway

## 2020-10-14 NOTE — PROGRESS NOTE ADULT - SUBJECTIVE AND OBJECTIVE BOX
DEEPIKA RUCKER  MRN-[id number]    Follow Up:  ID following for pneumonia     Interval History: He is off antibiotics. More calm and cooperative though he gets frustrated due to difficulty in ability to communicate. swallowing eval done today     ROS:    [ ] Unobtainable because:  [X ] All other systems negative    Constitutional: no fever, no chills  Head: no trauma  Eyes: no vision changes, no eye pain  ENT:  no sore throat, no rhinorrhea  Cardiovascular:  no chest pain, no palpitation  Respiratory:  no SOB, no cough  GI:  no abd pain, no vomiting, no diarrhea  urinary: no dysuria, no hematuria, no flank pain  musculoskeletal:  no joint pain, no joint swelling  skin:  no rash  neurology:  no headache, no seizure, no change in mental status  psych: no anxiety, no depression         Allergies  No Known Allergies        ANTIMICROBIALS:  influenza   Vaccine 0.5 once      MEDICATIONS  (STANDING):  albuterol/ipratropium for Nebulization 3 every 6 hours  clonazePAM  Tablet 1 two times a day  dextrose 50% Injectable 12.5 once  dextrose 50% Injectable 25 once  dextrose 50% Injectable 25 once  enoxaparin Injectable 40 daily  influenza   Vaccine 0.5 once  insulin lispro (HumaLOG) corrective regimen sliding scale  every 6 hours  metoprolol tartrate 25 every 8 hours  pantoprazole   Suspension 40 daily  QUEtiapine 200 <User Schedule>  QUEtiapine 100 <User Schedule>      PRN  acetaminophen   Tablet .. 650 milliGRAM(s) Oral every 6 hours PRN  dextrose 40% Gel 15 Gram(s) Oral once PRN  glucagon  Injectable 1 milliGRAM(s) IntraMuscular once PRN  LORazepam   Injectable 2 milliGRAM(s) IV Push every 4 hours PRN  metoprolol tartrate Injectable 5 milliGRAM(s) IV Push every 6 hours PRN      Vital Signs Last 24 Hrs  T(F): 98 (10-14-20 @ 17:39), Max: 99.3 (10-14-20 @ 05:10)  HR: 120 (10-14-20 @ 17:39)  BP: 103/68 (10-14-20 @ 17:39)  RR: 20 (10-14-20 @ 17:39)  SpO2: 97% (10-14-20 @ 17:39) (96% - 99%)  Wt(kg): --    Physical Exam:  General:    NAD,  non toxic, breathing comfortably  Head: atraumatic, normocephalic  Eye: normal sclera and conjunctiva  ENT:    no oral lesions, neck supple  Cardio:     regular S1, S2,  no murmur  Respiratory:    still with rhonchi but on trach collar, not even on oxygen   abd:     soft,   BS +,   no tenderness, PEG site clean   :   no CVAT,  no suprapubic tenderness,     Musculoskeletal:   no joint swelling,   no edema  Skin:    psoriasis on face   Neurologic:     no focal deficit  psych: anxious but redirectable, seems frustrated with inability to talk     WBC Count: 12.20 K/uL (10-13 @ 07:23)  WBC Count: 10.47 K/uL (10-09 @ 17:18)  WBC Count: 10.95 K/uL (10-08 @ 10:00)                            9.0    12.20 )-----------( 338      ( 13 Oct 2020 07:23 )             27.7       10-13    133<L>  |  103  |  16  ----------------------------<  109<H>  4.0   |  21<L>  |  0.43<L>    Ca    9.2      13 Oct 2020 07:23        Creatinine Trend: 0.43<--, 0.49<--, 0.45<--, 0.49<--, 0.48<--, 0.51<--        MICROBIOLOGY:  Culture - Body Fluid with Gram Stain (10.13.20 @ 00:36)    Gram Stain:   polymorphonuclear leukocytes seen  No organisms seen  by cytocentrifuge    Specimen Source: .Body Fluid RIGHT THORACENTESIS    Culture Results:   No growth        .Urine Clean Catch (Midstream)  10-11-20   <10,000 CFU/mL Normal Urogenital Poonam  --  --      .Sputum Sputum  10-10-20   Few Pseudomonas aeruginosa  Normal Respiratory Poonam absent  --  Pseudomonas aeruginosa      .Blood Blood-Peripheral  10-10-20   No growth to date.  --  --      .Sputum Sputum  09-26-20   Moderate Pseudomonas aeruginosa  --  Pseudomonas aeruginosa      .Blood Blood-Peripheral  09-25-20   No Growth Final  --  --      Ascites Fl paracentesis ascites  09-22-20   No growth  --    No polymorphonuclear cells seen per low power field  White blood cells seen per oil power field  No organisms seen per oil power field  by cytocentrifuge      .Blood Blood-Peripheral  09-21-20   No Growth Final  --  --    Procalcitonin, Serum: 0.07 (10-11-20 @ 11:51)  Procalcitonin, Serum: 0.06 (10-10-20 @ 02:13)      RADIOLOGY:  < from: Xray Chest 1 View- PORTABLE-Routine (Xray Chest 1 View- PORTABLE-Routine .) (10.09.20 @ 14:16) >  IMPRESSION:  Right effusion with possible underlying infiltrate.    (I personally reviewed)

## 2020-10-14 NOTE — PROGRESS NOTE ADULT - ASSESSMENT
DEEPIKA RUCKER is a 44M PMH alcohol abuse, hx of alcohol withdrawal and alcohol pancreatitis who presented for abdominal pain. Admitted with necrotizing pancreatitis, sepsis, septic shock with course complicated by acute hypoxic respiratory failure secondary to PNA and ARDS requiring intubation and DTs requiring sedation and small bowel obstruction conservatively managed with NGT and NPO. Currently, s/p trach and PEG on 9/10 and treated for klebsiella bacteremia.  Intermittently having fevers, last fever 9/21, 101.0  WBC trending down, 10.95 today.   SAAG <1.1 which goes against portal hypertension and more in line with pancreatitis vs SBP vs some other cause   CXR from 9/21 with increased lung markings and effusions as well as trach  Blood culture from 9/10 was positive for klebsiella quite sensitive and one set from 9/12 is negative   Patient has multiple possible sources of fever:  Abdomen such as SBP, pancreatitis, hepatobiliary disease   Pulmonary: CXR very unclear but he is on trach collar thus unlikely   Skin and soft tissue: cellulitis of the arm (US didnt show DVT or thrombophlebitis)        Antibiotics this admission:  Vancomycin: 8/26-27; 9/16-20  Zosyn: 8/18-8/27  Meropenem: 8/23-9/2  ceftriaxone: 9/14-20  Cefazolin: 9/20->9/29  Cefepime 9/29->10/6; 10/9->10/13    9/23: Continues to respond to cefazolin, fluid analysis not consistent with SBP and negative culture thus far, heavily sedated though meds cut in half,  9/24: Spiked a fever, panculture and repeat imaging, Behavioral health consulted, ascites fluid culture negative   9/25: still spiking, getting CT c/a/p, continue cefazolin for cellulitis which is much better   9/29: still febrile, slowly peeling off sedatives, pseudomonas growing in sputum   9/30: Fever curve improving, CT head negative, planned for MRI brain, continue cefepime  10/7: Off antibiotics, s/p course of cefepime. Low grade fever, isolated. Unfortunately mental status remains poor.   10/8: remains off antibiotics, s/p course of cefepime, remains afebrile.  10/12: Pt spiked on 10/10, blood culture NGTD x2, sputum growing pseud S cefepime, CXR (I personally reviewed) showed large R sided effusion, planned for repeat CT with possible thoracentesis  10/14: afebrile, off antibiotics, had barium swallow today->recommendations: cont PEG tube feeding with pleasure feeds mechanical soft with nectar thick liquids    Suggestions--  #Fever/pneumonia (resolved)      -monitor off antibiotics   -f/u all culture results from thoracentesis  -with next fever, repeat 2 sets of blood cultures     #Psoriasis   -the lesions on face are getting bigger and spreading   -would suggest pimecrolimus 1% cream or desonide->since not formulary start hydrocortisone 2.5% ointment BID     #Need for Influenza Vaccination   -Given flu season and patient has prolonged hospitalization with multiple comorbidities,  suggest giving him a flu shot     Will sign off. Please call with questions.  Discussed with Dr. Deleon.    Angelito Morrison DO  Cell: [phone]  Pager [phone]  Infectious Disease Attending  After 5pm/weekends please call [phone] for all inquiries and new consults

## 2020-10-14 NOTE — PROGRESS NOTE BEHAVIORAL HEALTH - NSBHCONSULTMEDS_PSY_A_CORE FT
COURSE OF TX: methadone weaned off as of 9/26/20; increased Seroquel 50mg PO bid to 100mg via PEG BID and increase Valium to 5mg PO bid on 9/28  - continued restlessness; increase Seroquel 100bid to 150 bid on 9/29/10; decrease Seroquel from 200 qd to 150qd on 10/8/20 (trying to find lowest effective dose with ultimate goal is to minimize / maybe even wean off Patient from an antipsychotic). Decrease HS dose of Seroquel from 300mg to 250mg PO on 10/12/20 and 200 qhs on 1014/20 (ie. restlessness causes found to be environmental, need-based in part); decrease AM dose of Seroquel from 150 to 100mg PO on 10/13/20  - PRN Ativan dose increased to 2mg with continued need so DC Valium on 9/30/20 and use Ativan 2mg PO q8am, q4pm but HOLD for sedation with goal to stabilize agitation to the point that Patient will not need any PRNs and will have episodes of being awake  - PO Ativan ineffective; convert to Klonopin equivalent (Ativan 2mg = Klonopin 1mg) and increase to Klonopin 1.5mg PO via PEG BID on 10/6/20, REDUCE to 1mg PO bid on 10/13/20 with goal to simplify regimen and limit overmedicated states

## 2020-10-14 NOTE — PROGRESS NOTE ADULT - NSHPATTENDINGPLANDISCUSS_GEN_ALL_CORE
Dr. Sarkar
Dr. Deleon
Dr. Deleon
Dr. Sarkar
Dr. Sarkar, Dr. Agarwal
Dr. Sarkar, Dr. Agarwal, RN Raeann
Dr. Stover
MICU Team
MICU Team

## 2020-10-14 NOTE — SWALLOW VFSS/MBS ASSESSMENT ADULT - SLP GENERAL OBSERVATIONS
pt seen sitting in the hausted chair, alert and oriented 3-4. pt on trach collar and able to follow directions. noted weak voice with trach occlusion pt seen sitting in the hausted chair, alert and oriented 3-4. pt on trach collar and able to follow directions. noted weak voice with trach occlusion.

## 2020-10-14 NOTE — PROGRESS NOTE BEHAVIORAL HEALTH - NSBHCHARTREVIEWVS_PSY_A_CORE FT
T(C): 37.4 (10-14-20 @ 05:10), Max: 37.4 (10-14-20 @ 05:10)  HR: 104 (10-14-20 @ 11:50) (104 - 120)  BP: 106/69 (10-14-20 @ 05:10) (100/69 - 108/56)  RR: 17 (10-14-20 @ 05:10) (17 - 20)  SpO2: 96% (10-14-20 @ 11:50) (96% - 99%)

## 2020-10-14 NOTE — SWALLOW VFSS/MBS ASSESSMENT ADULT - H & P REVIEW
yes/Patient is a 44M with a PMH of EtOH abuse and withdrawal, alcohol pancreatitis who presents to the ED with severe abdominal pain.  Patient states that for the past three days he has been on a drinking binge, drinking 5 beers and 5 shots of liquor per day.  Last drink reportedly yesterday.  Patient reports that he woke up today and had severe generalized abdominal pain with multiple episodes of nonbloody, nonbilious vomiting.  Patient also complaining of nausea and generalized tremors.  Vitals stable, labs show leukocytosis, lactic acidosis, and severely elevated lipase levels.  CT shows pancreatitis with early necrosis in the pancreatic body.  Will admit to floor.  hospital course complicated by RRT on 8/18/2020 Patient exhibiting worsening withdrawal, on ativan and phenobarbital. Tachycardic to the 140s, BP elevated. CIWA score - 21 Consulted ICU for eval - who accepted the patient for transfer Transferred to ICU for severe alcohol withdrawal symptoms. pt intubated 8/23/2020 2/2 Desaturating to 80's. CXR done showed worsening pneumonia  Now with worsening DTs, acute hypoxic respiratory failure requiring intubation secondary to PNA.  small bowel obstruction conservatively managed with NGT and NPO. Currently, s/p trach and PEG on 9/10 and treated for klebsiella bacteremia. pt currently on 30% trach collar, trach changed to #6 shiley cuffed, cuff deflated. on 10/11/2020. Patient is a 44M with a PMH of EtOH abuse and withdrawal, alcohol pancreatitis who presents to the ED with severe abdominal pain.  Patient states that for the past three days he has been on a drinking binge, drinking 5 beers and 5 shots of liquor per day.  Last drink reportedly yesterday.  Patient reports that he woke up today and had severe generalized abdominal pain with multiple episodes of nonbloody, nonbilious vomiting.  Patient also complaining of nausea and generalized tremors.  Vitals stable, labs show leukocytosis, lactic acidosis, and severely elevated lipase levels.  CT shows pancreatitis with early necrosis in the pancreatic body.  Will admit to floor.  hospital course complicated by RRT on 8/18/2020 Patient exhibiting worsening withdrawal, on ativan and phenobarbital. Tachycardic to the 140s, BP elevated. CIWA score - 21 Consulted ICU for eval - who accepted the patient for transfer. admitted to ICU for severe alcohol withdrawal symptoms. pt intubated 8/23/2020 2/2 Desaturating to 80's. CXR done showed worsening pneumonia  Now with worsening DTs, acute hypoxic respiratory failure requiring intubation secondary to PNA and small bowel obstruction -conservatively managed with NGT and NPO. Currently, s/p trach and PEG on 9/10 and treated for klebsiella bacteremia. pt currently on 30% trach collar, and trach changed to #6 shiley cuffed, cuff deflated. on 10/11/2020./yes

## 2020-10-14 NOTE — PROGRESS NOTE ADULT - ASSESSMENT
44M EtOH pancreatitis p/w abdominal pain 8/17/20, found to have necrotizing pancreatitis c/b septic shock and respiratory failure secondary to PNA and ARDS requiring intubation and DTs requiring sedation and small bowel obstruction conservatively managed with NGT and NPO for which he was admitted to ICU. Currently, s/p trach and PEG on 9/10 and treated for klebsiella bacteremia.    downgraded to  floor 9/20/20 from ICU    Assessment and Plan:      #Aspiration right sided pneumonitis/pneumonia. procalcitonin negative x 2. recurrent aspirations likely. aspiration precautions. sputum +ve pseudomonas. Discussed with ID>>DC iv cefepime. Change feeding to bolus to decrease aspiration risk.     #Diarrhea  could be multifactorial, --PEG feeds, abx, pancreatic insufficiency, also On laxative  Off miralax.       #unstageable pressure ulcers x 2 on sacral area ,    -vascular consult appreciated. cont current care.     #s/p necrotizing pancreatitis , s/p septic shock , resolved     -RLE showing no dvt , cellulitis improved  -CT Chest/Abdomen/Pelvis ordered   - Moderate right and small left pleural effusion.  Bilateral patchy airspace disease has improved compared to the prior.  Improving pancreatitis with decreased size of the pancreas as well as improved peripancreatic edema. Small collection adjacent to the undersurface of the tail is also decreased. No evidence of necrosis.      #Metabolic encephalopathy   AGITATION   hepatic vs. ICU delirium vs sepisis   patient awake , mouthing words to staff . agitated at times    d/angela Precedex since 9/15 in ICU  -diazepam, Methadone and Seroquel - were tapered. Psychiatrist following. Tapering seroquel slowly.   reorientation.   discussed with swallow eval>>. start small pleasure dysphagia diet feeding with strict aspiration precautions.       #Acute respiratory failure secondary to PNA/ARDS.   s/p TRACH 9/10/20   continue with frequent suctioning/pulmonary toilet  Right pleural effusion. s/p tap. no growth fluid so far.      #Ascites , abd is soft.   distended gallbladder and ascites seen on CT, abd US neg for acute cholecystitis   s/p paracentesis 9/22 by IR   ABD US showing worsening ascites, s/p paracentesis by IR 9/22 with 1.5L straw colored fluid removed -cultures did not show any specific organisms  fluid analysis of paracentesis not consistent with SBP.   9/21 KUB shows nonspecific bowel gas pattern without e/o obstruction or ileus   Blood culture from 9/10 was positive for klebsiella quite sensitive and one set from 9/12 is negative, 9/25 repeat blood cultures negative, sputum cultures from 9/26- showing pseudomonas aeruginosa   SAAG < 1.1 going against portal HTN       #s/p PEG placement 9/10/20    s/p SBO which was treated conservatively   Ileus.      #Hypokalememia-- repleted    #Hypomagnesemia --repleted    #Hypophosphatemia --repleted    #normocytic anemia , probably related to sepsis and acute illness   requiring transfusion on 9/17/20  follow anemia work-up   transfuse prn of H/H <7/21      hypercalcemia   - 2/2 to immobilization vs vitamin d supplementation although levels are only 25   - s/p pamidronate x 1    Vitamin D insufficiency. cont supplement.     SEVERE DECONDITIONING d/t prolonged hospitalization   PT  aggressively while inpatient.     #Preventative measures   -dvt ppx.   fall, aspiration precautions  Discussed with nursing staff.

## 2020-10-14 NOTE — PROGRESS NOTE ADULT - ASSESSMENT
Subjective Complaints:  Historian:             Vital Signs Last 24 Hrs  T(C): 36.7 (14 Oct 2020 17:39), Max: 37.4 (14 Oct 2020 05:10)  T(F): 98 (14 Oct 2020 17:39), Max: 99.3 (14 Oct 2020 05:10)  HR: 120 (14 Oct 2020 17:39) (104 - 120)  BP: 103/68 (14 Oct 2020 17:39) (103/68 - 109/70)  BP(mean): --  RR: 20 (14 Oct 2020 17:39) (17 - 20)  SpO2: 97% (14 Oct 2020 17:39) (96% - 99%)    GENERAL PHYSICAL EXAM:  General:  Appears stated age, well-groomed, well-nourished, no distress  HEENT:  NC/AT, patent nares w/ pink mucosa, OP clear w/o lesions, PERRL, EOMI, conjunctivae clear, no thyromegaly, nodules, adenopathy, no JVD  Chest:  Full & symmetric excursion, no increased effort, breath sounds clear  Cardiovascular:  Regular rhythm, S1, S2, no murmur/rub/S3/S4, no carotid/femoral/abdominal bruit, radial/pedal pulses 2+, no edema  Abdomen:  Soft, non-tender, non-distended, normoactive bowel sounds, no HSM  Extremities:  Gait & station:   Digits:   Nails:   Joints, Bones, Muscles:   ROM:   Stability:  Skin:  No rash/erythema/ecchymoses/petechiae/wounds/abscess/warm/dry  Musculoskeletal:  Full ROM in all joints w/o swelling/tenderness/effusion        LABS:                        9.0    12.20 )-----------( 338      ( 13 Oct 2020 07:23 )             27.7     10-13    133<L>  |  103  |  16  ----------------------------<  109<H>  4.0   |  21<L>  |  0.43<L>    Ca    9.2      13 Oct 2020 07:23            RADIOLOGY & ADDITIONAL STUDIES:        Neurology Progress Note:      Mental Status: awake alert speech fluent   follows commands       Cranial Nerves:2 /12 intact      Motor:    restless in bed moves arm leg         Sensory: intact      Cerebellar: defrd      Gait: defrd      Assesment/Plan:  s/p trach and peg feeding restless in bed  no seizure rash less on face on thiamine s/p etoh pancreatitis delerium

## 2020-10-14 NOTE — PROGRESS NOTE ADULT - SUBJECTIVE AND OBJECTIVE BOX
INTERVAL HPI:  44M with ETOH Abuse and withdrawal, Alcohol Pancreatitis who presents to the ED with severe abdominal pain.  Found to have Necrotizing Pancreatitis, Septic  Shock, Pneumonia/ARDS with Hypoxic Respiratory failure. Admitted to ICU, had prolonged hospital course with Vent support. Eventually required Tracheostomy and Peg placement.  Also with acute metabolic Encephalopathy.  09/20/20:  Transferred to .    Not able to provide details due to encephalopathy.  09/22/20: 1500 ml paracentesis.  10/12/20: 2 litre right thoracentesis.    OVERNIGHT EVENTS:  Awake responsive and comfortable.    Vital Signs Last 24 Hrs  T(C): 37.4 (14 Oct 2020 05:10), Max: 37.4 (14 Oct 2020 05:10)  T(F): 99.3 (14 Oct 2020 05:10), Max: 99.3 (14 Oct 2020 05:10)  HR: 104 (14 Oct 2020 15:24) (104 - 120)  BP: 109/70 (14 Oct 2020 15:24) (106/69 - 109/70)  BP(mean): --  RR: 18 (14 Oct 2020 15:24) (17 - 18)  SpO2: 96% (14 Oct 2020 15:24) (96% - 99%)    PHYSICAL EXAM:  GEN:         Awake, responsive and comfortable.  HEENT:     Trach  RESP:       no distress  CVS:          Regular rate and rhythm.   ABD:         Soft, non-tender, non-distended;     MEDICATIONS  (STANDING):  albuterol/ipratropium for Nebulization 3 milliLiter(s) Nebulizer every 6 hours  BACItracin   Ointment 1 Application(s) Topical two times a day  chlorhexidine 4% Liquid 1 Application(s) Topical <User Schedule>  clonazePAM  Tablet 1 milliGRAM(s) Oral two times a day  collagenase Ointment 1 Application(s) Topical daily  dextrose 5%. 1000 milliLiter(s) (50 mL/Hr) IV Continuous <Continuous>  dextrose 50% Injectable 12.5 Gram(s) IV Push once  dextrose 50% Injectable 25 Gram(s) IV Push once  dextrose 50% Injectable 25 Gram(s) IV Push once  enoxaparin Injectable 40 milliGRAM(s) SubCutaneous daily  folic acid 1 milliGRAM(s) Oral daily  hydrocortisone 2.5% Ointment 1 Application(s) Topical two times a day  influenza   Vaccine 0.5 milliLiter(s) IntraMuscular once  insulin lispro (HumaLOG) corrective regimen sliding scale   SubCutaneous every 6 hours  metoprolol tartrate 25 milliGRAM(s) Enteral Tube every 8 hours  multivitamin 1 Tablet(s) Oral daily  pantoprazole   Suspension 40 milliGRAM(s) Oral daily  QUEtiapine 200 milliGRAM(s) Oral <User Schedule>  QUEtiapine 100 milliGRAM(s) Oral <User Schedule>  sodium chloride 3%  Inhalation 4 milliLiter(s) Inhalation every 6 hours  thiamine 100 milliGRAM(s) Oral daily    MEDICATIONS  (PRN):  acetaminophen   Tablet .. 650 milliGRAM(s) Oral every 6 hours PRN Temp greater or equal to 38C (100.4F), Mild Pain (1 - 3)  dextrose 40% Gel 15 Gram(s) Oral once PRN Blood Glucose LESS THAN 70 milliGRAM(s)/deciliter  glucagon  Injectable 1 milliGRAM(s) IntraMuscular once PRN Glucose LESS THAN 70 milligrams/deciliter  LORazepam   Injectable 2 milliGRAM(s) IV Push every 4 hours PRN breakthrough agitation  metoprolol tartrate Injectable 5 milliGRAM(s) IV Push every 6 hours PRN if sbp>160 or HR >110 hold if SBP<100 or HR<60    LABS:                        9.0    12.20 )-----------( 338      ( 13 Oct 2020 07:23 )             27.7     10-13    133<L>  |  103  |  16  ----------------------------<  109<H>  4.0   |  21<L>  |  0.43<L>    Ca    9.2      13 Oct 2020 07:23    ASSESSMENT AND PLAN:  ·	Hypoxic Respiratory failure.  ·	S/P tracheostomy.  ·	Bilateral pneumonia.  ·	Worsening right pleural effusion.  ·	S/P Septic Shock.  ·	Necrotizing Pancreatis.  ·	Acute metabolic Encephalopathy.  ·	Alcohol abuse.  ·	Klebsiella Bacteremia.  ·	Anemia    Swallow evaluation noted.  Respiratory status better,  Will try for trach  CAP tomorrow,

## 2020-10-14 NOTE — SWALLOW VFSS/MBS ASSESSMENT ADULT - DIAGNOSTIC IMPRESSIONS
oropharyngeal dysphagia marked by mild premature spill with thin liquids, delay in swallow trigger with passive overflow to PFS, decreased epiglottic deflection, reduced BOT retraction causing residue in the valleculae, and decreased laryngeal elevation with reduced CP opening causing residue in the PFS- mostly cleared with double swallow. decreased laryngeal closure causing trace penetration with thin liquid and subsequent aspiration- pt unable to clear despite cough. no aspiration noted during study with nectar thick liquids and soft solid.

## 2020-10-15 LAB
ANION GAP SERPL CALC-SCNC: 7 MMOL/L — SIGNIFICANT CHANGE UP (ref 5–17)
BUN SERPL-MCNC: 21 MG/DL — SIGNIFICANT CHANGE UP (ref 7–23)
CALCIUM SERPL-MCNC: 8.6 MG/DL — SIGNIFICANT CHANGE UP (ref 8.5–10.1)
CHLORIDE SERPL-SCNC: 100 MMOL/L — SIGNIFICANT CHANGE UP (ref 96–108)
CO2 SERPL-SCNC: 26 MMOL/L — SIGNIFICANT CHANGE UP (ref 22–31)
CREAT SERPL-MCNC: 0.4 MG/DL — LOW (ref 0.5–1.3)
CULTURE RESULTS: SIGNIFICANT CHANGE UP
CULTURE RESULTS: SIGNIFICANT CHANGE UP
GLUCOSE BLDC GLUCOMTR-MCNC: 115 MG/DL — HIGH (ref 70–99)
GLUCOSE BLDC GLUCOMTR-MCNC: 123 MG/DL — HIGH (ref 70–99)
GLUCOSE BLDC GLUCOMTR-MCNC: 126 MG/DL — HIGH (ref 70–99)
GLUCOSE BLDC GLUCOMTR-MCNC: 126 MG/DL — HIGH (ref 70–99)
GLUCOSE SERPL-MCNC: 117 MG/DL — HIGH (ref 70–99)
HCT VFR BLD CALC: 28.1 % — LOW (ref 39–50)
HGB BLD-MCNC: 9.3 G/DL — LOW (ref 13–17)
MCHC RBC-ENTMCNC: 29 PG — SIGNIFICANT CHANGE UP (ref 27–34)
MCHC RBC-ENTMCNC: 33.1 GM/DL — SIGNIFICANT CHANGE UP (ref 32–36)
MCV RBC AUTO: 87.5 FL — SIGNIFICANT CHANGE UP (ref 80–100)
NON-GYNECOLOGICAL CYTOLOGY STUDY: SIGNIFICANT CHANGE UP
NRBC # BLD: 0 /100 WBCS — SIGNIFICANT CHANGE UP (ref 0–0)
PLATELET # BLD AUTO: 230 K/UL — SIGNIFICANT CHANGE UP (ref 150–400)
POTASSIUM SERPL-MCNC: 3.5 MMOL/L — SIGNIFICANT CHANGE UP (ref 3.5–5.3)
POTASSIUM SERPL-SCNC: 3.5 MMOL/L — SIGNIFICANT CHANGE UP (ref 3.5–5.3)
RBC # BLD: 3.21 M/UL — LOW (ref 4.2–5.8)
RBC # FLD: 14.9 % — HIGH (ref 10.3–14.5)
SODIUM SERPL-SCNC: 133 MMOL/L — LOW (ref 135–145)
SPECIMEN SOURCE: SIGNIFICANT CHANGE UP
SPECIMEN SOURCE: SIGNIFICANT CHANGE UP
WBC # BLD: 6.59 K/UL — SIGNIFICANT CHANGE UP (ref 3.8–10.5)
WBC # FLD AUTO: 6.59 K/UL — SIGNIFICANT CHANGE UP (ref 3.8–10.5)

## 2020-10-15 PROCEDURE — 99232 SBSQ HOSP IP/OBS MODERATE 35: CPT

## 2020-10-15 PROCEDURE — 99231 SBSQ HOSP IP/OBS SF/LOW 25: CPT

## 2020-10-15 RX ORDER — QUETIAPINE FUMARATE 200 MG/1
50 TABLET, FILM COATED ORAL DAILY
Refills: 0 | Status: COMPLETED | OUTPATIENT
Start: 2020-10-15 | End: 2020-10-17

## 2020-10-15 RX ORDER — ONDANSETRON 8 MG/1
4 TABLET, FILM COATED ORAL EVERY 8 HOURS
Refills: 0 | Status: DISCONTINUED | OUTPATIENT
Start: 2020-10-15 | End: 2020-10-26

## 2020-10-15 RX ORDER — TRAMADOL HYDROCHLORIDE 50 MG/1
25 TABLET ORAL EVERY 8 HOURS
Refills: 0 | Status: DISCONTINUED | OUTPATIENT
Start: 2020-10-15 | End: 2020-10-22

## 2020-10-15 RX ADMIN — CHLORHEXIDINE GLUCONATE 1 APPLICATION(S): 213 SOLUTION TOPICAL at 05:39

## 2020-10-15 RX ADMIN — QUETIAPINE FUMARATE 100 MILLIGRAM(S): 200 TABLET, FILM COATED ORAL at 08:14

## 2020-10-15 RX ADMIN — TRAMADOL HYDROCHLORIDE 25 MILLIGRAM(S): 50 TABLET ORAL at 22:55

## 2020-10-15 RX ADMIN — Medication 100 MILLIGRAM(S): at 12:05

## 2020-10-15 RX ADMIN — Medication 1 APPLICATION(S): at 17:26

## 2020-10-15 RX ADMIN — Medication 650 MILLIGRAM(S): at 15:39

## 2020-10-15 RX ADMIN — Medication 1 MILLIGRAM(S): at 05:51

## 2020-10-15 RX ADMIN — Medication 25 MILLIGRAM(S): at 05:38

## 2020-10-15 RX ADMIN — Medication 650 MILLIGRAM(S): at 16:28

## 2020-10-15 RX ADMIN — Medication 1 TABLET(S): at 12:05

## 2020-10-15 RX ADMIN — Medication 3 MILLILITER(S): at 11:29

## 2020-10-15 RX ADMIN — QUETIAPINE FUMARATE 200 MILLIGRAM(S): 200 TABLET, FILM COATED ORAL at 22:23

## 2020-10-15 RX ADMIN — Medication 3 MILLILITER(S): at 23:24

## 2020-10-15 RX ADMIN — Medication 1 APPLICATION(S): at 05:39

## 2020-10-15 RX ADMIN — TRAMADOL HYDROCHLORIDE 25 MILLIGRAM(S): 50 TABLET ORAL at 22:22

## 2020-10-15 RX ADMIN — Medication 3 MILLILITER(S): at 17:06

## 2020-10-15 RX ADMIN — Medication 1 APPLICATION(S): at 05:42

## 2020-10-15 RX ADMIN — Medication 25 MILLIGRAM(S): at 15:40

## 2020-10-15 RX ADMIN — Medication 1 MILLIGRAM(S): at 17:25

## 2020-10-15 RX ADMIN — Medication 3 MILLILITER(S): at 05:29

## 2020-10-15 RX ADMIN — Medication 1 APPLICATION(S): at 05:38

## 2020-10-15 RX ADMIN — Medication 25 MILLIGRAM(S): at 01:25

## 2020-10-15 RX ADMIN — Medication 25 MILLIGRAM(S): at 22:23

## 2020-10-15 RX ADMIN — Medication 650 MILLIGRAM(S): at 04:54

## 2020-10-15 RX ADMIN — ENOXAPARIN SODIUM 40 MILLIGRAM(S): 100 INJECTION SUBCUTANEOUS at 12:04

## 2020-10-15 RX ADMIN — Medication 1 MILLIGRAM(S): at 12:07

## 2020-10-15 RX ADMIN — Medication 1 APPLICATION(S): at 17:31

## 2020-10-15 RX ADMIN — PANTOPRAZOLE SODIUM 40 MILLIGRAM(S): 20 TABLET, DELAYED RELEASE ORAL at 12:07

## 2020-10-15 RX ADMIN — ONDANSETRON 4 MILLIGRAM(S): 8 TABLET, FILM COATED ORAL at 17:26

## 2020-10-15 RX ADMIN — Medication 3 MILLILITER(S): at 00:13

## 2020-10-15 RX ADMIN — Medication 650 MILLIGRAM(S): at 04:28

## 2020-10-15 NOTE — PROGRESS NOTE BEHAVIORAL HEALTH - NSBHFUPINTERVALHXFT_PSY_A_CORE
Interval events: has been afebrile, off antibiotics. Pt s/p Barium swallow study with recommendations to cont PEG tube feeding with pleasure feeds mechanical soft with nectar thick liquids. QTc 433 on repeat EKG. Tolerating the weaning off of the Seroquel. Agitation continues to be "need based" and stemming from Pt's inability to communicate his needs due to his trach.

## 2020-10-15 NOTE — PROGRESS NOTE ADULT - ASSESSMENT
Subjective Complaints:  Historian:             Vital Signs Last 24 Hrs  T(C): 37.9 (15 Oct 2020 16:37), Max: 38.1 (15 Oct 2020 05:06)  T(F): 100.3 (15 Oct 2020 16:37), Max: 100.6 (15 Oct 2020 05:06)  HR: 116 (15 Oct 2020 17:25) (100 - 122)  BP: 108/61 (15 Oct 2020 16:00) (108/61 - 120/75)  BP(mean): --  RR: 18 (15 Oct 2020 16:00) (18 - 19)  SpO2: 97% (15 Oct 2020 17:25) (97% - 98%)    GENERAL PHYSICAL EXAM:  General:  Appears stated age, well-groomed, well-nourished, no distress  HEENT:  NC/AT, patent nares w/ pink mucosa, OP clear w/o lesions, PERRL, EOMI, conjunctivae clear, no thyromegaly, nodules, adenopathy, no JVD  Chest:  Full & symmetric excursion, no increased effort, breath sounds clear  Cardiovascular:  Regular rhythm, S1, S2, no murmur/rub/S3/S4, no carotid/femoral/abdominal bruit, radial/pedal pulses 2+, no edema  Abdomen:  Soft, non-tender, non-distended, normoactive bowel sounds, no HSM  Extremities:  Gait & station:   Digits:   Nails:   Joints, Bones, Muscles:   ROM:   Stability:  Skin:  No rash/erythema/ecchymoses/petechiae/wounds/abscess/warm/dry  Musculoskeletal:  Full ROM in all joints w/o swelling/tenderness/effusion        LABS:                        9.3    6.59  )-----------( 230      ( 15 Oct 2020 14:31 )             28.1     10-15    133<L>  |  100  |  21  ----------------------------<  117<H>  3.5   |  26  |  0.40<L>    Ca    8.6      15 Oct 2020 14:31            RADIOLOGY & ADDITIONAL STUDIES:        Neurology Progress Note:      Mental Status: awaek alert speech fluent better now       Cranial Nerves: 2 /12 intact       Motor:   arm leg 3/5         Sensory:intact      Cerebellar: defrd      Gait:defrd      Assesment/Plan:  s/p delerium better now no seizure no rash  s/p etoh pancreatitis  for rehab s/p  trach and peg swallowing eval on thiamine

## 2020-10-15 NOTE — PROGRESS NOTE ADULT - ASSESSMENT
44M EtOH pancreatitis p/w abdominal pain 8/17/20, found to have necrotizing pancreatitis c/b septic shock and respiratory failure secondary to PNA and ARDS requiring intubation and DTs requiring sedation and small bowel obstruction conservatively managed with NGT and NPO for which he was admitted to ICU. Currently, s/p trach and PEG on 9/10 and treated for klebsiella bacteremia.    downgraded to  floor 9/20/20 from ICU    Assessment and Plan:      #Aspiration right sided pneumonitis/pneumonia. + fever likely from recurrent aspirations. aspiration precautions. cont bolus tube feeding and  pleasure oral feeding with dysphagia diet. Follow labs today and tomorrow. Updated ID Dr Morrison.     #Diarrhea  could be multifactorial, --PEG feeds, abx, pancreatic insufficiency, also On laxative  Off miralax.       #unstageable pressure ulcers x 2 on sacral area ,    -vascular consult appreciated. cont current care.     #s/p necrotizing pancreatitis , s/p septic shock , resolved     -RLE showing no dvt , cellulitis improved  -CT Chest/Abdomen/Pelvis ordered   - Moderate right and small left pleural effusion.  Bilateral patchy airspace disease has improved compared to the prior.  Improving pancreatitis with decreased size of the pancreas as well as improved peripancreatic edema. Small collection adjacent to the undersurface of the tail is also decreased. No evidence of necrosis.      #Metabolic encephalopathy   AGITATION   hepatic vs. ICU delirium vs sepisis   patient awake , mouthing words to staff . agitated at times    d/angela Precedex since 9/15 in ICU  -Psychiatrist following. cont seroquel and klonopin as recommended.       #Acute respiratory failure secondary to PNA/ARDS.   s/p TRACH 9/10/20   trach capped today. follow for next 24 hours and if tolerates it, trach might come out tomorrow. discussed with Dr Moody. cont NC oxygen meanwhile.     #Ascites , abd is soft.   distended gallbladder and ascites seen on CT, abd US neg for acute cholecystitis   s/p paracentesis 9/22 by IR   ABD US showing worsening ascites, s/p paracentesis by IR 9/22 with 1.5L straw colored fluid removed -cultures did not show any specific organisms  fluid analysis of paracentesis not consistent with SBP.   9/21 KUB shows nonspecific bowel gas pattern without e/o obstruction or ileus   Blood culture from 9/10 was positive for klebsiella quite sensitive and one set from 9/12 is negative, 9/25 repeat blood cultures negative, sputum cultures from 9/26- showing pseudomonas aeruginosa   SAAG < 1.1 going against portal HTN       #s/p PEG placement 9/10/20    s/p SBO which was treated conservatively   Ileus.      #Hypokalememia-- repleted    #Hypomagnesemia --repleted    #Hypophosphatemia --repleted    #normocytic anemia , probably related to sepsis and acute illness   requiring transfusion on 9/17/20  follow anemia work-up   transfuse prn of H/H <7/21      hypercalcemia   - 2/2 to immobilization vs vitamin d supplementation although levels are only 25   - s/p pamidronate x 1    Vitamin D insufficiency. cont supplement.     SEVERE DECONDITIONING d/t prolonged hospitalization   PT  aggressively while inpatient.     #Preventative measures   -dvt ppx.   fall, aspiration precautions  Discussed with nursing staff.

## 2020-10-15 NOTE — PROGRESS NOTE ADULT - SUBJECTIVE AND OBJECTIVE BOX
INTERVAL HPI:  44M with ETOH Abuse and withdrawal, Alcohol Pancreatitis who presents to the ED with severe abdominal pain.  Found to have Necrotizing Pancreatitis, Septic  Shock, Pneumonia/ARDS with Hypoxic Respiratory failure. Admitted to ICU, had prolonged hospital course with Vent support. Eventually required Tracheostomy and Peg placement.  Also with acute metabolic Encephalopathy.  09/20/20:  Transferred to .    Not able to provide details due to encephalopathy.  09/22/20: 1500 ml paracentesis.  10/12/20: 2 litre right thoracentesis.    OVERNIGHT EVENTS:  Awake and comfortable    Vital Signs Last 24 Hrs  T(C): 37.8 (15 Oct 2020 10:43), Max: 38.1 (15 Oct 2020 05:06)  T(F): 100 (15 Oct 2020 10:43), Max: 100.6 (15 Oct 2020 05:06)  HR: 102 (15 Oct 2020 11:01) (102 - 122)  BP: 120/75 (15 Oct 2020 10:43) (103/68 - 120/75)  BP(mean): --  RR: 18 (15 Oct 2020 10:43) (18 - 20)  SpO2: 97% (15 Oct 2020 10:43) (96% - 99%)    PHYSICAL EXAM:  GEN:         Awake, responsive and comfortable.  HEENT:     trach  RESP:       no distress  CVS:          Regular rate and rhythm.   ABD:         Soft, non-tender, non-distended;     MEDICATIONS  (STANDING):  albuterol/ipratropium for Nebulization 3 milliLiter(s) Nebulizer every 6 hours  BACItracin   Ointment 1 Application(s) Topical two times a day  chlorhexidine 4% Liquid 1 Application(s) Topical <User Schedule>  clonazePAM  Tablet 1 milliGRAM(s) Oral two times a day  collagenase Ointment 1 Application(s) Topical daily  dextrose 5%. 1000 milliLiter(s) (50 mL/Hr) IV Continuous <Continuous>  dextrose 50% Injectable 12.5 Gram(s) IV Push once  dextrose 50% Injectable 25 Gram(s) IV Push once  dextrose 50% Injectable 25 Gram(s) IV Push once  enoxaparin Injectable 40 milliGRAM(s) SubCutaneous daily  folic acid 1 milliGRAM(s) Oral daily  hydrocortisone 2.5% Ointment 1 Application(s) Topical two times a day  influenza   Vaccine 0.5 milliLiter(s) IntraMuscular once  insulin lispro (HumaLOG) corrective regimen sliding scale   SubCutaneous every 6 hours  metoprolol tartrate 25 milliGRAM(s) Enteral Tube every 8 hours  multivitamin 1 Tablet(s) Oral daily  pantoprazole   Suspension 40 milliGRAM(s) Oral daily  QUEtiapine 200 milliGRAM(s) Oral <User Schedule>  QUEtiapine 100 milliGRAM(s) Oral <User Schedule>  thiamine 100 milliGRAM(s) Oral daily    MEDICATIONS  (PRN):  acetaminophen   Tablet .. 650 milliGRAM(s) Oral every 6 hours PRN Temp greater or equal to 38C (100.4F), Mild Pain (1 - 3)  dextrose 40% Gel 15 Gram(s) Oral once PRN Blood Glucose LESS THAN 70 milliGRAM(s)/deciliter  glucagon  Injectable 1 milliGRAM(s) IntraMuscular once PRN Glucose LESS THAN 70 milligrams/deciliter  metoprolol tartrate Injectable 5 milliGRAM(s) IV Push every 6 hours PRN if sbp>160 or HR >110 hold if SBP<100 or HR<60    ASSESSMENT AND PLAN:  ·	Hypoxic Respiratory failure.  ·	S/P tracheostomy.  ·	Bilateral pneumonia.  ·	Worsening right pleural effusion.  ·	S/P Septic Shock.  ·	Necrotizing Pancreatis.  ·	Acute metabolic Encephalopathy.  ·	Alcohol abuse.  ·	Klebsiella Bacteremia.  ·	Anemia    SPO2 96% on room air via trach. much less secretions per RN.  Trach CAPPED, 2 litre nasal O2 placed.  Keep on monitor bed with continuous SPO2 monitoring.  Will repeat CXR in am.  Possible decannulation if tolerates Trach CAP.

## 2020-10-15 NOTE — PROGRESS NOTE BEHAVIORAL HEALTH - OTHER
cooperative during exam improved improved with residual limitations has shown some improvement; remains semi-tenuous deferred at this time poor / not able ot produce audible sound mouthing words which looks to be within normal rate slight delay due to effort in attempting to speak mouthing words shrugs shoulder less confused, looks serious/appropriate to context as expected / appropriate to context not able to ascertain at this time due to limitation of exam improving has shown some improvement; remains semi-tenuousbut with some improvement

## 2020-10-15 NOTE — PROGRESS NOTE ADULT - SUBJECTIVE AND OBJECTIVE BOX
DEEPIKA RUCKER  MRN-[id number]    Follow Up:  ID following for pneumonia     Interval History: Spiked a fever today, trach was capped. has a very weak voice. continues to be titrated off sedatives    ROS:    [ ] Unobtainable because:  [X ] All other systems negative    Constitutional: no fever, no chills  Head: no trauma  Eyes: no vision changes, no eye pain  ENT:  no sore throat, no rhinorrhea  Cardiovascular:  no chest pain, no palpitation  Respiratory:  no SOB, no cough  GI:  no abd pain, no vomiting, no diarrhea  urinary: no dysuria, no hematuria, no flank pain  musculoskeletal:  no joint pain, no joint swelling  skin:  no rash  neurology:  no headache, no seizure, no change in mental status  psych: no anxiety, no depression         Allergies  No Known Allergies        ANTIMICROBIALS: influenza   Vaccine 0.5 once      MEDICATIONS  (STANDING):  albuterol/ipratropium for Nebulization 3 every 6 hours  clonazePAM  Tablet 1 two times a day  dextrose 50% Injectable 12.5 once  dextrose 50% Injectable 25 once  dextrose 50% Injectable 25 once  enoxaparin Injectable 40 daily  influenza   Vaccine 0.5 once  insulin lispro (HumaLOG) corrective regimen sliding scale  every 6 hours  metoprolol tartrate 25 every 8 hours  pantoprazole   Suspension 40 daily  QUEtiapine 200 <User Schedule>  QUEtiapine 50 daily      PRN  acetaminophen   Tablet .. 650 milliGRAM(s) Oral every 6 hours PRN  dextrose 40% Gel 15 Gram(s) Oral once PRN  glucagon  Injectable 1 milliGRAM(s) IntraMuscular once PRN  metoprolol tartrate Injectable 5 milliGRAM(s) IV Push every 6 hours PRN  ondansetron Injectable 4 milliGRAM(s) IV Push every 8 hours PRN  traMADol 25 milliGRAM(s) Oral every 8 hours PRN      Vital Signs Last 24 Hrs  T(F): 100.3 (10-15-20 @ 16:37), Max: 100.6 (10-15-20 @ 05:06)  HR: 94 (10-15-20 @ 22:18)  BP: 117/75 (10-15-20 @ 22:18)  RR: 18 (10-15-20 @ 16:00)  SpO2: 97% (10-15-20 @ 17:25) (97% - 98%)  Wt(kg): --      Physical Exam:  General:    NAD,  non toxic, breathing comfortably , weak voice,   Head: atraumatic, normocephalic  Eye: normal sclera and conjunctiva  ENT:    no oral lesions, neck supple  Cardio:     regular S1, S2,  no murmur  Respiratory:    still with rhonchi , capped trach, now back on NC   abd:     soft,   BS +,   LLQ tenderness, PEG site clean   :   no CVAT,  no suprapubic tenderness,     Musculoskeletal:   no joint swelling,   no edema  Skin:    psoriasis on face   Neurologic:     no focal deficit  psych: more calm    WBC Count: 6.59 K/uL (10-15 @ 14:31)  WBC Count: 12.20 K/uL (10-13 @ 07:23)  WBC Count: 10.47 K/uL (10-09 @ 17:18)                            9.3    6.59  )-----------( 230      ( 15 Oct 2020 14:31 )             28.1       10-15    133<L>  |  100  |  21  ----------------------------<  117<H>  3.5   |  26  |  0.40<L>    Ca    8.6      15 Oct 2020 14:31        Creatinine Trend: 0.40<--, 0.43<--, 0.49<--, 0.45<--, 0.49<--, 0.48<--        MICROBIOLOGY:  Culture - Body Fluid with Gram Stain (10.13.20 @ 00:36)    Gram Stain:   polymorphonuclear leukocytes seen  No organisms seen  by cytocentrifuge    Specimen Source: .Body Fluid RIGHT THORACENTESIS    Culture Results:   No growth        .Urine Clean Catch (Midstream)  10-11-20   <10,000 CFU/mL Normal Urogenital Poonam  --  --      .Sputum Sputum  10-10-20   Few Pseudomonas aeruginosa  Normal Respiratory Poonam absent  --  Pseudomonas aeruginosa      .Blood Blood-Peripheral  10-10-20   No growth to date.  --  --      .Sputum Sputum  09-26-20   Moderate Pseudomonas aeruginosa  --  Pseudomonas aeruginosa      .Blood Blood-Peripheral  09-25-20   No Growth Final  --  --      Ascites Fl paracentesis ascites  09-22-20   No growth  --    No polymorphonuclear cells seen per low power field  White blood cells seen per oil power field  No organisms seen per oil power field  by cytocentrifuge      .Blood Blood-Peripheral  09-21-20   No Growth Final  --  --    Procalcitonin, Serum: 0.07 (10-11-20 @ 11:51)  Procalcitonin, Serum: 0.06 (10-10-20 @ 02:13)      RADIOLOGY:  < from: Xray Chest 1 View- PORTABLE-Routine (Xray Chest 1 View- PORTABLE-Routine .) (10.09.20 @ 14:16) >  IMPRESSION:  Right effusion with possible underlying infiltrate.    (I personally reviewed)

## 2020-10-15 NOTE — PROGRESS NOTE BEHAVIORAL HEALTH - NSBHCHARTREVIEWVS_PSY_A_CORE FT
T(C): 37.8 (10-15-20 @ 10:43), Max: 38.1 (10-15-20 @ 05:06)  HR: 100 (10-15-20 @ 11:40) (100 - 122)  BP: 120/75 (10-15-20 @ 10:43) (103/68 - 120/75)  RR: 18 (10-15-20 @ 10:43) (18 - 20)  SpO2: 98% (10-15-20 @ 11:40) (96% - 99%)

## 2020-10-15 NOTE — PROGRESS NOTE BEHAVIORAL HEALTH - SUMMARY
Persistent multifactorial delirium with difficulty weaning off of sedation and IV treatment in ICU with episodes of breakthrough agitation with high tolerance to medications. Prolonged hospital course.   - collateral from girlfriend: Patient and her have been together for 1.5 years. Pt lives with his elderly father who has physical disability (leg) and was taking care of him. Patient is not working. They went on vacation to [place] and para-sailed few weeks prior to admission. Patient has no formal prior psychiatric history. + had mandibular fracture and has a metal plate in his jaw. + alcohol abuse.  - MSE much better starting week of 10/12/2020

## 2020-10-15 NOTE — PROGRESS NOTE ADULT - ASSESSMENT
DEEPIKA RUCKER is a 44M PMH alcohol abuse, hx of alcohol withdrawal and alcohol pancreatitis who presented for abdominal pain. Admitted with necrotizing pancreatitis, sepsis, septic shock with course complicated by acute hypoxic respiratory failure secondary to PNA and ARDS requiring intubation and DTs requiring sedation and small bowel obstruction conservatively managed with NGT and NPO. Currently, s/p trach and PEG on 9/10 and treated for klebsiella bacteremia.  Intermittently having fevers, last fever 9/21, 101.0  WBC trending down, 10.95 today.   SAAG <1.1 which goes against portal hypertension and more in line with pancreatitis vs SBP vs some other cause   CXR from 9/21 with increased lung markings and effusions as well as trach  Blood culture from 9/10 was positive for klebsiella quite sensitive and one set from 9/12 is negative   Patient has multiple possible sources of fever:  Abdomen such as SBP, pancreatitis, hepatobiliary disease   Pulmonary: CXR very unclear but he is on trach collar thus unlikely   Skin and soft tissue: cellulitis of the arm (US didnt show DVT or thrombophlebitis)        Antibiotics this admission:  Vancomycin: 8/26-27; 9/16-20  Zosyn: 8/18-8/27  Meropenem: 8/23-9/2  ceftriaxone: 9/14-20  Cefazolin: 9/20->9/29  Cefepime 9/29->10/6; 10/9->10/13    9/23: Continues to respond to cefazolin, fluid analysis not consistent with SBP and negative culture thus far, heavily sedated though meds cut in half,  9/24: Spiked a fever, panculture and repeat imaging, Behavioral health consulted, ascites fluid culture negative   9/25: still spiking, getting CT c/a/p, continue cefazolin for cellulitis which is much better   9/29: still febrile, slowly peeling off sedatives, pseudomonas growing in sputum   9/30: Fever curve improving, CT head negative, planned for MRI brain, continue cefepime  10/7: Off antibiotics, s/p course of cefepime. Low grade fever, isolated. Unfortunately mental status remains poor.   10/8: remains off antibiotics, s/p course of cefepime, remains afebrile.  10/12: Pt spiked on 10/10, blood culture NGTD x2, sputum growing pseud S cefepime, CXR (I personally reviewed) showed large R sided effusion, planned for repeat CT with possible thoracentesis  10/14: afebrile, off antibiotics, had barium swallow today->recommendations: cont PEG tube feeding with pleasure feeds mechanical soft with nectar thick liquids  10?15: capped trach, low grade fever 100.6 but resolved on its own, does have some LLQ abd pain which will be monitored    Suggestions--  #Fever/pneumonia   -monitor off antibiotics   -f/u all culture results from thoracentesis  -repeat CXR   continue nebs and consider steroids if wheezing   -may be ready for decannulation   -with next fever above 101, repeat 2 sets of blood cultures     #Psoriasis   -the lesions on face are getting bigger and spreading   -would suggest pimecrolimus 1% cream or desonide->since not formulary start hydrocortisone 2.5% ointment BID     #Need for Influenza Vaccination   -Given flu season and patient has prolonged hospitalization with multiple comorbidities,  suggest giving him a flu shot     Discussed with Dr. Deleon and Dr. Fransisco Morrison,   Cell: [phone]  Pager [phone]  Infectious Disease Attending  After 5pm/weekends please call [phone] for all inquiries and new consults

## 2020-10-15 NOTE — PROGRESS NOTE ADULT - SUBJECTIVE AND OBJECTIVE BOX
CHIEF COMPLAINT: Trach tube capped today and now on NC 2 liter oxygen  calmer and wants to eat.   Fever 100.6   no vomiting       PHYSICAL EXAM:    GENERAL: Thinly built, Trach capped and PEG +  HEENT: Thick secretions trach site. Trach tube  # 6  CHEST/LUNG: Coarse sounds bilaterally, Decreased air entry right base. No wheezing   HEART: Regular rate and rhythm; No murmur. + Tachycardic  ABDOMEN: Soft, Nontender, Nondistended; Bowel sounds present  EXTREMITIES:  , No clubbing, cyanosis, or edema  NERVOUS SYSTEM:  Limited exam. Was trying to talk but did not follow any commands.   Psychiatry: AA and calm at this time      OBJECTIVE DATA:       Vital Signs Last 24 Hrs  T(C): 37.8 (15 Oct 2020 10:43), Max: 38.1 (15 Oct 2020 05:06)  T(F): 100 (15 Oct 2020 10:43), Max: 100.6 (15 Oct 2020 05:06)  HR: 100 (15 Oct 2020 11:40) (100 - 122)  BP: 120/75 (15 Oct 2020 10:43) (103/68 - 120/75)  BP(mean): --  RR: 18 (15 Oct 2020 10:43) (18 - 20)  SpO2: 98% (15 Oct 2020 11:40) (96% - 99%)           Daily     Daily Weight in k.8 (15 Oct 2020 05:06)  LABS:                                     CAPILLARY BLOOD GLUCOSE      POCT Blood Glucose.: 115 mg/dL (15 Oct 2020 11:37)      Culture - Body Fluid with Gram Stain (collected 10-13)  Source: .Body Fluid RIGHT THORACENTESIS  Gram Stain (prelim) (10-13):    polymorphonuclear leukocytes seen    No organisms seen    by cytocentrifuge  Preliminary Report (10-13):    No growth    Culture - Urine (collected 10-11)  Source: .Urine Clean Catch (Midstream)  Final Report (10-11):    <10,000 CFU/mL Normal Urogenital Poonam        MEDICATIONS  (STANDING):  albuterol/ipratropium for Nebulization 3 milliLiter(s) Nebulizer every 6 hours  BACItracin   Ointment 1 Application(s) Topical two times a day  chlorhexidine 4% Liquid 1 Application(s) Topical <User Schedule>  clonazePAM  Tablet 1 milliGRAM(s) Oral two times a day  collagenase Ointment 1 Application(s) Topical daily  dextrose 5%. 1000 milliLiter(s) (50 mL/Hr) IV Continuous <Continuous>  dextrose 50% Injectable 12.5 Gram(s) IV Push once  dextrose 50% Injectable 25 Gram(s) IV Push once  dextrose 50% Injectable 25 Gram(s) IV Push once  enoxaparin Injectable 40 milliGRAM(s) SubCutaneous daily  folic acid 1 milliGRAM(s) Oral daily  hydrocortisone 2.5% Ointment 1 Application(s) Topical two times a day  influenza   Vaccine 0.5 milliLiter(s) IntraMuscular once  insulin lispro (HumaLOG) corrective regimen sliding scale   SubCutaneous every 6 hours  metoprolol tartrate 25 milliGRAM(s) Enteral Tube every 8 hours  multivitamin 1 Tablet(s) Oral daily  pantoprazole   Suspension 40 milliGRAM(s) Oral daily  QUEtiapine 200 milliGRAM(s) Oral <User Schedule>  thiamine 100 milliGRAM(s) Oral daily    MEDICATIONS  (PRN):  acetaminophen   Tablet .. 650 milliGRAM(s) Oral every 6 hours PRN Temp greater or equal to 38C (100.4F), Mild Pain (1 - 3)  dextrose 40% Gel 15 Gram(s) Oral once PRN Blood Glucose LESS THAN 70 milliGRAM(s)/deciliter  glucagon  Injectable 1 milliGRAM(s) IntraMuscular once PRN Glucose LESS THAN 70 milligrams/deciliter  metoprolol tartrate Injectable 5 milliGRAM(s) IV Push every 6 hours PRN if sbp>160 or HR >110 hold if SBP<100 or HR<60

## 2020-10-15 NOTE — PROGRESS NOTE BEHAVIORAL HEALTH - NSBHCONSULTMEDS_PSY_A_CORE FT
COURSE OF TX: methadone weaned off as of 9/26/20; increased Seroquel 50mg PO bid to 100mg via PEG BID and increase Valium to 5mg PO bid on 9/28  - continued restlessness; increase Seroquel 100bid to 150 bid on 9/29/10; decrease Seroquel from 200 qd to 150qd on 10/8/20 (trying to find lowest effective dose with ultimate goal is to minimize / maybe even wean off Patient from an antipsychotic). Decrease HS dose of Seroquel from 300mg to 250mg PO on 10/12/20 and 200 qhs on 1014/20 (ie. restlessness causes found to be environmental, need-based in part); decrease AM dose of Seroquel from 150 to 100mg PO on 10/13/20; then down to 50mg PO qd x 2 days then stop/off on 10/15/2020  - PRN Ativan dose increased to 2mg with continued need so DC Valium on 9/30/20 and use Ativan 2mg PO q8am, q4pm but HOLD for sedation with goal to stabilize agitation to the point that Patient will not need any PRNs and will have episodes of being awake  - PO Ativan ineffective; convert to Klonopin equivalent (Ativan 2mg = Klonopin 1mg) and increase to Klonopin 1.5mg PO via PEG BID on 10/6/20, REDUCE to 1mg PO bid on 10/13/20 with goal to simplify regimen and limit overmedicated states

## 2020-10-16 LAB
ANION GAP SERPL CALC-SCNC: 7 MMOL/L — SIGNIFICANT CHANGE UP (ref 5–17)
BUN SERPL-MCNC: 18 MG/DL — SIGNIFICANT CHANGE UP (ref 7–23)
CALCIUM SERPL-MCNC: 8.4 MG/DL — LOW (ref 8.5–10.1)
CHLORIDE SERPL-SCNC: 100 MMOL/L — SIGNIFICANT CHANGE UP (ref 96–108)
CO2 SERPL-SCNC: 27 MMOL/L — SIGNIFICANT CHANGE UP (ref 22–31)
CREAT SERPL-MCNC: 0.3 MG/DL — LOW (ref 0.5–1.3)
GLUCOSE BLDC GLUCOMTR-MCNC: 111 MG/DL — HIGH (ref 70–99)
GLUCOSE BLDC GLUCOMTR-MCNC: 114 MG/DL — HIGH (ref 70–99)
GLUCOSE BLDC GLUCOMTR-MCNC: 118 MG/DL — HIGH (ref 70–99)
GLUCOSE BLDC GLUCOMTR-MCNC: 123 MG/DL — HIGH (ref 70–99)
GLUCOSE BLDC GLUCOMTR-MCNC: 136 MG/DL — HIGH (ref 70–99)
GLUCOSE SERPL-MCNC: 105 MG/DL — HIGH (ref 70–99)
HCT VFR BLD CALC: 28.3 % — LOW (ref 39–50)
HGB BLD-MCNC: 9 G/DL — LOW (ref 13–17)
MCHC RBC-ENTMCNC: 28.8 PG — SIGNIFICANT CHANGE UP (ref 27–34)
MCHC RBC-ENTMCNC: 31.8 GM/DL — LOW (ref 32–36)
MCV RBC AUTO: 90.4 FL — SIGNIFICANT CHANGE UP (ref 80–100)
NRBC # BLD: 0 /100 WBCS — SIGNIFICANT CHANGE UP (ref 0–0)
PLATELET # BLD AUTO: 204 K/UL — SIGNIFICANT CHANGE UP (ref 150–400)
POTASSIUM SERPL-MCNC: 3.8 MMOL/L — SIGNIFICANT CHANGE UP (ref 3.5–5.3)
POTASSIUM SERPL-SCNC: 3.8 MMOL/L — SIGNIFICANT CHANGE UP (ref 3.5–5.3)
RBC # BLD: 3.13 M/UL — LOW (ref 4.2–5.8)
RBC # FLD: 14.8 % — HIGH (ref 10.3–14.5)
SODIUM SERPL-SCNC: 134 MMOL/L — LOW (ref 135–145)
TRYPSINOGEN SERPL-MCNC: 946 NG/ML — HIGH (ref 169–773)
WBC # BLD: 7.66 K/UL — SIGNIFICANT CHANGE UP (ref 3.8–10.5)
WBC # FLD AUTO: 7.66 K/UL — SIGNIFICANT CHANGE UP (ref 3.8–10.5)

## 2020-10-16 PROCEDURE — 71045 X-RAY EXAM CHEST 1 VIEW: CPT | Mod: 26

## 2020-10-16 PROCEDURE — 99231 SBSQ HOSP IP/OBS SF/LOW 25: CPT

## 2020-10-16 PROCEDURE — 99232 SBSQ HOSP IP/OBS MODERATE 35: CPT

## 2020-10-16 RX ORDER — QUETIAPINE FUMARATE 200 MG/1
150 TABLET, FILM COATED ORAL
Refills: 0 | Status: DISCONTINUED | OUTPATIENT
Start: 2020-10-16 | End: 2020-10-19

## 2020-10-16 RX ADMIN — Medication 650 MILLIGRAM(S): at 17:22

## 2020-10-16 RX ADMIN — Medication 1 MILLIGRAM(S): at 17:47

## 2020-10-16 RX ADMIN — Medication 3 MILLILITER(S): at 05:32

## 2020-10-16 RX ADMIN — Medication 1 MILLIGRAM(S): at 05:25

## 2020-10-16 RX ADMIN — ENOXAPARIN SODIUM 40 MILLIGRAM(S): 100 INJECTION SUBCUTANEOUS at 12:43

## 2020-10-16 RX ADMIN — Medication 1 APPLICATION(S): at 17:47

## 2020-10-16 RX ADMIN — TRAMADOL HYDROCHLORIDE 25 MILLIGRAM(S): 50 TABLET ORAL at 09:43

## 2020-10-16 RX ADMIN — TRAMADOL HYDROCHLORIDE 25 MILLIGRAM(S): 50 TABLET ORAL at 10:43

## 2020-10-16 RX ADMIN — Medication 25 MILLIGRAM(S): at 05:26

## 2020-10-16 RX ADMIN — Medication 3 MILLILITER(S): at 17:51

## 2020-10-16 RX ADMIN — Medication 1 APPLICATION(S): at 05:22

## 2020-10-16 RX ADMIN — QUETIAPINE FUMARATE 150 MILLIGRAM(S): 200 TABLET, FILM COATED ORAL at 21:53

## 2020-10-16 RX ADMIN — Medication 100 MILLIGRAM(S): at 11:48

## 2020-10-16 RX ADMIN — Medication 1 TABLET(S): at 11:47

## 2020-10-16 RX ADMIN — Medication 1 MILLIGRAM(S): at 11:47

## 2020-10-16 RX ADMIN — Medication 3 MILLILITER(S): at 23:09

## 2020-10-16 RX ADMIN — PANTOPRAZOLE SODIUM 40 MILLIGRAM(S): 20 TABLET, DELAYED RELEASE ORAL at 11:47

## 2020-10-16 RX ADMIN — Medication 3 MILLILITER(S): at 11:24

## 2020-10-16 RX ADMIN — Medication 650 MILLIGRAM(S): at 16:22

## 2020-10-16 RX ADMIN — Medication 25 MILLIGRAM(S): at 21:53

## 2020-10-16 RX ADMIN — Medication 1 APPLICATION(S): at 05:21

## 2020-10-16 RX ADMIN — QUETIAPINE FUMARATE 50 MILLIGRAM(S): 200 TABLET, FILM COATED ORAL at 11:48

## 2020-10-16 NOTE — PROGRESS NOTE BEHAVIORAL HEALTH - SUMMARY
Persistent multifactorial delirium with difficulty weaning off of sedation and IV treatment in ICU with episodes of breakthrough agitation with high tolerance to medications. Prolonged hospital course.   - collateral from girlfriend: Patient and her have been together for 1.5 years. Pt lives with his elderly father who has physical disability (leg) and was taking care of him. Patient is not working. They went on vacation to [place] and para-sailed few weeks prior to admission. Patient has no formal prior psychiatric history. + had mandibular fracture and has a metal plate in his jaw. + alcohol abuse.  - MSE much better starting week of 10/12/2020; started to be able to verbally communicate 10/14, 10/15

## 2020-10-16 NOTE — CHART NOTE - NSCHARTNOTEFT_GEN_A_CORE
Assessment:  Pt adm c dx of pancreatitis, nausea, vomiting, c ETOH pancreatitis, s/p necrotizing pancreatitis, improving pancreatitis, metabolic encephalopathy, agitation, delirium, s/p septic shock, respiratory failure secondary to pneumonia, ARDS, s/p intubation, s/p ICU stay, DTs, SBO, bacteremia, s/p paracentesis, s/p trach, PEG, diarrhea, pressure ulcers.  Pt seen, c speaking valve, requested juice and pudding.  Pt's family was @ bedside, was agitated when family was asking questions when he was trying to speak to RD.    Factors impacting intake: [ ] none [ ] nausea  [ ] vomiting [ ] diarrhea [ ] constipation  [ ]chewing problems [ x] swallowing issues; 10/14, swallow evaluation c recommendation for pleasure feeding of Dysphagia 2 Mechanical Soft - [place] Consistency Fluids + PEG feeding  [ ] other:     Diet Prescription: Diet, NPO with Tube Feed:   Tube Feeding Modality: Gastrostomy  Vital AF 1.2  Total Volume for 24 Hours (mL): 1440  Bolus  Total Volume of Bolus (mL):  360  Total # of Feeds: 4  Tube Feed Frequency: Every 6 hours   Tube Feed Start Time: 18:00  Bolus Feed Rate (mL per Hour): 360   Bolus Feed Duration (in Hours): 1  No Carb Prosource (1pkg = 15gms Protein)     Qty per Day:  1  Supplement Feeding Modality:  Gastrostomy  Probiotic Yogurt/Smoothie Cans or Servings Per Day:  1       Frequency:  Two Times a day (10-13-20 @ 16:56)    Intake: Vital AF 1.2, bolus feeding as above=1440 m, 1728 calories, 108 grams protein, 1167 ml free water, 121% RDIs + No Carb Prosource 2 pkg daily=120 calories, 30 grams protein     Current Weight: 10/16, 56.3 kg, 08/18, 71.5 kg , c 15. 2 kg wt. loss   % Weight Change: 21.2%  10/11, no edema noted  Pt agitated when seen, not appropriate for nutrition focused physical exam, c visible severe depletion of orbital, temple, clavicle, shoulder, thigh areas.    Pertinent Medications: MEDICATIONS  (STANDING):  albuterol/ipratropium for Nebulization 3 milliLiter(s) Nebulizer every 6 hours  BACItracin   Ointment 1 Application(s) Topical two times a day  chlorhexidine 4% Liquid 1 Application(s) Topical <User Schedule>  clonazePAM  Tablet 1 milliGRAM(s) Oral two times a day  collagenase Ointment 1 Application(s) Topical daily  dextrose 5%. 1000 milliLiter(s) (50 mL/Hr) IV Continuous <Continuous>  dextrose 50% Injectable 12.5 Gram(s) IV Push once  dextrose 50% Injectable 25 Gram(s) IV Push once  dextrose 50% Injectable 25 Gram(s) IV Push once  enoxaparin Injectable 40 milliGRAM(s) SubCutaneous daily  folic acid 1 milliGRAM(s) Oral daily  hydrocortisone 2.5% Ointment 1 Application(s) Topical two times a day  influenza   Vaccine 0.5 milliLiter(s) IntraMuscular once  insulin lispro (HumaLOG) corrective regimen sliding scale   SubCutaneous every 6 hours  metoprolol tartrate 25 milliGRAM(s) Enteral Tube every 8 hours  multivitamin 1 Tablet(s) Oral daily  pantoprazole   Suspension 40 milliGRAM(s) Oral daily  QUEtiapine 50 milliGRAM(s) Oral daily  QUEtiapine 150 milliGRAM(s) Oral <User Schedule>  thiamine 100 milliGRAM(s) Oral daily    MEDICATIONS  (PRN):  acetaminophen   Tablet .. 650 milliGRAM(s) Oral every 6 hours PRN Temp greater or equal to 38C (100.4F), Mild Pain (1 - 3)  dextrose 40% Gel 15 Gram(s) Oral once PRN Blood Glucose LESS THAN 70 milliGRAM(s)/deciliter  glucagon  Injectable 1 milliGRAM(s) IntraMuscular once PRN Glucose LESS THAN 70 milligrams/deciliter  metoprolol tartrate Injectable 5 milliGRAM(s) IV Push every 6 hours PRN if sbp>160 or HR >110 hold if SBP<100 or HR<60  ondansetron Injectable 4 milliGRAM(s) IV Push every 8 hours PRN Nausea and/or Vomiting  traMADol 25 milliGRAM(s) Oral every 8 hours PRN Severe Pain (7 - 10)    Pertinent Labs: 10-16 Na134 mmol/L<L> Glu 105 mg/dL<H> K+ 3.8 mmol/L Cr  0.30 mg/dL<L> BUN 18 mg/dL 10-09 Phos 2.5 mg/dL 10-09 Alb 2.6 g/dL<L>10-09 ALT 27 U/L AST 30 U/L Alkaline Phosphatase 125 U/L<H>   CAPILLARY BLOOD GLUCOSE      POCT Blood Glucose.: 123 mg/dL (16 Oct 2020 11:45)  POCT Blood Glucose.: 136 mg/dL (16 Oct 2020 05:11)  POCT Blood Glucose.: 114 mg/dL (16 Oct 2020 00:23)  POCT Blood Glucose.: 126 mg/dL (15 Oct 2020 17:24)    Skin:   wound; scrotum; redness  pressure ulcer x 3  1. sacrum; unstageable   2. tracheal area: stage II    Estimated Needs:   [x ] no change since previous assessment(10/06)  [ ] recalculated:     Previous Nutrition Diagnosis:   [x ] Malnutrition; severe malnutrition in context of acute illness     Related to: inadequate protein-energy intake in setting of pancreatitis, respiratory failure, hepatic/metabolic encephalopathy, pressure ulcer     As evidenced by: <50% nutrition needs >5 days, physical findings of moderate muscle loss     Nutrition Diagnosis is [ x] ongoing  [ ] resolved [ ] not applicable       New Nutrition Diagnosis: [ x] not applicable     Interventions:   Recommend  [x ] Change Diet To: pleasure feeding Dysphagia 2 Mechanical Soft - [place] Consistency Fluids; small meals for pleasure feeds(Food & Nutrition Service made aware), aspiration precautions   [ ] Nutrition Supplement  [x ] Nutrition Support; Jevity 1.2, 240 ml q 6 hours @ present and observe tolerance= 960ml, 1152 calories, 53 grams protein, 775ml free water and if tolerated increase to Jevity 1.2, 360 ml q 6 hours= 1440 ml 1728 jean, 83 gm pro, 1166 mL free water, 120% RDI's  [ ] Other:     Monitoring and Evaluation:   [x ] PO intake [ x ] Tolerance to diet prescription [ x ] weights [ x ] labs[ x ] follow up per protocol  [ ] other: Assessment:  Pt adm c dx of pancreatitis, nausea, vomiting, c ETOH pancreatitis, s/p necrotizing pancreatitis, improving pancreatitis, metabolic encephalopathy, agitation, delirium, s/p septic shock, respiratory failure secondary to pneumonia, ARDS, s/p intubation, s/p ICU stay, DTs, SBO, bacteremia, s/p paracentesis, s/p trach, PEG, diarrhea, pressure ulcers.  Pt seen, c speaking valve, requested juice and pudding.  Pt's family was @ bedside, was agitated when family was asking questions when he was trying to speak to RD.    Factors impacting intake: [ ] none [ ] nausea  [ ] vomiting [ ] diarrhea [ ] constipation  [ ]chewing problems [ x] swallowing issues; 10/14, swallow evaluation c recommendation for pleasure feeding of Dysphagia 2 Mechanical Soft - [place] Consistency Fluids + PEG feeding  [ ] other:     Diet Prescription: Diet, NPO with Tube Feed:   Tube Feeding Modality: Gastrostomy  Vital AF 1.2  Total Volume for 24 Hours (mL): 1440  Bolus  Total Volume of Bolus (mL):  360  Total # of Feeds: 4  Tube Feed Frequency: Every 6 hours   Tube Feed Start Time: 18:00  Bolus Feed Rate (mL per Hour): 360   Bolus Feed Duration (in Hours): 1  No Carb Prosource (1pkg = 15gms Protein)     Qty per Day:  1  Supplement Feeding Modality:  Gastrostomy  Probiotic Yogurt/Smoothie Cans or Servings Per Day:  1       Frequency:  Two Times a day (10-13-20 @ 16:56)    Intake: Vital AF 1.2, bolus feeding as above=1440 m, 1728 calories, 108 grams protein, 1167 ml free water, 121% RDIs + No Carb Prosource 2 pkg daily=120 calories, 30 grams protein     Current Weight: 10/16, 56.3 kg, 08/18, 71.5 kg , c 15. 2 kg wt. loss   % Weight Change: 21.2%  10/11, no edema noted  Pt agitated when seen, not appropriate for nutrition focused physical exam, c visible severe depletion of orbital, temple, clavicle, shoulder, thigh areas.    Pertinent Medications: MEDICATIONS  (STANDING):  albuterol/ipratropium for Nebulization 3 milliLiter(s) Nebulizer every 6 hours  BACItracin   Ointment 1 Application(s) Topical two times a day  chlorhexidine 4% Liquid 1 Application(s) Topical <User Schedule>  clonazePAM  Tablet 1 milliGRAM(s) Oral two times a day  collagenase Ointment 1 Application(s) Topical daily  dextrose 5%. 1000 milliLiter(s) (50 mL/Hr) IV Continuous <Continuous>  dextrose 50% Injectable 12.5 Gram(s) IV Push once  dextrose 50% Injectable 25 Gram(s) IV Push once  dextrose 50% Injectable 25 Gram(s) IV Push once  enoxaparin Injectable 40 milliGRAM(s) SubCutaneous daily  folic acid 1 milliGRAM(s) Oral daily  hydrocortisone 2.5% Ointment 1 Application(s) Topical two times a day  influenza   Vaccine 0.5 milliLiter(s) IntraMuscular once  insulin lispro (HumaLOG) corrective regimen sliding scale   SubCutaneous every 6 hours  metoprolol tartrate 25 milliGRAM(s) Enteral Tube every 8 hours  multivitamin 1 Tablet(s) Oral daily  pantoprazole   Suspension 40 milliGRAM(s) Oral daily  QUEtiapine 50 milliGRAM(s) Oral daily  QUEtiapine 150 milliGRAM(s) Oral <User Schedule>  thiamine 100 milliGRAM(s) Oral daily    MEDICATIONS  (PRN):  acetaminophen   Tablet .. 650 milliGRAM(s) Oral every 6 hours PRN Temp greater or equal to 38C (100.4F), Mild Pain (1 - 3)  dextrose 40% Gel 15 Gram(s) Oral once PRN Blood Glucose LESS THAN 70 milliGRAM(s)/deciliter  glucagon  Injectable 1 milliGRAM(s) IntraMuscular once PRN Glucose LESS THAN 70 milligrams/deciliter  metoprolol tartrate Injectable 5 milliGRAM(s) IV Push every 6 hours PRN if sbp>160 or HR >110 hold if SBP<100 or HR<60  ondansetron Injectable 4 milliGRAM(s) IV Push every 8 hours PRN Nausea and/or Vomiting  traMADol 25 milliGRAM(s) Oral every 8 hours PRN Severe Pain (7 - 10)    Pertinent Labs: 10-16 Na134 mmol/L<L> Glu 105 mg/dL<H> K+ 3.8 mmol/L Cr  0.30 mg/dL<L> BUN 18 mg/dL 10-09 Phos 2.5 mg/dL 10-09 Alb 2.6 g/dL<L>10-09 ALT 27 U/L AST 30 U/L Alkaline Phosphatase 125 U/L<H>   CAPILLARY BLOOD GLUCOSE      POCT Blood Glucose.: 123 mg/dL (16 Oct 2020 11:45)  POCT Blood Glucose.: 136 mg/dL (16 Oct 2020 05:11)  POCT Blood Glucose.: 114 mg/dL (16 Oct 2020 00:23)  POCT Blood Glucose.: 126 mg/dL (15 Oct 2020 17:24)    Skin:   wound; scrotum; redness  pressure ulcer x 3  1. sacrum; unstageable   2. tracheal area: stage II    Estimated Needs:   [x ] no change since previous assessment(10/06)  [ ] recalculated:     Previous Nutrition Diagnosis:   [x ] Malnutrition; severe malnutrition in context of acute illness     Related to: inadequate protein-energy intake in setting of pancreatitis, respiratory failure, hepatic/metabolic encephalopathy, pressure ulcer     As evidenced by: <50% nutrition needs >5 days, physical findings of moderate muscle loss     Nutrition Diagnosis is [ x] ongoing  [ ] resolved [ ] not applicable       New Nutrition Diagnosis: [ x] not applicable     Interventions:   Recommend  [x ] Change Diet To: pleasure feeding Dysphagia 2 Mechanical Soft - Nectar Consistency Fluids; small meals for pleasure feeds(Food & Nutrition Service made aware), aspiration precautions   [ ] Nutrition Supplement  [x ] Nutrition Support; Jevity 1.2, 240 ml q 6 hours @ present and observe tolerance= 960ml, 1152 calories, 53 grams protein, 775ml free water and if tolerated increase to Jevity 1.2, 360 ml q 6 hours= 1440 ml 1728 jean, 83 gm pro, 1166 mL free water, 120% RDI's + No Carb Prosource 1 pkg daily=60 calories, 15 grams protein per pkg via G-Tube  [ ] Other:     Monitoring and Evaluation:   [x ] PO intake [ x ] Tolerance to diet prescription [ x ] weights [ x ] labs[ x ] follow up per protocol  [ ] other: Assessment:  Pt adm c dx of pancreatitis, nausea, vomiting, c ETOH pancreatitis, s/p necrotizing pancreatitis, improving pancreatitis, metabolic encephalopathy, agitation, delirium, s/p septic shock, respiratory failure secondary to pneumonia, ARDS, s/p intubation, s/p ICU stay, DTs, SBO, bacteremia, s/p paracentesis, s/p trach, PEG, diarrhea, pressure ulcers, plan for pleasure feeding noted in MDs notes.  Pt seen, c speaking valve, requested juice and pudding.  Pt's family was @ bedside, was agitated when family was asking questions when he was trying to speak to RD.    Factors impacting intake: [ ] none [ ] nausea  [ ] vomiting [ ] diarrhea [ ] constipation  [ ]chewing problems [ x] swallowing issues; 10/14, swallow evaluation c recommendation for pleasure feeding of Dysphagia 2 Mechanical Soft - [place] Consistency Fluids + PEG feeding  [ ] other:     Diet Prescription: Diet, NPO with Tube Feed:   Tube Feeding Modality: Gastrostomy  Vital AF 1.2  Total Volume for 24 Hours (mL): 1440  Bolus  Total Volume of Bolus (mL):  360  Total # of Feeds: 4  Tube Feed Frequency: Every 6 hours   Tube Feed Start Time: 18:00  Bolus Feed Rate (mL per Hour): 360   Bolus Feed Duration (in Hours): 1  No Carb Prosource (1pkg = 15gms Protein)     Qty per Day:  1  Supplement Feeding Modality:  Gastrostomy  Probiotic Yogurt/Smoothie Cans or Servings Per Day:  1       Frequency:  Two Times a day (10-13-20 @ 16:56)    Intake: Vital AF 1.2, bolus feeding as above=1440 m, 1728 calories, 108 grams protein, 1167 ml free water, 121% RDIs + No Carb Prosource 2 pkg daily=120 calories, 30 grams protein     Current Weight: 10/16, 56.3 kg, 08/18, 71.5 kg , c 15. 2 kg wt. loss   % Weight Change: 21.2%  10/11, no edema noted  Pt agitated when seen, not appropriate for nutrition focused physical exam, c visible severe depletion of orbital, temple, clavicle, shoulder, thigh areas.    Pertinent Medications: MEDICATIONS  (STANDING):  albuterol/ipratropium for Nebulization 3 milliLiter(s) Nebulizer every 6 hours  BACItracin   Ointment 1 Application(s) Topical two times a day  chlorhexidine 4% Liquid 1 Application(s) Topical <User Schedule>  clonazePAM  Tablet 1 milliGRAM(s) Oral two times a day  collagenase Ointment 1 Application(s) Topical daily  dextrose 5%. 1000 milliLiter(s) (50 mL/Hr) IV Continuous <Continuous>  dextrose 50% Injectable 12.5 Gram(s) IV Push once  dextrose 50% Injectable 25 Gram(s) IV Push once  dextrose 50% Injectable 25 Gram(s) IV Push once  enoxaparin Injectable 40 milliGRAM(s) SubCutaneous daily  folic acid 1 milliGRAM(s) Oral daily  hydrocortisone 2.5% Ointment 1 Application(s) Topical two times a day  influenza   Vaccine 0.5 milliLiter(s) IntraMuscular once  insulin lispro (HumaLOG) corrective regimen sliding scale   SubCutaneous every 6 hours  metoprolol tartrate 25 milliGRAM(s) Enteral Tube every 8 hours  multivitamin 1 Tablet(s) Oral daily  pantoprazole   Suspension 40 milliGRAM(s) Oral daily  QUEtiapine 50 milliGRAM(s) Oral daily  QUEtiapine 150 milliGRAM(s) Oral <User Schedule>  thiamine 100 milliGRAM(s) Oral daily    MEDICATIONS  (PRN):  acetaminophen   Tablet .. 650 milliGRAM(s) Oral every 6 hours PRN Temp greater or equal to 38C (100.4F), Mild Pain (1 - 3)  dextrose 40% Gel 15 Gram(s) Oral once PRN Blood Glucose LESS THAN 70 milliGRAM(s)/deciliter  glucagon  Injectable 1 milliGRAM(s) IntraMuscular once PRN Glucose LESS THAN 70 milligrams/deciliter  metoprolol tartrate Injectable 5 milliGRAM(s) IV Push every 6 hours PRN if sbp>160 or HR >110 hold if SBP<100 or HR<60  ondansetron Injectable 4 milliGRAM(s) IV Push every 8 hours PRN Nausea and/or Vomiting  traMADol 25 milliGRAM(s) Oral every 8 hours PRN Severe Pain (7 - 10)    Pertinent Labs: 10-16 Na134 mmol/L<L> Glu 105 mg/dL<H> K+ 3.8 mmol/L Cr  0.30 mg/dL<L> BUN 18 mg/dL 10-09 Phos 2.5 mg/dL 10-09 Alb 2.6 g/dL<L>10-09 ALT 27 U/L AST 30 U/L Alkaline Phosphatase 125 U/L<H>   CAPILLARY BLOOD GLUCOSE      POCT Blood Glucose.: 123 mg/dL (16 Oct 2020 11:45)  POCT Blood Glucose.: 136 mg/dL (16 Oct 2020 05:11)  POCT Blood Glucose.: 114 mg/dL (16 Oct 2020 00:23)  POCT Blood Glucose.: 126 mg/dL (15 Oct 2020 17:24)    Skin:   wound; scrotum; redness  pressure ulcer x 3  1. sacrum; unstageable   2. tracheal area: stage II    Estimated Needs:   [x ] no change since previous assessment(10/06)  [ ] recalculated:     Previous Nutrition Diagnosis:   [x ] Malnutrition; severe malnutrition in context of acute illness     Related to: inadequate protein-energy intake in setting of pancreatitis, respiratory failure, hepatic/metabolic encephalopathy, pressure ulcer     As evidenced by: <50% nutrition needs >5 days, physical findings of moderate muscle loss     Nutrition Diagnosis is [ x] ongoing  [ ] resolved [ ] not applicable       New Nutrition Diagnosis: [ x] not applicable     Interventions:   Recommend  [x ] Change Diet To: pleasure feeding Dysphagia 2 Mechanical Soft - Nectar Consistency Fluids; small meals for pleasure feeds(Food & Nutrition Service made aware), aspiration precautions   [ ] Nutrition Supplement  [x ] Nutrition Support; Jevity 1.2, 240 ml q 6 hours @ present and observe tolerance= 960ml, 1152 calories, 53 grams protein, 775ml free water and if tolerated increase to Jevity 1.2, 360 ml q 6 hours= 1440 ml 1728 jean, 83 gm pro, 1166 mL free water, 120% RDI's + No Carb Prosource 1 pkg daily=60 calories, 15 grams protein per pkg via G-Tube  [ ] Other:     Monitoring and Evaluation:   [x ] PO intake [ x ] Tolerance to diet prescription [ x ] weights [ x ] labs[ x ] follow up per protocol  [ ] other: Assessment:  Pt adm c dx of pancreatitis, nausea, vomiting, c ETOH pancreatitis, s/p necrotizing pancreatitis, improving pancreatitis, metabolic encephalopathy, agitation, delirium, s/p septic shock, respiratory failure secondary to pneumonia, ARDS, s/p intubation, s/p ICU stay, DTs, SBO, bacteremia, s/p paracentesis, s/p trach, PEG, diarrhea, pressure ulcers, plan for pleasure feeding noted in MDs notes & discussed c RN.  Pt seen, c speaking valve, requested juice and pudding.  Pt's family was @ bedside, was agitated when family was asking questions when he was trying to speak to RD.    Factors impacting intake: [ ] none [ ] nausea  [ ] vomiting [ ] diarrhea [ ] constipation  [ ]chewing problems [ x] swallowing issues; 10/14, swallow evaluation c recommendation for pleasure feeding of Dysphagia 2 Mechanical Soft - [place] Consistency Fluids + PEG feeding  [ ] other:     Diet Prescription: Diet, NPO with Tube Feed:   Tube Feeding Modality: Gastrostomy  Vital AF 1.2  Total Volume for 24 Hours (mL): 1440  Bolus  Total Volume of Bolus (mL):  360  Total # of Feeds: 4  Tube Feed Frequency: Every 6 hours   Tube Feed Start Time: 18:00  Bolus Feed Rate (mL per Hour): 360   Bolus Feed Duration (in Hours): 1  No Carb Prosource (1pkg = 15gms Protein)     Qty per Day:  1  Supplement Feeding Modality:  Gastrostomy  Probiotic Yogurt/Smoothie Cans or Servings Per Day:  1       Frequency:  Two Times a day (10-13-20 @ 16:56)    Intake: Vital AF 1.2, bolus feeding as above=1440 m, 1728 calories, 108 grams protein, 1167 ml free water, 121% RDIs + No Carb Prosource 2 pkg daily=120 calories, 30 grams protein     Current Weight: 10/16, 56.3 kg, 08/18, 71.5 kg , c 15. 2 kg wt. loss   % Weight Change: 21.2%  10/11, no edema noted  Pt agitated when seen, not appropriate for nutrition focused physical exam, c visible severe depletion of orbital, temple, clavicle, shoulder, thigh areas.    Pertinent Medications: MEDICATIONS  (STANDING):  albuterol/ipratropium for Nebulization 3 milliLiter(s) Nebulizer every 6 hours  BACItracin   Ointment 1 Application(s) Topical two times a day  chlorhexidine 4% Liquid 1 Application(s) Topical <User Schedule>  clonazePAM  Tablet 1 milliGRAM(s) Oral two times a day  collagenase Ointment 1 Application(s) Topical daily  dextrose 5%. 1000 milliLiter(s) (50 mL/Hr) IV Continuous <Continuous>  dextrose 50% Injectable 12.5 Gram(s) IV Push once  dextrose 50% Injectable 25 Gram(s) IV Push once  dextrose 50% Injectable 25 Gram(s) IV Push once  enoxaparin Injectable 40 milliGRAM(s) SubCutaneous daily  folic acid 1 milliGRAM(s) Oral daily  hydrocortisone 2.5% Ointment 1 Application(s) Topical two times a day  influenza   Vaccine 0.5 milliLiter(s) IntraMuscular once  insulin lispro (HumaLOG) corrective regimen sliding scale   SubCutaneous every 6 hours  metoprolol tartrate 25 milliGRAM(s) Enteral Tube every 8 hours  multivitamin 1 Tablet(s) Oral daily  pantoprazole   Suspension 40 milliGRAM(s) Oral daily  QUEtiapine 50 milliGRAM(s) Oral daily  QUEtiapine 150 milliGRAM(s) Oral <User Schedule>  thiamine 100 milliGRAM(s) Oral daily    MEDICATIONS  (PRN):  acetaminophen   Tablet .. 650 milliGRAM(s) Oral every 6 hours PRN Temp greater or equal to 38C (100.4F), Mild Pain (1 - 3)  dextrose 40% Gel 15 Gram(s) Oral once PRN Blood Glucose LESS THAN 70 milliGRAM(s)/deciliter  glucagon  Injectable 1 milliGRAM(s) IntraMuscular once PRN Glucose LESS THAN 70 milligrams/deciliter  metoprolol tartrate Injectable 5 milliGRAM(s) IV Push every 6 hours PRN if sbp>160 or HR >110 hold if SBP<100 or HR<60  ondansetron Injectable 4 milliGRAM(s) IV Push every 8 hours PRN Nausea and/or Vomiting  traMADol 25 milliGRAM(s) Oral every 8 hours PRN Severe Pain (7 - 10)    Pertinent Labs: 10-16 Na134 mmol/L<L> Glu 105 mg/dL<H> K+ 3.8 mmol/L Cr  0.30 mg/dL<L> BUN 18 mg/dL 10-09 Phos 2.5 mg/dL 10-09 Alb 2.6 g/dL<L>10-09 ALT 27 U/L AST 30 U/L Alkaline Phosphatase 125 U/L<H>   CAPILLARY BLOOD GLUCOSE      POCT Blood Glucose.: 123 mg/dL (16 Oct 2020 11:45)  POCT Blood Glucose.: 136 mg/dL (16 Oct 2020 05:11)  POCT Blood Glucose.: 114 mg/dL (16 Oct 2020 00:23)  POCT Blood Glucose.: 126 mg/dL (15 Oct 2020 17:24)    Skin:   wound; scrotum; redness  pressure ulcer x 3  1. sacrum; unstageable   2. tracheal area: stage II    Estimated Needs:   [x ] no change since previous assessment(10/06)  [ ] recalculated:     Previous Nutrition Diagnosis:   [x ] Malnutrition; severe malnutrition in context of acute illness     Related to: inadequate protein-energy intake in setting of pancreatitis, respiratory failure, hepatic/metabolic encephalopathy, pressure ulcer     As evidenced by: <50% nutrition needs >5 days, physical findings of moderate muscle loss     Nutrition Diagnosis is [ x] ongoing  [ ] resolved [ ] not applicable       New Nutrition Diagnosis: [ x] not applicable     Interventions:   Recommend  [x ] Change Diet To: pleasure feeding Dysphagia 2 Mechanical Soft - Nectar Consistency Fluids; small meals for pleasure feeds(Food & Nutrition Service made aware), aspiration precautions   [ ] Nutrition Supplement  [x ] Nutrition Support; Jevity 1.2, 240 ml q 6 hours @ present and observe tolerance= 960ml, 1152 calories, 53 grams protein, 775ml free water and if tolerated increase to Jevity 1.2, 360 ml q 6 hours= 1440 ml 1728 jean, 83 gm pro, 1166 mL free water, 120% RDI's + No Carb Prosource 1 pkg daily=60 calories, 15 grams protein per pkg via G-Tube  [ ] Other:     Monitoring and Evaluation:   [x ] PO intake [ x ] Tolerance to diet prescription [ x ] weights [ x ] labs[ x ] follow up per protocol  [ ] other:

## 2020-10-16 NOTE — PROGRESS NOTE BEHAVIORAL HEALTH - NSBHCONSULTMEDS_PSY_A_CORE FT
COURSE OF TX: methadone weaned off as of 9/26/20; increased Seroquel 50mg PO bid to 100mg via PEG BID and increase Valium to 5mg PO bid on 9/28 due to agitation. Continued restlessness thus increased Seroquel 100bid to 150 bid on 9/29/10; decrease Seroquel from 200 qd to 150qd on 10/8/20 (trying to find lowest effective dose with ultimate goal is to minimize / maybe even wean off Patient from an antipsychotic). Once sources of agitation identified/address, behavior much improved. Decreased HS dose of Seroquel from 300mg to 250mg PO on 10/12/20 and 200 qhs on 1014/20 (ie. restlessness causes found to be environmental, need-based in part); decrease AM dose of Seroquel from 150 to 100mg PO on 10/13/20; then down to 50mg PO qd x 2 days then stop/off on 10/15/2020. Decrease Seroquel 200mg po qhs to 150mg po qhs on 10/16 with goal to wean off/use only as PRN  - PRN Ativan dose increased to 2mg with continued need so DC Valium on 9/30/20 and use Ativan 2mg PO q8am, q4pm but HOLD for sedation with goal to stabilize agitation to the point that Patient will not need any PRNs and will have episodes of being awake  - PO Ativan ineffective; convert to Klonopin equivalent (Ativan 2mg = Klonopin 1mg) and increase to Klonopin 1.5mg PO via PEG BID on 10/6/20, REDUCE to 1mg PO bid on 10/13/20 with goal to simplify regimen and limit overmedicated states

## 2020-10-16 NOTE — PROGRESS NOTE BEHAVIORAL HEALTH - NSBHCHARTREVIEWVS_PSY_A_CORE FT
T(C): 36.8 (10-16-20 @ 11:00), Max: 37.9 (10-15-20 @ 16:37)  HR: 98 (10-16-20 @ 13:22) (88 - 117)  BP: 95/67 (10-16-20 @ 13:22) (95/67 - 117/75)  RR: 18 (10-16-20 @ 11:00) (18 - 19)  SpO2: 98% (10-16-20 @ 11:28) (97% - 99%)

## 2020-10-16 NOTE — PROGRESS NOTE ADULT - SUBJECTIVE AND OBJECTIVE BOX
INTERVAL HPI:   44M with ETOH Abuse and withdrawal, Alcohol Pancreatitis who presents to the ED with severe abdominal pain.  Found to have Necrotizing Pancreatitis, Septic  Shock, Pneumonia/ARDS with Hypoxic Respiratory failure. Admitted to ICU, had prolonged hospital course with Vent support. Eventually required Tracheostomy and Peg placement.  Also with acute metabolic Encephalopathy.  09/20/20:  Transferred to .    Not able to provide details due to encephalopathy.  09/22/20: 1500 ml paracentesis.  10/12/20: 2 litre right thoracentesis( exudate, negative culture and no malignant cells).  10/15/20: Trach Capped    OVERNIGHT EVENTS:  Tolerating Trach CAP well.    Vital Signs Last 24 Hrs  T(C): 36.8 (16 Oct 2020 11:00), Max: 37.9 (15 Oct 2020 16:37)  T(F): 98.2 (16 Oct 2020 11:00), Max: 100.3 (15 Oct 2020 16:37)  HR: 98 (16 Oct 2020 13:22) (88 - 117)  BP: 95/67 (16 Oct 2020 13:22) (95/67 - 117/75)  BP(mean): --  RR: 18 (16 Oct 2020 11:00) (18 - 19)  SpO2: 98% (16 Oct 2020 11:28) (97% - 99%)    PHYSICAL EXAM:  GEN:         Awake, responsive and comfortable.  HEENT:     CAP trach  RESP:         no distress  CVS:          Regular rate and rhythm.   ABD:         Soft, non-tender, non-distended;     MEDICATIONS  (STANDING):  albuterol/ipratropium for Nebulization 3 milliLiter(s) Nebulizer every 6 hours  BACItracin   Ointment 1 Application(s) Topical two times a day  chlorhexidine 4% Liquid 1 Application(s) Topical <User Schedule>  clonazePAM  Tablet 1 milliGRAM(s) Oral two times a day  collagenase Ointment 1 Application(s) Topical daily  dextrose 5%. 1000 milliLiter(s) (50 mL/Hr) IV Continuous <Continuous>  dextrose 50% Injectable 12.5 Gram(s) IV Push once  dextrose 50% Injectable 25 Gram(s) IV Push once  dextrose 50% Injectable 25 Gram(s) IV Push once  enoxaparin Injectable 40 milliGRAM(s) SubCutaneous daily  folic acid 1 milliGRAM(s) Oral daily  hydrocortisone 2.5% Ointment 1 Application(s) Topical two times a day  influenza   Vaccine 0.5 milliLiter(s) IntraMuscular once  insulin lispro (HumaLOG) corrective regimen sliding scale   SubCutaneous every 6 hours  metoprolol tartrate 25 milliGRAM(s) Enteral Tube every 8 hours  multivitamin 1 Tablet(s) Oral daily  pantoprazole   Suspension 40 milliGRAM(s) Oral daily  QUEtiapine 50 milliGRAM(s) Oral daily  QUEtiapine 150 milliGRAM(s) Oral <User Schedule>  thiamine 100 milliGRAM(s) Oral daily    MEDICATIONS  (PRN):  acetaminophen   Tablet .. 650 milliGRAM(s) Oral every 6 hours PRN Temp greater or equal to 38C (100.4F), Mild Pain (1 - 3)  dextrose 40% Gel 15 Gram(s) Oral once PRN Blood Glucose LESS THAN 70 milliGRAM(s)/deciliter  glucagon  Injectable 1 milliGRAM(s) IntraMuscular once PRN Glucose LESS THAN 70 milligrams/deciliter  metoprolol tartrate Injectable 5 milliGRAM(s) IV Push every 6 hours PRN if sbp>160 or HR >110 hold if SBP<100 or HR<60  ondansetron Injectable 4 milliGRAM(s) IV Push every 8 hours PRN Nausea and/or Vomiting  traMADol 25 milliGRAM(s) Oral every 8 hours PRN Severe Pain (7 - 10)             9.0    7.66  )-----------( 204      ( 16 Oct 2020 07:37 )             28.3     10-16    134<L>  |  100  |  18  ----------------------------<  105<H>  3.8   |  27  |  0.30<L>    Ca    8.4<L>      16 Oct 2020 07:37    ASSESSMENT AND PLAN:  ·	Hypoxic Respiratory failure.  ·	S/P tracheostomy.   ·	Bilateral pneumonia(pseudomonas in sputum).  ·	Worsening right pleural effusion S/P Thoracentesis  ·	S/P Septic Shock.  ·	Necrotizing Pancreatis.  ·	Acute metabolic Encephalopathy.  ·	Alcohol abuse.  ·	Klebsiella Bacteremia.  ·	Anemia    Tolerating Trach CAP well. Possible de cannulation tomorrow if stable.  CXR with reaccumulation of right pleural effusion, may need thoracentesis/ chest tube drainage  drainage.

## 2020-10-16 NOTE — PROGRESS NOTE BEHAVIORAL HEALTH - NSBHCHARTREVIEWLAB_PSY_A_CORE FT
10-16    134<L>  |  100  |  18  ----------------------------<  105<H>  3.8   |  27  |  0.30<L>    Ca    8.4<L>      16 Oct 2020 07:37

## 2020-10-16 NOTE — PROGRESS NOTE ADULT - SUBJECTIVE AND OBJECTIVE BOX
CHIEF COMPLAINT: Trach tube capped today and now on NC 2 liter oxygen  calmer and wants to eat.   Low grade fever.   no vomiting   wants to eat food orally  pain sacral area ulcer.       PHYSICAL EXAM:    GENERAL: Thinly built, Trach capped and PEG +  HEENT: Thick secretions trach site. Trach tube  # 6  CHEST/LUNG: Coarse sounds bilaterally, Decreased air entry right base. No wheezing   HEART: Regular rate and rhythm; No murmur. + Tachycardic  ABDOMEN: Soft, Nontender, Nondistended; Bowel sounds present  EXTREMITIES:  , No clubbing, cyanosis, or edema  NERVOUS SYSTEM:  Limited exam. Was trying to talk but did not follow any commands.   Psychiatry: AA and calm at this time      OBJECTIVE DATA:       Vital Signs Last 24 Hrs  T(C): 36.8 (16 Oct 2020 11:00), Max: 37.9 (15 Oct 2020 16:37)  T(F): 98.2 (16 Oct 2020 11:00), Max: 100.3 (15 Oct 2020 16:37)  HR: 102 (16 Oct 2020 11:28) (88 - 117)  BP: 109/68 (16 Oct 2020 11:00) (102/68 - 117/75)  BP(mean): --  RR: 18 (16 Oct 2020 11:00) (18 - 19)  SpO2: 98% (16 Oct 2020 11:28) (97% - 99%)           Daily     Daily Weight in k.3 (16 Oct 2020 05:38)  LABS:                        9.0    7.66  )-----------( 204      ( 16 Oct 2020 07:37 )             28.3             10-    134<L>  |  100  |  18  ----------------------------<  105<H>  3.8   |  27  |  0.30<L>    Ca    8.4<L>      16 Oct 2020 07:37                         CAPILLARY BLOOD GLUCOSE      POCT Blood Glucose.: 123 mg/dL (16 Oct 2020 11:45)      Culture - Body Fluid with Gram Stain (collected 10-13)  Source: .Body Fluid RIGHT THORACENTESIS  Gram Stain (prelim) (10-13):    polymorphonuclear leukocytes seen    No organisms seen    by cytocentrifuge  Preliminary Report (10-13):    No growth      CXR reviewed by me showed large right pleural effusion  (10/16/20)    MEDICATIONS  (STANDING):  albuterol/ipratropium for Nebulization 3 milliLiter(s) Nebulizer every 6 hours  BACItracin   Ointment 1 Application(s) Topical two times a day  chlorhexidine 4% Liquid 1 Application(s) Topical <User Schedule>  clonazePAM  Tablet 1 milliGRAM(s) Oral two times a day  collagenase Ointment 1 Application(s) Topical daily  dextrose 5%. 1000 milliLiter(s) (50 mL/Hr) IV Continuous <Continuous>  dextrose 50% Injectable 12.5 Gram(s) IV Push once  dextrose 50% Injectable 25 Gram(s) IV Push once  dextrose 50% Injectable 25 Gram(s) IV Push once  enoxaparin Injectable 40 milliGRAM(s) SubCutaneous daily  folic acid 1 milliGRAM(s) Oral daily  hydrocortisone 2.5% Ointment 1 Application(s) Topical two times a day  influenza   Vaccine 0.5 milliLiter(s) IntraMuscular once  insulin lispro (HumaLOG) corrective regimen sliding scale   SubCutaneous every 6 hours  metoprolol tartrate 25 milliGRAM(s) Enteral Tube every 8 hours  multivitamin 1 Tablet(s) Oral daily  pantoprazole   Suspension 40 milliGRAM(s) Oral daily  QUEtiapine 200 milliGRAM(s) Oral <User Schedule>  QUEtiapine 50 milliGRAM(s) Oral daily  thiamine 100 milliGRAM(s) Oral daily    MEDICATIONS  (PRN):  acetaminophen   Tablet .. 650 milliGRAM(s) Oral every 6 hours PRN Temp greater or equal to 38C (100.4F), Mild Pain (1 - 3)  dextrose 40% Gel 15 Gram(s) Oral once PRN Blood Glucose LESS THAN 70 milliGRAM(s)/deciliter  glucagon  Injectable 1 milliGRAM(s) IntraMuscular once PRN Glucose LESS THAN 70 milligrams/deciliter  metoprolol tartrate Injectable 5 milliGRAM(s) IV Push every 6 hours PRN if sbp>160 or HR >110 hold if SBP<100 or HR<60  ondansetron Injectable 4 milliGRAM(s) IV Push every 8 hours PRN Nausea and/or Vomiting  traMADol 25 milliGRAM(s) Oral every 8 hours PRN Severe Pain (7 - 10)

## 2020-10-16 NOTE — PROGRESS NOTE ADULT - ASSESSMENT
44M EtOH pancreatitis p/w abdominal pain 8/17/20, found to have necrotizing pancreatitis c/b septic shock and respiratory failure secondary to PNA and ARDS requiring intubation and DTs requiring sedation and small bowel obstruction conservatively managed with NGT and NPO for which he was admitted to ICU. Currently, s/p trach and PEG on 9/10 and treated for klebsiella bacteremia.    downgraded to  floor 9/20/20 from ICU    Assessment and Plan:      #Aspiration right sided pneumonitis/pneumonia. + fever likely from recurrent aspirations. aspiration precautions. cont bolus tube feeding and  pleasure oral feeding with dysphagia diet.  reviewed ID recs. Panculture if fever > 101. Discussed with nurse.     Recurrent right pleural effusion. ? parapneumonic. Discussed with Dr Boss and Dr Moody. recommending holding off tap because of patient's previous tough pleurocentesis and his stable resp status. if continues to have worsening pleural effusion>>>might need pleurx.     #Diarrhea  could be multifactorial, --PEG feeds, abx, pancreatic insufficiency, also On laxative  Off miralax.       #unstageable pressure ulcers x 2 on sacral area ,    -aggressive  PT   -Discussed with the patient about frequent turning in the bed.     #s/p necrotizing pancreatitis , s/p septic shock , resolved     -RLE showing no dvt , cellulitis improved  -CT Chest/Abdomen/Pelvis ordered   Bilateral patchy airspace disease has improved compared to the prior.  Improving pancreatitis with decreased size of the pancreas as well as improved peripancreatic edema. Small collection adjacent to the undersurface of the tail is also decreased. No evidence of necrosis.      #Metabolic encephalopathy   AGITATION   hepatic vs. ICU delirium vs sepisis   patient awake , mouthing words to staff . agitated at times    d/angela Precedex since 9/15 in ICU  -Psychiatrist following. cont seroquel taper and klonopin.       #Acute respiratory failure secondary to PNA/ARDS.   s/p TRACH 9/10/20   trach capped and NC oxygen. discussed with Dr Moody. will assess patient over the weekend about decannulation.     #Ascites , abd is soft.   distended gallbladder and ascites seen on CT, abd US neg for acute cholecystitis   s/p paracentesis 9/22 by IR   ABD US showing worsening ascites, s/p paracentesis by IR 9/22 with 1.5L straw colored fluid removed -cultures did not show any specific organisms  fluid analysis of paracentesis not consistent with SBP.   9/21 KUB shows nonspecific bowel gas pattern without e/o obstruction or ileus   Blood culture from 9/10 was positive for klebsiella quite sensitive and one set from 9/12 is negative, 9/25 repeat blood cultures negative, sputum cultures from 9/26- showing pseudomonas aeruginosa   SAAG < 1.1 going against portal HTN       #s/p PEG placement 9/10/20    s/p SBO which was treated conservatively   Ileus.      #Hypokalememia-- repleted    #Hypomagnesemia --repleted    #Hypophosphatemia --repleted    #normocytic anemia , probably related to sepsis and acute illness   requiring transfusion on 9/17/20  follow anemia work-up   transfuse prn of H/H <7/21      hypercalcemia   - 2/2 to immobilization vs vitamin d supplementation although levels are only 25   - s/p pamidronate x 1    Vitamin D insufficiency. cont supplement.     SEVERE DECONDITIONING d/t prolonged hospitalization   PT  aggressively while inpatient.     #Preventative measures   -dvt ppx.   fall, aspiration precautions  Discussed with nursing staff.

## 2020-10-16 NOTE — PROGRESS NOTE BEHAVIORAL HEALTH - NSBHFUPINTERVALHXFT_PSY_A_CORE
Interval events: has been afebrile, off antibiotics and tolerating the nectar-thickened diet. Patient participating in PT and was able to walk a short distance with a walker. Now able to verbally communicate which makes Patient very happy. No subsequent episodes of agitation (expected).  QTc 433 on repeat EKG. Tolerating the weaning off of the Seroquel. Agitation was undoubtedly "need based" and stemmed from Pt's inability to communicate his needs due to his trach.

## 2020-10-16 NOTE — PROGRESS NOTE ADULT - ASSESSMENT
Subjective Complaints:  Historian:             Vital Signs Last 24 Hrs  T(C): 36.8 (16 Oct 2020 11:00), Max: 37.9 (15 Oct 2020 16:37)  T(F): 98.2 (16 Oct 2020 11:00), Max: 100.3 (15 Oct 2020 16:37)  HR: 98 (16 Oct 2020 13:22) (88 - 117)  BP: 95/67 (16 Oct 2020 13:22) (95/67 - 117/75)  BP(mean): --  RR: 18 (16 Oct 2020 11:00) (18 - 19)  SpO2: 98% (16 Oct 2020 11:28) (97% - 99%)    GENERAL PHYSICAL EXAM:  General:  Appears stated age, well-groomed, well-nourished, no distress  HEENT:  NC/AT, patent nares w/ pink mucosa, OP clear w/o lesions, PERRL, EOMI, conjunctivae clear, no thyromegaly, nodules, adenopathy, no JVD  Chest:  Full & symmetric excursion, no increased effort, breath sounds clear  Cardiovascular:  Regular rhythm, S1, S2, no murmur/rub/S3/S4, no carotid/femoral/abdominal bruit, radial/pedal pulses 2+, no edema  Abdomen:  Soft, non-tender, non-distended, normoactive bowel sounds, no HSM  Extremities:  Gait & station:   Digits:   Nails:   Joints, Bones, Muscles:   ROM:   Stability:  Skin:  No rash/erythema/ecchymoses/petechiae/wounds/abscess/warm/dry  Musculoskeletal:  Full ROM in all joints w/o swelling/tenderness/effusion        LABS:                        9.0    7.66  )-----------( 204      ( 16 Oct 2020 07:37 )             28.3     10-16    134<L>  |  100  |  18  ----------------------------<  105<H>  3.8   |  27  |  0.30<L>    Ca    8.4<L>      16 Oct 2020 07:37            RADIOLOGY & ADDITIONAL STUDIES:        Neurology Progress Note:      Mental Status: awaek alert speech fluen twith trach       Cranial Nerves:2 /12intact      Motor:    arm leg 3/5         Sensory:intact      Cerebellar: defrd      Gait: defrd      Assesment/Plan:  s/p trach and peg rash improved  no seizure weakness of arm legs wasting  critical care neuropathy  on thiamine for pt rehab s/p delerium

## 2020-10-17 LAB
CULTURE RESULTS: NO GROWTH — SIGNIFICANT CHANGE UP
GLUCOSE BLDC GLUCOMTR-MCNC: 105 MG/DL — HIGH (ref 70–99)
GLUCOSE BLDC GLUCOMTR-MCNC: 105 MG/DL — HIGH (ref 70–99)
GLUCOSE BLDC GLUCOMTR-MCNC: 111 MG/DL — HIGH (ref 70–99)
GRAM STN FLD: SIGNIFICANT CHANGE UP
SPECIMEN SOURCE: SIGNIFICANT CHANGE UP

## 2020-10-17 PROCEDURE — 72125 CT NECK SPINE W/O DYE: CPT | Mod: 26

## 2020-10-17 PROCEDURE — 93010 ELECTROCARDIOGRAM REPORT: CPT

## 2020-10-17 PROCEDURE — 99233 SBSQ HOSP IP/OBS HIGH 50: CPT

## 2020-10-17 PROCEDURE — 72170 X-RAY EXAM OF PELVIS: CPT | Mod: 26

## 2020-10-17 PROCEDURE — 70450 CT HEAD/BRAIN W/O DYE: CPT | Mod: 26

## 2020-10-17 RX ORDER — OXYCODONE AND ACETAMINOPHEN 5; 325 MG/1; MG/1
1 TABLET ORAL ONCE
Refills: 0 | Status: DISCONTINUED | OUTPATIENT
Start: 2020-10-17 | End: 2020-10-17

## 2020-10-17 RX ADMIN — Medication 1 MILLIGRAM(S): at 17:42

## 2020-10-17 RX ADMIN — Medication 25 MILLIGRAM(S): at 14:51

## 2020-10-17 RX ADMIN — Medication 1 APPLICATION(S): at 05:36

## 2020-10-17 RX ADMIN — QUETIAPINE FUMARATE 50 MILLIGRAM(S): 200 TABLET, FILM COATED ORAL at 12:35

## 2020-10-17 RX ADMIN — Medication 1 MILLIGRAM(S): at 05:35

## 2020-10-17 RX ADMIN — ENOXAPARIN SODIUM 40 MILLIGRAM(S): 100 INJECTION SUBCUTANEOUS at 12:34

## 2020-10-17 RX ADMIN — TRAMADOL HYDROCHLORIDE 25 MILLIGRAM(S): 50 TABLET ORAL at 06:46

## 2020-10-17 RX ADMIN — Medication 3 MILLILITER(S): at 05:37

## 2020-10-17 RX ADMIN — Medication 25 MILLIGRAM(S): at 21:15

## 2020-10-17 RX ADMIN — Medication 1 MILLIGRAM(S): at 12:35

## 2020-10-17 RX ADMIN — Medication 100 MILLIGRAM(S): at 12:35

## 2020-10-17 RX ADMIN — Medication 1 TABLET(S): at 12:35

## 2020-10-17 RX ADMIN — Medication 1 APPLICATION(S): at 17:42

## 2020-10-17 RX ADMIN — TRAMADOL HYDROCHLORIDE 25 MILLIGRAM(S): 50 TABLET ORAL at 22:16

## 2020-10-17 RX ADMIN — OXYCODONE AND ACETAMINOPHEN 1 TABLET(S): 5; 325 TABLET ORAL at 10:26

## 2020-10-17 RX ADMIN — Medication 25 MILLIGRAM(S): at 05:35

## 2020-10-17 RX ADMIN — Medication 1 APPLICATION(S): at 05:35

## 2020-10-17 RX ADMIN — Medication 3 MILLILITER(S): at 15:01

## 2020-10-17 RX ADMIN — PANTOPRAZOLE SODIUM 40 MILLIGRAM(S): 20 TABLET, DELAYED RELEASE ORAL at 12:35

## 2020-10-17 RX ADMIN — OXYCODONE AND ACETAMINOPHEN 1 TABLET(S): 5; 325 TABLET ORAL at 12:10

## 2020-10-17 RX ADMIN — QUETIAPINE FUMARATE 150 MILLIGRAM(S): 200 TABLET, FILM COATED ORAL at 21:12

## 2020-10-17 NOTE — CHART NOTE - NSCHARTNOTEFT_GEN_A_CORE
called to bedside for pt with fall while returning from bathroom .pt slipped and fell on buttock after pt was on buttock head fell back and struck locker door .  pt is s/p tracheotomy with sacral decubiti speech is coherent answers all questions folllows all commands     Vital Signs Last 24 Hrs  T(C): 36.6 (17 Oct 2020 05:17), Max: 37.1 (16 Oct 2020 23:57)  T(F): 97.9 (17 Oct 2020 05:17), Max: 98.7 (16 Oct 2020 23:57)  HR: 106 (17 Oct 2020 08:15) (91 - 106)  BP: 122/66 (17 Oct 2020 08:15) (95/67 - 122/66)  BP(mean): --  RR: 20 (17 Oct 2020 08:15) (18 - 20)  SpO2: 100% (17 Oct 2020 08:15) (98% - 100%)    PHYSICAL EXAM:    GENERAL: NAD, s/p tracheotomy   HEAD:  Atraumatic, Normocephalic  EYES: EOMI, PERRLA  ENMT: No tonsillar erythema, exudates, or enlargement; Moist mucous membranes  NECK: Supple, No JVD,  NERVOUS SYSTEM:  Alert & Oriented X3, Good concentration;  CHEST/LUNG: Clear to percussion bilaterally; No rales, rhonchi, wheezing, or rubs  HEART: Regular rate and rhythm; No murmurs, rubs, or gallops  ABDOMEN: Soft, Nontender, Nondistended; Bowel sounds present  EXTREMITIES:  2+ Peripheral Pulses, No clubbing, cyanosis, or edema  musculoskeletal - sacral decubiti with dressing c/d/i  nead non tender to palpation, from at hips knees shoulders , elbows non tender to palpation arms legs hips .tender over sacral decubiti, no abrasion noted on buttock skin with exception of dressing site , no para spinal tenderness bunging abrasion contusion     impression- s/p fall    head ct without ,pelvis  film

## 2020-10-17 NOTE — PROGRESS NOTE ADULT - ASSESSMENT
44M EtOH pancreatitis p/w abdominal pain 8/17/20, found to have necrotizing pancreatitis c/b septic shock and respiratory failure secondary to PNA and ARDS requiring intubation and DTs requiring sedation and small bowel obstruction conservatively managed with NGT and NPO for which he was admitted to ICU. Currently, s/p trach and PEG on 9/10 and treated for klebsiella bacteremia.    downgraded to [facility] floor 9/20/20 from ICU    Assessment and Plan:      Mechanical Fall: No new  pain. Neurochecks q2 h. X ray pelvis showed no acute fracture.     #Aspiration right sided pneumonitis/pneumonia.  fever likely from recurrent aspirations. aspiration precautions. cont bolus tube feeding and  pleasure oral feeding with dysphagia diet.  reviewed ID recs. Panculture if fever > 101. Discussed with nurse.     Recurrent right pleural effusion. ? parapneumonic. Discussed with Dr Boss and Dr Moody. recommending holding off tap because of patient's previous tough pleurocentesis and his stable resp status. if continues to have worsening pleural effusion>>>might need pleurx.     #Diarrhea  could be multifactorial, --PEG feeds, abx, pancreatic insufficiency, also On laxative  Off miralax.       #unstageable pressure ulcers x 2 on sacral area ,    -aggressive  PT   -Discussed with the patient about frequent turning in the bed.     #s/p necrotizing pancreatitis , s/p septic shock , resolved     -RLE showing no dvt , cellulitis improved  -CT Chest/Abdomen/Pelvis ordered   Bilateral patchy airspace disease has improved compared to the prior.  Improving pancreatitis with decreased size of the pancreas as well as improved peripancreatic edema. Small collection adjacent to the undersurface of the tail is also decreased. No evidence of necrosis.      #Metabolic encephalopathy   AGITATION   hepatic vs. ICU delirium vs sepisis   patient awake , mouthing words to staff . agitated at times    d/angela Precedex since 9/15 in ICU  -Psychiatrist following. cont seroquel taper and klonopin.       #Acute respiratory failure secondary to PNA/ARDS.   s/p TRACH 9/10/20   trach capped and NC oxygen. discussed with Dr Moody. will assess patient over the weekend about decannulation.     #Ascites , abd is soft.   distended gallbladder and ascites seen on CT, abd US neg for acute cholecystitis   s/p paracentesis 9/22 by IR   ABD US showing worsening ascites, s/p paracentesis by IR 9/22 with 1.5L straw colored fluid removed -cultures did not show any specific organisms  fluid analysis of paracentesis not consistent with SBP.   9/21 KUB shows nonspecific bowel gas pattern without e/o obstruction or ileus   Blood culture from 9/10 was positive for klebsiella quite sensitive and one set from 9/12 is negative, 9/25 repeat blood cultures negative, sputum cultures from 9/26- showing pseudomonas aeruginosa   SAAG < 1.1 going against portal HTN       #s/p PEG placement 9/10/20    s/p SBO which was treated conservatively   Ileus.      #Hypokalememia-- repleted    #Hypomagnesemia --repleted    #Hypophosphatemia --repleted    #normocytic anemia , probably related to sepsis and acute illness   requiring transfusion on 9/17/20  follow anemia work-up   transfuse prn of H/H <7/21      hypercalcemia   - 2/2 to immobilization vs vitamin d supplementation although levels are only 25   - s/p pamidronate x 1    Vitamin D insufficiency. cont supplement.     SEVERE DECONDITIONING d/t prolonged hospitalization   PT  aggressively while inpatient.     DC finger sticks as blood sugars are controlled    #Preventative measures   -dvt ppx.   fall, aspiration precautions  Discussed with nursing staff. 44M EtOH pancreatitis p/w abdominal pain 8/17/20, found to have necrotizing pancreatitis c/b septic shock and respiratory failure secondary to PNA and ARDS requiring intubation and DTs requiring sedation and small bowel obstruction conservatively managed with NGT and NPO for which he was admitted to ICU. Currently, s/p trach and PEG on 9/10 and treated for klebsiella bacteremia.    downgraded to [facility] floor 9/20/20 from ICU    Assessment and Plan:      Mechanical Fall: No new  pain. Neurochecks q2 h. X ray pelvis showed no acute fracture. CT head and C spine showed no acute fracture also.     #Aspiration right sided pneumonitis/pneumonia.  fever likely from recurrent aspirations. aspiration precautions. cont bolus tube feeding and  pleasure oral feeding with dysphagia diet.  reviewed ID recs. Panculture if fever > 101. Discussed with nurse.     Recurrent right pleural effusion. ? parapneumonic. Discussed with Dr Boss and Dr Moody. recommending holding off tap because of patient's previous tough pleurocentesis and his stable resp status. if continues to have worsening pleural effusion>>>might need pleurx.     #Diarrhea  could be multifactorial, --PEG feeds, abx, pancreatic insufficiency, also On laxative  Off miralax.       #unstageable pressure ulcers x 2 on sacral area ,    -aggressive  PT   -Discussed with the patient about frequent turning in the bed.     #s/p necrotizing pancreatitis , s/p septic shock , resolved     -RLE showing no dvt , cellulitis improved  -CT Chest/Abdomen/Pelvis ordered   Bilateral patchy airspace disease has improved compared to the prior.  Improving pancreatitis with decreased size of the pancreas as well as improved peripancreatic edema. Small collection adjacent to the undersurface of the tail is also decreased. No evidence of necrosis.      #Metabolic encephalopathy   AGITATION   hepatic vs. ICU delirium vs sepisis   patient awake , mouthing words to staff . agitated at times    d/angela Precedex since 9/15 in ICU  -Psychiatrist following. cont seroquel taper and klonopin.       #Acute respiratory failure secondary to PNA/ARDS.   s/p TRACH 9/10/20   trach capped and NC oxygen. discussed with Dr Moody. will assess patient over the weekend about decannulation.     #Ascites , abd is soft.   distended gallbladder and ascites seen on CT, abd US neg for acute cholecystitis   s/p paracentesis 9/22 by IR   ABD US showing worsening ascites, s/p paracentesis by IR 9/22 with 1.5L straw colored fluid removed -cultures did not show any specific organisms  fluid analysis of paracentesis not consistent with SBP.   9/21 KUB shows nonspecific bowel gas pattern without e/o obstruction or ileus   Blood culture from 9/10 was positive for klebsiella quite sensitive and one set from 9/12 is negative, 9/25 repeat blood cultures negative, sputum cultures from 9/26- showing pseudomonas aeruginosa   SAAG < 1.1 going against portal HTN       #s/p PEG placement 9/10/20    s/p SBO which was treated conservatively   Ileus.      #Hypokalememia-- repleted    #Hypomagnesemia --repleted    #Hypophosphatemia --repleted    #normocytic anemia , probably related to sepsis and acute illness   requiring transfusion on 9/17/20  follow anemia work-up   transfuse prn of H/H <7/21      hypercalcemia   - 2/2 to immobilization vs vitamin d supplementation although levels are only 25   - s/p pamidronate x 1    Vitamin D insufficiency. cont supplement.     SEVERE DECONDITIONING d/t prolonged hospitalization   PT  aggressively while inpatient.     DC finger sticks as blood sugars are controlled    #Preventative measures   -dvt ppx.   fall, aspiration precautions  Discussed with nursing staff.

## 2020-10-17 NOTE — PROGRESS NOTE ADULT - SUBJECTIVE AND OBJECTIVE BOX
INTERVAL HPI:   44M with ETOH Abuse and withdrawal, Alcohol Pancreatitis who presents to the ED with severe abdominal pain.  Found to have Necrotizing Pancreatitis, Septic  Shock, Pneumonia/ARDS with Hypoxic Respiratory failure. Admitted to ICU, had prolonged hospital course with Vent support. Eventually required Tracheostomy and Peg placement.  Also with acute metabolic Encephalopathy.  09/20/20:  Transferred to .    Not able to provide details due to encephalopathy.  09/22/20: 1500 ml paracentesis.  10/12/20: 2 litre right thoracentesis( exudate, negative culture and no malignant cells).  10/15/20: Trach Capped.  10/17/20:  De cannulated    OVERNIGHT EVENTS:   Awake, comfortable    Vital Signs Last 24 Hrs  T(C): 36.7 (17 Oct 2020 10:32), Max: 37.1 (16 Oct 2020 23:57)  T(F): 98 (17 Oct 2020 10:32), Max: 98.7 (16 Oct 2020 23:57)  HR: 101 (17 Oct 2020 14:48) (91 - 106)  BP: 114/86 (17 Oct 2020 14:48) (103/57 - 122/66)  BP(mean): --  RR: 20 (17 Oct 2020 14:48) (18 - 20)  SpO2: 96% (17 Oct 2020 14:48) (96% - 100%)    PHYSICAL EXAM:  GEN:         Awake, responsive and comfortable.  HEENT:     Trach  RESP:        no wheezing.  CVS:          Regular rate and rhythm.   ABD:         Soft, non-tender, non-distended;     MEDICATIONS  (STANDING):  albuterol/ipratropium for Nebulization 3 milliLiter(s) Nebulizer every 6 hours  BACItracin   Ointment 1 Application(s) Topical two times a day  chlorhexidine 4% Liquid 1 Application(s) Topical <User Schedule>  clonazePAM  Tablet 1 milliGRAM(s) Oral two times a day  collagenase Ointment 1 Application(s) Topical daily  dextrose 5%. 1000 milliLiter(s) (50 mL/Hr) IV Continuous <Continuous>  dextrose 50% Injectable 12.5 Gram(s) IV Push once  dextrose 50% Injectable 25 Gram(s) IV Push once  dextrose 50% Injectable 25 Gram(s) IV Push once  enoxaparin Injectable 40 milliGRAM(s) SubCutaneous daily  folic acid 1 milliGRAM(s) Oral daily  hydrocortisone 2.5% Ointment 1 Application(s) Topical two times a day  influenza   Vaccine 0.5 milliLiter(s) IntraMuscular once  insulin lispro (HumaLOG) corrective regimen sliding scale   SubCutaneous every 6 hours  metoprolol tartrate 25 milliGRAM(s) Enteral Tube every 8 hours  multivitamin 1 Tablet(s) Oral daily  pantoprazole   Suspension 40 milliGRAM(s) Oral daily  QUEtiapine 150 milliGRAM(s) Oral <User Schedule>  thiamine 100 milliGRAM(s) Oral daily    MEDICATIONS  (PRN):  acetaminophen   Tablet .. 650 milliGRAM(s) Oral every 6 hours PRN Temp greater or equal to 38C (100.4F), Mild Pain (1 - 3)  dextrose 40% Gel 15 Gram(s) Oral once PRN Blood Glucose LESS THAN 70 milliGRAM(s)/deciliter  glucagon  Injectable 1 milliGRAM(s) IntraMuscular once PRN Glucose LESS THAN 70 milligrams/deciliter  metoprolol tartrate Injectable 5 milliGRAM(s) IV Push every 6 hours PRN if sbp>160 or HR >110 hold if SBP<100 or HR<60  ondansetron Injectable 4 milliGRAM(s) IV Push every 8 hours PRN Nausea and/or Vomiting  traMADol 25 milliGRAM(s) Oral every 8 hours PRN Severe Pain (7 - 10)    LABS:                        9.0    7.66  )-----------( 204      ( 16 Oct 2020 07:37 )             28.3     10-16    134<L>  |  100  |  18  ----------------------------<  105<H>  3.8   |  27  |  0.30<L>    Ca    8.4<L>      16 Oct 2020 07:37    ASSESSMENT AND PLAN:  ·	Hypoxic Respiratory failure.  ·	S/P tracheostomy.   ·	Bilateral pneumonia(pseudomonas in sputum).  ·	Worsening right pleural effusion S/P Thoracentesis  ·	S/P Septic Shock.  ·	Necrotizing Pancreatis.  ·	Acute metabolic Encephalopathy.  ·	Alcohol abuse.  ·	Klebsiella Bacteremia.  ·	Anemia      Tolerated CAPPED trach for more than 48 hours. No sob, minimal secretions and able to cough it up.  De cannulated.  Sterile dressing applied.  Continue O2 and nebulizer.

## 2020-10-17 NOTE — PROGRESS NOTE ADULT - SUBJECTIVE AND OBJECTIVE BOX
CHIEF COMPLAINT: Trach tube capped and now on NC 2 liter oxygen  Pain ulcer area.   nurse reported patient fell in the bathroom   no new pain or headache or vision problems reported.   no new open wound or bleeding       PHYSICAL EXAM:    GENERAL: Thinly built, Trach capped and PEG +  HEENT: . Trach tube  # 6 capped   CHEST/LUNG: Coarse sounds bilaterally, Decreased air entry right base. No wheezing   HEART: Regular rate and rhythm; No murmur. + Tachycardic  ABDOMEN: Soft, Nontender, Nondistended; Bowel sounds present  EXTREMITIES:  , No clubbing, cyanosis, or edema  NERVOUS SYSTEM:  Followed most of my commands   Psychiatry: AA and calm at this time      OBJECTIVE DATA:     Vital Signs Last 24 Hrs  T(C): 36.7 (17 Oct 2020 10:32), Max: 37.1 (16 Oct 2020 23:57)  T(F): 98 (17 Oct 2020 10:32), Max: 98.7 (16 Oct 2020 23:57)  HR: 105 (17 Oct 2020 10:32) (91 - 106)  BP: 116/59 (17 Oct 2020 10:32) (95/67 - 122/66)  BP(mean): --  RR: 20 (17 Oct 2020 10:32) (18 - 20)  SpO2: 100% (17 Oct 2020 10:32) (98% - 100%)           Daily     Daily Weight in k.3 (17 Oct 2020 05:17)  LABS:                        9.0    7.66  )-----------( 204      ( 16 Oct 2020 07:37 )             28.3             10-16    134<L>  |  100  |  18  ----------------------------<  105<H>  3.8   |  27  |  0.30<L>    Ca    8.4<L>      16 Oct 2020 07:37                         CAPILLARY BLOOD GLUCOSE      POCT Blood Glucose.: 105 mg/dL (17 Oct 2020 12:28)      Culture - Body Fluid with Gram Stain (collected 10-13)  Source: .Body Fluid RIGHT THORACENTESIS  Gram Stain (prelim) (10-13):    polymorphonuclear leukocytes seen    No organisms seen    by cytocentrifuge  Preliminary Report (10-13):    No growth        MEDICATIONS  (STANDING):  albuterol/ipratropium for Nebulization 3 milliLiter(s) Nebulizer every 6 hours  BACItracin   Ointment 1 Application(s) Topical two times a day  chlorhexidine 4% Liquid 1 Application(s) Topical <User Schedule>  clonazePAM  Tablet 1 milliGRAM(s) Oral two times a day  collagenase Ointment 1 Application(s) Topical daily  dextrose 5%. 1000 milliLiter(s) (50 mL/Hr) IV Continuous <Continuous>  dextrose 50% Injectable 12.5 Gram(s) IV Push once  dextrose 50% Injectable 25 Gram(s) IV Push once  dextrose 50% Injectable 25 Gram(s) IV Push once  enoxaparin Injectable 40 milliGRAM(s) SubCutaneous daily  folic acid 1 milliGRAM(s) Oral daily  hydrocortisone 2.5% Ointment 1 Application(s) Topical two times a day  influenza   Vaccine 0.5 milliLiter(s) IntraMuscular once  insulin lispro (HumaLOG) corrective regimen sliding scale   SubCutaneous every 6 hours  metoprolol tartrate 25 milliGRAM(s) Enteral Tube every 8 hours  multivitamin 1 Tablet(s) Oral daily  pantoprazole   Suspension 40 milliGRAM(s) Oral daily  QUEtiapine 50 milliGRAM(s) Oral daily  QUEtiapine 150 milliGRAM(s) Oral <User Schedule>  thiamine 100 milliGRAM(s) Oral daily    MEDICATIONS  (PRN):  acetaminophen   Tablet .. 650 milliGRAM(s) Oral every 6 hours PRN Temp greater or equal to 38C (100.4F), Mild Pain (1 - 3)  dextrose 40% Gel 15 Gram(s) Oral once PRN Blood Glucose LESS THAN 70 milliGRAM(s)/deciliter  glucagon  Injectable 1 milliGRAM(s) IntraMuscular once PRN Glucose LESS THAN 70 milligrams/deciliter  metoprolol tartrate Injectable 5 milliGRAM(s) IV Push every 6 hours PRN if sbp>160 or HR >110 hold if SBP<100 or HR<60  ondansetron Injectable 4 milliGRAM(s) IV Push every 8 hours PRN Nausea and/or Vomiting  traMADol 25 milliGRAM(s) Oral every 8 hours PRN Severe Pain (7 - 10)

## 2020-10-17 NOTE — PROGRESS NOTE ADULT - ASSESSMENT
Subjective Complaints:  Historian:             Vital Signs Last 24 Hrs  T(C): 36.3 (17 Oct 2020 16:43), Max: 37.1 (16 Oct 2020 23:57)  T(F): 97.4 (17 Oct 2020 16:43), Max: 98.7 (16 Oct 2020 23:57)  HR: 96 (17 Oct 2020 16:43) (80 - 106)  BP: 101/70 (17 Oct 2020 16:43) (101/70 - 122/66)  BP(mean): --  RR: 18 (17 Oct 2020 16:43) (18 - 20)  SpO2: 100% (17 Oct 2020 16:43) (96% - 100%)    GENERAL PHYSICAL EXAM:  General:  Appears stated age, well-groomed, well-nourished, no distress  HEENT:  NC/AT, patent nares w/ pink mucosa, OP clear w/o lesions, PERRL, EOMI, conjunctivae clear, no thyromegaly, nodules, adenopathy, no JVD  Chest:  Full & symmetric excursion, no increased effort, breath sounds clear  Cardiovascular:  Regular rhythm, S1, S2, no murmur/rub/S3/S4, no carotid/femoral/abdominal bruit, radial/pedal pulses 2+, no edema  Abdomen:  Soft, non-tender, non-distended, normoactive bowel sounds, no HSM  Extremities:  Gait & station:   Digits:   Nails:   Joints, Bones, Muscles:   ROM:   Stability:  Skin:  No rash/erythema/ecchymoses/petechiae/wounds/abscess/warm/dry  Musculoskeletal:  Full ROM in all joints w/o swelling/tenderness/effusion        LABS:                        9.0    7.66  )-----------( 204      ( 16 Oct 2020 07:37 )             28.3     10-16    134<L>  |  100  |  18  ----------------------------<  105<H>  3.8   |  27  |  0.30<L>    Ca    8.4<L>      16 Oct 2020 07:37            RADIOLOGY & ADDITIONAL STUDIES:        Neurology Progress Note:      Mental Status: pt is sleeping       Cranial Nerves:defrd      Motor:  moves arm leg  in bed         Sensory:defrd      Cerebellar:defrd       Gait:defrd      Assesment/Plan:  s/p etoh pancreatitis  critical care neuropathy  and myopathy s/p trach and peg for swallowing eval for rehab no   seizure

## 2020-10-18 LAB
GLUCOSE BLDC GLUCOMTR-MCNC: 107 MG/DL — HIGH (ref 70–99)
GLUCOSE BLDC GLUCOMTR-MCNC: 109 MG/DL — HIGH (ref 70–99)
GLUCOSE BLDC GLUCOMTR-MCNC: 117 MG/DL — HIGH (ref 70–99)
GLUCOSE BLDC GLUCOMTR-MCNC: 123 MG/DL — HIGH (ref 70–99)

## 2020-10-18 PROCEDURE — 99232 SBSQ HOSP IP/OBS MODERATE 35: CPT

## 2020-10-18 PROCEDURE — 71045 X-RAY EXAM CHEST 1 VIEW: CPT | Mod: 26

## 2020-10-18 RX ADMIN — Medication 100 MILLIGRAM(S): at 11:31

## 2020-10-18 RX ADMIN — PANTOPRAZOLE SODIUM 40 MILLIGRAM(S): 20 TABLET, DELAYED RELEASE ORAL at 11:31

## 2020-10-18 RX ADMIN — Medication 1 TABLET(S): at 11:31

## 2020-10-18 RX ADMIN — Medication 650 MILLIGRAM(S): at 13:58

## 2020-10-18 RX ADMIN — ENOXAPARIN SODIUM 40 MILLIGRAM(S): 100 INJECTION SUBCUTANEOUS at 11:31

## 2020-10-18 RX ADMIN — Medication 3 MILLILITER(S): at 05:46

## 2020-10-18 RX ADMIN — Medication 1 APPLICATION(S): at 05:13

## 2020-10-18 RX ADMIN — Medication 1 MILLIGRAM(S): at 17:33

## 2020-10-18 RX ADMIN — Medication 1 APPLICATION(S): at 05:15

## 2020-10-18 RX ADMIN — Medication 25 MILLIGRAM(S): at 05:14

## 2020-10-18 RX ADMIN — TRAMADOL HYDROCHLORIDE 25 MILLIGRAM(S): 50 TABLET ORAL at 09:36

## 2020-10-18 RX ADMIN — Medication 1 MILLIGRAM(S): at 05:13

## 2020-10-18 RX ADMIN — Medication 1 MILLIGRAM(S): at 11:31

## 2020-10-18 RX ADMIN — Medication 3 MILLILITER(S): at 00:12

## 2020-10-18 RX ADMIN — TRAMADOL HYDROCHLORIDE 25 MILLIGRAM(S): 50 TABLET ORAL at 20:18

## 2020-10-18 RX ADMIN — QUETIAPINE FUMARATE 150 MILLIGRAM(S): 200 TABLET, FILM COATED ORAL at 21:07

## 2020-10-18 RX ADMIN — Medication 650 MILLIGRAM(S): at 14:58

## 2020-10-18 RX ADMIN — Medication 3 MILLILITER(S): at 11:05

## 2020-10-18 RX ADMIN — Medication 3 MILLILITER(S): at 17:07

## 2020-10-18 RX ADMIN — Medication 3 MILLILITER(S): at 23:23

## 2020-10-18 RX ADMIN — TRAMADOL HYDROCHLORIDE 25 MILLIGRAM(S): 50 TABLET ORAL at 21:00

## 2020-10-18 RX ADMIN — TRAMADOL HYDROCHLORIDE 25 MILLIGRAM(S): 50 TABLET ORAL at 10:36

## 2020-10-18 NOTE — PROGRESS NOTE ADULT - SUBJECTIVE AND OBJECTIVE BOX
CHIEF COMPLAINT: s/p decannulation trach and now on NC 2 liter oxygen  Pain ulcer area.   no fever   able to cough  more with it and calm       PHYSICAL EXAM:    GENERAL: Thinly built,  PEG +  HEENT: .+ dressing trach site no bleeding   CHEST/LUNG: better air entry bilaterally, Decreased air entry right base. No wheezing   HEART: Regular rate and rhythm; No murmur. + Tachycardic  ABDOMEN: Soft, Nontender, Nondistended; Bowel sounds present  EXTREMITIES:  , No clubbing, cyanosis, or edema  NERVOUS SYSTEM:  Followed most of my commands   Psychiatry: Alert and awake       OBJECTIVE DATA:       Vital Signs Last 24 Hrs  T(C): 36.4 (18 Oct 2020 10:55), Max: 36.6 (17 Oct 2020 23:45)  T(F): 97.6 (18 Oct 2020 10:55), Max: 97.8 (17 Oct 2020 23:45)  HR: 98 (18 Oct 2020 10:55) (80 - 101)  BP: 101/63 (18 Oct 2020 10:55) (100/64 - 114/86)  BP(mean): 76 (18 Oct 2020 10:55) (76 - 76)  RR: 20 (18 Oct 2020 10:55) (18 - 20)  SpO2: 98% (18 Oct 2020 10:55) (95% - 100%)           Daily     Daily Weight in k.5 (18 Oct 2020 05:09)  LABS:                                     CAPILLARY BLOOD GLUCOSE      POCT Blood Glucose.: 107 mg/dL (18 Oct 2020 11:19)      MEDICATIONS  (STANDING):  albuterol/ipratropium for Nebulization 3 milliLiter(s) Nebulizer every 6 hours  BACItracin   Ointment 1 Application(s) Topical two times a day  chlorhexidine 4% Liquid 1 Application(s) Topical <User Schedule>  clonazePAM  Tablet 1 milliGRAM(s) Oral two times a day  collagenase Ointment 1 Application(s) Topical daily  enoxaparin Injectable 40 milliGRAM(s) SubCutaneous daily  folic acid 1 milliGRAM(s) Oral daily  hydrocortisone 2.5% Ointment 1 Application(s) Topical two times a day  influenza   Vaccine 0.5 milliLiter(s) IntraMuscular once  metoprolol tartrate 25 milliGRAM(s) Enteral Tube every 8 hours  multivitamin 1 Tablet(s) Oral daily  pantoprazole   Suspension 40 milliGRAM(s) Oral daily  QUEtiapine 150 milliGRAM(s) Oral <User Schedule>  thiamine 100 milliGRAM(s) Oral daily    MEDICATIONS  (PRN):  acetaminophen   Tablet .. 650 milliGRAM(s) Oral every 6 hours PRN Temp greater or equal to 38C (100.4F), Mild Pain (1 - 3)  glucagon  Injectable 1 milliGRAM(s) IntraMuscular once PRN Glucose LESS THAN 70 milligrams/deciliter  metoprolol tartrate Injectable 5 milliGRAM(s) IV Push every 6 hours PRN if sbp>160 or HR >110 hold if SBP<100 or HR<60  ondansetron Injectable 4 milliGRAM(s) IV Push every 8 hours PRN Nausea and/or Vomiting  traMADol 25 milliGRAM(s) Oral every 8 hours PRN Severe Pain (7 - 10)

## 2020-10-18 NOTE — PROGRESS NOTE ADULT - SUBJECTIVE AND OBJECTIVE BOX
INTERVAL HPI:  44M with ETOH Abuse and withdrawal, Alcohol Pancreatitis who presents to the ED with severe abdominal pain.  Found to have Necrotizing Pancreatitis, Septic  Shock, Pneumonia/ARDS with Hypoxic Respiratory failure. Admitted to ICU, had prolonged hospital course with Vent support. Eventually required Tracheostomy and Peg placement.  Also with acute metabolic Encephalopathy.  09/20/20:  Transferred to .    Not able to provide details due to encephalopathy.  09/22/20: 1500 ml paracentesis.  10/12/20: 2 litre right thoracentesis( exudate, negative culture and no malignant cells).  10/15/20: Trach Capped.  10/17/20:  De cannulated    OVERNIGHT EVENTS:  Comfortable    Vital Signs Last 24 Hrs  T(C): 36.4 (18 Oct 2020 10:55), Max: 36.6 (17 Oct 2020 23:45)  T(F): 97.6 (18 Oct 2020 10:55), Max: 97.8 (17 Oct 2020 23:45)  HR: 97 (18 Oct 2020 13:21) (91 - 98)  BP: 98/72 (18 Oct 2020 13:21) (98/72 - 108/72)  BP(mean): 76 (18 Oct 2020 10:55) (76 - 76)  RR: 20 (18 Oct 2020 10:55) (18 - 20)  SpO2: 98% (18 Oct 2020 10:55) (95% - 100%)    PHYSICAL EXAM:  GEN:         Awake, responsive and comfortable.  HEENT:    Normal.    RESP:         no distress.  CVS:          Regular rate and rhythm.   ABD:         Soft, non-tender, non-distended;     MEDICATIONS  (STANDING):  albuterol/ipratropium for Nebulization 3 milliLiter(s) Nebulizer every 6 hours  BACItracin   Ointment 1 Application(s) Topical two times a day  chlorhexidine 4% Liquid 1 Application(s) Topical <User Schedule>  clonazePAM  Tablet 1 milliGRAM(s) Oral two times a day  collagenase Ointment 1 Application(s) Topical daily  enoxaparin Injectable 40 milliGRAM(s) SubCutaneous daily  folic acid 1 milliGRAM(s) Oral daily  hydrocortisone 2.5% Ointment 1 Application(s) Topical two times a day  influenza   Vaccine 0.5 milliLiter(s) IntraMuscular once  metoprolol tartrate 25 milliGRAM(s) Enteral Tube every 8 hours  multivitamin 1 Tablet(s) Oral daily  pantoprazole   Suspension 40 milliGRAM(s) Oral daily  QUEtiapine 150 milliGRAM(s) Oral <User Schedule>  thiamine 100 milliGRAM(s) Oral daily    MEDICATIONS  (PRN):  acetaminophen   Tablet .. 650 milliGRAM(s) Oral every 6 hours PRN Temp greater or equal to 38C (100.4F), Mild Pain (1 - 3)  glucagon  Injectable 1 milliGRAM(s) IntraMuscular once PRN Glucose LESS THAN 70 milligrams/deciliter  metoprolol tartrate Injectable 5 milliGRAM(s) IV Push every 6 hours PRN if sbp>160 or HR >110 hold if SBP<100 or HR<60  ondansetron Injectable 4 milliGRAM(s) IV Push every 8 hours PRN Nausea and/or Vomiting  traMADol 25 milliGRAM(s) Oral every 8 hours PRN Severe Pain (7 - 10)    ASSESSMENT AND PLAN:  ·	Hypoxic Respiratory failure.  ·	S/P tracheostomy.   ·	Bilateral pneumonia(pseudomonas in sputum).  ·	Worsening right pleural effusion S/P Thoracentesis  ·	S/P Septic Shock.  ·	Necrotizing Pancreatis.  ·	Acute metabolic Encephalopathy.  ·	Alcohol abuse.  ·	Klebsiella Bacteremia.  ·	Anemia    Pulmonary stable after de cannulation.  Continue nebulizer.  CXR in am.

## 2020-10-18 NOTE — PROGRESS NOTE ADULT - ASSESSMENT
44M EtOH pancreatitis p/w abdominal pain 8/17/20, found to have necrotizing pancreatitis c/b septic shock and respiratory failure secondary to PNA and ARDS requiring intubation and DTs requiring sedation and small bowel obstruction conservatively managed with NGT and NPO for which he was admitted to ICU. Currently, s/p trach and PEG on 9/10 and treated for klebsiella bacteremia.    downgraded to  floor 9/20/20 from ICU    Assessment and Plan:      Mechanical Fall: No new  pain. Neurochecks q2 h. X ray pelvis showed no acute fracture. CT head and C spine showed no acute fracture also.     #Aspiration right sided pneumonitis/pneumonia.  fever resolved. aspiration precautions. cont bolus tube feeding and  pleasure oral feeding with dysphagia diet.  reviewed ID recs. Panculture if fever > 101. Repeat swallow study on Monday.     Recurrent right pleural effusion. ? parapneumonic. Discussed with Dr Boss and Dr Moody.  Follow repeat CXR.     #Diarrhea  could be multifactorial, --PEG feeds, abx, pancreatic insufficiency, also On laxative  Off miralax.       #unstageable pressure ulcers x 2 on sacral area ,    -aggressive  PT   -Discussed with the patient about frequent turning in the bed.     #s/p necrotizing pancreatitis , s/p septic shock , resolved     -RLE showing no dvt , cellulitis improved  -CT Chest/Abdomen/Pelvis ordered   Bilateral patchy airspace disease has improved compared to the prior.  Improving pancreatitis with decreased size of the pancreas as well as improved peripancreatic edema. Small collection adjacent to the undersurface of the tail is also decreased. No evidence of necrosis.      #Metabolic encephalopathy   AGITATION   hepatic vs. ICU delirium vs sepisis   patient awake , mouthing words to staff . agitated at times    d/angela Precedex since 9/15 in ICU  -Psychiatrist following. cont seroquel taper and klonopin.       #Acute respiratory failure secondary to PNA/ARDS.   s/p TRACH 9/10/20   s/p trach decannulation 10/17/20  on nasal canula oxygen  Follow repeat CXR for right sided pleural effusion.     #Ascites , abd is soft.   distended gallbladder and ascites seen on CT, abd US neg for acute cholecystitis   s/p paracentesis 9/22 by IR   ABD US showing worsening ascites, s/p paracentesis by IR 9/22 with 1.5L straw colored fluid removed -cultures did not show any specific organisms  fluid analysis of paracentesis not consistent with SBP.   9/21 KUB shows nonspecific bowel gas pattern without e/o obstruction or ileus   Blood culture from 9/10 was positive for klebsiella quite sensitive and one set from 9/12 is negative, 9/25 repeat blood cultures negative, sputum cultures from 9/26- showing pseudomonas aeruginosa   SAAG < 1.1 going against portal HTN       #s/p PEG placement 9/10/20    s/p SBO which was treated conservatively   Ileus.      #Hypokalememia-- repleted    #Hypomagnesemia --repleted    #Hypophosphatemia --repleted    #normocytic anemia , probably related to sepsis and acute illness   requiring transfusion on 9/17/20  follow anemia work-up   transfuse prn of H/H <7/21      hypercalcemia   - 2/2 to immobilization vs vitamin d supplementation although levels are only 25   - s/p pamidronate x 1    Vitamin D insufficiency. cont supplement.     SEVERE DECONDITIONING d/t prolonged hospitalization   PT  aggressively while inpatient.     DC finger sticks as blood sugars are controlled    #Preventative measures   -dvt ppx.   fall, aspiration precautions  Discussed with nursing staff. 44M EtOH pancreatitis p/w abdominal pain 8/17/20, found to have necrotizing pancreatitis c/b septic shock and respiratory failure secondary to PNA and ARDS requiring intubation and DTs requiring sedation and small bowel obstruction conservatively managed with NGT and NPO for which he was admitted to ICU. Currently, s/p trach and PEG on 9/10 and treated for klebsiella bacteremia.    downgraded to [facility] floor 9/20/20 from ICU    Assessment and Plan:      s/p Mechanical Fall 10/17/20:  X ray pelvis showed no acute fracture. CT head and C spine showed no acute fracture also.     #Aspiration right sided pneumonitis/pneumonia.  fever resolved. aspiration precautions. cont bolus tube feeding and  pleasure oral feeding with dysphagia diet.  reviewed ID recs. Panculture if fever > 101. Repeat swallow study on Monday.     Recurrent right pleural effusion. ? parapneumonic. Discussed with Dr Boss and Dr Moody.  Follow repeat CXR.     #Diarrhea  could be multifactorial, --PEG feeds, abx, pancreatic insufficiency, also On laxative  Off miralax.       #unstageable pressure ulcers x 2 on sacral area ,    -aggressive  PT   -Discussed with the patient about frequent turning in the bed.     #s/p necrotizing pancreatitis , s/p septic shock , resolved     -RLE showing no dvt , cellulitis improved  -CT Chest/Abdomen/Pelvis ordered   Bilateral patchy airspace disease has improved compared to the prior.  Improving pancreatitis with decreased size of the pancreas as well as improved peripancreatic edema. Small collection adjacent to the undersurface of the tail is also decreased. No evidence of necrosis.      #Metabolic encephalopathy   AGITATION   hepatic vs. ICU delirium vs sepisis   patient awake , mouthing words to staff . agitated at times    d/angela Precedex since 9/15 in ICU  -Psychiatrist following. cont seroquel taper and klonopin.       #Acute respiratory failure secondary to PNA/ARDS.   s/p TRACH 9/10/20   s/p trach decannulation 10/17/20  on nasal canula oxygen  Follow repeat CXR for right sided pleural effusion.     #Ascites , abd is soft.   distended gallbladder and ascites seen on CT, abd US neg for acute cholecystitis   s/p paracentesis 9/22 by IR   ABD US showing worsening ascites, s/p paracentesis by IR 9/22 with 1.5L straw colored fluid removed -cultures did not show any specific organisms  fluid analysis of paracentesis not consistent with SBP.   9/21 KUB shows nonspecific bowel gas pattern without e/o obstruction or ileus   Blood culture from 9/10 was positive for klebsiella quite sensitive and one set from 9/12 is negative, 9/25 repeat blood cultures negative, sputum cultures from 9/26- showing pseudomonas aeruginosa   SAAG < 1.1 going against portal HTN       #s/p PEG placement 9/10/20    s/p SBO which was treated conservatively   Ileus.      #Hypokalememia-- repleted    #Hypomagnesemia --repleted    #Hypophosphatemia --repleted    #normocytic anemia , probably related to sepsis and acute illness   requiring transfusion on 9/17/20  follow anemia work-up   transfuse prn of H/H <7/21      hypercalcemia   - 2/2 to immobilization vs vitamin d supplementation although levels are only 25   - s/p pamidronate x 1    Vitamin D insufficiency. cont supplement.     SEVERE DECONDITIONING d/t prolonged hospitalization   PT  aggressively while inpatient.     DC finger sticks as blood sugars are controlled    #Preventative measures   -dvt ppx.   fall, aspiration precautions  Discussed with nursing staff.

## 2020-10-18 NOTE — PROGRESS NOTE ADULT - ASSESSMENT
Subjective Complaints:  Historian:             Vital Signs Last 24 Hrs  T(C): 36.7 (18 Oct 2020 17:13), Max: 36.7 (18 Oct 2020 17:13)  T(F): 98 (18 Oct 2020 17:13), Max: 98 (18 Oct 2020 17:13)  HR: 90 (18 Oct 2020 17:13) (90 - 98)  BP: 111/72 (18 Oct 2020 17:29) (98/72 - 111/72)  BP(mean): 76 (18 Oct 2020 10:55) (76 - 76)  RR: 18 (18 Oct 2020 17:13) (18 - 20)  SpO2: 97% (18 Oct 2020 17:13) (95% - 98%)    GENERAL PHYSICAL EXAM:  General:  Appears stated age, well-groomed, well-nourished, no distress  HEENT:  NC/AT, patent nares w/ pink mucosa, OP clear w/o lesions, PERRL, EOMI, conjunctivae clear, no thyromegaly, nodules, adenopathy, no JVD  Chest:  Full & symmetric excursion, no increased effort, breath sounds clear  Cardiovascular:  Regular rhythm, S1, S2, no murmur/rub/S3/S4, no carotid/femoral/abdominal bruit, radial/pedal pulses 2+, no edema  Abdomen:  Soft, non-tender, non-distended, normoactive bowel sounds, no HSM  Extremities:  Gait & station:   Digits:   Nails:   Joints, Bones, Muscles:   ROM:   Stability:  Skin:  No rash/erythema/ecchymoses/petechiae/wounds/abscess/warm/dry  Musculoskeletal:  Full ROM in all joints w/o swelling/tenderness/effusion        LABS:                RADIOLOGY & ADDITIONAL STUDIES:        Neurology Progress Note:      Mental Status: awaek alert speech fluent  folows commands       Cranial Nerves: 2 1./2 intact      Motor:    arm leg 3/5 wasting arm legs  neuropathy critical care         Sensory:intact      Cerebellar: intact      Gait: ubnsteady       Assesment/Plan: s/p etoh delerium resolving no tremors on pureediet  no seizure s/p trach and peg  for pt rehab  in thiamine

## 2020-10-19 DIAGNOSIS — R41.0 DISORIENTATION, UNSPECIFIED: ICD-10-CM

## 2020-10-19 LAB
ANION GAP SERPL CALC-SCNC: 7 MMOL/L — SIGNIFICANT CHANGE UP (ref 5–17)
BUN SERPL-MCNC: 19 MG/DL — SIGNIFICANT CHANGE UP (ref 7–23)
CALCIUM SERPL-MCNC: 8.7 MG/DL — SIGNIFICANT CHANGE UP (ref 8.5–10.1)
CHLORIDE SERPL-SCNC: 103 MMOL/L — SIGNIFICANT CHANGE UP (ref 96–108)
CO2 SERPL-SCNC: 27 MMOL/L — SIGNIFICANT CHANGE UP (ref 22–31)
CREAT SERPL-MCNC: 0.45 MG/DL — LOW (ref 0.5–1.3)
GLUCOSE BLDC GLUCOMTR-MCNC: 105 MG/DL — HIGH (ref 70–99)
GLUCOSE BLDC GLUCOMTR-MCNC: 112 MG/DL — HIGH (ref 70–99)
GLUCOSE SERPL-MCNC: 105 MG/DL — HIGH (ref 70–99)
HCT VFR BLD CALC: 27.7 % — LOW (ref 39–50)
HGB BLD-MCNC: 9.1 G/DL — LOW (ref 13–17)
MCHC RBC-ENTMCNC: 29.2 PG — SIGNIFICANT CHANGE UP (ref 27–34)
MCHC RBC-ENTMCNC: 32.9 GM/DL — SIGNIFICANT CHANGE UP (ref 32–36)
MCV RBC AUTO: 88.8 FL — SIGNIFICANT CHANGE UP (ref 80–100)
NRBC # BLD: 0 /100 WBCS — SIGNIFICANT CHANGE UP (ref 0–0)
PLATELET # BLD AUTO: 196 K/UL — SIGNIFICANT CHANGE UP (ref 150–400)
POTASSIUM SERPL-MCNC: 3.6 MMOL/L — SIGNIFICANT CHANGE UP (ref 3.5–5.3)
POTASSIUM SERPL-SCNC: 3.6 MMOL/L — SIGNIFICANT CHANGE UP (ref 3.5–5.3)
RBC # BLD: 3.12 M/UL — LOW (ref 4.2–5.8)
RBC # FLD: 14.9 % — HIGH (ref 10.3–14.5)
SODIUM SERPL-SCNC: 137 MMOL/L — SIGNIFICANT CHANGE UP (ref 135–145)
WBC # BLD: 4.96 K/UL — SIGNIFICANT CHANGE UP (ref 3.8–10.5)
WBC # FLD AUTO: 4.96 K/UL — SIGNIFICANT CHANGE UP (ref 3.8–10.5)

## 2020-10-19 PROCEDURE — 99231 SBSQ HOSP IP/OBS SF/LOW 25: CPT

## 2020-10-19 PROCEDURE — 99232 SBSQ HOSP IP/OBS MODERATE 35: CPT

## 2020-10-19 PROCEDURE — 71045 X-RAY EXAM CHEST 1 VIEW: CPT | Mod: 26

## 2020-10-19 RX ORDER — QUETIAPINE FUMARATE 200 MG/1
100 TABLET, FILM COATED ORAL DAILY
Refills: 0 | Status: DISCONTINUED | OUTPATIENT
Start: 2020-10-19 | End: 2020-10-19

## 2020-10-19 RX ORDER — CLONAZEPAM 1 MG
0.5 TABLET ORAL DAILY
Refills: 0 | Status: DISCONTINUED | OUTPATIENT
Start: 2020-10-19 | End: 2020-10-20

## 2020-10-19 RX ORDER — QUETIAPINE FUMARATE 200 MG/1
100 TABLET, FILM COATED ORAL AT BEDTIME
Refills: 0 | Status: DISCONTINUED | OUTPATIENT
Start: 2020-10-19 | End: 2020-10-22

## 2020-10-19 RX ORDER — CLONAZEPAM 1 MG
1 TABLET ORAL AT BEDTIME
Refills: 0 | Status: DISCONTINUED | OUTPATIENT
Start: 2020-10-19 | End: 2020-10-21

## 2020-10-19 RX ADMIN — Medication 650 MILLIGRAM(S): at 02:14

## 2020-10-19 RX ADMIN — Medication 100 MILLIGRAM(S): at 13:55

## 2020-10-19 RX ADMIN — Medication 1 MILLIGRAM(S): at 13:54

## 2020-10-19 RX ADMIN — TRAMADOL HYDROCHLORIDE 25 MILLIGRAM(S): 50 TABLET ORAL at 06:50

## 2020-10-19 RX ADMIN — Medication 1 TABLET(S): at 13:54

## 2020-10-19 RX ADMIN — Medication 650 MILLIGRAM(S): at 02:50

## 2020-10-19 RX ADMIN — Medication 1 APPLICATION(S): at 06:32

## 2020-10-19 RX ADMIN — TRAMADOL HYDROCHLORIDE 25 MILLIGRAM(S): 50 TABLET ORAL at 17:03

## 2020-10-19 RX ADMIN — Medication 3 MILLILITER(S): at 05:29

## 2020-10-19 RX ADMIN — CHLORHEXIDINE GLUCONATE 1 APPLICATION(S): 213 SOLUTION TOPICAL at 06:37

## 2020-10-19 RX ADMIN — Medication 25 MILLIGRAM(S): at 06:41

## 2020-10-19 RX ADMIN — Medication 25 MILLIGRAM(S): at 14:08

## 2020-10-19 RX ADMIN — Medication 1 MILLIGRAM(S): at 06:36

## 2020-10-19 RX ADMIN — Medication 25 MILLIGRAM(S): at 21:14

## 2020-10-19 RX ADMIN — Medication 1 APPLICATION(S): at 06:33

## 2020-10-19 RX ADMIN — Medication 3 MILLILITER(S): at 11:19

## 2020-10-19 RX ADMIN — Medication 1 APPLICATION(S): at 13:52

## 2020-10-19 RX ADMIN — Medication 1 MILLIGRAM(S): at 21:14

## 2020-10-19 RX ADMIN — ENOXAPARIN SODIUM 40 MILLIGRAM(S): 100 INJECTION SUBCUTANEOUS at 13:53

## 2020-10-19 RX ADMIN — Medication 3 MILLILITER(S): at 17:10

## 2020-10-19 RX ADMIN — QUETIAPINE FUMARATE 100 MILLIGRAM(S): 200 TABLET, FILM COATED ORAL at 21:15

## 2020-10-19 RX ADMIN — PANTOPRAZOLE SODIUM 40 MILLIGRAM(S): 20 TABLET, DELAYED RELEASE ORAL at 14:31

## 2020-10-19 RX ADMIN — Medication 3 MILLILITER(S): at 23:56

## 2020-10-19 NOTE — PROGRESS NOTE BEHAVIORAL HEALTH - SUMMARY
Persistent multifactorial delirium with difficulty weaning off of sedation and IV treatment in ICU with episodes of breakthrough agitation with high tolerance to medications. Prolonged hospital course.   - collateral from girlfriend: Patient and her have been together for 1.5 years. Pt lives with his elderly father who has physical disability (leg) and was taking care of him. Patient is not working. They went on vacation to [place] and para-sailed few weeks prior to admission. Patient has no formal prior psychiatric history. + had mandibular fracture and has a metal plate in his jaw. + alcohol abuse.  - MSE much better starting week of 10/12/2020; started to be able to verbally communicate week of 10/14

## 2020-10-19 NOTE — PROGRESS NOTE ADULT - ASSESSMENT
Subjective Complaints:  Historian:             Vital Signs Last 24 Hrs  T(C): 37.3 (19 Oct 2020 16:34), Max: 37.3 (19 Oct 2020 16:34)  T(F): 99.2 (19 Oct 2020 16:34), Max: 99.2 (19 Oct 2020 16:34)  HR: 80 (19 Oct 2020 17:45) (75 - 114)  BP: 108/80 (19 Oct 2020 16:34) (100/64 - 134/88)  BP(mean): --  RR: 18 (19 Oct 2020 16:34) (17 - 18)  SpO2: 98% (19 Oct 2020 17:45) (95% - 99%)    GENERAL PHYSICAL EXAM:  General:  Appears stated age, well-groomed, well-nourished, no distress  HEENT:  NC/AT, patent nares w/ pink mucosa, OP clear w/o lesions, PERRL, EOMI, conjunctivae clear, no thyromegaly, nodules, adenopathy, no JVD  Chest:  Full & symmetric excursion, no increased effort, breath sounds clear  Cardiovascular:  Regular rhythm, S1, S2, no murmur/rub/S3/S4, no carotid/femoral/abdominal bruit, radial/pedal pulses 2+, no edema  Abdomen:  Soft, non-tender, non-distended, normoactive bowel sounds, no HSM  Extremities:  Gait & station:   Digits:   Nails:   Joints, Bones, Muscles:   ROM:   Stability:  Skin:  No rash/erythema/ecchymoses/petechiae/wounds/abscess/warm/dry  Musculoskeletal:  Full ROM in all joints w/o swelling/tenderness/effusion        LABS:                        9.1    4.96  )-----------( 196      ( 19 Oct 2020 08:24 )             27.7     10-19    137  |  103  |  19  ----------------------------<  105<H>  3.6   |  27  |  0.45<L>    Ca    8.7      19 Oct 2020 08:24            RADIOLOGY & ADDITIONAL STUDIES:        Neurology Progress Note:      Mental Status: awaek alert   speech fluent  follows commands       Cranial Nerves: 2 /12w intact       Motor:   arm leg 3/5         Sensory:intact      Cerebellar: defrd      Gait:defrd      Assesment/Plan:  hx of etoh pancreatitis delerium resolved no seizure  on thiamine  for pt rehab s/p peg s/p trach on puree iet

## 2020-10-19 NOTE — PROGRESS NOTE BEHAVIORAL HEALTH - NSBHCHARTREVIEWVS_PSY_A_CORE FT
T(C): 36.2 (10-19-20 @ 11:06), Max: 37.1 (10-18-20 @ 21:57)  HR: 77 (10-19-20 @ 12:01) (75 - 114)  BP: 113/68 (10-19-20 @ 11:06) (100/64 - 113/68)  RR: 18 (10-19-20 @ 11:06) (17 - 18)  SpO2: 97% (10-19-20 @ 12:01) (95% - 97%)

## 2020-10-19 NOTE — PROGRESS NOTE BEHAVIORAL HEALTH - NSBHFUPINTERVALHXFT_PSY_A_CORE
Continues his clinical progress. Now able to verbally engage and make his needs known. Feels motivated to get better, looks forward to eating regular food (kellen burger and fries) and is ready for discharge. Denies SI.HI.

## 2020-10-19 NOTE — PROGRESS NOTE BEHAVIORAL HEALTH - NS ED B[facility] MED ROS MUSCULOSKELETAL
INR need to be under 3.0 in order to proceed with procedure. Will hold dose tonight, 2.5 mg tomorrow.    Left message with Luisa relaying above. Asked her to return call to verify she received message.        No complaints

## 2020-10-19 NOTE — PROGRESS NOTE ADULT - ASSESSMENT
44M EtOH pancreatitis p/w abdominal pain 8/17/20, found to have necrotizing pancreatitis c/b septic shock and respiratory failure secondary to PNA and ARDS requiring intubation and DTs requiring sedation and small bowel obstruction conservatively managed with NGT and NPO for which he was admitted to ICU. Currently, s/p trach and PEG on 9/10 and treated for klebsiella bacteremia.    downgraded to [facility] floor 9/20/20 from ICU    Assessment and Plan:      s/p Mechanical Fall 10/17/20:  X ray pelvis showed no acute fracture. CT head and C spine showed no acute fracture also.     #Aspiration right sided pneumonitis/pneumonia.  fever resolved. aspiration precautions. cont bolus tube feeding. repeat swallow study today and see if dysphagia diet can be advanced. PEG tube removal??    Recurrent right pleural effusion. ? parapneumonic. improving.     #Diarrhea  could be multifactorial, --PEG feeds, abx, pancreatic insufficiency, also On laxative  Off miralax.       #unstageable pressure ulcers x 2 on sacral area ,    -aggressive  PT   -Discussed with the patient about frequent turning in the bed.     #s/p necrotizing pancreatitis , s/p septic shock , resolved     -RLE showing no dvt , cellulitis improved  -CT Chest/Abdomen/Pelvis ordered   Bilateral patchy airspace disease has improved compared to the prior.  Improving pancreatitis with decreased size of the pancreas as well as improved peripancreatic edema. Small collection adjacent to the undersurface of the tail is also decreased. No evidence of necrosis.      #Metabolic encephalopathy   AGITATION   hepatic vs. ICU delirium vs sepisis   patient awake , mouthing words to staff . agitated at times    d/angela Precedex since 9/15 in ICU  -Psychiatrist following. Cont to taper seroquel. cont klonopin.       #Acute respiratory failure secondary to PNA/ARDS.   s/p TRACH 9/10/20   s/p trach decannulation 10/17/20  on nasal canula oxygen>> taper if possible.       #Ascites , abd is soft.   distended gallbladder and ascites seen on CT, abd US neg for acute cholecystitis   s/p paracentesis 9/22 by IR   ABD US showing worsening ascites, s/p paracentesis by IR 9/22 with 1.5L straw colored fluid removed -cultures did not show any specific organisms  fluid analysis of paracentesis not consistent with SBP.   9/21 KUB shows nonspecific bowel gas pattern without e/o obstruction or ileus   Blood culture from 9/10 was positive for klebsiella quite sensitive and one set from 9/12 is negative, 9/25 repeat blood cultures negative, sputum cultures from 9/26- showing pseudomonas aeruginosa   SAAG < 1.1 going against portal HTN       #s/p PEG placement 9/10/20    s/p SBO which was treated conservatively   Ileus.      #Hypokalememia-- repleted    #Hypomagnesemia --repleted    #Hypophosphatemia --repleted    #normocytic anemia , probably related to sepsis and acute illness   requiring transfusion on 9/17/20  follow anemia work-up   transfuse prn of H/H <7/21      hypercalcemia   - 2/2 to immobilization vs vitamin d supplementation although levels are only 25   - s/p pamidronate x 1    Vitamin D insufficiency. cont supplement.     SEVERE DECONDITIONING d/t prolonged hospitalization   PT  aggressively while inpatient.     DC finger sticks.    #Preventative measures   -dvt ppx.   fall, aspiration precautions  Discussed with nursing staff.

## 2020-10-19 NOTE — PROGRESS NOTE BEHAVIORAL HEALTH - NSBHCHARTREVIEWLAB_PSY_A_CORE FT
10-19    137  |  103  |  19  ----------------------------<  105<H>  3.6   |  27  |  0.45<L>    Ca    8.7      19 Oct 2020 08:24

## 2020-10-19 NOTE — PROGRESS NOTE BEHAVIORAL HEALTH - NSBHCONSULTMEDS_PSY_A_CORE FT
COURSE OF TX: methadone weaned off as of 9/26/20; increased Seroquel 50mg PO bid to 100mg via PEG BID and increase Valium to 5mg PO bid on 9/28 due to agitation. Continued restlessness thus increased Seroquel 100bid to 150 bid on 9/29/10; decrease Seroquel from 200 qd to 150qd on 10/8/20 (trying to find lowest effective dose with ultimate goal is to minimize / maybe even wean off Patient from an antipsychotic). Once sources of agitation identified/address, behavior much improved. Decreased HS dose of Seroquel from 300mg to 250mg PO on 10/12/20 and 200 qhs on 1014/20 (ie. restlessness causes found to be environmental, need-based in part); decrease AM dose of Seroquel from 150 to 100mg PO on 10/13/20; then down to 50mg PO qd x 2 days then stop/off on 10/15/2020. Decrease Seroquel 200mg po qhs to 150mg po qhs on 10/16, 100mg PO qhs on 10/19 with goal to wean off/use only as PRN  - PRN Ativan dose increased to 2mg with continued need so DC Valium on 9/30/20 and use Ativan 2mg PO q8am, q4pm but HOLD for sedation with goal to stabilize agitation to the point that Patient will not need any PRNs and will have episodes of being awake  - PO Ativan ineffective; convert to Klonopin equivalent (Ativan 2mg = Klonopin 1mg) and increase to Klonopin 1.5mg PO via PEG BID on 10/6/20, REDUCE to 1mg PO bid on 10/13/20; reduce to 0.5mg qd/1mg PO qhs on 10/19/20 with goal to simplify regimen and limit overmedicated states

## 2020-10-19 NOTE — PROGRESS NOTE ADULT - SUBJECTIVE AND OBJECTIVE BOX
INTERVAL HPI:  44M with ETOH Abuse and withdrawal, Alcohol Pancreatitis who presents to the ED with severe abdominal pain.  Found to have Necrotizing Pancreatitis, Septic  Shock, Pneumonia/ARDS with Hypoxic Respiratory failure. Admitted to ICU, had prolonged hospital course with Vent support. Eventually required Tracheostomy and Peg placement.  Also with acute metabolic Encephalopathy.  09/20/20:  Transferred to .    Not able to provide details due to encephalopathy.  09/22/20: 1500 ml paracentesis.  10/12/20: 2 litre right thoracentesis( exudate, negative culture and no malignant cells).  10/15/20: Trach Capped.  10/17/20:  De cannulated    OVERNIGHT EVENTS:  Comfortable and feels better.    Vital Signs Last 24 Hrs  T(C): 37.3 (19 Oct 2020 16:34), Max: 37.3 (19 Oct 2020 16:34)  T(F): 99.2 (19 Oct 2020 16:34), Max: 99.2 (19 Oct 2020 16:34)  HR: 80 (19 Oct 2020 16:34) (75 - 114)  BP: 108/80 (19 Oct 2020 16:34) (100/64 - 134/88)  BP(mean): --  RR: 18 (19 Oct 2020 16:34) (17 - 18)  SpO2: 99% (19 Oct 2020 16:34) (95% - 99%)    PHYSICAL EXAM:  GEN:         Awake, responsive and comfortable.  HEENT:    Normal.    RESP:          no distress  CVS:           Regular rate and rhythm.   ABD:         Soft, non-tender, non-distended;     MEDICATIONS  (STANDING):  albuterol/ipratropium for Nebulization 3 milliLiter(s) Nebulizer every 6 hours  BACItracin   Ointment 1 Application(s) Topical two times a day  chlorhexidine 4% Liquid 1 Application(s) Topical <User Schedule>  clonazePAM  Tablet 0.5 milliGRAM(s) Oral daily  clonazePAM  Tablet 1 milliGRAM(s) Oral at bedtime  collagenase Ointment 1 Application(s) Topical daily  enoxaparin Injectable 40 milliGRAM(s) SubCutaneous daily  folic acid 1 milliGRAM(s) Oral daily  hydrocortisone 2.5% Ointment 1 Application(s) Topical two times a day  influenza   Vaccine 0.5 milliLiter(s) IntraMuscular once  metoprolol tartrate 25 milliGRAM(s) Enteral Tube every 8 hours  multivitamin 1 Tablet(s) Oral daily  pantoprazole   Suspension 40 milliGRAM(s) Oral daily  QUEtiapine 100 milliGRAM(s) Oral at bedtime  thiamine 100 milliGRAM(s) Oral daily    MEDICATIONS  (PRN):  acetaminophen   Tablet .. 650 milliGRAM(s) Oral every 6 hours PRN Temp greater or equal to 38C (100.4F), Mild Pain (1 - 3)  glucagon  Injectable 1 milliGRAM(s) IntraMuscular once PRN Glucose LESS THAN 70 milligrams/deciliter  metoprolol tartrate Injectable 5 milliGRAM(s) IV Push every 6 hours PRN if sbp>160 or HR >110 hold if SBP<100 or HR<60  ondansetron Injectable 4 milliGRAM(s) IV Push every 8 hours PRN Nausea and/or Vomiting  traMADol 25 milliGRAM(s) Oral every 8 hours PRN Severe Pain (7 - 10)    LABS:                        9.1    4.96  )-----------( 196      ( 19 Oct 2020 08:24 )             27.7     10-19    137  |  103  |  19  ----------------------------<  105<H>  3.6   |  27  |  0.45<L>    Ca    8.7      19 Oct 2020 08:24  < from: Xray Chest 1 View- PORTABLE-Routine (Xray Chest 1 View- PORTABLE-Routine in AM.) (10.19.20 @ 07:43) >  EXAM:  XR CHEST PORTABLE ROUTINE 1V                          PROCEDURE DATE:  10/19/2020      INTERPRETATION:  CLINICAL STATEMENT: Follow-up chest pain.    TECHNIQUE: AP view of the chest.    COMPARISON: 10/18/2020    FINDINGS/  IMPRESSION:  Large right pleural effusion with adjacent opacity without significant change    Heart size cannot be accurately assessed in this projection.    ESTEBAN DIMAS M.D. ATTENDING RADIOLOGIST  This document has been electronically signed. Oct 19 2020 3:30PM    ASSESSMENT AND PLAN:  ·	Hypoxic Respiratory failure.  ·	S/P tracheostomy.   ·	Bilateral pneumonia(pseudomonas in sputum).  ·	Worsening right pleural effusion S/P Thoracentesis  ·	S/P Septic Shock.  ·	Necrotizing Pancreatis.  ·	Acute metabolic Encephalopathy.  ·	Alcohol abuse.  ·	Klebsiella Bacteremia.  ·	Anemia    Re accumulation of right pleural effusion noted which may need drainage again, will discuss with IR.  Ascites is most likely reason for pleural effusion.  Continue O2 and nebulizer.

## 2020-10-19 NOTE — PROGRESS NOTE ADULT - SUBJECTIVE AND OBJECTIVE BOX
CHIEF COMPLAINT: significant improvement in last couple of days.   able to eat breakfast.   no fever no chest pain  + pain sacral area.   no agitation at this time      PHYSICAL EXAM:    GENERAL: Thinly built,  PEG +  HEENT: .+ dressing trach site no bleeding   CHEST/LUNG: better air entry bilaterally, no wheezing   HEART: Regular rate and rhythm; No murmur.   ABDOMEN: Soft, Nontender, Nondistended; Bowel sounds present  EXTREMITIES:  , No clubbing, cyanosis, or edema  NERVOUS SYSTEM:  Followed most of my commands. moving all four extremities spontaneously   Psychiatry: Alert and awake and oriented x 2-3       OBJECTIVE DATA:       Vital Signs Last 24 Hrs  T(C): 36.2 (19 Oct 2020 11:06), Max: 37.1 (18 Oct 2020 21:57)  T(F): 97.2 (19 Oct 2020 11:06), Max: 98.7 (18 Oct 2020 21:57)  HR: 77 (19 Oct 2020 12:01) (75 - 114)  BP: 113/68 (19 Oct 2020 11:06) (98/72 - 113/68)  BP(mean): --  RR: 18 (19 Oct 2020 11:06) (17 - 18)  SpO2: 97% (19 Oct 2020 12:01) (95% - 97%)           Daily     Daily Weight in k.5 (19 Oct 2020 06:00)  LABS:                        9.1    4.96  )-----------( 196      ( 19 Oct 2020 08:24 )             27.7             10-19    137  |  103  |  19  ----------------------------<  105<H>  3.6   |  27  |  0.45<L>    Ca    8.7      19 Oct 2020 08:24                         CAPILLARY BLOOD GLUCOSE      POCT Blood Glucose.: 105 mg/dL (19 Oct 2020 11:21)          Interval Radiology studies: reviewed     XR reviewed by me showed improvement in infiltrates and right pleural effusion     MEDICATIONS  (STANDING):  albuterol/ipratropium for Nebulization 3 milliLiter(s) Nebulizer every 6 hours  BACItracin   Ointment 1 Application(s) Topical two times a day  chlorhexidine 4% Liquid 1 Application(s) Topical <User Schedule>  clonazePAM  Tablet 1 milliGRAM(s) Oral two times a day  collagenase Ointment 1 Application(s) Topical daily  enoxaparin Injectable 40 milliGRAM(s) SubCutaneous daily  folic acid 1 milliGRAM(s) Oral daily  hydrocortisone 2.5% Ointment 1 Application(s) Topical two times a day  influenza   Vaccine 0.5 milliLiter(s) IntraMuscular once  metoprolol tartrate 25 milliGRAM(s) Enteral Tube every 8 hours  multivitamin 1 Tablet(s) Oral daily  pantoprazole   Suspension 40 milliGRAM(s) Oral daily  QUEtiapine 100 milliGRAM(s) Oral daily  thiamine 100 milliGRAM(s) Oral daily    MEDICATIONS  (PRN):  acetaminophen   Tablet .. 650 milliGRAM(s) Oral every 6 hours PRN Temp greater or equal to 38C (100.4F), Mild Pain (1 - 3)  glucagon  Injectable 1 milliGRAM(s) IntraMuscular once PRN Glucose LESS THAN 70 milligrams/deciliter  metoprolol tartrate Injectable 5 milliGRAM(s) IV Push every 6 hours PRN if sbp>160 or HR >110 hold if SBP<100 or HR<60  ondansetron Injectable 4 milliGRAM(s) IV Push every 8 hours PRN Nausea and/or Vomiting  traMADol 25 milliGRAM(s) Oral every 8 hours PRN Severe Pain (7 - 10)

## 2020-10-20 PROCEDURE — 99231 SBSQ HOSP IP/OBS SF/LOW 25: CPT

## 2020-10-20 PROCEDURE — 99232 SBSQ HOSP IP/OBS MODERATE 35: CPT

## 2020-10-20 RX ORDER — CLONAZEPAM 1 MG
0.5 TABLET ORAL DAILY
Refills: 0 | Status: DISCONTINUED | OUTPATIENT
Start: 2020-10-20 | End: 2020-10-22

## 2020-10-20 RX ORDER — ALBUTEROL 90 UG/1
2 AEROSOL, METERED ORAL EVERY 6 HOURS
Refills: 0 | Status: DISCONTINUED | OUTPATIENT
Start: 2020-10-20 | End: 2020-10-26

## 2020-10-20 RX ADMIN — Medication 650 MILLIGRAM(S): at 14:59

## 2020-10-20 RX ADMIN — Medication 1 APPLICATION(S): at 14:59

## 2020-10-20 RX ADMIN — Medication 25 MILLIGRAM(S): at 22:20

## 2020-10-20 RX ADMIN — ENOXAPARIN SODIUM 40 MILLIGRAM(S): 100 INJECTION SUBCUTANEOUS at 14:59

## 2020-10-20 RX ADMIN — Medication 1 MILLIGRAM(S): at 15:00

## 2020-10-20 RX ADMIN — QUETIAPINE FUMARATE 100 MILLIGRAM(S): 200 TABLET, FILM COATED ORAL at 22:20

## 2020-10-20 RX ADMIN — Medication 1 MILLIGRAM(S): at 22:23

## 2020-10-20 RX ADMIN — Medication 0.5 MILLIGRAM(S): at 09:26

## 2020-10-20 RX ADMIN — Medication 3 MILLILITER(S): at 11:03

## 2020-10-20 RX ADMIN — Medication 100 MILLIGRAM(S): at 15:00

## 2020-10-20 RX ADMIN — Medication 25 MILLIGRAM(S): at 15:01

## 2020-10-20 RX ADMIN — TRAMADOL HYDROCHLORIDE 25 MILLIGRAM(S): 50 TABLET ORAL at 18:08

## 2020-10-20 RX ADMIN — Medication 650 MILLIGRAM(S): at 16:00

## 2020-10-20 RX ADMIN — TRAMADOL HYDROCHLORIDE 25 MILLIGRAM(S): 50 TABLET ORAL at 10:30

## 2020-10-20 RX ADMIN — PANTOPRAZOLE SODIUM 40 MILLIGRAM(S): 20 TABLET, DELAYED RELEASE ORAL at 15:00

## 2020-10-20 RX ADMIN — Medication 3 MILLILITER(S): at 05:18

## 2020-10-20 RX ADMIN — TRAMADOL HYDROCHLORIDE 25 MILLIGRAM(S): 50 TABLET ORAL at 09:26

## 2020-10-20 RX ADMIN — Medication 1 TABLET(S): at 15:00

## 2020-10-20 RX ADMIN — ALBUTEROL 2 PUFF(S): 90 AEROSOL, METERED ORAL at 15:03

## 2020-10-20 NOTE — PROGRESS NOTE ADULT - SUBJECTIVE AND OBJECTIVE BOX
INTERVAL HPI:  44M with ETOH Abuse and withdrawal, Alcohol Pancreatitis who presents to the ED with severe abdominal pain.  Found to have Necrotizing Pancreatitis, Septic  Shock, Pneumonia/ARDS with Hypoxic Respiratory failure. Admitted to ICU, had prolonged hospital course with Vent support. Eventually required Tracheostomy and Peg placement.  Also with acute metabolic Encephalopathy.  09/20/20:  Transferred to .    Not able to provide details due to encephalopathy.  09/22/20: 1500 ml paracentesis.  10/12/20: 2 litre right thoracentesis( exudate, negative culture and no malignant cells).  10/15/20: Trach Capped.  10/17/20:  De cannulated    OVERNIGHT EVENTS:  Comfortable    Vital Signs Last 24 Hrs  T(C): 37.2 (20 Oct 2020 16:32), Max: 37.2 (20 Oct 2020 16:32)  T(F): 98.9 (20 Oct 2020 16:32), Max: 98.9 (20 Oct 2020 16:32)  HR: 98 (20 Oct 2020 16:32) (11 - 106)  BP: 110/77 (20 Oct 2020 16:32) (94/68 - 111/71)  BP(mean): --  RR: 18 (20 Oct 2020 16:32) (18 - 19)  SpO2: 99% (20 Oct 2020 16:32) (96% - 99%)    PHYSICAL EXAM:  GEN:        comfortable.  HEENT:    Normal.    RESP:       no distress.  CVS:          Regular rate and rhythm.   ABD:         Soft, non-tender, non-distended;     MEDICATIONS  (STANDING):  BACItracin   Ointment 1 Application(s) Topical two times a day  chlorhexidine 4% Liquid 1 Application(s) Topical <User Schedule>  clonazePAM  Tablet 1 milliGRAM(s) Oral at bedtime  collagenase Ointment 1 Application(s) Topical daily  enoxaparin Injectable 40 milliGRAM(s) SubCutaneous daily  folic acid 1 milliGRAM(s) Oral daily  influenza   Vaccine 0.5 milliLiter(s) IntraMuscular once  metoprolol tartrate 25 milliGRAM(s) Enteral Tube every 8 hours  multivitamin 1 Tablet(s) Oral daily  pantoprazole   Suspension 40 milliGRAM(s) Oral daily  QUEtiapine 100 milliGRAM(s) Oral at bedtime  thiamine 100 milliGRAM(s) Oral daily    MEDICATIONS  (PRN):  acetaminophen   Tablet .. 650 milliGRAM(s) Oral every 6 hours PRN Temp greater or equal to 38C (100.4F), Mild Pain (1 - 3)  ALBUTerol    90 MICROgram(s) HFA Inhaler 2 Puff(s) Inhalation every 6 hours PRN sob  clonazePAM  Tablet 0.5 milliGRAM(s) Oral daily PRN anxiety  glucagon  Injectable 1 milliGRAM(s) IntraMuscular once PRN Glucose LESS THAN 70 milligrams/deciliter  metoprolol tartrate Injectable 5 milliGRAM(s) IV Push every 6 hours PRN if sbp>160 or HR >110 hold if SBP<100 or HR<60  ondansetron Injectable 4 milliGRAM(s) IV Push every 8 hours PRN Nausea and/or Vomiting  traMADol 25 milliGRAM(s) Oral every 8 hours PRN Severe Pain (7 - 10)    LABS:                        9.1    4.96  )-----------( 196      ( 19 Oct 2020 08:24 )             27.7     10-19    137  |  103  |  19  ----------------------------<  105<H>  3.6   |  27  |  0.45<L>    Ca    8.7      19 Oct 2020 08:24    ASSESSMENT AND PLAN:  ·	Hypoxic Respiratory failure.  ·	S/P tracheostomy.   ·	Bilateral pneumonia(pseudomonas in sputum).  ·	Worsening right pleural effusion S/P Thoracentesis  ·	S/P Septic Shock.  ·	Necrotizing Pancreatis.  ·	Acute metabolic Encephalopathy.  ·	Alcohol abuse.  ·	Klebsiella Bacteremia.  ·	Anemia    Continue O2.  Physical therapy.

## 2020-10-20 NOTE — PROGRESS NOTE BEHAVIORAL HEALTH - NSBHCONSULTMEDS_PSY_A_CORE FT
COURSE OF TX: methadone weaned off as of 9/26/20; increased Seroquel 50mg PO bid to 100mg via PEG BID and increase Valium to 5mg PO bid on 9/28 due to agitation. Continued restlessness thus increased Seroquel 100bid to 150 bid on 9/29/10; decrease Seroquel from 200 qd to 150qd on 10/8/20 (trying to find lowest effective dose with ultimate goal is to minimize / maybe even wean off Patient from an antipsychotic). Once sources of agitation identified/address, behavior much improved. Decreased HS dose of Seroquel from 300mg to 250mg PO on 10/12/20 and 200 qhs on 1014/20 (ie. restlessness causes found to be environmental, need-based in part); decrease AM dose of Seroquel from 150 to 100mg PO on 10/13/20; then down to 50mg PO qd x 2 days then stop/off on 10/15/2020. Decrease Seroquel 200mg po qhs to 150mg po qhs on 10/16, 100mg PO qhs on 10/19 with goal to wean off/use only as PRN  - PRN Ativan dose increased to 2mg with continued need so DC Valium on 9/30/20 and use Ativan 2mg PO q8am, q4pm but HOLD for sedation with goal to stabilize agitation to the point that Patient will not need any PRNs and will have episodes of being awake  - PO Ativan ineffective; convert to Klonopin equivalent (Ativan 2mg = Klonopin 1mg) and increase to Klonopin 1.5mg PO via PEG BID on 10/6/20, REDUCE to 1mg PO bid on 10/13/20; reduce to 0.5mg qd/1mg PO qhs on 10/19/20. Change Klonopin 0.5mg PO qd to PRN for anxiety on 10/20/20 with goal to simplify regimen and limit overmedicated states

## 2020-10-20 NOTE — PROGRESS NOTE BEHAVIORAL HEALTH - SUMMARY
Persistent multifactorial delirium with difficulty weaning off of sedation and IV treatment in ICU with episodes of breakthrough agitation with high tolerance to medications. Prolonged hospital course.   - collateral from girlfriend: Patient and her have been together for 1.5 years. Pt lives with his elderly father who has physical disability (leg) and was taking care of him. Patient is not working. They went on vacation to [place] and para-sailed few weeks prior to admission. Patient has no formal prior psychiatric history. + had mandibular fracture and has a metal plate in his jaw. + alcohol abuse.  - MSE much better starting week of 10/12/2020; started to be able to verbally communicate week of 10/14 and continues his progress

## 2020-10-20 NOTE — PROGRESS NOTE BEHAVIORAL HEALTH - NSBHFUPINTERVALHXFT_PSY_A_CORE
Continues his clinical progress and tolerating the weaning off of the Seroquel and Klonopin without any issues/breakthrough anxiety, agitation. Patient is happy he is now able to verbally engage and make his needs known. Feels motivated to get better, looks forward to eating regular food ([employer] burger and fries) and is ready for discharge. Denies SI.HI.

## 2020-10-20 NOTE — PROGRESS NOTE ADULT - ASSESSMENT
Subjective Complaints:  Historian:             Vital Signs Last 24 Hrs  T(C): 36.7 (20 Oct 2020 05:28), Max: 37.3 (19 Oct 2020 16:34)  T(F): 98.1 (20 Oct 2020 05:28), Max: 99.2 (19 Oct 2020 16:34)  HR: 98 (20 Oct 2020 05:28) (72 - 112)  BP: 104/73 (20 Oct 2020 05:28) (94/68 - 134/88)  BP(mean): --  RR: 18 (20 Oct 2020 05:28) (18 - 18)  SpO2: 97% (20 Oct 2020 05:28) (96% - 99%)    GENERAL PHYSICAL EXAM:  General:  Appears stated age, well-groomed, well-nourished, no distress  HEENT:  NC/AT, patent nares w/ pink mucosa, OP clear w/o lesions, PERRL, EOMI, conjunctivae clear, no thyromegaly, nodules, adenopathy, no JVD  Chest:  Full & symmetric excursion, no increased effort, breath sounds clear  Cardiovascular:  Regular rhythm, S1, S2, no murmur/rub/S3/S4, no carotid/femoral/abdominal bruit, radial/pedal pulses 2+, no edema  Abdomen:  Soft, non-tender, non-distended, normoactive bowel sounds, no HSM  Extremities:  Gait & station:   Digits:   Nails:   Joints, Bones, Muscles:   ROM:   Stability:  Skin:  No rash/erythema/ecchymoses/petechiae/wounds/abscess/warm/dry  Musculoskeletal:  Full ROM in all joints w/o swelling/tenderness/effusion        LABS:                        9.1    4.96  )-----------( 196      ( 19 Oct 2020 08:24 )             27.7     10-19    137  |  103  |  19  ----------------------------<  105<H>  3.6   |  27  |  0.45<L>    Ca    8.7      19 Oct 2020 08:24            RADIOLOGY & ADDITIONAL STUDIES:        Neurology Progress Note:      Mental Status: awake alert  speech fluent follows commands       Cranial Nerves: 2 /12 intact      Motor:   arm leg 3/5         Sensory:intact      Cerebellar: defrd      Gait: unsteady       Assesment/Plan:  s/p etoh pancreatitis delerium reolved no seizure wasting of arm legs on thiamine s/p trach and peg now eating   for pt rehab

## 2020-10-20 NOTE — CHART NOTE - NSCHARTNOTEFT_GEN_A_CORE
Assessment:  Pt adm c ETOH pancreatitis, s/p necrotizing pancreatitis, improving pancreatitis, s/p septic shock, s/p respiratory failure, aspiration pneumonia, s/p ascites, ARDS, s/p intubation, DTs, s/p SBO, s/p trach, PEG, recurrent pleural effusion, diarrhea, unstageable pressure ulcer, metabolic encephalopathy, agitation, vitamin D insufficiency.  Pt requested regular foods, stated that he did not like ground mechanical soft diet, s/p swallow evaluation 10/19 c recommendation for regular consistency c thin liquids.  Calorie count ordered for1 day 10/20 & initiated by RN, RD complete calorie count on 10/21.   Pt refused PEG feeding.     Factors impacting intake: [x ] none [ ] nausea  [ ] vomiting [ ] diarrhea [ ] constipation  [ ]chewing problems [ ] swallowing issues  [ ] other:     Diet Prescription: Diet, Dysphagia 2 Mechanical Soft-Nectar Consistency Fluid:   Tube Feeding Modality: Gastrostomy  Jevity 1.2 Reji  Total Volume for 24 Hours (mL): 960  Bolus  Total Volume of Bolus (mL):  240  Tube Feed Frequency: Every 6 hours   Tube Feed Start Time: 18:00  Bolus Feed Rate (mL per Hour): 240   Bolus Feed Duration (in Hours): 1  No Carb Prosource (1pkg = 15gms Protein)     Qty per Day:  1 (10-16-20 @ 16:54)    Intake: >75% of meals being consumed as per RN    Current Weight: 10/20, 55.5 kg, 08/18, 71.5 kg, c wt. loss of 16 kg  % Weight Change: 22.4%  No edema noted    Nutrition focused physical exam conducted; Subcutaneous fat Exam;  [ Moderate  ]  Orbital fat pads region,  [ Moderate  ]Buccal fat region,  [ Severe  ]triceps region, [ Severe  ]ribs region.  Muscle Exam; [ Moderate  ]temples region, [ Unable, pt c T shirt on  ]clavicle region, [ Unable, pt c T-shirt on  ]shoulder region, [ Unable   ]Scapula region, [ Moderate  ]Interosseous region, [ Severe  ]thigh region, [ Severe   ]Calf region    Pertinent Medications: MEDICATIONS  (STANDING):  BACItracin   Ointment 1 Application(s) Topical two times a day  chlorhexidine 4% Liquid 1 Application(s) Topical <User Schedule>  clonazePAM  Tablet 1 milliGRAM(s) Oral at bedtime  collagenase Ointment 1 Application(s) Topical daily  enoxaparin Injectable 40 milliGRAM(s) SubCutaneous daily  folic acid 1 milliGRAM(s) Oral daily  influenza   Vaccine 0.5 milliLiter(s) IntraMuscular once  metoprolol tartrate 25 milliGRAM(s) Enteral Tube every 8 hours  multivitamin 1 Tablet(s) Oral daily  pantoprazole   Suspension 40 milliGRAM(s) Oral daily  QUEtiapine 100 milliGRAM(s) Oral at bedtime  thiamine 100 milliGRAM(s) Oral daily    MEDICATIONS  (PRN):  acetaminophen   Tablet .. 650 milliGRAM(s) Oral every 6 hours PRN Temp greater or equal to 38C (100.4F), Mild Pain (1 - 3)  ALBUTerol    90 MICROgram(s) HFA Inhaler 2 Puff(s) Inhalation every 6 hours PRN sob  clonazePAM  Tablet 0.5 milliGRAM(s) Oral daily PRN anxiety  glucagon  Injectable 1 milliGRAM(s) IntraMuscular once PRN Glucose LESS THAN 70 milligrams/deciliter  metoprolol tartrate Injectable 5 milliGRAM(s) IV Push every 6 hours PRN if sbp>160 or HR >110 hold if SBP<100 or HR<60  ondansetron Injectable 4 milliGRAM(s) IV Push every 8 hours PRN Nausea and/or Vomiting  traMADol 25 milliGRAM(s) Oral every 8 hours PRN Severe Pain (7 - 10)    Pertinent Labs: 10-19 Na137 mmol/L Glu 105 mg/dL<H> K+ 3.6 mmol/L Cr  0.45 mg/dL<L> BUN 19 mg/dL  10/09, Lipase 660 U/L    CAPILLARY BLOOD GLUCOSE    Skin:   skin tear; left scapula   wound; scrotum redness  Pressure ulcers x 2  1. sacrum; unstageable   2. trach; stage II    Estimated Needs:   [ x] no change since previous assessment(10/06)  [ ] recalculated:     Previous Nutrition Diagnosis:   [x ] Malnutrition; severe malnutrition in context of acute illness     Related to: inadequate protein-energy intake in setting of pancreatitis, respiratory failure, hepatic/metabolic encephalopathy, pressure ulcer     As evidenced by: <50% nutrition needs >5 days, physical findings of moderate muscle loss     Nutrition Diagnosis is [ x] ongoing, improving   [ ] resolved [ ] not applicable     New Nutrition Diagnosis: [x ] not applicable     Interventions:   Recommend  [x ] Change Diet To: low fat regular,   [ x] Nutrition Supplement; Heraclio (c specialized AA to support tissue  building & help maintain lean body mass) 1 pkg daily = 80 calories, No Carb Prosource 2 pkg daily=120 calories, 30 grams protein   [ ] Nutrition Support  [ x] Other: multivitamin c minerals daily and d/c multivitamin     Monitoring and Evaluation:   [x ] PO intake [ x ] Tolerance to diet prescription [ x ] weights [ x ] labs; Lipase [ x ] follow up per protocol  [ x] other: follow Assessment:  Pt adm c ETOH pancreatitis, s/p necrotizing pancreatitis, improving pancreatitis, s/p septic shock, s/p respiratory failure, aspiration pneumonia, s/p ascites, ARDS, s/p intubation, DTs, s/p SBO, s/p trach, PEG, recurrent pleural effusion, diarrhea, unstageable pressure ulcer, metabolic encephalopathy, agitation, vitamin D insufficiency.  Pt requested regular foods, stated that he did not like ground mechanical soft diet, s/p swallow evaluation 10/19 c recommendation for regular consistency c thin liquids.  Calorie count ordered for1 day 10/20 & initiated by RN, RD complete calorie count on 10/21.   Pt refused PEG feeding.     Factors impacting intake: [x ] none [ ] nausea  [ ] vomiting [ ] diarrhea [ ] constipation  [ ]chewing problems [ ] swallowing issues  [ ] other:     Diet Prescription: Diet, Dysphagia 2 Mechanical Soft-Nectar Consistency Fluid:   Tube Feeding Modality: Gastrostomy  Jevity 1.2 Reji  Total Volume for 24 Hours (mL): 960  Bolus  Total Volume of Bolus (mL):  240  Tube Feed Frequency: Every 6 hours   Tube Feed Start Time: 18:00  Bolus Feed Rate (mL per Hour): 240   Bolus Feed Duration (in Hours): 1  No Carb Prosource (1pkg = 15gms Protein)     Qty per Day:  1 (10-16-20 @ 16:54)    Intake: >75% of meals being consumed as per RN    Current Weight: 10/20, 55.5 kg, 08/18, 71.5 kg, c wt. loss of 16 kg  % Weight Change: 22.4%  No edema noted    Nutrition focused physical exam conducted; Subcutaneous fat Exam;  [ Moderate  ]  Orbital fat pads region,  [ Moderate  ]Buccal fat region,  [ Severe  ]triceps region, [ Severe  ]ribs region.  Muscle Exam; [ Moderate  ]temples region, [ Unable, pt c T shirt on  ]clavicle region, [ Unable, pt c T-shirt on  ]shoulder region, [ Unable   ]Scapula region, [ Moderate  ]Interosseous region, [ Severe  ]thigh region, [ Severe   ]Calf region    Pertinent Medications: MEDICATIONS  (STANDING):  BACItracin   Ointment 1 Application(s) Topical two times a day  chlorhexidine 4% Liquid 1 Application(s) Topical <User Schedule>  clonazePAM  Tablet 1 milliGRAM(s) Oral at bedtime  collagenase Ointment 1 Application(s) Topical daily  enoxaparin Injectable 40 milliGRAM(s) SubCutaneous daily  folic acid 1 milliGRAM(s) Oral daily  influenza   Vaccine 0.5 milliLiter(s) IntraMuscular once  metoprolol tartrate 25 milliGRAM(s) Enteral Tube every 8 hours  multivitamin 1 Tablet(s) Oral daily  pantoprazole   Suspension 40 milliGRAM(s) Oral daily  QUEtiapine 100 milliGRAM(s) Oral at bedtime  thiamine 100 milliGRAM(s) Oral daily    MEDICATIONS  (PRN):  acetaminophen   Tablet .. 650 milliGRAM(s) Oral every 6 hours PRN Temp greater or equal to 38C (100.4F), Mild Pain (1 - 3)  ALBUTerol    90 MICROgram(s) HFA Inhaler 2 Puff(s) Inhalation every 6 hours PRN sob  clonazePAM  Tablet 0.5 milliGRAM(s) Oral daily PRN anxiety  glucagon  Injectable 1 milliGRAM(s) IntraMuscular once PRN Glucose LESS THAN 70 milligrams/deciliter  metoprolol tartrate Injectable 5 milliGRAM(s) IV Push every 6 hours PRN if sbp>160 or HR >110 hold if SBP<100 or HR<60  ondansetron Injectable 4 milliGRAM(s) IV Push every 8 hours PRN Nausea and/or Vomiting  traMADol 25 milliGRAM(s) Oral every 8 hours PRN Severe Pain (7 - 10)    Pertinent Labs: 10-19 Na137 mmol/L Glu 105 mg/dL<H> K+ 3.6 mmol/L Cr  0.45 mg/dL<L> BUN 19 mg/dL  10/09, Lipase 660 U/L    CAPILLARY BLOOD GLUCOSE    Skin:   skin tear; left scapula   wound; scrotum redness  Pressure ulcers x 2  1. sacrum; unstageable   2. trach; stage II    Estimated Needs:   [ x] no change since previous assessment(10/06)  [ ] recalculated:     Previous Nutrition Diagnosis:   [x ] Malnutrition; severe malnutrition in context of acute illness     Related to: inadequate protein-energy intake in setting of pancreatitis, respiratory failure, hepatic/metabolic encephalopathy, pressure ulcer     As evidenced by: <50% nutrition needs >5 days, physical findings of moderate muscle loss     Goal: pt to consume >75% of meals and supplements; not met     Nutrition Diagnosis is [ x] ongoing, improving   [ ] resolved [ ] not applicable     New Nutrition Diagnosis: [x ] not applicable     Interventions:   Recommend  [x ] Change Diet To: low fat regular,   [ x] Nutrition Supplement; Heraclio (c specialized AA to support tissue  building & help maintain lean body mass) 1 pkg daily = 80 calories, No Carb Prosource 2 pkg daily=120 calories, 30 grams protein   [ ] Nutrition Support  [ x] Other: multivitamin c minerals daily and d/c multivitamin     Monitoring and Evaluation:   [x ] PO intake [ x ] Tolerance to diet prescription [ x ] weights [ x ] labs; Lipase [ x ] follow up per protocol  [ x] other: follow

## 2020-10-20 NOTE — PROGRESS NOTE BEHAVIORAL HEALTH - NSBHCHARTREVIEWVS_PSY_A_CORE FT
T(C): 36.8 (10-20-20 @ 12:00), Max: 37.3 (10-19-20 @ 16:34)  HR: 11 (10-20-20 @ 12:00) (11 - 98)  BP: 108/65 (10-20-20 @ 12:00) (94/68 - 111/71)  RR: 18 (10-20-20 @ 12:00) (18 - 18)  SpO2: 97% (10-20-20 @ 12:00) (96% - 99%)

## 2020-10-20 NOTE — PROGRESS NOTE ADULT - SUBJECTIVE AND OBJECTIVE BOX
CHIEF COMPLAINT:   able to eat food. no cough.   no fever no chest pain  + pain sacral area.   no agitation at this time      PHYSICAL EXAM:    GENERAL: Thinly built,  PEG +  HEENT: .+ dressing trach site no bleeding   CHEST/LUNG: better air entry bilaterally, no wheezing   HEART: Regular rate and rhythm; No murmur.   ABDOMEN: Soft, Nontender, Nondistended; Bowel sounds present  EXTREMITIES:  , No clubbing, cyanosis, or edema  NERVOUS SYSTEM:  Followed most of my commands. moving all four extremities spontaneously   Psychiatry: Alert and awake and oriented x 3       OBJECTIVE DATA:       Vital Signs Last 24 Hrs  T(C): 36.8 (20 Oct 2020 12:00), Max: 37.3 (19 Oct 2020 16:34)  T(F): 98.2 (20 Oct 2020 12:00), Max: 99.2 (19 Oct 2020 16:34)  HR: 11 (20 Oct 2020 12:00) (11 - 112)  BP: 108/65 (20 Oct 2020 12:00) (94/68 - 134/88)  BP(mean): --  RR: 18 (20 Oct 2020 12:00) (18 - 18)  SpO2: 97% (20 Oct 2020 12:00) (96% - 99%)           Daily     Daily Weight in k.5 (20 Oct 2020 06:00)  LABS:                        9.1    4.96  )-----------( 196      ( 19 Oct 2020 08:24 )             27.7             10-19    137  |  103  |  19  ----------------------------<  105<H>  3.6   |  27  |  0.45<L>    Ca    8.7      19 Oct 2020 08:24                         CAPILLARY BLOOD GLUCOSE      POCT Blood Glucose.: 105 mg/dL (19 Oct 2020 11:21)      MEDICATIONS  (STANDING):  albuterol/ipratropium for Nebulization 3 milliLiter(s) Nebulizer every 6 hours  BACItracin   Ointment 1 Application(s) Topical two times a day  chlorhexidine 4% Liquid 1 Application(s) Topical <User Schedule>  clonazePAM  Tablet 0.5 milliGRAM(s) Oral daily  clonazePAM  Tablet 1 milliGRAM(s) Oral at bedtime  collagenase Ointment 1 Application(s) Topical daily  enoxaparin Injectable 40 milliGRAM(s) SubCutaneous daily  folic acid 1 milliGRAM(s) Oral daily  influenza   Vaccine 0.5 milliLiter(s) IntraMuscular once  metoprolol tartrate 25 milliGRAM(s) Enteral Tube every 8 hours  multivitamin 1 Tablet(s) Oral daily  pantoprazole   Suspension 40 milliGRAM(s) Oral daily  QUEtiapine 100 milliGRAM(s) Oral at bedtime  thiamine 100 milliGRAM(s) Oral daily    MEDICATIONS  (PRN):  acetaminophen   Tablet .. 650 milliGRAM(s) Oral every 6 hours PRN Temp greater or equal to 38C (100.4F), Mild Pain (1 - 3)  glucagon  Injectable 1 milliGRAM(s) IntraMuscular once PRN Glucose LESS THAN 70 milligrams/deciliter  metoprolol tartrate Injectable 5 milliGRAM(s) IV Push every 6 hours PRN if sbp>160 or HR >110 hold if SBP<100 or HR<60  ondansetron Injectable 4 milliGRAM(s) IV Push every 8 hours PRN Nausea and/or Vomiting  traMADol 25 milliGRAM(s) Oral every 8 hours PRN Severe Pain (7 - 10)

## 2020-10-20 NOTE — PROGRESS NOTE ADULT - ASSESSMENT
44M EtOH pancreatitis p/w abdominal pain 8/17/20, found to have necrotizing pancreatitis c/b septic shock and respiratory failure secondary to PNA and ARDS requiring intubation and DTs requiring sedation and small bowel obstruction conservatively managed with NGT and NPO for which he was admitted to ICU. Currently, s/p trach and PEG on 9/10 and treated for klebsiella bacteremia.    downgraded to [facility] floor 9/20/20 from ICU    Assessment and Plan:      s/p Mechanical Fall 10/17/20:  X ray pelvis showed no acute fracture. CT head and C spine showed no acute fracture also.     #Aspiration right sided pneumonitis/pneumonia.  fever resolved. aspiration precautions. cont bolus tube feeding. calorie count and if adequate, would try to get PEG tube out.     Recurrent right pleural effusion. ? parapneumonic. stable.     #Diarrhea  could be multifactorial, --PEG feeds, abx, pancreatic insufficiency, also On laxative  Off miralax.       #unstageable pressure ulcers x 2 on sacral area ,    -reconsutled Dr Lee to re-evaluate the patient and see if he can benefit from wound vac. patient is not agitated and agreed to it.     #s/p necrotizing pancreatitis , s/p septic shock , resolved     -RLE showing no dvt , cellulitis improved  -CT Chest/Abdomen/Pelvis ordered   Bilateral patchy airspace disease has improved compared to the prior.  Improving pancreatitis with decreased size of the pancreas as well as improved peripancreatic edema. Small collection adjacent to the undersurface of the tail is also decreased. No evidence of necrosis.      #Metabolic encephalopathy   AGITATION   hepatic vs. ICU delirium vs sepisis   patient awake , mouthing words to staff . agitated at times    d/angela Precedex since 9/15 in ICU  -Psychiatrist following. Cont to taper seroquel. cont klonopin.       #Acute respiratory failure secondary to PNA/ARDS.   s/p TRACH 9/10/20   s/p trach decannulation 10/17/20  on nasal canula oxygen>> taper if possible.       #Ascites , abd is soft.   distended gallbladder and ascites seen on CT, abd US neg for acute cholecystitis   s/p paracentesis 9/22 by IR   ABD US showing worsening ascites, s/p paracentesis by IR 9/22 with 1.5L straw colored fluid removed -cultures did not show any specific organisms  fluid analysis of paracentesis not consistent with SBP.   9/21 KUB shows nonspecific bowel gas pattern without e/o obstruction or ileus   Blood culture from 9/10 was positive for klebsiella quite sensitive and one set from 9/12 is negative, 9/25 repeat blood cultures negative, sputum cultures from 9/26- showing pseudomonas aeruginosa   SAAG < 1.1 going against portal HTN       #s/p PEG placement 9/10/20    s/p SBO which was treated conservatively   Ileus.      #Hypokalememia-- repleted    #Hypomagnesemia --repleted    #Hypophosphatemia --repleted    #normocytic anemia , probably related to sepsis and acute illness   requiring transfusion on 9/17/20  follow anemia work-up   transfuse prn of H/H <7/21      hypercalcemia   - 2/2 to immobilization vs vitamin d supplementation although levels are only 25   - s/p pamidronate x 1    Vitamin D insufficiency. cont supplement.     SEVERE DECONDITIONING d/t prolonged hospitalization   PT  aggressive      #Preventative measures   -dvt ppx.   fall, aspiration precautions  Discussed with nursing staff.

## 2020-10-21 PROCEDURE — 99231 SBSQ HOSP IP/OBS SF/LOW 25: CPT

## 2020-10-21 PROCEDURE — 99232 SBSQ HOSP IP/OBS MODERATE 35: CPT

## 2020-10-21 RX ORDER — CLONAZEPAM 1 MG
0.5 TABLET ORAL AT BEDTIME
Refills: 0 | Status: DISCONTINUED | OUTPATIENT
Start: 2020-10-21 | End: 2020-10-22

## 2020-10-21 RX ADMIN — TRAMADOL HYDROCHLORIDE 25 MILLIGRAM(S): 50 TABLET ORAL at 12:17

## 2020-10-21 RX ADMIN — CHLORHEXIDINE GLUCONATE 1 APPLICATION(S): 213 SOLUTION TOPICAL at 07:58

## 2020-10-21 RX ADMIN — Medication 0.5 MILLIGRAM(S): at 22:02

## 2020-10-21 RX ADMIN — Medication 1 APPLICATION(S): at 11:19

## 2020-10-21 RX ADMIN — Medication 650 MILLIGRAM(S): at 18:26

## 2020-10-21 RX ADMIN — TRAMADOL HYDROCHLORIDE 25 MILLIGRAM(S): 50 TABLET ORAL at 20:58

## 2020-10-21 RX ADMIN — ENOXAPARIN SODIUM 40 MILLIGRAM(S): 100 INJECTION SUBCUTANEOUS at 11:19

## 2020-10-21 RX ADMIN — Medication 1 MILLIGRAM(S): at 11:19

## 2020-10-21 RX ADMIN — Medication 1 TABLET(S): at 11:19

## 2020-10-21 RX ADMIN — QUETIAPINE FUMARATE 100 MILLIGRAM(S): 200 TABLET, FILM COATED ORAL at 22:02

## 2020-10-21 RX ADMIN — TRAMADOL HYDROCHLORIDE 25 MILLIGRAM(S): 50 TABLET ORAL at 11:29

## 2020-10-21 RX ADMIN — Medication 0.5 MILLIGRAM(S): at 11:30

## 2020-10-21 RX ADMIN — Medication 100 MILLIGRAM(S): at 11:19

## 2020-10-21 RX ADMIN — Medication 1 APPLICATION(S): at 17:57

## 2020-10-21 RX ADMIN — TRAMADOL HYDROCHLORIDE 25 MILLIGRAM(S): 50 TABLET ORAL at 19:58

## 2020-10-21 RX ADMIN — Medication 650 MILLIGRAM(S): at 18:44

## 2020-10-21 RX ADMIN — Medication 25 MILLIGRAM(S): at 22:02

## 2020-10-21 NOTE — PROGRESS NOTE BEHAVIORAL HEALTH - SUMMARY
Persistent multifactorial delirium with difficulty weaning off of sedation and IV treatment in ICU with episodes of breakthrough agitation with high tolerance to medications. Prolonged hospital course.   - collateral from girlfriend: Patient and her have been together for 1.5 years. Pt lives with his elderly father who has physical disability (leg) and was taking care of him. Patient is not working. They went on vacation to [place] and para-sailed few weeks prior to admission. Patient has no formal prior psychiatric history. + had mandibular fracture and has a metal plate in his jaw. + alcohol abuse.  - MSE much better starting week of 10/12/2020; started to be able to verbally communicate week of 10/14 and continues his progress  - regained capacity and can make his medical decisions

## 2020-10-21 NOTE — PROGRESS NOTE ADULT - ASSESSMENT
44M EtOH pancreatitis p/w abdominal pain 8/17/20, found to have necrotizing pancreatitis c/b septic shock and respiratory failure secondary to PNA and ARDS requiring intubation and DTs requiring sedation and small bowel obstruction conservatively managed with NGT and NPO for which he was admitted to ICU. Currently, s/p trach and PEG on 9/10 and treated for klebsiella bacteremia.    downgraded to [facility] floor 9/20/20 from ICU    Assessment and Plan:      s/p Mechanical Fall 10/17/20:  X ray pelvis showed no acute fracture. CT head and C spine showed no acute fracture also.     #Aspiration right sided pneumonitis/pneumonia.  fever resolved. aspiration precautions. cont bolus tube feeding. calorie count and if adequate, would try to get PEG tube out.     Recurrent right pleural effusion. ? parapneumonic. stable.     #Diarrhea  could be multifactorial, --PEG feeds, abx, pancreatic insufficiency, also On laxative  Off miralax.       #unstageable pressure ulcers x 2 on sacral area ,    -reconsutled Dr Lee to re-evaluate the patient and see if he can benefit from wound vac. patient is not agitated and agreed to it.     #s/p necrotizing pancreatitis , s/p septic shock , resolved     -RLE showing no dvt , cellulitis improved  -CT Chest/Abdomen/Pelvis ordered   Bilateral patchy airspace disease has improved compared to the prior.  Improving pancreatitis with decreased size of the pancreas as well as improved peripancreatic edema. Small collection adjacent to the undersurface of the tail is also decreased. No evidence of necrosis.      #Metabolic encephalopathy   AGITATION   hepatic vs. ICU delirium vs sepisis   patient awake , mouthing words to staff . agitated at times    d/angela Precedex since 9/15 in ICU  -Psychiatrist following. Cont to taper seroquel. cont klonopin.       #Acute respiratory failure secondary to PNA/ARDS.   s/p TRACH 9/10/20   s/p trach decannulation 10/17/20  on nasal canula oxygen>> taper if possible.       #Ascites , abd is soft.   distended gallbladder and ascites seen on CT, abd US neg for acute cholecystitis   s/p paracentesis 9/22 by IR   ABD US showing worsening ascites, s/p paracentesis by IR 9/22 with 1.5L straw colored fluid removed -cultures did not show any specific organisms  fluid analysis of paracentesis not consistent with SBP.   9/21 KUB shows nonspecific bowel gas pattern without e/o obstruction or ileus   Blood culture from 9/10 was positive for klebsiella quite sensitive and one set from 9/12 is negative, 9/25 repeat blood cultures negative, sputum cultures from 9/26- showing pseudomonas aeruginosa   SAAG < 1.1 going against portal HTN       #s/p PEG placement 9/10/20, Contacted surg to eval patient for peg removal     s/p SBO which was treated conservatively   Ileus.      #Hypokalememia-- repleted    #Hypomagnesemia --repleted    #Hypophosphatemia --repleted    #normocytic anemia , probably related to sepsis and acute illness   requiring transfusion on 9/17/20  follow anemia work-up   transfuse prn of H/H <7/21      hypercalcemia   - 2/2 to immobilization vs vitamin d supplementation although levels are only 25   - s/p pamidronate x 1    Vitamin D insufficiency. cont supplement.     SEVERE DECONDITIONING d/t prolonged hospitalization   PT  aggressive      #Preventative measures   -dvt ppx.   fall, aspiration precautions  Discussed with nursing staff.

## 2020-10-21 NOTE — PROGRESS NOTE BEHAVIORAL HEALTH - NSBHCHARTREVIEWVS_PSY_A_CORE FT
T(C): 36.7 (10-21-20 @ 05:15), Max: 37.2 (10-20-20 @ 16:32)  HR: 80 (10-21-20 @ 05:15) (11 - 106)  BP: 100/67 (10-21-20 @ 05:15) (100/67 - 110/77)  RR: 18 (10-21-20 @ 05:15) (17 - 19)  SpO2: 97% (10-21-20 @ 05:15) (96% - 99%)

## 2020-10-21 NOTE — PROGRESS NOTE BEHAVIORAL HEALTH - NSBHCONSULTMEDS_PSY_A_CORE FT
COURSE OF TX: methadone weaned off as of 9/26/20; increased Seroquel 50mg PO bid to 100mg via PEG BID and increase Valium to 5mg PO bid on 9/28 due to agitation. Continued restlessness thus increased Seroquel 100bid to 150 bid on 9/29/10; decrease Seroquel from 200 qd to 150qd on 10/8/20 (trying to find lowest effective dose with ultimate goal is to minimize / maybe even wean off Patient from an antipsychotic). Once sources of agitation identified/address, behavior much improved. Decreased HS dose of Seroquel from 300mg to 250mg PO on 10/12/20 and 200 qhs on 1014/20 (ie. restlessness causes found to be environmental, need-based in part); decrease AM dose of Seroquel from 150 to 100mg PO on 10/13/20; then down to 50mg PO qd x 2 days then stop/off on 10/15/2020. Decrease Seroquel 200mg po qhs to 150mg po qhs on 10/16, 100mg PO qhs on 10/19 with goal to wean off/use only as PRN  - PRN Ativan dose increased to 2mg with continued need so DC Valium on 9/30/20 and use Ativan 2mg PO q8am, q4pm but HOLD for sedation with goal to stabilize agitation to the point that Patient will not need any PRNs and will have episodes of being awake  - PO Ativan ineffective; convert to Klonopin equivalent (Ativan 2mg = Klonopin 1mg) and increase to Klonopin 1.5mg PO via PEG BID on 10/6/20, REDUCE to 1mg PO bid on 10/13/20; reduce to 0.5mg qd/1mg PO qhs on 10/19/20. Change Klonopin 0.5mg PO qd to PRN for anxiety on 10/20/20 and reduced 1mg qhs to 0.5mg qhs on 10/21/20 with goal to simplify regimen and limit overmedicated states

## 2020-10-21 NOTE — PROGRESS NOTE ADULT - SUBJECTIVE AND OBJECTIVE BOX
INTERVAL HPI:  44M with ETOH Abuse and withdrawal, Alcohol Pancreatitis who presents to the ED with severe abdominal pain.  Found to have Necrotizing Pancreatitis, Septic  Shock, Pneumonia/ARDS with Hypoxic Respiratory failure. Admitted to ICU, had prolonged hospital course with Vent support. Eventually required Tracheostomy and Peg placement.  Also with acute metabolic Encephalopathy.  09/20/20:  Transferred to .    Not able to provide details due to encephalopathy.  09/22/20: 1500 ml paracentesis.  10/12/20: 2 litre right thoracentesis( exudate, negative culture and no malignant cells).  10/15/20: Trach Capped.  10/17/20:  De cannulated    OVERNIGHT EVENTS:  Off telemetry, breathing is better.    Vital Signs Last 24 Hrs  T(C): 36.7 (21 Oct 2020 17:10), Max: 36.9 (21 Oct 2020 00:00)  T(F): 98 (21 Oct 2020 17:10), Max: 98.4 (21 Oct 2020 00:00)  HR: 99 (21 Oct 2020 17:10) (80 - 99)  BP: 107/72 (21 Oct 2020 17:10) (100/67 - 107/74)  BP(mean): --  RR: 17 (21 Oct 2020 17:10) (16 - 18)  SpO2: 96% (21 Oct 2020 17:10) (96% - 99%)    PHYSICAL EXAM:  GEN:         Awake, responsive and comfortable.  HEENT:    Normal.    RESP:        no distress.  CVS:          Regular rate and rhythm.   ABD:         Soft, non-tender, non-distended;     MEDICATIONS  (STANDING):  BACItracin   Ointment 1 Application(s) Topical two times a day  chlorhexidine 4% Liquid 1 Application(s) Topical <User Schedule>  clonazePAM  Tablet 0.5 milliGRAM(s) Oral at bedtime  collagenase Ointment 1 Application(s) Topical daily  enoxaparin Injectable 40 milliGRAM(s) SubCutaneous daily  folic acid 1 milliGRAM(s) Oral daily  influenza   Vaccine 0.5 milliLiter(s) IntraMuscular once  metoprolol tartrate 25 milliGRAM(s) Enteral Tube every 8 hours  multivitamin 1 Tablet(s) Oral daily  pantoprazole   Suspension 40 milliGRAM(s) Oral daily  QUEtiapine 100 milliGRAM(s) Oral at bedtime  thiamine 100 milliGRAM(s) Oral daily    MEDICATIONS  (PRN):  acetaminophen   Tablet .. 650 milliGRAM(s) Oral every 6 hours PRN Temp greater or equal to 38C (100.4F), Mild Pain (1 - 3)  ALBUTerol    90 MICROgram(s) HFA Inhaler 2 Puff(s) Inhalation every 6 hours PRN sob  clonazePAM  Tablet 0.5 milliGRAM(s) Oral daily PRN anxiety  glucagon  Injectable 1 milliGRAM(s) IntraMuscular once PRN Glucose LESS THAN 70 milligrams/deciliter  metoprolol tartrate Injectable 5 milliGRAM(s) IV Push every 6 hours PRN if sbp>160 or HR >110 hold if SBP<100 or HR<60  ondansetron Injectable 4 milliGRAM(s) IV Push every 8 hours PRN Nausea and/or Vomiting  traMADol 25 milliGRAM(s) Oral every 8 hours PRN Severe Pain (7 - 10)    ASSESSMENT AND PLAN:  ·	Hypoxic Respiratory failure.  ·	S/P tracheostomy.   ·	Bilateral pneumonia(pseudomonas in sputum).  ·	Worsening right pleural effusion S/P Thoracentesis  ·	S/P Septic Shock.  ·	Necrotizing Pancreatis.  ·	Acute metabolic Encephalopathy.  ·	Alcohol abuse.  ·	Klebsiella Bacteremia.  ·	Anemia    Pulmonary status is stable.  Continue O2 and Physical therapy.

## 2020-10-21 NOTE — CHART NOTE - NSCHARTNOTEFT_GEN_A_CORE
Results from 1 day calorie count is as follows:  10/20: Pt consumed 445 calories, 24 grams protein for breakfast            Pt consumed 520 calories & 46 grams of protein for lunch            Dinner meal was not documented on calorie count sheet, however 75% meal intake recorded on adult assessment flow sheet under nutrition.  Pt seen this morning, ate 100% of breakfast.    Pt appears to be meeting close to 75% of estimated protein-energy needs @ present.     Pt is not receiving G-Tube feeding, plan is to take PEG out as per RN.    Pt dislikes Prosource, did not try LADAN Pulliam encouraged trial of Heraclio & explained its use for wound healing, low fat diet therapy for pancreatitis explained.  Recommend change multivitamin to multivitamin c minerals daily.  Recommend Low fat diet, Heraclio (c specialized AA to support tissue  building & help maintain lean body mass) 1 pkg daily = 80 calories., will add Ensure Clear 8 oz 2x/day (480 jean, 16 gm pro) and monitor for acceptance to supplements.   Pt seen for follow up 10/20, please refer to RD chart note. Results from 1 day calorie count is as follows:  10/20: Pt consumed 445 calories, 24 grams protein for breakfast            Pt consumed 520 calories & 26 grams of protein for lunch            Dinner meal was not documented on calorie count sheet, however 75% meal intake recorded on adult assessment flow sheet under nutrition.  Pt seen this morning, ate 100% of breakfast.    Pt appears to be meeting close to 75% of estimated protein-energy needs @ present.     Pt is not receiving G-Tube feeding, plan is to take PEG out as per RN.    Pt dislikes Prosource, did not try LADAN Pulliam encouraged trial of Heraclio & explained its use for wound healing, low fat diet therapy for pancreatitis explained.  Recommend change multivitamin to multivitamin c minerals daily.  Recommend Low fat diet, Heraclio (c specialized AA to support tissue  building & help maintain lean body mass) 1 pkg daily = 80 calories., will add Ensure Clear 8 oz 2x/day (480 jean, 16 gm pro) and monitor for acceptance to supplements.   Pt seen for follow up 10/20, please refer to RD chart note.

## 2020-10-21 NOTE — PROGRESS NOTE ADULT - SUBJECTIVE AND OBJECTIVE BOX
Patient is a 44y old  Male who presents with a chief complaint of pancreatitis (20 Oct 2020 16:57)      INTERVAL HPI/OVERNIGHT EVENTS: no events     MEDICATIONS  (STANDING):  BACItracin   Ointment 1 Application(s) Topical two times a day  chlorhexidine 4% Liquid 1 Application(s) Topical <User Schedule>  clonazePAM  Tablet 0.5 milliGRAM(s) Oral at bedtime  collagenase Ointment 1 Application(s) Topical daily  enoxaparin Injectable 40 milliGRAM(s) SubCutaneous daily  folic acid 1 milliGRAM(s) Oral daily  influenza   Vaccine 0.5 milliLiter(s) IntraMuscular once  metoprolol tartrate 25 milliGRAM(s) Enteral Tube every 8 hours  multivitamin 1 Tablet(s) Oral daily  pantoprazole   Suspension 40 milliGRAM(s) Oral daily  QUEtiapine 100 milliGRAM(s) Oral at bedtime  thiamine 100 milliGRAM(s) Oral daily    MEDICATIONS  (PRN):  acetaminophen   Tablet .. 650 milliGRAM(s) Oral every 6 hours PRN Temp greater or equal to 38C (100.4F), Mild Pain (1 - 3)  ALBUTerol    90 MICROgram(s) HFA Inhaler 2 Puff(s) Inhalation every 6 hours PRN sob  clonazePAM  Tablet 0.5 milliGRAM(s) Oral daily PRN anxiety  glucagon  Injectable 1 milliGRAM(s) IntraMuscular once PRN Glucose LESS THAN 70 milligrams/deciliter  metoprolol tartrate Injectable 5 milliGRAM(s) IV Push every 6 hours PRN if sbp>160 or HR >110 hold if SBP<100 or HR<60  ondansetron Injectable 4 milliGRAM(s) IV Push every 8 hours PRN Nausea and/or Vomiting  traMADol 25 milliGRAM(s) Oral every 8 hours PRN Severe Pain (7 - 10)      Allergies    No Known Allergies    Intolerances             Vital Signs Last 24 Hrs  T(C): 36.7 (21 Oct 2020 05:15), Max: 37.2 (20 Oct 2020 16:32)  T(F): 98 (21 Oct 2020 05:15), Max: 98.9 (20 Oct 2020 16:32)  HR: 80 (21 Oct 2020 05:15) (11 - 106)  BP: 100/67 (21 Oct 2020 05:15) (100/67 - 110/77)  BP(mean): --  RR: 18 (21 Oct 2020 05:15) (17 - 19)  SpO2: 97% (21 Oct 2020 05:15) (96% - 99%)    PHYSICAL EXAM:  GENERAL: nad  HEENT: .+ dressing trach site no bleeding   CHEST/LUNG: better air entry bilaterally, no wheezing   HEART: Regular rate and rhythm; No murmur.   ABDOMEN: Soft, Nontender, Nondistended; Bowel sounds present, peg  EXTREMITIES:  , No clubbing, cyanosis, or edema  NERVOUS SYSTEM:  Followed most of my commands. moving all four extremities spontaneously   Psychiatry: Alert and awake and oriented x 3   LABS:              CAPILLARY BLOOD GLUCOSE          RADIOLOGY & ADDITIONAL TESTS:    Imaging Personally Reviewed:  [ X] YES  [ ] NO    Consultant(s) Notes Reviewed:  [ X] YES  [ ] NO    Care Discussed with Consultants/Other Providers [X ] YES  [ ] NO

## 2020-10-21 NOTE — PROGRESS NOTE BEHAVIORAL HEALTH - NSBHCONSULTFOLLOWDETAILS_PSY_A_CORE FT
10/7/20 -  56.6 kg / 124.7 pounds
rechecked weight on 10/7/20; now is 56.6 kg / 124.7 pounds
recheck weight; now much less then 92.1kg
rechecked weight on 10/7/20; now is 56.6 kg / 124.7 pounds

## 2020-10-21 NOTE — CHART NOTE - NSCHARTNOTEFT_GEN_A_CORE
Called by medical attending for removal of PEG tube since no longer needed. Patient tolerating regular diet.   Patient seen and examined with Dr. Adams.  PEG tube removed at bedside. Patient tolerated well.  Dry sterile dressing applied.

## 2020-10-21 NOTE — PROGRESS NOTE BEHAVIORAL HEALTH - NSBHFUPINTERVALHXFT_PSY_A_CORE
Patient has a nice visit with his girlfriend yesterday afternoon, even made jokes. Patient feels like he is getting stronger and his ambulation with a walker is improving Continues his clinical progress and tolerating the weaning off of the Seroquel and Klonopin without any issues/breakthrough anxiety, agitation. Patient is happy he is now able to verbally engage and make his needs known. Feels motivated to get better, looks forward to eating regular food (kellen burger and fries) and is ready for discharge. Denies SI.HI.

## 2020-10-22 LAB — SARS-COV-2 RNA SPEC QL NAA+PROBE: SIGNIFICANT CHANGE UP

## 2020-10-22 PROCEDURE — 99231 SBSQ HOSP IP/OBS SF/LOW 25: CPT

## 2020-10-22 PROCEDURE — 99232 SBSQ HOSP IP/OBS MODERATE 35: CPT

## 2020-10-22 RX ORDER — LANOLIN ALCOHOL/MO/W.PET/CERES
3 CREAM (GRAM) TOPICAL ONCE
Refills: 0 | Status: COMPLETED | OUTPATIENT
Start: 2020-10-22 | End: 2020-10-22

## 2020-10-22 RX ORDER — TRAMADOL HYDROCHLORIDE 50 MG/1
25 TABLET ORAL ONCE
Refills: 0 | Status: DISCONTINUED | OUTPATIENT
Start: 2020-10-22 | End: 2020-10-22

## 2020-10-22 RX ADMIN — TRAMADOL HYDROCHLORIDE 25 MILLIGRAM(S): 50 TABLET ORAL at 06:50

## 2020-10-22 RX ADMIN — TRAMADOL HYDROCHLORIDE 25 MILLIGRAM(S): 50 TABLET ORAL at 19:59

## 2020-10-22 RX ADMIN — Medication 1 APPLICATION(S): at 17:20

## 2020-10-22 RX ADMIN — Medication 650 MILLIGRAM(S): at 00:44

## 2020-10-22 RX ADMIN — Medication 650 MILLIGRAM(S): at 12:16

## 2020-10-22 RX ADMIN — Medication 25 MILLIGRAM(S): at 13:55

## 2020-10-22 RX ADMIN — Medication 1 APPLICATION(S): at 13:01

## 2020-10-22 RX ADMIN — PANTOPRAZOLE SODIUM 40 MILLIGRAM(S): 20 TABLET, DELAYED RELEASE ORAL at 13:01

## 2020-10-22 RX ADMIN — Medication 650 MILLIGRAM(S): at 23:50

## 2020-10-22 RX ADMIN — Medication 650 MILLIGRAM(S): at 13:00

## 2020-10-22 RX ADMIN — TRAMADOL HYDROCHLORIDE 25 MILLIGRAM(S): 50 TABLET ORAL at 13:00

## 2020-10-22 RX ADMIN — Medication 650 MILLIGRAM(S): at 00:14

## 2020-10-22 RX ADMIN — TRAMADOL HYDROCHLORIDE 25 MILLIGRAM(S): 50 TABLET ORAL at 13:30

## 2020-10-22 RX ADMIN — Medication 100 MILLIGRAM(S): at 12:16

## 2020-10-22 RX ADMIN — ENOXAPARIN SODIUM 40 MILLIGRAM(S): 100 INJECTION SUBCUTANEOUS at 12:18

## 2020-10-22 RX ADMIN — Medication 25 MILLIGRAM(S): at 21:32

## 2020-10-22 RX ADMIN — Medication 1 MILLIGRAM(S): at 12:15

## 2020-10-22 RX ADMIN — TRAMADOL HYDROCHLORIDE 25 MILLIGRAM(S): 50 TABLET ORAL at 20:59

## 2020-10-22 RX ADMIN — Medication 650 MILLIGRAM(S): at 23:05

## 2020-10-22 RX ADMIN — CHLORHEXIDINE GLUCONATE 1 APPLICATION(S): 213 SOLUTION TOPICAL at 06:30

## 2020-10-22 RX ADMIN — Medication 3 MILLIGRAM(S): at 23:05

## 2020-10-22 RX ADMIN — Medication 1 TABLET(S): at 12:18

## 2020-10-22 RX ADMIN — Medication 1 APPLICATION(S): at 05:48

## 2020-10-22 NOTE — PROGRESS NOTE BEHAVIORAL HEALTH - NS ED BHA AXIS I SECONDARY1 CODE FT
F10.29

## 2020-10-22 NOTE — PROGRESS NOTE BEHAVIORAL HEALTH - MOOD
Other

## 2020-10-22 NOTE — PROGRESS NOTE BEHAVIORAL HEALTH - NSBHCONSULTMEDS_PSY_A_CORE FT
COURSE OF TX: methadone weaned off as of 9/26/20; increased Seroquel 50mg PO bid to 100mg via PEG BID and increase Valium to 5mg PO bid on 9/28 due to agitation. Continued restlessness thus increased Seroquel 100bid to 150 bid on 9/29/10; decrease Seroquel from 200 qd to 150qd on 10/8/20 (trying to find lowest effective dose with ultimate goal is to minimize / maybe even wean off Patient from an antipsychotic). Once sources of agitation identified/address, behavior much improved. Decreased HS dose of Seroquel from 300mg to 250mg PO on 10/12/20 and 200 qhs on 1014/20 (ie. restlessness causes found to be environmental, need-based in part); decrease AM dose of Seroquel from 150 to 100mg PO on 10/13/20; then down to 50mg PO qd x 2 days then stop/off on 10/15/2020. Decrease Seroquel 200mg po qhs to 150mg po qhs on 10/16, 100mg PO qhs on 10/19 with goal to wean off/use only as PRN. DC Seroquel in total on 10/22/20.   - PRN Ativan dose increased to 2mg with continued need so DC Valium on 9/30/20 and use Ativan 2mg PO q8am, q4pm but HOLD for sedation with goal to stabilize agitation to the point that Patient will not need any PRNs and will have episodes of being awake  - PO Ativan ineffective; convert to Klonopin equivalent (Ativan 2mg = Klonopin 1mg) and increase to Klonopin 1.5mg PO via PEG BID on 10/6/20, REDUCE to 1mg PO bid on 10/13/20; reduce to 0.5mg qd/1mg PO qhs on 10/19/20. Change Klonopin 0.5mg PO qd to PRN for anxiety on 10/20/20 and reduced 1mg qhs to 0.5mg qhs on 10/21/20 with goal to simplify regimen and limit overmedicated states. DC Klonopin in total on 10/22/20

## 2020-10-22 NOTE — PROGRESS NOTE BEHAVIORAL HEALTH - ORIENTED TO TIME
Yes
Other

## 2020-10-22 NOTE — PROGRESS NOTE BEHAVIORAL HEALTH - EYE CONTACT
Other
Fair/Other
Good
Good
Other
Fair/Other
Fair/Other
Other
Other
Other/Fair
Other/Fair
Other

## 2020-10-22 NOTE — PROGRESS NOTE BEHAVIORAL HEALTH - PERCEPTIONS
Other
No abnormalities
Other

## 2020-10-22 NOTE — PROGRESS NOTE ADULT - ASSESSMENT
44M EtOH pancreatitis p/w abdominal pain 8/17/20, found to have necrotizing pancreatitis c/b septic shock and respiratory failure secondary to PNA and ARDS requiring intubation and DTs requiring sedation and small bowel obstruction conservatively managed with NGT and NPO for which he was admitted to ICU. Currently, s/p trach and PEG on 9/10 and treated for klebsiella bacteremia.    downgraded to [facility] floor 9/20/20 from ICU    Assessment and Plan:      s/p Mechanical Fall 10/17/20:  X ray pelvis showed no acute fracture. CT head and C spine showed no acute fracture also.     #Aspiration right sided pneumonitis/pneumonia.  fever resolved. aspiration precautions. cont bolus tube feeding. calorie count and if adequate, would try to get PEG tube out.     Recurrent right pleural effusion. ? parapneumonic. stable.            #unstageable pressure ulcers x 2 on sacral area ,    -wound vac to be placed     #s/p necrotizing pancreatitis , s/p septic shock , resolved     -RLE showing no dvt , cellulitis improved  -CT Chest/Abdomen/Pelvis ordered   Bilateral patchy airspace disease has improved compared to the prior.  Improving pancreatitis with decreased size of the pancreas as well as improved peripancreatic edema. Small collection adjacent to the undersurface of the tail is also decreased. No evidence of necrosis.      #Metabolic encephalopathy   AGITATION   hepatic vs. ICU delirium vs sepisis   patient awake , mouthing words to staff . agitated at times    d/angela Precedex since 9/15 in ICU  -Psychiatrist following. Cont to taper seroquel. cont klonopin.       #Acute respiratory failure secondary to PNA/ARDS.   s/p TRACH 9/10/20   s/p trach decannulation 10/17/20  on nasal canula oxygen>> taper if possible.       #Ascites , abd is soft.   distended gallbladder and ascites seen on CT, abd US neg for acute cholecystitis   s/p paracentesis 9/22 by IR   ABD US showing worsening ascites, s/p paracentesis by IR 9/22 with 1.5L straw colored fluid removed -cultures did not show any specific organisms  fluid analysis of paracentesis not consistent with SBP.   9/21 KUB shows nonspecific bowel gas pattern without e/o obstruction or ileus   Blood culture from 9/10 was positive for klebsiella quite sensitive and one set from 9/12 is negative, 9/25 repeat blood cultures negative, sputum cultures from 9/26- showing pseudomonas aeruginosa   SAAG < 1.1 going against portal HTN       #s/p PEG placement 9/10/20, s/p peg removal 10/21    s/p SBO which was treated conservatively   Ileus.      #Hypokalememia-- repleted    #Hypomagnesemia --repleted    #Hypophosphatemia --repleted    #normocytic anemia , probably related to sepsis and acute illness   requiring transfusion on 9/17/20  follow anemia work-up   transfuse prn of H/H <7/21      hypercalcemia   - 2/2 to immobilization vs vitamin d supplementation although levels are only 25   - s/p pamidronate x 1    Vitamin D insufficiency. cont supplement.     SEVERE DECONDITIONING d/t prolonged hospitalization   PT  aggressive      #Preventative measures   -dvt ppx.   fall, aspiration precautions  Discussed with nursing staff. 44M EtOH pancreatitis p/w abdominal pain 8/17/20, found to have necrotizing pancreatitis c/b septic shock and respiratory failure secondary to PNA and ARDS requiring intubation and DTs requiring sedation and small bowel obstruction conservatively managed with NGT and NPO for which he was admitted to ICU. Currently, s/p trach and PEG on 9/10 and treated for klebsiella bacteremia.    downgraded to [facility] floor 9/20/20 from ICU    Assessment and Plan:      s/p Mechanical Fall 10/17/20:  X ray pelvis showed no acute fracture. CT head and C spine showed no acute fracture also.     #Aspiration right sided pneumonitis/pneumonia.  fever resolved. aspiration precautions. resolved    Recurrent right pleural effusion. ? parapneumonic. stable.            #unstageable pressure ulcers x 2 on sacral area ,    -wound vac to be placed     #s/p necrotizing pancreatitis , s/p septic shock , resolved     -RLE showing no dvt , cellulitis improved  -CT Chest/Abdomen/Pelvis ordered   Bilateral patchy airspace disease has improved compared to the prior.  Improving pancreatitis with decreased size of the pancreas as well as improved peripancreatic edema. Small collection adjacent to the undersurface of the tail is also decreased. No evidence of necrosis.      #Metabolic encephalopathy   AGITATION   hepatic vs. ICU delirium vs sepisis   patient awake , mouthing words to staff . agitated at times    d/angela Precedex since 9/15 in ICU  -Psychiatrist following. Cont to taper seroquel. cont klonopin.       #Acute respiratory failure secondary to PNA/ARDS.   s/p TRACH 9/10/20   s/p trach decannulation 10/17/20  on nasal canula oxygen>> taper if possible.       #Ascites , abd is soft.   distended gallbladder and ascites seen on CT, abd US neg for acute cholecystitis   s/p paracentesis 9/22 by IR   ABD US showing worsening ascites, s/p paracentesis by IR 9/22 with 1.5L straw colored fluid removed -cultures did not show any specific organisms  fluid analysis of paracentesis not consistent with SBP.   9/21 KUB shows nonspecific bowel gas pattern without e/o obstruction or ileus   Blood culture from 9/10 was positive for klebsiella quite sensitive and one set from 9/12 is negative, 9/25 repeat blood cultures negative, sputum cultures from 9/26- showing pseudomonas aeruginosa   SAAG < 1.1 going against portal HTN       #s/p PEG placement 9/10/20, s/p peg removal 10/21    s/p SBO which was treated conservatively   Ileus.      #Hypokalememia-- repleted    #Hypomagnesemia --repleted    #Hypophosphatemia --repleted    #normocytic anemia , probably related to sepsis and acute illness   requiring transfusion on 9/17/20  follow anemia work-up   transfuse prn of H/H <7/21      hypercalcemia   - 2/2 to immobilization vs vitamin d supplementation although levels are only 25   - s/p pamidronate x 1    Vitamin D insufficiency. cont supplement.     SEVERE DECONDITIONING d/t prolonged hospitalization   PT  aggressive      #Preventative measures   -dvt ppx.   fall, aspiration precautions  Discussed with nursing staff.

## 2020-10-22 NOTE — PROGRESS NOTE BEHAVIORAL HEALTH - LANGUAGE
No abnormalities noted
Other

## 2020-10-22 NOTE — PROGRESS NOTE BEHAVIORAL HEALTH - NSBHCONSULTFOLLOW_PSY_A_CORE
Signing off - call with questions…
yes

## 2020-10-22 NOTE — PROGRESS NOTE BEHAVIORAL HEALTH - IMPULSE CONTROL
Other
Normal
Normal
Other

## 2020-10-22 NOTE — PROGRESS NOTE BEHAVIORAL HEALTH - PRIMARY DX
Delirium in remission
Delirium due to multiple etiologies
Delirium in remission
Delirium due to multiple etiologies

## 2020-10-22 NOTE — PROGRESS NOTE BEHAVIORAL HEALTH - NSBHLOC_PSY_A_CORE
Lethargic, arousable to tactile stimulus
Alert
Lethargic, arousable to verbal stimulus
Other
Alert

## 2020-10-22 NOTE — PROGRESS NOTE BEHAVIORAL HEALTH - AFFECT RANGE
Constricted
Full
Full
Other/Constricted
Constricted

## 2020-10-22 NOTE — PROGRESS NOTE BEHAVIORAL HEALTH - THOUGHT CONTENT
Other
Unremarkable
Other
Unremarkable
Unremarkable
Other

## 2020-10-22 NOTE — PROGRESS NOTE BEHAVIORAL HEALTH - NSBHFUPINTERVALHXFT_PSY_A_CORE
Patient's trach and PEG have been removed. He is tolerating a regular diet and ate a burger today. He is feeling well and ready for discharge.   Endorses stable euthymic mood, improving sleep / appetite / energy level. Denies any symptoms of hypomania/claude/psychosis/major depression/ anxiety/panic. Denies any active or passive suicidal or homicidal ideation. Names protective factors (karlos; family; hope for future). Motivated to get better. Patient feels like he does not need to go into a ALE as he feels ok to walk with a walker and prefers home PT if needed. His girlfriend is very supportive and is available to help him/look in on him regularly.

## 2020-10-22 NOTE — PROGRESS NOTE ADULT - SUBJECTIVE AND OBJECTIVE BOX
INTERVAL HPI:  44M with ETOH Abuse and withdrawal, Alcohol Pancreatitis who presents to the ED with severe abdominal pain.  Found to have Necrotizing Pancreatitis, Septic  Shock, Pneumonia/ARDS with Hypoxic Respiratory failure. Admitted to ICU, had prolonged hospital course with Vent support. Eventually required Tracheostomy and Peg placement.  Also with acute metabolic Encephalopathy.  09/20/20:  Transferred to .    Not able to provide details due to encephalopathy.  09/22/20: 1500 ml paracentesis.  10/12/20: 2 litre right thoracentesis( exudate, negative culture and no malignant cells).  10/15/20: Trach Capped.  10/17/20:  De cannulated    OVERNIGHT EVENTS:  Feels better.    Vital Signs Last 24 Hrs  T(C): 37.1 (22 Oct 2020 06:18), Max: 37.1 (22 Oct 2020 06:18)  T(F): 98.7 (22 Oct 2020 06:18), Max: 98.7 (22 Oct 2020 06:18)  HR: 89 (22 Oct 2020 06:18) (86 - 99)  BP: 92/67 (22 Oct 2020 06:18) (92/67 - 112/82)  BP(mean): --  RR: 17 (22 Oct 2020 06:18) (16 - 17)  SpO2: 97% (22 Oct 2020 06:18) (96% - 98%)    PHYSICAL EXAM:  GEN:         Awake, responsive and comfortable.  HEENT:    Normal.    RESP:        no distress  CVS:          Regular rate and rhythm.   ABD:         Soft, non-tender, non-distended;     MEDICATIONS  (STANDING):  BACItracin   Ointment 1 Application(s) Topical two times a day  chlorhexidine 4% Liquid 1 Application(s) Topical <User Schedule>  clonazePAM  Tablet 0.5 milliGRAM(s) Oral at bedtime  collagenase Ointment 1 Application(s) Topical daily  enoxaparin Injectable 40 milliGRAM(s) SubCutaneous daily  folic acid 1 milliGRAM(s) Oral daily  influenza   Vaccine 0.5 milliLiter(s) IntraMuscular once  metoprolol tartrate 25 milliGRAM(s) Enteral Tube every 8 hours  multivitamin 1 Tablet(s) Oral daily  pantoprazole   Suspension 40 milliGRAM(s) Oral daily  QUEtiapine 100 milliGRAM(s) Oral at bedtime  thiamine 100 milliGRAM(s) Oral daily    MEDICATIONS  (PRN):  acetaminophen   Tablet .. 650 milliGRAM(s) Oral every 6 hours PRN Temp greater or equal to 38C (100.4F), Mild Pain (1 - 3)  ALBUTerol    90 MICROgram(s) HFA Inhaler 2 Puff(s) Inhalation every 6 hours PRN sob  clonazePAM  Tablet 0.5 milliGRAM(s) Oral daily PRN anxiety  glucagon  Injectable 1 milliGRAM(s) IntraMuscular once PRN Glucose LESS THAN 70 milligrams/deciliter  metoprolol tartrate Injectable 5 milliGRAM(s) IV Push every 6 hours PRN if sbp>160 or HR >110 hold if SBP<100 or HR<60  ondansetron Injectable 4 milliGRAM(s) IV Push every 8 hours PRN Nausea and/or Vomiting  traMADol 25 milliGRAM(s) Oral every 8 hours PRN Severe Pain (7 - 10)    ASSESSMENT AND PLAN:  ·	Hypoxic Respiratory failure.  ·	S/P tracheostomy.   ·	Bilateral pneumonia(pseudomonas in sputum).  ·	Worsening right pleural effusion S/P Thoracentesis  ·	S/P Septic Shock.  ·	Necrotizing Pancreatis.  ·	Acute metabolic Encephalopathy.  ·	Alcohol abuse.  ·	Klebsiella Bacteremia.  ·	Anemia    Pulmonary status is stable.  Continue O2 and Physical therapy.  Spoke with IR PA, they will get new CXR with lateral decube, and possibly  will drain it.

## 2020-10-22 NOTE — PROGRESS NOTE BEHAVIORAL HEALTH - FUND OF KNOWLEDGE
Normal
Other

## 2020-10-22 NOTE — PROGRESS NOTE BEHAVIORAL HEALTH - NSBHCONSORIP_PSY_A_CORE
Consult...

## 2020-10-22 NOTE — PROGRESS NOTE BEHAVIORAL HEALTH - NS ED BHA MSE SPEECH ARTICULATION
Other
Normal
Normal
Other

## 2020-10-22 NOTE — PROGRESS NOTE BEHAVIORAL HEALTH - AFFECT QUALITY
Other
Euthymic
Other
Euthymic
Euthymic
Other

## 2020-10-22 NOTE — PROGRESS NOTE BEHAVIORAL HEALTH - NS ED BHA MSE SPEECH RATE
Other
Normal
Normal
Other
Slowed
Other
Other
Slowed
Slowed
Other

## 2020-10-22 NOTE — PROGRESS NOTE BEHAVIORAL HEALTH - NSBHCHARTREVIEWVS_PSY_A_CORE FT
T(C): 36.6 (10-22-20 @ 11:48), Max: 37.1 (10-22-20 @ 06:18)  HR: 94 (10-22-20 @ 11:48) (87 - 99)  BP: 99/71 (10-22-20 @ 11:48) (92/67 - 112/82)  RR: 17 (10-22-20 @ 11:48) (17 - 17)  SpO2: 96% (10-22-20 @ 11:48) (96% - 98%)

## 2020-10-22 NOTE — PROGRESS NOTE BEHAVIORAL HEALTH - THOUGHT PROCESS
Other
Linear
Other
Linear
Linear
Other

## 2020-10-22 NOTE — PROGRESS NOTE BEHAVIORAL HEALTH - NS ED BHA MSE SPEECH VOLUME
Other
Other
Normal
Normal
Other
Soft
Other
Soft
Soft
Other

## 2020-10-22 NOTE — PROGRESS NOTE BEHAVIORAL HEALTH - AFFECT CONGRUENCE
Other
Congruent
Congruent
Other
Congruent
Other
Other
Congruent
Congruent
Other

## 2020-10-22 NOTE — PROGRESS NOTE BEHAVIORAL HEALTH - RECENT MEMORY
Normal
Other

## 2020-10-22 NOTE — PROGRESS NOTE BEHAVIORAL HEALTH - BEHAVIOR
Other
Cooperative
Other
Cooperative
Cooperative
Other

## 2020-10-22 NOTE — PROGRESS NOTE BEHAVIORAL HEALTH - NSBHCONSULTFOLLOWAFTERCARE_PSY_A_CORE FT
psychiatrically cleared for discharge. Has capacity to make his medical decisions including engaging in discharge planning   - does not need any prescriptions for psychiatric medications on discharge

## 2020-10-22 NOTE — PROGRESS NOTE BEHAVIORAL HEALTH - NSBHCONSFOLLOWNEEDS_PSY_A_CORE
no psychiatric contraindications to discharge
Patient needs further psychiatric safety assessment prior to discharge
no psychiatric contraindications to discharge

## 2020-10-22 NOTE — PROGRESS NOTE BEHAVIORAL HEALTH - NS ED BHA MSE SPEECH SPONTANEITY
Other
Normal
Normal
Other
Increased latency
Other
Increased latency
Increased latency
Other

## 2020-10-22 NOTE — PROGRESS NOTE BEHAVIORAL HEALTH - SUMMARY
Persistent multifactorial delirium with difficulty weaning off of sedation and IV treatment in ICU with episodes of breakthrough agitation with high tolerance to medications. Prolonged hospital course.   - collateral from girlfriend: Patient and her have been together for 1.5 years. Pt lives with his elderly father who has physical disability (leg) and was taking care of him. Patient is not working. They went on vacation to [place] and para-sailed few weeks prior to admission. Patient has no formal prior psychiatric history. + had mandibular fracture and has a metal plate in his jaw. + alcohol abuse.  - MSE much better starting week of 10/12/2020; started to be able to verbally communicate week of 10/14 and continues his progress  - regained capacity and can make his medical decisions

## 2020-10-22 NOTE — PROGRESS NOTE ADULT - SUBJECTIVE AND OBJECTIVE BOX
Patient is a 44y old  Male who presents with a chief complaint of pancreatitis (21 Oct 2020 17:53)      INTERVAL HPI/OVERNIGHT EVENTS: no events     MEDICATIONS  (STANDING):  BACItracin   Ointment 1 Application(s) Topical two times a day  chlorhexidine 4% Liquid 1 Application(s) Topical <User Schedule>  clonazePAM  Tablet 0.5 milliGRAM(s) Oral at bedtime  collagenase Ointment 1 Application(s) Topical daily  enoxaparin Injectable 40 milliGRAM(s) SubCutaneous daily  folic acid 1 milliGRAM(s) Oral daily  influenza   Vaccine 0.5 milliLiter(s) IntraMuscular once  metoprolol tartrate 25 milliGRAM(s) Enteral Tube every 8 hours  multivitamin 1 Tablet(s) Oral daily  pantoprazole   Suspension 40 milliGRAM(s) Oral daily  QUEtiapine 100 milliGRAM(s) Oral at bedtime  thiamine 100 milliGRAM(s) Oral daily    MEDICATIONS  (PRN):  acetaminophen   Tablet .. 650 milliGRAM(s) Oral every 6 hours PRN Temp greater or equal to 38C (100.4F), Mild Pain (1 - 3)  ALBUTerol    90 MICROgram(s) HFA Inhaler 2 Puff(s) Inhalation every 6 hours PRN sob  clonazePAM  Tablet 0.5 milliGRAM(s) Oral daily PRN anxiety  glucagon  Injectable 1 milliGRAM(s) IntraMuscular once PRN Glucose LESS THAN 70 milligrams/deciliter  metoprolol tartrate Injectable 5 milliGRAM(s) IV Push every 6 hours PRN if sbp>160 or HR >110 hold if SBP<100 or HR<60  ondansetron Injectable 4 milliGRAM(s) IV Push every 8 hours PRN Nausea and/or Vomiting  traMADol 25 milliGRAM(s) Oral every 8 hours PRN Severe Pain (7 - 10)      Allergies    No Known Allergies    Intolerances     Vital Signs Last 24 Hrs  T(C): 37.1 (22 Oct 2020 06:18), Max: 37.1 (22 Oct 2020 06:18)  T(F): 98.7 (22 Oct 2020 06:18), Max: 98.7 (22 Oct 2020 06:18)  HR: 89 (22 Oct 2020 06:18) (86 - 99)  BP: 92/67 (22 Oct 2020 06:18) (92/67 - 112/82)  BP(mean): --  RR: 17 (22 Oct 2020 06:18) (16 - 17)  SpO2: 97% (22 Oct 2020 06:18) (96% - 98%)    PHYSICAL EXAM:  GENERAL: nad  CHEST/LUNG: better air entry bilaterally, no wheezing   HEART: Regular rate and rhythm; No murmur.   ABDOMEN: Soft, Nontender, Nondistended; Bowel sounds present, peg  EXTREMITIES:  , No clubbing, cyanosis, or edema  NERVOUS SYSTEM:  Followed most of my commands. moving all four extremities spontaneously   Psychiatry: Alert and awake and oriented x 3     LABS:              CAPILLARY BLOOD GLUCOSE      POCT Blood Glucose.: 108 mg/dL (22 Oct 2020 07:51)      RADIOLOGY & ADDITIONAL TESTS:    Imaging Personally Reviewed:  [ X] YES  [ ] NO    Consultant(s) Notes Reviewed:  [ X] YES  [ ] NO    Care Discussed with Consultants/Other Providers [X ] YES  [ ] NO

## 2020-10-23 PROCEDURE — 71045 X-RAY EXAM CHEST 1 VIEW: CPT | Mod: 26,59

## 2020-10-23 PROCEDURE — 99232 SBSQ HOSP IP/OBS MODERATE 35: CPT

## 2020-10-23 PROCEDURE — 71046 X-RAY EXAM CHEST 2 VIEWS: CPT | Mod: 26

## 2020-10-23 PROCEDURE — 32555 ASPIRATE PLEURA W/ IMAGING: CPT | Mod: 59

## 2020-10-23 RX ORDER — TRAMADOL HYDROCHLORIDE 50 MG/1
25 TABLET ORAL EVERY 8 HOURS
Refills: 0 | Status: DISCONTINUED | OUTPATIENT
Start: 2020-10-23 | End: 2020-10-26

## 2020-10-23 RX ORDER — LANOLIN ALCOHOL/MO/W.PET/CERES
3 CREAM (GRAM) TOPICAL AT BEDTIME
Refills: 0 | Status: DISCONTINUED | OUTPATIENT
Start: 2020-10-23 | End: 2020-10-26

## 2020-10-23 RX ADMIN — TRAMADOL HYDROCHLORIDE 25 MILLIGRAM(S): 50 TABLET ORAL at 22:15

## 2020-10-23 RX ADMIN — Medication 3 MILLIGRAM(S): at 22:33

## 2020-10-23 RX ADMIN — Medication 1 APPLICATION(S): at 17:28

## 2020-10-23 RX ADMIN — CHLORHEXIDINE GLUCONATE 1 APPLICATION(S): 213 SOLUTION TOPICAL at 08:46

## 2020-10-23 RX ADMIN — Medication 25 MILLIGRAM(S): at 06:09

## 2020-10-23 RX ADMIN — Medication 650 MILLIGRAM(S): at 18:14

## 2020-10-23 RX ADMIN — Medication 100 MILLIGRAM(S): at 12:32

## 2020-10-23 RX ADMIN — Medication 25 MILLIGRAM(S): at 21:16

## 2020-10-23 RX ADMIN — Medication 650 MILLIGRAM(S): at 09:48

## 2020-10-23 RX ADMIN — Medication 650 MILLIGRAM(S): at 17:29

## 2020-10-23 RX ADMIN — Medication 1 TABLET(S): at 12:32

## 2020-10-23 RX ADMIN — PANTOPRAZOLE SODIUM 40 MILLIGRAM(S): 20 TABLET, DELAYED RELEASE ORAL at 12:32

## 2020-10-23 RX ADMIN — Medication 650 MILLIGRAM(S): at 10:24

## 2020-10-23 RX ADMIN — Medication 1 MILLIGRAM(S): at 12:32

## 2020-10-23 RX ADMIN — TRAMADOL HYDROCHLORIDE 25 MILLIGRAM(S): 50 TABLET ORAL at 21:16

## 2020-10-23 NOTE — PROGRESS NOTE ADULT - SUBJECTIVE AND OBJECTIVE BOX
Patient is a 44y old  Male who presents with a chief complaint of pancreatitis (22 Oct 2020 11:15)      INTERVAL HPI/OVERNIGHT EVENTS: no events     MEDICATIONS  (STANDING):  BACItracin   Ointment 1 Application(s) Topical two times a day  chlorhexidine 4% Liquid 1 Application(s) Topical <User Schedule>  collagenase Ointment 1 Application(s) Topical daily  folic acid 1 milliGRAM(s) Oral daily  influenza   Vaccine 0.5 milliLiter(s) IntraMuscular once  metoprolol tartrate 25 milliGRAM(s) Enteral Tube every 8 hours  multivitamin 1 Tablet(s) Oral daily  pantoprazole   Suspension 40 milliGRAM(s) Oral daily  thiamine 100 milliGRAM(s) Oral daily    MEDICATIONS  (PRN):  acetaminophen   Tablet .. 650 milliGRAM(s) Oral every 6 hours PRN Temp greater or equal to 38C (100.4F), Mild Pain (1 - 3)  ALBUTerol    90 MICROgram(s) HFA Inhaler 2 Puff(s) Inhalation every 6 hours PRN sob  glucagon  Injectable 1 milliGRAM(s) IntraMuscular once PRN Glucose LESS THAN 70 milligrams/deciliter  metoprolol tartrate Injectable 5 milliGRAM(s) IV Push every 6 hours PRN if sbp>160 or HR >110 hold if SBP<100 or HR<60  ondansetron Injectable 4 milliGRAM(s) IV Push every 8 hours PRN Nausea and/or Vomiting      Allergies    No Known Allergies    Intolerances         Vital Signs Last 24 Hrs  T(C): 37.2 (23 Oct 2020 11:41), Max: 37.2 (23 Oct 2020 11:41)  T(F): 98.9 (23 Oct 2020 11:41), Max: 98.9 (23 Oct 2020 11:41)  HR: 78 (23 Oct 2020 11:41) (78 - 88)  BP: 109/66 (23 Oct 2020 11:41) (97/70 - 117/80)  BP(mean): --  RR: 18 (23 Oct 2020 11:41) (17 - 18)  SpO2: 97% (23 Oct 2020 11:41) (97% - 98%)    PHYSICAL EXAM:  GENERAL: NAD, well-groomed, well-developed  HEAD:  Atraumatic, Normocephalic  EYES: EOMI, PERRLA, conjunctiva and sclera clear  ENMT: No tonsillar erythema, exudates, or enlargement; Moist mucous membranes, Good dentition, No lesions  NECK: Supple, No JVD, Normal thyroid  NERVOUS SYSTEM:  Alert & Oriented X3, Good concentration; Motor Strength 5/5 B/L upper and lower extremities; DTRs 2+ intact and symmetric  CHEST/LUNG: Clear to percussion bilaterally; No rales, rhonchi, wheezing, or rubs  HEART: Regular rate and rhythm; No murmurs, rubs, or gallops  ABDOMEN: Soft, Nontender, Nondistended; Bowel sounds present  EXTREMITIES:  2+ Peripheral Pulses, No clubbing, cyanosis, or edema  LYMPH: No lymphadenopathy noted  SKIN: No rashes or lesions    LABS:              CAPILLARY BLOOD GLUCOSE          RADIOLOGY & ADDITIONAL TESTS:    Imaging Personally Reviewed:  [ X] YES  [ ] NO    Consultant(s) Notes Reviewed:  [ X] YES  [ ] NO    Care Discussed with Consultants/Other Providers [X ] YES  [ ] NO

## 2020-10-23 NOTE — PROGRESS NOTE ADULT - ASSESSMENT
44M EtOH pancreatitis p/w abdominal pain 8/17/20, found to have necrotizing pancreatitis c/b septic shock and respiratory failure secondary to PNA and ARDS requiring intubation and DTs requiring sedation and small bowel obstruction conservatively managed with NGT and NPO for which he was admitted to ICU. Currently, s/p trach and PEG on 9/10 and treated for klebsiella bacteremia.    downgraded to GM floor 9/20/20 from ICU    Assessment and Plan:      s/p Mechanical Fall 10/17/20:  X ray pelvis showed no acute fracture. CT head and C spine showed no acute fracture also.     #Aspiration right sided pneumonitis/pneumonia.  fever resolved. aspiration precautions. resolved    Recurrent right pleural effusion. ? parapneumonic. plan for IR drainage .            #unstageable pressure ulcers x 2 on sacral area ,    -wound vac to be placed     #s/p necrotizing pancreatitis , s/p septic shock , resolved     -RLE showing no dvt , cellulitis improved  -CT Chest/Abdomen/Pelvis ordered   Bilateral patchy airspace disease has improved compared to the prior.  Improving pancreatitis with decreased size of the pancreas as well as improved peripancreatic edema. Small collection adjacent to the undersurface of the tail is also decreased. No evidence of necrosis.      #Metabolic encephalopathy   AGITATION   hepatic vs. ICU delirium vs sepisis   patient awake , mouthing words to staff . agitated at times    d/angela Precedex since 9/15 in ICU  -Psychiatrist following. Cont to taper seroquel. cont klonopin.       #Acute respiratory failure secondary to PNA/ARDS.   s/p TRACH 9/10/20   s/p trach decannulation 10/17/20  on nasal canula oxygen>> taper if possible.       #Ascites , abd is soft.   distended gallbladder and ascites seen on CT, abd US neg for acute cholecystitis   s/p paracentesis 9/22 by IR   ABD US showing worsening ascites, s/p paracentesis by IR 9/22 with 1.5L straw colored fluid removed -cultures did not show any specific organisms  fluid analysis of paracentesis not consistent with SBP.   9/21 KUB shows nonspecific bowel gas pattern without e/o obstruction or ileus   Blood culture from 9/10 was positive for klebsiella quite sensitive and one set from 9/12 is negative, 9/25 repeat blood cultures negative, sputum cultures from 9/26- showing pseudomonas aeruginosa   SAAG < 1.1 going against portal HTN       #s/p PEG placement 9/10/20, s/p peg removal 10/21    s/p SBO which was treated conservatively   Ileus.      #Hypokalememia-- repleted    #Hypomagnesemia --repleted    #Hypophosphatemia --repleted    #normocytic anemia , probably related to sepsis and acute illness   requiring transfusion on 9/17/20  follow anemia work-up   transfuse prn of H/H <7/21      hypercalcemia   - 2/2 to immobilization vs vitamin d supplementation although levels are only 25   - s/p pamidronate x 1    Vitamin D insufficiency. cont supplement.     SEVERE DECONDITIONING d/t prolonged hospitalization   PT  aggressive      #Preventative measures   -dvt ppx.   fall, aspiration precautions  Discussed with nursing staff.

## 2020-10-23 NOTE — PROCEDURE NOTE - ADDITIONAL PROCEDURE DETAILS
pt s/p diagnostic/therapeutic paracentesis.  1500mL of serous fluid aspiratated, a sample was sent off for analysis.  Hemostasis achieved.  DSD applied.  No complications    -f/u final ascitic fluid study results  -Lovenox can be resumed tomorrow
s/p large volume diagnostic/therapeutic right thoracentesis.  2 Liters of serous fluid aspirated.  A sample was sent to lab for analysis.  Hemostasis achieved.  DSD applied  Post procedure CXR shows no PTX    Plan  -f/u final pleural fluid study results  -Lovenox to be resumed tomorrow
s/p midline placement inserted into the left brachial vein under US guidance.  4fr x 20cm SL.  No complications  -midline can be accessed
s/p therapeutic large volume right thoracentesis.  Approx 1.3 Liters of serous fluid aspirted.  Hemostasis achieved.  DSD applied.    Post procedure CXR shows no PTX  Pt had a thoracentesis 11days ago, which showed exudative fluid.  Culture shows no growth.  Cytology was negative for malignant cells  ?parapneumonic effusion    45 yo male with a prolonged hospital course 2/2 ETOH abuse and alcoholic pancreatitis,     Plan  -Lovenox has been d/c'd, can be resumed tomorrow
Size 8F DCT XLT tracheostomy exchanged for 6F Fenestrated tracheostomy tube. Cuff not inflated. SpO2 98% post exchange. Patient tolerated procedure well.
Patient seen at bedside for midline catheter placement.  Consent obtained from patient after risks/benefits/alternatives explained.   Upper extremity prepped and draped in usual sterile fashion. Time out performed with RN. 4Fr 15 cm single lumen midline catheter placed using ultrasound guided seldinger technique, L brachial vein. Flushes well, with good venous return. Patient tolerated procedure well without complication and was left in no acute distress. Upper arm circumference noted to be 28cm.

## 2020-10-23 NOTE — PROGRESS NOTE ADULT - SUBJECTIVE AND OBJECTIVE BOX
INTERVAL HPI:  44M with ETOH Abuse and withdrawal, Alcohol Pancreatitis who presents to the ED with severe abdominal pain.  Found to have Necrotizing Pancreatitis, Septic  Shock, Pneumonia/ARDS with Hypoxic Respiratory failure. Admitted to ICU, had prolonged hospital course with Vent support. Eventually required Tracheostomy and Peg placement.  Also with acute metabolic Encephalopathy.  09/20/20:  Transferred to .    Not able to provide details due to encephalopathy.  09/22/20: 1500 ml paracentesis.  10/12/20: 2 litre right thoracentesis( exudate, negative culture and no malignant cells).  10/15/20: Trach Capped.  10/17/20:  De cannulated  10/23/20:  1.5 litre right thoracentesis.    OVERNIGHT EVENTS:  Comfortable.    Vital Signs Last 24 Hrs  T(C): 37.2 (23 Oct 2020 17:11), Max: 37.2 (23 Oct 2020 11:41)  T(F): 99 (23 Oct 2020 17:11), Max: 99 (23 Oct 2020 17:11)  HR: 98 (23 Oct 2020 17:11) (78 - 98)  BP: 112/70 (23 Oct 2020 17:11) (97/70 - 112/70)  BP(mean): --  RR: 19 (23 Oct 2020 17:11) (17 - 19)  SpO2: 97% (23 Oct 2020 17:11) (97% - 98%)    PHYSICAL EXAM:  GEN:         Awake, responsive and comfortable.  HEENT:    Normal.    RESP:        no distress  CVS:          Regular rate and rhythm.   ABD:         Soft, non-tender, non-distended;     MEDICATIONS  (STANDING):  BACItracin   Ointment 1 Application(s) Topical two times a day  chlorhexidine 4% Liquid 1 Application(s) Topical <User Schedule>  collagenase Ointment 1 Application(s) Topical daily  folic acid 1 milliGRAM(s) Oral daily  influenza   Vaccine 0.5 milliLiter(s) IntraMuscular once  metoprolol tartrate 25 milliGRAM(s) Enteral Tube every 8 hours  multivitamin 1 Tablet(s) Oral daily  pantoprazole   Suspension 40 milliGRAM(s) Oral daily  thiamine 100 milliGRAM(s) Oral daily    MEDICATIONS  (PRN):  acetaminophen   Tablet .. 650 milliGRAM(s) Oral every 6 hours PRN Temp greater or equal to 38C (100.4F), Mild Pain (1 - 3)  ALBUTerol    90 MICROgram(s) HFA Inhaler 2 Puff(s) Inhalation every 6 hours PRN sob  glucagon  Injectable 1 milliGRAM(s) IntraMuscular once PRN Glucose LESS THAN 70 milligrams/deciliter  metoprolol tartrate Injectable 5 milliGRAM(s) IV Push every 6 hours PRN if sbp>160 or HR >110 hold if SBP<100 or HR<60  ondansetron Injectable 4 milliGRAM(s) IV Push every 8 hours PRN Nausea and/or Vomiting    ASSESSMENT AND PLAN:  ·	Hypoxic Respiratory failure.  ·	S/P tracheostomy.   ·	Bilateral pneumonia(pseudomonas in sputum).  ·	Worsening right pleural effusion S/P Thoracentesis  ·	S/P Septic Shock.  ·	Necrotizing Pancreatis.  ·	Acute metabolic Encephalopathy.  ·	Alcohol abuse.  ·	Klebsiella Bacteremia.  ·	Anemia    Had repeat right sided thoracentesis with removal of 1.5 litres.  Pulmonary status better and stable.  Will need repeat CXR in 4 weeks.

## 2020-10-24 ENCOUNTER — TRANSCRIPTION ENCOUNTER (OUTPATIENT)
Age: 45
End: 2020-10-24

## 2020-10-24 PROCEDURE — 99239 HOSP IP/OBS DSCHRG MGMT >30: CPT

## 2020-10-24 RX ORDER — LANOLIN ALCOHOL/MO/W.PET/CERES
1 CREAM (GRAM) TOPICAL
Qty: 30 | Refills: 0
Start: 2020-10-24 | End: 2020-11-22

## 2020-10-24 RX ORDER — ALBUTEROL 90 UG/1
2 AEROSOL, METERED ORAL
Qty: 1 | Refills: 0
Start: 2020-10-24

## 2020-10-24 RX ORDER — METOPROLOL TARTRATE 50 MG
1 TABLET ORAL
Qty: 60 | Refills: 0
Start: 2020-10-24 | End: 2020-11-22

## 2020-10-24 RX ORDER — TRAMADOL HYDROCHLORIDE 50 MG/1
1 TABLET ORAL
Qty: 9 | Refills: 0
Start: 2020-10-24 | End: 2020-10-26

## 2020-10-24 RX ADMIN — Medication 25 MILLIGRAM(S): at 21:26

## 2020-10-24 RX ADMIN — Medication 1 TABLET(S): at 12:20

## 2020-10-24 RX ADMIN — Medication 1 MILLIGRAM(S): at 12:20

## 2020-10-24 RX ADMIN — Medication 100 MILLIGRAM(S): at 12:20

## 2020-10-24 RX ADMIN — Medication 25 MILLIGRAM(S): at 05:48

## 2020-10-24 RX ADMIN — TRAMADOL HYDROCHLORIDE 25 MILLIGRAM(S): 50 TABLET ORAL at 13:22

## 2020-10-24 RX ADMIN — Medication 1 APPLICATION(S): at 18:00

## 2020-10-24 RX ADMIN — TRAMADOL HYDROCHLORIDE 25 MILLIGRAM(S): 50 TABLET ORAL at 14:20

## 2020-10-24 RX ADMIN — PANTOPRAZOLE SODIUM 40 MILLIGRAM(S): 20 TABLET, DELAYED RELEASE ORAL at 12:20

## 2020-10-24 RX ADMIN — Medication 1 APPLICATION(S): at 05:49

## 2020-10-24 NOTE — DISCHARGE NOTE PROVIDER - NSDCMRMEDTOKEN_GEN_ALL_CORE_FT
albuterol 90 mcg/inh inhalation aerosol: 2 puff(s) inhaled every 6 hours, As needed, sob  folic acid 1 mg oral tablet: 1 tab(s) orally once a day   melatonin 3 mg oral tablet: 1 tab(s) orally once a day (at bedtime), As needed, Insomnia  metoprolol tartrate 25 mg oral tablet: 1 tab(s) orally 2 times a day   thiamine 100 mg oral tablet: 1 tab(s) orally once a day

## 2020-10-24 NOTE — DISCHARGE NOTE PROVIDER - PROVIDER TOKENS
FREE:[LAST:[your primary doctor],PHONE:[(   )    -],FAX:[(   )    -]],PROVIDER:[TOKEN:[[id number]]]

## 2020-10-24 NOTE — PROGRESS NOTE ADULT - ASSESSMENT
Subjective Complaints:  Historian:             Vital Signs Last 24 Hrs  T(C): 37.5 (24 Oct 2020 17:21), Max: 37.5 (24 Oct 2020 17:21)  T(F): 99.5 (24 Oct 2020 17:21), Max: 99.5 (24 Oct 2020 17:21)  HR: 78 (24 Oct 2020 17:21) (70 - 85)  BP: 120/78 (24 Oct 2020 17:21) (109/82 - 120/78)  BP(mean): --  RR: 18 (24 Oct 2020 17:21) (17 - 18)  SpO2: 95% (24 Oct 2020 17:21) (95% - 98%)    GENERAL PHYSICAL EXAM:  General:  Appears stated age, well-groomed, well-nourished, no distress  HEENT:  NC/AT, patent nares w/ pink mucosa, OP clear w/o lesions, PERRL, EOMI, conjunctivae clear, no thyromegaly, nodules, adenopathy, no JVD  Chest:  Full & symmetric excursion, no increased effort, breath sounds clear  Cardiovascular:  Regular rhythm, S1, S2, no murmur/rub/S3/S4, no carotid/femoral/abdominal bruit, radial/pedal pulses 2+, no edema  Abdomen:  Soft, non-tender, non-distended, normoactive bowel sounds, no HSM  Extremities:  Gait & station:   Digits:   Nails:   Joints, Bones, Muscles:   ROM:   Stability:  Skin:  No rash/erythema/ecchymoses/petechiae/wounds/abscess/warm/dry  Musculoskeletal:  Full ROM in all joints w/o swelling/tenderness/effusion        LABS:                RADIOLOGY & ADDITIONAL STUDIES:        Neurology Progress Note:      Mental Status: awaek alert speech fluent        Cranial Nerves: 2 1/2 intact      Motor:    arm leg 4/5 wasting legs         Sensory:intact      Cerebellar: defrd      Gait: unsteady       Assesment/Plan:  s/p etoh pancreatitis  s/p trach and peg on thiamine for emg out pt,

## 2020-10-24 NOTE — DISCHARGE NOTE PROVIDER - HOSPITAL COURSE
44M EtOH pancreatitis p/w abdominal pain 8/17/20, found to have necrotizing pancreatitis c/b septic shock and respiratory failure secondary to PNA and ARDS requiring intubation and DTs requiring sedation and small bowel obstruction conservatively managed with NGT and NPO for which he was admitted to ICU. Currently, s/p trach and PEG on 9/10 and treated for klebsiella bacteremia.    downgraded to GM floor 9/20/20 from ICU    Assessment and Plan:      s/p Mechanical Fall 10/17/20:  X ray pelvis showed no acute fracture. CT head and C spine showed no acute fracture also.     #Aspiration right sided pneumonitis/pneumonia.  fever resolved. aspiration precautions. resolved    Recurrent right pleural effusion. ? parapneumonic. s/p Ir drainage, repeat cxr in 2-3wks          #unstageable pressure ulcers x 2 on sacral area ,    -wound vac to be placed     #s/p necrotizing pancreatitis , s/p septic shock , resolved        -CT Chest/Abdomen/Pelvis o   Bilateral patchy airspace disease has improved compared to the prior.  Improving pancreatitis with decreased size of the pancreas as well as improved peripancreatic edema. Small collection adjacent to the undersurface of the tail is also decreased. No evidence of necrosis.      #Metabolic encephalopathy   AGITATION   resolved       #Acute respiratory failure secondary to PNA/ARDS.   s/p TRACH 9/10/20   s/p trach decannulation 10/17/20         #Ascites , abd is soft.   distended gallbladder and ascites seen on CT, abd US neg for acute cholecystitis   s/p paracentesis 9/22 by IR   ABD US showing worsening ascites, s/p paracentesis by IR 9/22 with 1.5L straw colored fluid removed -cultures did not show any specific organisms  fluid analysis of paracentesis not consistent with SBP.   9/21 KUB shows nonspecific bowel gas pattern without e/o obstruction or ileus   Blood culture from 9/10 was positive for klebsiella quite sensitive and one set from 9/12 is negative, 9/25 repeat blood cultures negative, sputum cultures from 9/26- showing pseudomonas aeruginosa   SAAG < 1.1 going against portal HTN       #s/p PEG placement 9/10/20, s/p peg removal 10/21    s/p SBO which was treated conservatively   Ileus.      #Hypokalememia-- repleted    #Hypomagnesemia --repleted    #Hypophosphatemia --repleted    #normocytic anemia , probably related to sepsis and acute illness   requiring transfusion on 9/17/20       hypercalcemia   - 2/2 to immobilization vs vitamin d supplementation although levels are only 25   - s/p pamidronate x 1    Vitamin D insufficiency. cont supplement.     SEVERE DECONDITIONING d/t prolonged hospitalization   PT  aggressive      #Preventative measures   -dvt ppx.   fall, aspiration precautions  Discussed with nursing staff.     Attending Attestation:   34 minutes spent on total dc encounter; more than 50% of the visit was spent counseling and/or coordinating care by the attending physician.

## 2020-10-24 NOTE — DISCHARGE NOTE PROVIDER - NSDCCPCAREPLAN_GEN_ALL_CORE_FT
PRINCIPAL DISCHARGE DIAGNOSIS  Diagnosis: Pancreatitis  Assessment and Plan of Treatment: resolved, continue with wound care at home, Physical therapy  Follow up wiht pulmonary for repeat chest xray in 2-3wks      SECONDARY DISCHARGE DIAGNOSES  Diagnosis: Nausea & vomiting  Assessment and Plan of Treatment:

## 2020-10-24 NOTE — DISCHARGE NOTE NURSING/CASE MANAGEMENT/SOCIAL WORK - NSDCFUADDAPPT_GEN_ALL_CORE_FT
Pt is to call [employer] and change PCP (primary doctor) to Dr. Rafael Salcido  [address] [place] [address]   [phone].

## 2020-10-24 NOTE — DISCHARGE NOTE PROVIDER - CARE PROVIDER_API CALL
your primary doctor,   Phone: (   )    -  Fax: (   )    -  Follow Up Time:     Gilmer Moody  [address], [address]  [place] [address]  Phone: [phone]  Fax: [phone]  Follow Up Time:

## 2020-10-24 NOTE — PROGRESS NOTE ADULT - SUBJECTIVE AND OBJECTIVE BOX
INTERVAL HPI:  44M with ETOH Abuse and withdrawal, Alcohol Pancreatitis who presents to the ED with severe abdominal pain.  Found to have Necrotizing Pancreatitis, Septic  Shock, Pneumonia/ARDS with Hypoxic Respiratory failure. Admitted to ICU, had prolonged hospital course with Vent support. Eventually required Tracheostomy and Peg placement.  Also with acute metabolic Encephalopathy.  09/20/20:  Transferred to .    Not able to provide details due to encephalopathy.  09/22/20: 1500 ml paracentesis.  10/12/20: 2 litre right thoracentesis( exudate, negative culture and no malignant cells).  10/15/20: Trach Capped.  10/17/20:  De cannulated  10/23/20:  1.5 litre right thoracentesis.    OVERNIGHT EVENTS:  Awake and comfortable.    Vital Signs Last 24 Hrs  T(C): 37.1 (24 Oct 2020 11:31), Max: 37.2 (23 Oct 2020 17:11)  T(F): 98.7 (24 Oct 2020 11:31), Max: 99 (23 Oct 2020 17:11)  HR: 81 (24 Oct 2020 11:31) (70 - 98)  BP: 114/73 (24 Oct 2020 11:31) (109/82 - 118/81)  BP(mean): --  RR: 17 (24 Oct 2020 11:31) (17 - 19)  SpO2: 98% (24 Oct 2020 11:31) (97% - 98%)    PHYSICAL EXAM:  GEN:         Awake, responsive and comfortable.  HEENT:    Normal.    RESP:        no wheezing.  CVS:           Regular rate and rhythm.   ABD:         Soft, non-tender, non-distended;     MEDICATIONS  (STANDING):  BACItracin   Ointment 1 Application(s) Topical two times a day  chlorhexidine 4% Liquid 1 Application(s) Topical <User Schedule>  collagenase Ointment 1 Application(s) Topical daily  folic acid 1 milliGRAM(s) Oral daily  metoprolol tartrate 25 milliGRAM(s) Enteral Tube every 8 hours  multivitamin 1 Tablet(s) Oral daily  pantoprazole   Suspension 40 milliGRAM(s) Oral daily  thiamine 100 milliGRAM(s) Oral daily    MEDICATIONS  (PRN):  acetaminophen   Tablet .. 650 milliGRAM(s) Oral every 6 hours PRN Temp greater or equal to 38C (100.4F), Mild Pain (1 - 3)  ALBUTerol    90 MICROgram(s) HFA Inhaler 2 Puff(s) Inhalation every 6 hours PRN sob  glucagon  Injectable 1 milliGRAM(s) IntraMuscular once PRN Glucose LESS THAN 70 milligrams/deciliter  melatonin 3 milliGRAM(s) Oral at bedtime PRN Insomnia  metoprolol tartrate Injectable 5 milliGRAM(s) IV Push every 6 hours PRN if sbp>160 or HR >110 hold if SBP<100 or HR<60  ondansetron Injectable 4 milliGRAM(s) IV Push every 8 hours PRN Nausea and/or Vomiting  traMADol 25 milliGRAM(s) Oral every 8 hours PRN Severe Pain (7 - 10)    ASSESSMENT AND PLAN:  ·	Hypoxic Respiratory failure.  ·	S/P tracheostomy.   ·	Bilateral pneumonia(pseudomonas in sputum).  ·	Worsening right pleural effusion S/P Thoracentesis  ·	S/P Septic Shock.  ·	Necrotizing Pancreatis.  ·	Acute metabolic Encephalopathy.  ·	Alcohol abuse.  ·	Klebsiella Bacteremia.  ·	Anemia    Had repeat right sided thoracentesis with removal of 1.5 litres.  Pulmonary status better and stable.  Will need repeat CXR in 4 weeks.

## 2020-10-24 NOTE — DISCHARGE NOTE NURSING/CASE MANAGEMENT/SOCIAL WORK - PATIENT PORTAL LINK FT
You can access the FollowMyHealth Patient Portal offered by [facility] by registering at the following website: [email or web address]. By joining [employer]’s FollowMyHealth portal, you will also be able to view your health information using other applications (apps) compatible with our system.

## 2020-10-25 PROCEDURE — 99232 SBSQ HOSP IP/OBS MODERATE 35: CPT

## 2020-10-25 RX ADMIN — Medication 1 APPLICATION(S): at 11:42

## 2020-10-25 RX ADMIN — PANTOPRAZOLE SODIUM 40 MILLIGRAM(S): 20 TABLET, DELAYED RELEASE ORAL at 11:38

## 2020-10-25 RX ADMIN — Medication 25 MILLIGRAM(S): at 21:46

## 2020-10-25 RX ADMIN — Medication 1 MILLIGRAM(S): at 11:38

## 2020-10-25 RX ADMIN — TRAMADOL HYDROCHLORIDE 25 MILLIGRAM(S): 50 TABLET ORAL at 12:25

## 2020-10-25 RX ADMIN — TRAMADOL HYDROCHLORIDE 25 MILLIGRAM(S): 50 TABLET ORAL at 19:56

## 2020-10-25 RX ADMIN — Medication 650 MILLIGRAM(S): at 09:50

## 2020-10-25 RX ADMIN — Medication 650 MILLIGRAM(S): at 09:08

## 2020-10-25 RX ADMIN — Medication 1 APPLICATION(S): at 05:24

## 2020-10-25 RX ADMIN — Medication 100 MILLIGRAM(S): at 11:38

## 2020-10-25 RX ADMIN — TRAMADOL HYDROCHLORIDE 25 MILLIGRAM(S): 50 TABLET ORAL at 11:38

## 2020-10-25 RX ADMIN — Medication 1 TABLET(S): at 11:38

## 2020-10-25 RX ADMIN — TRAMADOL HYDROCHLORIDE 25 MILLIGRAM(S): 50 TABLET ORAL at 20:36

## 2020-10-25 RX ADMIN — Medication 25 MILLIGRAM(S): at 05:24

## 2020-10-25 NOTE — PROGRESS NOTE ADULT - SUBJECTIVE AND OBJECTIVE BOX
Patient is a 44y old  Male who presents with a chief complaint of pancreatitis (24 Oct 2020 19:52)      INTERVAL HPI/OVERNIGHT EVENTS:    MEDICATIONS  (STANDING):  BACItracin   Ointment 1 Application(s) Topical two times a day  chlorhexidine 4% Liquid 1 Application(s) Topical <User Schedule>  collagenase Ointment 1 Application(s) Topical daily  folic acid 1 milliGRAM(s) Oral daily  metoprolol tartrate 25 milliGRAM(s) Enteral Tube every 8 hours  multivitamin 1 Tablet(s) Oral daily  pantoprazole   Suspension 40 milliGRAM(s) Oral daily  thiamine 100 milliGRAM(s) Oral daily    MEDICATIONS  (PRN):  acetaminophen   Tablet .. 650 milliGRAM(s) Oral every 6 hours PRN Temp greater or equal to 38C (100.4F), Mild Pain (1 - 3)  ALBUTerol    90 MICROgram(s) HFA Inhaler 2 Puff(s) Inhalation every 6 hours PRN sob  glucagon  Injectable 1 milliGRAM(s) IntraMuscular once PRN Glucose LESS THAN 70 milligrams/deciliter  melatonin 3 milliGRAM(s) Oral at bedtime PRN Insomnia  metoprolol tartrate Injectable 5 milliGRAM(s) IV Push every 6 hours PRN if sbp>160 or HR >110 hold if SBP<100 or HR<60  ondansetron Injectable 4 milliGRAM(s) IV Push every 8 hours PRN Nausea and/or Vomiting  traMADol 25 milliGRAM(s) Oral every 8 hours PRN Severe Pain (7 - 10)      Allergies    No Known Allergies    Intolerances        REVIEW OF SYSTEMS:  CONSTITUTIONAL: No fever, weight loss, or fatigue  EYES: No eye pain, visual disturbances, or discharge  ENMT:  No difficulty hearing, tinnitus, vertigo; No sinus or throat pain  NECK: No pain or stiffness  BREASTS: No pain, masses, or nipple discharge  RESPIRATORY: No cough, wheezing, chills or hemoptysis; No shortness of breath  CARDIOVASCULAR: No chest pain, palpitations, dizziness, or leg swelling  GASTROINTESTINAL: No abdominal or epigastric pain. No nausea, vomiting, or hematemesis; No diarrhea or constipation. No melena or hematochezia.  GENITOURINARY: No dysuria, frequency, hematuria, or incontinence  NEUROLOGICAL: No headaches, memory loss, loss of strength, numbness, or tremors  SKIN: No itching, burning, rashes, or lesions   LYMPH NODES: No enlarged glands  ENDOCRINE: No heat or cold intolerance; No hair loss  MUSCULOSKELETAL: No joint pain or swelling; No muscle, back, or extremity pain  PSYCHIATRIC: No depression, anxiety, mood swings, or difficulty sleeping  HEME/LYMPH: No easy bruising, or bleeding gums  ALLERGY AND IMMUNOLOGIC: No hives or eczema    Vital Signs Last 24 Hrs  T(C): 37 (25 Oct 2020 05:33), Max: 37.5 (24 Oct 2020 17:21)  T(F): 98.6 (25 Oct 2020 05:33), Max: 99.5 (24 Oct 2020 17:21)  HR: 87 (25 Oct 2020 05:33) (78 - 87)  BP: 114/72 (25 Oct 2020 05:33) (114/72 - 120/86)  BP(mean): --  RR: 17 (25 Oct 2020 05:33) (17 - 18)  SpO2: 96% (25 Oct 2020 05:33) (95% - 98%)    PHYSICAL EXAM:  GENERAL: NAD, well-groomed, well-developed  HEAD:  Atraumatic, Normocephalic  EYES: EOMI, PERRLA, conjunctiva and sclera clear  ENMT: No tonsillar erythema, exudates, or enlargement; Moist mucous membranes, Good dentition, No lesions  NECK: Supple, No JVD, Normal thyroid  NERVOUS SYSTEM:  Alert & Oriented X3, Good concentration; Motor Strength 5/5 B/L upper and lower extremities; DTRs 2+ intact and symmetric  CHEST/LUNG: Clear to percussion bilaterally; No rales, rhonchi, wheezing, or rubs  HEART: Regular rate and rhythm; No murmurs, rubs, or gallops  ABDOMEN: Soft, Nontender, Nondistended; Bowel sounds present  EXTREMITIES:  2+ Peripheral Pulses, No clubbing, cyanosis, or edema  LYMPH: No lymphadenopathy noted  SKIN: No rashes or lesions    LABS:              CAPILLARY BLOOD GLUCOSE          RADIOLOGY & ADDITIONAL TESTS:    Imaging Personally Reviewed:  [ X] YES  [ ] NO    Consultant(s) Notes Reviewed:  [ X] YES  [ ] NO    Care Discussed with Consultants/Other Providers [X ] YES  [ ] NO

## 2020-10-25 NOTE — PROGRESS NOTE ADULT - ASSESSMENT
44M EtOH pancreatitis p/w abdominal pain 8/17/20, found to have necrotizing pancreatitis c/b septic shock and respiratory failure secondary to PNA and ARDS requiring intubation and DTs requiring sedation and small bowel obstruction conservatively managed with NGT and NPO for which he was admitted to ICU. Currently, s/p trach and PEG on 9/10 and treated for klebsiella bacteremia.    downgraded to GM floor 9/20/20 from ICU        Pending home care setup for dc     Assessment and Plan:      s/p Mechanical Fall 10/17/20:  X ray pelvis showed no acute fracture. CT head and C spine showed no acute fracture also.     #Aspiration right sided pneumonitis/pneumonia.  fever resolved. aspiration precautions. resolved    Recurrent right pleural effusion. ? parapneumonic. s/p Ir drainage, repeat cxr in 2-3wks          #unstageable pressure ulcers x 2 on sacral area ,    -wound vac to be placed     #s/p necrotizing pancreatitis , s/p septic shock , resolved        -CT Chest/Abdomen/Pelvis o   Bilateral patchy airspace disease has improved compared to the prior.  Improving pancreatitis with decreased size of the pancreas as well as improved peripancreatic edema. Small collection adjacent to the undersurface of the tail is also decreased. No evidence of necrosis.      #Metabolic encephalopathy   AGITATION   resolved       #Acute respiratory failure secondary to PNA/ARDS.   s/p TRACH 9/10/20   s/p trach decannulation 10/17/20         #Ascites , abd is soft.   distended gallbladder and ascites seen on CT, abd US neg for acute cholecystitis   s/p paracentesis 9/22 by IR   ABD US showing worsening ascites, s/p paracentesis by IR 9/22 with 1.5L straw colored fluid removed -cultures did not show any specific organisms  fluid analysis of paracentesis not consistent with SBP.   9/21 KUB shows nonspecific bowel gas pattern without e/o obstruction or ileus   Blood culture from 9/10 was positive for klebsiella quite sensitive and one set from 9/12 is negative, 9/25 repeat blood cultures negative, sputum cultures from 9/26- showing pseudomonas aeruginosa   SAAG < 1.1 going against portal HTN       #s/p PEG placement 9/10/20, s/p peg removal 10/21    s/p SBO which was treated conservatively   Ileus.      #Hypokalememia-- repleted    #Hypomagnesemia --repleted    #Hypophosphatemia --repleted    #normocytic anemia , probably related to sepsis and acute illness   requiring transfusion on 9/17/20       hypercalcemia   - 2/2 to immobilization vs vitamin d supplementation although levels are only 25   - s/p pamidronate x 1    Vitamin D insufficiency. cont supplement.     SEVERE DECONDITIONING d/t prolonged hospitalization   PT  aggressive      #Preventative measures   -dvt ppx.   fall, aspiration precautions  Discussed with nursing staff.

## 2020-10-25 NOTE — PROGRESS NOTE ADULT - SUBJECTIVE AND OBJECTIVE BOX
INTERVAL HPI:   44M with ETOH Abuse and withdrawal, Alcohol Pancreatitis who presents to the ED with severe abdominal pain.  Found to have Necrotizing Pancreatitis, Septic  Shock, Pneumonia/ARDS with Hypoxic Respiratory failure. Admitted to ICU, had prolonged hospital course with Vent support. Eventually required Tracheostomy and Peg placement.  Also with acute metabolic Encephalopathy.  09/20/20:  Transferred to .    Not able to provide details due to encephalopathy.  09/22/20: 1500 ml paracentesis.  10/12/20: 2 litre right thoracentesis( exudate, negative culture and no malignant cells).  10/15/20: Trach Capped.  10/17/20:  De cannulated  10/23/20:  1.5 litre right thoracentesis.    OVERNIGHT EVENTS:  Feels better.    Vital Signs Last 24 Hrs  T(C): 37.3 (25 Oct 2020 16:43), Max: 37.3 (25 Oct 2020 16:43)  T(F): 99.1 (25 Oct 2020 16:43), Max: 99.1 (25 Oct 2020 16:43)  HR: 81 (25 Oct 2020 16:43) (78 - 87)  BP: 124/76 (25 Oct 2020 16:43) (108/75 - 124/76)  BP(mean): --  RR: 18 (25 Oct 2020 16:43) (17 - 18)  SpO2: 98% (25 Oct 2020 16:43) (96% - 98%)    PHYSICAL EXAM:  GEN:         Awake, responsive and comfortable.  HEENT:    Normal.    RESP:       no distress  CVS:          Regular rate and rhythm.   ABD:         Soft, non-tender, non-distended;     MEDICATIONS  (STANDING):  BACItracin   Ointment 1 Application(s) Topical two times a day  chlorhexidine 4% Liquid 1 Application(s) Topical <User Schedule>  collagenase Ointment 1 Application(s) Topical daily  folic acid 1 milliGRAM(s) Oral daily  metoprolol tartrate 25 milliGRAM(s) Enteral Tube every 8 hours  multivitamin 1 Tablet(s) Oral daily  pantoprazole   Suspension 40 milliGRAM(s) Oral daily  thiamine 100 milliGRAM(s) Oral daily    MEDICATIONS  (PRN):  acetaminophen   Tablet .. 650 milliGRAM(s) Oral every 6 hours PRN Temp greater or equal to 38C (100.4F), Mild Pain (1 - 3)  ALBUTerol    90 MICROgram(s) HFA Inhaler 2 Puff(s) Inhalation every 6 hours PRN sob  glucagon  Injectable 1 milliGRAM(s) IntraMuscular once PRN Glucose LESS THAN 70 milligrams/deciliter  melatonin 3 milliGRAM(s) Oral at bedtime PRN Insomnia  metoprolol tartrate Injectable 5 milliGRAM(s) IV Push every 6 hours PRN if sbp>160 or HR >110 hold if SBP<100 or HR<60  ondansetron Injectable 4 milliGRAM(s) IV Push every 8 hours PRN Nausea and/or Vomiting  traMADol 25 milliGRAM(s) Oral every 8 hours PRN Severe Pain (7 - 10)    ASSESSMENT AND PLAN:  ·	Hypoxic Respiratory failure.  ·	S/P tracheostomy.   ·	Bilateral pneumonia(pseudomonas in sputum).  ·	Worsening right pleural effusion S/P Thoracentesis  ·	S/P Septic Shock.  ·	Necrotizing Pancreatis.  ·	Acute metabolic Encephalopathy.  ·	Alcohol abuse.  ·	Klebsiella Bacteremia.  ·	Anemia    Pulmonary status better and stable.  Will need repeat CXR in 4 weeks.  Awaiting home health arrangements.

## 2020-10-25 NOTE — CHART NOTE - NSCHARTNOTEFT_GEN_A_CORE
Assessment:  Pt seen for follow up, c girlfriend @ bedside, dressed to go home.    RD reviewed diet therapy for pancreatitis, encouraged adequate protein-energy intake for pressure ulcers.  Pt adm c EtOH pancreatitis, s/p necrotizing pancreatitis, septic shock, respiratory failure secondary to PNA, ARDS requiring intubation and DTs requiring sedation and small bowel obstruction conservatively, s/p adm to ICU,  s/p trach and PEG, s/p klebsiella bacteremia, pressure ulcers. s/p ascites, s/p paracentesis,  s/p mechanical fall w/o fractures, normocytic anemia, vitamin D deficiency(s/p supplementation)     Factors impacting intake: [x ] none [ ] nausea  [ ] vomiting [ ] diarrhea [ ] constipation  [ ]chewing problems [ ] swallowing issues  [ ] other:     Diet Prescription: Diet, Regular:   Low Fat (LOWFAT)  Heraclio(7 Gm Arginine/7 Gm Glut/1.2 Gm HMB     Qty per Day:  1  Supplement Feeding Modality:  Oral  Ensure Clear Cans or Servings Per Day:  2       Frequency:  Daily (10-21-20 @ 12:42)    Intake: >75% of meals & supplements    Current Weight: 10/25, 62.1 kg, 08/18, 71.5 kg, c wt. loss of 9.4 kg since adm  % Weight Change: 13.1%  10/22, no edema noted     Pertinent Medications: MEDICATIONS  (STANDING):  BACItracin   Ointment 1 Application(s) Topical two times a day  chlorhexidine 4% Liquid 1 Application(s) Topical <User Schedule>  collagenase Ointment 1 Application(s) Topical daily  folic acid 1 milliGRAM(s) Oral daily  metoprolol tartrate 25 milliGRAM(s) Enteral Tube every 8 hours  multivitamin 1 Tablet(s) Oral daily  pantoprazole   Suspension 40 milliGRAM(s) Oral daily  thiamine 100 milliGRAM(s) Oral daily    MEDICATIONS  (PRN):  acetaminophen   Tablet .. 650 milliGRAM(s) Oral every 6 hours PRN Temp greater or equal to 38C (100.4F), Mild Pain (1 - 3)  ALBUTerol    90 MICROgram(s) HFA Inhaler 2 Puff(s) Inhalation every 6 hours PRN sob  glucagon  Injectable 1 milliGRAM(s) IntraMuscular once PRN Glucose LESS THAN 70 milligrams/deciliter  melatonin 3 milliGRAM(s) Oral at bedtime PRN Insomnia  metoprolol tartrate Injectable 5 milliGRAM(s) IV Push every 6 hours PRN if sbp>160 or HR >110 hold if SBP<100 or HR<60  ondansetron Injectable 4 milliGRAM(s) IV Push every 8 hours PRN Nausea and/or Vomiting  traMADol 25 milliGRAM(s) Oral every 8 hours PRN Severe Pain (7 - 10)    Pertinent Labs:   10/19, Glucose 105 mg/dL <H> Cr 0.45 mg/dL <L>  10/22 POCT blood Glucose range 108-119<acceptable POCT blood Glucose range>   CAPILLARY BLOOD GLUCOSE      Skin:   Pressure ulcer x  2  1.  sacrum; stage IV  2. trach area; stage II    Estimated Needs:   [ x] no change since previous assessment (10/06)  [ ] recalculated:     Previous Nutrition Diagnosis:   [x ] Malnutrition; severe malnutrition in context of acute illness     Related to: inadequate protein-energy intake in setting of pancreatitis, respiratory failure, hepatic/metabolic encephalopathy, pressure ulcer     As evidenced by: <50% nutrition needs >5 days, physical findings of moderate muscle loss     Goal: pt to consume >75% of meals & supplement; met     Nutrition Diagnosis is [ x] ongoing, improving   [ ] resolved [ ] not applicable     New Nutrition Diagnosis: [ x] not applicable     Interventions:   Recommend  [x] Continue c current diet regimen   [ ] Change Diet To:  [ ] Nutrition Supplement  [ ] Nutrition Support  [x ] Other: multivitamin c minerals & d/c multivitamin    Monitoring and Evaluation:   [x] PO intake [ x ] Tolerance to diet prescription [ x ] weights [ x ] labs[ x ] follow up per protocol  [ ] other:

## 2020-10-25 NOTE — CHART NOTE - NSCHARTNOTESELECT_GEN_ALL_CORE
Malnutrition Notification
Event Note
Event Note/upgrade
Mother contact information/Event Note
Nutrition Services
PEG removal/Event Note
Transfer Note

## 2020-10-26 VITALS
OXYGEN SATURATION: 96 % | DIASTOLIC BLOOD PRESSURE: 69 MMHG | RESPIRATION RATE: 18 BRPM | HEART RATE: 72 BPM | TEMPERATURE: 99 F | SYSTOLIC BLOOD PRESSURE: 105 MMHG

## 2020-10-26 PROCEDURE — 99232 SBSQ HOSP IP/OBS MODERATE 35: CPT

## 2020-10-26 RX ADMIN — Medication 1 MILLIGRAM(S): at 11:28

## 2020-10-26 RX ADMIN — CHLORHEXIDINE GLUCONATE 1 APPLICATION(S): 213 SOLUTION TOPICAL at 11:31

## 2020-10-26 RX ADMIN — Medication 100 MILLIGRAM(S): at 11:28

## 2020-10-26 RX ADMIN — PANTOPRAZOLE SODIUM 40 MILLIGRAM(S): 20 TABLET, DELAYED RELEASE ORAL at 11:28

## 2020-10-26 RX ADMIN — Medication 1 TABLET(S): at 11:28

## 2020-10-26 RX ADMIN — Medication 1 APPLICATION(S): at 05:17

## 2020-10-26 RX ADMIN — Medication 25 MILLIGRAM(S): at 05:17

## 2020-10-26 NOTE — PROGRESS NOTE ADULT - SUBJECTIVE AND OBJECTIVE BOX
Patient is a 44y old  Male who presents with a chief complaint of pancreatitis (25 Oct 2020 18:27)      INTERVAL HPI/OVERNIGHT EVENTS: no events     MEDICATIONS  (STANDING):  BACItracin   Ointment 1 Application(s) Topical two times a day  chlorhexidine 4% Liquid 1 Application(s) Topical <User Schedule>  collagenase Ointment 1 Application(s) Topical daily  folic acid 1 milliGRAM(s) Oral daily  metoprolol tartrate 25 milliGRAM(s) Enteral Tube every 8 hours  multivitamin 1 Tablet(s) Oral daily  pantoprazole   Suspension 40 milliGRAM(s) Oral daily  thiamine 100 milliGRAM(s) Oral daily    MEDICATIONS  (PRN):  acetaminophen   Tablet .. 650 milliGRAM(s) Oral every 6 hours PRN Temp greater or equal to 38C (100.4F), Mild Pain (1 - 3)  ALBUTerol    90 MICROgram(s) HFA Inhaler 2 Puff(s) Inhalation every 6 hours PRN sob  glucagon  Injectable 1 milliGRAM(s) IntraMuscular once PRN Glucose LESS THAN 70 milligrams/deciliter  melatonin 3 milliGRAM(s) Oral at bedtime PRN Insomnia  metoprolol tartrate Injectable 5 milliGRAM(s) IV Push every 6 hours PRN if sbp>160 or HR >110 hold if SBP<100 or HR<60  ondansetron Injectable 4 milliGRAM(s) IV Push every 8 hours PRN Nausea and/or Vomiting  traMADol 25 milliGRAM(s) Oral every 8 hours PRN Severe Pain (7 - 10)      Allergies    No Known Allergies    Intolerances         Vital Signs Last 24 Hrs  T(C): 37.1 (26 Oct 2020 10:59), Max: 37.3 (25 Oct 2020 16:43)  T(F): 98.7 (26 Oct 2020 10:59), Max: 99.1 (25 Oct 2020 16:43)  HR: 72 (26 Oct 2020 10:59) (72 - 84)  BP: 105/69 (26 Oct 2020 10:59) (103/73 - 124/76)  BP(mean): --  RR: 18 (26 Oct 2020 10:59) (17 - 18)  SpO2: 96% (26 Oct 2020 10:59) (96% - 98%)    PHYSICAL EXAM:  GENERAL: NAD, well-groomed, well-developed  HEAD:  Atraumatic, Normocephalic  EYES: EOMI, PERRLA, conjunctiva and sclera clear  ENMT: No tonsillar erythema, exudates, or enlargement; Moist mucous membranes, Good dentition, No lesions  NECK: Supple, No JVD, Normal thyroid  NERVOUS SYSTEM:  Alert & Oriented X3, Good concentration; Motor Strength 5/5 B/L upper and lower extremities; DTRs 2+ intact and symmetric  CHEST/LUNG: Clear to percussion bilaterally; No rales, rhonchi, wheezing, or rubs  HEART: Regular rate and rhythm; No murmurs, rubs, or gallops  ABDOMEN: Soft, Nontender, Nondistended; Bowel sounds present  EXTREMITIES:  2+ Peripheral Pulses, No clubbing, cyanosis, or edema  LYMPH: No lymphadenopathy noted  SKIN: No rashes or lesions    LABS:              CAPILLARY BLOOD GLUCOSE          RADIOLOGY & ADDITIONAL TESTS:    Imaging Personally Reviewed:  [ X] YES  [ ] NO    Consultant(s) Notes Reviewed:  [ X] YES  [ ] NO    Care Discussed with Consultants/Other Providers [X ] YES  [ ] NO

## 2020-10-26 NOTE — PROGRESS NOTE ADULT - PROVIDER SPECIALTY LIST ADULT
Critical Care
Hospitalist
Infectious Disease
Intervent Radiology
Neurology
Pulmonology
Surgery
Critical Care
Surgery
Surgery
Hospitalist
Critical Care
Critical Care
Hospitalist
Infectious Disease

## 2020-10-26 NOTE — PROGRESS NOTE ADULT - REASON FOR ADMISSION
pancreatitis

## 2020-10-28 DIAGNOSIS — G72.81 CRITICAL ILLNESS MYOPATHY: ICD-10-CM

## 2020-10-28 DIAGNOSIS — G62.81 CRITICAL ILLNESS POLYNEUROPATHY: ICD-10-CM
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
## 2020-10-28 DIAGNOSIS — E87.6 HYPOKALEMIA: ICD-10-CM

## 2020-10-28 DIAGNOSIS — R71.0 PRECIPITOUS DROP IN HEMATOCRIT: ICD-10-CM

## 2020-10-28 DIAGNOSIS — L89.892 PRESSURE ULCER OF OTHER SITE, STAGE 2: ICD-10-CM

## 2020-10-28 DIAGNOSIS — E83.52 HYPERCALCEMIA: ICD-10-CM

## 2020-10-28 DIAGNOSIS — K56.609 UNSPECIFIED INTESTINAL OBSTRUCTION, UNSPECIFIED AS TO PARTIAL VERSUS COMPLETE OBSTRUCTION: ICD-10-CM

## 2020-10-28 DIAGNOSIS — N17.9 ACUTE KIDNEY FAILURE, UNSPECIFIED: ICD-10-CM

## 2020-10-28 DIAGNOSIS — R65.21 SEVERE SEPSIS WITH SEPTIC SHOCK: ICD-10-CM

## 2020-10-28 DIAGNOSIS — L03.113 CELLULITIS OF RIGHT UPPER LIMB: ICD-10-CM

## 2020-10-28 DIAGNOSIS — J80 ACUTE RESPIRATORY DISTRESS SYNDROME: ICD-10-CM

## 2020-10-28 DIAGNOSIS — E43 UNSPECIFIED SEVERE PROTEIN-CALORIE MALNUTRITION: ICD-10-CM

## 2020-10-28 DIAGNOSIS — E83.51 HYPOCALCEMIA: ICD-10-CM

## 2020-10-28 DIAGNOSIS — K85.21 ALCOHOL INDUCED ACUTE PANCREATITIS WITH UNINFECTED NECROSIS: ICD-10-CM

## 2020-10-28 DIAGNOSIS — K70.10 ALCOHOLIC HEPATITIS WITHOUT ASCITES: ICD-10-CM

## 2020-10-28 DIAGNOSIS — E87.3 ALKALOSIS: ICD-10-CM

## 2020-10-28 DIAGNOSIS — A41.9 SEPSIS, UNSPECIFIED ORGANISM: ICD-10-CM

## 2020-10-28 DIAGNOSIS — J90 PLEURAL EFFUSION, NOT ELSEWHERE CLASSIFIED: ICD-10-CM

## 2020-10-28 DIAGNOSIS — J13 PNEUMONIA DUE TO STREPTOCOCCUS PNEUMONIAE: ICD-10-CM

## 2020-10-28 DIAGNOSIS — K85.90 ACUTE PANCREATITIS WITHOUT NECROSIS OR INFECTION, UNSPECIFIED: ICD-10-CM

## 2020-10-28 DIAGNOSIS — J69.0 PNEUMONITIS DUE TO INHALATION OF FOOD AND VOMIT: ICD-10-CM

## 2020-10-28 DIAGNOSIS — K70.11 ALCOHOLIC HEPATITIS WITH ASCITES: ICD-10-CM

## 2020-10-28 DIAGNOSIS — E83.42 HYPOMAGNESEMIA: ICD-10-CM

## 2020-10-28 DIAGNOSIS — E55.9 VITAMIN D DEFICIENCY, UNSPECIFIED: ICD-10-CM

## 2020-10-28 DIAGNOSIS — K86.0 ALCOHOL-INDUCED CHRONIC PANCREATITIS: ICD-10-CM

## 2020-10-28 DIAGNOSIS — E83.39 OTHER DISORDERS OF PHOSPHORUS METABOLISM: ICD-10-CM

## 2020-10-28 DIAGNOSIS — L40.9 PSORIASIS, UNSPECIFIED: ICD-10-CM

## 2020-10-28 DIAGNOSIS — L89.154 PRESSURE ULCER OF SACRAL REGION, STAGE 4: ICD-10-CM

## 2020-10-28 DIAGNOSIS — E87.2 ACIDOSIS: ICD-10-CM

## 2020-10-28 DIAGNOSIS — Z91.81 HISTORY OF FALLING: ICD-10-CM

## 2020-10-28 DIAGNOSIS — G93.41 METABOLIC ENCEPHALOPATHY: ICD-10-CM

## 2020-10-28 DIAGNOSIS — D64.9 ANEMIA, UNSPECIFIED: ICD-10-CM

## 2020-10-28 DIAGNOSIS — E16.2 HYPOGLYCEMIA, UNSPECIFIED: ICD-10-CM

## 2020-10-28 DIAGNOSIS — R19.7 DIARRHEA, UNSPECIFIED: ICD-10-CM

## 2020-10-28 DIAGNOSIS — F10.231 ALCOHOL DEPENDENCE WITH WITHDRAWAL DELIRIUM: ICD-10-CM

## 2020-11-05 DIAGNOSIS — Z01.812 ENCOUNTER FOR PREPROCEDURAL LABORATORY EXAMINATION: ICD-10-CM

## 2020-11-11 ENCOUNTER — APPOINTMENT (OUTPATIENT)
Age: 45
End: 2020-11-11
Payer: MEDICAID

## 2020-11-11 VITALS
HEIGHT: 70 IN | OXYGEN SATURATION: 98 % | WEIGHT: 150 LBS | RESPIRATION RATE: 14 BRPM | HEART RATE: 90 BPM | BODY MASS INDEX: 21.47 KG/M2 | TEMPERATURE: 97.3 F | DIASTOLIC BLOOD PRESSURE: 82 MMHG | SYSTOLIC BLOOD PRESSURE: 116 MMHG

## 2020-11-11 DIAGNOSIS — J90 PLEURAL EFFUSION, NOT ELSEWHERE CLASSIFIED: ICD-10-CM

## 2020-11-11 DIAGNOSIS — F10.10 ALCOHOL ABUSE, UNCOMPLICATED: ICD-10-CM

## 2020-11-11 DIAGNOSIS — J18.9 PNEUMONIA, UNSPECIFIED ORGANISM: ICD-10-CM

## 2020-11-11 DIAGNOSIS — J96.01 ACUTE RESPIRATORY FAILURE WITH HYPOXIA: ICD-10-CM

## 2020-11-11 PROCEDURE — 99072 ADDL SUPL MATRL&STAF TM PHE: CPT

## 2020-11-11 PROCEDURE — 94727 GAS DIL/WSHOT DETER LNG VOL: CPT

## 2020-11-11 PROCEDURE — 94010 BREATHING CAPACITY TEST: CPT

## 2020-11-11 PROCEDURE — 99204 OFFICE O/P NEW MOD 45 MIN: CPT | Mod: 25

## 2020-11-11 PROCEDURE — 94799 UNLISTED PULMONARY SVC/PX: CPT

## 2020-11-11 PROCEDURE — 99406 BEHAV CHNG SMOKING 3-10 MIN: CPT

## 2020-11-11 PROCEDURE — 94729 DIFFUSING CAPACITY: CPT

## 2020-11-12 ENCOUNTER — TRANSCRIPTION ENCOUNTER (OUTPATIENT)
Age: 45
End: 2020-11-12

## 2020-11-12 NOTE — PHYSICAL EXAM
[Normal S1, S2] : normal s1, s2 [Clear to Auscultation Bilaterally] : clear to auscultation bilaterally [Normal Gait] : normal gait [No Clubbing] : no clubbing [No Cyanosis] : no cyanosis [No Focal Deficits] : no focal deficits [Oriented x3] : oriented x3 [TextBox_2] : Chronically ill appearing [TextBox_44] : s/p decannulation trach site clean dry [TextBox_89] : Firm, tender

## 2020-11-12 NOTE — REVIEW OF SYSTEMS
[Poor Appetite] : poor appetite [Recent Wt Loss (___ Lbs)] : ~T recent [unfilled] lb weight loss [Hepatic Disease] : hepatic disease [Negative] : Endocrine [TextBox_69] : See HPI

## 2020-11-12 NOTE — REASON FOR VISIT
[Follow-Up - From Hospitalization] : a follow-up visit after a recent hospitalization [Spouse] : spouse [TextBox_44] : Pleural effusion, acute respiratory failure, hypoxia, pancreatitis, SBP

## 2020-11-12 NOTE — COUNSELING
[Risk of tobacco use and health benefits of smoking cessation discussed] : Risk of tobacco use and health benefits of smoking cessation discussed [Cessation strategies including cessation program discussed] : Cessation strategies including cessation program discussed [Use of nicotine replacement therapies and other medications discussed] : Use of nicotine replacement therapies and other medications discussed [Willing to Quit Smoking] : Willing to quit smoking [Tobacco Use Cessation Intermediate Greater Than 3 Minutes Up to 10 Minutes] : Tobacco Use Cessation Intermediate Greater Than 3 Minutes Up to 10 Minutes [Hazards of at-risk alcohol use discussed] : Hazards of at-risk alcohol use discussed [Strategies to reduce or eliminate alcohol use discussed] : Strategies to reduce or eliminate alcohol use discussed [Inadequate social support] : Inadequate social support [Limited decision making ability] : Limited decision making ability [Needs reinforcement, provided] : Patient needs reinforcement on understanding of lifestyle changes and steps needed to achieve self management goal; reinforcement was provided [FreeTextEntry1] : 10

## 2020-11-12 NOTE — ASSESSMENT
[FreeTextEntry1] : 44 year old male Hx ETOH abuse, s/p hospitalization a for sepsis, pancreatitis, SBP, intubated, s/p tracheostomy, s/p thoracentesis, right pleural effusion \par \par Obtain repeat CXR\par \par Follow up after CXR

## 2020-11-12 NOTE — PROCEDURE
[FreeTextEntry1] : PFT 11/11/20 personally reviewed minimal obstructive ventilatory defect and moderate restrictive ventilatory defect with moderate gas exchange abnormality

## 2020-11-12 NOTE — HISTORY OF PRESENT ILLNESS
[Former] : former [< 30 pack-years] : < 30 pack-years [Never] : never [TextBox_4] : Patient is a 44 year old male Hx ETOH abuse s/p recent hospitalization SBP, acute respiratory failure, sepsis, s/p tracheostomy, PEG placement, right pleural effusion s/p thoracentesis presents to [facility] for follow up recent hospitalization.  patient reports he is in pain.  He has a sacral decubitus, wound vac in place.  He reports he is using ETOH at this time.  He was discharged from hospital 10/28/20, now decannulated.  He reports respirator status is stable.   [TextBox_11] : 3/4  [TextBox_13] : 15

## 2020-11-12 NOTE — HISTORY OF PRESENT ILLNESS
[Former] : former [< 30 pack-years] : < 30 pack-years [Never] : never [TextBox_4] : Patient is a 44 year old male Hx ETOH abuse s/p recent hospitalization SBP, acute respiratory failure, sepsis, s/p tracheostomy, PEG placement, right pleural effusion s/p thoracentesis presents to [facility] for follow up recent hospitalization.  patient reports he is in pain.  He has a sacral decubitus, wound vac in place.  He reports he is using ETOH at this time.  He was discharged from hospital 10/28/20, now decannulated.  He reports respirator status is stable.   [TextBox_11] : 3/4  [TextBox_13] : 15

## 2020-11-19 ENCOUNTER — OUTPATIENT (OUTPATIENT)
Dept: OUTPATIENT SERVICES | Facility: HOSPITAL | Age: 45
LOS: 1 days | End: 2020-11-19
Payer: MEDICAID

## 2020-11-19 ENCOUNTER — APPOINTMENT (OUTPATIENT)
Dept: RADIOLOGY | Facility: CLINIC | Age: 45
End: 2020-11-19
Payer: MEDICAID

## 2020-11-19 DIAGNOSIS — S02.652S: Chronic | ICD-10-CM

## 2020-11-19 DIAGNOSIS — J90 PLEURAL EFFUSION, NOT ELSEWHERE CLASSIFIED: ICD-10-CM

## 2020-11-19 PROCEDURE — 71046 X-RAY EXAM CHEST 2 VIEWS: CPT

## 2020-11-19 PROCEDURE — 71046 X-RAY EXAM CHEST 2 VIEWS: CPT | Mod: 26

## 2020-11-30 ENCOUNTER — NON-APPOINTMENT (OUTPATIENT)
Age: 45
End: 2020-11-30

## 2020-12-11 NOTE — H&P ADULT - NSHPPHYSICALEXAM_GEN_ALL_CORE
S/p coronary stenting, TAVR at [facility] pending. Continue work-up/treatment at [facility]. Physical Exam:   GENERAL: NAD, well-groomed, well-developed  HEAD:  Atraumatic, Normocephalic  EYES: EOMI, PERRLA, conjunctiva and sclera clear  ENMT: No tonsillar erythema, exudates, or enlargement; Moist mucous membranes, No lesions  NECK: Supple, No JVD  NERVOUS SYSTEM:  +tremors, +anxiety, Alert & Oriented X3, Good concentration; Motor Strength 5/5 B/L upper and lower extremities, No arm/leg drift, good finger grasp  CHEST/LUNG: +cough, Clear to percussion bilaterally; No rales, rhonchi, wheezing, or rubs  HEART: Regular rate and rhythm; No murmurs  ABDOMEN: Soft, +LLQ ttp , Nondistended; Bowel sounds present  EXTREMITIES:  2+ Peripheral Pulses, No clubbing, cyanosis, or edema  LYMPH: No lymphadenopathy noted  SKIN: No rashes or lesions

## 2021-01-20 ENCOUNTER — APPOINTMENT (OUTPATIENT)
Dept: UROLOGY | Facility: CLINIC | Age: 46
End: 2021-01-20
Payer: MEDICAID

## 2021-01-20 VITALS
DIASTOLIC BLOOD PRESSURE: 81 MMHG | HEART RATE: 88 BPM | SYSTOLIC BLOOD PRESSURE: 123 MMHG | BODY MASS INDEX: 23.07 KG/M2 | WEIGHT: 161.13 LBS | HEIGHT: 70 IN | TEMPERATURE: 98.3 F

## 2021-01-20 DIAGNOSIS — I10 ESSENTIAL (PRIMARY) HYPERTENSION: ICD-10-CM

## 2021-01-20 DIAGNOSIS — Z87.09 PERSONAL HISTORY OF OTHER DISEASES OF THE RESPIRATORY SYSTEM: ICD-10-CM

## 2021-01-20 DIAGNOSIS — N48.30 PRIAPISM, UNSPECIFIED: ICD-10-CM

## 2021-01-20 DIAGNOSIS — R39.9 UNSPECIFIED SYMPTOMS AND SIGNS INVOLVING THE GENITOURINARY SYSTEM: ICD-10-CM

## 2021-01-20 DIAGNOSIS — Z87.19 PERSONAL HISTORY OF OTHER DISEASES OF THE DIGESTIVE SYSTEM: ICD-10-CM

## 2021-01-20 PROCEDURE — 51798 US URINE CAPACITY MEASURE: CPT

## 2021-01-20 PROCEDURE — 99072 ADDL SUPL MATRL&STAF TM PHE: CPT

## 2021-01-20 PROCEDURE — 99204 OFFICE O/P NEW MOD 45 MIN: CPT | Mod: 25

## 2021-01-20 RX ORDER — TRAZODONE HYDROCHLORIDE 50 MG/1
50 TABLET ORAL
Qty: 30 | Refills: 1 | Status: DISCONTINUED | COMMUNITY
End: 2021-01-20

## 2021-01-20 RX ORDER — QUETIAPINE FUMARATE 25 MG/1
25 TABLET ORAL TWICE DAILY
Qty: 180 | Refills: 1 | Status: DISCONTINUED | COMMUNITY
End: 2021-01-20

## 2021-01-20 RX ORDER — CEPHALEXIN 500 MG/1
500 CAPSULE ORAL 4 TIMES DAILY
Qty: 28 | Refills: 0 | Status: DISCONTINUED | COMMUNITY
Start: 2018-06-20 | End: 2021-01-20

## 2021-01-20 RX ORDER — ALBUTEROL SULFATE 2.5 MG/.5ML
SOLUTION RESPIRATORY (INHALATION)
Refills: 0 | Status: ACTIVE | COMMUNITY

## 2021-01-20 RX ORDER — METOPROLOL TARTRATE 75 MG/1
TABLET, FILM COATED ORAL
Refills: 0 | Status: ACTIVE | COMMUNITY

## 2021-01-20 NOTE — HISTORY OF PRESENT ILLNESS
[FreeTextEntry1] : 44 y/o male presenting for initial visit for painful erection. Pt c/o pain during erection. Onset was about 2 months. Pt describes symptoms as constant but improving. Pt states penis does not curve during erection. Pertinent medical history includes HTN.\par \par Pt c/o slow urine stream. N x 6-7. Denies hematuria, dysuria, urgency and hesitancy. PVR 19 cc.\par \par O/E: circumcised phallus, adequate meatus, both testes descended, nontender, no mass palpable\par GO: 1+ non tender\par \par Will get UA and culture\par Follow up 2 weeks\par uroflow and bladder scan

## 2021-01-20 NOTE — PHYSICAL EXAM
[General Appearance - Well Developed] : well developed [General Appearance - Well Nourished] : well nourished [Normal Appearance] : normal appearance [Well Groomed] : well groomed [General Appearance - In No Acute Distress] : no acute distress [Edema] : no peripheral edema [Respiration, Rhythm And Depth] : normal respiratory rhythm and effort [Exaggerated Use Of Accessory Muscles For Inspiration] : no accessory muscle use [Abdomen Soft] : soft [Abdomen Tenderness] : non-tender [Costovertebral Angle Tenderness] : no ~M costovertebral angle tenderness [Urethral Meatus] : meatus normal [Urinary Bladder Findings] : the bladder was normal on palpation [Scrotum] : the scrotum was normal [Testes Mass (___cm)] : there were no testicular masses [No Prostate Nodules] : no prostate nodules [Normal Station and Gait] : the gait and station were normal for the patient's age [] : no rash [No Focal Deficits] : no focal deficits [Oriented To Time, Place, And Person] : oriented to person, place, and time [Affect] : the affect was normal [Mood] : the mood was normal [Not Anxious] : not anxious [No Palpable Adenopathy] : no palpable adenopathy [Penis Abnormality] : normal circumcised penis [Epididymis] : the epididymides were normal [Testes Tenderness] : no tenderness of the testes [Anus Abnormality] : the anus and perineum were normal [Rectal Exam - Rectum] : digital rectal exam was normal [Prostate Enlargement] : the prostate was not enlarged [Prostate Size ___ (0-4)] : prostate size [unfilled] (scale: 0-4) [Prostate Tenderness] : the prostate was not tender [FreeTextEntry1] : GO: negative

## 2021-01-20 NOTE — LETTER BODY
[Dear  ___] : Dear  [unfilled], [Consult Letter:] : I had the pleasure of evaluating your patient, [unfilled]. [Please see my note below.] : Please see my note below. [Consult Closing:] : Thank you very much for allowing me to participate in the care of this patient.  If you have any questions, please do not hesitate to contact me. [Sincerely,] : Sincerely, [FreeTextEntry3] : Stephan Klein MD\par

## 2021-01-20 NOTE — ADDENDUM
[FreeTextEntry1] : I, Betty Innocent, acted solely as a scribe for Dr. Stephan Klein on this date, 01/20/2021.\par \par All medical record entries made by the Scribe were at my Dr. Stephan Klein direction and personally dictated by me on, 01/20/2021. I have reviewed the chart and agree that the record accurately reflects my personal performance of the history, physical exam, assessment and plan. I have also personally directed, reviewed and agreed with the chart.\par

## 2021-01-22 LAB
APPEARANCE: CLEAR
BACTERIA: NEGATIVE
BILIRUBIN URINE: NEGATIVE
BLOOD URINE: NEGATIVE
COLOR: YELLOW
GLUCOSE QUALITATIVE U: NEGATIVE
HYALINE CASTS: 0 /LPF
KETONES URINE: NEGATIVE
LEUKOCYTE ESTERASE URINE: NEGATIVE
MICROSCOPIC-UA: NORMAL
NITRITE URINE: NEGATIVE
PH URINE: 7.5
PROTEIN URINE: NORMAL
RED BLOOD CELLS URINE: 0 /HPF
SPECIFIC GRAVITY URINE: 1.02
SQUAMOUS EPITHELIAL CELLS: 0 /HPF
UROBILINOGEN URINE: NORMAL
WHITE BLOOD CELLS URINE: 0 /HPF

## 2021-01-25 LAB — BACTERIA UR CULT: NORMAL

## 2021-02-05 ENCOUNTER — EMERGENCY (EMERGENCY)
Facility: HOSPITAL | Age: 46
LOS: 0 days | Discharge: HOME | End: 2021-02-06
Attending: STUDENT IN AN ORGANIZED HEALTH CARE EDUCATION/TRAINING PROGRAM
Payer: MEDICAID

## 2021-02-05 VITALS
DIASTOLIC BLOOD PRESSURE: 74 MMHG | RESPIRATION RATE: 18 BRPM | OXYGEN SATURATION: 95 % | WEIGHT: 164.02 LBS | TEMPERATURE: 98 F | HEIGHT: 70 IN | HEART RATE: 126 BPM | SYSTOLIC BLOOD PRESSURE: 124 MMHG

## 2021-02-05 DIAGNOSIS — F91.9 CONDUCT DISORDER, UNSPECIFIED: ICD-10-CM

## 2021-02-05 DIAGNOSIS — F12.99 CANNABIS USE, UNSPECIFIED WITH UNSPECIFIED CANNABIS-INDUCED DISORDER: ICD-10-CM

## 2021-02-05 DIAGNOSIS — S02.652S: Chronic | ICD-10-CM

## 2021-02-05 DIAGNOSIS — R41.82 ALTERED MENTAL STATUS, UNSPECIFIED: ICD-10-CM

## 2021-02-05 LAB — GLUCOSE BLDC GLUCOMTR-MCNC: 128 MG/DL — HIGH (ref 70–99)

## 2021-02-05 PROCEDURE — 99284 EMERGENCY DEPT VISIT MOD MDM: CPT

## 2021-02-05 NOTE — ED PROVIDER NOTE - PATIENT PORTAL LINK FT
You can access the FollowMyHealth Patient Portal offered by [facility] by registering at the following website: [email or web address]. By joining [employer]’s FollowMyHealth portal, you will also be able to view your health information using other applications (apps) compatible with our system.

## 2021-02-05 NOTE — ED PROVIDER NOTE - PROGRESS NOTE DETAILS
Pt girlfriend Lisa called ED (ph # [phone]) States she lives w/ pt in pt's father's home. Endorses belligerent, agitated behavior tonight. States father sleeping currently. She will come to ED to  pt in the AM. Pt informed that girlfriend will pick him up in AM, father does not want pt to return home until sober. Pt w/o wallet, unable to pay for taxi/ hotel for tonight. Pt becomes agitated, physically aggressive, verbally aggressive w/ staff when informed of plan. Meds ordered. Sleeping comfortably s/p Zyprexa. Pt girlfriend Bonnie called ED (ph # [phone]) States she lives w/ pt in pt's father's home. Endorses belligerent, agitated behavior tonight. States father sleeping currently. She will come to ED to  pt in the AM.

## 2021-02-05 NOTE — ED PROVIDER NOTE - PHYSICAL EXAMINATION
GEN: Awake, alert, interactive, NAD.  HEAD AND NECK: NC/AT. Airway patent. Neck supple.   EYES:  Clear b/l. EOMI. PERRL.   ENT: Moist mucus membranes.   CARDIAC: Regular rate, regular rhythm. No evident pedal edema.    RESP/CHEST: Normal respiratory effort with no use of accessory muscles or retractions. Clear throughout on auscultation.  ABD: Soft, non-distended, non-tender. No rebound, no guarding.   BACK: No midline spinal TTP. No CVAT.   EXTREMITIES: Moving all extremities with no apparent deformities.   SKIN: Warm, dry, intact normal color. No rash.   NEURO: AOx3, CN II-XII grossly intact, no focal deficits.   PSYCH: Agitated, but able to be redirected / reasonable.

## 2021-02-05 NOTE — ED PROVIDER NOTE - OBJECTIVE STATEMENT
44yo M BIBA d/t presumed EtOH intoxication. Per EMS report, pt father called EMS d/t pt being intoxicated, belligerent, agitated. Pt lives at home w/ father (Abdoulaye Hussein ). Pt states he drinks on weekends, endorses occasional marijuana use. Pt admits to altercation tonight, but states he cares for his father and his father is an alcoholic. Pt w/ clear speech, steady gait and is agitated but reasonable.     Denies PMH, PSH, meds, allergies.

## 2021-02-05 NOTE — ED ADULT TRIAGE NOTE - CHIEF COMPLAINT QUOTE
BIBA,  intoxication from home, per EMS, pt had combative with father,  pt admits to drinking and having marijuana this evening.  pt calm, cooperative in triage.

## 2021-02-05 NOTE — ED PROVIDER NOTE - CLINICAL SUMMARY MEDICAL DECISION MAKING FREE TEXT BOX
Clinically sober. Will attempt to reach pt father and obtain collateral. Pt w/ clear speech / steady gait, wishes to return home. Will attempt to reach pt father and obtain collateral. 46yo M BIBA d/t presumed EtOH intoxicated, belligerent / agitated. AFVSS. Pt w/ clear speech / steady gait, wishes to return home. Will attempt to reach pt father and obtain collateral. Spoke w/ pt girlfriend who lives w/ pt and father, states father does not wish for pt to return home until sober, endorses altercation, aggression tonight. Pt updated, becomes aggressive / agitated in ED, unable to be redirected. Meds ordered. Pt girlfriend will  from ED once sober. 44yo M BIBA d/t presumed EtOH intoxicated, belligerent / agitated. AFVSS. Pt w/ clear speech / steady gait, wishes to return home. Will attempt to reach pt father and obtain collateral. Spoke w/ pt girlfriend who lives w/ pt and father, states father does not wish for pt to return home until sober, endorses altercation, aggression tonight. Pt updated, becomes aggressive / agitated in ED, unable to be redirected. Meds ordered. Pt girlfriend will  from ED once sober.    Pt now awake and alert as per Dr. Castro may dc with  Bonnie - lópez dc home in her care.

## 2021-02-06 VITALS
TEMPERATURE: 98 F | OXYGEN SATURATION: 97 % | SYSTOLIC BLOOD PRESSURE: 100 MMHG | HEART RATE: 113 BPM | RESPIRATION RATE: 18 BRPM | DIASTOLIC BLOOD PRESSURE: 64 MMHG

## 2021-02-06 RX ORDER — OLANZAPINE 15 MG/1
10 TABLET, FILM COATED ORAL ONCE
Refills: 0 | Status: COMPLETED | OUTPATIENT
Start: 2021-02-06 | End: 2021-02-06

## 2021-02-06 RX ORDER — DIPHENHYDRAMINE HCL 50 MG
25 CAPSULE ORAL ONCE
Refills: 0 | Status: COMPLETED | OUTPATIENT
Start: 2021-02-06 | End: 2021-02-06

## 2021-02-06 RX ORDER — ZIPRASIDONE HYDROCHLORIDE 20 MG/1
10 CAPSULE ORAL ONCE
Refills: 0 | Status: DISCONTINUED | OUTPATIENT
Start: 2021-02-06 | End: 2021-02-06

## 2021-02-06 RX ORDER — HALOPERIDOL DECANOATE 100 MG/ML
5 INJECTION INTRAMUSCULAR ONCE
Refills: 0 | Status: COMPLETED | OUTPATIENT
Start: 2021-02-06 | End: 2021-02-06

## 2021-02-06 RX ADMIN — Medication 2 MILLIGRAM(S): at 00:59

## 2021-02-06 RX ADMIN — Medication 25 MILLIGRAM(S): at 00:45

## 2021-02-06 RX ADMIN — HALOPERIDOL DECANOATE 5 MILLIGRAM(S): 100 INJECTION INTRAMUSCULAR at 00:45

## 2021-02-06 RX ADMIN — OLANZAPINE 10 MILLIGRAM(S): 15 TABLET, FILM COATED ORAL at 01:59

## 2021-02-06 NOTE — ED ADULT NURSE REASSESSMENT NOTE - NS ED NURSE REASSESS COMMENT FT1
Safety checks compld + maintd. T+P Q encouraged. meds were given as ord with no s/s of adverse RXNs. skin care and complete care rendered at this time as patient soiled themselves. Fall +skin precs in place. Call bell within reach and safety measures in place.

## 2021-02-06 NOTE — ED ADULT NURSE NOTE - NSIMPLEMENTINTERV_GEN_ALL_ED
Implemented All Fall Risk Interventions:  [place] to call system. Call bell, personal items and telephone within reach. Instruct patient to call for assistance. Room bathroom lighting operational. Non-slip footwear when patient is off stretcher. Physically safe environment: no spills, clutter or unnecessary equipment. Stretcher in lowest position, wheels locked, appropriate side rails in place. Provide visual cue, wrist band, yellow gown, etc. Monitor gait and stability. Monitor for mental status changes and reorient to person, place, and time. Review medications for side effects contributing to fall risk. Reinforce activity limits and safety measures with patient and family.

## 2021-02-06 NOTE — ED ADULT NURSE NOTE - OBJECTIVE STATEMENT
Assisting primary RN. Received patient in bed 1. 46yo M BIBA d/t presumed EtOH intoxication. Per EMS report, pt father called EMS d/t pt being intoxicated, belligerent, agitated. Pt lives at home w/ father (Abdoulaye Hussein ). Pt states he drinks on weekends, endorses occasional marijuana use. Pt admits to altercation tonight, but states he cares for his father and his father is an alcoholic. Pt w/ clear speech, steady gait and is agitated but reasonable.

## 2021-02-06 NOTE — ED ADULT NURSE REASSESSMENT NOTE - NS ED NURSE REASSESS COMMENT FT1
Patient currently resting in bed. Restraints in place. Constant observation maintained. Pt calm and cooperative.

## 2021-02-10 ENCOUNTER — APPOINTMENT (OUTPATIENT)
Dept: UROLOGY | Facility: CLINIC | Age: 46
End: 2021-02-10

## 2021-02-10 NOTE — PHYSICAL THERAPY INITIAL EVALUATION ADULT - LEVEL OF INDEPENDENCE: GAIT, REHAB EVAL
moderate assist (50% patients effort) Ftsg Text: The defect edges were debeveled with a Dermablade.  Given the location of the defect, shape of the defect and the proximity to free margins a full thickness skin graft was deemed most appropriate.  Using a sterile surgical marker, the primary defect shape was transferred to the donor site. The area thus outlined was incised deep to adipose tissue with a #15 scalpel blade.  The harvested graft was then trimmed of adipose tissue until only dermis and epidermis was left.  The skin margins of the secondary defect were undermined to an appropriate distance in all directions utilizing iris scissors.  The secondary defect was closed with interrupted buried subcutaneous sutures.  The skin edges were then re-apposed with running  sutures.  The skin graft was then placed in the primary defect and oriented appropriately.

## 2021-02-11 NOTE — ED ADULT NURSE NOTE - NSSUSCREENINGQ4_ED_ALL_ED
ONCOLOGY INTAKE: Records Information      APPT INFORMATION: 2/18/21 - [facility]  Referring provider:  Karla Darling  Referring provider s clinic:  [facility]  Reason for visit/diagnosis:  Breast Cancer  Has patient been notified of appointment date and time?: Yes     RECORDS INFORMATION:  Were the records received with the referral (via [employer])? Internal referral     Has patient been seen for any external appt for this diagnosis? No     ADDITIONAL INFORMATION:  Scheduled via [employer]  I called Jessika & confirmed the appt with Dr Akhtar for 2/18/21 at 230pm - Virtual visit instructions sent to Jessika via [employer]     No

## 2021-03-15 NOTE — DISCHARGE NOTE NURSING/CASE MANAGEMENT/SOCIAL WORK - NSFLUVACAGEDISCH_IMM_ALL_CORE
RN attempted to reapproach patient to obtain scheduled vitals and head to toe assessment. Patient again refused assessment and vitals and stated that he wanted to be left alone. Patient educated again on risks. Writer stated that I would still be checking on him to do hourly rounding and if he needed anything to let me know.  Patient resting in bed with no distress Adult

## 2021-04-08 NOTE — H&P ADULT - HISTORY OF PRESENT ILLNESS
Retina ATTACHED and doing well today. 41 yo M with total body pain, n/v/d for about 4 days.  Pt. admits to chronic drinking (5 glasses of wine per day).  He stopped days ago due to n/v and epigastric pain.  He thought he should stop because he figured alcohol was irritating his stomach.  He also notes diffuse cramps in his muscles.  He's been drinking Gatorade for the last few days to replenish fluids.  Pt. denies inciting event.  No sick contacts, no recent travel.  He was well before.  No other complaints.   	ROS: negative for fever, cough, headache, shortness of breath, and rash.  PMH: negative; Meds: Denies; SH: chronic alcohol and marijuana use

## 2021-04-27 NOTE — PROGRESS NOTE BEHAVIORAL HEALTH - NSBHCONSULTMEDS_PSY_A_CORE FT
-- DO NOT REPLY / DO NOT REPLY ALL --  -- Message is from the [facility]--    Referral Request  Name of Specialist: n/a  Provider's specialty: Orthopedics    Medical condition for referral:  Acute pain of right knee - Previous specialist doesn't take [place]     Is this a NEW request?: no      Referral ordered by: Tina Ohara MD      Insurance type: [facility]       Payor: [place] [employer] [facility] MEDICAID / Plan: [place] [facility] MEDICAID HMO / Product Type: T19 HMO      Preferred Delivery Method   Call when ready for pickup - phone number to notify: [phone]    Caller Information       Type Contact Phone    04/27/2021 10:57 AM CDT Phone (Incoming) Jennifer Regan (Self) [phone] ()          Alternative phone number: none    Turnaround time given to caller:   \"This message will be sent to [state Provider's full name]. The clinical team will return your call as soon as they review your message. Typically, it takes 3 business days to process referral requests.\"   COURSE OF TX: methadone weaned off as of 9/26/20; increased Seroquel 50mg PO bid to 100mg via PEG BID and increase Valium to 5mg PO bid on 9/28  - continued restlessness; increase Seroquel 100bid to 150 bid on 9/29/10  - PRN Ativan dose increased to 2mg with continued need so DC Valium on 9/30/20 and use Ativan 2mg PO q8am, q4pm but HOLD for sedation with goal to stabilize agitation to the point that Patient will not need any PRNs and will have episodes of being awake  - PO Ativan ineffective; convert to Klonopin equivalent (Ativan 2mg = Klonopin 1mg) and increase to Klonopin 1.5mg PO via PEG BID on 10/6/20

## 2021-05-12 ENCOUNTER — APPOINTMENT (OUTPATIENT)
Age: 46
End: 2021-05-12

## 2021-05-13 NOTE — PATIENT PROFILE ADULT - HAS THE PATIENT USED TOBACCO IN THE PAST 30 DAYS?
Additional Information: (Optional): The wound was cleaned, and a pressure dressing was applied.  The patient received detailed post-op instructions. Yes

## 2021-06-24 NOTE — PROGRESS NOTE ADULT - ASSESSMENT
DEEPIKA RUCKER is a 44M PMH alcohol abuse, hx of alcohol withdrawal and alcohol pancreatitis who presented for abdominal pain. Admitted with necrotizing pancreatitis, sepsis, septic shock with course complicated by acute hypoxic respiratory failure secondary to PNA and ARDS requiring intubation and DTs requiring sedation and small bowel obstruction conservatively managed with NGT and NPO. Currently, s/p trach and PEG on 9/10 and treated for klebsiella bacteremia.  Intermittently having fevers, last fever 9/21, 101.0  WBC trending down, 10.95 today.   SAAG <1.1 which goes against portal hypertension and more in line with pancreatitis vs SBP vs some other cause   CXR from 9/21 with increased lung markings and effusions as well as trach  Blood culture from 9/10 was positive for klebsiella quite sensitive and one set from 9/12 is negative   Patient has multiple possible sources of fever:  Abdomen such as SBP, pancreatitis, hepatobiliary disease   Pulmonary: CXR very unclear but he is on trach collar thus unlikely   Skin and soft tissue: cellulitis of the arm (US didnt show DVT or thrombophlebitis)        Antibiotics this admission:  Vancomycin: 8/26-27; 9/16-20  Zosyn: 8/18-8/27  Meropenem: 8/23-9/2  ceftriaxone: 9/14-20  Cefazolin: 9/20->9/29  Cefepime 9/29->10/6; 10/9->    9/23: Continues to respond to cefazolin, fluid analysis not consistent with SBP and negative culture thus far, heavily sedated though meds cut in half,  9/24: Spiked a fever, panculture and repeat imaging, Behavioral health consulted, ascites fluid culture negative   9/25: still spiking, getting CT c/a/p, continue cefazolin for cellulitis which is much better   9/29: still febrile, slowly peeling off sedatives, pseudomonas growing in sputum   9/30: Fever curve improving, CT head negative, planned for MRI brain, continue cefepime  10/7: Off antibiotics, s/p course of cefepime. Low grade fever, isolated. Unfortunately mental status remains poor.   10/8: remains off antibiotics, s/p course of cefepime, remains afebrile.  10/12: Pt spiked on 10/10, blood culture NGTD x2, sputum growing pseud S cefepime, CXR (I personally reviewed) showed large R sided effusion, planned for repeat CT with possible thoracentesis      Suggestions--  #Fever/pneumonia     -Unclear of source of fever f rom 2 days ago but has been afebrile, normal WBC, not requiring higher O2 sat, secretions are improved and normal procalcitonin   -cefepime covering the pseudomonas but not sure thats the source of infection   -would favor a short course of antibiotics (stop cefepime after tomorrows PM dose)   -if performing thoracentesis, please use thoracentesis order set and check off all orders including gram stain and culture, fungal and AFB culture   -with next fever, repeat 2 sets of blood cultures     #Psoriasis   -the lesions on face are getting bigger and spreading   -would suggest pimecrolimus 1% cream pr desonide->if not formulary start hydrocortisone 2.5% ointment BID     #Need for Influenza Vaccination   -Given flu season and patient has prolonged hospitalization with multiple comorbidities, i'd suggest giving him a flu shot today or tomorrow     Angelito Morrison,   Cell: [phone]  Pager [phone]  Infectious Disease Attending  After 5pm/weekends please call [phone] for all inquiries and new consults (4) rarely moist

## 2021-09-22 NOTE — ASU PREOP CHECKLIST - SIDE RAILS UP
Quality 431: Preventive Care And Screening: Unhealthy Alcohol Use - Screening: Patient identified as an unhealthy alcohol user when screened for unhealthy alcohol use using a systematic screening method and received brief counseling Detail Level: Detailed Quality 226: Preventive Care And Screening: Tobacco Use: Screening And Cessation Intervention: Patient screened for tobacco use and is an ex/non-smoker Quality 130: Documentation Of Current Medications In The Medical Record: Current Medications Documented n/a

## 2021-12-24 NOTE — PROGRESS NOTE BEHAVIORAL HEALTH - NS ED BHA MED ROS GASTROINTESTINAL
Unable to assess Alert-The patient is alert, awake and responds to voice. The patient is oriented to time, place, and person. The triage nurse is able to obtain subjective information.

## 2022-01-17 NOTE — ED ADULT NURSE NOTE - BREATHING, MLM
Patient tolerated blood transfusion well. Given information regarding potential reactions and actions to take at home.     Spontaneous, unlabored and symmetrical

## 2022-02-24 NOTE — H&P ADULT - NSHPLANGLIMITEDENGLISH_GEN_A_CORE
Hospitalist Progress Note  [facility]       Patient: Keyona Martinez  Unit/Bed: 3E-69/319-R  YOB: 1980  MRN: [id number]  Acct: [de-identified]   AdmittingDiagnosis: Acute respiratory failure with hypoxia ([address] [J96.01]  Pneumonia due to infectious organism, unspecified laterality, unspecified part of lung [J18.9]  Admit Date: 2/18/2022  Hospital Day: 6    Subjective:     Feeling slightly better this morning, Requiring only 3 L of O2 by nasal cannula but much continues to wheeze     objective:   BP (!) 168/77   Pulse 93   Temp 98.4 °F (36.9 °C) (Oral)   Resp 18   Ht 5' (1.524 m)   Wt 291 lb (132 kg)   SpO2 92%   BMI 56.83 kg/m²     Intake/Output Summary (Last 24 hours) at 2/24/2022 1154  Last data filed at 2/24/2022 9129  Gross per 24 hour   Intake 600 ml   Output --   Net 600 ml     General appearance: A&O x3, Not ill or toxic, mild distress noted   HEENT:  JUNI  EOM intact. Neck: Supple, with full range of motion. No jugular venous distention. Trachea midline. Respiratory:   NL A/E bilat with occasional wheeze   Cardiovascular:  normal S1/S2 with no murmurs/gallops  Abdomen: obese, Soft, non-tender, non-distended, no rigidity or peritoneal signs  Musculoskeletal: NL symmetrical A/PROM bilat U/L extremities   Skin: No rashes. No edema  Neurologic:  CN II-XII intact. NL symmetrical reflexes. NL gait and stance. NL Cerebellar exam. Power 5/5 all muscle groups U/L extremities.  Toes downgoing  Capillary Refill: Brisk,< 3 seconds   Peripheral Pulses: +2 palpable, equal bilaterally     DVT Prophylaxis: Lovenox    Data:  CBC:   Lab Results   Component Value Date    WBC 23.5 02/24/2022    HGB 16.9 02/24/2022    HCT 54.4 02/24/2022    MCV 97.1 02/24/2022     02/24/2022     BMP:   Lab Results   Component Value Date     02/24/2022    K 3.9 02/24/2022    K 5.0 02/19/2022    CL 97 02/24/2022    CO2 36 02/24/2022    PHOS 2.8 07/11/2014    BUN 15 02/24/2022    CREATININE 0.4 02/24/2022    CALCIUM 8.5 02/24/2022     ABGs:   Lab Results   Component Value Date    PH 7.37 02/19/2022    PCO2 54 02/19/2022    PO2 54 02/19/2022    HCO3 31 02/19/2022    O2SAT 86 02/19/2022     Troponin:   Lab Results   Component Value Date    TROPONINI <0.006 07/14/2013    TROPONINI <0.006 04/19/2012      LFTs   Lab Results   Component Value Date    AST 33 02/20/2022    ALT 54 02/20/2022    BILITOT 0.2 02/20/2022    BILITOT NEGATIVE 12/17/2011    ALKPHOS 151 02/20/2022          Imaging   ECHO Complete 2D W Doppler W Color    Result Date: 2/19/2022  Transthoracic Echocardiography Report (TTE)  Demographics   Patient Name  Quang Delgado  Gender             Female                S   MR #          [id number]    Race                                             Ethnicity   Account #     [de-identified]    Room Number        4474   Accession     [id number]   Date of Study      02/19/2022  Number   Date of Birth 1980   Referring          Linda oLndon                             Physician          Rita Nunez MD   Age           37 year(s)   Sonographer        ALVINA Rizzo, RDCS,                                                RDMS, RVT                              Interpreting       Citlaly Kauffman MD                             Physician  Procedure Type of Study   TTE procedure:ECHOCARDIOGRAM COMPLETE 2D W DOPPLER W COLOR. Procedure Date Date: 02/19/2022 Start: 09:31 AM Study Location: Bedside Technical Quality: Limited visualization due to body habitus. Indications:Acute respiratory failure. Additional Medical History:acute respiratory failure, abdominal pain, tachycardia, sepsis, diabetes, hypertension, morbid obesity, hyperlipidemia Patient Status: Routine Height: 60 inches Weight: 270.01 pounds BSA: 2.12 m^2 BMI: 52.73 kg/m^2 BP: 173/84 mmHg  Conclusions   Summary  Technically difficult examination.   Left ventricular size and systolic function is normal. Ejection fraction  was estimated at No 55-60%. LV wall thickness is within normal limits and  there are no obvious wall motion abnormalities. The right ventricular size appears normal with normal systolic function  and wall thickness. Signature   ----------------------------------------------------------------  Electronically signed by Agueda Bedoya MD (Interpreting  physician) on 02/19/2022 at 01:11 PM  ----------------------------------------------------------------   Findings   Mitral Valve  The mitral valve structure is normal with normal leaflet separation. DOPPLER: The transmitral velocity was within the normal range with no  evidence for mitral stenosis. There was no evidence of mitral  regurgitation. Aortic Valve  The aortic valve appears trileaflet with normal thickness and leaflet  excursion. DOPPLER: Transaortic velocity was within the normal range with  no evidence of aortic stenosis. There was no evidence of aortic  regurgitation. Tricuspid Valve  The tricuspid valve structure is normal with normal leaflet separation. DOPPLER: There is no evidence of tricuspid stenosis. There was no evidence  of tricuspid regurgitation. Pulmonic Valve  The pulmonic valve was not well visualized . Left Atrium  Left atrial size is normal.   Left Ventricle  Left ventricular size and systolic function is normal. Ejection fraction  was estimated at 55-60%. LV wall thickness is within normal limits and  there are no obvious wall motion abnormalities. Right Atrium  Right atrial size was normal.   Right Ventricle  The right ventricular size appears normal with normal systolic function  and wall thickness. Pericardial Effusion  The pericardium appears normal with no evidence of a pericardial effusion. Pleural Effusion  No evidence of pleural effusion. Aorta / Great Vessels  -Aortic root dimension within normal limits. -IVC size is within normal limits with normal respiratory phasic changes.   M-Mode/2D Measurements & Calculations   LV Diastolic LV Systolic Dimension: 2.7 cm    LA Dimension: 3.9 cmLA  Dimension: 3.7 cm LV Volume Diastolic: 98.2 ml     Area: 12.1 cm^2  LV FS:27 %        LV Volume Systolic: 27 ml  LV PW Diastolic:  LV EDV/LV EDV Index: 58.1 ml/27  1.3 cm            m^2LV ESV/LV ESV Index: 27 JE/76  Septum Diastolic: m^2                              RV Diastolic Dimension:  1.3 cm            EF Calculated: 53.5 %            1.8 cm                                                      Ascending Aorta: 2.4 cm                                                     LA volume/Index: 26.8                                                     ml /13m^2  Doppler Measurements & Calculations   MV Peak E-Wave: 121 cm/s   AV Peak Velocity: 174  LVOT Peak Velocity: 120  MV Peak A-Wave: 136 cm/s   cm/s                   cm/s  MV E/A Ratio: 0.89         AV Peak Gradient:      LVOT Peak Gradient: 6  MV Peak Gradient: 5.86     12.11 mmHg             mmHg  mmHg                                                    TV Peak E-Wave: 53.6  MV Deceleration Time: 264                         cm/s  msec                             IVRT: 95 msec          TV Peak Gradient: 1.15                                                    mmHg  MV E' Septal Velocity: 7.7                        TR Velocity:244 cm/s  cm/s                       AV DVI (Vmax):0.69     TR Gradient:23.81 mmHg  MV A' Septal Velocity: 9.6                        PV Peak Velocity: 88.7  cm/s                                              cm/s  MV E' Lateral Velocity:                           PV Peak Gradient: 3.15  9.4 cm/s                                          mmHg  MV A' Lateral Velocity:  10.2 cm/s  E/E' septal: 15.71  E/E' lateral: 12.87  [id number]    XR CHEST (2 VW)    Result Date: 2/24/2022  PROCEDURE: XR CHEST (2 VW) CLINICAL INFORMATION: shortness of breath.  Shortness of breath. Acute hypoxia. COMPARISON: Chest x-ray 2/18/2022. TECHNIQUE: PA and lateral views the chest. FINDINGS: The heart size is normal.  The mediastinum is not widened. There is engorgement of the pulmonary vascularity. There are worsening bilateral interstitial infiltrates. There are no pleural effusions. No suspicious osseous lesions are present. Worsening pulmonary edema. **This report has been created using voice recognition software. It may contain minor errors which are inherent in voice recognition technology. ** Final report electronically signed by Dr. Lupe Lam on 2/24/2022 7:17 AM    CTA CHEST W WO CONTRAST    Result Date: 2/18/2022  CTA chest Comparison: None. Findings: Uniform density through the thyroid gland. Lymphoid hyperplasia in the superior mediastinum, hilar regions and infracarinal tissues. Adenopathy may be reactive to scattered atelectasis and infiltrate through the lungs bilaterally. There is groundglass opacification centrally in the upper lobes with predominantly atelectasis and consolidative infiltrate scattered through the lower lobes and right middle lobe. Minimal airspace opacification is noted to the lingula. There is no evidence of pulmonary embolism or aortic dissection. Small hernia is evident with moderate volume of air seen into the distal esophagus suggesting underlying gastroesophageal reflux disease, correlate clinically. The included portions of the upper abdomen are notable for hepatomegaly. Axillary lymph nodes are benign appearing. The patient's breast tissues are that of scattered fibroglandular nature. For healthcare maintenance purposes she should be reminded that the [employer] recommends screening mammography commencing at the age of 37. Impression: Bilateral pneumonia with significant reactive adenopathy. Follow-up recommended to ensure complete clearing of via chest radiography and subsequent CT examination across the next 6-8 weeks. This document has been electronically signed by: Brionna Gomes MD on 02/18/2022 10:52 PM All CTs at this facility use dose modulation techniques and iterative reconstructions, and/or weight-based dosing when appropriate to reduce radiation to a low as reasonably achievable. XR CHEST PORTABLE    Result Date: 2/18/2022  PROCEDURE: XR CHEST PORTABLE CLINICAL INFORMATION: SOB. COMPARISON: Chest x-ray dated 23 May 2019. Meseret Dragan TECHNIQUE: AP upright view of the chest. FINDINGS: There is mild cardiomegaly. .The mediastinum is not widened. There is slightly increased density throughout both lung fields. There are no effusions. . The pulmonary vascularity is increased. No suspicious osseous lesions are present. 1. Cardiomegaly and possible mild congestive heart failure. 2.. Slightly increased density throughout both lung fields. **This report has been created using voice recognition software. It may contain minor errors which are inherent in voice recognition technology. ** Final report electronically signed by DR Joy Beckham on 2/18/2022 9:01 PM      Assessment/Plan:    1.  Acute respiratory failure secondary to #2  Improving, Continue to wean oxygen, incentive spirometry, aggressive bronchodilator treatments,   Will decrease IV steroids to once daily      2.  Community-acquired pneumonia/bilateral  Continue IV antibiotics,  and IV steroids      3.  Tobacco abuse   Encouraged to quit, continue nicotine patch     4.  Hypertension   Under control continue home medications      Electronically signed by Lyric Andrade MD on 2/24/2022 at 11:54 AM    Rounding Hospitalist

## 2022-03-01 NOTE — PROGRESS NOTE BEHAVIORAL HEALTH - NS ED BHA MED ROS GASTROINTESTINAL
Patient called stating she has swelling and a lump under her right eyelid.  Patient stated it started yesterday and has not gotten better.  Patient woke up with crustiness and puffiness this morning.  [phone]   No complaints

## 2022-03-03 NOTE — ED ADULT NURSE NOTE - HOW OFTEN DO YOU HAVE SIX OR MORE DRINKS ON ONE OCCASION?
Four or more times a week FAMILY HISTORY:  No pertinent family history in first degree relatives

## 2022-04-28 NOTE — ED ADULT TRIAGE NOTE - NS ED NURSE DIRECT TO ROOM YN
No
Price (Do Not Change): 0.00
Detail Level: Simple
Instructions: This plan will send the code FBSD to the PM system.  DO NOT or CHANGE the price.

## 2022-05-19 NOTE — OCCUPATIONAL THERAPY INITIAL EVALUATION ADULT - DEEP PRESSURE SENSATION, RUE, OT EVAL
Anesthesia Consult and Med Hx


Date of service: 05/19/22





- Airway


Anesthetic Teeth Evaluation: Good


ROM Head & Neck: Adequate


Mental/Hyoid Distance: Adequate


Mallampati Class: Class II


Intubation Access Assessment: Probably Good





- Pulmonary Exam


CTA: Yes





- Cardiac Exam


Cardiac Exam: RRR





- Pre-Operative Health Status


ASA Pre-Surgery Classification: ASA1


Proposed Anesthetic Plan: General





- Pulmonary


Hx Smoking: No


Hx Asthma: No


Hx Sleep Apnea: No (LEANN PRE SCREEN NEGATIVE)





- Cardiovascular System


Hx Hypertension: No


Hx Coronary Artery Disease: No





- Central Nervous System


Hx Seizures: No


CVA: No


Hx Psychiatric Problems: No





- Gastrointestinal


Hx Gastroesophageal Reflux Disease: No





- Endocrine


Hx Renal Disease: No


Hx Liver Disease: No


Hx Non-Insulin Dependent Diabetes: No


Hx Thyroid Disease: No





- Hematic


Hx Anemia: No


Hx Sickle Cell Disease: No





- Other Systems


Hx Alcohol Use: Yes (occasional)


Hx Substance Use: No


Hx Cancer: No


Hx Obesity: No





- Additional Comments


Anesthesia Medical History Comments: no hx of anesthetic complications
within normal limits

## 2022-06-21 NOTE — H&P PST ADULT - PROBLEM SELECTOR PLAN 2
5 Pt was evaluated for ETOH withdrawal on 11/1/18 in [facility] ([place] Pt currently admits to drinking 2-3 times per week, 3-4 beers, last drink yesterday.   Case discussed with Dr Devi anesthesiologist.

## 2022-08-05 NOTE — PATIENT PROFILE ADULT - NSPROHMSYMPCOND_GEN_A_NUR
well developed, well nourished , in no acute distress , ambulating without difficulty , normal communication ability none

## 2022-12-20 NOTE — ED ADULT TRIAGE NOTE - NSWEIGHTCALCTOOLDRUG_GEN_A_CORE
INDICATION: Arthritis left hip.



TIME OF EXAM: 3:20 PM



2 views left hip were obtained.



FINDINGS: There is superior joint space narrowing compatible with

degenerative change. There is some spurring at the femoral head

neck junction laterally. Femoral acetabular alignment is

maintained. There are no fractures identified. The femoral head

and neck are intact. Left-sided rami are intact.



IMPRESSION: Hip joint degenerative changes. No acute bony

abnormality is detected.



Dictated by: 



  Dictated on workstation # [id number]  used

## 2023-03-16 NOTE — PROGRESS NOTE BEHAVIORAL HEALTH - NS ED BHA MED ROS GASTROINTESTINAL
Patient is TEMPORARILY approved for [employer] Patient Assistance Program for 90-days as of 3/15/23 and will receive Jakafi free of charge through the programs dispensing pharmacy, [facility]. Someone from the program's pharmacy will be reaching out to patient to coordinate their first shipment.     FOR DOCUMENTATION ONLY:  Financial Assistance for Jakafi is approved from 3/15/23 to 6/13/23  Source: [employer] Patient Assistance Program  Case ID: [id number]  Phone: [phone]  Fax: [phone]   Pharmacy: [facility]    **Patient must submit proof of income before his 90-day approval expires. Once this documentation is received, patients enrollment will be extended through 12/31/23**    Outgoing call to notify patient. Wife answered the phone and says she will relay info to patient.    Unable to assess

## 2023-06-13 NOTE — ASU PATIENT PROFILE, ADULT - NSCAFFEAMTFREQ_GEN_ALL_CORE_SD
1-2 cups/cans per day Ftsg Text: The defect edges were debeveled with a #15 scalpel blade. Given the location of the defect, shape of the defect and the proximity to free margins a full thickness skin graft was deemed most appropriate. Using a sterile surgical marker, the primary defect shape was transferred to the donor site. The area thus outlined was incised deep to adipose tissue with a #15 scalpel blade.  The harvested graft was then trimmed of adipose tissue until only dermis and epidermis was left.  The skin margins of the secondary defect were undermined to an appropriate distance in all directions utilizing iris scissors.  The secondary defect was closed with interrupted buried subcutaneous sutures.  The skin edges were then re-apposed with running  sutures.  The skin graft was then placed in the primary defect and oriented appropriately.

## 2023-07-07 NOTE — BEHAVIORAL HEALTH ASSESSMENT NOTE - SUICIDE PROTECTIVE FACTORS
Gout Agents Refill  Protocol - 12 Month Protocol Passed     LV 5/17/2023  NV 11/28/2023    Current medication:  Currently, on Allopurinol (Zyloprim) 300 mg tablet for gout, Montelukast (Singulair) 10 mg tablet daily for allergy and Pantoprazole (Protonix) 40 mg tablet for acid reflux. On Albuterol inhaler as needed once a week.    Current medication:    Medication list reviewed and continue current management.   Suggested notifying me if he uses Albuterol inhaler 3-4 times in a week.    Refill authorized per protocol.     Other

## 2023-07-08 NOTE — PATIENT PROFILE ADULT - NSSCCAGEALCOHOLGUILTYABOUT_GEN_A_NUR
Answer Assessment - Initial Assessment Questions  1. ONSET: "Describe your bleeding: is it getting worse, staying the same, improving, or stopping and starting?"      After laying on side stood up and filled pad- bright red denies clots    2. AMOUNT: "How much bleeding are you having today?"      - SPOTTING: spotting, or pinkish / brownish mucous discharge; does not fill panti-liner or pad     - MILD:  less than 1 pad / hour; less than patient's usual menstrual bleeding    - MODERATE: 1-2 pads / hour; 1 menstrual cup every 6 hours; small-medium blood clots (e.g., pea, grape, small coin)    - SEVERE: soaking 2 or more pads/hour for 2 or more hours; 1 menstrual cup every 2 hours; bleeding not contained by pads or continuous red blood from vagina; large blood clots (e.g., golf ball, large coin)       Soaking pad x1    3. ABDOMINAL PAIN: "Do you have any pain?" "How bad is the pain?" "What does it keep you from doing?"      - MILD -  doesn't interfere with normal activities, abdomen soft and not tender to touch      - MODERATE - interferes with normal activities or awakens from sleep, tender to touch      - SEVERE - excruciating pain, doubled over, unable to do any normal activities        Mild /10    4. HORMONES: "Are you taking any birth control medications?" (e.g., birth control pills, Depo-Provera)      Denies    5. BLOOD THINNERS: "Do you take any blood thinners?" (e.g., coumadin, aspirin)      Denies    6. OTHER SYMPTOMS: "Do you have any other symptoms?" (e.g., fever, chills, dizziness)      Denies    7. DELIVERY DATE: "When was your delivery date?" "Vaginal delivery or ?"          8.  BREASTFEEDING: "Are you breastfeeding?"      yes    Protocols used: POSTPARTUM - VAGINAL BLEEDING AND LOCHIA-ADULT-AH
On call provider contacted. Monitor for now. Recommended to call back with more bleeding. Please contact on call for [facility].
Reason for Disposition  • SEVERE vaginal bleeding (e.g., soaking 2 pads per hour and present 2 or more hours; large blood clots)    Protocols used: POSTPARTUM - VAGINAL BLEEDING AND LOCHIA-ADULT-AH
Regarding: Postpartum Vaginal bleeding  ----- Message from Tyler Hammond RN sent at 7/8/2023 11:27 AM EDT -----  " I am 3 weeks postpartum and having heavy bleeding. I filled up a whole pad when I sat up from the couch. No other symptoms. "
yes

## 2023-09-05 NOTE — PROGRESS NOTE BEHAVIORAL HEALTH - SECONDARY DX1
No
Alcohol dependence with unspecified alcohol-induced disorder

## 2023-09-11 NOTE — ED ADULT TRIAGE NOTE - PRO INTERPRETER NEED 2
What Type Of Note Output Would You Prefer (Optional)?: Standard Output
How Severe Is Your Rash?: moderate
Is This A New Presentation, Or A Follow-Up?: Rash
English

## 2023-09-18 NOTE — PROGRESS NOTE ADULT - ATTENDING COMMENTS
FU in 6months  
Agree with above.  Remains intubated 14/400/25%/+5  etOH withdrawal with times of somnolence and episodes of extreme agitation.  fentanyl and Precedex  Will attempt using benzos instead of phenobarb.    Total attending critical care time spent: 40 minutes
Agree with above.  etOH ?5 glasses wine daily.  Now pancreatitis + DTs (etOH negative on admission)  c/w phenobarb + Precedex  NPO + LR @125  Continue to monitor closely in ICU.    Total attending critical care time spent: 35 minutes
Agree with above.  etOH withdrawal on Precedex / phenobarb. On LR @125/hr for pancreatitis. Lipase remains elevated but slightly improved.  All day, incoherent / restless then sedated, sonorous with stertor.  Intubated for airway protection.  c/w Precedex and phenobarb ATC.    Total attending critical care time spent, excluding procedures performed: 60 minutes
Agree with above.  etOH withdrawal.  Intubated for somnolence.  Continue phenobarb: change to 260 Q8h ATC and PRN  Start tube feeds at low dose (10/hr) and Reglan. May not tolerate given significant GI output (bilious non-bloody).  Cannot SAT / SBT until calmer.    Total attending critical care time spent: 35 minutes
Pt seen and examined on rounds with team 5/14.     42M PMH alcohol abuse presents with total body pain, abdominal pain, n/v/d for 4 days. Pt stopped drinking approximately 3 days prior to admission due to n/v and epigastric pain.  Admitted to ICU for alcohol pancreatitis and alcohol withdrawal/DTs, acute renal insufficiency. Intubated 5/8 for acute hypoxic respiratory failure due to staph aureus pneumonia. Gram positive bacteremia.     - on meropenem for pneumonia/bacteremia, d/c vanco as MRSA not growing in cultures  - copious ETT and oral secretions, unable to extubate today, cont scopolamine   - on propofol drip for sedation  - cont daily wean as tolerates  - pancreatitis improved, downtrending lipase  - cont with reglan for component of ileus with high NGT residuals  - start trickle NGT feeds   - coag negative staph in blood cultures: will repeat blood cultures ?contaminant   - supplement hypokalemia    Critical Care Time: 40 min
Pt seen and examined with team on rounds 5/15    42M PMH alcohol abuse presents with total body pain, abdominal pain, n/v/d for 4 days. Pt stopped drinking approximately 3 days prior to admission due to n/v and epigastric pain.  Admitted to ICU for alcohol pancreatitis and alcohol withdrawal/DTs, acute renal insufficiency. Intubated 5/8 for acute hypoxic respiratory failure due to staph aureus pneumonia. Gram positive bacteremia.     - on meropenem for pneumonia/bacteremia, vanco d/angela yesterday as there is no MRSA in cultures  - weaning, but not extubated as pt with copious ETT and oral secretions  - d/c propofol due to elevated triglycerides while on propofol, start precedex drip  - cont daily wean as tolerates  - pancreatitis improved, downtrending lipase  - cont with reglan for component of ileus with high NGT residuals  - cont NGT feeds as tolerates  - coag negative staph in blood cultures: will repeat blood cultures ?contaminant   - supplement hypokalemia, hypomagnesemia    Critical Care Time: 40 min
Pt seen and examined with ICU team on rounds 5/17:    42M PMH alcohol abuse presents with total body pain, abdominal pain, n/v/d for 4 days. Pt stopped drinking approximately 3 days prior to admission due to n/v and epigastric pain.  Admitted to ICU for alcohol pancreatitis and alcohol withdrawal/DTs, acute renal insufficiency. Intubated 5/8 for acute hypoxic respiratory failure due to staph aureus pneumonia. Extubated 5/16.    - weaned and extubated yesterday to nasal cannula  - pt still with periods of agitation and restlessness  - received ativan, zyprexa, versed, haldol while on precedex gtt  - now that pt taking PO: will d/c ativan and change to librium (longer acting)  - trial of standing haldol- current restlessness and agitation more likely ICU delirium s/p prolonged ICU stay, pt out of withdrawal period  - wean down precedex drip  - maintain on 1:1 constant observation  - change antibiotics to levaquin to cover staph PNA  - supplement hypokalemia, hypomagnesemia, hypophosphatemia     Critical Care Time: 35 min

## 2023-09-28 NOTE — DISCHARGE NOTE NURSING/CASE MANAGEMENT/SOCIAL WORK - NSDCVIVACCINE_GEN_ALL_CORE_FT
----- Message from NOAH Slade sent at 9/28/2023  8:15 AM CDT -----  Please call patient with + urine culture, E.Coli is the isolated organism and this is susceptible to Macrobid - please send Rx for 100 mg Macrobid PO BID x 5 days. Return to clinic if symptoms are persisting or worsening otherwise follow up with PCP PRN.    No Vaccines Administered.

## 2023-11-15 NOTE — PROGRESS NOTE ADULT - ASSESSMENT
Reviewed with pt and given written copy to pt.    alcohol withdrawal/ Pancreatitis , Hypokalemia ,hyponatremia Elevated serum protien  follow up labs  clinically improved  bladder sono  for retention.- noted    PT/OT follow up-dc planning

## 2024-01-10 NOTE — SBIRT NOTE ADULT - NSSBIRTAUDITDRGSCORE_GEN_A_CORE_CAL
01/09/24 2046   BiPAP/CPAP   BIPAP/CPAP Not Done Patient refused  (doesnt want hospital unit, will wear oxygen at night)        1

## 2024-02-27 NOTE — PROGRESS NOTE ADULT - ASSESSMENT
42M PMH alcohol abuse presents with total body pain, abdominal pain, n/v/d for 4 days. Pt stopped drinking approximately 3 days prior to admission due to n/v and epigastric pain.  Admitted to ICU for alcohol pancreatitis and alcohol withdrawal/DTs, acute renal insufficiency. Intubated 5/8 for acute hypoxic respiratory failure due to staph aureus pneumonia. Extubated 5/16.    - doing well on nasal cannula  - agitation and restlessness slowly improving  - wean off precedex, cont librium and haldol  - maintain on 1:1 constant observation  - cont levaquin to cover staph PNA  - supplement hypokalemia, hypophosphatemia  - OOB to chair, physical therapy  - pancreatitis resolved, tolerating po diet     Critical Care Time: 32 min . Awake

## 2024-04-03 NOTE — BEHAVIORAL HEALTH ASSESSMENT NOTE - RISK ASSESSMENT
Refill request received for Wellbutrin, montelukast    Last office visit: 1/17/2024  Next office visit: 4/19/2024  Last refill: 12/08/2023  Last labs:     
has chronic risks, but no imminent risk to self or others: older male, substance abuse, legal issues, not keen on seeking substance abuse treatment, motivated to stop drinking on own, anger outbursts but keen on treatment, mood stable, alert, denies any si or hi, no psychosis, denies any history of inpatient psych admissions, no known history of si or hi, future oriented, feels responsible for father and family.

## 2024-05-13 NOTE — PATIENT PROFILE ADULT - NSPROMUTPARTICIPCAREFT_GEN_A_NUR
Physical Therapy Missed Treatment Note      Patient Name:  Fransico Montalvo   MRN:  [id number]  Admitting Diagnosis:  Hematochezia   Recent Surgery: Procedure(s) (LRB):  EGD (ESOPHAGOGASTRODUODENOSCOPY) (N/A) 3 Days Post-Op  Admit Date: 5/9/2024  Length of Stay: 4 days    Patient not seen today due to (P) Off the floor for procedure/surgery x2 attempts. Will follow-up for progressive mobility 5/14/24 pending continued medical stability and patient participation.    5/13/2024    n/a

## 2024-07-09 NOTE — PATIENT PROFILE ADULT - DOES PATIENT HAVE ADVANCE DIRECTIVE
71 year old M hx of ESRD on HD left AVF MWFr, CVA w/ gliosis, BKA/AKA functional quadriplegia, CAD, HTN, HLD, recent admission for MRSA bacteremia here w/ not responding at his extended care facility.    ESRD:  On HD TIW via [facility].  Schedule is MWF. Consent obtained and charted.  s/p HD 7/5 UF 1576  hd tmr  - Renal diet.    Unresponsiveness:  Verbally responsive  work up per team  possible sec to sepsis   improved    Hyperkalemia:  K wnl  EKG not consistent with Hyperkalemia.  resolved  hd per schedule  - Follow up BMP.    anemia  above goal   epo with hd as needed   monitor no

## 2024-09-05 NOTE — PROGRESS NOTE BEHAVIORAL HEALTH - GROOMING
[facility] Primary [facility]  
Fair

## 2024-10-23 NOTE — DISCHARGE NOTE PROVIDER - NSDCQMPCI_CARD_ALL_CORE
PT ASKED FOR AN ATTENDING ONLY..      SCHEDULED WITH ABDELRAHMAN ROGERS, HERRERA ECHOLS, 2/12/2025, 8AM.      HFU/  CARBON/ Stroke-like symptoms     DC- HOME- 10/23/2024    ----- Message from Delia Hernandez MD sent at 10/23/2024 12:07 PM EDT -----  Pedrito Gaines will need follow-up in in 3 months with neurovascular team for Embolic stroke secondary to known hypercoaguable state in 60 minute appointment. They will not require outpatient neurological testing  
No